# Patient Record
Sex: FEMALE | Race: WHITE | HISPANIC OR LATINO | URBAN - METROPOLITAN AREA
[De-identification: names, ages, dates, MRNs, and addresses within clinical notes are randomized per-mention and may not be internally consistent; named-entity substitution may affect disease eponyms.]

---

## 2017-10-18 ENCOUNTER — OUTPATIENT (OUTPATIENT)
Dept: OUTPATIENT SERVICES | Facility: HOSPITAL | Age: 77
LOS: 1 days | End: 2017-10-18
Payer: MEDICARE

## 2017-10-18 PROCEDURE — 74177 CT ABD & PELVIS W/CONTRAST: CPT | Mod: 26

## 2017-10-18 PROCEDURE — 74177 CT ABD & PELVIS W/CONTRAST: CPT

## 2018-03-01 ENCOUNTER — RESULT REVIEW (OUTPATIENT)
Age: 78
End: 2018-03-01

## 2018-03-02 ENCOUNTER — INPATIENT (INPATIENT)
Facility: HOSPITAL | Age: 78
LOS: 2 days | Discharge: HOME HEALTH | DRG: 355 | End: 2018-03-05
Attending: SURGERY | Admitting: SURGERY
Payer: MEDICARE

## 2018-03-02 VITALS
WEIGHT: 197.09 LBS | HEART RATE: 62 BPM | HEIGHT: 64 IN | TEMPERATURE: 98 F | DIASTOLIC BLOOD PRESSURE: 56 MMHG | OXYGEN SATURATION: 98 % | RESPIRATION RATE: 17 BRPM | SYSTOLIC BLOOD PRESSURE: 111 MMHG

## 2018-03-02 LAB
GLUCOSE BLDC GLUCOMTR-MCNC: 127 MG/DL — HIGH (ref 70–99)
GLUCOSE BLDC GLUCOMTR-MCNC: 182 MG/DL — HIGH (ref 70–99)
GLUCOSE BLDC GLUCOMTR-MCNC: 215 MG/DL — HIGH (ref 70–99)
GLUCOSE BLDC GLUCOMTR-MCNC: 224 MG/DL — HIGH (ref 70–99)
GLUCOSE BLDC GLUCOMTR-MCNC: 233 MG/DL — HIGH (ref 70–99)

## 2018-03-02 PROCEDURE — 49566: CPT | Mod: 82

## 2018-03-02 RX ORDER — SODIUM CHLORIDE 9 MG/ML
500 INJECTION, SOLUTION INTRAVENOUS ONCE
Qty: 0 | Refills: 0 | Status: COMPLETED | OUTPATIENT
Start: 2018-03-02 | End: 2018-03-02

## 2018-03-02 RX ORDER — ACETAMINOPHEN 500 MG
1000 TABLET ORAL ONCE
Qty: 0 | Refills: 0 | Status: COMPLETED | OUTPATIENT
Start: 2018-03-02 | End: 2018-03-03

## 2018-03-02 RX ORDER — LEVOTHYROXINE SODIUM 125 MCG
75 TABLET ORAL AT BEDTIME
Qty: 0 | Refills: 0 | Status: DISCONTINUED | OUTPATIENT
Start: 2018-03-02 | End: 2018-03-03

## 2018-03-02 RX ORDER — HEPARIN SODIUM 5000 [USP'U]/ML
5000 INJECTION INTRAVENOUS; SUBCUTANEOUS EVERY 8 HOURS
Qty: 0 | Refills: 0 | Status: DISCONTINUED | OUTPATIENT
Start: 2018-03-02 | End: 2018-03-05

## 2018-03-02 RX ORDER — SODIUM CHLORIDE 9 MG/ML
1000 INJECTION, SOLUTION INTRAVENOUS
Qty: 0 | Refills: 0 | Status: DISCONTINUED | OUTPATIENT
Start: 2018-03-02 | End: 2018-03-05

## 2018-03-02 RX ORDER — ONDANSETRON 8 MG/1
4 TABLET, FILM COATED ORAL EVERY 6 HOURS
Qty: 0 | Refills: 0 | Status: DISCONTINUED | OUTPATIENT
Start: 2018-03-02 | End: 2018-03-05

## 2018-03-02 RX ORDER — GLUCAGON INJECTION, SOLUTION 0.5 MG/.1ML
1 INJECTION, SOLUTION SUBCUTANEOUS ONCE
Qty: 0 | Refills: 0 | Status: DISCONTINUED | OUTPATIENT
Start: 2018-03-02 | End: 2018-03-05

## 2018-03-02 RX ORDER — DEXTROSE 50 % IN WATER 50 %
25 SYRINGE (ML) INTRAVENOUS ONCE
Qty: 0 | Refills: 0 | Status: DISCONTINUED | OUTPATIENT
Start: 2018-03-02 | End: 2018-03-05

## 2018-03-02 RX ORDER — CEFAZOLIN SODIUM 1 G
1000 VIAL (EA) INJECTION ONCE
Qty: 0 | Refills: 0 | Status: COMPLETED | OUTPATIENT
Start: 2018-03-02 | End: 2018-03-02

## 2018-03-02 RX ORDER — INSULIN LISPRO 100/ML
VIAL (ML) SUBCUTANEOUS
Qty: 0 | Refills: 0 | Status: DISCONTINUED | OUTPATIENT
Start: 2018-03-02 | End: 2018-03-05

## 2018-03-02 RX ORDER — METOPROLOL TARTRATE 50 MG
2.5 TABLET ORAL EVERY 6 HOURS
Qty: 0 | Refills: 0 | Status: DISCONTINUED | OUTPATIENT
Start: 2018-03-02 | End: 2018-03-03

## 2018-03-02 RX ORDER — HYDROMORPHONE HYDROCHLORIDE 2 MG/ML
0.5 INJECTION INTRAMUSCULAR; INTRAVENOUS; SUBCUTANEOUS EVERY 4 HOURS
Qty: 0 | Refills: 0 | Status: DISCONTINUED | OUTPATIENT
Start: 2018-03-02 | End: 2018-03-04

## 2018-03-02 RX ORDER — HYDROMORPHONE HYDROCHLORIDE 2 MG/ML
0.25 INJECTION INTRAMUSCULAR; INTRAVENOUS; SUBCUTANEOUS EVERY 4 HOURS
Qty: 0 | Refills: 0 | Status: DISCONTINUED | OUTPATIENT
Start: 2018-03-02 | End: 2018-03-04

## 2018-03-02 RX ORDER — SODIUM CHLORIDE 9 MG/ML
1000 INJECTION, SOLUTION INTRAVENOUS
Qty: 0 | Refills: 0 | Status: DISCONTINUED | OUTPATIENT
Start: 2018-03-02 | End: 2018-03-04

## 2018-03-02 RX ORDER — DEXTROSE 50 % IN WATER 50 %
12.5 SYRINGE (ML) INTRAVENOUS ONCE
Qty: 0 | Refills: 0 | Status: DISCONTINUED | OUTPATIENT
Start: 2018-03-02 | End: 2018-03-05

## 2018-03-02 RX ORDER — DEXTROSE 50 % IN WATER 50 %
1 SYRINGE (ML) INTRAVENOUS ONCE
Qty: 0 | Refills: 0 | Status: DISCONTINUED | OUTPATIENT
Start: 2018-03-02 | End: 2018-03-05

## 2018-03-02 RX ADMIN — Medication 100 MILLIGRAM(S): at 17:56

## 2018-03-02 RX ADMIN — Medication 2: at 22:54

## 2018-03-02 RX ADMIN — Medication 75 MICROGRAM(S): at 22:55

## 2018-03-02 RX ADMIN — Medication: at 12:02

## 2018-03-02 RX ADMIN — SODIUM CHLORIDE 125 MILLILITER(S): 9 INJECTION, SOLUTION INTRAVENOUS at 21:14

## 2018-03-02 RX ADMIN — HEPARIN SODIUM 5000 UNIT(S): 5000 INJECTION INTRAVENOUS; SUBCUTANEOUS at 22:51

## 2018-03-02 RX ADMIN — HYDROMORPHONE HYDROCHLORIDE 0.5 MILLIGRAM(S): 2 INJECTION INTRAMUSCULAR; INTRAVENOUS; SUBCUTANEOUS at 14:30

## 2018-03-02 RX ADMIN — HYDROMORPHONE HYDROCHLORIDE 0.5 MILLIGRAM(S): 2 INJECTION INTRAMUSCULAR; INTRAVENOUS; SUBCUTANEOUS at 14:04

## 2018-03-02 RX ADMIN — SODIUM CHLORIDE 1000 MILLILITER(S): 9 INJECTION, SOLUTION INTRAVENOUS at 14:00

## 2018-03-02 RX ADMIN — SODIUM CHLORIDE 100 MILLILITER(S): 9 INJECTION, SOLUTION INTRAVENOUS at 11:59

## 2018-03-02 RX ADMIN — ONDANSETRON 4 MILLIGRAM(S): 8 TABLET, FILM COATED ORAL at 17:56

## 2018-03-02 RX ADMIN — HYDROMORPHONE HYDROCHLORIDE 0.5 MILLIGRAM(S): 2 INJECTION INTRAMUSCULAR; INTRAVENOUS; SUBCUTANEOUS at 21:45

## 2018-03-02 RX ADMIN — HYDROMORPHONE HYDROCHLORIDE 0.5 MILLIGRAM(S): 2 INJECTION INTRAMUSCULAR; INTRAVENOUS; SUBCUTANEOUS at 21:19

## 2018-03-02 RX ADMIN — Medication 4: at 16:00

## 2018-03-02 NOTE — BRIEF OPERATIVE NOTE - PROCEDURE
<<-----Click on this checkbox to enter Procedure Incisional hernia repair with mesh  03/02/2018    Active  CORRINA  Robotic assisted procedure  03/02/2018    Active  CORRINA

## 2018-03-02 NOTE — H&P PST ADULT - ASSESSMENT
79 yo F w/ T2DM, HTN, HLD. with questionable surgical history. Here for ventral hernia repair robotic assist, converted to open    1. Ventral hernia s/p open repair  -Pain/nausea control  -DEIDRA  -NPO/IVF  DVT PPx hold until POD1    2. Cardiac hx   Denies any hx, however considered high risk  -2/16 Echo & stress, LVEF 70%, w/ normal perfusion    3. T2DM  -Home Glipizide held  -ISS    4. HTN  -Home Atenolol Held  - Current 2.5 Metoprolol IV Push    5. Hypothyroidism  - oN synthroid  - IV Levothyroxine

## 2018-03-02 NOTE — PROGRESS NOTE ADULT - SUBJECTIVE AND OBJECTIVE BOX
Team 1 Surgery Post-Op Note, PCN:     Pre-Op Dx:  incisional hernia  Procedure: Robotic assisted procedure  Incisional hernia repair with mesh    Surgeon: Huber    Subjective: pt seen at bedside, without complaints at this time      Vital Signs Last 24 Hrs  T(C): 36.6 (02 Mar 2018 11:19), Max: 36.6 (02 Mar 2018 11:19)  T(F): 97.9 (02 Mar 2018 11:19), Max: 97.9 (02 Mar 2018 11:19)  HR: 66 (02 Mar 2018 14:28) (62 - 86)  BP: 97/51 (02 Mar 2018 14:28) (92/50 - 115/58)  BP(mean): 69 (02 Mar 2018 14:28) (62 - 89)  RR: 18 (02 Mar 2018 14:28) (12 - 23)  SpO2: 96% (02 Mar 2018 14:28) (90% - 99%)    Physical Exam:  General: NAD, resting comfortably in bed  Pulmonary: Nonlabored breathing, no respiratory distress  Cardiovascular: NSR  Abdominal: soft, NT/ND  Extremities: WWP, normal strength  Neuro: A/O x 3, CNs II-XII grossly intact, no focal deficits, normal sensation  Pulses: palpable distal pulses            CAPILLARY BLOOD GLUCOSE  224 (02 Mar 2018 11:49)      POCT Blood Glucose.: 224 mg/dL (02 Mar 2018 11:50)  POCT Blood Glucose.: 127 mg/dL (02 Mar 2018 06:45)              Radiology and Additional Studies:    Assessment:78y Female s/p robot assisted incisional hernia repair    Plan:  Pain/nausea control PRN  Home meds  Incentive spirometer/OOB/Ambulate  IVF, Diet: CLD  AM labs  continue suazo

## 2018-03-02 NOTE — H&P PST ADULT - HISTORY OF PRESENT ILLNESS
77 yo F PMHx T2DM, HLD, HTN, Hypothyroidism. Here for a robotic-assisted ventral hernia repair, for a symptomatic ventral hernia. Patient considered cardiac risk for general anesthesia, so a pre-operative Echo & Stress test were performed, LVEF 70%, normal perfusion.     Patient reports no significant surgical history, however found to have 2 defects with suture upon entering abdomen.

## 2018-03-02 NOTE — BRIEF OPERATIVE NOTE - OPERATION/FINDINGS
Patient denied history of surgery, however two defects were identified with Prolene sutures present at both defects.  Several loops of small bowel including the mesentery were incarcerated within the sac and unable to be reduced robotically or laparoscopically.  Procedure was converted to open and the two defects were combined into one by dividing the tissue connecting the two defects.  Retrorectus plane created bilaterally.  Posterior sheath closed with running 0 Vicryl sutures.  Progrip mesh 15 x 15 cm placed overlying posterior sheath closure in retrorectus sheath.  Anterior sheath closed with running 1 PDS sutures.  19F Magdaleno drain left in subcutaneous space created by hernia sac.

## 2018-03-02 NOTE — PACU DISCHARGE NOTE - NS MD DISCHARGE NOTE DISCHARGE
I called pt. No answer. Left msg that I did not get notice of this. The last thing I saw was that they wanted to get old films to compare. Can you call breast center and see if there is an addendum or updated report? I left ms that I did not know and would try to find out.   Floor

## 2018-03-03 LAB
ANION GAP SERPL CALC-SCNC: 10 MMOL/L — SIGNIFICANT CHANGE UP (ref 5–17)
BASOPHILS NFR BLD AUTO: 0.1 % — SIGNIFICANT CHANGE UP (ref 0–2)
BUN SERPL-MCNC: 16 MG/DL — SIGNIFICANT CHANGE UP (ref 7–23)
CALCIUM SERPL-MCNC: 9.1 MG/DL — SIGNIFICANT CHANGE UP (ref 8.4–10.5)
CHLORIDE SERPL-SCNC: 101 MMOL/L — SIGNIFICANT CHANGE UP (ref 96–108)
CO2 SERPL-SCNC: 24 MMOL/L — SIGNIFICANT CHANGE UP (ref 22–31)
CREAT SERPL-MCNC: 0.75 MG/DL — SIGNIFICANT CHANGE UP (ref 0.5–1.3)
EOSINOPHIL NFR BLD AUTO: 0 % — SIGNIFICANT CHANGE UP (ref 0–6)
GLUCOSE BLDC GLUCOMTR-MCNC: 118 MG/DL — HIGH (ref 70–99)
GLUCOSE BLDC GLUCOMTR-MCNC: 125 MG/DL — HIGH (ref 70–99)
GLUCOSE BLDC GLUCOMTR-MCNC: 147 MG/DL — HIGH (ref 70–99)
GLUCOSE BLDC GLUCOMTR-MCNC: 170 MG/DL — HIGH (ref 70–99)
GLUCOSE BLDC GLUCOMTR-MCNC: 185 MG/DL — HIGH (ref 70–99)
GLUCOSE SERPL-MCNC: 132 MG/DL — HIGH (ref 70–99)
HBA1C BLD-MCNC: 5.8 % — HIGH (ref 4–5.6)
HCT VFR BLD CALC: 37.9 % — SIGNIFICANT CHANGE UP (ref 34.5–45)
HGB BLD-MCNC: 12.3 G/DL — SIGNIFICANT CHANGE UP (ref 11.5–15.5)
LYMPHOCYTES # BLD AUTO: 9.2 % — LOW (ref 13–44)
MAGNESIUM SERPL-MCNC: 1.5 MG/DL — LOW (ref 1.6–2.6)
MCHC RBC-ENTMCNC: 29.9 PG — SIGNIFICANT CHANGE UP (ref 27–34)
MCHC RBC-ENTMCNC: 32.5 G/DL — SIGNIFICANT CHANGE UP (ref 32–36)
MCV RBC AUTO: 92 FL — SIGNIFICANT CHANGE UP (ref 80–100)
MONOCYTES NFR BLD AUTO: 6.4 % — SIGNIFICANT CHANGE UP (ref 2–14)
NEUTROPHILS NFR BLD AUTO: 84.3 % — HIGH (ref 43–77)
PHOSPHATE SERPL-MCNC: 3.1 MG/DL — SIGNIFICANT CHANGE UP (ref 2.5–4.5)
PLATELET # BLD AUTO: 218 K/UL — SIGNIFICANT CHANGE UP (ref 150–400)
POTASSIUM SERPL-MCNC: 4.3 MMOL/L — SIGNIFICANT CHANGE UP (ref 3.5–5.3)
POTASSIUM SERPL-SCNC: 4.3 MMOL/L — SIGNIFICANT CHANGE UP (ref 3.5–5.3)
RBC # BLD: 4.12 M/UL — SIGNIFICANT CHANGE UP (ref 3.8–5.2)
RBC # FLD: 13.1 % — SIGNIFICANT CHANGE UP (ref 10.3–16.9)
SODIUM SERPL-SCNC: 135 MMOL/L — SIGNIFICANT CHANGE UP (ref 135–145)
WBC # BLD: 15.1 K/UL — HIGH (ref 3.8–10.5)
WBC # FLD AUTO: 15.1 K/UL — HIGH (ref 3.8–10.5)

## 2018-03-03 RX ORDER — ACETAMINOPHEN 500 MG
1000 TABLET ORAL ONCE
Qty: 0 | Refills: 0 | Status: COMPLETED | OUTPATIENT
Start: 2018-03-03 | End: 2018-03-04

## 2018-03-03 RX ORDER — ATENOLOL 25 MG/1
50 TABLET ORAL DAILY
Qty: 0 | Refills: 0 | Status: DISCONTINUED | OUTPATIENT
Start: 2018-03-03 | End: 2018-03-05

## 2018-03-03 RX ORDER — LISINOPRIL 2.5 MG/1
10 TABLET ORAL DAILY
Qty: 0 | Refills: 0 | Status: DISCONTINUED | OUTPATIENT
Start: 2018-03-03 | End: 2018-03-05

## 2018-03-03 RX ORDER — AMLODIPINE BESYLATE 2.5 MG/1
10 TABLET ORAL DAILY
Qty: 0 | Refills: 0 | Status: DISCONTINUED | OUTPATIENT
Start: 2018-03-03 | End: 2018-03-05

## 2018-03-03 RX ORDER — ATORVASTATIN CALCIUM 80 MG/1
20 TABLET, FILM COATED ORAL AT BEDTIME
Qty: 0 | Refills: 0 | Status: DISCONTINUED | OUTPATIENT
Start: 2018-03-03 | End: 2018-03-05

## 2018-03-03 RX ORDER — LEVOTHYROXINE SODIUM 125 MCG
150 TABLET ORAL DAILY
Qty: 0 | Refills: 0 | Status: DISCONTINUED | OUTPATIENT
Start: 2018-03-03 | End: 2018-03-05

## 2018-03-03 RX ORDER — MAGNESIUM SULFATE 500 MG/ML
4 VIAL (ML) INJECTION ONCE
Qty: 0 | Refills: 0 | Status: COMPLETED | OUTPATIENT
Start: 2018-03-03 | End: 2018-03-03

## 2018-03-03 RX ADMIN — ONDANSETRON 4 MILLIGRAM(S): 8 TABLET, FILM COATED ORAL at 05:47

## 2018-03-03 RX ADMIN — Medication 10 MILLIGRAM(S): at 17:44

## 2018-03-03 RX ADMIN — HEPARIN SODIUM 5000 UNIT(S): 5000 INJECTION INTRAVENOUS; SUBCUTANEOUS at 17:43

## 2018-03-03 RX ADMIN — Medication 100 GRAM(S): at 12:28

## 2018-03-03 RX ADMIN — Medication 1000 MILLIGRAM(S): at 06:15

## 2018-03-03 RX ADMIN — HYDROMORPHONE HYDROCHLORIDE 0.5 MILLIGRAM(S): 2 INJECTION INTRAMUSCULAR; INTRAVENOUS; SUBCUTANEOUS at 12:07

## 2018-03-03 RX ADMIN — HYDROMORPHONE HYDROCHLORIDE 0.5 MILLIGRAM(S): 2 INJECTION INTRAMUSCULAR; INTRAVENOUS; SUBCUTANEOUS at 17:45

## 2018-03-03 RX ADMIN — ATENOLOL 50 MILLIGRAM(S): 25 TABLET ORAL at 10:17

## 2018-03-03 RX ADMIN — Medication 400 MILLIGRAM(S): at 05:47

## 2018-03-03 RX ADMIN — HEPARIN SODIUM 5000 UNIT(S): 5000 INJECTION INTRAVENOUS; SUBCUTANEOUS at 05:47

## 2018-03-03 RX ADMIN — ATORVASTATIN CALCIUM 20 MILLIGRAM(S): 80 TABLET, FILM COATED ORAL at 22:13

## 2018-03-03 RX ADMIN — HEPARIN SODIUM 5000 UNIT(S): 5000 INJECTION INTRAVENOUS; SUBCUTANEOUS at 22:12

## 2018-03-03 RX ADMIN — Medication 2: at 17:44

## 2018-03-03 RX ADMIN — SODIUM CHLORIDE 125 MILLILITER(S): 9 INJECTION, SOLUTION INTRAVENOUS at 06:38

## 2018-03-03 RX ADMIN — HYDROMORPHONE HYDROCHLORIDE 0.5 MILLIGRAM(S): 2 INJECTION INTRAMUSCULAR; INTRAVENOUS; SUBCUTANEOUS at 18:45

## 2018-03-03 RX ADMIN — HYDROMORPHONE HYDROCHLORIDE 0.5 MILLIGRAM(S): 2 INJECTION INTRAMUSCULAR; INTRAVENOUS; SUBCUTANEOUS at 13:05

## 2018-03-03 RX ADMIN — Medication 2: at 22:13

## 2018-03-03 NOTE — PROGRESS NOTE ADULT - SUBJECTIVE AND OBJECTIVE BOX
O/N: EMANUEL, pain well tolerated  3/2: robot assisted incisional hernia repair with mesh, POC wnl    POD 1 Incisional hernia repair with mesh O/N: EMANUEL, pain well tolerated  3/2: robot assisted incisional hernia repair with mesh, POC wnl    POD 1 Incisional hernia repair with mesh    SUBJECTIVE:  pt seen at bedside, complains of some incisional pain    MEDICATIONS  (STANDING):  dextrose 5%. 1000 milliLiter(s) (50 mL/Hr) IV Continuous <Continuous>  dextrose 50% Injectable 12.5 Gram(s) IV Push once  dextrose 50% Injectable 25 Gram(s) IV Push once  dextrose 50% Injectable 25 Gram(s) IV Push once  heparin  Injectable 5000 Unit(s) SubCutaneous every 8 hours  insulin lispro (HumaLOG) corrective regimen sliding scale   SubCutaneous Before meals and at bedtime  lactated ringers. 1000 milliLiter(s) (125 mL/Hr) IV Continuous <Continuous>  levothyroxine Injectable 75 MICROGram(s) IV Push at bedtime  metoprolol    tartrate Injectable 2.5 milliGRAM(s) IV Push every 6 hours    MEDICATIONS  (PRN):  dextrose Gel 1 Dose(s) Oral once PRN Blood Glucose LESS THAN 70 milliGRAM(s)/deciliter  glucagon  Injectable 1 milliGRAM(s) IntraMuscular once PRN Glucose LESS THAN 70 milligrams/deciliter  HYDROmorphone  Injectable 0.25 milliGRAM(s) IV Push every 4 hours PRN Moderate Pain  HYDROmorphone  Injectable 0.5 milliGRAM(s) IV Push every 4 hours PRN Severe Pain  ondansetron Injectable 4 milliGRAM(s) IV Push every 6 hours PRN Nausea      Vital Signs Last 24 Hrs  T(C): 36.6 (03 Mar 2018 04:00), Max: 36.6 (02 Mar 2018 11:19)  T(F): 97.8 (03 Mar 2018 04:00), Max: 97.9 (02 Mar 2018 11:19)  HR: 75 (03 Mar 2018 04:00) (66 - 86)  BP: 111/64 (03 Mar 2018 04:00) (91/48 - 121/67)  BP(mean): 72 (02 Mar 2018 16:00) (62 - 89)  RR: 18 (03 Mar 2018 04:00) (12 - 23)  SpO2: 95% (03 Mar 2018 04:00) (90% - 99%)    PHYSICAL EXAM:      Constitutional: A&Ox3    Respiratory: non labored breathing, no respiratory distress    Cardiovascular: NSR, RRR    Gastrointestinal: soft, mild incisional tenderness, nondistended    Extremities: (-) edema          I&O's Detail    02 Mar 2018 07:01  -  03 Mar 2018 07:00  --------------------------------------------------------  IN:    IV PiggyBack: 200 mL    lactated ringers.: 1980 mL  Total IN: 2180 mL    OUT:    Bulb: 140 mL    Indwelling Catheter - Urethral: 1085 mL  Total OUT: 1225 mL    Total NET: 955 mL          LABS:                        12.3   15.1  )-----------( 218      ( 03 Mar 2018 06:35 )             37.9     03-03    135  |  101  |  16  ----------------------------<  132<H>  4.3   |  24  |  0.75    Ca    9.1      03 Mar 2018 06:35  Phos  3.1     03-03  Mg     1.5     03-03

## 2018-03-03 NOTE — PROGRESS NOTE ADULT - ASSESSMENT
79 yo F w/ T2DM, HTN, HLD. with questionable surgical history. Here for ventral hernia repair robotic assist, converted to open    1. Ventral hernia s/p open repair  -Pain/nausea control  -DEIDRA  -CLD/IVF  -DVT PPx hold until POD1  -Ancef x 1 @ 18:00  -Nicole  -LEANN drain    2. Cardiac hx   Denies any hx, however considered high risk  -2/16 Echo & stress, LVEF 70%, w/ normal perfusion    3. T2DM  -Home Glipizide held  -ISS    4. HTN  -Home Atenolol Held  - Current 2.5 Metoprolol IV Push    5. Hypothyroidism  - On synthroid  - IV Levothyroxine 77 yo F w/ T2DM, HTN, HLD. with questionable surgical history. Here for ventral hernia repair robotic assist, converted to open    1. Ventral hernia s/p open repair  -Pain/nausea control  -DEIDRA  -CLD/IVF-adv to regular diet  -DVT PPx hold until POD1  -Ancef x 1 @ 18:00  -Nicole-dc this am  -LEANN drain    2. Cardiac hx   Denies any hx, however considered high risk  -2/16 Echo & stress, LVEF 70%, w/ normal perfusion    3. T2DM  -Home Glipizide held  -ISS    4. HTN  -Home Atenolol Held  - Current 2.5 Metoprolol IV Push    5. Hypothyroidism  - On synthroid  - IV Levothyroxine

## 2018-03-04 ENCOUNTER — TRANSCRIPTION ENCOUNTER (OUTPATIENT)
Age: 78
End: 2018-03-04

## 2018-03-04 LAB
GLUCOSE BLDC GLUCOMTR-MCNC: 134 MG/DL — HIGH (ref 70–99)
GLUCOSE BLDC GLUCOMTR-MCNC: 148 MG/DL — HIGH (ref 70–99)
GLUCOSE BLDC GLUCOMTR-MCNC: 150 MG/DL — HIGH (ref 70–99)
GLUCOSE BLDC GLUCOMTR-MCNC: 158 MG/DL — HIGH (ref 70–99)

## 2018-03-04 PROCEDURE — 74019 RADEX ABDOMEN 2 VIEWS: CPT | Mod: 26

## 2018-03-04 PROCEDURE — 74021 RADEX ABDOMEN 3+ VIEWS: CPT | Mod: 26

## 2018-03-04 RX ORDER — ATORVASTATIN CALCIUM 80 MG/1
1 TABLET, FILM COATED ORAL
Qty: 0 | Refills: 0 | COMMUNITY
Start: 2018-03-04

## 2018-03-04 RX ORDER — OXYCODONE AND ACETAMINOPHEN 5; 325 MG/1; MG/1
2 TABLET ORAL EVERY 4 HOURS
Qty: 0 | Refills: 0 | Status: DISCONTINUED | OUTPATIENT
Start: 2018-03-04 | End: 2018-03-05

## 2018-03-04 RX ORDER — OXYCODONE AND ACETAMINOPHEN 5; 325 MG/1; MG/1
1 TABLET ORAL EVERY 4 HOURS
Qty: 0 | Refills: 0 | Status: DISCONTINUED | OUTPATIENT
Start: 2018-03-04 | End: 2018-03-05

## 2018-03-04 RX ADMIN — LISINOPRIL 10 MILLIGRAM(S): 2.5 TABLET ORAL at 05:10

## 2018-03-04 RX ADMIN — Medication 150 MICROGRAM(S): at 05:10

## 2018-03-04 RX ADMIN — ATORVASTATIN CALCIUM 20 MILLIGRAM(S): 80 TABLET, FILM COATED ORAL at 21:57

## 2018-03-04 RX ADMIN — HEPARIN SODIUM 5000 UNIT(S): 5000 INJECTION INTRAVENOUS; SUBCUTANEOUS at 14:11

## 2018-03-04 RX ADMIN — AMLODIPINE BESYLATE 10 MILLIGRAM(S): 2.5 TABLET ORAL at 05:10

## 2018-03-04 RX ADMIN — OXYCODONE AND ACETAMINOPHEN 2 TABLET(S): 5; 325 TABLET ORAL at 09:03

## 2018-03-04 RX ADMIN — Medication 400 MILLIGRAM(S): at 00:14

## 2018-03-04 RX ADMIN — Medication 2: at 17:14

## 2018-03-04 RX ADMIN — OXYCODONE AND ACETAMINOPHEN 2 TABLET(S): 5; 325 TABLET ORAL at 10:00

## 2018-03-04 RX ADMIN — HEPARIN SODIUM 5000 UNIT(S): 5000 INJECTION INTRAVENOUS; SUBCUTANEOUS at 05:10

## 2018-03-04 RX ADMIN — ATENOLOL 50 MILLIGRAM(S): 25 TABLET ORAL at 05:10

## 2018-03-04 RX ADMIN — Medication 1000 MILLIGRAM(S): at 00:45

## 2018-03-04 RX ADMIN — HEPARIN SODIUM 5000 UNIT(S): 5000 INJECTION INTRAVENOUS; SUBCUTANEOUS at 21:58

## 2018-03-04 NOTE — DISCHARGE NOTE ADULT - PATIENT PORTAL LINK FT
You can access the [facility] Patient Portal, offered by [facility], by registering with the following website: [email or web address]

## 2018-03-04 NOTE — PROGRESS NOTE ADULT - ASSESSMENT
79 yo F w/ T2DM, HTN, HLD. with questionable surgical history. Here for ventral hernia repair robotic assist, converted to open    1. Ventral hernia s/p open repair  -Pain/nausea control  -DEIDRA  -CLD/IVF  -DVT PPx  -LEANN drain    2. Cardiac hx   Denies any hx, however considered high risk  -2/16 Echo & stress, LVEF 70%, w/ normal perfusion    3. T2DM  -Home Glipizide held  -ISS    4. HTN  -Home Atenolol Held  - Current 2.5 Metoprolol IV Push    5. Hypothyroidism  - On synthroid  - IV Levothyroxine 77 yo F w/ T2DM, HTN, HLD. with questionable surgical history. Here for ventral hernia repair robotic assist, converted to open    1. Ventral hernia s/p open repair  -Pain/nausea control  -DEIDRA  -Reg diet  -DVT PPx  -LEANN drain    2. Cardiac hx   Denies any hx, however considered high risk  -2/16 Echo & stress, LVEF 70%, w/ normal perfusion    3. T2DM  -Home Glipizide held  -ISS    4. HTN  -Home Atenolol Held  - Current 2.5 Metoprolol IV Push    5. Hypothyroidism  - PO synthroid

## 2018-03-04 NOTE — PROGRESS NOTE ADULT - SUBJECTIVE AND OBJECTIVE BOX
O/N: EMANUEL, pain well tolerated  3/3: gabriele amato'd, oral home meds, adv to regular diet, dulcolax suppository, denies BM, passed TOV    POD 2  robot assisted incisional hernia repair with mesh O/N: EMANUEL, pain well tolerated  3/3: gabriele montoya, oral home meds, adv to regular diet, dulcolax suppository, heidi BM, passed TOV    POD 2  robot assisted incisional hernia repair with mesh      Vital Signs Last 24 Hrs  T(C): 37.4 (04 Mar 2018 05:08), Max: 37.4 (03 Mar 2018 22:03)  T(F): 99.3 (04 Mar 2018 05:08), Max: 99.3 (03 Mar 2018 22:03)  HR: 88 (04 Mar 2018 05:08) (82 - 90)  BP: 125/81 (04 Mar 2018 05:08) (125/81 - 145/72)  BP(mean): --  RR: 23 (04 Mar 2018 05:08) (16 - 24)  SpO2: 91% (04 Mar 2018 05:08) (90% - 95%)      I&O's Summary    02 Mar 2018 07:01  -  03 Mar 2018 07:00  --------------------------------------------------------  IN: 2180 mL / OUT: 1225 mL / NET: 955 mL    03 Mar 2018 07:01  -  04 Mar 2018 06:21  --------------------------------------------------------  IN: 180 mL / OUT: 581 mL / NET: -401 mL        Gen: NAD   Abd: soft, nt / nd   incisions c/d/i

## 2018-03-04 NOTE — DISCHARGE NOTE ADULT - HOSPITAL COURSE
Her postoperative course was unremarkable with advancement of diet, passing trial of void, and pain control. On day of discharge patient was stable to be d/c'd home.

## 2018-03-04 NOTE — DISCHARGE NOTE ADULT - MEDICATION SUMMARY - MEDICATIONS TO TAKE
I will START or STAY ON the medications listed below when I get home from the hospital:    Aspirin Enteric Coated 81 mg oral delayed release tablet  -- 1 tab(s) by mouth once a day  -- Indication: For Home med     oxyCODONE-acetaminophen 5 mg-325 mg oral tablet  -- 1 tab(s) by mouth every 4 hours, As needed, Moderate Pain (4 - 6)  -- Indication: For PRN pain    lisinopril 10 mg oral tablet  -- 1 tab(s) by mouth once a day  -- Indication: For Home med     glipiZIDE 5 mg oral tablet  -- 1 tab(s) by mouth once a day  -- Indication: For Home med     atorvastatin 20 mg oral tablet  -- 1 tab(s) by mouth once a day (at bedtime)  -- Indication: For Home med     amlodipine-atorvastatin 10 mg-20 mg oral tablet  -- 1 tab(s) by mouth once a day  -- Indication: For Home med     atenolol 50 mg oral tablet  -- 1 tab(s) by mouth once a day  -- Indication: For Home med     Synthroid 150 mcg (0.15 mg) oral tablet  -- 1 tab(s) by mouth once a day  -- Indication: For Home med I will START or STAY ON the medications listed below when I get home from the hospital:    Aspirin Enteric Coated 81 mg oral delayed release tablet  -- 1 tab(s) by mouth once a day  -- Indication: For Home med     oxyCODONE-acetaminophen 5 mg-325 mg oral tablet  -- 1 tab(s) by mouth every 4 hours, As needed, Moderate Pain (4 - 6) MDD:6  -- Indication: For PRN pain     lisinopril 10 mg oral tablet  -- 1 tab(s) by mouth once a day  -- Indication: For Home med     glipiZIDE 5 mg oral tablet  -- 1 tab(s) by mouth once a day  -- Indication: For Home med     atorvastatin 20 mg oral tablet  -- 1 tab(s) by mouth once a day (at bedtime)  -- Indication: For Home med     amlodipine-atorvastatin 10 mg-20 mg oral tablet  -- 1 tab(s) by mouth once a day  -- Indication: For Home med     atenolol 50 mg oral tablet  -- 1 tab(s) by mouth once a day  -- Indication: For Home med     Synthroid 150 mcg (0.15 mg) oral tablet  -- 1 tab(s) by mouth once a day  -- Indication: For Home med I will START or STAY ON the medications listed below when I get home from the hospital:    Aspirin Enteric Coated 81 mg oral delayed release tablet  -- 1 tab(s) by mouth once a day  -- Indication: For Home med     oxyCODONE-acetaminophen 5 mg-325 mg oral tablet  -- 1 tab(s) by mouth every 4 hours, As needed, Moderate Pain (4 - 6) MDD:6  -- Indication: For PRN pain     lisinopril 10 mg oral tablet  -- 1 tab(s) by mouth once a day  -- Indication: For Home med     glipiZIDE 5 mg oral tablet  -- 1 tab(s) by mouth once a day  -- Indication: For Home med     atorvastatin 20 mg oral tablet  -- 1 tab(s) by mouth once a day (at bedtime)  -- Indication: For Home med     amlodipine-atorvastatin 10 mg-20 mg oral tablet  -- 1 tab(s) by mouth once a day  -- Indication: For Home med     atenolol 50 mg oral tablet  -- 1 tab(s) by mouth once a day  -- Indication: For Home med     bisacodyl 5 mg oral delayed release tablet  -- 1 tab(s) by mouth every 12 hours, As needed, Constipation  -- Indication: For constipation    Synthroid 150 mcg (0.15 mg) oral tablet  -- 1 tab(s) by mouth once a day  -- Indication: For Home med

## 2018-03-04 NOTE — DISCHARGE NOTE ADULT - CARE PLAN
Principal Discharge DX:	Ventral hernia  Goal:	follow up  Assessment and plan of treatment:	follow up in the office with dr. daley. call to schedule an appointment. call in 1 - 2 weeks. continue with regular diet. take pain medication only as needed. Principal Discharge DX:	Ventral hernia  Goal:	follow up  Assessment and plan of treatment:	follow up in the office with dr. daley. call to schedule an appointment. call in 1 week. continue with regular diet. take pain medication only as needed. LEANN will remain in place. Dr. Daley will remove drain in the office.

## 2018-03-04 NOTE — DISCHARGE NOTE ADULT - PLAN OF CARE
follow up follow up in the office with dr. daley. call to schedule an appointment. call in 1 - 2 weeks. continue with regular diet. take pain medication only as needed. follow up in the office with dr. daley. call to schedule an appointment. call in 1 week. continue with regular diet. take pain medication only as needed. LEANN will remain in place. Dr. Daley will remove drain in the office.

## 2018-03-05 VITALS
SYSTOLIC BLOOD PRESSURE: 100 MMHG | OXYGEN SATURATION: 91 % | HEART RATE: 95 BPM | RESPIRATION RATE: 17 BRPM | DIASTOLIC BLOOD PRESSURE: 73 MMHG | TEMPERATURE: 98 F

## 2018-03-05 LAB
ANION GAP SERPL CALC-SCNC: 12 MMOL/L — SIGNIFICANT CHANGE UP (ref 5–17)
BUN SERPL-MCNC: 31 MG/DL — HIGH (ref 7–23)
CALCIUM SERPL-MCNC: 10.2 MG/DL — SIGNIFICANT CHANGE UP (ref 8.4–10.5)
CHLORIDE SERPL-SCNC: 101 MMOL/L — SIGNIFICANT CHANGE UP (ref 96–108)
CO2 SERPL-SCNC: 22 MMOL/L — SIGNIFICANT CHANGE UP (ref 22–31)
CREAT SERPL-MCNC: 0.92 MG/DL — SIGNIFICANT CHANGE UP (ref 0.5–1.3)
GLUCOSE BLDC GLUCOMTR-MCNC: 151 MG/DL — HIGH (ref 70–99)
GLUCOSE BLDC GLUCOMTR-MCNC: 177 MG/DL — HIGH (ref 70–99)
GLUCOSE SERPL-MCNC: 163 MG/DL — HIGH (ref 70–99)
HCT VFR BLD CALC: 39.2 % — SIGNIFICANT CHANGE UP (ref 34.5–45)
HGB BLD-MCNC: 12.5 G/DL — SIGNIFICANT CHANGE UP (ref 11.5–15.5)
MAGNESIUM SERPL-MCNC: 2.1 MG/DL — SIGNIFICANT CHANGE UP (ref 1.6–2.6)
MCHC RBC-ENTMCNC: 30 PG — SIGNIFICANT CHANGE UP (ref 27–34)
MCHC RBC-ENTMCNC: 31.9 G/DL — LOW (ref 32–36)
MCV RBC AUTO: 94.2 FL — SIGNIFICANT CHANGE UP (ref 80–100)
PHOSPHATE SERPL-MCNC: 2.5 MG/DL — SIGNIFICANT CHANGE UP (ref 2.5–4.5)
PLATELET # BLD AUTO: 273 K/UL — SIGNIFICANT CHANGE UP (ref 150–400)
POTASSIUM SERPL-MCNC: 4.6 MMOL/L — SIGNIFICANT CHANGE UP (ref 3.5–5.3)
POTASSIUM SERPL-SCNC: 4.6 MMOL/L — SIGNIFICANT CHANGE UP (ref 3.5–5.3)
RBC # BLD: 4.16 M/UL — SIGNIFICANT CHANGE UP (ref 3.8–5.2)
RBC # FLD: 13.8 % — SIGNIFICANT CHANGE UP (ref 10.3–16.9)
SODIUM SERPL-SCNC: 135 MMOL/L — SIGNIFICANT CHANGE UP (ref 135–145)
WBC # BLD: 20.6 K/UL — HIGH (ref 3.8–10.5)
WBC # FLD AUTO: 20.6 K/UL — HIGH (ref 3.8–10.5)

## 2018-03-05 PROCEDURE — 85025 COMPLETE CBC W/AUTO DIFF WBC: CPT

## 2018-03-05 PROCEDURE — 83735 ASSAY OF MAGNESIUM: CPT

## 2018-03-05 PROCEDURE — 84100 ASSAY OF PHOSPHORUS: CPT

## 2018-03-05 PROCEDURE — S2900: CPT

## 2018-03-05 PROCEDURE — 74019 RADEX ABDOMEN 2 VIEWS: CPT

## 2018-03-05 PROCEDURE — 74021 RADEX ABDOMEN 3+ VIEWS: CPT

## 2018-03-05 PROCEDURE — 36415 COLL VENOUS BLD VENIPUNCTURE: CPT

## 2018-03-05 PROCEDURE — 88302 TISSUE EXAM BY PATHOLOGIST: CPT

## 2018-03-05 PROCEDURE — 83036 HEMOGLOBIN GLYCOSYLATED A1C: CPT

## 2018-03-05 PROCEDURE — 82962 GLUCOSE BLOOD TEST: CPT

## 2018-03-05 PROCEDURE — 85027 COMPLETE CBC AUTOMATED: CPT

## 2018-03-05 PROCEDURE — 80048 BASIC METABOLIC PNL TOTAL CA: CPT

## 2018-03-05 PROCEDURE — C1781: CPT

## 2018-03-05 RX ADMIN — OXYCODONE AND ACETAMINOPHEN 1 TABLET(S): 5; 325 TABLET ORAL at 05:57

## 2018-03-05 RX ADMIN — AMLODIPINE BESYLATE 10 MILLIGRAM(S): 2.5 TABLET ORAL at 05:57

## 2018-03-05 RX ADMIN — Medication 150 MICROGRAM(S): at 05:57

## 2018-03-05 RX ADMIN — HEPARIN SODIUM 5000 UNIT(S): 5000 INJECTION INTRAVENOUS; SUBCUTANEOUS at 05:57

## 2018-03-05 RX ADMIN — ATENOLOL 50 MILLIGRAM(S): 25 TABLET ORAL at 05:57

## 2018-03-05 RX ADMIN — OXYCODONE AND ACETAMINOPHEN 1 TABLET(S): 5; 325 TABLET ORAL at 06:51

## 2018-03-05 RX ADMIN — LISINOPRIL 10 MILLIGRAM(S): 2.5 TABLET ORAL at 05:57

## 2018-03-05 RX ADMIN — OXYCODONE AND ACETAMINOPHEN 2 TABLET(S): 5; 325 TABLET ORAL at 00:47

## 2018-03-05 RX ADMIN — Medication 2: at 08:12

## 2018-03-05 RX ADMIN — OXYCODONE AND ACETAMINOPHEN 2 TABLET(S): 5; 325 TABLET ORAL at 01:30

## 2018-03-05 NOTE — PROGRESS NOTE ADULT - SUBJECTIVE AND OBJECTIVE BOX
O/N:pain controlled, tolerating diet, ARBF. VSS. EMANUEL  3/4: diet advanced to regular, mild abdominal distension, abdominal film performed confirming dilatation O/N:pain controlled, tolerating diet, ARBF. VSS. EMANUEL  3/4: diet advanced to regular, mild abdominal distension, abdominal film performed confirming dilatation       INTERVAL HPI/OVERNIGHT EVENTS:    STATUS POST:    POD 3  robot assisted incisional hernia repair with mesh    SUBJECTIVE: Pt seen and examined at bedside. No flatus no BM. States chronic laxative use.   Flatus: [ ] YES [ x] NO             Bowel Movement: [ ] YES [ x] NO  Pain (0-10):            Pain Control Adequate: [x ] YES [ ] NO  Nausea: [ ] YES [x ] NO            Vomiting: [ ] YES [x ] NO  Diarrhea: [ ] YES [x ] NO         Constipation: [x ] YES [ ] NO     Chest Pain: [ ] YES [ x] NO    SOB:  [ ] YES [x ] NO    MEDICATIONS  (STANDING):  amLODIPine   Tablet 10 milliGRAM(s) Oral daily  ATENolol  Tablet 50 milliGRAM(s) Oral daily  atorvastatin 20 milliGRAM(s) Oral at bedtime  dextrose 5%. 1000 milliLiter(s) (50 mL/Hr) IV Continuous <Continuous>  dextrose 50% Injectable 12.5 Gram(s) IV Push once  dextrose 50% Injectable 25 Gram(s) IV Push once  dextrose 50% Injectable 25 Gram(s) IV Push once  heparin  Injectable 5000 Unit(s) SubCutaneous every 8 hours  insulin lispro (HumaLOG) corrective regimen sliding scale   SubCutaneous Before meals and at bedtime  levothyroxine 150 MICROGram(s) Oral daily  lisinopril 10 milliGRAM(s) Oral daily    MEDICATIONS  (PRN):  bisacodyl 5 milliGRAM(s) Oral every 12 hours PRN Constipation  dextrose Gel 1 Dose(s) Oral once PRN Blood Glucose LESS THAN 70 milliGRAM(s)/deciliter  glucagon  Injectable 1 milliGRAM(s) IntraMuscular once PRN Glucose LESS THAN 70 milligrams/deciliter  ondansetron Injectable 4 milliGRAM(s) IV Push every 6 hours PRN Nausea  oxyCODONE    5 mG/acetaminophen 325 mG 1 Tablet(s) Oral every 4 hours PRN Moderate Pain (4 - 6)  oxyCODONE    5 mG/acetaminophen 325 mG 2 Tablet(s) Oral every 4 hours PRN Severe Pain (7 - 10)      Vital Signs Last 24 Hrs  T(C): 36.7 (05 Mar 2018 05:11), Max: 37.1 (04 Mar 2018 21:10)  T(F): 98.1 (05 Mar 2018 05:11), Max: 98.7 (04 Mar 2018 21:10)  HR: 83 (05 Mar 2018 05:11) (80 - 95)  BP: 121/77 (05 Mar 2018 05:11) (121/72 - 143/78)  BP(mean): --  RR: 17 (05 Mar 2018 05:11) (16 - 20)  SpO2: 93% (05 Mar 2018 05:11) (91% - 95%)    PHYSICAL EXAM:      Constitutional: A&Ox3    Respiratory: non labored breathing, no respiratory distress    Cardiovascular:  RRR    Gastrointestinal: obese, softly distended, NT incisions cdi s/s to LEANN    Genitourinary: voiding    Extremities: (-) edema                  I&O's Detail    04 Mar 2018 07:01  -  05 Mar 2018 07:00  --------------------------------------------------------  IN:    Oral Fluid: 480 mL  Total IN: 480 mL    OUT:    Bulb: 10 mL    Voided: 400 mL  Total OUT: 410 mL    Total NET: 70 mL          LABS:                        12.5   20.6  )-----------( 273      ( 05 Mar 2018 05:26 )             39.2     03-05    135  |  101  |  31<H>  ----------------------------<  163<H>  4.6   |  22  |  0.92    Ca    10.2      05 Mar 2018 05:26  Phos  2.5     03-05  Mg     2.1     03-05            RADIOLOGY & ADDITIONAL STUDIES:

## 2018-03-06 LAB — SURGICAL PATHOLOGY STUDY: SIGNIFICANT CHANGE UP

## 2018-03-07 DIAGNOSIS — K43.0 INCISIONAL HERNIA WITH OBSTRUCTION, WITHOUT GANGRENE: ICD-10-CM

## 2018-03-07 DIAGNOSIS — I10 ESSENTIAL (PRIMARY) HYPERTENSION: ICD-10-CM

## 2018-03-07 DIAGNOSIS — E03.9 HYPOTHYROIDISM, UNSPECIFIED: ICD-10-CM

## 2018-03-07 DIAGNOSIS — E11.9 TYPE 2 DIABETES MELLITUS WITHOUT COMPLICATIONS: ICD-10-CM

## 2018-03-07 DIAGNOSIS — E66.9 OBESITY, UNSPECIFIED: ICD-10-CM

## 2018-03-07 DIAGNOSIS — E78.5 HYPERLIPIDEMIA, UNSPECIFIED: ICD-10-CM

## 2018-03-07 DIAGNOSIS — K42.9 UMBILICAL HERNIA WITHOUT OBSTRUCTION OR GANGRENE: ICD-10-CM

## 2018-03-12 ENCOUNTER — INPATIENT (INPATIENT)
Facility: HOSPITAL | Age: 78
LOS: 3 days | Discharge: HOME HEALTH | DRG: 176 | End: 2018-03-16
Attending: SURGERY | Admitting: SURGERY
Payer: MEDICARE

## 2018-03-12 VITALS
SYSTOLIC BLOOD PRESSURE: 105 MMHG | RESPIRATION RATE: 18 BRPM | OXYGEN SATURATION: 95 % | HEART RATE: 72 BPM | DIASTOLIC BLOOD PRESSURE: 71 MMHG

## 2018-03-12 DIAGNOSIS — Z98.890 OTHER SPECIFIED POSTPROCEDURAL STATES: Chronic | ICD-10-CM

## 2018-03-12 PROBLEM — E66.9 OBESITY, UNSPECIFIED: Chronic | Status: ACTIVE | Noted: 2018-03-01

## 2018-03-12 PROBLEM — I10 ESSENTIAL (PRIMARY) HYPERTENSION: Chronic | Status: ACTIVE | Noted: 2018-03-01

## 2018-03-12 PROBLEM — E78.5 HYPERLIPIDEMIA, UNSPECIFIED: Chronic | Status: ACTIVE | Noted: 2018-03-01

## 2018-03-12 PROBLEM — E11.9 TYPE 2 DIABETES MELLITUS WITHOUT COMPLICATIONS: Chronic | Status: ACTIVE | Noted: 2018-03-01

## 2018-03-12 LAB
ALBUMIN SERPL ELPH-MCNC: 2.2 G/DL — LOW (ref 3.3–5)
ALP SERPL-CCNC: 125 U/L — HIGH (ref 40–120)
ALT FLD-CCNC: 23 U/L — SIGNIFICANT CHANGE UP (ref 10–45)
ANION GAP SERPL CALC-SCNC: 14 MMOL/L — SIGNIFICANT CHANGE UP (ref 5–17)
APTT BLD: 31.9 SEC — SIGNIFICANT CHANGE UP (ref 27.5–37.4)
APTT BLD: 79.4 SEC — HIGH (ref 27.5–37.4)
AST SERPL-CCNC: 33 U/L — SIGNIFICANT CHANGE UP (ref 10–40)
BILIRUB SERPL-MCNC: 1 MG/DL — SIGNIFICANT CHANGE UP (ref 0.2–1.2)
BUN SERPL-MCNC: 31 MG/DL — HIGH (ref 7–23)
CALCIUM SERPL-MCNC: 9.4 MG/DL — SIGNIFICANT CHANGE UP (ref 8.4–10.5)
CHLORIDE SERPL-SCNC: 105 MMOL/L — SIGNIFICANT CHANGE UP (ref 96–108)
CO2 SERPL-SCNC: 24 MMOL/L — SIGNIFICANT CHANGE UP (ref 22–31)
CREAT SERPL-MCNC: 0.82 MG/DL — SIGNIFICANT CHANGE UP (ref 0.5–1.3)
GLUCOSE BLDC GLUCOMTR-MCNC: 112 MG/DL — HIGH (ref 70–99)
GLUCOSE BLDC GLUCOMTR-MCNC: 66 MG/DL — LOW (ref 70–99)
GLUCOSE BLDC GLUCOMTR-MCNC: 90 MG/DL — SIGNIFICANT CHANGE UP (ref 70–99)
GLUCOSE SERPL-MCNC: 95 MG/DL — SIGNIFICANT CHANGE UP (ref 70–99)
HCT VFR BLD CALC: 42.1 % — SIGNIFICANT CHANGE UP (ref 34.5–45)
HGB BLD-MCNC: 13.5 G/DL — SIGNIFICANT CHANGE UP (ref 11.5–15.5)
INR BLD: 1.35 — HIGH (ref 0.88–1.16)
LG PLATELETS BLD QL AUTO: PRESENT — SIGNIFICANT CHANGE UP
LYMPHOCYTES # BLD AUTO: 10 % — LOW (ref 13–44)
MANUAL DIF COMMENT BLD-IMP: SIGNIFICANT CHANGE UP
MANUAL SMEAR VERIFICATION: YES — SIGNIFICANT CHANGE UP
MCHC RBC-ENTMCNC: 29.9 PG — SIGNIFICANT CHANGE UP (ref 27–34)
MCHC RBC-ENTMCNC: 32.1 G/DL — SIGNIFICANT CHANGE UP (ref 32–36)
MCV RBC AUTO: 93.1 FL — SIGNIFICANT CHANGE UP (ref 80–100)
METAMYELOCYTES # FLD: 1 % — HIGH
MONOCYTES NFR BLD AUTO: 5 % — SIGNIFICANT CHANGE UP (ref 2–14)
MYELOCYTES NFR BLD: 1 % — HIGH
NEUTROPHILS NFR BLD AUTO: 67 % — SIGNIFICANT CHANGE UP (ref 43–77)
NEUTS BAND # BLD: 16 % — HIGH
PLAT MORPH BLD: (no result)
PLATELET # BLD AUTO: 253 K/UL — SIGNIFICANT CHANGE UP (ref 150–400)
POTASSIUM SERPL-MCNC: 4 MMOL/L — SIGNIFICANT CHANGE UP (ref 3.5–5.3)
POTASSIUM SERPL-SCNC: 4 MMOL/L — SIGNIFICANT CHANGE UP (ref 3.5–5.3)
PROT SERPL-MCNC: 6.7 G/DL — SIGNIFICANT CHANGE UP (ref 6–8.3)
PROTHROM AB SERPL-ACNC: 15.1 SEC — HIGH (ref 9.8–12.7)
RBC # BLD: 4.52 M/UL — SIGNIFICANT CHANGE UP (ref 3.8–5.2)
RBC # FLD: 14.4 % — SIGNIFICANT CHANGE UP (ref 10.3–16.9)
RBC BLD AUTO: NORMAL — SIGNIFICANT CHANGE UP
SODIUM SERPL-SCNC: 143 MMOL/L — SIGNIFICANT CHANGE UP (ref 135–145)
TOXIC GRANULES BLD QL SMEAR: SLIGHT — SIGNIFICANT CHANGE UP
TROPONIN T SERPL-MCNC: <0.01 NG/ML — SIGNIFICANT CHANGE UP (ref 0–0.01)
TROPONIN T SERPL-MCNC: <0.01 NG/ML — SIGNIFICANT CHANGE UP (ref 0–0.01)
WBC # BLD: 20.2 K/UL — HIGH (ref 3.8–10.5)
WBC # FLD AUTO: 20.2 K/UL — HIGH (ref 3.8–10.5)

## 2018-03-12 PROCEDURE — 71045 X-RAY EXAM CHEST 1 VIEW: CPT | Mod: 26

## 2018-03-12 PROCEDURE — 99285 EMERGENCY DEPT VISIT HI MDM: CPT | Mod: 25

## 2018-03-12 PROCEDURE — 71275 CT ANGIOGRAPHY CHEST: CPT | Mod: 26

## 2018-03-12 PROCEDURE — 99223 1ST HOSP IP/OBS HIGH 75: CPT | Mod: GC

## 2018-03-12 PROCEDURE — 93010 ELECTROCARDIOGRAM REPORT: CPT

## 2018-03-12 RX ORDER — ATORVASTATIN CALCIUM 80 MG/1
20 TABLET, FILM COATED ORAL AT BEDTIME
Qty: 0 | Refills: 0 | Status: DISCONTINUED | OUTPATIENT
Start: 2018-03-12 | End: 2018-03-16

## 2018-03-12 RX ORDER — HEPARIN SODIUM 5000 [USP'U]/ML
3500 INJECTION INTRAVENOUS; SUBCUTANEOUS EVERY 6 HOURS
Qty: 0 | Refills: 0 | Status: DISCONTINUED | OUTPATIENT
Start: 2018-03-12 | End: 2018-03-12

## 2018-03-12 RX ORDER — ONDANSETRON 8 MG/1
4 TABLET, FILM COATED ORAL EVERY 6 HOURS
Qty: 0 | Refills: 0 | Status: DISCONTINUED | OUTPATIENT
Start: 2018-03-12 | End: 2018-03-16

## 2018-03-12 RX ORDER — SODIUM CHLORIDE 9 MG/ML
1000 INJECTION, SOLUTION INTRAVENOUS
Qty: 0 | Refills: 0 | Status: DISCONTINUED | OUTPATIENT
Start: 2018-03-12 | End: 2018-03-16

## 2018-03-12 RX ORDER — SODIUM CHLORIDE 9 MG/ML
1000 INJECTION, SOLUTION INTRAVENOUS
Qty: 0 | Refills: 0 | Status: DISCONTINUED | OUTPATIENT
Start: 2018-03-12 | End: 2018-03-14

## 2018-03-12 RX ORDER — HEPARIN SODIUM 5000 [USP'U]/ML
7000 INJECTION INTRAVENOUS; SUBCUTANEOUS EVERY 6 HOURS
Qty: 0 | Refills: 0 | Status: DISCONTINUED | OUTPATIENT
Start: 2018-03-12 | End: 2018-03-12

## 2018-03-12 RX ORDER — DOCUSATE SODIUM 100 MG
100 CAPSULE ORAL THREE TIMES A DAY
Qty: 0 | Refills: 0 | Status: DISCONTINUED | OUTPATIENT
Start: 2018-03-12 | End: 2018-03-16

## 2018-03-12 RX ORDER — SENNA PLUS 8.6 MG/1
2 TABLET ORAL AT BEDTIME
Qty: 0 | Refills: 0 | Status: DISCONTINUED | OUTPATIENT
Start: 2018-03-12 | End: 2018-03-16

## 2018-03-12 RX ORDER — ACETAMINOPHEN 500 MG
975 TABLET ORAL ONCE
Qty: 0 | Refills: 0 | Status: COMPLETED | OUTPATIENT
Start: 2018-03-12 | End: 2018-03-12

## 2018-03-12 RX ORDER — DEXTROSE 50 % IN WATER 50 %
1 SYRINGE (ML) INTRAVENOUS ONCE
Qty: 0 | Refills: 0 | Status: DISCONTINUED | OUTPATIENT
Start: 2018-03-12 | End: 2018-03-16

## 2018-03-12 RX ORDER — HEPARIN SODIUM 5000 [USP'U]/ML
7000 INJECTION INTRAVENOUS; SUBCUTANEOUS ONCE
Qty: 0 | Refills: 0 | Status: COMPLETED | OUTPATIENT
Start: 2018-03-12 | End: 2018-03-12

## 2018-03-12 RX ORDER — KETOROLAC TROMETHAMINE 30 MG/ML
15 SYRINGE (ML) INJECTION ONCE
Qty: 0 | Refills: 0 | Status: DISCONTINUED | OUTPATIENT
Start: 2018-03-12 | End: 2018-03-12

## 2018-03-12 RX ORDER — INSULIN LISPRO 100/ML
VIAL (ML) SUBCUTANEOUS
Qty: 0 | Refills: 0 | Status: DISCONTINUED | OUTPATIENT
Start: 2018-03-12 | End: 2018-03-16

## 2018-03-12 RX ORDER — DEXTROSE 50 % IN WATER 50 %
50 SYRINGE (ML) INTRAVENOUS ONCE
Qty: 0 | Refills: 0 | Status: COMPLETED | OUTPATIENT
Start: 2018-03-12 | End: 2018-03-12

## 2018-03-12 RX ORDER — MORPHINE SULFATE 50 MG/1
4 CAPSULE, EXTENDED RELEASE ORAL ONCE
Qty: 0 | Refills: 0 | Status: DISCONTINUED | OUTPATIENT
Start: 2018-03-12 | End: 2018-03-12

## 2018-03-12 RX ORDER — DEXTROSE 50 % IN WATER 50 %
25 SYRINGE (ML) INTRAVENOUS ONCE
Qty: 0 | Refills: 0 | Status: DISCONTINUED | OUTPATIENT
Start: 2018-03-12 | End: 2018-03-12

## 2018-03-12 RX ORDER — LEVOTHYROXINE SODIUM 125 MCG
150 TABLET ORAL DAILY
Qty: 0 | Refills: 0 | Status: DISCONTINUED | OUTPATIENT
Start: 2018-03-13 | End: 2018-03-16

## 2018-03-12 RX ORDER — DEXTROSE 50 % IN WATER 50 %
12.5 SYRINGE (ML) INTRAVENOUS ONCE
Qty: 0 | Refills: 0 | Status: DISCONTINUED | OUTPATIENT
Start: 2018-03-12 | End: 2018-03-16

## 2018-03-12 RX ORDER — GLUCAGON INJECTION, SOLUTION 0.5 MG/.1ML
1 INJECTION, SOLUTION SUBCUTANEOUS ONCE
Qty: 0 | Refills: 0 | Status: DISCONTINUED | OUTPATIENT
Start: 2018-03-12 | End: 2018-03-16

## 2018-03-12 RX ORDER — ACETAMINOPHEN 500 MG
650 TABLET ORAL EVERY 6 HOURS
Qty: 0 | Refills: 0 | Status: DISCONTINUED | OUTPATIENT
Start: 2018-03-12 | End: 2018-03-16

## 2018-03-12 RX ORDER — DEXTROSE 50 % IN WATER 50 %
25 SYRINGE (ML) INTRAVENOUS ONCE
Qty: 0 | Refills: 0 | Status: DISCONTINUED | OUTPATIENT
Start: 2018-03-12 | End: 2018-03-16

## 2018-03-12 RX ORDER — LISINOPRIL 2.5 MG/1
10 TABLET ORAL DAILY
Qty: 0 | Refills: 0 | Status: DISCONTINUED | OUTPATIENT
Start: 2018-03-13 | End: 2018-03-16

## 2018-03-12 RX ORDER — ATENOLOL 25 MG/1
50 TABLET ORAL DAILY
Qty: 0 | Refills: 0 | Status: DISCONTINUED | OUTPATIENT
Start: 2018-03-13 | End: 2018-03-16

## 2018-03-12 RX ORDER — ASPIRIN/CALCIUM CARB/MAGNESIUM 324 MG
81 TABLET ORAL DAILY
Qty: 0 | Refills: 0 | Status: DISCONTINUED | OUTPATIENT
Start: 2018-03-12 | End: 2018-03-16

## 2018-03-12 RX ORDER — SODIUM CHLORIDE 9 MG/ML
1000 INJECTION, SOLUTION INTRAVENOUS
Qty: 0 | Refills: 0 | Status: DISCONTINUED | OUTPATIENT
Start: 2018-03-12 | End: 2018-03-12

## 2018-03-12 RX ORDER — HEPARIN SODIUM 5000 [USP'U]/ML
INJECTION INTRAVENOUS; SUBCUTANEOUS
Qty: 25000 | Refills: 0 | Status: DISCONTINUED | OUTPATIENT
Start: 2018-03-12 | End: 2018-03-13

## 2018-03-12 RX ADMIN — HEPARIN SODIUM 1600 UNIT(S)/HR: 5000 INJECTION INTRAVENOUS; SUBCUTANEOUS at 17:05

## 2018-03-12 RX ADMIN — Medication 975 MILLIGRAM(S): at 15:05

## 2018-03-12 RX ADMIN — MORPHINE SULFATE 4 MILLIGRAM(S): 50 CAPSULE, EXTENDED RELEASE ORAL at 17:06

## 2018-03-12 RX ADMIN — Medication 975 MILLIGRAM(S): at 16:05

## 2018-03-12 RX ADMIN — Medication 50 MILLILITER(S): at 18:08

## 2018-03-12 RX ADMIN — MORPHINE SULFATE 4 MILLIGRAM(S): 50 CAPSULE, EXTENDED RELEASE ORAL at 17:21

## 2018-03-12 RX ADMIN — HEPARIN SODIUM 7000 UNIT(S): 5000 INJECTION INTRAVENOUS; SUBCUTANEOUS at 17:06

## 2018-03-12 RX ADMIN — SODIUM CHLORIDE 150 MILLILITER(S): 9 INJECTION, SOLUTION INTRAVENOUS at 12:50

## 2018-03-12 RX ADMIN — Medication 15 MILLIGRAM(S): at 15:05

## 2018-03-12 RX ADMIN — Medication 15 MILLIGRAM(S): at 15:20

## 2018-03-12 NOTE — CONSULT NOTE ADULT - SUBJECTIVE AND OBJECTIVE BOX
Patient is a 78y old  Female who presents with a chief complaint of Confusion, pulmonary embolism (12 Mar 2018 18:28)      HPI:  77 yo female with hx of HTN, HLD, DM2, hypothyroidism, s/p ventral hernia repair with Dr. Ngo on 3/2 (robotic assisted converted to open), had an uncomplicated post-op course and was discharged on 3/5, presents to the ED today by EMS 2/2 hypoglycemia. Patient was confused this morning at home, diaphoretic and shaking and her daughter called EMS, she was found to have Glc 40. Patient reports taking diabetic medications daily, but over the last few days has not had an appetite and has been eating much less. Denies nausea, emesis, has mild abdominal and back pain, but it's well controlled with pain medications. Patient also reports diarrheal stools, that are darer than usual. Denies melena, BRBPR, urinary symptoms fevers or chills.  In the ED patient started complaining of some chest pain, which prompted CTA PE protocol study. Patient denies difficulty breathing, palpitations or shortness of breath.    In the ED remains hemodynamically stable, afebrile. Saturating 93-96% on RA. (12 Mar 2018 18:28)      PAST MEDICAL & SURGICAL HISTORY:  Obesity  DM (diabetes mellitus)  HLD (hyperlipidemia)  HTN (hypertension)  H/O ventral hernia repair      FAMILY HISTORY:  No pertinent family history in first degree relatives      SOCIAL HISTORY:  Smoking Status: [ ] Current, [ ] Former, [ ] Never  Pack Years:    MEDICATIONS:  Pulmonary:    Antimicrobials:    Anticoagulants:  aspirin enteric coated 81 milliGRAM(s) Oral daily  heparin  Infusion. 1500 Unit(s)/Hr IV Continuous <Continuous>    Onc:    GI/:  docusate sodium 100 milliGRAM(s) Oral three times a day PRN  senna 2 Tablet(s) Oral at bedtime PRN    Endocrine:  atorvastatin 20 milliGRAM(s) Oral at bedtime  dextrose 50% Injectable 12.5 Gram(s) IV Push once  dextrose 50% Injectable 25 Gram(s) IV Push once  dextrose 50% Injectable 25 Gram(s) IV Push once  dextrose Gel 1 Dose(s) Oral once PRN  glucagon  Injectable 1 milliGRAM(s) IntraMuscular once PRN  insulin lispro (HumaLOG) corrective regimen sliding scale   SubCutaneous Before meals and at bedtime  levothyroxine 150 MICROGram(s) Oral daily    Cardiac:  ATENolol  Tablet 50 milliGRAM(s) Oral daily  lisinopril 10 milliGRAM(s) Oral daily    Other Medications:  acetaminophen   Tablet 650 milliGRAM(s) Oral every 6 hours PRN  dextrose 5% + sodium chloride 0.45%. 1000 milliLiter(s) IV Continuous <Continuous>  dextrose 5%. 1000 milliLiter(s) IV Continuous <Continuous>  ondansetron Injectable 4 milliGRAM(s) IV Push every 6 hours PRN      Allergies    No Known Allergies    Intolerances        Vital Signs Last 24 Hrs  T(C): 37.2 (13 Mar 2018 20:49), Max: 37.4 (13 Mar 2018 00:50)  T(F): 98.9 (13 Mar 2018 20:49), Max: 99.4 (13 Mar 2018 00:50)  HR: 77 (13 Mar 2018 20:49) (76 - 86)  BP: 113/53 (13 Mar 2018 20:49) (109/65 - 118/56)  BP(mean): --  RR: 16 (13 Mar 2018 20:49) (16 - 18)  SpO2: 96% (13 Mar 2018 20:49) (95% - 96%)    03-13 @ 07:01  -  03-13 @ 23:04  --------------------------------------------------------  IN: 846 mL / OUT: 0 mL / NET: 846 mL          LABS:      CBC Full  -  ( 13 Mar 2018 07:49 )  WBC Count : 15.2 K/uL  Hemoglobin : 11.5 g/dL  Hematocrit : 35.6 %  Platelet Count - Automated : 181 K/uL  Mean Cell Volume : 92.5 fL  Mean Cell Hemoglobin : 29.9 pg  Mean Cell Hemoglobin Concentration : 32.3 g/dL  Auto Neutrophil # : x  Auto Lymphocyte # : x  Auto Monocyte # : x  Auto Eosinophil # : x  Auto Basophil # : x  Auto Neutrophil % : x  Auto Lymphocyte % : x  Auto Monocyte % : x  Auto Eosinophil % : x  Auto Basophil % : x    03-13    140  |  103  |  25<H>  ----------------------------<  147<H>  3.5   |  26  |  0.78    Ca    9.0      13 Mar 2018 07:49  Phos  3.3     03-13  Mg     1.8     03-13    TPro  5.9<L>  /  Alb  2.0<L>  /  TBili  1.0  /  DBili  x   /  AST  28  /  ALT  20  /  AlkPhos  110  03-13    PT/INR - ( 13 Mar 2018 07:49 )   PT: 17.0 sec;   INR: 1.52          PTT - ( 13 Mar 2018 18:43 )  PTT:94.6 sec    < from: CT Angio Chest PE Protocol w/ IV Cont (03.12.18 @ 14:14) >  EXAM:  CT ANGIO CHEST PE PROTOCOL IC                          PROCEDURE DATE:  03/12/2018    Quantity of Contrast in Vial in ml: 100 Contrast Used: Optiray 350  Quantity of Contrast Wasted in ml: 7           INTERPRETATION:  CTA (CT angiography) ofthe CHEST dated 3/12/2018 2:14 PM    INDICATION: Shortness of breath. Recent surgery. Assess for pulmonary   embolism.    TECHNIQUE: CT angiography of the chest was performed during bolus   injection of intravenous contrast.  Post-processing including the   production of axial, coronal and sagittal multiplanar reformatted images   and axial and coronal maximum intensity projections (MIPs) was performed.    PRIOR STUDY: None.    FINDINGS: There are filling defects within the motion artifact is present   in these regions. Arteries in the right upper lobe, right middle lobe,   and lingula. There also are probable subsegmental pulmonary emboli   involving the bilateral lower lobes. Evaluation here is difficult due to   breathing artifact. The pulmonary artery measures 3.4 cm. No thoracic   aortic aneurysm. The heart is normal in size. There is no ventricular   septal bowing, however there is increased ratio between the right and   left ventricles, suspicious for right heart strain. No pericardial   effusion is seen.     No mediastinal, hilar or axillary lymphadenopathy is seen. A few   epiphrenic lymph nodes are noted measuring up to 0.9 cm in short axis.    No pleural effusions are seen. Images of the lung are degraded by motion   artifact, especially at the lung bases. Scattered micronodules in both   lungs. Few thin-walled cysts within the left upper lobe. Mosaic lung   perfusion pattern is noted bilaterally.     Limited evaluation of the upper abdomen demonstrates no abnormality.     Evaluation of the osseous structures demonstrates moderate degenerative   changes of the spine.      IMPRESSION:   1.  Pulmonary emboli within the segmental arteries of the right upper   lobe, right middle lobe, and lingula.  2.  Findings suspicious for right heart strain, recommend correlation   with echocardiography.     < end of copied text >                  RADIOLOGY & ADDITIONAL STUDIES (The following images were personally reviewed):

## 2018-03-12 NOTE — ED PROVIDER NOTE - PHYSICAL EXAMINATION
VITAL SIGNS: I have reviewed nursing notes and confirm.  CONSTITUTIONAL: Well-developed; well-nourished obese female comfortable in stretcher moving all ext speaking clearly in complete sentences; in no acute distress.  SKIN: Agree with RN documentation regarding decubitus evaluation. Remainder of skin exam is warm and dry, no acute rash.  HEAD: Normocephalic; atraumatic.  EYES: PERRL, EOM intact; conjunctiva and sclera clear.  ENT: No nasal discharge; airway clear.  NECK: Supple; non tender.  CARD: S1, S2 normal; no murmurs, gallops, or rubs. Regular rate and rhythm.  RESP: No wheezes, rales or rhonchi.  ABD: Normal bowel sounds; soft; non-distended; non-tender; well healing midline surgical scar w/staples, + RLQ LEANN drain w/mild surrounding erythema, no discharge/purulence  EXT: Normal ROM. No clubbing, cyanosis or edema. non-ttp all ext  LYMPH: No acute cervical adenopathy.  NEURO: Alert, oriented. Grossly unremarkable.  PSYCH: Cooperative, appropriate.

## 2018-03-12 NOTE — CONSULT NOTE ADULT - ATTENDING COMMENTS
Patient seen and examined with house-staff during bedside rounds.  Resident note read, including vitals, physical findings, laboratory data, and radiological reports.   Revisions included below.  Direct personal management at bed side and extensive interpretation of the data.  Plan was outlined and discussed in details with the housestaff.  Decision making of high complexity  Action taken for acute disease activity to reflect the level of care provided:  - medication reconciliation  - review laboratory data  - Review the CT scan of chest  - discussion and management of PE

## 2018-03-12 NOTE — ED PROVIDER NOTE - MEDICAL DECISION MAKING DETAILS
+ hypoglycemia 2/2 long acting glipizide, stable BGM's on D5 1/2NS drinking orange juice, tolerating PO w/out pain, + R-sided PE on CTA, initiating heparin therapy, will require admission, discussing disposition with surgical team, anticipate admission to their service for post-op complication.

## 2018-03-12 NOTE — ED ADULT NURSE NOTE - OTHER COMPLAINTS
Brother called EMS because patient had altered mental status. On arrival of EMS, fingerstick 22. Patient given 1 amp D50. FS at triage 114. IV#18g to right forearm placed by EMS.

## 2018-03-12 NOTE — ED ADULT NURSE NOTE - OBJECTIVE STATEMENT
Patient presents to the ED via EMS, as per EMS, brother called 911 as patient was not acting like herself. FS 22 in field, given D5. Patient in the ED, alert and oriented x3. Speaking in full sentences. S/p hernia surgery on march 2nd, staples noted to abdomen, no signs of infections noted. reports diarrhea which has been going on since she was discharged from hospital. Patient presents to the ED via EMS, as per EMS, brother called 911 as patient was not acting like herself. FS 22 in field, given D5. Patient in the ED, alert and oriented x3. Speaking in full sentences. Brother reports that has not been eating. Brother gave her the diabetes medication today.  S/p hernia surgery on march 2nd, staples noted to abdomen, no signs of infections noted. Patient noted to have a tube to lower right abdomen. Reports diarrhea which has been going on since she was discharged from hospital as per the brother. Patient also complaining of chest pain and shortness of breath since yesterday. No fever or chills.

## 2018-03-12 NOTE — CONSULT NOTE ADULT - PROBLEM SELECTOR RECOMMENDATION 9
the patient has provoked PE postoperatively.  Duration of treatment is 3 month.  CT scan of chest revealed enlarged PA and right ventricular strain.  There is not enough clot burden to account for the dilated PA and troponin is negative.  She was started on heparin and aim at PTT 1.5x control.  Venous doppler/echo

## 2018-03-12 NOTE — H&P ADULT - HISTORY OF PRESENT ILLNESS
79 yo female with hx of HTN, HLD, DM2, hypothyroidism, s/p ventral hernia repair with Dr. Ngo on 3/2 (robotic assisted converted to open), had an uncomplicated post-op course and was discharged on 3/5, presents to the ED today by EMS 2/2 hypoglycemia. Patient was confused this morning at home, diaphoretic and shaking and her daughter called EMS, she was found to have Glc 40. Patient reports taking diabetic medications daily, but over the last few days has not had an appetite and has been eating much less. Denies nausea, emesis, has mild abdominal and back pain, but it's well controlled with pain medications. Patient also reports diarrheal stools, that are darer than usual. Denies melena, BRBPR, urinary symptoms fevers or chills.  In the ED patient started complaining of some chest pain, which prompted CTA PE protocol study. Patient denies difficulty breathing, palpitations or shortness of breath.    In the ED remains hemodynamically stable, afebrile. Saturating 93-96% on RA.

## 2018-03-12 NOTE — H&P ADULT - ASSESSMENT
77 yo female with HTN, HLD, DM2 and recent ventral hernia repair with Dr. Ngo (3/2/18), uncomplicated hospital course, presents to the ED with signs of hypoglycemia and some chest pain, found to have RUL PE on CTA.   - admit to surgery service under care of Dr. Ngo   - heparin gtt   - ECHO   - US Duplex LE b/l   - clears, IVF hydration   - trend troponin, rest of labs in am   - trend PTT goal 60-80   - pulmonology consult Dr. Trammell   - telemetry level of care   - discussed with Dr. Ngo

## 2018-03-12 NOTE — H&P ADULT - NSHPLABSRESULTS_GEN_ALL_CORE
Comprehensive Metabolic Panel (03.12.18 @ 11:48)    Sodium, Serum: 143 mmol/L    Potassium, Serum: 4.0 mmol/L    Chloride, Serum: 105 mmol/L    Carbon Dioxide, Serum: 24 mmol/L    Anion Gap, Serum: 14 mmol/L    Blood Urea Nitrogen, Serum: 31 mg/dL    Creatinine, Serum: 0.82 mg/dL    Glucose, Serum: 95 mg/dL    Calcium, Total Serum: 9.4 mg/dL    Protein Total, Serum: 6.7 g/dL    Albumin, Serum: 2.2 g/dL    Bilirubin Total, Serum: 1.0 mg/dL    Alkaline Phosphatase, Serum: 125 U/L    Aspartate Aminotransferase (AST/SGOT): 33 U/L    Alanine Aminotransferase (ALT/SGPT): 23 U/L    Complete Blood Count + Automated Diff (03.12.18 @ 11:48)    WBC Count: 20.2 K/uL    RBC Count: 4.52 M/uL    Hemoglobin: 13.5 g/dL    Hematocrit: 42.1 %    Mean Cell Volume: 93.1 fL    Mean Cell Hemoglobin: 29.9 pg    Mean Cell Hemoglobin Conc: 32.1 g/dL    Red Cell Distrib Width: 14.4 %    Platelet Count - Automated: 253 K/uL    POCT  Blood Glucose (03.12.18 @ 16:06)    POCT Blood Glucose.: 59: NotifiedMDClndMtr mg/dL    Prothrombin Time and INR, Plasma (03.12.18 @ 11:48)    Prothrombin Time, Plasma: 15.1: Effective March 21st, the reference range for PT has changed. sec    INR: 1.35: An INR within the range of 2.00 to 3.00 is recommended for patients with  deep vein thrombosis, pulmonary embolism, atrial fibrillation and tissue  valves.  An INR within the range of 2.50 to 3.50 is recommended for most patients  with artificial valves.    < from: CT Angio Chest PE Protocol w/ IV Cont (03.12.18 @ 14:14) >    IMPRESSION:   1.  Pulmonary emboli within the segmental arteries of the right upper   lobe, right middle lobe, and lingula.  2.  Findings suspicious for right heart strain, recommend correlation   with echocardiography.

## 2018-03-12 NOTE — ED PROVIDER NOTE - OBJECTIVE STATEMENT
77 yo F PMHx T2DM, HLD, HTN, Hypothyroidism, s/p ventral hernia repair on 3/4/18, unremarkable post-operative course, discharged on 3/5/18, p/w fatigue, diaphoresis, tremor and hypoglycemia after taking glipizide without eating anything today. Pt has had poor appetite since leaving hospital but has continued to take regular doses of her DM meds. Also endorsing exertional SOB and a dull substernal non-radiating non-positional CP which she describes as a heaviness. Having loose BMs, no brbpr or melena stool. No active abd pain. Has a RLQ LEANN drain in place to be removed this week, but notes that the drain pouch fell off somewhere in her house and she doesn't know where it is.

## 2018-03-12 NOTE — H&P ADULT - NSHPPHYSICALEXAM_GEN_ALL_CORE
Alert and oriented, in no acute distress  Normocardic, normotensive  Non-labored breathing on RA, mildly decreased breath sounds bibasilar  Abdomen soft, obese, mildly distended, non-tender throughout, no rebound or guarding, LEANN drain in place with no output, mild erythema around LEANN drain, midline incsion site healing well, small surrounding erythema, no edema, or drainage

## 2018-03-13 DIAGNOSIS — I26.99 OTHER PULMONARY EMBOLISM WITHOUT ACUTE COR PULMONALE: ICD-10-CM

## 2018-03-13 DIAGNOSIS — R06.00 DYSPNEA, UNSPECIFIED: ICD-10-CM

## 2018-03-13 DIAGNOSIS — I27.21 SECONDARY PULMONARY ARTERIAL HYPERTENSION: ICD-10-CM

## 2018-03-13 LAB
ALBUMIN SERPL ELPH-MCNC: 2 G/DL — LOW (ref 3.3–5)
ALP SERPL-CCNC: 110 U/L — SIGNIFICANT CHANGE UP (ref 40–120)
ALT FLD-CCNC: 20 U/L — SIGNIFICANT CHANGE UP (ref 10–45)
ANION GAP SERPL CALC-SCNC: 11 MMOL/L — SIGNIFICANT CHANGE UP (ref 5–17)
APTT BLD: 112.3 SEC — HIGH (ref 27.5–37.4)
APTT BLD: 88.7 SEC — HIGH (ref 27.5–37.4)
APTT BLD: 94.6 SEC — HIGH (ref 27.5–37.4)
AST SERPL-CCNC: 28 U/L — SIGNIFICANT CHANGE UP (ref 10–40)
BILIRUB SERPL-MCNC: 1 MG/DL — SIGNIFICANT CHANGE UP (ref 0.2–1.2)
BUN SERPL-MCNC: 25 MG/DL — HIGH (ref 7–23)
C DIFF GDH STL QL: NEGATIVE — SIGNIFICANT CHANGE UP
C DIFF GDH STL QL: SIGNIFICANT CHANGE UP
CALCIUM SERPL-MCNC: 9 MG/DL — SIGNIFICANT CHANGE UP (ref 8.4–10.5)
CHLORIDE SERPL-SCNC: 103 MMOL/L — SIGNIFICANT CHANGE UP (ref 96–108)
CO2 SERPL-SCNC: 26 MMOL/L — SIGNIFICANT CHANGE UP (ref 22–31)
CREAT SERPL-MCNC: 0.78 MG/DL — SIGNIFICANT CHANGE UP (ref 0.5–1.3)
GLUCOSE BLDC GLUCOMTR-MCNC: 105 MG/DL — HIGH (ref 70–99)
GLUCOSE BLDC GLUCOMTR-MCNC: 129 MG/DL — HIGH (ref 70–99)
GLUCOSE BLDC GLUCOMTR-MCNC: 138 MG/DL — HIGH (ref 70–99)
GLUCOSE BLDC GLUCOMTR-MCNC: 143 MG/DL — HIGH (ref 70–99)
GLUCOSE BLDC GLUCOMTR-MCNC: 160 MG/DL — HIGH (ref 70–99)
GLUCOSE BLDC GLUCOMTR-MCNC: 96 MG/DL — SIGNIFICANT CHANGE UP (ref 70–99)
GLUCOSE SERPL-MCNC: 147 MG/DL — HIGH (ref 70–99)
HBA1C BLD-MCNC: 5.7 % — HIGH (ref 4–5.6)
HCT VFR BLD CALC: 35.6 % — SIGNIFICANT CHANGE UP (ref 34.5–45)
HGB BLD-MCNC: 11.5 G/DL — SIGNIFICANT CHANGE UP (ref 11.5–15.5)
INR BLD: 1.52 — HIGH (ref 0.88–1.16)
MAGNESIUM SERPL-MCNC: 1.8 MG/DL — SIGNIFICANT CHANGE UP (ref 1.6–2.6)
MCHC RBC-ENTMCNC: 29.9 PG — SIGNIFICANT CHANGE UP (ref 27–34)
MCHC RBC-ENTMCNC: 32.3 G/DL — SIGNIFICANT CHANGE UP (ref 32–36)
MCV RBC AUTO: 92.5 FL — SIGNIFICANT CHANGE UP (ref 80–100)
PHOSPHATE SERPL-MCNC: 3.3 MG/DL — SIGNIFICANT CHANGE UP (ref 2.5–4.5)
PLATELET # BLD AUTO: 181 K/UL — SIGNIFICANT CHANGE UP (ref 150–400)
POTASSIUM SERPL-MCNC: 3.5 MMOL/L — SIGNIFICANT CHANGE UP (ref 3.5–5.3)
POTASSIUM SERPL-SCNC: 3.5 MMOL/L — SIGNIFICANT CHANGE UP (ref 3.5–5.3)
PROT SERPL-MCNC: 5.9 G/DL — LOW (ref 6–8.3)
PROTHROM AB SERPL-ACNC: 17 SEC — HIGH (ref 9.8–12.7)
RBC # BLD: 3.85 M/UL — SIGNIFICANT CHANGE UP (ref 3.8–5.2)
RBC # FLD: 14.3 % — SIGNIFICANT CHANGE UP (ref 10.3–16.9)
SODIUM SERPL-SCNC: 140 MMOL/L — SIGNIFICANT CHANGE UP (ref 135–145)
TROPONIN T SERPL-MCNC: <0.01 NG/ML — SIGNIFICANT CHANGE UP (ref 0–0.01)
WBC # BLD: 15.2 K/UL — HIGH (ref 3.8–10.5)
WBC # FLD AUTO: 15.2 K/UL — HIGH (ref 3.8–10.5)

## 2018-03-13 PROCEDURE — 93306 TTE W/DOPPLER COMPLETE: CPT | Mod: 26

## 2018-03-13 PROCEDURE — 99232 SBSQ HOSP IP/OBS MODERATE 35: CPT | Mod: GC

## 2018-03-13 RX ORDER — HEPARIN SODIUM 5000 [USP'U]/ML
1500 INJECTION INTRAVENOUS; SUBCUTANEOUS
Qty: 25000 | Refills: 0 | Status: DISCONTINUED | OUTPATIENT
Start: 2018-03-13 | End: 2018-03-14

## 2018-03-13 RX ORDER — HYDROMORPHONE HYDROCHLORIDE 2 MG/ML
0.5 INJECTION INTRAMUSCULAR; INTRAVENOUS; SUBCUTANEOUS ONCE
Qty: 0 | Refills: 0 | Status: DISCONTINUED | OUTPATIENT
Start: 2018-03-13 | End: 2018-03-13

## 2018-03-13 RX ORDER — HEPARIN SODIUM 5000 [USP'U]/ML
1550 INJECTION INTRAVENOUS; SUBCUTANEOUS
Qty: 25000 | Refills: 0 | Status: DISCONTINUED | OUTPATIENT
Start: 2018-03-13 | End: 2018-03-13

## 2018-03-13 RX ADMIN — Medication 2: at 12:46

## 2018-03-13 RX ADMIN — Medication 150 MICROGRAM(S): at 06:57

## 2018-03-13 RX ADMIN — HEPARIN SODIUM 1400 UNIT(S)/HR: 5000 INJECTION INTRAVENOUS; SUBCUTANEOUS at 09:29

## 2018-03-13 RX ADMIN — Medication 81 MILLIGRAM(S): at 12:46

## 2018-03-13 RX ADMIN — ATENOLOL 50 MILLIGRAM(S): 25 TABLET ORAL at 06:57

## 2018-03-13 RX ADMIN — ATORVASTATIN CALCIUM 20 MILLIGRAM(S): 80 TABLET, FILM COATED ORAL at 22:33

## 2018-03-13 RX ADMIN — SODIUM CHLORIDE 125 MILLILITER(S): 9 INJECTION, SOLUTION INTRAVENOUS at 15:18

## 2018-03-13 RX ADMIN — LISINOPRIL 10 MILLIGRAM(S): 2.5 TABLET ORAL at 06:57

## 2018-03-13 RX ADMIN — HYDROMORPHONE HYDROCHLORIDE 0.5 MILLIGRAM(S): 2 INJECTION INTRAMUSCULAR; INTRAVENOUS; SUBCUTANEOUS at 01:25

## 2018-03-13 RX ADMIN — Medication 650 MILLIGRAM(S): at 22:33

## 2018-03-13 RX ADMIN — HYDROMORPHONE HYDROCHLORIDE 0.5 MILLIGRAM(S): 2 INJECTION INTRAMUSCULAR; INTRAVENOUS; SUBCUTANEOUS at 01:08

## 2018-03-13 NOTE — PATIENT PROFILE ADULT. - --DESCRIBE SURGICAL SITE
midline Abd incision with stables open to air clean dry and intact with 1 LEANN on the right side of Abd no drainage

## 2018-03-13 NOTE — PROGRESS NOTE ADULT - SUBJECTIVE AND OBJECTIVE BOX
O/N: admitted with PE started on heparin gtt, troponin negative x2, PTT 79.4 continue heparin gtt at 16, diarrhea sent sample to r/o C. Diff O/N: admitted with PE started on heparin gtt, troponin negative x2, PTT 79.4 continue heparin gtt at 16, diarrhea sent sample to r/o C. Diff       SUBJECTIVE: Patient seen and examined bedside by chief resident. Doing well, no longer complaining of chest pain, or SOB.     aspirin enteric coated 81 milliGRAM(s) Oral daily  ATENolol  Tablet 50 milliGRAM(s) Oral daily  heparin  Infusion.  Unit(s)/Hr IV Continuous <Continuous>  lisinopril 10 milliGRAM(s) Oral daily      Vital Signs Last 24 Hrs  T(C): 37.2 (13 Mar 2018 13:41), Max: 37.7 (12 Mar 2018 23:03)  T(F): 99 (13 Mar 2018 13:41), Max: 99.9 (12 Mar 2018 23:03)  HR: 76 (13 Mar 2018 13:41) (62 - 91)  BP: 115/57 (13 Mar 2018 13:41) (114/58 - 123/71)  BP(mean): --  RR: 18 (13 Mar 2018 13:41) (17 - 18)  SpO2: 96% (13 Mar 2018 13:41) (93% - 96%)  I&O's Detail    13 Mar 2018 07:01  -  13 Mar 2018 14:43  --------------------------------------------------------  IN:    dextrose 5% + sodium chloride 0.45%.: 750 mL    heparin  Infusion.: 96 mL  Total IN: 846 mL    OUT:  Total OUT: 0 mL    Total NET: 846 mL          General: NAD, resting comfortably in bed  C/V: NSR  Pulm: Nonlabored breathing, no respiratory distress  Abd: soft, NT/ND. LEANN drain in place.   Extrem: WWP, no edema, SCDs in place        LABS:                        11.5   15.2  )-----------( 181      ( 13 Mar 2018 07:49 )             35.6     03-13    140  |  103  |  25<H>  ----------------------------<  147<H>  3.5   |  26  |  0.78    Ca    9.0      13 Mar 2018 07:49  Phos  3.3     03-13  Mg     1.8     03-13    TPro  5.9<L>  /  Alb  2.0<L>  /  TBili  1.0  /  DBili  x   /  AST  28  /  ALT  20  /  AlkPhos  110  03-13    PT/INR - ( 13 Mar 2018 07:49 )   PT: 17.0 sec;   INR: 1.52          PTT - ( 13 Mar 2018 12:59 )  PTT:88.7 sec      RADIOLOGY & ADDITIONAL STUDIES:

## 2018-03-13 NOTE — PROGRESS NOTE ADULT - SUBJECTIVE AND OBJECTIVE BOX
Interval Events: Reviewed  Patient seen and examined at bedside.    Patient is a 78y old  Female who presents with a chief complaint of Confusion, pulmonary embolism (12 Mar 2018 18:28)    she is still sob but better  PAST MEDICAL & SURGICAL HISTORY:  Obesity  DM (diabetes mellitus)  HLD (hyperlipidemia)  HTN (hypertension)  H/O ventral hernia repair      MEDICATIONS:  Pulmonary:    Antimicrobials:    Anticoagulants:  aspirin enteric coated 81 milliGRAM(s) Oral daily  heparin  Infusion. 1500 Unit(s)/Hr IV Continuous <Continuous>    Cardiac:  ATENolol  Tablet 50 milliGRAM(s) Oral daily  lisinopril 10 milliGRAM(s) Oral daily      Allergies    No Known Allergies    Intolerances        Vital Signs Last 24 Hrs  T(C): 37.2 (13 Mar 2018 20:49), Max: 37.4 (13 Mar 2018 00:50)  T(F): 98.9 (13 Mar 2018 20:49), Max: 99.4 (13 Mar 2018 00:50)  HR: 77 (13 Mar 2018 20:49) (76 - 86)  BP: 113/53 (13 Mar 2018 20:49) (109/65 - 118/56)  BP(mean): --  RR: 16 (13 Mar 2018 20:49) (16 - 18)  SpO2: 96% (13 Mar 2018 20:49) (95% - 96%)    03-13 @ 07:01  -  03-13 @ 23:10  --------------------------------------------------------  IN: 846 mL / OUT: 0 mL / NET: 846 mL          LABS:      CBC Full  -  ( 13 Mar 2018 07:49 )  WBC Count : 15.2 K/uL  Hemoglobin : 11.5 g/dL  Hematocrit : 35.6 %  Platelet Count - Automated : 181 K/uL  Mean Cell Volume : 92.5 fL  Mean Cell Hemoglobin : 29.9 pg  Mean Cell Hemoglobin Concentration : 32.3 g/dL  Auto Neutrophil # : x  Auto Lymphocyte # : x  Auto Monocyte # : x  Auto Eosinophil # : x  Auto Basophil # : x  Auto Neutrophil % : x  Auto Lymphocyte % : x  Auto Monocyte % : x  Auto Eosinophil % : x  Auto Basophil % : x    03-13    140  |  103  |  25<H>  ----------------------------<  147<H>  3.5   |  26  |  0.78    Ca    9.0      13 Mar 2018 07:49  Phos  3.3     03-13  Mg     1.8     03-13    TPro  5.9<L>  /  Alb  2.0<L>  /  TBili  1.0  /  DBili  x   /  AST  28  /  ALT  20  /  AlkPhos  110  03-13    PT/INR - ( 13 Mar 2018 07:49 )   PT: 17.0 sec;   INR: 1.52          PTT - ( 13 Mar 2018 18:43 )  PTT:94.6 sec          < from: Echocardiogram (03.13.18 @ 10:53) >    EXAM:  ECHOCARDIOGRAM (CARDIOL)                          PROCEDURE DATE:  03/13/2018                        INTERPRETATION:  Patient Height: 163.0 cm  Patient Weight: 85.7 kg  Heart Rate: 82 bpm  Systolic Pressure: 115 mmHg  Diastolic Pressure: 54 mmHg  BSA: 1.9 m^2  Interpretation Summary  The left atrium is mildly dilated. Right atrial size is normal.There is   mild   aortic valve thickening. No aortic regurgitation noted. No   hemodynamically   significant valvular aortic stenosis.  There istrivial mitral valve   thickening. There is trace mitral regurgitation.  Structurally normal   tricuspid valve. There is mild tricuspid regurgitation.The pulmonary   artery   systolic pressure is estimated to be 30 mmHg.The pulmonic valve is not   well   visualized. There is mild pulmonic regurgitation.  The right ventricle is   dilated. The right ventricular systolic function is normal.  There is   mild   concentric left ventricular hypertrophy. The left ventricular wall   motion is   normal. The left ventricular ejection fraction is 70%. Indeterminate LV   diastolic function.  No aortic root dilatation.There is no pericardial   effusion.No prior study for comparison.    < end of copied text >            RADIOLOGY & ADDITIONAL STUDIES (The following images were personally reviewed):  Nicole:                                     No  Urine output:                       adequate  DVT prophylaxis:                 Yes  Flattus:                                  Yes  Bowel movement:              No

## 2018-03-13 NOTE — PROGRESS NOTE ADULT - ASSESSMENT
79 yo female with HTN, HLD, DM2 and recent ventral hernia repair with Dr. Ngo (3/2/18), uncomplicated hospital course, presents to the ED with signs of hypoglycemia and some chest pain, found to have RUL PE on CTA.      1. pulmonary embolism   - heparin gtt   - ECHO   - US Duplex LE b/l   - clears, IVF hydration   - trend troponin, rest of labs in am   - pulmonology consult Dr. Trammell    2. s/p ventral hernia repair robotic converted to open on 3/2 POD #10  -stable  -LEANN x1    3. DM  -on ISS    4. HTN  -on home medication    5. hypothyroidism  -on home medication    6. HLD  -on home medication

## 2018-03-14 ENCOUNTER — TRANSCRIPTION ENCOUNTER (OUTPATIENT)
Age: 78
End: 2018-03-14

## 2018-03-14 LAB
ANION GAP SERPL CALC-SCNC: 13 MMOL/L — SIGNIFICANT CHANGE UP (ref 5–17)
APTT BLD: 57.7 SEC — HIGH (ref 27.5–37.4)
APTT BLD: 64.2 SEC — HIGH (ref 27.5–37.4)
APTT BLD: 86.8 SEC — HIGH (ref 27.5–37.4)
BUN SERPL-MCNC: 19 MG/DL — SIGNIFICANT CHANGE UP (ref 7–23)
CALCIUM SERPL-MCNC: 8.5 MG/DL — SIGNIFICANT CHANGE UP (ref 8.4–10.5)
CHLORIDE SERPL-SCNC: 104 MMOL/L — SIGNIFICANT CHANGE UP (ref 96–108)
CO2 SERPL-SCNC: 23 MMOL/L — SIGNIFICANT CHANGE UP (ref 22–31)
CREAT SERPL-MCNC: 0.71 MG/DL — SIGNIFICANT CHANGE UP (ref 0.5–1.3)
GLUCOSE BLDC GLUCOMTR-MCNC: 102 MG/DL — HIGH (ref 70–99)
GLUCOSE BLDC GLUCOMTR-MCNC: 161 MG/DL — HIGH (ref 70–99)
GLUCOSE BLDC GLUCOMTR-MCNC: 195 MG/DL — HIGH (ref 70–99)
GLUCOSE BLDC GLUCOMTR-MCNC: 92 MG/DL — SIGNIFICANT CHANGE UP (ref 70–99)
GLUCOSE SERPL-MCNC: 186 MG/DL — HIGH (ref 70–99)
HCT VFR BLD CALC: 33.6 % — LOW (ref 34.5–45)
HGB BLD-MCNC: 10.6 G/DL — LOW (ref 11.5–15.5)
MAGNESIUM SERPL-MCNC: 1.6 MG/DL — SIGNIFICANT CHANGE UP (ref 1.6–2.6)
MCHC RBC-ENTMCNC: 28.7 PG — SIGNIFICANT CHANGE UP (ref 27–34)
MCHC RBC-ENTMCNC: 31.5 G/DL — LOW (ref 32–36)
MCV RBC AUTO: 91.1 FL — SIGNIFICANT CHANGE UP (ref 80–100)
PHOSPHATE SERPL-MCNC: 2.6 MG/DL — SIGNIFICANT CHANGE UP (ref 2.5–4.5)
PLATELET # BLD AUTO: 145 K/UL — LOW (ref 150–400)
POTASSIUM SERPL-MCNC: 3.5 MMOL/L — SIGNIFICANT CHANGE UP (ref 3.5–5.3)
POTASSIUM SERPL-SCNC: 3.5 MMOL/L — SIGNIFICANT CHANGE UP (ref 3.5–5.3)
RBC # BLD: 3.69 M/UL — LOW (ref 3.8–5.2)
RBC # FLD: 14.3 % — SIGNIFICANT CHANGE UP (ref 10.3–16.9)
SODIUM SERPL-SCNC: 140 MMOL/L — SIGNIFICANT CHANGE UP (ref 135–145)
WBC # BLD: 16.3 K/UL — HIGH (ref 3.8–10.5)
WBC # FLD AUTO: 16.3 K/UL — HIGH (ref 3.8–10.5)

## 2018-03-14 PROCEDURE — 93970 EXTREMITY STUDY: CPT | Mod: 26

## 2018-03-14 PROCEDURE — 99232 SBSQ HOSP IP/OBS MODERATE 35: CPT | Mod: GC

## 2018-03-14 RX ORDER — HEPARIN SODIUM 5000 [USP'U]/ML
1550 INJECTION INTRAVENOUS; SUBCUTANEOUS
Qty: 25000 | Refills: 0 | Status: DISCONTINUED | OUTPATIENT
Start: 2018-03-14 | End: 2018-03-14

## 2018-03-14 RX ORDER — SODIUM CHLORIDE 9 MG/ML
3 INJECTION INTRAMUSCULAR; INTRAVENOUS; SUBCUTANEOUS EVERY 8 HOURS
Qty: 0 | Refills: 0 | Status: DISCONTINUED | OUTPATIENT
Start: 2018-03-14 | End: 2018-03-16

## 2018-03-14 RX ORDER — APIXABAN 2.5 MG/1
10 TABLET, FILM COATED ORAL EVERY 12 HOURS
Qty: 0 | Refills: 0 | Status: DISCONTINUED | OUTPATIENT
Start: 2018-03-14 | End: 2018-03-14

## 2018-03-14 RX ORDER — APIXABAN 2.5 MG/1
10 TABLET, FILM COATED ORAL EVERY 12 HOURS
Qty: 0 | Refills: 0 | Status: DISCONTINUED | OUTPATIENT
Start: 2018-03-14 | End: 2018-03-16

## 2018-03-14 RX ADMIN — Medication 2: at 12:53

## 2018-03-14 RX ADMIN — SODIUM CHLORIDE 3 MILLILITER(S): 9 INJECTION INTRAMUSCULAR; INTRAVENOUS; SUBCUTANEOUS at 12:55

## 2018-03-14 RX ADMIN — SODIUM CHLORIDE 3 MILLILITER(S): 9 INJECTION INTRAMUSCULAR; INTRAVENOUS; SUBCUTANEOUS at 22:18

## 2018-03-14 RX ADMIN — LISINOPRIL 10 MILLIGRAM(S): 2.5 TABLET ORAL at 07:08

## 2018-03-14 RX ADMIN — HEPARIN SODIUM 1550 UNIT(S)/HR: 5000 INJECTION INTRAVENOUS; SUBCUTANEOUS at 01:43

## 2018-03-14 RX ADMIN — Medication 150 MICROGRAM(S): at 07:08

## 2018-03-14 RX ADMIN — Medication 81 MILLIGRAM(S): at 12:54

## 2018-03-14 RX ADMIN — ATENOLOL 50 MILLIGRAM(S): 25 TABLET ORAL at 07:08

## 2018-03-14 RX ADMIN — APIXABAN 10 MILLIGRAM(S): 2.5 TABLET, FILM COATED ORAL at 22:17

## 2018-03-14 RX ADMIN — Medication 650 MILLIGRAM(S): at 07:09

## 2018-03-14 RX ADMIN — APIXABAN 10 MILLIGRAM(S): 2.5 TABLET, FILM COATED ORAL at 09:56

## 2018-03-14 RX ADMIN — Medication 2: at 07:09

## 2018-03-14 RX ADMIN — ATORVASTATIN CALCIUM 20 MILLIGRAM(S): 80 TABLET, FILM COATED ORAL at 22:17

## 2018-03-14 NOTE — DISCHARGE NOTE ADULT - CARE PLAN
Principal Discharge DX:	Other acute pulmonary embolism without acute cor pulmonale  Goal:	Recovery  Assessment and plan of treatment:	General Discharge Instructions:  Please resume all regular home medications unless specifically advised not to take a particular medication. Also, please take any new medications as prescribed.  Please get plenty of rest, continue to ambulate several times per day, and drink adequate amounts of fluids. Avoid lifting weights greater than 5-10 lbs until you follow-up with your surgeon, who will instruct you further regarding activity restrictions.  Avoid driving or operating heavy machinery while taking pain medications.  Please follow-up with your surgeon and Primary Care Provider (PCP) as advised.  Incision Care:  *Please call your doctor or nurse practitioner if you have increased pain, swelling, redness, or drainage from the incision site.  *Avoid swimming and baths until your follow-up appointment.  *You may shower, and wash surgical incisions with a mild soap and warm water. Gently pat the area dry.  *If you have staples, they will be removed at your follow-up appointment.  *If you have steri-strips, they will fall off on their own. Please remove any remaining strips 7-10 days after surgery.  Goal:	Recovery  Assessment and plan of treatment:	Warning Signs:  Please call your doctor or nurse practitioner if you experience the following:  *You experience new chest pain, pressure, squeezing or tightness.  *New or worsening cough, shortness of breath, or wheeze.  *If you are vomiting and cannot keep down fluids or your medications.  *You are getting dehydrated due to continued vomiting, diarrhea, or other reasons. Signs of dehydration include dry mouth, rapid heartbeat, or feeling dizzy or faint when standing.  *You see blood or dark/black material when you vomit or have a bowel movement.  *You experience burning when you urinate, have blood in your urine, or experience a discharge.  *Your pain is not improving within 8-12 hours or is not gone within 24 hours. Call or return immediately if your pain is getting worse, changes location, or moves to your chest or back.  *You have shaking chills, or fever greater than 101.5 degrees Fahrenheit or 38 degrees Celsius.  *Any change in your symptoms, or any new symptoms that concern you.  Goal:	Eliquis  Assessment and plan of treatment:	Start Eliquis 10mg two times daily for 7 days (3/14-3/20), then 5mg two times daily for 3 months

## 2018-03-14 NOTE — PROGRESS NOTE ADULT - SUBJECTIVE AND OBJECTIVE BOX
Interval Events: Reviewed  Patient seen and examined at bedside.    Patient is a 78y old  Female who presents with a chief complaint of Confusion, pulmonary embolism (14 Mar 2018 13:37)    she is having diarrhea  PAST MEDICAL & SURGICAL HISTORY:  Obesity  DM (diabetes mellitus)  HLD (hyperlipidemia)  HTN (hypertension)  H/O ventral hernia repair      MEDICATIONS:  Pulmonary:    Antimicrobials:    Anticoagulants:  apixaban 10 milliGRAM(s) Oral every 12 hours  aspirin enteric coated 81 milliGRAM(s) Oral daily    Cardiac:  ATENolol  Tablet 50 milliGRAM(s) Oral daily  lisinopril 10 milliGRAM(s) Oral daily      Allergies    No Known Allergies    Intolerances        Vital Signs Last 24 Hrs  T(C): 37.4 (14 Mar 2018 21:14), Max: 37.6 (14 Mar 2018 07:39)  T(F): 99.4 (14 Mar 2018 21:14), Max: 99.6 (14 Mar 2018 07:39)  HR: 79 (14 Mar 2018 21:14) (71 - 93)  BP: 125/60 (14 Mar 2018 21:14) (109/53 - 134/63)  BP(mean): --  RR: 18 (14 Mar 2018 21:14) (16 - 18)  SpO2: 93% (14 Mar 2018 21:14) (92% - 95%)    03-13 @ 07:01 - 03-14 @ 07:00  --------------------------------------------------------  IN: 2769 mL / OUT: 0 mL / NET: 2769 mL    03-14 @ 07:01 - 03-14 @ 21:21  --------------------------------------------------------  IN: 240 mL / OUT: 0 mL / NET: 240 mL          LABS:      CBC Full  -  ( 14 Mar 2018 07:49 )  WBC Count : 16.3 K/uL  Hemoglobin : 10.6 g/dL  Hematocrit : 33.6 %  Platelet Count - Automated : 145 K/uL  Mean Cell Volume : 91.1 fL  Mean Cell Hemoglobin : 28.7 pg  Mean Cell Hemoglobin Concentration : 31.5 g/dL  Auto Neutrophil # : x  Auto Lymphocyte # : x  Auto Monocyte # : x  Auto Eosinophil # : x  Auto Basophil # : x  Auto Neutrophil % : x  Auto Lymphocyte % : x  Auto Monocyte % : x  Auto Eosinophil % : x  Auto Basophil % : x    03-14    140  |  104  |  19  ----------------------------<  186<H>  3.5   |  23  |  0.71    Ca    8.5      14 Mar 2018 08:58  Phos  2.6     03-14  Mg     1.6     03-14    TPro  5.9<L>  /  Alb  2.0<L>  /  TBili  1.0  /  DBili  x   /  AST  28  /  ALT  20  /  AlkPhos  110  03-13    PT/INR - ( 13 Mar 2018 07:49 )   PT: 17.0 sec;   INR: 1.52          PTT - ( 14 Mar 2018 12:39 )  PTT:86.8 sec      < from: US Duplex Venous Lower Ext Complete, Bilateral (03.14.18 @ 11:31) >  EXAM:  US DPLX LWR EXT VEINS COMPL BI                          PROCEDURE DATE:  03/14/2018                     INTERPRETATION:    VENOUS DUPLEX DOPPLER OF BOTH LOWER EXTREMITIES dated 3/14/2018 11:31 AM    INDICATION: Pulmonary embolus    TECHNIQUE: Duplex Doppler evaluation including gray-scale ultrasound   imaging, color flow Doppler imaging, and Doppler spectral analysis of the   veins of both lower extremities was performed.     COMPARISON: None    FINDINGS:    Thigh veins: The common femoral, femoral, popliteal, proximal greater   saphenous, and proximal deep femoral veins are patent and free of   thrombus bilaterally. The veins are normally compressible and have normal   phasic flow and augmentation response.    Calf veins: The right paired peroneal and posterior tibial calf veins are   patent. The left paired posterior tibial veins are patent.    The right  calf intramuscular vein, left paired peroneal veins, and left    calf intramuscular vein are noncompressible and occluded bythrombus.    There is a 3.1 x 0.7 x 2.4 cm avascular cystic structure in the right   popliteal fossa consistent with Baker's cyst.    IMPRESSION:  1.  Acute deep venous thrombosis in bilateral calf intramuscular veins   and left paired peroneal veins.  2.  No DVT above the knee.  3.  Right Baker's cyst.    < end of copied text >                RADIOLOGY & ADDITIONAL STUDIES (The following images were personally reviewed):  Nicole:                                     No  Urine output:                       adequate  DVT prophylaxis:                 Yes  Flattus:                                  Yes  Bowel movement:              No

## 2018-03-14 NOTE — DISCHARGE NOTE ADULT - HOSPITAL COURSE
77 yo female with hx of HTN, HLD, DM2, hypothyroidism, s/p ventral hernia repair with Dr. Ngo on 3/2 (robotic assisted converted to open), had an uncomplicated post-op course and was discharged on 3/5, presented to the ED on 3/13 by EMS 2/2 hypoglycemia. Patient was confused that morning at home, diaphoretic and shaking and her daughter called EMS, she was found to have Glc 40. Patient reported taking diabetic medications daily, but over the last few days has not had an appetite and has been eating much less. Denied nausea, emesis, has mild abdominal and back pain, but it's well controlled with pain medications. Patient also reports diarrheal stools, that are darer than usual. Denied melena, BRBPR, urinary symptoms fevers or chills. In the ED patient started complaining of some chest pain, which prompted CTA PE protocol study. Patient denied difficulty breathing, palpitations or shortness of breath. In the ED she remained hemodynamically stable, afebrile. Saturated 93-96% on RA. Patient was placed on a heparin drip and pulmonary consult to Dr. Trammell. Patient was placed on apixaban the next day and given instructions on administration. 77 yo female with hx of HTN, HLD, DM2, hypothyroidism, s/p ventral hernia repair with Dr. Ngo on 3/2 (robotic assisted converted to open), had an uncomplicated post-op course and was discharged on 3/5, presented to the ED on 3/13 by EMS 2/2 hypoglycemia. Patient was confused that morning at home, diaphoretic and shaking and her daughter called EMS, she was found to have Glc 40. Patient reported taking diabetic medications daily, but over the last few days has not had an appetite and has been eating much less. Denied nausea, emesis, has mild abdominal and back pain, but it's well controlled with pain medications. Patient also reports diarrheal stools, that are darer than usual. Denied melena, BRBPR, urinary symptoms fevers or chills. In the ED patient started complaining of some chest pain, which prompted CTA PE protocol study. Patient denied difficulty breathing, palpitations or shortness of breath. In the ED she remained hemodynamically stable, afebrile. Saturated 93-96% on RA. Patient was placed on a heparin drip and pulmonary consult to Dr. Trammell. Patient was placed on apixaban the next day and given instructions on administration. Her LEANN drain was removed while she was in the hospital. On AM of 3/15 it was noticed patient becomes short of breath with exertion, pending a PT evaluation

## 2018-03-14 NOTE — PROGRESS NOTE ADULT - ASSESSMENT
79 yo female with HTN, HLD, DM2 and recent ventral hernia repair with Dr. Ngo (3/2/18), uncomplicated hospital course, presents to the ED with signs of hypoglycemia and some chest pain, found to have RUL PE on CTA.      1. pulmonary embolism   - heparin gtt   - ECHO   - US Duplex LE b/l   - clears, IVF hydration   - trend troponin, rest of labs in am   - pulmonology consult Dr. Trammell    2. s/p ventral hernia repair robotic converted to open on 3/2 POD #10  -stable  -LEANN x1    3. DM  -on ISS    4. HTN  -on home medication    5. hypothyroidism  -on home medication    6. HLD  -on home medication    7. dispo 79 yo female with HTN, HLD, DM2 and recent ventral hernia repair with Dr. Ngo (3/2/18), uncomplicated hospital course, presents to the ED with signs of hypoglycemia and some chest pain, found to have RUL PE on CTA.      1. pulmonary embolism   - heparin gtt   - ECHO   - US Duplex LE b/l   - clears, IVF hydration   - trend troponin, rest of labs in am   - pulmonology consult Dr. Trammell - start apixaban 10mg BID x 7 days, then 5mg BID x 3 months    2. s/p ventral hernia repair robotic converted to open on 3/2 POD #10  -stable  -LEANN x1    3. DM  -on ISS    4. HTN  -on home medication    5. hypothyroidism  -on home medication    6. HLD  -on home medication    7. dispo

## 2018-03-14 NOTE — DISCHARGE NOTE ADULT - MEDICATION SUMMARY - MEDICATIONS TO TAKE
I will START or STAY ON the medications listed below when I get home from the hospital:    Aspirin Enteric Coated 81 mg oral delayed release tablet  -- 1 tab(s) by mouth once a day  -- Indication: For Home medication    oxyCODONE-acetaminophen 5 mg-325 mg oral tablet  -- 1 tab(s) by mouth every 4 hours, As needed, Moderate Pain (4 - 6) MDD:6  -- Indication: For moderate pain    lisinopril 10 mg oral tablet  -- 1 tab(s) by mouth once a day  -- Indication: For Home medication    apixaban 5 mg oral tablet  -- 2 tab(s) by mouth 2 times a day for 4 days  -- Check with your doctor before becoming pregnant.  It is very important that you take or use this exactly as directed.  Do not skip doses or discontinue unless directed by your doctor.  Obtain medical advice before taking any non-prescription drugs as some may affect the action of this medication.    -- Indication: For Pulmonary embolism    apixaban 5 mg oral tablet  -- 1 tab(s) by mouth 2 times a day MDD:2; Please start on 3/21/18  -- Check with your doctor before becoming pregnant.  It is very important that you take or use this exactly as directed.  Do not skip doses or discontinue unless directed by your doctor.  Obtain medical advice before taking any non-prescription drugs as some may affect the action of this medication.    -- Indication: For Pulmonary embolism    glipiZIDE 5 mg oral tablet  -- 1 tab(s) by mouth once a day  -- Indication: For Home medication    atenolol 50 mg oral tablet  -- 1 tab(s) by mouth once a day  -- Indication: For Home medication    bisacodyl 5 mg oral delayed release tablet  -- 1 tab(s) by mouth every 12 hours, As needed, Constipation  -- Indication: For Home medication    Synthroid 150 mcg (0.15 mg) oral tablet  -- 1 tab(s) by mouth once a day  -- Indication: For Home medication

## 2018-03-14 NOTE — DISCHARGE NOTE ADULT - CARE PROVIDER_API CALL
Catrachito Ngo), Surgery  [address] [place] [address]  Phone: [phone]  Fax: [phone] Catrachito Ngo), Surgery  [address] [place] [address]  Phone: [phone]  Fax: [phone]    Irma Trammell), Critical Care Medicine; Pulmonary Disease  [address] [place] [address]  Phone: [phone]  Fax: [phone]

## 2018-03-14 NOTE — PROGRESS NOTE ADULT - SUBJECTIVE AND OBJECTIVE BOX
O/N: 6pm PTT 94.6 heparin gtt decreased to 15, repeat PTT@12:45am PTT 57.7 heparin gtt increased to 15.5. VSS. EMANUEL  3/13: 12 PM ptt 88. decreased to 15.5. 6 PM PTT. Echo read. Dr. Trammell will see later today O/N: 6pm PTT 94.6 heparin gtt decreased to 15, repeat PTT@12:45am PTT 57.7 heparin gtt increased to 15.5. VSS. EMANUEL  3/13: 12 PM ptt 88. decreased to 15.5. 6 PM PTT. Echo read. Dr. Trammell will see later today     SUBJECTIVE:  pt seen at bedside, without complaints at this time    MEDICATIONS  (STANDING):  apixaban 10 milliGRAM(s) Oral every 12 hours  aspirin enteric coated 81 milliGRAM(s) Oral daily  ATENolol  Tablet 50 milliGRAM(s) Oral daily  atorvastatin 20 milliGRAM(s) Oral at bedtime  dextrose 5% + sodium chloride 0.45%. 1000 milliLiter(s) (125 mL/Hr) IV Continuous <Continuous>  dextrose 5%. 1000 milliLiter(s) (50 mL/Hr) IV Continuous <Continuous>  dextrose 50% Injectable 12.5 Gram(s) IV Push once  dextrose 50% Injectable 25 Gram(s) IV Push once  dextrose 50% Injectable 25 Gram(s) IV Push once  heparin  Infusion. 1550 Unit(s)/Hr (15.5 mL/Hr) IV Continuous <Continuous>  insulin lispro (HumaLOG) corrective regimen sliding scale   SubCutaneous Before meals and at bedtime  levothyroxine 150 MICROGram(s) Oral daily  lisinopril 10 milliGRAM(s) Oral daily    MEDICATIONS  (PRN):  acetaminophen   Tablet 650 milliGRAM(s) Oral every 6 hours PRN moderate pain  dextrose Gel 1 Dose(s) Oral once PRN Blood Glucose LESS THAN 70 milliGRAM(s)/deciliter  docusate sodium 100 milliGRAM(s) Oral three times a day PRN Constipation  glucagon  Injectable 1 milliGRAM(s) IntraMuscular once PRN Glucose LESS THAN 70 milligrams/deciliter  ondansetron Injectable 4 milliGRAM(s) IV Push every 6 hours PRN Nausea  senna 2 Tablet(s) Oral at bedtime PRN Constipation      Vital Signs Last 24 Hrs  T(C): 36.7 (14 Mar 2018 00:32), Max: 37.2 (13 Mar 2018 13:41)  T(F): 98 (14 Mar 2018 00:32), Max: 99 (13 Mar 2018 13:41)  HR: 91 (14 Mar 2018 00:32) (76 - 91)  BP: 109/53 (14 Mar 2018 00:32) (109/53 - 115/57)  BP(mean): --  RR: 16 (14 Mar 2018 00:32) (16 - 18)  SpO2: 96% (13 Mar 2018 20:49) (95% - 96%)    PHYSICAL EXAM:      Constitutional: A&Ox3    Respiratory: non labored breathing, no respiratory distress    Cardiovascular: NSR, RRR    Gastrointestinal: soft, nontender, nondistended, LEANN in place to self suction    Extremities: (-) edema            I&O's Detail    13 Mar 2018 07:01  -  14 Mar 2018 07:00  --------------------------------------------------------  IN:    dextrose 5% + sodium chloride 0.45%.: 2250 mL    heparin  Infusion.: 93 mL    heparin  Infusion.: 96 mL    heparin  Infusion.: 90 mL    Oral Fluid: 120 mL  Total IN: 2649 mL    OUT:  Total OUT: 0 mL    Total NET: 2649 mL          LABS:                        11.5   15.2  )-----------( 181      ( 13 Mar 2018 07:49 )             35.6     03-13    140  |  103  |  25<H>  ----------------------------<  147<H>  3.5   |  26  |  0.78    Ca    9.0      13 Mar 2018 07:49  Phos  3.3     03-13  Mg     1.8     03-13    TPro  5.9<L>  /  Alb  2.0<L>  /  TBili  1.0  /  DBili  x   /  AST  28  /  ALT  20  /  AlkPhos  110  03-13    PT/INR - ( 13 Mar 2018 07:49 )   PT: 17.0 sec;   INR: 1.52          PTT - ( 14 Mar 2018 00:59 )  PTT:57.7 sec

## 2018-03-14 NOTE — DISCHARGE NOTE ADULT - PATIENT PORTAL LINK FT
You can access the [facility] Patient Portal, offered by [facility], by registering with the following website: [email or web address]

## 2018-03-14 NOTE — DISCHARGE NOTE ADULT - PLAN OF CARE
Recovery General Discharge Instructions:  Please resume all regular home medications unless specifically advised not to take a particular medication. Also, please take any new medications as prescribed.  Please get plenty of rest, continue to ambulate several times per day, and drink adequate amounts of fluids. Avoid lifting weights greater than 5-10 lbs until you follow-up with your surgeon, who will instruct you further regarding activity restrictions.  Avoid driving or operating heavy machinery while taking pain medications.  Please follow-up with your surgeon and Primary Care Provider (PCP) as advised.  Incision Care:  *Please call your doctor or nurse practitioner if you have increased pain, swelling, redness, or drainage from the incision site.  *Avoid swimming and baths until your follow-up appointment.  *You may shower, and wash surgical incisions with a mild soap and warm water. Gently pat the area dry.  *If you have staples, they will be removed at your follow-up appointment.  *If you have steri-strips, they will fall off on their own. Please remove any remaining strips 7-10 days after surgery. Warning Signs:  Please call your doctor or nurse practitioner if you experience the following:  *You experience new chest pain, pressure, squeezing or tightness.  *New or worsening cough, shortness of breath, or wheeze.  *If you are vomiting and cannot keep down fluids or your medications.  *You are getting dehydrated due to continued vomiting, diarrhea, or other reasons. Signs of dehydration include dry mouth, rapid heartbeat, or feeling dizzy or faint when standing.  *You see blood or dark/black material when you vomit or have a bowel movement.  *You experience burning when you urinate, have blood in your urine, or experience a discharge.  *Your pain is not improving within 8-12 hours or is not gone within 24 hours. Call or return immediately if your pain is getting worse, changes location, or moves to your chest or back.  *You have shaking chills, or fever greater than 101.5 degrees Fahrenheit or 38 degrees Celsius.  *Any change in your symptoms, or any new symptoms that concern you. Eliquis Start Eliquis 10mg two times daily for 7 days (3/14-3/20), then 5mg two times daily for 3 months

## 2018-03-15 LAB
ANION GAP SERPL CALC-SCNC: 12 MMOL/L — SIGNIFICANT CHANGE UP (ref 5–17)
APTT BLD: 38.5 SEC — HIGH (ref 27.5–37.4)
BUN SERPL-MCNC: 16 MG/DL — SIGNIFICANT CHANGE UP (ref 7–23)
CALCIUM SERPL-MCNC: 8.9 MG/DL — SIGNIFICANT CHANGE UP (ref 8.4–10.5)
CHLORIDE SERPL-SCNC: 105 MMOL/L — SIGNIFICANT CHANGE UP (ref 96–108)
CO2 SERPL-SCNC: 24 MMOL/L — SIGNIFICANT CHANGE UP (ref 22–31)
CREAT SERPL-MCNC: 0.74 MG/DL — SIGNIFICANT CHANGE UP (ref 0.5–1.3)
GLUCOSE BLDC GLUCOMTR-MCNC: 109 MG/DL — HIGH (ref 70–99)
GLUCOSE BLDC GLUCOMTR-MCNC: 95 MG/DL — SIGNIFICANT CHANGE UP (ref 70–99)
GLUCOSE BLDC GLUCOMTR-MCNC: 98 MG/DL — SIGNIFICANT CHANGE UP (ref 70–99)
GLUCOSE BLDC GLUCOMTR-MCNC: 99 MG/DL — SIGNIFICANT CHANGE UP (ref 70–99)
GLUCOSE SERPL-MCNC: 109 MG/DL — HIGH (ref 70–99)
HCT VFR BLD CALC: 34.9 % — SIGNIFICANT CHANGE UP (ref 34.5–45)
HGB BLD-MCNC: 11.1 G/DL — LOW (ref 11.5–15.5)
INR BLD: 3.43 — HIGH (ref 0.88–1.16)
MAGNESIUM SERPL-MCNC: 1.7 MG/DL — SIGNIFICANT CHANGE UP (ref 1.6–2.6)
MCHC RBC-ENTMCNC: 29.3 PG — SIGNIFICANT CHANGE UP (ref 27–34)
MCHC RBC-ENTMCNC: 31.8 G/DL — LOW (ref 32–36)
MCV RBC AUTO: 92.1 FL — SIGNIFICANT CHANGE UP (ref 80–100)
PHOSPHATE SERPL-MCNC: 3.4 MG/DL — SIGNIFICANT CHANGE UP (ref 2.5–4.5)
PLATELET # BLD AUTO: 158 K/UL — SIGNIFICANT CHANGE UP (ref 150–400)
POTASSIUM SERPL-MCNC: 3.6 MMOL/L — SIGNIFICANT CHANGE UP (ref 3.5–5.3)
POTASSIUM SERPL-SCNC: 3.6 MMOL/L — SIGNIFICANT CHANGE UP (ref 3.5–5.3)
PROTHROM AB SERPL-ACNC: 39 SEC — HIGH (ref 9.8–12.7)
RBC # BLD: 3.79 M/UL — LOW (ref 3.8–5.2)
RBC # FLD: 14.5 % — SIGNIFICANT CHANGE UP (ref 10.3–16.9)
SODIUM SERPL-SCNC: 141 MMOL/L — SIGNIFICANT CHANGE UP (ref 135–145)
WBC # BLD: 10.1 K/UL — SIGNIFICANT CHANGE UP (ref 3.8–10.5)
WBC # FLD AUTO: 10.1 K/UL — SIGNIFICANT CHANGE UP (ref 3.8–10.5)

## 2018-03-15 PROCEDURE — 99232 SBSQ HOSP IP/OBS MODERATE 35: CPT | Mod: GC

## 2018-03-15 RX ORDER — POTASSIUM CHLORIDE 20 MEQ
20 PACKET (EA) ORAL ONCE
Qty: 0 | Refills: 0 | Status: COMPLETED | OUTPATIENT
Start: 2018-03-15 | End: 2018-03-15

## 2018-03-15 RX ORDER — MAGNESIUM SULFATE 500 MG/ML
2 VIAL (ML) INJECTION ONCE
Qty: 0 | Refills: 0 | Status: COMPLETED | OUTPATIENT
Start: 2018-03-15 | End: 2018-03-15

## 2018-03-15 RX ADMIN — ATORVASTATIN CALCIUM 20 MILLIGRAM(S): 80 TABLET, FILM COATED ORAL at 21:13

## 2018-03-15 RX ADMIN — Medication 150 MICROGRAM(S): at 06:43

## 2018-03-15 RX ADMIN — SODIUM CHLORIDE 3 MILLILITER(S): 9 INJECTION INTRAMUSCULAR; INTRAVENOUS; SUBCUTANEOUS at 06:44

## 2018-03-15 RX ADMIN — SODIUM CHLORIDE 3 MILLILITER(S): 9 INJECTION INTRAMUSCULAR; INTRAVENOUS; SUBCUTANEOUS at 13:49

## 2018-03-15 RX ADMIN — SODIUM CHLORIDE 3 MILLILITER(S): 9 INJECTION INTRAMUSCULAR; INTRAVENOUS; SUBCUTANEOUS at 21:16

## 2018-03-15 RX ADMIN — APIXABAN 10 MILLIGRAM(S): 2.5 TABLET, FILM COATED ORAL at 10:05

## 2018-03-15 RX ADMIN — Medication 50 GRAM(S): at 10:06

## 2018-03-15 RX ADMIN — Medication 81 MILLIGRAM(S): at 13:48

## 2018-03-15 RX ADMIN — APIXABAN 10 MILLIGRAM(S): 2.5 TABLET, FILM COATED ORAL at 21:13

## 2018-03-15 RX ADMIN — Medication 20 MILLIEQUIVALENT(S): at 10:05

## 2018-03-15 RX ADMIN — ATENOLOL 50 MILLIGRAM(S): 25 TABLET ORAL at 06:43

## 2018-03-15 RX ADMIN — LISINOPRIL 10 MILLIGRAM(S): 2.5 TABLET ORAL at 06:43

## 2018-03-15 NOTE — PROGRESS NOTE ADULT - PROBLEM SELECTOR PROBLEM 1
Other acute pulmonary embolism without acute cor pulmonale

## 2018-03-15 NOTE — PROGRESS NOTE ADULT - PROBLEM SELECTOR PLAN 3
on anticoagulation but will need sleep study as outpatient

## 2018-03-15 NOTE — PHYSICAL THERAPY INITIAL EVALUATION ADULT - PERTINENT HX OF CURRENT PROBLEM, REHAB EVAL
77 yo female with HTN, HLD, DM2 and recent ventral hernia repair with Dr. Ngo (3/2/18), uncomplicated hospital course, presents to the ED with signs of hypoglycemia and some chest pain, found to have RUL PE on CTA.

## 2018-03-15 NOTE — PHYSICAL THERAPY INITIAL EVALUATION ADULT - CRITERIA FOR SKILLED THERAPEUTIC INTERVENTIONS
functional limitations in following categories/therapy frequency/anticipated discharge recommendation/risk reduction/prevention/impairments found/rehab potential

## 2018-03-15 NOTE — DIETITIAN INITIAL EVALUATION ADULT. - OTHER INFO
77yo F with HTN, HLD, DM2 and recent ventral hernia repair p/w signs of hypoglycemia and some chest pain, found to have RUL PE on CTA. pt currently on CSTCHO diet and tolerating PO. Endorses poor appetite, consuming <25% meals. Discussed with pt increased nutrient needs and aiming for >50% tray. Pt declined ONS. Reported having diarrhea 3x yesterday, improving. NKFA or dietary restrictions. Skin: surgical incision; GI diarrhea per flowsheet.

## 2018-03-15 NOTE — PROGRESS NOTE ADULT - PROBLEM SELECTOR PLAN 1
she is anticoagulated .  Oxygen saturation is normal.  ECHO reviewed.  Start Apixaban in am and DC heparin in 2 hours.  Start at 10 mg BID for 7 days then 5 mg BID for total of 3 month.  No bleeding.  Continue monitor.  Increase activity
she is anticoagulated .  Oxygen saturation is normal.  ECHO reviewed.  Started Apixaban in am and DCed heparin in 2 hour.  Continue monitor.  Increase activity
she is anticoagulated .  Oxygen saturation is normal.  ECHO reviewed.  Started Apixaban in am and DCed heparin in 2 hour.  Continue monitor.  Increase activity

## 2018-03-15 NOTE — PROVIDER CONTACT NOTE (CHANGE IN STATUS NOTIFICATION) - RECOMMENDATIONS
Increased O2 to get the pt back up to 90%, once pt was settled O2 lowered back down to 4.5L, sat pt in up right position.

## 2018-03-15 NOTE — PROGRESS NOTE ADULT - ATTENDING COMMENTS
Patient seen and examined with house-staff during bedside rounds.  Resident note read, including vitals, physical findings, laboratory data, and radiological reports.   Revisions included below.  Direct personal management at bed side and extensive interpretation of the data.  Plan was outlined and discussed in details with the housestaff.  Decision making of high complexity  Action taken for acute disease activity to reflect the level of care provided:  - medication reconciliation  - review laboratory data  - Review ECHO  - discussion and management of PE  - evaluation of diarrhea
Patient seen and examined with house-staff during bedside rounds.  Resident note read, including vitals, physical findings, laboratory data, and radiological reports.   Revisions included below.  Direct personal management at bed side and extensive interpretation of the data.  Plan was outlined and discussed in details with the housestaff.  Decision making of high complexity  Action taken for acute disease activity to reflect the level of care provided:  - medication reconciliation  - review laboratory data  - Review ECHO  - discussion and management of PE
Patient seen and examined with house-staff during bedside rounds.  Resident note read, including vitals, physical findings, laboratory data, and radiological reports.   Revisions included below.  Direct personal management at bed side and extensive interpretation of the data.  Plan was outlined and discussed in details with the housestaff.  Decision making of high complexity  Action taken for acute disease activity to reflect the level of care provided:  - medication reconciliation  - review laboratory data  - Review ECHO  - discussion and management of PE  - evaluation of diarrhea

## 2018-03-15 NOTE — PROGRESS NOTE ADULT - ASSESSMENT
77 yo female with HTN, HLD, DM2 and recent ventral hernia repair with Dr. Ngo (3/2/18), uncomplicated hospital course, presents to the ED with signs of hypoglycemia and some chest pain, found to have RUL PE on CTA.      1. pulmonary embolism   - heparin gtt   - ECHO   - US Duplex LE b/l   - clears, IVF hydration   - trend troponin, rest of labs in am   - pulmonology consult Dr. Trammell - rec apixaban 10mg BID x 7 days, then 5mg BID x 3        months    2. s/p ventral hernia repair robotic converted to open on 3/2 POD #10  -stable  -LEANN x1    3. DM  -on ISS    4. HTN  -on home medication    5. hypothyroidism  -on home medication    6. HLD  -on home medication    7. dispo 77 yo female with HTN, HLD, DM2 and recent ventral hernia repair with Dr. Ngo (3/2/18), uncomplicated hospital course, presents to the ED with signs of hypoglycemia and some chest pain, found to have RUL PE on CTA.      1. pulmonary embolism   - heparin gtt   - ECHO   - US Duplex LE b/l   - clears, IVF hydration   - trend troponin, rest of labs in am   - pulmonology consult Dr. Trammell - rec apixaban 10mg BID x 7 days, then 5mg BID x 3        months  -wean off OT    2. s/p ventral hernia repair robotic converted to open on 3/2 POD #10  -stable  -LEANN x1    3. DM  -on ISS    4. HTN  -on home medication    5. hypothyroidism  -on home medication    6. HLD  -on home medication    7. dispo  - PT consult

## 2018-03-15 NOTE — PROGRESS NOTE ADULT - PROBLEM SELECTOR PLAN 2
related to PAH, PE, MO, LV diastolic dysfunction, and possible SHARON

## 2018-03-15 NOTE — DIETITIAN INITIAL EVALUATION ADULT. - ENERGY NEEDS
Height: 5'2"(estimated) Weight: 190lbs, IBW 110lbs+/-10%, %%, BMI 34  IBW used for calculations as pt >120% of IBW   Nutrient needs based on [facility] standards of care for maintenance in older adults.   needs adjusted 2/2 post-op recovery, age and diarrhea

## 2018-03-15 NOTE — PROGRESS NOTE ADULT - PROBLEM SELECTOR PROBLEM 3
PAH (pulmonary artery hypertension)

## 2018-03-15 NOTE — PROGRESS NOTE ADULT - SUBJECTIVE AND OBJECTIVE BOX
O/N: desatting to 79% increased oxygen to 4.5L nc and now 94%  3/14: started apixaban 10mg BID x 7 days, stopped heparin drip, U/S duplex: calf vein thrombosis, None above knee. PT consult O/N: desatting to 79% increased oxygen to 4.5L nc and now 94%  3/14: started apixaban 10mg BID x 7 days, stopped heparin drip, U/S duplex: calf vein thrombosis, None above knee. PT consult      SUBJECTIVE:  pt seen sitting in chair, without complaints at this time    MEDICATIONS  (STANDING):  apixaban 10 milliGRAM(s) Oral every 12 hours  aspirin enteric coated 81 milliGRAM(s) Oral daily  ATENolol  Tablet 50 milliGRAM(s) Oral daily  atorvastatin 20 milliGRAM(s) Oral at bedtime  dextrose 5%. 1000 milliLiter(s) (50 mL/Hr) IV Continuous <Continuous>  dextrose 50% Injectable 12.5 Gram(s) IV Push once  dextrose 50% Injectable 25 Gram(s) IV Push once  dextrose 50% Injectable 25 Gram(s) IV Push once  insulin lispro (HumaLOG) corrective regimen sliding scale   SubCutaneous Before meals and at bedtime  levothyroxine 150 MICROGram(s) Oral daily  lisinopril 10 milliGRAM(s) Oral daily  sodium chloride 0.9% lock flush 3 milliLiter(s) IV Push every 8 hours    MEDICATIONS  (PRN):  acetaminophen   Tablet 650 milliGRAM(s) Oral every 6 hours PRN moderate pain  dextrose Gel 1 Dose(s) Oral once PRN Blood Glucose LESS THAN 70 milliGRAM(s)/deciliter  docusate sodium 100 milliGRAM(s) Oral three times a day PRN Constipation  glucagon  Injectable 1 milliGRAM(s) IntraMuscular once PRN Glucose LESS THAN 70 milligrams/deciliter  ondansetron Injectable 4 milliGRAM(s) IV Push every 6 hours PRN Nausea  senna 2 Tablet(s) Oral at bedtime PRN Constipation      Vital Signs Last 24 Hrs  T(C): 36.1 (15 Mar 2018 06:15), Max: 37.6 (14 Mar 2018 07:39)  T(F): 97 (15 Mar 2018 06:15), Max: 99.6 (14 Mar 2018 07:39)  HR: 76 (15 Mar 2018 06:15) (71 - 93)  BP: 117/58 (15 Mar 2018 06:15) (103/54 - 134/63)  BP(mean): --  RR: 20 (15 Mar 2018 06:15) (17 - 23)  SpO2: 93% (15 Mar 2018 06:15) (92% - 96%)    PHYSICAL EXAM:      Constitutional: A&Ox3    Respiratory: non labored breathing, no respiratory distress    Cardiovascular: NSR, RRR    Gastrointestinal: soft, nondistended, nontender, incision c/d/i with staples    Extremities: (-) edema                  I&O's Detail    14 Mar 2018 07:01  -  15 Mar 2018 07:00  --------------------------------------------------------  IN:    Oral Fluid: 240 mL  Total IN: 240 mL    OUT:  Total OUT: 0 mL    Total NET: 240 mL          LABS:                        11.1   10.1  )-----------( 158      ( 15 Mar 2018 06:36 )             34.9     03-15    141  |  105  |  16  ----------------------------<  109<H>  3.6   |  24  |  0.74    Ca    8.9      15 Mar 2018 06:36  Phos  3.4     03-15  Mg     1.7     03-15    TPro  5.9<L>  /  Alb  2.0<L>  /  TBili  1.0  /  DBili  x   /  AST  28  /  ALT  20  /  AlkPhos  110  03-13    PT/INR - ( 15 Mar 2018 06:36 )   PT: 39.0 sec;   INR: 3.43          PTT - ( 15 Mar 2018 06:36 )  PTT:38.5 sec

## 2018-03-16 VITALS
OXYGEN SATURATION: 95 % | TEMPERATURE: 98 F | HEART RATE: 78 BPM | RESPIRATION RATE: 18 BRPM | SYSTOLIC BLOOD PRESSURE: 120 MMHG | DIASTOLIC BLOOD PRESSURE: 71 MMHG

## 2018-03-16 LAB
ANION GAP SERPL CALC-SCNC: 12 MMOL/L — SIGNIFICANT CHANGE UP (ref 5–17)
BUN SERPL-MCNC: 18 MG/DL — SIGNIFICANT CHANGE UP (ref 7–23)
CALCIUM SERPL-MCNC: 8.9 MG/DL — SIGNIFICANT CHANGE UP (ref 8.4–10.5)
CHLORIDE SERPL-SCNC: 103 MMOL/L — SIGNIFICANT CHANGE UP (ref 96–108)
CO2 SERPL-SCNC: 23 MMOL/L — SIGNIFICANT CHANGE UP (ref 22–31)
CREAT SERPL-MCNC: 0.71 MG/DL — SIGNIFICANT CHANGE UP (ref 0.5–1.3)
GLUCOSE BLDC GLUCOMTR-MCNC: 111 MG/DL — HIGH (ref 70–99)
GLUCOSE BLDC GLUCOMTR-MCNC: 98 MG/DL — SIGNIFICANT CHANGE UP (ref 70–99)
GLUCOSE SERPL-MCNC: 115 MG/DL — HIGH (ref 70–99)
HCT VFR BLD CALC: 33.8 % — LOW (ref 34.5–45)
HCT VFR BLD CALC: 38.2 % — SIGNIFICANT CHANGE UP (ref 34.5–45)
HGB BLD-MCNC: 10.7 G/DL — LOW (ref 11.5–15.5)
HGB BLD-MCNC: 12.1 G/DL — SIGNIFICANT CHANGE UP (ref 11.5–15.5)
MAGNESIUM SERPL-MCNC: 1.9 MG/DL — SIGNIFICANT CHANGE UP (ref 1.6–2.6)
MCHC RBC-ENTMCNC: 29.2 PG — SIGNIFICANT CHANGE UP (ref 27–34)
MCHC RBC-ENTMCNC: 29.4 PG — SIGNIFICANT CHANGE UP (ref 27–34)
MCHC RBC-ENTMCNC: 31.7 G/DL — LOW (ref 32–36)
MCHC RBC-ENTMCNC: 31.7 G/DL — LOW (ref 32–36)
MCV RBC AUTO: 92.1 FL — SIGNIFICANT CHANGE UP (ref 80–100)
MCV RBC AUTO: 92.9 FL — SIGNIFICANT CHANGE UP (ref 80–100)
PHOSPHATE SERPL-MCNC: 3.5 MG/DL — SIGNIFICANT CHANGE UP (ref 2.5–4.5)
PLATELET # BLD AUTO: 186 K/UL — SIGNIFICANT CHANGE UP (ref 150–400)
PLATELET # BLD AUTO: 209 K/UL — SIGNIFICANT CHANGE UP (ref 150–400)
POTASSIUM SERPL-MCNC: 3.6 MMOL/L — SIGNIFICANT CHANGE UP (ref 3.5–5.3)
POTASSIUM SERPL-SCNC: 3.6 MMOL/L — SIGNIFICANT CHANGE UP (ref 3.5–5.3)
RBC # BLD: 3.67 M/UL — LOW (ref 3.8–5.2)
RBC # BLD: 4.11 M/UL — SIGNIFICANT CHANGE UP (ref 3.8–5.2)
RBC # FLD: 14.5 % — SIGNIFICANT CHANGE UP (ref 10.3–16.9)
RBC # FLD: 14.5 % — SIGNIFICANT CHANGE UP (ref 10.3–16.9)
SODIUM SERPL-SCNC: 138 MMOL/L — SIGNIFICANT CHANGE UP (ref 135–145)
WBC # BLD: 8.6 K/UL — SIGNIFICANT CHANGE UP (ref 3.8–10.5)
WBC # BLD: 8.8 K/UL — SIGNIFICANT CHANGE UP (ref 3.8–10.5)
WBC # FLD AUTO: 8.6 K/UL — SIGNIFICANT CHANGE UP (ref 3.8–10.5)
WBC # FLD AUTO: 8.8 K/UL — SIGNIFICANT CHANGE UP (ref 3.8–10.5)

## 2018-03-16 PROCEDURE — 99285 EMERGENCY DEPT VISIT HI MDM: CPT | Mod: 25

## 2018-03-16 PROCEDURE — 87324 CLOSTRIDIUM AG IA: CPT

## 2018-03-16 PROCEDURE — 83690 ASSAY OF LIPASE: CPT

## 2018-03-16 PROCEDURE — 82962 GLUCOSE BLOOD TEST: CPT

## 2018-03-16 PROCEDURE — 71275 CT ANGIOGRAPHY CHEST: CPT

## 2018-03-16 PROCEDURE — 97162 PT EVAL MOD COMPLEX 30 MIN: CPT

## 2018-03-16 PROCEDURE — 80053 COMPREHEN METABOLIC PANEL: CPT

## 2018-03-16 PROCEDURE — 36415 COLL VENOUS BLD VENIPUNCTURE: CPT

## 2018-03-16 PROCEDURE — 84484 ASSAY OF TROPONIN QUANT: CPT

## 2018-03-16 PROCEDURE — 71045 X-RAY EXAM CHEST 1 VIEW: CPT

## 2018-03-16 PROCEDURE — 96375 TX/PRO/DX INJ NEW DRUG ADDON: CPT | Mod: XU

## 2018-03-16 PROCEDURE — 93306 TTE W/DOPPLER COMPLETE: CPT

## 2018-03-16 PROCEDURE — 87449 NOS EACH ORGANISM AG IA: CPT

## 2018-03-16 PROCEDURE — 83036 HEMOGLOBIN GLYCOSYLATED A1C: CPT

## 2018-03-16 PROCEDURE — 85025 COMPLETE CBC W/AUTO DIFF WBC: CPT

## 2018-03-16 PROCEDURE — 93005 ELECTROCARDIOGRAM TRACING: CPT

## 2018-03-16 PROCEDURE — 83735 ASSAY OF MAGNESIUM: CPT

## 2018-03-16 PROCEDURE — 96374 THER/PROPH/DIAG INJ IV PUSH: CPT | Mod: XU

## 2018-03-16 PROCEDURE — 85610 PROTHROMBIN TIME: CPT

## 2018-03-16 PROCEDURE — 85730 THROMBOPLASTIN TIME PARTIAL: CPT

## 2018-03-16 PROCEDURE — 84100 ASSAY OF PHOSPHORUS: CPT

## 2018-03-16 PROCEDURE — 85027 COMPLETE CBC AUTOMATED: CPT

## 2018-03-16 PROCEDURE — 80048 BASIC METABOLIC PNL TOTAL CA: CPT

## 2018-03-16 PROCEDURE — 93970 EXTREMITY STUDY: CPT

## 2018-03-16 RX ORDER — APIXABAN 2.5 MG/1
10 TABLET, FILM COATED ORAL ONCE
Qty: 0 | Refills: 0 | Status: COMPLETED | OUTPATIENT
Start: 2018-03-16 | End: 2018-03-16

## 2018-03-16 RX ORDER — POTASSIUM CHLORIDE 20 MEQ
20 PACKET (EA) ORAL
Qty: 0 | Refills: 0 | Status: COMPLETED | OUTPATIENT
Start: 2018-03-16 | End: 2018-03-16

## 2018-03-16 RX ORDER — APIXABAN 2.5 MG/1
2 TABLET, FILM COATED ORAL
Qty: 16 | Refills: 0 | OUTPATIENT
Start: 2018-03-16 | End: 2018-03-19

## 2018-03-16 RX ADMIN — Medication 20 MILLIEQUIVALENT(S): at 12:19

## 2018-03-16 RX ADMIN — Medication 20 MILLIEQUIVALENT(S): at 10:42

## 2018-03-16 RX ADMIN — SODIUM CHLORIDE 3 MILLILITER(S): 9 INJECTION INTRAMUSCULAR; INTRAVENOUS; SUBCUTANEOUS at 05:41

## 2018-03-16 RX ADMIN — Medication 650 MILLIGRAM(S): at 00:44

## 2018-03-16 RX ADMIN — Medication 150 MICROGRAM(S): at 05:41

## 2018-03-16 RX ADMIN — APIXABAN 10 MILLIGRAM(S): 2.5 TABLET, FILM COATED ORAL at 11:47

## 2018-03-16 RX ADMIN — Medication 81 MILLIGRAM(S): at 11:47

## 2018-03-16 RX ADMIN — LISINOPRIL 10 MILLIGRAM(S): 2.5 TABLET ORAL at 05:41

## 2018-03-16 RX ADMIN — ATENOLOL 50 MILLIGRAM(S): 25 TABLET ORAL at 05:41

## 2018-03-16 NOTE — PROGRESS NOTE ADULT - SUBJECTIVE AND OBJECTIVE BOX
O/N: EMANUEL  3/15: PT consult-rec home PT, EMANUEL, lm in am O/N: EMANUEL  3/15: PT consult-rec home PT, lm CASTELLANOS in am    SUBJECTIVE:  pt seen at bedside, without complaints at this time    MEDICATIONS  (STANDING):  apixaban 10 milliGRAM(s) Oral every 12 hours  aspirin enteric coated 81 milliGRAM(s) Oral daily  ATENolol  Tablet 50 milliGRAM(s) Oral daily  atorvastatin 20 milliGRAM(s) Oral at bedtime  dextrose 5%. 1000 milliLiter(s) (50 mL/Hr) IV Continuous <Continuous>  dextrose 50% Injectable 12.5 Gram(s) IV Push once  dextrose 50% Injectable 25 Gram(s) IV Push once  dextrose 50% Injectable 25 Gram(s) IV Push once  insulin lispro (HumaLOG) corrective regimen sliding scale   SubCutaneous Before meals and at bedtime  levothyroxine 150 MICROGram(s) Oral daily  lisinopril 10 milliGRAM(s) Oral daily  sodium chloride 0.9% lock flush 3 milliLiter(s) IV Push every 8 hours    MEDICATIONS  (PRN):  acetaminophen   Tablet 650 milliGRAM(s) Oral every 6 hours PRN moderate pain  dextrose Gel 1 Dose(s) Oral once PRN Blood Glucose LESS THAN 70 milliGRAM(s)/deciliter  docusate sodium 100 milliGRAM(s) Oral three times a day PRN Constipation  glucagon  Injectable 1 milliGRAM(s) IntraMuscular once PRN Glucose LESS THAN 70 milligrams/deciliter  ondansetron Injectable 4 milliGRAM(s) IV Push every 6 hours PRN Nausea  senna 2 Tablet(s) Oral at bedtime PRN Constipation      Vital Signs Last 24 Hrs  T(C): 36.2 (16 Mar 2018 05:39), Max: 37.2 (15 Mar 2018 21:10)  T(F): 97.1 (16 Mar 2018 05:39), Max: 98.9 (15 Mar 2018 21:10)  HR: 79 (16 Mar 2018 05:39) (76 - 86)  BP: 133/63 (16 Mar 2018 05:39) (92/50 - 134/61)  BP(mean): --  RR: 16 (16 Mar 2018 05:39) (16 - 20)  SpO2: 95% (16 Mar 2018 05:39) (93% - 96%)    PHYSICAL EXAM:      Constitutional: A&Ox3    Respiratory: non labored breathing, no respiratory distress    Cardiovascular: NSR, RRR    Gastrointestinal: soft, nondistended, nontender, incision c/d/i    Extremities: (-) edema            I&O's Detail    15 Mar 2018 07:01  -  16 Mar 2018 07:00  --------------------------------------------------------  IN:    Oral Fluid: 1140 mL  Total IN: 1140 mL    OUT:  Total OUT: 0 mL    Total NET: 1140 mL          LABS:                        11.1   10.1  )-----------( 158      ( 15 Mar 2018 06:36 )             34.9     03-15    141  |  105  |  16  ----------------------------<  109<H>  3.6   |  24  |  0.74    Ca    8.9      15 Mar 2018 06:36  Phos  3.4     03-15  Mg     1.7     03-15      PT/INR - ( 15 Mar 2018 06:36 )   PT: 39.0 sec;   INR: 3.43          PTT - ( 15 Mar 2018 06:36 )  PTT:38.5 sec

## 2018-03-16 NOTE — PROGRESS NOTE ADULT - ASSESSMENT
79 yo female with HTN, HLD, DM2 and recent ventral hernia repair with Dr. Ngo (3/2/18), uncomplicated hospital course, presents to the ED with signs of hypoglycemia and some chest pain, found to have RUL PE on CTA.    1. pulmonary embolism  - rec apixaban 10mg BID x 7 days, then 5mg BID x 3        months   - ECHO - LVEF 70%, mildly dilated LA   - US Duplex LE b/l: calf vein venous thrombosis   - clears, IVF hydration   - trend troponin, rest of labs in am   - pulmonology consult Dr. Trammell -     2. s/p ventral hernia repair robotic converted to open on 3/2 POD #10  -stable  -LEANN x1    3. DM  -on ISS    4. HTN  -on home medication    5. hypothyroidism  -on home medication    6. HLD  -on home medication    7. dispo 79 yo female with HTN, HLD, DM2 and recent ventral hernia repair with Dr. Ngo (3/2/18), uncomplicated hospital course, presents to the ED with signs of hypoglycemia and some chest pain, found to have RUL PE on CTA.    1. pulmonary embolism  - rec apixaban 10mg BID x 7 days, then 5mg BID x 3        months   - ECHO - LVEF 70%, mildly dilated LA   - US Duplex LE b/l: calf vein venous thrombosis   - clears, IVF hydration   - trend troponin, rest of labs in am   - pulmonology consult Dr. Trammell -     2. s/p ventral hernia repair robotic converted to open on 3/2 POD #10  -stable  -LEANN x1    3. DM  -on ISS    4. HTN  -on home medication    5. hypothyroidism  -on home medication    6. HLD  -on home medication    7. dispo  - home today with home PT

## 2018-03-19 DIAGNOSIS — E11.649 TYPE 2 DIABETES MELLITUS WITH HYPOGLYCEMIA WITHOUT COMA: ICD-10-CM

## 2018-03-19 DIAGNOSIS — E66.9 OBESITY, UNSPECIFIED: ICD-10-CM

## 2018-03-19 DIAGNOSIS — I82.492 ACUTE EMBOLISM AND THROMBOSIS OF OTHER SPECIFIED DEEP VEIN OF LEFT LOWER EXTREMITY: ICD-10-CM

## 2018-03-19 DIAGNOSIS — I26.99 OTHER PULMONARY EMBOLISM WITHOUT ACUTE COR PULMONALE: ICD-10-CM

## 2018-03-19 DIAGNOSIS — I10 ESSENTIAL (PRIMARY) HYPERTENSION: ICD-10-CM

## 2018-03-19 DIAGNOSIS — E78.5 HYPERLIPIDEMIA, UNSPECIFIED: ICD-10-CM

## 2018-03-19 DIAGNOSIS — R19.7 DIARRHEA, UNSPECIFIED: ICD-10-CM

## 2018-03-19 DIAGNOSIS — E03.9 HYPOTHYROIDISM, UNSPECIFIED: ICD-10-CM

## 2018-03-19 DIAGNOSIS — I27.20 PULMONARY HYPERTENSION, UNSPECIFIED: ICD-10-CM

## 2018-03-19 DIAGNOSIS — I82.4Z3 ACUTE EMBOLISM AND THROMBOSIS OF UNSPECIFIED DEEP VEINS OF DISTAL LOWER EXTREMITY, BILATERAL: ICD-10-CM

## 2018-03-19 DIAGNOSIS — Z79.84 LONG TERM (CURRENT) USE OF ORAL HYPOGLYCEMIC DRUGS: ICD-10-CM

## 2018-03-21 RX ORDER — APIXABAN 2.5 MG/1
1 TABLET, FILM COATED ORAL
Qty: 60 | Refills: 0 | OUTPATIENT
Start: 2018-03-21 | End: 2018-04-19

## 2018-03-23 ENCOUNTER — INPATIENT (INPATIENT)
Facility: HOSPITAL | Age: 78
LOS: 18 days | Discharge: HOME | DRG: 856 | End: 2018-04-11
Attending: STUDENT IN AN ORGANIZED HEALTH CARE EDUCATION/TRAINING PROGRAM | Admitting: STUDENT IN AN ORGANIZED HEALTH CARE EDUCATION/TRAINING PROGRAM
Payer: MEDICARE

## 2018-03-23 VITALS
WEIGHT: 160.06 LBS | OXYGEN SATURATION: 96 % | HEIGHT: 63 IN | RESPIRATION RATE: 18 BRPM | SYSTOLIC BLOOD PRESSURE: 97 MMHG | DIASTOLIC BLOOD PRESSURE: 60 MMHG | HEART RATE: 84 BPM | TEMPERATURE: 98 F

## 2018-03-23 DIAGNOSIS — Z98.890 OTHER SPECIFIED POSTPROCEDURAL STATES: Chronic | ICD-10-CM
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
## 2018-03-23 LAB
ALBUMIN SERPL ELPH-MCNC: 1.9 G/DL — LOW (ref 3.3–5)
ALP SERPL-CCNC: 68 U/L — SIGNIFICANT CHANGE UP (ref 40–120)
ALT FLD-CCNC: 50 U/L — HIGH (ref 10–45)
ANION GAP SERPL CALC-SCNC: 15 MMOL/L — SIGNIFICANT CHANGE UP (ref 5–17)
ANISOCYTOSIS BLD QL: SLIGHT — SIGNIFICANT CHANGE UP
APTT BLD: 28.8 SEC — SIGNIFICANT CHANGE UP (ref 27.5–37.4)
AST SERPL-CCNC: 54 U/L — HIGH (ref 10–40)
BILIRUB SERPL-MCNC: 1 MG/DL — SIGNIFICANT CHANGE UP (ref 0.2–1.2)
BLD GP AB SCN SERPL QL: NEGATIVE — SIGNIFICANT CHANGE UP
BUN SERPL-MCNC: 22 MG/DL — SIGNIFICANT CHANGE UP (ref 7–23)
CALCIUM SERPL-MCNC: 9.1 MG/DL — SIGNIFICANT CHANGE UP (ref 8.4–10.5)
CHLORIDE SERPL-SCNC: 104 MMOL/L — SIGNIFICANT CHANGE UP (ref 96–108)
CO2 SERPL-SCNC: 20 MMOL/L — LOW (ref 22–31)
CREAT SERPL-MCNC: 1.02 MG/DL — SIGNIFICANT CHANGE UP (ref 0.5–1.3)
DOHLE BOD BLD QL SMEAR: SIGNIFICANT CHANGE UP
EOSINOPHIL NFR BLD AUTO: 1 % — SIGNIFICANT CHANGE UP (ref 0–6)
GLUCOSE BLDC GLUCOMTR-MCNC: 74 MG/DL — SIGNIFICANT CHANGE UP (ref 70–99)
GLUCOSE BLDC GLUCOMTR-MCNC: 82 MG/DL — SIGNIFICANT CHANGE UP (ref 70–99)
GLUCOSE BLDC GLUCOMTR-MCNC: 88 MG/DL — SIGNIFICANT CHANGE UP (ref 70–99)
GLUCOSE SERPL-MCNC: 86 MG/DL — SIGNIFICANT CHANGE UP (ref 70–99)
HCT VFR BLD CALC: 37.7 % — SIGNIFICANT CHANGE UP (ref 34.5–45)
HGB BLD-MCNC: 11.7 G/DL — SIGNIFICANT CHANGE UP (ref 11.5–15.5)
HYPOCHROMIA BLD QL: SLIGHT — SIGNIFICANT CHANGE UP
INR BLD: 2.01 — HIGH (ref 0.88–1.16)
LACTATE SERPL-SCNC: 2.8 MMOL/L — HIGH (ref 0.5–2)
LACTATE SERPL-SCNC: 3.1 MMOL/L — HIGH (ref 0.5–2)
LACTATE SERPL-SCNC: 4 MMOL/L — CRITICAL HIGH (ref 0.5–2)
LYMPHOCYTES # BLD AUTO: 20 % — SIGNIFICANT CHANGE UP (ref 13–44)
MANUAL DIF COMMENT BLD-IMP: SIGNIFICANT CHANGE UP
MANUAL SMEAR VERIFICATION: YES — SIGNIFICANT CHANGE UP
MCHC RBC-ENTMCNC: 29 PG — SIGNIFICANT CHANGE UP (ref 27–34)
MCHC RBC-ENTMCNC: 31 G/DL — LOW (ref 32–36)
MCV RBC AUTO: 93.5 FL — SIGNIFICANT CHANGE UP (ref 80–100)
METAMYELOCYTES # FLD: 1 % — HIGH
MONOCYTES NFR BLD AUTO: 7 % — SIGNIFICANT CHANGE UP (ref 2–14)
MYELOCYTES NFR BLD: 1 % — HIGH
NEUTROPHILS NFR BLD AUTO: 59 % — SIGNIFICANT CHANGE UP (ref 43–77)
NEUTS BAND # BLD: 11 % — SIGNIFICANT CHANGE UP
PLAT MORPH BLD: NORMAL — SIGNIFICANT CHANGE UP
PLATELET # BLD AUTO: 465 K/UL — HIGH (ref 150–400)
POTASSIUM SERPL-MCNC: 4.6 MMOL/L — SIGNIFICANT CHANGE UP (ref 3.5–5.3)
POTASSIUM SERPL-SCNC: 4.6 MMOL/L — SIGNIFICANT CHANGE UP (ref 3.5–5.3)
PROT SERPL-MCNC: 6.4 G/DL — SIGNIFICANT CHANGE UP (ref 6–8.3)
PROTHROM AB SERPL-ACNC: 22.6 SEC — HIGH (ref 9.8–12.7)
RBC # BLD: 4.03 M/UL — SIGNIFICANT CHANGE UP (ref 3.8–5.2)
RBC # FLD: 15 % — SIGNIFICANT CHANGE UP (ref 10.3–16.9)
RBC BLD AUTO: (no result)
RH IG SCN BLD-IMP: POSITIVE — SIGNIFICANT CHANGE UP
SODIUM SERPL-SCNC: 139 MMOL/L — SIGNIFICANT CHANGE UP (ref 135–145)
TOXIC GRANULES BLD QL SMEAR: SIGNIFICANT CHANGE UP
WBC # BLD: 16.3 K/UL — HIGH (ref 3.8–10.5)
WBC # FLD AUTO: 16.3 K/UL — HIGH (ref 3.8–10.5)

## 2018-03-23 PROCEDURE — 71045 X-RAY EXAM CHEST 1 VIEW: CPT | Mod: 26

## 2018-03-23 PROCEDURE — 99285 EMERGENCY DEPT VISIT HI MDM: CPT

## 2018-03-23 RX ORDER — SODIUM CHLORIDE 9 MG/ML
1000 INJECTION, SOLUTION INTRAVENOUS
Qty: 0 | Refills: 0 | Status: DISCONTINUED | OUTPATIENT
Start: 2018-03-23 | End: 2018-03-24

## 2018-03-23 RX ORDER — PROTHROMBIN COMPLEX CONCENTRATE (HUMAN) 25.5; 16.5; 24; 22; 22; 26 [IU]/ML; [IU]/ML; [IU]/ML; [IU]/ML; [IU]/ML; [IU]/ML
1500 POWDER, FOR SOLUTION INTRAVENOUS ONCE
Qty: 0 | Refills: 0 | Status: DISCONTINUED | OUTPATIENT
Start: 2018-03-23 | End: 2018-03-23

## 2018-03-23 RX ORDER — PIPERACILLIN AND TAZOBACTAM 4; .5 G/20ML; G/20ML
3.38 INJECTION, POWDER, LYOPHILIZED, FOR SOLUTION INTRAVENOUS ONCE
Qty: 0 | Refills: 0 | Status: COMPLETED | OUTPATIENT
Start: 2018-03-23 | End: 2018-03-23

## 2018-03-23 RX ORDER — SODIUM CHLORIDE 9 MG/ML
1000 INJECTION, SOLUTION INTRAVENOUS
Qty: 0 | Refills: 0 | Status: DISCONTINUED | OUTPATIENT
Start: 2018-03-23 | End: 2018-04-11

## 2018-03-23 RX ORDER — INSULIN LISPRO 100/ML
VIAL (ML) SUBCUTANEOUS
Qty: 0 | Refills: 0 | Status: DISCONTINUED | OUTPATIENT
Start: 2018-03-23 | End: 2018-03-30

## 2018-03-23 RX ORDER — SODIUM CHLORIDE 9 MG/ML
1000 INJECTION INTRAMUSCULAR; INTRAVENOUS; SUBCUTANEOUS ONCE
Qty: 0 | Refills: 0 | Status: COMPLETED | OUTPATIENT
Start: 2018-03-23 | End: 2018-03-23

## 2018-03-23 RX ORDER — MORPHINE SULFATE 50 MG/1
2 CAPSULE, EXTENDED RELEASE ORAL ONCE
Qty: 0 | Refills: 0 | Status: DISCONTINUED | OUTPATIENT
Start: 2018-03-23 | End: 2018-03-23

## 2018-03-23 RX ORDER — ASPIRIN/CALCIUM CARB/MAGNESIUM 324 MG
81 TABLET ORAL DAILY
Qty: 0 | Refills: 0 | Status: DISCONTINUED | OUTPATIENT
Start: 2018-03-23 | End: 2018-03-24

## 2018-03-23 RX ORDER — DEXTROSE 50 % IN WATER 50 %
12.5 SYRINGE (ML) INTRAVENOUS ONCE
Qty: 0 | Refills: 0 | Status: DISCONTINUED | OUTPATIENT
Start: 2018-03-23 | End: 2018-04-11

## 2018-03-23 RX ORDER — DEXTROSE 50 % IN WATER 50 %
1 SYRINGE (ML) INTRAVENOUS ONCE
Qty: 0 | Refills: 0 | Status: DISCONTINUED | OUTPATIENT
Start: 2018-03-23 | End: 2018-04-11

## 2018-03-23 RX ORDER — APIXABAN 2.5 MG/1
5 TABLET, FILM COATED ORAL EVERY 12 HOURS
Qty: 0 | Refills: 0 | Status: DISCONTINUED | OUTPATIENT
Start: 2018-03-23 | End: 2018-03-24

## 2018-03-23 RX ORDER — PIPERACILLIN AND TAZOBACTAM 4; .5 G/20ML; G/20ML
3.38 INJECTION, POWDER, LYOPHILIZED, FOR SOLUTION INTRAVENOUS EVERY 6 HOURS
Qty: 0 | Refills: 0 | Status: DISCONTINUED | OUTPATIENT
Start: 2018-03-23 | End: 2018-03-26

## 2018-03-23 RX ORDER — OXYCODONE AND ACETAMINOPHEN 5; 325 MG/1; MG/1
1 TABLET ORAL EVERY 4 HOURS
Qty: 0 | Refills: 0 | Status: DISCONTINUED | OUTPATIENT
Start: 2018-03-23 | End: 2018-03-30

## 2018-03-23 RX ORDER — LEVOTHYROXINE SODIUM 125 MCG
150 TABLET ORAL DAILY
Qty: 0 | Refills: 0 | Status: DISCONTINUED | OUTPATIENT
Start: 2018-03-23 | End: 2018-04-11

## 2018-03-23 RX ORDER — IOHEXOL 300 MG/ML
50 INJECTION, SOLUTION INTRAVENOUS ONCE
Qty: 0 | Refills: 0 | Status: COMPLETED | OUTPATIENT
Start: 2018-03-23 | End: 2018-03-23

## 2018-03-23 RX ORDER — DEXTROSE 50 % IN WATER 50 %
25 SYRINGE (ML) INTRAVENOUS ONCE
Qty: 0 | Refills: 0 | Status: DISCONTINUED | OUTPATIENT
Start: 2018-03-23 | End: 2018-04-11

## 2018-03-23 RX ORDER — VANCOMYCIN HCL 1 G
1000 VIAL (EA) INTRAVENOUS ONCE
Qty: 0 | Refills: 0 | Status: COMPLETED | OUTPATIENT
Start: 2018-03-23 | End: 2018-03-23

## 2018-03-23 RX ORDER — ATENOLOL 25 MG/1
50 TABLET ORAL DAILY
Qty: 0 | Refills: 0 | Status: DISCONTINUED | OUTPATIENT
Start: 2018-03-23 | End: 2018-03-26

## 2018-03-23 RX ORDER — GLUCAGON INJECTION, SOLUTION 0.5 MG/.1ML
1 INJECTION, SOLUTION SUBCUTANEOUS ONCE
Qty: 0 | Refills: 0 | Status: DISCONTINUED | OUTPATIENT
Start: 2018-03-23 | End: 2018-04-11

## 2018-03-23 RX ORDER — LISINOPRIL 2.5 MG/1
10 TABLET ORAL DAILY
Qty: 0 | Refills: 0 | Status: DISCONTINUED | OUTPATIENT
Start: 2018-03-23 | End: 2018-03-26

## 2018-03-23 RX ADMIN — MORPHINE SULFATE 2 MILLIGRAM(S): 50 CAPSULE, EXTENDED RELEASE ORAL at 07:59

## 2018-03-23 RX ADMIN — SODIUM CHLORIDE 2000 MILLILITER(S): 9 INJECTION INTRAMUSCULAR; INTRAVENOUS; SUBCUTANEOUS at 06:42

## 2018-03-23 RX ADMIN — PIPERACILLIN AND TAZOBACTAM 200 GRAM(S): 4; .5 INJECTION, POWDER, LYOPHILIZED, FOR SOLUTION INTRAVENOUS at 07:59

## 2018-03-23 RX ADMIN — SODIUM CHLORIDE 3000 MILLILITER(S): 9 INJECTION INTRAMUSCULAR; INTRAVENOUS; SUBCUTANEOUS at 09:05

## 2018-03-23 RX ADMIN — IOHEXOL 50 MILLILITER(S): 300 INJECTION, SOLUTION INTRAVENOUS at 07:03

## 2018-03-23 RX ADMIN — PIPERACILLIN AND TAZOBACTAM 200 GRAM(S): 4; .5 INJECTION, POWDER, LYOPHILIZED, FOR SOLUTION INTRAVENOUS at 13:48

## 2018-03-23 RX ADMIN — Medication 81 MILLIGRAM(S): at 12:08

## 2018-03-23 RX ADMIN — SODIUM CHLORIDE 2000 MILLILITER(S): 9 INJECTION INTRAMUSCULAR; INTRAVENOUS; SUBCUTANEOUS at 07:03

## 2018-03-23 RX ADMIN — Medication 250 MILLIGRAM(S): at 10:18

## 2018-03-23 RX ADMIN — SODIUM CHLORIDE 150 MILLILITER(S): 9 INJECTION, SOLUTION INTRAVENOUS at 10:17

## 2018-03-23 RX ADMIN — PIPERACILLIN AND TAZOBACTAM 200 GRAM(S): 4; .5 INJECTION, POWDER, LYOPHILIZED, FOR SOLUTION INTRAVENOUS at 21:36

## 2018-03-23 RX ADMIN — SODIUM CHLORIDE 150 MILLILITER(S): 9 INJECTION, SOLUTION INTRAVENOUS at 19:16

## 2018-03-23 RX ADMIN — APIXABAN 5 MILLIGRAM(S): 2.5 TABLET, FILM COATED ORAL at 18:43

## 2018-03-23 RX ADMIN — Medication 150 MICROGRAM(S): at 12:08

## 2018-03-23 NOTE — ED ADULT NURSE NOTE - OBJECTIVE STATEMENT
Pt states " I had hernia repair on 3/2/18 and since then, I have not been well. I have abd pain and tonight I started having diarrhea with bright red blood in it. I also have no appetite". Pt noted with open purulent malodorous wound to mid abd and rt lower quad. Pt also noted with bloody diarrhea

## 2018-03-23 NOTE — ED PROVIDER NOTE - OBJECTIVE STATEMENT
77 y/o female with hx of DM, HLD, HTN, PE on eloquis c/o abd pain and BRBPR. pt states abd pain ever since ventral hernia repair on 3/2/18. pt reports pain past 10 days to lower abdomen. pt also not BRBRPR past few days. no fever or chills. no n/v. + decrease appetite. + diarrhea. no constipation. no urinary sx's. no further complaints.

## 2018-03-23 NOTE — H&P ADULT - NSHPPHYSICALEXAM_GEN_ALL_CORE
General: WN/WD NAD  Neurology: A&Ox3, nonfocal, CABELLO x 4  Eyes: PERRLA/ EOMI, Gross vision intact  ENT/Neck: Neck supple, trachea midline, No JVD, Gross hearing intact  Respiratory: CTA B/L, No wheezing, rales, rhonchi  CV: RRR, S1S2, no murmurs, rubs or gallops  Abdominal: Soft, NT/ND. Purulent drainage from midline incision  Rectal: Rectal vault with stool, no visible blood on exam  Extremities: No edema, + peripheral pulses  Skin: No Rashes, Hematoma, Ecchymosis  Incisions: Midline incision

## 2018-03-23 NOTE — ED PROVIDER NOTE - ATTENDING CONTRIBUTION TO CARE
78F hx htn, dm, high chol, PE (on eliquis), s/p ventral hernia repair 3/2.  c/o abd pain and bleeding per rectum.  states ongoing for past few days.  no vomiting.  +diarrhea.  no fevers.    gen- nad  heent- ncat, clear conj  cv -rrr  lungs -ctab  abd - soft, ventral hernia - +dehiscence of incision with fecal material leaking, +lower abd TTP, maroon colored stool per rectum  ext -wwp, no edema  neuro -aox3, steady gait, lomeli  abd pain, likely feculent material from ventral hernia incision, also with GI bleeding. surgery consulted, will give kcentra for eliquis reversal. 78F hx htn, dm, high chol, PE (on eliquis), s/p ventral hernia repair 3/2.  c/o abd pain and bleeding per rectum.  states ongoing for past few days.  no vomiting.  +diarrhea.  no fevers.    gen- nad  heent- ncat, clear conj  cv -rrr  lungs -ctab  abd - soft, ventral hernia - +dehiscence of incision with fecal material leaking, +lower abd TTP, maroon colored stool per rectum  ext -wwp, no edema  neuro -aox3, steady gait, lomeli  abd pain, likely feculent material from ventral hernia incision, also with GI bleeding. surgery consulted, pending CT, given ABX, ivf

## 2018-03-23 NOTE — ED PROVIDER NOTE - PROGRESS NOTE DETAILS
received s/o from night team to obtain CT.  surgery saw patient and recom abx.  Pt stable, hb stable, rectal by surgery no blood on JOHN.  will continue to monitor.

## 2018-03-23 NOTE — H&P ADULT - ASSESSMENT
79 y/o female with hx of DM, HLD, HTN, recent admission for post-operative PE placed on Eliquis. Ventral hernia repair on 3/2. Now presenting for new onset of abdominal pain with active purulent drainage, and questionable blood per rectum    Continue IV ABX  Regular Diet - Carb controlled  IVF, will Hep Lock once tolerating  Trend Lactate decreasing after fluid bolus  Continue Home Eliquis, considering low suspicion for GI bleed, (Hgb stable since discharge, as well as Vital signs stable)  Continue Home Meds  Insulin Sliding Scale  Wound Packing

## 2018-03-23 NOTE — ED PROVIDER NOTE - GASTROINTESTINAL, MLM
Abdomen soft, + incision to abdominal wall open and leaking brownish d/c. + lower abd tenderness. no distention. no gaurding. no rebound. + maroon colored stool per rectum.

## 2018-03-23 NOTE — H&P ADULT - NSHPREVIEWOFSYSTEMS_GEN_ALL_CORE
REVIEW OF SYSTEMS:    CONSTITUTIONAL: No weakness, fevers or chills  EYES/ENT: No visual changes;  No vertigo or throat pain   NECK: No pain or stiffness  RESPIRATORY: No cough, wheezing, hemoptysis; No shortness of breath  CARDIOVASCULAR: No chest pain or palpitations  GASTROINTESTINAL: Incisional abdominal pain, Decreased appetite, Diarrhea, with episodic maroon colored stool.   GENITOURINARY: No dysuria, frequency or hematuria  NEUROLOGICAL: No numbness or weakness  SKIN: No itching, rashes

## 2018-03-23 NOTE — H&P ADULT - HISTORY OF PRESENT ILLNESS
77 y/o female with hx of DM, HLD, HTN, recent admission for post-operative PE placed on Eliquis. Complaining of abdominal pain and passing maroon colored stools. Patient states that she has had abdominal pain since ventral hernia repair on 3/2/18. Last few bowel movements feels like they might have had some blood in them, but she did notice anything bright red. Noticed some drainage from midline abdominal wound over the past couple of days, No fever or chills. no n/v. + decrease appetite. + diarrhea. No shortness of breath or chest pain.

## 2018-03-23 NOTE — ED PROVIDER NOTE - MEDICAL DECISION MAKING DETAILS
abd pain and BRBPR on eloquis. incision open and likely fistula present. maroon colored stool present on rectal exam. surgery consulted. k centra given. ct ordered, labs, and dispo pending results and surgery eval

## 2018-03-24 LAB
ANION GAP SERPL CALC-SCNC: 12 MMOL/L — SIGNIFICANT CHANGE UP (ref 5–17)
APTT BLD: 31.2 SEC — SIGNIFICANT CHANGE UP (ref 27.5–37.4)
BUN SERPL-MCNC: 32 MG/DL — HIGH (ref 7–23)
C DIFF GDH STL QL: NEGATIVE — SIGNIFICANT CHANGE UP
C DIFF GDH STL QL: SIGNIFICANT CHANGE UP
CALCIUM SERPL-MCNC: 8.4 MG/DL — SIGNIFICANT CHANGE UP (ref 8.4–10.5)
CHLORIDE SERPL-SCNC: 104 MMOL/L — SIGNIFICANT CHANGE UP (ref 96–108)
CO2 SERPL-SCNC: 21 MMOL/L — LOW (ref 22–31)
CREAT SERPL-MCNC: 1.62 MG/DL — HIGH (ref 0.5–1.3)
GLUCOSE BLDC GLUCOMTR-MCNC: 105 MG/DL — HIGH (ref 70–99)
GLUCOSE BLDC GLUCOMTR-MCNC: 132 MG/DL — HIGH (ref 70–99)
GLUCOSE BLDC GLUCOMTR-MCNC: 81 MG/DL — SIGNIFICANT CHANGE UP (ref 70–99)
GLUCOSE BLDC GLUCOMTR-MCNC: 83 MG/DL — SIGNIFICANT CHANGE UP (ref 70–99)
GLUCOSE SERPL-MCNC: 76 MG/DL — SIGNIFICANT CHANGE UP (ref 70–99)
HCT VFR BLD CALC: 32.4 % — LOW (ref 34.5–45)
HCT VFR BLD CALC: 35.1 % — SIGNIFICANT CHANGE UP (ref 34.5–45)
HGB BLD-MCNC: 10 G/DL — LOW (ref 11.5–15.5)
HGB BLD-MCNC: 11 G/DL — LOW (ref 11.5–15.5)
INR BLD: 2.36 — HIGH (ref 0.88–1.16)
LDH SERPL L TO P-CCNC: 170 U/L — SIGNIFICANT CHANGE UP (ref 50–242)
MAGNESIUM SERPL-MCNC: 1.7 MG/DL — SIGNIFICANT CHANGE UP (ref 1.6–2.6)
MCHC RBC-ENTMCNC: 29.2 PG — SIGNIFICANT CHANGE UP (ref 27–34)
MCHC RBC-ENTMCNC: 29.6 PG — SIGNIFICANT CHANGE UP (ref 27–34)
MCHC RBC-ENTMCNC: 30.9 G/DL — LOW (ref 32–36)
MCHC RBC-ENTMCNC: 31.3 G/DL — LOW (ref 32–36)
MCV RBC AUTO: 94.5 FL — SIGNIFICANT CHANGE UP (ref 80–100)
MCV RBC AUTO: 94.6 FL — SIGNIFICANT CHANGE UP (ref 80–100)
PHOSPHATE SERPL-MCNC: 3.7 MG/DL — SIGNIFICANT CHANGE UP (ref 2.5–4.5)
PLATELET # BLD AUTO: 321 K/UL — SIGNIFICANT CHANGE UP (ref 150–400)
PLATELET # BLD AUTO: 327 K/UL — SIGNIFICANT CHANGE UP (ref 150–400)
POTASSIUM SERPL-MCNC: 4.5 MMOL/L — SIGNIFICANT CHANGE UP (ref 3.5–5.3)
POTASSIUM SERPL-SCNC: 4.5 MMOL/L — SIGNIFICANT CHANGE UP (ref 3.5–5.3)
PROTHROM AB SERPL-ACNC: 26.7 SEC — HIGH (ref 9.8–12.7)
RBC # BLD: 3.43 M/UL — LOW (ref 3.8–5.2)
RBC # BLD: 3.71 M/UL — LOW (ref 3.8–5.2)
RBC # FLD: 15.1 % — SIGNIFICANT CHANGE UP (ref 10.3–16.9)
RBC # FLD: 15.1 % — SIGNIFICANT CHANGE UP (ref 10.3–16.9)
SODIUM SERPL-SCNC: 137 MMOL/L — SIGNIFICANT CHANGE UP (ref 135–145)
WBC # BLD: 13.6 K/UL — HIGH (ref 3.8–10.5)
WBC # BLD: 9.8 K/UL — SIGNIFICANT CHANGE UP (ref 3.8–10.5)
WBC # FLD AUTO: 13.6 K/UL — HIGH (ref 3.8–10.5)
WBC # FLD AUTO: 9.8 K/UL — SIGNIFICANT CHANGE UP (ref 3.8–10.5)

## 2018-03-24 RX ORDER — SODIUM CHLORIDE 9 MG/ML
1000 INJECTION, SOLUTION INTRAVENOUS
Qty: 0 | Refills: 0 | Status: DISCONTINUED | OUTPATIENT
Start: 2018-03-24 | End: 2018-03-26

## 2018-03-24 RX ORDER — MAGNESIUM SULFATE 500 MG/ML
1 VIAL (ML) INJECTION ONCE
Qty: 0 | Refills: 0 | Status: COMPLETED | OUTPATIENT
Start: 2018-03-24 | End: 2018-03-24

## 2018-03-24 RX ADMIN — Medication 150 MICROGRAM(S): at 05:52

## 2018-03-24 RX ADMIN — PIPERACILLIN AND TAZOBACTAM 200 GRAM(S): 4; .5 INJECTION, POWDER, LYOPHILIZED, FOR SOLUTION INTRAVENOUS at 18:10

## 2018-03-24 RX ADMIN — SODIUM CHLORIDE 175 MILLILITER(S): 9 INJECTION, SOLUTION INTRAVENOUS at 10:00

## 2018-03-24 RX ADMIN — PIPERACILLIN AND TAZOBACTAM 200 GRAM(S): 4; .5 INJECTION, POWDER, LYOPHILIZED, FOR SOLUTION INTRAVENOUS at 05:52

## 2018-03-24 RX ADMIN — ATENOLOL 50 MILLIGRAM(S): 25 TABLET ORAL at 05:52

## 2018-03-24 RX ADMIN — LISINOPRIL 10 MILLIGRAM(S): 2.5 TABLET ORAL at 05:52

## 2018-03-24 RX ADMIN — PIPERACILLIN AND TAZOBACTAM 200 GRAM(S): 4; .5 INJECTION, POWDER, LYOPHILIZED, FOR SOLUTION INTRAVENOUS at 11:31

## 2018-03-24 RX ADMIN — SODIUM CHLORIDE 150 MILLILITER(S): 9 INJECTION, SOLUTION INTRAVENOUS at 05:56

## 2018-03-24 RX ADMIN — Medication 100 GRAM(S): at 11:31

## 2018-03-24 NOTE — PROGRESS NOTE ADULT - ASSESSMENT
79 y/o female with hx of DM, HLD, HTN, recent admission for post-operative PE placed on Eliquis. Ventral hernia repair on 3/2. Now presenting for new onset of abdominal pain with active purulent drainage, and questionable blood per rectum    Continue IV ABX  Regular Diet - Carb controlled  IVF, will Hep Lock once tolerating  Trend Lactate decreasing after fluid bolus  Continue Home Eliquis, considering low suspicion for GI bleed, (Hgb stable since discharge, as well as Vital signs stable)  Continue Home Meds  Insulin Sliding Scale  Wound Packing 79 y/o female with hx of DM, HLD, HTN, recent admission for post-operative PE placed on Eliquis. Ventral hernia repair on 3/2. Now presenting for new onset of abdominal pain with active purulent drainage, and questionable blood per rectum    Continue IV ABX  Regular Diet - Carb controlled  IVF, will Hep Lock once tolerating  Holding home Eliquis & ASA.   Continue Home Meds  Insulin Sliding Scale  Wound Packing

## 2018-03-24 NOTE — PHYSICAL THERAPY INITIAL EVALUATION ADULT - ADDITIONAL COMMENTS
Pt states she lives in an elevator building, no prior use of DME, no hx of falls and is able to ambulate into her environment with grocery shopping and sometimes goes to the movies.

## 2018-03-24 NOTE — PROGRESS NOTE ADULT - SUBJECTIVE AND OBJECTIVE BOX
O/N: pt has multiple episodes of diarrhea as per nurse, wound pack replaced on abdomen, patient is actively bleeding out of her vagina, contact gyn in AM, labs were drawn at 3:30 instead of 5:30 due to bleed, pt/ptt/inr, lactate was added, Eliquis and Aspirin were stopped until situation is under control.  [facility]23: [facility] Wound packed, very little drainage. Fascia appears intact. O/N: pt has multiple episodes of diarrhea as per nurse, wound pack replaced on abdomen, patient is actively bleeding out of her vagina, contact gyn in AM, labs were drawn at 3:30 instead of 5:30 due to bleed, pt/ptt/inr, lactate was added, Eliquis and Aspirin were stopped until situation is under control.  3/23: Admitted to [facility]. Wound packed, very little drainage. Fascia appears intact.    SUBJECTIVE: Patient seen and examined bedside by chief resident. Feels ok.     ATENolol  Tablet 50 milliGRAM(s) Oral daily  lisinopril 10 milliGRAM(s) Oral daily  piperacillin/tazobactam IVPB. 3.375 Gram(s) IV Intermittent every 6 hours      Vital Signs Last 24 Hrs  T(C): 37.4 (24 Mar 2018 05:25), Max: 37.4 (24 Mar 2018 05:25)  T(F): 99.4 (24 Mar 2018 05:25), Max: 99.4 (24 Mar 2018 05:25)  HR: 80 (24 Mar 2018 05:25) (69 - 80)  BP: 126/75 (24 Mar 2018 05:25) (100/69 - 126/75)  BP(mean): --  RR: 18 (24 Mar 2018 05:25) (16 - 19)  SpO2: 95% (24 Mar 2018 05:25) (93% - 97%)  I&O's Detail    23 Mar 2018 07:01  -  24 Mar 2018 07:00  --------------------------------------------------------  IN:    IV PiggyBack: 100 mL    lactated ringers.: 1100 mL  Total IN: 1200 mL    OUT:  Total OUT: 0 mL    Total NET: 1200 mL          General: NAD, resting comfortably in bed  C/V: NSR  Pulm: Nonlabored breathing, no respiratory distress  Abd: soft, NT/ND. Wound packed  Rectum: No blood rectal vault.   Extrem: WWP, no edema, SCDs in place        LABS:                        11.0   9.8   )-----------( 327      ( 24 Mar 2018 03:44 )             35.1     03-24    137  |  104  |  32<H>  ----------------------------<  76  4.5   |  21<L>  |  1.62<H>    Ca    8.4      24 Mar 2018 03:44  Phos  3.7     03-24  Mg     1.7     03-24    TPro  6.4  /  Alb  1.9<L>  /  TBili  1.0  /  DBili  x   /  AST  54<H>  /  ALT  50<H>  /  AlkPhos  68  03-23    PT/INR - ( 24 Mar 2018 03:44 )   PT: 26.7 sec;   INR: 2.36          PTT - ( 24 Mar 2018 03:44 )  PTT:31.2 sec      RADIOLOGY & ADDITIONAL STUDIES:

## 2018-03-24 NOTE — PROVIDER CONTACT NOTE (OTHER) - SITUATION
pt with diarrhea- placed on Contact Isolation Precaution protocol,  stool specimen sent to r/o C-Diff with negative result

## 2018-03-25 DIAGNOSIS — N93.9 ABNORMAL UTERINE AND VAGINAL BLEEDING, UNSPECIFIED: ICD-10-CM

## 2018-03-25 DIAGNOSIS — D25.1 INTRAMURAL LEIOMYOMA OF UTERUS: ICD-10-CM

## 2018-03-25 LAB
ANION GAP SERPL CALC-SCNC: 12 MMOL/L — SIGNIFICANT CHANGE UP (ref 5–17)
BUN SERPL-MCNC: 39 MG/DL — HIGH (ref 7–23)
CALCIUM SERPL-MCNC: 7.9 MG/DL — LOW (ref 8.4–10.5)
CHLORIDE SERPL-SCNC: 106 MMOL/L — SIGNIFICANT CHANGE UP (ref 96–108)
CO2 SERPL-SCNC: 19 MMOL/L — LOW (ref 22–31)
CREAT SERPL-MCNC: 2.96 MG/DL — HIGH (ref 0.5–1.3)
GLUCOSE BLDC GLUCOMTR-MCNC: 115 MG/DL — HIGH (ref 70–99)
GLUCOSE BLDC GLUCOMTR-MCNC: 133 MG/DL — HIGH (ref 70–99)
GLUCOSE BLDC GLUCOMTR-MCNC: 138 MG/DL — HIGH (ref 70–99)
GLUCOSE BLDC GLUCOMTR-MCNC: 181 MG/DL — HIGH (ref 70–99)
GLUCOSE SERPL-MCNC: 174 MG/DL — HIGH (ref 70–99)
HCT VFR BLD CALC: 27.6 % — LOW (ref 34.5–45)
HGB BLD-MCNC: 8.5 G/DL — LOW (ref 11.5–15.5)
MCHC RBC-ENTMCNC: 29.4 PG — SIGNIFICANT CHANGE UP (ref 27–34)
MCHC RBC-ENTMCNC: 30.8 G/DL — LOW (ref 32–36)
MCV RBC AUTO: 95.5 FL — SIGNIFICANT CHANGE UP (ref 80–100)
PLATELET # BLD AUTO: 290 K/UL — SIGNIFICANT CHANGE UP (ref 150–400)
POTASSIUM SERPL-MCNC: 4 MMOL/L — SIGNIFICANT CHANGE UP (ref 3.5–5.3)
POTASSIUM SERPL-SCNC: 4 MMOL/L — SIGNIFICANT CHANGE UP (ref 3.5–5.3)
RBC # BLD: 2.89 M/UL — LOW (ref 3.8–5.2)
RBC # FLD: 15.2 % — SIGNIFICANT CHANGE UP (ref 10.3–16.9)
SODIUM SERPL-SCNC: 137 MMOL/L — SIGNIFICANT CHANGE UP (ref 135–145)
WBC # BLD: 13.3 K/UL — HIGH (ref 3.8–10.5)
WBC # FLD AUTO: 13.3 K/UL — HIGH (ref 3.8–10.5)

## 2018-03-25 PROCEDURE — 76856 US EXAM PELVIC COMPLETE: CPT | Mod: 26

## 2018-03-25 RX ORDER — SODIUM CHLORIDE 9 MG/ML
500 INJECTION, SOLUTION INTRAVENOUS ONCE
Qty: 0 | Refills: 0 | Status: COMPLETED | OUTPATIENT
Start: 2018-03-25 | End: 2018-03-25

## 2018-03-25 RX ORDER — IPRATROPIUM/ALBUTEROL SULFATE 18-103MCG
3 AEROSOL WITH ADAPTER (GRAM) INHALATION ONCE
Qty: 0 | Refills: 0 | Status: COMPLETED | OUTPATIENT
Start: 2018-03-25 | End: 2018-03-25

## 2018-03-25 RX ORDER — IPRATROPIUM/ALBUTEROL SULFATE 18-103MCG
3 AEROSOL WITH ADAPTER (GRAM) INHALATION EVERY 6 HOURS
Qty: 0 | Refills: 0 | Status: DISCONTINUED | OUTPATIENT
Start: 2018-03-25 | End: 2018-03-30

## 2018-03-25 RX ORDER — ONDANSETRON 8 MG/1
4 TABLET, FILM COATED ORAL ONCE
Qty: 0 | Refills: 0 | Status: COMPLETED | OUTPATIENT
Start: 2018-03-25 | End: 2018-03-25

## 2018-03-25 RX ADMIN — ONDANSETRON 4 MILLIGRAM(S): 8 TABLET, FILM COATED ORAL at 05:48

## 2018-03-25 RX ADMIN — SODIUM CHLORIDE 175 MILLILITER(S): 9 INJECTION, SOLUTION INTRAVENOUS at 22:19

## 2018-03-25 RX ADMIN — Medication 3 MILLILITER(S): at 22:18

## 2018-03-25 RX ADMIN — Medication 150 MICROGRAM(S): at 05:48

## 2018-03-25 RX ADMIN — PIPERACILLIN AND TAZOBACTAM 200 GRAM(S): 4; .5 INJECTION, POWDER, LYOPHILIZED, FOR SOLUTION INTRAVENOUS at 05:48

## 2018-03-25 RX ADMIN — PIPERACILLIN AND TAZOBACTAM 200 GRAM(S): 4; .5 INJECTION, POWDER, LYOPHILIZED, FOR SOLUTION INTRAVENOUS at 00:33

## 2018-03-25 RX ADMIN — PIPERACILLIN AND TAZOBACTAM 200 GRAM(S): 4; .5 INJECTION, POWDER, LYOPHILIZED, FOR SOLUTION INTRAVENOUS at 17:58

## 2018-03-25 RX ADMIN — LISINOPRIL 10 MILLIGRAM(S): 2.5 TABLET ORAL at 05:48

## 2018-03-25 RX ADMIN — PIPERACILLIN AND TAZOBACTAM 200 GRAM(S): 4; .5 INJECTION, POWDER, LYOPHILIZED, FOR SOLUTION INTRAVENOUS at 12:49

## 2018-03-25 RX ADMIN — ATENOLOL 50 MILLIGRAM(S): 25 TABLET ORAL at 05:48

## 2018-03-25 RX ADMIN — SODIUM CHLORIDE 1000 MILLILITER(S): 9 INJECTION, SOLUTION INTRAVENOUS at 21:11

## 2018-03-25 RX ADMIN — SODIUM CHLORIDE 175 MILLILITER(S): 9 INJECTION, SOLUTION INTRAVENOUS at 12:50

## 2018-03-25 RX ADMIN — Medication 2: at 12:49

## 2018-03-25 NOTE — CONSULT NOTE ADULT - SUBJECTIVE AND OBJECTIVE BOX
HPI:  79 y/o female with hx of DM, HLD, HTN, recent admission for post-operative PE placed on Eliquis. Complaining of abdominal pain and passing maroon colored stools. Patient states that she has had abdominal pain since ventral hernia repair on 3/2/18. Last few bowel movements feels like they might have had some blood in them, but she did notice anything bright red. Noticed some drainage from midline abdominal wound over the past couple of days, No fever or chills. no n/v. + decrease appetite. + diarrhea. No shortness of breath or chest pain. (23 Mar 2018 09:32)    Gyn consulted for new onset vaginal bleeding, noted to have passage of clots per vagina overnight.  On assessment, patient not fully cooperative and resistant to questioning, initially denied vaginal bleeding however following further discussion did recall having some bleeding overnight.  She was not able to quantify amount and does not recall prior episodes of vaginal bleeding.  She denies any discharge or urinary complaints.  No pelvic pain or cramping.    ObHx:  x2 >40 years ago  GynHx: LMP 30 years ago, no bleeding since.  Denies gynecologic history.  Has not seen a gyn in many years.        PAST MEDICAL & SURGICAL HISTORY:  Obesity  DM (diabetes mellitus)  HLD (hyperlipidemia)  HTN (hypertension)  H/O ventral hernia repair      ROS: See HPI    MEDICATIONS  (STANDING):  ATENolol  Tablet 50 milliGRAM(s) Oral daily  dextrose 5% + sodium chloride 0.45%. 1000 milliLiter(s) (175 mL/Hr) IV Continuous <Continuous>  dextrose 5%. 1000 milliLiter(s) (50 mL/Hr) IV Continuous <Continuous>  dextrose 50% Injectable 12.5 Gram(s) IV Push once  dextrose 50% Injectable 25 Gram(s) IV Push once  dextrose 50% Injectable 25 Gram(s) IV Push once  insulin lispro (HumaLOG) corrective regimen sliding scale   SubCutaneous Before meals and at bedtime  levothyroxine 150 MICROGram(s) Oral daily  lisinopril 10 milliGRAM(s) Oral daily  piperacillin/tazobactam IVPB. 3.375 Gram(s) IV Intermittent every 6 hours    MEDICATIONS  (PRN):  dextrose Gel 1 Dose(s) Oral once PRN Blood Glucose LESS THAN 70 milliGRAM(s)/deciliter  glucagon  Injectable 1 milliGRAM(s) IntraMuscular once PRN Glucose LESS THAN 70 milligrams/deciliter  oxyCODONE    5 mG/acetaminophen 325 mG 1 Tablet(s) Oral every 4 hours PRN Severe Pain (7 - 10)      Allergies    No Known Allergies    Intolerances        SOCIAL HISTORY:  Smoke: Never Smoker  EtOH: occasional    FAMILY HISTORY:  No pertinent family history in first degree relatives      Vital Signs Last 24 Hrs  T(C): 36.1 (25 Mar 2018 20:33), Max: 36.9 (25 Mar 2018 10:10)  T(F): 97 (25 Mar 2018 20:33), Max: 98.5 (25 Mar 2018 10:10)  HR: 60 (25 Mar 2018 20:33) (60 - 65)  BP: 92/62 (25 Mar 2018 20:33) (92/62 - 125/71)  BP(mean): --  RR: 20 (25 Mar 2018 20:33) (16 - 20)  SpO2: 93% (25 Mar 2018 20:33) (90% - 97%)    PHYSICAL EXAM    Gen: NAD   CV: RRR  Pulm: CTAB  Abd: Soft, obese, open defect in ventral hernia with purulent drainage under ABD pad, healing robotic port sites; unable to palpate uterus or adnexa due to habitus  Pelvic: patient declined speculum exam and internal pelvic exam; able to appreciate BRB in vaginal area externally    LABS:                        8.5    13.3  )-----------( 290      ( 25 Mar 2018 12:56 )             27.6     -    137  |  106  |  39<H>  ----------------------------<  174<H>  4.0   |  19<L>  |  2.96<H>    Ca    7.9<L>      25 Mar 2018 12:56  Phos  3.7     03-24  Mg     1.7     03-24      PT/INR - ( 24 Mar 2018 03:44 )   PT: 26.7 sec;   INR: 2.36          PTT - ( 24 Mar 2018 03:44 )  PTT:31.2 sec      RADIOLOGY & ADDITIONAL STUDIES:    < from: US Pelvis Complete (18 @ 16:19) >     ******PRELIMINARY REPORT******    ******PRELIMINARY REPORT******              INTERPRETATION:  RESIDENT PRELIMINARY REPORT    PELVIC ULTRASOUND dated 3/25/2018 4:19 PM    INDICATION: Postmenopausal vaginal bleeding. LMP: Many years ago.    TECHNIQUE: Transabdominal views of the pelvis were obtained. Patient   refused transvaginal examination.      PRIOR STUDIES: CT abdomen and pelvis 10/18/2017.    FINDINGS:   These images demonstrate the uterus to be anteverted. The uterus is   enlarged measuring 19.6 x 11.3 x 12.0 cm. There is a 9.0 x 7.8 x 9.3 cm   intramural fibroid at the uterine fundus.  The endometrium is thickened,   measuring 1.5 cm in thickness, which is normal.    The right ovary is normal in size, measuring 2.8 x 2.0 x 2.1 cm. No right   ovarian masses are seen. The left ovary is normal in size, measuring 2.6  x 1.3 x 2.3 cm. No left ovarian masses are seen. Doppler evaluation   demonstrates flow to both ovaries with no evidence of torsion.    No free fluid is seen in the cul-de-sac.    The urinary bladder is visualized anterior to the inferior uterus   consistent with bladder prolapse.      IMPRESSION:   1.  Thickened endometrium measuring 1.5 cm.  2.  Enlarged fibroid uterus.  3.  Findings consistent with prolapsed urinary bladder, similar to prior   examination dated 10/18/2017.            "Thank you for the opportunity to participate in the care of this   patient."  ******PRELIMINARY REPORT******    ******PRELIMINARY REPORT******          < end of copied text >

## 2018-03-25 NOTE — CONSULT NOTE ADULT - PROBLEM SELECTOR RECOMMENDATION 9
- vaginal bleeding likely exacerbated by anticoagulation, however still ultimately concerning for underlying malignancy given enlarged uterus and thickened endometrium, both of which are abnormal in a postmenopausal female.  Patient would benefit from tissue sampling via endometrial biopsy or dilation and curettage, however patient not consenting to pelvic exam at this time.  Will return to reassess patient at a later time to see if she is amenable  - recommend pad counts to quantify bleeding   - no other intervention at this time

## 2018-03-25 NOTE — PROGRESS NOTE ADULT - ASSESSMENT
79 y/o female with hx of DM, HLD, HTN, recent admission for post-operative PE placed on Eliquis. Ventral hernia repair on 3/2. Now presenting for new onset of abdominal pain with active purulent drainage, and questionable blood per rectum    Continue IV ABX  Regular Diet - Carb controlled  IVF, will Hep Lock once tolerating  Trend Lactate decreasing after fluid bolus  Holding  Home Eliquis, due to vaginal bleeding, (Hgb stable since discharge, as well as Vital signs stable)  Continue Home Meds  Insulin Sliding Scale  Wound Packing

## 2018-03-25 NOTE — CONSULT NOTE ADULT - ASSESSMENT
77 yo female s/p ventral hernia repair complicated by postoperative PE now on Eliquis and ASA, readmitted to general surgery with abdominal pain with suspected GI bleed along with purulent drainage from incision.  Gyn consulted for vaginal bleeding.

## 2018-03-25 NOTE — PROGRESS NOTE ADULT - SUBJECTIVE AND OBJECTIVE BOX
3/24 ON: Still passing clots from vagina. Hemodynamically stable. 3/24 ON: Still passing clots from vagina. Hemodynamically stable.       SUBJECTIVE: comp of vaginal bleeding  Flatus: [x ] YES [ ] NO             Bowel Movement: [x ] YES [ ] NO  Pain (0-10):            Pain Control Adequate: [x ] YES [ ] NO  Nausea: [ ] YES [x ] NO            Vomiting: [ ] YES [x ] NO  Diarrhea: [ ] YES [x ] NO         Constipation: [ ] YES [ x] NO     Chest Pain: [ ] YES [x ] NO    SOB:  [ ] YES [x ] NO    MEDICATIONS  (STANDING):  ATENolol  Tablet 50 milliGRAM(s) Oral daily  dextrose 5% + sodium chloride 0.45%. 1000 milliLiter(s) (175 mL/Hr) IV Continuous <Continuous>  dextrose 5%. 1000 milliLiter(s) (50 mL/Hr) IV Continuous <Continuous>  dextrose 50% Injectable 12.5 Gram(s) IV Push once  dextrose 50% Injectable 25 Gram(s) IV Push once  dextrose 50% Injectable 25 Gram(s) IV Push once  insulin lispro (HumaLOG) corrective regimen sliding scale   SubCutaneous Before meals and at bedtime  levothyroxine 150 MICROGram(s) Oral daily  lisinopril 10 milliGRAM(s) Oral daily  piperacillin/tazobactam IVPB. 3.375 Gram(s) IV Intermittent every 6 hours    MEDICATIONS  (PRN):  dextrose Gel 1 Dose(s) Oral once PRN Blood Glucose LESS THAN 70 milliGRAM(s)/deciliter  glucagon  Injectable 1 milliGRAM(s) IntraMuscular once PRN Glucose LESS THAN 70 milligrams/deciliter  oxyCODONE    5 mG/acetaminophen 325 mG 1 Tablet(s) Oral every 4 hours PRN Severe Pain (7 - 10)      Vital Signs Last 24 Hrs  T(C): 36.8 (25 Mar 2018 05:39), Max: 36.9 (24 Mar 2018 10:15)  T(F): 98.2 (25 Mar 2018 05:39), Max: 98.5 (24 Mar 2018 10:15)  HR: 65 (25 Mar 2018 05:39) (65 - 70)  BP: 108/77 (25 Mar 2018 05:39) (100/57 - 113/67)  BP(mean): --  RR: 18 (25 Mar 2018 05:39) (16 - 18)  SpO2: 94% (25 Mar 2018 05:39) (92% - 98%)    PHYSICAL EXAM:      Constitutional: A&Ox3    Respiratory: non labored breathing, no respiratory distress    Cardiovascular: NSR, RRR    Gastrointestinal: Soft ND,NT                 Incision: CDI    Genitourinary: voiding    Extremities: (-) edema                  I&O's Detail    24 Mar 2018 07:01  -  25 Mar 2018 07:00  --------------------------------------------------------  IN:    dextrose 5% + sodium chloride 0.45%.: 3762.5 mL    IV PiggyBack: 450 mL    Oral Fluid: 600 mL  Total IN: 4812.5 mL    OUT:    Voided: 2 mL  Total OUT: 2 mL    Total NET: 4810.5 mL          LABS:                        10.0   13.6  )-----------( 321      ( 24 Mar 2018 22:54 )             32.4     03-24    137  |  104  |  32<H>  ----------------------------<  76  4.5   |  21<L>  |  1.62<H>    Ca    8.4      24 Mar 2018 03:44  Phos  3.7     03-24  Mg     1.7     03-24      PT/INR - ( 24 Mar 2018 03:44 )   PT: 26.7 sec;   INR: 2.36          PTT - ( 24 Mar 2018 03:44 )  PTT:31.2 sec      RADIOLOGY & ADDITIONAL STUDIES:

## 2018-03-26 DIAGNOSIS — N17.9 ACUTE KIDNEY FAILURE, UNSPECIFIED: ICD-10-CM

## 2018-03-26 DIAGNOSIS — N25.0 RENAL OSTEODYSTROPHY: ICD-10-CM

## 2018-03-26 DIAGNOSIS — D64.9 ANEMIA, UNSPECIFIED: ICD-10-CM

## 2018-03-26 DIAGNOSIS — T81.4XXA INFECTION FOLLOWING A PROCEDURE, INITIAL ENCOUNTER: ICD-10-CM

## 2018-03-26 DIAGNOSIS — I10 ESSENTIAL (PRIMARY) HYPERTENSION: ICD-10-CM

## 2018-03-26 LAB
ANION GAP SERPL CALC-SCNC: 14 MMOL/L — SIGNIFICANT CHANGE UP (ref 5–17)
ANISOCYTOSIS BLD QL: SLIGHT — SIGNIFICANT CHANGE UP
APPEARANCE UR: (no result)
BASOPHILS NFR BLD AUTO: 1 % — SIGNIFICANT CHANGE UP (ref 0–2)
BILIRUB UR-MCNC: (no result)
BUN SERPL-MCNC: 41 MG/DL — HIGH (ref 7–23)
CALCIUM SERPL-MCNC: 7.8 MG/DL — LOW (ref 8.4–10.5)
CALCIUM UR-MCNC: 6.6 MG/DL — SIGNIFICANT CHANGE UP
CHLORIDE SERPL-SCNC: 103 MMOL/L — SIGNIFICANT CHANGE UP (ref 96–108)
CO2 SERPL-SCNC: 19 MMOL/L — LOW (ref 22–31)
COLOR SPEC: YELLOW — SIGNIFICANT CHANGE UP
CREAT ?TM UR-MCNC: 137 MG/DL — SIGNIFICANT CHANGE UP
CREAT SERPL-MCNC: 3.75 MG/DL — HIGH (ref 0.5–1.3)
DIFF PNL FLD: (no result)
DOHLE BOD BLD QL SMEAR: SIGNIFICANT CHANGE UP
EOSINOPHIL NFR BLD AUTO: 1 % — SIGNIFICANT CHANGE UP (ref 0–6)
GLUCOSE BLDC GLUCOMTR-MCNC: 134 MG/DL — HIGH (ref 70–99)
GLUCOSE BLDC GLUCOMTR-MCNC: 148 MG/DL — HIGH (ref 70–99)
GLUCOSE BLDC GLUCOMTR-MCNC: 159 MG/DL — HIGH (ref 70–99)
GLUCOSE BLDC GLUCOMTR-MCNC: 172 MG/DL — HIGH (ref 70–99)
GLUCOSE SERPL-MCNC: 135 MG/DL — HIGH (ref 70–99)
GLUCOSE UR QL: NEGATIVE — SIGNIFICANT CHANGE UP
HCT VFR BLD CALC: 27.2 % — LOW (ref 34.5–45)
HGB BLD-MCNC: 8.6 G/DL — LOW (ref 11.5–15.5)
KETONES UR-MCNC: 15 MG/DL
LEUKOCYTE ESTERASE UR-ACNC: NEGATIVE — SIGNIFICANT CHANGE UP
LYMPHOCYTES # BLD AUTO: 14 % — SIGNIFICANT CHANGE UP (ref 13–44)
MAGNESIUM SERPL-MCNC: 1.8 MG/DL — SIGNIFICANT CHANGE UP (ref 1.6–2.6)
MANUAL DIF COMMENT BLD-IMP: SIGNIFICANT CHANGE UP
MANUAL SMEAR VERIFICATION: SIGNIFICANT CHANGE UP
MCHC RBC-ENTMCNC: 30 PG — SIGNIFICANT CHANGE UP (ref 27–34)
MCHC RBC-ENTMCNC: 31.6 G/DL — LOW (ref 32–36)
MCV RBC AUTO: 94.8 FL — SIGNIFICANT CHANGE UP (ref 80–100)
METAMYELOCYTES # FLD: 1 % — HIGH
MONOCYTES NFR BLD AUTO: 7 % — SIGNIFICANT CHANGE UP (ref 2–14)
NEUTROPHILS NFR BLD AUTO: 65 % — SIGNIFICANT CHANGE UP (ref 43–77)
NEUTS BAND # BLD: 11 % — SIGNIFICANT CHANGE UP
NEUTS VAC BLD QL SMEAR: PRESENT — SIGNIFICANT CHANGE UP
NITRITE UR-MCNC: NEGATIVE — SIGNIFICANT CHANGE UP
NRBC # BLD: 1 /100 WBCS — HIGH
OSMOLALITY UR: 303 MOSMOL/KG — SIGNIFICANT CHANGE UP (ref 100–650)
PH UR: 5 — SIGNIFICANT CHANGE UP (ref 5–8)
PHOSPHATE SERPL-MCNC: 4.7 MG/DL — HIGH (ref 2.5–4.5)
PLAT MORPH BLD: NORMAL — SIGNIFICANT CHANGE UP
PLATELET # BLD AUTO: 257 K/UL — SIGNIFICANT CHANGE UP (ref 150–400)
POLYCHROMASIA BLD QL SMEAR: SLIGHT — SIGNIFICANT CHANGE UP
POTASSIUM SERPL-MCNC: 3.9 MMOL/L — SIGNIFICANT CHANGE UP (ref 3.5–5.3)
POTASSIUM SERPL-SCNC: 3.9 MMOL/L — SIGNIFICANT CHANGE UP (ref 3.5–5.3)
POTASSIUM UR-SCNC: 88 MMOL/L — SIGNIFICANT CHANGE UP
PROT ?TM UR-MCNC: 221 MG/DL — SIGNIFICANT CHANGE UP (ref 0–12)
PROT UR-MCNC: 30 MG/DL
PROT/CREAT UR-RTO: 1.61 RATIO — SIGNIFICANT CHANGE UP (ref 0–0.2)
RBC # BLD: 2.87 M/UL — LOW (ref 3.8–5.2)
RBC # FLD: 15.4 % — SIGNIFICANT CHANGE UP (ref 10.3–16.9)
RBC BLD AUTO: (no result)
SODIUM SERPL-SCNC: 136 MMOL/L — SIGNIFICANT CHANGE UP (ref 135–145)
SODIUM UR-SCNC: 30 MMOL/L — SIGNIFICANT CHANGE UP
SP GR SPEC: 1.02 — SIGNIFICANT CHANGE UP (ref 1–1.03)
TOXIC GRANULES BLD QL SMEAR: SLIGHT — SIGNIFICANT CHANGE UP
UROBILINOGEN FLD QL: 0.2 E.U./DL — SIGNIFICANT CHANGE UP
WBC # BLD: 13.8 K/UL — HIGH (ref 3.8–10.5)
WBC # FLD AUTO: 13.8 K/UL — HIGH (ref 3.8–10.5)

## 2018-03-26 PROCEDURE — 71045 X-RAY EXAM CHEST 1 VIEW: CPT | Mod: 26

## 2018-03-26 RX ORDER — SODIUM CHLORIDE 9 MG/ML
1000 INJECTION INTRAMUSCULAR; INTRAVENOUS; SUBCUTANEOUS
Qty: 0 | Refills: 0 | Status: DISCONTINUED | OUTPATIENT
Start: 2018-03-26 | End: 2018-03-27

## 2018-03-26 RX ORDER — PIPERACILLIN AND TAZOBACTAM 4; .5 G/20ML; G/20ML
INJECTION, POWDER, LYOPHILIZED, FOR SOLUTION INTRAVENOUS
Qty: 0 | Refills: 0 | Status: DISCONTINUED | OUTPATIENT
Start: 2018-03-26 | End: 2018-03-27

## 2018-03-26 RX ORDER — DIPHENHYDRAMINE HCL 50 MG
25 CAPSULE ORAL ONCE
Qty: 0 | Refills: 0 | Status: COMPLETED | OUTPATIENT
Start: 2018-03-26 | End: 2018-03-26

## 2018-03-26 RX ORDER — SODIUM CHLORIDE 9 MG/ML
500 INJECTION, SOLUTION INTRAVENOUS ONCE
Qty: 0 | Refills: 0 | Status: COMPLETED | OUTPATIENT
Start: 2018-03-26 | End: 2018-03-26

## 2018-03-26 RX ORDER — PIPERACILLIN AND TAZOBACTAM 4; .5 G/20ML; G/20ML
2.25 INJECTION, POWDER, LYOPHILIZED, FOR SOLUTION INTRAVENOUS EVERY 6 HOURS
Qty: 0 | Refills: 0 | Status: DISCONTINUED | OUTPATIENT
Start: 2018-03-26 | End: 2018-03-27

## 2018-03-26 RX ORDER — PIPERACILLIN AND TAZOBACTAM 4; .5 G/20ML; G/20ML
2.25 INJECTION, POWDER, LYOPHILIZED, FOR SOLUTION INTRAVENOUS ONCE
Qty: 0 | Refills: 0 | Status: COMPLETED | OUTPATIENT
Start: 2018-03-26 | End: 2018-03-26

## 2018-03-26 RX ADMIN — PIPERACILLIN AND TAZOBACTAM 200 GRAM(S): 4; .5 INJECTION, POWDER, LYOPHILIZED, FOR SOLUTION INTRAVENOUS at 14:49

## 2018-03-26 RX ADMIN — SODIUM CHLORIDE 1000 MILLILITER(S): 9 INJECTION, SOLUTION INTRAVENOUS at 22:15

## 2018-03-26 RX ADMIN — Medication 25 MILLIGRAM(S): at 03:38

## 2018-03-26 RX ADMIN — PIPERACILLIN AND TAZOBACTAM 200 GRAM(S): 4; .5 INJECTION, POWDER, LYOPHILIZED, FOR SOLUTION INTRAVENOUS at 19:08

## 2018-03-26 RX ADMIN — PIPERACILLIN AND TAZOBACTAM 200 GRAM(S): 4; .5 INJECTION, POWDER, LYOPHILIZED, FOR SOLUTION INTRAVENOUS at 00:13

## 2018-03-26 RX ADMIN — PIPERACILLIN AND TAZOBACTAM 200 GRAM(S): 4; .5 INJECTION, POWDER, LYOPHILIZED, FOR SOLUTION INTRAVENOUS at 06:00

## 2018-03-26 RX ADMIN — Medication 2: at 17:44

## 2018-03-26 RX ADMIN — Medication 2: at 08:28

## 2018-03-26 RX ADMIN — PIPERACILLIN AND TAZOBACTAM 200 GRAM(S): 4; .5 INJECTION, POWDER, LYOPHILIZED, FOR SOLUTION INTRAVENOUS at 23:31

## 2018-03-26 RX ADMIN — SODIUM CHLORIDE 75 MILLILITER(S): 9 INJECTION INTRAMUSCULAR; INTRAVENOUS; SUBCUTANEOUS at 14:30

## 2018-03-26 RX ADMIN — SODIUM CHLORIDE 1000 MILLILITER(S): 9 INJECTION, SOLUTION INTRAVENOUS at 03:42

## 2018-03-26 RX ADMIN — Medication 150 MICROGRAM(S): at 05:59

## 2018-03-26 RX ADMIN — SODIUM CHLORIDE 175 MILLILITER(S): 9 INJECTION, SOLUTION INTRAVENOUS at 05:59

## 2018-03-26 RX ADMIN — Medication 3 MILLILITER(S): at 05:59

## 2018-03-26 NOTE — CONSULT NOTE ADULT - PROBLEM SELECTOR RECOMMENDATION 5
Patient's Calcium 7.8 with albumin 1.9 at present with corrected calcium 9.9, Phosphorus 4.7 at present    Low k/Low phos/renal diet for now.  Monitor calcium and phosphorus level

## 2018-03-26 NOTE — PROGRESS NOTE ADULT - SUBJECTIVE AND OBJECTIVE BOX
O/N: per gyn: patient refused speculum exam, recs pad count, wound dressing replaced. BP low to 92 bolused, still having vaginal bleeding.   3/25: vaginal bleeding, transvaginal US-thickened endometrium and enlarge fibroid uterus- c/s GYN. O/N: per gyn: patient refused speculum exam, recs pad count, wound dressing replaced. BP low to 92 bolused, still having vaginal bleeding.   3/25: vaginal bleeding, transvaginal US-thickened endometrium and enlarge fibroid uterus- c/s GYN.       SUBJECTIVE: Patient seen and examined bedside by chief resident.     ATENolol  Tablet 50 milliGRAM(s) Oral daily  lisinopril 10 milliGRAM(s) Oral daily  piperacillin/tazobactam IVPB. 3.375 Gram(s) IV Intermittent every 6 hours      Vital Signs Last 24 Hrs  T(C): 35.9 (26 Mar 2018 05:51), Max: 37 (25 Mar 2018 22:00)  T(F): 96.7 (26 Mar 2018 05:51), Max: 98.6 (25 Mar 2018 22:00)  HR: 58 (26 Mar 2018 05:51) (57 - 65)  BP: 94/60 (26 Mar 2018 05:51) (91/52 - 125/71)  BP(mean): --  RR: 18 (26 Mar 2018 05:51) (16 - 22)  SpO2: 95% (26 Mar 2018 05:51) (90% - 97%)  I&O's Detail    25 Mar 2018 07:01  -  26 Mar 2018 07:00  --------------------------------------------------------  IN:    dextrose 5% + sodium chloride 0.45%.: 3580 mL    IV PiggyBack: 400 mL    Lactated Ringers IV Bolus: 500 mL    Oral Fluid: 600 mL  Total IN: 5080 mL    OUT:    Voided: 300 mL  Total OUT: 300 mL    Total NET: 4780 mL          General: NAD, resting comfortably in bed  C/V: NSR  Pulm: Nonlabored breathing, no respiratory distress  Abd: soft, NT/ND. Midline wound wide/superficial  Extrem: WWP, no edema, SCDs in place        LABS:                        8.6    13.8  )-----------( 257      ( 26 Mar 2018 07:04 )             27.2     03-26    136  |  103  |  41<H>  ----------------------------<  135<H>  3.9   |  19<L>  |  3.75<H>    Ca    7.8<L>      26 Mar 2018 07:04  Phos  4.7     03-26  Mg     1.8     03-26            RADIOLOGY & ADDITIONAL STUDIES:

## 2018-03-26 NOTE — CONSULT NOTE ADULT - PROBLEM SELECTOR RECOMMENDATION 9
Oliguric GONZALEZ likely due to Ischemic ATN/Hypotension/hypoperfusion of kidneys along with ACEI use vs Drug induced AIN.    Plan:  Strict I/o  Avoid Nephrotoxic agents  Urinalysis, urine electrolytes as per ordered  Avoid Hypotension and maintain MAP>70mmhg  Hold ACEI and betablocker for now  Change IV fluids to NS @ 75 cc/ hr for now to maintain MAP.  Obtain repeat chest xray  Close monitoring advised to better monitoring of blood pressure and urine output.  Volume status mildly hypevolemic. Electrolytes stable at present.  Patient may require RRT/HD if no improvement in next 24 to 48 hours if no significant urine output and/or electrolytes imbalance  Monitor BMP every 8 to 12 hrly for now.  Obtain Bilateral renal and bladder sonogram  Obtain CBC with differential   Will obtain further workup after urinalysis available to assess proteinuria/hematuria.  Adjust antibiotics as per renal dose clearance of Oliguric GONZALEZ likely due to Ischemic ATN/Hypotension/hypoperfusion of kidneys along with ACEI use vs Drug induced AIN.    Plan:  Strict I/o  Avoid Nephrotoxic agents  Urinalysis, urine electrolytes as per ordered  Avoid Hypotension and maintain MAP>70mmhg  Hold ACEI and betablocker for now  Change IV fluids 500cc bolus followed by NS @ 75 cc/ hr for next 12 hrs for now to maintain MAP.  Obtain repeat chest xray  Close monitoring advised to better monitoring of blood pressure and urine output.  Volume status mildly hypevolemic. Electrolytes stable at present.  Patient may require RRT/HD if no improvement in next 24 to 48 hours if no significant urine output and/or electrolytes imbalance  Monitor BMP every 8 to 12 hrly for now.  Obtain Bilateral renal and bladder sonogram  Obtain CBC with differential   Will obtain further workup after urinalysis available to assess proteinuria/hematuria.  Adjust antibiotics as per renal dose clearance of

## 2018-03-26 NOTE — CONSULT NOTE ADULT - SUBJECTIVE AND OBJECTIVE BOX
Patient is a 78y old  Female who presents with a chief complaint of Wound abscess (23 Mar 2018 09:32)      HPI:  79 y/o female with hx of DM, HLD, HTN, recent admission for post-operative PE placed on Eliquis. Complaining of abdominal pain and passing maroon colored stools. Patient states that she has had abdominal pain since ventral hernia repair on 3/2/18. Last few bowel movements feels like they might have had some blood in them, but she did notice anything bright red. Noticed some drainage from midline abdominal wound over the past couple of days, No fever or chills. no n/v. + decrease appetite. + diarrhea. No shortness of breath or chest pain. (23 Mar 2018 09:32)      PAST MEDICAL & SURGICAL HISTORY:  Obesity  DM (diabetes mellitus)  HLD (hyperlipidemia)  HTN (hypertension)  H/O ventral hernia repair      Allergies    No Known Allergies    Intolerances        FAMILY HISTORY:  No pertinent family history in first degree relatives      SOCIAL HISTORY:    MEDICATIONS  (STANDING):  ATENolol  Tablet 50 milliGRAM(s) Oral daily  dextrose 5% + sodium chloride 0.45%. 1000 milliLiter(s) (175 mL/Hr) IV Continuous <Continuous>  dextrose 5%. 1000 milliLiter(s) (50 mL/Hr) IV Continuous <Continuous>  dextrose 50% Injectable 12.5 Gram(s) IV Push once  dextrose 50% Injectable 25 Gram(s) IV Push once  dextrose 50% Injectable 25 Gram(s) IV Push once  insulin lispro (HumaLOG) corrective regimen sliding scale   SubCutaneous Before meals and at bedtime  levothyroxine 150 MICROGram(s) Oral daily  lisinopril 10 milliGRAM(s) Oral daily  piperacillin/tazobactam IVPB. 3.375 Gram(s) IV Intermittent every 6 hours    MEDICATIONS  (PRN):  ALBUTerol/ipratropium for Nebulization 3 milliLiter(s) Nebulizer every 6 hours PRN Wheezing  dextrose Gel 1 Dose(s) Oral once PRN Blood Glucose LESS THAN 70 milliGRAM(s)/deciliter  glucagon  Injectable 1 milliGRAM(s) IntraMuscular once PRN Glucose LESS THAN 70 milligrams/deciliter  oxyCODONE    5 mG/acetaminophen 325 mG 1 Tablet(s) Oral every 4 hours PRN Severe Pain (7 - 10)      REVIEW OF SYSTEMS:    CONSTITUTIONAL: Fatigue and tired, No fever or chills,  EYES: No blurred or double vision.  ENT: No recent URI or sore throat  RESPIRATORY: Mild shortness of breath, Denies any cough, hemoptysis  CARDIOVASCULAR: No Chest pain or shortness of breath  GASTROINTESTINAL: Mild abdominal discomfort, No nausea or vomiting, No diarrhea  GENITOURINARY: No dysuria or urinary burning, No difficulty passing urine, No hematuria  NEUROLOGICAL: No headaches or blurred vision  SKIN: No skin rashes   MUSCULOSKELETAL: Complaints of bilateral leg pains        PHYSICAL EXAM:  GENERAL: Ill appearing, sitting in chair, alert, awake, no acute distress at present  HEAD:  Atraumatic, Normocephalic,   EYES: Bilateral conjuctival and scleral pallor+nt  Oral cavity: Oral mucosa dry and pale  NECK: Neck supple, No JVD  CHEST/LUNG: Bilateral decreased breath sounds, Bibasilar minimal crepitations at base, No rales, no wheezing  HEART: Regular rate and rhythm. GIBSON II/VI at LPSB, No gallop, no rub   ABDOMEN: Soft, Nontender, BS+nt, No flank tenderness.   EXTREMITIES: Bilateral pedal edema+nt, No clubbing, cyanosis,  no calf tenderness  Neurology: AAOx3, no focal neurological deficit  SKIN: No rashes or lesions      CAPILLARY BLOOD GLUCOSE      POCT Blood Glucose.: 148 mg/dL (26 Mar 2018 12:12)  POCT Blood Glucose.: 159 mg/dL (26 Mar 2018 07:34)  POCT Blood Glucose.: 138 mg/dL (25 Mar 2018 21:06)  POCT Blood Glucose.: 115 mg/dL (25 Mar 2018 17:16)      I&O's Summary    25 Mar 2018 07:01  -  26 Mar 2018 07:00  --------------------------------------------------------  IN: 5080 mL / OUT: 300 mL / NET: 4780 mL    26 Mar 2018 07:01  -  26 Mar 2018 12:19  --------------------------------------------------------  IN: 240 mL / OUT: 60 mL / NET: 180 mL          LABS:                                                   03-26-18 @ 07:04    136  |  103  |  41<H>  ----------------------------<  135<H>  3.9   |  19<L>  |  3.75<H>                                                 03-25-18 @ 12:56    137  |  106  |  39<H>  ----------------------------<  174<H>  4.0   |  19<L>  |  2.96<H>                                                 03-24-18 @ 03:44    137  |  104  |  32<H>  ----------------------------<  76  4.5   |  21<L>  |  1.62<H>                                                 03-23-18 @ 06:38    139  |  104  |  22  ----------------------------<  86  4.6   |  20<L>  |  1.02    Ca    7.8<L>      26 Mar 2018 07:04  Ca    7.9<L>      25 Mar 2018 12:56  Ca    8.4      24 Mar 2018 03:44  Ca    9.1      23 Mar 2018 06:38  Phos  4.7     03-26  Phos  3.7     03-24  Mg     1.8     03-26  Mg     1.7     03-24    TPro  6.4  /  Alb  1.9<L>  /  TBili  1.0  /  DBili  x   /  AST  54<H>  /  ALT  50<H>  /  AlkPhos  68  03-23                          8.6    13.8  )-----------( 257      ( 26 Mar 2018 07:04 )             27.2     CBC Full  -  ( 26 Mar 2018 07:04 )  WBC Count : 13.8 K/uL  Hemoglobin : 8.6 g/dL  Hematocrit : 27.2 %  Platelet Count - Automated : 257 K/uL  Mean Cell Volume : 94.8 fL      CBC Full  -  ( 25 Mar 2018 12:56 )  WBC Count : 13.3 K/uL  Hemoglobin : 8.5 g/dL  Hematocrit : 27.6 %  Platelet Count - Automated : 290 K/uL  Mean Cell Volume : 95.5 fL      CBC Full  -  ( 24 Mar 2018 22:54 )  WBC Count : 13.6 K/uL  Hemoglobin : 10.0 g/dL  Hematocrit : 32.4 %  Platelet Count - Automated : 321 K/uL  Mean Cell Volume : 94.5 fL      CBC Full  -  ( 24 Mar 2018 03:44 )  WBC Count : 9.8 K/uL  Hemoglobin : 11.0 g/dL  Hematocrit : 35.1 %  Platelet Count - Automated : 327 K/uL  Mean Cell Volume : 94.6 fL      CBC Full  -  ( 23 Mar 2018 06:37 )  WBC Count : 16.3 K/uL  Hemoglobin : 11.7 g/dL  Hematocrit : 37.7 %  Platelet Count - Automated : 465 K/uL  Mean Cell Volume : 93.5 fL                  RADIOLOGY & ADDITIONAL TESTS:    < from: US Pelvis Complete (03.25.18 @ 16:19) >    EXAM:  US PELVIC COMPLETE                          PROCEDURE DATE:  03/25/2018                     INTERPRETATION:  PELVIC ULTRASOUND dated 3/25/2018 4:19 PM    INDICATION: Postmenopausal vaginal bleeding. LMP: Many years ago.    TECHNIQUE: Transabdominal views of the pelvis were obtained. Patient   refused transvaginal examination.      PRIOR STUDIES: CT abdomen and pelvis 10/18/2017.    FINDINGS:   These images demonstrate the uterus to be anteverted. The uterus is   enlarged measuring 19.6 x11.3 x 12.0 cm. There is a 9.0 x 7.8 x 9.3 cm   intramural fibroid at the uterine fundus.  The endometrium is thickened,   measuring 1.5 cm in thickness, which is normal.    The right ovary is normal in size, measuring 2.8 x 2.0 x 2.1 cm. No right   ovarian masses are seen. The left ovary is normal in size, measuring 2.6   x 1.3 x 2.3 cm. No left ovarian masses are seen. Doppler evaluation   demonstrates flow to both ovaries with no evidence of torsion.    No free fluid is seen in the cul-de-sac.    IMPRESSION:   Limited study due to lack of transvaginal images.    Thickened endometrium measuring 1.5 cm. Differential diagnosis includes   endometrial cancer, endometrial hyperplasia, endometrial polyp. Further   assessment is advised.    Enlarged fibroid uterus.    The pelvic floor relaxation reported on CT of abdomen and pelvis dated   10/18/2017 it is not demonstrated on ultrasound.            "Thank you for the opportunity to participate in the care of this   patient."    JACK BRYANT M.D., RADIOLOGY RESIDENT  This document has been electronically signed.  ELMER BECKER M.D., ATTENDING RADIOLOGIST  This document has been electronically signed. Mar 26 2018  9:04AM                  < end of copied text >    EKG/Telemetry: Reviewed Patient is a 78y old  Female who presents with a chief complaint of Wound abscess (23 Mar 2018 09:32)      HPI:  77 y/o female with hx of DM, HLD, HTN, recent admission for post-operative PE placed on Eliquis. Complaining of abdominal pain and passing maroon colored stools. Patient states that she has had abdominal pain since ventral hernia repair on 3/2/18. Last few bowel movements feels like they might have had some blood in them, but she did notice anything bright red. Noticed some drainage from midline abdominal wound over the past couple of days, No fever or chills. no n/v. + decrease appetite. + diarrhea. No shortness of breath or chest pain. (23 Mar 2018 09:32)      PAST MEDICAL & SURGICAL HISTORY:  Obesity  DM (diabetes mellitus)  HLD (hyperlipidemia)  HTN (hypertension)  H/O ventral hernia repair      Allergies    No Known Allergies    Intolerances    Home Medications:   * Patient Currently Takes Medications as of 23-Mar-2018 06:29 documented in Structured Notes  · 	apixaban 5 mg oral tablet: 1 tab(s) orally 2 times a day MDD:2, Please start on 3/21/18, Last Dose Taken:    · 	bisacodyl 5 mg oral delayed release tablet: 1 tab(s) orally every 12 hours, As needed, Constipation, Last Dose Taken:    · 	oxyCODONE-acetaminophen 5 mg-325 mg oral tablet: 1 tab(s) orally every 4 hours, As needed, Moderate Pain (4 - 6) MDD:6, Last Dose Taken:    · 	lisinopril 10 mg oral tablet: 1 tab(s) orally once a day, Last Dose Taken:    · 	atenolol 50 mg oral tablet: 1 tab(s) orally once a day, Last Dose Taken:    · 	Aspirin Enteric Coated 81 mg oral delayed release tablet: 1 tab(s) orally once a day, Last Dose Taken:    · 	glipiZIDE 5 mg oral tablet: 1 tab(s) orally once a day, Last Dose Taken:    · 	Synthroid 150 mcg (0.15 mg) oral tablet: 1 tab(s) orally once a day, Last Dose Taken:    · 	oxycodone-acetaminophen 5 mg-300 mg oral tablet: 1 tab(s) orally every 6 hours, Last Dose Taken:    · 	bisacodyl: Last Dose Taken:        FAMILY HISTORY:  No pertinent family history in first degree relatives      SOCIAL HISTORY:    MEDICATIONS  (STANDING):  ATENolol  Tablet 50 milliGRAM(s) Oral daily  dextrose 5% + sodium chloride 0.45%. 1000 milliLiter(s) (175 mL/Hr) IV Continuous <Continuous>  dextrose 5%. 1000 milliLiter(s) (50 mL/Hr) IV Continuous <Continuous>  dextrose 50% Injectable 12.5 Gram(s) IV Push once  dextrose 50% Injectable 25 Gram(s) IV Push once  dextrose 50% Injectable 25 Gram(s) IV Push once  insulin lispro (HumaLOG) corrective regimen sliding scale   SubCutaneous Before meals and at bedtime  levothyroxine 150 MICROGram(s) Oral daily  lisinopril 10 milliGRAM(s) Oral daily  piperacillin/tazobactam IVPB. 3.375 Gram(s) IV Intermittent every 6 hours    MEDICATIONS  (PRN):  ALBUTerol/ipratropium for Nebulization 3 milliLiter(s) Nebulizer every 6 hours PRN Wheezing  dextrose Gel 1 Dose(s) Oral once PRN Blood Glucose LESS THAN 70 milliGRAM(s)/deciliter  glucagon  Injectable 1 milliGRAM(s) IntraMuscular once PRN Glucose LESS THAN 70 milligrams/deciliter  oxyCODONE    5 mG/acetaminophen 325 mG 1 Tablet(s) Oral every 4 hours PRN Severe Pain (7 - 10)      REVIEW OF SYSTEMS:    CONSTITUTIONAL: Fatigue and tired, No fever or chills,  EYES: No blurred or double vision.  ENT: No recent URI or sore throat  RESPIRATORY: Mild shortness of breath, Denies any cough, hemoptysis  CARDIOVASCULAR: No Chest pain or shortness of breath  GASTROINTESTINAL: Mild abdominal discomfort, No nausea or vomiting, No diarrhea  GENITOURINARY: No dysuria or urinary burning, No difficulty passing urine, No hematuria  NEUROLOGICAL: No headaches or blurred vision  SKIN: No skin rashes   MUSCULOSKELETAL: Complaints of bilateral leg pains        PHYSICAL EXAM:  GENERAL: Ill appearing, sitting in chair, alert, awake, no acute distress at present  HEAD:  Atraumatic, Normocephalic,   EYES: Bilateral conjuctival and scleral pallor+nt  Oral cavity: Oral mucosa dry and pale  NECK: Neck supple, No JVD  CHEST/LUNG: Bilateral decreased breath sounds, Bibasilar minimal crepitations at base, No rales, no wheezing  HEART: Regular rate and rhythm. GIBSON II/VI at LPSB, No gallop, no rub   ABDOMEN: Soft, Nontender, BS+nt, No flank tenderness.   EXTREMITIES: Bilateral pedal edema+nt, No clubbing, cyanosis,  no calf tenderness  Neurology: AAOx3, no focal neurological deficit  SKIN: No rashes or lesions      CAPILLARY BLOOD GLUCOSE      POCT Blood Glucose.: 148 mg/dL (26 Mar 2018 12:12)  POCT Blood Glucose.: 159 mg/dL (26 Mar 2018 07:34)  POCT Blood Glucose.: 138 mg/dL (25 Mar 2018 21:06)  POCT Blood Glucose.: 115 mg/dL (25 Mar 2018 17:16)      I&O's Summary    25 Mar 2018 07:01  -  26 Mar 2018 07:00  --------------------------------------------------------  IN: 5080 mL / OUT: 300 mL / NET: 4780 mL    26 Mar 2018 07:01  -  26 Mar 2018 12:19  --------------------------------------------------------  IN: 240 mL / OUT: 60 mL / NET: 180 mL          LABS:                                                   03-26-18 @ 07:04    136  |  103  |  41<H>  ----------------------------<  135<H>  3.9   |  19<L>  |  3.75<H>                                                 03-25-18 @ 12:56    137  |  106  |  39<H>  ----------------------------<  174<H>  4.0   |  19<L>  |  2.96<H>                                                 03-24-18 @ 03:44    137  |  104  |  32<H>  ----------------------------<  76  4.5   |  21<L>  |  1.62<H>                                                 03-23-18 @ 06:38    139  |  104  |  22  ----------------------------<  86  4.6   |  20<L>  |  1.02    Ca    7.8<L>      26 Mar 2018 07:04  Ca    7.9<L>      25 Mar 2018 12:56  Ca    8.4      24 Mar 2018 03:44  Ca    9.1      23 Mar 2018 06:38  Phos  4.7     03-26  Phos  3.7     03-24  Mg     1.8     03-26  Mg     1.7     03-24    TPro  6.4  /  Alb  1.9<L>  /  TBili  1.0  /  DBili  x   /  AST  54<H>  /  ALT  50<H>  /  AlkPhos  68  03-23                          8.6    13.8  )-----------( 257      ( 26 Mar 2018 07:04 )             27.2     CBC Full  -  ( 26 Mar 2018 07:04 )  WBC Count : 13.8 K/uL  Hemoglobin : 8.6 g/dL  Hematocrit : 27.2 %  Platelet Count - Automated : 257 K/uL  Mean Cell Volume : 94.8 fL      CBC Full  -  ( 25 Mar 2018 12:56 )  WBC Count : 13.3 K/uL  Hemoglobin : 8.5 g/dL  Hematocrit : 27.6 %  Platelet Count - Automated : 290 K/uL  Mean Cell Volume : 95.5 fL      CBC Full  -  ( 24 Mar 2018 22:54 )  WBC Count : 13.6 K/uL  Hemoglobin : 10.0 g/dL  Hematocrit : 32.4 %  Platelet Count - Automated : 321 K/uL  Mean Cell Volume : 94.5 fL      CBC Full  -  ( 24 Mar 2018 03:44 )  WBC Count : 9.8 K/uL  Hemoglobin : 11.0 g/dL  Hematocrit : 35.1 %  Platelet Count - Automated : 327 K/uL  Mean Cell Volume : 94.6 fL      CBC Full  -  ( 23 Mar 2018 06:37 )  WBC Count : 16.3 K/uL  Hemoglobin : 11.7 g/dL  Hematocrit : 37.7 %  Platelet Count - Automated : 465 K/uL  Mean Cell Volume : 93.5 fL                  RADIOLOGY & ADDITIONAL TESTS:    < from: US Pelvis Complete (03.25.18 @ 16:19) >    EXAM:  US PELVIC COMPLETE                          PROCEDURE DATE:  03/25/2018                     INTERPRETATION:  PELVIC ULTRASOUND dated 3/25/2018 4:19 PM    INDICATION: Postmenopausal vaginal bleeding. LMP: Many years ago.    TECHNIQUE: Transabdominal views of the pelvis were obtained. Patient   refused transvaginal examination.      PRIOR STUDIES: CT abdomen and pelvis 10/18/2017.    FINDINGS:   These images demonstrate the uterus to be anteverted. The uterus is   enlarged measuring 19.6 x11.3 x 12.0 cm. There is a 9.0 x 7.8 x 9.3 cm   intramural fibroid at the uterine fundus.  The endometrium is thickened,   measuring 1.5 cm in thickness, which is normal.    The right ovary is normal in size, measuring 2.8 x 2.0 x 2.1 cm. No right   ovarian masses are seen. The left ovary is normal in size, measuring 2.6   x 1.3 x 2.3 cm. No left ovarian masses are seen. Doppler evaluation   demonstrates flow to both ovaries with no evidence of torsion.    No free fluid is seen in the cul-de-sac.    IMPRESSION:   Limited study due to lack of transvaginal images.    Thickened endometrium measuring 1.5 cm. Differential diagnosis includes   endometrial cancer, endometrial hyperplasia, endometrial polyp. Further   assessment is advised.    Enlarged fibroid uterus.    The pelvic floor relaxation reported on CT of abdomen and pelvis dated   10/18/2017 it is not demonstrated on ultrasound.            "Thank you for the opportunity to participate in the care of this   patient."    JACK BRYANT M.D., RADIOLOGY RESIDENT  This document has been electronically signed.  ELMER BECKER M.D., ATTENDING RADIOLOGIST  This document has been electronically signed. Mar 26 2018  9:04AM                  < end of copied text >    EKG/Telemetry: Reviewed

## 2018-03-26 NOTE — CONSULT NOTE ADULT - ASSESSMENT
79 y/o female with hx of DM, HLD, HTN, recent admission for post-operative PE placed on Eliquis. Complaining of abdominal pain and passing maroon colored stools. Nephrology consult called for Oliguric GONZALEZ

## 2018-03-26 NOTE — CONSULT NOTE ADULT - SUBJECTIVE AND OBJECTIVE BOX
HPI:  79 y/o female with hx of DM, HLD, HTN, consulted for vaginal bleeding s/p treatment for postop PE with elequis and ASA. Pt seen again to attempt pelvic exam and possible EMB for thick endometrial strip on TAUS. At this time pt reports no vaginal bleeding despite report from nurse that pt has bleeding every time she ambulates. In addition, pt states that she has no pelvic pain and does not want to be examined or have a procedure. In her own words pt states "I am 80 years old, I do not want to have anything done". Pt was told specifically that the increased diameter of her endometrial lining could be caused by increased cell division and a sign of cancer. Pt stated understanding but had no questions or concerns.     She does not have a GYN doctor currently    MEDICATIONS  (STANDING):  ATENolol  Tablet 50 milliGRAM(s) Oral daily  dextrose 5% + sodium chloride 0.45%. 1000 milliLiter(s) (175 mL/Hr) IV Continuous <Continuous>  dextrose 5%. 1000 milliLiter(s) (50 mL/Hr) IV Continuous <Continuous>  dextrose 50% Injectable 12.5 Gram(s) IV Push once  dextrose 50% Injectable 25 Gram(s) IV Push once  dextrose 50% Injectable 25 Gram(s) IV Push once  insulin lispro (HumaLOG) corrective regimen sliding scale   SubCutaneous Before meals and at bedtime  levothyroxine 150 MICROGram(s) Oral daily  lisinopril 10 milliGRAM(s) Oral daily  piperacillin/tazobactam IVPB. 3.375 Gram(s) IV Intermittent every 6 hours    MEDICATIONS  (PRN):  ALBUTerol/ipratropium for Nebulization 3 milliLiter(s) Nebulizer every 6 hours PRN Wheezing  dextrose Gel 1 Dose(s) Oral once PRN Blood Glucose LESS THAN 70 milliGRAM(s)/deciliter  glucagon  Injectable 1 milliGRAM(s) IntraMuscular once PRN Glucose LESS THAN 70 milligrams/deciliter  oxyCODONE    5 mG/acetaminophen 325 mG 1 Tablet(s) Oral every 4 hours PRN Severe Pain (7 - 10)      Vital Signs Last 24 Hrs  T(C): 35.9 (26 Mar 2018 05:51), Max: 37 (25 Mar 2018 22:00)  T(F): 96.7 (26 Mar 2018 05:51), Max: 98.6 (25 Mar 2018 22:00)  HR: 58 (26 Mar 2018 05:51) (57 - 65)  BP: 94/60 (26 Mar 2018 05:51) (91/52 - 125/71)  BP(mean): --  RR: 18 (26 Mar 2018 05:51) (16 - 22)  SpO2: 95% (26 Mar 2018 05:51) (90% - 97%)  PHYSICAL EXAM    Gen: NAD, sleeping undressed under towel  Abd: Soft, obese, open defect in ventral hernia with purulent drainage under ABD pad, healing robotic port sites  Pelvic: patient declined speculum exam and internal pelvic exam    LABS:               8.6    13.8   )----------(  257       ( 26 Mar 2018 07:04 )               27.2      136    |  103    |  41     ----------------------------<  135        ( 26 Mar 2018 07:04 )  3.9     |  19     |  3.75     Ca    7.8        ( 26 Mar 2018 07:04 )  Phos  4.7       ( 26 Mar 2018 07:04 )  Mg     1.8       ( 26 Mar 2018 07:04 )            CAPILLARY BLOOD GLUCOSE              RADIOLOGY & ADDITIONAL STUDIES:    < from: US Pelvis Complete (03.25.18 @ 16:19) >    EXAM:  US PELVIC COMPLETE                          PROCEDURE DATE:  03/25/2018                     INTERPRETATION:  PELVIC ULTRASOUND dated 3/25/2018 4:19 PM    INDICATION: Postmenopausal vaginal bleeding. LMP: Many years ago.    TECHNIQUE: Transabdominal views of the pelvis were obtained. Patient   refused transvaginal examination.      PRIOR STUDIES: CT abdomen and pelvis 10/18/2017.    FINDINGS:   These images demonstrate the uterus to be anteverted. The uterus is   enlarged measuring 19.6 x11.3 x 12.0 cm. There is a 9.0 x 7.8 x 9.3 cm   intramural fibroid at the uterine fundus.  The endometrium is thickened,   measuring 1.5 cm in thickness, which is normal.    The right ovary is normal in size, measuring 2.8 x 2.0 x 2.1 cm. No right   ovarian masses are seen. The left ovary is normal in size, measuring 2.6   x 1.3 x 2.3 cm. No left ovarian masses are seen. Doppler evaluation   demonstrates flow to both ovaries with no evidence of torsion.    No free fluid is seen in the cul-de-sac.    IMPRESSION:   Limited study due to lack of transvaginal images.    Thickened endometrium measuring 1.5 cm. Differential diagnosis includes   endometrial cancer, endometrial hyperplasia, endometrial polyp. Further   assessment is advised.    Enlarged fibroid uterus.    The pelvic floor relaxation reported on CT of abdomen and pelvis dated   10/18/2017 it is not demonstrated on ultrasound.            "Thank you for the opportunity to participate in the care of this   patient."    JACK BRYANT M.D., RADIOLOGY RESIDENT  This document has been electronically signed.  ELMER BECKER M.D., ATTENDING RADIOLOGIST  This document has been electronically signed. Mar 26 2018  9:04AM GYN was previously consulted on this patient on 3/25.    HPI: 79 y/o Para 2 postmenopausal female with hx of DM, HLD, HTN, consulted for vaginal bleeding s/p anticoagulation for postop PE.      Patient was previously declined pelvic exam and transvaginal ultrasound; transabdominal ultrasound was obtained with results listed below.  We saw patient at bedside to discuss these results.  At this time pt reports no vaginal bleeding.  In addition, pt states that she has no pelvic pain and does not want to be examined or have a procedure. Patient states "I am 80 years old, I do not want to have anything done".     She does not have a GYN doctor currently and has not seen an OBGYN in many years.    MEDICATIONS  (STANDING):  ATENolol  Tablet 50 milliGRAM(s) Oral daily  dextrose 5% + sodium chloride 0.45%. 1000 milliLiter(s) (175 mL/Hr) IV Continuous <Continuous>  dextrose 5%. 1000 milliLiter(s) (50 mL/Hr) IV Continuous <Continuous>  dextrose 50% Injectable 12.5 Gram(s) IV Push once  dextrose 50% Injectable 25 Gram(s) IV Push once  dextrose 50% Injectable 25 Gram(s) IV Push once  insulin lispro (HumaLOG) corrective regimen sliding scale   SubCutaneous Before meals and at bedtime  levothyroxine 150 MICROGram(s) Oral daily  lisinopril 10 milliGRAM(s) Oral daily  piperacillin/tazobactam IVPB. 3.375 Gram(s) IV Intermittent every 6 hours    MEDICATIONS  (PRN):  ALBUTerol/ipratropium for Nebulization 3 milliLiter(s) Nebulizer every 6 hours PRN Wheezing  dextrose Gel 1 Dose(s) Oral once PRN Blood Glucose LESS THAN 70 milliGRAM(s)/deciliter  glucagon  Injectable 1 milliGRAM(s) IntraMuscular once PRN Glucose LESS THAN 70 milligrams/deciliter  oxyCODONE    5 mG/acetaminophen 325 mG 1 Tablet(s) Oral every 4 hours PRN Severe Pain (7 - 10)      Vital Signs Last 24 Hrs  T(C): 35.9 (26 Mar 2018 05:51), Max: 37 (25 Mar 2018 22:00)  T(F): 96.7 (26 Mar 2018 05:51), Max: 98.6 (25 Mar 2018 22:00)  HR: 58 (26 Mar 2018 05:51) (57 - 65)  BP: 94/60 (26 Mar 2018 05:51) (91/52 - 125/71)  BP(mean): --  RR: 18 (26 Mar 2018 05:51) (16 - 22)  SpO2: 95% (26 Mar 2018 05:51) (90% - 97%)    PHYSICAL EXAM  Gen: NAD, sleeping undressed under towel  Abd: Soft, obese, open defect in ventral hernia with purulent drainage under ABD pad, healing robotic port sites  Pelvic: patient declined speculum exam and internal pelvic exam    LABS:               8.6    13.8   )----------(  257       ( 26 Mar 2018 07:04 )               27.2      136    |  103    |  41     ----------------------------<  135        ( 26 Mar 2018 07:04 )  3.9     |  19     |  3.75     Ca    7.8        ( 26 Mar 2018 07:04 )  Phos  4.7       ( 26 Mar 2018 07:04 )  Mg     1.8       ( 26 Mar 2018 07:04 )            CAPILLARY BLOOD GLUCOSE              RADIOLOGY & ADDITIONAL STUDIES:    < from: US Pelvis Complete (03.25.18 @ 16:19) >    EXAM:  US PELVIC COMPLETE                          PROCEDURE DATE:  03/25/2018                     INTERPRETATION:  PELVIC ULTRASOUND dated 3/25/2018 4:19 PM    INDICATION: Postmenopausal vaginal bleeding. LMP: Many years ago.    TECHNIQUE: Transabdominal views of the pelvis were obtained. Patient   refused transvaginal examination.      PRIOR STUDIES: CT abdomen and pelvis 10/18/2017.    FINDINGS:   These images demonstrate the uterus to be anteverted. The uterus is   enlarged measuring 19.6 x11.3 x 12.0 cm. There is a 9.0 x 7.8 x 9.3 cm   intramural fibroid at the uterine fundus.  The endometrium is thickened,   measuring 1.5 cm in thickness, which is normal.    The right ovary is normal in size, measuring 2.8 x 2.0 x 2.1 cm. No right   ovarian masses are seen. The left ovary is normal in size, measuring 2.6   x 1.3 x 2.3 cm. No left ovarian masses are seen. Doppler evaluation   demonstrates flow to both ovaries with no evidence of torsion.    No free fluid is seen in the cul-de-sac.    IMPRESSION:   Limited study due to lack of transvaginal images.    Thickened endometrium measuring 1.5 cm. Differential diagnosis includes   endometrial cancer, endometrial hyperplasia, endometrial polyp. Further   assessment is advised.    Enlarged fibroid uterus.    The pelvic floor relaxation reported on CT of abdomen and pelvis dated   10/18/2017 it is not demonstrated on ultrasound.            "Thank you for the opportunity to participate in the care of this   patient."    JACK BRYANT M.D., RADIOLOGY RESIDENT  This document has been electronically signed.  ELMER BECKER M.D., ATTENDING RADIOLOGIST  This document has been electronically signed. Mar 26 2018  9:04AM

## 2018-03-26 NOTE — CONSULT NOTE ADULT - ASSESSMENT
77 yo female s/p ventral hernia repair complicated by postoperative PE now on Eliquis and ASA, readmitted to general surgery with abdominal pain with suspected GI bleed.  Gyn consulted for vaginal bleeding. Second attempt to preform pelvic exam made. Pt offered exam and endometrial biopsy due to concern of postmenopausal bleeding.  At this time, pt very confused and might not be able to understand full extent of vaginal bleeding and risk of endometrial cancer, although counseling was given. Unable to perform exam and therefore would highly recommend her to see GYN outpt for exam and EMB.     d/w Dr. Christian 77 yo female s/p ventral hernia repair complicated by postoperative PE now on Eliquis and ASA, readmitted to general surgery with abdominal pain with suspected GI bleed.  Gyn consulted for postmenopausal vaginal bleeding.     -Discussed with patient the results of her transabdominal ultrasound.  Recommended transvaginal ultrasound for better assessment of uterus/adnexa.  -Discussed with patient that differential for thickened endometrial lining does include uterine malignancy.  -Pt offered pelvic exam and endometrial biopsy, but declined.  -We would highly recommend her to see GYN outpatient for examination and EMB.  Outpatient follow-up can be obtained at [facility] telephone #: [phone].    GYN is signed off on this patient, please re-consult if patient if necessary.    d/w Dr. Christian

## 2018-03-26 NOTE — CONSULT NOTE ADULT - PROBLEM SELECTOR RECOMMENDATION 4
Surgical abdominal wound infection now on IV antibiotics with Zosyn for now.  Adjust renal dose clearance of antibiotics with Cr cl=10-15ml/min for now  Obtain CBC with differential

## 2018-03-26 NOTE — PROGRESS NOTE ADULT - ASSESSMENT
79 y/o female with hx of DM, HLD, HTN, recent admission for post-operative PE placed on Eliquis. Ventral hernia repair on 3/2. Now presenting for new onset of abdominal pain with active purulent drainage, and questionable blood per rectum    Continue IV ABX  Regular Diet - Carb controlled  IVF, will Hep Lock once tolerating  Holding  Home Eliquis, due to vaginal bleeding, (Hgb stable since discharge, as well as Vital signs stable)  Continue Home Meds  Insulin Sliding Scale  Wound Packing

## 2018-03-27 LAB
ANION GAP SERPL CALC-SCNC: 14 MMOL/L — SIGNIFICANT CHANGE UP (ref 5–17)
ANION GAP SERPL CALC-SCNC: 15 MMOL/L — SIGNIFICANT CHANGE UP (ref 5–17)
BUN SERPL-MCNC: 44 MG/DL — HIGH (ref 7–23)
BUN SERPL-MCNC: 49 MG/DL — HIGH (ref 7–23)
CALCIUM SERPL-MCNC: 7.8 MG/DL — LOW (ref 8.4–10.5)
CALCIUM SERPL-MCNC: 7.8 MG/DL — LOW (ref 8.4–10.5)
CHLORIDE SERPL-SCNC: 104 MMOL/L — SIGNIFICANT CHANGE UP (ref 96–108)
CHLORIDE SERPL-SCNC: 104 MMOL/L — SIGNIFICANT CHANGE UP (ref 96–108)
CO2 SERPL-SCNC: 17 MMOL/L — LOW (ref 22–31)
CO2 SERPL-SCNC: 18 MMOL/L — LOW (ref 22–31)
CREAT SERPL-MCNC: 4.64 MG/DL — HIGH (ref 0.5–1.3)
CREAT SERPL-MCNC: 5.16 MG/DL — HIGH (ref 0.5–1.3)
GLUCOSE BLDC GLUCOMTR-MCNC: 74 MG/DL — SIGNIFICANT CHANGE UP (ref 70–99)
GLUCOSE BLDC GLUCOMTR-MCNC: 81 MG/DL — SIGNIFICANT CHANGE UP (ref 70–99)
GLUCOSE BLDC GLUCOMTR-MCNC: 82 MG/DL — SIGNIFICANT CHANGE UP (ref 70–99)
GLUCOSE BLDC GLUCOMTR-MCNC: 84 MG/DL — SIGNIFICANT CHANGE UP (ref 70–99)
GLUCOSE BLDC GLUCOMTR-MCNC: 87 MG/DL — SIGNIFICANT CHANGE UP (ref 70–99)
GLUCOSE SERPL-MCNC: 76 MG/DL — SIGNIFICANT CHANGE UP (ref 70–99)
GLUCOSE SERPL-MCNC: 79 MG/DL — SIGNIFICANT CHANGE UP (ref 70–99)
HCT VFR BLD CALC: 25.1 % — LOW (ref 34.5–45)
HGB BLD-MCNC: 8 G/DL — LOW (ref 11.5–15.5)
MAGNESIUM SERPL-MCNC: 1.9 MG/DL — SIGNIFICANT CHANGE UP (ref 1.6–2.6)
MCHC RBC-ENTMCNC: 29.7 PG — SIGNIFICANT CHANGE UP (ref 27–34)
MCHC RBC-ENTMCNC: 31.9 G/DL — LOW (ref 32–36)
MCV RBC AUTO: 93.3 FL — SIGNIFICANT CHANGE UP (ref 80–100)
PHOSPHATE SERPL-MCNC: 5.9 MG/DL — HIGH (ref 2.5–4.5)
PLATELET # BLD AUTO: 248 K/UL — SIGNIFICANT CHANGE UP (ref 150–400)
POTASSIUM SERPL-MCNC: 4.2 MMOL/L — SIGNIFICANT CHANGE UP (ref 3.5–5.3)
POTASSIUM SERPL-MCNC: 4.7 MMOL/L — SIGNIFICANT CHANGE UP (ref 3.5–5.3)
POTASSIUM SERPL-SCNC: 4.2 MMOL/L — SIGNIFICANT CHANGE UP (ref 3.5–5.3)
POTASSIUM SERPL-SCNC: 4.7 MMOL/L — SIGNIFICANT CHANGE UP (ref 3.5–5.3)
RBC # BLD: 2.69 M/UL — LOW (ref 3.8–5.2)
RBC # FLD: 15.3 % — SIGNIFICANT CHANGE UP (ref 10.3–16.9)
SODIUM SERPL-SCNC: 135 MMOL/L — SIGNIFICANT CHANGE UP (ref 135–145)
SODIUM SERPL-SCNC: 137 MMOL/L — SIGNIFICANT CHANGE UP (ref 135–145)
WBC # BLD: 12.9 K/UL — HIGH (ref 3.8–10.5)
WBC # FLD AUTO: 12.9 K/UL — HIGH (ref 3.8–10.5)

## 2018-03-27 PROCEDURE — 71045 X-RAY EXAM CHEST 1 VIEW: CPT | Mod: 26

## 2018-03-27 PROCEDURE — 76770 US EXAM ABDO BACK WALL COMP: CPT | Mod: 26

## 2018-03-27 RX ORDER — ALBUMIN HUMAN 25 %
50 VIAL (ML) INTRAVENOUS EVERY 6 HOURS
Qty: 0 | Refills: 0 | Status: COMPLETED | OUTPATIENT
Start: 2018-03-27 | End: 2018-03-28

## 2018-03-27 RX ORDER — SODIUM CHLORIDE 9 MG/ML
1000 INJECTION INTRAMUSCULAR; INTRAVENOUS; SUBCUTANEOUS
Qty: 0 | Refills: 0 | Status: DISCONTINUED | OUTPATIENT
Start: 2018-03-27 | End: 2018-03-28

## 2018-03-27 RX ORDER — HEPARIN SODIUM 5000 [USP'U]/ML
5000 INJECTION INTRAVENOUS; SUBCUTANEOUS EVERY 8 HOURS
Qty: 0 | Refills: 0 | Status: DISCONTINUED | OUTPATIENT
Start: 2018-03-27 | End: 2018-03-29

## 2018-03-27 RX ORDER — PIPERACILLIN AND TAZOBACTAM 4; .5 G/20ML; G/20ML
2.25 INJECTION, POWDER, LYOPHILIZED, FOR SOLUTION INTRAVENOUS EVERY 6 HOURS
Qty: 0 | Refills: 0 | Status: DISCONTINUED | OUTPATIENT
Start: 2018-03-27 | End: 2018-03-30

## 2018-03-27 RX ORDER — HEPARIN SODIUM 5000 [USP'U]/ML
7500 INJECTION INTRAVENOUS; SUBCUTANEOUS EVERY 8 HOURS
Qty: 0 | Refills: 0 | Status: DISCONTINUED | OUTPATIENT
Start: 2018-03-27 | End: 2018-03-27

## 2018-03-27 RX ORDER — SODIUM CHLORIDE 9 MG/ML
250 INJECTION INTRAMUSCULAR; INTRAVENOUS; SUBCUTANEOUS ONCE
Qty: 0 | Refills: 0 | Status: COMPLETED | OUTPATIENT
Start: 2018-03-27 | End: 2018-03-27

## 2018-03-27 RX ORDER — SODIUM CHLORIDE 9 MG/ML
1000 INJECTION INTRAMUSCULAR; INTRAVENOUS; SUBCUTANEOUS
Qty: 0 | Refills: 0 | Status: DISCONTINUED | OUTPATIENT
Start: 2018-03-27 | End: 2018-03-27

## 2018-03-27 RX ORDER — SODIUM CHLORIDE 9 MG/ML
500 INJECTION INTRAMUSCULAR; INTRAVENOUS; SUBCUTANEOUS ONCE
Qty: 0 | Refills: 0 | Status: COMPLETED | OUTPATIENT
Start: 2018-03-27 | End: 2018-03-27

## 2018-03-27 RX ADMIN — PIPERACILLIN AND TAZOBACTAM 200 GRAM(S): 4; .5 INJECTION, POWDER, LYOPHILIZED, FOR SOLUTION INTRAVENOUS at 11:59

## 2018-03-27 RX ADMIN — SODIUM CHLORIDE 500 MILLILITER(S): 9 INJECTION INTRAMUSCULAR; INTRAVENOUS; SUBCUTANEOUS at 21:48

## 2018-03-27 RX ADMIN — SODIUM CHLORIDE 75 MILLILITER(S): 9 INJECTION INTRAMUSCULAR; INTRAVENOUS; SUBCUTANEOUS at 05:19

## 2018-03-27 RX ADMIN — PIPERACILLIN AND TAZOBACTAM 200 GRAM(S): 4; .5 INJECTION, POWDER, LYOPHILIZED, FOR SOLUTION INTRAVENOUS at 17:14

## 2018-03-27 RX ADMIN — HEPARIN SODIUM 5000 UNIT(S): 5000 INJECTION INTRAVENOUS; SUBCUTANEOUS at 21:48

## 2018-03-27 RX ADMIN — SODIUM CHLORIDE 100 MILLILITER(S): 9 INJECTION INTRAMUSCULAR; INTRAVENOUS; SUBCUTANEOUS at 11:59

## 2018-03-27 RX ADMIN — HEPARIN SODIUM 7500 UNIT(S): 5000 INJECTION INTRAVENOUS; SUBCUTANEOUS at 14:11

## 2018-03-27 RX ADMIN — SODIUM CHLORIDE 100 MILLILITER(S): 9 INJECTION INTRAMUSCULAR; INTRAVENOUS; SUBCUTANEOUS at 21:48

## 2018-03-27 RX ADMIN — OXYCODONE AND ACETAMINOPHEN 1 TABLET(S): 5; 325 TABLET ORAL at 17:30

## 2018-03-27 RX ADMIN — Medication 50 MILLILITER(S): at 23:41

## 2018-03-27 RX ADMIN — Medication 50 MILLILITER(S): at 19:53

## 2018-03-27 RX ADMIN — Medication 150 MICROGRAM(S): at 05:19

## 2018-03-27 RX ADMIN — OXYCODONE AND ACETAMINOPHEN 1 TABLET(S): 5; 325 TABLET ORAL at 10:00

## 2018-03-27 RX ADMIN — PIPERACILLIN AND TAZOBACTAM 200 GRAM(S): 4; .5 INJECTION, POWDER, LYOPHILIZED, FOR SOLUTION INTRAVENOUS at 05:19

## 2018-03-27 RX ADMIN — OXYCODONE AND ACETAMINOPHEN 1 TABLET(S): 5; 325 TABLET ORAL at 08:59

## 2018-03-27 RX ADMIN — SODIUM CHLORIDE 1000 MILLILITER(S): 9 INJECTION INTRAMUSCULAR; INTRAVENOUS; SUBCUTANEOUS at 17:58

## 2018-03-27 NOTE — PROGRESS NOTE ADULT - SUBJECTIVE AND OBJECTIVE BOX
Patient is a 78y Female seen and evaluated at bedside. Patient lying in bed in no acute distress at present. Patient denies any chest pain, shortness of breath, palpitations, dizziness at present. Patient denies any nausea, vomiting, abdominal pain, confusion, headache, tremors at present. Creatinine continue to increase to 4.6 at present with oliguria.       ALBUTerol/ipratropium for Nebulization 3 every 6 hours PRN  dextrose 5%. 1000 <Continuous>  dextrose 50% Injectable 12.5 once  dextrose 50% Injectable 25 once  dextrose 50% Injectable 25 once  dextrose Gel 1 once PRN  glucagon  Injectable 1 once PRN  heparin  Injectable 7500 every 8 hours  insulin lispro (HumaLOG) corrective regimen sliding scale  Before meals and at bedtime  levothyroxine 150 daily  oxyCODONE    5 mG/acetaminophen 325 mG 1 every 4 hours PRN  piperacillin/tazobactam IVPB. 2.25 every 6 hours  sodium chloride 0.9%. 1000 <Continuous>      Allergies    No Known Allergies    Intolerances        T(C): , Max: 36.7 (18 @ 17:01)  T(F): , Max: 98.1 (18 @ 17:01)  HR: 65 (18 @ 08:09)  BP: 109/68 (18 @ 08:09)  BP(mean): --  RR: 17 (18 @ 08:09)  SpO2: 97% (18 @ 08:09)  Wt(kg): --     @ 07:  -   @ 07:00  --------------------------------------------------------  IN: 3295 mL / OUT: 300 mL / NET: 2995 mL     @ 07:01  -   @ 11:00  --------------------------------------------------------  IN: 275 mL / OUT: 20 mL / NET: 255 mL          Review of Systems:  CONSTITUTIONAL: Fatigue and tired,  No fever or chills,  EYES: No blurred or double vision.  RESPIRATORY: No shortness of breath, cough, hemoptysis  CARDIOVASCULAR: No Chest pain or shortness of breath, palpitations, dizziness  GASTROINTESTINAL: No abdominal or flank pain, No nausea or vomiting, No diarrhea  GENITOURINARY: No dysuria or urinary burning, No difficulty passing urine, No hematuria  NEUROLOGICAL: No headaches or blurred vision  SKIN: No skin rashes   MUSCULOSKELETAL: No arthralgia, Joint pain, leg edema, No muscle pains      PHYSICAL EXAM:  GENERAL: well  appearing, alert, awake, no acute distress at present  HEAD:  Atraumatic, Normocephalic,   EYES: Bilateral conjuctival and scleral pallor+nt  Oral cavity: Oral mucosa dry and pink  NECK: Neck supple, No JVD  CHEST/LUNG: Bilateral good air entry+nt, mild basal crepitations, no wheezing  HEART: Regular rate and rhythm. GIBSON II/VI at LPSB, No gallop, no rub   ABDOMEN: Soft, Nontender,mildly distended, lower abdominal wall mild erythema+nt, BS+nt, No flank tenderness.   EXTREMITIES: No clubbing, cyanosis, trace bilateral pedal edema noted.  Neurology: AAOx3, no focal neurological deficit, No asterixis  SKIN: No rashes or lesions          ACCESS:     LABS:                        8.0    12.9  )-----------( 248      ( 27 Mar 2018 06:28 )             25.1     03-    135  |  104  |  44<H>  ----------------------------<  76  4.2   |  17<L>  |  4.64<H>    Ca    7.8<L>      27 Mar 2018 06:28  Phos  5.9       Mg     1.9               Urinalysis Basic - ( 26 Mar 2018 13:24 )    Color: Yellow / Appearance: Cloudy / S.025 / pH: x  Gluc: x / Ketone: 15 mg/dL  / Bili: Small / Urobili: 0.2 E.U./dL   Blood: x / Protein: 30 mg/dL / Nitrite: NEGATIVE   Leuk Esterase: NEGATIVE / RBC: 5-10 /HPF / WBC < 5 /HPF   Sq Epi: x / Non Sq Epi: 5-10 /HPF / Bacteria: Many /HPF      Osmolality, Random Urine: 303 mosmol/kg ( @ 13:20)  Calcium, Random Urine: 6.6 mg/dL ( @ 13:20)  Potassium, Random Urine: 88 mmol/L ( @ 13:20)  Creatinine, Random Urine: 137 mg/dL ( @ 13:20)  Protein/Creatinine Ratio Calculation: 1.61 Ratio ( @ 13:20)  Sodium, Random Urine: 30 mmol/L ( @ 13:20)        RADIOLOGY & ADDITIONAL STUDIES:    < from: Xray Chest 1 View-PORTABLE IMMEDIATE (18 @ 12:57) >    EXAM:  XR CHEST PORTABLE IMMED 1V                          PROCEDURE DATE:  2018                     INTERPRETATION:  PORTABLE CHEST X-RAY     HISTORY: SOB. fluid in lungs    PRIOR STUDIES: 3/23/2018.    FINDINGS: Limited study. Kyphotic positioning. Poor inspiratory effort.   The visualized lungs are grossly clear. No pleural effusion. No   pneumothorax. Size of cardiac silhouette cannot be accurately assessed on   this study.    IMPRESSION:  Limited study. No gross lung pathology.            "Thank you for the opportunity to participate in the care of this   patient."        THERESE RAMIREZ M.D., ATTENDING RADIOLOGIST  This document has been electronically signed. Mar 27 2018 10:53AM                  < end of copied text >

## 2018-03-27 NOTE — PROGRESS NOTE ADULT - SUBJECTIVE AND OBJECTIVE BOX
O/N: VSS. EMANUEL. bolus [id number]  3/26: Serum Cr to 3.75. Renal consulted. FENa 0.6. Holding home BP meds. Fluids changed. O/N: VSS. EMANUEL. bolus [id number]  3/26: Serum Cr to 3.75. Renal consulted. FENa 0.6. Holding home BP meds. Fluids changed.       SUBJECTIVE: Patient seen and examined bedside by chief resident. Patients creatinine continues to rise    piperacillin/tazobactam IVPB. 2.25 Gram(s) IV Intermittent every 6 hours      Vital Signs Last 24 Hrs  T(C): 36.1 (27 Mar 2018 08:09), Max: 36.7 (26 Mar 2018 17:01)  T(F): 96.9 (27 Mar 2018 08:09), Max: 98.1 (26 Mar 2018 17:01)  HR: 65 (27 Mar 2018 08:09) (57 - 106)  BP: 109/68 (27 Mar 2018 08:09) (91/61 - 110/67)  BP(mean): --  RR: 17 (27 Mar 2018 08:09) (17 - 18)  SpO2: 97% (27 Mar 2018 08:09) (94% - 98%)  I&O's Detail    26 Mar 2018 07:01  -  27 Mar 2018 07:00  --------------------------------------------------------  IN:    dextrose 5% + sodium chloride 0.45%.: 1050 mL    IV PiggyBack: 200 mL    Lactated Ringers IV Bolus: 500 mL    Oral Fluid: 420 mL    sodium chloride 0.9%: 1125 mL  Total IN: 3295 mL    OUT:    Indwelling Catheter - Urethral: 220 mL    Voided: 80 mL  Total OUT: 300 mL    Total NET: 2995 mL          General: NAD, resting comfortably in bed  C/V: NSR  Pulm: Nonlabored breathing, no respiratory distress  Abd: soft, NT/ND. Wound packed.  Extrem: WWP, no edema, SCDs in place        LABS:                        8.0    12.9  )-----------( 248      ( 27 Mar 2018 06:28 )             25.1     03-27    135  |  104  |  44<H>  ----------------------------<  76  4.2   |  17<L>  |  4.64<H>    Ca    7.8<L>      27 Mar 2018 06:28  Phos  5.9     03-  Mg     1.9     03-        Urinalysis Basic - ( 26 Mar 2018 13:24 )    Color: Yellow / Appearance: Cloudy / S.025 / pH: x  Gluc: x / Ketone: 15 mg/dL  / Bili: Small / Urobili: 0.2 E.U./dL   Blood: x / Protein: 30 mg/dL / Nitrite: NEGATIVE   Leuk Esterase: NEGATIVE / RBC: 5-10 /HPF / WBC < 5 /HPF   Sq Epi: x / Non Sq Epi: 5-10 /HPF / Bacteria: Many /HPF        RADIOLOGY & ADDITIONAL STUDIES:

## 2018-03-27 NOTE — PROGRESS NOTE ADULT - PROBLEM SELECTOR PLAN 4
Surgical abdominal wound infection now on IV antibiotics with Zosyn for now.  Adjust renal dose clearance of antibiotics with Cr cl=10-15ml/min for now  Obtain CBC with differential.

## 2018-03-27 NOTE — PROGRESS NOTE ADULT - PROBLEM SELECTOR PLAN 1
Oliguric GONZALEZ likely due to Ischemic ATN/Hypotension/hypoperfusion of kidneys along with ACEI use vs Drug induced AIN.    Plan:  Strict I/o  Avoid Nephrotoxic agents  Urinalysis, urine electrolytes as per ordered  Avoid Hypotension and maintain MAP>70mmhg  Hold ACEI and betablocker for now  Change IV fluids 500cc bolus followed by NS @ 75 cc/ hr for next 12 hrs for now to maintain MAP.  Obtain repeat chest xray  Close monitoring advised to better monitoring of blood pressure and urine output.  Volume status mildly hypevolemic. Electrolytes stable at present.  Patient may require RRT/HD if no improvement in next 24 to 48 hours if no significant urine output and/or electrolytes imbalance  Monitor BMP every 8 to 12 hrly for now.  Obtain Bilateral renal and bladder sonogram  Obtain CBC with differential   Will obtain further workup after urinalysis available to assess proteinuria/hematuria.  Adjust antibiotics as per renal dose clearance of Oliguric GONZALEZ likely due to pre-renal and/or Hypotension/hypoperfusion of kidneys along with ACEI use with Low Fe Na 0.7 at present    Plan:  Continue IV fluids with NS @ 100 cc/hr for now with watchful respiratory status  Avoid nephrotoxic agents  Monitor BMP every 12 hrly for now  Hold all BP medications and maintain MAP>70mmhg  Monitor BMP every 12 hrly  Strict I/o for now  volume status and electrolytes stable at present but still remains oliguric at present.  Will consider Lasix challenge once adequate volume repletion.   No urgent need for RRT/HD at present.

## 2018-03-27 NOTE — PROGRESS NOTE ADULT - ASSESSMENT
77 y/o female with hx of DM, HLD, HTN, recent admission for post-operative PE placed on Eliquis. Complaining of abdominal pain and passing maroon colored stools. Nephrology consult called for Oliguric GONZALEZ.

## 2018-03-27 NOTE — PROGRESS NOTE ADULT - PROBLEM SELECTOR PLAN 3
Patient with prior history of hypertension now blood pressure improving off BP medications with range of 110's.  Continue hold all BP medications including ACEi and betablokers.  Consider IV fluids with NS @ 100 cc/hr for now  Maintain MAP>65-70 mmhg.

## 2018-03-27 NOTE — PROGRESS NOTE ADULT - PROBLEM SELECTOR PLAN 2
Anemia of chronic disease with acute bleeding likely due to blood loss anemia with stable hemoglobin of 8.0 at present.  Obtain iron studies  Transfuse PRBC per primary team  No indication for EPO at present due to GONZALEZ

## 2018-03-27 NOTE — PROGRESS NOTE ADULT - ASSESSMENT
79 y/o female with hx of DM, HLD, HTN, recent admission for post-operative PE placed on Eliquis. Ventral hernia repair on 3/2. Now presenting for new onset of abdominal pain with active purulent drainage, and questionable blood per rectum    Continue IV ABX  Regular Diet - Carb controlled  IVF, will Hep Lock once tolerating  Holding  Home Eliquis, due to vaginal bleeding, (Hgb stable since discharge, as well as Vital signs stable)  Continue Home Meds  Insulin Sliding Scale  Wound Packing 77 y/o female with hx of DM, HLD, HTN, recent admission for post-operative PE placed on Eliquis. Ventral hernia repair on 3/2. Now presenting for new onset of abdominal pain with active purulent drainage, and questionable blood per rectum    Continue IV ABX  Regular Diet - Carb controlled  IVF,   Renal recs appreciated.   Holding  Home Eliquis, due to vaginal bleeding, (Hgb stable since discharge, as well as Vital signs stable)  Continue Home Meds  Insulin Sliding Scale  Wound Packing

## 2018-03-28 DIAGNOSIS — E83.39 OTHER DISORDERS OF PHOSPHORUS METABOLISM: ICD-10-CM

## 2018-03-28 LAB
ANION GAP SERPL CALC-SCNC: 17 MMOL/L — SIGNIFICANT CHANGE UP (ref 5–17)
ANION GAP SERPL CALC-SCNC: 17 MMOL/L — SIGNIFICANT CHANGE UP (ref 5–17)
BUN SERPL-MCNC: 46 MG/DL — HIGH (ref 7–23)
BUN SERPL-MCNC: 49 MG/DL — HIGH (ref 7–23)
CALCIUM SERPL-MCNC: 7.8 MG/DL — LOW (ref 8.4–10.5)
CALCIUM SERPL-MCNC: 8 MG/DL — LOW (ref 8.4–10.5)
CALCIUM UR-MCNC: 4.8 MG/DL — SIGNIFICANT CHANGE UP
CHLORIDE SERPL-SCNC: 103 MMOL/L — SIGNIFICANT CHANGE UP (ref 96–108)
CHLORIDE SERPL-SCNC: 105 MMOL/L — SIGNIFICANT CHANGE UP (ref 96–108)
CHLORIDE UR-SCNC: 42 MMOL/L — SIGNIFICANT CHANGE UP
CO2 SERPL-SCNC: 16 MMOL/L — LOW (ref 22–31)
CO2 SERPL-SCNC: 16 MMOL/L — LOW (ref 22–31)
CREAT ?TM UR-MCNC: 122 MG/DL — SIGNIFICANT CHANGE UP
CREAT SERPL-MCNC: 5.44 MG/DL — HIGH (ref 0.5–1.3)
CREAT SERPL-MCNC: 5.5 MG/DL — HIGH (ref 0.5–1.3)
CULTURE RESULTS: SIGNIFICANT CHANGE UP
CULTURE RESULTS: SIGNIFICANT CHANGE UP
GLUCOSE BLDC GLUCOMTR-MCNC: 72 MG/DL — SIGNIFICANT CHANGE UP (ref 70–99)
GLUCOSE BLDC GLUCOMTR-MCNC: 77 MG/DL — SIGNIFICANT CHANGE UP (ref 70–99)
GLUCOSE BLDC GLUCOMTR-MCNC: 78 MG/DL — SIGNIFICANT CHANGE UP (ref 70–99)
GLUCOSE BLDC GLUCOMTR-MCNC: 79 MG/DL — SIGNIFICANT CHANGE UP (ref 70–99)
GLUCOSE BLDC GLUCOMTR-MCNC: 84 MG/DL — SIGNIFICANT CHANGE UP (ref 70–99)
GLUCOSE SERPL-MCNC: 73 MG/DL — SIGNIFICANT CHANGE UP (ref 70–99)
GLUCOSE SERPL-MCNC: 78 MG/DL — SIGNIFICANT CHANGE UP (ref 70–99)
HCT VFR BLD CALC: 25.6 % — LOW (ref 34.5–45)
HGB BLD-MCNC: 8.1 G/DL — LOW (ref 11.5–15.5)
MAGNESIUM SERPL-MCNC: 1.9 MG/DL — SIGNIFICANT CHANGE UP (ref 1.6–2.6)
MCHC RBC-ENTMCNC: 29.3 PG — SIGNIFICANT CHANGE UP (ref 27–34)
MCHC RBC-ENTMCNC: 31.6 G/DL — LOW (ref 32–36)
MCV RBC AUTO: 92.8 FL — SIGNIFICANT CHANGE UP (ref 80–100)
OSMOLALITY UR: 315 MOSMOL/KG — SIGNIFICANT CHANGE UP (ref 100–650)
PHOSPHATE SERPL-MCNC: 6.8 MG/DL — HIGH (ref 2.5–4.5)
PLATELET # BLD AUTO: 249 K/UL — SIGNIFICANT CHANGE UP (ref 150–400)
POTASSIUM SERPL-MCNC: 4.4 MMOL/L — SIGNIFICANT CHANGE UP (ref 3.5–5.3)
POTASSIUM SERPL-MCNC: 4.4 MMOL/L — SIGNIFICANT CHANGE UP (ref 3.5–5.3)
POTASSIUM SERPL-SCNC: 4.4 MMOL/L — SIGNIFICANT CHANGE UP (ref 3.5–5.3)
POTASSIUM SERPL-SCNC: 4.4 MMOL/L — SIGNIFICANT CHANGE UP (ref 3.5–5.3)
PROT ?TM UR-MCNC: 377 MG/DL — HIGH (ref 0–12)
PROT/CREAT UR-RTO: 3.1 RATIO — HIGH (ref 0–0.2)
RBC # BLD: 2.76 M/UL — LOW (ref 3.8–5.2)
RBC # FLD: 15.7 % — SIGNIFICANT CHANGE UP (ref 10.3–16.9)
SODIUM SERPL-SCNC: 136 MMOL/L — SIGNIFICANT CHANGE UP (ref 135–145)
SODIUM SERPL-SCNC: 138 MMOL/L — SIGNIFICANT CHANGE UP (ref 135–145)
SODIUM UR-SCNC: 65 MMOL/L — SIGNIFICANT CHANGE UP
SPECIMEN SOURCE: SIGNIFICANT CHANGE UP
SPECIMEN SOURCE: SIGNIFICANT CHANGE UP
WBC # BLD: 15.8 K/UL — HIGH (ref 3.8–10.5)
WBC # FLD AUTO: 15.8 K/UL — HIGH (ref 3.8–10.5)

## 2018-03-28 PROCEDURE — 71045 X-RAY EXAM CHEST 1 VIEW: CPT | Mod: 26

## 2018-03-28 RX ORDER — ONDANSETRON 8 MG/1
4 TABLET, FILM COATED ORAL EVERY 6 HOURS
Qty: 0 | Refills: 0 | Status: DISCONTINUED | OUTPATIENT
Start: 2018-03-28 | End: 2018-03-30

## 2018-03-28 RX ORDER — SODIUM BICARBONATE 1 MEQ/ML
650 SYRINGE (ML) INTRAVENOUS THREE TIMES A DAY
Qty: 0 | Refills: 0 | Status: DISCONTINUED | OUTPATIENT
Start: 2018-03-28 | End: 2018-03-30

## 2018-03-28 RX ORDER — ALBUMIN HUMAN 25 %
50 VIAL (ML) INTRAVENOUS EVERY 6 HOURS
Qty: 0 | Refills: 0 | Status: DISCONTINUED | OUTPATIENT
Start: 2018-03-28 | End: 2018-03-30

## 2018-03-28 RX ORDER — ASPIRIN/CALCIUM CARB/MAGNESIUM 324 MG
81 TABLET ORAL DAILY
Qty: 0 | Refills: 0 | Status: DISCONTINUED | OUTPATIENT
Start: 2018-03-28 | End: 2018-03-28

## 2018-03-28 RX ORDER — FUROSEMIDE 40 MG
80 TABLET ORAL ONCE
Qty: 0 | Refills: 0 | Status: COMPLETED | OUTPATIENT
Start: 2018-03-28 | End: 2018-03-28

## 2018-03-28 RX ADMIN — PIPERACILLIN AND TAZOBACTAM 200 GRAM(S): 4; .5 INJECTION, POWDER, LYOPHILIZED, FOR SOLUTION INTRAVENOUS at 01:00

## 2018-03-28 RX ADMIN — Medication 150 MICROGRAM(S): at 06:09

## 2018-03-28 RX ADMIN — Medication 50 MILLILITER(S): at 17:33

## 2018-03-28 RX ADMIN — PIPERACILLIN AND TAZOBACTAM 200 GRAM(S): 4; .5 INJECTION, POWDER, LYOPHILIZED, FOR SOLUTION INTRAVENOUS at 23:18

## 2018-03-28 RX ADMIN — ONDANSETRON 4 MILLIGRAM(S): 8 TABLET, FILM COATED ORAL at 06:55

## 2018-03-28 RX ADMIN — HEPARIN SODIUM 5000 UNIT(S): 5000 INJECTION INTRAVENOUS; SUBCUTANEOUS at 06:08

## 2018-03-28 RX ADMIN — Medication 650 MILLIGRAM(S): at 14:00

## 2018-03-28 RX ADMIN — HEPARIN SODIUM 5000 UNIT(S): 5000 INJECTION INTRAVENOUS; SUBCUTANEOUS at 23:18

## 2018-03-28 RX ADMIN — PIPERACILLIN AND TAZOBACTAM 200 GRAM(S): 4; .5 INJECTION, POWDER, LYOPHILIZED, FOR SOLUTION INTRAVENOUS at 17:24

## 2018-03-28 RX ADMIN — Medication 80 MILLIGRAM(S): at 11:38

## 2018-03-28 RX ADMIN — PIPERACILLIN AND TAZOBACTAM 200 GRAM(S): 4; .5 INJECTION, POWDER, LYOPHILIZED, FOR SOLUTION INTRAVENOUS at 06:09

## 2018-03-28 RX ADMIN — PIPERACILLIN AND TAZOBACTAM 200 GRAM(S): 4; .5 INJECTION, POWDER, LYOPHILIZED, FOR SOLUTION INTRAVENOUS at 11:38

## 2018-03-28 RX ADMIN — Medication 50 MILLILITER(S): at 11:37

## 2018-03-28 RX ADMIN — Medication 650 MILLIGRAM(S): at 23:14

## 2018-03-28 RX ADMIN — Medication 50 MILLILITER(S): at 06:07

## 2018-03-28 RX ADMIN — HEPARIN SODIUM 5000 UNIT(S): 5000 INJECTION INTRAVENOUS; SUBCUTANEOUS at 14:01

## 2018-03-28 RX ADMIN — Medication 50 MILLILITER(S): at 23:15

## 2018-03-28 NOTE — CONSULT NOTE ADULT - SUBJECTIVE AND OBJECTIVE BOX
Briefly, Ms. Baig is a 78 year old woman with a past medical history of diabetes mellitus, hyperlipidemia, hypertension, and recent admission for post-operative pulmonary embolism with subsequent treatment with Eliquis, unknown past psychiatric history. She was found to have Oliguric GONZALEZ, wound infection, and possible blood per rectum. Psychiatry consulted to assess the patient's capacity to refuse dialysis as her creatinine is climbing.     Patient assessed at bedside, where she was received in bed with wrist restraints for unclear reason. On assessment, the patient is uncooperative and hostile. She at first refused to engage in any conversation with this writer. On further questioning, the patient complained that doctors come in and out of the room and no one has a solution. She explained that she has been in the hospital since the 2nd of March and now has a tube coming out of her stomach and wonders if this writer would want to live ''like this.'' She says she wants to die instead of living ''like this'' but ''no one will let me die, so there.'' She refused to engage in a conversation about her medical illnesses, reason for hospitalization, or current treatment. She was unable to provide meaningful explanations for why the doctors want her to receive dialysis.     The patient also refused to give any information about her family or if there is anyone that helps her with her medical care. She made some statements about needing to be in an institution.     The patient refused to cooperative further with interview. At this point, the patient is unable to demonstrate an adequate appreciation of her medical illness, the reasons for treatment, or risks/benefits/alternatives to dialysis. As such, she does NOT currently show capacity to refuse dialysis.     CL Psychiatry to follow

## 2018-03-28 NOTE — PROGRESS NOTE ADULT - PROBLEM SELECTOR PLAN 3
Patient with prior history of hypertension now blood pressure improving off BP medications with range of 110's.  Continue hold all BP medications including ACEi and betablokers.  Hold IV fluids for now  Maintain MAP>65-70 mmhg.

## 2018-03-28 NOTE — PROGRESS NOTE ADULT - PROBLEM SELECTOR PLAN 1
Oliguric GONZALEZ likely due Ischemic ATN with increasing creatinine to 5.4 at present with volume overload state with net positive fluid balance.    Plan:  Hold IV fluids for now  Will give Lasix 80mg IV once for now with close monitoring of urine output.  Avoid nephrotoxic agents  Monitor BMP every 12 hrly for now  Hold all BP medications and maintain MAP>70mmhg  Monitor BMP every 12 hrly  Strict I/o for now  Continue Iv albumin every 6 hrly for now  Add sodi  If no significant urine output will start RRT Oliguric GONZALEZ likely due Ischemic ATN with increasing creatinine to 5.4 at present with volume overload state with net positive fluid balance.    Plan:  Hold IV fluids for now  Will give Lasix 80mg IV once for now with close monitoring of urine output.  Avoid nephrotoxic agents  Monitor BMP every 12 hrly for now  Hold all BP medications and maintain MAP>70mmhg  Monitor BMP every 12 hrly  Strict I/o for now  Continue Iv albumin every 6 hrly for now  Add sodium bicarbonate 650mg po tid for now  If no significant urine output will start RRT. Oliguric GONZALEZ likely due Ischemic ATN with increasing creatinine to 5.4 at present with volume overload state with net positive fluid balance.    Plan:  Hold IV fluids for now  Will give Lasix 80mg IV once for now with close monitoring of urine output.  Avoid nephrotoxic agents  Monitor BMP every 12 hrly for now  Hold all BP medications and maintain MAP>70mmhg  Strict I/o for now  Continue Iv albumin every 6 hrly for now  Add sodium bicarbonate 650mg po tid for now  If no significant urine output will start RRT.   Discuss with Dr. Mcelroy and patient as patient refusing dialysis at present. Risk, benefits, alternative explained to patient. Will recommend psych evaluation for capacity and family involvement as well as medical/ID evaluation for further management.

## 2018-03-28 NOTE — PROGRESS NOTE ADULT - ASSESSMENT
79 y/o female with hx of DM, HLD, HTN, recent admission for post-operative PE placed on Eliquis. Complaining of abdominal pain and passing maroon colored stools. Nephrology consult called for Oliguric GONZALEZ.

## 2018-03-28 NOTE — PROGRESS NOTE ADULT - PROBLEM SELECTOR PLAN 5
Hyperphosphatemia with phosphorus level 6.7 at present likely due to GONZALEZ and Dietary indiscretion    Plan:  Low phosphorus/Low k/renal diet  Monitor phosphorus daily  If remains elevated will ad Phos binder.

## 2018-03-28 NOTE — PROGRESS NOTE ADULT - PROBLEM SELECTOR PLAN 2
Anemia of chronic disease with acute bleeding likely due to blood loss anemia with stable hemoglobin of 8.1 at present.  Obtain iron studies  Transfuse PRBC per primary team  No indication for EPO at present due to GONZALEZ

## 2018-03-28 NOTE — PROGRESS NOTE ADULT - SUBJECTIVE AND OBJECTIVE BOX
O/N: bolused [id number], VSS. EMANUEL  3/27: Restarted SubQHep. Renal believes this is all ATN. Holding course. Albumin 25% Q6 for 4 doses. Possible Lasix challenge tomorrow. MAP goal of 70. 500 cc bolus. O/N: bolused [id number], VSS. EMANUEL  3/27: Restarted SubQHep. Renal believes this is all ATN. Holding course. Albumin 25% Q6 for 4 doses. Possible Lasix challenge tomorrow. MAP goal of 70. 500 cc bolus.     SUBJECTIVE:  pt seen at bedside, nontalkative. does not offer complaints at this time.    MEDICATIONS  (STANDING):  albumin human 25% IVPB 50 milliLiter(s) IV Intermittent every 6 hours  dextrose 5%. 1000 milliLiter(s) (50 mL/Hr) IV Continuous <Continuous>  dextrose 50% Injectable 12.5 Gram(s) IV Push once  dextrose 50% Injectable 25 Gram(s) IV Push once  dextrose 50% Injectable 25 Gram(s) IV Push once  heparin  Injectable 5000 Unit(s) SubCutaneous every 8 hours  insulin lispro (HumaLOG) corrective regimen sliding scale   SubCutaneous Before meals and at bedtime  levothyroxine 150 MICROGram(s) Oral daily  piperacillin/tazobactam IVPB. 2.25 Gram(s) IV Intermittent every 6 hours  sodium chloride 0.9%. 1000 milliLiter(s) (100 mL/Hr) IV Continuous <Continuous>    MEDICATIONS  (PRN):  ALBUTerol/ipratropium for Nebulization 3 milliLiter(s) Nebulizer every 6 hours PRN Wheezing  dextrose Gel 1 Dose(s) Oral once PRN Blood Glucose LESS THAN 70 milliGRAM(s)/deciliter  glucagon  Injectable 1 milliGRAM(s) IntraMuscular once PRN Glucose LESS THAN 70 milligrams/deciliter  ondansetron Injectable 4 milliGRAM(s) IV Push every 6 hours PRN Nausea and/or Vomiting  oxyCODONE    5 mG/acetaminophen 325 mG 1 Tablet(s) Oral every 4 hours PRN Severe Pain (7 - 10)      Vital Signs Last 24 Hrs  T(C): 36.3 (28 Mar 2018 05:53), Max: 36.8 (27 Mar 2018 17:19)  T(F): 97.3 (28 Mar 2018 05:53), Max: 98.2 (27 Mar 2018 17:19)  HR: 72 (28 Mar 2018 05:08) (64 - 72)  BP: 114/56 (28 Mar 2018 05:08) (91/54 - 144/93)  BP(mean): 81 (28 Mar 2018 05:08) (66 - 113)  RR: 19 (28 Mar 2018 05:08) (16 - 19)  SpO2: 92% (28 Mar 2018 05:08) (91% - 98%)    PHYSICAL EXAM:      Constitutional: A&Ox3    Respiratory: non labored breathing, no respiratory distress    Cardiovascular: NSR, RRR    Gastrointestinal: soft, nontender, wound vac in place to suction without erythema    Extremities: (-) edema      I&O's Detail    27 Mar 2018 07:01  -  28 Mar 2018 07:00  --------------------------------------------------------  IN:    IV PiggyBack: 200 mL    Oral Fluid: 340 mL    sodium chloride 0.9%: 75 mL    sodium chloride 0.9%.: 2050 mL  Total IN: 2665 mL    OUT:    Indwelling Catheter - Urethral: 40 mL  Total OUT: 40 mL    Total NET: 2625 mL          LABS:                        8.1    15.8  )-----------( 249      ( 28 Mar 2018 06:14 )             25.6     03-28    138  |  105  |  46<H>  ----------------------------<  73  4.4   |  16<L>  |  5.44<H>    Ca    8.0<L>      28 Mar 2018 06:14  Phos  6.8     -  Mg     1.9     -28        Urinalysis Basic - ( 26 Mar 2018 13:24 )    Color: Yellow / Appearance: Cloudy / S.025 / pH: x  Gluc: x / Ketone: 15 mg/dL  / Bili: Small / Urobili: 0.2 E.U./dL   Blood: x / Protein: 30 mg/dL / Nitrite: NEGATIVE   Leuk Esterase: NEGATIVE / RBC: 5-10 /HPF / WBC < 5 /HPF   Sq Epi: x / Non Sq Epi: 5-10 /HPF / Bacteria: Many /HPF

## 2018-03-28 NOTE — PROGRESS NOTE ADULT - SUBJECTIVE AND OBJECTIVE BOX
Patient is a 78y Female seen and evaluated at bedside. Patient lying in bed complaints of mild shortness of breath and bloating. She denies any chest pain, palpitations, dizziness. Blood pressure ranging from 100 to 110's at present. Creatinine continue to uptrend to 5.4      albumin human 25% IVPB 50 every 6 hours  ALBUTerol/ipratropium for Nebulization 3 every 6 hours PRN  dextrose 5%. 1000 <Continuous>  dextrose 50% Injectable 12.5 once  dextrose 50% Injectable 25 once  dextrose 50% Injectable 25 once  dextrose Gel 1 once PRN  furosemide   Injectable 80 once  glucagon  Injectable 1 once PRN  heparin  Injectable 5000 every 8 hours  insulin lispro (HumaLOG) corrective regimen sliding scale  Before meals and at bedtime  levothyroxine 150 daily  ondansetron Injectable 4 every 6 hours PRN  oxyCODONE    5 mG/acetaminophen 325 mG 1 every 4 hours PRN  piperacillin/tazobactam IVPB. 2.25 every 6 hours  sodium chloride 0.9%. 1000 <Continuous>      Allergies    No Known Allergies    Intolerances        T(C): , Max: 36.8 (18 @ 17:19)  T(F): , Max: 98.3 (18 @ 10:00)  HR: 72 (18 @ 09:00)  BP: 114/58 (18 @ 09:00)  BP(mean): 81 (18 @ 09:00)  RR: 18 (18 @ 09:00)  SpO2: 94% (18 @ 09:00)  Wt(kg): --     @ 07:01  -   @ 07:00  --------------------------------------------------------  IN: 2665 mL / OUT: 40 mL / NET: 2625 mL          Review of Systems:  CONSTITUTIONAL: No fever or chills, No fatigue or tiredness.  EYES: No blurred or double vision.  RESPIRATORY: No shortness of breath, cough, hemoptysis  CARDIOVASCULAR: No Chest pain or shortness of breath  GASTROINTESTINAL: NO abdominal or flank pain, No nausea or vomiting, No diarrhea  GENITOURINARY: No dysuria or urinary burning, No difficulty passing urine, No hematuria  NEUROLOGICAL: No headaches or blurred vision  SKIN: No skin rashes   MUSCULOSKELETAL: No arthralgia, Joint pain, leg edema, No muscle pains      PHYSICAL EXAM:  GENERAL: NAD, well-developed, well nourished, alert, awake, no acute distress at present  HEAD:  Atraumatic, Normocephalic,   EYES: Bilateral conjuctiva and sclera normal   Oral cavity: Oral mucosa dry and pink  NECK: Neck supple, No JVD  CHEST/LUNG: Clear to auscultation bilaterally; No wheeze, no rales, no crepitations  HEART: Regular rate and rhythm. GIBSON II/VI at LPSB, No gallop, no rub   ABDOMEN: Soft, Nontender, BS+nt, No flank tenderness.   EXTREMITIES: No clubbing, cyanosis, or edema  Neurology: AAOx3, no focal neurological deficit  SKIN: No rashes or lesions          ACCESS:     LABS:                        8.1    15.8  )-----------( 249      ( 28 Mar 2018 06:14 )             25.6         138  |  105  |  46<H>  ----------------------------<  73  4.4   |  16<L>  |  5.44<H>    Ca    8.0<L>      28 Mar 2018 06:14  Phos  6.8       Mg     1.9               Urinalysis Basic - ( 26 Mar 2018 13:24 )    Color: Yellow / Appearance: Cloudy / S.025 / pH: x  Gluc: x / Ketone: 15 mg/dL  / Bili: Small / Urobili: 0.2 E.U./dL   Blood: x / Protein: 30 mg/dL / Nitrite: NEGATIVE   Leuk Esterase: NEGATIVE / RBC: 5-10 /HPF / WBC < 5 /HPF   Sq Epi: x / Non Sq Epi: 5-10 /HPF / Bacteria: Many /HPF      Osmolality, Random Urine: 315 mosmol/kg ( @ 05:10)  Calcium, Random Urine: 4.8 mg/dL ( @ 05:10)  Chloride, Random Urine: 42 mmol/L ( @ 05:09)  Creatinine, Random Urine: 122 mg/dL ( @ 05:09)  Protein/Creatinine Ratio Calculation: 3.1 Ratio ( @ 05:09)  Sodium, Random Urine: 65 mmol/L ( @ 05:09)  Osmolality, Random Urine: 303 mosmol/kg ( @ 13:20)  Calcium, Random Urine: 6.6 mg/dL ( @ 13:20)  Potassium, Random Urine: 88 mmol/L ( @ 13:20)  Creatinine, Random Urine: 137 mg/dL ( @ 13:20)  Protein/Creatinine Ratio Calculation: 1.61 Ratio ( @ 13:20)  Sodium, Random Urine: 30 mmol/L ( @ 13:20)        RADIOLOGY & ADDITIONAL STUDIES:  < from: Xray Chest 1 View- PORTABLE-Urgent (18 @ 10:39) >    EXAM:  XR CHEST PORTABLE URGENT 1V                          PROCEDURE DATE:  2018                     INTERPRETATION:  PORTABLE CHEST X-RAY     HISTORY: f/u effusions    PRIOR STUDIES: 3/27/2018.    FINDINGS: Limited. Under inspiration. Possible trace right basal pleural   fluid. No visualized left pleural effusion. No pneumothorax. Size of   cardiac silhouette unchanged.      IMPRESSION:  Limited study. Possible trace right basal pleural effusion.            "Thank you for the opportunity to participate in the care of this   patient."        THERESE RAMIREZ M.D., ATTENDING RADIOLOGIST  This document has been electronically signed. Mar 28 2018 10:56AM                  < end of copied text > Patient is a 78y Female seen and evaluated at bedside. Patient lying in bed complaints of mild shortness of breath and bloating. She denies any chest pain, palpitations, dizziness. Blood pressure ranging from 100 to 110's at present. Creatinine continue to uptrend to 5.4 at present with oligoanuria. Chest xray with right pleural effusion.      albumin human 25% IVPB 50 every 6 hours  ALBUTerol/ipratropium for Nebulization 3 every 6 hours PRN  dextrose 5%. 1000 <Continuous>  dextrose 50% Injectable 12.5 once  dextrose 50% Injectable 25 once  dextrose 50% Injectable 25 once  dextrose Gel 1 once PRN  furosemide   Injectable 80 once  glucagon  Injectable 1 once PRN  heparin  Injectable 5000 every 8 hours  insulin lispro (HumaLOG) corrective regimen sliding scale  Before meals and at bedtime  levothyroxine 150 daily  ondansetron Injectable 4 every 6 hours PRN  oxyCODONE    5 mG/acetaminophen 325 mG 1 every 4 hours PRN  piperacillin/tazobactam IVPB. 2.25 every 6 hours  sodium chloride 0.9%. 1000 <Continuous>      Allergies    No Known Allergies    Intolerances        T(C): , Max: 36.8 (18 @ 17:19)  T(F): , Max: 98.3 (18 @ 10:00)  HR: 72 (18 @ 09:00)  BP: 114/58 (18 @ 09:00)  BP(mean): 81 (18 @ 09:00)  RR: 18 (18 @ 09:00)  SpO2: 94% (18 @ 09:00)  Wt(kg): --     @ 07:01  -   @ 07:00  --------------------------------------------------------  IN: 2665 mL / OUT: 40 mL / NET: 2625 mL          Review of Systems:  CONSTITUTIONAL: No fever or chills, No fatigue or tiredness.  EYES: No blurred or double vision.  RESPIRATORY: No shortness of breath, cough, hemoptysis  CARDIOVASCULAR: No Chest pain or shortness of breath  GASTROINTESTINAL: NO abdominal or flank pain, No nausea or vomiting, No diarrhea  GENITOURINARY: No dysuria or urinary burning, No difficulty passing urine, No hematuria  NEUROLOGICAL: No headaches or blurred vision  SKIN: No skin rashes   MUSCULOSKELETAL: No arthralgia, Joint pain, leg edema, No muscle pains      PHYSICAL EXAM:  GENERAL: NAD, well-developed, well nourished, alert, awake, no acute distress at present  HEAD:  Atraumatic, Normocephalic,   EYES: Bilateral conjuctiva and sclera normal   Oral cavity: Oral mucosa dry and pink  NECK: Neck supple, No JVD  CHEST/LUNG: Clear to auscultation bilaterally; No wheeze, no rales, no crepitations  HEART: Regular rate and rhythm. GIBSON II/VI at LPSB, No gallop, no rub   ABDOMEN: Soft, Nontender, BS+nt, No flank tenderness.   EXTREMITIES: No clubbing, cyanosis, or edema  Neurology: AAOx3, no focal neurological deficit  SKIN: No rashes or lesions          ACCESS:     LABS:                        8.1    15.8  )-----------( 249      ( 28 Mar 2018 06:14 )             25.6         138  |  105  |  46<H>  ----------------------------<  73  4.4   |  16<L>  |  5.44<H>    Ca    8.0<L>      28 Mar 2018 06:14  Phos  6.8       Mg     1.9               Urinalysis Basic - ( 26 Mar 2018 13:24 )    Color: Yellow / Appearance: Cloudy / S.025 / pH: x  Gluc: x / Ketone: 15 mg/dL  / Bili: Small / Urobili: 0.2 E.U./dL   Blood: x / Protein: 30 mg/dL / Nitrite: NEGATIVE   Leuk Esterase: NEGATIVE / RBC: 5-10 /HPF / WBC < 5 /HPF   Sq Epi: x / Non Sq Epi: 5-10 /HPF / Bacteria: Many /HPF      Osmolality, Random Urine: 315 mosmol/kg ( @ 05:10)  Calcium, Random Urine: 4.8 mg/dL ( @ 05:10)  Chloride, Random Urine: 42 mmol/L ( @ 05:09)  Creatinine, Random Urine: 122 mg/dL ( @ 05:09)  Protein/Creatinine Ratio Calculation: 3.1 Ratio ( @ 05:09)  Sodium, Random Urine: 65 mmol/L ( @ 05:09)  Osmolality, Random Urine: 303 mosmol/kg ( @ 13:20)  Calcium, Random Urine: 6.6 mg/dL ( @ 13:20)  Potassium, Random Urine: 88 mmol/L ( @ 13:20)  Creatinine, Random Urine: 137 mg/dL ( @ 13:20)  Protein/Creatinine Ratio Calculation: 1.61 Ratio ( @ 13:20)  Sodium, Random Urine: 30 mmol/L ( @ 13:20)        RADIOLOGY & ADDITIONAL STUDIES:  < from: Xray Chest 1 View- PORTABLE-Urgent (18 @ 10:39) >    EXAM:  XR CHEST PORTABLE URGENT 1V                          PROCEDURE DATE:  2018                     INTERPRETATION:  PORTABLE CHEST X-RAY     HISTORY: f/u effusions    PRIOR STUDIES: 3/27/2018.    FINDINGS: Limited. Under inspiration. Possible trace right basal pleural   fluid. No visualized left pleural effusion. No pneumothorax. Size of   cardiac silhouette unchanged.      IMPRESSION:  Limited study. Possible trace right basal pleural effusion.            "Thank you for the opportunity to participate in the care of this   patient."        THERESE RAMIREZ M.D., ATTENDING RADIOLOGIST  This document has been electronically signed. Mar 28 2018 10:56AM                  < end of copied text >

## 2018-03-28 NOTE — DIETITIAN INITIAL EVALUATION ADULT. - OTHER INFO
79 y/o female with hx of DM, HLD, HTN, recent admission for post-operative PE placed on Eliquis. Ventral hernia repair on 3/2. Now presenting for new onset of abdominal pain with active purulent drainage, and questionable blood per rectum. Observed pt did not touch breakfast tray this morning.

## 2018-03-28 NOTE — DIETITIAN INITIAL EVALUATION ADULT. - ENERGY NEEDS
52 kg IBW; 82 kg ABW; BMI 32; 158% of IBW; 160 cm.  IBW used due to pt > 120% of IBW.   Needs 2* infection

## 2018-03-29 DIAGNOSIS — F05 DELIRIUM DUE TO KNOWN PHYSIOLOGICAL CONDITION: ICD-10-CM

## 2018-03-29 DIAGNOSIS — E03.9 HYPOTHYROIDISM, UNSPECIFIED: ICD-10-CM

## 2018-03-29 DIAGNOSIS — J90 PLEURAL EFFUSION, NOT ELSEWHERE CLASSIFIED: ICD-10-CM

## 2018-03-29 DIAGNOSIS — I26.99 OTHER PULMONARY EMBOLISM WITHOUT ACUTE COR PULMONALE: ICD-10-CM

## 2018-03-29 DIAGNOSIS — D62 ACUTE POSTHEMORRHAGIC ANEMIA: ICD-10-CM

## 2018-03-29 DIAGNOSIS — E11.9 TYPE 2 DIABETES MELLITUS WITHOUT COMPLICATIONS: ICD-10-CM

## 2018-03-29 LAB
ANION GAP SERPL CALC-SCNC: 18 MMOL/L — HIGH (ref 5–17)
ANION GAP SERPL CALC-SCNC: 19 MMOL/L — HIGH (ref 5–17)
APPEARANCE UR: (no result)
BACTERIA # UR AUTO: (no result) /HPF
BILIRUB UR-MCNC: (no result)
BUN SERPL-MCNC: 53 MG/DL — HIGH (ref 7–23)
BUN SERPL-MCNC: 56 MG/DL — HIGH (ref 7–23)
CALCIUM SERPL-MCNC: 7.9 MG/DL — LOW (ref 8.4–10.5)
CALCIUM SERPL-MCNC: 8.1 MG/DL — LOW (ref 8.4–10.5)
CHLORIDE SERPL-SCNC: 102 MMOL/L — SIGNIFICANT CHANGE UP (ref 96–108)
CHLORIDE SERPL-SCNC: 105 MMOL/L — SIGNIFICANT CHANGE UP (ref 96–108)
CO2 SERPL-SCNC: 15 MMOL/L — LOW (ref 22–31)
CO2 SERPL-SCNC: 16 MMOL/L — LOW (ref 22–31)
COLOR SPEC: YELLOW — SIGNIFICANT CHANGE UP
COMMENT - URINE: SIGNIFICANT CHANGE UP
CREAT SERPL-MCNC: 5.69 MG/DL — HIGH (ref 0.5–1.3)
CREAT SERPL-MCNC: 5.92 MG/DL — HIGH (ref 0.5–1.3)
DIFF PNL FLD: (no result)
EPI CELLS # UR: (no result) /HPF (ref 0–5)
FERRITIN SERPL-MCNC: 356 NG/ML — HIGH (ref 15–150)
GLUCOSE BLDC GLUCOMTR-MCNC: 70 MG/DL — SIGNIFICANT CHANGE UP (ref 70–99)
GLUCOSE BLDC GLUCOMTR-MCNC: 72 MG/DL — SIGNIFICANT CHANGE UP (ref 70–99)
GLUCOSE BLDC GLUCOMTR-MCNC: 84 MG/DL — SIGNIFICANT CHANGE UP (ref 70–99)
GLUCOSE BLDC GLUCOMTR-MCNC: 85 MG/DL — SIGNIFICANT CHANGE UP (ref 70–99)
GLUCOSE SERPL-MCNC: 75 MG/DL — SIGNIFICANT CHANGE UP (ref 70–99)
GLUCOSE SERPL-MCNC: 82 MG/DL — SIGNIFICANT CHANGE UP (ref 70–99)
GLUCOSE UR QL: NEGATIVE — SIGNIFICANT CHANGE UP
GRAN CASTS # UR COMP ASSIST: (no result) /LPF
HAV IGM SER-ACNC: SIGNIFICANT CHANGE UP
HBV CORE IGM SER-ACNC: SIGNIFICANT CHANGE UP
HBV SURFACE AG SER-ACNC: SIGNIFICANT CHANGE UP
HCT VFR BLD CALC: 18.7 % — CRITICAL LOW (ref 34.5–45)
HCT VFR BLD CALC: 18.8 % — CRITICAL LOW (ref 34.5–45)
HCV AB S/CO SERPL IA: 0.2 S/CO — SIGNIFICANT CHANGE UP
HCV AB SERPL-IMP: SIGNIFICANT CHANGE UP
HGB BLD-MCNC: 5.9 G/DL — CRITICAL LOW (ref 11.5–15.5)
HGB BLD-MCNC: 6.1 G/DL — CRITICAL LOW (ref 11.5–15.5)
IRON SATN MFR SERPL: 55 UG/DL — SIGNIFICANT CHANGE UP (ref 30–160)
IRON SATN MFR SERPL: 64 % — HIGH (ref 14–50)
KETONES UR-MCNC: (no result) MG/DL
LEUKOCYTE ESTERASE UR-ACNC: (no result)
MAGNESIUM SERPL-MCNC: 1.9 MG/DL — SIGNIFICANT CHANGE UP (ref 1.6–2.6)
MAGNESIUM SERPL-MCNC: 1.9 MG/DL — SIGNIFICANT CHANGE UP (ref 1.6–2.6)
MCHC RBC-ENTMCNC: 29.2 PG — SIGNIFICANT CHANGE UP (ref 27–34)
MCHC RBC-ENTMCNC: 29.2 PG — SIGNIFICANT CHANGE UP (ref 27–34)
MCHC RBC-ENTMCNC: 31.6 G/DL — LOW (ref 32–36)
MCHC RBC-ENTMCNC: 32.4 G/DL — SIGNIFICANT CHANGE UP (ref 32–36)
MCV RBC AUTO: 90 FL — SIGNIFICANT CHANGE UP (ref 80–100)
MCV RBC AUTO: 92.6 FL — SIGNIFICANT CHANGE UP (ref 80–100)
NITRITE UR-MCNC: NEGATIVE — SIGNIFICANT CHANGE UP
PH UR: 5 — SIGNIFICANT CHANGE UP (ref 5–8)
PHOSPHATE SERPL-MCNC: 6.8 MG/DL — HIGH (ref 2.5–4.5)
PHOSPHATE SERPL-MCNC: 6.9 MG/DL — HIGH (ref 2.5–4.5)
PLATELET # BLD AUTO: 180 K/UL — SIGNIFICANT CHANGE UP (ref 150–400)
PLATELET # BLD AUTO: 196 K/UL — SIGNIFICANT CHANGE UP (ref 150–400)
POTASSIUM SERPL-MCNC: 4.3 MMOL/L — SIGNIFICANT CHANGE UP (ref 3.5–5.3)
POTASSIUM SERPL-MCNC: 4.3 MMOL/L — SIGNIFICANT CHANGE UP (ref 3.5–5.3)
POTASSIUM SERPL-SCNC: 4.3 MMOL/L — SIGNIFICANT CHANGE UP (ref 3.5–5.3)
POTASSIUM SERPL-SCNC: 4.3 MMOL/L — SIGNIFICANT CHANGE UP (ref 3.5–5.3)
PROT UR-MCNC: 100 MG/DL
RBC # BLD: 2.02 M/UL — LOW (ref 3.8–5.2)
RBC # BLD: 2.09 M/UL — LOW (ref 3.8–5.2)
RBC # FLD: 15.1 % — SIGNIFICANT CHANGE UP (ref 10.3–16.9)
RBC # FLD: 15.6 % — SIGNIFICANT CHANGE UP (ref 10.3–16.9)
RBC CASTS # UR COMP ASSIST: (no result) /HPF
SODIUM SERPL-SCNC: 135 MMOL/L — SIGNIFICANT CHANGE UP (ref 135–145)
SODIUM SERPL-SCNC: 140 MMOL/L — SIGNIFICANT CHANGE UP (ref 135–145)
SP GR SPEC: >=1.03 — SIGNIFICANT CHANGE UP (ref 1–1.03)
TIBC SERPL-MCNC: 86 UG/DL — LOW (ref 220–430)
UIBC SERPL-MCNC: 31 UG/DL — LOW (ref 110–370)
UROBILINOGEN FLD QL: 0.2 E.U./DL — SIGNIFICANT CHANGE UP
WBC # BLD: 12.1 K/UL — HIGH (ref 3.8–10.5)
WBC # BLD: 12.8 K/UL — HIGH (ref 3.8–10.5)
WBC # FLD AUTO: 12.1 K/UL — HIGH (ref 3.8–10.5)
WBC # FLD AUTO: 12.8 K/UL — HIGH (ref 3.8–10.5)
WBC UR QL: (no result) /HPF

## 2018-03-29 PROCEDURE — 76937 US GUIDE VASCULAR ACCESS: CPT | Mod: 26

## 2018-03-29 PROCEDURE — 99152 MOD SED SAME PHYS/QHP 5/>YRS: CPT

## 2018-03-29 PROCEDURE — 99233 SBSQ HOSP IP/OBS HIGH 50: CPT

## 2018-03-29 PROCEDURE — 36558 INSERT TUNNELED CV CATH: CPT

## 2018-03-29 PROCEDURE — 77001 FLUOROGUIDE FOR VEIN DEVICE: CPT | Mod: 26

## 2018-03-29 RX ORDER — ALBUMIN HUMAN 25 %
50 VIAL (ML) INTRAVENOUS
Qty: 0 | Refills: 0 | Status: DISCONTINUED | OUTPATIENT
Start: 2018-03-29 | End: 2018-03-30

## 2018-03-29 RX ADMIN — Medication 650 MILLIGRAM(S): at 15:39

## 2018-03-29 RX ADMIN — ONDANSETRON 4 MILLIGRAM(S): 8 TABLET, FILM COATED ORAL at 06:11

## 2018-03-29 RX ADMIN — Medication 50 MILLILITER(S): at 11:54

## 2018-03-29 RX ADMIN — HEPARIN SODIUM 5000 UNIT(S): 5000 INJECTION INTRAVENOUS; SUBCUTANEOUS at 06:11

## 2018-03-29 RX ADMIN — Medication 150 MICROGRAM(S): at 06:12

## 2018-03-29 RX ADMIN — Medication 50 MILLILITER(S): at 06:12

## 2018-03-29 RX ADMIN — PIPERACILLIN AND TAZOBACTAM 200 GRAM(S): 4; .5 INJECTION, POWDER, LYOPHILIZED, FOR SOLUTION INTRAVENOUS at 06:12

## 2018-03-29 RX ADMIN — Medication 3 MILLILITER(S): at 15:39

## 2018-03-29 RX ADMIN — Medication 650 MILLIGRAM(S): at 06:12

## 2018-03-29 RX ADMIN — PIPERACILLIN AND TAZOBACTAM 200 GRAM(S): 4; .5 INJECTION, POWDER, LYOPHILIZED, FOR SOLUTION INTRAVENOUS at 11:54

## 2018-03-29 NOTE — CONSULT NOTE ADULT - ASSESSMENT
78F with PMHx of HTN, DM, HLD, hypothyroidism admitted for sepsis 2/2 abdominal wound infection following robotically assisted hernia repair on 3/2, complicated by RUL PE, now found to have acute blood loss anemia and ATN. Medicine consulted for medical management of these conditions. 78F with PMHx of HTN, DM, HLD, hypothyroidism admitted for management of surgical wound infection following robotically assisted hernia repair on 3/2, complicated by RUL PE, now found to have acute blood loss anemia and ATN. Medicine consulted for medical management of these conditions.

## 2018-03-29 NOTE — CONSULT NOTE ADULT - PROBLEM SELECTOR PROBLEM 7
Delirium due to another medical condition DM (diabetes mellitus) Surgical wound infection, initial encounter

## 2018-03-29 NOTE — CONSULT NOTE ADULT - PROBLEM SELECTOR RECOMMENDATION 4
New on this admission, no prior reported hx of renal disease. Per records, patient developed oliguria with worsening creatinine despite Lasix challenge. Nephrology following, deemed patient candidate for HD, s/p HD catheter placement by IR on 3/29 with first session of HD on same day, tolerated well.   Etiology likely ischemic ATN vs hypoperfusion vs drug-induced AIN (patient previously on ACEi and given Vancomycin).   - continue management per nephrology Consider sequelae of fluid overload 2/2 renal failure. Can also consider pulmonary embolism.   - continue to monitor respiratory status with frequent lung checks   - f/u CXR after HD to assess for improvement in effusions   - consider NIV

## 2018-03-29 NOTE — CONSULT NOTE ADULT - PROBLEM SELECTOR RECOMMENDATION 5
Interval development of bilateral pleural effusions on CXR from 3/28 compared to that of 3/23. Likely 2/2 fluid overload in the setting of renal failure and oliguria. Patient currently without complaints of SOB, no increased oxygen requirements (on NC2L, RR 18). Expect that pleural effusions will improve with HD.   - follow up AM CXR to assess for improvement after HD New on this admission, no prior reported hx of renal disease. Per records, patient developed oliguria with worsening creatinine despite Lasix challenge. Nephrology following, deemed patient candidate for HD, s/p HD catheter placement by IR on 3/29 with first session of HD on same day, tolerated well.   Etiology likely ischemic ATN vs hypoperfusion vs drug-induced AIN (patient previously on ACEi and given Vancomycin).   - continue management per nephrology

## 2018-03-29 NOTE — CONSULT NOTE ADULT - PROBLEM SELECTOR RECOMMENDATION 3
Patient with prior history of hypertension with blood pressure ranging from 90's at present with oliguria now on lisinopril and atenolol for now.  Advised hold all BP medications including ACEi and betablokers.  Consider IV fluids with NS @ 75 cc/hr for now  Maintain MAP>65-70 mmhg
CTA 3/12 with findings of PE within the segmental arteries of the right upper   lobe, right middle lobe, and lingula. Subsequent echocardiogram notable for RV dilatation, however deemed by pulmonology to be unlikely that clot burden caused dilatation. Trops neg x3. Provoked PE in the setting of recent abdominal surgery.   - hold Eliquis in the setting of active vaginal bleeding with acute blood loss anemia

## 2018-03-29 NOTE — PROGRESS NOTE ADULT - SUBJECTIVE AND OBJECTIVE BOX
Patient was seen and evaluated on dialysis.   Patient is tolerating the procedure well.   HR: 68 (03-29-18 @ 15:47)  BP: 128/60 (03-29-18 @ 15:47) pusle 65, /67  Continue dialysis:   Dialyzer:  180     QB:      400  QD: 500  Goal UF .5 l over 2 Hours

## 2018-03-29 NOTE — CONSULT NOTE ADULT - PROBLEM SELECTOR RECOMMENDATION 6
Per documentation, patient initially presented with abdominal pain and noted to have purulent drainage from abdominal incision sites. Currently with wound vac over midline incision, on suction.   - continue Zosyn per primary team; given new HD, would recommend decreasing dose to Zosyn 2.25g Q12hrs and dosing after dialysis on HD days Interval development of bilateral pleural effusions on CXR from 3/28 compared to that of 3/23. Likely 2/2 fluid overload in the setting of renal failure and oliguria. Patient currently without complaints of SOB, no increased oxygen requirements (on NC2L, RR 18). Expect that pleural effusions will improve with HD.   - follow up CXR to assess for improvement after HD

## 2018-03-29 NOTE — PROGRESS NOTE ADULT - PROBLEM SELECTOR PLAN 2
Anemia of chronic disease with acute bleeding likely due to blood loss anemia with stable hemoglobin of 8.1 at present.  Obtain iron studies  Transfuse PRBC per primary team  No indication for EPO at present due to GONZALEZ  Work up for paraproteinemia as per ordered.

## 2018-03-29 NOTE — PROVIDER CONTACT NOTE (OTHER) - SITUATION
1)low urine output: PA stated she will get in contact with team for a treatment plan  2) vaginal bleeding: PA stated pt is refusing treatment, was deemed incompetent to make self decisions per psych

## 2018-03-29 NOTE — CONSULT NOTE ADULT - PROBLEM SELECTOR RECOMMENDATION 2
Anemia of chronic disease with acute bleeding likely due to blood loss anemia  Obtain iron studies  Transfuse PRBC per primary team  No indication for EPO at present due to GONZALEZ
- again, enlarged/fibroid uterus is concerning in postmenopausal female.  Recommend comparison with prior CT scan (no uterine size provided in formal report) to assess growth since October.  Could also perform MRI to better assess pelvic anatomy and pathology
- again, enlarged/fibroid uterus is concerning in postmenopausal female.  Recommend comparison with prior CT scan (no uterine size provided in formal report) to assess growth since October.  Could also perform MRI to better assess pelvic anatomy and pathology
New on this admission, per reports patient denies previous hx of bleeding.   - plans per GYN

## 2018-03-29 NOTE — CONSULT NOTE ADULT - PROBLEM SELECTOR RECOMMENDATION 8
Previously on Lisinopril and Atenolol. BPs stable without antihypertensives   - would continue to hold antihypertensives as patient currently normotensive On Glipizide at home.   - continue to hold oral antihyperglycemics   - continue ISS, FSG

## 2018-03-29 NOTE — PROGRESS NOTE BEHAVIORAL HEALTH - NSBHCONSULTMEDS_PSY_A_CORE FT
1. Haldol 2 mg PO or IM q4h prn for agitation  2. Recheck QTc if utilizing haldol  3. Patient does not have capacity to refuse dialysis and mental status would likely improve with dialysis  4. Agree with attempting to reach nephew/clarifying if there is a health care proxy

## 2018-03-29 NOTE — PROGRESS NOTE BEHAVIORAL HEALTH - NSBHFUPINTERVALHXFT_PSY_A_CORE
Patient seen at bedside. In wrist restraints. Was irritable, moderately cooperative with interview. Pt did not recall what brought her to the hospital. She stated that she was at another hospital where she received bad care, and now she is at [facility] and does not know what is happening with her medically and what the team is recommending. When dialysis was brought up specifically, she says she doesn't want it "because I want to die."  Cannot explain why she wants to die.  Says she has never been felt this way before.  Denies pain or acute physical discomfort.  When wrist restraints questioned, seems surprised to find herself with them, says it is because she wanted water from the bedside table.  Asks for detailed explanation explaining dialysis.  She stated living alone in [place] but could not specify which area. When writer provided her the choices, she said [place]. Denied any current or past AVH.  Says she has no friends or family.    Stated she was depressed in the past but has been fine for years. She could not recall any medication she took for her mood. She denied having any family or friends, and could not recall if she has a nephew.  She stated she did not want to die when she was not in the hospital and at home, and was more “happy.”     Per RN patient in restraints due to removing IV yesterday but is calmer and more cooperative today, will trial one hand free.     Cognitive testing: Pt could not tell how old she was but was able to provide her year of birth. She could recognize objects. Her immediate recall was 3/3, delayed recall 0/3 even with cues.  She could not recall the current president of [place], but when provided with multiple choices, she was able to recognize him. The year she stated was “1918,” month as “April” and season as “spring.” Patient seen at bedside. In wrist restraints. Was irritable, moderately cooperative with interview. Pt did not recall what brought her to the hospital. She stated that she was at another hospital where she received bad care, and now she is at [facility] and does not know what is happening with her medically and what the team is recommending. When dialysis was brought up specifically, she says she doesn't want it "because I want to die."  Cannot explain why she wants to die.  Says she has never been felt this way before.  Denies pain or acute physical discomfort.  When wrist restraints questioned, seems surprised to find herself with them, says it is because she wanted water from the bedside table.  Asks for detailed explanation explaining dialysis.  She stated living alone in [place] but could not specify which area. When writer provided her the choices, she said [place]. Denied any current or past AVH.  Says she has no friends or family, does not recall having nephew.    Per RN patient in restraints due to removing IV yesterday but is calmer and more cooperative today, will trial one hand free.     Cognitive testing: Pt could not tell how old she was but was able to provide her year of birth. She could recognize objects. Her immediate recall was 3/3, delayed recall 0/3 even with cues.  She could not recall the current president of [place], but when provided with multiple choices, she was able to recognize him. The year she stated was “1918,” month as “April” and season as “spring.”

## 2018-03-29 NOTE — PROGRESS NOTE ADULT - ASSESSMENT
77 y/o female with hx of DM, HLD, HTN, recent admission for post-operative PE placed on Eliquis. Ventral hernia repair on 3/2. Now presenting for new onset of abdominal pain with active purulent drainage, and questionable blood per rectum    Continue IV ABX  Regular Diet - Carb controlled  IVF, will Hep Lock once tolerating  Holding  Home Eliquis, due to vaginal bleeding, (Hgb stable since discharge, as well as Vital signs stable)  Continue Home Meds  Insulin Sliding Scale  Wound Packing 79 y/o female with hx of DM, HLD, HTN, recent admission for post-operative PE placed on Eliquis. Ventral hernia repair on 3/2. Now presenting for new onset of abdominal pain with active purulent drainage, and questionable blood per rectum    Continue IV ABX  Regular Diet - Carb controlled  IVF, will Hep Lock once tolerating  Holding  Home Eliquis, due to vaginal bleeding, (Hgb stable since discharge, as well as Vital signs stable)  Continue Home Meds  Insulin Sliding Scale  Wound Packing-wound vac change pending for friday

## 2018-03-29 NOTE — CONSULT NOTE ADULT - PROBLEM SELECTOR RECOMMENDATION 9
On Synthroid at home.   - continue Synthroid 150mcg QD  - f/u TSH to ensure adequate dose Patient noted to have drop in H/H from baseline associated with active vaginal bleeding. Evaluated by GYN who recommended pelvic ultrasound with findings of fibroid uterus and endometrial wall thickening concerning for possible malignancy.  - s/p 2U pRBC, would check post-transfusion CBC  - maintain active T/S   - given no cardiac hx, can transfuse to goal Hgb >7   - continue to monitor CBC Q8hrs   - hold AC as patient having active vaginal bleeding, will need to reassess once Hgb stabilizes Previously on Lisinopril and Atenolol. BPs stable without antihypertensives   - would continue to hold antihypertensives as patient currently normotensive

## 2018-03-29 NOTE — PROGRESS NOTE BEHAVIORAL HEALTH - SUMMARY
78F unclear PPH but as per patient minimal, PMH DM, HLD, HTN, recent ventral hernia repair with subsequent admission for post-operative PE placed on Eliquis, presenting again 3/23 for abdominal pain and bloody stools.  Patient's Cr WNL on admission, uptrending since 3/24.  Patient also with vaginal bleeding and as per gyn consult concern for potential malignancy.  Found to have purulent drainage from abdominal wound.  As per chart patient AAOx3 on admission and 3/24, but becoming increasingly more disoriented and uncooperative since then.  PAtient likely delirious in setting of GONZALEZ, wound infection, and potential gynecological malignancy but now refusing dialysis.  Patient does not have the capacity to refuse dialysis as she does not understand her current medical situation and cannot explain her reasoning for refusing dialysis.  Per primary team they are attempting to contact nephew.  If he cannot be reached would consider two physician consent for dialysis if need becomes emergent.

## 2018-03-29 NOTE — PROGRESS NOTE ADULT - PROBLEM SELECTOR PLAN 1
Oligoanuric GONZALEZ likely due Ischemic ATN with increasing creatinine to 5.4 at present with volume overload state.    Initial urinalysis with Protein/RBC/blood and WBC  PCR: 1.6 to 3.1  Plan:  Hold IV fluids for now  Patient received lasix IV 80mg yesterday with no urine output.  Avoid nephrotoxic agents  Monitor BMP every 12 hrly for now  Hold all BP medications and maintain MAP>70mmhg  Strict I/o for now  Continue Iv albumin every 6 hrly and sodium bicarbonate for now  Obtain new blood work today along with work up for proteinuria/hematuria as per ordered.  Will consider RRT if patient/family in agreement for it.   Psych consult noted.

## 2018-03-29 NOTE — PROGRESS NOTE ADULT - SUBJECTIVE AND OBJECTIVE BOX
O/N: psych evaluated pt and states "she does NOT currently show capacity to refuse dialysis", low urine output,  3/28: still decreased UO, lasix 80mg given, stopped lasix and IVF as per renal-may need dialysis, psych consult? O/N: psych evaluated pt and states "she does NOT currently show capacity to refuse dialysis", low urine output,  3/28: still decreased UO, lasix 80mg given, stopped lasix and IVF as per renal-may need dialysis, psych consult?      SUBJECTIVE:  pt seen at bedside with chief resident, does not offer complaints. had one episode of emesis as per RN    MEDICATIONS  (STANDING):  albumin human 25% IVPB 50 milliLiter(s) IV Intermittent every 6 hours  dextrose 5%. 1000 milliLiter(s) (50 mL/Hr) IV Continuous <Continuous>  dextrose 50% Injectable 12.5 Gram(s) IV Push once  dextrose 50% Injectable 25 Gram(s) IV Push once  dextrose 50% Injectable 25 Gram(s) IV Push once  heparin  Injectable 5000 Unit(s) SubCutaneous every 8 hours  insulin lispro (HumaLOG) corrective regimen sliding scale   SubCutaneous Before meals and at bedtime  levothyroxine 150 MICROGram(s) Oral daily  piperacillin/tazobactam IVPB. 2.25 Gram(s) IV Intermittent every 6 hours  sodium bicarbonate 650 milliGRAM(s) Oral three times a day    MEDICATIONS  (PRN):  ALBUTerol/ipratropium for Nebulization 3 milliLiter(s) Nebulizer every 6 hours PRN Wheezing  dextrose Gel 1 Dose(s) Oral once PRN Blood Glucose LESS THAN 70 milliGRAM(s)/deciliter  glucagon  Injectable 1 milliGRAM(s) IntraMuscular once PRN Glucose LESS THAN 70 milligrams/deciliter  ondansetron Injectable 4 milliGRAM(s) IV Push every 6 hours PRN Nausea and/or Vomiting  oxyCODONE    5 mG/acetaminophen 325 mG 1 Tablet(s) Oral every 4 hours PRN Severe Pain (7 - 10)      Vital Signs Last 24 Hrs  T(C): 36.4 (29 Mar 2018 06:00), Max: 36.9 (29 Mar 2018 02:00)  T(F): 97.6 (29 Mar 2018 06:00), Max: 98.4 (29 Mar 2018 02:00)  HR: 64 (29 Mar 2018 04:40) (64 - 82)  BP: 110/56 (29 Mar 2018 04:40) (99/58 - 134/62)  BP(mean): 80 (29 Mar 2018 04:40) (72 - 89)  RR: 17 (29 Mar 2018 04:40) (16 - 18)  SpO2: 95% (29 Mar 2018 04:40) (94% - 99%)    PHYSICAL EXAM:      Constitutional: A&Ox3    Respiratory: non labored breathing, no respiratory distress    Cardiovascular: NSR, RRR    Gastrointestinal: soft, nontender, small wound at right lower quadrant, scabbing presents-silk suture removed; wound vac in place to suction without surrounding erythema    Extremities: (-) edema        I&O's Detail    27 Mar 2018 07:01  -  28 Mar 2018 07:00  --------------------------------------------------------  IN:    IV PiggyBack: 200 mL    Oral Fluid: 340 mL    sodium chloride 0.9%: 2050 mL    sodium chloride 0.9%: 75 mL  Total IN: 2665 mL    OUT:    Indwelling Catheter - Urethral: 40 mL  Total OUT: 40 mL    Total NET: 2625 mL      28 Mar 2018 07:01  -  29 Mar 2018 06:49  --------------------------------------------------------  IN:    IV PiggyBack: 700 mL  Total IN: 700 mL    OUT:    Indwelling Catheter - Urethral: 73 mL    VAC (Vacuum Assisted Closure) System: 100 mL  Total OUT: 173 mL    Total NET: 527 mL          LABS:                        8.1    15.8  )-----------( 249      ( 28 Mar 2018 06:14 )             25.6     03-28    136  |  103  |  49<H>  ----------------------------<  78  4.4   |  16<L>  |  5.50<H>    Ca    7.8<L>      28 Mar 2018 16:53  Phos  6.8     03-28  Mg     1.9     03-28        Urinalysis Basic - ( 29 Mar 2018 04:06 )    Color: x / Appearance: x / SG: x / pH: x  Gluc: x / Ketone: x  / Bili: x / Urobili: x   Blood: x / Protein: x / Nitrite: x   Leuk Esterase: x / RBC: Many /HPF / WBC 5-10 /HPF   Sq Epi: x / Non Sq Epi: 5-10 /HPF / Bacteria: Many /HPF

## 2018-03-29 NOTE — PROGRESS NOTE ADULT - SUBJECTIVE AND OBJECTIVE BOX
Patient is a 78y Female seen and evaluated at bedside. Patient lying in bed in no acute distress feels frustrated and does not want to talk about dialysis. She remain oligoanuric over past 24 hours with Lasix trial. Patient denies any chest pain, shortness of breath, palpitations, dizziness at present. Blood pressure better ranging from 110 to 120s at present.     albumin human 25% IVPB 50 every 6 hours  ALBUTerol/ipratropium for Nebulization 3 every 6 hours PRN  dextrose 5%. 1000 <Continuous>  dextrose 50% Injectable 12.5 once  dextrose 50% Injectable 25 once  dextrose 50% Injectable 25 once  dextrose Gel 1 once PRN  glucagon  Injectable 1 once PRN  heparin  Injectable 5000 every 8 hours  insulin lispro (HumaLOG) corrective regimen sliding scale  Before meals and at bedtime  levothyroxine 150 daily  ondansetron Injectable 4 every 6 hours PRN  oxyCODONE    5 mG/acetaminophen 325 mG 1 every 4 hours PRN  piperacillin/tazobactam IVPB. 2.25 every 6 hours  sodium bicarbonate 650 three times a day      Allergies    No Known Allergies    Intolerances        T(C): , Max: 37 (03-29-18 @ 10:27)  T(F): , Max: 98.6 (03-29-18 @ 10:27)  HR: 72 (03-29-18 @ 09:35)  BP: 113/59 (03-29-18 @ 09:35)  BP(mean): 83 (03-29-18 @ 09:35)  RR: 16 (03-29-18 @ 09:35)  SpO2: 93% (03-29-18 @ 09:35)  Wt(kg): --    03-28 @ 07:01  -  03-29 @ 07:00  --------------------------------------------------------  IN: 700 mL / OUT: 173 mL / NET: 527 mL          Review of Systems:  CONSTITUTIONAL:  Feels fatigue and frustrated, No fever or chills.  EYES: No blurred or double vision.  RESPIRATORY: No shortness of breath, cough, hemoptysis  CARDIOVASCULAR: No Chest pain or shortness of breath  GASTROINTESTINAL: Lower abdominal pain, No nausea or vomiting, No diarrhea  GENITOURINARY: Diminished urine output, No dysuria or urinary burning,  No hematuria  NEUROLOGICAL: No headaches or blurred vision  SKIN: No skin rashes   MUSCULOSKELETAL: Bilateral leg edema and abdominal and thigh edema      PHYSICAL EXAM:  GENERAL: Ill appearing, lying in bed, awake and alert in no acute distress at present  HEAD:  Atraumatic, Normocephalic,   EYES: Bilateral conjuctival and scleral pallor+nt  Oral cavity: Oral mucosa moist and pale  NECK: Neck supple, No JVD  CHEST/LUNG: Bilateral decreased breath sounds, Bibasilar rales and crepitations+nt, no wheezing  HEART: Regular rate and rhythm. GIBSON II/VI at LPSB, No gallop, no rub   ABDOMEN: Soft, Nontender, lower surgical scar with erythema noted, BS+nt, No flank tenderness.   EXTREMITIES: Bilateral pitting pedal edema++nt, No clubbing, cyanosis  Neurology: AAOx2-3, no focal neurological deficit  SKIN: No rashes or lesions          ACCESS:     LABS:                        8.1    15.8  )-----------( 249      ( 28 Mar 2018 06:14 )             25.6     03-28    136  |  103  |  49<H>  ----------------------------<  78  4.4   |  16<L>  |  5.50<H>    Ca    7.8<L>      28 Mar 2018 16:53  Phos  6.8     03-28  Mg     1.9     03-28          Urinalysis Basic - ( 29 Mar 2018 04:06 )    Color: x / Appearance: x / SG: x / pH: x  Gluc: x / Ketone: x  / Bili: x / Urobili: x   Blood: x / Protein: x / Nitrite: x   Leuk Esterase: x / RBC: Many /HPF / WBC 5-10 /HPF   Sq Epi: x / Non Sq Epi: 5-10 /HPF / Bacteria: Many /HPF      Osmolality, Random Urine: 315 mosmol/kg (03-28 @ 05:10)  Calcium, Random Urine: 4.8 mg/dL (03-28 @ 05:10)  Chloride, Random Urine: 42 mmol/L (03-28 @ 05:09)  Creatinine, Random Urine: 122 mg/dL (03-28 @ 05:09)  Protein/Creatinine Ratio Calculation: 3.1 Ratio (03-28 @ 05:09)  Sodium, Random Urine: 65 mmol/L (03-28 @ 05:09)        RADIOLOGY & ADDITIONAL STUDIES:  < from: Xray Chest 1 View- PORTABLE-Urgent (03.28.18 @ 10:39) >    EXAM:  XR CHEST PORTABLE URGENT 1V                          PROCEDURE DATE:  03/28/2018                     INTERPRETATION:  PORTABLE CHEST X-RAY     HISTORY: f/u effusions    PRIOR STUDIES: 3/27/2018.    FINDINGS: Limited. Under inspiration. Possible trace right basal pleural   fluid. No visualized left pleural effusion. No pneumothorax. Size of   cardiac silhouette unchanged.      IMPRESSION:  Limited study. Possible trace right basal pleural effusion.            "Thank you for the opportunity to participate in the care of this   patient."        THERESE RAMIREZ M.D., ATTENDING RADIOLOGIST  This document has been electronically signed. Mar 28 2018 10:56AM                  < end of copied text >

## 2018-03-29 NOTE — CONSULT NOTE ADULT - PROBLEM SELECTOR RECOMMENDATION 7
On Glipizide at home.   - continue to hold oral antihyperglycemics   - continue ISS, FSG Per documentation, patient initially presented with abdominal pain and noted to have purulent drainage from abdominal incision sites. Currently with wound vac over midline incision, on suction.   - continue Zosyn per primary team; ensure its renally dosed in this patient with new HD, would recommend dosing after dialysis on HD days

## 2018-03-29 NOTE — CONSULT NOTE ADULT - SUBJECTIVE AND OBJECTIVE BOX
HPI:   78F with PMHx of HTN, DM, HLD, hypothyroidism admitted for sepsis 2/2 abdominal wound infection following robotically assisted hernia repair on 3/2. Per history, patient initially presented on 3/2 for elective surgical intervention of symptomatic ventral hernia, procedure tolerated without complication and patient discharged on 3/5 with instructions to continue home meds. Patient returned on 3/12 with acute confusion and lethargy in the setting of hypoglycemia, during which time she was found to have a RUL PE for which she was started on hep gtt and transitioned to Eliquis; discharged on 3/16. Patient again returned on 3/23 with complaints of abdominal pain and possible maroon colored stools, found to have purulent discharge from abdominal port sites and midline incision. Patient had wound vac placed and started on Vancomycin and Zosyn with interval development of ATN (believed to be due to Vancomycin) with oliguria. Nephrology consulted and per recommendations patient given Lasix challenge without improvement    SUBJECTIVE: Patient seen and examined at bedside.     ROS:  CV: Denies chest pain  Resp: Denies SOB  GI: Denies abdominal pain, constipation, diarrhea, nausea, vomiting  : Denies dysuria  ID: Denies fevers, chills  MSK: Denies joint pain     OBJECTIVE:    VITAL SIGNS:  ICU Vital Signs Last 24 Hrs  T(C): 36.7 (29 Mar 2018 19:55), Max: 37 (29 Mar 2018 10:27)  T(F): 98.1 (29 Mar 2018 19:55), Max: 98.6 (29 Mar 2018 10:27)  HR: 61 (29 Mar 2018 19:55) (61 - 72)  BP: 124/57 (29 Mar 2018 19:55) (92/43 - 128/60)  BP(mean): 86 (29 Mar 2018 15:47) (79 - 86)  ABP: --  ABP(mean): --  RR: 16 (29 Mar 2018 19:55) (16 - 18)  SpO2: 96% (29 Mar 2018 19:55) (93% - 97%)        03-28 @ 07:01  -  03-29 @ 07:00  --------------------------------------------------------  IN: 700 mL / OUT: 173 mL / NET: 527 mL    03-29 @ 07:01  - 03-29 @ 21:15  --------------------------------------------------------  IN: 800 mL / OUT: 1250 mL / NET: -450 mL      CAPILLARY BLOOD GLUCOSE      POCT Blood Glucose.: 70 mg/dL (29 Mar 2018 20:40)      PHYSICAL EXAM:    General: NAD  HEENT: NC/AT; PERRL, clear conjunctiva  Neck: supple  Respiratory: CTA b/l  Cardiovascular: +S1/S2; RRR  Abdomen: soft, NT/ND; +BS x4  Extremities: WWP, 2+ peripheral pulses b/l; no LE edema  Skin: normal color and turgor; no rash  MSK: no joint swelling  Neurological:     MEDICATIONS:  MEDICATIONS  (STANDING):  albumin human 25% IVPB 50 milliLiter(s) IV Intermittent every 1 hour  albumin human 25% IVPB 50 milliLiter(s) IV Intermittent every 6 hours  dextrose 5%. 1000 milliLiter(s) (50 mL/Hr) IV Continuous <Continuous>  dextrose 50% Injectable 12.5 Gram(s) IV Push once  dextrose 50% Injectable 25 Gram(s) IV Push once  dextrose 50% Injectable 25 Gram(s) IV Push once  insulin lispro (HumaLOG) corrective regimen sliding scale   SubCutaneous Before meals and at bedtime  levothyroxine 150 MICROGram(s) Oral daily  piperacillin/tazobactam IVPB. 2.25 Gram(s) IV Intermittent every 6 hours  sodium bicarbonate 650 milliGRAM(s) Oral three times a day    MEDICATIONS  (PRN):  ALBUTerol/ipratropium for Nebulization 3 milliLiter(s) Nebulizer every 6 hours PRN Wheezing  dextrose Gel 1 Dose(s) Oral once PRN Blood Glucose LESS THAN 70 milliGRAM(s)/deciliter  glucagon  Injectable 1 milliGRAM(s) IntraMuscular once PRN Glucose LESS THAN 70 milligrams/deciliter  ondansetron Injectable 4 milliGRAM(s) IV Push every 6 hours PRN Nausea and/or Vomiting  oxyCODONE    5 mG/acetaminophen 325 mG 1 Tablet(s) Oral every 4 hours PRN Severe Pain (7 - 10)      ALLERGIES:  Allergies    No Known Allergies    Intolerances        LABS:                        5.9    12.1  )-----------( 196      ( 29 Mar 2018 16:02 )             18.7     03-29    140  |  105  |  53<H>  ----------------------------<  75  4.3   |  16<L>  |  5.92<H>    Ca    8.1<L>      29 Mar 2018 16:02  Phos  6.9     03-29  Mg     1.9     03-29        Urinalysis Basic - ( 29 Mar 2018 04:06 )    Color: x / Appearance: x / SG: x / pH: x  Gluc: x / Ketone: x  / Bili: x / Urobili: x   Blood: x / Protein: x / Nitrite: x   Leuk Esterase: x / RBC: Many /HPF / WBC 5-10 /HPF   Sq Epi: x / Non Sq Epi: 5-10 /HPF / Bacteria: Many /HPF        RADIOLOGY & ADDITIONAL TESTS: Reviewed. HPI:   78F with PMHx of HTN, DM, HLD, hypothyroidism admitted for sepsis 2/2 abdominal wound infection following robotically assisted hernia repair on 3/2. Per history, patient initially presented on 3/2 for elective surgical intervention of symptomatic ventral hernia, procedure tolerated without complication and patient discharged on 3/5 with instructions to continue home meds. Patient returned on 3/12 with acute confusion and lethargy in the setting of hypoglycemia, during which time she was found to have a RUL PE for which she was started on hep gtt and transitioned to Eliquis; discharged on 3/16. Patient again returned on 3/23 with complaints of abdominal pain and possible maroon colored stools, found to have purulent discharge from abdominal port sites and midline incision. Patient had wound vac placed and started on Vancomycin and Zosyn with interval development of ATN (believed to be due to Vancomycin) with oliguria. Nephrology consulted and per recommendations patient given Lasix challenge without improvement, now s/p R IJ HD catheter placement by IR on 3/29 and first HD session same day, tolerated well. Hospital course additionally complicated by acute blood loss anemia, determined to be 2/2 heavy vaginal bleeding, for which patient underwent pelvic ultrasound on 3/25 with evidence of fibroid uterus with thickened endometrial walls concerning for possible malignancy. Patient continued to have stable Hgb >8 until 3/29 when Hgb found to be 6.1 --> 5.6 on repeat; transfused 2U pRBC. Eliquis had previously been discontinued on 3/24, but with anemia aspirin and HSQ also discontinued on 3/28 and 3/29, respectively. Patient has intermittently refused procedures including complete vaginal exam, transvaginal US, and initially dialysis for which psych was consulted and deemed patient without capacity.     SUBJECTIVE: Patient seen and examined at bedside. Patient not participatory with questioning or physical exam, stating she wants to sleep and to leave her alone. Unable to assess ROS.     VITAL SIGNS:  ICU Vital Signs Last 24 Hrs  T(C): 36.7 (29 Mar 2018 19:55), Max: 37 (29 Mar 2018 10:27)  T(F): 98.1 (29 Mar 2018 19:55), Max: 98.6 (29 Mar 2018 10:27)  HR: 61 (29 Mar 2018 19:55) (61 - 72)  BP: 124/57 (29 Mar 2018 19:55) (92/43 - 128/60)  BP(mean): 86 (29 Mar 2018 15:47) (79 - 86)  RR: 16 (29 Mar 2018 19:55) (16 - 18)  SpO2: 96% (29 Mar 2018 19:55) (93% - 97%)    03-28 @ 07:01 - 03-29 @ 07:00  --------------------------------------------------------  IN: 700 mL / OUT: 173 mL / NET: 527 mL    03-29 @ 07:01 - 03-29 @ 21:15  --------------------------------------------------------  IN: 800 mL / OUT: 1250 mL / NET: -450 mL    POCT Blood Glucose.: 70 mg/dL (29 Mar 2018 20:40)    PHYSICAL EXAM:  Constitutional: elderly female, unkempt appearing, well-developed, somnolent but awakens to name calling, NAD   HEENT: NCAT, refusing to open eyes or mouth   Neck: supple, HD catheter in place   Pulm: limited due to poor inspiratory effort, decreased breath sounds at bilateral bases, no wheezing  CV: RRR, +S1S2, no murmurs appreciated   GI: soft, nontender, wound vac overlying midline incision, port sites with purulent discharge and covered with gauze, +BS  : refusing to participate in exam, +active vaginal bleeding noted  Ext: WWP, 3+ LE edema bilaterally, DP pulses palpable   Neuro: somnolent but awakens to name calling, refusing to participate in orientation questioning or neurological exam, moves all extremities     MEDICATIONS:  MEDICATIONS  (STANDING):  albumin human 25% IVPB 50 milliLiter(s) IV Intermittent every 1 hour  albumin human 25% IVPB 50 milliLiter(s) IV Intermittent every 6 hours  dextrose 5%. 1000 milliLiter(s) (50 mL/Hr) IV Continuous <Continuous>  dextrose 50% Injectable 12.5 Gram(s) IV Push once  dextrose 50% Injectable 25 Gram(s) IV Push once  dextrose 50% Injectable 25 Gram(s) IV Push once  insulin lispro (HumaLOG) corrective regimen sliding scale   SubCutaneous Before meals and at bedtime  levothyroxine 150 MICROGram(s) Oral daily  piperacillin/tazobactam IVPB. 2.25 Gram(s) IV Intermittent every 6 hours  sodium bicarbonate 650 milliGRAM(s) Oral three times a day    MEDICATIONS  (PRN):  ALBUTerol/ipratropium for Nebulization 3 milliLiter(s) Nebulizer every 6 hours PRN Wheezing  dextrose Gel 1 Dose(s) Oral once PRN Blood Glucose LESS THAN 70 milliGRAM(s)/deciliter  glucagon  Injectable 1 milliGRAM(s) IntraMuscular once PRN Glucose LESS THAN 70 milligrams/deciliter  ondansetron Injectable 4 milliGRAM(s) IV Push every 6 hours PRN Nausea and/or Vomiting  oxyCODONE    5 mG/acetaminophen 325 mG 1 Tablet(s) Oral every 4 hours PRN Severe Pain (7 - 10)    ALLERGIES: No Known Allergies    LABS:                        5.9    12.1  )-----------( 196      ( 29 Mar 2018 16:02 )             18.7     140  |  105  |  53<H>  ----------------------------<  75  4.3   |  16<L>  |  5.92<H>    Ca    8.1<L>      Phos  6.9     Mg     1.9         Urinalysis Basic - ( 29 Mar 2018 04:06 )  Color: x / Appearance: x / SG: x / pH: x  Gluc: x / Ketone: x  / Bili: x / Urobili: x   Blood: x / Protein: x / Nitrite: x   Leuk Esterase: x / RBC: Many /HPF / WBC 5-10 /HPF   Sq Epi: x / Non Sq Epi: 5-10 /HPF / Bacteria: Many /HPF    RADIOLOGY & ADDITIONAL TESTS: Reviewed. HPI:   78F with PMHx of HTN, DM, HLD, hypothyroidism admitted for management of surgical wound infection following robotically assisted hernia repair on 3/2. Per history, patient initially presented on 3/2 for elective surgical intervention of symptomatic ventral hernia, procedure tolerated without complication and patient discharged on 3/5 with instructions to continue home meds. Patient returned on 3/12 with acute confusion and lethargy in the setting of hypoglycemia, during which time she was found to have a RUL PE for which she was started on hep gtt and transitioned to Eliquis; discharged on 3/16. Patient again returned on 3/23 with complaints of abdominal pain and possible maroon colored stools, found to have purulent discharge from abdominal port sites and midline incision. Patient had wound vac placed and started on Vancomycin and Zosyn with interval development of ATN (believed to be due to Vancomycin) with oliguria. Nephrology consulted and per recommendations patient given Lasix challenge without improvement, now s/p R IJ HD catheter placement by IR on 3/29 and first HD session same day, tolerated well. Hospital course additionally complicated by acute blood loss anemia, determined to be 2/2 heavy vaginal bleeding, for which patient underwent pelvic ultrasound on 3/25 with evidence of fibroid uterus with thickened endometrial walls concerning for possible malignancy. Patient continued to have stable Hgb >8 until 3/29 when Hgb found to be 6.1 --> 5.6 on repeat; transfused 2U pRBC. Eliquis had previously been discontinued on 3/24, but with anemia aspirin and HSQ also discontinued on 3/28 and 3/29, respectively. Patient has intermittently refused procedures including complete vaginal exam, transvaginal US, and initially dialysis for which psych was consulted and deemed patient without capacity.     SUBJECTIVE: Patient seen and examined at bedside. Patient not participatory with questioning or physical exam, stating she wants to sleep and to leave her alone. Unable to assess ROS.     VITAL SIGNS:  ICU Vital Signs Last 24 Hrs  T(C): 36.7 (29 Mar 2018 19:55), Max: 37 (29 Mar 2018 10:27)  T(F): 98.1 (29 Mar 2018 19:55), Max: 98.6 (29 Mar 2018 10:27)  HR: 61 (29 Mar 2018 19:55) (61 - 72)  BP: 124/57 (29 Mar 2018 19:55) (92/43 - 128/60)  BP(mean): 86 (29 Mar 2018 15:47) (79 - 86)  RR: 16 (29 Mar 2018 19:55) (16 - 18)  SpO2: 96% (29 Mar 2018 19:55) (93% - 97%)    03-28 @ 07:01 - 03-29 @ 07:00  --------------------------------------------------------  IN: 700 mL / OUT: 173 mL / NET: 527 mL    03-29 @ 07:01 - 03-29 @ 21:15  --------------------------------------------------------  IN: 800 mL / OUT: 1250 mL / NET: -450 mL    POCT Blood Glucose.: 70 mg/dL (29 Mar 2018 20:40)    PHYSICAL EXAM:  Constitutional: elderly female, unkempt appearing, well-developed, somnolent but awakens to name calling, NAD   HEENT: NCAT, refusing to open eyes or mouth   Neck: supple, HD catheter in place   Pulm: limited due to poor inspiratory effort, decreased breath sounds at bilateral bases, no wheezing  CV: RRR, +S1S2, no murmurs appreciated   GI: soft, nontender, wound vac overlying midline incision, port sites with purulent discharge and covered with gauze, +BS  : refusing to participate in exam, +active vaginal bleeding noted  Ext: WWP, 3+ LE edema bilaterally, DP pulses palpable   Neuro: somnolent but awakens to name calling, refusing to participate in orientation questioning or neurological exam, moves all extremities     MEDICATIONS:  MEDICATIONS  (STANDING):  albumin human 25% IVPB 50 milliLiter(s) IV Intermittent every 1 hour  albumin human 25% IVPB 50 milliLiter(s) IV Intermittent every 6 hours  dextrose 5%. 1000 milliLiter(s) (50 mL/Hr) IV Continuous <Continuous>  dextrose 50% Injectable 12.5 Gram(s) IV Push once  dextrose 50% Injectable 25 Gram(s) IV Push once  dextrose 50% Injectable 25 Gram(s) IV Push once  insulin lispro (HumaLOG) corrective regimen sliding scale   SubCutaneous Before meals and at bedtime  levothyroxine 150 MICROGram(s) Oral daily  piperacillin/tazobactam IVPB. 2.25 Gram(s) IV Intermittent every 6 hours  sodium bicarbonate 650 milliGRAM(s) Oral three times a day    MEDICATIONS  (PRN):  ALBUTerol/ipratropium for Nebulization 3 milliLiter(s) Nebulizer every 6 hours PRN Wheezing  dextrose Gel 1 Dose(s) Oral once PRN Blood Glucose LESS THAN 70 milliGRAM(s)/deciliter  glucagon  Injectable 1 milliGRAM(s) IntraMuscular once PRN Glucose LESS THAN 70 milligrams/deciliter  ondansetron Injectable 4 milliGRAM(s) IV Push every 6 hours PRN Nausea and/or Vomiting  oxyCODONE    5 mG/acetaminophen 325 mG 1 Tablet(s) Oral every 4 hours PRN Severe Pain (7 - 10)    ALLERGIES: No Known Allergies    LABS:                        5.9    12.1  )-----------( 196      ( 29 Mar 2018 16:02 )             18.7     140  |  105  |  53<H>  ----------------------------<  75  4.3   |  16<L>  |  5.92<H>    Ca    8.1<L>      Phos  6.9     Mg     1.9         Urinalysis Basic - ( 29 Mar 2018 04:06 )  Color: x / Appearance: x / SG: x / pH: x  Gluc: x / Ketone: x  / Bili: x / Urobili: x   Blood: x / Protein: x / Nitrite: x   Leuk Esterase: x / RBC: Many /HPF / WBC 5-10 /HPF   Sq Epi: x / Non Sq Epi: 5-10 /HPF / Bacteria: Many /HPF    RADIOLOGY & ADDITIONAL TESTS: Reviewed.

## 2018-03-29 NOTE — PROVIDER CONTACT NOTE (OTHER) - BACKGROUND
Plan to have two physician consent tomorrow for treatment, since no family available  3) Pa stated she will come and look at wound open to air to RLQ, has not seen it previously. Awaiting orders

## 2018-03-30 DIAGNOSIS — E03.9 HYPOTHYROIDISM, UNSPECIFIED: ICD-10-CM

## 2018-03-30 DIAGNOSIS — I26.99 OTHER PULMONARY EMBOLISM WITHOUT ACUTE COR PULMONALE: ICD-10-CM

## 2018-03-30 DIAGNOSIS — R06.82 TACHYPNEA, NOT ELSEWHERE CLASSIFIED: ICD-10-CM

## 2018-03-30 DIAGNOSIS — I10 ESSENTIAL (PRIMARY) HYPERTENSION: ICD-10-CM

## 2018-03-30 DIAGNOSIS — N17.9 ACUTE KIDNEY FAILURE, UNSPECIFIED: ICD-10-CM

## 2018-03-30 DIAGNOSIS — R63.8 OTHER SYMPTOMS AND SIGNS CONCERNING FOOD AND FLUID INTAKE: ICD-10-CM

## 2018-03-30 DIAGNOSIS — E11.8 TYPE 2 DIABETES MELLITUS WITH UNSPECIFIED COMPLICATIONS: ICD-10-CM

## 2018-03-30 DIAGNOSIS — I82.403 ACUTE EMBOLISM AND THROMBOSIS OF UNSPECIFIED DEEP VEINS OF LOWER EXTREMITY, BILATERAL: ICD-10-CM

## 2018-03-30 DIAGNOSIS — Z29.9 ENCOUNTER FOR PROPHYLACTIC MEASURES, UNSPECIFIED: ICD-10-CM

## 2018-03-30 LAB
ALBUMIN SERPL ELPH-MCNC: 3.4 G/DL — SIGNIFICANT CHANGE UP (ref 3.3–5)
ALP SERPL-CCNC: 45 U/L — SIGNIFICANT CHANGE UP (ref 40–120)
ALT FLD-CCNC: 13 U/L — SIGNIFICANT CHANGE UP (ref 10–45)
ANION GAP SERPL CALC-SCNC: 16 MMOL/L — SIGNIFICANT CHANGE UP (ref 5–17)
ANION GAP SERPL CALC-SCNC: 17 MMOL/L — SIGNIFICANT CHANGE UP (ref 5–17)
ANION GAP SERPL CALC-SCNC: 18 MMOL/L — HIGH (ref 5–17)
APTT BLD: 34.7 SEC — SIGNIFICANT CHANGE UP (ref 27.5–37.4)
APTT BLD: 38.6 SEC — HIGH (ref 27.5–37.4)
AST SERPL-CCNC: 17 U/L — SIGNIFICANT CHANGE UP (ref 10–40)
B-OH-BUTYR SERPL-SCNC: 0.2 MMOL/L — SIGNIFICANT CHANGE UP
BASOPHILS NFR BLD AUTO: 0.2 % — SIGNIFICANT CHANGE UP (ref 0–2)
BILIRUB SERPL-MCNC: 0.8 MG/DL — SIGNIFICANT CHANGE UP (ref 0.2–1.2)
BUN SERPL-MCNC: 20 MG/DL — SIGNIFICANT CHANGE UP (ref 7–23)
BUN SERPL-MCNC: 24 MG/DL — HIGH (ref 7–23)
BUN SERPL-MCNC: 39 MG/DL — HIGH (ref 7–23)
CALCIUM SERPL-MCNC: 7.9 MG/DL — LOW (ref 8.4–10.5)
CALCIUM SERPL-MCNC: 7.9 MG/DL — LOW (ref 8.4–10.5)
CALCIUM SERPL-MCNC: 8.2 MG/DL — LOW (ref 8.4–10.5)
CHLORIDE SERPL-SCNC: 100 MMOL/L — SIGNIFICANT CHANGE UP (ref 96–108)
CHLORIDE SERPL-SCNC: 102 MMOL/L — SIGNIFICANT CHANGE UP (ref 96–108)
CHLORIDE SERPL-SCNC: 102 MMOL/L — SIGNIFICANT CHANGE UP (ref 96–108)
CK MB CFR SERPL CALC: 1.6 NG/ML — SIGNIFICANT CHANGE UP (ref 0–6.7)
CK SERPL-CCNC: 13 U/L — LOW (ref 25–170)
CO2 SERPL-SCNC: 19 MMOL/L — LOW (ref 22–31)
CO2 SERPL-SCNC: 22 MMOL/L — SIGNIFICANT CHANGE UP (ref 22–31)
CO2 SERPL-SCNC: 24 MMOL/L — SIGNIFICANT CHANGE UP (ref 22–31)
CREAT SERPL-MCNC: 2.85 MG/DL — HIGH (ref 0.5–1.3)
CREAT SERPL-MCNC: 3.6 MG/DL — HIGH (ref 0.5–1.3)
CREAT SERPL-MCNC: 4.64 MG/DL — HIGH (ref 0.5–1.3)
EOSINOPHIL NFR BLD AUTO: 0.3 % — SIGNIFICANT CHANGE UP (ref 0–6)
GLUCOSE BLDC GLUCOMTR-MCNC: 170 MG/DL — HIGH (ref 70–99)
GLUCOSE BLDC GLUCOMTR-MCNC: 69 MG/DL — LOW (ref 70–99)
GLUCOSE BLDC GLUCOMTR-MCNC: 69 MG/DL — LOW (ref 70–99)
GLUCOSE BLDC GLUCOMTR-MCNC: 72 MG/DL — SIGNIFICANT CHANGE UP (ref 70–99)
GLUCOSE BLDC GLUCOMTR-MCNC: 77 MG/DL — SIGNIFICANT CHANGE UP (ref 70–99)
GLUCOSE BLDC GLUCOMTR-MCNC: 77 MG/DL — SIGNIFICANT CHANGE UP (ref 70–99)
GLUCOSE BLDC GLUCOMTR-MCNC: 78 MG/DL — SIGNIFICANT CHANGE UP (ref 70–99)
GLUCOSE BLDC GLUCOMTR-MCNC: 86 MG/DL — SIGNIFICANT CHANGE UP (ref 70–99)
GLUCOSE SERPL-MCNC: 70 MG/DL — SIGNIFICANT CHANGE UP (ref 70–99)
GLUCOSE SERPL-MCNC: 74 MG/DL — SIGNIFICANT CHANGE UP (ref 70–99)
GLUCOSE SERPL-MCNC: 80 MG/DL — SIGNIFICANT CHANGE UP (ref 70–99)
HCT VFR BLD CALC: 22 % — LOW (ref 34.5–45)
HCT VFR BLD CALC: 23.5 % — LOW (ref 34.5–45)
HCT VFR BLD CALC: 23.9 % — LOW (ref 34.5–45)
HCT VFR BLD CALC: 24.1 % — LOW (ref 34.5–45)
HCT VFR BLD CALC: 34.6 % — SIGNIFICANT CHANGE UP (ref 34.5–45)
HGB BLD-MCNC: 11.2 G/DL — LOW (ref 11.5–15.5)
HGB BLD-MCNC: 7.3 G/DL — LOW (ref 11.5–15.5)
HGB BLD-MCNC: 7.8 G/DL — LOW (ref 11.5–15.5)
HGB BLD-MCNC: 7.8 G/DL — LOW (ref 11.5–15.5)
HGB BLD-MCNC: 8 G/DL — LOW (ref 11.5–15.5)
INR BLD: 1.61 — HIGH (ref 0.88–1.16)
INR BLD: 1.74 — HIGH (ref 0.88–1.16)
LACTATE SERPL-SCNC: 1.4 MMOL/L — SIGNIFICANT CHANGE UP (ref 0.5–2)
LYMPHOCYTES # BLD AUTO: 9.9 % — LOW (ref 13–44)
MAGNESIUM SERPL-MCNC: 1.8 MG/DL — SIGNIFICANT CHANGE UP (ref 1.6–2.6)
MAGNESIUM SERPL-MCNC: 1.9 MG/DL — SIGNIFICANT CHANGE UP (ref 1.6–2.6)
MCHC RBC-ENTMCNC: 27.1 PG — SIGNIFICANT CHANGE UP (ref 27–34)
MCHC RBC-ENTMCNC: 28.7 PG — SIGNIFICANT CHANGE UP (ref 27–34)
MCHC RBC-ENTMCNC: 28.9 PG — SIGNIFICANT CHANGE UP (ref 27–34)
MCHC RBC-ENTMCNC: 29 PG — SIGNIFICANT CHANGE UP (ref 27–34)
MCHC RBC-ENTMCNC: 29.1 PG — SIGNIFICANT CHANGE UP (ref 27–34)
MCHC RBC-ENTMCNC: 32.4 G/DL — SIGNIFICANT CHANGE UP (ref 32–36)
MCHC RBC-ENTMCNC: 32.6 G/DL — SIGNIFICANT CHANGE UP (ref 32–36)
MCHC RBC-ENTMCNC: 33.2 G/DL — SIGNIFICANT CHANGE UP (ref 32–36)
MCV RBC AUTO: 83.8 FL — SIGNIFICANT CHANGE UP (ref 80–100)
MCV RBC AUTO: 87 FL — SIGNIFICANT CHANGE UP (ref 80–100)
MCV RBC AUTO: 87.3 FL — SIGNIFICANT CHANGE UP (ref 80–100)
MCV RBC AUTO: 87.6 FL — SIGNIFICANT CHANGE UP (ref 80–100)
MCV RBC AUTO: 87.9 FL — SIGNIFICANT CHANGE UP (ref 80–100)
MONOCYTES NFR BLD AUTO: 6.1 % — SIGNIFICANT CHANGE UP (ref 2–14)
NEUTROPHILS NFR BLD AUTO: 83.5 % — HIGH (ref 43–77)
PHOSPHATE SERPL-MCNC: 3.2 MG/DL — SIGNIFICANT CHANGE UP (ref 2.5–4.5)
PHOSPHATE SERPL-MCNC: 5.4 MG/DL — HIGH (ref 2.5–4.5)
PLATELET # BLD AUTO: 131 K/UL — LOW (ref 150–400)
PLATELET # BLD AUTO: 147 K/UL — LOW (ref 150–400)
PLATELET # BLD AUTO: 149 K/UL — LOW (ref 150–400)
PLATELET # BLD AUTO: 152 K/UL — SIGNIFICANT CHANGE UP (ref 150–400)
PLATELET # BLD AUTO: 426 K/UL — HIGH (ref 150–400)
POTASSIUM SERPL-MCNC: 3.6 MMOL/L — SIGNIFICANT CHANGE UP (ref 3.5–5.3)
POTASSIUM SERPL-MCNC: 3.6 MMOL/L — SIGNIFICANT CHANGE UP (ref 3.5–5.3)
POTASSIUM SERPL-MCNC: 3.8 MMOL/L — SIGNIFICANT CHANGE UP (ref 3.5–5.3)
POTASSIUM SERPL-SCNC: 3.6 MMOL/L — SIGNIFICANT CHANGE UP (ref 3.5–5.3)
POTASSIUM SERPL-SCNC: 3.6 MMOL/L — SIGNIFICANT CHANGE UP (ref 3.5–5.3)
POTASSIUM SERPL-SCNC: 3.8 MMOL/L — SIGNIFICANT CHANGE UP (ref 3.5–5.3)
PROT SERPL-MCNC: 5.3 G/DL — LOW (ref 6–8.3)
PROT SERPL-MCNC: 5.3 G/DL — LOW (ref 6–8.3)
PROT SERPL-MCNC: 6 G/DL — SIGNIFICANT CHANGE UP (ref 6–8.3)
PROTHROM AB SERPL-ACNC: 18.1 SEC — HIGH (ref 9.8–12.7)
PROTHROM AB SERPL-ACNC: 19.5 SEC — HIGH (ref 9.8–12.7)
RBC # BLD: 2.51 M/UL — LOW (ref 3.8–5.2)
RBC # BLD: 2.7 M/UL — LOW (ref 3.8–5.2)
RBC # BLD: 2.72 M/UL — LOW (ref 3.8–5.2)
RBC # BLD: 2.76 M/UL — LOW (ref 3.8–5.2)
RBC # BLD: 4.13 M/UL — SIGNIFICANT CHANGE UP (ref 3.8–5.2)
RBC # BLD: 4.13 M/UL — SIGNIFICANT CHANGE UP (ref 3.8–5.2)
RBC # FLD: 14.1 % — SIGNIFICANT CHANGE UP (ref 10.3–16.9)
RBC # FLD: 15.2 % — SIGNIFICANT CHANGE UP (ref 10.3–16.9)
RBC # FLD: 15.6 % — SIGNIFICANT CHANGE UP (ref 10.3–16.9)
RBC # FLD: 15.7 % — SIGNIFICANT CHANGE UP (ref 10.3–16.9)
RBC # FLD: 16.1 % — SIGNIFICANT CHANGE UP (ref 10.3–16.9)
RETICS/RBC NFR: 1.2 % — SIGNIFICANT CHANGE UP (ref 0.5–2.5)
SODIUM SERPL-SCNC: 139 MMOL/L — SIGNIFICANT CHANGE UP (ref 135–145)
SODIUM SERPL-SCNC: 140 MMOL/L — SIGNIFICANT CHANGE UP (ref 135–145)
SODIUM SERPL-SCNC: 141 MMOL/L — SIGNIFICANT CHANGE UP (ref 135–145)
T PALLIDUM AB TITR SER: NEGATIVE — SIGNIFICANT CHANGE UP
TROPONIN T SERPL-MCNC: 0.03 NG/ML — HIGH (ref 0–0.01)
TSH SERPL-MCNC: 0.45 UIU/ML — SIGNIFICANT CHANGE UP (ref 0.35–4.94)
TSH SERPL-MCNC: 1.03 UIU/ML — SIGNIFICANT CHANGE UP (ref 0.35–4.94)
WBC # BLD: 10.5 K/UL — SIGNIFICANT CHANGE UP (ref 3.8–10.5)
WBC # BLD: 10.5 K/UL — SIGNIFICANT CHANGE UP (ref 3.8–10.5)
WBC # BLD: 12.4 K/UL — HIGH (ref 3.8–10.5)
WBC # BLD: 13.4 K/UL — HIGH (ref 3.8–10.5)
WBC # BLD: 14.2 K/UL — HIGH (ref 3.8–10.5)
WBC # FLD AUTO: 10.5 K/UL — SIGNIFICANT CHANGE UP (ref 3.8–10.5)
WBC # FLD AUTO: 10.5 K/UL — SIGNIFICANT CHANGE UP (ref 3.8–10.5)
WBC # FLD AUTO: 12.4 K/UL — HIGH (ref 3.8–10.5)
WBC # FLD AUTO: 13.4 K/UL — HIGH (ref 3.8–10.5)
WBC # FLD AUTO: 14.2 K/UL — HIGH (ref 3.8–10.5)

## 2018-03-30 PROCEDURE — 99222 1ST HOSP IP/OBS MODERATE 55: CPT | Mod: GC

## 2018-03-30 PROCEDURE — 93970 EXTREMITY STUDY: CPT | Mod: 26

## 2018-03-30 PROCEDURE — 37191 INS ENDOVAS VENA CAVA FILTR: CPT | Mod: GC

## 2018-03-30 PROCEDURE — 99233 SBSQ HOSP IP/OBS HIGH 50: CPT

## 2018-03-30 RX ORDER — DEXTROSE 50 % IN WATER 50 %
25 SYRINGE (ML) INTRAVENOUS ONCE
Qty: 0 | Refills: 0 | Status: DISCONTINUED | OUTPATIENT
Start: 2018-03-30 | End: 2018-03-30

## 2018-03-30 RX ORDER — DOCUSATE SODIUM 100 MG
100 CAPSULE ORAL THREE TIMES A DAY
Qty: 0 | Refills: 0 | Status: DISCONTINUED | OUTPATIENT
Start: 2018-03-30 | End: 2018-03-31

## 2018-03-30 RX ORDER — MEDROXYPROGESTERONE ACETATE 150 MG/ML
10 INJECTION, SUSPENSION, EXTENDED RELEASE INTRAMUSCULAR DAILY
Qty: 0 | Refills: 0 | Status: DISCONTINUED | OUTPATIENT
Start: 2018-03-30 | End: 2018-04-11

## 2018-03-30 RX ORDER — PIPERACILLIN AND TAZOBACTAM 4; .5 G/20ML; G/20ML
INJECTION, POWDER, LYOPHILIZED, FOR SOLUTION INTRAVENOUS
Qty: 0 | Refills: 0 | Status: DISCONTINUED | OUTPATIENT
Start: 2018-03-30 | End: 2018-04-01

## 2018-03-30 RX ORDER — POTASSIUM CHLORIDE 20 MEQ
20 PACKET (EA) ORAL ONCE
Qty: 0 | Refills: 0 | Status: COMPLETED | OUTPATIENT
Start: 2018-03-30 | End: 2018-03-30

## 2018-03-30 RX ORDER — DEXTROSE 50 % IN WATER 50 %
25 SYRINGE (ML) INTRAVENOUS ONCE
Qty: 0 | Refills: 0 | Status: COMPLETED | OUTPATIENT
Start: 2018-03-30 | End: 2018-03-30

## 2018-03-30 RX ORDER — HYDROMORPHONE HYDROCHLORIDE 2 MG/ML
0.5 INJECTION INTRAMUSCULAR; INTRAVENOUS; SUBCUTANEOUS ONCE
Qty: 0 | Refills: 0 | Status: DISCONTINUED | OUTPATIENT
Start: 2018-03-30 | End: 2018-03-30

## 2018-03-30 RX ORDER — DEXTROSE 50 % IN WATER 50 %
50 SYRINGE (ML) INTRAVENOUS ONCE
Qty: 0 | Refills: 0 | Status: COMPLETED | OUTPATIENT
Start: 2018-03-30 | End: 2018-03-30

## 2018-03-30 RX ORDER — SENNA PLUS 8.6 MG/1
2 TABLET ORAL AT BEDTIME
Qty: 0 | Refills: 0 | Status: DISCONTINUED | OUTPATIENT
Start: 2018-03-30 | End: 2018-03-31

## 2018-03-30 RX ORDER — ACETAMINOPHEN 500 MG
650 TABLET ORAL EVERY 6 HOURS
Qty: 0 | Refills: 0 | Status: DISCONTINUED | OUTPATIENT
Start: 2018-03-30 | End: 2018-04-11

## 2018-03-30 RX ORDER — HYDROMORPHONE HYDROCHLORIDE 2 MG/ML
0.5 INJECTION INTRAMUSCULAR; INTRAVENOUS; SUBCUTANEOUS EVERY 6 HOURS
Qty: 0 | Refills: 0 | Status: DISCONTINUED | OUTPATIENT
Start: 2018-03-30 | End: 2018-03-31

## 2018-03-30 RX ORDER — PIPERACILLIN AND TAZOBACTAM 4; .5 G/20ML; G/20ML
2.25 INJECTION, POWDER, LYOPHILIZED, FOR SOLUTION INTRAVENOUS EVERY 12 HOURS
Qty: 0 | Refills: 0 | Status: DISCONTINUED | OUTPATIENT
Start: 2018-03-31 | End: 2018-04-01

## 2018-03-30 RX ORDER — ALBUMIN HUMAN 25 %
50 VIAL (ML) INTRAVENOUS
Qty: 0 | Refills: 0 | Status: COMPLETED | OUTPATIENT
Start: 2018-03-30 | End: 2018-03-30

## 2018-03-30 RX ORDER — MAGNESIUM SULFATE 500 MG/ML
2 VIAL (ML) INJECTION ONCE
Qty: 0 | Refills: 0 | Status: COMPLETED | OUTPATIENT
Start: 2018-03-30 | End: 2018-03-30

## 2018-03-30 RX ORDER — PIPERACILLIN AND TAZOBACTAM 4; .5 G/20ML; G/20ML
2.25 INJECTION, POWDER, LYOPHILIZED, FOR SOLUTION INTRAVENOUS ONCE
Qty: 0 | Refills: 0 | Status: COMPLETED | OUTPATIENT
Start: 2018-03-30 | End: 2018-03-30

## 2018-03-30 RX ADMIN — Medication 50 MILLILITER(S): at 22:06

## 2018-03-30 RX ADMIN — HYDROMORPHONE HYDROCHLORIDE 0.5 MILLIGRAM(S): 2 INJECTION INTRAMUSCULAR; INTRAVENOUS; SUBCUTANEOUS at 12:18

## 2018-03-30 RX ADMIN — PIPERACILLIN AND TAZOBACTAM 200 GRAM(S): 4; .5 INJECTION, POWDER, LYOPHILIZED, FOR SOLUTION INTRAVENOUS at 22:53

## 2018-03-30 RX ADMIN — Medication 50 MILLILITER(S): at 07:13

## 2018-03-30 RX ADMIN — Medication 25 MILLILITER(S): at 18:30

## 2018-03-30 RX ADMIN — PIPERACILLIN AND TAZOBACTAM 200 GRAM(S): 4; .5 INJECTION, POWDER, LYOPHILIZED, FOR SOLUTION INTRAVENOUS at 00:13

## 2018-03-30 RX ADMIN — Medication 650 MILLIGRAM(S): at 06:40

## 2018-03-30 RX ADMIN — Medication 50 MILLILITER(S): at 17:10

## 2018-03-30 RX ADMIN — Medication 3 MILLILITER(S): at 02:34

## 2018-03-30 RX ADMIN — Medication 20 MILLIEQUIVALENT(S): at 10:10

## 2018-03-30 RX ADMIN — PIPERACILLIN AND TAZOBACTAM 200 GRAM(S): 4; .5 INJECTION, POWDER, LYOPHILIZED, FOR SOLUTION INTRAVENOUS at 06:41

## 2018-03-30 RX ADMIN — Medication 50 MILLILITER(S): at 01:14

## 2018-03-30 RX ADMIN — Medication 50 GRAM(S): at 10:11

## 2018-03-30 RX ADMIN — HYDROMORPHONE HYDROCHLORIDE 0.5 MILLIGRAM(S): 2 INJECTION INTRAMUSCULAR; INTRAVENOUS; SUBCUTANEOUS at 11:55

## 2018-03-30 RX ADMIN — Medication 150 MICROGRAM(S): at 06:40

## 2018-03-30 NOTE — CONSULT NOTE ADULT - ASSESSMENT
79 yo F with PE, unable to be anticoagulated due to significant vaginal bleeding  -NPO except meds for OR/Cath lab, patient added on  -Transfuse as needed per primary team  -check ECHO/trops  -Plan for IVC filter placement this afternoon      Patient with altered mental status at baseline and unable to provide consent, her Nephew/healthcare proxy Vargas Boggs contacted, and is agreeable to procedure.  Consent obtained, on chart. Case d/w primary team and Dr. Colon.

## 2018-03-30 NOTE — CONSULT NOTE ADULT - ATTENDING COMMENTS
IVC filter placed for contraindication to anticoagulation in setting of venous thromboembolism.    Risks and benefits explained to patient and family.    No periop complications.    Groin soft.    Call with questions.
Patient seen and examined: Plans discussed; Had IVC filter inserted; Antibiotics noted:
Patient with GYN pathology but unwilling to participate in work up at this time.  She is also not a good candidate for biopsy that can lead to increased bleeding.  VB can be due to anticoagulation, however, GYN pathology ie thickened EE and uterine size does warrant independent work up if patient consents.  If patient to go to OR, please notify us otherwise recommend biopsy when stable. See above for more details
Case d/w Dr. Adams, agree with above.  D/C antihypertensives.   Monitor UO. Serial BMP's.   Renal service will monitor ARF with you.    Recommend Internal Medicine consult.

## 2018-03-30 NOTE — BRIEF OPERATIVE NOTE - OPERATION/FINDINGS
Right groin accessed with US and micropuncture needle. Venogram of the IVC shot to identify renal veins and iliacs. Cook Celect IVC filter placed in the infrarenal IVC. Sheath pulled and manual pressure held 12 minutes.

## 2018-03-30 NOTE — PROGRESS NOTE ADULT - PROBLEM SELECTOR PLAN 3
Patient with prior history of hypertension now blood pressure improving off BP medications with range of 110 to 120's.  Continue hold all BP medications including ACEi and betablokers.  Maintain MAP>65-70 mmhg.

## 2018-03-30 NOTE — PROGRESS NOTE ADULT - SUBJECTIVE AND OBJECTIVE BOX
Patient was seen and evaluated on dialysis.   Patient is was not tolerating HD well.   About 2 hours into HD, the patient was noted to have HR up to 150-160s on the bedside cardiac monitor and patient reported a sensation that she was "going to die." Does not endorse chest pain while here.   The HD machine vital sign measurements (which rely on a manual BP measured HR and is not the reliable real time measurement of a cardiac monitor) did not  this tachycardia.    HR: 67 (03-30-18 @ 16:10)  BP: 124/56 (03-30-18 @ 16:10)  Initial dialysis settings  Dialyzer:  Revaclear   300     QB: 300       QD: 500 3K bath   Goal UF 1.5kg over 2.5 Hours     Cancelled HD at about 2 hours   Calculated net UF will have been 1.4kg approximately    One can review the paper HD flowsheets for hemodynamic measurements obtained intradialysis.     Since rinseback, patient's symptoms have improved    HD nurses to obtain stat labs  Also noted to have mild oozing of blood at the PC site. Heavy bandaging is applied.   ECG to be performed.   Glucose 69. NPO. 1amp of D50 given.     DDx:   Arrhythmia provoked by ultrafiltration  Cardiac ischemia  Hypoglycemia => catecholaminergic effect   New onset of infection  New onset of bleeding  Anxiety    Dialyzer reaction  Transient hypokalemia intradialysis    Primary team to evaluate for the above etiologies.  Renal to follow up on the potential renal related etiologies by switching dialyzers to Optiflux on next HD and checking post HD K labs  Of note, do not replete a post HD K unless < 2.5 as there will be a rebound of intracellular to extracellular K levels  If K is 2.5 - 3.5, repeat K in 4-6 hours to see the equilibrium levels before repletion.     If PC site continues to have oozing overnight, reverse any coagulopathy (if any exists, coags sent) and contact IR resident on call for hemostasis.     Informed primary team intern Dr. Matthews of these developments to follow up on once return to floors.

## 2018-03-30 NOTE — PROGRESS NOTE ADULT - PROBLEM SELECTOR PROBLEM 5
Type 2 diabetes mellitus with complication, unspecified long term insulin use status Other pulmonary embolism without acute cor pulmonale, unspecified chronicity

## 2018-03-30 NOTE — PROGRESS NOTE ADULT - PROBLEM SELECTOR PROBLEM 4
Other pulmonary embolism without acute cor pulmonale, unspecified chronicity Deep vein thrombosis (DVT) of both lower extremities, unspecified chronicity, unspecified vein

## 2018-03-30 NOTE — PROGRESS NOTE ADULT - PROBLEM SELECTOR PLAN 1
-Active vaginal bleeding possibly 2/2 GYN malignancy.  TVUS showed fibroid uterus with thickened endometrium.  -s/p 2 units PRBCs -Active vaginal bleeding possibly 2/2 GYN malignancy.  TVUS showed fibroid uterus with thickened endometrium.  -s/p 2 units PRBCs  -maintain active T&S  -Transfuse <7  -Trend CBC  -IVC filter today.  A/C contraindicated.

## 2018-03-30 NOTE — PROGRESS NOTE ADULT - PROBLEM SELECTOR PLAN 3
Interval development of bilateral pleural effusions on CXR from 3/28 compared to that of 3/23. Likely 2/2 fluid overload in the setting of renal failure and oliguria. Patient currently without complaints of SOB, no increased oxygen requirements (on NC2L, RR 18). Expect that pleural effusions will improve with HD.   - follow up CXR

## 2018-03-30 NOTE — PROGRESS NOTE ADULT - ASSESSMENT
79 y/o female with hx of DM, HLD, HTN, recent admission for post-operative PE placed on Eliquis. Ventral hernia repair on 3/2. Now presenting for new onset of abdominal pain with active purulent drainage, and questionable blood per rectum    Continue IV ABX  Regular Diet - Carb controlled  IVF, will Hep Lock once tolerating  Holding  Home Eliquis, due to vaginal bleeding, (Hgb stable since discharge, as well as Vital signs stable)  Continue Home Meds  Insulin Sliding Scale  Wound Packing 77 y/o female with hx of DM, HLD, HTN, recent admission for post-operative PE placed on Eliquis. Ventral hernia repair on 3/2. Now presenting for new onset of abdominal pain with active purulent drainage, and questionable blood per rectum    Continue IV ABX  Regular Diet - Carb controlled  IVF, will Hep Lock once tolerating  Holding  Home Eliquis, due to vaginal bleeding, (Hgb stable since discharge, as well as Vital signs stable)  Continue Home Meds  Insulin Sliding Scale  Wound Packing  HD as per nephro

## 2018-03-30 NOTE — PROGRESS NOTE ADULT - SUBJECTIVE AND OBJECTIVE BOX
HOSPITAL COURSE:    78F with PMHx of HTN, DM, HLD, hypothyroidism admitted for management of surgical wound infection following robotically assisted hernia repair on 3/2. Per history, patient initially presented on 3/2 for elective surgical intervention of symptomatic ventral hernia, procedure tolerated without complication and patient discharged on 3/5 with instructions to continue home meds. Patient returned on 3/12 with acute confusion and lethargy in the setting of hypoglycemia, during which time she was found to have a RUL PE for which she was started on hep gtt and transitioned to Eliquis; discharged on 3/16. Patient again returned on 3/23 with complaints of abdominal pain and possible maroon colored stools, found to have purulent discharge from abdominal port sites and midline incision. Patient had wound vac placed and started on Vancomycin and Zosyn with interval development of ATN (believed to be due to Vancomycin) with oliguria. Nephrology consulted and per recommendations patient given Lasix challenge without improvement, now s/p R IJ HD catheter placement by IR on 3/29 and first HD session same day, tolerated well. Hospital course additionally complicated by acute blood loss anemia, determined to be 2/2 heavy vaginal bleeding, for which patient underwent pelvic ultrasound on 3/25 with evidence of fibroid uterus with thickened endometrial walls concerning for possible malignancy. Patient continued to have stable Hgb >8 until 3/29 when Hgb found to be 6.1 --> 5.6 on repeat; transfused 2U pRBC. Eliquis had previously been discontinued on 3/24, but with anemia aspirin and HSQ also discontinued on 3/28 and 3/29, respectively. Patient has intermittently refused procedures including complete vaginal exam, transvaginal US, and initially dialysis for which psych was consulted and deemed patient without capacity. Due to worsening renal function, patient had HD cath placed with first HD on 3/29.      INTERVAL/OVERNIGHT EVENTS:  First HD on 3/29, tolerated well.    SUBJECTIVE:  Seen and examined at bedside.     ROS otherwise negative.    VITAL SIGNS:  T(F): 98.6 (03-30-18 @ 09:32)  HR: 64 (03-30-18 @ 09:24)  BP: 105/49 (03-30-18 @ 08:39)  RR: 18 (03-30-18 @ 09:24)  SpO2: 96% (03-30-18 @ 09:24)  Wt(kg): --    PHYSICAL EXAM:        MEDICATIONS  (STANDING):  albumin human 25% IVPB 50 milliLiter(s) IV Intermittent every 1 hour  albumin human 25% IVPB 50 milliLiter(s) IV Intermittent every 6 hours  dextrose 5%. 1000 milliLiter(s) (50 mL/Hr) IV Continuous <Continuous>  dextrose 50% Injectable 12.5 Gram(s) IV Push once  dextrose 50% Injectable 25 Gram(s) IV Push once  dextrose 50% Injectable 25 Gram(s) IV Push once  docusate sodium 100 milliGRAM(s) Oral three times a day  insulin lispro (HumaLOG) corrective regimen sliding scale   SubCutaneous Before meals and at bedtime  levothyroxine 150 MICROGram(s) Oral daily  piperacillin/tazobactam IVPB. 2.25 Gram(s) IV Intermittent every 6 hours  senna 2 Tablet(s) Oral at bedtime  sodium bicarbonate 650 milliGRAM(s) Oral three times a day    MEDICATIONS  (PRN):  ALBUTerol/ipratropium for Nebulization 3 milliLiter(s) Nebulizer every 6 hours PRN Wheezing  dextrose Gel 1 Dose(s) Oral once PRN Blood Glucose LESS THAN 70 milliGRAM(s)/deciliter  glucagon  Injectable 1 milliGRAM(s) IntraMuscular once PRN Glucose LESS THAN 70 milligrams/deciliter  ondansetron Injectable 4 milliGRAM(s) IV Push every 6 hours PRN Nausea and/or Vomiting  oxyCODONE    5 mG/acetaminophen 325 mG 1 Tablet(s) Oral every 4 hours PRN Severe Pain (7 - 10)      Allergies    No Known Allergies    Intolerances      albumin human 25% IVPB 50 milliLiter(s) IV Intermittent every 1 hour  albumin human 25% IVPB 50 milliLiter(s) IV Intermittent every 6 hours  ALBUTerol/ipratropium for Nebulization 3 milliLiter(s) Nebulizer every 6 hours PRN  dextrose 5%. 1000 milliLiter(s) IV Continuous <Continuous>  dextrose 50% Injectable 12.5 Gram(s) IV Push once  dextrose 50% Injectable 25 Gram(s) IV Push once  dextrose 50% Injectable 25 Gram(s) IV Push once  dextrose Gel 1 Dose(s) Oral once PRN  docusate sodium 100 milliGRAM(s) Oral three times a day  glucagon  Injectable 1 milliGRAM(s) IntraMuscular once PRN  insulin lispro (HumaLOG) corrective regimen sliding scale   SubCutaneous Before meals and at bedtime  levothyroxine 150 MICROGram(s) Oral daily  ondansetron Injectable 4 milliGRAM(s) IV Push every 6 hours PRN  oxyCODONE    5 mG/acetaminophen 325 mG 1 Tablet(s) Oral every 4 hours PRN  piperacillin/tazobactam IVPB. 2.25 Gram(s) IV Intermittent every 6 hours  senna 2 Tablet(s) Oral at bedtime  sodium bicarbonate 650 milliGRAM(s) Oral three times a day      LABS:                          7.8    13.4  )-----------( 149      ( 30 Mar 2018 07:41 )             23.9     03-30    139  |  102  |  39<H>  ----------------------------<  70  3.8   |  19<L>  |  4.64<H>    Ca    7.9<L>      30 Mar 2018 07:41  Phos  5.4     03-30  Mg     1.8     03-30        Urinalysis Basic - ( 29 Mar 2018 04:06 )    Color: x / Appearance: x / SG: x / pH: x  Gluc: x / Ketone: x  / Bili: x / Urobili: x   Blood: x / Protein: x / Nitrite: x   Leuk Esterase: x / RBC: Many /HPF / WBC 5-10 /HPF   Sq Epi: x / Non Sq Epi: 5-10 /HPF / Bacteria: Many /HPF        RADIOLOGY & ADDITIONAL TESTS:  All imaging reviewed. HOSPITAL COURSE:    78F with PMHx of HTN, DM, HLD, hypothyroidism admitted for management of surgical wound infection following robotically assisted hernia repair on 3/2. Per history, patient initially presented on 3/2 for elective surgical intervention of symptomatic ventral hernia, procedure tolerated without complication and patient discharged on 3/5 with instructions to continue home meds. Patient returned on 3/12 with acute confusion and lethargy in the setting of hypoglycemia, during which time she was found to have a RUL PE for which she was started on hep gtt and transitioned to Eliquis; discharged on 3/16. Patient again returned on 3/23 with complaints of abdominal pain and possible maroon colored stools, found to have purulent discharge from abdominal port sites and midline incision. Patient had wound vac placed and started on Vancomycin and Zosyn with interval development of ATN (believed to be due to Vancomycin) with oliguria. Nephrology consulted and per recommendations patient given Lasix challenge without improvement, now s/p R IJ HD catheter placement by IR on 3/29 and first HD session same day, tolerated well. Hospital course additionally complicated by acute blood loss anemia, determined to be 2/2 heavy vaginal bleeding, for which patient underwent pelvic ultrasound on 3/25 with evidence of fibroid uterus with thickened endometrial walls concerning for possible malignancy. Patient continued to have stable Hgb >8 until 3/29 when Hgb found to be 6.1 --> 5.6 on repeat; transfused 2U pRBC. Eliquis had previously been discontinued on 3/24, but with anemia aspirin and HSQ also discontinued on 3/28 and 3/29, respectively. Patient has intermittently refused procedures including complete vaginal exam, transvaginal US, and initially dialysis for which psych was consulted and deemed patient without capacity. Due to worsening renal function, patient had HD cath placed with first HD on 3/29.      INTERVAL/OVERNIGHT EVENTS:  First HD on 3/29, tolerated well.    SUBJECTIVE:  Seen and examined at bedside.  Does not report complaints but says that "you are not making me any better."      VITAL SIGNS:  T(F): 98.6 (03-30-18 @ 09:32)  HR: 64 (03-30-18 @ 09:24)  BP: 105/49 (03-30-18 @ 08:39)  RR: 18 (03-30-18 @ 09:24)  SpO2: 96% (03-30-18 @ 09:24)  Wt(kg): --    PHYSICAL EXAM:    General: NAD, comfortable, elderly female, unkempt appearing, obese  HEENT: NCAT, PERRL, clear conjunctiva, no scleral icterus, dry mucous membranes  Neck: supple, no JVD, HD cath in place on R  Respiratory: difficult to auscultate due to body habitus and poor inspiratory effort on exam. No wheezing, rhonchi, rales,    Cardiovascular: RRR, normal S1S2, no M/R/G  Abdomen: soft, NT/ND, bowel sounds in all four quadrants, ventral wound vac over umbilicus draining pink fluid  Extremities: WWP, no clubbing or cyanosis. 4+ pitting edema over entire lower extremities  Neuro: awake, alert, oriented to person, place and time, moving all extremities   Psych: does not have insight into medical condition, does not believe she has any medical problems    MEDICATIONS  (STANDING):  albumin human 25% IVPB 50 milliLiter(s) IV Intermittent every 1 hour  albumin human 25% IVPB 50 milliLiter(s) IV Intermittent every 6 hours  dextrose 5%. 1000 milliLiter(s) (50 mL/Hr) IV Continuous <Continuous>  dextrose 50% Injectable 12.5 Gram(s) IV Push once  dextrose 50% Injectable 25 Gram(s) IV Push once  dextrose 50% Injectable 25 Gram(s) IV Push once  docusate sodium 100 milliGRAM(s) Oral three times a day  insulin lispro (HumaLOG) corrective regimen sliding scale   SubCutaneous Before meals and at bedtime  levothyroxine 150 MICROGram(s) Oral daily  piperacillin/tazobactam IVPB. 2.25 Gram(s) IV Intermittent every 6 hours  senna 2 Tablet(s) Oral at bedtime  sodium bicarbonate 650 milliGRAM(s) Oral three times a day    MEDICATIONS  (PRN):  ALBUTerol/ipratropium for Nebulization 3 milliLiter(s) Nebulizer every 6 hours PRN Wheezing  dextrose Gel 1 Dose(s) Oral once PRN Blood Glucose LESS THAN 70 milliGRAM(s)/deciliter  glucagon  Injectable 1 milliGRAM(s) IntraMuscular once PRN Glucose LESS THAN 70 milligrams/deciliter  ondansetron Injectable 4 milliGRAM(s) IV Push every 6 hours PRN Nausea and/or Vomiting  oxyCODONE    5 mG/acetaminophen 325 mG 1 Tablet(s) Oral every 4 hours PRN Severe Pain (7 - 10)      Allergies    No Known Allergies    Intolerances      albumin human 25% IVPB 50 milliLiter(s) IV Intermittent every 1 hour  albumin human 25% IVPB 50 milliLiter(s) IV Intermittent every 6 hours  ALBUTerol/ipratropium for Nebulization 3 milliLiter(s) Nebulizer every 6 hours PRN  dextrose 5%. 1000 milliLiter(s) IV Continuous <Continuous>  dextrose 50% Injectable 12.5 Gram(s) IV Push once  dextrose 50% Injectable 25 Gram(s) IV Push once  dextrose 50% Injectable 25 Gram(s) IV Push once  dextrose Gel 1 Dose(s) Oral once PRN  docusate sodium 100 milliGRAM(s) Oral three times a day  glucagon  Injectable 1 milliGRAM(s) IntraMuscular once PRN  insulin lispro (HumaLOG) corrective regimen sliding scale   SubCutaneous Before meals and at bedtime  levothyroxine 150 MICROGram(s) Oral daily  ondansetron Injectable 4 milliGRAM(s) IV Push every 6 hours PRN  oxyCODONE    5 mG/acetaminophen 325 mG 1 Tablet(s) Oral every 4 hours PRN  piperacillin/tazobactam IVPB. 2.25 Gram(s) IV Intermittent every 6 hours  senna 2 Tablet(s) Oral at bedtime  sodium bicarbonate 650 milliGRAM(s) Oral three times a day      LABS:                          7.8    13.4  )-----------( 149      ( 30 Mar 2018 07:41 )             23.9     03-30    139  |  102  |  39<H>  ----------------------------<  70  3.8   |  19<L>  |  4.64<H>    Ca    7.9<L>      30 Mar 2018 07:41  Phos  5.4     03-30  Mg     1.8     03-30        Urinalysis Basic - ( 29 Mar 2018 04:06 )    Color: x / Appearance: x / SG: x / pH: x  Gluc: x / Ketone: x  / Bili: x / Urobili: x   Blood: x / Protein: x / Nitrite: x   Leuk Esterase: x / RBC: Many /HPF / WBC 5-10 /HPF   Sq Epi: x / Non Sq Epi: 5-10 /HPF / Bacteria: Many /HPF        RADIOLOGY & ADDITIONAL TESTS:  All imaging reviewed.

## 2018-03-30 NOTE — PROVIDER CONTACT NOTE (CHANGE IN STATUS NOTIFICATION) - ACTION/TREATMENT ORDERED:
as per PA, will come by later today to change wound vac. no exact time given. notified PA that medicine was at bedside completing their consult. gyn already consulted; patient refusing speculum

## 2018-03-30 NOTE — PROGRESS NOTE ADULT - PROBLEM SELECTOR PLAN 1
Oliguric GONZALEZ likely due Ischemic ATN now on RRT.    1st ever HD treatment on 3/29. Tolerated procedure well.  Will schedule for 2nd HD treatment for UF and clearance   Volume status hypervolemic. Electrolytes stable at present.  Avoid nephrotoxic agents  Monitor BMP daily for now  Continue IV albumin for now.  Strict I/o for now  Follow up proteinuria work up to eval for other etiologies of GONZALEZ.

## 2018-03-30 NOTE — PROGRESS NOTE ADULT - PROBLEM SELECTOR PLAN 5
Hyperphosphatemia with phosphorus level 5.4 at present likely due to GONZALEZ and Dietary indiscretion    Plan:  Low phosphorus/Low k/renal diet  Monitor phosphorus daily  Will do RRT for now.

## 2018-03-30 NOTE — PROGRESS NOTE ADULT - PROBLEM SELECTOR PLAN 2
Anemia of chronic disease with acute bleeding likely due to blood loss anemia with stable hemoglobin of 7.8 at present.  Not iron deficient  Received 2 units of PRBC yesterday.  Transfuse PRBC per primary team  No indication for EPO at present due to GONZALEZ  Work up for paraproteinemia as per ordered.

## 2018-03-30 NOTE — PROGRESS NOTE BEHAVIORAL HEALTH - SUMMARY
78F unclear PPH but as per patient minimal, PMH DM, HLD, HTN, recent ventral hernia repair with subsequent admission for post-operative PE placed on Eliquis, presenting again 3/23 for abdominal pain and bloody stools, initially AAOx3 but increasingly more delirious 2/2 GONZALEZ, wound infection, and potential gynecological malignancy.  Refusing dialysis and gyn workup.  Continues to appear delirious today.  Nephew is consenting to procedures as patient does not have the capacity to participate in medical decision making at this time (although this could change as delirium resolves.)  As per chart patient previously was living alone, also has brother who had been staying with her on discharge from previous admission. 78F h/o depression not currently in treatment, PMH DM, HLD, HTN, recent ventral hernia repair with subsequent admission for post-operative PE placed on Eliquis, presenting again 3/23 for abdominal pain and bloody stools, initially AAOx3 but increasingly more delirious 2/2 GONZALEZ, wound infection, and potential gynecological malignancy.  Refusing dialysis and gyn workup.  Continues to appear delirious today.  Nephew is consenting to procedures as patient does not have the capacity to participate in medical decision making at this time (although this could change as delirium resolves.)  Patient at baseline has been depressed but cognitively intact.  Will consider antidepressant trial as delirium resolves.

## 2018-03-30 NOTE — CONSULT NOTE ADULT - SUBJECTIVE AND OBJECTIVE BOX
HPI / Hospital Course:  78F with PMHx of HTN, DM, HLD, hypothyroidism admitted for management of surgical wound infection following robotically assisted hernia repair on 3/2. Per history, patient initially presented on 3/2 for elective surgical intervention of symptomatic ventral hernia, procedure tolerated without complication and patient discharged on 3/5 with instructions to continue home meds. Patient returned on 3/12 with acute confusion and lethargy in the setting of hypoglycemia, during which time she was found to have a RUL PE for which she was started on hep gtt and transitioned to Eliquis; discharged on 3/16. Patient again returned on 3/23 with complaints of abdominal pain and possible maroon colored stools, found to have purulent discharge from abdominal port sites and midline incision. Patient had wound vac placed and started on Vancomycin and Zosyn with interval development of ATN (believed to be due to Vancomycin) with oliguria. Nephrology consulted and per recommendations patient given Lasix challenge without improvement, now s/p R IJ HD catheter placement by IR on 3/29 and first HD session same day, tolerated well. Hospital course additionally complicated by acute blood loss anemia, determined to be 2/2 heavy vaginal bleeding, for which patient underwent pelvic ultrasound on 3/25 with evidence of fibroid uterus with thickened endometrial walls concerning for possible malignancy. Patient continued to have stable Hgb >8 until 3/29 when Hgb found to be 6.1 --> 5.6 on repeat; transfused 2U pRBC. Eliquis had previously been discontinued on 3/24, but with anemia aspirin and HSQ also discontinued on 3/28 and 3/29, respectively. Patient has intermittently refused procedures including complete vaginal exam, transvaginal US, and initially dialysis for which psych was consulted and deemed patient without capacity.     SUBJECTIVE:       ICU CONSULT    Patient is a 78y old  Female who presents with a chief complaint of Wound abscess (23 Mar 2018 09:32)      78yFemale      MEDICATIONS  (STANDING):  albumin human 25% IVPB 50 milliLiter(s) IV Intermittent every 1 hour  albumin human 25% IVPB 50 milliLiter(s) IV Intermittent every 6 hours  dextrose 5%. 1000 milliLiter(s) (50 mL/Hr) IV Continuous <Continuous>  dextrose 50% Injectable 12.5 Gram(s) IV Push once  dextrose 50% Injectable 25 Gram(s) IV Push once  dextrose 50% Injectable 25 Gram(s) IV Push once  insulin lispro (HumaLOG) corrective regimen sliding scale   SubCutaneous Before meals and at bedtime  levothyroxine 150 MICROGram(s) Oral daily  piperacillin/tazobactam IVPB. 2.25 Gram(s) IV Intermittent every 6 hours  sodium bicarbonate 650 milliGRAM(s) Oral three times a day    MEDICATIONS  (PRN):  ALBUTerol/ipratropium for Nebulization 3 milliLiter(s) Nebulizer every 6 hours PRN Wheezing  dextrose Gel 1 Dose(s) Oral once PRN Blood Glucose LESS THAN 70 milliGRAM(s)/deciliter  glucagon  Injectable 1 milliGRAM(s) IntraMuscular once PRN Glucose LESS THAN 70 milligrams/deciliter  ondansetron Injectable 4 milliGRAM(s) IV Push every 6 hours PRN Nausea and/or Vomiting  oxyCODONE    5 mG/acetaminophen 325 mG 1 Tablet(s) Oral every 4 hours PRN Severe Pain (7 - 10)    PAST MEDICAL & SURGICAL HISTORY:  Obesity  DM (diabetes mellitus)  HLD (hyperlipidemia)  HTN (hypertension)  H/O ventral hernia repair    Allergies    No Known Allergies    Intolerances      FAMILY HISTORY:  No pertinent family history in first degree relatives      ROS:  CV: Denies chest pain  Resp: Denies SOB  GI: Denies abdominal pain, constipation, diarrhea, nausea, vomiting  : Denies dysuria  ID: Denies fevers, chills  MSK: Denies joint pain     PHYSICAL EXAM   Vital Signs Last 24 Hrs  T(C): 37 (30 Mar 2018 05:06), Max: 37.2 (30 Mar 2018 01:00)  T(F): 98.6 (30 Mar 2018 05:06), Max: 99 (30 Mar 2018 01:00)  HR: 64 (30 Mar 2018 05:23) (61 - 72)  BP: 122/56 (30 Mar 2018 05:23) (92/43 - 128/60)  BP(mean): 81 (30 Mar 2018 05:23) (67 - 86)  RR: 17 (30 Mar 2018 05:23) (16 - 19)  SpO2: 99% (30 Mar 2018 05:23) (93% - 99%)  CAPILLARY BLOOD GLUCOSE      POCT Blood Glucose.: 77 mg/dL (30 Mar 2018 07:32)  POCT Blood Glucose.: 72 mg/dL (29 Mar 2018 22:11)  POCT Blood Glucose.: 70 mg/dL (29 Mar 2018 20:40)  POCT Blood Glucose.: 85 mg/dL (29 Mar 2018 11:21)      General: NAD, comfortable  HEENT: NCAT, PERRL, clear conjunctiva, no scleral icterus  Neck: supple, no JVD  Respiratory: CTA b/l, no wheezing, rhonchi, rales  Cardiovascular: RRR, normal S1S2, no M/R/G  Abdomen: soft, NT/ND, bowel sounds in all four quadrants, no palpable masses  Extremities: WWP, no clubbing, cyanosis, or edema  Neuro:       LABS                        7.8    13.4  )-----------( 149      ( 30 Mar 2018 07:41 )             23.9     03-30    139  |  102  |  39<H>  ----------------------------<  70  3.8   |  19<L>  |  4.64<H>    Ca    7.9<L>      30 Mar 2018 07:41  Phos  5.4     03-30  Mg     1.8     03-30                Urinalysis Basic - ( 29 Mar 2018 04:06 )    Color: x / Appearance: x / SG: x / pH: x  Gluc: x / Ketone: x  / Bili: x / Urobili: x   Blood: x / Protein: x / Nitrite: x   Leuk Esterase: x / RBC: Many /HPF / WBC 5-10 /HPF   Sq Epi: x / Non Sq Epi: 5-10 /HPF / Bacteria: Many /HPF          IMAGING   CXR -   EKG - ICU consult note    HPI / Hospital Course:  78F with PMHx of HTN, DM, HLD, hypothyroidism admitted for management of surgical wound infection following robotically assisted hernia repair on 3/2. Per history, patient initially presented on 3/2 for elective surgical intervention of symptomatic ventral hernia, procedure tolerated without complication and patient discharged on 3/5 with instructions to continue home meds. Patient returned on 3/12 with acute confusion and lethargy in the setting of hypoglycemia, during which time she was found to have a RUL PE for which she was started on hep gtt and transitioned to Eliquis; discharged on 3/16. Patient again returned on 3/23 with complaints of abdominal pain and possible maroon colored stools, found to have purulent discharge from abdominal port sites and midline incision. Patient had wound vac placed and started on Vancomycin and Zosyn with interval development of ATN (believed to be due to Vancomycin) with oliguria. Nephrology consulted and per recommendations patient given Lasix challenge without improvement, now s/p R IJ HD catheter placement by IR on 3/29 and first HD session same day, tolerated well. Hospital course additionally complicated by acute blood loss anemia, determined to be 2/2 heavy vaginal bleeding, for which patient underwent pelvic ultrasound on 3/25 with evidence of fibroid uterus with thickened endometrial walls concerning for possible malignancy. Patient continued to have stable Hgb >8 until 3/29 when Hgb found to be 6.1 --> 5.6 on repeat; transfused 2U pRBC. Eliquis had previously been discontinued on 3/24, but with anemia aspirin and HSQ also discontinued on 3/28 and 3/29, respectively. Patient has intermittently refused procedures including complete vaginal exam, transvaginal US, and initially dialysis for which psych was consulted and deemed patient without capacity. Dialysis catheter placed and first dialysis session done overnight.     SUBJECTIVE: Patient denies any current complaints other than being thirsty. Initially stated she has vaginal bleeding, then denied stating that she has not problems. Stated that I have not helped her, and when explained that I was just meeting her for the first time patient responded with "oh". Denies any current headache, dizziness, blurry vision, chest pain, SOB, palpitations, abdominal pain, or other complaints. Stating multiple times that she does not have any problems and nothing is wrong with her.       MEDICATIONS  (STANDING):  albumin human 25% IVPB 50 milliLiter(s) IV Intermittent every 1 hour  albumin human 25% IVPB 50 milliLiter(s) IV Intermittent every 6 hours  dextrose 5%. 1000 milliLiter(s) (50 mL/Hr) IV Continuous <Continuous>  dextrose 50% Injectable 12.5 Gram(s) IV Push once  dextrose 50% Injectable 25 Gram(s) IV Push once  dextrose 50% Injectable 25 Gram(s) IV Push once  insulin lispro (HumaLOG) corrective regimen sliding scale   SubCutaneous Before meals and at bedtime  levothyroxine 150 MICROGram(s) Oral daily  piperacillin/tazobactam IVPB. 2.25 Gram(s) IV Intermittent every 6 hours  sodium bicarbonate 650 milliGRAM(s) Oral three times a day    MEDICATIONS  (PRN):  ALBUTerol/ipratropium for Nebulization 3 milliLiter(s) Nebulizer every 6 hours PRN Wheezing  dextrose Gel 1 Dose(s) Oral once PRN Blood Glucose LESS THAN 70 milliGRAM(s)/deciliter  glucagon  Injectable 1 milliGRAM(s) IntraMuscular once PRN Glucose LESS THAN 70 milligrams/deciliter  ondansetron Injectable 4 milliGRAM(s) IV Push every 6 hours PRN Nausea and/or Vomiting  oxyCODONE    5 mG/acetaminophen 325 mG 1 Tablet(s) Oral every 4 hours PRN Severe Pain (7 - 10)    PAST MEDICAL & SURGICAL HISTORY:  Obesity  DM (diabetes mellitus)  HLD (hyperlipidemia)  HTN (hypertension)  H/O ventral hernia repair    Allergies    No Known Allergies    Intolerances      FAMILY HISTORY:  No pertinent family history in first degree relatives      ROS:  CV: Denies chest pain or palpitations  Resp: Denies SOB  GI: Denies abdominal pain, constipation, diarrhea, nausea, vomiting  : Denies dysuria  ID: Denies fevers, chills    ROS as above and in HPI    PHYSICAL EXAM   Vital Signs Last 24 Hrs  T(C): 37 (30 Mar 2018 05:06), Max: 37.2 (30 Mar 2018 01:00)  T(F): 98.6 (30 Mar 2018 05:06), Max: 99 (30 Mar 2018 01:00)  HR: 64 (30 Mar 2018 05:23) (61 - 72)  BP: 122/56 (30 Mar 2018 05:23) (92/43 - 128/60)  BP(mean): 81 (30 Mar 2018 05:23) (67 - 86)  RR: 17 (30 Mar 2018 05:23) (16 - 19)  SpO2: 99% (30 Mar 2018 05:23) (93% - 99%)  CAPILLARY BLOOD GLUCOSE      POCT Blood Glucose.: 77 mg/dL (30 Mar 2018 07:32)  POCT Blood Glucose.: 72 mg/dL (29 Mar 2018 22:11)  POCT Blood Glucose.: 70 mg/dL (29 Mar 2018 20:40)  POCT Blood Glucose.: 85 mg/dL (29 Mar 2018 11:21)      General: NAD, comfortable, elderly female, unkempt appearing, obese  HEENT: NCAT, PERRL, clear conjunctiva, no scleral icterus, dry mucous membranes  Neck: supple, no JVD, HD cath in place on R  Respiratory: difficult to auscultate due to body habitus and poor inspiratory effort on exam. No wheezing, rhonchi, rales,    Cardiovascular: RRR, normal S1S2, no M/R/G  Abdomen: soft, NT/ND, bowel sounds in all four quadrants, ventral wound vac over umbilicus draining pink fluid  Extremities: WWP, no clubbing or cyanosis. 4+ pitting edema over entire lower extremities  Neuro: awake, alert, oriented to person, place and time, moving all extremities   Psych: does not have insight into medical condition, does not believe she has any medical problems      LABS                        7.8    13.4  )-----------( 149      ( 30 Mar 2018 07:41 )             23.9     03-30    139  |  102  |  39<H>  ----------------------------<  70  3.8   |  19<L>  |  4.64<H>    Ca    7.9<L>      30 Mar 2018 07:41  Phos  5.4     03-30  Mg     1.8     03-30            Urinalysis Basic - ( 29 Mar 2018 04:06 )    Color: x / Appearance: x / SG: x / pH: x  Gluc: x / Ketone: x  / Bili: x / Urobili: x   Blood: x / Protein: x / Nitrite: x   Leuk Esterase: x / RBC: Many /HPF / WBC 5-10 /HPF   Sq Epi: x / Non Sq Epi: 5-10 /HPF / Bacteria: Many /HPF          IMAGING   CXR -   EKG - ICU consult note    HPI / Hospital Course:  78F with PMHx of HTN, DM, HLD, hypothyroidism admitted for management of surgical wound infection following robotically assisted hernia repair on 3/2. Per history, patient initially presented on 3/2 for elective surgical intervention of symptomatic ventral hernia, procedure tolerated without complication and patient discharged on 3/5 with instructions to continue home meds. Patient returned on 3/12 with acute confusion and lethargy in the setting of hypoglycemia, during which time she was found to have a RUL PE for which she was started on hep gtt and transitioned to Eliquis; discharged on 3/16. Patient again returned on 3/23 with complaints of abdominal pain and possible maroon colored stools, found to have purulent discharge from abdominal port sites and midline incision. Patient had wound vac placed and started on Vancomycin and Zosyn with interval development of ATN (believed to be due to Vancomycin) with oliguria. Nephrology consulted and per recommendations patient given Lasix challenge without improvement, now s/p R IJ HD catheter placement by IR on 3/29 and first HD session same day, tolerated well. Hospital course additionally complicated by acute blood loss anemia, determined to be 2/2 heavy vaginal bleeding, for which patient underwent pelvic ultrasound on 3/25 with evidence of fibroid uterus with thickened endometrial walls concerning for possible malignancy. Patient continued to have stable Hgb >8 until 3/29 when Hgb found to be 6.1 --> 5.6 on repeat; transfused 2U pRBC. Eliquis had previously been discontinued on 3/24, but with anemia aspirin and HSQ also discontinued on 3/28 and 3/29, respectively. Patient has intermittently refused procedures including complete vaginal exam, transvaginal US, and initially dialysis for which psych was consulted and deemed patient without capacity. Dialysis catheter placed and first dialysis session done overnight.     SUBJECTIVE: Patient denies any current complaints other than being thirsty. Initially stated she has vaginal bleeding, then denied stating that she has not problems. Stated that I have not helped her, and when explained that I was just meeting her for the first time patient responded with "oh". Denies any current headache, dizziness, blurry vision, chest pain, SOB, palpitations, abdominal pain, or other complaints. Stating multiple times that she does not have any problems and nothing is wrong with her.       MEDICATIONS  (STANDING):  albumin human 25% IVPB 50 milliLiter(s) IV Intermittent every 1 hour  albumin human 25% IVPB 50 milliLiter(s) IV Intermittent every 6 hours  dextrose 5%. 1000 milliLiter(s) (50 mL/Hr) IV Continuous <Continuous>  dextrose 50% Injectable 12.5 Gram(s) IV Push once  dextrose 50% Injectable 25 Gram(s) IV Push once  dextrose 50% Injectable 25 Gram(s) IV Push once  insulin lispro (HumaLOG) corrective regimen sliding scale   SubCutaneous Before meals and at bedtime  levothyroxine 150 MICROGram(s) Oral daily  piperacillin/tazobactam IVPB. 2.25 Gram(s) IV Intermittent every 6 hours  sodium bicarbonate 650 milliGRAM(s) Oral three times a day    MEDICATIONS  (PRN):  ALBUTerol/ipratropium for Nebulization 3 milliLiter(s) Nebulizer every 6 hours PRN Wheezing  dextrose Gel 1 Dose(s) Oral once PRN Blood Glucose LESS THAN 70 milliGRAM(s)/deciliter  glucagon  Injectable 1 milliGRAM(s) IntraMuscular once PRN Glucose LESS THAN 70 milligrams/deciliter  ondansetron Injectable 4 milliGRAM(s) IV Push every 6 hours PRN Nausea and/or Vomiting  oxyCODONE    5 mG/acetaminophen 325 mG 1 Tablet(s) Oral every 4 hours PRN Severe Pain (7 - 10)    PAST MEDICAL & SURGICAL HISTORY:  Obesity  DM (diabetes mellitus)  HLD (hyperlipidemia)  HTN (hypertension)  H/O ventral hernia repair    Allergies    No Known Allergies    Intolerances      FAMILY HISTORY:  No pertinent family history in first degree relatives      ROS:  CV: Denies chest pain or palpitations  Resp: Denies SOB  GI: Denies abdominal pain, constipation, diarrhea, nausea, vomiting  : Denies dysuria  ID: Denies fevers, chills    ROS as above and in HPI    PHYSICAL EXAM   Vital Signs Last 24 Hrs  T(C): 37 (30 Mar 2018 05:06), Max: 37.2 (30 Mar 2018 01:00)  T(F): 98.6 (30 Mar 2018 05:06), Max: 99 (30 Mar 2018 01:00)  HR: 64 (30 Mar 2018 05:23) (61 - 72)  BP: 122/56 (30 Mar 2018 05:23) (92/43 - 128/60)  BP(mean): 81 (30 Mar 2018 05:23) (67 - 86)  RR: 17 (30 Mar 2018 05:23) (16 - 19)  SpO2: 99% (30 Mar 2018 05:23) (93% - 99%)  CAPILLARY BLOOD GLUCOSE      POCT Blood Glucose.: 77 mg/dL (30 Mar 2018 07:32)  POCT Blood Glucose.: 72 mg/dL (29 Mar 2018 22:11)  POCT Blood Glucose.: 70 mg/dL (29 Mar 2018 20:40)  POCT Blood Glucose.: 85 mg/dL (29 Mar 2018 11:21)      General: NAD, comfortable, elderly female, unkempt appearing, obese  HEENT: NCAT, PERRL, clear conjunctiva, no scleral icterus, dry mucous membranes  Neck: supple, no JVD, HD cath in place on R  Respiratory: difficult to auscultate due to body habitus and poor inspiratory effort on exam. No wheezing, rhonchi, rales,    Cardiovascular: RRR, normal S1S2, no M/R/G  Abdomen: soft, NT/ND, bowel sounds in all four quadrants, ventral wound vac over umbilicus draining pink fluid  Extremities: WWP, no clubbing or cyanosis. 4+ pitting edema over entire lower extremities  Neuro: awake, alert, oriented to person, place and time, moving all extremities   Psych: does not have insight into medical condition, does not believe she has any medical problems      LABS                        7.8    13.4  )-----------( 149      ( 30 Mar 2018 07:41 )             23.9     03-30    139  |  102  |  39<H>  ----------------------------<  70  3.8   |  19<L>  |  4.64<H>    Ca    7.9<L>      30 Mar 2018 07:41  Phos  5.4     03-30  Mg     1.8     03-30            Urinalysis Basic - ( 29 Mar 2018 04:06 )    Color: x / Appearance: x / SG: x / pH: x  Gluc: x / Ketone: x  / Bili: x / Urobili: x   Blood: x / Protein: x / Nitrite: x   Leuk Esterase: x / RBC: Many /HPF / WBC 5-10 /HPF   Sq Epi: x / Non Sq Epi: 5-10 /HPF / Bacteria: Many /HPF      Imagining:  CXR: R pleural effusion, not seen on admission CXR

## 2018-03-30 NOTE — CONSULT NOTE ADULT - SUBJECTIVE AND OBJECTIVE BOX
Vascular Attending:        HPI:  77 y/o female with hx of DM, HLD, HTN, recent admission for post-operative PE placed on Eliquis. Complaining of abdominal pain and passing maroon colored stools. Patient states that she has had abdominal pain since ventral hernia repair on 3/2/18. Last few bowel movements feels like they might have had some blood in them, but she did notice anything bright red. Noticed some drainage from midline abdominal wound over the past couple of days, No fever or chills. no n/v. + decrease appetite. + diarrhea. No shortness of breath or chest pain. (23 Mar 2018 09:32)      PAST MEDICAL & SURGICAL HISTORY:  Obesity  DM (diabetes mellitus)  HLD (hyperlipidemia)  HTN (hypertension)  H/O ventral hernia repair      REVIEW OF SYSTEMS      General:	    Skin/Breast:  	  Ophthalmologic:  	  ENMT:	    Respiratory and Thorax:  	  Cardiovascular:	    Gastrointestinal:	    Genitourinary:	    Musculoskeletal:	    Neurological:	    Psychiatric:	    Hematology/Lymphatics:	    Endocrine:	    Allergic/Immunologic:	    MEDICATIONS  (STANDING):  albumin human 25% IVPB 50 milliLiter(s) IV Intermittent every 1 hour  dextrose 5%. 1000 milliLiter(s) (50 mL/Hr) IV Continuous <Continuous>  dextrose 50% Injectable 12.5 Gram(s) IV Push once  dextrose 50% Injectable 25 Gram(s) IV Push once  dextrose 50% Injectable 25 Gram(s) IV Push once  docusate sodium 100 milliGRAM(s) Oral three times a day  levothyroxine 150 MICROGram(s) Oral daily  medroxyPROGESTERone 10 milliGRAM(s) Oral daily  piperacillin/tazobactam IVPB. 2.25 Gram(s) IV Intermittent every 6 hours  senna 2 Tablet(s) Oral at bedtime    MEDICATIONS  (PRN):  acetaminophen   Tablet. 650 milliGRAM(s) Oral every 6 hours PRN Mild Pain (1 - 3)  dextrose Gel 1 Dose(s) Oral once PRN Blood Glucose LESS THAN 70 milliGRAM(s)/deciliter  glucagon  Injectable 1 milliGRAM(s) IntraMuscular once PRN Glucose LESS THAN 70 milligrams/deciliter  HYDROmorphone  Injectable 0.5 milliGRAM(s) IV Push every 6 hours PRN Severe Pain (7 - 10)      Allergies    No Known Allergies    Intolerances        SOCIAL HISTORY:    FAMILY HISTORY:  No pertinent family history in first degree relatives      Vital Signs Last 24 Hrs  T(C): 37 (30 Mar 2018 09:32), Max: 37.2 (30 Mar 2018 01:00)  T(F): 98.6 (30 Mar 2018 09:32), Max: 99 (30 Mar 2018 01:00)  HR: 66 (30 Mar 2018 11:58) (61 - 72)  BP: 109/52 (30 Mar 2018 11:58) (92/43 - 128/60)  BP(mean): 71 (30 Mar 2018 08:39) (67 - 86)  RR: 18 (30 Mar 2018 11:58) (16 - 19)  SpO2: 97% (30 Mar 2018 11:58) (93% - 99%)    PHYSICAL EXAM:      Constitutional:    Eyes:    ENMT:    Neck:    Breasts:    Back:    Respiratory:    Cardiovascular:    Gastrointestinal:    Genitourinary:    Rectal:    Extremities:    Vascular:    Neurological:    Skin:    Lymph Nodes:    Musculoskeletal:    Psychiatric:        LABS:                        7.8    13.4  )-----------( 149      ( 30 Mar 2018 07:41 )             23.9     03-30    139  |  102  |  39<H>  ----------------------------<  70  3.8   |  19<L>  |  4.64<H>    Ca    7.9<L>      30 Mar 2018 07:41  Phos  5.4     03-30  Mg     1.8     03-30        Urinalysis Basic - ( 29 Mar 2018 04:06 )    Color: x / Appearance: x / SG: x / pH: x  Gluc: x / Ketone: x  / Bili: x / Urobili: x   Blood: x / Protein: x / Nitrite: x   Leuk Esterase: x / RBC: Many /HPF / WBC 5-10 /HPF   Sq Epi: x / Non Sq Epi: 5-10 /HPF / Bacteria: Many /HPF        RADIOLOGY & ADDITIONAL STUDIES Vascular Attending:  Isaiah      HPI:  79 y/o female with hx of DM, HLD, HTN, recent admission for post-operative PE placed on Eliquis. Complaining of abdominal pain and passing maroon colored stools and having vaginal bleeding. Patient states that she has had abdominal pain since ventral hernia repair on 3/2/18. Primary team reports patient has been having significant vaginal bleeding for the past few days and found to have endometriosis and uterine fibroids on transvaginal US by GYN team. Heparin drip is contraindicated at this point and vascular Surgery consulted for IVC filter placement.    PAST MEDICAL & SURGICAL HISTORY:  Obesity  DM (diabetes mellitus)  HLD (hyperlipidemia)  HTN (hypertension)  H/O ventral hernia repair  PE      MEDICATIONS  (STANDING):  albumin human 25% IVPB 50 milliLiter(s) IV Intermittent every 1 hour  dextrose 5%. 1000 milliLiter(s) (50 mL/Hr) IV Continuous <Continuous>  dextrose 50% Injectable 12.5 Gram(s) IV Push once  dextrose 50% Injectable 25 Gram(s) IV Push once  dextrose 50% Injectable 25 Gram(s) IV Push once  docusate sodium 100 milliGRAM(s) Oral three times a day  levothyroxine 150 MICROGram(s) Oral daily  medroxyPROGESTERone 10 milliGRAM(s) Oral daily  piperacillin/tazobactam IVPB. 2.25 Gram(s) IV Intermittent every 6 hours  senna 2 Tablet(s) Oral at bedtime    MEDICATIONS  (PRN):  acetaminophen   Tablet. 650 milliGRAM(s) Oral every 6 hours PRN Mild Pain (1 - 3)  dextrose Gel 1 Dose(s) Oral once PRN Blood Glucose LESS THAN 70 milliGRAM(s)/deciliter  glucagon  Injectable 1 milliGRAM(s) IntraMuscular once PRN Glucose LESS THAN 70 milligrams/deciliter  HYDROmorphone  Injectable 0.5 milliGRAM(s) IV Push every 6 hours PRN Severe Pain (7 - 10)      Allergies    No Known Allergies          Vital Signs Last 24 Hrs  T(C): 37 (30 Mar 2018 09:32), Max: 37.2 (30 Mar 2018 01:00)  T(F): 98.6 (30 Mar 2018 09:32), Max: 99 (30 Mar 2018 01:00)  HR: 66 (30 Mar 2018 11:58) (61 - 72)  BP: 109/52 (30 Mar 2018 11:58) (92/43 - 128/60)  BP(mean): 71 (30 Mar 2018 08:39) (67 - 86)  RR: 18 (30 Mar 2018 11:58) (16 - 19)  SpO2: 97% (30 Mar 2018 11:58) (93% - 99%)    PHYSICAL EXAM:      Constitutional: alert and awake, NAD, satting well on NC    Respiratory: no respiratory distress, speaking without SOB    Cardiovascular: RRR    Extremities: LE warm, well perfused, calf soft and non tender      LABS:                        7.8    13.4  )-----------( 149      ( 30 Mar 2018 07:41 )             23.9     03-30    139  |  102  |  39<H>  ----------------------------<  70  3.8   |  19<L>  |  4.64<H>    Ca    7.9<L>      30 Mar 2018 07:41  Phos  5.4     03-30  Mg     1.8     03-30        Urinalysis Basic - ( 29 Mar 2018 04:06 )    Color: x / Appearance: x / SG: x / pH: x  Gluc: x / Ketone: x  / Bili: x / Urobili: x   Blood: x / Protein: x / Nitrite: x   Leuk Esterase: x / RBC: Many /HPF / WBC 5-10 /HPF   Sq Epi: x / Non Sq Epi: 5-10 /HPF / Bacteria: Many /HPF        RADIOLOGY & ADDITIONAL STUDIES    CTA IMPRESSION:   1.  Pulmonary emboli within the segmental arteries of the right upper lobe, right middle lobe, and lingula.   2.  Findings suspicious for right heart strain, recommend correlation with echocardiography.       The above findings were discussed with Dr. Bey of the ER on 3/12/2018   at 3:40 PM.      Duplex IMPRESSION: No DVT above the knees. Resolution of the prior bilateral calf intramuscular vein thrombosis. Unchanged thrombosis of left calf   paired  peroneal veins. New thrombosis of one of the  right calf  paired peroneal veins. Vascular Attending:  Isaiah      HPI:  77 y/o female with hx of DM, HLD, HTN, recent admission for post-operative PE placed on Eliquis. Complaining of abdominal pain and passing maroon colored stools and having vaginal bleeding. Patient states that she has had abdominal pain since ventral hernia repair on 3/2/18. Primary team reports patient has been having significant vaginal bleeding for the past few days and found to have endometriosis and uterine fibroids on transvaginal US by GYN team. Heparin drip is contraindicated at this point and Vascular Surgery consulted for IVC filter placement.    PAST MEDICAL & SURGICAL HISTORY:  Obesity  DM (diabetes mellitus)  HLD (hyperlipidemia)  HTN (hypertension)  H/O ventral hernia repair  PE      MEDICATIONS  (STANDING):  albumin human 25% IVPB 50 milliLiter(s) IV Intermittent every 1 hour  dextrose 5%. 1000 milliLiter(s) (50 mL/Hr) IV Continuous <Continuous>  dextrose 50% Injectable 12.5 Gram(s) IV Push once  dextrose 50% Injectable 25 Gram(s) IV Push once  dextrose 50% Injectable 25 Gram(s) IV Push once  docusate sodium 100 milliGRAM(s) Oral three times a day  levothyroxine 150 MICROGram(s) Oral daily  medroxyPROGESTERone 10 milliGRAM(s) Oral daily  piperacillin/tazobactam IVPB. 2.25 Gram(s) IV Intermittent every 6 hours  senna 2 Tablet(s) Oral at bedtime    MEDICATIONS  (PRN):  acetaminophen   Tablet. 650 milliGRAM(s) Oral every 6 hours PRN Mild Pain (1 - 3)  dextrose Gel 1 Dose(s) Oral once PRN Blood Glucose LESS THAN 70 milliGRAM(s)/deciliter  glucagon  Injectable 1 milliGRAM(s) IntraMuscular once PRN Glucose LESS THAN 70 milligrams/deciliter  HYDROmorphone  Injectable 0.5 milliGRAM(s) IV Push every 6 hours PRN Severe Pain (7 - 10)      Allergies    No Known Allergies          Vital Signs Last 24 Hrs  T(C): 37 (30 Mar 2018 09:32), Max: 37.2 (30 Mar 2018 01:00)  T(F): 98.6 (30 Mar 2018 09:32), Max: 99 (30 Mar 2018 01:00)  HR: 66 (30 Mar 2018 11:58) (61 - 72)  BP: 109/52 (30 Mar 2018 11:58) (92/43 - 128/60)  BP(mean): 71 (30 Mar 2018 08:39) (67 - 86)  RR: 18 (30 Mar 2018 11:58) (16 - 19)  SpO2: 97% (30 Mar 2018 11:58) (93% - 99%)    PHYSICAL EXAM:      Constitutional: alert and awake, NAD, satting well on NC    Respiratory: no respiratory distress, speaking without SOB    Cardiovascular: RRR    Extremities: LE warm, well perfused, calf soft and non tender      LABS:                        7.8    13.4  )-----------( 149      ( 30 Mar 2018 07:41 )             23.9     03-30    139  |  102  |  39<H>  ----------------------------<  70  3.8   |  19<L>  |  4.64<H>    Ca    7.9<L>      30 Mar 2018 07:41  Phos  5.4     03-30  Mg     1.8     03-30        Urinalysis Basic - ( 29 Mar 2018 04:06 )    Color: x / Appearance: x / SG: x / pH: x  Gluc: x / Ketone: x  / Bili: x / Urobili: x   Blood: x / Protein: x / Nitrite: x   Leuk Esterase: x / RBC: Many /HPF / WBC 5-10 /HPF   Sq Epi: x / Non Sq Epi: 5-10 /HPF / Bacteria: Many /HPF        RADIOLOGY & ADDITIONAL STUDIES    CTA IMPRESSION:   1.  Pulmonary emboli within the segmental arteries of the right upper lobe, right middle lobe, and lingula.   2.  Findings suspicious for right heart strain, recommend correlation with echocardiography.       The above findings were discussed with Dr. Bey of the ER on 3/12/2018   at 3:40 PM.      Duplex IMPRESSION: No DVT above the knees. Resolution of the prior bilateral calf intramuscular vein thrombosis. Unchanged thrombosis of left calf   paired  peroneal veins. New thrombosis of one of the  right calf  paired peroneal veins.

## 2018-03-30 NOTE — PROGRESS NOTE ADULT - PROBLEM SELECTOR PLAN 5
CTA 3/12 with findings of PE within the segmental arteries of the right upper   lobe, right middle lobe, and lingula. Subsequent echocardiogram notable for RV dilatation, however deemed by pulmonology to be unlikely that clot burden caused dilatation. Trops neg x3. Provoked PE in the setting of recent abdominal surgery.   - hold Eliquis in the setting of active vaginal bleeding with acute blood loss anemia.   -IVF 3/30

## 2018-03-30 NOTE — PROGRESS NOTE ADULT - SUBJECTIVE AND OBJECTIVE BOX
O/N: Tunneled hemodialysis catheter placement performed via right   internal jugular vein by IR, pt received HD today and tolerated well, pt pulled wound vac off, re-adhere the wound vac, H/H: 8.0/24.1after 2 units of blood, medicine consult done (see note for recs)  3/29: hgb down-1u on hold for after IR, for HD access with IR today and then emergency HD tonight-nephew contacted and okayed, medicine consult O/N: Tunneled hemodialysis catheter placement performed via right   internal jugular vein by IR, pt received HD today and tolerated well, pt pulled wound vac off, re-adhere the wound vac, H/H: 8.0/24.1after 2 units of blood, medicine consult done (see note for recs)  3/29: hgb down-1u on hold for after IR, for HD access with IR today and then emergency HD tonight-nephew contacted and okayed, medicine consult    SUBJECTIVE:  pt seen at bedside, non-participatory.    MEDICATIONS  (STANDING):  albumin human 25% IVPB 50 milliLiter(s) IV Intermittent every 1 hour  albumin human 25% IVPB 50 milliLiter(s) IV Intermittent every 6 hours  dextrose 5%. 1000 milliLiter(s) (50 mL/Hr) IV Continuous <Continuous>  dextrose 50% Injectable 12.5 Gram(s) IV Push once  dextrose 50% Injectable 25 Gram(s) IV Push once  dextrose 50% Injectable 25 Gram(s) IV Push once  insulin lispro (HumaLOG) corrective regimen sliding scale   SubCutaneous Before meals and at bedtime  levothyroxine 150 MICROGram(s) Oral daily  piperacillin/tazobactam IVPB. 2.25 Gram(s) IV Intermittent every 6 hours  sodium bicarbonate 650 milliGRAM(s) Oral three times a day    MEDICATIONS  (PRN):  ALBUTerol/ipratropium for Nebulization 3 milliLiter(s) Nebulizer every 6 hours PRN Wheezing  dextrose Gel 1 Dose(s) Oral once PRN Blood Glucose LESS THAN 70 milliGRAM(s)/deciliter  glucagon  Injectable 1 milliGRAM(s) IntraMuscular once PRN Glucose LESS THAN 70 milligrams/deciliter  ondansetron Injectable 4 milliGRAM(s) IV Push every 6 hours PRN Nausea and/or Vomiting  oxyCODONE    5 mG/acetaminophen 325 mG 1 Tablet(s) Oral every 4 hours PRN Severe Pain (7 - 10)      Vital Signs Last 24 Hrs  T(C): 37 (30 Mar 2018 05:06), Max: 37.2 (30 Mar 2018 01:00)  T(F): 98.6 (30 Mar 2018 05:06), Max: 99 (30 Mar 2018 01:00)  HR: 64 (30 Mar 2018 05:23) (61 - 72)  BP: 122/56 (30 Mar 2018 05:23) (92/43 - 128/60)  BP(mean): 81 (30 Mar 2018 05:23) (67 - 86)  RR: 17 (30 Mar 2018 05:23) (16 - 19)  SpO2: 99% (30 Mar 2018 05:23) (93% - 99%)    PHYSICAL EXAM:      Constitutional: A&Ox3    Respiratory: non labored breathing, no respiratory distress    Cardiovascular: NSR, RRR    Gastrointestinal: soft, nontender, nondistended, wound vac in place to suction    Extremities: (-) edema        I&O's Detail    29 Mar 2018 07:01  -  30 Mar 2018 07:00  --------------------------------------------------------  IN:    IV PiggyBack: 350 mL    PRBCs (Packed Red Blood Cells): 600 mL  Total IN: 950 mL    OUT:    Indwelling Catheter - Urethral: 200 mL    Other: 1100 mL  Total OUT: 1300 mL    Total NET: -350 mL          LABS:                        8.0    12.4  )-----------( 147      ( 30 Mar 2018 00:26 )             24.1     03-29    140  |  105  |  53<H>  ----------------------------<  75  4.3   |  16<L>  |  5.92<H>    Ca    8.1<L>      29 Mar 2018 16:02  Phos  6.9     03-29  Mg     1.9     03-29        Urinalysis Basic - ( 29 Mar 2018 04:06 )    Color: x / Appearance: x / SG: x / pH: x  Gluc: x / Ketone: x  / Bili: x / Urobili: x   Blood: x / Protein: x / Nitrite: x   Leuk Esterase: x / RBC: Many /HPF / WBC 5-10 /HPF   Sq Epi: x / Non Sq Epi: 5-10 /HPF / Bacteria: Many /HPF

## 2018-03-30 NOTE — CONSULT NOTE ADULT - ASSESSMENT
78F with PMHx of HTN, DM, HLD, hypothyroidism admitted for management of surgical wound infection following robotically assisted hernia repair on 3/2, complicated by RUL PE, now found to have acute blood loss anemia and ATN. s/p first dialysis session yesterday. Patient still denying any medical problems, deemed not to have capacity per psychiatry.     Recommend    # anemia: likely due to vaginal bleeding, pelvic ultrasound with findings of fibroid uterus and endometrial wall thickening concerning for possible malignancy. s/p 2 units pRBCs yesterday, Hgb 7.8 this morning.   - maintain active type & screen  - goal Hgb > 7 as no history of cardiac disease  - continue to monitor CBC q8hr and PRN  - hold all AC due to active vaginal bleeding, can consider restarting if Hgb stabilizes  - f/u GYN    # recently diagnosed PE, was discharged on Eliquis. Patient now with vaginal bleeding, requiring transfusion  - hold all AC in setting of vaginal bleeding requiring transfusion  - f/u bilateral LE duplex  - patient will likely need IVC filter, especially if DVT seen on duplex    # acute renal failure: new this admission, with oliguria and worsening creatinine. Unclear etiology, may be ischemic ATN vs drug-induced AIN. Dailysis catheter placed, now s/p first session of HD which she tolerated well  -  continue management per nephrology  - patient to have second dialysis session today      Transfer to medical telemetry team (7 [facility]) under Dr. Johnson

## 2018-03-30 NOTE — PROGRESS NOTE ADULT - PROBLEM SELECTOR PLAN 7
Previously on Lisinopril and atenolol. BPs stable without antihypertensives   -would continue to hold antihypertensives as patient currently normotensive.

## 2018-03-30 NOTE — PROGRESS NOTE BEHAVIORAL HEALTH - NSBHCONSULTMEDS_PSY_A_CORE FT
1. Haldol 2 mg PO or IM q4h prn for agitation  2. Recheck QTc if utilizing haldol  3. Patient does not have capacity to participate in medical decision making at this time  4. Please attempt to clarify/document patient's baseline functioning and mental status 1. Haldol 2 mg PO or IM q4h prn for agitation  2. Recheck QTc if utilizing haldol  3. Patient does not have capacity to participate in medical decision making at this time

## 2018-03-30 NOTE — PROGRESS NOTE BEHAVIORAL HEALTH - NSBHFUPINTERVALHXFT_PSY_A_CORE
As per chart, HD catheter placed and patient underwent dialysis last night.  Patient seen at bedside.  Eyes closed, opens to name called.  Says she is "bad," but denies pain.  AAOx0.  [facility] at bedside, does not know who brought them.  Unable to explain medical situation.    Attempted to call nephew to clarify patient's baseline mental status, no answer. As per chart, HD catheter placed and patient underwent dialysis last night.  Patient seen at bedside.  Eyes closed, opens to name called.  Says she is "bad," but denies pain.  AAOx0.  [facility] at bedside, does not know who brought them.  Again says she has no friends or family.  Unable to explain medical situation.    Spoke to nephew Vargas Boggs- he lives in [place].  Patient's brother/Vargas's father lives in [place].  At baseline patient is cognitively intact but depressed for many years, stays at home all the time, likely in setting of no relationship with children for 10 years.  Patient's brother had been staying with patient following hernia surgery with [facility] 3 times a week, but when brother had to go back to [place] they took her to hospital because didn't feel she could care for herself alone.  She was having vaginal bleeding, defecating on herself, couldn't get out of bed alone, wouldn't take medications on her own.  They have tried calling her here but she hasn't picked up.  Brother and nephew would like patient to go to rehab.  One of her friends went to go see her, thinks she is "out of it."

## 2018-03-30 NOTE — PROGRESS NOTE ADULT - SUBJECTIVE AND OBJECTIVE BOX
Patient is a 78y Female seen and evaluated at bedside. Patient lying in bed in no acute distress at present. Interval placement of HD catheter yesterday followed by 1st HD treatment for Anuric GONZALEZ with net 500cc fluid removal. Patient still remain with significant net positive fluid balance since admission upto 15 Liter. Pateint received 2 units of PRBC during HD treatment. for vaginal bleeding      albumin human 25% IVPB 50 every 1 hour  albumin human 25% IVPB 50 every 6 hours  ALBUTerol/ipratropium for Nebulization 3 every 6 hours PRN  dextrose 5%. 1000 <Continuous>  dextrose 50% Injectable 12.5 once  dextrose 50% Injectable 25 once  dextrose 50% Injectable 25 once  dextrose Gel 1 once PRN  glucagon  Injectable 1 once PRN  insulin lispro (HumaLOG) corrective regimen sliding scale  Before meals and at bedtime  levothyroxine 150 daily  magnesium sulfate  IVPB 2 once  ondansetron Injectable 4 every 6 hours PRN  oxyCODONE    5 mG/acetaminophen 325 mG 1 every 4 hours PRN  piperacillin/tazobactam IVPB. 2.25 every 6 hours  potassium chloride   Powder 20 once  sodium bicarbonate 650 three times a day      Allergies    No Known Allergies    Intolerances        T(C): , Max: 37.2 (03-30-18 @ 01:00)  T(F): , Max: 99 (03-30-18 @ 01:00)  HR: 64 (03-30-18 @ 09:24)  BP: 105/49 (03-30-18 @ 08:39)  BP(mean): 71 (03-30-18 @ 08:39)  RR: 18 (03-30-18 @ 09:24)  SpO2: 96% (03-30-18 @ 09:24)  Wt(kg): --    03-29 @ 07:01  -  03-30 @ 07:00  --------------------------------------------------------  IN: 950 mL / OUT: 1300 mL / NET: -350 mL          Review of Systems:  CONSTITUTIONAL: Fatigue and tired, No fever or chill.  EYES: No blurred or double vision.  RESPIRATORY: Mild shortness of breath, Denies any cough, hemoptysis  CARDIOVASCULAR: No Chest pain, palpitations, dizziness  GASTROINTESTINAL: No abdominal or flank pain, No nausea or vomiting, No diarrhea  GENITOURINARY: No dysuria or urinary burning, No difficulty passing urine, No hematuria  NEUROLOGICAL: No headaches or blurred vision  SKIN: No skin rashes   MUSCULOSKELETAL: Bilateral leg edema+nt, No arthralgia, Joint pain, No muscle pains      PHYSICAL EXAM:  GENERAL: Ill appearing, alert, awake, no acute distress at present  HEAD:  Atraumatic, Normocephalic,   EYES: Bilateral conjuctiva and sclera normal   Oral cavity: Oral mucosa dry and pale  NECK: Neck supple, No JVD  CHEST/LUNG: Bilateral decreased breath sounds, Bibasilar crepitations+nt, no wheezing  HEART: Regular rate and rhythm. GIBSON II/VI at LPSB, No gallop, no rub   ABDOMEN: Soft, Nontender, non distended,  BS+nt, No flank tenderness.   EXTREMITIES: Bilateral pitting pedal edema ++nt, No clubbing, cyanosis  Neurology: AAOx2-3, no focal neurological deficit  SKIN: No rashes or lesions          ACCESS: Right     LABS:                        7.8    13.4  )-----------( 149      ( 30 Mar 2018 07:41 )             23.9     03-30    139  |  102  |  39<H>  ----------------------------<  70  3.8   |  19<L>  |  4.64<H>    Ca    7.9<L>      30 Mar 2018 07:41  Phos  5.4     03-30  Mg     1.8     03-30      Hepatitis C Virus S/CO Ratio: 0.20 S/CO (03-29 @ 12:32)  Hepatitis C Virus Interpretation: Nonreact (03-29 @ 12:32)      Urinalysis Basic - ( 29 Mar 2018 04:06 )    Color: x / Appearance: x / SG: x / pH: x  Gluc: x / Ketone: x  / Bili: x / Urobili: x   Blood: x / Protein: x / Nitrite: x   Leuk Esterase: x / RBC: Many /HPF / WBC 5-10 /HPF   Sq Epi: x / Non Sq Epi: 5-10 /HPF / Bacteria: Many /HPF            RADIOLOGY & ADDITIONAL STUDIES:  < from: IR Procedure. (03.29.18 @ 17:32) >    EXAM:  IR INTERVENTIONAL RAD PROC                          PROCEDURE DATE:  03/29/2018                     INTERPRETATION:  Tunneled hemodialysis catheter placement     : Ashley Bailey MD    Assistant: None    Contrast: None    Complications: None immediate    Medications: Versed 1 mg IV, Fentanyl 25 mcg IV. Conscious sedation was   administered and monitored by radiology nursing personnel, under my   direct supervision, for a total of 20 minutes. 2% lidocaine   subcutaneously.    Cumulative Dose: 10 mGy  Fluoroscopy time: 1.4 minutes    Indication: Acute renal failure, requires hemodialysis.      Procedure:     Informed consent was obtained where the risks, benefits and alternatives   to the procedure were explained to the patient's healthcare proxy over   the telephone.    Using sterile technique and real time ultrasound guidance, the right   internal jugular vein was accessed through a short subcutaneous tunnel.   Using this access, a stiff guidewire was inserted into the inferior vena   cava.  Serial dilatations of the tract were then performed.  Finally, a   14.5- Fr 19 cm (from cuff to tip) Cov[employer] Damagerome dialysis catheter   was placed under fluoroscopic guidance with its tip at the caval atrial   junction. The catheter was secured in place with sutures, each lumen   flushed with sterile saline and then heparinized, and a sterile dressing   applied to the area. The patient tolerated the procedure well.    The catheter may be used immediately.      This procedure was performed under the personal supervision of Dr. Bailey, who was present for the entire procedure.    Findings:   1.  Ultrasound shows an anechoic and compressible right internal jugular   vein.  2. Fluoroscopy demonstrates a right-sided tunneled dialysis catheter in   place with distal catheter tip at the cavoatrial junction.    Impression: Tunneled hemodialysis catheter placement performed via right   internal jugular vein access, as discussed above. The catheter is ready   for immediate use.            "Thank you for the opportunity to participate in the care of this   patient."        ASHLEY BAILEY M.D., ATTENDING RADIOLOGIST  This document has been electronically signed. Mar 29 2018  5:41PM                  < end of copied text > Patient is a 78y Female seen and evaluated at bedside. Patient lying in bed in no acute distress at present. Interval placement of HD catheter yesterday followed by 1st HD treatment for Anuric GONZALEZ with net 500cc fluid removal. Patient still remain with significant net positive fluid balance since admission upto 15 Liter. Pateint received 2 units of PRBC during HD treatment. for vaginal bleeding. Patient urine output 200 cc in past 24 hours duration.      albumin human 25% IVPB 50 every 1 hour  albumin human 25% IVPB 50 every 6 hours  ALBUTerol/ipratropium for Nebulization 3 every 6 hours PRN  dextrose 5%. 1000 <Continuous>  dextrose 50% Injectable 12.5 once  dextrose 50% Injectable 25 once  dextrose 50% Injectable 25 once  dextrose Gel 1 once PRN  glucagon  Injectable 1 once PRN  insulin lispro (HumaLOG) corrective regimen sliding scale  Before meals and at bedtime  levothyroxine 150 daily  magnesium sulfate  IVPB 2 once  ondansetron Injectable 4 every 6 hours PRN  oxyCODONE    5 mG/acetaminophen 325 mG 1 every 4 hours PRN  piperacillin/tazobactam IVPB. 2.25 every 6 hours  potassium chloride   Powder 20 once  sodium bicarbonate 650 three times a day      Allergies    No Known Allergies    Intolerances        T(C): , Max: 37.2 (03-30-18 @ 01:00)  T(F): , Max: 99 (03-30-18 @ 01:00)  HR: 64 (03-30-18 @ 09:24)  BP: 105/49 (03-30-18 @ 08:39)  BP(mean): 71 (03-30-18 @ 08:39)  RR: 18 (03-30-18 @ 09:24)  SpO2: 96% (03-30-18 @ 09:24)  Wt(kg): --    03-29 @ 07:01  -  03-30 @ 07:00  --------------------------------------------------------  IN: 950 mL / OUT: 1300 mL / NET: -350 mL          Review of Systems:  CONSTITUTIONAL: Fatigue and tired, No fever or chill.  EYES: No blurred or double vision.  RESPIRATORY: Mild shortness of breath, Denies any cough, hemoptysis  CARDIOVASCULAR: No Chest pain, palpitations, dizziness  GASTROINTESTINAL: No abdominal or flank pain, No nausea or vomiting, No diarrhea  GENITOURINARY: No dysuria or urinary burning, No difficulty passing urine, No hematuria  NEUROLOGICAL: No headaches or blurred vision  SKIN: No skin rashes   MUSCULOSKELETAL: Bilateral leg edema+nt, No arthralgia, Joint pain, No muscle pains      PHYSICAL EXAM:  GENERAL: Ill appearing, alert, awake, no acute distress at present  HEAD:  Atraumatic, Normocephalic,   EYES: Bilateral conjuctiva and sclera normal   Oral cavity: Oral mucosa dry and pale  NECK: Neck supple, No JVD  CHEST/LUNG: Bilateral decreased breath sounds, Bibasilar crepitations+nt, no wheezing  HEART: Regular rate and rhythm. GIBSON II/VI at LPSB, No gallop, no rub   ABDOMEN: Soft, Nontender, non distended,  BS+nt, No flank tenderness, Lower abdominal wound vac present.   EXTREMITIES: Bilateral pitting pedal edema ++nt, No clubbing, cyanosis  Neurology: AAOx2-3, no focal neurological deficit  SKIN: No rashes or lesions          ACCESS: Right     LABS:                        7.8    13.4  )-----------( 149      ( 30 Mar 2018 07:41 )             23.9     03-30    139  |  102  |  39<H>  ----------------------------<  70  3.8   |  19<L>  |  4.64<H>    Ca    7.9<L>      30 Mar 2018 07:41  Phos  5.4     03-30  Mg     1.8     03-30      Hepatitis C Virus S/CO Ratio: 0.20 S/CO (03-29 @ 12:32)  Hepatitis C Virus Interpretation: Nonreact (03-29 @ 12:32)      Urinalysis Basic - ( 29 Mar 2018 04:06 )    Color: x / Appearance: x / SG: x / pH: x  Gluc: x / Ketone: x  / Bili: x / Urobili: x   Blood: x / Protein: x / Nitrite: x   Leuk Esterase: x / RBC: Many /HPF / WBC 5-10 /HPF   Sq Epi: x / Non Sq Epi: 5-10 /HPF / Bacteria: Many /HPF            RADIOLOGY & ADDITIONAL STUDIES:  < from: IR Procedure. (03.29.18 @ 17:32) >    EXAM:  IR INTERVENTIONAL RAD PROC                          PROCEDURE DATE:  03/29/2018                     INTERPRETATION:  Tunneled hemodialysis catheter placement     : Ashley Bailey MD    Assistant: None    Contrast: None    Complications: None immediate    Medications: Versed 1 mg IV, Fentanyl 25 mcg IV. Conscious sedation was   administered and monitored by radiology nursing personnel, under my   direct supervision, for a total of 20 minutes. 2% lidocaine   subcutaneously.    Cumulative Dose: 10 mGy  Fluoroscopy time: 1.4 minutes    Indication: Acute renal failure, requires hemodialysis.      Procedure:     Informed consent was obtained where the risks, benefits and alternatives   to the procedure were explained to the patient's healthcare proxy over   the telephone.    Using sterile technique and real time ultrasound guidance, the right   internal jugular vein was accessed through a short subcutaneous tunnel.   Using this access, a stiff guidewire was inserted into the inferior vena   cava.  Serial dilatations of the tract were then performed.  Finally, a   14.5- Fr 19 cm (from cuff to tip) Cov[employer]eredekal[employer]comrome dialysis catheter   was placed under fluoroscopic guidance with its tip at the caval atrial   junction. The catheter was secured in place with sutures, each lumen   flushed with sterile saline and then heparinized, and a sterile dressing   applied to the area. The patient tolerated the procedure well.    The catheter may be used immediately.      This procedure was performed under the personal supervision of Dr. Bailey, who was present for the entire procedure.    Findings:   1.  Ultrasound shows an anechoic and compressible right internal jugular   vein.  2. Fluoroscopy demonstrates a right-sided tunneled dialysis catheter in   place with distal catheter tip at the cavoatrial junction.    Impression: Tunneled hemodialysis catheter placement performed via right   internal jugular vein access, as discussed above. The catheter is ready   for immediate use.            "Thank you for the opportunity to participate in the care of this   patient."        ASHLEY BAILEY M.D., ATTENDING RADIOLOGIST  This document has been electronically signed. Mar 29 2018  5:41PM                  < end of copied text >

## 2018-03-30 NOTE — PROGRESS NOTE ADULT - PROBLEM SELECTOR PLAN 4
Surgical abdominal wound infection now on IV antibiotics with Zosyn along with wound vac for now.  Adjust renal dose clearance of antibiotics with Cr cl=10-15ml/min for now

## 2018-03-30 NOTE — CHART NOTE - NSCHARTNOTEFT_GEN_A_CORE
wound vac change    wound vac sponge removed, wound pink with granulation tissue, no active discharge or bleeding noted.   wound vac sponge inserted and packed into the wound, attached to suction  the patient tolerated the procedure

## 2018-03-30 NOTE — PROVIDER CONTACT NOTE (CHANGE IN STATUS NOTIFICATION) - ASSESSMENT
saturated pad with red juan blood, wound vac alarm air leak, completed leak check & was prompted by machine to change dressing

## 2018-03-31 LAB
ANA TITR SER: NEGATIVE — SIGNIFICANT CHANGE UP
ANION GAP SERPL CALC-SCNC: 15 MMOL/L — SIGNIFICANT CHANGE UP (ref 5–17)
BUN SERPL-MCNC: 26 MG/DL — HIGH (ref 7–23)
C3 SERPL-MCNC: 18 MG/DL — LOW (ref 80–180)
CALCIUM SERPL-MCNC: 8.1 MG/DL — LOW (ref 8.4–10.5)
CHLORIDE SERPL-SCNC: 102 MMOL/L — SIGNIFICANT CHANGE UP (ref 96–108)
CO2 SERPL-SCNC: 23 MMOL/L — SIGNIFICANT CHANGE UP (ref 22–31)
CORTIS AM PEAK SERPL-MCNC: 17.6 UG/DL — SIGNIFICANT CHANGE UP (ref 3.9–37.5)
CREAT SERPL-MCNC: 3.81 MG/DL — HIGH (ref 0.5–1.3)
EOSINOPHIL NFR BLD AUTO: 2 % — SIGNIFICANT CHANGE UP (ref 0–6)
FOLATE SERPL-MCNC: 14.2 NG/ML — SIGNIFICANT CHANGE UP (ref 4.8–24.2)
GLUCOSE BLDC GLUCOMTR-MCNC: 73 MG/DL — SIGNIFICANT CHANGE UP (ref 70–99)
GLUCOSE BLDC GLUCOMTR-MCNC: 89 MG/DL — SIGNIFICANT CHANGE UP (ref 70–99)
GLUCOSE BLDC GLUCOMTR-MCNC: 92 MG/DL — SIGNIFICANT CHANGE UP (ref 70–99)
GLUCOSE SERPL-MCNC: 89 MG/DL — SIGNIFICANT CHANGE UP (ref 70–99)
HBV SURFACE AB SER-ACNC: SIGNIFICANT CHANGE UP
HCT VFR BLD CALC: 23 % — LOW (ref 34.5–45)
HGB BLD-MCNC: 7.6 G/DL — LOW (ref 11.5–15.5)
KAPPA LC SER QL IFE: 12.8 MG/DL — HIGH (ref 0.33–1.94)
KAPPA/LAMBDA FREE LIGHT CHAIN RATIO, SERUM: 1.24 RATIO — SIGNIFICANT CHANGE UP (ref 0.26–1.65)
LAMBDA LC SER QL IFE: 10.3 MG/DL — HIGH (ref 0.57–2.63)
LYMPHOCYTES # BLD AUTO: 20 % — SIGNIFICANT CHANGE UP (ref 13–44)
MAGNESIUM SERPL-MCNC: 1.9 MG/DL — SIGNIFICANT CHANGE UP (ref 1.6–2.6)
MCHC RBC-ENTMCNC: 29.2 PG — SIGNIFICANT CHANGE UP (ref 27–34)
MCHC RBC-ENTMCNC: 33 G/DL — SIGNIFICANT CHANGE UP (ref 32–36)
MCV RBC AUTO: 88.5 FL — SIGNIFICANT CHANGE UP (ref 80–100)
MONOCYTES NFR BLD AUTO: 4 % — SIGNIFICANT CHANGE UP (ref 2–14)
NEUTROPHILS NFR BLD AUTO: 69 % — SIGNIFICANT CHANGE UP (ref 43–77)
PHOSPHATE SERPL-MCNC: 4.3 MG/DL — SIGNIFICANT CHANGE UP (ref 2.5–4.5)
PLATELET # BLD AUTO: 128 K/UL — LOW (ref 150–400)
POTASSIUM SERPL-MCNC: 3.6 MMOL/L — SIGNIFICANT CHANGE UP (ref 3.5–5.3)
POTASSIUM SERPL-SCNC: 3.6 MMOL/L — SIGNIFICANT CHANGE UP (ref 3.5–5.3)
RBC # BLD: 2.6 M/UL — LOW (ref 3.8–5.2)
RBC # FLD: 16.2 % — SIGNIFICANT CHANGE UP (ref 10.3–16.9)
SODIUM SERPL-SCNC: 140 MMOL/L — SIGNIFICANT CHANGE UP (ref 135–145)
TROPONIN T SERPL-MCNC: 0.02 NG/ML — HIGH (ref 0–0.01)
VIT B12 SERPL-MCNC: 396 PG/ML — SIGNIFICANT CHANGE UP (ref 232–1245)
WBC # BLD: 10.2 K/UL — SIGNIFICANT CHANGE UP (ref 3.8–10.5)
WBC # FLD AUTO: 10.2 K/UL — SIGNIFICANT CHANGE UP (ref 3.8–10.5)

## 2018-03-31 PROCEDURE — 99232 SBSQ HOSP IP/OBS MODERATE 35: CPT

## 2018-03-31 RX ORDER — QUETIAPINE FUMARATE 200 MG/1
12.5 TABLET, FILM COATED ORAL AT BEDTIME
Qty: 0 | Refills: 0 | Status: DISCONTINUED | OUTPATIENT
Start: 2018-03-31 | End: 2018-04-04

## 2018-03-31 RX ORDER — HALOPERIDOL DECANOATE 100 MG/ML
1 INJECTION INTRAMUSCULAR ONCE
Qty: 0 | Refills: 0 | Status: COMPLETED | OUTPATIENT
Start: 2018-03-31 | End: 2018-03-31

## 2018-03-31 RX ORDER — ALBUMIN HUMAN 25 %
50 VIAL (ML) INTRAVENOUS
Qty: 0 | Refills: 0 | Status: DISCONTINUED | OUTPATIENT
Start: 2018-03-31 | End: 2018-04-01

## 2018-03-31 RX ORDER — DEXTROSE 50 % IN WATER 50 %
50 SYRINGE (ML) INTRAVENOUS ONCE
Qty: 0 | Refills: 0 | Status: COMPLETED | OUTPATIENT
Start: 2018-03-31 | End: 2018-03-31

## 2018-03-31 RX ADMIN — Medication 150 MICROGRAM(S): at 06:57

## 2018-03-31 RX ADMIN — PIPERACILLIN AND TAZOBACTAM 200 GRAM(S): 4; .5 INJECTION, POWDER, LYOPHILIZED, FOR SOLUTION INTRAVENOUS at 18:56

## 2018-03-31 RX ADMIN — MEDROXYPROGESTERONE ACETATE 10 MILLIGRAM(S): 150 INJECTION, SUSPENSION, EXTENDED RELEASE INTRAMUSCULAR at 12:12

## 2018-03-31 RX ADMIN — QUETIAPINE FUMARATE 12.5 MILLIGRAM(S): 200 TABLET, FILM COATED ORAL at 21:41

## 2018-03-31 RX ADMIN — QUETIAPINE FUMARATE 12.5 MILLIGRAM(S): 200 TABLET, FILM COATED ORAL at 07:26

## 2018-03-31 RX ADMIN — Medication 100 MILLIGRAM(S): at 06:57

## 2018-03-31 RX ADMIN — HALOPERIDOL DECANOATE 1 MILLIGRAM(S): 100 INJECTION INTRAMUSCULAR at 07:26

## 2018-03-31 NOTE — PROGRESS NOTE ADULT - ASSESSMENT
Assessment: 78 year old female s/p ventral hernia repair, abdominal wound    Plan:  Now transferred to medical service  Continue IV ABX  Regular Diet - Carb controlled  IVF, will Hep Lock once tolerating  s/p IVC filter placement  Continue Home Meds  Insulin Sliding Scale  Wound Packing-wound vac in place  HD as per nephro

## 2018-03-31 NOTE — PROGRESS NOTE ADULT - SUBJECTIVE AND OBJECTIVE BOX
Patient was seen and evaluated on dialysis.   Patient is tolerating the procedure well.   HR: 71 (03-31-18 @ 15:45)  BP: 111/52 (03-31-18 @ 15:45)  Continue dialysis:   Dialyzer:  optiflux 180        QB: 400       QD: 500 3K bath   Goal UF 1.5kg over 3 Hours     No recurrent symptoms or tachycardia today noted on HD

## 2018-03-31 NOTE — PROGRESS NOTE ADULT - ASSESSMENT
79 y/o female with hx of DM, HLD, HTN, recent admission for post-operative PE placed on Eliquis. Complaining of abdominal pain and passing maroon colored stools. Nephrology consult called for Oliguric GONZALEZ started HD

## 2018-03-31 NOTE — PROGRESS NOTE ADULT - PROBLEM SELECTOR PLAN 1
-Active vaginal bleeding possibly 2/2 GYN malignancy.  TVUS showed fibroid uterus with thickened endometrium.  -s/p 2 units PRBCs  -maintain active T&S  -Transfuse <7  -Trend CBC  -IVC filter today.  A/C contraindicated. -Active vaginal bleeding possibly 2/2 GYN malignancy.  TVUS showed fibroid uterus with thickened endometrium.  -s/p 2 units PRBCs  -maintain active T&S  -Transfuse <7  -Trend CBC  -IVC filter placed A/C contraindicated in setting of vaginal bleeding  #Intrabdominal Infection  Patient on zosyn for abdominal wound infection s/p incisional hernia repair.  Length of treatment to be clarified with surgery.

## 2018-03-31 NOTE — PROGRESS NOTE ADULT - SUBJECTIVE AND OBJECTIVE BOX
Patient is a 78y old  Female who presents with a chief complaint of Wound abscess (23 Mar 2018 09:32)        INTERVAL HPI/OVERNIGHT EVENTS: none, remains mildly agitated    SYMPTOMS agitated at times ,confused    DRIPS none        ICU Vital Signs Last 24 Hrs  T(C): 36.8 (31 Mar 2018 05:27), Max: 37 (30 Mar 2018 09:32)  T(F): 98.2 (31 Mar 2018 05:27), Max: 98.6 (30 Mar 2018 09:32)  HR: 64 (31 Mar 2018 06:00) (62 - 70)  BP: 139/64 (31 Mar 2018 06:00) (102/47 - 139/64)  BP(mean): 92 (31 Mar 2018 06:00) (71 - 92)  ABP: --  ABP(mean): --  RR: 15 (31 Mar 2018 06:00) (15 - 18)  SpO2: 96% (31 Mar 2018 06:00) (95% - 98%)      I&O's Summary    29 Mar 2018 07:01  -  30 Mar 2018 07:00  --------------------------------------------------------  IN: 950 mL / OUT: 1300 mL / NET: -350 mL    30 Mar 2018 07:01  -  31 Mar 2018 06:48  --------------------------------------------------------  IN: 100 mL / OUT: 1385 mL / NET: -1285 mL        EXAM    Chest clear    Heart rr    Abdomen softnontedner with vac    Extremities with edema    Neuro awake, agitates at times confused      LABS:  CAPILLARY BLOOD GLUCOSE                            7.6    10.2  )-----------( 128      ( 31 Mar 2018 03:08 )             23.0     03-31    140  |  102  |  26<H>  ----------------------------<  89  3.6   |  23  |  3.81<H>    Ca    8.1<L>      31 Mar 2018 03:08  Phos  4.3     03-31  Mg     1.9     03-31    TPro  6.0  /  Alb  3.4  /  TBili  0.8  /  DBili  x   /  AST  17  /  ALT  13  /  AlkPhos  45  03-30    PT/INR - ( 30 Mar 2018 22:39 )   PT: 19.5 sec;   INR: 1.74          PTT - ( 30 Mar 2018 22:39 )  PTT:38.6 sec    Lactate, Blood: 1.4 mmoL/L (03-30 @ 18:45)        RADIOLOGY & ADDITIONAL STUDIES:    CRITICAL CARE TIME SPENT:

## 2018-03-31 NOTE — PROGRESS NOTE ADULT - PROBLEM SELECTOR PLAN 8
On Synthroid at home.   -continue Synthroid 150mcg QD  -f/u TSH to ensure adequate dose. On Synthroid at home.   -continue Synthroid 150mcg QD

## 2018-03-31 NOTE — PROGRESS NOTE ADULT - SUBJECTIVE AND OBJECTIVE BOX
patient seen and examined at bedside.    Rt groin- drsg- c/d/i soft no hematoma felt    78yoF s/p IVC filter  -signing off thank you for the consult  -call [phone] if any questions or concerns

## 2018-03-31 NOTE — PROGRESS NOTE ADULT - SUBJECTIVE AND OBJECTIVE BOX
Patient seen and examined at bedside.     No recurrent arrhythmia issues overnight  Workup post HD arrhythmia was reviewed and unrevealing  Patient laying supine now  Does not endorse dyspnea or orthopnea.   Remains oliguric with only 135mL of urine output  Inputs recorded as 100mL but uncertain of true inputs     acetaminophen   Tablet. 650 milliGRAM(s) every 6 hours PRN  albumin human 25% IVPB 50 milliLiter(s) every 1 hour PRN  dextrose 5%. 1000 milliLiter(s) <Continuous>  dextrose 50% Injectable 12.5 Gram(s) once  dextrose 50% Injectable 25 Gram(s) once  dextrose 50% Injectable 25 Gram(s) once  dextrose Gel 1 Dose(s) once PRN  docusate sodium 100 milliGRAM(s) three times a day  glucagon  Injectable 1 milliGRAM(s) once PRN  levothyroxine 150 MICROGram(s) daily  medroxyPROGESTERone 10 milliGRAM(s) daily  piperacillin/tazobactam IVPB.     piperacillin/tazobactam IVPB. 2.25 Gram(s) every 12 hours  QUEtiapine 12.5 milliGRAM(s) at bedtime  senna 2 Tablet(s) at bedtime      Allergies    No Known Allergies    Intolerances        T(C): , Max: 36.8 (03-31-18 @ 01:00)  T(F): , Max: 98.3 (03-31-18 @ 01:00)  HR: 68 (03-31-18 @ 08:32)  BP: 136/63 (03-31-18 @ 08:32)  BP(mean): 91 (03-31-18 @ 08:32)  RR: 18 (03-31-18 @ 08:32)  SpO2: 96% (03-31-18 @ 08:32)  Wt(kg): --    03-30 @ 07:01  -  03-31 @ 07:00  --------------------------------------------------------  IN:    IV PiggyBack: 100 mL  Total IN: 100 mL    OUT:    Indwelling Catheter - Urethral: 150 mL    Other: 1200 mL    VAC (Vacuum Assisted Closure) System: 100 mL  Total OUT: 1450 mL    Total NET: -1350 mL              LABS:                        7.6    10.2  )-----------( 128      ( 31 Mar 2018 03:08 )             23.0     03-31    140  |  102  |  26<H>  ----------------------------<  89  3.6   |  23  |  3.81<H>    Ca    8.1<L>      31 Mar 2018 03:08  Phos  4.3     03-31  Mg     1.9     03-31    TPro  6.0  /  Alb  3.4  /  TBili  0.8  /  DBili  x   /  AST  17  /  ALT  13  /  AlkPhos  45  03-30      PT/INR - ( 30 Mar 2018 22:39 )   PT: 19.5 sec;   INR: 1.74          PTT - ( 30 Mar 2018 22:39 )  PTT:38.6 sec          RADIOLOGY & ADDITIONAL STUDIES:

## 2018-03-31 NOTE — PROGRESS NOTE ADULT - ASSESSMENT
78F with PMHx of HTN, DM, HLD, hypothyroidism admitted for management of surgical wound infection following robotically assisted hernia repair on 3/2, complicated by RUL PE, now found to have acute blood loss anemia and ATN. s/p first dialysis session yesterday. Patient still denying any medical problems, deemed not to have capacity per psychiatry.

## 2018-03-31 NOTE — PROGRESS NOTE ADULT - ASSESSMENT
A -toxic encephalopathy/ delerium /acute renal failure stn/wound infectio/dvt/PE/vaginal bleeding    Suggest  dialysis per renal  small dose of seroquel at night  discuss duration of ABX with surgery  mobilize  possible transfer to floor

## 2018-03-31 NOTE — PROGRESS NOTE ADULT - PROBLEM SELECTOR PLAN 5
CTA 3/12 with findings of PE within the segmental arteries of the right upper   lobe, right middle lobe, and lingula. Subsequent echocardiogram notable for RV dilatation, however deemed by pulmonology to be unlikely that clot burden caused dilatation. Trops neg x3. Provoked PE in the setting of recent abdominal surgery.   - hold Eliquis in the setting of active vaginal bleeding with acute blood loss anemia.   -IVF 3/30 CTA 3/12 with findings of PE within the segmental arteries of the right upper   lobe, right middle lobe, and lingula. Subsequent echocardiogram notable for RV dilatation, however deemed by pulmonology to be unlikely that clot burden caused dilatation. Trops neg x3. Provoked PE in the setting of recent abdominal surgery.   - hold Eliquis in the setting of active vaginal bleeding with acute blood loss anemia.   -IVF 3/30 placed

## 2018-03-31 NOTE — PROGRESS NOTE ADULT - SUBJECTIVE AND OBJECTIVE BOX
SUBJECTIVE:  pt seen at bedside, uncooperative; s/p IVC filter placement    MEDICATIONS  (STANDING):  dextrose 5%. 1000 milliLiter(s) (50 mL/Hr) IV Continuous <Continuous>  dextrose 50% Injectable 12.5 Gram(s) IV Push once  dextrose 50% Injectable 25 Gram(s) IV Push once  dextrose 50% Injectable 25 Gram(s) IV Push once  levothyroxine 150 MICROGram(s) Oral daily  medroxyPROGESTERone 10 milliGRAM(s) Oral daily  piperacillin/tazobactam IVPB.      piperacillin/tazobactam IVPB. 2.25 Gram(s) IV Intermittent every 12 hours  QUEtiapine 12.5 milliGRAM(s) Oral at bedtime    MEDICATIONS  (PRN):  acetaminophen   Tablet. 650 milliGRAM(s) Oral every 6 hours PRN Mild Pain (1 - 3)  albumin human 25% IVPB 50 milliLiter(s) IV Intermittent every 1 hour PRN HD nurse use  dextrose Gel 1 Dose(s) Oral once PRN Blood Glucose LESS THAN 70 milliGRAM(s)/deciliter  glucagon  Injectable 1 milliGRAM(s) IntraMuscular once PRN Glucose LESS THAN 70 milligrams/deciliter      Vital Signs Last 24 Hrs  T(C): 36.9 (31 Mar 2018 13:00), Max: 36.9 (31 Mar 2018 13:00)  T(F): 98.4 (31 Mar 2018 13:00), Max: 98.4 (31 Mar 2018 13:00)  HR: 68 (31 Mar 2018 12:04) (62 - 70)  BP: 138/62 (31 Mar 2018 12:04) (102/47 - 139/64)  BP(mean): 89 (31 Mar 2018 12:04) (88 - 92)  RR: 20 (31 Mar 2018 12:04) (15 - 20)  SpO2: 98% (31 Mar 2018 12:04) (95% - 98%)    PHYSICAL EXAM:      Constitutional: A&Ox3    Respiratory: non labored breathing, no respiratory distress    Cardiovascular: NSR, RRR    Gastrointestinal: soft, nondistended, nontender, wound vac in place to suction, mild surrounding erythema, LEANN site wound unchanged without active discharge, mild erythema noted    Extremities: (-) edema          I&O's Detail    30 Mar 2018 07:01  -  31 Mar 2018 07:00  --------------------------------------------------------  IN:    IV PiggyBack: 100 mL  Total IN: 100 mL    OUT:    Indwelling Catheter - Urethral: 150 mL    Other: 1200 mL    VAC (Vacuum Assisted Closure) System: 100 mL  Total OUT: 1450 mL    Total NET: -1350 mL          LABS:                        7.6    10.2  )-----------( 128      ( 31 Mar 2018 03:08 )             23.0     03-31    140  |  102  |  26<H>  ----------------------------<  89  3.6   |  23  |  3.81<H>    Ca    8.1<L>      31 Mar 2018 03:08  Phos  4.3     03-31  Mg     1.9     03-31    TPro  6.0  /  Alb  3.4  /  TBili  0.8  /  DBili  x   /  AST  17  /  ALT  13  /  AlkPhos  45  03-30    PT/INR - ( 30 Mar 2018 22:39 )   PT: 19.5 sec;   INR: 1.74          PTT - ( 30 Mar 2018 22:39 )  PTT:38.6 sec

## 2018-03-31 NOTE — PROGRESS NOTE ADULT - PROBLEM SELECTOR PLAN 1
Oliguric ATN  Will perform third session of HD today for clearance and volume optimization.Will avoid hypotension on HD  Then tentatively on a MWF schedule barring quick resolution of ATN.

## 2018-03-31 NOTE — PROGRESS NOTE ADULT - PROBLEM SELECTOR PLAN 4
-bilateral thrombi below the knees  -IVC filter today  -Hold A/C in setting of bleeding -bilateral thrombi below the knees  -IVC filter placed   -Hold A/C in setting of vaginal bleeding

## 2018-03-31 NOTE — PROGRESS NOTE ADULT - PROBLEM SELECTOR PLAN 3
Interval development of bilateral pleural effusions on CXR from 3/28 compared to that of 3/23. Likely 2/2 fluid overload in the setting of renal failure and oliguria. Patient currently without complaints of SOB, no increased oxygen requirements (on NC2L, RR 18). Expect that pleural effusions will improve with HD.   - follow up CXR Interval development of bilateral pleural effusions on CXR from 3/28 compared to that of 3/23. Likely 2/2 fluid overload in the setting of renal failure and oliguria. Patient currently without complaints of SOB, no increased oxygen requirements (on NC2L, RR 18). Improved with dialysis.

## 2018-03-31 NOTE — PROGRESS NOTE ADULT - PROBLEM SELECTOR PLAN 2
-New on this admission, no prior reported hx of renal disease. Per records, patient developed oliguria with worsening creatinine despite Lasix challenge.   -s/p HD catheter placement by IR on 3/29 with first session of HD on same day, tolerated well.    - continue management per nephrology.    #AGMA   -likely 2/2 uremia.   -f/u B-OH and lactate  -Trend BMP -New on this admission, no prior reported hx of renal disease. Per records, patient developed oliguria with worsening creatinine despite Lasix challenge.   -s/p HD catheter placement by IR on 3/29 with first session of HD on same day, tolerated well.    - continue management per nephrology.  - dialysis session today tolerated  #Agitation  Patient intermittingly agitated, resulted in pulling out IV lines last night, started seroquel 12.5 at bedtime  Prior to admission as per family patient baseline A&O x 2, patient not at baseline possibly in setting of uremia

## 2018-03-31 NOTE — PROGRESS NOTE ADULT - SUBJECTIVE AND OBJECTIVE BOX
INCOMPLETE  INTERVAL/OVERNIGHT EVENTS:  First HD on 3/29, tolerated well.    SUBJECTIVE:  Seen and examined at bedside.  Does not report complaints but says that "you are not making me any better."      VITAL SIGNS:  T(F): 98.6 (03-30-18 @ 09:32)  HR: 64 (03-30-18 @ 09:24)  BP: 105/49 (03-30-18 @ 08:39)  RR: 18 (03-30-18 @ 09:24)  SpO2: 96% (03-30-18 @ 09:24)  Wt(kg): --    PHYSICAL EXAM:    General: NAD, comfortable, elderly female, unkempt appearing, obese  HEENT: NCAT, PERRL, clear conjunctiva, no scleral icterus, dry mucous membranes  Neck: supple, no JVD, HD cath in place on R  Respiratory: difficult to auscultate due to body habitus and poor inspiratory effort on exam. No wheezing, rhonchi, rales,    Cardiovascular: RRR, normal S1S2, no M/R/G  Abdomen: soft, NT/ND, bowel sounds in all four quadrants, ventral wound vac over umbilicus draining pink fluid  Extremities: WWP, no clubbing or cyanosis. 4+ pitting edema over entire lower extremities  Neuro: awake, alert, oriented to person, place and time, moving all extremities   Psych: does not have insight into medical condition, does not believe she has any medical problems    MEDICATIONS  (STANDING):  albumin human 25% IVPB 50 milliLiter(s) IV Intermittent every 1 hour  albumin human 25% IVPB 50 milliLiter(s) IV Intermittent every 6 hours  dextrose 5%. 1000 milliLiter(s) (50 mL/Hr) IV Continuous <Continuous>  dextrose 50% Injectable 12.5 Gram(s) IV Push once  dextrose 50% Injectable 25 Gram(s) IV Push once  dextrose 50% Injectable 25 Gram(s) IV Push once  docusate sodium 100 milliGRAM(s) Oral three times a day  insulin lispro (HumaLOG) corrective regimen sliding scale   SubCutaneous Before meals and at bedtime  levothyroxine 150 MICROGram(s) Oral daily  piperacillin/tazobactam IVPB. 2.25 Gram(s) IV Intermittent every 6 hours  senna 2 Tablet(s) Oral at bedtime  sodium bicarbonate 650 milliGRAM(s) Oral three times a day    MEDICATIONS  (PRN):  ALBUTerol/ipratropium for Nebulization 3 milliLiter(s) Nebulizer every 6 hours PRN Wheezing  dextrose Gel 1 Dose(s) Oral once PRN Blood Glucose LESS THAN 70 milliGRAM(s)/deciliter  glucagon  Injectable 1 milliGRAM(s) IntraMuscular once PRN Glucose LESS THAN 70 milligrams/deciliter  ondansetron Injectable 4 milliGRAM(s) IV Push every 6 hours PRN Nausea and/or Vomiting  oxyCODONE    5 mG/acetaminophen 325 mG 1 Tablet(s) Oral every 4 hours PRN Severe Pain (7 - 10)      Allergies    No Known Allergies    Intolerances      albumin human 25% IVPB 50 milliLiter(s) IV Intermittent every 1 hour  albumin human 25% IVPB 50 milliLiter(s) IV Intermittent every 6 hours  ALBUTerol/ipratropium for Nebulization 3 milliLiter(s) Nebulizer every 6 hours PRN  dextrose 5%. 1000 milliLiter(s) IV Continuous <Continuous>  dextrose 50% Injectable 12.5 Gram(s) IV Push once  dextrose 50% Injectable 25 Gram(s) IV Push once  dextrose 50% Injectable 25 Gram(s) IV Push once  dextrose Gel 1 Dose(s) Oral once PRN  docusate sodium 100 milliGRAM(s) Oral three times a day  glucagon  Injectable 1 milliGRAM(s) IntraMuscular once PRN  insulin lispro (HumaLOG) corrective regimen sliding scale   SubCutaneous Before meals and at bedtime  levothyroxine 150 MICROGram(s) Oral daily  ondansetron Injectable 4 milliGRAM(s) IV Push every 6 hours PRN  oxyCODONE    5 mG/acetaminophen 325 mG 1 Tablet(s) Oral every 4 hours PRN  piperacillin/tazobactam IVPB. 2.25 Gram(s) IV Intermittent every 6 hours  senna 2 Tablet(s) Oral at bedtime  sodium bicarbonate 650 milliGRAM(s) Oral three times a day      LABS:                          7.8    13.4  )-----------( 149      ( 30 Mar 2018 07:41 )             23.9     03-30    139  |  102  |  39<H>  ----------------------------<  70  3.8   |  19<L>  |  4.64<H>    Ca    7.9<L>      30 Mar 2018 07:41  Phos  5.4     03-30  Mg     1.8     03-30        Urinalysis Basic - ( 29 Mar 2018 04:06 )    Color: x / Appearance: x / SG: x / pH: x  Gluc: x / Ketone: x  / Bili: x / Urobili: x   Blood: x / Protein: x / Nitrite: x   Leuk Esterase: x / RBC: Many /HPF / WBC 5-10 /HPF   Sq Epi: x / Non Sq Epi: 5-10 /HPF / Bacteria: Many /HPF        RADIOLOGY & ADDITIONAL TESTS:  All imaging reviewed. INCOMPLETE  INTERVAL/OVERNIGHT EVENTS:  First HD on 3/29, tolerated well.    SUBJECTIVE:  Seen and examined at bedside.  Does not report complaints but says that "you are not making me any better."      VITAL SIGNS:  ICU Vital Signs Last 24 Hrs  T(C): 36.8 (31 Mar 2018 05:27), Max: 37 (30 Mar 2018 09:32)  T(F): 98.2 (31 Mar 2018 05:27), Max: 98.6 (30 Mar 2018 09:32)  HR: 64 (31 Mar 2018 00:46) (62 - 70)  BP: 131/61 (31 Mar 2018 00:46) (102/47 - 131/61)  BP(mean): 88 (31 Mar 2018 00:46) (71 - 88)  ABP: --  ABP(mean): --  RR: 15 (31 Mar 2018 00:46) (15 - 18)  SpO2: 96% (31 Mar 2018 00:46) (95% - 98%)      PHYSICAL EXAM:    General: NAD, comfortable, elderly female, unkempt appearing, obese  HEENT: NCAT, PERRL, clear conjunctiva, no scleral icterus, dry mucous membranes  Neck: supple, no JVD, HD cath in place on R  Respiratory: difficult to auscultate due to body habitus and poor inspiratory effort on exam. No wheezing, rhonchi, rales,    Cardiovascular: RRR, normal S1S2, no M/R/G  Abdomen: soft, NT/ND, bowel sounds in all four quadrants, ventral wound vac over umbilicus draining pink fluid  Extremities: WWP, no clubbing or cyanosis. 4+ pitting edema over entire lower extremities  Neuro: awake, alert, oriented to person, place and time, moving all extremities   Psych: does not have insight into medical condition, does not believe she has any medical problems    MEDICATIONS  (STANDING):  albumin human 25% IVPB 50 milliLiter(s) IV Intermittent every 1 hour  albumin human 25% IVPB 50 milliLiter(s) IV Intermittent every 6 hours  dextrose 5%. 1000 milliLiter(s) (50 mL/Hr) IV Continuous <Continuous>  dextrose 50% Injectable 12.5 Gram(s) IV Push once  dextrose 50% Injectable 25 Gram(s) IV Push once  dextrose 50% Injectable 25 Gram(s) IV Push once  docusate sodium 100 milliGRAM(s) Oral three times a day  insulin lispro (HumaLOG) corrective regimen sliding scale   SubCutaneous Before meals and at bedtime  levothyroxine 150 MICROGram(s) Oral daily  piperacillin/tazobactam IVPB. 2.25 Gram(s) IV Intermittent every 6 hours  senna 2 Tablet(s) Oral at bedtime  sodium bicarbonate 650 milliGRAM(s) Oral three times a day    MEDICATIONS  (PRN):  ALBUTerol/ipratropium for Nebulization 3 milliLiter(s) Nebulizer every 6 hours PRN Wheezing  dextrose Gel 1 Dose(s) Oral once PRN Blood Glucose LESS THAN 70 milliGRAM(s)/deciliter  glucagon  Injectable 1 milliGRAM(s) IntraMuscular once PRN Glucose LESS THAN 70 milligrams/deciliter  ondansetron Injectable 4 milliGRAM(s) IV Push every 6 hours PRN Nausea and/or Vomiting  oxyCODONE    5 mG/acetaminophen 325 mG 1 Tablet(s) Oral every 4 hours PRN Severe Pain (7 - 10)      Allergies    No Known Allergies    Intolerances      albumin human 25% IVPB 50 milliLiter(s) IV Intermittent every 1 hour  albumin human 25% IVPB 50 milliLiter(s) IV Intermittent every 6 hours  ALBUTerol/ipratropium for Nebulization 3 milliLiter(s) Nebulizer every 6 hours PRN  dextrose 5%. 1000 milliLiter(s) IV Continuous <Continuous>  dextrose 50% Injectable 12.5 Gram(s) IV Push once  dextrose 50% Injectable 25 Gram(s) IV Push once  dextrose 50% Injectable 25 Gram(s) IV Push once  dextrose Gel 1 Dose(s) Oral once PRN  docusate sodium 100 milliGRAM(s) Oral three times a day  glucagon  Injectable 1 milliGRAM(s) IntraMuscular once PRN  insulin lispro (HumaLOG) corrective regimen sliding scale   SubCutaneous Before meals and at bedtime  levothyroxine 150 MICROGram(s) Oral daily  ondansetron Injectable 4 milliGRAM(s) IV Push every 6 hours PRN  oxyCODONE    5 mG/acetaminophen 325 mG 1 Tablet(s) Oral every 4 hours PRN  piperacillin/tazobactam IVPB. 2.25 Gram(s) IV Intermittent every 6 hours  senna 2 Tablet(s) Oral at bedtime  sodium bicarbonate 650 milliGRAM(s) Oral three times a day      LABS:                          7.8    13.4  )-----------( 149      ( 30 Mar 2018 07:41 )             23.9     03-30    139  |  102  |  39<H>  ----------------------------<  70  3.8   |  19<L>  |  4.64<H>    Ca    7.9<L>      30 Mar 2018 07:41  Phos  5.4     03-30  Mg     1.8     03-30        Urinalysis Basic - ( 29 Mar 2018 04:06 )    Color: x / Appearance: x / SG: x / pH: x  Gluc: x / Ketone: x  / Bili: x / Urobili: x   Blood: x / Protein: x / Nitrite: x   Leuk Esterase: x / RBC: Many /HPF / WBC 5-10 /HPF   Sq Epi: x / Non Sq Epi: 5-10 /HPF / Bacteria: Many /HPF        RADIOLOGY & ADDITIONAL TESTS:  All imaging reviewed. INTERVAL/OVERNIGHT EVENTS:  FGS 86, given 1 AMP D50.  Agitated, pulled out peripheral IVs.      SUBJECTIVE:  Patient complaining of diffuse abdominal pain. Patient otherwise without complaints.       VITAL SIGNS:  ICU Vital Signs Last 24 Hrs  T(C): 37 (31 Mar 2018 18:45), Max: 37.2 (31 Mar 2018 15:45)  T(F): 98.6 (31 Mar 2018 18:45), Max: 99 (31 Mar 2018 15:45)  HR: 78 (31 Mar 2018 20:14) (64 - 80)  BP: 169/73 (31 Mar 2018 20:14) (111/52 - 169/73)  BP(mean): 105 (31 Mar 2018 20:14) (88 - 105)  ABP: --  ABP(mean): --  RR: 18 (31 Mar 2018 20:14) (15 - 20)  SpO2: 100% (31 Mar 2018 20:14) (96% - 100%)        PHYSICAL EXAM:    General: NAD, comfortable, elderly female, obese  HEENT: NCAT, PERRL, clear conjunctiva, no scleral icterus, dry mucous membranes  Neck: supple, no JVD, HD cath in place on R  Respiratory: difficult to auscultate due to body habitus and poor inspiratory effort on exam. No wheezing, rhonchi, rales,    Cardiovascular: RRR, normal S1S2, no M/R/G  Abdomen: soft, diffuse tenderness to palpation, bowel sounds in all four quadrants, ventral wound vac over umbilicus draining pink fluid  Extremities: WWP, no clubbing or cyanosis. 4+ pitting edema over entire lower extremities  Neuro: awake, alert, oriented to person, place and time, moving all extremities   Psych: does not have insight into medical condition, does not believe she has any medical problems    MEDICATIONS  (STANDING):  dextrose 5%. 1000 milliLiter(s) (50 mL/Hr) IV Continuous <Continuous>  dextrose 50% Injectable 12.5 Gram(s) IV Push once  dextrose 50% Injectable 25 Gram(s) IV Push once  dextrose 50% Injectable 25 Gram(s) IV Push once  levothyroxine 150 MICROGram(s) Oral daily  medroxyPROGESTERone 10 milliGRAM(s) Oral daily  piperacillin/tazobactam IVPB.      piperacillin/tazobactam IVPB. 2.25 Gram(s) IV Intermittent every 12 hours  QUEtiapine 12.5 milliGRAM(s) Oral at bedtime    MEDICATIONS  (STANDING):  dextrose 5%. 1000 milliLiter(s) (50 mL/Hr) IV Continuous <Continuous>  dextrose 50% Injectable 12.5 Gram(s) IV Push once  dextrose 50% Injectable 25 Gram(s) IV Push once  dextrose 50% Injectable 25 Gram(s) IV Push once  levothyroxine 150 MICROGram(s) Oral daily  medroxyPROGESTERone 10 milliGRAM(s) Oral daily  piperacillin/tazobactam IVPB.      piperacillin/tazobactam IVPB. 2.25 Gram(s) IV Intermittent every 12 hours  QUEtiapine 12.5 milliGRAM(s) Oral at bedtime    MEDICATIONS  (PRN):  acetaminophen   Tablet. 650 milliGRAM(s) Oral every 6 hours PRN Mild Pain (1 - 3)  albumin human 25% IVPB 50 milliLiter(s) IV Intermittent every 1 hour PRN HD nurse use  dextrose Gel 1 Dose(s) Oral once PRN Blood Glucose LESS THAN 70 milliGRAM(s)/deciliter  glucagon  Injectable 1 milliGRAM(s) IntraMuscular once PRN Glucose LESS THAN 70 milligrams/deciliter      .  LABS:                         7.6    10.2  )-----------( 128      ( 31 Mar 2018 03:08 )             23.0     03-31    140  |  102  |  26<H>  ----------------------------<  89  3.6   |  23  |  3.81<H>    Ca    8.1<L>      31 Mar 2018 03:08  Phos  4.3     03-31  Mg     1.9     03-31    TPro  6.0  /  Alb  3.4  /  TBili  0.8  /  DBili  x   /  AST  17  /  ALT  13  /  AlkPhos  45  03-30    PT/INR - ( 30 Mar 2018 22:39 )   PT: 19.5 sec;   INR: 1.74          PTT - ( 30 Mar 2018 22:39 )  PTT:38.6 sec    CARDIAC MARKERS ( 31 Mar 2018 03:08 )  x     / 0.02 ng/mL / x     / x     / x      CARDIAC MARKERS ( 30 Mar 2018 19:19 )  x     / 0.03 ng/mL / 13 U/L / x     / 1.6 ng/mL            RADIOLOGY, EKG & ADDITIONAL TESTS: Reviewed.

## 2018-04-01 LAB
24R-OH-CALCIDIOL SERPL-MCNC: 17.8 NG/ML — LOW (ref 30–80)
ANION GAP SERPL CALC-SCNC: 15 MMOL/L — SIGNIFICANT CHANGE UP (ref 5–17)
AUTO DIFF PNL BLD: NEGATIVE — SIGNIFICANT CHANGE UP
BLD GP AB SCN SERPL QL: NEGATIVE — SIGNIFICANT CHANGE UP
BUN SERPL-MCNC: 16 MG/DL — SIGNIFICANT CHANGE UP (ref 7–23)
C-ANCA SER-ACNC: NEGATIVE — SIGNIFICANT CHANGE UP
CALCIUM SERPL-MCNC: 8 MG/DL — LOW (ref 8.4–10.5)
CHLORIDE SERPL-SCNC: 100 MMOL/L — SIGNIFICANT CHANGE UP (ref 96–108)
CO2 SERPL-SCNC: 25 MMOL/L — SIGNIFICANT CHANGE UP (ref 22–31)
CREAT SERPL-MCNC: 3.11 MG/DL — HIGH (ref 0.5–1.3)
EOSINOPHIL NFR BLD AUTO: 1 % — SIGNIFICANT CHANGE UP (ref 0–6)
GLUCOSE BLDC GLUCOMTR-MCNC: 86 MG/DL — SIGNIFICANT CHANGE UP (ref 70–99)
GLUCOSE BLDC GLUCOMTR-MCNC: 87 MG/DL — SIGNIFICANT CHANGE UP (ref 70–99)
GLUCOSE SERPL-MCNC: 82 MG/DL — SIGNIFICANT CHANGE UP (ref 70–99)
HCT VFR BLD CALC: 21.9 % — LOW (ref 34.5–45)
HCT VFR BLD CALC: 23.8 % — LOW (ref 34.5–45)
HGB BLD-MCNC: 7.1 G/DL — LOW (ref 11.5–15.5)
HGB BLD-MCNC: 7.6 G/DL — LOW (ref 11.5–15.5)
LYMPHOCYTES # BLD AUTO: 15 % — SIGNIFICANT CHANGE UP (ref 13–44)
MAGNESIUM SERPL-MCNC: 1.8 MG/DL — SIGNIFICANT CHANGE UP (ref 1.6–2.6)
MCHC RBC-ENTMCNC: 28.6 PG — SIGNIFICANT CHANGE UP (ref 27–34)
MCHC RBC-ENTMCNC: 28.9 PG — SIGNIFICANT CHANGE UP (ref 27–34)
MCHC RBC-ENTMCNC: 31.9 G/DL — LOW (ref 32–36)
MCHC RBC-ENTMCNC: 32.4 G/DL — SIGNIFICANT CHANGE UP (ref 32–36)
MCV RBC AUTO: 89 FL — SIGNIFICANT CHANGE UP (ref 80–100)
MCV RBC AUTO: 89.5 FL — SIGNIFICANT CHANGE UP (ref 80–100)
MONOCYTES NFR BLD AUTO: 1 % — LOW (ref 2–14)
NEUTROPHILS NFR BLD AUTO: 75 % — SIGNIFICANT CHANGE UP (ref 43–77)
P-ANCA SER-ACNC: NEGATIVE — SIGNIFICANT CHANGE UP
PHOSPHATE SERPL-MCNC: 3.1 MG/DL — SIGNIFICANT CHANGE UP (ref 2.5–4.5)
PLATELET # BLD AUTO: 127 K/UL — LOW (ref 150–400)
PLATELET # BLD AUTO: 97 K/UL — LOW (ref 150–400)
POTASSIUM SERPL-MCNC: 3.4 MMOL/L — LOW (ref 3.5–5.3)
POTASSIUM SERPL-SCNC: 3.4 MMOL/L — LOW (ref 3.5–5.3)
RBC # BLD: 2.46 M/UL — LOW (ref 3.8–5.2)
RBC # BLD: 2.66 M/UL — LOW (ref 3.8–5.2)
RBC # FLD: 16 % — SIGNIFICANT CHANGE UP (ref 10.3–16.9)
RBC # FLD: 16.4 % — SIGNIFICANT CHANGE UP (ref 10.3–16.9)
RH IG SCN BLD-IMP: POSITIVE — SIGNIFICANT CHANGE UP
SODIUM SERPL-SCNC: 140 MMOL/L — SIGNIFICANT CHANGE UP (ref 135–145)
WBC # BLD: 11.6 K/UL — HIGH (ref 3.8–10.5)
WBC # BLD: 9.4 K/UL — SIGNIFICANT CHANGE UP (ref 3.8–10.5)
WBC # FLD AUTO: 11.6 K/UL — HIGH (ref 3.8–10.5)
WBC # FLD AUTO: 9.4 K/UL — SIGNIFICANT CHANGE UP (ref 3.8–10.5)

## 2018-04-01 PROCEDURE — 99232 SBSQ HOSP IP/OBS MODERATE 35: CPT

## 2018-04-01 RX ORDER — MAGNESIUM SULFATE 500 MG/ML
1 VIAL (ML) INJECTION ONCE
Qty: 0 | Refills: 0 | Status: COMPLETED | OUTPATIENT
Start: 2018-04-01 | End: 2018-04-01

## 2018-04-01 RX ORDER — CHOLECALCIFEROL (VITAMIN D3) 125 MCG
1000 CAPSULE ORAL DAILY
Qty: 0 | Refills: 0 | Status: DISCONTINUED | OUTPATIENT
Start: 2018-04-01 | End: 2018-04-11

## 2018-04-01 RX ADMIN — Medication 100 GRAM(S): at 08:32

## 2018-04-01 RX ADMIN — MEDROXYPROGESTERONE ACETATE 10 MILLIGRAM(S): 150 INJECTION, SUSPENSION, EXTENDED RELEASE INTRAMUSCULAR at 13:51

## 2018-04-01 RX ADMIN — QUETIAPINE FUMARATE 12.5 MILLIGRAM(S): 200 TABLET, FILM COATED ORAL at 21:39

## 2018-04-01 RX ADMIN — Medication 1000 UNIT(S): at 21:39

## 2018-04-01 RX ADMIN — Medication 150 MICROGRAM(S): at 06:31

## 2018-04-01 NOTE — CONSULT NOTE ADULT - SUBJECTIVE AND OBJECTIVE BOX
Medicine requested a central venous catheter to accommodate therapy for the anemia, the abdominal wall infection post ventral hernia repair and the uterine bleeding. The history a ATN and acute renal   failure is also noted along with the cxr and data. She has a right pectoral/ right IJ Permcath for HD. On ultrasound exam, the left int jugular and subclavians veins are normal. The two physician consent  was signed.

## 2018-04-01 NOTE — PROGRESS NOTE ADULT - PROBLEM SELECTOR PLAN 2
-New on this admission, no prior reported hx of renal disease. Per records, patient developed oliguria with worsening creatinine despite Lasix challenge.   -s/p HD catheter placement by IR on 3/29 with first session of HD on same day, tolerated well.    - continue management per nephrology.  - last dialysis session 3/31  -started vit D supplementation    #Agitation  Patient intermittingly agitated, resulted in pulling out IV lines last night, started seroquel 12.5 at bedtime.  may decrease dose if possible as quite somnolent this morning

## 2018-04-01 NOTE — PROGRESS NOTE ADULT - SUBJECTIVE AND OBJECTIVE BOX
Patient is a 78y old  Female who presents with a chief complaint of Wound abscess (23 Mar 2018 09:32)        INTERVAL HPI/OVERNIGHT EVENTS: less agitated    SYMPTOMS no sob, pain    DRIPS none        ICU Vital Signs Last 24 Hrs  T(C): 37.5 (01 Apr 2018 05:00), Max: 37.5 (31 Mar 2018 22:00)  T(F): 99.5 (01 Apr 2018 05:00), Max: 99.5 (31 Mar 2018 22:00)  HR: 69 (01 Apr 2018 05:31) (68 - 80)  BP: 161/72 (01 Apr 2018 05:31) (111/52 - 169/73)  BP(mean): 103 (01 Apr 2018 05:31) (89 - 105)  ABP: --  ABP(mean): --  RR: 16 (01 Apr 2018 05:31) (16 - 20)  SpO2: 95% (01 Apr 2018 05:31) (95% - 100%)      I&O's Summary    30 Mar 2018 07:01  -  31 Mar 2018 07:00  --------------------------------------------------------  IN: 100 mL / OUT: 1450 mL / NET: -1350 mL    31 Mar 2018 07:01  -  01 Apr 2018 06:35  --------------------------------------------------------  IN: 20 mL / OUT: 1615 mL / NET: -1595 mL        EXAM    Chest clear    Heart rr    Abdomen soft nontender with small vac    Extremities trace edma    Neuro awake      LABS:  CAPILLARY BLOOD GLUCOSE                            7.6    10.2  )-----------( 128      ( 31 Mar 2018 03:08 )             23.0     03-31    140  |  102  |  26<H>  ----------------------------<  89  3.6   |  23  |  3.81<H>    Ca    8.1<L>      31 Mar 2018 03:08  Phos  4.3     03-31  Mg     1.9     03-31    TPro  6.0  /  Alb  3.4  /  TBili  0.8  /  DBili  x   /  AST  17  /  ALT  13  /  AlkPhos  45  03-30    PT/INR - ( 30 Mar 2018 22:39 )   PT: 19.5 sec;   INR: 1.74          PTT - ( 30 Mar 2018 22:39 )  PTT:38.6 sec          RADIOLOGY & ADDITIONAL STUDIES:    CRITICAL CARE TIME SPENT:

## 2018-04-01 NOTE — PROGRESS NOTE ADULT - PROBLEM SELECTOR PLAN 3
Patient with prior history of hypertension now blood pressure improving off BP medications with range of 110 to 120's.  Maintain MAP>65-70 mmhg.

## 2018-04-01 NOTE — PROGRESS NOTE ADULT - SUBJECTIVE AND OBJECTIVE BOX
INTERVAL/OVERNIGHT EVENTS:        SUBJECTIVE:  Seen and examined at bedside.    ROS otherwise negative.    VITAL SIGNS:  T(F): 99 (04-01-18 @ 01:00)  HR: 68 (04-01-18 @ 01:18)  BP: 152/66 (04-01-18 @ 01:18)  RR: 17 (04-01-18 @ 01:18)  SpO2: 98% (04-01-18 @ 01:18)  Wt(kg): --    PHYSICAL EXAM:        MEDICATIONS  (STANDING):  dextrose 5%. 1000 milliLiter(s) (50 mL/Hr) IV Continuous <Continuous>  dextrose 50% Injectable 12.5 Gram(s) IV Push once  dextrose 50% Injectable 25 Gram(s) IV Push once  dextrose 50% Injectable 25 Gram(s) IV Push once  levothyroxine 150 MICROGram(s) Oral daily  medroxyPROGESTERone 10 milliGRAM(s) Oral daily  piperacillin/tazobactam IVPB.      piperacillin/tazobactam IVPB. 2.25 Gram(s) IV Intermittent every 12 hours  QUEtiapine 12.5 milliGRAM(s) Oral at bedtime    MEDICATIONS  (PRN):  acetaminophen   Tablet. 650 milliGRAM(s) Oral every 6 hours PRN Mild Pain (1 - 3)  albumin human 25% IVPB 50 milliLiter(s) IV Intermittent every 1 hour PRN HD nurse use  dextrose Gel 1 Dose(s) Oral once PRN Blood Glucose LESS THAN 70 milliGRAM(s)/deciliter  glucagon  Injectable 1 milliGRAM(s) IntraMuscular once PRN Glucose LESS THAN 70 milligrams/deciliter      Allergies    No Known Allergies    Intolerances      acetaminophen   Tablet. 650 milliGRAM(s) Oral every 6 hours PRN  albumin human 25% IVPB 50 milliLiter(s) IV Intermittent every 1 hour PRN  dextrose 5%. 1000 milliLiter(s) IV Continuous <Continuous>  dextrose 50% Injectable 12.5 Gram(s) IV Push once  dextrose 50% Injectable 25 Gram(s) IV Push once  dextrose 50% Injectable 25 Gram(s) IV Push once  dextrose Gel 1 Dose(s) Oral once PRN  glucagon  Injectable 1 milliGRAM(s) IntraMuscular once PRN  levothyroxine 150 MICROGram(s) Oral daily  medroxyPROGESTERone 10 milliGRAM(s) Oral daily  piperacillin/tazobactam IVPB.      piperacillin/tazobactam IVPB. 2.25 Gram(s) IV Intermittent every 12 hours  QUEtiapine 12.5 milliGRAM(s) Oral at bedtime      LABS:                          7.6    10.2  )-----------( 128      ( 31 Mar 2018 03:08 )             23.0     03-31    140  |  102  |  26<H>  ----------------------------<  89  3.6   |  23  |  3.81<H>    Ca    8.1<L>      31 Mar 2018 03:08  Phos  4.3     03-31  Mg     1.9     03-31    TPro  6.0  /  Alb  3.4  /  TBili  0.8  /  DBili  x   /  AST  17  /  ALT  13  /  AlkPhos  45  03-30    PT/INR - ( 30 Mar 2018 22:39 )   PT: 19.5 sec;   INR: 1.74          PTT - ( 30 Mar 2018 22:39 )  PTT:38.6 sec      RADIOLOGY & ADDITIONAL TESTS:  All imaging reviewed. INTERVAL/OVERNIGHT EVENTS:  FS 73.  No IV access.  Improved on its own to 87.  Obtained IV access.  Went through 3 sanitary pads overnight due to vaginal bleeding.    SUBJECTIVE:  Seen and examined at bedside.  Uncooperative to questioning.  ROS questionable but denies SOB, cough, CP, abd pain, N/V/D/C, flank pain, leg pain    ROS otherwise negative.    VITAL SIGNS:  T(F): 99 (04-01-18 @ 01:00)  HR: 68 (04-01-18 @ 01:18)  BP: 152/66 (04-01-18 @ 01:18)  RR: 17 (04-01-18 @ 01:18)  SpO2: 98% (04-01-18 @ 01:18)  Wt(kg): --    PHYSICAL EXAM:    General: NAD, comfortable, elderly female, obese, oriented x o  HEENT: NCAT, PERRL, clear conjunctiva, no scleral icterus, dry mucous membranes  Neck: supple, no JVD, HD cath in place on R  Respiratory: difficult to auscultate due to body habitus and poor inspiratory effort on exam. No wheezing, rhonchi, rales  Cardiovascular: normal S1S2, irregularly irregular with normal rate, no M/R/G  Abdomen: soft, nontender to palpation, bowel sounds in all four quadrants, ventral wound vac over umbilicus draining pink fluid, small 2 cm wound on RLQ without purulence or significant surrounding erythema.  Extremities: WWP, no clubbing or cyanosis. 3+ pitting edema b/l  lower extremities  Neuro: oriented x 0  Psych: does not have insight into medical condition, does not believe she has any medical problems    MEDICATIONS  (STANDING):  dextrose 5%. 1000 milliLiter(s) (50 mL/Hr) IV Continuous <Continuous>  dextrose 50% Injectable 12.5 Gram(s) IV Push once  dextrose 50% Injectable 25 Gram(s) IV Push once  dextrose 50% Injectable 25 Gram(s) IV Push once  levothyroxine 150 MICROGram(s) Oral daily  medroxyPROGESTERone 10 milliGRAM(s) Oral daily  piperacillin/tazobactam IVPB.      piperacillin/tazobactam IVPB. 2.25 Gram(s) IV Intermittent every 12 hours  QUEtiapine 12.5 milliGRAM(s) Oral at bedtime    MEDICATIONS  (PRN):  acetaminophen   Tablet. 650 milliGRAM(s) Oral every 6 hours PRN Mild Pain (1 - 3)  albumin human 25% IVPB 50 milliLiter(s) IV Intermittent every 1 hour PRN HD nurse use  dextrose Gel 1 Dose(s) Oral once PRN Blood Glucose LESS THAN 70 milliGRAM(s)/deciliter  glucagon  Injectable 1 milliGRAM(s) IntraMuscular once PRN Glucose LESS THAN 70 milligrams/deciliter      Allergies    No Known Allergies    Intolerances      acetaminophen   Tablet. 650 milliGRAM(s) Oral every 6 hours PRN  albumin human 25% IVPB 50 milliLiter(s) IV Intermittent every 1 hour PRN  dextrose 5%. 1000 milliLiter(s) IV Continuous <Continuous>  dextrose 50% Injectable 12.5 Gram(s) IV Push once  dextrose 50% Injectable 25 Gram(s) IV Push once  dextrose 50% Injectable 25 Gram(s) IV Push once  dextrose Gel 1 Dose(s) Oral once PRN  glucagon  Injectable 1 milliGRAM(s) IntraMuscular once PRN  levothyroxine 150 MICROGram(s) Oral daily  medroxyPROGESTERone 10 milliGRAM(s) Oral daily  piperacillin/tazobactam IVPB.      piperacillin/tazobactam IVPB. 2.25 Gram(s) IV Intermittent every 12 hours  QUEtiapine 12.5 milliGRAM(s) Oral at bedtime      LABS:                          7.6    10.2  )-----------( 128      ( 31 Mar 2018 03:08 )             23.0     03-31    140  |  102  |  26<H>  ----------------------------<  89  3.6   |  23  |  3.81<H>    Ca    8.1<L>      31 Mar 2018 03:08  Phos  4.3     03-31  Mg     1.9     03-31    TPro  6.0  /  Alb  3.4  /  TBili  0.8  /  DBili  x   /  AST  17  /  ALT  13  /  AlkPhos  45  03-30    PT/INR - ( 30 Mar 2018 22:39 )   PT: 19.5 sec;   INR: 1.74          PTT - ( 30 Mar 2018 22:39 )  PTT:38.6 sec      RADIOLOGY & ADDITIONAL TESTS:  All imaging reviewed. HOSPITAL COURSE  78F with PMHx of HTN, DM, HLD, hypothyroidism admitted for management of surgical wound infection following robotically assisted hernia repair on 3/2. Per history, patient initially presented on 3/2 for elective surgical intervention of symptomatic ventral hernia, procedure tolerated without complication and patient discharged on 3/5 with instructions to continue home meds. Patient returned on 3/12 with acute confusion and lethargy in the setting of hypoglycemia, during which time she was found to have a RUL PE for which she was started on hep gtt and transitioned to Eliquis; discharged on 3/16. Patient again returned on 3/23 with complaints of abdominal pain and possible maroon colored stools, found to have purulent discharge from abdominal port sites and midline incision. Patient had wound vac placed and started on Vancomycin and Zosyn with interval development of ATN (believed to be due to Vancomycin) with oliguria. Nephrology consulted and per recommendations patient given Lasix challenge without improvement, now s/p R IJ HD catheter placement by IR on 3/29 and first HD session same day, tolerated well. Hospital course additionally complicated by acute blood loss anemia, determined to be 2/2 heavy vaginal bleeding, for which patient underwent pelvic ultrasound on 3/25 with evidence of fibroid uterus with thickened endometrial walls concerning for possible malignancy. Patient continued to have stable Hgb >8 until 3/29 when Hgb found to be 6.1 --> 5.6 on repeat; transfused 2U pRBC. Eliquis had previously been discontinued on 3/24, but with anemia aspirin and HSQ also discontinued on 3/28 and 3/29, respectively. Patient has intermittently refused procedures including complete vaginal exam, transvaginal US, and initially dialysis for which psych was consulted and deemed patient without capacity. Due to worsening renal function, patient had HD cath placed with first HD on 3/29.  Zosyn discontinued.  She continues to have vaginal bleeding.  Anticipate PRBC transfusion 4/1 for hgb 7.1-7.8 in the setting of active bleeding, pending CVC placement (patient only has one 22 gauge IV).    INTERVAL/OVERNIGHT EVENTS:  FS 73.  No IV access.  Improved on its own to 87.  Obtained IV access.  Went through 3 sanitary pads overnight due to vaginal bleeding.    SUBJECTIVE:  Seen and examined at bedside.  Uncooperative to questioning.  ROS questionable but denies SOB, cough, CP, abd pain, N/V/D/C, flank pain, leg pain    ROS otherwise negative.    VITAL SIGNS:  T(F): 99 (04-01-18 @ 01:00)  HR: 68 (04-01-18 @ 01:18)  BP: 152/66 (04-01-18 @ 01:18)  RR: 17 (04-01-18 @ 01:18)  SpO2: 98% (04-01-18 @ 01:18)  Wt(kg): --    PHYSICAL EXAM:    General: NAD, comfortable, elderly female, obese, oriented x o  HEENT: NCAT, PERRL, clear conjunctiva, no scleral icterus, dry mucous membranes  Neck: supple, no JVD, HD cath in place on R  Respiratory: difficult to auscultate due to body habitus and poor inspiratory effort on exam. No wheezing, rhonchi, rales  Cardiovascular: normal S1S2, irregularly irregular with normal rate, no M/R/G  Abdomen: soft, nontender to palpation, bowel sounds in all four quadrants, ventral wound vac over umbilicus draining pink fluid, small 2 cm wound on RLQ without purulence or significant surrounding erythema.  Extremities: WWP, no clubbing or cyanosis. 3+ pitting edema b/l  lower extremities  Neuro: oriented x 0  Psych: does not have insight into medical condition, does not believe she has any medical problems    MEDICATIONS  (STANDING):  dextrose 5%. 1000 milliLiter(s) (50 mL/Hr) IV Continuous <Continuous>  dextrose 50% Injectable 12.5 Gram(s) IV Push once  dextrose 50% Injectable 25 Gram(s) IV Push once  dextrose 50% Injectable 25 Gram(s) IV Push once  levothyroxine 150 MICROGram(s) Oral daily  medroxyPROGESTERone 10 milliGRAM(s) Oral daily  piperacillin/tazobactam IVPB.      piperacillin/tazobactam IVPB. 2.25 Gram(s) IV Intermittent every 12 hours  QUEtiapine 12.5 milliGRAM(s) Oral at bedtime    MEDICATIONS  (PRN):  acetaminophen   Tablet. 650 milliGRAM(s) Oral every 6 hours PRN Mild Pain (1 - 3)  albumin human 25% IVPB 50 milliLiter(s) IV Intermittent every 1 hour PRN HD nurse use  dextrose Gel 1 Dose(s) Oral once PRN Blood Glucose LESS THAN 70 milliGRAM(s)/deciliter  glucagon  Injectable 1 milliGRAM(s) IntraMuscular once PRN Glucose LESS THAN 70 milligrams/deciliter      Allergies    No Known Allergies    Intolerances      acetaminophen   Tablet. 650 milliGRAM(s) Oral every 6 hours PRN  albumin human 25% IVPB 50 milliLiter(s) IV Intermittent every 1 hour PRN  dextrose 5%. 1000 milliLiter(s) IV Continuous <Continuous>  dextrose 50% Injectable 12.5 Gram(s) IV Push once  dextrose 50% Injectable 25 Gram(s) IV Push once  dextrose 50% Injectable 25 Gram(s) IV Push once  dextrose Gel 1 Dose(s) Oral once PRN  glucagon  Injectable 1 milliGRAM(s) IntraMuscular once PRN  levothyroxine 150 MICROGram(s) Oral daily  medroxyPROGESTERone 10 milliGRAM(s) Oral daily  piperacillin/tazobactam IVPB.      piperacillin/tazobactam IVPB. 2.25 Gram(s) IV Intermittent every 12 hours  QUEtiapine 12.5 milliGRAM(s) Oral at bedtime      LABS:                          7.6    10.2  )-----------( 128      ( 31 Mar 2018 03:08 )             23.0     03-31    140  |  102  |  26<H>  ----------------------------<  89  3.6   |  23  |  3.81<H>    Ca    8.1<L>      31 Mar 2018 03:08  Phos  4.3     03-31  Mg     1.9     03-31    TPro  6.0  /  Alb  3.4  /  TBili  0.8  /  DBili  x   /  AST  17  /  ALT  13  /  AlkPhos  45  03-30    PT/INR - ( 30 Mar 2018 22:39 )   PT: 19.5 sec;   INR: 1.74          PTT - ( 30 Mar 2018 22:39 )  PTT:38.6 sec      RADIOLOGY & ADDITIONAL TESTS:  All imaging reviewed.

## 2018-04-01 NOTE — PROGRESS NOTE ADULT - PROBLEM SELECTOR PLAN 1
Oliguric ATN  Tentatively on a MWF schedule barring quick resolution of ATN.  Not yet at [facility]  Will challenge [facility] on 4/2 HD session

## 2018-04-01 NOTE — PROGRESS NOTE ADULT - PROBLEM SELECTOR PLAN 3
Interval development of bilateral pleural effusions on CXR from 3/28 compared to that of 3/23. Likely 2/2 fluid overload in the setting of renal failure and oliguria. Patient currently without complaints of SOB, no increased oxygen requirements (on NC2L, RR 18). Improved with dialysis.

## 2018-04-01 NOTE — PROGRESS NOTE ADULT - PROBLEM SELECTOR PLAN 5
CTA 3/12 with findings of PE within the segmental arteries of the right upper   lobe, right middle lobe, and lingula. Subsequent echocardiogram notable for RV dilatation, however deemed by pulmonology to be unlikely that clot burden caused dilatation. Trops neg x3. Provoked PE in the setting of recent abdominal surgery.   - hold Eliquis in the setting of active vaginal bleeding with acute blood loss anemia.   -IVF 3/30 placed

## 2018-04-01 NOTE — PROGRESS NOTE ADULT - PROBLEM SELECTOR PLAN 7
Previously on Lisinopril and atenolol. BPs stable without antihypertensives   -would continue to hold antihypertensives as patient currently normotensive, although intermittently hypertensive to systolic 160s

## 2018-04-01 NOTE — PROGRESS NOTE ADULT - SUBJECTIVE AND OBJECTIVE BOX
Patient seen and examined at bedside.     Tolerated third HD session well  No adverse events  1500mL UF obtained  Remains anuric with 15mL of urine output    she reports she feels okay right now. No new complaints     acetaminophen   Tablet. 650 milliGRAM(s) every 6 hours PRN  albumin human 25% IVPB 50 milliLiter(s) every 1 hour PRN  dextrose 5%. 1000 milliLiter(s) <Continuous>  dextrose 50% Injectable 12.5 Gram(s) once  dextrose 50% Injectable 25 Gram(s) once  dextrose 50% Injectable 25 Gram(s) once  dextrose Gel 1 Dose(s) once PRN  glucagon  Injectable 1 milliGRAM(s) once PRN  levothyroxine 150 MICROGram(s) daily  medroxyPROGESTERone 10 milliGRAM(s) daily  QUEtiapine 12.5 milliGRAM(s) at bedtime      Allergies    No Known Allergies    Intolerances        T(C): , Max: 37.5 (03-31-18 @ 22:00)  T(F): , Max: 99.5 (03-31-18 @ 22:00)  HR: 69 (04-01-18 @ 05:31)  BP: 161/72 (04-01-18 @ 05:31)  BP(mean): 103 (04-01-18 @ 05:31)  RR: 16 (04-01-18 @ 05:31)  SpO2: 95% (04-01-18 @ 05:31)  Wt(kg): --    03-31 @ 07:01  -  04-01 @ 07:00  --------------------------------------------------------  IN:    Oral Fluid: 20 mL  Total IN: 20 mL    OUT:    Indwelling Catheter - Urethral: 15 mL    Other: 1500 mL    VAC (Vacuum Assisted Closure) System: 100 mL  Total OUT: 1615 mL    Total NET: -1595 mL              LABS:                        7.1    9.4   )-----------( 97       ( 01 Apr 2018 06:23 )             21.9     04-01    140  |  100  |  16  ----------------------------<  82  3.4<L>   |  25  |  3.11<H>    Ca    8.0<L>      01 Apr 2018 06:23  Phos  3.1     04-01  Mg     1.8     04-01    TPro  6.0  /  Alb  3.4  /  TBili  0.8  /  DBili  x   /  AST  17  /  ALT  13  /  AlkPhos  45  03-30      PT/INR - ( 30 Mar 2018 22:39 )   PT: 19.5 sec;   INR: 1.74          PTT - ( 30 Mar 2018 22:39 )  PTT:38.6 sec          RADIOLOGY & ADDITIONAL STUDIES:

## 2018-04-01 NOTE — PROGRESS NOTE ADULT - ASSESSMENT
79 y/o female with hx of DM, HLD, HTN, recent admission for post-operative PE placed on Eliquis. Complaining of abdominal pain and passing maroon colored stools. Nephrology consult called for Oliguric GONZALEZ started HD.  Not yet at [facility]

## 2018-04-01 NOTE — PROGRESS NOTE ADULT - PROBLEM SELECTOR PLAN 1
-Active vaginal bleeding possibly 2/2 GYN malignancy.  3 pads overnight.  TVUS showed fibroid uterus with thickened endometrium.  -s/p 2 units PRBCs  -maintain active T&S  -Transfuse <7.  will transfuse one unit today for 7.1.  repeat CBC this evening.  -IVC filter placed A/C contraindicated in setting of vaginal bleeding  -f/u gyn onc re: intervention    #Intrabdominal Infection  d/c'ed zosyn.  abdominal wound infection after incisional hernia repair.  afebrile without leukocytosis

## 2018-04-01 NOTE — PROGRESS NOTE ADULT - ASSESSMENT
78F with PMHx of HTN, DM, HLD, hypothyroidism admitted for management of surgical wound infection following robotically assisted hernia repair on 3/2, complicated by RUL PE, now found to have acute blood loss anemia and ATN on HD, with blood loss anemia

## 2018-04-01 NOTE — PROGRESS NOTE ADULT - ASSESSMENT
A - deelrium/encephalopthy/ ATN acute renal failure/DVT/vaginal bleeding/sp ivc filter    Suggest:  DC abx  continue small dose of seroquel  dialysis per renal  mobilize  wound care per surgery  allow po feeds  transfer to floor

## 2018-04-02 LAB
% ALBUMIN: 51.5 % — SIGNIFICANT CHANGE UP
% ALPHA 1: 7.2 % — SIGNIFICANT CHANGE UP
% ALPHA 2: 9.1 % — SIGNIFICANT CHANGE UP
% BETA: 8 % — SIGNIFICANT CHANGE UP
% GAMMA: 24.2 % — SIGNIFICANT CHANGE UP
% M SPIKE: SIGNIFICANT CHANGE UP %
ALBUMIN SERPL ELPH-MCNC: 2.7 G/DL — LOW (ref 3.6–5.5)
ALBUMIN/GLOB SERPL ELPH: 1 RATIO — SIGNIFICANT CHANGE UP
ALPHA1 GLOB SERPL ELPH-MCNC: 0.4 G/DL — SIGNIFICANT CHANGE UP (ref 0.1–0.4)
ALPHA2 GLOB SERPL ELPH-MCNC: 0.5 G/DL — SIGNIFICANT CHANGE UP (ref 0.5–1)
B-GLOBULIN SERPL ELPH-MCNC: 0.4 G/DL — LOW (ref 0.5–1)
GAMMA GLOBULIN: 1.3 G/DL — SIGNIFICANT CHANGE UP (ref 0.6–1.6)
GLUCOSE BLDC GLUCOMTR-MCNC: 75 MG/DL — SIGNIFICANT CHANGE UP (ref 70–99)
GLUCOSE BLDC GLUCOMTR-MCNC: 81 MG/DL — SIGNIFICANT CHANGE UP (ref 70–99)
GLUCOSE BLDC GLUCOMTR-MCNC: 82 MG/DL — SIGNIFICANT CHANGE UP (ref 70–99)
GLUCOSE BLDC GLUCOMTR-MCNC: 84 MG/DL — SIGNIFICANT CHANGE UP (ref 70–99)
INTERPRETATION SERPL IFE-IMP: SIGNIFICANT CHANGE UP
M-SPIKE: SIGNIFICANT CHANGE UP G/DL (ref 0–0)
PROT PATTERN SERPL ELPH-IMP: SIGNIFICANT CHANGE UP

## 2018-04-02 PROCEDURE — 99233 SBSQ HOSP IP/OBS HIGH 50: CPT

## 2018-04-02 PROCEDURE — 71045 X-RAY EXAM CHEST 1 VIEW: CPT | Mod: 26

## 2018-04-02 PROCEDURE — 99233 SBSQ HOSP IP/OBS HIGH 50: CPT | Mod: GC

## 2018-04-02 RX ORDER — PIPERACILLIN AND TAZOBACTAM 4; .5 G/20ML; G/20ML
2.25 INJECTION, POWDER, LYOPHILIZED, FOR SOLUTION INTRAVENOUS EVERY 12 HOURS
Qty: 0 | Refills: 0 | Status: DISCONTINUED | OUTPATIENT
Start: 2018-04-02 | End: 2018-04-02

## 2018-04-02 RX ORDER — ACETAMINOPHEN 500 MG
1000 TABLET ORAL ONCE
Qty: 0 | Refills: 0 | Status: COMPLETED | OUTPATIENT
Start: 2018-04-02 | End: 2018-04-02

## 2018-04-02 RX ORDER — SODIUM CHLORIDE 0.65 %
1 AEROSOL, SPRAY (ML) NASAL EVERY 4 HOURS
Qty: 0 | Refills: 0 | Status: DISCONTINUED | OUTPATIENT
Start: 2018-04-02 | End: 2018-04-11

## 2018-04-02 RX ADMIN — QUETIAPINE FUMARATE 12.5 MILLIGRAM(S): 200 TABLET, FILM COATED ORAL at 21:59

## 2018-04-02 RX ADMIN — MEDROXYPROGESTERONE ACETATE 10 MILLIGRAM(S): 150 INJECTION, SUSPENSION, EXTENDED RELEASE INTRAMUSCULAR at 19:00

## 2018-04-02 RX ADMIN — Medication 400 MILLIGRAM(S): at 07:41

## 2018-04-02 RX ADMIN — Medication 150 MICROGRAM(S): at 06:03

## 2018-04-02 RX ADMIN — Medication 1000 UNIT(S): at 19:00

## 2018-04-02 NOTE — PROGRESS NOTE ADULT - ASSESSMENT
78 year old female s/p ventral hernia repair, c/b surgical site infection, was on IV Zosyn, c/b GONZALEZ. Now with spiked fever of 100.8 and leukocytosis     CXR, blood culture, UA, CBC, BMP - To follow up  Hold IV Zosyn for now as per  due to worsening renal function  follow up on labs   will reassessed in PM    plan d/w

## 2018-04-02 NOTE — PROGRESS NOTE ADULT - SUBJECTIVE AND OBJECTIVE BOX
[facility] TRANSFER ACCEPT NOTE    HOSPITAL COURSE:  78F with PMHx of HTN, DM, HLD, hypothyroidism admitted for management of surgical wound infection following robotically assisted hernia repair on 3/2. Per history, patient initially presented on 3/2 for elective surgical intervention of symptomatic ventral hernia, procedure tolerated without complication and patient discharged on 3/5 with instructions to continue home meds. Patient returned on 3/12 with acute confusion and lethargy in the setting of hypoglycemia, during which time she was found to have a RUL PE for which she was started on hep gtt and transitioned to Eliquis; discharged on 3/16. Patient again returned on 3/23 with complaints of abdominal pain and possible maroon colored stools, found to have purulent discharge from abdominal port sites and midline incision. Patient had wound vac placed and started on Vancomycin and Zosyn with interval development of ATN (believed to be due to Vancomycin) with oliguria. Nephrology consulted and per recommendations patient given Lasix challenge without improvement, now s/p R IJ HD catheter placement by IR on 3/29 and first HD session same day, tolerated well. Hospital course additionally complicated by acute blood loss anemia, determined to be 2/2 heavy vaginal bleeding, for which patient underwent pelvic ultrasound on 3/25 with evidence of fibroid uterus with thickened endometrial walls concerning for possible malignancy. Patient continued to have stable Hgb >8 until 3/29 when Hgb found to be 6.1 --> 5.6 on repeat; transfused 2U pRBC. Eliquis had previously been discontinued on 3/24, but with anemia aspirin and HSQ also discontinued on 3/28 and 3/29, respectively. Patient has intermittently refused procedures including complete vaginal exam, transvaginal US, and initially dialysis for which psych was consulted and deemed patient without capacity. Due to worsening renal function, patient had HD cath placed with first HD on 3/29.  Zosyn discontinued.  She continues to have vaginal spotting.  GYN recc Provera to control vaginal bleeding with outpt follow up.  Hgb stable in 7-8 range.  L IJ TLC placed on 4/1 in the event of needing pRBCs.      SUBJECTIVE / INTERVAL HPI: Patient seen and examined at bedside. She is alert and inappropriately responded to questions, which is her baseline     VITAL SIGNS:  Vital Signs Last 24 Hrs  T(C): 36.8 (02 Apr 2018 11:44), Max: 38.2 (02 Apr 2018 06:00)  T(F): 98.2 (02 Apr 2018 11:44), Max: 100.8 (02 Apr 2018 06:00)  HR: 82 (02 Apr 2018 11:44) (68 - 82)  BP: 143/71 (02 Apr 2018 11:44) (142/64 - 165/72)  BP(mean): 92 (02 Apr 2018 08:15) (92 - 103)  RR: 17 (02 Apr 2018 11:44) (14 - 22)  SpO2: 93% (02 Apr 2018 11:44) (93% - 100%)    PHYSICAL EXAM:    General: NAD, comfortable, elderly female, obese, oriented x o  HEENT: NCAT, PERRL, clear conjunctiva, no scleral icterus, dry mucous membranes  Neck: supple, no JVD, HD cath in place on R  Respiratory: difficult to auscultate due to body habitus and poor inspiratory effort on exam. No wheezing, rhonchi, rales  Cardiovascular: normal S1S2, irregularly irregular with normal rate, no M/R/G  Abdomen: soft, nontender to palpation, bowel sounds in all four quadrants, ventral wound vac over umbilicus draining pink fluid, small 2 cm wound on RLQ without purulence or significant surrounding erythema.  Extremities: WWP, no clubbing or cyanosis. 3+ pitting edema b/l  lower extremities  Neuro: oriented x 0  Psych: does not have insight into medical condition, does not believe she has any medical problems    MEDICATIONS:  MEDICATIONS  (STANDING):  cholecalciferol 1000 Unit(s) Oral daily  dextrose 5%. 1000 milliLiter(s) (50 mL/Hr) IV Continuous <Continuous>  dextrose 50% Injectable 12.5 Gram(s) IV Push once  dextrose 50% Injectable 25 Gram(s) IV Push once  dextrose 50% Injectable 25 Gram(s) IV Push once  heparin  flush 100 Units/mL Injectable 100 Unit(s) IV Push every other day  levothyroxine 150 MICROGram(s) Oral daily  medroxyPROGESTERone 10 milliGRAM(s) Oral daily  QUEtiapine 12.5 milliGRAM(s) Oral at bedtime    MEDICATIONS  (PRN):  acetaminophen   Tablet. 650 milliGRAM(s) Oral every 6 hours PRN Mild Pain (1 - 3)  dextrose Gel 1 Dose(s) Oral once PRN Blood Glucose LESS THAN 70 milliGRAM(s)/deciliter  glucagon  Injectable 1 milliGRAM(s) IntraMuscular once PRN Glucose LESS THAN 70 milligrams/deciliter      ALLERGIES:  Allergies    No Known Allergies    Intolerances        LABS:                        7.6    11.6  )-----------( 127      ( 01 Apr 2018 18:12 )             23.8     04-01    140  |  100  |  16  ----------------------------<  82  3.4<L>   |  25  |  3.11<H>    Ca    8.0<L>      01 Apr 2018 06:23  Phos  3.1     04-01  Mg     1.8     04-01          CAPILLARY BLOOD GLUCOSE      POCT Blood Glucose.: 84 mg/dL (02 Apr 2018 12:05)      RADIOLOGY & ADDITIONAL TESTS: Reviewed.

## 2018-04-02 NOTE — PROGRESS NOTE ADULT - SUBJECTIVE AND OBJECTIVE BOX
INTERVAL/OVERNIGHT EVENTS:  L IJ TLC placed.    SUBJECTIVE:  Seen and examined at bedside.    ROS otherwise negative.    VITAL SIGNS:  T(F): 100.8 (04-02-18 @ 06:00)  HR: 78 (04-02-18 @ 04:59)  BP: 157/70 (04-02-18 @ 04:59)  RR: 14 (04-02-18 @ 04:59)  SpO2: 100% (04-02-18 @ 04:59)  Wt(kg): --    PHYSICAL EXAM:        MEDICATIONS  (STANDING):  acetaminophen  IVPB 1000 milliGRAM(s) IV Intermittent once  cholecalciferol 1000 Unit(s) Oral daily  dextrose 5%. 1000 milliLiter(s) (50 mL/Hr) IV Continuous <Continuous>  dextrose 50% Injectable 12.5 Gram(s) IV Push once  dextrose 50% Injectable 25 Gram(s) IV Push once  dextrose 50% Injectable 25 Gram(s) IV Push once  heparin  flush 100 Units/mL Injectable 100 Unit(s) IV Push every other day  levothyroxine 150 MICROGram(s) Oral daily  medroxyPROGESTERone 10 milliGRAM(s) Oral daily  QUEtiapine 12.5 milliGRAM(s) Oral at bedtime    MEDICATIONS  (PRN):  acetaminophen   Tablet. 650 milliGRAM(s) Oral every 6 hours PRN Mild Pain (1 - 3)  dextrose Gel 1 Dose(s) Oral once PRN Blood Glucose LESS THAN 70 milliGRAM(s)/deciliter  glucagon  Injectable 1 milliGRAM(s) IntraMuscular once PRN Glucose LESS THAN 70 milligrams/deciliter      Allergies    No Known Allergies    Intolerances      acetaminophen   Tablet. 650 milliGRAM(s) Oral every 6 hours PRN  acetaminophen  IVPB 1000 milliGRAM(s) IV Intermittent once  cholecalciferol 1000 Unit(s) Oral daily  dextrose 5%. 1000 milliLiter(s) IV Continuous <Continuous>  dextrose 50% Injectable 12.5 Gram(s) IV Push once  dextrose 50% Injectable 25 Gram(s) IV Push once  dextrose 50% Injectable 25 Gram(s) IV Push once  dextrose Gel 1 Dose(s) Oral once PRN  glucagon  Injectable 1 milliGRAM(s) IntraMuscular once PRN  heparin  flush 100 Units/mL Injectable 100 Unit(s) IV Push every other day  levothyroxine 150 MICROGram(s) Oral daily  medroxyPROGESTERone 10 milliGRAM(s) Oral daily  QUEtiapine 12.5 milliGRAM(s) Oral at bedtime      LABS:                          7.6    11.6  )-----------( 127      ( 01 Apr 2018 18:12 )             23.8     04-01    140  |  100  |  16  ----------------------------<  82  3.4<L>   |  25  |  3.11<H>    Ca    8.0<L>      01 Apr 2018 06:23  Phos  3.1     04-01  Mg     1.8     04-01            RADIOLOGY & ADDITIONAL TESTS:  All imaging reviewed. HOSPITAL COURSE  7[facility] to [facility]  78F with PMHx of HTN, DM, HLD, hypothyroidism admitted for management of surgical wound infection following robotically assisted hernia repair on 3/2. Per history, patient initially presented on 3/2 for elective surgical intervention of symptomatic ventral hernia, procedure tolerated without complication and patient discharged on 3/5 with instructions to continue home meds. Patient returned on 3/12 with acute confusion and lethargy in the setting of hypoglycemia, during which time she was found to have a RUL PE for which she was started on hep gtt and transitioned to Eliquis; discharged on 3/16. Patient again returned on 3/23 with complaints of abdominal pain and possible maroon colored stools, found to have purulent discharge from abdominal port sites and midline incision. Patient had wound vac placed and started on Vancomycin and Zosyn with interval development of ATN (believed to be due to Vancomycin) with oliguria. Nephrology consulted and per recommendations patient given Lasix challenge without improvement, now s/p R IJ HD catheter placement by IR on 3/29 and first HD session same day, tolerated well. Hospital course additionally complicated by acute blood loss anemia, determined to be 2/2 heavy vaginal bleeding, for which patient underwent pelvic ultrasound on 3/25 with evidence of fibroid uterus with thickened endometrial walls concerning for possible malignancy. Patient continued to have stable Hgb >8 until 3/29 when Hgb found to be 6.1 --> 5.6 on repeat; transfused 2U pRBC. Eliquis had previously been discontinued on 3/24, but with anemia aspirin and HSQ also discontinued on 3/28 and 3/29, respectively. Patient has intermittently refused procedures including complete vaginal exam, transvaginal US, and initially dialysis for which psych was consulted and deemed patient without capacity. Due to worsening renal function, patient had HD cath placed with first HD on 3/29.  Zosyn discontinued.  She continues to have vaginal spotting.  GYN recc Provera to control vaginal bleeding with outpt follow up.  Hgb stable in 7-8 range.  L IJ TLC placed on 4/1 in the event of needing PRBCs.      INTERVAL/OVERNIGHT EVENTS:  L IJ TLC placed.    SUBJECTIVE:  Seen and examined at bedside.    ROS otherwise negative.    VITAL SIGNS:  T(F): 100.8 (04-02-18 @ 06:00)  HR: 78 (04-02-18 @ 04:59)  BP: 157/70 (04-02-18 @ 04:59)  RR: 14 (04-02-18 @ 04:59)  SpO2: 100% (04-02-18 @ 04:59)  Wt(kg): --    PHYSICAL EXAM:        MEDICATIONS  (STANDING):  acetaminophen  IVPB 1000 milliGRAM(s) IV Intermittent once  cholecalciferol 1000 Unit(s) Oral daily  dextrose 5%. 1000 milliLiter(s) (50 mL/Hr) IV Continuous <Continuous>  dextrose 50% Injectable 12.5 Gram(s) IV Push once  dextrose 50% Injectable 25 Gram(s) IV Push once  dextrose 50% Injectable 25 Gram(s) IV Push once  heparin  flush 100 Units/mL Injectable 100 Unit(s) IV Push every other day  levothyroxine 150 MICROGram(s) Oral daily  medroxyPROGESTERone 10 milliGRAM(s) Oral daily  QUEtiapine 12.5 milliGRAM(s) Oral at bedtime    MEDICATIONS  (PRN):  acetaminophen   Tablet. 650 milliGRAM(s) Oral every 6 hours PRN Mild Pain (1 - 3)  dextrose Gel 1 Dose(s) Oral once PRN Blood Glucose LESS THAN 70 milliGRAM(s)/deciliter  glucagon  Injectable 1 milliGRAM(s) IntraMuscular once PRN Glucose LESS THAN 70 milligrams/deciliter      Allergies    No Known Allergies    Intolerances      acetaminophen   Tablet. 650 milliGRAM(s) Oral every 6 hours PRN  acetaminophen  IVPB 1000 milliGRAM(s) IV Intermittent once  cholecalciferol 1000 Unit(s) Oral daily  dextrose 5%. 1000 milliLiter(s) IV Continuous <Continuous>  dextrose 50% Injectable 12.5 Gram(s) IV Push once  dextrose 50% Injectable 25 Gram(s) IV Push once  dextrose 50% Injectable 25 Gram(s) IV Push once  dextrose Gel 1 Dose(s) Oral once PRN  glucagon  Injectable 1 milliGRAM(s) IntraMuscular once PRN  heparin  flush 100 Units/mL Injectable 100 Unit(s) IV Push every other day  levothyroxine 150 MICROGram(s) Oral daily  medroxyPROGESTERone 10 milliGRAM(s) Oral daily  QUEtiapine 12.5 milliGRAM(s) Oral at bedtime      LABS:                          7.6    11.6  )-----------( 127      ( 01 Apr 2018 18:12 )             23.8     04-01    140  |  100  |  16  ----------------------------<  82  3.4<L>   |  25  |  3.11<H>    Ca    8.0<L>      01 Apr 2018 06:23  Phos  3.1     04-01  Mg     1.8     04-01            RADIOLOGY & ADDITIONAL TESTS:  All imaging reviewed.

## 2018-04-02 NOTE — PROGRESS NOTE ADULT - PROBLEM SELECTOR PLAN 2
-New on this admission, no prior reported hx of renal disease. Per records, patient developed oliguria with worsening creatinine despite Lasix challenge.   -s/p HD catheter placement by IR on 3/29 with first session of HD on same day, tolerated well.    - continue management per nephrology.  - last dialysis session 4/2  -started vit D supplementation    #Agitation  Patient intermittingly agitated, resulted in pulling out IV lines last night, started seroquel 12.5 at bedtime.

## 2018-04-02 NOTE — PROGRESS NOTE BEHAVIORAL HEALTH - NSBHFUPINTERVALHXFT_PSY_A_CORE
As per chart Seroquel 12.5 mg PO HS started on 3/31 as patient was agitated, pulled out IV.  Patient seen at bedside.  Eyes closed, opens to name called.  Says she is "bad," but again denies pain.  When asked if she feels sad says yes.  Refuses to answer further questions, says she wants to "rest."

## 2018-04-02 NOTE — PROGRESS NOTE ADULT - SUBJECTIVE AND OBJECTIVE BOX
SUBJECTIVE: Patient seen and examined bedside, refused to be examined, able to inspect - wound VAC in place, no leak, no purulent discharge, no skin changes. did claim that there's some pain over the LLQ. no nausea/vomiting, spiked fever to 100.8 in AM.     heparin  flush 100 Units/mL Injectable 100 Unit(s) IV Push every other day  piperacillin/tazobactam IVPB. 2.25 Gram(s) IV Intermittent every 12 hours      Vital Signs Last 24 Hrs  T(C): 36.8 (02 Apr 2018 11:44), Max: 38.2 (02 Apr 2018 06:00)  T(F): 98.2 (02 Apr 2018 11:44), Max: 100.8 (02 Apr 2018 06:00)  HR: 82 (02 Apr 2018 11:44) (68 - 82)  BP: 143/71 (02 Apr 2018 11:44) (142/64 - 165/72)  BP(mean): 92 (02 Apr 2018 08:15) (92 - 103)  RR: 17 (02 Apr 2018 11:44) (14 - 22)  SpO2: 93% (02 Apr 2018 11:44) (93% - 100%)  I&O's Detail    01 Apr 2018 07:01  -  02 Apr 2018 07:00  --------------------------------------------------------  IN:    Oral Fluid: 340 mL  Total IN: 340 mL    OUT:    Indwelling Catheter - Stomal: 75 mL    Indwelling Catheter - Urethral: 100 mL    VAC (Vacuum Assisted Closure) System: 210 mL  Total OUT: 385 mL    Total NET: -45 mL        Physical exam : unable to assessed, patient refused.    LABS:                        7.6    11.6  )-----------( 127      ( 01 Apr 2018 18:12 )             23.8     04-01    140  |  100  |  16  ----------------------------<  82  3.4<L>   |  25  |  3.11<H>    Ca    8.0<L>      01 Apr 2018 06:23  Phos  3.1     04-01  Mg     1.8     04-01            RADIOLOGY & ADDITIONAL STUDIES:

## 2018-04-02 NOTE — PROGRESS NOTE ADULT - PROBLEM SELECTOR PLAN 3
-Improved with dialysis.    -Interval development of bilateral pleural effusions on CXR from 3/28 compared to that of 3/23. Likely 2/2 fluid overload in the setting of renal failure and oliguria. Patient currently without complaints of SOB, no increased oxygen requirements (on NC2L, RR 18).

## 2018-04-02 NOTE — PROGRESS NOTE ADULT - PROBLEM SELECTOR PLAN 4
-bilateral thrombi below the knees  -IVC filter placed   -Hold A/C in setting of vaginal spotting -bilateral thrombi below the knees  -IVC filter placed 3/30  -Hold A/C in setting of vaginal spotting

## 2018-04-02 NOTE — PROCEDURE NOTE - ADDITIONAL PROCEDURE DETAILS
cvp is high normal; equal breath sounds; guidewire ectopy was noted on the screen and was immediately stopped by withdrawing   the wire; biopatch is applied.

## 2018-04-02 NOTE — PROGRESS NOTE BEHAVIORAL HEALTH - SUMMARY
78F h/o depression not currently in treatment, PMH DM, HLD, HTN, recent ventral hernia repair with subsequent admission for post-operative PE placed on Eliquis, presenting again 3/23 for abdominal pain and bloody stools, initially AAOx3 but increasingly more delirious 2/2 GONZALEZ, wound infection, and potential gynecological malignancy.  Refusing dialysis and gyn workup.  Appears very slightly more alert today and less confused although was minimally participatory in interview.  Would start Lexapro 5 mg PO daily as nephew reporting patient with significant depression prior to presentation.

## 2018-04-02 NOTE — PROGRESS NOTE ADULT - PROBLEM SELECTOR PLAN 5
CTA 3/12 with findings of PE within the segmental arteries of the right upper   lobe, right middle lobe, and lingula. Subsequent echocardiogram notable for RV dilatation, however deemed by pulmonology to be unlikely that clot burden caused dilatation. Trops neg x3. Provoked PE in the setting of recent abdominal surgery.   - hold Eliquis in the setting of active vaginal spotting with acute blood loss anemia.   -IVC placed 3/30

## 2018-04-02 NOTE — CHART NOTE - NSCHARTNOTEFT_GEN_A_CORE
Nephrology addendum    Patient's dialysis catheter was manipulated and a cap was placed which cannot be removed. Unable to use patients HD catheter. Will need to fix HD catheter. Will postpone dialysis till tomorrow since there is no emergent need for dialysis today. Please do not use HD catheter for anything other then hemodialysis. Catheter should not be flushed by any other nurses besides the hemodialysis nurses.

## 2018-04-02 NOTE — PROGRESS NOTE ADULT - PROBLEM SELECTOR PLAN 5
CTA 3/12 with findings of PE within the segmental arteries of the right upper   lobe, right middle lobe, and lingula. Subsequent echocardiogram notable for RV dilatation, however deemed by pulmonology to be unlikely that clot burden caused dilatation. Trops neg x3. Provoked PE in the setting of recent abdominal surgery.   - hold Eliquis in the setting of active vaginal spotting with acute blood loss anemia.   -IVC placed 3/30 CTA 3/12 with findings of PE within the segmental arteries of the right upper   lobe, right middle lobe, and lingula. Subsequent echocardiogram notable for RV dilatation, however deemed by pulmonology to be unlikely that clot burden caused dilatation. Trops neg x3. Provoked PE in the setting of recent abdominal surgery.   - hold Eliquis in the setting of active vaginal spotting with acute blood loss anemia.   -IVC filter placed 3/30

## 2018-04-02 NOTE — PROGRESS NOTE BEHAVIORAL HEALTH - NSBHCONSULTMEDS_PSY_A_CORE FT
1. Start Lexapro 5 mg PO daily  2. Can continue Seroquel 12.5 mg PO HS for now, would consider switching to haldol (0.5 mg PO BID) if oversedated

## 2018-04-02 NOTE — PROGRESS NOTE ADULT - PROBLEM SELECTOR PLAN 3
Patient has a history of hypertension.   blood pressure stable off antihypertensive medications.   WIll manage blood pressure with UF during dialysis.

## 2018-04-02 NOTE — PROCEDURE NOTE - NSPROCDETAILS_GEN_ALL_CORE
lumen(s) aspirated and flushed/ultrasound guidance/sterile dressing applied/sterile technique, catheter placed/guidewire recovered

## 2018-04-02 NOTE — PROGRESS NOTE ADULT - PROBLEM SELECTOR PLAN 1
Patient is a 78 year old female with acute oliguric renal failure requiring hemodialysis. Patient remains oliguric with only 100 cc of urine output. Patient is tentatively being dialyzed on a M/W/F schedule.     P - dialysis today   Optiflux 180, , , 2K bath   goal UF 1.5 kg over 3.5 hours   Monitor strict I/Os   avoid nephrotoxic medications, IV contrast and NSAIDs.

## 2018-04-02 NOTE — PROGRESS NOTE ADULT - PROBLEM SELECTOR PLAN 1
-Active vaginal spotting possibly 2/2 GYN malignancy.  ~1 pad overnight (patient refusing actual pads).  TVUS showed fibroid uterus with thickened endometrium.  -s/p 2 units PRBCs  -maintain active T&S  -Transfuse <7.  Trend CBC  -IVC filter placed A/C contraindicated in setting of vaginal bleeding  -c/w provera with outpt f/u    #Intrabdominal Infection  d/c'ed zosyn 3/31.  may restart as had temp of 100.8 this am.  abdominal wound infection after incisional hernia repair.  still without leukocytosis.  blood cx today.  may do UA and CXR today.

## 2018-04-02 NOTE — PROGRESS NOTE ADULT - PROBLEM SELECTOR PLAN 2
Hemoglobin 7.6   No iron deficiency anemia  No indication for Epo as patient's renal failure is due to GONZALEZ

## 2018-04-02 NOTE — PROGRESS NOTE ADULT - ASSESSMENT
Patient is a 78 year old female with a history of diabetes mellitus, hyperlipidemia, hypertension and pulmonary embolism whom developed oliguric GONZALEZ from ATN now on hemodialysis.

## 2018-04-02 NOTE — PROGRESS NOTE ADULT - PROBLEM SELECTOR PLAN 7
Previously on Lisinopril and atenolol. BPs stable without antihypertensives   -would continue to hold antihypertensives as patient currently normotensive, although intermittently hypertensive to systolic 160s Previously on Lisinopril and atenolol. BPs stable without antihypertensives   -would continue to hold antihypertensives as patient currently normotensive, although intermittently hypertensive to systolic 160s  -BP will be managed with hyperfiltration during dialysis

## 2018-04-02 NOTE — PROGRESS NOTE ADULT - PROBLEM SELECTOR PLAN 1
-Active vaginal spotting possibly 2/2 GYN malignancy.  ~1 pad overnight (patient refusing actual pads).  TVUS showed fibroid uterus with thickened endometrium.  -s/p 2 units PRBCs  -maintain active T&S  -Transfuse <7.  Trend CBC  -IVC filter placed A/C contraindicated in setting of vaginal bleeding  -c/w provera with outpt f/u    #Intrabdominal Infection  d/c'ed zosyn 3/31.  may restart as had temp of 100.8 this am.  abdominal wound infection after incisional hernia repair.  still without leukocytosis.  blood cx today.  may do UA and CXR today. -Active vaginal spotting possibly 2/2 GYN malignancy.  ~1 pad overnight (patient refusing actual pads).  TVUS showed fibroid uterus with thickened endometrium.  -s/p 2 units PRBCs  -maintain active T&S  -Transfuse <7.  Trend CBC  -IVC filter placed A/C contraindicated in setting of vaginal bleeding  -c/w provera with outpt f/u    #Intrabdominal Infection  d/c'ed zosyn 3/31.  may restart as had temp of 100.8 this am.  abdominal wound infection after incisional hernia repair.  still without leukocytosis.    - f/u BCx today  - f/u CXR and UA  - wound vac management by Surgery Team 1C

## 2018-04-02 NOTE — PROGRESS NOTE ADULT - SUBJECTIVE AND OBJECTIVE BOX
Patient seen and examined at bedside. Patient is a 78 year old female with a history of diabetes mellitus, hyperlipidemia, hypertension and pulmonary embolism whom developed oliguric GONZALEZ from ATN now on hemodialysis. Patient appears comfortable and in no acute distress. Patient was last dialyzed 3/31.     acetaminophen   Tablet. 650 milliGRAM(s) every 6 hours PRN  cholecalciferol 1000 Unit(s) daily  dextrose 5%. 1000 milliLiter(s) <Continuous>  dextrose 50% Injectable 12.5 Gram(s) once  dextrose 50% Injectable 25 Gram(s) once  dextrose 50% Injectable 25 Gram(s) once  dextrose Gel 1 Dose(s) once PRN  glucagon  Injectable 1 milliGRAM(s) once PRN  heparin  flush 100 Units/mL Injectable 100 Unit(s) every other day  levothyroxine 150 MICROGram(s) daily  medroxyPROGESTERone 10 milliGRAM(s) daily  QUEtiapine 12.5 milliGRAM(s) at bedtime    Allergies    No Known Allergies    Intolerances    T(C): , Max: 38.2 (04-02-18 @ 06:00)  T(F): , Max: 100.8 (04-02-18 @ 06:00)  HR: 82 (04-02-18 @ 11:44)  BP: 143/71 (04-02-18 @ 11:44)  BP(mean): 92 (04-02-18 @ 08:15)  RR: 17 (04-02-18 @ 11:44)  SpO2: 93% (04-02-18 @ 11:44)    04-01 @ 07:01  -  04-02 @ 07:00  --------------------------------------------------------  IN:    Oral Fluid: 340 mL  Total IN: 340 mL    OUT:    Indwelling Catheter - Stomal: 75 mL    Indwelling Catheter - Urethral: 100 mL    VAC (Vacuum Assisted Closure) System: 210 mL  Total OUT: 385 mL    Total NET: -45 mL    LABS:                        7.6    11.6  )-----------( 127      ( 01 Apr 2018 18:12 )             23.8     04-01    140  |  100  |  16  ----------------------------<  82  3.4<L>   |  25  |  3.11<H>    Ca    8.0<L>      01 Apr 2018 06:23  Phos  3.1     04-01  Mg     1.8     04-01

## 2018-04-02 NOTE — CHART NOTE - NSCHARTNOTEFT_GEN_A_CORE
Admitting Diagnosis:   Patient is a 78y old  Female who presents with a chief complaint of Wound abscess (23 Mar 2018 09:32)      PAST MEDICAL & SURGICAL HISTORY:  Obesity  DM (diabetes mellitus)  HLD (hyperlipidemia)  HTN (hypertension)  H/O ventral hernia repair      Current Nutrition Order:  Renal, Consistent Carbohydrate Diet w/o snack        PO Intake: Good (%) [   ]  Fair (50-75%) [   ] Poor (<25%) [   ]- intake variable, 25-50% per meal     GI Issues: Some nausea endorsed    Pain: No pain at this time     Skin Integrity: R. chest permacath; abdominal wound with vac    Labs:   04-01    140  |  100  |  16  ----------------------------<  82  3.4<L>   |  25  |  3.11<H>    Ca    8.0<L>      01 Apr 2018 06:23  Phos  3.1     04-01  Mg     1.8     04-01      CAPILLARY BLOOD GLUCOSE      POCT Blood Glucose.: 84 mg/dL (02 Apr 2018 12:05)  POCT Blood Glucose.: 75 mg/dL (02 Apr 2018 06:43)      Medications:  MEDICATIONS  (STANDING):  cholecalciferol 1000 Unit(s) Oral daily  dextrose 5%. 1000 milliLiter(s) (50 mL/Hr) IV Continuous <Continuous>  dextrose 50% Injectable 12.5 Gram(s) IV Push once  dextrose 50% Injectable 25 Gram(s) IV Push once  dextrose 50% Injectable 25 Gram(s) IV Push once  heparin  flush 100 Units/mL Injectable 100 Unit(s) IV Push every other day  levothyroxine 150 MICROGram(s) Oral daily  medroxyPROGESTERone 10 milliGRAM(s) Oral daily  QUEtiapine 12.5 milliGRAM(s) Oral at bedtime    MEDICATIONS  (PRN):  acetaminophen   Tablet. 650 milliGRAM(s) Oral every 6 hours PRN Mild Pain (1 - 3)  dextrose Gel 1 Dose(s) Oral once PRN Blood Glucose LESS THAN 70 milliGRAM(s)/deciliter  glucagon  Injectable 1 milliGRAM(s) IntraMuscular once PRN Glucose LESS THAN 70 milligrams/deciliter      Weight: 97.4kg  Daily     Daily     Weight Change: 1kg weight loss since admit (initiation of HD)    Estimated energy needs: Ideal body weight used for calculations as pt >120% of IBW. Needs estimated for age, wound healing, ESRD on HD  Calories: 30-35 kcal/kg = 3010-2454 kcal/day  Protein: 1.2-1.4 g/kg = 62-73 g protein/day  Fluids per MD: 1000mL + UOP    Subjective: 77 yo/female admitted with wound dehiscence/abdominal pain. Pt deemed not to have capacity per psych notes. HD initiated on 3/29, plan for 4th session today. Pt seen in room, tx to 7 UR, asleep, only arousable to very loud verbal stimuli and still lethargic. Pt also noted with AMS. Pt endorses poor intake/appetite and some nausea. Unable to complete rest of interview 2/2 lethargy. Unable to provide nutrition education at this time; also unsure if pt good candidate for ed.     Previous Nutrition Diagnosis:  Increased nutrient needs RT increased demand for protein AEB infection and HD    Active [ X  ]  Resolved [   ]    If resolved, new PES:     Goal: Pt will meet % of EER per day with +tolerance     Recommendations:  1. recommend Nepro 1x/day (425 kcal, 19.1g protein)  2. encourage PO intake   3. trend pre/post HD weights   4. attempt to provide nutrition education at f/u    Education: Unable to provide at this time 2/2 lethargy    Risk Level: High [  X ] Moderate [   ] Low [   ]

## 2018-04-03 LAB
ANION GAP SERPL CALC-SCNC: 17 MMOL/L — SIGNIFICANT CHANGE UP (ref 5–17)
APPEARANCE UR: (no result)
APTT BLD: 27.8 SEC — SIGNIFICANT CHANGE UP (ref 27.5–37.4)
BACTERIA # UR AUTO: (no result) /HPF
BASOPHILS NFR BLD AUTO: 0.3 % — SIGNIFICANT CHANGE UP (ref 0–2)
BILIRUB UR-MCNC: (no result)
BLD GP AB SCN SERPL QL: NEGATIVE — SIGNIFICANT CHANGE UP
BUN SERPL-MCNC: 31 MG/DL — HIGH (ref 7–23)
CALCIUM SERPL-MCNC: 8.4 MG/DL — SIGNIFICANT CHANGE UP (ref 8.4–10.5)
CHLORIDE SERPL-SCNC: 101 MMOL/L — SIGNIFICANT CHANGE UP (ref 96–108)
CO2 SERPL-SCNC: 22 MMOL/L — SIGNIFICANT CHANGE UP (ref 22–31)
COLOR SPEC: SIGNIFICANT CHANGE UP
COMMENT - URINE: SIGNIFICANT CHANGE UP
COMMENT - URINE: SIGNIFICANT CHANGE UP
CREAT SERPL-MCNC: 3.9 MG/DL — HIGH (ref 0.5–1.3)
DIFF PNL FLD: (no result)
EOSINOPHIL NFR BLD AUTO: 0.3 % — SIGNIFICANT CHANGE UP (ref 0–6)
EPI CELLS # UR: SIGNIFICANT CHANGE UP /HPF (ref 0–5)
GLUCOSE BLDC GLUCOMTR-MCNC: 73 MG/DL — SIGNIFICANT CHANGE UP (ref 70–99)
GLUCOSE BLDC GLUCOMTR-MCNC: 74 MG/DL — SIGNIFICANT CHANGE UP (ref 70–99)
GLUCOSE SERPL-MCNC: 71 MG/DL — SIGNIFICANT CHANGE UP (ref 70–99)
GLUCOSE UR QL: NEGATIVE — SIGNIFICANT CHANGE UP
HCT VFR BLD CALC: 23.7 % — LOW (ref 34.5–45)
HGB BLD-MCNC: 7.5 G/DL — LOW (ref 11.5–15.5)
INR BLD: 1.79 — HIGH (ref 0.88–1.16)
KETONES UR-MCNC: (no result) MG/DL
LEUKOCYTE ESTERASE UR-ACNC: NEGATIVE — SIGNIFICANT CHANGE UP
LYMPHOCYTES # BLD AUTO: 12.6 % — LOW (ref 13–44)
MAGNESIUM SERPL-MCNC: 2.1 MG/DL — SIGNIFICANT CHANGE UP (ref 1.6–2.6)
MCHC RBC-ENTMCNC: 28.6 PG — SIGNIFICANT CHANGE UP (ref 27–34)
MCHC RBC-ENTMCNC: 31.6 G/DL — LOW (ref 32–36)
MCV RBC AUTO: 90.5 FL — SIGNIFICANT CHANGE UP (ref 80–100)
MONOCYTES NFR BLD AUTO: 5.7 % — SIGNIFICANT CHANGE UP (ref 2–14)
NEUTROPHILS NFR BLD AUTO: 81.1 % — HIGH (ref 43–77)
NITRITE UR-MCNC: NEGATIVE — SIGNIFICANT CHANGE UP
PH UR: 5 — SIGNIFICANT CHANGE UP (ref 5–8)
PLATELET # BLD AUTO: 140 K/UL — LOW (ref 150–400)
POTASSIUM SERPL-MCNC: 3.6 MMOL/L — SIGNIFICANT CHANGE UP (ref 3.5–5.3)
POTASSIUM SERPL-SCNC: 3.6 MMOL/L — SIGNIFICANT CHANGE UP (ref 3.5–5.3)
PROT UR-MCNC: 30 MG/DL
PROTHROM AB SERPL-ACNC: 20.1 SEC — HIGH (ref 9.8–12.7)
RBC # BLD: 2.62 M/UL — LOW (ref 3.8–5.2)
RBC # FLD: 16.6 % — SIGNIFICANT CHANGE UP (ref 10.3–16.9)
RBC CASTS # UR COMP ASSIST: (no result) /HPF
RH IG SCN BLD-IMP: POSITIVE — SIGNIFICANT CHANGE UP
SODIUM SERPL-SCNC: 140 MMOL/L — SIGNIFICANT CHANGE UP (ref 135–145)
SP GR SPEC: >=1.03 — SIGNIFICANT CHANGE UP (ref 1–1.03)
UROBILINOGEN FLD QL: 0.2 E.U./DL — SIGNIFICANT CHANGE UP
WBC # BLD: 12.7 K/UL — HIGH (ref 3.8–10.5)
WBC # FLD AUTO: 12.7 K/UL — HIGH (ref 3.8–10.5)
WBC UR QL: (no result) /HPF

## 2018-04-03 PROCEDURE — 99233 SBSQ HOSP IP/OBS HIGH 50: CPT

## 2018-04-03 PROCEDURE — 36580 REPLACE CVAD CATH: CPT

## 2018-04-03 PROCEDURE — 99233 SBSQ HOSP IP/OBS HIGH 50: CPT | Mod: GC

## 2018-04-03 RX ORDER — ESCITALOPRAM OXALATE 10 MG/1
5 TABLET, FILM COATED ORAL DAILY
Qty: 0 | Refills: 0 | Status: DISCONTINUED | OUTPATIENT
Start: 2018-04-03 | End: 2018-04-04

## 2018-04-03 RX ORDER — ERYTHROPOIETIN 10000 [IU]/ML
10000 INJECTION, SOLUTION INTRAVENOUS; SUBCUTANEOUS ONCE
Qty: 0 | Refills: 0 | Status: COMPLETED | OUTPATIENT
Start: 2018-04-03 | End: 2018-04-03

## 2018-04-03 RX ADMIN — Medication 650 MILLIGRAM(S): at 12:36

## 2018-04-03 RX ADMIN — Medication 150 MICROGRAM(S): at 06:23

## 2018-04-03 RX ADMIN — QUETIAPINE FUMARATE 12.5 MILLIGRAM(S): 200 TABLET, FILM COATED ORAL at 21:57

## 2018-04-03 RX ADMIN — ERYTHROPOIETIN 10000 UNIT(S): 10000 INJECTION, SOLUTION INTRAVENOUS; SUBCUTANEOUS at 17:30

## 2018-04-03 RX ADMIN — Medication 650 MILLIGRAM(S): at 13:00

## 2018-04-03 NOTE — PROGRESS NOTE ADULT - SUBJECTIVE AND OBJECTIVE BOX
Patient was seen and evaluated on dialysis.   Patient is tolerating the procedure well.   HR: 84 (04-03-18 @ 09:06)  BP: 123/66 (04-03-18 @ 09:06)  Continue dialysis:   Dialyzer:  Optiflux 180      QB:   400     QD: 500 2K bath   Goal UF 1.5kg over 3.5 Hours

## 2018-04-03 NOTE — PROGRESS NOTE ADULT - PROBLEM SELECTOR PLAN 4
-bilateral thrombi below the knees  -IVC filter placed 3/30  -Hold A/C in setting of vaginal spotting

## 2018-04-03 NOTE — PROGRESS NOTE ADULT - PROBLEM SELECTOR PLAN 1
-Active vaginal spotting possibly 2/2 GYN malignancy.  ~1 pad overnight (patient refusing actual pads).  TVUS showed fibroid uterus with thickened endometrium.  -s/p 2 units PRBCs  -maintain active T&S  -Transfuse <7.  Trend CBC  -IVC filter placed A/C contraindicated in setting of vaginal bleeding  -c/w medroxyprogesterone 10mg daily, and with outpt GYN follow up    #Intrabdominal Infection  s/p Vanc 1 dose 3/23 and Zosyn 3/23-3/31 for adominal wound infection after incisional hernia repair. Patient had solitary fever to 100.8 on 4/2 and leukocytosis increasing to 12.7  - f/u BCx 4/2  - f/u CXR and UA  - wound vac management by Surgery Team 1C  - holding off on restarting antibiotics at this time, patient is non toxic appearing

## 2018-04-03 NOTE — PROGRESS NOTE ADULT - SUBJECTIVE AND OBJECTIVE BOX
OVERNIGHT EVENTS: EMANUEL    SUBJECTIVE / INTERVAL HPI: Patient seen and examined at bedside. Awake but not oriented and unable to obtain ROS    VITAL SIGNS:  Vital Signs Last 24 Hrs  T(C): 36.9 (03 Apr 2018 05:15), Max: 37 (02 Apr 2018 20:50)  T(F): 98.5 (03 Apr 2018 05:15), Max: 98.6 (02 Apr 2018 20:50)  HR: 85 (03 Apr 2018 05:15) (79 - 85)  BP: 129/70 (03 Apr 2018 05:15) (129/70 - 167/70)  BP(mean): --  RR: 1 (03 Apr 2018 05:15) (1 - 20)  SpO2: 93% (03 Apr 2018 05:15) (91% - 96%)    PHYSICAL EXAM:    General: NAD, comfortable, elderly female, obese, oriented x 0  HEENT: NCAT, PERRL, clear conjunctiva, no scleral icterus, dry mucous membranes  Neck: supple, no JVD, HD cath in place on R, CVC in place in left IJ  Respiratory: difficult to auscultate due to body habitus and poor inspiratory effort on exam. No wheezing, rhonchi, rales  Cardiovascular: S1/S2, RRR, no M/R/G  Abdomen: soft, nontender to palpation, bowel sounds in all four quadrants, ventral wound vac over umbilicus draining pink fluid, small 2 cm wound on RLQ without purulence or significant surrounding erythema.  Extremities: WWP, no clubbing or cyanosis. 3+ pitting edema b/l  lower extremities  Neuro: oriented x 0, moves all extremities, no focal deficits  Psych: does not have insight into medical condition, does not believe she has any medical problems    MEDICATIONS:  MEDICATIONS  (STANDING):  cholecalciferol 1000 Unit(s) Oral daily  dextrose 5%. 1000 milliLiter(s) (50 mL/Hr) IV Continuous <Continuous>  dextrose 50% Injectable 12.5 Gram(s) IV Push once  dextrose 50% Injectable 25 Gram(s) IV Push once  dextrose 50% Injectable 25 Gram(s) IV Push once  epoetin nury Injectable 54016 Unit(s) IV Push once  escitalopram 5 milliGRAM(s) Oral daily  heparin  flush 100 Units/mL Injectable 100 Unit(s) IV Push every other day  levothyroxine 150 MICROGram(s) Oral daily  medroxyPROGESTERone 10 milliGRAM(s) Oral daily  QUEtiapine 12.5 milliGRAM(s) Oral at bedtime    MEDICATIONS  (PRN):  acetaminophen   Tablet. 650 milliGRAM(s) Oral every 6 hours PRN Mild Pain (1 - 3)  dextrose Gel 1 Dose(s) Oral once PRN Blood Glucose LESS THAN 70 milliGRAM(s)/deciliter  glucagon  Injectable 1 milliGRAM(s) IntraMuscular once PRN Glucose LESS THAN 70 milligrams/deciliter  sodium chloride 0.65% Nasal 1 Spray(s) Both Nostrils every 4 hours PRN Nasal Congestion      ALLERGIES:  Allergies    No Known Allergies    Intolerances        LABS:                        7.5    12.7  )-----------( 140      ( 03 Apr 2018 07:09 )             23.7     04-03    140  |  101  |  31<H>  ----------------------------<  71  3.6   |  22  |  3.90<H>    Ca    8.4      03 Apr 2018 07:09  Mg     2.1     04-03      PT/INR - ( 03 Apr 2018 07:09 )   PT: 20.1 sec;   INR: 1.79          PTT - ( 03 Apr 2018 07:09 )  PTT:27.8 sec    CAPILLARY BLOOD GLUCOSE      POCT Blood Glucose.: 81 mg/dL (02 Apr 2018 21:35)      RADIOLOGY & ADDITIONAL TESTS: Reviewed.    ASSESSMENT:    PLAN:

## 2018-04-03 NOTE — PROGRESS NOTE ADULT - SUBJECTIVE AND OBJECTIVE BOX
SUBJECTIVE: Pt seen and examined at bedside. HD catheter clogged with plans to go to IR for exchange today. No fevers since the 1x fever yesterday. +BM/+Flatus    Vital Signs Last 24 Hrs  T(C): 37.1 (03 Apr 2018 09:06), Max: 37.1 (03 Apr 2018 09:06)  T(F): 98.8 (03 Apr 2018 09:06), Max: 98.8 (03 Apr 2018 09:06)  HR: 84 (03 Apr 2018 09:06) (79 - 85)  BP: 123/66 (03 Apr 2018 09:06) (123/66 - 167/70)  BP(mean): --  RR: 16 (03 Apr 2018 09:06) (1 - 20)  SpO2: 95% (03 Apr 2018 09:06) (91% - 96%)    PHYSICAL EXAM    Gen: NAD  CV: RRR  Pulm: Regular non-labored respirations  Abd: Soft, mild tenderness to palpation around VAC site      LABS:                        7.5    12.7  )-----------( 140      ( 03 Apr 2018 07:09 )             23.7     04-03    140  |  101  |  31<H>  ----------------------------<  71  3.6   |  22  |  3.90<H>    Ca    8.4      03 Apr 2018 07:09  Mg     2.1     04-03      PT/INR - ( 03 Apr 2018 07:09 )   PT: 20.1 sec;   INR: 1.79          PTT - ( 03 Apr 2018 07:09 )  PTT:27.8 sec      ASSESSMENT AND PLAN  78 year old female s/p ventral hernia repair, c/b surgical site infection s/p wound VAC placement, was on IV Zosyn, c/b GONZALEZ now on medicine service with vaginal bleeding. Pt with fever to 100.8 yesterday, bld Cx negative x12hr. CXR with questionable right sided infiltrate, however, radiology read pending. Would get UA/UCx to complete fever workup. SUBJECTIVE: Pt seen and examined at bedside. HD catheter clogged with plans to go to IR for exchange today. No fevers since the 1x fever yesterday. +BM/+Flatus    Vital Signs Last 24 Hrs  T(C): 37.1 (03 Apr 2018 09:06), Max: 37.1 (03 Apr 2018 09:06)  T(F): 98.8 (03 Apr 2018 09:06), Max: 98.8 (03 Apr 2018 09:06)  HR: 84 (03 Apr 2018 09:06) (79 - 85)  BP: 123/66 (03 Apr 2018 09:06) (123/66 - 167/70)  BP(mean): --  RR: 16 (03 Apr 2018 09:06) (1 - 20)  SpO2: 95% (03 Apr 2018 09:06) (91% - 96%)    PHYSICAL EXAM    Gen: NAD  CV: RRR  Pulm: Regular non-labored respirations  Abd: Soft, mild tenderness to palpation around VAC site. VAC with good seal      LABS:                        7.5    12.7  )-----------( 140      ( 03 Apr 2018 07:09 )             23.7     04-03    140  |  101  |  31<H>  ----------------------------<  71  3.6   |  22  |  3.90<H>    Ca    8.4      03 Apr 2018 07:09  Mg     2.1     04-03      PT/INR - ( 03 Apr 2018 07:09 )   PT: 20.1 sec;   INR: 1.79          PTT - ( 03 Apr 2018 07:09 )  PTT:27.8 sec      ASSESSMENT AND PLAN  78 year old female s/p ventral hernia repair, c/b surgical site infection s/p wound VAC placement, was on IV Zosyn, c/b GONZALEZ now on medicine service with vaginal bleeding. Pt with fever to 100.8 yesterday, bld Cx negative x12hr. CXR with questionable right sided infiltrate, however, radiology read pending. Would get UA/UCx to complete fever workup. SUBJECTIVE: Pt seen and examined at bedside. HD catheter clogged with plans to go to IR for exchange today. No fevers since the 1x fever yesterday. +BM/+Flatus    Vital Signs Last 24 Hrs  T(C): 37.1 (03 Apr 2018 09:06), Max: 37.1 (03 Apr 2018 09:06)  T(F): 98.8 (03 Apr 2018 09:06), Max: 98.8 (03 Apr 2018 09:06)  HR: 84 (03 Apr 2018 09:06) (79 - 85)  BP: 123/66 (03 Apr 2018 09:06) (123/66 - 167/70)  BP(mean): --  RR: 16 (03 Apr 2018 09:06) (1 - 20)  SpO2: 95% (03 Apr 2018 09:06) (91% - 96%)    PHYSICAL EXAM    Gen: NAD  Neck: Both Left IJ and Right HD catheter sites appear clean without surrounding erythema or induration  CV: RRR  Pulm: Regular non-labored respirations  Abd: Soft, mild tenderness to palpation around VAC site. VAC removed with granulation tissue and fibrinous tissue underneath. VAC replaced with 3x black sponge pieces  Back: No sacral decubitus ulcer or lesion appreciated      LABS:                        7.5    12.7  )-----------( 140      ( 03 Apr 2018 07:09 )             23.7     04-03    140  |  101  |  31<H>  ----------------------------<  71  3.6   |  22  |  3.90<H>    Ca    8.4      03 Apr 2018 07:09  Mg     2.1     04-03      PT/INR - ( 03 Apr 2018 07:09 )   PT: 20.1 sec;   INR: 1.79          PTT - ( 03 Apr 2018 07:09 )  PTT:27.8 sec      ASSESSMENT AND PLAN  78 year old female s/p ventral hernia repair, c/b surgical site infection s/p wound VAC placement, was on IV Zosyn, c/b GONZALEZ now on medicine service with vaginal bleeding. Pt with fever to 100.8 yesterday, bld Cx negative x12hr. CXR with questionable right sided infiltrate, however, radiology read pending. Unlikely to be abdominal source at this time and abdominal wound appears clean and without purulence on exam. Line sites and sacrum clean and do not suggest source of fever. Pt afebrile today but WBC increasing. Would follow up radiology read of CXR from yesterday.   -No acute surgical intervention at this time  -Plan to change VAC again at bedside on Friday 2/6 (NOTE: 3 black foam pieces located within cavity)  -If pt produces any urine would be useful to send UA/UCx to complete fever workup  -Further management per primary team  -D/W Dr. Ngo

## 2018-04-03 NOTE — PROGRESS NOTE ADULT - SUBJECTIVE AND OBJECTIVE BOX
Patient seen and examined at bedside. Patient is a 78 year old female with a history of diabetes mellitus, hyperlipidemia, hypertension and pulmonary embolism whom developed oliguric GONZALEZ from ATN now on hemodialysis. Patient is comfortable and in no acute distress. Patient is planned for new permcath placement today.     acetaminophen   Tablet. 650 milliGRAM(s) every 6 hours PRN  cholecalciferol 1000 Unit(s) daily  dextrose 5%. 1000 milliLiter(s) <Continuous>  dextrose 50% Injectable 12.5 Gram(s) once  dextrose 50% Injectable 25 Gram(s) once  dextrose 50% Injectable 25 Gram(s) once  dextrose Gel 1 Dose(s) once PRN  epoetin nury Injectable 71426 Unit(s) once  escitalopram 5 milliGRAM(s) daily  glucagon  Injectable 1 milliGRAM(s) once PRN  heparin  flush 100 Units/mL Injectable 100 Unit(s) every other day  levothyroxine 150 MICROGram(s) daily  medroxyPROGESTERone 10 milliGRAM(s) daily  QUEtiapine 12.5 milliGRAM(s) at bedtime  sodium chloride 0.65% Nasal 1 Spray(s) every 4 hours PRN    Allergies    No Known Allergies    Intolerances    T(C): , Max: 37.1 (04-03-18 @ 09:06)  T(F): , Max: 98.8 (04-03-18 @ 09:06)  HR: 84 (04-03-18 @ 09:06)  BP: 123/66 (04-03-18 @ 09:06)  RR: 16 (04-03-18 @ 09:06)  SpO2: 95% (04-03-18 @ 09:06)    04-02 @ 07:01  -  04-03 @ 07:00  --------------------------------------------------------  IN:  Total IN: 0 mL    OUT:    Indwelling Catheter - Urethral: 250 mL  Total OUT: 250 mL    Total NET: -250 mL    LABS:                        7.5    12.7  )-----------( 140      ( 03 Apr 2018 07:09 )             23.7     04-03    140  |  101  |  31<H>  ----------------------------<  71  3.6   |  22  |  3.90<H>    Ca    8.4      03 Apr 2018 07:09  Mg     2.1     04-03    PT/INR - ( 03 Apr 2018 07:09 )   PT: 20.1 sec;   INR: 1.79       PTT - ( 03 Apr 2018 07:09 )  PTT:27.8 sec  Urinalysis Basic - ( 03 Apr 2018 12:50 )    Color: manuel / Appearance: SL Cloudy / SG: >=1.030 / pH: x  Gluc: x / Ketone: Trace mg/dL  / Bili: Moderate / Urobili: 0.2 E.U./dL   Blood: x / Protein: 30 mg/dL / Nitrite: NEGATIVE   Leuk Esterase: NEGATIVE / RBC: 5-10 /HPF / WBC 5-10 /HPF   Sq Epi: x / Non Sq Epi: 0-5 /HPF / Bacteria: Many /HPF

## 2018-04-03 NOTE — PROGRESS NOTE ADULT - PROBLEM SELECTOR PLAN 1
Patient is a 78 year old female with acute oliguric renal failure requiring hemodialysis. Patient remains oliguric with only 250 cc of urine output. Patient is tentatively being dialyzed on a M/W/F schedule. Dialysis was unable to be done yesterday as it was noted that patient's HD catheter was manipulated by one of the floor nurses and an incorrect cap was placed on the catheter which was unable to be removed, which hindered the catheter unusable.     P - Patient is to have a new permcath placed today   dialysis after permcath placement   Optiflux 180, , , 2K bath   goal UF 1.5 kg over 3.5 hours   Monitor strict I/Os   avoid nephrotoxic medications, IV contrast and NSAIDs.

## 2018-04-03 NOTE — PROGRESS NOTE BEHAVIORAL HEALTH - SUMMARY
78F h/o depression not currently in treatment, PMH DM, HLD, HTN, recent ventral hernia repair with subsequent admission for post-operative PE placed on Eliquis, presenting again 3/23 for abdominal pain and bloody stools, initially AAOx3 but increasingly more delirious 2/2 GONZALEZ, wound infection, and potential gynecological malignancy.  Refusing dialysis and gyn workup.  Delirium appears to be resolving alhtough patient continues to report depression.  Would continue Lexapro 5 mg PO daily and Seroquel 12.5 mg PO HS.

## 2018-04-03 NOTE — PROGRESS NOTE ADULT - PROBLEM SELECTOR PLAN 2
-New on this admission, no prior reported hx of renal disease. Per records, patient developed oliguria with worsening creatinine despite Lasix challenge.   -s/p HD catheter placement by IR on 3/29 with first session of HD on same day, tolerated well.  Will receive HD cath replacement today 2/2 cap on HD cath unable to be removed  - continue management per nephrology.  -started vit D supplementation    #Agitation  Patient intermittingly agitated, resulted in pulling out IV lines last night, started seroquel 12.5 at bedtime.  - f/u EKG for QTc -New on this admission, no prior reported hx of renal disease.  Etiology ischemic ATN with component of AIN from ACE-I and Vancomycin. Per records, patient developed oligoanuria with worsening creatinine despite Lasix challenge.   -s/p HD catheter placement by IR on 3/29 with first session of HD on same day, tolerated well.  Will receive HD cath replacement today 2/2 cap on HD cath unable to be removed  - continue management per nephrology.  -started vit D supplementation    #Agitation  Patient intermittingly agitated, resulted in pulling out IV lines last night, started seroquel 12.5 at bedtime.  - f/u EKG for QTc

## 2018-04-03 NOTE — PROGRESS NOTE BEHAVIORAL HEALTH - NSBHCONSULTMEDS_PSY_A_CORE FT
1. Continue Lexapro 5 mg PO daily  2. Can continue Seroquel 12.5 mg PO HS for now, would consider switching to haldol (0.5 mg PO BID) if oversedated

## 2018-04-03 NOTE — PROGRESS NOTE BEHAVIORAL HEALTH - NSBHFUPINTERVALHXFT_PSY_A_CORE
Patient seen at bedside.  Distinctly more alert than at last interview, able to carry on conversation.  Continues to report that she is depressed and wants to die, now able to say this is in the context of ongoing admission.  Says she thought hernia repair would require 3-4 days of recovery and is upset she has been in the hospital for so long.  Reports she was not depressed prior to surgery.  Still with some confusion about timeline and multiple medical issues.  Denies vaginal bleeding.  Does not want to speak about family but today does not deny she has nephew or brother.  Able to wet mouth with sponge on own. Patient seen at bedside.  Distinctly more alert than at last interview, able to carry on conversation.  Somewhat labile and dramatic.  Continues to report that she is depressed and wants to die, now able to say this is in the context of ongoing admission.  Says she thought hernia repair would require 3-4 days of recovery and is upset she has been in the hospital for so long.  Reports she was not depressed prior to surgery.  Still with some confusion about timeline and multiple medical issues.  Denies vaginal bleeding.  Does not want to speak about family but today does not deny she has nephew or brother.  Able to wet mouth with sponge on own.

## 2018-04-03 NOTE — PROGRESS NOTE ADULT - PROBLEM SELECTOR PLAN 5
CTA 3/12 with findings of PE within the segmental arteries of the right upper   lobe, right middle lobe, and lingula. Subsequent echocardiogram notable for RV dilatation, however deemed by pulmonology to be unlikely that clot burden caused dilatation. Trops neg x3. Provoked PE in the setting of recent abdominal surgery.   - hold Eliquis in the setting of active vaginal spotting with acute blood loss anemia.   -IVC filter placed 3/30

## 2018-04-03 NOTE — PROGRESS NOTE ADULT - PROBLEM SELECTOR PLAN 7
Previously on Lisinopril and atenolol. BPs stable without antihypertensives   -would continue to hold antihypertensives as patient currently normotensive, although intermittently hypertensive to systolic 160s  -BP will be managed with hyperfiltration during dialysis

## 2018-04-04 DIAGNOSIS — R06.02 SHORTNESS OF BREATH: ICD-10-CM

## 2018-04-04 LAB
ALBUMIN SERPL ELPH-MCNC: 2.3 G/DL — LOW (ref 3.3–5)
ALP SERPL-CCNC: 53 U/L — SIGNIFICANT CHANGE UP (ref 40–120)
ALT FLD-CCNC: 10 U/L — SIGNIFICANT CHANGE UP (ref 10–45)
ANION GAP SERPL CALC-SCNC: 15 MMOL/L — SIGNIFICANT CHANGE UP (ref 5–17)
APPEARANCE UR: CLEAR — SIGNIFICANT CHANGE UP
AST SERPL-CCNC: 18 U/L — SIGNIFICANT CHANGE UP (ref 10–40)
BILIRUB SERPL-MCNC: 0.8 MG/DL — SIGNIFICANT CHANGE UP (ref 0.2–1.2)
BILIRUB UR-MCNC: (no result)
BUN SERPL-MCNC: 17 MG/DL — SIGNIFICANT CHANGE UP (ref 7–23)
CALCIUM SERPL-MCNC: 8.1 MG/DL — LOW (ref 8.4–10.5)
CHLORIDE SERPL-SCNC: 98 MMOL/L — SIGNIFICANT CHANGE UP (ref 96–108)
CO2 SERPL-SCNC: 25 MMOL/L — SIGNIFICANT CHANGE UP (ref 22–31)
COLOR SPEC: YELLOW — SIGNIFICANT CHANGE UP
CREAT SERPL-MCNC: 2.37 MG/DL — HIGH (ref 0.5–1.3)
DIFF PNL FLD: (no result)
EOSINOPHIL NFR BLD AUTO: 1 % — SIGNIFICANT CHANGE UP (ref 0–6)
GLUCOSE BLDC GLUCOMTR-MCNC: 78 MG/DL — SIGNIFICANT CHANGE UP (ref 70–99)
GLUCOSE BLDC GLUCOMTR-MCNC: 82 MG/DL — SIGNIFICANT CHANGE UP (ref 70–99)
GLUCOSE BLDC GLUCOMTR-MCNC: 88 MG/DL — SIGNIFICANT CHANGE UP (ref 70–99)
GLUCOSE BLDC GLUCOMTR-MCNC: 91 MG/DL — SIGNIFICANT CHANGE UP (ref 70–99)
GLUCOSE BLDC GLUCOMTR-MCNC: 97 MG/DL — SIGNIFICANT CHANGE UP (ref 70–99)
GLUCOSE SERPL-MCNC: 89 MG/DL — SIGNIFICANT CHANGE UP (ref 70–99)
GLUCOSE UR QL: NEGATIVE — SIGNIFICANT CHANGE UP
HCT VFR BLD CALC: 23.1 % — LOW (ref 34.5–45)
HGB BLD-MCNC: 7.3 G/DL — LOW (ref 11.5–15.5)
KETONES UR-MCNC: (no result) MG/DL
LACTATE SERPL-SCNC: 2.2 MMOL/L — HIGH (ref 0.5–2)
LEUKOCYTE ESTERASE UR-ACNC: NEGATIVE — SIGNIFICANT CHANGE UP
LYMPHOCYTES # BLD AUTO: 9 % — LOW (ref 13–44)
MAGNESIUM SERPL-MCNC: 1.9 MG/DL — SIGNIFICANT CHANGE UP (ref 1.6–2.6)
MCHC RBC-ENTMCNC: 28.6 PG — SIGNIFICANT CHANGE UP (ref 27–34)
MCHC RBC-ENTMCNC: 31.6 G/DL — LOW (ref 32–36)
MCV RBC AUTO: 90.6 FL — SIGNIFICANT CHANGE UP (ref 80–100)
MONOCYTES NFR BLD AUTO: 4 % — SIGNIFICANT CHANGE UP (ref 2–14)
NEUTROPHILS NFR BLD AUTO: 83 % — HIGH (ref 43–77)
NITRITE UR-MCNC: NEGATIVE — SIGNIFICANT CHANGE UP
PH UR: 5 — SIGNIFICANT CHANGE UP (ref 5–8)
PLATELET # BLD AUTO: 145 K/UL — LOW (ref 150–400)
POTASSIUM SERPL-MCNC: 3.3 MMOL/L — LOW (ref 3.5–5.3)
POTASSIUM SERPL-SCNC: 3.3 MMOL/L — LOW (ref 3.5–5.3)
PROT SERPL-MCNC: 5.4 G/DL — LOW (ref 6–8.3)
PROT UR-MCNC: 30 MG/DL
RBC # BLD: 2.55 M/UL — LOW (ref 3.8–5.2)
RBC # FLD: 16.5 % — SIGNIFICANT CHANGE UP (ref 10.3–16.9)
SODIUM SERPL-SCNC: 138 MMOL/L — SIGNIFICANT CHANGE UP (ref 135–145)
SP GR SPEC: >=1.03 — SIGNIFICANT CHANGE UP (ref 1–1.03)
UROBILINOGEN FLD QL: 0.2 E.U./DL — SIGNIFICANT CHANGE UP
WBC # BLD: 13.2 K/UL — HIGH (ref 3.8–10.5)
WBC # FLD AUTO: 13.2 K/UL — HIGH (ref 3.8–10.5)

## 2018-04-04 PROCEDURE — 93010 ELECTROCARDIOGRAM REPORT: CPT

## 2018-04-04 PROCEDURE — 71045 X-RAY EXAM CHEST 1 VIEW: CPT | Mod: 26

## 2018-04-04 PROCEDURE — 99233 SBSQ HOSP IP/OBS HIGH 50: CPT | Mod: GC

## 2018-04-04 PROCEDURE — 99233 SBSQ HOSP IP/OBS HIGH 50: CPT

## 2018-04-04 RX ORDER — QUETIAPINE FUMARATE 200 MG/1
12.5 TABLET, FILM COATED ORAL AT BEDTIME
Qty: 0 | Refills: 0 | Status: DISCONTINUED | OUTPATIENT
Start: 2018-04-04 | End: 2018-04-11

## 2018-04-04 RX ORDER — ARIPIPRAZOLE 15 MG/1
5 TABLET ORAL DAILY
Qty: 0 | Refills: 0 | Status: DISCONTINUED | OUTPATIENT
Start: 2018-04-04 | End: 2018-04-11

## 2018-04-04 RX ORDER — POTASSIUM CHLORIDE 20 MEQ
40 PACKET (EA) ORAL ONCE
Qty: 0 | Refills: 0 | Status: COMPLETED | OUTPATIENT
Start: 2018-04-04 | End: 2018-04-04

## 2018-04-04 RX ORDER — ESCITALOPRAM OXALATE 10 MG/1
10 TABLET, FILM COATED ORAL DAILY
Qty: 0 | Refills: 0 | Status: DISCONTINUED | OUTPATIENT
Start: 2018-04-05 | End: 2018-04-11

## 2018-04-04 RX ORDER — INSULIN LISPRO 100/ML
VIAL (ML) SUBCUTANEOUS
Qty: 0 | Refills: 0 | Status: DISCONTINUED | OUTPATIENT
Start: 2018-04-04 | End: 2018-04-11

## 2018-04-04 RX ORDER — ACETAMINOPHEN 500 MG
1000 TABLET ORAL ONCE
Qty: 0 | Refills: 0 | Status: COMPLETED | OUTPATIENT
Start: 2018-04-04 | End: 2018-04-04

## 2018-04-04 RX ORDER — ACETAMINOPHEN 500 MG
1000 TABLET ORAL ONCE
Qty: 0 | Refills: 0 | Status: COMPLETED | OUTPATIENT
Start: 2018-04-04 | End: 2018-04-06

## 2018-04-04 RX ORDER — ESCITALOPRAM OXALATE 10 MG/1
5 TABLET, FILM COATED ORAL ONCE
Qty: 0 | Refills: 0 | Status: COMPLETED | OUTPATIENT
Start: 2018-04-04 | End: 2018-04-04

## 2018-04-04 RX ORDER — FUROSEMIDE 40 MG
40 TABLET ORAL ONCE
Qty: 0 | Refills: 0 | Status: COMPLETED | OUTPATIENT
Start: 2018-04-04 | End: 2018-04-04

## 2018-04-04 RX ORDER — POTASSIUM CHLORIDE 20 MEQ
20 PACKET (EA) ORAL ONCE
Qty: 0 | Refills: 0 | Status: COMPLETED | OUTPATIENT
Start: 2018-04-04 | End: 2018-04-04

## 2018-04-04 RX ORDER — POTASSIUM CHLORIDE 20 MEQ
20 PACKET (EA) ORAL ONCE
Qty: 0 | Refills: 0 | Status: DISCONTINUED | OUTPATIENT
Start: 2018-04-04 | End: 2018-04-04

## 2018-04-04 RX ADMIN — Medication 1000 MILLIGRAM(S): at 17:34

## 2018-04-04 RX ADMIN — ESCITALOPRAM OXALATE 5 MILLIGRAM(S): 10 TABLET, FILM COATED ORAL at 14:40

## 2018-04-04 RX ADMIN — ESCITALOPRAM OXALATE 5 MILLIGRAM(S): 10 TABLET, FILM COATED ORAL at 11:25

## 2018-04-04 RX ADMIN — MEDROXYPROGESTERONE ACETATE 10 MILLIGRAM(S): 150 INJECTION, SUSPENSION, EXTENDED RELEASE INTRAMUSCULAR at 11:29

## 2018-04-04 RX ADMIN — Medication 100 UNIT(S): at 11:26

## 2018-04-04 RX ADMIN — ARIPIPRAZOLE 5 MILLIGRAM(S): 15 TABLET ORAL at 14:38

## 2018-04-04 RX ADMIN — Medication 40 MILLIGRAM(S): at 09:53

## 2018-04-04 RX ADMIN — Medication 100 MILLIEQUIVALENT(S): at 13:02

## 2018-04-04 RX ADMIN — Medication 400 MILLIGRAM(S): at 15:47

## 2018-04-04 NOTE — PROVIDER CONTACT NOTE (OTHER) - SITUATION
Pt was in the process of taking her medications, began to choke and spit out her medication. Pt says she is nauseous and has pain above wound vac. Pt complains of SOB.

## 2018-04-04 NOTE — PROGRESS NOTE ADULT - PROBLEM SELECTOR PLAN 1
-Active vaginal spotting possibly 2/2 GYN malignancy.  ~1 pad overnight (patient refusing actual pads).  TVUS showed fibroid uterus with thickened endometrium.  -s/p 2 units PRBCs  -maintain active T&S  -Transfuse <7.  Trend CBC  -IVC filter placed A/C contraindicated in setting of vaginal bleeding  -c/w medroxyprogesterone 10mg daily, and with outpt GYN follow up    #Intrabdominal Infection  s/p Vanc 1 dose 3/23 and Zosyn 3/23-3/31 for adominal wound infection after incisional hernia repair. Patient had solitary fever to 100.8 on 4/2 and leukocytosis increasing to 12.7  - f/u BCx 4/2  - f/u CXR and UA  - wound vac management by Surgery Team 1C  - holding off on restarting antibiotics at this time, patient is non toxic appearing -Active vaginal spotting possibly 2/2 GYN malignancy.  ~1 pad overnight (patient refusing actual pads).  TVUS showed fibroid uterus with thickened endometrium.  -s/p 2 units PRBCs  -maintain active T&S  -Transfuse <7.  Trend CBC  -IVC filter placed A/C contraindicated in setting of vaginal bleeding  -c/w medroxyprogesterone 10mg daily, and with outpt GYN follow up    #Intrabdominal Infection  s/p Vanc 1 dose 3/23 and Zosyn 3/23-3/31 for abdominal wound infection after incisional hernia repair. Patient had solitary fever to 100.8 on 4/2 and leukocytosis increasing to 12.7  - f/u BCx 4/2  - f/u CXR and UA  - wound vac management by Surgery Team 1C  - holding off on restarting antibiotics at this time, patient is non toxic appearing -Active vaginal spotting possibly 2/2 GYN malignancy.  ~1 pad overnight (patient refusing actual pads).  TVUS showed fibroid uterus with thickened endometrium.  -s/p 2 units PRBCs  -maintain active T&S  -Transfuse <7.  Trend CBC  -IVC filter placed A/C contraindicated in setting of vaginal bleeding  -c/w medroxyprogesterone 10mg daily, and with outpt GYN follow up    #Intrabdominal Infection  s/p Vanc 1 dose 3/23 and Zosyn 3/23-3/31 for abdominal wound infection after incisional hernia repair. Patient had solitary fever to 100.8 on 4/2 and leukocytosis increasing to 12.7  - Bcx 4/2: NG at 2days  - wound vac management by Surgery Team 1C  - holding off on restarting antibiotics at this time, patient is non toxic appearing

## 2018-04-04 NOTE — PROGRESS NOTE BEHAVIORAL HEALTH - NSBHFUPINTERVALHXFT_PSY_A_CORE
Patient seen at bedside.  RNs present at bedside.  Patient reports abdominal discomfort, cannot be more specific.  Moderately cooperative with PO meds.  Wants to sleep.

## 2018-04-04 NOTE — PROGRESS NOTE BEHAVIORAL HEALTH - NSBHCONSULTMEDS_PSY_A_CORE FT
1. Increase Lexapro to 10 mg PO daily  2. Start Abilify 5 mg PO daily  3. Switch Seroquel 12.5 mg PO HS to PRN for agitation/insomnia

## 2018-04-04 NOTE — PROGRESS NOTE ADULT - PROBLEM SELECTOR PLAN 4
-bilateral thrombi below the knees  -IVC filter placed 3/30  -Hold A/C in setting of vaginal spotting -bilateral thrombi below the knees  -IVC filter placed 3/30  -heparin flushes

## 2018-04-04 NOTE — PROGRESS NOTE ADULT - PROBLEM SELECTOR PLAN 10
DVT PPx: IVC filter    Dispo: [facility]  FULL CODE DVT PPx: IVC filter  Nicole: in place    Dispo: [facility]  FULL CODE

## 2018-04-04 NOTE — PROGRESS NOTE ADULT - ASSESSMENT
78F with PMHx of HTN, DM, HLD, hypothyroidism admitted for management of surgical wound infection following robotically assisted hernia repair on 3/2, complicated by RUL PE, now found to have acute blood loss anemia and ATN on HD

## 2018-04-04 NOTE — PROGRESS NOTE ADULT - SUBJECTIVE AND OBJECTIVE BOX
OVERNIGHT EVENTS:    SUBJECTIVE / INTERVAL HPI: Patient seen and examined at bedside. Patient upset at having been awakened for morning exam. Unwilling to participate in interview or exam. Deferred.     VITAL SIGNS:  Vital Signs Last 24 Hrs  T(C): 36.4 (04 Apr 2018 05:12), Max: 37.1 (03 Apr 2018 09:06)  T(F): 97.6 (04 Apr 2018 05:12), Max: 98.8 (03 Apr 2018 09:06)  HR: 76 (04 Apr 2018 05:12) (76 - 94)  BP: 125/67 (04 Apr 2018 05:12) (123/66 - 164/72)  BP(mean): --  RR: 19 (04 Apr 2018 05:12) (16 - 19)  SpO2: 93% (04 Apr 2018 05:12) (92% - 95%)    PHYSICAL EXAM: Patient deferred    General: NAD, upset at being awakened for morning exam, mildly combative  HEENT: NCAT, PERRL, clear conjunctiva, no scleral icterus, dry mucous membranes  Neck: supple, no JVD, HD cath on R, TLC in place in left IJ - clean, dry with no surrounding erythema  Respiratory: unable to assess  Cardiovascular: unable to assess  Abdomen:  unable to assess  Extremities: unable to assess  Neuro: unable to fully assess    MEDICATIONS:  MEDICATIONS  (STANDING):  cholecalciferol 1000 Unit(s) Oral daily  dextrose 5%. 1000 milliLiter(s) (50 mL/Hr) IV Continuous <Continuous>  dextrose 50% Injectable 12.5 Gram(s) IV Push once  dextrose 50% Injectable 25 Gram(s) IV Push once  dextrose 50% Injectable 25 Gram(s) IV Push once  escitalopram 5 milliGRAM(s) Oral daily  heparin  flush 100 Units/mL Injectable 100 Unit(s) IV Push every other day  levothyroxine 150 MICROGram(s) Oral daily  medroxyPROGESTERone 10 milliGRAM(s) Oral daily  QUEtiapine 12.5 milliGRAM(s) Oral at bedtime    MEDICATIONS  (PRN):  acetaminophen   Tablet. 650 milliGRAM(s) Oral every 6 hours PRN Mild Pain (1 - 3)  dextrose Gel 1 Dose(s) Oral once PRN Blood Glucose LESS THAN 70 milliGRAM(s)/deciliter  glucagon  Injectable 1 milliGRAM(s) IntraMuscular once PRN Glucose LESS THAN 70 milligrams/deciliter  sodium chloride 0.65% Nasal 1 Spray(s) Both Nostrils every 4 hours PRN Nasal Congestion      ALLERGIES:  Allergies    No Known Allergies    Intolerances        LABS:                        7.5    12.7  )-----------( 140      ( 03 Apr 2018 07:09 )             23.7     04-03    140  |  101  |  31<H>  ----------------------------<  71  3.6   |  22  |  3.90<H>    Ca    8.4      03 Apr 2018 07:09  Mg     2.1     04-03      PT/INR - ( 03 Apr 2018 07:09 )   PT: 20.1 sec;   INR: 1.79          PTT - ( 03 Apr 2018 07:09 )  PTT:27.8 sec  Urinalysis Basic - ( 04 Apr 2018 01:30 )    Color: Yellow / Appearance: Clear / SG: >=1.030 / pH: x  Gluc: x / Ketone: Trace mg/dL  / Bili: Moderate / Urobili: 0.2 E.U./dL   Blood: x / Protein: 30 mg/dL / Nitrite: NEGATIVE   Leuk Esterase: NEGATIVE / RBC: 5-10 /HPF / WBC 5-10 /HPF   Sq Epi: x / Non Sq Epi: 0-5 /HPF / Bacteria: Present /HPF      CAPILLARY BLOOD GLUCOSE      POCT Blood Glucose.: 78 mg/dL (04 Apr 2018 01:48)      RADIOLOGY & ADDITIONAL TESTS: Reviewed. OVERNIGHT EVENTS: EMANUEL    SUBJECTIVE / INTERVAL HPI: Patient seen and examined at bedside. Patient upset at having been awakened for morning exam. Unwilling to participate in interview or exam. Deferred.     VITAL SIGNS:  Vital Signs Last 24 Hrs  T(C): 36.4 (04 Apr 2018 05:12), Max: 37.1 (03 Apr 2018 09:06)  T(F): 97.6 (04 Apr 2018 05:12), Max: 98.8 (03 Apr 2018 09:06)  HR: 76 (04 Apr 2018 05:12) (76 - 94)  BP: 125/67 (04 Apr 2018 05:12) (123/66 - 164/72)  BP(mean): --  RR: 19 (04 Apr 2018 05:12) (16 - 19)  SpO2: 93% (04 Apr 2018 05:12) (92% - 95%)    PHYSICAL EXAM: Patient deferred    General: NAD, upset at being awakened for morning exam, mildly combative  HEENT: NCAT, PERRL, clear conjunctiva, no scleral icterus, dry mucous membranes  Neck: supple, no JVD, HD cath on R, TLC in place in left IJ - clean, dry with no surrounding erythema  Respiratory: unable to assess  Cardiovascular: unable to assess  Abdomen:  unable to assess  Extremities: unable to assess  Neuro: unable to fully assess    MEDICATIONS:  MEDICATIONS  (STANDING):  cholecalciferol 1000 Unit(s) Oral daily  dextrose 5%. 1000 milliLiter(s) (50 mL/Hr) IV Continuous <Continuous>  dextrose 50% Injectable 12.5 Gram(s) IV Push once  dextrose 50% Injectable 25 Gram(s) IV Push once  dextrose 50% Injectable 25 Gram(s) IV Push once  escitalopram 5 milliGRAM(s) Oral daily  heparin  flush 100 Units/mL Injectable 100 Unit(s) IV Push every other day  levothyroxine 150 MICROGram(s) Oral daily  medroxyPROGESTERone 10 milliGRAM(s) Oral daily  QUEtiapine 12.5 milliGRAM(s) Oral at bedtime    MEDICATIONS  (PRN):  acetaminophen   Tablet. 650 milliGRAM(s) Oral every 6 hours PRN Mild Pain (1 - 3)  dextrose Gel 1 Dose(s) Oral once PRN Blood Glucose LESS THAN 70 milliGRAM(s)/deciliter  glucagon  Injectable 1 milliGRAM(s) IntraMuscular once PRN Glucose LESS THAN 70 milligrams/deciliter  sodium chloride 0.65% Nasal 1 Spray(s) Both Nostrils every 4 hours PRN Nasal Congestion      ALLERGIES:  Allergies    No Known Allergies    Intolerances        LABS:                        7.5    12.7  )-----------( 140      ( 03 Apr 2018 07:09 )             23.7     04-03    140  |  101  |  31<H>  ----------------------------<  71  3.6   |  22  |  3.90<H>    Ca    8.4      03 Apr 2018 07:09  Mg     2.1     04-03      PT/INR - ( 03 Apr 2018 07:09 )   PT: 20.1 sec;   INR: 1.79          PTT - ( 03 Apr 2018 07:09 )  PTT:27.8 sec  Urinalysis Basic - ( 04 Apr 2018 01:30 )    Color: Yellow / Appearance: Clear / SG: >=1.030 / pH: x  Gluc: x / Ketone: Trace mg/dL  / Bili: Moderate / Urobili: 0.2 E.U./dL   Blood: x / Protein: 30 mg/dL / Nitrite: NEGATIVE   Leuk Esterase: NEGATIVE / RBC: 5-10 /HPF / WBC 5-10 /HPF   Sq Epi: x / Non Sq Epi: 0-5 /HPF / Bacteria: Present /HPF      CAPILLARY BLOOD GLUCOSE      POCT Blood Glucose.: 78 mg/dL (04 Apr 2018 01:48)      RADIOLOGY & ADDITIONAL TESTS: Reviewed. OVERNIGHT EVENTS: EMANUEL    SUBJECTIVE / INTERVAL HPI: Patient seen and examined at bedside. Patient upset at having been awakened for morning exam. Unwilling to participate in interview or exam. Deferred. Later in the morning, patient complaining of shortness of breath. Sp02 was 93-94% with temperature afebrile and 's, Hr in the 80's. Urgent CXR showed some possible congestion, unclear if increased from prior. Patient symptoms improved after one dose of 40mg IV lasix.     VITAL SIGNS:  Vital Signs Last 24 Hrs  T(C): 36.4 (04 Apr 2018 05:12), Max: 37.1 (03 Apr 2018 09:06)  T(F): 97.6 (04 Apr 2018 05:12), Max: 98.8 (03 Apr 2018 09:06)  HR: 76 (04 Apr 2018 05:12) (76 - 94)  BP: 125/67 (04 Apr 2018 05:12) (123/66 - 164/72)  BP(mean): --  RR: 19 (04 Apr 2018 05:12) (16 - 19)  SpO2: 93% (04 Apr 2018 05:12) (92% - 95%)    PHYSICAL EXAM: Patient deferred    General: NAD, upset at being awakened for morning exam, mildly combative  HEENT: NCAT, PERRL, clear conjunctiva, no scleral icterus, dry mucous membranes  Neck: supple, no JVD, HD cath on R, TLC in place in left IJ - clean, dry with no surrounding erythema  Respiratory: unable to assess  Cardiovascular: unable to assess  Abdomen:  unable to assess  Extremities: unable to assess  Neuro: unable to fully assess    MEDICATIONS:  MEDICATIONS  (STANDING):  cholecalciferol 1000 Unit(s) Oral daily  dextrose 5%. 1000 milliLiter(s) (50 mL/Hr) IV Continuous <Continuous>  dextrose 50% Injectable 12.5 Gram(s) IV Push once  dextrose 50% Injectable 25 Gram(s) IV Push once  dextrose 50% Injectable 25 Gram(s) IV Push once  escitalopram 5 milliGRAM(s) Oral daily  heparin  flush 100 Units/mL Injectable 100 Unit(s) IV Push every other day  levothyroxine 150 MICROGram(s) Oral daily  medroxyPROGESTERone 10 milliGRAM(s) Oral daily  QUEtiapine 12.5 milliGRAM(s) Oral at bedtime    MEDICATIONS  (PRN):  acetaminophen   Tablet. 650 milliGRAM(s) Oral every 6 hours PRN Mild Pain (1 - 3)  dextrose Gel 1 Dose(s) Oral once PRN Blood Glucose LESS THAN 70 milliGRAM(s)/deciliter  glucagon  Injectable 1 milliGRAM(s) IntraMuscular once PRN Glucose LESS THAN 70 milligrams/deciliter  sodium chloride 0.65% Nasal 1 Spray(s) Both Nostrils every 4 hours PRN Nasal Congestion      ALLERGIES:  Allergies    No Known Allergies    Intolerances        LABS:                        7.5    12.7  )-----------( 140      ( 03 Apr 2018 07:09 )             23.7     04-03    140  |  101  |  31<H>  ----------------------------<  71  3.6   |  22  |  3.90<H>    Ca    8.4      03 Apr 2018 07:09  Mg     2.1     04-03      PT/INR - ( 03 Apr 2018 07:09 )   PT: 20.1 sec;   INR: 1.79          PTT - ( 03 Apr 2018 07:09 )  PTT:27.8 sec  Urinalysis Basic - ( 04 Apr 2018 01:30 )    Color: Yellow / Appearance: Clear / SG: >=1.030 / pH: x  Gluc: x / Ketone: Trace mg/dL  / Bili: Moderate / Urobili: 0.2 E.U./dL   Blood: x / Protein: 30 mg/dL / Nitrite: NEGATIVE   Leuk Esterase: NEGATIVE / RBC: 5-10 /HPF / WBC 5-10 /HPF   Sq Epi: x / Non Sq Epi: 0-5 /HPF / Bacteria: Present /HPF      CAPILLARY BLOOD GLUCOSE      POCT Blood Glucose.: 78 mg/dL (04 Apr 2018 01:48)      RADIOLOGY & ADDITIONAL TESTS: Reviewed. OVERNIGHT EVENTS: EMANUEL    SUBJECTIVE / INTERVAL HPI: Patient seen and examined at bedside. Patient upset at having been awakened for morning exam. Unwilling to participate in interview or exam. Deferred. Later in the morning, patient complaining of shortness of breath. Sp02 was 93-94% with temperature afebrile and 's, Hr in the 80's. Urgent CXR showed some possible congestion, unclear if increased from prior. Patient symptoms improved after one dose of 40mg IV lasix. Nicole placed for strict I/O monitoring.      VITAL SIGNS:  Vital Signs Last 24 Hrs  T(C): 36.4 (04 Apr 2018 05:12), Max: 37.1 (03 Apr 2018 09:06)  T(F): 97.6 (04 Apr 2018 05:12), Max: 98.8 (03 Apr 2018 09:06)  HR: 76 (04 Apr 2018 05:12) (76 - 94)  BP: 125/67 (04 Apr 2018 05:12) (123/66 - 164/72)  BP(mean): --  RR: 19 (04 Apr 2018 05:12) (16 - 19)  SpO2: 93% (04 Apr 2018 05:12) (92% - 95%)    PHYSICAL EXAM: Patient deferred    General: NAD, upset at being awakened for morning exam, mildly combative  HEENT: NCAT, PERRL, clear conjunctiva, no scleral icterus, dry mucous membranes  Neck: supple, no JVD, HD cath on R, TLC in place in left IJ - clean, dry with no surrounding erythema  Respiratory: unable to assess  Cardiovascular: unable to assess  Abdomen:  unable to assess  Extremities: unable to assess  Neuro: unable to fully assess    MEDICATIONS:  MEDICATIONS  (STANDING):  cholecalciferol 1000 Unit(s) Oral daily  dextrose 5%. 1000 milliLiter(s) (50 mL/Hr) IV Continuous <Continuous>  dextrose 50% Injectable 12.5 Gram(s) IV Push once  dextrose 50% Injectable 25 Gram(s) IV Push once  dextrose 50% Injectable 25 Gram(s) IV Push once  escitalopram 5 milliGRAM(s) Oral daily  heparin  flush 100 Units/mL Injectable 100 Unit(s) IV Push every other day  levothyroxine 150 MICROGram(s) Oral daily  medroxyPROGESTERone 10 milliGRAM(s) Oral daily  QUEtiapine 12.5 milliGRAM(s) Oral at bedtime    MEDICATIONS  (PRN):  acetaminophen   Tablet. 650 milliGRAM(s) Oral every 6 hours PRN Mild Pain (1 - 3)  dextrose Gel 1 Dose(s) Oral once PRN Blood Glucose LESS THAN 70 milliGRAM(s)/deciliter  glucagon  Injectable 1 milliGRAM(s) IntraMuscular once PRN Glucose LESS THAN 70 milligrams/deciliter  sodium chloride 0.65% Nasal 1 Spray(s) Both Nostrils every 4 hours PRN Nasal Congestion      ALLERGIES:  Allergies    No Known Allergies    Intolerances        LABS:                        7.5    12.7  )-----------( 140      ( 03 Apr 2018 07:09 )             23.7     04-03    140  |  101  |  31<H>  ----------------------------<  71  3.6   |  22  |  3.90<H>    Ca    8.4      03 Apr 2018 07:09  Mg     2.1     04-03      PT/INR - ( 03 Apr 2018 07:09 )   PT: 20.1 sec;   INR: 1.79          PTT - ( 03 Apr 2018 07:09 )  PTT:27.8 sec  Urinalysis Basic - ( 04 Apr 2018 01:30 )    Color: Yellow / Appearance: Clear / SG: >=1.030 / pH: x  Gluc: x / Ketone: Trace mg/dL  / Bili: Moderate / Urobili: 0.2 E.U./dL   Blood: x / Protein: 30 mg/dL / Nitrite: NEGATIVE   Leuk Esterase: NEGATIVE / RBC: 5-10 /HPF / WBC 5-10 /HPF   Sq Epi: x / Non Sq Epi: 0-5 /HPF / Bacteria: Present /HPF      CAPILLARY BLOOD GLUCOSE      POCT Blood Glucose.: 78 mg/dL (04 Apr 2018 01:48)      RADIOLOGY & ADDITIONAL TESTS: Reviewed.

## 2018-04-04 NOTE — PROGRESS NOTE ADULT - PROBLEM SELECTOR PLAN 7
Previously on Lisinopril and atenolol. BPs stable without antihypertensives   -would continue to hold antihypertensives as patient currently normotensive, although intermittently hypertensive to systolic 160s  -BP will be managed with hyperfiltration during dialysis Previously on Lisinopril and atenolol. BPs stable without antihypertensives   -would continue to hold antihypertensives as patient currently normotensive - SBP to 130's  -BP will be managed with hyperfiltration during dialysis. HD planned for later today.

## 2018-04-04 NOTE — PROGRESS NOTE ADULT - ASSESSMENT
78F with PMHx of HTN, DM, HLD, hypothyroidism admitted for management of surgical wound infection following robotically assisted hernia repair on 3/2, complicated by RUL PE, now found to have acute blood loss anemia and ATN on HD, with blood loss anemia 78F with PMHx of HTN, DM, HLD, hypothyroidism admitted for management of surgical wound infection following robotically assisted hernia repair on 3/2, complicated by RUL PE, now found to have acute blood loss anemia and ATN on HD (last session 4/3), with blood loss anemia currently stable.

## 2018-04-04 NOTE — PROGRESS NOTE BEHAVIORAL HEALTH - SUMMARY
78F h/o depression not currently in treatment, PMH DM, HLD, HTN, recent ventral hernia repair with subsequent admission for post-operative PE placed on Eliquis, presenting again 3/23 for abdominal pain and bloody stools, initially AAOx3 but increasingly more delirious 2/2 GONZALEZ, wound infection, and potential gynecological malignancy.  Refusing dialysis and gyn workup.  Delirium appears to be resolving although patient continues to report depression.  Would increase Lexapro to 10 mg PO daily and switch antipsychotic to Abilify 5 mg PO daily as patient appears lethargic but still delirious, also adjunctive treatment for depression.  Can continue Seroquel 12.5 mg PO HS as PRN order for agitation.

## 2018-04-04 NOTE — PROGRESS NOTE ADULT - SUBJECTIVE AND OBJECTIVE BOX
CC: SURGICAL WOUND INFECTION      INTERVAL HISTORY:  appears SOB  complains of not feeling well with chest discomfort and subjective SOB      ROS: No chest pain, no sob, no abd pain. No n/v/d    PAST MEDICAL & SURGICAL HISTORY:  Obesity  DM (diabetes mellitus)  HLD (hyperlipidemia)  HTN (hypertension)  H/O ventral hernia repair  No significant past surgical history  History of left inguinal hernia repair      PHYSICAL EXAM:  T(C): 37.4 (04-04-18 @ 09:10), Max: 37.4 (04-04-18 @ 09:10)  HR: 90 (04-04-18 @ 09:10)  BP: 132/70 (04-04-18 @ 09:10) (125/67 - 164/72)  RR: 18 (04-04-18 @ 09:10)  SpO2: 93% (04-04-18 @ 09:10)  Wt(kg): --  I&O's Summary    03 Apr 2018 07:01  -  04 Apr 2018 07:00  --------------------------------------------------------  IN: 0 mL / OUT: 1500 mL / NET: -1500 mL      Weight   General: AAO x 3,  NAD.  HEENT: moist mucous membranes, no pallor/cyanosis.  Neck: no JVD visible.  Cardiac: S1, S2. RRR. No murmurs   Respratory: CTA b/l, no access muscle use.   Abdomen: soft. nontender. nondistended  Skin: no rashes.  Extremities: no LE edema b/l  Access: R Permacath      DATA:                        7.5<L>  12.7<H> )-----------( 140<L>    ( 03 Apr 2018 07:09 )             23.7<L>    Ferritin, Serum: 356 ng/mL <H> (03-29 @ 12:32)      140    |  101    |  31<H>  ----------------------------<  71     Ca:8.4   (03 Apr 2018 07:09)  3.6     |  22     |  3.90<H>              Vitamin D, 25-Hydroxy: 17.8 ng/mL <L> [30.0 - 80.0] (03-31 @ 20:39)    Urinalysis Basic - ( 04 Apr 2018 01:30 )  Color: Yellow / Appearance: Clear / SG: >=1.030 / pH: x  Gluc: x / Ketone: Trace mg/dL<!!>  / Bili: Moderate<!!> / Urobili: 0.2 E.U./dL   Blood: x / Protein: 30 mg/dL<!!> / Nitrite: NEGATIVE   Leuk Esterase: NEGATIVE / RBC: 5-10 /HPF<!!> / WBC 5-10 /HPF<!!>   Sq Epi: x / Non Sq Epi: 0-5 /HPF / Bacteria: Present /HPF<!!>        Protein/Creatinine Ratio Calculation: 3.1 Ratio <H> (03-28 @ 05:09)          MEDICATIONS  (STANDING):  cholecalciferol 1000 Unit(s) Oral daily  dextrose 5%. 1000 milliLiter(s) (50 mL/Hr) IV Continuous <Continuous>  dextrose 50% Injectable 12.5 Gram(s) IV Push once  dextrose 50% Injectable 25 Gram(s) IV Push once  dextrose 50% Injectable 25 Gram(s) IV Push once  escitalopram 5 milliGRAM(s) Oral daily  heparin  flush 100 Units/mL Injectable 100 Unit(s) IV Push every other day  levothyroxine 150 MICROGram(s) Oral daily  medroxyPROGESTERone 10 milliGRAM(s) Oral daily  QUEtiapine 12.5 milliGRAM(s) Oral at bedtime    MEDICATIONS  (PRN):  acetaminophen   Tablet. 650 milliGRAM(s) Oral every 6 hours PRN Mild Pain (1 - 3)  dextrose Gel 1 Dose(s) Oral once PRN Blood Glucose LESS THAN 70 milliGRAM(s)/deciliter  glucagon  Injectable 1 milliGRAM(s) IntraMuscular once PRN Glucose LESS THAN 70 milligrams/deciliter  sodium chloride 0.65% Nasal 1 Spray(s) Both Nostrils every 4 hours PRN Nasal Congestion

## 2018-04-04 NOTE — PROGRESS NOTE ADULT - PROBLEM SELECTOR PLAN 6
On Glipizide at home.   -continue to hold oral antihyperglycemics   -continue ISS, FSG. On Glipizide at home.   -continue to hold oral antihyperglycemics   -continue ISS, FSG.  -Fs showing blood sugar in the 80's, will continue to monitor and supplement as appropriate

## 2018-04-04 NOTE — PROGRESS NOTE ADULT - PROBLEM SELECTOR PLAN 1
repeat labs today  had 1.5L UF removed yesterday  noted to have increased UO since yesterday as per chart - please place Nicole    give one dose of Lasix 40mg IV x 1 to see if there is a response (would like to see if there may be some resolution of GONZALEZ)  if there is a response to Lasix may start standing dose of Lasix 40mg IV BID to maintain a negative fluid balance  if no response to Lasix will consider repeat hemodialysis today for additional UF repeat labs today  had 1.5L UF removed yesterday  noted to have increased UO since yesterday as per chart - please place Nicole  also, repeat Urine protein:creatinine ratio    give one dose of Lasix 40mg IV x 1 to see if there is a response (would like to see if there may be some resolution of GONZALEZ)  if there is a response to Lasix may start standing dose of Lasix 40mg IV BID to maintain a negative fluid balance  if no response to Lasix will consider repeat hemodialysis today for additional UF

## 2018-04-04 NOTE — PROGRESS NOTE ADULT - PROBLEM SELECTOR PLAN 9
F:  none  E:  replete cautiously  N:  renal consistent carb F:  Nicole in place; strict I/O  E:  replete cautiously  N:  renal consistent carb

## 2018-04-04 NOTE — PROGRESS NOTE ADULT - PROBLEM SELECTOR PLAN 3
-Improved with dialysis.    -Interval development of bilateral pleural effusions on CXR from 3/28 compared to that of 3/23. Likely 2/2 fluid overload in the setting of renal failure and oliguria. Patient currently without complaints of SOB, no increased oxygen requirements (on NC2L, RR 18). -Improved with dialysis.    -Interval development of bilateral pleural effusions on CXR from 3/28 compared to that of 3/23. Likely 2/2 fluid overload in the setting of renal failure and oliguria. Patient currently without complaints of SOB, no increased oxygen requirements (on NC2L, RR 18).  -CXR this AM, poor quality film. Unclear if change from previous. Experiencing SOB, improved with one dose of 40mg IV lasix. -Improved with dialysis.    -Interval development of bilateral pleural effusions on CXR from 3/28 compared to that of 3/23. Likely 2/2 fluid overload in the setting of renal failure and oliguria. Patient currently without complaints of SOB, no increased oxygen requirements (on NC2L, RR 18).  -CXR this AM, poor quality film. Unclear if change from previous. Experiencing SOB, improved with one dose of 40mg IV lasix.  -encouraging incentive spirometry for improved aeration

## 2018-04-04 NOTE — PROGRESS NOTE ADULT - PROBLEM SELECTOR PLAN 5
CTA 3/12 with findings of PE within the segmental arteries of the right upper   lobe, right middle lobe, and lingula. Subsequent echocardiogram notable for RV dilatation, however deemed by pulmonology to be unlikely that clot burden caused dilatation. Trops neg x3. Provoked PE in the setting of recent abdominal surgery.   - hold Eliquis in the setting of active vaginal spotting with acute blood loss anemia.   -IVC filter placed 3/30 CTA 3/12 with findings of PE within the segmental arteries of the right upper   lobe, right middle lobe, and lingula. Subsequent echocardiogram notable for RV dilatation, however deemed by pulmonology to be unlikely that clot burden caused dilatation. Trops neg x3. Provoked PE in the setting of recent abdominal surgery.   - hold Eliquis in the setting of active vaginal spotting with acute blood loss anemia.   - receiving heparin flushes  -IVC filter placed 3/30 CTA 3/12 with findings of PE within the segmental arteries of the right upper   lobe, right middle lobe, and lingula. Subsequent echocardiogram notable for RV dilatation, however deemed by pulmonology to be unlikely that clot burden caused dilatation. Trops neg x3. Provoked PE in the setting of recent abdominal surgery.   - hold Eliquis in the setting of active vaginal spotting with acute blood loss anemia.   - receiving heparin flushes  -IVC filter placed 3/30  -encouraging incentive spirometry

## 2018-04-04 NOTE — PROVIDER CONTACT NOTE (OTHER) - BACKGROUND
VS: BP elevated 165/73, SPO2 at 91%. T 98.9 and initial , went down to 96 in a minute. Pt noted to have dark stool this morning. Md Tom aware

## 2018-04-04 NOTE — PROGRESS NOTE ADULT - SUBJECTIVE AND OBJECTIVE BOX
SUBJECTIVE: Pt seen and examined at bedside. s/p HD catheter exchange and dialysis yesterday. Tolerating diet. +BM/+Flatus    Vital Signs Last 24 Hrs  T(C): 36.2 (04 Apr 2018 10:26), Max: 37.4 (04 Apr 2018 09:10)  T(F): 97.2 (04 Apr 2018 10:26), Max: 99.3 (04 Apr 2018 09:10)  HR: 85 (04 Apr 2018 10:26) (76 - 94)  BP: 135/65 (04 Apr 2018 10:26) (125/67 - 164/72)  BP(mean): --  RR: 24 (04 Apr 2018 10:26) (18 - 24)  SpO2: 95% (04 Apr 2018 10:26) (92% - 95%)    PHYSICAL EXAM    Gen: NAD  CV: RRR  Pulm: Regular non-labored respirations  Abd: Soft, NT/ND. VAC in place with good suction          LABS:                        7.3    13.2  )-----------( 145      ( 04 Apr 2018 10:19 )             23.1     04-04    138  |  98  |  17  ----------------------------<  89  3.3<L>   |  25  |  2.37<H>    Ca    8.1<L>      04 Apr 2018 10:19  Mg     1.9     04-04    TPro  5.4<L>  /  Alb  2.3<L>  /  TBili  0.8  /  DBili  x   /  AST  18  /  ALT  10  /  AlkPhos  53  04-04    PT/INR - ( 03 Apr 2018 07:09 )   PT: 20.1 sec;   INR: 1.79          PTT - ( 03 Apr 2018 07:09 )  PTT:27.8 sec  Urinalysis Basic - ( 04 Apr 2018 01:30 )    Color: Yellow / Appearance: Clear / SG: >=1.030 / pH: x  Gluc: x / Ketone: Trace mg/dL  / Bili: Moderate / Urobili: 0.2 E.U./dL   Blood: x / Protein: 30 mg/dL / Nitrite: NEGATIVE   Leuk Esterase: NEGATIVE / RBC: 5-10 /HPF / WBC 5-10 /HPF   Sq Epi: x / Non Sq Epi: 0-5 /HPF / Bacteria: Present /HPF        ASSESSMENT AND PLAN  78 year old female s/p ventral hernia repair, c/b surgical site infection s/p wound VAC placement, was on IV Zosyn, c/b GONZALEZ now on medicine service with vaginal bleeding. Pt with fever to 100.8 yesterday, bld Cx negative x1d. Pt afebrile but WBC with slight increase.   -No acute surgical intervention at this time  -Plan to change VAC again at bedside on Friday 2/6 (NOTE: 3 black foam pieces located within cavity)  -Further management per primary team  -D/W Dr. Ngo

## 2018-04-04 NOTE — PROGRESS NOTE ADULT - PROBLEM SELECTOR PLAN 2
-New on this admission, no prior reported hx of renal disease.  Etiology ischemic ATN with component of AIN from ACE-I and Vancomycin. Per records, patient developed oligoanuria with worsening creatinine despite Lasix challenge.   -s/p HD catheter placement by IR on 3/29 with first session of HD on same day, tolerated well.  Will receive HD cath replacement today 2/2 cap on HD cath unable to be removed  - continue management per nephrology.  -started vit D supplementation    #Agitation  Patient intermittingly agitated, resulted in pulling out IV lines last night, started seroquel 12.5 at bedtime.  - f/u EKG for QTc -New on this admission, no prior reported hx of renal disease.  Etiology ischemic ATN with component of AIN from ACE-I and Vancomycin. Per records, patient developed oligoanuria with worsening creatinine despite Lasix challenge.   -s/p HD catheter placement by IR on 3/29 with first session of HD on same day, tolerated well.  Will receive HD cath replacement today 2/2 cap on HD cath unable to be removed  - continue management per nephrology, will receive HD today  -started vit D supplementation, refusing this AM    #Agitation  Patient intermittingly agitated, resulted in pulling out IV lines last night, started seroquel 12.5 at bedtime.  - Qtc 426 on Am EKG

## 2018-04-04 NOTE — PROGRESS NOTE ADULT - PROBLEM SELECTOR PLAN 2
needs repeat blood work, CXR, ABG  EKG result noted from this AM  to receive Lasix this AM as delineated above

## 2018-04-04 NOTE — PROGRESS NOTE BEHAVIORAL HEALTH - NSBHCONSULTFOLLOWDETAILS_PSY_A_CORE FT
Patient delirious, does not appear to be at baseline mental status
Patient delirious, does nto appear to be at baseline mental status

## 2018-04-04 NOTE — PROGRESS NOTE ADULT - PROBLEM SELECTOR PLAN 8
On Synthroid at home.   -continue Synthroid 150mcg QD On Synthroid at home.   -continue Synthroid 150mcg QD, intermittently refusing

## 2018-04-05 ENCOUNTER — TRANSCRIPTION ENCOUNTER (OUTPATIENT)
Age: 78
End: 2018-04-05

## 2018-04-05 LAB
ALBUMIN SERPL ELPH-MCNC: 2.4 G/DL — LOW (ref 3.3–5)
ALP SERPL-CCNC: 51 U/L — SIGNIFICANT CHANGE UP (ref 40–120)
ALT FLD-CCNC: 11 U/L — SIGNIFICANT CHANGE UP (ref 10–45)
ANION GAP SERPL CALC-SCNC: 18 MMOL/L — HIGH (ref 5–17)
AST SERPL-CCNC: 19 U/L — SIGNIFICANT CHANGE UP (ref 10–40)
BASOPHILS NFR BLD AUTO: 0.4 % — SIGNIFICANT CHANGE UP (ref 0–2)
BILIRUB SERPL-MCNC: 0.7 MG/DL — SIGNIFICANT CHANGE UP (ref 0.2–1.2)
BUN SERPL-MCNC: 25 MG/DL — HIGH (ref 7–23)
CALCIUM SERPL-MCNC: 8.4 MG/DL — SIGNIFICANT CHANGE UP (ref 8.4–10.5)
CHLORIDE SERPL-SCNC: 100 MMOL/L — SIGNIFICANT CHANGE UP (ref 96–108)
CO2 SERPL-SCNC: 23 MMOL/L — SIGNIFICANT CHANGE UP (ref 22–31)
CREAT SERPL-MCNC: 2.57 MG/DL — HIGH (ref 0.5–1.3)
EOSINOPHIL NFR BLD AUTO: 0.1 % — SIGNIFICANT CHANGE UP (ref 0–6)
GLUCOSE BLDC GLUCOMTR-MCNC: 86 MG/DL — SIGNIFICANT CHANGE UP (ref 70–99)
GLUCOSE BLDC GLUCOMTR-MCNC: 87 MG/DL — SIGNIFICANT CHANGE UP (ref 70–99)
GLUCOSE BLDC GLUCOMTR-MCNC: 90 MG/DL — SIGNIFICANT CHANGE UP (ref 70–99)
GLUCOSE BLDC GLUCOMTR-MCNC: 95 MG/DL — SIGNIFICANT CHANGE UP (ref 70–99)
GLUCOSE SERPL-MCNC: 88 MG/DL — SIGNIFICANT CHANGE UP (ref 70–99)
HCT VFR BLD CALC: 23.1 % — LOW (ref 34.5–45)
HGB BLD-MCNC: 7.3 G/DL — LOW (ref 11.5–15.5)
LYMPHOCYTES # BLD AUTO: 15.9 % — SIGNIFICANT CHANGE UP (ref 13–44)
MAGNESIUM SERPL-MCNC: 1.9 MG/DL — SIGNIFICANT CHANGE UP (ref 1.6–2.6)
MCHC RBC-ENTMCNC: 28.6 PG — SIGNIFICANT CHANGE UP (ref 27–34)
MCHC RBC-ENTMCNC: 31.6 G/DL — LOW (ref 32–36)
MCV RBC AUTO: 90.6 FL — SIGNIFICANT CHANGE UP (ref 80–100)
MONOCYTES NFR BLD AUTO: 5.8 % — SIGNIFICANT CHANGE UP (ref 2–14)
NEUTROPHILS NFR BLD AUTO: 77.8 % — HIGH (ref 43–77)
PLATELET # BLD AUTO: 156 K/UL — SIGNIFICANT CHANGE UP (ref 150–400)
POTASSIUM SERPL-MCNC: 3.5 MMOL/L — SIGNIFICANT CHANGE UP (ref 3.5–5.3)
POTASSIUM SERPL-SCNC: 3.5 MMOL/L — SIGNIFICANT CHANGE UP (ref 3.5–5.3)
PROT SERPL-MCNC: 5.2 G/DL — LOW (ref 6–8.3)
RBC # BLD: 2.55 M/UL — LOW (ref 3.8–5.2)
RBC # FLD: 17.2 % — HIGH (ref 10.3–16.9)
SODIUM SERPL-SCNC: 141 MMOL/L — SIGNIFICANT CHANGE UP (ref 135–145)
WBC # BLD: 11.4 K/UL — HIGH (ref 3.8–10.5)
WBC # FLD AUTO: 11.4 K/UL — HIGH (ref 3.8–10.5)

## 2018-04-05 PROCEDURE — 99233 SBSQ HOSP IP/OBS HIGH 50: CPT | Mod: GC

## 2018-04-05 PROCEDURE — 99231 SBSQ HOSP IP/OBS SF/LOW 25: CPT

## 2018-04-05 RX ORDER — POTASSIUM CHLORIDE 20 MEQ
40 PACKET (EA) ORAL ONCE
Qty: 0 | Refills: 0 | Status: COMPLETED | OUTPATIENT
Start: 2018-04-05 | End: 2018-04-05

## 2018-04-05 RX ORDER — FUROSEMIDE 40 MG
60 TABLET ORAL ONCE
Qty: 0 | Refills: 0 | Status: COMPLETED | OUTPATIENT
Start: 2018-04-05 | End: 2018-04-05

## 2018-04-05 RX ORDER — MAGNESIUM SULFATE 500 MG/ML
1 VIAL (ML) INJECTION ONCE
Qty: 0 | Refills: 0 | Status: COMPLETED | OUTPATIENT
Start: 2018-04-05 | End: 2018-04-05

## 2018-04-05 RX ORDER — TUBERCULIN PURIFIED PROTEIN DERIVATIVE 5 [IU]/.1ML
5 INJECTION, SOLUTION INTRADERMAL ONCE
Qty: 0 | Refills: 0 | Status: COMPLETED | OUTPATIENT
Start: 2018-04-05 | End: 2018-04-05

## 2018-04-05 RX ORDER — ERYTHROPOIETIN 10000 [IU]/ML
10000 INJECTION, SOLUTION INTRAVENOUS; SUBCUTANEOUS ONCE
Qty: 0 | Refills: 0 | Status: DISCONTINUED | OUTPATIENT
Start: 2018-04-05 | End: 2018-04-05

## 2018-04-05 RX ORDER — ACETAMINOPHEN 500 MG
650 TABLET ORAL EVERY 6 HOURS
Qty: 0 | Refills: 0 | Status: DISCONTINUED | OUTPATIENT
Start: 2018-04-05 | End: 2018-04-11

## 2018-04-05 RX ADMIN — Medication 100 GRAM(S): at 11:55

## 2018-04-05 RX ADMIN — ARIPIPRAZOLE 5 MILLIGRAM(S): 15 TABLET ORAL at 11:54

## 2018-04-05 RX ADMIN — Medication 150 MICROGRAM(S): at 05:55

## 2018-04-05 RX ADMIN — Medication 1000 UNIT(S): at 11:54

## 2018-04-05 RX ADMIN — MEDROXYPROGESTERONE ACETATE 10 MILLIGRAM(S): 150 INJECTION, SUSPENSION, EXTENDED RELEASE INTRAMUSCULAR at 11:54

## 2018-04-05 RX ADMIN — Medication 60 MILLIGRAM(S): at 21:42

## 2018-04-05 RX ADMIN — Medication 40 MILLIEQUIVALENT(S): at 11:54

## 2018-04-05 RX ADMIN — ESCITALOPRAM OXALATE 10 MILLIGRAM(S): 10 TABLET, FILM COATED ORAL at 11:54

## 2018-04-05 RX ADMIN — Medication 60 MILLIGRAM(S): at 11:54

## 2018-04-05 RX ADMIN — TUBERCULIN PURIFIED PROTEIN DERIVATIVE 5 UNIT(S): 5 INJECTION, SOLUTION INTRADERMAL at 12:00

## 2018-04-05 NOTE — PROGRESS NOTE ADULT - PROBLEM SELECTOR PLAN 10
DVT PPx: IVC filter  Nicole: in place    Dispo: [facility]  FULL CODE DVT PPx: IVC filter  Nicole: in place  Lines: R subclavian permcath changed 4/3, TLC in L IJ changed 4/2  Full code  Dispo: [facility]  Dispo: [facility]  FULL CODE

## 2018-04-05 NOTE — PROGRESS NOTE ADULT - SUBJECTIVE AND OBJECTIVE BOX
On interval followup, the left int jugular venous catheter site is clean, dry and non-inflamed. Afebrile.  Notes and cultures are followed.

## 2018-04-05 NOTE — PROGRESS NOTE ADULT - PROBLEM SELECTOR PLAN 6
On Glipizide at home.   -continue to hold oral antihyperglycemics   -continue ISS, FSG.  -Fs showing blood sugar in the 80's, will continue to monitor and supplement as appropriate On Glipizide at home.   -continue to hold oral antihyperglycemics   -continue ISS, FSG.    #Delirium/Depression  -followed by psychiatry service: Delirium appears to be resolving although patient continues to report depression.   -continue Lexapro 10 mg PO daily   -continue Abilify 5 mg PO daily    -continue Seroquel 12.5 mg PO HS as PRN order for agitation

## 2018-04-05 NOTE — PROGRESS NOTE BEHAVIORAL HEALTH - SUMMARY
78F h/o depression not currently in treatment, PMH DM, HLD, HTN, recent ventral hernia repair with subsequent admission for post-operative PE placed on Eliquis, presenting again 3/23 for abdominal pain and bloody stools, initially AAOx3 but increasingly more delirious 2/2 GONZALEZ, wound infection, and potential gynecological malignancy, initially refusing dialysis and gyn workup.  Delirium appears to be resolving although patient continues to report depression.  Would continue Lexapro 10 mg PO daily and Abilify 5 mg PO daily for delirium and adjunctive treatment for depression.  Can continue Seroquel 12.5 mg PO HS as PRN order for agitation.

## 2018-04-05 NOTE — DISCHARGE NOTE ADULT - PLAN OF CARE
Please continue to take your atorvastatin on a daily basis. You developed fluid accumulation in your lungs that was relieved by a medication called lasix (a that makes you urinate out extra fluid your body is storing) and dialysis. Likely you had these pleural effusions because of increased fluid from your kidneys not working correctly. You presented with complications of a wound at the closure site of your ventral hernia repair. You were seen by the surgery team and a wound vac was placed To You presented with anemia due to blood loss. If you begin to experience bleeding again, in your stool, from your vagina, or in your sputum or vomit, tell your doctor and report to the emergency room if the episodes continue. You presented with complications of a wound at the closure site of your ventral hernia repair. You were seen by the surgery team and a wound vac was placed and you were given vancomycin and zosyn. Your antibiotics were stopped out of concern for your renal function but your infection dissipated. You have an abnormal growth in your uterus that can cause vaginal bleeding. If you continue to lose large amounts of blood or have ongoing episodes of light bleeding, follow up with a gynecologist who can oversee your symptoms and oversee your management and care. Please continue to take your synthroid on a daily basis. To improve kidney function Your kidneys were not working well. It's possible that this was because of a medication you received, that was then stopped. You had a catheter placed in your right shoulder to provide dialysis to help your kidneys while they recovered. Your kidneys did show improvement and you started to make more urine. It will be very important for you to continue to take your medications daily and follow up with your doctor who will continue to monitor your progress. Your kidneys were not working well. It's possible that this was because of a medication you received, that was then stopped. You had a catheter placed in your right shoulder to provide dialysis to help your kidneys while they recovered. Your kidneys did show improvement and you started to make more urine. It will be very important for you to continue to take your medications daily and follow up with your doctor who will continue to monitor your progress. An appointment has been made for you to follow up with Dr. De Jesus on 4/16 at 10:20AM at her office on [address] [place] [address] (address below). You have an abnormal growth in your uterus that can cause vaginal bleeding. If you continue to lose large amounts of blood or have ongoing episodes of light bleeding, follow up with a gynecologist who can oversee your symptoms and oversee your management and care.   While in the hospital, the gynecologists recommended an endometrial biopsy, which you may pursue if you decide to do so in the outpatient setting. Please continue to take your synthroid on a daily basis.    HYPERLIPIDEMIA  -continue daily atorvostatin You had some blood mixed in with your stool. You underwent a colonoscopy - a test that looks inside of your colon to see if there are any abnormalities. The test identified some inflammation with bleeding. Take a diet as you are able to tolerate. If you develop abdominal pain and notice increased blood from your rectum/in your stool, report back to the hospital for treatment. You presented with complications of a wound at the closure site of your ventral hernia repair. You were seen by the surgery team and a wound vac was placed and you were given vancomycin and zosyn. Your antibiotics were stopped out of concern for your renal function but your infection dissipated.    #Wound care with wound vac in place  Please follow the care instructions outlined below. You have an abnormal growth in your uterus that can cause vaginal bleeding. If you continue to lose large amounts of blood or have ongoing episodes of light bleeding, follow up with a gynecologist who can oversee your symptoms and oversee your management and care.   While in the hospital, the gynecologists recommended an endometrial biopsy, which you may pursue if you decide to do so in the outpatient setting. If you feel comfortable doing so, please follow up with Dr. Christian for this evaluation and procedure. His information is provided below. Please continue to take your synthroid on a daily basis.     HYPERLIPIDEMIA  -continue daily atorvostatin

## 2018-04-05 NOTE — PROGRESS NOTE BEHAVIORAL HEALTH - NSBHCONSULTMEDS_PSY_A_CORE FT
1. Continue Lexapro 10 mg PO daily  2. Continue Abilify 5 mg PO daily  3. Continue Seroquel 12.5 mg PO HS PRN for agitation/insomnia

## 2018-04-05 NOTE — DISCHARGE NOTE ADULT - OTHER SIGNIFICANT FINDINGS
< from: US Duplex Venous Lower Ext Complete, Bilateral (03.14.18 @ 11:31) >  IMPRESSION:  1.  Acute deep venous thrombosis in bilateral calf intramuscular veins   and left paired peroneal veins.  2.  No DVT above the knee.  3.  Right Baker's cyst.    < end of copied text >  < from: US Pelvis Complete (03.25.18 @ 16:19) >  IMPRESSION:   Limited study due to lack of transvaginal images.    Thickened endometrium measuring 1.5 cm. Differential diagnosis includes   endometrial cancer, endometrial hyperplasia, endometrial polyp. Further   assessment is advised.    Enlarged fibroid uterus.    The pelvic floor relaxation reported on CT of abdomen and pelvis dated   10/18/2017 it is not demonstrated on ultrasound.    < end of copied text >

## 2018-04-05 NOTE — PROGRESS NOTE ADULT - PROBLEM SELECTOR PLAN 5
CTA 3/12 with findings of PE within the segmental arteries of the right upper   lobe, right middle lobe, and lingula. Subsequent echocardiogram notable for RV dilatation, however deemed by pulmonology to be unlikely that clot burden caused dilatation. Trops neg x3. Provoked PE in the setting of recent abdominal surgery.   - hold Eliquis in the setting of active vaginal spotting with acute blood loss anemia.   - receiving heparin flushes  -IVC filter placed 3/30  -encouraging incentive spirometry

## 2018-04-05 NOTE — DISCHARGE NOTE ADULT - FINDINGS/TREATMENT
A catheter was placed in the right side of your neck in order to allow you to receive dialysis treatments. A catheter was placed in your neck for administration of medications and blood products in case of severe blood loss anemia. Wound VAC :  Please change the wound VAC 3 times a week (Monday, Wednesday and Friday)  Use a medium size granuloform (black) sponge. Connect it to the machine with 125mmHg pressure.  In case of wound VAC is not functioning and no replacement, you can use a wet to dry method for dressing and replace it with VAC once it is available. Wound VAC :  Please change the wound VAC 3 times a week (Monday, Wednesday and Friday)  Use a medium size granuloform (black) sponge. Connect it to the machine with 125mmHg pressure.  In case of wound VAC is not functioning and no replacement, you can use a wet to dry method for dressing and replace it with VAC once it is available.    1. irrigate wound well with NS, pat dry.  2. Using large granufoam, cut granufoam in half so that it is less thick.   3. Granufoam should be placed in 2 layers in wound bed, cutting granufoam so that it Is big enough to fill cavity beneath skin but not so big that it can't be placed in wound opening (which is much smaller). Fold granufoam and placthen open it out once it is beneath skin. Repeat with additional layer.   4. Cut smaller piece to fit over opening of the wound, then apply clear drape and suction tubing.   5. Vac at 125mm/Hg negative pressure continuous, change 3x/week.

## 2018-04-05 NOTE — PROGRESS NOTE ADULT - ASSESSMENT
78F with PMHx of HTN, DM, HLD, hypothyroidism admitted for management of surgical wound infection following robotically assisted hernia repair on 3/2, complicated by RUL PE, now found to have acute blood loss anemia and ATN on HD.

## 2018-04-05 NOTE — PROGRESS NOTE ADULT - SUBJECTIVE AND OBJECTIVE BOX
CC: SURGICAL WOUND INFECTION      INTERVAL HISTORY: comfortable  appears better than yesterday      ROS: No chest pain, no sob, no abd pain. No n/v/d    PAST MEDICAL & SURGICAL HISTORY:  Obesity  DM (diabetes mellitus)  HLD (hyperlipidemia)  HTN (hypertension)  H/O ventral hernia repair  No significant past surgical history  History of left inguinal hernia repair      PHYSICAL EXAM:  T(C): 37.2 (04-05-18 @ 10:19), Max: 37.2 (04-04-18 @ 14:48)  HR: 80 (04-05-18 @ 10:19)  BP: 153/76 (04-05-18 @ 10:19) (148/74 - 165/73)  RR: 18 (04-05-18 @ 10:19)  SpO2: 95% (04-05-18 @ 10:19)  Wt(kg): --  I&O's Summary    04 Apr 2018 07:01  -  05 Apr 2018 07:00  --------------------------------------------------------  IN: 190 mL / OUT: 325 mL / NET: -135 mL      Weight   General: AAO x 3,  NAD.  HEENT: moist mucous membranes, no pallor/cyanosis.  Neck: no JVD visible.  Cardiac: S1, S2. RRR. No murmurs   Respratory: CTA b/l, no access muscle use.   Abdomen: soft. nontender. nondistended  Skin: no rashes.  Extremities: no LE edema b/l  Access:       DATA:                        7.3<L>  11.4<H> )-----------( 156      ( 05 Apr 2018 07:19 )             23.1<L>    Ferritin, Serum: 356 ng/mL <H> (03-29 @ 12:32)      141    |  100    |  25<H>  ----------------------------<  88     Ca:8.4   (05 Apr 2018 07:19)  3.5     |  23     |  2.57<H>      eGFR if Non : 17 <L>  eGFR if African American: 20 <L>    TPro  5.2<L>  /  Alb  2.4<L>  /  TBili  0.7    /  DBili  x      /  AST  19     /  ALT  11     /  AlkPhos  51     05 Apr 2018 07:19      Vitamin D, 25-Hydroxy: 17.8 ng/mL <L> [30.0 - 80.0] (03-31 @ 20:39)    Urinalysis Basic - ( 04 Apr 2018 01:30 )  Color: Yellow / Appearance: Clear / SG: >=1.030 / pH: x  Gluc: x / Ketone: Trace mg/dL<!!>  / Bili: Moderate<!!> / Urobili: 0.2 E.U./dL   Blood: x / Protein: 30 mg/dL<!!> / Nitrite: NEGATIVE   Leuk Esterase: NEGATIVE / RBC: 5-10 /HPF<!!> / WBC 5-10 /HPF<!!>   Sq Epi: x / Non Sq Epi: 0-5 /HPF / Bacteria: Present /HPF<!!>        Protein/Creatinine Ratio Calculation: 3.1 Ratio <H> (03-28 @ 05:09)          MEDICATIONS  (STANDING):  ARIPiprazole 5 milliGRAM(s) Oral daily  cholecalciferol 1000 Unit(s) Oral daily  dextrose 5%. 1000 milliLiter(s) (50 mL/Hr) IV Continuous <Continuous>  dextrose 50% Injectable 12.5 Gram(s) IV Push once  dextrose 50% Injectable 25 Gram(s) IV Push once  dextrose 50% Injectable 25 Gram(s) IV Push once  escitalopram 10 milliGRAM(s) Oral daily  heparin  flush 100 Units/mL Injectable 100 Unit(s) IV Push every other day  insulin lispro (HumaLOG) corrective regimen sliding scale   SubCutaneous Before meals and at bedtime  levothyroxine 150 MICROGram(s) Oral daily  medroxyPROGESTERone 10 milliGRAM(s) Oral daily    MEDICATIONS  (PRN):  acetaminophen   Tablet. 650 milliGRAM(s) Oral every 6 hours PRN Mild Pain (1 - 3)  acetaminophen  IVPB. 1000 milliGRAM(s) IV Intermittent once PRN Moderate Pain (4 - 6)  dextrose Gel 1 Dose(s) Oral once PRN Blood Glucose LESS THAN 70 milliGRAM(s)/deciliter  glucagon  Injectable 1 milliGRAM(s) IntraMuscular once PRN Glucose LESS THAN 70 milligrams/deciliter  QUEtiapine 12.5 milliGRAM(s) Oral at bedtime PRN Agitation and/or Insomnia  sodium chloride 0.65% Nasal 1 Spray(s) Both Nostrils every 4 hours PRN Nasal Congestion

## 2018-04-05 NOTE — PROGRESS NOTE ADULT - PROBLEM SELECTOR PLAN 7
Previously on Lisinopril and atenolol. BPs stable without antihypertensives   -would continue to hold antihypertensives as patient currently normotensive - SBP to 130's  -BP will be managed with hyperfiltration during dialysis. HD planned for later today.

## 2018-04-05 NOTE — PROGRESS NOTE ADULT - PROBLEM SELECTOR PLAN 9
F:  Nicole in place; strict I/O  E:  replete cautiously  N:  renal consistent carb    Lines: R subclavian permcath changed 4/3, TLC in L IJ changed 4/2 F:  Nicole in place; strict I/O  E:  replete cautiously  N:  renal consistent carb

## 2018-04-05 NOTE — PROGRESS NOTE ADULT - PROBLEM SELECTOR PLAN 1
-Active vaginal spotting possibly 2/2 GYN malignancy.  ~1 pad overnight (patient refusing actual pads).  TVUS showed fibroid uterus with thickened endometrium.  -s/p 2 units PRBCs  -maintain active T&S  -Transfuse <7.  Trend CBC  -IVC filter placed A/C contraindicated in setting of vaginal bleeding  -c/w medroxyprogesterone 10mg daily, and with outpt GYN follow up    #Intrabdominal Infection  s/p Vanc 1 dose 3/23 and Zosyn 3/23-3/31 for abdominal wound infection after incisional hernia repair. Patient had solitary fever to 100.8 on 4/2 and leukocytosis increasing to 12.7  - Bcx 4/2: NG at 2days  - wound vac management by Surgery Team 1C  - holding off on restarting antibiotics at this time, patient is non toxic appearing -New on this admission, no prior reported hx of renal disease.  Etiology ischemic ATN with component of AIN from ACE-I and Vancomycin. Per records, patient developed oligoanuria with worsening creatinine despite Lasix challenge.   -s/p HD catheter placement by IR on 3/29 with first session of HD on same day, tolerated well.  Will receive HD cath replacement today 4/2 cap on HD cath unable to be removed  - continue management per nephrology; will give additional lasix challenge 60mg IV x2 to evaluate urine output given increased output overnight.   -continue vit D supplementation    #Agitation  Patient intermittingly agitated, resulted in pulling out IV lines last night, started seroquel 12.5 at bedtime.  - Qtc 426 on Am EKG

## 2018-04-05 NOTE — DISCHARGE NOTE ADULT - MEDICATION SUMMARY - MEDICATIONS TO STOP TAKING
I will STOP taking the medications listed below when I get home from the hospital:    lisinopril 10 mg oral tablet  -- 1 tab(s) by mouth once a day    atenolol 50 mg oral tablet  -- 1 tab(s) by mouth once a day    oxyCODONE-acetaminophen 5 mg-325 mg oral tablet  -- 1 tab(s) by mouth every 4 hours, As needed, Moderate Pain (4 - 6) MDD:6    bisacodyl 5 mg oral delayed release tablet  -- 1 tab(s) by mouth every 12 hours, As needed, Constipation    oxycodone-acetaminophen 5 mg-300 mg oral tablet  -- 1 tab(s) by mouth every 6 hours

## 2018-04-05 NOTE — DISCHARGE NOTE ADULT - CARE PLAN
Principal Discharge DX:	Acute renal failure  Goal:	To  Secondary Diagnosis:	Anemia  Assessment and plan of treatment:	You presented with anemia due to blood loss. If you begin to experience bleeding again, in your stool, from your vagina, or in your sputum or vomit, tell your doctor and report to the emergency room if the episodes continue.  Secondary Diagnosis:	HLD (hyperlipidemia)  Assessment and plan of treatment:	Please continue to take your atorvastatin on a daily basis.  Secondary Diagnosis:	Pleural effusion  Assessment and plan of treatment:	You developed fluid accumulation in your lungs that was relieved by a medication called lasix (a that makes you urinate out extra fluid your body is storing) and dialysis. Likely you had these pleural effusions because of increased fluid from your kidneys not working correctly.  Secondary Diagnosis:	Surgical wound infection, initial encounter  Assessment and plan of treatment:	You presented with complications of a wound at the closure site of your ventral hernia repair. You were seen by the surgery team and a wound vac was placed  Secondary Diagnosis:	Intramural leiomyoma of uterus  Secondary Diagnosis:	Hypothyroidism Principal Discharge DX:	Acute renal failure  Goal:	To  Secondary Diagnosis:	Anemia  Assessment and plan of treatment:	You presented with anemia due to blood loss. If you begin to experience bleeding again, in your stool, from your vagina, or in your sputum or vomit, tell your doctor and report to the emergency room if the episodes continue.  Secondary Diagnosis:	HLD (hyperlipidemia)  Assessment and plan of treatment:	Please continue to take your atorvastatin on a daily basis.  Secondary Diagnosis:	Pleural effusion  Assessment and plan of treatment:	You developed fluid accumulation in your lungs that was relieved by a medication called lasix (a that makes you urinate out extra fluid your body is storing) and dialysis. Likely you had these pleural effusions because of increased fluid from your kidneys not working correctly.  Secondary Diagnosis:	Surgical wound infection, initial encounter  Assessment and plan of treatment:	You presented with complications of a wound at the closure site of your ventral hernia repair. You were seen by the surgery team and a wound vac was placed and you were given vancomycin and zosyn. Your antibiotics were stopped out of concern for your renal function but your infection dissipated.  Secondary Diagnosis:	Intramural leiomyoma of uterus  Assessment and plan of treatment:	You have an abnormal growth in your uterus that can cause vaginal bleeding. If you continue to lose large amounts of blood or have ongoing episodes of light bleeding, follow up with a gynecologist who can oversee your symptoms and oversee your management and care.  Secondary Diagnosis:	Hypothyroidism  Assessment and plan of treatment:	Please continue to take your synthroid on a daily basis. Principal Discharge DX:	Acute renal failure  Goal:	To improve kidney function  Assessment and plan of treatment:	Your kidneys were not working well. It's possible that this was because of a medication you received, that was then stopped. You had a catheter placed in your right shoulder to provide dialysis to help your kidneys while they recovered. Your kidneys did show improvement and you started to make more urine. It will be very important for you to continue to take your medications daily and follow up with your doctor who will continue to monitor your progress.  Secondary Diagnosis:	Anemia  Assessment and plan of treatment:	You presented with anemia due to blood loss. If you begin to experience bleeding again, in your stool, from your vagina, or in your sputum or vomit, tell your doctor and report to the emergency room if the episodes continue.  Secondary Diagnosis:	HLD (hyperlipidemia)  Assessment and plan of treatment:	Please continue to take your atorvastatin on a daily basis.  Secondary Diagnosis:	Pleural effusion  Assessment and plan of treatment:	You developed fluid accumulation in your lungs that was relieved by a medication called lasix (a that makes you urinate out extra fluid your body is storing) and dialysis. Likely you had these pleural effusions because of increased fluid from your kidneys not working correctly.  Secondary Diagnosis:	Surgical wound infection, initial encounter  Assessment and plan of treatment:	You presented with complications of a wound at the closure site of your ventral hernia repair. You were seen by the surgery team and a wound vac was placed and you were given vancomycin and zosyn. Your antibiotics were stopped out of concern for your renal function but your infection dissipated.  Secondary Diagnosis:	Intramural leiomyoma of uterus  Assessment and plan of treatment:	You have an abnormal growth in your uterus that can cause vaginal bleeding. If you continue to lose large amounts of blood or have ongoing episodes of light bleeding, follow up with a gynecologist who can oversee your symptoms and oversee your management and care.  Secondary Diagnosis:	Hypothyroidism  Assessment and plan of treatment:	Please continue to take your synthroid on a daily basis. Principal Discharge DX:	Acute renal failure  Goal:	To improve kidney function  Assessment and plan of treatment:	Your kidneys were not working well. It's possible that this was because of a medication you received, that was then stopped. You had a catheter placed in your right shoulder to provide dialysis to help your kidneys while they recovered. Your kidneys did show improvement and you started to make more urine. It will be very important for you to continue to take your medications daily and follow up with your doctor who will continue to monitor your progress. An appointment has been made for you to follow up with Dr. De Jesus on 4/16 at 10:20AM at her office on [address] [place] [address] (address below).  Secondary Diagnosis:	Anemia  Assessment and plan of treatment:	You presented with anemia due to blood loss. If you begin to experience bleeding again, in your stool, from your vagina, or in your sputum or vomit, tell your doctor and report to the emergency room if the episodes continue.  Secondary Diagnosis:	HLD (hyperlipidemia)  Assessment and plan of treatment:	Please continue to take your atorvastatin on a daily basis.  Secondary Diagnosis:	Pleural effusion  Assessment and plan of treatment:	You developed fluid accumulation in your lungs that was relieved by a medication called lasix (a that makes you urinate out extra fluid your body is storing) and dialysis. Likely you had these pleural effusions because of increased fluid from your kidneys not working correctly.  Secondary Diagnosis:	Surgical wound infection, initial encounter  Assessment and plan of treatment:	You presented with complications of a wound at the closure site of your ventral hernia repair. You were seen by the surgery team and a wound vac was placed and you were given vancomycin and zosyn. Your antibiotics were stopped out of concern for your renal function but your infection dissipated.  Secondary Diagnosis:	Intramural leiomyoma of uterus  Assessment and plan of treatment:	You have an abnormal growth in your uterus that can cause vaginal bleeding. If you continue to lose large amounts of blood or have ongoing episodes of light bleeding, follow up with a gynecologist who can oversee your symptoms and oversee your management and care.   While in the hospital, the gynecologists recommended an endometrial biopsy, which you may pursue if you decide to do so in the outpatient setting.  Secondary Diagnosis:	Hypothyroidism  Assessment and plan of treatment:	Please continue to take your synthroid on a daily basis. Principal Discharge DX:	Acute renal failure  Goal:	To improve kidney function  Assessment and plan of treatment:	Your kidneys were not working well. It's possible that this was because of a medication you received, that was then stopped. You had a catheter placed in your right shoulder to provide dialysis to help your kidneys while they recovered. Your kidneys did show improvement and you started to make more urine. It will be very important for you to continue to take your medications daily and follow up with your doctor who will continue to monitor your progress. An appointment has been made for you to follow up with Dr. De Jesus on 4/16 at 10:20AM at her office on [address] [place] [address] (address below).  Secondary Diagnosis:	Anemia  Assessment and plan of treatment:	You presented with anemia due to blood loss. If you begin to experience bleeding again, in your stool, from your vagina, or in your sputum or vomit, tell your doctor and report to the emergency room if the episodes continue.  Secondary Diagnosis:	Pleural effusion  Assessment and plan of treatment:	You developed fluid accumulation in your lungs that was relieved by a medication called lasix (a that makes you urinate out extra fluid your body is storing) and dialysis. Likely you had these pleural effusions because of increased fluid from your kidneys not working correctly.  Secondary Diagnosis:	Surgical wound infection, initial encounter  Assessment and plan of treatment:	You presented with complications of a wound at the closure site of your ventral hernia repair. You were seen by the surgery team and a wound vac was placed and you were given vancomycin and zosyn. Your antibiotics were stopped out of concern for your renal function but your infection dissipated.  Secondary Diagnosis:	Intramural leiomyoma of uterus  Assessment and plan of treatment:	You have an abnormal growth in your uterus that can cause vaginal bleeding. If you continue to lose large amounts of blood or have ongoing episodes of light bleeding, follow up with a gynecologist who can oversee your symptoms and oversee your management and care.   While in the hospital, the gynecologists recommended an endometrial biopsy, which you may pursue if you decide to do so in the outpatient setting.  Secondary Diagnosis:	Hypothyroidism  Assessment and plan of treatment:	Please continue to take your synthroid on a daily basis.    HYPERLIPIDEMIA  -continue daily atorvostatin  Secondary Diagnosis:	Ischemic colitis  Assessment and plan of treatment:	You had some blood mixed in with your stool. You underwent a colonoscopy - a test that looks inside of your colon to see if there are any abnormalities. The test identified some inflammation with bleeding. Take a diet as you are able to tolerate. If you develop abdominal pain and notice increased blood from your rectum/in your stool, report back to the hospital for treatment. Principal Discharge DX:	Acute renal failure  Goal:	To improve kidney function  Assessment and plan of treatment:	Your kidneys were not working well. It's possible that this was because of a medication you received, that was then stopped. You had a catheter placed in your right shoulder to provide dialysis to help your kidneys while they recovered. Your kidneys did show improvement and you started to make more urine. It will be very important for you to continue to take your medications daily and follow up with your doctor who will continue to monitor your progress. An appointment has been made for you to follow up with Dr. De Jesus on 4/16 at 10:20AM at her office on [address] [place] [address] (address below).  Secondary Diagnosis:	Anemia  Assessment and plan of treatment:	You presented with anemia due to blood loss. If you begin to experience bleeding again, in your stool, from your vagina, or in your sputum or vomit, tell your doctor and report to the emergency room if the episodes continue.  Secondary Diagnosis:	Pleural effusion  Assessment and plan of treatment:	You developed fluid accumulation in your lungs that was relieved by a medication called lasix (a that makes you urinate out extra fluid your body is storing) and dialysis. Likely you had these pleural effusions because of increased fluid from your kidneys not working correctly.  Secondary Diagnosis:	Surgical wound infection, initial encounter  Assessment and plan of treatment:	You presented with complications of a wound at the closure site of your ventral hernia repair. You were seen by the surgery team and a wound vac was placed and you were given vancomycin and zosyn. Your antibiotics were stopped out of concern for your renal function but your infection dissipated.    #Wound care with wound vac in place  Please follow the care instructions outlined below.  Secondary Diagnosis:	Intramural leiomyoma of uterus  Assessment and plan of treatment:	You have an abnormal growth in your uterus that can cause vaginal bleeding. If you continue to lose large amounts of blood or have ongoing episodes of light bleeding, follow up with a gynecologist who can oversee your symptoms and oversee your management and care.   While in the hospital, the gynecologists recommended an endometrial biopsy, which you may pursue if you decide to do so in the outpatient setting. If you feel comfortable doing so, please follow up with Dr. Christian for this evaluation and procedure. His information is provided below.  Secondary Diagnosis:	Hypothyroidism  Assessment and plan of treatment:	Please continue to take your synthroid on a daily basis.     HYPERLIPIDEMIA  -continue daily atorvostatin  Secondary Diagnosis:	Ischemic colitis  Assessment and plan of treatment:	You had some blood mixed in with your stool. You underwent a colonoscopy - a test that looks inside of your colon to see if there are any abnormalities. The test identified some inflammation with bleeding. Take a diet as you are able to tolerate. If you develop abdominal pain and notice increased blood from your rectum/in your stool, report back to the hospital for treatment.

## 2018-04-05 NOTE — PROGRESS NOTE ADULT - PROBLEM SELECTOR PLAN 3
-Improved with dialysis.    -Interval development of bilateral pleural effusions on CXR from 3/28 compared to that of 3/23. Likely 2/2 fluid overload in the setting of renal failure and oliguria. Patient currently without complaints of SOB, no increased oxygen requirements (on NC2L, RR 18).  -CXR this AM, poor quality film. Unclear if change from previous. Experiencing SOB, improved with one dose of 40mg IV lasix.  -encouraging incentive spirometry for improved aeration

## 2018-04-05 NOTE — PROGRESS NOTE ADULT - PROBLEM SELECTOR PLAN 1
patient appears to have a slowly increasing UO daily  minimal response to 40mg IV Lasix but will try higher dose today of 60 mg IV  will defer HD today in light of stable Scret and increasing UO

## 2018-04-05 NOTE — DISCHARGE NOTE ADULT - HOSPITAL COURSE
EDIT THIS.    78F with PMHx of HTN, DM, HLD, hypothyroidism admitted for management of surgical wound infection following robotically assisted hernia repair on 3/2. Per history, patient initially presented on 3/2 for elective surgical intervention of symptomatic ventral hernia, procedure tolerated without complication and patient discharged on 3/5 with instructions to continue home meds. Patient returned on 3/12 with acute confusion and lethargy in the setting of hypoglycemia, during which time she was found to have a RUL PE for which she was started on hep gtt and transitioned to Eliquis; discharged on 3/16. Patient again returned on 3/23 with complaints of abdominal pain and possible maroon colored stools, found to have purulent discharge from abdominal port sites and midline incision. Patient had wound vac placed and started on Vancomycin and Zosyn with interval development of ATN (believed to be due to Vancomycin) with oliguria. Nephrology consulted and per recommendations patient given Lasix challenge without improvement, now s/p R IJ HD catheter placement by IR on 3/29 and first HD session same day, tolerated well. Hospital course additionally complicated by acute blood loss anemia, determined to be 2/2 heavy vaginal bleeding, for which patient underwent pelvic ultrasound on 3/25 with evidence of fibroid uterus with thickened endometrial walls concerning for possible malignancy. Patient continued to have stable Hgb >8 until 3/29 when Hgb found to be 6.1 --> 5.6 on repeat; transfused 2U pRBC. Eliquis had previously been discontinued on 3/24, but with anemia aspirin and HSQ also discontinued on 3/28 and 3/29, respectively. Patient has intermittently refused procedures including complete vaginal exam, transvaginal US, and initially dialysis for which psych was consulted and deemed patient without capacity. Due to worsening renal function, patient had HD cath placed with first HD on 3/29.  Zosyn discontinued.  She continues to have vaginal spotting.  GYN recc Provera to control vaginal bleeding with outpt follow up.  Hgb stable in 7-8 range.  L IJ TLC placed on 4/1 in the event of needing pRBCs. 78F with PMHx of HTN, DM, HLD, hypothyroidism admitted for management of surgical wound infection from the incision site of a robotically assisted hernia repair performed on 3/2. Patient had wound vac placed and was treated with broad spectrum antibiotics; vanc/zosyn. Antibiotics were discontinued with development of ATN with oliguria for which patient had R IJ HD catheter placed on 3/29 for initiation of hemodialysis, which the patient tolerated well. Patient showed improvement in Uop with lasix challenges and it was determined that _____________________________ (yes/no) long term dialysis.    Hospital course additionally complicated by acute blood loss anemia, determined to be 2/2 heavy vaginal bleeding and required transfusion of 2u pRBC. Pelvic ultrasound on 3/25 with evidence of fibroid uterus with thickened endometrial walls concerning for possible malignancy. Bleeding stabilized with initiation of Provera.     Psych team followed patient for dilirium and depression prescribing new medications.  L IJ TLC placed on 4/1 in the event of needing pRBCs. 78F with PMHx of HTN, DM, HLD, hypothyroidism admitted for management of surgical wound infection from the incision site of a robotically assisted hernia repair performed on 3/2. Patient had wound vac placed and was treated with broad spectrum antibiotics; vanc/zosyn. Antibiotics were discontinued with development of ATN with oliguria for which patient had R IJ HD catheter placed on 3/29 for initiation of hemodialysis, which the patient tolerated well. Patient showed improvement in     Hospital course additionally complicated by acute blood loss anemia, determined to be 2/2 heavy vaginal bleeding and required transfusion of 2u pRBC. Pelvic ultrasound on 3/25 with evidence of fibroid uterus with thickened endometrial walls concerning for possible malignancy. Bleeding stabilized with initiation of Provera.     Psych team followed patient for dilirium and depression prescribing new medications.  L IJ TLC placed on 4/1 in the event of needing pRBCs. 78F with PMHx of HTN, DM, HLD, hypothyroidism admitted for management of surgical wound infection from the incision site of a robotically assisted hernia repair performed on 3/2. Patient had wound vac placed and was treated with broad spectrum antibiotics; vanc/zosyn. Antibiotics were discontinued with development of ATN with oliguria for which patient had R IJ HD catheter placed on 3/29 for initiation of hemodialysis, which the patient tolerated well. Patient showed improvement in     Hospital course additionally complicated by acute blood loss anemia, determined to be 2/2 heavy vaginal bleeding and required transfusion of 2u pRBC. Pelvic ultrasound on 3/25 with evidence of fibroid uterus with thickened endometrial walls concerning for possible malignancy. Bleeding stabilized with initiation of Provera.     Patient had additional episode of bright red blood noticed mixed in with stool and a colonoscopy identified mild ischemic colitis and gastritis. Patient was stable and did not require additional transfusion. Course further complicated by electrolyte abnormalities that resolved with fluid administration.     Psych team followed patient for dilirium and depression prescribing new medications.  L IJ TLC placed on 4/1 in the event of needing pRBCs, changed on 4/3, removed on 4/10. 78F with PMHx of HTN, DM, HLD, hypothyroidism admitted for management of surgical wound infection from the incision site of a robotically assisted hernia repair performed on 3/2. Patient had wound vac placed and was treated with broad spectrum antibiotics; vanc/zosyn. Antibiotics were discontinued with development of ATN with oliguria for which patient had R IJ HD catheter placed on 3/29 for initiation of hemodialysis. Patient tolerated dialysis well. Patient showed improvement in kidney function with lasix challenge and kidney function improved. HD cath and suazo were removed.     Hospital course additionally complicated by acute blood loss anemia, determined to be 2/2 heavy vaginal bleeding and required transfusion of 2u pRBC. Pelvic ultrasound on 3/25 with evidence of fibroid uterus with thickened endometrial walls concerning for possible malignancy. Bleeding stabilized with initiation of Provera.     Patient had additional episode of bright red blood noticed mixed in with stool and a colonoscopy identified mild ischemic colitis and gastritis. Patient was stable and did not require additional transfusion. She did not look toxic, was afebrile, did not require antibiotics. Course further complicated by electrolyte abnormalities that resolved with fluid administration.     Psych team followed patient for dilirium and depression prescribing new medications.  L IJ TLC placed on 4/1 in the event of needing pRBCs, changed on 4/3, removed on 4/10.     Patient stable for discharge to [facility] on 4/11. 78F with PMHx of HTN, DM, HLD, hypothyroidism admitted for management of surgical wound infection from the incision site of a robotically assisted hernia repair performed on 3/2. Patient had wound vac placed and was treated with broad spectrum antibiotics; vanc/zosyn. Antibiotics were discontinued with development of ATN with oliguria for which patient had R IJ HD catheter placed on 3/29 for initiation of hemodialysis. Patient tolerated dialysis well. Patient showed improvement in kidney function with lasix challenge and kidney function improved. HD cath and suazo were removed.     Hospital course additionally complicated by acute blood loss anemia, determined to be 2/2 heavy vaginal bleeding and required transfusion of 2u pRBC. Pelvic ultrasound on 3/25 with evidence of fibroid uterus with thickened endometrial walls concerning for possible malignancy. Bleeding stabilized with initiation of Provera.     Patient had additional episode of bright red blood noticed mixed in with stool and a colonoscopy identified mild colitis and gastritis. Patient was stable and did not require additional transfusion. She did not look toxic, was afebrile, did not require antibiotics. Course further complicated by electrolyte abnormalities that resolved with fluid administration. Patient continued to complain of abdominal pain in the area of her wound vac and underwent CT abdomen pelvis which showed:     Psych team followed patient for dilirium and depression prescribing new medications.  L IJ TLC placed on 4/1 in the event of needing pRBCs, changed on 4/3, removed on 4/10.     Patient stable for discharge to [facility] on 4/11. 78F with PMHx of HTN, DM, HLD, hypothyroidism admitted for management of surgical wound infection from the incision site of a robotically assisted hernia repair performed on 3/2. Patient had wound vac placed and was treated with broad spectrum antibiotics; vanc/zosyn. Antibiotics were discontinued with development of ATN with oliguria for which patient had R IJ HD catheter placed on 3/29 for initiation of hemodialysis. Patient tolerated dialysis well. Patient showed improvement in kidney function with lasix challenge and kidney function improved. HD cath and suazo were removed.     Hospital course additionally complicated by acute blood loss anemia, determined to be 2/2 heavy vaginal bleeding and required transfusion of 2u pRBC. Pelvic ultrasound on 3/25 with evidence of fibroid uterus with thickened endometrial walls concerning for possible malignancy. Bleeding stabilized with initiation of Provera.     Patient had additional episode of bright red blood noticed mixed in with stool and a colonoscopy identified mild colitis and gastritis. Patient was stable and did not require additional transfusion. She did not look toxic, was afebrile, did not require antibiotics. Course further complicated by electrolyte abnormalities that resolved with fluid administration. Patient continued to complain of abdominal pain in the area of her wound vac and underwent CT abdomen pelvis which showed:     Psych team followed patient for dilirium and depression prescribing new medications.  L IJ TLC placed on 4/1 in the event of needing pRBCs, changed on 4/3, removed on 4/10.     Patient stable for discharge to [facility] on 4/11. On discharge, patient's lisinopril was being held for elevated creatinine and eliquis was being held for bleeding. Lisinopril can be restarted as appropriate with resolution of kidney injury and eliquis should be held as long as there is ongoing concern for bleed. 78F with PMHx of HTN, DM, HLD, hypothyroidism admitted for management of surgical wound infection from the incision site of a robotically assisted hernia repair performed on 3/2. Patient had wound vac placed and was treated with broad spectrum antibiotics; vanc/zosyn. Antibiotics were discontinued with development of ATN with oliguria for which patient had R IJ HD catheter placed on 3/29 for initiation of hemodialysis. Patient tolerated dialysis well. Patient showed improvement in kidney function with lasix challenge and kidney function improved. HD cath and suazo were removed.     Hospital course additionally complicated by acute blood loss anemia, determined to be 2/2 heavy vaginal bleeding and required transfusion of 2u pRBC. Pelvic ultrasound on 3/25 with evidence of fibroid uterus with thickened endometrial walls concerning for possible malignancy. Bleeding stabilized with initiation of Provera.     Patient had additional episode of bright red blood noticed mixed in with stool and a colonoscopy identified mild colitis and gastritis. Patient was stable and did not require additional transfusion. She did not look toxic, was afebrile, did not require antibiotics. Course further complicated by electrolyte abnormalities that resolved with fluid administration. Patient continued to complain of abdominal pain in the area of her wound vac and underwent CT abdomen pelvis which showed nonspecific fluid around the sigmoid on preliminary read.     Psych team followed patient for dilirium and depression prescribing new medications.  L IJ TLC placed on 4/1 in the event of needing pRBCs, changed on 4/3, removed on 4/10.     Patient stable for discharge to [facility] on 4/11. On discharge, patient's lisinopril was being held for elevated creatinine and eliquis was being held for bleeding. Lisinopril can be restarted as appropriate with resolution of kidney injury and eliquis should be held as long as there is ongoing concern for bleed.

## 2018-04-05 NOTE — PROGRESS NOTE ADULT - SUBJECTIVE AND OBJECTIVE BOX
SUBJECTIVE: Pt seen and examined at bedside. Pt reports that she doesn't feel well, although reports no abdominal pain when asked. +BM/flatus.     Vital Signs Last 24 Hrs  T(C): 36.5 (05 Apr 2018 05:19), Max: 37.2 (04 Apr 2018 14:48)  T(F): 97.7 (05 Apr 2018 05:19), Max: 98.9 (04 Apr 2018 14:48)  HR: 100 (05 Apr 2018 05:19) (81 - 100)  BP: 148/74 (05 Apr 2018 05:19) (135/65 - 165/73)  BP(mean): --  RR: 18 (05 Apr 2018 05:19) (18 - 24)  SpO2: 96% (05 Apr 2018 05:19) (91% - 96%)    PHYSICAL EXAM    Gen: NAD  CV: RRR  Pulm: Regular non-labored respirations  Abd: Soft, NT/ND. VAC in place with good suction, no surrounding erythema or induration to suggest infection.      LABS:                        7.3    11.4  )-----------( 156      ( 05 Apr 2018 07:19 )             23.1     04-05    141  |  100  |  25<H>  ----------------------------<  88  3.5   |  23  |  2.57<H>    Ca    8.4      05 Apr 2018 07:19  Mg     1.9     04-05    TPro  5.2<L>  /  Alb  2.4<L>  /  TBili  0.7  /  DBili  x   /  AST  19  /  ALT  11  /  AlkPhos  51  04-05      Urinalysis Basic - ( 04 Apr 2018 01:30 )    Color: Yellow / Appearance: Clear / SG: >=1.030 / pH: x  Gluc: x / Ketone: Trace mg/dL  / Bili: Moderate / Urobili: 0.2 E.U./dL   Blood: x / Protein: 30 mg/dL / Nitrite: NEGATIVE   Leuk Esterase: NEGATIVE / RBC: 5-10 /HPF / WBC 5-10 /HPF   Sq Epi: x / Non Sq Epi: 0-5 /HPF / Bacteria: Present /HPF      ASSESSMENT AND PLAN  78 year old female s/p ventral hernia repair, c/b surgical site infection s/p wound VAC placement, was on IV Zosyn, c/b GONZALEZ now on medicine service. Pt continues to be afebrile, VSS. WBC decreasing this morning. Source of WBC increase and fever unlikely to be abdomen; pt does not complain of abd pain and is nontender on exam. Wound appeared clean with granulation tissue and some fibrinous material on Tues 4/3. Skin surrounding VAC continues to be nonerythematous, with no surrounding induration. Nurse yesterday with concerns that pt aspirating with medications, and CXR from 4/2 shows Right sided infiltrate, possibly aspiration per radiology read. Would pursue speech and swallow evaluation to further assess aspiration; it is possible that the pt's fever from 2days ago and her increased WBC due to brewing pneumonia.     -No acute surgical intervention at this time  -Would obtain Speech and Swallow evaluation   -Plan to change VAC again at bedside on Friday 2/6 (NOTE: 3 black foam pieces located within cavity)  -Further management per primary team  -D/W Dr. Ngo

## 2018-04-05 NOTE — PROGRESS NOTE ADULT - PROBLEM SELECTOR PLAN 2
receiving EPO with HD  will continue to receive if requires HD but if not will switch to SQ q week  Fe stores adequate

## 2018-04-05 NOTE — PROGRESS NOTE BEHAVIORAL HEALTH - NSBHFUPINTERVALHXFT_PSY_A_CORE
Patient seen at bedside.  Appears awake, alert.  Sipping ginger ale, coughed.  Does not participate much verbally in interview, but appears less depressed/irritable/labile.

## 2018-04-05 NOTE — DISCHARGE NOTE ADULT - ADDITIONAL INSTRUCTIONS
Please follow up with Dr. De Jesus on 4/16/2018 at her office on [address] [place] [address] (address above) at 10:20AM. Please follow up with Dr. De Jesus on 4/16/2018 at her office on [address] [place] [address] (address above) at 10:20AM.  Please follow up with Dr. Christina when you feel comfortable.

## 2018-04-05 NOTE — DISCHARGE NOTE ADULT - SECONDARY DIAGNOSIS.
Pleural effusion Surgical wound infection, initial encounter Intramural leiomyoma of uterus Hypothyroidism Anemia HLD (hyperlipidemia) Ischemic colitis

## 2018-04-05 NOTE — PROGRESS NOTE ADULT - SUBJECTIVE AND OBJECTIVE BOX
OVERNIGHT EVENTS:    SUBJECTIVE / INTERVAL HPI: Patient seen and examined at bedside.     VITAL SIGNS:  Vital Signs Last 24 Hrs  T(C): 36.5 (05 Apr 2018 05:19), Max: 37.4 (04 Apr 2018 09:10)  T(F): 97.7 (05 Apr 2018 05:19), Max: 99.3 (04 Apr 2018 09:10)  HR: 100 (05 Apr 2018 05:19) (81 - 100)  BP: 148/74 (05 Apr 2018 05:19) (132/70 - 165/73)  BP(mean): --  RR: 18 (05 Apr 2018 05:19) (18 - 24)  SpO2: 96% (05 Apr 2018 05:19) (91% - 96%)    PHYSICAL EXAM:    General: WDWN  HEENT: NC/AT; PERRL, anicteric sclera; MMM  Neck: supple  Cardiovascular: +S1/S2; RRR  Respiratory: CTA B/L; no W/R/R  Gastrointestinal: soft, NT/ND; +BSx4  Extremities: WWP; no edema, clubbing or cyanosis  Vascular: 2+ radial, DP/PT pulses B/L  Neurological: AAOx3; no focal deficits    MEDICATIONS:  MEDICATIONS  (STANDING):  ARIPiprazole 5 milliGRAM(s) Oral daily  cholecalciferol 1000 Unit(s) Oral daily  dextrose 5%. 1000 milliLiter(s) (50 mL/Hr) IV Continuous <Continuous>  dextrose 50% Injectable 12.5 Gram(s) IV Push once  dextrose 50% Injectable 25 Gram(s) IV Push once  dextrose 50% Injectable 25 Gram(s) IV Push once  escitalopram 10 milliGRAM(s) Oral daily  heparin  flush 100 Units/mL Injectable 100 Unit(s) IV Push every other day  insulin lispro (HumaLOG) corrective regimen sliding scale   SubCutaneous Before meals and at bedtime  levothyroxine 150 MICROGram(s) Oral daily  medroxyPROGESTERone 10 milliGRAM(s) Oral daily    MEDICATIONS  (PRN):  acetaminophen   Tablet. 650 milliGRAM(s) Oral every 6 hours PRN Mild Pain (1 - 3)  acetaminophen  IVPB. 1000 milliGRAM(s) IV Intermittent once PRN Moderate Pain (4 - 6)  dextrose Gel 1 Dose(s) Oral once PRN Blood Glucose LESS THAN 70 milliGRAM(s)/deciliter  glucagon  Injectable 1 milliGRAM(s) IntraMuscular once PRN Glucose LESS THAN 70 milligrams/deciliter  QUEtiapine 12.5 milliGRAM(s) Oral at bedtime PRN Agitation and/or Insomnia  sodium chloride 0.65% Nasal 1 Spray(s) Both Nostrils every 4 hours PRN Nasal Congestion      ALLERGIES:  Allergies    No Known Allergies    Intolerances    LABS:                        7.3    13.2  )-----------( 145      ( 04 Apr 2018 10:19 )             23.1     04-04    138  |  98  |  17  ----------------------------<  89  3.3<L>   |  25  |  2.37<H>    Ca    8.1<L>      04 Apr 2018 10:19  Mg     1.9     04-04    TPro  5.4<L>  /  Alb  2.3<L>  /  TBili  0.8  /  DBili  x   /  AST  18  /  ALT  10  /  AlkPhos  53  04-04    PT/INR - ( 03 Apr 2018 07:09 )   PT: 20.1 sec;   INR: 1.79          PTT - ( 03 Apr 2018 07:09 )  PTT:27.8 sec  Urinalysis Basic - ( 04 Apr 2018 01:30 )    Color: Yellow / Appearance: Clear / SG: >=1.030 / pH: x  Gluc: x / Ketone: Trace mg/dL  / Bili: Moderate / Urobili: 0.2 E.U./dL   Blood: x / Protein: 30 mg/dL / Nitrite: NEGATIVE   Leuk Esterase: NEGATIVE / RBC: 5-10 /HPF / WBC 5-10 /HPF   Sq Epi: x / Non Sq Epi: 0-5 /HPF / Bacteria: Present /HPF      CAPILLARY BLOOD GLUCOSE      POCT Blood Glucose.: 97 mg/dL (04 Apr 2018 21:29)      RADIOLOGY & ADDITIONAL TESTS: Reviewed. OVERNIGHT EVENTS: No events overnight. Uop 150cc overnight.    SUBJECTIVE / INTERVAL HPI: Patient seen and examined at bedside. No shortness of breath this morning. No complaints of pain.     VITAL SIGNS:  Vital Signs Last 24 Hrs  T(C): 36.5 (05 Apr 2018 05:19), Max: 37.4 (04 Apr 2018 09:10)  T(F): 97.7 (05 Apr 2018 05:19), Max: 99.3 (04 Apr 2018 09:10)  HR: 100 (05 Apr 2018 05:19) (81 - 100)  BP: 148/74 (05 Apr 2018 05:19) (132/70 - 165/73)  BP(mean): --  RR: 18 (05 Apr 2018 05:19) (18 - 24)  SpO2: 96% (05 Apr 2018 05:19) (91% - 96%)    PHYSICAL EXAM:    General: WDWN, obese female, laying in bed, in no acute distress, breathing comfortably on room air  HEENT: NC/AT; PERRL, anicteric sclera; MMM,   Neck: supple, no jvd  Cardiovascular: +S1/soft S2, 2/6 crescendo/decrescendo murmur at RSB, 2/6 murmur at the apex  Respiratory: course breath sounds bilaterally - difficult to auscultate because patient noncompliant with exam/movt. for appropriate auscultation  Gastrointestinal: soft, nontender to palpation, +BSx4 normal bowel sounds, central wound vac in place - no erythema or signs of infection  Extremities: WWP; no edema, clubbing or cyanosis  Vascular: 2+ radial, DP/PT pulses B/L  Neurological: AAOx3; no focal deficits    MEDICATIONS:  MEDICATIONS  (STANDING):  ARIPiprazole 5 milliGRAM(s) Oral daily  cholecalciferol 1000 Unit(s) Oral daily  dextrose 5%. 1000 milliLiter(s) (50 mL/Hr) IV Continuous <Continuous>  dextrose 50% Injectable 12.5 Gram(s) IV Push once  dextrose 50% Injectable 25 Gram(s) IV Push once  dextrose 50% Injectable 25 Gram(s) IV Push once  escitalopram 10 milliGRAM(s) Oral daily  heparin  flush 100 Units/mL Injectable 100 Unit(s) IV Push every other day  insulin lispro (HumaLOG) corrective regimen sliding scale   SubCutaneous Before meals and at bedtime  levothyroxine 150 MICROGram(s) Oral daily  medroxyPROGESTERone 10 milliGRAM(s) Oral daily    MEDICATIONS  (PRN):  acetaminophen   Tablet. 650 milliGRAM(s) Oral every 6 hours PRN Mild Pain (1 - 3)  acetaminophen  IVPB. 1000 milliGRAM(s) IV Intermittent once PRN Moderate Pain (4 - 6)  dextrose Gel 1 Dose(s) Oral once PRN Blood Glucose LESS THAN 70 milliGRAM(s)/deciliter  glucagon  Injectable 1 milliGRAM(s) IntraMuscular once PRN Glucose LESS THAN 70 milligrams/deciliter  QUEtiapine 12.5 milliGRAM(s) Oral at bedtime PRN Agitation and/or Insomnia  sodium chloride 0.65% Nasal 1 Spray(s) Both Nostrils every 4 hours PRN Nasal Congestion      ALLERGIES:  Allergies    No Known Allergies    Intolerances    LABS:                        7.3    13.2  )-----------( 145      ( 04 Apr 2018 10:19 )             23.1     04-04    138  |  98  |  17  ----------------------------<  89  3.3<L>   |  25  |  2.37<H>    Ca    8.1<L>      04 Apr 2018 10:19  Mg     1.9     04-04    TPro  5.4<L>  /  Alb  2.3<L>  /  TBili  0.8  /  DBili  x   /  AST  18  /  ALT  10  /  AlkPhos  53  04-04    PT/INR - ( 03 Apr 2018 07:09 )   PT: 20.1 sec;   INR: 1.79          PTT - ( 03 Apr 2018 07:09 )  PTT:27.8 sec  Urinalysis Basic - ( 04 Apr 2018 01:30 )    Color: Yellow / Appearance: Clear / SG: >=1.030 / pH: x  Gluc: x / Ketone: Trace mg/dL  / Bili: Moderate / Urobili: 0.2 E.U./dL   Blood: x / Protein: 30 mg/dL / Nitrite: NEGATIVE   Leuk Esterase: NEGATIVE / RBC: 5-10 /HPF / WBC 5-10 /HPF   Sq Epi: x / Non Sq Epi: 0-5 /HPF / Bacteria: Present /HPF      CAPILLARY BLOOD GLUCOSE      POCT Blood Glucose.: 97 mg/dL (04 Apr 2018 21:29)      RADIOLOGY & ADDITIONAL TESTS: Reviewed. OVERNIGHT EVENTS: No events overnight. Uop 150cc overnight.    SUBJECTIVE / INTERVAL HPI: Patient seen and examined at bedside. No shortness of breath this morning. No complaints of pain.     VITAL SIGNS:  Vital Signs Last 24 Hrs  T(C): 36.5 (05 Apr 2018 05:19), Max: 37.4 (04 Apr 2018 09:10)  T(F): 97.7 (05 Apr 2018 05:19), Max: 99.3 (04 Apr 2018 09:10)  HR: 100 (05 Apr 2018 05:19) (81 - 100)  BP: 148/74 (05 Apr 2018 05:19) (132/70 - 165/73)  BP(mean): --  RR: 18 (05 Apr 2018 05:19) (18 - 24)  SpO2: 96% (05 Apr 2018 05:19) (91% - 96%)    PHYSICAL EXAM:  General: WDWN, obese female, laying in bed, in no acute distress, breathing comfortably on room air  HEENT: NC/AT; PERRL, anicteric sclera; MMM, L TLC in place (site clean/dry/intact), R permcath in place (site clean/dry/intact)  Neck: supple, no jvd  Cardiovascular: +S1/S2, RRR, -m/g/r  Respiratory: course breath sounds bilaterally - difficult to auscultate because patient noncompliant with exam/movt. for appropriate auscultation  Gastrointestinal: soft, nontender to palpation, +BSx4 normal bowel sounds, central wound vac in place - no erythema or signs of infection  Extremities: WWP, clubbing or cyanosis, 2+ edema bilaterally in calves  Vascular: 2+ radial, DP/PT pulses B/L  Neurological: AAOx3; no focal deficits    MEDICATIONS:  MEDICATIONS  (STANDING):  ARIPiprazole 5 milliGRAM(s) Oral daily  cholecalciferol 1000 Unit(s) Oral daily  dextrose 5%. 1000 milliLiter(s) (50 mL/Hr) IV Continuous <Continuous>  dextrose 50% Injectable 12.5 Gram(s) IV Push once  dextrose 50% Injectable 25 Gram(s) IV Push once  dextrose 50% Injectable 25 Gram(s) IV Push once  escitalopram 10 milliGRAM(s) Oral daily  heparin  flush 100 Units/mL Injectable 100 Unit(s) IV Push every other day  insulin lispro (HumaLOG) corrective regimen sliding scale   SubCutaneous Before meals and at bedtime  levothyroxine 150 MICROGram(s) Oral daily  medroxyPROGESTERone 10 milliGRAM(s) Oral daily    MEDICATIONS  (PRN):  acetaminophen   Tablet. 650 milliGRAM(s) Oral every 6 hours PRN Mild Pain (1 - 3)  acetaminophen  IVPB. 1000 milliGRAM(s) IV Intermittent once PRN Moderate Pain (4 - 6)  dextrose Gel 1 Dose(s) Oral once PRN Blood Glucose LESS THAN 70 milliGRAM(s)/deciliter  glucagon  Injectable 1 milliGRAM(s) IntraMuscular once PRN Glucose LESS THAN 70 milligrams/deciliter  QUEtiapine 12.5 milliGRAM(s) Oral at bedtime PRN Agitation and/or Insomnia  sodium chloride 0.65% Nasal 1 Spray(s) Both Nostrils every 4 hours PRN Nasal Congestion      ALLERGIES:  Allergies  No Known Allergies or Intolerances    LABS:                        7.3    13.2  )-----------( 145      ( 04 Apr 2018 10:19 )             23.1     04-04    138  |  98  |  17  ----------------------------<  89  3.3<L>   |  25  |  2.37<H>    Ca    8.1<L>      04 Apr 2018 10:19  Mg     1.9     04-04    TPro  5.4<L>  /  Alb  2.3<L>  /  TBili  0.8  /  DBili  x   /  AST  18  /  ALT  10  /  AlkPhos  53  04-04    PT/INR - ( 03 Apr 2018 07:09 )   PT: 20.1 sec;   INR: 1.79          PTT - ( 03 Apr 2018 07:09 )  PTT:27.8 sec  Urinalysis Basic - ( 04 Apr 2018 01:30 )    Color: Yellow / Appearance: Clear / SG: >=1.030 / pH: x  Gluc: x / Ketone: Trace mg/dL  / Bili: Moderate / Urobili: 0.2 E.U./dL   Blood: x / Protein: 30 mg/dL / Nitrite: NEGATIVE   Leuk Esterase: NEGATIVE / RBC: 5-10 /HPF / WBC 5-10 /HPF   Sq Epi: x / Non Sq Epi: 0-5 /HPF / Bacteria: Present /HPF      CAPILLARY BLOOD GLUCOSE      POCT Blood Glucose.: 97 mg/dL (04 Apr 2018 21:29)      RADIOLOGY & ADDITIONAL TESTS: Reviewed.

## 2018-04-05 NOTE — DISCHARGE NOTE ADULT - CARE PROVIDER_API CALL
Geneva De Jesus), Internal Medicine  [address] [address]  Phone: [phone]  Fax: [phone] Geneva De Jesus), Internal Medicine  [address] [address]  Phone: [phone]  Fax: [phone]    Sixto Christian), Obstetrics and Gynecology  [address] [address]  Phone: [phone]  Fax: [phone]

## 2018-04-05 NOTE — PROGRESS NOTE ADULT - PROBLEM SELECTOR PLAN 2
-New on this admission, no prior reported hx of renal disease.  Etiology ischemic ATN with component of AIN from ACE-I and Vancomycin. Per records, patient developed oligoanuria with worsening creatinine despite Lasix challenge.   -s/p HD catheter placement by IR on 3/29 with first session of HD on same day, tolerated well.  Will receive HD cath replacement today 2/2 cap on HD cath unable to be removed  - continue management per nephrology, will receive HD today  -started vit D supplementation, refusing this AM    #Agitation  Patient intermittingly agitated, resulted in pulling out IV lines last night, started seroquel 12.5 at bedtime.  - Qtc 426 on Am EKG -Active vaginal spotting possibly 2/2 GYN malignancy.  ~1 pad overnight (patient refusing actual pads).  TVUS showed fibroid uterus with thickened endometrium.  -s/p 2 units PRBCs  -maintain active T&S  -Transfuse <7.  Trend CBC  -IVC filter placed A/C contraindicated in setting of vaginal bleeding  -c/w medroxyprogesterone 10mg daily, and with outpt GYN follow up    #Intrabdominal Infection  s/p Vanc 1 dose 3/23 and Zosyn 3/23-3/31 for abdominal wound infection after incisional hernia repair. Patient had solitary fever to 100.8 on 4/2 and leukocytosis increasing to 12.7  - Bcx 4/2: NG   - wound vac management by Surgery Team 1C, to be changed on Friday  - Leukocytosis trending down. No need for antibiotics at this time.

## 2018-04-05 NOTE — DISCHARGE NOTE ADULT - MEDICATION SUMMARY - MEDICATIONS TO TAKE
I will START or STAY ON the medications listed below when I get home from the hospital:    Aspirin Enteric Coated 81 mg oral delayed release tablet  -- 1 tab(s) by mouth once a day  -- Indication: For Prophylactic measure    acetaminophen 325 mg oral tablet  -- 2 tab(s) by mouth every 6 hours, As needed, Mild Pain (1 - 3)  -- Indication: For Pain    escitalopram 10 mg oral tablet  -- 1 tab(s) by mouth once a day  -- Indication: For Anxiety    glipiZIDE 5 mg oral tablet  -- 1 tab(s) by mouth once a day  -- Indication: For Diabetes    medroxyPROGESTERone 10 mg oral tablet  -- 1 tab(s) by mouth once a day  -- Indication: For Vaginal bleeding    ARIPiprazole 5 mg oral tablet  -- 1 tab(s) by mouth once a day  -- Indication: For Delirium due to another medical condition    QUEtiapine  -- Indication: For Delirium due to another medical condition    amLODIPine 10 mg oral tablet  -- 1 tab(s) by mouth once a day  -- Indication: For HTN (hypertension)    bisacodyl 5 mg oral delayed release tablet  -- 1 tab(s) by mouth every 12 hours, As needed, Constipation  -- Indication: For constipation    sodium chloride 0.65% nasal spray  -- 2 spray(s) into nose once a day  -- Indication: For Nasal congestion    pantoprazole 40 mg oral delayed release tablet  -- 1 tab(s) by mouth once a day (before a meal)  -- Indication: For Gastritis    levothyroxine 150 mcg (0.15 mg) oral tablet  -- 1 tab(s) by mouth once a day  -- Indication: For Hypothyroidism    cholecalciferol oral tablet  -- 1000 unit(s) by mouth once a day  -- Indication: For Nutrition, metabolism, and development symptoms

## 2018-04-06 LAB
ANION GAP SERPL CALC-SCNC: 17 MMOL/L — SIGNIFICANT CHANGE UP (ref 5–17)
BASOPHILS NFR BLD AUTO: 0.4 % — SIGNIFICANT CHANGE UP (ref 0–2)
BLD GP AB SCN SERPL QL: NEGATIVE — SIGNIFICANT CHANGE UP
BUN SERPL-MCNC: 31 MG/DL — HIGH (ref 7–23)
CALCIUM SERPL-MCNC: 8.8 MG/DL — SIGNIFICANT CHANGE UP (ref 8.4–10.5)
CHLORIDE SERPL-SCNC: 100 MMOL/L — SIGNIFICANT CHANGE UP (ref 96–108)
CO2 SERPL-SCNC: 22 MMOL/L — SIGNIFICANT CHANGE UP (ref 22–31)
CREAT SERPL-MCNC: 2.34 MG/DL — HIGH (ref 0.5–1.3)
EOSINOPHIL NFR BLD AUTO: 0.1 % — SIGNIFICANT CHANGE UP (ref 0–6)
GLUCOSE BLDC GLUCOMTR-MCNC: 75 MG/DL — SIGNIFICANT CHANGE UP (ref 70–99)
GLUCOSE BLDC GLUCOMTR-MCNC: 75 MG/DL — SIGNIFICANT CHANGE UP (ref 70–99)
GLUCOSE BLDC GLUCOMTR-MCNC: 76 MG/DL — SIGNIFICANT CHANGE UP (ref 70–99)
GLUCOSE BLDC GLUCOMTR-MCNC: 77 MG/DL — SIGNIFICANT CHANGE UP (ref 70–99)
GLUCOSE SERPL-MCNC: 79 MG/DL — SIGNIFICANT CHANGE UP (ref 70–99)
HCT VFR BLD CALC: 24.3 % — LOW (ref 34.5–45)
HGB BLD-MCNC: 7.6 G/DL — LOW (ref 11.5–15.5)
LYMPHOCYTES # BLD AUTO: 20.6 % — SIGNIFICANT CHANGE UP (ref 13–44)
MAGNESIUM SERPL-MCNC: 2.2 MG/DL — SIGNIFICANT CHANGE UP (ref 1.6–2.6)
MCHC RBC-ENTMCNC: 28.9 PG — SIGNIFICANT CHANGE UP (ref 27–34)
MCHC RBC-ENTMCNC: 31.3 G/DL — LOW (ref 32–36)
MCV RBC AUTO: 92.4 FL — SIGNIFICANT CHANGE UP (ref 80–100)
MONOCYTES NFR BLD AUTO: 6.1 % — SIGNIFICANT CHANGE UP (ref 2–14)
NEUTROPHILS NFR BLD AUTO: 72.8 % — SIGNIFICANT CHANGE UP (ref 43–77)
PLATELET # BLD AUTO: 165 K/UL — SIGNIFICANT CHANGE UP (ref 150–400)
POTASSIUM SERPL-MCNC: 3.3 MMOL/L — LOW (ref 3.5–5.3)
POTASSIUM SERPL-SCNC: 3.3 MMOL/L — LOW (ref 3.5–5.3)
RBC # BLD: 2.63 M/UL — LOW (ref 3.8–5.2)
RBC # FLD: 17.4 % — HIGH (ref 10.3–16.9)
RH IG SCN BLD-IMP: POSITIVE — SIGNIFICANT CHANGE UP
SODIUM SERPL-SCNC: 139 MMOL/L — SIGNIFICANT CHANGE UP (ref 135–145)
WBC # BLD: 10.2 K/UL — SIGNIFICANT CHANGE UP (ref 3.8–10.5)
WBC # FLD AUTO: 10.2 K/UL — SIGNIFICANT CHANGE UP (ref 3.8–10.5)

## 2018-04-06 PROCEDURE — 99231 SBSQ HOSP IP/OBS SF/LOW 25: CPT

## 2018-04-06 PROCEDURE — 99233 SBSQ HOSP IP/OBS HIGH 50: CPT

## 2018-04-06 RX ORDER — APIXABAN 2.5 MG/1
5 TABLET, FILM COATED ORAL EVERY 12 HOURS
Qty: 0 | Refills: 0 | Status: DISCONTINUED | OUTPATIENT
Start: 2018-04-06 | End: 2018-04-07

## 2018-04-06 RX ORDER — FUROSEMIDE 40 MG
60 TABLET ORAL EVERY 12 HOURS
Qty: 0 | Refills: 0 | Status: DISCONTINUED | OUTPATIENT
Start: 2018-04-06 | End: 2018-04-07

## 2018-04-06 RX ORDER — POTASSIUM CHLORIDE 20 MEQ
40 PACKET (EA) ORAL ONCE
Qty: 0 | Refills: 0 | Status: DISCONTINUED | OUTPATIENT
Start: 2018-04-06 | End: 2018-04-06

## 2018-04-06 RX ORDER — POTASSIUM CHLORIDE 20 MEQ
20 PACKET (EA) ORAL
Qty: 0 | Refills: 0 | Status: COMPLETED | OUTPATIENT
Start: 2018-04-06 | End: 2018-04-06

## 2018-04-06 RX ORDER — POTASSIUM CHLORIDE 20 MEQ
10 PACKET (EA) ORAL
Qty: 0 | Refills: 0 | Status: DISCONTINUED | OUTPATIENT
Start: 2018-04-06 | End: 2018-04-06

## 2018-04-06 RX ADMIN — ARIPIPRAZOLE 5 MILLIGRAM(S): 15 TABLET ORAL at 11:47

## 2018-04-06 RX ADMIN — Medication 150 MICROGRAM(S): at 05:40

## 2018-04-06 RX ADMIN — Medication 1000 UNIT(S): at 11:47

## 2018-04-06 RX ADMIN — Medication 1000 MILLIGRAM(S): at 12:44

## 2018-04-06 RX ADMIN — Medication 60 MILLIGRAM(S): at 11:46

## 2018-04-06 RX ADMIN — Medication 100 MILLIEQUIVALENT(S): at 11:47

## 2018-04-06 RX ADMIN — Medication 60 MILLIGRAM(S): at 23:18

## 2018-04-06 RX ADMIN — Medication 100 UNIT(S): at 11:48

## 2018-04-06 RX ADMIN — APIXABAN 5 MILLIGRAM(S): 2.5 TABLET, FILM COATED ORAL at 17:16

## 2018-04-06 RX ADMIN — MEDROXYPROGESTERONE ACETATE 10 MILLIGRAM(S): 150 INJECTION, SUSPENSION, EXTENDED RELEASE INTRAMUSCULAR at 11:48

## 2018-04-06 RX ADMIN — Medication 100 MILLIEQUIVALENT(S): at 10:39

## 2018-04-06 RX ADMIN — ESCITALOPRAM OXALATE 10 MILLIGRAM(S): 10 TABLET, FILM COATED ORAL at 11:47

## 2018-04-06 RX ADMIN — Medication 400 MILLIGRAM(S): at 11:59

## 2018-04-06 RX ADMIN — Medication 100 MILLIEQUIVALENT(S): at 09:39

## 2018-04-06 NOTE — PROGRESS NOTE ADULT - PROBLEM SELECTOR PLAN 1
-New on this admission, no prior reported hx of renal disease.  Etiology ischemic ATN with component of AIN from ACE-I and Vancomycin. Per records, patient developed oligoanuria with worsening creatinine despite Lasix challenge.   -s/p HD catheter placement by IR on 3/29 with first session of HD on same day, tolerated well.  Will receive HD cath replacement today 4/2 cap on HD cath unable to be removed  - continue management per nephrology; will give additional lasix challenge 60mg IV x2 to evaluate urine output given increased output overnight.   -continue vit D supplementation    #Agitation  Patient intermittingly agitated, resulted in pulling out IV lines last night, started seroquel 12.5 at bedtime.  - Qtc 426 on Am EKG -New on this admission, no prior reported hx of renal disease.  Etiology ischemic ATN with component of AIN from ACE-I and Vancomycin. Per records, patient developed oligoanuria with worsening creatinine despite Lasix challenge.   -s/p HD catheter placement by IR on 3/29 with first session of HD on same day, tolerated well.  Will receive HD cath replacement today 4/2 cap on HD cath unable to be removed  - continue management per nephrology; made ~240cc urine overnight, await nephrology recommendations regarding ongoing dialysis and management  -continue vit D supplementation -New on this admission, no prior reported hx of renal disease.  Etiology ischemic ATN with component of AIN from ACE-I and Vancomycin. Per records, patient developed oligoanuria with worsening creatinine despite Lasix challenge.   -s/p HD catheter placement by IR on 3/29 with first session of HD on same day, tolerated well.  Will receive HD cath replacement today 4/2 cap on HD cath unable to be removed  - continue management per nephrology; made ~240cc urine overnight  -will continue with additional day of 60mg IV lasix BID to monitor Uop, then evaluate whether long term dialysis necessary. If so, may also consider starting epogen.  -PPD placed at noon on 4/5  -continue vit D supplementation -New on this admission, no prior reported hx of renal disease.  Etiology ischemic ATN with component of AIN from ACE-I and Vancomycin. Per records, patient developed oligoanuria with worsening creatinine despite Lasix challenge.   -s/p HD catheter placement by IR on 3/29 with first session of HD on same day, tolerated well.  Will receive HD cath replacement today 4/2 cap on HD cath unable to be removed  - continue management per nephrology; made ~240cc urine overnight  -will continue with additional day of 60mg IV lasix BID to monitor Uop, then evaluate whether long term dialysis necessary. If so, may also consider starting epogen.  -serum creatinine downtrending  -PPD placed at noon on 4/5  -continue vit D supplementation

## 2018-04-06 NOTE — PROGRESS NOTE ADULT - PROBLEM SELECTOR PROBLEM 2
Acute blood loss anemia Deep vein thrombosis (DVT) of both lower extremities, unspecified chronicity, unspecified vein

## 2018-04-06 NOTE — PROGRESS NOTE BEHAVIORAL HEALTH - SUMMARY
78F h/o depression not currently in treatment, PMH DM, HLD, HTN, recent ventral hernia repair with subsequent admission for post-operative PE placed on Eliquis, presenting again 3/23 for abdominal pain and bloody stools, initially AAOx3 but increasingly more delirious 2/2 GONZALEZ, wound infection, and potential gynecological malignancy, initially refusing dialysis and gyn workup.  Delirium appears to be resolving and patient appears less depressed/labile.  Would continue Lexapro 10 mg PO daily and Abilify 5 mg PO daily for delirium and adjunctive treatment for depression.  Can continue Seroquel 12.5 mg PO HS as PRN order for agitation.

## 2018-04-06 NOTE — PROGRESS NOTE ADULT - SUBJECTIVE AND OBJECTIVE BOX
SUBJECTIVE: Pt seen and examined at bedside. +Flatus/BM. Pt reports no abd pain    Vital Signs Last 24 Hrs  T(C): 36.9 (06 Apr 2018 08:38), Max: 37.1 (05 Apr 2018 20:52)  T(F): 98.5 (06 Apr 2018 08:38), Max: 98.7 (05 Apr 2018 20:52)  HR: 76 (06 Apr 2018 08:38) (76 - 83)  BP: 128/70 (06 Apr 2018 08:38) (128/70 - 156/74)  BP(mean): --  RR: 18 (06 Apr 2018 08:38) (16 - 20)  SpO2: 95% (06 Apr 2018 08:38) (93% - 95%)    PHYSICAL EXAM    Gen: NAD  CV: RRR  Pulm: Regular non-labored respirations  Abd: Soft, NT/ND. Wound with granulating tissue, and some fibrinous material. No purulence noted.        LABS:                        7.6    10.2  )-----------( 165      ( 06 Apr 2018 06:39 )             24.3     04-06    139  |  100  |  31<H>  ----------------------------<  79  3.3<L>   |  22  |  2.34<H>    Ca    8.8      06 Apr 2018 06:38  Mg     2.2     04-06    TPro  5.2<L>  /  Alb  2.4<L>  /  TBili  0.7  /  DBili  x   /  AST  19  /  ALT  11  /  AlkPhos  51  04-05      ASSESSMENT AND PLAN  78 year old female s/p ventral hernia repair, c/b surgical site infection s/p wound VAC placement, was on IV Zosyn, c/b GONZALEZ now on medicine service. Pt continues to be afebrile, VSS. WBC continuing to decrease   -No acute surgical intervention at this time  -Would get wound care nurse consult for VAC changes. NOTE: there are 3 pieces of black foam inside the wound  -Further management per primary team  -D/W Dr. Ngo

## 2018-04-06 NOTE — PROGRESS NOTE BEHAVIORAL HEALTH - NSBHFUPINTERVALHXFT_PSY_A_CORE
Patient seen at bedside.  Awake, alert, appears pleasant although gives minimal verbal answers to questions, it appears volitionally- responds with arm movements and shrugs.  Denies recent vaginal bleeding, does not want further workup at this time.  Says she is feeling much better physically and would feel comfortable leaving hospital when it is recommended.  Continues to refuse to speak about her family.  Does not know name of hospital and says year is "1918."

## 2018-04-06 NOTE — PROGRESS NOTE ADULT - PROBLEM SELECTOR PLAN 5
CTA 3/12 with findings of PE within the segmental arteries of the right upper   lobe, right middle lobe, and lingula. Subsequent echocardiogram notable for RV dilatation, however deemed by pulmonology to be unlikely that clot burden caused dilatation. Trops neg x3. Provoked PE in the setting of recent abdominal surgery.   - hold Eliquis in the setting of active vaginal spotting with acute blood loss anemia.   - receiving heparin flushes  -IVC filter placed 3/30  -encouraging incentive spirometry CTA 3/12 with findings of PE within the segmental arteries of the right upper   lobe, right middle lobe, and lingula. Subsequent echocardiogram notable for RV dilatation, however deemed by pulmonology to be unlikely that clot burden caused dilatation. Trops neg x3. Provoked PE in the setting of recent abdominal surgery.   - hold Eliquis given recent vaginal bleed   - receiving heparin flushes  -IVC filter placed 3/30  -encouraging incentive spirometry - Resolved with dialysis.    - Interval development of bilateral pleural effusions on CXR from 3/28 compared to that of 3/23. Likely 2/2 fluid overload in the setting of renal failure and oliguria. Patient currently without complaints of SOB, no increased oxygen requirements (on NC2L, RR 18).   -encouraging incentive spirometry for improved aeration

## 2018-04-06 NOTE — PROGRESS NOTE ADULT - PROBLEM SELECTOR PLAN 3
-Improved with dialysis.    -Interval development of bilateral pleural effusions on CXR from 3/28 compared to that of 3/23. Likely 2/2 fluid overload in the setting of renal failure and oliguria. Patient currently without complaints of SOB, no increased oxygen requirements (on NC2L, RR 18).  -CXR this AM, poor quality film. Unclear if change from previous. Experiencing SOB, improved with one dose of 40mg IV lasix.  -encouraging incentive spirometry for improved aeration - Resolved with dialysis.    - Interval development of bilateral pleural effusions on CXR from 3/28 compared to that of 3/23. Likely 2/2 fluid overload in the setting of renal failure and oliguria. Patient currently without complaints of SOB, no increased oxygen requirements (on NC2L, RR 18).   -encouraging incentive spirometry for improved aeration CTA 3/12 with findings of PE within the segmental arteries of the right upper   lobe, right middle lobe, and lingula. Subsequent echocardiogram notable for RV dilatation, however deemed by pulmonology to be unlikely that clot burden caused dilatation. Trops neg x3. Provoked PE in the setting of recent abdominal surgery.   - restarting Eliquis given resolution of vaginal bleed  -IVC filter placed 3/30  -encouraging incentive spirometry

## 2018-04-06 NOTE — PROGRESS NOTE ADULT - SUBJECTIVE AND OBJECTIVE BOX
OVERNIGHT EVENTS:    SUBJECTIVE / INTERVAL HPI: Patient seen and examined at bedside.     VITAL SIGNS:  Vital Signs Last 24 Hrs  T(C): 37 (06 Apr 2018 05:19), Max: 37.2 (05 Apr 2018 10:19)  T(F): 98.6 (06 Apr 2018 05:19), Max: 98.9 (05 Apr 2018 10:19)  HR: 81 (06 Apr 2018 05:46) (79 - 83)  BP: 150/75 (06 Apr 2018 05:19) (137/71 - 156/74)  BP(mean): --  RR: 18 (06 Apr 2018 05:46) (16 - 20)  SpO2: 95% (06 Apr 2018 05:46) (93% - 95%)    PHYSICAL EXAM:    General: WDWN, obese female, laying in bed, in no acute distress, breathing comfortably on room air  HEENT: NC/AT; PERRL, anicteric sclera; MMM, L TLC in place (site clean/dry/intact), R permcath in place (site clean/dry/intact)  Neck: supple, no jvd  Cardiovascular: +S1/S2, RRR, -m/g/r  Respiratory: course breath sounds bilaterally - difficult to auscultate because patient noncompliant with exam/movt. for appropriate auscultation  Gastrointestinal: soft, nontender to palpation, +BSx4 normal bowel sounds, central wound vac in place - no erythema or signs of infection  Extremities: WWP, clubbing or cyanosis, 2+ edema bilaterally in calves  Vascular: 2+ radial, DP/PT pulses B/L  Neurological: AAOx3; no focal deficits      MEDICATIONS:  MEDICATIONS  (STANDING):  ARIPiprazole 5 milliGRAM(s) Oral daily  cholecalciferol 1000 Unit(s) Oral daily  dextrose 5%. 1000 milliLiter(s) (50 mL/Hr) IV Continuous <Continuous>  dextrose 50% Injectable 12.5 Gram(s) IV Push once  dextrose 50% Injectable 25 Gram(s) IV Push once  dextrose 50% Injectable 25 Gram(s) IV Push once  escitalopram 10 milliGRAM(s) Oral daily  heparin  flush 100 Units/mL Injectable 100 Unit(s) IV Push every other day  insulin lispro (HumaLOG) corrective regimen sliding scale   SubCutaneous Before meals and at bedtime  levothyroxine 150 MICROGram(s) Oral daily  medroxyPROGESTERone 10 milliGRAM(s) Oral daily    MEDICATIONS  (PRN):  acetaminophen   Tablet. 650 milliGRAM(s) Oral every 6 hours PRN Mild Pain (1 - 3)  acetaminophen   Tablet. 650 milliGRAM(s) Oral every 6 hours PRN Moderate Pain (4 - 6)  acetaminophen  IVPB. 1000 milliGRAM(s) IV Intermittent once PRN Moderate Pain (4 - 6)  dextrose Gel 1 Dose(s) Oral once PRN Blood Glucose LESS THAN 70 milliGRAM(s)/deciliter  glucagon  Injectable 1 milliGRAM(s) IntraMuscular once PRN Glucose LESS THAN 70 milligrams/deciliter  QUEtiapine 12.5 milliGRAM(s) Oral at bedtime PRN Agitation and/or Insomnia  sodium chloride 0.65% Nasal 1 Spray(s) Both Nostrils every 4 hours PRN Nasal Congestion      ALLERGIES:  Allergies    No Known Allergies    Intolerances        LABS:                        7.3    11.4  )-----------( 156      ( 05 Apr 2018 07:19 )             23.1     04-05    141  |  100  |  25<H>  ----------------------------<  88  3.5   |  23  |  2.57<H>    Ca    8.4      05 Apr 2018 07:19  Mg     1.9     04-05    TPro  5.2<L>  /  Alb  2.4<L>  /  TBili  0.7  /  DBili  x   /  AST  19  /  ALT  11  /  AlkPhos  51  04-05        POCT Blood Glucose.: 77 mg/dL (06 Apr 2018 06:39)      RADIOLOGY & ADDITIONAL TESTS: Reviewed. OVERNIGHT EVENTS:    SUBJECTIVE / INTERVAL HPI: Patient seen and examined at bedside. She was in no apparent distress. She denied any abdominal pain or shortness of breath.     VITAL SIGNS:  Vital Signs Last 24 Hrs  T(C): 37 (06 Apr 2018 05:19), Max: 37.2 (05 Apr 2018 10:19)  T(F): 98.6 (06 Apr 2018 05:19), Max: 98.9 (05 Apr 2018 10:19)  HR: 81 (06 Apr 2018 05:46) (79 - 83)  BP: 150/75 (06 Apr 2018 05:19) (137/71 - 156/74)  BP(mean): --  RR: 18 (06 Apr 2018 05:46) (16 - 20)  SpO2: 95% (06 Apr 2018 05:46) (93% - 95%)    PHYSICAL EXAM:  General: WDWN, obese female, laying in bed, in no acute distress, breathing comfortably on room air  HEENT: NC/AT; PERRL, anicteric sclera; MMM, L TLC in place (site clean/dry/intact), R permcath in place (site clean/dry/intact)  Neck: supple, no jvd  Cardiovascular: +S1/S2, RRR, -m/g/r  Respiratory: course breath sounds bilaterally - difficult to auscultate because patient noncompliant with exam/movt. for appropriate auscultation  Gastrointestinal: soft, nontender to palpation, +BSx4 normal bowel sounds, central wound vac in place - no erythema or signs of infection  Extremities: WWP, clubbing or cyanosis, 2+ edema bilaterally in calves persists, but skin now starting to crack and look dry  Vascular: 2+ radial, DP/PT pulses B/L  Neurological: AAOx3; no focal deficits    MEDICATIONS:  MEDICATIONS  (STANDING):  ARIPiprazole 5 milliGRAM(s) Oral daily  cholecalciferol 1000 Unit(s) Oral daily  dextrose 5%. 1000 milliLiter(s) (50 mL/Hr) IV Continuous <Continuous>  dextrose 50% Injectable 12.5 Gram(s) IV Push once  dextrose 50% Injectable 25 Gram(s) IV Push once  dextrose 50% Injectable 25 Gram(s) IV Push once  escitalopram 10 milliGRAM(s) Oral daily  heparin  flush 100 Units/mL Injectable 100 Unit(s) IV Push every other day  insulin lispro (HumaLOG) corrective regimen sliding scale   SubCutaneous Before meals and at bedtime  levothyroxine 150 MICROGram(s) Oral daily  medroxyPROGESTERone 10 milliGRAM(s) Oral daily    MEDICATIONS  (PRN):  acetaminophen   Tablet. 650 milliGRAM(s) Oral every 6 hours PRN Mild Pain (1 - 3)  acetaminophen   Tablet. 650 milliGRAM(s) Oral every 6 hours PRN Moderate Pain (4 - 6)  acetaminophen  IVPB. 1000 milliGRAM(s) IV Intermittent once PRN Moderate Pain (4 - 6)  dextrose Gel 1 Dose(s) Oral once PRN Blood Glucose LESS THAN 70 milliGRAM(s)/deciliter  glucagon  Injectable 1 milliGRAM(s) IntraMuscular once PRN Glucose LESS THAN 70 milligrams/deciliter  QUEtiapine 12.5 milliGRAM(s) Oral at bedtime PRN Agitation and/or Insomnia  sodium chloride 0.65% Nasal 1 Spray(s) Both Nostrils every 4 hours PRN Nasal Congestion      ALLERGIES:  Allergies    No Known Allergies    Intolerances        LABS:                        7.3    11.4  )-----------( 156      ( 05 Apr 2018 07:19 )             23.1     04-05    141  |  100  |  25<H>  ----------------------------<  88  3.5   |  23  |  2.57<H>    Ca    8.4      05 Apr 2018 07:19  Mg     1.9     04-05    TPro  5.2<L>  /  Alb  2.4<L>  /  TBili  0.7  /  DBili  x   /  AST  19  /  ALT  11  /  AlkPhos  51  04-05        POCT Blood Glucose.: 77 mg/dL (06 Apr 2018 06:39)      RADIOLOGY & ADDITIONAL TESTS: Reviewed. OVERNIGHT EVENTS: Uop ~ 240cc overnight after 60mg IV lasix challenge at 11:00PM.     SUBJECTIVE / INTERVAL HPI: Patient seen and examined at bedside. She was in no apparent distress. She denied any abdominal pain or shortness of breath.     VITAL SIGNS:  Vital Signs Last 24 Hrs  T(C): 37 (06 Apr 2018 05:19), Max: 37.2 (05 Apr 2018 10:19)  T(F): 98.6 (06 Apr 2018 05:19), Max: 98.9 (05 Apr 2018 10:19)  HR: 81 (06 Apr 2018 05:46) (79 - 83)  BP: 150/75 (06 Apr 2018 05:19) (137/71 - 156/74)  BP(mean): --  RR: 18 (06 Apr 2018 05:46) (16 - 20)  SpO2: 95% (06 Apr 2018 05:46) (93% - 95%)    PHYSICAL EXAM:  General: WDWN, obese female, laying in bed, in no acute distress, breathing comfortably on room air  HEENT: NC/AT; PERRL, anicteric sclera; MMM, L TLC in place (site clean/dry/intact), R permcath in place (site clean/dry/intact)  Neck: supple, no jvd  Cardiovascular: +S1/S2, RRR, -m/g/r  Respiratory: course breath sounds bilaterally - difficult to auscultate because patient noncompliant with exam/movt. for appropriate auscultation  Gastrointestinal: soft, nontender to palpation, +BSx4 normal bowel sounds, central wound vac in place - no erythema or signs of infection  Extremities: WWP, clubbing or cyanosis, 2+ edema bilaterally in calves persists, but skin now starting to crack and look dry  Vascular: 2+ radial, DP/PT pulses B/L  Neurological: AAOx3; no focal deficits    MEDICATIONS:  MEDICATIONS  (STANDING):  ARIPiprazole 5 milliGRAM(s) Oral daily  cholecalciferol 1000 Unit(s) Oral daily  dextrose 5%. 1000 milliLiter(s) (50 mL/Hr) IV Continuous <Continuous>  dextrose 50% Injectable 12.5 Gram(s) IV Push once  dextrose 50% Injectable 25 Gram(s) IV Push once  dextrose 50% Injectable 25 Gram(s) IV Push once  escitalopram 10 milliGRAM(s) Oral daily  heparin  flush 100 Units/mL Injectable 100 Unit(s) IV Push every other day  insulin lispro (HumaLOG) corrective regimen sliding scale   SubCutaneous Before meals and at bedtime  levothyroxine 150 MICROGram(s) Oral daily  medroxyPROGESTERone 10 milliGRAM(s) Oral daily    MEDICATIONS  (PRN):  acetaminophen   Tablet. 650 milliGRAM(s) Oral every 6 hours PRN Mild Pain (1 - 3)  acetaminophen   Tablet. 650 milliGRAM(s) Oral every 6 hours PRN Moderate Pain (4 - 6)  acetaminophen  IVPB. 1000 milliGRAM(s) IV Intermittent once PRN Moderate Pain (4 - 6)  dextrose Gel 1 Dose(s) Oral once PRN Blood Glucose LESS THAN 70 milliGRAM(s)/deciliter  glucagon  Injectable 1 milliGRAM(s) IntraMuscular once PRN Glucose LESS THAN 70 milligrams/deciliter  QUEtiapine 12.5 milliGRAM(s) Oral at bedtime PRN Agitation and/or Insomnia  sodium chloride 0.65% Nasal 1 Spray(s) Both Nostrils every 4 hours PRN Nasal Congestion      ALLERGIES:  Allergies    No Known Allergies    Intolerances        LABS:                        7.3    11.4  )-----------( 156      ( 05 Apr 2018 07:19 )             23.1     04-05    141  |  100  |  25<H>  ----------------------------<  88  3.5   |  23  |  2.57<H>    Ca    8.4      05 Apr 2018 07:19  Mg     1.9     04-05    TPro  5.2<L>  /  Alb  2.4<L>  /  TBili  0.7  /  DBili  x   /  AST  19  /  ALT  11  /  AlkPhos  51  04-05        POCT Blood Glucose.: 77 mg/dL (06 Apr 2018 06:39)      RADIOLOGY & ADDITIONAL TESTS: Reviewed. OVERNIGHT EVENTS: Uop ~ 240cc overnight after 60mg IV lasix challenge at 11:00PM.     SUBJECTIVE / INTERVAL HPI: Patient seen and examined at bedside. She was in no apparent distress. She denied any abdominal pain or shortness of breath.     VITAL SIGNS:  Vital Signs Last 24 Hrs  T(C): 37 (06 Apr 2018 05:19), Max: 37.2 (05 Apr 2018 10:19)  T(F): 98.6 (06 Apr 2018 05:19), Max: 98.9 (05 Apr 2018 10:19)  HR: 81 (06 Apr 2018 05:46) (79 - 83)  BP: 150/75 (06 Apr 2018 05:19) (137/71 - 156/74)  BP(mean): --  RR: 18 (06 Apr 2018 05:46) (16 - 20)  SpO2: 95% (06 Apr 2018 05:46) (93% - 95%)    PHYSICAL EXAM:  General: WDWN, obese female, laying in bed, in no acute distress, breathing comfortably on room air  HEENT: NC/AT; PERRL, anicteric sclera; MMM, L TLC in place (site clean/dry/intact), R permcath in place (site clean/dry/intact)  Neck: supple, no jvd  Cardiovascular: +S1/S2, RRR, -m/g/r  Respiratory: clear breath sounds   Gastrointestinal: soft, nontender to deep palpation, +BSx4 normal bowel sounds, central wound vac in place - no erythema or signs of infection  Extremities: WWP, clubbing or cyanosis, 2+ edema bilaterally in calves persists, but skin now starting to crack and look dry  Vascular: 2+ radial, DP/PT pulses B/L  Neurological: AAOx3; no focal deficits    MEDICATIONS:  MEDICATIONS  (STANDING):  ARIPiprazole 5 milliGRAM(s) Oral daily  cholecalciferol 1000 Unit(s) Oral daily  dextrose 5%. 1000 milliLiter(s) (50 mL/Hr) IV Continuous <Continuous>  dextrose 50% Injectable 12.5 Gram(s) IV Push once  dextrose 50% Injectable 25 Gram(s) IV Push once  dextrose 50% Injectable 25 Gram(s) IV Push once  escitalopram 10 milliGRAM(s) Oral daily  heparin  flush 100 Units/mL Injectable 100 Unit(s) IV Push every other day  insulin lispro (HumaLOG) corrective regimen sliding scale   SubCutaneous Before meals and at bedtime  levothyroxine 150 MICROGram(s) Oral daily  medroxyPROGESTERone 10 milliGRAM(s) Oral daily    MEDICATIONS  (PRN):  acetaminophen   Tablet. 650 milliGRAM(s) Oral every 6 hours PRN Mild Pain (1 - 3)  acetaminophen   Tablet. 650 milliGRAM(s) Oral every 6 hours PRN Moderate Pain (4 - 6)  acetaminophen  IVPB. 1000 milliGRAM(s) IV Intermittent once PRN Moderate Pain (4 - 6)  dextrose Gel 1 Dose(s) Oral once PRN Blood Glucose LESS THAN 70 milliGRAM(s)/deciliter  glucagon  Injectable 1 milliGRAM(s) IntraMuscular once PRN Glucose LESS THAN 70 milligrams/deciliter  QUEtiapine 12.5 milliGRAM(s) Oral at bedtime PRN Agitation and/or Insomnia  sodium chloride 0.65% Nasal 1 Spray(s) Both Nostrils every 4 hours PRN Nasal Congestion      ALLERGIES:  Allergies    No Known Allergies    Intolerances        LABS:                        7.3    11.4  )-----------( 156      ( 05 Apr 2018 07:19 )             23.1     04-05    141  |  100  |  25<H>  ----------------------------<  88  3.5   |  23  |  2.57<H>    Ca    8.4      05 Apr 2018 07:19  Mg     1.9     04-05    TPro  5.2<L>  /  Alb  2.4<L>  /  TBili  0.7  /  DBili  x   /  AST  19  /  ALT  11  /  AlkPhos  51  04-05        POCT Blood Glucose.: 77 mg/dL (06 Apr 2018 06:39)      RADIOLOGY & ADDITIONAL TESTS: Reviewed. OVERNIGHT EVENTS: Uop ~ 240cc overnight after 60mg IV lasix challenge at 11:00PM.     SUBJECTIVE / INTERVAL HPI: Patient seen and examined at bedside. She was in no apparent distress. She denied any abdominal pain or shortness of breath.     VITAL SIGNS:  Vital Signs Last 24 Hrs  T(C): 37 (06 Apr 2018 05:19), Max: 37.2 (05 Apr 2018 10:19)  T(F): 98.6 (06 Apr 2018 05:19), Max: 98.9 (05 Apr 2018 10:19)  HR: 81 (06 Apr 2018 05:46) (79 - 83)  BP: 150/75 (06 Apr 2018 05:19) (137/71 - 156/74)  BP(mean): --  RR: 18 (06 Apr 2018 05:46) (16 - 20)  SpO2: 95% (06 Apr 2018 05:46) (93% - 95%)    PHYSICAL EXAM:  General: WDWN, obese female, laying in bed, in no acute distress, breathing comfortably on room air  HEENT: NC/AT; PERRL, anicteric sclera; MMM, L TLC in place (site clean/dry/intact), R permcath in place (site clean/dry/intact)  Neck: supple, no jvd  Cardiovascular: +S1/S2, RRR, -m/g/r  Respiratory: clear breath sounds bilaterally  Gastrointestinal: soft, nontender to deep palpation, +BSx4 normal bowel sounds, central wound vac in place - no erythema or signs of infection  Extremities: WWP, clubbing or cyanosis, 2+ edema bilaterally in calves persists, but skin now starting to look dry  Vascular: 2+ radial, DP/PT pulses B/L  Neurological: AAOx3; no focal deficits    MEDICATIONS:  MEDICATIONS  (STANDING):  ARIPiprazole 5 milliGRAM(s) Oral daily  cholecalciferol 1000 Unit(s) Oral daily  dextrose 5%. 1000 milliLiter(s) (50 mL/Hr) IV Continuous <Continuous>  dextrose 50% Injectable 12.5 Gram(s) IV Push once  dextrose 50% Injectable 25 Gram(s) IV Push once  dextrose 50% Injectable 25 Gram(s) IV Push once  escitalopram 10 milliGRAM(s) Oral daily  heparin  flush 100 Units/mL Injectable 100 Unit(s) IV Push every other day  insulin lispro (HumaLOG) corrective regimen sliding scale   SubCutaneous Before meals and at bedtime  levothyroxine 150 MICROGram(s) Oral daily  medroxyPROGESTERone 10 milliGRAM(s) Oral daily    MEDICATIONS  (PRN):  acetaminophen   Tablet. 650 milliGRAM(s) Oral every 6 hours PRN Mild Pain (1 - 3)  acetaminophen   Tablet. 650 milliGRAM(s) Oral every 6 hours PRN Moderate Pain (4 - 6)  acetaminophen  IVPB. 1000 milliGRAM(s) IV Intermittent once PRN Moderate Pain (4 - 6)  dextrose Gel 1 Dose(s) Oral once PRN Blood Glucose LESS THAN 70 milliGRAM(s)/deciliter  glucagon  Injectable 1 milliGRAM(s) IntraMuscular once PRN Glucose LESS THAN 70 milligrams/deciliter  QUEtiapine 12.5 milliGRAM(s) Oral at bedtime PRN Agitation and/or Insomnia  sodium chloride 0.65% Nasal 1 Spray(s) Both Nostrils every 4 hours PRN Nasal Congestion      ALLERGIES:  Allergies  No Known Allergies    Intolerances      LABS:                        7.3    11.4  )-----------( 156      ( 05 Apr 2018 07:19 )             23.1     04-05    141  |  100  |  25<H>  ----------------------------<  88  3.5   |  23  |  2.57<H>    Ca    8.4      05 Apr 2018 07:19  Mg     1.9     04-05    TPro  5.2<L>  /  Alb  2.4<L>  /  TBili  0.7  /  DBili  x   /  AST  19  /  ALT  11  /  AlkPhos  51  04-05        POCT Blood Glucose.: 77 mg/dL (06 Apr 2018 06:39)      RADIOLOGY & ADDITIONAL TESTS: Reviewed. OVERNIGHT EVENTS: Uop ~ 240cc overnight after 60mg IV lasix challenge at 11:00PM.     SUBJECTIVE / INTERVAL HPI: Patient seen and examined at bedside. She was in no apparent distress. She denied any abdominal pain or shortness of breath.     VITAL SIGNS:  Vital Signs Last 24 Hrs  T(C): 37 (06 Apr 2018 05:19), Max: 37.2 (05 Apr 2018 10:19)  T(F): 98.6 (06 Apr 2018 05:19), Max: 98.9 (05 Apr 2018 10:19)  HR: 81 (06 Apr 2018 05:46) (79 - 83)  BP: 150/75 (06 Apr 2018 05:19) (137/71 - 156/74)  BP(mean): --  RR: 18 (06 Apr 2018 05:46) (16 - 20)  SpO2: 95% (06 Apr 2018 05:46) (93% - 95%)    PHYSICAL EXAM:  General: WDWN, obese female, laying in bed, in no acute distress, breathing comfortably on room air  HEENT: NC/AT; PERRL, anicteric sclera; MMM, L TLC in place (site clean/dry/intact), R permcath in place (site clean/dry/intact)  Neck: supple, no jvd  Cardiovascular: +S1/S2, RRR, -m/g/r  Respiratory: clear breath sounds bilaterally  Gastrointestinal: soft, nontender to deep palpation, +BSx4 normal bowel sounds, central wound vac in place - no erythema or signs of infection  Extremities: WWP, clubbing or cyanosis, 2+ edema bilaterally in calves persists, but skin now starting to look dry  Vascular: 2+ radial, DP/PT pulses B/L  Neurological: AAOx3; no focal deficits  Psych: less agitated, less combative, more cooperative and appropriate    MEDICATIONS:  MEDICATIONS  (STANDING):  ARIPiprazole 5 milliGRAM(s) Oral daily  cholecalciferol 1000 Unit(s) Oral daily  dextrose 5%. 1000 milliLiter(s) (50 mL/Hr) IV Continuous <Continuous>  dextrose 50% Injectable 12.5 Gram(s) IV Push once  dextrose 50% Injectable 25 Gram(s) IV Push once  dextrose 50% Injectable 25 Gram(s) IV Push once  escitalopram 10 milliGRAM(s) Oral daily  heparin  flush 100 Units/mL Injectable 100 Unit(s) IV Push every other day  insulin lispro (HumaLOG) corrective regimen sliding scale   SubCutaneous Before meals and at bedtime  levothyroxine 150 MICROGram(s) Oral daily  medroxyPROGESTERone 10 milliGRAM(s) Oral daily    MEDICATIONS  (PRN):  acetaminophen   Tablet. 650 milliGRAM(s) Oral every 6 hours PRN Mild Pain (1 - 3)  acetaminophen   Tablet. 650 milliGRAM(s) Oral every 6 hours PRN Moderate Pain (4 - 6)  acetaminophen  IVPB. 1000 milliGRAM(s) IV Intermittent once PRN Moderate Pain (4 - 6)  dextrose Gel 1 Dose(s) Oral once PRN Blood Glucose LESS THAN 70 milliGRAM(s)/deciliter  glucagon  Injectable 1 milliGRAM(s) IntraMuscular once PRN Glucose LESS THAN 70 milligrams/deciliter  QUEtiapine 12.5 milliGRAM(s) Oral at bedtime PRN Agitation and/or Insomnia  sodium chloride 0.65% Nasal 1 Spray(s) Both Nostrils every 4 hours PRN Nasal Congestion      ALLERGIES:  Allergies  No Known Allergies    Intolerances      LABS:                        7.3    11.4  )-----------( 156      ( 05 Apr 2018 07:19 )             23.1     04-05    141  |  100  |  25<H>  ----------------------------<  88  3.5   |  23  |  2.57<H>    Ca    8.4      05 Apr 2018 07:19  Mg     1.9     04-05    TPro  5.2<L>  /  Alb  2.4<L>  /  TBili  0.7  /  DBili  x   /  AST  19  /  ALT  11  /  AlkPhos  51  04-05        POCT Blood Glucose.: 77 mg/dL (06 Apr 2018 06:39)      RADIOLOGY & ADDITIONAL TESTS: Reviewed.

## 2018-04-06 NOTE — PROGRESS NOTE ADULT - PROBLEM SELECTOR PLAN 2
-Active vaginal spotting possibly 2/2 GYN malignancy.  ~1 pad overnight (patient refusing actual pads).  TVUS showed fibroid uterus with thickened endometrium.  -s/p 2 units PRBCs  -maintain active T&S  -Transfuse <7.  Trend CBC  -IVC filter placed A/C contraindicated in setting of vaginal bleeding  -c/w medroxyprogesterone 10mg daily, and with outpt GYN follow up    #Intrabdominal Infection  s/p Vanc 1 dose 3/23 and Zosyn 3/23-3/31 for abdominal wound infection after incisional hernia repair. Patient had solitary fever to 100.8 on 4/2 and leukocytosis increasing to 12.7  - Bcx 4/2: NG   - wound vac management by Surgery Team 1C, to be changed on Friday  - Leukocytosis trending down. No need for antibiotics at this time. -Now stable. previously active vaginal spotting possibly 2/2 GYN malignancy.  ~1 pad overnight (patient refusing actual pads).  TVUS showed fibroid uterus with thickened endometrium. Received 2 units PRBCs.  -maintain active T&S  -Transfuse <7.  Trend CBC  -IVC filter placed A/C contraindicated in setting of vaginal bleeding  -c/w medroxyprogesterone 10mg daily, and with outpt GYN follow up    #Intrabdominal Infection  leukocytosis resolved, no longer on antibiotics. No signs of infection at wound site. No clinical complaints by patient.   - wound vac management by Surgery Team 1C, to be changed today -bilateral thrombi below the knees with LE edema greater in L>R  -IVC filter placed 3/30  -restarting eliquis tonight given no vaginal bleeding

## 2018-04-06 NOTE — PROGRESS NOTE ADULT - PROBLEM SELECTOR PLAN 10
DVT PPx: IVC filter  Nicole: in place  Lines: R subclavian permcath changed 4/3, TLC in L IJ changed 4/2  Full code  Dispo: [facility]  Dispo: [facility]  FULL CODE DVT PPx: IVC filter  Nicole: in place  Lines: R subclavian permcath changed 4/3, TLC in L IJ changed 4/2  Full code  Dispo: [facility]    FULL CODE

## 2018-04-06 NOTE — PROGRESS NOTE ADULT - PROBLEM SELECTOR PLAN 1
patient with increased UO s/p one dose of Lasix  should have received second dose but never carried through  would continue Lasix as a standing order of 60mg IV q 12  Scret trending downwards so will again defer HD  will follow on a daily basis and make decisions regarding further hemodialysis depending on Scret and UO measures

## 2018-04-06 NOTE — PROGRESS NOTE ADULT - PROBLEM SELECTOR PLAN 6
On Glipizide at home.   -continue to hold oral antihyperglycemics   -continue ISS, FSG.    #Delirium/Depression  -followed by psychiatry service: Delirium appears to be resolving although patient continues to report depression.   -continue Lexapro 10 mg PO daily   -continue Abilify 5 mg PO daily    -continue Seroquel 12.5 mg PO HS as PRN order for agitation On Glipizide at home.   -continue to hold oral antihyperglycemics   -continue ISS, FSG.    #Delirium/Depression  -followed by psychiatry service: Delirium appears to be resolving although patient continues to report depression.   -continue Lexapro 10 mg PO daily   -continue Abilify 5 mg PO daily    -continue Seroquel 12.5 mg PO HS as PRN order for agitation, QTc 426 on EKG

## 2018-04-06 NOTE — PROGRESS NOTE ADULT - PROBLEM SELECTOR PLAN 4
-bilateral thrombi below the knees  -IVC filter placed 3/30  -heparin flushes -bilateral thrombi below the knees with LE edema greater in L>R  -IVC filter placed 3/30 -Now stable. previously active vaginal spotting possibly 2/2 GYN malignancy.  ~1 pad overnight (patient refusing actual pads).  TVUS showed fibroid uterus with thickened endometrium. Received 2 units PRBCs.  -maintain active T&S  -Transfuse <7.  Trend CBC  -IVC filter placed A/C contraindicated in setting of vaginal bleeding  -c/w medroxyprogesterone 10mg daily, and with outpt GYN follow up    #Intrabdominal Infection  leukocytosis resolved, no longer on antibiotics. No signs of infection at wound site. No clinical complaints by patient.   - wound vac management by Surgery Team 1C, to be changed today -Now stable. previously active vaginal spotting possibly 2/2 GYN malignancy.  ~1 pad overnight (patient refusing actual pads).  TVUS showed fibroid uterus with thickened endometrium. Received 2 units PRBCs.  -maintain active T&S  -Transfuse <7.  Trend CBC  -IVC filter placed A/C contraindicated in setting of vaginal bleeding  -c/w medroxyprogesterone 10mg daily, and with outpt GYN follow up    #Intrabdominal Infection  leukocytosis resolved, no longer on antibiotics. No signs of infection at wound site. Wound vac in place. No clinical complaints by patient.   - wound vac intermittent management by Surgery Team 1C, to be changed today.   - consulted wound care team for additional maintenance

## 2018-04-06 NOTE — PROGRESS NOTE ADULT - PROBLEM SELECTOR PLAN 2
may consider SQ EPOGEN if no longer planning to receive HD  further decision regarding HD to be given tomorrow in AM

## 2018-04-06 NOTE — PROGRESS NOTE ADULT - SUBJECTIVE AND OBJECTIVE BOX
CC: SURGICAL WOUND INFECTION      INTERVAL HISTORY:  clinically stable  no complaints      ROS: No chest pain, no sob, no abd pain. No n/v/d    PAST MEDICAL & SURGICAL HISTORY:  Obesity  DM (diabetes mellitus)  HLD (hyperlipidemia)  HTN (hypertension)  H/O ventral hernia repair  No significant past surgical history  History of left inguinal hernia repair      PHYSICAL EXAM:  T(C): 36.9 (04-06-18 @ 08:38), Max: 37.2 (04-05-18 @ 10:19)  HR: 76 (04-06-18 @ 08:38)  BP: 128/70 (04-06-18 @ 08:38) (128/70 - 156/74)  RR: 18 (04-06-18 @ 08:38)  SpO2: 95% (04-06-18 @ 08:38)  Wt(kg): --  I&O's Summary    05 Apr 2018 07:01  -  06 Apr 2018 07:00  --------------------------------------------------------  IN: 100 mL / OUT: 425 mL / NET: -325 mL      Weight   General: AAO x 3,  NAD.  HEENT: moist mucous membranes, no pallor/cyanosis.  Neck: no JVD visible.  Cardiac: S1, S2. RRR. No murmurs   Respratory: CTA b/l, no access muscle use.   Abdomen: soft. nontender. nondistended  Skin: no rashes.  Extremities: + trace LE edema b/l  Access:       DATA:                        7.6<L>  10.2  )-----------( 165      ( 06 Apr 2018 06:39 )             24.3<L>    Ferritin, Serum: 356 ng/mL <H> (03-29 @ 12:32)      139    |  100    |  31<H>  ----------------------------<  79     Ca:8.8   (06 Apr 2018 06:38)  3.3<L>   |  22     |  2.34<H>      eGFR if Non : 19 <L>  eGFR if : 22 <L>    TPro  5.2<L>  /  Alb  2.4<L>  /  TBili  0.7    /  DBili  x      /  AST  19     /  ALT  11     /  AlkPhos  51     05 Apr 2018 07:19      Vitamin D, 25-Hydroxy: 17.8 ng/mL <L> [30.0 - 80.0] (03-31 @ 20:39)    Urinalysis Basic - ( 04 Apr 2018 01:30 )  Color: Yellow / Appearance: Clear / SG: >=1.030 / pH: x  Gluc: x / Ketone: Trace mg/dL<!!>  / Bili: Moderate<!!> / Urobili: 0.2 E.U./dL   Blood: x / Protein: 30 mg/dL<!!> / Nitrite: NEGATIVE   Leuk Esterase: NEGATIVE / RBC: 5-10 /HPF<!!> / WBC 5-10 /HPF<!!>   Sq Epi: x / Non Sq Epi: 0-5 /HPF / Bacteria: Present /HPF<!!>        Protein/Creatinine Ratio Calculation: 3.1 Ratio <H> (03-28 @ 05:09)          MEDICATIONS  (STANDING):  ARIPiprazole 5 milliGRAM(s) Oral daily  cholecalciferol 1000 Unit(s) Oral daily  dextrose 5%. 1000 milliLiter(s) (50 mL/Hr) IV Continuous <Continuous>  dextrose 50% Injectable 12.5 Gram(s) IV Push once  dextrose 50% Injectable 25 Gram(s) IV Push once  dextrose 50% Injectable 25 Gram(s) IV Push once  escitalopram 10 milliGRAM(s) Oral daily  heparin  flush 100 Units/mL Injectable 100 Unit(s) IV Push every other day  insulin lispro (HumaLOG) corrective regimen sliding scale   SubCutaneous Before meals and at bedtime  levothyroxine 150 MICROGram(s) Oral daily  medroxyPROGESTERone 10 milliGRAM(s) Oral daily  potassium chloride  20 mEq/100 mL IVPB 20 milliEquivalent(s) IV Intermittent every 1 hour    MEDICATIONS  (PRN):  acetaminophen   Tablet. 650 milliGRAM(s) Oral every 6 hours PRN Mild Pain (1 - 3)  acetaminophen   Tablet. 650 milliGRAM(s) Oral every 6 hours PRN Moderate Pain (4 - 6)  acetaminophen  IVPB. 1000 milliGRAM(s) IV Intermittent once PRN Moderate Pain (4 - 6)  dextrose Gel 1 Dose(s) Oral once PRN Blood Glucose LESS THAN 70 milliGRAM(s)/deciliter  glucagon  Injectable 1 milliGRAM(s) IntraMuscular once PRN Glucose LESS THAN 70 milligrams/deciliter  QUEtiapine 12.5 milliGRAM(s) Oral at bedtime PRN Agitation and/or Insomnia  sodium chloride 0.65% Nasal 1 Spray(s) Both Nostrils every 4 hours PRN Nasal Congestion

## 2018-04-06 NOTE — PROGRESS NOTE ADULT - ASSESSMENT
78F with PMHx of HTN, DM, HLD, hypothyroidism admitted for management of surgical wound infection following robotically assisted hernia repair on 3/2, complicated by RUL PE, now found to have acute blood loss anemia and ATN on HD. 78F with PMHx of HTN, DM, HLD, hypothyroidism admitted for management of surgical wound infection from robotically assisted hernia repair on previous admission. Course complicated by blood loss anemia (now resolved), with patient receiving intermittent HD for acute renal failure. Now undergoing lasix challenge to evaluate need for chronic dialysis as labs show improvement in kidney function.

## 2018-04-06 NOTE — PROGRESS NOTE ADULT - PROBLEM SELECTOR PLAN 7
Previously on Lisinopril and atenolol. BPs stable without antihypertensives   -would continue to hold antihypertensives as patient currently normotensive - SBP to 130's  -BP will be managed with hyperfiltration during dialysis. HD planned for later today. Previously on Lisinopril and atenolol. BPs stable without antihypertensives   -would continue to hold antihypertensives as patient currently normotensive - SBP to 130's

## 2018-04-06 NOTE — PROGRESS NOTE ADULT - PROBLEM SELECTOR PROBLEM 4
Deep vein thrombosis (DVT) of both lower extremities, unspecified chronicity, unspecified vein Acute blood loss anemia

## 2018-04-06 NOTE — PROVIDER CONTACT NOTE (OTHER) - BACKGROUND
Pt admitted with surgical wound infection. As per report nephrology will use urine output data to determine if patient will require long-term dialysis.

## 2018-04-07 LAB
ANION GAP SERPL CALC-SCNC: 18 MMOL/L — HIGH (ref 5–17)
ANISOCYTOSIS BLD QL: SLIGHT — SIGNIFICANT CHANGE UP
BUN SERPL-MCNC: 35 MG/DL — HIGH (ref 7–23)
CALCIUM SERPL-MCNC: 9 MG/DL — SIGNIFICANT CHANGE UP (ref 8.4–10.5)
CHLORIDE SERPL-SCNC: 104 MMOL/L — SIGNIFICANT CHANGE UP (ref 96–108)
CO2 SERPL-SCNC: 21 MMOL/L — LOW (ref 22–31)
CREAT SERPL-MCNC: 1.92 MG/DL — HIGH (ref 0.5–1.3)
CULTURE RESULTS: SIGNIFICANT CHANGE UP
CULTURE RESULTS: SIGNIFICANT CHANGE UP
DACRYOCYTES BLD QL SMEAR: SLIGHT — SIGNIFICANT CHANGE UP
DOHLE BOD BLD QL SMEAR: SIGNIFICANT CHANGE UP
GLUCOSE BLDC GLUCOMTR-MCNC: 71 MG/DL — SIGNIFICANT CHANGE UP (ref 70–99)
GLUCOSE BLDC GLUCOMTR-MCNC: 74 MG/DL — SIGNIFICANT CHANGE UP (ref 70–99)
GLUCOSE BLDC GLUCOMTR-MCNC: 80 MG/DL — SIGNIFICANT CHANGE UP (ref 70–99)
GLUCOSE BLDC GLUCOMTR-MCNC: 85 MG/DL — SIGNIFICANT CHANGE UP (ref 70–99)
GLUCOSE SERPL-MCNC: 79 MG/DL — SIGNIFICANT CHANGE UP (ref 70–99)
HCT VFR BLD CALC: 23.6 % — LOW (ref 34.5–45)
HCT VFR BLD CALC: 24.7 % — LOW (ref 34.5–45)
HGB BLD-MCNC: 7.3 G/DL — LOW (ref 11.5–15.5)
HGB BLD-MCNC: 7.7 G/DL — LOW (ref 11.5–15.5)
HYPOCHROMIA BLD QL: SIGNIFICANT CHANGE UP
LYMPHOCYTES # BLD AUTO: 15 % — SIGNIFICANT CHANGE UP (ref 13–44)
MACROCYTES BLD QL: SLIGHT — SIGNIFICANT CHANGE UP
MAGNESIUM SERPL-MCNC: 1.9 MG/DL — SIGNIFICANT CHANGE UP (ref 1.6–2.6)
MANUAL DIF COMMENT BLD-IMP: SIGNIFICANT CHANGE UP
MANUAL SMEAR VERIFICATION: YES — SIGNIFICANT CHANGE UP
MCHC RBC-ENTMCNC: 29.1 PG — SIGNIFICANT CHANGE UP (ref 27–34)
MCHC RBC-ENTMCNC: 29.2 PG — SIGNIFICANT CHANGE UP (ref 27–34)
MCHC RBC-ENTMCNC: 30.9 G/DL — LOW (ref 32–36)
MCHC RBC-ENTMCNC: 31.2 G/DL — LOW (ref 32–36)
MCV RBC AUTO: 93.6 FL — SIGNIFICANT CHANGE UP (ref 80–100)
MCV RBC AUTO: 94 FL — SIGNIFICANT CHANGE UP (ref 80–100)
METAMYELOCYTES # FLD: 1 % — HIGH
MICROCYTES BLD QL: SLIGHT — SIGNIFICANT CHANGE UP
MONOCYTES NFR BLD AUTO: 7 % — SIGNIFICANT CHANGE UP (ref 2–14)
MYELOCYTES NFR BLD: 2 % — HIGH
NEUTROPHILS NFR BLD AUTO: 73 % — SIGNIFICANT CHANGE UP (ref 43–77)
NEUTS BAND # BLD: 2 % — SIGNIFICANT CHANGE UP
NRBC # BLD: 1 /100 WBCS — HIGH
OVALOCYTES BLD QL SMEAR: SLIGHT — SIGNIFICANT CHANGE UP
PLAT MORPH BLD: NORMAL — SIGNIFICANT CHANGE UP
PLATELET # BLD AUTO: 181 K/UL — SIGNIFICANT CHANGE UP (ref 150–400)
PLATELET # BLD AUTO: 196 K/UL — SIGNIFICANT CHANGE UP (ref 150–400)
POIKILOCYTOSIS BLD QL AUTO: SLIGHT — SIGNIFICANT CHANGE UP
POLYCHROMASIA BLD QL SMEAR: SLIGHT — SIGNIFICANT CHANGE UP
POTASSIUM SERPL-MCNC: 3.8 MMOL/L — SIGNIFICANT CHANGE UP (ref 3.5–5.3)
POTASSIUM SERPL-SCNC: 3.8 MMOL/L — SIGNIFICANT CHANGE UP (ref 3.5–5.3)
RBC # BLD: 2.51 M/UL — LOW (ref 3.8–5.2)
RBC # BLD: 2.64 M/UL — LOW (ref 3.8–5.2)
RBC # FLD: 17.5 % — HIGH (ref 10.3–16.9)
RBC # FLD: 17.6 % — HIGH (ref 10.3–16.9)
RBC BLD AUTO: (no result)
SCHISTOCYTES BLD QL AUTO: SIGNIFICANT CHANGE UP
SODIUM SERPL-SCNC: 143 MMOL/L — SIGNIFICANT CHANGE UP (ref 135–145)
SPECIMEN SOURCE: SIGNIFICANT CHANGE UP
SPECIMEN SOURCE: SIGNIFICANT CHANGE UP
SPHEROCYTES BLD QL SMEAR: SLIGHT — SIGNIFICANT CHANGE UP
WBC # BLD: 10.3 K/UL — SIGNIFICANT CHANGE UP (ref 3.8–10.5)
WBC # BLD: 11 K/UL — HIGH (ref 3.8–10.5)
WBC # FLD AUTO: 10.3 K/UL — SIGNIFICANT CHANGE UP (ref 3.8–10.5)
WBC # FLD AUTO: 11 K/UL — HIGH (ref 3.8–10.5)

## 2018-04-07 PROCEDURE — 99232 SBSQ HOSP IP/OBS MODERATE 35: CPT

## 2018-04-07 PROCEDURE — 99233 SBSQ HOSP IP/OBS HIGH 50: CPT | Mod: GC

## 2018-04-07 RX ORDER — AMLODIPINE BESYLATE 2.5 MG/1
10 TABLET ORAL DAILY
Qty: 0 | Refills: 0 | Status: DISCONTINUED | OUTPATIENT
Start: 2018-04-07 | End: 2018-04-11

## 2018-04-07 RX ADMIN — Medication 1000 UNIT(S): at 12:43

## 2018-04-07 RX ADMIN — ESCITALOPRAM OXALATE 10 MILLIGRAM(S): 10 TABLET, FILM COATED ORAL at 12:45

## 2018-04-07 RX ADMIN — Medication 60 MILLIGRAM(S): at 12:44

## 2018-04-07 RX ADMIN — AMLODIPINE BESYLATE 10 MILLIGRAM(S): 2.5 TABLET ORAL at 22:50

## 2018-04-07 RX ADMIN — TUBERCULIN PURIFIED PROTEIN DERIVATIVE 5 UNIT(S): 5 INJECTION, SOLUTION INTRADERMAL at 19:13

## 2018-04-07 RX ADMIN — APIXABAN 5 MILLIGRAM(S): 2.5 TABLET, FILM COATED ORAL at 18:30

## 2018-04-07 RX ADMIN — MEDROXYPROGESTERONE ACETATE 10 MILLIGRAM(S): 150 INJECTION, SUSPENSION, EXTENDED RELEASE INTRAMUSCULAR at 12:43

## 2018-04-07 RX ADMIN — ARIPIPRAZOLE 5 MILLIGRAM(S): 15 TABLET ORAL at 12:42

## 2018-04-07 RX ADMIN — APIXABAN 5 MILLIGRAM(S): 2.5 TABLET, FILM COATED ORAL at 06:46

## 2018-04-07 RX ADMIN — Medication 150 MICROGRAM(S): at 06:45

## 2018-04-07 NOTE — PROGRESS NOTE ADULT - PROBLEM SELECTOR PLAN 1
Patient is a 78 year old female with acute oliguric renal failure requiring hemodialysis. Patient's urine output has increased to 700 cc. Patient's creatinine is trending down to 1.9 today. Electrolytes are stable. Patient seems to be recovering renal function.     P - would continue with furosemide 60 mg PO BID   Patient is recovering renal function and will no longer require hemodialysis   would recommend to remove permcath   Please have patient follow up in nephrology clinic upon discharge   Monitor strict I/Os   avoid nephrotoxic medications, IV contrast and NSAIDs. Patient is a 78 year old female with acute oliguric renal failure requiring hemodialysis. Patient's urine output has increased to 700 cc. Patient's creatinine is trending down to 1.9 today. Electrolytes are stable. Patient seems to be recovering renal function.     P - please stop furosemide   Patient is recovering renal function and will no longer require hemodialysis   would recommend to remove permcath   Please have patient follow up in nephrology clinic upon discharge   Monitor strict I/Os   avoid nephrotoxic medications, IV contrast and NSAIDs.

## 2018-04-07 NOTE — PROGRESS NOTE ADULT - PROBLEM SELECTOR PLAN 6
On Glipizide at home.   -continue to hold oral antihyperglycemics   -continue ISS, FSG.    #Delirium/Depression  -followed by psychiatry service: Delirium appears to be resolving although patient continues to report depression.   -continue Lexapro 10 mg PO daily   -continue Abilify 5 mg PO daily    -continue Seroquel 12.5 mg PO HS as PRN order for agitation, QTc 426 on EKG

## 2018-04-07 NOTE — PROGRESS NOTE ADULT - PROBLEM SELECTOR PLAN 3
Blood pressure elevated which could be volume related.   Continue with furosemide 40 mg BID Blood pressure elevated which could be volume related.   please stop furosemide

## 2018-04-07 NOTE — PROGRESS NOTE ADULT - PROBLEM SELECTOR PLAN 1
-New on this admission, no prior reported hx of renal disease.  Etiology ischemic ATN with component of AIN from ACE-I and Vancomycin. Per records, patient developed oligoanuria with worsening creatinine despite Lasix challenge.   -s/p HD catheter placement by IR on 3/29 with first session of HD on same day, tolerated well.  Will receive HD cath replacement today 4/2 cap on HD cath unable to be removed  - continue management per nephrology; with improved creatinine clearance and Uop (550cc o/n) no need for ongoing dialysis, can remove permcath.  -Await IR removal of permcath on Monday  -dc lasix   -PPD placed on afternoon of 4/5, read this afternoon; no reaction  -continue vit D supplementation

## 2018-04-07 NOTE — PROGRESS NOTE ADULT - SUBJECTIVE AND OBJECTIVE BOX
Patient seen and examined at bedside. Patient is a 78 year old female with a history of diabetes mellitus, hyperlipidemia, hypertension and pulmonary embolism whom developed oliguric GONZALEZ from ATN which required dialysis. Patient has not received dialysis for several days now. Patient appears to be regaining renal function and urine output has increased and patient's creatinine is now down trending.     acetaminophen   Tablet. 650 milliGRAM(s) every 6 hours PRN  acetaminophen   Tablet. 650 milliGRAM(s) every 6 hours PRN  apixaban 5 milliGRAM(s) every 12 hours  ARIPiprazole 5 milliGRAM(s) daily  cholecalciferol 1000 Unit(s) daily  dextrose 5%. 1000 milliLiter(s) <Continuous>  dextrose 50% Injectable 12.5 Gram(s) once  dextrose 50% Injectable 25 Gram(s) once  dextrose 50% Injectable 25 Gram(s) once  dextrose Gel 1 Dose(s) once PRN  escitalopram 10 milliGRAM(s) daily  furosemide   Injectable 60 milliGRAM(s) every 12 hours  glucagon  Injectable 1 milliGRAM(s) once PRN  heparin  flush 100 Units/mL Injectable 100 Unit(s) every other day  insulin lispro (HumaLOG) corrective regimen sliding scale   Before meals and at bedtime  levothyroxine 150 MICROGram(s) daily  medroxyPROGESTERone 10 milliGRAM(s) daily  QUEtiapine 12.5 milliGRAM(s) at bedtime PRN  sodium chloride 0.65% Nasal 1 Spray(s) every 4 hours PRN    Allergies    No Known Allergies    Intolerances    T(C): , Max: 37 (04-07-18 @ 06:38)  T(F): , Max: 98.6 (04-07-18 @ 06:38)  HR: 71 (04-07-18 @ 10:00)  BP: 142/64 (04-07-18 @ 10:00)  BP(mean): 105 (04-06-18 @ 15:11)  RR: 16 (04-07-18 @ 10:00)  SpO2: 94% (04-07-18 @ 10:00)    04-06 @ 07:01  -  04-07 @ 07:00  --------------------------------------------------------  IN:  Total IN: 0 mL    OUT:    Indwelling Catheter - Urethral: 700 mL    VAC (Vacuum Assisted Closure) System: 50 mL  Total OUT: 750 mL    Total NET: -750 mL    LABS:                        7.3    10.3  )-----------( 181      ( 07 Apr 2018 11:04 )             23.6     04-07    143  |  104  |  35<H>  ----------------------------<  79  3.8   |  21<L>  |  1.92<H>    Ca    9.0      07 Apr 2018 11:04  Mg     1.9     04-07

## 2018-04-07 NOTE — PROGRESS NOTE ADULT - PROBLEM SELECTOR PLAN 5
- Resolved with dialysis.    - Interval development of bilateral pleural effusions on CXR from 3/28 compared to that of 3/23. Likely 2/2 fluid overload in the setting of renal failure and oliguria. Patient currently without complaints of SOB, no increased oxygen requirements (on NC2L, RR 18).   -encouraging incentive spirometry for improved aeration

## 2018-04-07 NOTE — PROGRESS NOTE ADULT - PROBLEM SELECTOR PLAN 7
Previously on Lisinopril, atenolol, amlodipine.   Restarted amlodipine at home dose for SBP to 160's.

## 2018-04-07 NOTE — PROGRESS NOTE ADULT - PROBLEM SELECTOR PLAN 3
CTA 3/12 with findings of PE within the segmental arteries of the right upper   lobe, right middle lobe, and lingula. Subsequent echocardiogram notable for RV dilatation, however deemed by pulmonology to be unlikely that clot burden caused dilatation. Trops neg x3. Provoked PE in the setting of recent abdominal surgery.   - Eliquis  - continue  -IVC filter placed 3/30  -encouraging incentive spirometry

## 2018-04-07 NOTE — PROGRESS NOTE ADULT - PROBLEM SELECTOR PLAN 4
-Now stable. previously active vaginal spotting possibly 2/2 GYN malignancy.  ~1 pad overnight (patient refusing actual pads).  TVUS showed fibroid uterus with thickened endometrium. Received 2 units PRBCs.  -maintain active T&S - last 4/6, next 4/9  -Transfuse <7.  Trend CBC  -IVC filter placed A/C contraindicated in setting of vaginal bleeding  -c/w medroxyprogesterone 10mg daily, and with outpt GYN follow up    #Intrabdominal Infection  leukocytosis resolved, no longer on antibiotics. No signs of infection at wound site. Wound vac in place. No clinical complaints by patient.   - wound vac intermittent management by Surgery Team 1C, changed 4/6   - consulted wound care team for additional maintenance

## 2018-04-07 NOTE — PROGRESS NOTE ADULT - PROBLEM SELECTOR PLAN 2
Hemoglobin 7.3  No iron deficiency anemia  No indication for Epo as patient's renal failure is due to GONZALEZ

## 2018-04-07 NOTE — PROGRESS NOTE ADULT - PROBLEM SELECTOR PLAN 2
-bilateral thrombi below the knees with LE edema greater in L>R  -IVC filter placed 3/30  -eliquis started, one episode of some new blood noted around vagina by nursing today, no repeat on monitoring throughout day  -Active T/S - next on 4/9

## 2018-04-07 NOTE — PROGRESS NOTE ADULT - ASSESSMENT
78F with PMHx of HTN, DM, HLD, hypothyroidism admitted for management of surgical wound infection from robotically assisted hernia repair on previous admission. Course complicated by blood loss anemia (now resolved), with patient receiving intermittent HD for acute renal failure. Now undergoing lasix challenge to evaluate need for chronic dialysis as labs show improvement in kidney function.

## 2018-04-07 NOTE — PROGRESS NOTE ADULT - SUBJECTIVE AND OBJECTIVE BOX
OVERNIGHT EVENTS: Uop ~550ccs.     SUBJECTIVE / INTERVAL HPI: Patient seen and examined at bedside. No complaints this AM. She is docile and denies shortness of breath or abdominal pain. No vaginal bleeding.     VITAL SIGNS:  Vital Signs Last 24 Hrs  T(C): 37 (07 Apr 2018 06:38), Max: 37 (07 Apr 2018 06:38)  T(F): 98.6 (07 Apr 2018 06:38), Max: 98.6 (07 Apr 2018 06:38)  HR: 82 (07 Apr 2018 06:38) (76 - 84)  BP: 148/73 (07 Apr 2018 06:38) (128/70 - 170/70)  BP(mean): 105 (06 Apr 2018 15:11) (105 - 105)  RR: 16 (07 Apr 2018 06:38) (16 - 20)  SpO2: 97% (07 Apr 2018 06:38) (94% - 97%)    PHYSICAL EXAM:  General: WDWN, obese female, laying in bed, in no acute distress, breathing comfortably on room air  HEENT: NC/AT; PERRL, anicteric sclera; MMM, L TLC in place (site clean/dry/intact), R permcath in place (site clean/dry/intact)  Neck: supple, no jvd  Cardiovascular: +S1/S2, RRR, -m/g/r  Respiratory: clear breath sounds bilaterally  Gastrointestinal: soft, nontender to deep palpation, +BSx4 normal bowel sounds, central wound vac in place - no erythema or signs of infection  Extremities: WWP, clubbing or cyanosis, 2+ edema bilaterally in calves persists, but skin now starting to look dry  Vascular: 2+ radial, DP/PT pulses B/L  Neurological: AAOx3; no focal deficits  Psych: docile, appropriate    MEDICATIONS:  MEDICATIONS  (STANDING):  apixaban 5 milliGRAM(s) Oral every 12 hours  ARIPiprazole 5 milliGRAM(s) Oral daily  cholecalciferol 1000 Unit(s) Oral daily  dextrose 5%. 1000 milliLiter(s) (50 mL/Hr) IV Continuous <Continuous>  dextrose 50% Injectable 12.5 Gram(s) IV Push once  dextrose 50% Injectable 25 Gram(s) IV Push once  dextrose 50% Injectable 25 Gram(s) IV Push once  escitalopram 10 milliGRAM(s) Oral daily  furosemide   Injectable 60 milliGRAM(s) IV Push every 12 hours  heparin  flush 100 Units/mL Injectable 100 Unit(s) IV Push every other day  insulin lispro (HumaLOG) corrective regimen sliding scale   SubCutaneous Before meals and at bedtime  levothyroxine 150 MICROGram(s) Oral daily  medroxyPROGESTERone 10 milliGRAM(s) Oral daily    MEDICATIONS  (PRN):  acetaminophen   Tablet. 650 milliGRAM(s) Oral every 6 hours PRN Mild Pain (1 - 3)  acetaminophen   Tablet. 650 milliGRAM(s) Oral every 6 hours PRN Moderate Pain (4 - 6)  dextrose Gel 1 Dose(s) Oral once PRN Blood Glucose LESS THAN 70 milliGRAM(s)/deciliter  glucagon  Injectable 1 milliGRAM(s) IntraMuscular once PRN Glucose LESS THAN 70 milligrams/deciliter  QUEtiapine 12.5 milliGRAM(s) Oral at bedtime PRN Agitation and/or Insomnia  sodium chloride 0.65% Nasal 1 Spray(s) Both Nostrils every 4 hours PRN Nasal Congestion      ALLERGIES:  Allergies    No Known Allergies    Intolerances        LABS:                        7.6    10.2  )-----------( 165      ( 06 Apr 2018 06:39 )             24.3     04-06    139  |  100  |  31<H>  ----------------------------<  79  3.3<L>   |  22  |  2.34<H>    Ca    8.8      06 Apr 2018 06:38  Mg     2.2     04-06          CAPILLARY BLOOD GLUCOSE      POCT Blood Glucose.: 74 mg/dL (07 Apr 2018 08:01)          RADIOLOGY & ADDITIONAL TESTS: Reviewed.      ASSESSMENT:    PLAN: OVERNIGHT EVENTS: Uop ~550ccs.     SUBJECTIVE / INTERVAL HPI: Patient seen and examined at bedside. No complaints this AM. She is docile and denies shortness of breath or abdominal pain. No vaginal bleeding.     VITAL SIGNS:  Vital Signs Last 24 Hrs  T(C): 37 (07 Apr 2018 06:38), Max: 37 (07 Apr 2018 06:38)  T(F): 98.6 (07 Apr 2018 06:38), Max: 98.6 (07 Apr 2018 06:38)  HR: 82 (07 Apr 2018 06:38) (76 - 84)  BP: 148/73 (07 Apr 2018 06:38) (128/70 - 170/70)  BP(mean): 105 (06 Apr 2018 15:11) (105 - 105)  RR: 16 (07 Apr 2018 06:38) (16 - 20)  SpO2: 97% (07 Apr 2018 06:38) (94% - 97%)    PHYSICAL EXAM:  General: WDWN, obese female, laying in bed, in no acute distress, breathing comfortably on room air  HEENT: NC/AT; PERRL, anicteric sclera; MMM, L TLC in place (site clean/dry/intact), R permcath in place (site clean/dry/intact)  Neck: supple, no jvd  Cardiovascular: +S1/S2, RRR, -m/g/r  Respiratory: clear breath sounds bilaterally  Gastrointestinal: soft, nontender to deep palpation, +BSx4 normal bowel sounds, central wound vac in place - no erythema or signs of infection  Extremities: WWP, clubbing or cyanosis, 2+ edema bilaterally in calves persists, but skin now starting to look dry  Vascular: 2+ radial, DP/PT pulses B/L  Neurological: AAOx3; no focal deficits  Psych: docile, appropriate    MEDICATIONS:  MEDICATIONS  (STANDING):  apixaban 5 milliGRAM(s) Oral every 12 hours  ARIPiprazole 5 milliGRAM(s) Oral daily  cholecalciferol 1000 Unit(s) Oral daily  dextrose 5%. 1000 milliLiter(s) (50 mL/Hr) IV Continuous <Continuous>  dextrose 50% Injectable 12.5 Gram(s) IV Push once  dextrose 50% Injectable 25 Gram(s) IV Push once  dextrose 50% Injectable 25 Gram(s) IV Push once  escitalopram 10 milliGRAM(s) Oral daily  furosemide   Injectable 60 milliGRAM(s) IV Push every 12 hours  heparin  flush 100 Units/mL Injectable 100 Unit(s) IV Push every other day  insulin lispro (HumaLOG) corrective regimen sliding scale   SubCutaneous Before meals and at bedtime  levothyroxine 150 MICROGram(s) Oral daily  medroxyPROGESTERone 10 milliGRAM(s) Oral daily    MEDICATIONS  (PRN):  acetaminophen   Tablet. 650 milliGRAM(s) Oral every 6 hours PRN Mild Pain (1 - 3)  acetaminophen   Tablet. 650 milliGRAM(s) Oral every 6 hours PRN Moderate Pain (4 - 6)  dextrose Gel 1 Dose(s) Oral once PRN Blood Glucose LESS THAN 70 milliGRAM(s)/deciliter  glucagon  Injectable 1 milliGRAM(s) IntraMuscular once PRN Glucose LESS THAN 70 milligrams/deciliter  QUEtiapine 12.5 milliGRAM(s) Oral at bedtime PRN Agitation and/or Insomnia  sodium chloride 0.65% Nasal 1 Spray(s) Both Nostrils every 4 hours PRN Nasal Congestion      ALLERGIES:  Allergies    No Known Allergies    Intolerances        LABS:                        7.6    10.2  )-----------( 165      ( 06 Apr 2018 06:39 )             24.3     04-06    139  |  100  |  31<H>  ----------------------------<  79  3.3<L>   |  22  |  2.34<H>    Ca    8.8      06 Apr 2018 06:38  Mg     2.2     04-06      CAPILLARY BLOOD GLUCOSE      POCT Blood Glucose.: 74 mg/dL (07 Apr 2018 08:01)          RADIOLOGY & ADDITIONAL TESTS: Reviewed.

## 2018-04-07 NOTE — PROGRESS NOTE ADULT - PROBLEM SELECTOR PLAN 10
DVT PPx: IVC filter  Nicole: in place  Lines: R subclavian permcath changed 4/3, TLC in L IJ changed 4/2  Full code  Dispo: RMF    FULL CODE

## 2018-04-08 DIAGNOSIS — K92.2 GASTROINTESTINAL HEMORRHAGE, UNSPECIFIED: ICD-10-CM

## 2018-04-08 LAB
GLUCOSE BLDC GLUCOMTR-MCNC: 87 MG/DL — SIGNIFICANT CHANGE UP (ref 70–99)
GLUCOSE BLDC GLUCOMTR-MCNC: 90 MG/DL — SIGNIFICANT CHANGE UP (ref 70–99)
GLUCOSE BLDC GLUCOMTR-MCNC: 93 MG/DL — SIGNIFICANT CHANGE UP (ref 70–99)
GLUCOSE BLDC GLUCOMTR-MCNC: 95 MG/DL — SIGNIFICANT CHANGE UP (ref 70–99)

## 2018-04-08 PROCEDURE — 99233 SBSQ HOSP IP/OBS HIGH 50: CPT | Mod: GC

## 2018-04-08 PROCEDURE — 99232 SBSQ HOSP IP/OBS MODERATE 35: CPT

## 2018-04-08 RX ORDER — SOD SULF/SODIUM/NAHCO3/KCL/PEG
4000 SOLUTION, RECONSTITUTED, ORAL ORAL ONCE
Qty: 0 | Refills: 0 | Status: COMPLETED | OUTPATIENT
Start: 2018-04-08 | End: 2018-04-08

## 2018-04-08 RX ADMIN — Medication 650 MILLIGRAM(S): at 13:40

## 2018-04-08 RX ADMIN — Medication 100 UNIT(S): at 12:34

## 2018-04-08 RX ADMIN — Medication 4000 MILLILITER(S): at 21:11

## 2018-04-08 RX ADMIN — AMLODIPINE BESYLATE 10 MILLIGRAM(S): 2.5 TABLET ORAL at 12:32

## 2018-04-08 RX ADMIN — ESCITALOPRAM OXALATE 10 MILLIGRAM(S): 10 TABLET, FILM COATED ORAL at 12:33

## 2018-04-08 RX ADMIN — Medication 1000 UNIT(S): at 12:32

## 2018-04-08 RX ADMIN — ARIPIPRAZOLE 5 MILLIGRAM(S): 15 TABLET ORAL at 12:32

## 2018-04-08 RX ADMIN — Medication 150 MICROGRAM(S): at 06:56

## 2018-04-08 RX ADMIN — MEDROXYPROGESTERONE ACETATE 10 MILLIGRAM(S): 150 INJECTION, SUSPENSION, EXTENDED RELEASE INTRAMUSCULAR at 12:33

## 2018-04-08 RX ADMIN — Medication 650 MILLIGRAM(S): at 12:50

## 2018-04-08 NOTE — PROGRESS NOTE ADULT - RS GEN PE MLT RESP DETAILS PC
respirations non-labored/normal/no rales/airway patent/no rhonchi/no wheezes
airway patent/no wheezes/no rhonchi/normal/respirations non-labored/no rales
clear to auscultation bilaterally/respirations non-labored/normal/airway patent/good air movement/breath sounds equal
normal/airway patent/respirations non-labored/clear to auscultation bilaterally/good air movement/breath sounds equal
clear to auscultation bilaterally/normal/airway patent/breath sounds equal/good air movement/respirations non-labored
good air movement/airway patent/respirations non-labored/normal/clear to auscultation bilaterally/breath sounds equal

## 2018-04-08 NOTE — PROVIDER CONTACT NOTE (OTHER) - ASSESSMENT
Bp, 167/70, T 98.3, RR 19, oxygenation 97.
4) PA stated she will renew suazo order
Alert, nonverbal at times. Pt with abdominal wound vac. Nicole to draining to gravity. Pt asleep. No distress noted.
Pt was in distress, unable to swallow--began to choke.

## 2018-04-08 NOTE — PROGRESS NOTE ADULT - CARDIOVASCULAR DETAILS
positive S2/positive S1
positive S1/positive S2
positive S2/positive S1
positive S2/positive S1

## 2018-04-08 NOTE — CHART NOTE - NSCHARTNOTEFT_GEN_A_CORE
Called to bedside around 11:15 pm as patient noted to have suspected vaginal bleeding. Per primary medical team, patient noted to be previously have some spotting earlier in the day on 4/7/2018 with no additional episodes since. Patient assessed, not in any acute distress, however noted to be sitting in blood with what appeared to be mixed in with stool. No melena appreciated. VSS, if anything hypertensive to 167/70. On exam, no gross blood appreciated to be noted upon external examination of the vagina. Rectal exam notable for blood (not bright red blood but not melena).     PE  Constitutional: NAD, lying comfortably in bed  :    Plan  -STAT CBC  -baseline Hgb on admission on 3/23/18 noted to be ~11, with slow downtrend throughout hospital stay. Hgb over the last several days noted to be 7s, not requiring any transfusions to date. Active T&S (4/6/18) in place. Transfuse if Hgb <7   -Maintain Active T&S  -PT/PTT/INR In AM   -Consider GI or surgery or ICU consult if patient becomes HD unstable Called to bedside around 11:15 pm as patient noted to have suspected vaginal bleeding. Per primary medical team, patient noted to have some spotting earlier in the day on 4/7/2018 with no additional episodes since. Vaginal bleeding concerning for underlying endometrial malignancy. TVUS (3/25/2018) revealed fibroid uterus with thickened endometrium (1.5 cm). Of note, patient's course also complicated by acute DVT in b/l calf intramuscular veins and left paired peroneal veins (LE duplex 3/14/18) for which she subsequently had an IVC placed (on 3/30). Patient was re-started on eliquis on 4/7 for the treatment of her DVTs as patient no longer appeared to have any recent vaginal bleeding.     Patient assessed, not in any acute distress, not complaining of any abdominal pain however noted to be sitting in blood with what appeared to be mixed in with stool. No melena appreciated. VSS, if anything hypertensive to 167/70. On exam, no gross blood appreciated to be noted upon external examination of the vagina. Rectal exam notable for blood (not bright red blood but not melena).     PE  Constitutional: NAD, lying comfortably in bed  : no gross vaginal bleeding appreciated  Rectal: patient sitting in bloody output, not juan red blood but perhaps mixed with stool. Rectal sphincter tone intact, similar output noted on JOHN    Plan  -Discontinue Eliquis, continue to monitor  -STAT CBC  -baseline Hgb on admission on 3/23/18 noted to be ~11, with slow downtrend throughout hospital stay. Hgb over the last several days noted to be 7s, not requiring any transfusions to date. Active T&S (4/6/18) in place. Transfuse if Hgb <7   -Maintain Active T&S  -VSS during encounter, monitor VS q2h for now  -PT/PTT/INR In AM   -Consider GI or surgery or ICU consult if patient becomes HD unstable Called to bedside around 11:15 pm as patient noted to have suspected vaginal bleeding. Per primary medical team, patient noted to have some spotting earlier in the day on 4/7/2018 with no additional episodes since. Vaginal bleeding first initiated after initiating AC for post-op PE (ventral hernia repair 3/2/18). Work up for post-menopausal bleeding TVUS (3/25/2018) revealed fibroid uterus with thickened endometrium (1.5 cm) concerning for underlying endometrial malignancy however patient previously expressing no desire for any intervention. Of note, patient's course also complicated by acute DVT in b/l calf intramuscular veins and left paired peroneal veins (LE duplex 3/14/18) for which she subsequently had an IVC placed (on 3/30). Patient was re-started on eliquis on 4/7 for the treatment of her DVTs as patient no longer appeared to have any recent vaginal bleeding.     Patient assessed, not in any acute distress, not complaining of any abdominal pain however noted to be sitting in blood with what appeared to be mixed in with stool. No melena appreciated. VSS, if anything hypertensive to 167/70. On exam, no gross blood appreciated to be noted upon external examination of the vagina. Rectal exam notable for blood (not bright red blood but not melena). Last dose of Eliquis 6 pm on 4/7/18, Eliquis discontinued and STAT CBC obtained.     PE  Constitutional: NAD, lying comfortably in bed  : no gross vaginal bleeding appreciated  Rectal: patient sitting in bloody output, not juan red blood but perhaps mixed with stool. Rectal sphincter tone intact, similar output noted on JOHN    Plan  -Discontinue Eliquis in light of concern for active bleed. Patient also admitted with concern for possible GIB as well. Continue to monitor  -STAT CBC  -baseline Hgb on admission on 3/23/18 noted to be ~11, with slow downtrend throughout hospital stay. Hgb over the last several days noted to be 7s. Active T&S (4/6/18) in place. Transfuse if Hgb <7   -Maintain Active T&S  -VSS during encounter, monitor VS q2h for now  -PT/PTT/INR In AM   -Consider GI consult in AM if patient has persistent bloody output

## 2018-04-08 NOTE — CONSULT NOTE ADULT - ASSESSMENT
78yr old F with PMHx significant for DMII, Dyslipidemia, HTN, sp ventral hernia repair 3/2/18 with Dr Fernandez now with wound vac over surgical site, post-op PE, and started on Eliquis, who was admitted for evaluation of abdominal pain and bleeding.     # GI bleed -   - unclear if GI bleed vs vaginal bleed  - patient with possible uterine cancer - but does not want further Rx for this  - Hgb relatively stable  - she will probably benefit from a colonoscopy to evaluate for possible etiologies of her bleeding from the colon  - unclear if patient will be able to tolerate prep/complete prep  - clears  - NPO midnite  - start 238g miralax in 64oz of clear liquid  - will plan for a colonoscopy as schedule permits tomorrow    Will discuss with attending Dr Magno ETIENNE following 78yr old F with PMHx significant for DMII, Dyslipidemia, HTN, sp ventral hernia repair 3/2/18 with Dr Fernandez now with wound vac over surgical site, post-op PE, and started on Eliquis, who was admitted for evaluation of abdominal pain and bleeding.     # GI bleed -   - unclear if GI bleed vs vaginal bleed  - patient with possible uterine cancer - but does not want further Rx for this  - Hgb relatively stable  - she will probably benefit from an EGD/colonoscopy to evaluate for possible etiologies of her bleeding/and anemia  - unclear if patient will be able to tolerate prep/complete prep  - clears  - NPO midnite  - start 238g miralax in 64oz of clear liquid  - will plan for an EGD/colonoscopy as schedule permits tomorrow    Will discuss with attending Dr Magno ETIENNE following

## 2018-04-08 NOTE — CONSULT NOTE ADULT - PROVIDER SPECIALTY LIST ADULT
Critical Care
GYN
Gastroenterology
Internal Medicine
Psychiatry
Surgery
Vascular Surgery
GYN
Nephrology

## 2018-04-08 NOTE — PROGRESS NOTE ADULT - PROBLEM SELECTOR PLAN 1
Patient is a 78 year old female with acute oliguric renal failure requiring hemodialysis. Patient's urine output has increased to 600 cc. Patient seems to be recovering renal function as creatinine has been trending down and electrolytes are stable.     P - Please check labs today    Patient is planned for permcath removal this week   Please have patient follow up in nephrology clinic upon discharge   Monitor strict I/Os   avoid nephrotoxic medications, IV contrast and NSAIDs.

## 2018-04-08 NOTE — CONSULT NOTE ADULT - CONSULT REQUESTED DATE/TIME
01-Apr-2018 23:50
08-Apr-2018 15:37
26-Mar-2018 10:11
28-Mar-2018 18:00
29-Mar-2018 21:00
30-Mar-2018 09:00
30-Mar-2018 14:28
25-Mar-2018 15:48
26-Mar-2018 12:18

## 2018-04-08 NOTE — PROGRESS NOTE ADULT - PROBLEM SELECTOR PLAN 1
Resolving.  The patient is urinating well with lasix (600cc overnight).  Creatinine is downtrending (1.92 today).    -Plan to remove dialysis catheter this week.

## 2018-04-08 NOTE — PROGRESS NOTE ADULT - PROBLEM SELECTOR PLAN 3
Blood pressure labile with BP sometimes going down to 120/60s  Patient was started on amlodipine 10 mg.   monitor blood pressure closely

## 2018-04-08 NOTE — PROGRESS NOTE ADULT - SUBJECTIVE AND OBJECTIVE BOX
Interval Events:  Patient seen and examined at bedside.  Melena noted last night and the patient's Eliquis was stopped.  Hb stable and the patient is hemodynamically stable.  No complaints this morning.          Vital Signs Last 24 Hrs  T(C): 36.9 (08 Apr 2018 09:31), Max: 36.9 (08 Apr 2018 09:31)  T(F): 98.5 (08 Apr 2018 09:31), Max: 98.5 (08 Apr 2018 09:31)  HR: 88 (08 Apr 2018 09:31) (73 - 88)  BP: 168/73 (08 Apr 2018 09:31) (121/67 - 169/77)  BP(mean): 99 (07 Apr 2018 15:13) (99 - 99)  RR: 18 (08 Apr 2018 09:31) (18 - 20)  SpO2: 90% (08 Apr 2018 09:31) (90% - 98%)    04-07 @ 07:01  -  04-08 @ 07:00  --------------------------------------------------------  IN: 0 mL / OUT: 600 mL / NET: -600 mL          PHYSICAL EXAM:    General: Well developed; well nourished; in no acute distress  Neck: Supple; non tender; no masses  Respiratory: +rales bilaterally at the bases  Cardiovascular: Regular rate and rhythm. S1 and S2 Normal; No murmurs, gallops or rubs  Gastrointestinal: Soft non-tender non-distended; Normal bowel sounds  Extremities: +1 pitting edema bilaterally  Neurological: Alert and oriented x3  Skin: Warm and dry. No obvious rash    LABS:      CBC Full  -  ( 07 Apr 2018 23:46 )  WBC Count : 11.0 K/uL  Hemoglobin : 7.7 g/dL  Hematocrit : 24.7 %  Platelet Count - Automated : 196 K/uL  Mean Cell Volume : 93.6 fL  Mean Cell Hemoglobin : 29.2 pg  Mean Cell Hemoglobin Concentration : 31.2 g/dL      04-07    143  |  104  |  35<H>  ----------------------------<  79  3.8   |  21<L>  |  1.92<H>    Ca    9.0      07 Apr 2018 11:04  Mg     1.9     04-07                        RADIOLOGY & ADDITIONAL STUDIES (The following images were personally reviewed):

## 2018-04-08 NOTE — PROGRESS NOTE ADULT - CONSTITUTIONAL DETAILS
well-developed/well-nourished/well-groomed
well-groomed/obese/well-developed/well-nourished
well-groomed/well-developed/well-nourished/obese
well-nourished/well-groomed/well-developed
well-developed/well-groomed/well-nourished
well-nourished/well-groomed/well-developed

## 2018-04-08 NOTE — PROVIDER CONTACT NOTE (OTHER) - ACTION/TREATMENT ORDERED:
discontinue Contact Isolation precaution per MD
Blood draw of CBC, Rectal and vaginal examination by MD Lemons
MD aware of urine output and wants urine output to be assessed at 6am.
MD Tom made aware and at bedside. No immediate intervention at this time, continue to monitor.

## 2018-04-08 NOTE — PROGRESS NOTE ADULT - MS EXT PE MLT D E PC
no pedal edema/normal
normal/no pedal edema
pedal edema
pedal edema
no pedal edema/normal
no pedal edema/normal

## 2018-04-08 NOTE — CONSULT NOTE ADULT - CONSULT REASON
Assess capacity to refuse dialysis
GI Bleed
central venous access
management of PE, active bleeding, and uremia with renal failure
management of PE, pleural effusions, chronic medical conditions
vaginal bleeding
PE and endometriosis with vaginal bleeding
vaginal bleeding
GONZALEZ

## 2018-04-08 NOTE — PROGRESS NOTE ADULT - PROBLEM SELECTOR PLAN 2
Hemoglobin 7.7. Patient did have an episode of vaginal bleeding overnight.   No iron deficiency anemia  No indication for Epo as patient's renal failure is due to GONZALEZ

## 2018-04-08 NOTE — CONSULT NOTE ADULT - CONSULT REQUESTED BY NAME
[place] Gloria
Dr Christina Johnson
Dr. Ngo
Dr. Ngo
Surgery and Neprhology
team 1
Dr. Johnson
team 1
Surgery team 1

## 2018-04-08 NOTE — PROGRESS NOTE ADULT - SUBJECTIVE AND OBJECTIVE BOX
On interval followup, the left int jugular catheter site is clean, dry and non-inflamed.  T 99 range. Notes and cultures are followed.

## 2018-04-08 NOTE — PROGRESS NOTE ADULT - NSHPATTENDINGPLANDISCUSS_GEN_ALL_CORE
housestaff
team1
housestaff
Dr. Adams
medical team
medical team
house staff
housestaff
house staff

## 2018-04-08 NOTE — CONSULT NOTE ADULT - SUBJECTIVE AND OBJECTIVE BOX
HPI:  78yr old F with PMHx significant for DMII, Dyslipidemia, HTN, sp ventral hernia repair 3/2/18 with Dr Fernandez, post-operative PE, and started on Eliquis, who was admitted for evaluation of abdominal pain and bleeding. Complaining of abdominal pain and passing maroon colored stools. Patient states that she has had abdominal pain since ventral hernia repair on 3/2/18. Last few bowel movements feels like they might have had some blood in them, but she did notice anything bright red. Noticed some drainage from midline abdominal wound over the past couple of days, No fever or chills. no n/v. + decrease appetite. + diarrhea. No shortness of breath or chest pain. (23 Mar 2018 09:32)      PAST MEDICAL & SURGICAL HISTORY:  Obesity  DM (diabetes mellitus)  HLD (hyperlipidemia)  HTN (hypertension)  H/O ventral hernia repair      REVIEW OF SYSTEMS      General:	    Skin/Breast:  	  Ophthalmologic:  	  ENMT:	    Respiratory and Thorax:  	  Cardiovascular:	    Gastrointestinal:	    Genitourinary:	    Musculoskeletal:	    Neurological:	    Psychiatric:	    Hematology/Lymphatics:	    Endocrine:	    Allergic/Immunologic:	    MEDICATIONS  (STANDING):  amLODIPine   Tablet 10 milliGRAM(s) Oral daily  ARIPiprazole 5 milliGRAM(s) Oral daily  cholecalciferol 1000 Unit(s) Oral daily  dextrose 5%. 1000 milliLiter(s) (50 mL/Hr) IV Continuous <Continuous>  dextrose 50% Injectable 12.5 Gram(s) IV Push once  dextrose 50% Injectable 25 Gram(s) IV Push once  dextrose 50% Injectable 25 Gram(s) IV Push once  escitalopram 10 milliGRAM(s) Oral daily  heparin  flush 100 Units/mL Injectable 100 Unit(s) IV Push every other day  insulin lispro (HumaLOG) corrective regimen sliding scale   SubCutaneous Before meals and at bedtime  levothyroxine 150 MICROGram(s) Oral daily  medroxyPROGESTERone 10 milliGRAM(s) Oral daily    MEDICATIONS  (PRN):  acetaminophen   Tablet. 650 milliGRAM(s) Oral every 6 hours PRN Mild Pain (1 - 3)  acetaminophen   Tablet. 650 milliGRAM(s) Oral every 6 hours PRN Moderate Pain (4 - 6)  dextrose Gel 1 Dose(s) Oral once PRN Blood Glucose LESS THAN 70 milliGRAM(s)/deciliter  glucagon  Injectable 1 milliGRAM(s) IntraMuscular once PRN Glucose LESS THAN 70 milligrams/deciliter  QUEtiapine 12.5 milliGRAM(s) Oral at bedtime PRN Agitation and/or Insomnia  sodium chloride 0.65% Nasal 1 Spray(s) Both Nostrils every 4 hours PRN Nasal Congestion      Allergies    No Known Allergies    Intolerances        SOCIAL HISTORY:    FAMILY HISTORY:  No pertinent family history in first degree relatives      Vital Signs Last 24 Hrs  T(C): 36.9 (08 Apr 2018 15:05), Max: 36.9 (08 Apr 2018 09:31)  T(F): 98.5 (08 Apr 2018 15:05), Max: 98.5 (08 Apr 2018 09:31)  HR: 94 (08 Apr 2018 15:05) (73 - 94)  BP: 138/71 (08 Apr 2018 15:05) (121/67 - 169/77)  BP(mean): --  RR: 20 (08 Apr 2018 15:05) (18 - 20)  SpO2: 91% (08 Apr 2018 15:05) (90% - 98%)    PHYSICAL EXAM:      Constitutional:    Eyes:    ENMT:    Neck:    Breasts:    Back:    Respiratory:    Cardiovascular:    Gastrointestinal:    Genitourinary:    Rectal:    Extremities:    Vascular:    Neurological:    Skin:    Lymph Nodes:    Musculoskeletal:    Psychiatric:        LABS:                        7.7    11.0  )-----------( 196      ( 07 Apr 2018 23:46 )             24.7     04-07    143  |  104  |  35<H>  ----------------------------<  79  3.8   |  21<L>  |  1.92<H>    Ca    9.0      07 Apr 2018 11:04  Mg     1.9     04-07            RADIOLOGY & ADDITIONAL STUDIES: HPI:  78yr old F with PMHx significant for DMII, Dyslipidemia, HTN, sp ventral hernia repair 3/2/18 with Dr Fernandez now with wound vac over surgical site, post-op PE, and started on Eliquis, who was admitted for evaluation of abdominal pain and bleeding.   Patient is a poor historian due to her dementia.  She report some abdominal pain - mostly located over the site of her wound vac and prior ventral hernia site, unable to further describe the pain. Also found to have bleeding which source has been in question, but was deemed to be vaginal - as her niece states this is not a new problem for her and she has been known to have vaginal bleeding in the past that she has refused to work up. Yesterday she had a rectal that showed some blood in the examining finger, so GI was consulted for this. Patient also notes some diarrhea, anorexia, and generalized malaise and fatigue. Patient states that she is nauseous and has been vomiting most of the day but her niece who is at bedside denies this. She denies melena, hematemesis, fever, chills, sick contacts, leg swelling, SOB, chest pain. Patient has been hospitalized for over 2 weeks now and is being followed by ObGyn for possible uterine malignancy which she does not want worked up any further. Psych feels that patient does not have capacity to make decisions regarding her care, and she does have 2 health care proxies her nephew and niece.    EGD - not sure  Colonoscopy - maybe 4yrs ago - she is not very sure      PAST MEDICAL & SURGICAL HISTORY:  Obesity  DM (diabetes mellitus)  HLD (hyperlipidemia)  HTN (hypertension)  H/O ventral hernia repair      REVIEW OF SYSTEMS  Apart from items noted in the HPI a 10point ROS was negative  	    MEDICATIONS  (STANDING):  amLODIPine   Tablet 10 milliGRAM(s) Oral daily  ARIPiprazole 5 milliGRAM(s) Oral daily  cholecalciferol 1000 Unit(s) Oral daily  dextrose 5%. 1000 milliLiter(s) (50 mL/Hr) IV Continuous <Continuous>  dextrose 50% Injectable 12.5 Gram(s) IV Push once  dextrose 50% Injectable 25 Gram(s) IV Push once  dextrose 50% Injectable 25 Gram(s) IV Push once  escitalopram 10 milliGRAM(s) Oral daily  heparin  flush 100 Units/mL Injectable 100 Unit(s) IV Push every other day  insulin lispro (HumaLOG) corrective regimen sliding scale   SubCutaneous Before meals and at bedtime  levothyroxine 150 MICROGram(s) Oral daily  medroxyPROGESTERone 10 milliGRAM(s) Oral daily    MEDICATIONS  (PRN):  acetaminophen   Tablet. 650 milliGRAM(s) Oral every 6 hours PRN Mild Pain (1 - 3)  acetaminophen   Tablet. 650 milliGRAM(s) Oral every 6 hours PRN Moderate Pain (4 - 6)  dextrose Gel 1 Dose(s) Oral once PRN Blood Glucose LESS THAN 70 milliGRAM(s)/deciliter  glucagon  Injectable 1 milliGRAM(s) IntraMuscular once PRN Glucose LESS THAN 70 milligrams/deciliter  QUEtiapine 12.5 milliGRAM(s) Oral at bedtime PRN Agitation and/or Insomnia  sodium chloride 0.65% Nasal 1 Spray(s) Both Nostrils every 4 hours PRN Nasal Congestion      Allergies  No Known Allergies    Intolerances    SOCIAL HISTORY:  Denies toxic or illicit habits    FAMILY HISTORY:  Niece denies FHx of stomach/colon/pancreatic cancer, IBD or liver issues  No pertinent family history in first degree relatives      Vital Signs Last 24 Hrs  T(C): 36.9 (08 Apr 2018 15:05), Max: 36.9 (08 Apr 2018 09:31)  T(F): 98.5 (08 Apr 2018 15:05), Max: 98.5 (08 Apr 2018 09:31)  HR: 94 (08 Apr 2018 15:05) (73 - 94)  BP: 138/71 (08 Apr 2018 15:05) (121/67 - 169/77)  BP(mean): --  RR: 20 (08 Apr 2018 15:05) (18 - 20)  SpO2: 91% (08 Apr 2018 15:05) (90% - 98%)    PHYSICAL EXAM:  GEN - elderly F lying in bed in no distress, anicteric afebrile, not pale  Respiratory: CTA  Cardiovascular: s1 s2 no M RRR  Gastrointestinal: full, obese, soft, midline incision with wound vac over it, vague generalized tenderness, NR, NG, no masses or organs palpated  Rectal: patient refused  Extremities: trace pitting edema  Neurological: AAOx1-2 non focal  Skin: intact      LABS:                    7.7    11.0  )-----------( 196      ( 07 Apr 2018 23:46 )             24.7     143  |  104  |  35<H>  ----------------------------<  79  3.8   |  21<L>  |  1.92<H>    Ca    9.0      07 Apr 2018 11:04  Mg     1.9     04-07          RADIOLOGY & ADDITIONAL STUDIES:

## 2018-04-08 NOTE — PROGRESS NOTE ADULT - ASSESSMENT
78 year old woman with HTN, DM, HLD, hypothyroidism with surgical wound infection, renal failure, GIB, and vaginal bleeding.

## 2018-04-09 ENCOUNTER — RESULT REVIEW (OUTPATIENT)
Age: 78
End: 2018-04-09

## 2018-04-09 LAB
ALBUMIN SERPL ELPH-MCNC: 2.6 G/DL — LOW (ref 3.3–5)
ALP SERPL-CCNC: 68 U/L — SIGNIFICANT CHANGE UP (ref 40–120)
ALT FLD-CCNC: 16 U/L — SIGNIFICANT CHANGE UP (ref 10–45)
ANION GAP SERPL CALC-SCNC: 13 MMOL/L — SIGNIFICANT CHANGE UP (ref 5–17)
ANION GAP SERPL CALC-SCNC: 15 MMOL/L — SIGNIFICANT CHANGE UP (ref 5–17)
ANISOCYTOSIS BLD QL: SLIGHT — SIGNIFICANT CHANGE UP
AST SERPL-CCNC: 23 U/L — SIGNIFICANT CHANGE UP (ref 10–40)
BILIRUB SERPL-MCNC: 1 MG/DL — SIGNIFICANT CHANGE UP (ref 0.2–1.2)
BLD GP AB SCN SERPL QL: NEGATIVE — SIGNIFICANT CHANGE UP
BUN SERPL-MCNC: 38 MG/DL — HIGH (ref 7–23)
BUN SERPL-MCNC: 39 MG/DL — HIGH (ref 7–23)
CALCIUM SERPL-MCNC: 9.1 MG/DL — SIGNIFICANT CHANGE UP (ref 8.4–10.5)
CALCIUM SERPL-MCNC: 9.3 MG/DL — SIGNIFICANT CHANGE UP (ref 8.4–10.5)
CHLORIDE SERPL-SCNC: 109 MMOL/L — HIGH (ref 96–108)
CHLORIDE SERPL-SCNC: 110 MMOL/L — HIGH (ref 96–108)
CO2 SERPL-SCNC: 26 MMOL/L — SIGNIFICANT CHANGE UP (ref 22–31)
CO2 SERPL-SCNC: 27 MMOL/L — SIGNIFICANT CHANGE UP (ref 22–31)
CREAT SERPL-MCNC: 1.33 MG/DL — HIGH (ref 0.5–1.3)
CREAT SERPL-MCNC: 1.45 MG/DL — HIGH (ref 0.5–1.3)
DACRYOCYTES BLD QL SMEAR: SLIGHT — SIGNIFICANT CHANGE UP
DOHLE BOD BLD QL SMEAR: SIGNIFICANT CHANGE UP
EOSINOPHIL NFR BLD AUTO: 1 % — SIGNIFICANT CHANGE UP (ref 0–6)
GLUCOSE BLDC GLUCOMTR-MCNC: 104 MG/DL — HIGH (ref 70–99)
GLUCOSE BLDC GLUCOMTR-MCNC: 187 MG/DL — HIGH (ref 70–99)
GLUCOSE BLDC GLUCOMTR-MCNC: 97 MG/DL — SIGNIFICANT CHANGE UP (ref 70–99)
GLUCOSE BLDC GLUCOMTR-MCNC: 99 MG/DL — SIGNIFICANT CHANGE UP (ref 70–99)
GLUCOSE SERPL-MCNC: 102 MG/DL — HIGH (ref 70–99)
GLUCOSE SERPL-MCNC: 106 MG/DL — HIGH (ref 70–99)
HCT VFR BLD CALC: 23.4 % — LOW (ref 34.5–45)
HGB BLD-MCNC: 7.1 G/DL — LOW (ref 11.5–15.5)
HYPOCHROMIA BLD QL: SIGNIFICANT CHANGE UP
LYMPHOCYTES # BLD AUTO: 24 % — SIGNIFICANT CHANGE UP (ref 13–44)
MACROCYTES BLD QL: SLIGHT — SIGNIFICANT CHANGE UP
MAGNESIUM SERPL-MCNC: 1.7 MG/DL — SIGNIFICANT CHANGE UP (ref 1.6–2.6)
MANUAL DIF COMMENT BLD-IMP: SIGNIFICANT CHANGE UP
MANUAL SMEAR VERIFICATION: SIGNIFICANT CHANGE UP
MCHC RBC-ENTMCNC: 28.5 PG — SIGNIFICANT CHANGE UP (ref 27–34)
MCHC RBC-ENTMCNC: 30.3 G/DL — LOW (ref 32–36)
MCV RBC AUTO: 94 FL — SIGNIFICANT CHANGE UP (ref 80–100)
METAMYELOCYTES # FLD: 2 % — HIGH
MICROCYTES BLD QL: SLIGHT — SIGNIFICANT CHANGE UP
MONOCYTES NFR BLD AUTO: 1 % — LOW (ref 2–14)
MYELOCYTES NFR BLD: 1 % — HIGH
NEUTROPHILS NFR BLD AUTO: 65 % — SIGNIFICANT CHANGE UP (ref 43–77)
NEUTS BAND # BLD: 6 % — SIGNIFICANT CHANGE UP
OVALOCYTES BLD QL SMEAR: SLIGHT — SIGNIFICANT CHANGE UP
PHOSPHATE SERPL-MCNC: 2.6 MG/DL — SIGNIFICANT CHANGE UP (ref 2.5–4.5)
PLAT MORPH BLD: NORMAL — SIGNIFICANT CHANGE UP
PLATELET # BLD AUTO: 173 K/UL — SIGNIFICANT CHANGE UP (ref 150–400)
POIKILOCYTOSIS BLD QL AUTO: SLIGHT — SIGNIFICANT CHANGE UP
POLYCHROMASIA BLD QL SMEAR: SLIGHT — SIGNIFICANT CHANGE UP
POTASSIUM SERPL-MCNC: 2.9 MMOL/L — CRITICAL LOW (ref 3.5–5.3)
POTASSIUM SERPL-MCNC: 3.1 MMOL/L — LOW (ref 3.5–5.3)
POTASSIUM SERPL-SCNC: 2.9 MMOL/L — CRITICAL LOW (ref 3.5–5.3)
POTASSIUM SERPL-SCNC: 3.1 MMOL/L — LOW (ref 3.5–5.3)
PROT SERPL-MCNC: 6 G/DL — SIGNIFICANT CHANGE UP (ref 6–8.3)
RBC # BLD: 2.49 M/UL — LOW (ref 3.8–5.2)
RBC # FLD: 17.7 % — HIGH (ref 10.3–16.9)
RBC BLD AUTO: (no result)
RH IG SCN BLD-IMP: POSITIVE — SIGNIFICANT CHANGE UP
SCHISTOCYTES BLD QL AUTO: SIGNIFICANT CHANGE UP
SODIUM SERPL-SCNC: 150 MMOL/L — HIGH (ref 135–145)
SODIUM SERPL-SCNC: 150 MMOL/L — HIGH (ref 135–145)
TOXIC GRANULES BLD QL SMEAR: SLIGHT — SIGNIFICANT CHANGE UP
WBC # BLD: 7.4 K/UL — SIGNIFICANT CHANGE UP (ref 3.8–10.5)
WBC # FLD AUTO: 7.4 K/UL — SIGNIFICANT CHANGE UP (ref 3.8–10.5)

## 2018-04-09 PROCEDURE — 99233 SBSQ HOSP IP/OBS HIGH 50: CPT | Mod: GC

## 2018-04-09 PROCEDURE — 99231 SBSQ HOSP IP/OBS SF/LOW 25: CPT

## 2018-04-09 RX ORDER — ALBUMIN HUMAN 25 %
250 VIAL (ML) INTRAVENOUS ONCE
Qty: 0 | Refills: 0 | Status: COMPLETED | OUTPATIENT
Start: 2018-04-09 | End: 2018-04-09

## 2018-04-09 RX ORDER — POTASSIUM CHLORIDE 20 MEQ
20 PACKET (EA) ORAL
Qty: 0 | Refills: 0 | Status: COMPLETED | OUTPATIENT
Start: 2018-04-09 | End: 2018-04-09

## 2018-04-09 RX ORDER — FLUTICASONE PROPIONATE 50 MCG
1 SPRAY, SUSPENSION NASAL
Qty: 0 | Refills: 0 | Status: COMPLETED | OUTPATIENT
Start: 2018-04-09 | End: 2018-04-10

## 2018-04-09 RX ORDER — MAGNESIUM SULFATE 500 MG/ML
1 VIAL (ML) INJECTION ONCE
Qty: 0 | Refills: 0 | Status: COMPLETED | OUTPATIENT
Start: 2018-04-09 | End: 2018-04-09

## 2018-04-09 RX ORDER — SODIUM CHLORIDE 9 MG/ML
1000 INJECTION, SOLUTION INTRAVENOUS
Qty: 0 | Refills: 0 | Status: DISCONTINUED | OUTPATIENT
Start: 2018-04-09 | End: 2018-04-09

## 2018-04-09 RX ORDER — SODIUM CHLORIDE 9 MG/ML
1000 INJECTION, SOLUTION INTRAVENOUS
Qty: 0 | Refills: 0 | Status: DISCONTINUED | OUTPATIENT
Start: 2018-04-09 | End: 2018-04-10

## 2018-04-09 RX ORDER — PANTOPRAZOLE SODIUM 20 MG/1
40 TABLET, DELAYED RELEASE ORAL
Qty: 0 | Refills: 0 | Status: DISCONTINUED | OUTPATIENT
Start: 2018-04-10 | End: 2018-04-11

## 2018-04-09 RX ADMIN — Medication 50 MILLIEQUIVALENT(S): at 13:00

## 2018-04-09 RX ADMIN — Medication 125 MILLILITER(S): at 12:26

## 2018-04-09 RX ADMIN — ARIPIPRAZOLE 5 MILLIGRAM(S): 15 TABLET ORAL at 12:29

## 2018-04-09 RX ADMIN — Medication 100 GRAM(S): at 12:29

## 2018-04-09 RX ADMIN — AMLODIPINE BESYLATE 10 MILLIGRAM(S): 2.5 TABLET ORAL at 05:53

## 2018-04-09 RX ADMIN — Medication 50 MILLIEQUIVALENT(S): at 18:43

## 2018-04-09 RX ADMIN — MEDROXYPROGESTERONE ACETATE 10 MILLIGRAM(S): 150 INJECTION, SUSPENSION, EXTENDED RELEASE INTRAMUSCULAR at 12:30

## 2018-04-09 RX ADMIN — Medication 1: at 21:56

## 2018-04-09 RX ADMIN — Medication 100 MILLIEQUIVALENT(S): at 22:53

## 2018-04-09 RX ADMIN — Medication 1 SPRAY(S): at 19:07

## 2018-04-09 RX ADMIN — Medication 100 MILLIEQUIVALENT(S): at 21:33

## 2018-04-09 RX ADMIN — ESCITALOPRAM OXALATE 10 MILLIGRAM(S): 10 TABLET, FILM COATED ORAL at 12:30

## 2018-04-09 RX ADMIN — Medication 1000 UNIT(S): at 12:30

## 2018-04-09 RX ADMIN — Medication 50 MILLIEQUIVALENT(S): at 19:07

## 2018-04-09 RX ADMIN — SODIUM CHLORIDE 100 MILLILITER(S): 9 INJECTION, SOLUTION INTRAVENOUS at 20:33

## 2018-04-09 RX ADMIN — Medication 650 MILLIGRAM(S): at 05:32

## 2018-04-09 RX ADMIN — Medication 150 MICROGRAM(S): at 05:53

## 2018-04-09 NOTE — PROGRESS NOTE ADULT - ASSESSMENT
78F with PMHx of HTN, DM, HLD, hypothyroidism admitted for management of surgical wound infection from robotically assisted hernia repair on previous admission. Course complicated by blood loss anemia (now resolved), with patient s/p intermittent HD for renal failure, now improving. 78F with PMHx of HTN, DM, HLD, hypothyroidism admitted for management of surgical wound infection from robotically assisted hernia repair on previous admission. Course complicated by blood loss anemia (now resolved), with patient s/p intermittent HD for renal failure, with improving kidney function. New episode of bleeding, unclear if source GI or . Pending scope.

## 2018-04-09 NOTE — PROGRESS NOTE ADULT - PROBLEM SELECTOR PLAN 3
CTA 3/12 with findings of PE within the segmental arteries of the right upper   lobe, right middle lobe, and lingula. Subsequent echocardiogram notable for RV dilatation, however deemed by pulmonology to be unlikely that clot burden caused dilatation. Trops neg x3. Provoked PE in the setting of recent abdominal surgery.   - Eliquis  - continue  -IVC filter placed 3/30  -encouraging incentive spirometry -bilateral thrombi below the knees with LE edema greater in L>R  -IVC filter placed 3/30  -eliquis discontinued with new episode of bleed (unclear source, GI/)  -Active T/S; renewed 4/9 -bilateral thrombi below the knees with LE edema greater in L>R  -IVC filter placed 3/30  -eliquis discontinued with new episode of bleed (unclear source, GI/)  -Active T/S; last active through midnight, ordered repeat today

## 2018-04-09 NOTE — PROGRESS NOTE ADULT - PROBLEM SELECTOR PLAN 5
- Resolved with dialysis.    - Interval development of bilateral pleural effusions on CXR from 3/28 compared to that of 3/23. Likely 2/2 fluid overload in the setting of renal failure and oliguria. Patient currently without complaints of SOB, no increased oxygen requirements (on NC2L, RR 18).   -encouraging incentive spirometry for improved aeration - Resolved with dialysis.    - Interval development of bilateral pleural effusions on CXR from 3/28 compared to that of 3/23. Likely 2/2 fluid overload in the setting of renal failure and oliguria. Patient currently without complaints of SOB, no increased oxygen requirements (on NC2L, RR 18).   -encouraging incentive spirometry

## 2018-04-09 NOTE — PROGRESS NOTE ADULT - PROBLEM SELECTOR PLAN 4
-Now stable. previously active vaginal spotting possibly 2/2 GYN malignancy.  ~1 pad overnight (patient refusing actual pads).  TVUS showed fibroid uterus with thickened endometrium. Received 2 units PRBCs.  -maintain active T&S - last 4/6, next 4/9  -Transfuse <7.  Trend CBC  -IVC filter placed A/C contraindicated in setting of vaginal bleeding  -c/w medroxyprogesterone 10mg daily, and with outpt GYN follow up    #Intrabdominal Infection  leukocytosis resolved, no longer on antibiotics. No signs of infection at wound site. Wound vac in place. No clinical complaints by patient.   - wound vac intermittent management by Surgery Team 1C, changed 4/6   - consulted wound care team for additional maintenance CTA 3/12 with findings of PE within the segmental arteries of the right upper   lobe, right middle lobe, and lingula. Subsequent echocardiogram notable for RV dilatation, however deemed by pulmonology to be unlikely that clot burden caused dilatation. Trops neg x3. Provoked PE in the setting of recent abdominal surgery.   - breathing comfortably on room air   - encouraging incentive spirometry  - holding Eliquis in context of intermittent bleed (unclear source, Gi/Gu)  - IVC filter in place; placed 3/30

## 2018-04-09 NOTE — PROGRESS NOTE ADULT - ASSESSMENT
78F with PMHx of HTN, DM, HLD, hypothyroidism admitted for management of surgical wound infection from robotically assisted hernia repair on previous admission. Course complicated by blood loss anemia (now resolved), with patient s/p intermittent HD for renal failure, with improving kidney function. New episode of bleeding

## 2018-04-09 NOTE — PROGRESS NOTE ADULT - SUBJECTIVE AND OBJECTIVE BOX
SUBJECTIVE: Pt seen and examined at bedside this AM. +BM/flatus. Question of GI bleed over the weekend, however per nurse, pt stool yellow-brown in color.       Vital Signs Last 24 Hrs  T(C): 36.9 (09 Apr 2018 10:25), Max: 36.9 (09 Apr 2018 05:10)  T(F): 98.5 (09 Apr 2018 10:25), Max: 98.5 (09 Apr 2018 10:25)  HR: 94 (09 Apr 2018 10:25) (87 - 95)  BP: 170/76 (09 Apr 2018 10:25) (152/78 - 170/76)  BP(mean): --  RR: 20 (09 Apr 2018 10:25) (19 - 20)  SpO2: 97% (09 Apr 2018 10:25) (94% - 97%)    PHYSICAL EXAM    Gen: NAD  CV: RRR  Pulm: Regular non-labored respirations  Abd: Soft, NT/ND. Wound VAC in place to suction      LABS:                        7.1    7.4   )-----------( 173      ( 09 Apr 2018 09:48 )             23.4     04-09    150<H>  |  110<H>  |  38<H>  ----------------------------<  102<H>  3.1<L>   |  27  |  1.45<H>    Ca    9.1      09 Apr 2018 09:48  Phos  2.6     04-09  Mg     1.7     04-09    TPro  6.0  /  Alb  2.6<L>  /  TBili  1.0  /  DBili  x   /  AST  23  /  ALT  16  /  AlkPhos  68  04-09          ASSESSMENT AND PLAN SUBJECTIVE: Pt seen and examined at bedside this AM. +BM/flatus. Question of GI bleed over the weekend, however per nurse, pt stool yellow-brown in color. Pt reported no abdominal pain when asked this morning.      Vital Signs Last 24 Hrs  T(C): 36.9 (09 Apr 2018 10:25), Max: 36.9 (09 Apr 2018 05:10)  T(F): 98.5 (09 Apr 2018 10:25), Max: 98.5 (09 Apr 2018 10:25)  HR: 94 (09 Apr 2018 10:25) (87 - 95)  BP: 170/76 (09 Apr 2018 10:25) (152/78 - 170/76)  BP(mean): --  RR: 20 (09 Apr 2018 10:25) (19 - 20)  SpO2: 97% (09 Apr 2018 10:25) (94% - 97%)    PHYSICAL EXAM    Gen: NAD  CV: RRR  Pulm: Regular non-labored respirations  Abd: Soft, NT/ND. Wound VAC in place to suction      LABS:                        7.1    7.4   )-----------( 173      ( 09 Apr 2018 09:48 )             23.4     04-09    150<H>  |  110<H>  |  38<H>  ----------------------------<  102<H>  3.1<L>   |  27  |  1.45<H>    Ca    9.1      09 Apr 2018 09:48  Phos  2.6     04-09  Mg     1.7     04-09    TPro  6.0  /  Alb  2.6<L>  /  TBili  1.0  /  DBili  x   /  AST  23  /  ALT  16  /  AlkPhos  68  04-09      ASSESSMENT AND PLAN  78 year old female s/p ventral hernia repair, c/b surgical site infection s/p wound VAC placement, was on IV Zosyn, c/b GONZALEZ now on medicine service. Pt continues to be afebrile, VSS. WBC normal   -No acute surgical intervention at this time  -VAC changes per wound care nursing team  -Further management per primary team  -D/W Dr. Ngo SUBJECTIVE: Pt seen and examined at bedside this AM. +BM/flatus. Question of GI bleed over the weekend, however per nurse, pt stool yellow-brown in color. Pt reported no abdominal pain when asked this morning.      Vital Signs Last 24 Hrs  T(C): 36.9 (09 Apr 2018 10:25), Max: 36.9 (09 Apr 2018 05:10)  T(F): 98.5 (09 Apr 2018 10:25), Max: 98.5 (09 Apr 2018 10:25)  HR: 94 (09 Apr 2018 10:25) (87 - 95)  BP: 170/76 (09 Apr 2018 10:25) (152/78 - 170/76)  BP(mean): --  RR: 20 (09 Apr 2018 10:25) (19 - 20)  SpO2: 97% (09 Apr 2018 10:25) (94% - 97%)    PHYSICAL EXAM    Gen: NAD  CV: RRR  Pulm: Regular non-labored respirations  Abd: Soft, NT/ND. Wound VAC in place to suction  Rectal: No obvious masses noted. No blood on withdrawal of gloved finger      LABS:                        7.1    7.4   )-----------( 173      ( 09 Apr 2018 09:48 )             23.4     04-09    150<H>  |  110<H>  |  38<H>  ----------------------------<  102<H>  3.1<L>   |  27  |  1.45<H>    Ca    9.1      09 Apr 2018 09:48  Phos  2.6     04-09  Mg     1.7     04-09    TPro  6.0  /  Alb  2.6<L>  /  TBili  1.0  /  DBili  x   /  AST  23  /  ALT  16  /  AlkPhos  68  04-09      ASSESSMENT AND PLAN  78 year old female s/p ventral hernia repair, c/b surgical site infection s/p wound VAC placement, was on IV Zosyn, c/b GONZALEZ now on medicine service. Pt continues to be afebrile, VSS. WBC normal  -No acute surgical intervention at this time  -VAC changes per wound care nursing team  -Colonoscopy/GIB workup per GI team  -Further management per primary team  -D/W Dr. Ngo

## 2018-04-09 NOTE — PROGRESS NOTE ADULT - SUBJECTIVE AND OBJECTIVE BOX
OVERNIGHT EVENTS: Episode of blood in stool when nursing came to clean patient. Unclear if source is vaginal or rectal. Eliquis dc'd. CBC stable. GI on board for possible scope.     SUBJECTIVE / INTERVAL HPI: Patient seen and examined at bedside. She is complaining of pain in her belly at the site of her ventral wound vac. No tenderness to palpation, normal bowel sounds. Site of wound vac clean and dry with no sign of infection. No further episodes of bleeding this morning.     VITAL SIGNS:  Vital Signs Last 24 Hrs  T(C): 36.9 (09 Apr 2018 05:10), Max: 36.9 (08 Apr 2018 09:31)  T(F): 98.4 (09 Apr 2018 05:10), Max: 98.5 (08 Apr 2018 09:31)  HR: 87 (09 Apr 2018 05:10) (87 - 95)  BP: 161/72 (09 Apr 2018 05:10) (138/71 - 168/73)  BP(mean): --  RR: 19 (09 Apr 2018 05:10) (18 - 20)  SpO2: 95% (09 Apr 2018 05:10) (90% - 96%)    PHYSICAL EXAM:  General: WDWN, obese female, laying in bed, breathing comfortably on room air  HEENT: NC/AT; PERRL, anicteric sclera; MMM, L TLC in place (site clean/dry/intact), R permcath in place (site clean/dry/intact)  Neck: supple, no jvd  Cardiovascular: +S1/S2, RRR, -m/g/r  Respiratory: clear breath sounds bilaterally  Gastrointestinal: soft, nontender to deep palpation, +BSx4 normal bowel sounds, central wound vac in place - no erythema or signs of infection. No bleeding around the rectum  : normal external female genitalia; no signs of further bleeding    Extremities: WWP, clubbing or cyanosis, 2+ edema bilaterally in calves persists worse in L>R  Vascular: 2+ radial, DP/PT pulses B/L  Neurological: AAOx3; no focal deficits      MEDICATIONS:  MEDICATIONS  (STANDING):  amLODIPine   Tablet 10 milliGRAM(s) Oral daily  ARIPiprazole 5 milliGRAM(s) Oral daily  cholecalciferol 1000 Unit(s) Oral daily  dextrose 5%. 1000 milliLiter(s) (50 mL/Hr) IV Continuous <Continuous>  dextrose 50% Injectable 12.5 Gram(s) IV Push once  dextrose 50% Injectable 25 Gram(s) IV Push once  dextrose 50% Injectable 25 Gram(s) IV Push once  escitalopram 10 milliGRAM(s) Oral daily  heparin  flush 100 Units/mL Injectable 100 Unit(s) IV Push every other day  insulin lispro (HumaLOG) corrective regimen sliding scale   SubCutaneous Before meals and at bedtime  levothyroxine 150 MICROGram(s) Oral daily  medroxyPROGESTERone 10 milliGRAM(s) Oral daily    MEDICATIONS  (PRN):  acetaminophen   Tablet. 650 milliGRAM(s) Oral every 6 hours PRN Mild Pain (1 - 3)  acetaminophen   Tablet. 650 milliGRAM(s) Oral every 6 hours PRN Moderate Pain (4 - 6)  dextrose Gel 1 Dose(s) Oral once PRN Blood Glucose LESS THAN 70 milliGRAM(s)/deciliter  glucagon  Injectable 1 milliGRAM(s) IntraMuscular once PRN Glucose LESS THAN 70 milligrams/deciliter  QUEtiapine 12.5 milliGRAM(s) Oral at bedtime PRN Agitation and/or Insomnia  sodium chloride 0.65% Nasal 1 Spray(s) Both Nostrils every 4 hours PRN Nasal Congestion      ALLERGIES:  Allergies    No Known Allergies    Intolerances        LABS:                        7.7    11.0  )-----------( 196      ( 07 Apr 2018 23:46 )             24.7     04-07    143  |  104  |  35<H>  ----------------------------<  79  3.8   |  21<L>  |  1.92<H>    Ca    9.0      07 Apr 2018 11:04  Mg     1.9     04-07      CAPILLARY BLOOD GLUCOSE      POCT Blood Glucose.: 93 mg/dL (08 Apr 2018 21:12)      RADIOLOGY & ADDITIONAL TESTS: Reviewed. OVERNIGHT EVENTS: Episode of blood in stool when nursing came to clean patient. Unclear if source is vaginal or rectal. Eliquis dc'd. CBC stable. GI on board for possible scope. Patient pulling at lines overnight. Three episodes of red streaking noted on kei when cleaning the patient after stooling.     SUBJECTIVE / INTERVAL HPI: Patient seen and examined at bedside. She is complaining of pain in her belly at the site of her ventral wound vac. No tenderness to palpation, normal bowel sounds. Site of wound vac clean with no sign of infection. No further episodes of bleeding this morning. However, bed sheet show areas of stool incontinence. The stool is watery with the outer ring of the stain showing absorption of bright red blood. Patient did not tolerate her golytly. Refused rectal exam.      VITAL SIGNS:  Vital Signs Last 24 Hrs  T(C): 36.9 (09 Apr 2018 05:10), Max: 36.9 (08 Apr 2018 09:31)  T(F): 98.4 (09 Apr 2018 05:10), Max: 98.5 (08 Apr 2018 09:31)  HR: 87 (09 Apr 2018 05:10) (87 - 95)  BP: 161/72 (09 Apr 2018 05:10) (138/71 - 168/73)  BP(mean): --  RR: 19 (09 Apr 2018 05:10) (18 - 20)  SpO2: 95% (09 Apr 2018 05:10) (90% - 96%)    PHYSICAL EXAM:  General: WDWN, obese female, laying in bed, breathing comfortably on room air  HEENT: NC/AT; PERRL, anicteric sclera; MMM, L TLC in place (site clean/dry/intact), R permcath in place (site clean/dry/intact)  Neck: supple, no jvd  Cardiovascular: +S1/S2, RRR, -m/g/r  Respiratory: clear breath sounds bilaterally  Gastrointestinal: soft, nontender to deep palpation, +BSx4 normal bowel sounds, central wound vac in place - no erythema or signs of infection. No bleeding around the rectum  : normal external female genitalia; no signs of further bleeding superficially on skin, refused rectal exam. Nicole in place with 30cc nonconcentrated urine  Extremities: WWP, clubbing or cyanosis, 2+ edema bilaterally in calves persists worse in L>R, bruising in areas of blood draws and peripheral lines  Vascular: 2+ radial, DP/PT pulses B/L  Neurological: AAOx3; no focal deficits    MEDICATIONS:  MEDICATIONS  (STANDING):  amLODIPine   Tablet 10 milliGRAM(s) Oral daily  ARIPiprazole 5 milliGRAM(s) Oral daily  cholecalciferol 1000 Unit(s) Oral daily  dextrose 5%. 1000 milliLiter(s) (50 mL/Hr) IV Continuous <Continuous>  dextrose 50% Injectable 12.5 Gram(s) IV Push once  dextrose 50% Injectable 25 Gram(s) IV Push once  dextrose 50% Injectable 25 Gram(s) IV Push once  escitalopram 10 milliGRAM(s) Oral daily  heparin  flush 100 Units/mL Injectable 100 Unit(s) IV Push every other day  insulin lispro (HumaLOG) corrective regimen sliding scale   SubCutaneous Before meals and at bedtime  levothyroxine 150 MICROGram(s) Oral daily  medroxyPROGESTERone 10 milliGRAM(s) Oral daily    MEDICATIONS  (PRN):  acetaminophen   Tablet. 650 milliGRAM(s) Oral every 6 hours PRN Mild Pain (1 - 3)  acetaminophen   Tablet. 650 milliGRAM(s) Oral every 6 hours PRN Moderate Pain (4 - 6)  dextrose Gel 1 Dose(s) Oral once PRN Blood Glucose LESS THAN 70 milliGRAM(s)/deciliter  glucagon  Injectable 1 milliGRAM(s) IntraMuscular once PRN Glucose LESS THAN 70 milligrams/deciliter  QUEtiapine 12.5 milliGRAM(s) Oral at bedtime PRN Agitation and/or Insomnia  sodium chloride 0.65% Nasal 1 Spray(s) Both Nostrils every 4 hours PRN Nasal Congestion      ALLERGIES:  Allergies  No Known Allergies    Intolerances        LABS:                        7.7    11.0  )-----------( 196      ( 07 Apr 2018 23:46 )             24.7     04-07    143  |  104  |  35<H>  ----------------------------<  79  3.8   |  21<L>  |  1.92<H>    Ca    9.0      07 Apr 2018 11:04  Mg     1.9     04-07      CAPILLARY BLOOD GLUCOSE      POCT Blood Glucose.: 93 mg/dL (08 Apr 2018 21:12)      RADIOLOGY & ADDITIONAL TESTS: Reviewed. OVERNIGHT EVENTS: Three episodes of red streaking noted on kei when cleaning the patient after stooling. Unclear if source is vaginal or rectal. Eliquis dc'd. CBC stable. GI on board for possible scope. Patient pulling at lines overnight.      SUBJECTIVE / INTERVAL HPI: Patient seen and examined at bedside. She is complaining of pain in her belly at the site of her ventral wound vac. No tenderness to palpation, normal bowel sounds. Site of wound vac clean with no sign of infection. No further episodes of bleeding this morning. However, bed sheet show areas of stool incontinence. The stool is watery with the outer ring of the stain showing absorption of bright red blood. Patient did not tolerate her golytly. Refused rectal exam.      VITAL SIGNS:  Vital Signs Last 24 Hrs  T(C): 36.9 (09 Apr 2018 05:10), Max: 36.9 (08 Apr 2018 09:31)  T(F): 98.4 (09 Apr 2018 05:10), Max: 98.5 (08 Apr 2018 09:31)  HR: 87 (09 Apr 2018 05:10) (87 - 95)  BP: 161/72 (09 Apr 2018 05:10) (138/71 - 168/73)  BP(mean): --  RR: 19 (09 Apr 2018 05:10) (18 - 20)  SpO2: 95% (09 Apr 2018 05:10) (90% - 96%)    PHYSICAL EXAM:  General: WDWN, obese female, laying in bed, breathing comfortably on room air  HEENT: NC/AT; PERRL, anicteric sclera; MMM, L TLC in place (site clean/dry/intact), R permcath in place (placed 4/3, site clean/dry/intact)  Neck: supple, no jvd  Cardiovascular: +S1/S2, RRR, -m/g/r  Respiratory: clear breath sounds bilaterally  Gastrointestinal: soft, nontender to deep palpation, +BSx4 normal bowel sounds, central wound vac in place - no erythema or signs of infection. No bleeding around the rectum  : normal external female genitalia; no signs of further bleeding superficially on skin, refused rectal exam. Nicole in place with 30cc nonconcentrated urine  Extremities: WWP, clubbing or cyanosis, 2+ edema bilaterally in calves persists worse in L>R, bruising in areas of blood draws and peripheral lines  Vascular: 2+ radial, DP/PT pulses B/L  Neurological: AAOx3; no focal deficits    MEDICATIONS:  MEDICATIONS  (STANDING):  amLODIPine   Tablet 10 milliGRAM(s) Oral daily  ARIPiprazole 5 milliGRAM(s) Oral daily  cholecalciferol 1000 Unit(s) Oral daily  dextrose 5%. 1000 milliLiter(s) (50 mL/Hr) IV Continuous <Continuous>  dextrose 50% Injectable 12.5 Gram(s) IV Push once  dextrose 50% Injectable 25 Gram(s) IV Push once  dextrose 50% Injectable 25 Gram(s) IV Push once  escitalopram 10 milliGRAM(s) Oral daily  heparin  flush 100 Units/mL Injectable 100 Unit(s) IV Push every other day  insulin lispro (HumaLOG) corrective regimen sliding scale   SubCutaneous Before meals and at bedtime  levothyroxine 150 MICROGram(s) Oral daily  medroxyPROGESTERone 10 milliGRAM(s) Oral daily    MEDICATIONS  (PRN):  acetaminophen   Tablet. 650 milliGRAM(s) Oral every 6 hours PRN Mild Pain (1 - 3)  acetaminophen   Tablet. 650 milliGRAM(s) Oral every 6 hours PRN Moderate Pain (4 - 6)  dextrose Gel 1 Dose(s) Oral once PRN Blood Glucose LESS THAN 70 milliGRAM(s)/deciliter  glucagon  Injectable 1 milliGRAM(s) IntraMuscular once PRN Glucose LESS THAN 70 milligrams/deciliter  QUEtiapine 12.5 milliGRAM(s) Oral at bedtime PRN Agitation and/or Insomnia  sodium chloride 0.65% Nasal 1 Spray(s) Both Nostrils every 4 hours PRN Nasal Congestion      ALLERGIES:  Allergies  No Known Allergies    Intolerances        LABS:                        7.7    11.0  )-----------( 196      ( 07 Apr 2018 23:46 )             24.7     04-07    143  |  104  |  35<H>  ----------------------------<  79  3.8   |  21<L>  |  1.92<H>    Ca    9.0      07 Apr 2018 11:04  Mg     1.9     04-07      CAPILLARY BLOOD GLUCOSE      POCT Blood Glucose.: 93 mg/dL (08 Apr 2018 21:12)      RADIOLOGY & ADDITIONAL TESTS: Reviewed.

## 2018-04-09 NOTE — PROGRESS NOTE ADULT - PROBLEM SELECTOR PLAN 7
Previously on Lisinopril, atenolol, amlodipine.   Restarted amlodipine at home dose for SBP to 160's. Previously on Lisinopril, atenolol, amlodipine.   Restarted amlodipine at home dose for SBP to 160's. Will increase dose if SBP remains elevated.

## 2018-04-09 NOTE — PROGRESS NOTE BEHAVIORAL HEALTH - SUMMARY
78F h/o depression not currently in treatment, PMH DM, HLD, HTN, recent ventral hernia repair with subsequent admission for post-operative PE placed on Eliquis, presenting again 3/23 for abdominal pain and bloody stools, initially AAOx3 but increasingly more delirious 2/2 GONZALEZ, wound infection, and potential gynecological malignancy, initially refusing dialysis and gyn workup.  Delirium appears to be resolving and patient appears less depressed/labile but continues to demonstrate very poor understanding of her medical conditions.  Would continue Lexapro 10 mg PO daily and Abilify 5 mg PO daily for delirium and adjunctive treatment for depression.  Can continue Seroquel 12.5 mg PO HS as PRN order for agitation.

## 2018-04-09 NOTE — PROGRESS NOTE ADULT - PROBLEM SELECTOR PLAN 9
F:  Nicole in place; strict I/O  E:  replete PRN  N:  soft with renal restriction F:  Nicole in place; strict I/O - will Dc  E:  replete PRN  N:  soft with renal restriction

## 2018-04-09 NOTE — PROGRESS NOTE BEHAVIORAL HEALTH - NSBHFUPINTERVALHXFT_PSY_A_CORE
Patient seen at bedside.  Awake, alert, mostly answers questions with shrugs and facial expressions.  Denies awareness of any recent bleeding, including previous  bleeding which has been discussed extensively with patient, and evidence of recent potential bloody stool.  Asks about her brother, requests I call nephew.  Does not want to call nephew herself.

## 2018-04-09 NOTE — PROGRESS NOTE ADULT - SUBJECTIVE AND OBJECTIVE BOX
CC: SURGICAL WOUND INFECTION      INTERVAL HISTORY:  feeling better      ROS: No chest pain, no sob, no abd pain. No n/v/d    PAST MEDICAL & SURGICAL HISTORY:  Obesity  DM (diabetes mellitus)  HLD (hyperlipidemia)  HTN (hypertension)  H/O ventral hernia repair  No significant past surgical history  History of left inguinal hernia repair      PHYSICAL EXAM:  T(C): 36.9 (04-09-18 @ 05:10), Max: 36.9 (04-08-18 @ 15:05)  HR: 87 (04-09-18 @ 05:10)  BP: 161/72 (04-09-18 @ 05:10) (138/71 - 166/70)  RR: 19 (04-09-18 @ 05:10)  SpO2: 95% (04-09-18 @ 05:10)  Wt(kg): --  I&O's Summary    08 Apr 2018 07:01  -  09 Apr 2018 07:00  --------------------------------------------------------  IN: 0 mL / OUT: 750 mL / NET: -750 mL      Weight   General: ,  NAD.  HEENT: moist mucous membranes, no pallor/cyanosis.  Neck: no JVD visible.  Cardiac: S1, S2. RRR. No murmurs   Respratory: CTA b/l, no access muscle use.   Abdomen: soft. nontender. nondistended  Skin: no rashes.  Extremities: no LE edema b/l  Access:       DATA:                        7.1<L>  7.4   )-----------( 173      ( 09 Apr 2018 09:48 )             23.4<L>    Ferritin, Serum: 356 ng/mL <H> (03-29 @ 12:32)      150<H>  |  110<H>  |  38<H>  ----------------------------<  102<H>  Ca:9.1   (09 Apr 2018 09:48)  3.1<L>   |  27     |  1.45<H>      eGFR if Non : 34 <L>  eGFR if : 40 <L>    TPro  6.0    /  Alb  2.6<L>  /  TBili  1.0    /  DBili  x      /  AST  23     /  ALT  16     /  AlkPhos  68     09 Apr 2018 09:48      Vitamin D, 25-Hydroxy: 17.8 ng/mL <L> [30.0 - 80.0] (03-31 @ 20:39)        Protein/Creatinine Ratio Calculation: 3.1 Ratio <H> (03-28 @ 05:09)          MEDICATIONS  (STANDING):  amLODIPine   Tablet 10 milliGRAM(s) Oral daily  ARIPiprazole 5 milliGRAM(s) Oral daily  cholecalciferol 1000 Unit(s) Oral daily  dextrose 5%. 1000 milliLiter(s) (50 mL/Hr) IV Continuous <Continuous>  dextrose 50% Injectable 12.5 Gram(s) IV Push once  dextrose 50% Injectable 25 Gram(s) IV Push once  dextrose 50% Injectable 25 Gram(s) IV Push once  escitalopram 10 milliGRAM(s) Oral daily  heparin  flush 100 Units/mL Injectable 100 Unit(s) IV Push every other day  insulin lispro (HumaLOG) corrective regimen sliding scale   SubCutaneous Before meals and at bedtime  levothyroxine 150 MICROGram(s) Oral daily  magnesium sulfate  IVPB 1 Gram(s) IV Intermittent once  medroxyPROGESTERone 10 milliGRAM(s) Oral daily  potassium chloride  20 mEq/100 mL IVPB 20 milliEquivalent(s) IV Intermittent every 2 hours    MEDICATIONS  (PRN):  acetaminophen   Tablet. 650 milliGRAM(s) Oral every 6 hours PRN Mild Pain (1 - 3)  acetaminophen   Tablet. 650 milliGRAM(s) Oral every 6 hours PRN Moderate Pain (4 - 6)  dextrose Gel 1 Dose(s) Oral once PRN Blood Glucose LESS THAN 70 milliGRAM(s)/deciliter  glucagon  Injectable 1 milliGRAM(s) IntraMuscular once PRN Glucose LESS THAN 70 milligrams/deciliter  QUEtiapine 12.5 milliGRAM(s) Oral at bedtime PRN Agitation and/or Insomnia  sodium chloride 0.65% Nasal 1 Spray(s) Both Nostrils every 4 hours PRN Nasal Congestion

## 2018-04-09 NOTE — PROGRESS NOTE ADULT - PROBLEM SELECTOR PROBLEM 4
Acute blood loss anemia Other pulmonary embolism without acute cor pulmonale, unspecified chronicity

## 2018-04-09 NOTE — ADVANCED PRACTICE NURSE CONSULT - ASSESSMENT
78 year old female s/p ventral hernia repair, c/b surgical site infection s/p wound VAC placement, was on IV Zosyn, c/b GONZALEZ now on medicine service. Pt continues to be afebrile, VSS. WBC continuing to decrease. Wound at mid abdomen above umbilicus, measures 4x4.5x4 cm on outside, beneath skin wound measures approx 5 cm to the right and also to the left, 4 cm at 12 o'clock and approx 3.5 cm at 6 o'clock (undermining circumferentially). 2 layers of granufoam placed inside/beneath skin of wound but layers cut in half to make them thinner. Smaller piece placed on top and vac at 125 mm/Hg negative pressure. Pt was observed pulling off old dressing at start of visit.

## 2018-04-09 NOTE — PROGRESS NOTE ADULT - PROBLEM SELECTOR PLAN 2
-bilateral thrombi below the knees with LE edema greater in L>R  -IVC filter placed 3/30  -eliquis started, one episode of some new blood noted around vagina by nursing today, no repeat on monitoring throughout day  -Active T/S - next on 4/9 -New on this admission, no prior reported hx of renal disease.  Etiology ischemic ATN with component of AIN from ACE-I and Vancomycin. Per records, patient developed oligoanuria with worsening creatinine despite Lasix challenge.   -s/p HD catheter placement by IR on 3/29 with first session of HD on same day, tolerated well.  Replaced on 4/2. For removal today by IR.   - continue management per nephrology; remove permcath, dc lasix.  -Await IR removal of permcath later today  -continue vit D supplementation -New on this admission, no prior reported hx of renal disease.  Etiology ischemic ATN with component of AIN from ACE-I and Vancomycin. Per records, patient developed oligoanuria with worsening creatinine despite Lasix challenge.   -s/p HD catheter placement by IR on 3/29 with first session of HD on same day, tolerated well.  Replaced on 4/2. For removal today by IR.   - continue management per nephrology; remove permcath, dc lasix.  - IR removal of permcath later today  -continue vit D supplementation

## 2018-04-09 NOTE — PROGRESS NOTE ADULT - PROBLEM SELECTOR PLAN 1
resolving GONZALEZ  no further acute need for hemodialysis  scheduled for Permacath removal  please discontinue Niocle for voiding trial

## 2018-04-09 NOTE — PROGRESS NOTE ADULT - PROBLEM SELECTOR PLAN 1
-New on this admission, no prior reported hx of renal disease.  Etiology ischemic ATN with component of AIN from ACE-I and Vancomycin. Per records, patient developed oligoanuria with worsening creatinine despite Lasix challenge.   -s/p HD catheter placement by IR on 3/29 with first session of HD on same day, tolerated well.  Will receive HD cath replacement today 4/2 cap on HD cath unable to be removed  - continue management per nephrology; with improved creatinine clearance and Uop (550cc o/n) no need for ongoing dialysis, can remove permcath.  -Await IR removal of permcath on Monday  -dc lasix   -PPD placed on afternoon of 4/5, read this afternoon; no reaction  -continue vit D supplementation -Now with newly noted blood on kei when being cleaned by nursing staff overnight. Episode x3 of red tinge mixed with stool on kei - unclear if GI or  source.   -CBC stable overnight, awaiting AM labs,   -active T&S renewed 4/9  -Transfuse <7.  Trend CBC  -Previously active vaginal spotting possibly 2/2 GYN malignancy.  TVUS showed fibroid uterus with thickened endometrium. Received 2 units PRBCs.  -c/w medroxyprogesterone 10mg daily, and with outpt GYN follow up  -holding eliquis in context of new bleed (unclear Gi/)    #Intrabdominal Infection  leukocytosis resolved, no longer on antibiotics. No signs of infection at wound site. Wound vac in place. patient complains of intermittent pain around site but nontender on exam.  - wound vac intermittent management by Surgery Team 1C, changed 4/6   - consulted wound care team for additional maintenance -Now with newly noted blood on kei when being cleaned by nursing staff overnight. Episode x3 of red tinge mixed with stool on kei - unclear if GI or  source.   -CBC stable overnight, Hgb 7.1 this AM  -active T&S renewed 4/9  -Transfuse <7.  Trend CBC  -Previously active vaginal spotting possibly 2/2 GYN malignancy.  TVUS showed fibroid uterus with thickened endometrium. Received 2 units PRBCs.  -c/w medroxyprogesterone 10mg daily, and with outpt GYN follow up  -holding eliquis in context of new bleed (unclear Gi/)    #Intrabdominal Infection  leukocytosis resolved, no longer on antibiotics. No signs of infection at wound site. Wound vac in place. patient complains of intermittent pain around site but nontender on exam.  - wound vac intermittent management by Surgery Team 1C, changed 4/6   - consulted wound care team for additional maintenance

## 2018-04-09 NOTE — PROGRESS NOTE ADULT - PROBLEM SELECTOR PLAN 10
DVT PPx: IVC filter  Nicole: in place  Lines: R subclavian permcath changed 4/3, TLC in L IJ changed 4/2  Full code  Dispo: [facility]    FULL CODE DVT PPx: IVC filter  Nicole: in place -   Lines: R subclavian permcath changed 4/3, TLC in L IJ changed 4/2  Full code  Dispo: [facility]    FULL CODE DVT PPx: IVC filter  Nicole: in place -   Lines: R subclavian permcath changed 4/3 - for removal, TLC in L IJ changed 4/2  Full code  Dispo: [facility]    FULL CODE

## 2018-04-10 ENCOUNTER — APPOINTMENT (OUTPATIENT)
Dept: PULMONOLOGY | Facility: CLINIC | Age: 78
End: 2018-04-10

## 2018-04-10 DIAGNOSIS — E87.0 HYPEROSMOLALITY AND HYPERNATREMIA: ICD-10-CM

## 2018-04-10 LAB
ANION GAP SERPL CALC-SCNC: 11 MMOL/L — SIGNIFICANT CHANGE UP (ref 5–17)
ANION GAP SERPL CALC-SCNC: 14 MMOL/L — SIGNIFICANT CHANGE UP (ref 5–17)
BUN SERPL-MCNC: 34 MG/DL — HIGH (ref 7–23)
BUN SERPL-MCNC: 35 MG/DL — HIGH (ref 7–23)
CALCIUM SERPL-MCNC: 9.1 MG/DL — SIGNIFICANT CHANGE UP (ref 8.4–10.5)
CALCIUM SERPL-MCNC: 9.2 MG/DL — SIGNIFICANT CHANGE UP (ref 8.4–10.5)
CHLORIDE SERPL-SCNC: 110 MMOL/L — HIGH (ref 96–108)
CHLORIDE SERPL-SCNC: 111 MMOL/L — HIGH (ref 96–108)
CO2 SERPL-SCNC: 23 MMOL/L — SIGNIFICANT CHANGE UP (ref 22–31)
CO2 SERPL-SCNC: 29 MMOL/L — SIGNIFICANT CHANGE UP (ref 22–31)
CREAT SERPL-MCNC: 1.24 MG/DL — SIGNIFICANT CHANGE UP (ref 0.5–1.3)
CREAT SERPL-MCNC: 1.26 MG/DL — SIGNIFICANT CHANGE UP (ref 0.5–1.3)
GLUCOSE BLDC GLUCOMTR-MCNC: 129 MG/DL — HIGH (ref 70–99)
GLUCOSE BLDC GLUCOMTR-MCNC: 133 MG/DL — HIGH (ref 70–99)
GLUCOSE BLDC GLUCOMTR-MCNC: 155 MG/DL — HIGH (ref 70–99)
GLUCOSE BLDC GLUCOMTR-MCNC: 173 MG/DL — HIGH (ref 70–99)
GLUCOSE SERPL-MCNC: 129 MG/DL — HIGH (ref 70–99)
GLUCOSE SERPL-MCNC: 133 MG/DL — HIGH (ref 70–99)
HCT VFR BLD CALC: 24.2 % — LOW (ref 34.5–45)
HGB BLD-MCNC: 7.4 G/DL — LOW (ref 11.5–15.5)
MAGNESIUM SERPL-MCNC: 1.7 MG/DL — SIGNIFICANT CHANGE UP (ref 1.6–2.6)
MCHC RBC-ENTMCNC: 29 PG — SIGNIFICANT CHANGE UP (ref 27–34)
MCHC RBC-ENTMCNC: 30.6 G/DL — LOW (ref 32–36)
MCV RBC AUTO: 94.9 FL — SIGNIFICANT CHANGE UP (ref 80–100)
PLATELET # BLD AUTO: 155 K/UL — SIGNIFICANT CHANGE UP (ref 150–400)
POTASSIUM SERPL-MCNC: 3.6 MMOL/L — SIGNIFICANT CHANGE UP (ref 3.5–5.3)
POTASSIUM SERPL-MCNC: 3.6 MMOL/L — SIGNIFICANT CHANGE UP (ref 3.5–5.3)
POTASSIUM SERPL-SCNC: 3.6 MMOL/L — SIGNIFICANT CHANGE UP (ref 3.5–5.3)
POTASSIUM SERPL-SCNC: 3.6 MMOL/L — SIGNIFICANT CHANGE UP (ref 3.5–5.3)
RBC # BLD: 2.55 M/UL — LOW (ref 3.8–5.2)
RBC # FLD: 18.4 % — HIGH (ref 10.3–16.9)
SODIUM SERPL-SCNC: 147 MMOL/L — HIGH (ref 135–145)
SODIUM SERPL-SCNC: 151 MMOL/L — HIGH (ref 135–145)
SURGICAL PATHOLOGY STUDY: SIGNIFICANT CHANGE UP
WBC # BLD: 10.4 K/UL — SIGNIFICANT CHANGE UP (ref 3.8–10.5)
WBC # FLD AUTO: 10.4 K/UL — SIGNIFICANT CHANGE UP (ref 3.8–10.5)

## 2018-04-10 PROCEDURE — 99233 SBSQ HOSP IP/OBS HIGH 50: CPT | Mod: GC

## 2018-04-10 PROCEDURE — 36589 REMOVAL TUNNELED CV CATH: CPT

## 2018-04-10 RX ORDER — POTASSIUM CHLORIDE 20 MEQ
40 PACKET (EA) ORAL ONCE
Qty: 0 | Refills: 0 | Status: COMPLETED | OUTPATIENT
Start: 2018-04-10 | End: 2018-04-10

## 2018-04-10 RX ORDER — MAGNESIUM SULFATE 500 MG/ML
1 VIAL (ML) INJECTION ONCE
Qty: 0 | Refills: 0 | Status: COMPLETED | OUTPATIENT
Start: 2018-04-10 | End: 2018-04-10

## 2018-04-10 RX ORDER — SODIUM CHLORIDE 9 MG/ML
1000 INJECTION, SOLUTION INTRAVENOUS
Qty: 0 | Refills: 0 | Status: DISCONTINUED | OUTPATIENT
Start: 2018-04-10 | End: 2018-04-11

## 2018-04-10 RX ADMIN — Medication 650 MILLIGRAM(S): at 10:36

## 2018-04-10 RX ADMIN — AMLODIPINE BESYLATE 10 MILLIGRAM(S): 2.5 TABLET ORAL at 06:53

## 2018-04-10 RX ADMIN — Medication 100 GRAM(S): at 10:12

## 2018-04-10 RX ADMIN — Medication 40 MILLIEQUIVALENT(S): at 10:12

## 2018-04-10 RX ADMIN — Medication 150 MICROGRAM(S): at 06:52

## 2018-04-10 RX ADMIN — MEDROXYPROGESTERONE ACETATE 10 MILLIGRAM(S): 150 INJECTION, SUSPENSION, EXTENDED RELEASE INTRAMUSCULAR at 11:42

## 2018-04-10 RX ADMIN — Medication 1 SPRAY(S): at 06:53

## 2018-04-10 RX ADMIN — Medication 100 UNIT(S): at 11:42

## 2018-04-10 RX ADMIN — PANTOPRAZOLE SODIUM 40 MILLIGRAM(S): 20 TABLET, DELAYED RELEASE ORAL at 06:53

## 2018-04-10 RX ADMIN — ESCITALOPRAM OXALATE 10 MILLIGRAM(S): 10 TABLET, FILM COATED ORAL at 11:42

## 2018-04-10 RX ADMIN — Medication 1: at 12:52

## 2018-04-10 RX ADMIN — Medication 1: at 17:27

## 2018-04-10 RX ADMIN — ARIPIPRAZOLE 5 MILLIGRAM(S): 15 TABLET ORAL at 11:42

## 2018-04-10 RX ADMIN — Medication 1000 UNIT(S): at 11:42

## 2018-04-10 NOTE — PROGRESS NOTE ADULT - PROBLEM SELECTOR PLAN 3
-bilateral thrombi below the knees with LE edema greater in L>R  -IVC filter placed 3/30  -eliquis discontinued with new Gi bleed  -Active T/S from 4/9 -bilateral thrombi below the knees with LE edema greater in L>R  -IVC filter placed 3/30  -eliquis discontinued with Gi v.  bleed  -Active T/S from 4/9

## 2018-04-10 NOTE — PROGRESS NOTE ADULT - ASSESSMENT
78yr old F with PMHx significant for DMII, Dyslipidemia, HTN, sp ventral hernia repair 3/2/18 with Dr Fernandez now with wound vac over surgical site, post-op PE, and started on Eliquis, who was admitted for evaluation of abdominal pain and bleeding.     # GI bleed -   - likely bled from the areas of colitis seen on colonoscopy  - sp EGD 4/9/18 which showed gastropathy and gastric erosions and ulcers  - sp colonoscopy 4/9/19 - which showed some significant areas of colitis in descending colon to splenic flexure, unable to get through the splenic flexure, path suggesting focal active inflammation  - patient with possible uterine cancer - but does not want further Rx for this  - Hgb relatively stable  - PPI daily  - monitor serial abdominal exams      Discussed with attending Dr Kiran  GI following

## 2018-04-10 NOTE — PROGRESS NOTE ADULT - SUBJECTIVE AND OBJECTIVE BOX
SUBJECTIVE: Pt seen and examined at bedside. Colonoscopy with no overt bleeding but notable for erythema of descending colon. Pt continues to note no abdominal pain. +BM. VAC in place    Vital Signs Last 24 Hrs  T(C): 36.7 (10 Apr 2018 09:11), Max: 37.1 (10 Apr 2018 04:47)  T(F): 98.1 (10 Apr 2018 09:11), Max: 98.7 (10 Apr 2018 04:47)  HR: 90 (10 Apr 2018 09:11) (90 - 100)  BP: 149/70 (10 Apr 2018 09:11) (149/70 - 156/79)  BP(mean): --  RR: 18 (10 Apr 2018 09:11) (18 - 20)  SpO2: 96% (10 Apr 2018 09:11) (95% - 96%)    PHYSICAL EXAM    Gen: NAD  CV: RRR  Pulm: Regular non-labored respirations  Abd: Soft, NT/ND. Wound VAC in place to suction with good seal        LABS:                        7.4    10.4  )-----------( 155      ( 10 Apr 2018 07:00 )             24.2     04-10    151<H>  |  111<H>  |  34<H>  ----------------------------<  129<H>  3.6   |  29  |  1.26    Ca    9.2      10 Apr 2018 07:00  Phos  2.6     04-09  Mg     1.7     04-10    TPro  6.0  /  Alb  2.6<L>  /  TBili  1.0  /  DBili  x   /  AST  23  /  ALT  16  /  AlkPhos  68  04-09      ASSESSMENT AND PLAN  78 year old female s/p ventral hernia repair, c/b surgical site infection s/p wound VAC placement, was on IV Zosyn, c/b GONZALEZ now on medicine service. Pt continues to be afebrile, VSS. Given normal WBC and benign exam, would not start abx. Pt can continue diet and will continue to observe. Suspect the blood coming from known vaginal bleeding.  -No acute surgical intervention at this time  -VAC changes per wound care nursing team  -Further management per primary team  -D/W Dr. Ngo

## 2018-04-10 NOTE — PROGRESS NOTE ADULT - PROBLEM SELECTOR PLAN 1
-Now with newly noted blood on kei when being cleaned by nursing staff overnight. Episode x3 of red tinge mixed with stool on kei - unclear if GI or  source.   -CBC stable overnight, Hgb 7.1 this AM  -active T&S renewed 4/9  -Transfuse <7.  Trend CBC  -Previously active vaginal spotting possibly 2/2 GYN malignancy.  TVUS showed fibroid uterus with thickened endometrium. Received 2 units PRBCs.  -c/w medroxyprogesterone 10mg daily, and with outpt GYN follow up  -holding eliquis in context of new bleed (unclear Gi/)    #Ischemic colitis  Possibly from previous surgery or wound vac placement  visualized on colonoscopy 4/9  # episodes of repeat bleeding;       #Intrabdominal Infection  leukocytosis resolved, no longer on antibiotics. No signs of infection at wound site. Wound vac in place. patient complains of intermittent pain around site but nontender on exam.  - wound vac intermittent management by Surgery Team 1C, changed 4/6   - consulted wound care team for additional maintenance -Now with newly noted blood on kei when being cleaned by nursing staff overnight. Episode x3 of red tinge mixed with stool on kei - unclear if GI or  source.   -s/p colonoscopy on 4/9 visualizing gastritis and ischemic colitis  -CBC stable overnight, Hgb 7.1 this AM  -active T&S renewed 4/9  -Transfuse <7.  Trend CBC  -Previously active vaginal spotting possibly 2/2 GYN malignancy.  TVUS showed fibroid uterus with thickened endometrium. Received 2 units PRBCs.  -c/w medroxyprogesterone 10mg daily, and with outpt GYN follow up  -holding eliquis in context of new bleed (still unclear Gi/)    #Ischemic colitis  Possibly from previous surgery or wound vac placement  visualized on colonoscopy 4/9  2 more episodes of small amount of blood noted on kei when changing patient, still unclear if GI or  given known vaginal bleeding  Patient CBC stable in the 7s with active T&S     #Intrabdominal Infection  leukocytosis resolved, no longer on antibiotics. No signs of infection at wound site. Wound vac in place. patient complains of intermittent pain around site but nontender on exam.  - wound vac intermittent management by Surgery Team 1C, changed 4/6   - consulted wound care team for additional maintenance

## 2018-04-10 NOTE — PROGRESS NOTE ADULT - PROBLEM SELECTOR PLAN 10
DVT PPx: IVC filter    Lines: R subclavian permcath changed 4/3 - for removal today, TLC in L IJ changed 4/2  Full code  Dispo: [facility]    FULL CODE

## 2018-04-10 NOTE — PROGRESS NOTE ADULT - SUBJECTIVE AND OBJECTIVE BOX
Pt seen and examined at bedside  No new c/o  Denies n/v/d  Still reports some abdominal discomfort at site of wound vac      MEDICATIONS:  MEDICATIONS  (STANDING):  amLODIPine   Tablet 10 milliGRAM(s) Oral daily  ARIPiprazole 5 milliGRAM(s) Oral daily  cholecalciferol 1000 Unit(s) Oral daily  dextrose 5%. 1000 milliLiter(s) (50 mL/Hr) IV Continuous <Continuous>  dextrose 5%. 1000 milliLiter(s) (125 mL/Hr) IV Continuous <Continuous>  dextrose 50% Injectable 12.5 Gram(s) IV Push once  dextrose 50% Injectable 25 Gram(s) IV Push once  dextrose 50% Injectable 25 Gram(s) IV Push once  escitalopram 10 milliGRAM(s) Oral daily  heparin  flush 100 Units/mL Injectable 100 Unit(s) IV Push every other day  insulin lispro (HumaLOG) corrective regimen sliding scale   SubCutaneous Before meals and at bedtime  levothyroxine 150 MICROGram(s) Oral daily  medroxyPROGESTERone 10 milliGRAM(s) Oral daily  pantoprazole    Tablet 40 milliGRAM(s) Oral before breakfast    MEDICATIONS  (PRN):  acetaminophen   Tablet. 650 milliGRAM(s) Oral every 6 hours PRN Mild Pain (1 - 3)  acetaminophen   Tablet. 650 milliGRAM(s) Oral every 6 hours PRN Moderate Pain (4 - 6)  dextrose Gel 1 Dose(s) Oral once PRN Blood Glucose LESS THAN 70 milliGRAM(s)/deciliter  glucagon  Injectable 1 milliGRAM(s) IntraMuscular once PRN Glucose LESS THAN 70 milligrams/deciliter  QUEtiapine 12.5 milliGRAM(s) Oral at bedtime PRN Agitation and/or Insomnia  sodium chloride 0.65% Nasal 1 Spray(s) Both Nostrils every 4 hours PRN Nasal Congestion      Allergies  No Known Allergies    Intolerances      Vital Signs Last 24 Hrs  T(C): 36.8 (10 Apr 2018 15:52), Max: 37.1 (10 Apr 2018 04:47)  T(F): 98.2 (10 Apr 2018 15:52), Max: 98.7 (10 Apr 2018 04:47)  HR: 92 (10 Apr 2018 15:52) (90 - 100)  BP: 154/72 (10 Apr 2018 15:52) (149/70 - 156/79)  BP(mean): --  RR: 18 (10 Apr 2018 15:52) (18 - 20)  SpO2: 95% (10 Apr 2018 15:52) (95% - 96%)    04-09 @ 07:01  -  04-10 @ 07:00  --------------------------------------------------------  IN: 1640 mL / OUT: 200 mL / NET: 1440 mL    04-10 @ 07:01  -  04-10 @ 20:10  --------------------------------------------------------  IN: 1225 mL / OUT: 0 mL / NET: 1225 mL      PHYSICAL EXAM:  GEN - elderly F lying in bed in no distress, anicteric afebrile, not pale  Gastrointestinal: full, obese, soft, midline incision with wound vac over it, vague generalized tenderness, NR, NG, no masses or organs palpated  Rectal: patient refused  Extremities: trace pitting edema  Neurological: AAOx1-2 non focal  Skin: intact      LABS:  CBC Full  -  ( 10 Apr 2018 07:00 )  WBC Count : 10.4 K/uL  Hemoglobin : 7.4 g/dL  Hematocrit : 24.2 %  Platelet Count - Automated : 155 K/uL  Mean Cell Volume : 94.9 fL  Mean Cell Hemoglobin : 29.0 pg  Mean Cell Hemoglobin Concentration : 30.6 g/dL    147<H>  |  110<H>  |  35<H>  ----------------------------<  133<H>  3.6   |  23  |  1.24    Ca    9.1      10 Apr 2018 18:50  Phos  2.6     04-09  Mg     1.7     04-10    TPro  6.0  /  Alb  2.6<L>  /  TBili  1.0  /  DBili  x   /  AST  23  /  ALT  16  /  AlkPhos  68  04-09        RADIOLOGY & ADDITIONAL STUDIES (The following images were personally reviewed):  Surgical Pathology Report (04.09.18 @ 15:25)  1. Stoamch, antrum; biopsy:  - Mild chronic gastritis.  - H pylori stain pending.    2. Stoamch; biopsy:  - Mild chronicgastritis.    3. Colon, descending; biopsy:  - Colonic mucosa with focal active inflammation and prominent pigment laden macrophages in the lamina propria suggestive of melanosis coli.

## 2018-04-10 NOTE — PROGRESS NOTE ADULT - PROBLEM SELECTOR PLAN 4
CTA 3/12 with findings of PE within the segmental arteries of the right upper   lobe, right middle lobe, and lingula. Subsequent echocardiogram notable for RV dilatation, however deemed by pulmonology to be unlikely that clot burden caused dilatation. Trops neg x3. Provoked PE in the setting of recent abdominal surgery.   - breathing comfortably on room air   - encouraging incentive spirometry  - holding Eliquis in context of intermittent bleed (previously vaginal, now from GI source)  - IVC filter in place; placed 3/30

## 2018-04-10 NOTE — PROGRESS NOTE ADULT - PROBLEM SELECTOR PLAN 2
-New on this admission, no prior reported hx of renal disease.  Etiology ischemic ATN with component of AIN from ACE-I and Vancomycin. Per records, patient developed oligoanuria with worsening creatinine despite Lasix challenge.   -s/p HD catheter placement by IR on 3/29 with first session of HD on same day, tolerated well.  Replaced on 4/2. For removal today by IR.   - continue management per nephrology; remove permcath, dc lasix.  - IR removal of permcath today (not removed yesterday)  -continue vit D supplementation -New on this admission, no prior reported hx of renal disease.  Etiology ischemic ATN with component of AIN from ACE-I and Vancomycin. Per records, patient developed oligoanuria with worsening creatinine despite Lasix challenge.   -s/p HD catheter placement by IR on 3/29 with first session of HD on same day, tolerated well.  Replaced on 4/2. For removal today by IR.   - continue management per nephrology; remove permcath, dc lasix, remove suazo, TOV  -Awaiting pass TOV  - IR removal of permcath today (not removed yesterday)  -continue vit D supplementation    #Hypernatremia   To high of 151, previous lasix use - was mildly dry  FWD 800ccs  D5 at 125cc/hr  Trend with afternoon BMP

## 2018-04-10 NOTE — PROGRESS NOTE ADULT - SUBJECTIVE AND OBJECTIVE BOX
OVERNIGHT EVENTS:    SUBJECTIVE / INTERVAL HPI: Patient seen and examined at bedside.     VITAL SIGNS:  Vital Signs Last 24 Hrs  T(C): 37.1 (10 Apr 2018 04:47), Max: 37.1 (10 Apr 2018 04:47)  T(F): 98.7 (10 Apr 2018 04:47), Max: 98.7 (10 Apr 2018 04:47)  HR: 98 (10 Apr 2018 04:47) (94 - 100)  BP: 156/79 (10 Apr 2018 04:47) (152/77 - 170/76)  BP(mean): --  RR: 19 (10 Apr 2018 04:47) (19 - 20)  SpO2: 95% (10 Apr 2018 04:47) (95% - 97%)    PHYSICAL EXAM:    General: WDWN  HEENT: NC/AT; PERRL, anicteric sclera; MMM  Neck: supple  Cardiovascular: +S1/S2; RRR  Respiratory: CTA B/L; no W/R/R  Gastrointestinal: soft, NT/ND; +BSx4  Extremities: WWP; no edema, clubbing or cyanosis  Vascular: 2+ radial, DP/PT pulses B/L  Neurological: AAOx3; no focal deficits    MEDICATIONS:  MEDICATIONS  (STANDING):  amLODIPine   Tablet 10 milliGRAM(s) Oral daily  ARIPiprazole 5 milliGRAM(s) Oral daily  cholecalciferol 1000 Unit(s) Oral daily  dextrose 5%. 1000 milliLiter(s) (50 mL/Hr) IV Continuous <Continuous>  dextrose 5%. 1000 milliLiter(s) (100 mL/Hr) IV Continuous <Continuous>  dextrose 50% Injectable 12.5 Gram(s) IV Push once  dextrose 50% Injectable 25 Gram(s) IV Push once  dextrose 50% Injectable 25 Gram(s) IV Push once  escitalopram 10 milliGRAM(s) Oral daily  fluticasone propionate 50 MICROgram(s)/spray Nasal Spray 1 Spray(s) Both Nostrils two times a day  heparin  flush 100 Units/mL Injectable 100 Unit(s) IV Push every other day  insulin lispro (HumaLOG) corrective regimen sliding scale   SubCutaneous Before meals and at bedtime  levothyroxine 150 MICROGram(s) Oral daily  medroxyPROGESTERone 10 milliGRAM(s) Oral daily  pantoprazole    Tablet 40 milliGRAM(s) Oral before breakfast    MEDICATIONS  (PRN):  acetaminophen   Tablet. 650 milliGRAM(s) Oral every 6 hours PRN Mild Pain (1 - 3)  acetaminophen   Tablet. 650 milliGRAM(s) Oral every 6 hours PRN Moderate Pain (4 - 6)  dextrose Gel 1 Dose(s) Oral once PRN Blood Glucose LESS THAN 70 milliGRAM(s)/deciliter  glucagon  Injectable 1 milliGRAM(s) IntraMuscular once PRN Glucose LESS THAN 70 milligrams/deciliter  QUEtiapine 12.5 milliGRAM(s) Oral at bedtime PRN Agitation and/or Insomnia  sodium chloride 0.65% Nasal 1 Spray(s) Both Nostrils every 4 hours PRN Nasal Congestion      ALLERGIES:  Allergies    No Known Allergies    Intolerances        LABS:                        7.1    7.4   )-----------( 173      ( 09 Apr 2018 09:48 )             23.4     04-09    150<H>  |  109<H>  |  39<H>  ----------------------------<  106<H>  2.9<LL>   |  26  |  1.33<H>    Ca    9.3      09 Apr 2018 19:09  Phos  2.6     04-09  Mg     1.7     04-09    TPro  6.0  /  Alb  2.6<L>  /  TBili  1.0  /  DBili  x   /  AST  23  /  ALT  16  /  AlkPhos  68  04-09        CAPILLARY BLOOD GLUCOSE      POCT Blood Glucose.: 187 mg/dL (09 Apr 2018 21:37)          RADIOLOGY & ADDITIONAL TESTS: Reviewed. OVERNIGHT EVENTS: Two episodes of mild blood noted on kei when nursing staff changing patient. Bladder scan at [facility] showed 219cc in bladder.     SUBJECTIVE / INTERVAL HPI: Patient seen and examined at bedside. Patient docile on exam this morning. No complaints. Denies abdominal pain this AM and is nontender to deep palpation. Not yet passed TOV, suspect patient may be incontinent, not voiding all at once.     VITAL SIGNS:  Vital Signs Last 24 Hrs  T(C): 37.1 (10 Apr 2018 04:47), Max: 37.1 (10 Apr 2018 04:47)  T(F): 98.7 (10 Apr 2018 04:47), Max: 98.7 (10 Apr 2018 04:47)  HR: 98 (10 Apr 2018 04:47) (94 - 100)  BP: 156/79 (10 Apr 2018 04:47) (152/77 - 170/76)  BP(mean): --  RR: 19 (10 Apr 2018 04:47) (19 - 20)  SpO2: 95% (10 Apr 2018 04:47) (95% - 97%)    PHYSICAL EXAM:  General: WDWN, obese female, laying flat in bed, breathing comfortably on room air  HEENT: NC/AT; PERRL, anicteric sclera; MMM, L TLC in place (site clean/dry/intact), R permcath remains in place (placed 4/3, site clean/dry/intact)  Neck: supple, no jvd  Cardiovascular: +S1/S2, RRR, -m/g/r  Respiratory: clear breath sounds bilaterally  Gastrointestinal: soft, nontender to deep palpation, +BSx4 normal bowel sounds, central wound vac in place - no erythema or signs of infection. No bleeding around the rectum  : normal external female genitalia; no signs of further bleeding superficially on skin.   Extremities: WWP, clubbing or cyanosis, 2+ edema bilaterally in calves persists worse in L>R, bruising in areas of blood draws and peripheral lines  Vascular: 2+ radial, DP/PT pulses B/L  Neurological: AAOx3; no focal deficits    MEDICATIONS:  MEDICATIONS  (STANDING):  amLODIPine   Tablet 10 milliGRAM(s) Oral daily  ARIPiprazole 5 milliGRAM(s) Oral daily  cholecalciferol 1000 Unit(s) Oral daily  dextrose 5%. 1000 milliLiter(s) (50 mL/Hr) IV Continuous <Continuous>  dextrose 5%. 1000 milliLiter(s) (100 mL/Hr) IV Continuous <Continuous>  dextrose 50% Injectable 12.5 Gram(s) IV Push once  dextrose 50% Injectable 25 Gram(s) IV Push once  dextrose 50% Injectable 25 Gram(s) IV Push once  escitalopram 10 milliGRAM(s) Oral daily  fluticasone propionate 50 MICROgram(s)/spray Nasal Spray 1 Spray(s) Both Nostrils two times a day  heparin  flush 100 Units/mL Injectable 100 Unit(s) IV Push every other day  insulin lispro (HumaLOG) corrective regimen sliding scale   SubCutaneous Before meals and at bedtime  levothyroxine 150 MICROGram(s) Oral daily  medroxyPROGESTERone 10 milliGRAM(s) Oral daily  pantoprazole    Tablet 40 milliGRAM(s) Oral before breakfast    MEDICATIONS  (PRN):  acetaminophen   Tablet. 650 milliGRAM(s) Oral every 6 hours PRN Mild Pain (1 - 3)  acetaminophen   Tablet. 650 milliGRAM(s) Oral every 6 hours PRN Moderate Pain (4 - 6)  dextrose Gel 1 Dose(s) Oral once PRN Blood Glucose LESS THAN 70 milliGRAM(s)/deciliter  glucagon  Injectable 1 milliGRAM(s) IntraMuscular once PRN Glucose LESS THAN 70 milligrams/deciliter  QUEtiapine 12.5 milliGRAM(s) Oral at bedtime PRN Agitation and/or Insomnia  sodium chloride 0.65% Nasal 1 Spray(s) Both Nostrils every 4 hours PRN Nasal Congestion      ALLERGIES:  Allergies    No Known Allergies    Intolerances    LABS:                        7.1    7.4   )-----------( 173      ( 09 Apr 2018 09:48 )             23.4     04-09    150<H>  |  109<H>  |  39<H>  ----------------------------<  106<H>  2.9<LL>   |  26  |  1.33<H>    Ca    9.3      09 Apr 2018 19:09  Phos  2.6     04-09  Mg     1.7     04-09    TPro  6.0  /  Alb  2.6<L>  /  TBili  1.0  /  DBili  x   /  AST  23  /  ALT  16  /  AlkPhos  68  04-09        CAPILLARY BLOOD GLUCOSE  POCT Blood Glucose.: 187 mg/dL (09 Apr 2018 21:37)        RADIOLOGY & ADDITIONAL TESTS: Reviewed.

## 2018-04-10 NOTE — PROGRESS NOTE ADULT - ASSESSMENT
78F with PMHx of HTN, DM, HLD, hypothyroidism admitted for management of surgical wound infection from robotically assisted hernia repair on previous admission.

## 2018-04-10 NOTE — PROGRESS NOTE ADULT - PROBLEM SELECTOR PLAN 9
F:  D5 at 100cc/hr   E:  replete PRN  N:  soft with renal restriction F:  D5 at 125cc/hr   E:  replete PRN  N:  soft with renal restriction

## 2018-04-10 NOTE — PROGRESS NOTE ADULT - PROBLEM SELECTOR PLAN 5
- Resolved with dialysis.    - Interval development of bilateral pleural effusions on CXR from 3/28 compared to that of 3/23. Likely 2/2 fluid overload in the setting of renal failure and oliguria. Patient currently without complaints of SOB, no increased oxygen requirements (on NC2L, RR 18).   -encouraging incentive spirometry

## 2018-04-10 NOTE — PROGRESS NOTE ADULT - PROBLEM SELECTOR PLAN 7
Previously on Lisinopril, atenolol, amlodipine.   Restarted amlodipine at home dose for SBP to 160's. Will increase dose if SBP remains elevated.

## 2018-04-10 NOTE — PROGRESS NOTE ADULT - SUBJECTIVE AND OBJECTIVE BOX
CC: SURGICAL WOUND INFECTION      INTERVAL HISTORY:  lying in bed in NAD      ROS: No chest pain, no sob, no abd pain. No n/v/d    PAST MEDICAL & SURGICAL HISTORY:  Obesity  DM (diabetes mellitus)  HLD (hyperlipidemia)  HTN (hypertension)  H/O ventral hernia repair  No significant past surgical history  History of left inguinal hernia repair      PHYSICAL EXAM:  T(C): 36.7 (04-10-18 @ 09:11), Max: 37.1 (04-10-18 @ 04:47)  HR: 90 (04-10-18 @ 09:11)  BP: 149/70 (04-10-18 @ 09:11) (149/70 - 170/76)  RR: 18 (04-10-18 @ 09:11)  SpO2: 96% (04-10-18 @ 09:11)  Wt(kg): --  I&O's Summary    09 Apr 2018 07:01  -  10 Apr 2018 07:00  --------------------------------------------------------  IN: 1640 mL / OUT: 200 mL / NET: 1440 mL      Weight   General:  NAD.  HEENT: moist mucous membranes, no pallor/cyanosis.  Neck: no JVD visible.  Cardiac: S1, S2. RRR. No murmurs   Respratory: CTA b/l, no access muscle use.   Abdomen: soft. nontender. nondistended  Skin: no rashes.  Extremities: no LE edema b/l  Access:       DATA:                        7.4<L>  10.4  )-----------( 155      ( 10 Apr 2018 07:00 )             24.2<L>    Ferritin, Serum: 356 ng/mL <H> (03-29 @ 12:32)      151<H>  |  111<H>  |  34<H>  ----------------------------<  129<H>  Ca:9.2   (10 Apr 2018 07:00)  3.6     |  29     |  1.26       eGFR if Non : 41 <L>  eGFR if : 47 <L>    TPro  6.0    /  Alb  2.6<L>  /  TBili  1.0    /  DBili  x      /  AST  23     /  ALT  16     /  AlkPhos  68     09 Apr 2018 09:48      Vitamin D, 25-Hydroxy: 17.8 ng/mL <L> [30.0 - 80.0] (03-31 @ 20:39)        Protein/Creatinine Ratio Calculation: 3.1 Ratio <H> (03-28 @ 05:09)          MEDICATIONS  (STANDING):  amLODIPine   Tablet 10 milliGRAM(s) Oral daily  ARIPiprazole 5 milliGRAM(s) Oral daily  cholecalciferol 1000 Unit(s) Oral daily  dextrose 5%. 1000 milliLiter(s) (50 mL/Hr) IV Continuous <Continuous>  dextrose 5%. 1000 milliLiter(s) (125 mL/Hr) IV Continuous <Continuous>  dextrose 50% Injectable 12.5 Gram(s) IV Push once  dextrose 50% Injectable 25 Gram(s) IV Push once  dextrose 50% Injectable 25 Gram(s) IV Push once  escitalopram 10 milliGRAM(s) Oral daily  heparin  flush 100 Units/mL Injectable 100 Unit(s) IV Push every other day  insulin lispro (HumaLOG) corrective regimen sliding scale   SubCutaneous Before meals and at bedtime  levothyroxine 150 MICROGram(s) Oral daily  medroxyPROGESTERone 10 milliGRAM(s) Oral daily  pantoprazole    Tablet 40 milliGRAM(s) Oral before breakfast    MEDICATIONS  (PRN):  acetaminophen   Tablet. 650 milliGRAM(s) Oral every 6 hours PRN Mild Pain (1 - 3)  acetaminophen   Tablet. 650 milliGRAM(s) Oral every 6 hours PRN Moderate Pain (4 - 6)  dextrose Gel 1 Dose(s) Oral once PRN Blood Glucose LESS THAN 70 milliGRAM(s)/deciliter  glucagon  Injectable 1 milliGRAM(s) IntraMuscular once PRN Glucose LESS THAN 70 milligrams/deciliter  QUEtiapine 12.5 milliGRAM(s) Oral at bedtime PRN Agitation and/or Insomnia  sodium chloride 0.65% Nasal 1 Spray(s) Both Nostrils every 4 hours PRN Nasal Congestion

## 2018-04-10 NOTE — PROGRESS NOTE ADULT - PROBLEM SELECTOR PLAN 6
On Glipizide at home.   -continue to hold oral antihyperglycemics   -continue ISS, FSG.    #Delirium/Depression  -followed by psychiatry service: Delirium appears to be resolving although patient continues to report depression.   -continue Lexapro 10 mg PO daily   -continue Abilify 5 mg PO daily    -continue Seroquel 12.5 mg PO HS as PRN order for agitation, QTc 426 on EKG On Glipizide at home.   -continue to hold oral antihyperglycemics   -continue ISS, FSG.    #Delirium/Depression  -followed by psychiatry service: Delirium appears to be resolving although patient continues to report depression.   -continue Lexapro 10 mg PO daily   -continue Abilify 5 mg PO daily    -continue Seroquel 12.5 mg PO HS as PRN order for agitation, QTc 426 on EKG  -Psych to recommend home care/adult services in outpatient setting

## 2018-04-10 NOTE — PROGRESS NOTE ADULT - ASSESSMENT
78F with PMHx of HTN, DM, HLD, hypothyroidism admitted for management of surgical wound infection from robotically assisted hernia repair on previous admission. Course complicated by blood loss anemia (now resolved), with patient s/p intermittent HD for renal failure, with improving kidney function. New episode of bleeding, unclear if source GI or . Pending scope. 78F with PMHx of HTN, DM, HLD, hypothyroidism admitted for management of surgical wound infection from robotically assisted hernia repair on previous admission. Course complicated by blood loss anemia (now resolved), with patient s/p intermittent HD for renal failure, with improving kidney function. New episode of bleeding, unclear if source GI or . Colonoscopy indicative of mild ischemic colitis.

## 2018-04-11 VITALS
HEART RATE: 98 BPM | TEMPERATURE: 98 F | DIASTOLIC BLOOD PRESSURE: 72 MMHG | SYSTOLIC BLOOD PRESSURE: 148 MMHG | OXYGEN SATURATION: 98 % | RESPIRATION RATE: 17 BRPM

## 2018-04-11 DIAGNOSIS — D72.829 ELEVATED WHITE BLOOD CELL COUNT, UNSPECIFIED: ICD-10-CM

## 2018-04-11 DIAGNOSIS — N17.0 ACUTE KIDNEY FAILURE WITH TUBULAR NECROSIS: ICD-10-CM

## 2018-04-11 DIAGNOSIS — F32.9 MAJOR DEPRESSIVE DISORDER, SINGLE EPISODE, UNSPECIFIED: ICD-10-CM

## 2018-04-11 LAB
ANION GAP SERPL CALC-SCNC: 13 MMOL/L — SIGNIFICANT CHANGE UP (ref 5–17)
BUN SERPL-MCNC: 32 MG/DL — HIGH (ref 7–23)
CALCIUM SERPL-MCNC: 9.3 MG/DL — SIGNIFICANT CHANGE UP (ref 8.4–10.5)
CHLORIDE SERPL-SCNC: 110 MMOL/L — HIGH (ref 96–108)
CO2 SERPL-SCNC: 25 MMOL/L — SIGNIFICANT CHANGE UP (ref 22–31)
CREAT SERPL-MCNC: 1.33 MG/DL — HIGH (ref 0.5–1.3)
GLUCOSE BLDC GLUCOMTR-MCNC: 121 MG/DL — HIGH (ref 70–99)
GLUCOSE BLDC GLUCOMTR-MCNC: 159 MG/DL — HIGH (ref 70–99)
GLUCOSE SERPL-MCNC: 118 MG/DL — HIGH (ref 70–99)
HCT VFR BLD CALC: 25.5 % — LOW (ref 34.5–45)
HGB BLD-MCNC: 7.6 G/DL — LOW (ref 11.5–15.5)
MAGNESIUM SERPL-MCNC: 1.8 MG/DL — SIGNIFICANT CHANGE UP (ref 1.6–2.6)
MCHC RBC-ENTMCNC: 28.4 PG — SIGNIFICANT CHANGE UP (ref 27–34)
MCHC RBC-ENTMCNC: 29.8 G/DL — LOW (ref 32–36)
MCV RBC AUTO: 95.1 FL — SIGNIFICANT CHANGE UP (ref 80–100)
PLATELET # BLD AUTO: 128 K/UL — LOW (ref 150–400)
POTASSIUM SERPL-MCNC: 3.7 MMOL/L — SIGNIFICANT CHANGE UP (ref 3.5–5.3)
POTASSIUM SERPL-SCNC: 3.7 MMOL/L — SIGNIFICANT CHANGE UP (ref 3.5–5.3)
RBC # BLD: 2.68 M/UL — LOW (ref 3.8–5.2)
RBC # FLD: 19.2 % — HIGH (ref 10.3–16.9)
SODIUM SERPL-SCNC: 148 MMOL/L — HIGH (ref 135–145)
WBC # BLD: 13.9 K/UL — HIGH (ref 3.8–10.5)
WBC # FLD AUTO: 13.9 K/UL — HIGH (ref 3.8–10.5)

## 2018-04-11 PROCEDURE — 82553 CREATINE MB FRACTION: CPT

## 2018-04-11 PROCEDURE — 86706 HEP B SURFACE ANTIBODY: CPT

## 2018-04-11 PROCEDURE — 71045 X-RAY EXAM CHEST 1 VIEW: CPT

## 2018-04-11 PROCEDURE — 86334 IMMUNOFIX E-PHORESIS SERUM: CPT

## 2018-04-11 PROCEDURE — 82746 ASSAY OF FOLIC ACID SERUM: CPT

## 2018-04-11 PROCEDURE — 86160 COMPLEMENT ANTIGEN: CPT

## 2018-04-11 PROCEDURE — 83521 IG LIGHT CHAINS FREE EACH: CPT

## 2018-04-11 PROCEDURE — 80048 BASIC METABOLIC PNL TOTAL CA: CPT

## 2018-04-11 PROCEDURE — 82550 ASSAY OF CK (CPK): CPT

## 2018-04-11 PROCEDURE — 86850 RBC ANTIBODY SCREEN: CPT

## 2018-04-11 PROCEDURE — 83615 LACTATE (LD) (LDH) ENZYME: CPT

## 2018-04-11 PROCEDURE — 80074 ACUTE HEPATITIS PANEL: CPT

## 2018-04-11 PROCEDURE — 82728 ASSAY OF FERRITIN: CPT

## 2018-04-11 PROCEDURE — 93970 EXTREMITY STUDY: CPT

## 2018-04-11 PROCEDURE — 80053 COMPREHEN METABOLIC PANEL: CPT

## 2018-04-11 PROCEDURE — 84133 ASSAY OF URINE POTASSIUM: CPT

## 2018-04-11 PROCEDURE — 96374 THER/PROPH/DIAG INJ IV PUSH: CPT

## 2018-04-11 PROCEDURE — 86780 TREPONEMA PALLIDUM: CPT

## 2018-04-11 PROCEDURE — 82340 ASSAY OF CALCIUM IN URINE: CPT

## 2018-04-11 PROCEDURE — 83935 ASSAY OF URINE OSMOLALITY: CPT

## 2018-04-11 PROCEDURE — 97161 PT EVAL LOW COMPLEX 20 MIN: CPT

## 2018-04-11 PROCEDURE — 84484 ASSAY OF TROPONIN QUANT: CPT

## 2018-04-11 PROCEDURE — 85027 COMPLETE CBC AUTOMATED: CPT

## 2018-04-11 PROCEDURE — 77001 FLUOROGUIDE FOR VEIN DEVICE: CPT

## 2018-04-11 PROCEDURE — 87040 BLOOD CULTURE FOR BACTERIA: CPT

## 2018-04-11 PROCEDURE — 84155 ASSAY OF PROTEIN SERUM: CPT

## 2018-04-11 PROCEDURE — 94640 AIRWAY INHALATION TREATMENT: CPT

## 2018-04-11 PROCEDURE — 82010 KETONE BODYS QUAN: CPT

## 2018-04-11 PROCEDURE — 99233 SBSQ HOSP IP/OBS HIGH 50: CPT

## 2018-04-11 PROCEDURE — 74176 CT ABD & PELVIS W/O CONTRAST: CPT

## 2018-04-11 PROCEDURE — 85610 PROTHROMBIN TIME: CPT

## 2018-04-11 PROCEDURE — 36589 REMOVAL TUNNELED CV CATH: CPT

## 2018-04-11 PROCEDURE — 76770 US EXAM ABDO BACK WALL COMP: CPT

## 2018-04-11 PROCEDURE — 86923 COMPATIBILITY TEST ELECTRIC: CPT

## 2018-04-11 PROCEDURE — C1750: CPT

## 2018-04-11 PROCEDURE — P9047: CPT

## 2018-04-11 PROCEDURE — 74176 CT ABD & PELVIS W/O CONTRAST: CPT | Mod: 26

## 2018-04-11 PROCEDURE — 82306 VITAMIN D 25 HYDROXY: CPT

## 2018-04-11 PROCEDURE — 84165 PROTEIN E-PHORESIS SERUM: CPT

## 2018-04-11 PROCEDURE — 82962 GLUCOSE BLOOD TEST: CPT

## 2018-04-11 PROCEDURE — 97116 GAIT TRAINING THERAPY: CPT

## 2018-04-11 PROCEDURE — 83735 ASSAY OF MAGNESIUM: CPT

## 2018-04-11 PROCEDURE — P9045: CPT

## 2018-04-11 PROCEDURE — 82607 VITAMIN B-12: CPT

## 2018-04-11 PROCEDURE — 85045 AUTOMATED RETICULOCYTE COUNT: CPT

## 2018-04-11 PROCEDURE — 85025 COMPLETE CBC W/AUTO DIFF WBC: CPT

## 2018-04-11 PROCEDURE — C1769: CPT

## 2018-04-11 PROCEDURE — 84443 ASSAY THYROID STIM HORMONE: CPT

## 2018-04-11 PROCEDURE — 76937 US GUIDE VASCULAR ACCESS: CPT

## 2018-04-11 PROCEDURE — 36430 TRANSFUSION BLD/BLD COMPNT: CPT

## 2018-04-11 PROCEDURE — 99239 HOSP IP/OBS DSCHRG MGMT >30: CPT

## 2018-04-11 PROCEDURE — 83550 IRON BINDING TEST: CPT

## 2018-04-11 PROCEDURE — 76856 US EXAM PELVIC COMPLETE: CPT

## 2018-04-11 PROCEDURE — 36558 INSERT TUNNELED CV CATH: CPT

## 2018-04-11 PROCEDURE — 81001 URINALYSIS AUTO W/SCOPE: CPT

## 2018-04-11 PROCEDURE — 36415 COLL VENOUS BLD VENIPUNCTURE: CPT

## 2018-04-11 PROCEDURE — 36580 REPLACE CVAD CATH: CPT

## 2018-04-11 PROCEDURE — 82533 TOTAL CORTISOL: CPT

## 2018-04-11 PROCEDURE — 84100 ASSAY OF PHOSPHORUS: CPT

## 2018-04-11 PROCEDURE — 96375 TX/PRO/DX INJ NEW DRUG ADDON: CPT

## 2018-04-11 PROCEDURE — 86901 BLOOD TYPING SEROLOGIC RH(D): CPT

## 2018-04-11 PROCEDURE — 86900 BLOOD TYPING SEROLOGIC ABO: CPT

## 2018-04-11 PROCEDURE — 82436 ASSAY OF URINE CHLORIDE: CPT

## 2018-04-11 PROCEDURE — 99152 MOD SED SAME PHYS/QHP 5/>YRS: CPT

## 2018-04-11 PROCEDURE — 88305 TISSUE EXAM BY PATHOLOGIST: CPT

## 2018-04-11 PROCEDURE — 83605 ASSAY OF LACTIC ACID: CPT

## 2018-04-11 PROCEDURE — 84156 ASSAY OF PROTEIN URINE: CPT

## 2018-04-11 PROCEDURE — 88341 IMHCHEM/IMCYTCHM EA ADD ANTB: CPT

## 2018-04-11 PROCEDURE — 84300 ASSAY OF URINE SODIUM: CPT

## 2018-04-11 PROCEDURE — C1894: CPT

## 2018-04-11 PROCEDURE — 87449 NOS EACH ORGANISM AG IA: CPT

## 2018-04-11 PROCEDURE — 99285 EMERGENCY DEPT VISIT HI MDM: CPT | Mod: 25

## 2018-04-11 PROCEDURE — 97530 THERAPEUTIC ACTIVITIES: CPT

## 2018-04-11 PROCEDURE — 85730 THROMBOPLASTIN TIME PARTIAL: CPT

## 2018-04-11 PROCEDURE — 87324 CLOSTRIDIUM AG IA: CPT

## 2018-04-11 PROCEDURE — 93005 ELECTROCARDIOGRAM TRACING: CPT

## 2018-04-11 PROCEDURE — C1880: CPT

## 2018-04-11 PROCEDURE — 97110 THERAPEUTIC EXERCISES: CPT

## 2018-04-11 PROCEDURE — 86038 ANTINUCLEAR ANTIBODIES: CPT

## 2018-04-11 PROCEDURE — P9016: CPT

## 2018-04-11 PROCEDURE — 90935 HEMODIALYSIS ONE EVALUATION: CPT

## 2018-04-11 RX ORDER — ACETAMINOPHEN 500 MG
2 TABLET ORAL
Qty: 0 | Refills: 0 | COMMUNITY
Start: 2018-04-11

## 2018-04-11 RX ORDER — LEVOTHYROXINE SODIUM 125 MCG
1 TABLET ORAL
Qty: 0 | Refills: 0 | COMMUNITY

## 2018-04-11 RX ORDER — SODIUM CHLORIDE 9 MG/ML
1000 INJECTION, SOLUTION INTRAVENOUS
Qty: 0 | Refills: 0 | Status: DISCONTINUED | OUTPATIENT
Start: 2018-04-11 | End: 2018-04-11

## 2018-04-11 RX ORDER — ESCITALOPRAM OXALATE 10 MG/1
1 TABLET, FILM COATED ORAL
Qty: 0 | Refills: 0 | COMMUNITY
Start: 2018-04-11

## 2018-04-11 RX ORDER — LEVOTHYROXINE SODIUM 125 MCG
1 TABLET ORAL
Qty: 0 | Refills: 0 | COMMUNITY
Start: 2018-04-11

## 2018-04-11 RX ORDER — ALBUMIN HUMAN 25 %
250 VIAL (ML) INTRAVENOUS ONCE
Qty: 0 | Refills: 0 | Status: COMPLETED | OUTPATIENT
Start: 2018-04-11 | End: 2018-04-11

## 2018-04-11 RX ORDER — PANTOPRAZOLE SODIUM 20 MG/1
1 TABLET, DELAYED RELEASE ORAL
Qty: 0 | Refills: 0 | COMMUNITY
Start: 2018-04-11

## 2018-04-11 RX ORDER — QUETIAPINE FUMARATE 200 MG/1
0 TABLET, FILM COATED ORAL
Qty: 0 | Refills: 0 | COMMUNITY
Start: 2018-04-11

## 2018-04-11 RX ORDER — POTASSIUM CHLORIDE 20 MEQ
20 PACKET (EA) ORAL DAILY
Qty: 0 | Refills: 0 | Status: DISCONTINUED | OUTPATIENT
Start: 2018-04-11 | End: 2018-04-11

## 2018-04-11 RX ORDER — ATENOLOL 25 MG/1
1 TABLET ORAL
Qty: 0 | Refills: 0 | COMMUNITY

## 2018-04-11 RX ORDER — MAGNESIUM SULFATE 500 MG/ML
1 VIAL (ML) INJECTION ONCE
Qty: 0 | Refills: 0 | Status: COMPLETED | OUTPATIENT
Start: 2018-04-11 | End: 2018-04-11

## 2018-04-11 RX ORDER — MEDROXYPROGESTERONE ACETATE 150 MG/ML
1 INJECTION, SUSPENSION, EXTENDED RELEASE INTRAMUSCULAR
Qty: 0 | Refills: 0 | COMMUNITY
Start: 2018-04-11

## 2018-04-11 RX ORDER — DIATRIZOATE MEGLUMINE 180 MG/ML
30 INJECTION, SOLUTION INTRAVESICAL ONCE
Qty: 0 | Refills: 0 | Status: COMPLETED | OUTPATIENT
Start: 2018-04-11 | End: 2018-04-11

## 2018-04-11 RX ORDER — LISINOPRIL 2.5 MG/1
1 TABLET ORAL
Qty: 0 | Refills: 0 | COMMUNITY

## 2018-04-11 RX ORDER — ARIPIPRAZOLE 15 MG/1
1 TABLET ORAL
Qty: 0 | Refills: 0 | COMMUNITY
Start: 2018-04-11

## 2018-04-11 RX ORDER — SODIUM CHLORIDE 0.65 %
2 AEROSOL, SPRAY (ML) NASAL
Qty: 0 | Refills: 0 | COMMUNITY
Start: 2018-04-11

## 2018-04-11 RX ORDER — CHOLECALCIFEROL (VITAMIN D3) 125 MCG
1000 CAPSULE ORAL
Qty: 0 | Refills: 0 | COMMUNITY
Start: 2018-04-11

## 2018-04-11 RX ORDER — AMLODIPINE BESYLATE 2.5 MG/1
1 TABLET ORAL
Qty: 0 | Refills: 0 | COMMUNITY
Start: 2018-04-11

## 2018-04-11 RX ADMIN — AMLODIPINE BESYLATE 10 MILLIGRAM(S): 2.5 TABLET ORAL at 05:12

## 2018-04-11 RX ADMIN — DIATRIZOATE MEGLUMINE 30 MILLILITER(S): 180 INJECTION, SOLUTION INTRAVESICAL at 12:58

## 2018-04-11 RX ADMIN — PANTOPRAZOLE SODIUM 40 MILLIGRAM(S): 20 TABLET, DELAYED RELEASE ORAL at 05:12

## 2018-04-11 RX ADMIN — Medication 150 MICROGRAM(S): at 05:12

## 2018-04-11 RX ADMIN — Medication 125 MILLILITER(S): at 10:26

## 2018-04-11 RX ADMIN — Medication 1000 UNIT(S): at 12:57

## 2018-04-11 RX ADMIN — ARIPIPRAZOLE 5 MILLIGRAM(S): 15 TABLET ORAL at 12:56

## 2018-04-11 RX ADMIN — MEDROXYPROGESTERONE ACETATE 10 MILLIGRAM(S): 150 INJECTION, SUSPENSION, EXTENDED RELEASE INTRAMUSCULAR at 12:57

## 2018-04-11 RX ADMIN — ESCITALOPRAM OXALATE 10 MILLIGRAM(S): 10 TABLET, FILM COATED ORAL at 12:57

## 2018-04-11 RX ADMIN — Medication 1: at 13:09

## 2018-04-11 RX ADMIN — SODIUM CHLORIDE 125 MILLILITER(S): 9 INJECTION, SOLUTION INTRAVENOUS at 08:59

## 2018-04-11 RX ADMIN — Medication 100 GRAM(S): at 08:58

## 2018-04-11 RX ADMIN — Medication 20 MILLIEQUIVALENT(S): at 12:56

## 2018-04-11 NOTE — PROGRESS NOTE BEHAVIORAL HEALTH - NS ED [facility] MED ROS ENDOCRINE
No complaints

## 2018-04-11 NOTE — PROGRESS NOTE BEHAVIORAL HEALTH - NSBHADMITCOORDWITH_PSY_A_CORE
medical staff
medical staff/family/Caregiver

## 2018-04-11 NOTE — PROGRESS NOTE BEHAVIORAL HEALTH - BEHAVIOR
Other
Uncooperative
Other
Other
Uncooperative

## 2018-04-11 NOTE — PROGRESS NOTE BEHAVIORAL HEALTH - NSBHCONSULTMEDS_PSY_A_CORE FT
1. Continue Lexapro 10 mg PO daily- discharge on this medication  2. Continue Abilify 5 mg PO daily- discharge on this medication  3. Continue Seroquel 12.5 mg PO HS PRN for agitation/insomnia

## 2018-04-11 NOTE — PROGRESS NOTE BEHAVIORAL HEALTH - NSBHPTASSESSDT_PSY_A_CORE
09-Apr-2018 09:30
10-Apr-2018 13:00
29-Mar-2018 10:15
04-Apr-2018 11:40
30-Mar-2018 12:00
05-Apr-2018 11:30
06-Apr-2018 11:30
03-Apr-2018 11:30
30-Mar-2018 12:00

## 2018-04-11 NOTE — ADVANCED PRACTICE NURSE CONSULT - ASSESSMENT
Wound vac dressing removed from abdominal wound and wet to dry kerlix lightly packed into wound, covered with ABD pad. Pt tolerated well.

## 2018-04-11 NOTE — PROGRESS NOTE BEHAVIORAL HEALTH - ABNORMAL MOVEMENTS
No abnormal movements

## 2018-04-11 NOTE — PROGRESS NOTE BEHAVIORAL HEALTH - DETAILS
vague abdominal discomfort

## 2018-04-11 NOTE — PROGRESS NOTE BEHAVIORAL HEALTH - NSBHFUPINTERVALCCFT_PSY_A_CORE
Does not verbalize
Does not verbalize
"I want to die"
unable to assess
"bad"
unable to assess
unable to assess
"bad"
"bad"

## 2018-04-11 NOTE — PROGRESS NOTE BEHAVIORAL HEALTH - THOUGHT CONTENT
Unremarkable

## 2018-04-11 NOTE — PROGRESS NOTE BEHAVIORAL HEALTH - SUMMARY
78F h/o depression not recently in treatment, PMH DM, HLD, HTN, recent ventral hernia repair with subsequent admission for post-operative PE placed on Eliquis, presenting again 3/23 for abdominal pain and bloody stools, initially AAOx3 but increasingly more delirious 2/2 GONZALEZ, wound infection, and potential gynecological malignancy, initially refusing dialysis and gyn workup.  Delirium appears to be resolving and patient appears less depressed/labile but continues to demonstrate very poor understanding of her medical conditions.  Would continue Lexapro 10 mg PO daily and Abilify 5 mg PO daily for delirium and adjunctive treatment for depression.  This regimen to be continued when discharged to [facility].

## 2018-04-11 NOTE — PROGRESS NOTE BEHAVIORAL HEALTH - NSBHFUPREASONCONS_PSY_A_CORE
agitation/delerium/capacity/depression
delerium/capacity/depression/agitation
delerium/capacity
capacity/delerium
delerium/capacity
capacity/delerium
capacity/delerium
delerium/capacity
delerium/capacity

## 2018-04-11 NOTE — PROGRESS NOTE BEHAVIORAL HEALTH - NSBHADMITCOUNSEL_PSY_A_CORE
importance of adherence to chosen treatment/risk factor reduction/risks and benefits of treatment options/client/family/caregiver education/prognosis/instructions for management, treatment and follow up/diagnostic results/impressions and/or recommended studies
client/family/caregiver education/importance of adherence to chosen treatment/diagnostic results/impressions and/or recommended studies/risks and benefits of treatment options/instructions for management, treatment and follow up/risk factor reduction/prognosis
instructions for management, treatment and follow up/risk factor reduction/diagnostic results/impressions and/or recommended studies/client/family/caregiver education/prognosis/risks and benefits of treatment options/importance of adherence to chosen treatment
client/family/caregiver education/prognosis/diagnostic results/impressions and/or recommended studies/importance of adherence to chosen treatment/risks and benefits of treatment options/risk factor reduction/instructions for management, treatment and follow up
diagnostic results/impressions and/or recommended studies/risks and benefits of treatment options/importance of adherence to chosen treatment/client/family/caregiver education/prognosis/instructions for management, treatment and follow up/risk factor reduction
instructions for management, treatment and follow up/risk factor reduction/risks and benefits of treatment options/client/family/caregiver education/prognosis/diagnostic results/impressions and/or recommended studies/importance of adherence to chosen treatment
client/family/caregiver education/instructions for management, treatment and follow up/risk factor reduction/diagnostic results/impressions and/or recommended studies/prognosis/risks and benefits of treatment options/importance of adherence to chosen treatment
client/family/caregiver education/risk factor reduction/prognosis/diagnostic results/impressions and/or recommended studies/risks and benefits of treatment options/importance of adherence to chosen treatment/instructions for management, treatment and follow up
prognosis/risks and benefits of treatment options/diagnostic results/impressions and/or recommended studies/importance of adherence to chosen treatment/instructions for management, treatment and follow up/risk factor reduction/client/family/caregiver education

## 2018-04-11 NOTE — PROGRESS NOTE BEHAVIORAL HEALTH - NSBHCHARTREVIEWLAB_PSY_A_CORE FT
7.6    10.2  )-----------( 165      ( 06 Apr 2018 06:39 )             24.3   04-06    139  |  100  |  31<H>  ----------------------------<  79  3.3<L>   |  22  |  2.34<H>    Ca    8.8      06 Apr 2018 06:38  Mg     2.2     04-06    TPro  5.2<L>  /  Alb  2.4<L>  /  TBili  0.7  /  DBili  x   /  AST  19  /  ALT  11  /  AlkPhos  51  04-05
7.6    11.6  )-----------( 127      ( 01 Apr 2018 18:12 )             23.8   04-01    140  |  100  |  16  ----------------------------<  82  3.4<L>   |  25  |  3.11<H>    Ca    8.0<L>      01 Apr 2018 06:23  Phos  3.1     04-01  Mg     1.8     04-01
7.8    13.4  )-----------( 149      ( 30 Mar 2018 07:41 )             23.9   03-30    139  |  102  |  39<H>  ----------------------------<  70  3.8   |  19<L>  |  4.64<H>    Ca    7.9<L>      30 Mar 2018 07:41  Phos  5.4     03-30  Mg     1.8     03-30
7.1    7.4   )-----------( 173      ( 09 Apr 2018 09:48 )             23.4   04-07    143  |  104  |  35<H>  ----------------------------<  79  3.8   |  21<L>  |  1.92<H>    Ca    9.0      07 Apr 2018 11:04  Mg     1.9     04-07
7.6    13.9  )-----------( 128      ( 11 Apr 2018 06:18 )             25.5   04-11    148<H>  |  110<H>  |  32<H>  ----------------------------<  118<H>  3.7   |  25  |  1.33<H>    Ca    9.3      11 Apr 2018 06:18  Phos  2.6     04-09  Mg     1.8     04-11    TPro  6.0  /  Alb  2.6<L>  /  TBili  1.0  /  DBili  x   /  AST  23  /  ALT  16  /  AlkPhos  68  04-09
6.1    12.8  )-----------( 180      ( 29 Mar 2018 12:04 )             18.8   03-29    135  |  102  |  56<H>  ----------------------------<  82  4.3   |  15<L>  |  5.69<H>    Ca    7.9<L>      29 Mar 2018 12:04  Phos  6.8     03-29  Mg     1.9     03-29
7.5    12.7  )-----------( 140      ( 03 Apr 2018 07:09 )             23.7   04-03    140  |  101  |  31<H>  ----------------------------<  71  3.6   |  22  |  3.90<H>    Ca    8.4      03 Apr 2018 07:09  Mg     2.1     04-03
7.3    13.2  )-----------( 145      ( 04 Apr 2018 10:19 )             23.1   04-04    138  |  98  |  17  ----------------------------<  89  3.3<L>   |  25  |  2.37<H>    Ca    8.1<L>      04 Apr 2018 10:19  Mg     1.9     04-04    TPro  5.4<L>  /  Alb  2.3<L>  /  TBili  0.8  /  DBili  x   /  AST  18  /  ALT  10  /  AlkPhos  53  04-04
7.3    11.4  )-----------( 156      ( 05 Apr 2018 07:19 )             23.1   04-05    141  |  100  |  25<H>  ----------------------------<  88  3.5   |  23  |  2.57<H>    Ca    8.4      05 Apr 2018 07:19  Mg     1.9     04-05    TPro  5.2<L>  /  Alb  2.4<L>  /  TBili  0.7  /  DBili  x   /  AST  19  /  ALT  11  /  AlkPhos  51  04-05

## 2018-04-11 NOTE — PROGRESS NOTE BEHAVIORAL HEALTH - NS ED [facility] MSE GENERAL APPEARANCE
Well developed

## 2018-04-11 NOTE — PROGRESS NOTE BEHAVIORAL HEALTH - NSBHCHARTREVIEWINVESTIGATE_PSY_A_CORE FT
3/23 EKG QTc 452

## 2018-04-11 NOTE — PROGRESS NOTE ADULT - PROVIDER SPECIALTY LIST ADULT
Critical Care
Critical Care
Hospitalist
Internal Medicine
Nephrology
Surgery
Vascular Surgery
Internal Medicine
Surgery
Gastroenterology
Nephrology
Internal Medicine

## 2018-04-11 NOTE — PROGRESS NOTE ADULT - ATTENDING COMMENTS
Patient seen and examined personally by me.  Patient has no complaints today.  She denies any shortness of breath or vaginal bleeding this morning.  She made 700cc of urine overnight with Lasix.    -Creatinine continues to trend down with good urine output and no electrolyte abnormalities so unlikely to be dialyzed today.  Nephrology following and will make decision however.    -Continue anticoagulation for previously found PE.    -Continue wound vac for surgical wound infection, surgery following.  Off of antibiotics.  -Fingersticks well controlled (not on basal insulin).  Ensure she is eating.  -Attempt to place peripheral IVs so we can remove the left IJ triple lumen catheter.  -Will get out of bed into a chair today.  -Plan for ERNESTINE next week.
dx  pulmonary edema -  improved with lasix ;  will need HD   encephaloapthy w/ agitation- mental status improved.      seroquel qhs. will need serial reassessments by psych.   ATN/ESRD- cont HD prn as per nephro. lasix as needed for vol. overload  vaginal bleeding/endometrial thickening - a/c dc'd , bleeding has subsided. will need endometrial bx? cont ocp  blood loss anemia - keep hb >7. no active bleeding currently  RUL PE s/p ivc filter - stable  HTN - BPs near goal. should improve w/ HD  hypothyroid- synthroid .  dispo - PT recommending ERNESTINE. will need to discuss with brother (in [place]) the plan as pt does not have capacity at this time.
will sign off case for now for GONZALEZ management but will continue to monitor hypernatremia  please call should further assistance be required
Patient was seen and examined. I agree with the assessment and plan as outlined above.  Plan for EGD and colonoscopy today.    Thank you for this consult. We will continue to follow along closely with you.
As Above.    Will check C Diff.  Needs PT and will need ERNESTINE
Dispo: if repeat CT negtive for infection/abscess; d/c to rehab today.
Case reviewed and patient seen on rounds.  Agree with above.
Interim chart reviewed and case d/w renal fellow, Dr. Adams, agree with note above. Second HD today, and likely again tomorrow.
Case reviewed with Dr. Adams. Agree with above. Likely RRT soon.  We will continue to follow from a renal standpoint, but would recommend getting and ID Consult.
Pt seen on renal rounds today. Case d/w Dr. Adams and resident at length.  Attempting fluid challenge today, though we are aware that the pt will likely remain oligo-anuric.  Will reevaluate labs in am.
ATN improving Cr, awaiting today's labs, non oliguric. If Cr comes back improved still today, can d/c catheter for pt comfort of no concerns by primary team. can d/c HD catheter tomorrow with IR. Holding lasix, pt euvolemic, not eating large amounts.
ATN on CKD with improving renal failure, last HD April 3rd, can remove permacath. On 60mg IV BID lasix, advise to d/c as pt appears euvolemic and not eating/drinking well, saturating well on RA. Will reassess need for diuretic tomorrow with daily weights, UO, bp etc.
dx  encephaloapthy w/ agitation- pt currently does NOT have capacity. HD catheter is non-functional and pt requires urgent RRT therefore, pt is to have HD catheter replaced by Dr Abad via 2 Physician consent.  seroquel qhs  ATN/ESRD- cont HD as per nephro.   vaginal bleeding/endometrial thickening - a/c dc'd , bleeding has subsided. will need endometrial bx? cont ocp  blood loss anemia - keep hb >7. no active bleeding currently  pulm edema - will need HD  RUL PE s/p ivc filter - stable  HTN - BPs near goal. should improve w/ HD  hypothyroid- synthroid
Hold zosyn as unclear what the source of the fever is.  Cont to monitor. Start Zosyn of cont fever or if localizing sx. Consult ID in AM for choice and duration with w/o a clear source. Will consider re-imaging as well if cont fever.
Pt seen and examined by me at bedside earlier in AM. Agree with housestaff's exam/a/p as noted above with additions,   colonoscopy report reviewed, possible ischemic colitis, clinically denies abd pain, surgery following, hgb stable.   ATN: permacath removed, monitor Cr  Hypernatremia likely 2/2 free water loss: trend na on D5w.   Agree with rest of treatment plan as above. d/c planning ERNESTINE.
dx  pulmonary edema - episode of resp distress this AM, improved with lasix ;  will need HD + standing lasix  encephaloapthy w/ agitation- pt currently does NOT have capacity. HD catheter is non-functional and pt requires urgent RRT therefore, pt is to have HD catheter replaced by Dr Abad (via 2 Physician consent).   seroquel qhs. will need serial reassessments by psych.   ATN/ESRD- cont HD prn as per nephro. lasix   vaginal bleeding/endometrial thickening - a/c dc'd , bleeding has subsided. will need endometrial bx? cont ocp  blood loss anemia - keep hb >7. no active bleeding currently  RUL PE s/p ivc filter - stable  HTN - BPs near goal. should improve w/ HD  hypothyroid- synthroid .
dx  pulmonary edema -  improved with lasix ;  may not need ongoing HD?   encephaloapthy w/ agitation- mental status improved.    seroquel qhs. f/u psych recs   ATN/ESRD- may not require HD as per nephro. lasix as needed for vol. overload  vaginal bleeding/endometrial thickening - a/c dc'd , bleeding has subsided. will need endometrial bx? cont ocp  blood loss anemia - keep hb >7. no active bleeding currently  RUL PE s/p ivc filter - stable  HTN - BPs near goal. lasix  hypothyroid- synthroid .  dispo - PT recommending ERNESTINE.

## 2018-04-11 NOTE — PROGRESS NOTE BEHAVIORAL HEALTH - NSBHCONSFOLLOWNEEDS_PSY_A_CORE
Patient needs further psychiatric safety assessment prior to discharge
Patient needs further psychiatric safety assessment prior to discharge
no psychiatric contraindications to discharge
no psychiatric contraindications to discharge
Patient needs further psychiatric safety assessment prior to discharge
no psychiatric contraindications to discharge
no psychiatric contraindications to discharge

## 2018-04-11 NOTE — PROGRESS NOTE BEHAVIORAL HEALTH - AFFECT CONGRUENCE
Congruent
Congruent
Not congruent
Not congruent
Congruent
Congruent
Not congruent
Congruent
Congruent

## 2018-04-11 NOTE — PROGRESS NOTE BEHAVIORAL HEALTH - NSBHCONSULTFOLLOWAFTERCARE_PSY_A_CORE FT
ERNESTINE as per primary team
ERNESTINE as per primary team, can be followed by psychiatry there.  Nephtone Kaye given contact information for [facility] to arrange follow up care after ERNESTINE should patient return home.

## 2018-04-11 NOTE — PROGRESS NOTE BEHAVIORAL HEALTH - NSBHFUPTYPE_PSY_A_CORE
Consult Follow Up

## 2018-04-11 NOTE — PROGRESS NOTE BEHAVIORAL HEALTH - NSBHFUPINTERVALHXFT_PSY_A_CORE
Patient seen at bedside.  Awake, alert, mostly answers questions with shrugs and facial expressions but less engaged than yesterday.  Indicates she is thirsty.  Does not engage in discussion of medical condition.    Spoke to nephew Vargas who wanted to check patient was getting treatment for depression.  Gave Vargas information for [facility] and discussed psychiatric follow up after ERNESTINE.

## 2018-04-11 NOTE — PROGRESS NOTE ADULT - SUBJECTIVE AND OBJECTIVE BOX
Pt seen and examined at bedside. Pt continues to note no abdominal pain. Complains of some nausea, but well controlled with medication +BM. VAC in place    VITALS:  Temp: 97.6  HR: 102  BP: 153/70  Resp: 18  O2: 100%      PHYSICAL EXAM    Gen: NAD  CV: RRR  Pulm: Regular non-labored respirations  Abd: Soft, NT/ND. Wound VAC in place to suction with good seal    LABS:  WBC: 13.9  Hgb: 7.6  Hct: 25.5  Plts: 128    Na: 148  K: 3.7  Cl: 110  BUN: 32  Creat: 1.33  Glu: 118            ASSESSMENT AND PLAN  78 year old female s/p ventral hernia repair, c/b surgical site infection s/p wound VAC placement, was on IV Zosyn, c/b GONZALEZ now on medicine service. Pt continues to be afebrile, VSS. Pt can continue diet and will continue to observe. Suspect the blood coming from known vaginal bleeding.  -No acute surgical intervention at this time  -VAC changes per wound care nursing team; as per WOCN-to change from wound vac to wet to dry dressings, instructions for ERNESTINE to be provided by WOCN  -Further management per primary team  -Dr. Ngo notified

## 2018-04-11 NOTE — PROGRESS NOTE BEHAVIORAL HEALTH - THOUGHT PROCESS
Perseverative
Other
Other
Perseverative
Other
Perseverative

## 2018-04-11 NOTE — PROGRESS NOTE BEHAVIORAL HEALTH - PERCEPTIONS
No abnormalities

## 2018-04-11 NOTE — CHART NOTE - NSCHARTNOTEFT_GEN_A_CORE
Admitting Diagnosis:   Patient is a 78y old  Female who presents with a chief complaint of surgical site infection from surgery prior to admission (05 Apr 2018 16:57)      PAST MEDICAL & SURGICAL HISTORY:  Obesity  DM (diabetes mellitus)  HLD (hyperlipidemia)  HTN (hypertension)  H/O ventral hernia repair      Current Nutrition Order:pureed CCHO with snack     PO Intake: Good (%) [   ]  Fair (50-75%) [ x  ] Poor (<25%) [   ] needs assistance   with  meals  GI Issues: none    Pain:some discomfort from wound vac noted     Skin Integrity: wound vac with wound on abdomen    Labs:   04-11    148<H>  |  110<H>  |  32<H>  ----------------------------<  118<H>  3.7   |  25  |  1.33<H>    Ca    9.3      11 Apr 2018 06:18  Phos  2.6     04-09  Mg     1.8     04-11    TPro  6.0  /  Alb  2.6<L>  /  TBili  1.0  /  DBili  x   /  AST  23  /  ALT  16  /  AlkPhos  68  04-09    CAPILLARY BLOOD GLUCOSE      POCT Blood Glucose.: 129 mg/dL (10 Apr 2018 21:34)  POCT Blood Glucose.: 155 mg/dL (10 Apr 2018 16:59)  POCT Blood Glucose.: 173 mg/dL (10 Apr 2018 12:10)  POCT Blood Glucose.: 133 mg/dL (10 Apr 2018 08:22)      Medications:  MEDICATIONS  (STANDING):  amLODIPine   Tablet 10 milliGRAM(s) Oral daily  ARIPiprazole 5 milliGRAM(s) Oral daily  cholecalciferol 1000 Unit(s) Oral daily  dextrose 5%. 1000 milliLiter(s) (50 mL/Hr) IV Continuous <Continuous>  dextrose 5%. 1000 milliLiter(s) (125 mL/Hr) IV Continuous <Continuous>  dextrose 50% Injectable 12.5 Gram(s) IV Push once  dextrose 50% Injectable 25 Gram(s) IV Push once  dextrose 50% Injectable 25 Gram(s) IV Push once  escitalopram 10 milliGRAM(s) Oral daily  heparin  flush 100 Units/mL Injectable 100 Unit(s) IV Push every other day  insulin lispro (HumaLOG) corrective regimen sliding scale   SubCutaneous Before meals and at bedtime  levothyroxine 150 MICROGram(s) Oral daily  medroxyPROGESTERone 10 milliGRAM(s) Oral daily  pantoprazole    Tablet 40 milliGRAM(s) Oral before breakfast    MEDICATIONS  (PRN):  acetaminophen   Tablet. 650 milliGRAM(s) Oral every 6 hours PRN Mild Pain (1 - 3)  acetaminophen   Tablet. 650 milliGRAM(s) Oral every 6 hours PRN Moderate Pain (4 - 6)  dextrose Gel 1 Dose(s) Oral once PRN Blood Glucose LESS THAN 70 milliGRAM(s)/deciliter  glucagon  Injectable 1 milliGRAM(s) IntraMuscular once PRN Glucose LESS THAN 70 milligrams/deciliter  QUEtiapine 12.5 milliGRAM(s) Oral at bedtime PRN Agitation and/or Insomnia  sodium chloride 0.65% Nasal 1 Spray(s) Both Nostrils every 4 hours PRN Nasal Congestion      Weight:3/23:81.6kg 4/3: pre-HD: 97.4kg ?accuracy  Daily  no updated weight   Daily     Weight Change: ?34lb weight gain since 3/23    Estimated energy needs: Based on IBW due to obesity;IBW:52kg g72-58mexn:1300-1560kcal and 1-1.2gmprotein(due to wound needs/pt not on HD now):52-62gmprotein and 30-35ccfluids:1560cc-1820cc    Subjective: 79 y/o female admitted s/p ventral hernia repair with noted gastric erosion/abdominal pain and bleeding. .With wound VAC.    Previous Nutrition Diagnosis: Increased nutrient needs r/t increased demand for protein/nutrients AEB: HD    Active [   ]  Resolved [  x ]    If resolved, new PES: Increased nutrient needs r/t increased demand for nutrients AEB: wound demands    Goal: Meet 80% of needs consistently     Recommendations:1.Updated weight 2.Assist with feeding    Education: not a candidate due to mental status    Risk Level: High [   ] Moderate [  x ] Low [   ]

## 2018-04-11 NOTE — PROGRESS NOTE ADULT - SUBJECTIVE AND OBJECTIVE BOX
Pt seen and examined by me at bedside earlier in AM    VSS  comfortable  lying supine  +S1/S2 RRR  CTA b/l  +bs/slight TTP around wound vac site    labs reviewed: WBC 13.9K

## 2018-04-11 NOTE — PROGRESS NOTE BEHAVIORAL HEALTH - NSBHCONSULTOBS_PSY_A_CORE
Routine observation

## 2018-04-11 NOTE — PROGRESS NOTE BEHAVIORAL HEALTH - LANGUAGE
Normal naming

## 2018-04-11 NOTE — PROGRESS NOTE BEHAVIORAL HEALTH - NSBHATTESTSEENBY_PSY_A_CORE
attending Psychiatrist without NP/Trainee

## 2018-04-11 NOTE — PROGRESS NOTE BEHAVIORAL HEALTH - NSBHTIMEBASEDBILLING_PSY_A_CORE
The patient has been examined and the H&P has been reviewed:    I concur with the findings and no changes have occurred since H&P was written.    Anesthesia/Surgery risks, benefits and alternative options discussed and understood by patient/family.          Active Hospital Problems    Diagnosis  POA    Atherosclerotic PVD with ulceration [I70.209, X92.532]  Yes      Resolved Hospital Problems    Diagnosis Date Resolved POA   No resolved problems to display.     
yes...

## 2018-04-11 NOTE — PROGRESS NOTE BEHAVIORAL HEALTH - PRIMARY DX
Delirium due to another medical condition

## 2018-04-11 NOTE — PROGRESS NOTE BEHAVIORAL HEALTH - NSBHCHARTREVIEWVS_PSY_A_CORE FT
Vital Signs Last 24 Hrs  T(C): 36.6 (06 Apr 2018 15:11), Max: 37.1 (05 Apr 2018 20:52)  T(F): 97.9 (06 Apr 2018 15:11), Max: 98.7 (05 Apr 2018 20:52)  HR: 84 (06 Apr 2018 15:11) (76 - 84)  BP: 156/80 (06 Apr 2018 15:11) (128/70 - 156/80)  BP(mean): 105 (06 Apr 2018 15:11) (105 - 105)  RR: 20 (06 Apr 2018 15:11) (16 - 20)  SpO2: 96% (06 Apr 2018 15:11) (93% - 96%)
Vital Signs Last 24 Hrs  T(C): 36.8 (02 Apr 2018 15:40), Max: 38.2 (02 Apr 2018 06:00)  T(F): 98.2 (02 Apr 2018 15:40), Max: 100.8 (02 Apr 2018 06:00)  HR: 79 (02 Apr 2018 15:40) (68 - 82)  BP: 167/70 (02 Apr 2018 15:40) (142/64 - 167/70)  BP(mean): 92 (02 Apr 2018 08:15) (92 - 103)  RR: 20 (02 Apr 2018 15:40) (14 - 22)  SpO2: 91% (02 Apr 2018 15:40) (91% - 100%)
Vital Signs Last 24 Hrs  T(C): 37 (30 Mar 2018 09:32), Max: 37.2 (30 Mar 2018 01:00)  T(F): 98.6 (30 Mar 2018 09:32), Max: 99 (30 Mar 2018 01:00)  HR: 66 (30 Mar 2018 11:58) (61 - 72)  BP: 109/52 (30 Mar 2018 11:58) (92/43 - 124/57)  BP(mean): 71 (30 Mar 2018 08:39) (67 - 82)  RR: 18 (30 Mar 2018 11:58) (16 - 19)  SpO2: 97% (30 Mar 2018 11:58) (93% - 99%)
Vital Signs Last 24 Hrs  T(C): 36.7 (11 Apr 2018 05:00), Max: 36.9 (11 Apr 2018 00:21)  T(F): 98 (11 Apr 2018 05:00), Max: 98.4 (11 Apr 2018 00:21)  HR: 101 (11 Apr 2018 05:00) (90 - 103)  BP: 137/71 (11 Apr 2018 05:00) (137/71 - 154/72)  BP(mean): --  RR: 18 (11 Apr 2018 05:00) (18 - 19)  SpO2: 96% (11 Apr 2018 05:00) (95% - 96%)
Vital Signs Last 24 Hrs  T(C): 36.9 (09 Apr 2018 05:10), Max: 36.9 (08 Apr 2018 15:05)  T(F): 98.4 (09 Apr 2018 05:10), Max: 98.5 (08 Apr 2018 15:05)  HR: 87 (09 Apr 2018 05:10) (87 - 95)  BP: 161/72 (09 Apr 2018 05:10) (138/71 - 166/70)  BP(mean): --  RR: 19 (09 Apr 2018 05:10) (19 - 20)  SpO2: 95% (09 Apr 2018 05:10) (91% - 96%)
Vital Signs Last 24 Hrs  T(C): 37 (29 Mar 2018 10:27), Max: 37 (29 Mar 2018 10:27)  T(F): 98.6 (29 Mar 2018 10:27), Max: 98.6 (29 Mar 2018 10:27)  HR: 64 (29 Mar 2018 12:04) (64 - 74)  BP: 116/58 (29 Mar 2018 12:04) (99/58 - 134/62)  BP(mean): 83 (29 Mar 2018 12:04) (72 - 89)  RR: 18 (29 Mar 2018 12:04) (16 - 18)  SpO2: 96% (29 Mar 2018 12:04) (93% - 99%)
Vital Signs Last 24 Hrs  T(C): 37.1 (03 Apr 2018 09:06), Max: 37.1 (03 Apr 2018 09:06)  T(F): 98.8 (03 Apr 2018 09:06), Max: 98.8 (03 Apr 2018 09:06)  HR: 84 (03 Apr 2018 09:06) (80 - 85)  BP: 123/66 (03 Apr 2018 09:06) (123/66 - 135/70)  BP(mean): --  RR: 16 (03 Apr 2018 09:06) (1 - 18)  SpO2: 95% (03 Apr 2018 09:06) (93% - 96%)
Vital Signs Last 24 Hrs  T(C): 36.2 (04 Apr 2018 10:26), Max: 37.4 (04 Apr 2018 09:10)  T(F): 97.2 (04 Apr 2018 10:26), Max: 99.3 (04 Apr 2018 09:10)  HR: 85 (04 Apr 2018 10:26) (76 - 94)  BP: 135/65 (04 Apr 2018 10:26) (125/67 - 164/72)  BP(mean): --  RR: 24 (04 Apr 2018 10:26) (18 - 24)  SpO2: 95% (04 Apr 2018 10:26) (92% - 95%)
Vital Signs Last 24 Hrs  T(C): 37.2 (05 Apr 2018 10:19), Max: 37.2 (05 Apr 2018 10:19)  T(F): 98.9 (05 Apr 2018 10:19), Max: 98.9 (05 Apr 2018 10:19)  HR: 80 (05 Apr 2018 10:19) (80 - 100)  BP: 153/76 (05 Apr 2018 10:19) (148/74 - 164/73)  BP(mean): --  RR: 18 (05 Apr 2018 10:19) (18 - 18)  SpO2: 95% (05 Apr 2018 10:19) (94% - 96%)

## 2018-04-13 DIAGNOSIS — E78.5 HYPERLIPIDEMIA, UNSPECIFIED: ICD-10-CM

## 2018-04-13 DIAGNOSIS — E87.1 HYPO-OSMOLALITY AND HYPONATREMIA: ICD-10-CM

## 2018-04-13 DIAGNOSIS — E66.9 OBESITY, UNSPECIFIED: ICD-10-CM

## 2018-04-13 DIAGNOSIS — E83.39 OTHER DISORDERS OF PHOSPHORUS METABOLISM: ICD-10-CM

## 2018-04-13 DIAGNOSIS — N25.0 RENAL OSTEODYSTROPHY: ICD-10-CM

## 2018-04-13 DIAGNOSIS — F05 DELIRIUM DUE TO KNOWN PHYSIOLOGICAL CONDITION: ICD-10-CM

## 2018-04-13 DIAGNOSIS — D62 ACUTE POSTHEMORRHAGIC ANEMIA: ICD-10-CM

## 2018-04-13 DIAGNOSIS — K29.70 GASTRITIS, UNSPECIFIED, WITHOUT BLEEDING: ICD-10-CM

## 2018-04-13 DIAGNOSIS — I10 ESSENTIAL (PRIMARY) HYPERTENSION: ICD-10-CM

## 2018-04-13 DIAGNOSIS — E11.9 TYPE 2 DIABETES MELLITUS WITHOUT COMPLICATIONS: ICD-10-CM

## 2018-04-13 DIAGNOSIS — K44.9 DIAPHRAGMATIC HERNIA WITHOUT OBSTRUCTION OR GANGRENE: ICD-10-CM

## 2018-04-13 DIAGNOSIS — T81.4XXA INFECTION FOLLOWING A PROCEDURE, INITIAL ENCOUNTER: ICD-10-CM

## 2018-04-13 DIAGNOSIS — N93.9 ABNORMAL UTERINE AND VAGINAL BLEEDING, UNSPECIFIED: ICD-10-CM

## 2018-04-13 DIAGNOSIS — D25.1 INTRAMURAL LEIOMYOMA OF UTERUS: ICD-10-CM

## 2018-04-13 DIAGNOSIS — J90 PLEURAL EFFUSION, NOT ELSEWHERE CLASSIFIED: ICD-10-CM

## 2018-04-13 DIAGNOSIS — K55.9 VASCULAR DISORDER OF INTESTINE, UNSPECIFIED: ICD-10-CM

## 2018-04-13 DIAGNOSIS — K63.5 POLYP OF COLON: ICD-10-CM

## 2018-04-13 DIAGNOSIS — N17.0 ACUTE KIDNEY FAILURE WITH TUBULAR NECROSIS: ICD-10-CM

## 2018-04-13 DIAGNOSIS — N81.10 CYSTOCELE, UNSPECIFIED: ICD-10-CM

## 2018-04-13 DIAGNOSIS — I82.491 ACUTE EMBOLISM AND THROMBOSIS OF OTHER SPECIFIED DEEP VEIN OF RIGHT LOWER EXTREMITY: ICD-10-CM

## 2018-04-13 DIAGNOSIS — I26.99 OTHER PULMONARY EMBOLISM WITHOUT ACUTE COR PULMONALE: ICD-10-CM

## 2018-04-13 DIAGNOSIS — K64.0 FIRST DEGREE HEMORRHOIDS: ICD-10-CM

## 2018-04-17 ENCOUNTER — INPATIENT (INPATIENT)
Facility: HOSPITAL | Age: 78
LOS: 47 days | Discharge: NURSING, REHAB OR LONG-TERM CARE | DRG: 856 | End: 2018-06-04
Attending: SURGERY | Admitting: SURGERY
Payer: MEDICARE

## 2018-04-17 VITALS — TEMPERATURE: 94 F | OXYGEN SATURATION: 100 % | HEART RATE: 89 BPM | RESPIRATION RATE: 13 BRPM

## 2018-04-17 DIAGNOSIS — Z98.890 OTHER SPECIFIED POSTPROCEDURAL STATES: Chronic | ICD-10-CM

## 2018-04-17 LAB
ALBUMIN SERPL ELPH-MCNC: 2.5 G/DL — LOW (ref 3.3–5)
ALP SERPL-CCNC: 82 U/L — SIGNIFICANT CHANGE UP (ref 40–120)
ALT FLD-CCNC: 15 U/L — SIGNIFICANT CHANGE UP (ref 10–45)
ANION GAP SERPL CALC-SCNC: 16 MMOL/L — SIGNIFICANT CHANGE UP (ref 5–17)
APTT BLD: 49.6 SEC — HIGH (ref 27.5–37.4)
AST SERPL-CCNC: 18 U/L — SIGNIFICANT CHANGE UP (ref 10–40)
BASE EXCESS BLDA CALC-SCNC: -6.2 MMOL/L — LOW (ref -2–3)
BILIRUB SERPL-MCNC: 1.1 MG/DL — SIGNIFICANT CHANGE UP (ref 0.2–1.2)
BLD GP AB SCN SERPL QL: NEGATIVE — SIGNIFICANT CHANGE UP
BUN SERPL-MCNC: 34 MG/DL — HIGH (ref 7–23)
CALCIUM SERPL-MCNC: 8 MG/DL — LOW (ref 8.4–10.5)
CHLORIDE SERPL-SCNC: 118 MMOL/L — HIGH (ref 96–108)
CO2 SERPL-SCNC: 19 MMOL/L — LOW (ref 22–31)
CREAT SERPL-MCNC: 1.92 MG/DL — HIGH (ref 0.5–1.3)
GAS PNL BLDV: SIGNIFICANT CHANGE UP
GLUCOSE BLDC GLUCOMTR-MCNC: 112 MG/DL — HIGH (ref 70–99)
GLUCOSE SERPL-MCNC: 111 MG/DL — HIGH (ref 70–99)
HCO3 BLDA-SCNC: 18 MMOL/L — LOW (ref 21–28)
HCT VFR BLD CALC: 25.7 % — LOW (ref 34.5–45)
HGB BLD-MCNC: 8 G/DL — LOW (ref 11.5–15.5)
INR BLD: 1.72 — HIGH (ref 0.88–1.16)
LACTATE SERPL-SCNC: 5.7 MMOL/L — CRITICAL HIGH (ref 0.5–2)
LACTATE SERPL-SCNC: 5.8 MMOL/L — CRITICAL HIGH (ref 0.5–2)
MAGNESIUM SERPL-MCNC: 2 MG/DL — SIGNIFICANT CHANGE UP (ref 1.6–2.6)
MCHC RBC-ENTMCNC: 28.8 PG — SIGNIFICANT CHANGE UP (ref 27–34)
MCHC RBC-ENTMCNC: 31.1 G/DL — LOW (ref 32–36)
MCV RBC AUTO: 92.4 FL — SIGNIFICANT CHANGE UP (ref 80–100)
PCO2 BLDA: 33 MMHG — SIGNIFICANT CHANGE UP (ref 32–45)
PH BLDA: 7.36 — SIGNIFICANT CHANGE UP (ref 7.35–7.45)
PHOSPHATE SERPL-MCNC: 2.1 MG/DL — LOW (ref 2.5–4.5)
PLATELET # BLD AUTO: 89 K/UL — LOW (ref 150–400)
PO2 BLDA: 129 MMHG — HIGH (ref 83–108)
POTASSIUM SERPL-MCNC: 3.7 MMOL/L — SIGNIFICANT CHANGE UP (ref 3.5–5.3)
POTASSIUM SERPL-SCNC: 3.7 MMOL/L — SIGNIFICANT CHANGE UP (ref 3.5–5.3)
PROT SERPL-MCNC: 4.7 G/DL — LOW (ref 6–8.3)
PROTHROM AB SERPL-ACNC: 19.3 SEC — HIGH (ref 9.8–12.7)
RBC # BLD: 2.78 M/UL — LOW (ref 3.8–5.2)
RBC # FLD: 21.5 % — HIGH (ref 10.3–16.9)
RH IG SCN BLD-IMP: POSITIVE — SIGNIFICANT CHANGE UP
SAO2 % BLDA: 98 % — SIGNIFICANT CHANGE UP (ref 95–100)
SODIUM SERPL-SCNC: 153 MMOL/L — HIGH (ref 135–145)
WBC # BLD: 17.9 K/UL — HIGH (ref 3.8–10.5)
WBC # FLD AUTO: 17.9 K/UL — HIGH (ref 3.8–10.5)

## 2018-04-17 PROCEDURE — 93010 ELECTROCARDIOGRAM REPORT: CPT

## 2018-04-17 PROCEDURE — 99223 1ST HOSP IP/OBS HIGH 75: CPT | Mod: GC

## 2018-04-17 PROCEDURE — 71045 X-RAY EXAM CHEST 1 VIEW: CPT | Mod: 26

## 2018-04-17 RX ORDER — PIPERACILLIN AND TAZOBACTAM 4; .5 G/20ML; G/20ML
4.5 INJECTION, POWDER, LYOPHILIZED, FOR SOLUTION INTRAVENOUS EVERY 8 HOURS
Qty: 0 | Refills: 0 | Status: DISCONTINUED | OUTPATIENT
Start: 2018-04-17 | End: 2018-04-17

## 2018-04-17 RX ORDER — PANTOPRAZOLE SODIUM 20 MG/1
40 TABLET, DELAYED RELEASE ORAL DAILY
Qty: 0 | Refills: 0 | Status: DISCONTINUED | OUTPATIENT
Start: 2018-04-17 | End: 2018-04-19

## 2018-04-17 RX ORDER — DEXTROSE 50 % IN WATER 50 %
12.5 SYRINGE (ML) INTRAVENOUS ONCE
Qty: 0 | Refills: 0 | Status: DISCONTINUED | OUTPATIENT
Start: 2018-04-17 | End: 2018-06-04

## 2018-04-17 RX ORDER — DEXTROSE 50 % IN WATER 50 %
25 SYRINGE (ML) INTRAVENOUS ONCE
Qty: 0 | Refills: 0 | Status: DISCONTINUED | OUTPATIENT
Start: 2018-04-17 | End: 2018-05-17

## 2018-04-17 RX ORDER — GLUCAGON INJECTION, SOLUTION 0.5 MG/.1ML
1 INJECTION, SOLUTION SUBCUTANEOUS ONCE
Qty: 0 | Refills: 0 | Status: DISCONTINUED | OUTPATIENT
Start: 2018-04-17 | End: 2018-05-17

## 2018-04-17 RX ORDER — OXACILLIN SODIUM 2 G
2 INTRAVENOUS SOLUTION, PIGGYBACK (EA) INTRAVENOUS ONCE
Qty: 0 | Refills: 0 | Status: COMPLETED | OUTPATIENT
Start: 2018-04-17 | End: 2018-04-17

## 2018-04-17 RX ORDER — ACETAMINOPHEN 500 MG
1000 TABLET ORAL ONCE
Qty: 0 | Refills: 0 | Status: COMPLETED | OUTPATIENT
Start: 2018-04-17 | End: 2018-04-17

## 2018-04-17 RX ORDER — INSULIN LISPRO 100/ML
VIAL (ML) SUBCUTANEOUS EVERY 6 HOURS
Qty: 0 | Refills: 0 | Status: DISCONTINUED | OUTPATIENT
Start: 2018-04-17 | End: 2018-06-04

## 2018-04-17 RX ORDER — LEVOTHYROXINE SODIUM 125 MCG
75 TABLET ORAL AT BEDTIME
Qty: 0 | Refills: 0 | Status: DISCONTINUED | OUTPATIENT
Start: 2018-04-17 | End: 2018-04-19

## 2018-04-17 RX ORDER — IPRATROPIUM/ALBUTEROL SULFATE 18-103MCG
3 AEROSOL WITH ADAPTER (GRAM) INHALATION EVERY 6 HOURS
Qty: 0 | Refills: 0 | Status: DISCONTINUED | OUTPATIENT
Start: 2018-04-17 | End: 2018-06-04

## 2018-04-17 RX ORDER — SODIUM CHLORIDE 9 MG/ML
1000 INJECTION, SOLUTION INTRAVENOUS
Qty: 0 | Refills: 0 | Status: DISCONTINUED | OUTPATIENT
Start: 2018-04-17 | End: 2018-04-17

## 2018-04-17 RX ORDER — DEXTROSE 50 % IN WATER 50 %
25 SYRINGE (ML) INTRAVENOUS ONCE
Qty: 0 | Refills: 0 | Status: DISCONTINUED | OUTPATIENT
Start: 2018-04-17 | End: 2018-06-04

## 2018-04-17 RX ORDER — ONDANSETRON 8 MG/1
4 TABLET, FILM COATED ORAL EVERY 6 HOURS
Qty: 0 | Refills: 0 | Status: DISCONTINUED | OUTPATIENT
Start: 2018-04-17 | End: 2018-06-04

## 2018-04-17 RX ORDER — HEPARIN SODIUM 5000 [USP'U]/ML
5000 INJECTION INTRAVENOUS; SUBCUTANEOUS EVERY 8 HOURS
Qty: 0 | Refills: 0 | Status: DISCONTINUED | OUTPATIENT
Start: 2018-04-17 | End: 2018-04-19

## 2018-04-17 RX ORDER — SODIUM CHLORIDE 9 MG/ML
1000 INJECTION, SOLUTION INTRAVENOUS
Qty: 0 | Refills: 0 | Status: DISCONTINUED | OUTPATIENT
Start: 2018-04-17 | End: 2018-05-17

## 2018-04-17 RX ORDER — DEXTROSE 50 % IN WATER 50 %
1 SYRINGE (ML) INTRAVENOUS ONCE
Qty: 0 | Refills: 0 | Status: DISCONTINUED | OUTPATIENT
Start: 2018-04-17 | End: 2018-05-17

## 2018-04-17 RX ORDER — MEROPENEM 1 G/30ML
1000 INJECTION INTRAVENOUS ONCE
Qty: 0 | Refills: 0 | Status: COMPLETED | OUTPATIENT
Start: 2018-04-17 | End: 2018-04-17

## 2018-04-17 RX ORDER — OXACILLIN SODIUM 2 G
2 INTRAVENOUS SOLUTION, PIGGYBACK (EA) INTRAVENOUS EVERY 4 HOURS
Qty: 0 | Refills: 0 | Status: DISCONTINUED | OUTPATIENT
Start: 2018-04-17 | End: 2018-05-01

## 2018-04-17 RX ORDER — MEROPENEM 1 G/30ML
INJECTION INTRAVENOUS
Qty: 0 | Refills: 0 | Status: DISCONTINUED | OUTPATIENT
Start: 2018-04-17 | End: 2018-04-17

## 2018-04-17 RX ORDER — DEXTROSE 10 % IN WATER 10 %
1000 INTRAVENOUS SOLUTION INTRAVENOUS
Qty: 0 | Refills: 0 | Status: DISCONTINUED | OUTPATIENT
Start: 2018-04-17 | End: 2018-04-20

## 2018-04-17 RX ORDER — MEROPENEM 1 G/30ML
1000 INJECTION INTRAVENOUS EVERY 12 HOURS
Qty: 0 | Refills: 0 | Status: DISCONTINUED | OUTPATIENT
Start: 2018-04-17 | End: 2018-04-22

## 2018-04-17 RX ORDER — SODIUM CHLORIDE 9 MG/ML
1000 INJECTION, SOLUTION INTRAVENOUS
Qty: 0 | Refills: 0 | Status: DISCONTINUED | OUTPATIENT
Start: 2018-04-17 | End: 2018-04-19

## 2018-04-17 RX ORDER — MEROPENEM 1 G/30ML
1000 INJECTION INTRAVENOUS ONCE
Qty: 0 | Refills: 0 | Status: COMPLETED | OUTPATIENT
Start: 2018-04-18 | End: 2018-04-18

## 2018-04-17 RX ORDER — OXACILLIN SODIUM 2 G
INTRAVENOUS SOLUTION, PIGGYBACK (EA) INTRAVENOUS
Qty: 0 | Refills: 0 | Status: DISCONTINUED | OUTPATIENT
Start: 2018-04-17 | End: 2018-05-01

## 2018-04-17 RX ADMIN — Medication 75 MICROGRAM(S): at 23:19

## 2018-04-17 RX ADMIN — Medication 100 GRAM(S): at 20:34

## 2018-04-17 RX ADMIN — SODIUM CHLORIDE 50 MILLILITER(S): 9 INJECTION, SOLUTION INTRAVENOUS at 17:00

## 2018-04-17 RX ADMIN — MEROPENEM 100 MILLIGRAM(S): 1 INJECTION INTRAVENOUS at 17:36

## 2018-04-17 RX ADMIN — HEPARIN SODIUM 5000 UNIT(S): 5000 INJECTION INTRAVENOUS; SUBCUTANEOUS at 21:22

## 2018-04-17 RX ADMIN — SODIUM CHLORIDE 100 MILLILITER(S): 9 INJECTION, SOLUTION INTRAVENOUS at 17:00

## 2018-04-17 RX ADMIN — PANTOPRAZOLE SODIUM 40 MILLIGRAM(S): 20 TABLET, DELAYED RELEASE ORAL at 19:50

## 2018-04-17 RX ADMIN — Medication 1000 MILLIGRAM(S): at 18:00

## 2018-04-17 RX ADMIN — Medication 400 MILLIGRAM(S): at 17:22

## 2018-04-17 RX ADMIN — Medication 50 MILLILITER(S): at 19:03

## 2018-04-17 NOTE — H&P ADULT - NSHPLABSRESULTS_GEN_ALL_CORE
8.0    17.9  )-----------( 89       ( 17 Apr 2018 15:45 )             25.7     04-17    153<H>  |  118<H>  |  34<H>  ----------------------------<  111<H>  3.7   |  19<L>  |  1.92<H>    Ca    8.0<L>      17 Apr 2018 15:45  Phos  2.1     04-17  Mg     2.0     04-17    TPro  4.7<L>  /  Alb  2.5<L>  /  TBili  1.1  /  DBili  x   /  AST  18  /  ALT  15  /  AlkPhos  82  04-17    Lactate, Blood (04.17.18 @ 19:47)    Lactate, Blood: 5.7    PT/INR - ( 17 Apr 2018 15:45 )   PT: 19.3 sec;   INR: 1.72          PTT - ( 17 Apr 2018 15:45 )  PTT:49.6 sec    Blood Gas Profile - Arterial (04.17.18 @ 15:57)    pH, Arterial: 7.36    pCO2, Arterial: 33 mmHg    pO2, Arterial: 129 mmHg    HCO3, Arterial: 18 mmol/L    Base Excess, Arterial: -6.2 mmol/L    Oxygen Saturation, Arterial: 98 %

## 2018-04-17 NOTE — H&P ADULT - NSHPPHYSICALEXAM_GEN_ALL_CORE
General: mild distress  HEENT: NC/AT, EOMI, normal hearing, tongue dry with brown discoloration, neck supple w/o LAD  Pulmonary: Nonlabored breathing, no respiratory distress, CTA-B  Cardiovascular: NSR, no murmurs  Abdominal: obese, soft, NT/ND, hypoactive BS, no organomegaly. Midline wound 5x5 cm, open and granulating with small center of fibrinous exudate and exposed suture. Severe undermining, and deep half of wound likely an additional 5 cm radius all around. Three kerlix bandages removed from wound upon arrival.   Extremities: WWP, 1+ edema in legs bilaterally  Neuro: A/O x 1, normal motor, unable to assess sensory

## 2018-04-17 NOTE — CONSULT NOTE ADULT - ASSESSMENT
79 y/o F with sepsis from open abdominal wound infection, bacteremia, and UTI.    Neuro: prn pain control, Zofran prn, holding seroquel, aripiprazole, escitalapram   CV: HD stable s/p severe sepsis on D5LR @100 D10 @50, holidng amlodipine  Pulm: Satting well on NC  GI: NPO, protonix  : Nicole S/p AKF on HD now resolved. UTI   ID: Ecoli in Urine resistant to Zosyn: Jose L( 4/17-) MSSA in blood: Oxacillin ( 4/17-)   Endo: ISS, Synthroid 75 IV, holding medroxyprogesterone  PPx: sqh PE:Sp IVC filter on 3/30  Lines: PIV Rt IJ TLC from OSH ( 4/15-) L Rad Art line( 4/15-)   Wounds: Wound vac to midline incision   PT/OT: Bed rest

## 2018-04-17 NOTE — H&P ADULT - HISTORY OF PRESENT ILLNESS
Pt is a 79 yo F with morbid obesity, DM, HTN, and hypothyroidism who presents as a transfer from OSH for management of multifactorial sepsis and abdominal wound.    On 3/2, she underwent repair of ventral hernia with progrip mesh, robotic converted to open. She regained bowel function and was discharged 3/4.    On 3/12, she presented to [facility] with SoA, ultimately diagnosed as PE. She was started on hep gtt and eventually switched to NOAC. She was discharged on 3/16.    On 3/23, she returned to [facility] with anemia 2/2 acute blood loss anemia from vaginal bleeding (uterine fibroids) and upper GI bleed. EGD showed ulcers, no active bleed. Hospital course complicated by ATN requiring temporary HD. HD catheter ultimately removed. She underwent IVC filter placement 3/30. She was discharged on 4/11.     She presented to [facility] from her rehab facility on 4/15 with sepsis, which was found to be 2/2 MSSA bacteremia and MDR E coli in urine. She was treated with IV Zosyn, but E coli in urine ctx were resistant to Zosyn. Her open abdominal wound was packed with betadine-soaked Kerlix WTD, changed daily. CT showed extraluminal gas and free fluid. She was stabilized and transferred to [facility] today.    Currently, she is [place]-speaking but not speaking coherently. She feels "bad" but no specific complaints.

## 2018-04-17 NOTE — CONSULT NOTE ADULT - SUBJECTIVE AND OBJECTIVE BOX
ff HPI:      PMH:  PSH:  Meds:  Allerg:  SH:  FH:    PAST MEDICAL & SURGICAL HISTORY:  Obesity  DM (diabetes mellitus)  HLD (hyperlipidemia)  HTN (hypertension)  H/O ventral hernia repair      MEDICATIONS  (STANDING):  dextrose 10%. 1000 milliLiter(s) (50 mL/Hr) IV Continuous <Continuous>  dextrose 5% + lactated ringers. 1000 milliLiter(s) (100 mL/Hr) IV Continuous <Continuous>  dextrose 5%. 1000 milliLiter(s) (50 mL/Hr) IV Continuous <Continuous>  dextrose 50% Injectable 12.5 Gram(s) IV Push once  dextrose 50% Injectable 25 Gram(s) IV Push once  dextrose 50% Injectable 25 Gram(s) IV Push once  heparin  Injectable 5000 Unit(s) SubCutaneous every 8 hours  insulin lispro (HumaLOG) corrective regimen sliding scale   SubCutaneous three times a day before meals  levothyroxine Injectable 75 MICROGram(s) IV Push at bedtime  meropenem  IVPB 1000 milliGRAM(s) IV Intermittent every 8 hours  oxacillin IVPB        MEDICATIONS  (PRN):  dextrose Gel 1 Dose(s) Oral once PRN Blood Glucose LESS THAN 70 milliGRAM(s)/deciliter  glucagon  Injectable 1 milliGRAM(s) IntraMuscular once PRN Glucose LESS THAN 70 milligrams/deciliter  ondansetron Injectable 4 milliGRAM(s) IV Push every 6 hours PRN Nausea      Allergies    No Known Allergies    Intolerances        SOCIAL HISTORY:    FAMILY HISTORY:  No pertinent family history in first degree relatives      REVIEW OF SYSTEMS      General:	    Skin/Breast:  	  Ophthalmologic:  	  ENMT:	    Respiratory and Thorax:  	  Cardiovascular:	    Gastrointestinal:	    Genitourinary:	    Musculoskeletal:	    Neurological:	    Psychiatric:	    Hematology/Lymphatics:	    Endocrine:	    Allergic/Immunologic:	    ICU Vital Signs Last 24 Hrs  T(C): 35.6 (17 Apr 2018 18:00), Max: 35.6 (17 Apr 2018 18:00)  T(F): 96.1 (17 Apr 2018 18:00), Max: 96.1 (17 Apr 2018 18:00)  HR: 82 (17 Apr 2018 19:00) (82 - 90)  BP: --  BP(mean): --  ABP: 152/58 (17 Apr 2018 19:00) (118/866 - 182/76)  ABP(mean): 90 (17 Apr 2018 19:00) (85 - 120)  RR: 23 (17 Apr 2018 19:00) (12 - 24)  SpO2: 100% (17 Apr 2018 19:00) (99% - 100%)      I&O's Summary    17 Apr 2018 07:01  -  17 Apr 2018 19:12  --------------------------------------------------------  IN: 450 mL / OUT: 110 mL / NET: 340 mL        Nicole:	  [ ] None	[ ] Daily Nicole Order Placed	   Indication:	  [ ] Strict I and O's    [ ] Obstruction     [ ] Incontinence + Stage 3 or 4 Decubitus  Central Line:  [ ] None	   [ ]  Medication / TPN Administration       Physical Exam:  General: NAD  HEENT: NC/AT, EOMI, PERRLA, normal hearing, no oral lesions, neck supple w/o LAD  Pulmonary: Nonlabored breathing, no respiratory distress, CTA-B  Cardiovascular: NSR, no murmurs  Abdominal: soft, NT/ND, +BS, no organomegaly  Extremities: WWP, 5/5 strength x 4, no clubbing/cyanosis/edema  Neuro: A/O x3, CNs II-XII grossly intact, normal motor/sensation, no focal deficits  Pulses:   Right:                                                                          Left:  FEM [ ]2+ [ ]1+ [ ]doppler                                             FEM [ ]2+ [ ]1+ [ ]doppler    POP [ ]2+ [ ]1+ [ ]doppler                                             POP [ ]2+ [ ]1+ [ ]doppler    DP [ ]2+ [ ]1+ [ ]doppler                                                DP [ ]2+ [ ]1+ [ ]doppler  PT[ ]2+ [ ]1+ [ ]doppler                                                  PT [ ]2+ [ ]1+ [ ]doppler    Lines/tubes/drains:    Vent settings:      LABS:                        8.0    17.9  )-----------( 89       ( 17 Apr 2018 15:45 )             25.7     04-17    153<H>  |  118<H>  |  34<H>  ----------------------------<  111<H>  3.7   |  19<L>  |  1.92<H>    Ca    8.0<L>      17 Apr 2018 15:45  Phos  2.1     04-17  Mg     2.0     04-17    TPro  4.7<L>  /  Alb  2.5<L>  /  TBili  1.1  /  DBili  x   /  AST  18  /  ALT  15  /  AlkPhos  82  04-17    PT/INR - ( 17 Apr 2018 15:45 )   PT: 19.3 sec;   INR: 1.72          PTT - ( 17 Apr 2018 15:45 )  PTT:49.6 sec    CAPILLARY BLOOD GLUCOSE      POCT Blood Glucose.: 112 mg/dL (17 Apr 2018 17:29)    LIVER FUNCTIONS - ( 17 Apr 2018 15:45 )  Alb: 2.5 g/dL / Pro: 4.7 g/dL / ALK PHOS: 82 U/L / ALT: 15 U/L / AST: 18 U/L / GGT: x             Cultures:      RADIOLOGY & ADDITIONAL STUDIES: HPI:  Pt is a 77 yo F with morbid obesity, DM, HTN, and hypothyroidism who presents as a transfer for [facility] for management of multifactorial sepsis and abdominal wound s/p surgery with Dr. Ngo.    On 3/2, she underwent repair of ventral hernia.       PMH:  PSH:  Meds:  Allerg:  SH:  FH:    PAST MEDICAL & SURGICAL HISTORY:  Obesity  DM (diabetes mellitus)  HLD (hyperlipidemia)  HTN (hypertension)  H/O ventral hernia repair  GERD      MEDICATIONS  (STANDING):  dextrose 10%. 1000 milliLiter(s) (50 mL/Hr) IV Continuous <Continuous>  dextrose 5% + lactated ringers. 1000 milliLiter(s) (100 mL/Hr) IV Continuous <Continuous>  dextrose 5%. 1000 milliLiter(s) (50 mL/Hr) IV Continuous <Continuous>  dextrose 50% Injectable 12.5 Gram(s) IV Push once  dextrose 50% Injectable 25 Gram(s) IV Push once  dextrose 50% Injectable 25 Gram(s) IV Push once  heparin  Injectable 5000 Unit(s) SubCutaneous every 8 hours  insulin lispro (HumaLOG) corrective regimen sliding scale   SubCutaneous three times a day before meals  levothyroxine Injectable 75 MICROGram(s) IV Push at bedtime  meropenem  IVPB 1000 milliGRAM(s) IV Intermittent every 8 hours  oxacillin IVPB        MEDICATIONS  (PRN):  dextrose Gel 1 Dose(s) Oral once PRN Blood Glucose LESS THAN 70 milliGRAM(s)/deciliter  glucagon  Injectable 1 milliGRAM(s) IntraMuscular once PRN Glucose LESS THAN 70 milligrams/deciliter  ondansetron Injectable 4 milliGRAM(s) IV Push every 6 hours PRN Nausea      Allergies    No Known Allergies    Intolerances        SOCIAL HISTORY:    FAMILY HISTORY:  No pertinent family history in first degree relatives      REVIEW OF SYSTEMS      General:	    Skin/Breast:  	  Ophthalmologic:  	  ENMT:	    Respiratory and Thorax:  	  Cardiovascular:	    Gastrointestinal:	    Genitourinary:	    Musculoskeletal:	    Neurological:	    Psychiatric:	    Hematology/Lymphatics:	    Endocrine:	    Allergic/Immunologic:	    ICU Vital Signs Last 24 Hrs  T(C): 35.6 (17 Apr 2018 18:00), Max: 35.6 (17 Apr 2018 18:00)  T(F): 96.1 (17 Apr 2018 18:00), Max: 96.1 (17 Apr 2018 18:00)  HR: 82 (17 Apr 2018 19:00) (82 - 90)  BP: --  BP(mean): --  ABP: 152/58 (17 Apr 2018 19:00) (118/866 - 182/76)  ABP(mean): 90 (17 Apr 2018 19:00) (85 - 120)  RR: 23 (17 Apr 2018 19:00) (12 - 24)  SpO2: 100% (17 Apr 2018 19:00) (99% - 100%)      I&O's Summary    17 Apr 2018 07:01  -  17 Apr 2018 19:12  --------------------------------------------------------  IN: 450 mL / OUT: 110 mL / NET: 340 mL        Nicole:	  [ ] None	[ ] Daily Nicole Order Placed	   Indication:	  [ ] Strict I and O's    [ ] Obstruction     [ ] Incontinence + Stage 3 or 4 Decubitus  Central Line:  [ ] None	   [ ]  Medication / TPN Administration       Physical Exam:  General: NAD  HEENT: NC/AT, EOMI, PERRLA, normal hearing, no oral lesions, neck supple w/o LAD  Pulmonary: Nonlabored breathing, no respiratory distress, CTA-B  Cardiovascular: NSR, no murmurs  Abdominal: soft, NT/ND, +BS, no organomegaly  Extremities: WWP, 5/5 strength x 4, no clubbing/cyanosis/edema  Neuro: A/O x3, CNs II-XII grossly intact, normal motor/sensation, no focal deficits  Pulses:   Right:                                                                          Left:  FEM [ ]2+ [ ]1+ [ ]doppler                                             FEM [ ]2+ [ ]1+ [ ]doppler    POP [ ]2+ [ ]1+ [ ]doppler                                             POP [ ]2+ [ ]1+ [ ]doppler    DP [ ]2+ [ ]1+ [ ]doppler                                                DP [ ]2+ [ ]1+ [ ]doppler  PT[ ]2+ [ ]1+ [ ]doppler                                                  PT [ ]2+ [ ]1+ [ ]doppler    Lines/tubes/drains:    Vent settings:      LABS:                        8.0    17.9  )-----------( 89       ( 17 Apr 2018 15:45 )             25.7     04-17    153<H>  |  118<H>  |  34<H>  ----------------------------<  111<H>  3.7   |  19<L>  |  1.92<H>    Ca    8.0<L>      17 Apr 2018 15:45  Phos  2.1     04-17  Mg     2.0     04-17    TPro  4.7<L>  /  Alb  2.5<L>  /  TBili  1.1  /  DBili  x   /  AST  18  /  ALT  15  /  AlkPhos  82  04-17    PT/INR - ( 17 Apr 2018 15:45 )   PT: 19.3 sec;   INR: 1.72          PTT - ( 17 Apr 2018 15:45 )  PTT:49.6 sec    CAPILLARY BLOOD GLUCOSE      POCT Blood Glucose.: 112 mg/dL (17 Apr 2018 17:29)    LIVER FUNCTIONS - ( 17 Apr 2018 15:45 )  Alb: 2.5 g/dL / Pro: 4.7 g/dL / ALK PHOS: 82 U/L / ALT: 15 U/L / AST: 18 U/L / GGT: x             Cultures:      RADIOLOGY & ADDITIONAL STUDIES: HPI:  Pt is a 77 yo F with morbid obesity, DM, HTN, and hypothyroidism who presents as a transfer from OSH for management of multifactorial sepsis and abdominal wound.    On 3/2, she underwent repair of ventral hernia with progrip mesh, robotic converted to open. She regained bowel function and was discharged 3/4.    On 3/12, she presented to [facility] with SoA, ultimately diagnosed as PE. She was started on hep gtt and eventually switched to NOAC. She was discharged on 3/16.    On 3/23, she returned to [facility] with anemia 2/2 acute blood loss anemia from vaginal bleeding (uterine fibroids) and upper GI bleed. EGD showed ulcers, no active bleed. Hospital course complicated by ATN requiring temporary HD. She was discharged on 4/11.     She presented to [facility] from her rehab facility on 4/15 with sepsis, which was found to be 2/2 MSSA bacteremia and MDR E coli in urine. She was treated with IV Zosyn, but E coli in urine ctx were resistant to Zosyn. Her open abdominal wound was packed with betadine-soaked Kerlix WTD, changed daily. CT showed extraluminal gas and free fluid. She was stabilized and transferred to [facility] today.    Currently, she is Vietnamese-speaking but not speaking coherently. She feels "bad" but no specific complaints.    PAST MEDICAL & SURGICAL HISTORY:  Obesity  DM (diabetes mellitus)  HLD (hyperlipidemia)  HTN (hypertension)  H/O ventral hernia repair  GERD      MEDICATIONS  (STANDING):  dextrose 10%. 1000 milliLiter(s) (50 mL/Hr) IV Continuous <Continuous>  dextrose 5% + lactated ringers. 1000 milliLiter(s) (100 mL/Hr) IV Continuous <Continuous>  dextrose 5%. 1000 milliLiter(s) (50 mL/Hr) IV Continuous <Continuous>  dextrose 50% Injectable 12.5 Gram(s) IV Push once  dextrose 50% Injectable 25 Gram(s) IV Push once  dextrose 50% Injectable 25 Gram(s) IV Push once  heparin  Injectable 5000 Unit(s) SubCutaneous every 8 hours  insulin lispro (HumaLOG) corrective regimen sliding scale   SubCutaneous three times a day before meals  levothyroxine Injectable 75 MICROGram(s) IV Push at bedtime  meropenem  IVPB 1000 milliGRAM(s) IV Intermittent every 8 hours  oxacillin IVPB        MEDICATIONS  (PRN):  dextrose Gel 1 Dose(s) Oral once PRN Blood Glucose LESS THAN 70 milliGRAM(s)/deciliter  glucagon  Injectable 1 milliGRAM(s) IntraMuscular once PRN Glucose LESS THAN 70 milligrams/deciliter  ondansetron Injectable 4 milliGRAM(s) IV Push every 6 hours PRN Nausea      Allergies    No Known Allergies    FAMILY HISTORY:  No pertinent family history in first degree relatives    ICU Vital Signs Last 24 Hrs  T(C): 35.6 (17 Apr 2018 18:00), Max: 35.6 (17 Apr 2018 18:00)  T(F): 96.1 (17 Apr 2018 18:00), Max: 96.1 (17 Apr 2018 18:00)  HR: 82 (17 Apr 2018 19:00) (82 - 90)  BP: --  BP(mean): --  ABP: 152/58 (17 Apr 2018 19:00) (118/866 - 182/76)  ABP(mean): 90 (17 Apr 2018 19:00) (85 - 120)  RR: 23 (17 Apr 2018 19:00) (12 - 24)  SpO2: 100% (17 Apr 2018 19:00) (99% - 100%)      I&O's Summary    17 Apr 2018 07:01  -  17 Apr 2018 19:12  --------------------------------------------------------  IN: 450 mL / OUT: 110 mL / NET: 340 mL        Nicole:	  [ ] None	[ x] Daily Nicole Order Placed	   Indication:	  [x ] Strict I and O's    [ ] Obstruction     [ ] Incontinence + Stage 3 or 4 Decubitus  Central Line:  [ ] None	   [x ]  Medication / TPN Administration       Physical Exam:  General: mild distress  HEENT: NC/AT, EOMI, normal hearing, tongue dry with brown discoloration, neck supple w/o LAD  Pulmonary: Nonlabored breathing, no respiratory distress, CTA-B  Cardiovascular: NSR, no murmurs  Abdominal: obese, soft, NT/ND, hypoactive BS, no organomegaly. Midline wound 5x5 cm, open and granulating with small center of fibrinous exudate and exposed suture. Severe undermining, and deep half of wound likely an additional 5 cm radius all around. Three kerlix bandages removed from wound upon arrival.   Extremities: WWP, 1+ edema in legs bilaterally  Neuro: A/O x 1, normal motor, unable to assess sensory        LABS:                        8.0    17.9  )-----------( 89       ( 17 Apr 2018 15:45 )             25.7     04-17    153<H>  |  118<H>  |  34<H>  ----------------------------<  111<H>  3.7   |  19<L>  |  1.92<H>    Ca    8.0<L>      17 Apr 2018 15:45  Phos  2.1     04-17  Mg     2.0     04-17    TPro  4.7<L>  /  Alb  2.5<L>  /  TBili  1.1  /  DBili  x   /  AST  18  /  ALT  15  /  AlkPhos  82  04-17    PT/INR - ( 17 Apr 2018 15:45 )   PT: 19.3 sec;   INR: 1.72          PTT - ( 17 Apr 2018 15:45 )  PTT:49.6 sec    CAPILLARY BLOOD GLUCOSE      POCT Blood Glucose.: 112 mg/dL (17 Apr 2018 17:29)    LIVER FUNCTIONS - ( 17 Apr 2018 15:45 )  Alb: 2.5 g/dL / Pro: 4.7 g/dL / ALK PHOS: 82 U/L / ALT: 15 U/L / AST: 18 U/L / GGT: x HPI:  Pt is a 77 yo F with morbid obesity, DM, HTN, and hypothyroidism who presents as a transfer from OSH for management of multifactorial sepsis and abdominal wound.    On 3/2, she underwent repair of ventral hernia with progrip mesh, robotic converted to open. She regained bowel function and was discharged 3/4.    On 3/12, she presented to [facility] with SoA, ultimately diagnosed as PE. She was started on hep gtt and eventually switched to NOAC. She was discharged on 3/16.    On 3/23, she returned to [facility] with anemia 2/2 acute blood loss anemia from vaginal bleeding (uterine fibroids) and upper GI bleed. EGD showed ulcers, no active bleed. Hospital course complicated by ATN requiring temporary HD. HD catheter ultimately removed. She underwent IVC filter placement 3/30. She was discharged on 4/11.     She presented to [facility] from her rehab facility on 4/15 with sepsis, which was found to be 2/2 MSSA bacteremia and MDR E coli in urine. She was treated with IV Zosyn, but E coli in urine ctx were resistant to Zosyn. Her open abdominal wound was packed with betadine-soaked Kerlix WTD, changed daily. CT showed extraluminal gas and free fluid. She was stabilized and transferred to [facility] today.    Currently, she is Salvadorean-speaking but not speaking coherently. She feels "bad" but no specific complaints.    PAST MEDICAL & SURGICAL HISTORY:  Obesity  DM (diabetes mellitus)  HLD (hyperlipidemia)  HTN (hypertension)  H/O ventral hernia repair  GERD      MEDICATIONS  (STANDING):  dextrose 10%. 1000 milliLiter(s) (50 mL/Hr) IV Continuous <Continuous>  dextrose 5% + lactated ringers. 1000 milliLiter(s) (100 mL/Hr) IV Continuous <Continuous>  dextrose 5%. 1000 milliLiter(s) (50 mL/Hr) IV Continuous <Continuous>  dextrose 50% Injectable 12.5 Gram(s) IV Push once  dextrose 50% Injectable 25 Gram(s) IV Push once  dextrose 50% Injectable 25 Gram(s) IV Push once  heparin  Injectable 5000 Unit(s) SubCutaneous every 8 hours  insulin lispro (HumaLOG) corrective regimen sliding scale   SubCutaneous three times a day before meals  levothyroxine Injectable 75 MICROGram(s) IV Push at bedtime  meropenem  IVPB 1000 milliGRAM(s) IV Intermittent every 8 hours  oxacillin IVPB        MEDICATIONS  (PRN):  dextrose Gel 1 Dose(s) Oral once PRN Blood Glucose LESS THAN 70 milliGRAM(s)/deciliter  glucagon  Injectable 1 milliGRAM(s) IntraMuscular once PRN Glucose LESS THAN 70 milligrams/deciliter  ondansetron Injectable 4 milliGRAM(s) IV Push every 6 hours PRN Nausea      Allergies    No Known Allergies    FAMILY HISTORY:  No pertinent family history in first degree relatives    ICU Vital Signs Last 24 Hrs  T(C): 35.6 (17 Apr 2018 18:00), Max: 35.6 (17 Apr 2018 18:00)  T(F): 96.1 (17 Apr 2018 18:00), Max: 96.1 (17 Apr 2018 18:00)  HR: 82 (17 Apr 2018 19:00) (82 - 90)  BP: --  BP(mean): --  ABP: 152/58 (17 Apr 2018 19:00) (118/866 - 182/76)  ABP(mean): 90 (17 Apr 2018 19:00) (85 - 120)  RR: 23 (17 Apr 2018 19:00) (12 - 24)  SpO2: 100% (17 Apr 2018 19:00) (99% - 100%)      I&O's Summary    17 Apr 2018 07:01  -  17 Apr 2018 19:12  --------------------------------------------------------  IN: 450 mL / OUT: 110 mL / NET: 340 mL        Nicole:	  [ ] None	[ x] Daily Nicole Order Placed	   Indication:	  [x ] Strict I and O's    [ ] Obstruction     [ ] Incontinence + Stage 3 or 4 Decubitus  Central Line:  [ ] None	   [x ]  Medication / TPN Administration       Physical Exam:  General: mild distress  HEENT: NC/AT, EOMI, normal hearing, tongue dry with brown discoloration, neck supple w/o LAD  Pulmonary: Nonlabored breathing, no respiratory distress, CTA-B  Cardiovascular: NSR, no murmurs  Abdominal: obese, soft, NT/ND, hypoactive BS, no organomegaly. Midline wound 5x5 cm, open and granulating with small center of fibrinous exudate and exposed suture. Severe undermining, and deep half of wound likely an additional 5 cm radius all around. Three kerlix bandages removed from wound upon arrival.   Extremities: WWP, 1+ edema in legs bilaterally  Neuro: A/O x 1, normal motor, unable to assess sensory        LABS:                        8.0    17.9  )-----------( 89       ( 17 Apr 2018 15:45 )             25.7     04-17    153<H>  |  118<H>  |  34<H>  ----------------------------<  111<H>  3.7   |  19<L>  |  1.92<H>    Ca    8.0<L>      17 Apr 2018 15:45  Phos  2.1     04-17  Mg     2.0     04-17    TPro  4.7<L>  /  Alb  2.5<L>  /  TBili  1.1  /  DBili  x   /  AST  18  /  ALT  15  /  AlkPhos  82  04-17    PT/INR - ( 17 Apr 2018 15:45 )   PT: 19.3 sec;   INR: 1.72          PTT - ( 17 Apr 2018 15:45 )  PTT:49.6 sec    CAPILLARY BLOOD GLUCOSE      POCT Blood Glucose.: 112 mg/dL (17 Apr 2018 17:29)    LIVER FUNCTIONS - ( 17 Apr 2018 15:45 )  Alb: 2.5 g/dL / Pro: 4.7 g/dL / ALK PHOS: 82 U/L / ALT: 15 U/L / AST: 18 U/L / GGT: x

## 2018-04-17 NOTE — H&P ADULT - PMH
DM (diabetes mellitus)    GERD (gastroesophageal reflux disease)    HLD (hyperlipidemia)    HTN (hypertension)    Hypothyroidism    Obesity

## 2018-04-18 ENCOUNTER — APPOINTMENT (OUTPATIENT)
Dept: NEPHROLOGY | Facility: CLINIC | Age: 78
End: 2018-04-18

## 2018-04-18 LAB
ANION GAP SERPL CALC-SCNC: 15 MMOL/L — SIGNIFICANT CHANGE UP (ref 5–17)
ANION GAP SERPL CALC-SCNC: 16 MMOL/L — SIGNIFICANT CHANGE UP (ref 5–17)
APPEARANCE UR: CLEAR — SIGNIFICANT CHANGE UP
APTT BLD: 54.4 SEC — HIGH (ref 27.5–37.4)
BILIRUB UR-MCNC: NEGATIVE — SIGNIFICANT CHANGE UP
BUN SERPL-MCNC: 32 MG/DL — HIGH (ref 7–23)
BUN SERPL-MCNC: 34 MG/DL — HIGH (ref 7–23)
CALCIUM SERPL-MCNC: 7.2 MG/DL — LOW (ref 8.4–10.5)
CALCIUM SERPL-MCNC: 7.5 MG/DL — LOW (ref 8.4–10.5)
CHLORIDE SERPL-SCNC: 116 MMOL/L — HIGH (ref 96–108)
CHLORIDE SERPL-SCNC: 117 MMOL/L — HIGH (ref 96–108)
CO2 SERPL-SCNC: 17 MMOL/L — LOW (ref 22–31)
CO2 SERPL-SCNC: 19 MMOL/L — LOW (ref 22–31)
COLOR SPEC: YELLOW — SIGNIFICANT CHANGE UP
CREAT SERPL-MCNC: 1.64 MG/DL — HIGH (ref 0.5–1.3)
CREAT SERPL-MCNC: 1.77 MG/DL — HIGH (ref 0.5–1.3)
DIFF PNL FLD: (no result)
GLUCOSE BLDC GLUCOMTR-MCNC: 129 MG/DL — HIGH (ref 70–99)
GLUCOSE BLDC GLUCOMTR-MCNC: 132 MG/DL — HIGH (ref 70–99)
GLUCOSE BLDC GLUCOMTR-MCNC: 70 MG/DL — SIGNIFICANT CHANGE UP (ref 70–99)
GLUCOSE BLDC GLUCOMTR-MCNC: 99 MG/DL — SIGNIFICANT CHANGE UP (ref 70–99)
GLUCOSE SERPL-MCNC: 142 MG/DL — HIGH (ref 70–99)
GLUCOSE SERPL-MCNC: 151 MG/DL — HIGH (ref 70–99)
GLUCOSE UR QL: NEGATIVE — SIGNIFICANT CHANGE UP
HCT VFR BLD CALC: 23.6 % — LOW (ref 34.5–45)
HGB BLD-MCNC: 7.4 G/DL — LOW (ref 11.5–15.5)
INR BLD: 1.59 — HIGH (ref 0.88–1.16)
KETONES UR-MCNC: (no result) MG/DL
LACTATE SERPL-SCNC: 4.6 MMOL/L — CRITICAL HIGH (ref 0.5–2)
LACTATE SERPL-SCNC: 5 MMOL/L — CRITICAL HIGH (ref 0.5–2)
LACTATE SERPL-SCNC: 5.7 MMOL/L — CRITICAL HIGH (ref 0.5–2)
LEUKOCYTE ESTERASE UR-ACNC: (no result)
MAGNESIUM SERPL-MCNC: 1.8 MG/DL — SIGNIFICANT CHANGE UP (ref 1.6–2.6)
MCHC RBC-ENTMCNC: 28.8 PG — SIGNIFICANT CHANGE UP (ref 27–34)
MCHC RBC-ENTMCNC: 31.4 G/DL — LOW (ref 32–36)
MCV RBC AUTO: 91.8 FL — SIGNIFICANT CHANGE UP (ref 80–100)
NITRITE UR-MCNC: NEGATIVE — SIGNIFICANT CHANGE UP
PH UR: 5.5 — SIGNIFICANT CHANGE UP (ref 5–8)
PHOSPHATE SERPL-MCNC: 2.5 MG/DL — SIGNIFICANT CHANGE UP (ref 2.5–4.5)
PLATELET # BLD AUTO: 70 K/UL — LOW (ref 150–400)
POTASSIUM SERPL-MCNC: 3.4 MMOL/L — LOW (ref 3.5–5.3)
POTASSIUM SERPL-MCNC: 3.7 MMOL/L — SIGNIFICANT CHANGE UP (ref 3.5–5.3)
POTASSIUM SERPL-SCNC: 3.4 MMOL/L — LOW (ref 3.5–5.3)
POTASSIUM SERPL-SCNC: 3.7 MMOL/L — SIGNIFICANT CHANGE UP (ref 3.5–5.3)
PROT UR-MCNC: 30 MG/DL
PROTHROM AB SERPL-ACNC: 17.8 SEC — HIGH (ref 9.8–12.7)
RBC # BLD: 2.57 M/UL — LOW (ref 3.8–5.2)
RBC # FLD: 21.1 % — HIGH (ref 10.3–16.9)
SODIUM SERPL-SCNC: 148 MMOL/L — HIGH (ref 135–145)
SODIUM SERPL-SCNC: 152 MMOL/L — HIGH (ref 135–145)
SP GR SPEC: 1.01 — SIGNIFICANT CHANGE UP (ref 1–1.03)
UROBILINOGEN FLD QL: 0.2 E.U./DL — SIGNIFICANT CHANGE UP
WBC # BLD: 10.2 K/UL — SIGNIFICANT CHANGE UP (ref 3.8–10.5)
WBC # FLD AUTO: 10.2 K/UL — SIGNIFICANT CHANGE UP (ref 3.8–10.5)

## 2018-04-18 PROCEDURE — 99233 SBSQ HOSP IP/OBS HIGH 50: CPT | Mod: GC

## 2018-04-18 PROCEDURE — 71045 X-RAY EXAM CHEST 1 VIEW: CPT | Mod: 26,77

## 2018-04-18 PROCEDURE — 71045 X-RAY EXAM CHEST 1 VIEW: CPT | Mod: 26

## 2018-04-18 PROCEDURE — 74176 CT ABD & PELVIS W/O CONTRAST: CPT | Mod: 26

## 2018-04-18 PROCEDURE — 99223 1ST HOSP IP/OBS HIGH 75: CPT

## 2018-04-18 PROCEDURE — 74018 RADEX ABDOMEN 1 VIEW: CPT | Mod: 26

## 2018-04-18 RX ORDER — ARIPIPRAZOLE 15 MG/1
10 TABLET ORAL DAILY
Qty: 0 | Refills: 0 | Status: DISCONTINUED | OUTPATIENT
Start: 2018-04-18 | End: 2018-04-23

## 2018-04-18 RX ORDER — ALBUMIN HUMAN 25 %
250 VIAL (ML) INTRAVENOUS ONCE
Qty: 0 | Refills: 0 | Status: COMPLETED | OUTPATIENT
Start: 2018-04-18 | End: 2018-04-18

## 2018-04-18 RX ORDER — QUETIAPINE FUMARATE 200 MG/1
25 TABLET, FILM COATED ORAL DAILY
Qty: 0 | Refills: 0 | Status: DISCONTINUED | OUTPATIENT
Start: 2018-04-18 | End: 2018-04-20

## 2018-04-18 RX ORDER — FUROSEMIDE 40 MG
20 TABLET ORAL ONCE
Qty: 0 | Refills: 0 | Status: COMPLETED | OUTPATIENT
Start: 2018-04-18 | End: 2018-04-18

## 2018-04-18 RX ORDER — ESCITALOPRAM OXALATE 10 MG/1
10 TABLET, FILM COATED ORAL DAILY
Qty: 0 | Refills: 0 | Status: DISCONTINUED | OUTPATIENT
Start: 2018-04-18 | End: 2018-04-23

## 2018-04-18 RX ORDER — POTASSIUM CHLORIDE 20 MEQ
20 PACKET (EA) ORAL
Qty: 0 | Refills: 0 | Status: COMPLETED | OUTPATIENT
Start: 2018-04-18 | End: 2018-04-18

## 2018-04-18 RX ORDER — IOHEXOL 300 MG/ML
30 INJECTION, SOLUTION INTRAVENOUS ONCE
Qty: 0 | Refills: 0 | Status: DISCONTINUED | OUTPATIENT
Start: 2018-04-18 | End: 2018-04-18

## 2018-04-18 RX ORDER — DIATRIZOATE MEGLUMINE 180 MG/ML
30 INJECTION, SOLUTION INTRAVESICAL ONCE
Qty: 0 | Refills: 0 | Status: DISCONTINUED | OUTPATIENT
Start: 2018-04-18 | End: 2018-04-18

## 2018-04-18 RX ORDER — MAGNESIUM SULFATE 500 MG/ML
1 VIAL (ML) INJECTION ONCE
Qty: 0 | Refills: 0 | Status: COMPLETED | OUTPATIENT
Start: 2018-04-18 | End: 2018-04-18

## 2018-04-18 RX ORDER — IOHEXOL 300 MG/ML
50 INJECTION, SOLUTION INTRAVENOUS ONCE
Qty: 0 | Refills: 0 | Status: COMPLETED | OUTPATIENT
Start: 2018-04-18 | End: 2018-04-18

## 2018-04-18 RX ORDER — DIATRIZOATE MEGLUMINE 180 MG/ML
30 INJECTION, SOLUTION INTRAVESICAL ONCE
Qty: 0 | Refills: 0 | Status: COMPLETED | OUTPATIENT
Start: 2018-04-18 | End: 2018-04-18

## 2018-04-18 RX ADMIN — ESCITALOPRAM OXALATE 10 MILLIGRAM(S): 10 TABLET, FILM COATED ORAL at 19:41

## 2018-04-18 RX ADMIN — SODIUM CHLORIDE 100 MILLILITER(S): 9 INJECTION, SOLUTION INTRAVENOUS at 05:25

## 2018-04-18 RX ADMIN — HEPARIN SODIUM 5000 UNIT(S): 5000 INJECTION INTRAVENOUS; SUBCUTANEOUS at 22:36

## 2018-04-18 RX ADMIN — IOHEXOL 50 MILLILITER(S): 300 INJECTION, SOLUTION INTRAVENOUS at 15:09

## 2018-04-18 RX ADMIN — PANTOPRAZOLE SODIUM 40 MILLIGRAM(S): 20 TABLET, DELAYED RELEASE ORAL at 11:56

## 2018-04-18 RX ADMIN — ARIPIPRAZOLE 10 MILLIGRAM(S): 15 TABLET ORAL at 19:50

## 2018-04-18 RX ADMIN — Medication 50 MILLIEQUIVALENT(S): at 09:48

## 2018-04-18 RX ADMIN — Medication 125 MILLILITER(S): at 03:19

## 2018-04-18 RX ADMIN — Medication 20 MILLIGRAM(S): at 20:58

## 2018-04-18 RX ADMIN — Medication 100 GRAM(S): at 09:48

## 2018-04-18 RX ADMIN — QUETIAPINE FUMARATE 25 MILLIGRAM(S): 200 TABLET, FILM COATED ORAL at 18:35

## 2018-04-18 RX ADMIN — Medication 100 GRAM(S): at 00:11

## 2018-04-18 RX ADMIN — HEPARIN SODIUM 5000 UNIT(S): 5000 INJECTION INTRAVENOUS; SUBCUTANEOUS at 13:18

## 2018-04-18 RX ADMIN — Medication 75 MICROGRAM(S): at 22:36

## 2018-04-18 RX ADMIN — Medication 100 GRAM(S): at 11:56

## 2018-04-18 RX ADMIN — MEROPENEM 100 MILLIGRAM(S): 1 INJECTION INTRAVENOUS at 05:25

## 2018-04-18 RX ADMIN — Medication 100 GRAM(S): at 15:11

## 2018-04-18 RX ADMIN — Medication 50 MILLILITER(S): at 15:10

## 2018-04-18 RX ADMIN — Medication 100 GRAM(S): at 04:11

## 2018-04-18 RX ADMIN — Medication 100 GRAM(S): at 19:10

## 2018-04-18 RX ADMIN — MEROPENEM 100 MILLIGRAM(S): 1 INJECTION INTRAVENOUS at 18:35

## 2018-04-18 RX ADMIN — Medication 50 MILLIEQUIVALENT(S): at 13:18

## 2018-04-18 RX ADMIN — HEPARIN SODIUM 5000 UNIT(S): 5000 INJECTION INTRAVENOUS; SUBCUTANEOUS at 05:25

## 2018-04-18 RX ADMIN — Medication 50 MILLIEQUIVALENT(S): at 11:56

## 2018-04-18 RX ADMIN — Medication 100 GRAM(S): at 07:33

## 2018-04-18 NOTE — DIETITIAN INITIAL EVALUATION ADULT. - ENERGY NEEDS
Height 67"; .4#; #; 153%IBW  BMI 32.3  Ideal body weight used for calculations as pt >120% of IBW. needs estimated for older age; adjusted for sepsis

## 2018-04-18 NOTE — DIETITIAN INITIAL EVALUATION ADULT. - OTHER INFO
77 yo/female with PMhx DM, HTN, recent ventral hernia repair c/b PE, anemia, GIB, admitted with sepsis 2/2 abdominal wound, UTI and bacteremia. Pt seen in room, on contact precautions, though very disoriented and AMS per RN. Eyes awake but not responding to questions or following commands. Possible plan for dobhoff placement and initiation of TF per RN. Skin noted with B/L buttock stage II pressure injuries and midline abdominal wound. Breathing with NC. NKFA. Weight from previous visit in early April 7# heavier than current; would continue to trend. No education dt AMS.

## 2018-04-18 NOTE — PROGRESS NOTE ADULT - ASSESSMENT
77 y/o F with sepsis from open abdominal wound infection, bacteremia present on admission, and UTI present on admission.    Neuro: prn pain control, Zofran prn, seroquel, aripiprazole, escitalapram   CV: HD stable s/p severe sepsis on D5LR @100 D10 @50, holidng amlodipine  Pulm: Satting well on NC  GI: NPO, protonix, Dobhoff  : Nicole S/p AKF on HD now resolved. UTI (present on admission)  ID:MDR  Ecoli in Urine resistant to Zosyn: Jose L( 4/17-) MSSA in blood: Oxacillin ( 4/17-) Kai virus (good hand washing), f/u Greg sensitivities  Endo: ISS, Synthroid 75 IV, holding medroxyprogesterone  PPx: sqh PE:Sp IVC filter on 3/30  Lines: PIV Rt IJ TLC from OSH ( 4/15-), s/p L Rad Art line( 4/15-4/18)   Wounds: Wound vac to midline incision   PT/OT: Bed rest

## 2018-04-18 NOTE — DIETITIAN INITIAL EVALUATION ADULT. - NS AS NUTRI INTERV ENTERAL NUTRITION
Recommend Glucerna 1.2 Nicholas @ 59mL/hr x 24hrs (route per MD) to provide 1416mL TV, 1699 kcal, 85g protein, 1142mL free h2O, 113% RDI, 1.38g/kg IBW protein. Recommend Volume Based Feeding protocol. Monitor for s/s intolerance; maintain aspiration precautions. Additional free H2O per MD discretion./Volume/Insert enteral feeding tube/Composition/Feeding tube flush/Concentration/Rate/Route/Schedule

## 2018-04-18 NOTE — CONSULT NOTE ADULT - SUBJECTIVE AND OBJECTIVE BOX
HPI:  Pt is a 79 yo F with morbid obesity, DM, HTN, and hypothyroidism who presents as a transfer from OSH for management of multifactorial sepsis and abdominal wound.    On 3/2, she underwent repair of ventral hernia with progrip mesh, robotic converted to open. She regained bowel function and was discharged 3/4.    On 3/12, she presented to [facility] with SoA, ultimately diagnosed as PE. She was started on hep gtt and eventually switched to NOAC. She was discharged on 3/16.    On 3/23, she returned to [facility] with anemia 2/2 acute blood loss anemia from vaginal bleeding (uterine fibroids) and upper GI bleed. EGD showed ulcers, no active bleed. Hospital course complicated by ATN requiring temporary HD. HD catheter ultimately removed. She underwent IVC filter placement 3/30. She was discharged on 4/11.     She presented to [facility] from her rehab facility on 4/15 with sepsis, which was found to be 2/2 MSSA bacteremia and MDR E coli in urine. She was treated with IV Zosyn, but E coli in urine ctx were resistant to Zosyn. Her open abdominal wound was packed with betadine-soaked Kerlix WTD, changed daily. CT showed extraluminal gas and free fluid. She was stabilized and transferred to [facility] today.    Currently, she is Azerbaijani-speaking but not speaking coherently. She feels "bad" but no specific complaints. (17 Apr 2018 21:02)      PAST MEDICAL & SURGICAL HISTORY:  Hypothyroidism  GERD (gastroesophageal reflux disease)  Obesity  DM (diabetes mellitus)  HLD (hyperlipidemia)  HTN (hypertension)  H/O ventral hernia repair        REVIEW OF SYSTEMS:  Negative except for above.       MEDICATIONS  (STANDING):  dextrose 10%. 1000 milliLiter(s) (50 mL/Hr) IV Continuous <Continuous>  dextrose 5% + lactated ringers. 1000 milliLiter(s) (100 mL/Hr) IV Continuous <Continuous>  dextrose 5%. 1000 milliLiter(s) (50 mL/Hr) IV Continuous <Continuous>  dextrose 50% Injectable 12.5 Gram(s) IV Push once  dextrose 50% Injectable 25 Gram(s) IV Push once  dextrose 50% Injectable 25 Gram(s) IV Push once  heparin  Injectable 5000 Unit(s) SubCutaneous every 8 hours  insulin lispro (HumaLOG) corrective regimen sliding scale   SubCutaneous every 6 hours  levothyroxine Injectable 75 MICROGram(s) IV Push at bedtime  magnesium sulfate  IVPB 1 Gram(s) IV Intermittent once  meropenem  IVPB 1000 milliGRAM(s) IV Intermittent every 8 hours  oxacillin IVPB      oxacillin IVPB 2 Gram(s) IV Intermittent every 4 hours  pantoprazole  Injectable 40 milliGRAM(s) IV Push daily  potassium chloride  20 mEq/100 mL IVPB 20 milliEquivalent(s) IV Intermittent every 2 hours    MEDICATIONS  (PRN):  ALBUTerol/ipratropium for Nebulization 3 milliLiter(s) Nebulizer every 6 hours PRN Shortness of Breath and/or Wheezing  dextrose Gel 1 Dose(s) Oral once PRN Blood Glucose LESS THAN 70 milliGRAM(s)/deciliter  glucagon  Injectable 1 milliGRAM(s) IntraMuscular once PRN Glucose LESS THAN 70 milligrams/deciliter  ondansetron Injectable 4 milliGRAM(s) IV Push every 6 hours PRN Nausea      Allergies    No Known Allergies    Intolerances        Vital Signs Last 24 Hrs  T(C): 36.1 (18 Apr 2018 05:18), Max: 36.1 (18 Apr 2018 05:18)  T(F): 97 (18 Apr 2018 05:18), Max: 97 (18 Apr 2018 05:18)  HR: 86 (18 Apr 2018 07:00) (76 - 94)  BP: --  BP(mean): --  RR: 18 (18 Apr 2018 07:00) (9 - 26)  SpO2: 99% (18 Apr 2018 07:00) (98% - 100%)    PHYSICAL EXAM:  Constitutional: NAD. Well-developed, well nourished  HEENT: Conjunctiva clear, no oral lesion, no sinus tenderness on percussion	  Neck: no JVD, no lymphadenopathy, supple  Respiratory: CTAB. No w/r/r.  Cardiovascular: RRR with no murmurs  Gastrointestinal:soft, (+) BS, no HSM, nondistened, nonrigid.  Extremities: LE WWP b/l. No LE edema b/l.   Vascular: 2+ DP pulses b/l    LABS                        7.4    10.2  )-----------( 70       ( 18 Apr 2018 06:09 )             23.6     04-18    148<H>  |  116<H>  |  32<H>  ----------------------------<  142<H>  3.4<L>   |  17<L>  |  1.64<H>    Ca    7.2<L>      18 Apr 2018 06:09  Phos  2.5     04-18  Mg     1.8     04-18    TPro  4.7<L>  /  Alb  2.5<L>  /  TBili  1.1  /  DBili  x   /  AST  18  /  ALT  15  /  AlkPhos  82  04-17    PT/INR - ( 18 Apr 2018 06:09 )   PT: 17.8 sec;   INR: 1.59          PTT - ( 18 Apr 2018 06:09 )  PTT:54.4 sec      MICROBIOLOGY:    RADIOLOGY & ADDITIONAL STUDIES: HPI:  78 year old female, morbidly obesity, DM, HTN, and hypothyroidism who presents as a transfer from OSH for management of multifactorial sepsis and abdominal wound. Patient underwent ventral hernia repair on 3/2 and left open. She was brought back to [facility] multiple times after discharge for post-operative complications of PE, GIB, and ATN.  She was then discharged and sent to [facility]. She presented to [facility] after rehab, admitted there with sepsis, found to have MSSA bacteremia and MDR E. Coli in urine. She was treated with Zosyn, however, as per primary team, records indicate cultures were resistant to Zosyn. Patient left with an open abdominal wound, which was packed. Underwent CT which showed "extraluminal gas and free fluid". Patient stabilized and transferred to [facility] for further management. Infectious disease consulted for management of MSSA bacteremia and MDR E. Coli. Patient arrived with severe sepsis, temp 94.5, WBX 17.9, lactate 5.8, and elevated Cr 1.7. Patient seen and examined at bedside.       PAST MEDICAL & SURGICAL HISTORY:  Hypothyroidism  GERD (gastroesophageal reflux disease)  Obesity  DM (diabetes mellitus)  HLD (hyperlipidemia)  HTN (hypertension)  H/O ventral hernia repair        REVIEW OF SYSTEMS:  Negative except for above.       MEDICATIONS  (STANDING):  dextrose 10%. 1000 milliLiter(s) (50 mL/Hr) IV Continuous <Continuous>  dextrose 5% + lactated ringers. 1000 milliLiter(s) (100 mL/Hr) IV Continuous <Continuous>  dextrose 5%. 1000 milliLiter(s) (50 mL/Hr) IV Continuous <Continuous>  dextrose 50% Injectable 12.5 Gram(s) IV Push once  dextrose 50% Injectable 25 Gram(s) IV Push once  dextrose 50% Injectable 25 Gram(s) IV Push once  heparin  Injectable 5000 Unit(s) SubCutaneous every 8 hours  insulin lispro (HumaLOG) corrective regimen sliding scale   SubCutaneous every 6 hours  levothyroxine Injectable 75 MICROGram(s) IV Push at bedtime  magnesium sulfate  IVPB 1 Gram(s) IV Intermittent once  meropenem  IVPB 1000 milliGRAM(s) IV Intermittent every 8 hours  oxacillin IVPB      oxacillin IVPB 2 Gram(s) IV Intermittent every 4 hours  pantoprazole  Injectable 40 milliGRAM(s) IV Push daily  potassium chloride  20 mEq/100 mL IVPB 20 milliEquivalent(s) IV Intermittent every 2 hours    MEDICATIONS  (PRN):  ALBUTerol/ipratropium for Nebulization 3 milliLiter(s) Nebulizer every 6 hours PRN Shortness of Breath and/or Wheezing  dextrose Gel 1 Dose(s) Oral once PRN Blood Glucose LESS THAN 70 milliGRAM(s)/deciliter  glucagon  Injectable 1 milliGRAM(s) IntraMuscular once PRN Glucose LESS THAN 70 milligrams/deciliter  ondansetron Injectable 4 milliGRAM(s) IV Push every 6 hours PRN Nausea      Allergies    No Known Allergies    Intolerances        Vital Signs Last 24 Hrs  T(C): 36.1 (18 Apr 2018 05:18), Max: 36.1 (18 Apr 2018 05:18)  T(F): 97 (18 Apr 2018 05:18), Max: 97 (18 Apr 2018 05:18)  HR: 86 (18 Apr 2018 07:00) (76 - 94)  BP: --  BP(mean): --  RR: 18 (18 Apr 2018 07:00) (9 - 26)  SpO2: 99% (18 Apr 2018 07:00) (98% - 100%)    PHYSICAL EXAM:  Constitutional: NAD. Well-developed, well nourished  HEENT: Conjunctiva clear, no oral lesion, no sinus tenderness on percussion	  Neck: no JVD, no lymphadenopathy, supple  Respiratory: CTAB. No w/r/r.  Cardiovascular: RRR with no murmurs  Gastrointestinal:soft, (+) BS, no HSM, nondistened, nonrigid.  Extremities: LE WWP b/l. No LE edema b/l.   Vascular: 2+ DP pulses b/l    LABS                        7.4    10.2  )-----------( 70       ( 18 Apr 2018 06:09 )             23.6     04-18    148<H>  |  116<H>  |  32<H>  ----------------------------<  142<H>  3.4<L>   |  17<L>  |  1.64<H>    Ca    7.2<L>      18 Apr 2018 06:09  Phos  2.5     04-18  Mg     1.8     04-18    TPro  4.7<L>  /  Alb  2.5<L>  /  TBili  1.1  /  DBili  x   /  AST  18  /  ALT  15  /  AlkPhos  82  04-17    PT/INR - ( 18 Apr 2018 06:09 )   PT: 17.8 sec;   INR: 1.59          PTT - ( 18 Apr 2018 06:09 )  PTT:54.4 sec      MICROBIOLOGY:    RADIOLOGY & ADDITIONAL STUDIES: HPI:  78 year old female, morbidly obesity, DM, HTN, and hypothyroidism who presents as a transfer from OSH for management of multifactorial sepsis and abdominal wound. Patient underwent ventral hernia repair on 3/2 and left open. She was brought back to [facility] multiple times after discharge for post-operative complications of PE, GIB, and ATN.  She was then discharged and sent to [facility]. She presented to [facility] after rehab, admitted there with sepsis, found to have MSSA bacteremia and MDR E. Coli in urine. She was treated with Zosyn, however, as per primary team, records indicate cultures were resistant to Zosyn. Patient left with an open abdominal wound, which was packed. Underwent CT which showed "extraluminal gas and free fluid". Patient stabilized and transferred to [facility] for further management. Infectious disease consulted for management of MSSA bacteremia and MDR E. Coli. Patient arrived with severe sepsis, temp 94.5, WBX 17.9, lactate 5.8, and elevated Cr 1.7. Patient seen and examined at bedside. Awake,       PAST MEDICAL & SURGICAL HISTORY:  Hypothyroidism  GERD (gastroesophageal reflux disease)  Obesity  DM (diabetes mellitus)  HLD (hyperlipidemia)  HTN (hypertension)  H/O ventral hernia repair        REVIEW OF SYSTEMS:  Negative except for above.       MEDICATIONS  (STANDING):  dextrose 10%. 1000 milliLiter(s) (50 mL/Hr) IV Continuous <Continuous>  dextrose 5% + lactated ringers. 1000 milliLiter(s) (100 mL/Hr) IV Continuous <Continuous>  dextrose 5%. 1000 milliLiter(s) (50 mL/Hr) IV Continuous <Continuous>  dextrose 50% Injectable 12.5 Gram(s) IV Push once  dextrose 50% Injectable 25 Gram(s) IV Push once  dextrose 50% Injectable 25 Gram(s) IV Push once  heparin  Injectable 5000 Unit(s) SubCutaneous every 8 hours  insulin lispro (HumaLOG) corrective regimen sliding scale   SubCutaneous every 6 hours  levothyroxine Injectable 75 MICROGram(s) IV Push at bedtime  magnesium sulfate  IVPB 1 Gram(s) IV Intermittent once  meropenem  IVPB 1000 milliGRAM(s) IV Intermittent every 8 hours  oxacillin IVPB      oxacillin IVPB 2 Gram(s) IV Intermittent every 4 hours  pantoprazole  Injectable 40 milliGRAM(s) IV Push daily  potassium chloride  20 mEq/100 mL IVPB 20 milliEquivalent(s) IV Intermittent every 2 hours    MEDICATIONS  (PRN):  ALBUTerol/ipratropium for Nebulization 3 milliLiter(s) Nebulizer every 6 hours PRN Shortness of Breath and/or Wheezing  dextrose Gel 1 Dose(s) Oral once PRN Blood Glucose LESS THAN 70 milliGRAM(s)/deciliter  glucagon  Injectable 1 milliGRAM(s) IntraMuscular once PRN Glucose LESS THAN 70 milligrams/deciliter  ondansetron Injectable 4 milliGRAM(s) IV Push every 6 hours PRN Nausea      Allergies    No Known Allergies    Intolerances        Vital Signs Last 24 Hrs  T(C): 36.1 (18 Apr 2018 05:18), Max: 36.1 (18 Apr 2018 05:18)  T(F): 97 (18 Apr 2018 05:18), Max: 97 (18 Apr 2018 05:18)  HR: 86 (18 Apr 2018 07:00) (76 - 94)  BP: --  BP(mean): --  RR: 18 (18 Apr 2018 07:00) (9 - 26)  SpO2: 99% (18 Apr 2018 07:00) (98% - 100%)    PHYSICAL EXAM:  Constitutional: NAD. Well-developed, well nourished  HEENT: Conjunctiva clear, no oral lesion, no sinus tenderness on percussion	  Neck: no JVD, no lymphadenopathy, supple  Respiratory: CTAB. No w/r/r.  Cardiovascular: RRR with no murmurs  Gastrointestinal:soft, (+) BS, no HSM, nondistened, nonrigid.  Extremities: LE WWP b/l. No LE edema b/l.   Vascular: 2+ DP pulses b/l    LABS                        7.4    10.2  )-----------( 70       ( 18 Apr 2018 06:09 )             23.6     04-18    148<H>  |  116<H>  |  32<H>  ----------------------------<  142<H>  3.4<L>   |  17<L>  |  1.64<H>    Ca    7.2<L>      18 Apr 2018 06:09  Phos  2.5     04-18  Mg     1.8     04-18    TPro  4.7<L>  /  Alb  2.5<L>  /  TBili  1.1  /  DBili  x   /  AST  18  /  ALT  15  /  AlkPhos  82  04-17    PT/INR - ( 18 Apr 2018 06:09 )   PT: 17.8 sec;   INR: 1.59          PTT - ( 18 Apr 2018 06:09 )  PTT:54.4 sec      MICROBIOLOGY:    RADIOLOGY & ADDITIONAL STUDIES: HPI:  78 year old female, morbidly obesity, DM, HTN, and hypothyroidism who presents as a transfer from OSH for management of multifactorial sepsis and abdominal wound. Patient underwent ventral hernia repair on 3/2 and left open. She was brought back to [facility] multiple times after discharge for post-operative complications of PE, GIB, and ATN.  She was then discharged and sent to [facility]. She presented to [facility] after rehab, admitted there with sepsis, found to have MSSA bacteremia and MDR E. Coli in urine. She was treated with Zosyn, however, as per primary team, records indicate cultures were resistant to Zosyn. Patient left with an open abdominal wound, which was packed. Underwent CT which showed "extraluminal gas and free fluid". Patient stabilized and transferred to [facility] for further management. Infectious disease consulted for management of MSSA bacteremia and MDR E. Coli. Patient arrived with severe sepsis, temp 94.5, WBC 17.9, lactate 5.8, and elevated Cr 1.7. Patient seen and examined at bedside. Awake, oriented x 0. Unable to obtain ROS.       PAST MEDICAL & SURGICAL HISTORY:  Hypothyroidism  GERD (gastroesophageal reflux disease)  Obesity  DM (diabetes mellitus)  HLD (hyperlipidemia)  HTN (hypertension)  H/O ventral hernia repair        REVIEW OF SYSTEMS:  Negative except for above.       MEDICATIONS  (STANDING):  dextrose 10%. 1000 milliLiter(s) (50 mL/Hr) IV Continuous <Continuous>  dextrose 5% + lactated ringers. 1000 milliLiter(s) (100 mL/Hr) IV Continuous <Continuous>  dextrose 5%. 1000 milliLiter(s) (50 mL/Hr) IV Continuous <Continuous>  dextrose 50% Injectable 12.5 Gram(s) IV Push once  dextrose 50% Injectable 25 Gram(s) IV Push once  dextrose 50% Injectable 25 Gram(s) IV Push once  heparin  Injectable 5000 Unit(s) SubCutaneous every 8 hours  insulin lispro (HumaLOG) corrective regimen sliding scale   SubCutaneous every 6 hours  levothyroxine Injectable 75 MICROGram(s) IV Push at bedtime  magnesium sulfate  IVPB 1 Gram(s) IV Intermittent once  meropenem  IVPB 1000 milliGRAM(s) IV Intermittent every 8 hours  oxacillin IVPB      oxacillin IVPB 2 Gram(s) IV Intermittent every 4 hours  pantoprazole  Injectable 40 milliGRAM(s) IV Push daily  potassium chloride  20 mEq/100 mL IVPB 20 milliEquivalent(s) IV Intermittent every 2 hours    MEDICATIONS  (PRN):  ALBUTerol/ipratropium for Nebulization 3 milliLiter(s) Nebulizer every 6 hours PRN Shortness of Breath and/or Wheezing  dextrose Gel 1 Dose(s) Oral once PRN Blood Glucose LESS THAN 70 milliGRAM(s)/deciliter  glucagon  Injectable 1 milliGRAM(s) IntraMuscular once PRN Glucose LESS THAN 70 milligrams/deciliter  ondansetron Injectable 4 milliGRAM(s) IV Push every 6 hours PRN Nausea      Allergies    No Known Allergies    Intolerances        Vital Signs Last 24 Hrs  T(C): 36.1 (18 Apr 2018 05:18), Max: 36.1 (18 Apr 2018 05:18)  T(F): 97 (18 Apr 2018 05:18), Max: 97 (18 Apr 2018 05:18)  HR: 86 (18 Apr 2018 07:00) (76 - 94)  BP: --  BP(mean): --  RR: 18 (18 Apr 2018 07:00) (9 - 26)  SpO2: 99% (18 Apr 2018 07:00) (98% - 100%)    PHYSICAL EXAM:  Constitutional: NAD. Morbidly obese. Non-toxic appearing   HEENT: Conjunctiva clear, no oral lesion, no sinus tenderness on percussion	  Respiratory: poor inspiratory effort. decreased at the bases. no wheezing or crackles   Cardiovascular: RRR with no murmurs  Gastrointestinal: obese, midline surgical open 5x5 wound. packed with kerlix bandages.   Extremities: 1+ pitting edema  Neuro" AAOx0-1     LABS                        7.4    10.2  )-----------( 70       ( 18 Apr 2018 06:09 )             23.6     04-18    148<H>  |  116<H>  |  32<H>  ----------------------------<  142<H>  3.4<L>   |  17<L>  |  1.64<H>    Ca    7.2<L>      18 Apr 2018 06:09  Phos  2.5     04-18  Mg     1.8     04-18    TPro  4.7<L>  /  Alb  2.5<L>  /  TBili  1.1  /  DBili  x   /  AST  18  /  ALT  15  /  AlkPhos  82  04-17    PT/INR - ( 18 Apr 2018 06:09 )   PT: 17.8 sec;   INR: 1.59          PTT - ( 18 Apr 2018 06:09 )  PTT:54.4 sec      MICROBIOLOGY:  MICRO RESULTS FROM OUTSIDE HOSPITAL GROWING MSSA BACTEREMIA AND MDR E.COLI IN URINE    RADIOLOGY & ADDITIONAL STUDIES: HPI:  78 year old female, morbidly obesity, DM, HTN, and hypothyroidism who presents as a transfer from OSH for management of multifactorial sepsis and abdominal wound. Patient underwent ventral hernia repair on 3/2 (wound dehiscences 3/24). She was brought back to [facility] multiple times after discharge for post-operative complications of PE, GIB, and ATN.  She was then discharged and sent to [facility]. She presented to [facility] after rehab, admitted there with sepsis, found to have MSSA bacteremia and MDR E. Coli in urine. She was treated with Zosyn, however, as per primary team, records indicate cultures were resistant to Zosyn. Patient left with an open abdominal wound, which was packed. Underwent CT which showed "extraluminal gas and free fluid". Patient stabilized and transferred to [facility] for further management. Infectious disease consulted for management of MSSA bacteremia and MDR E. Coli. Patient arrived with severe sepsis, temp 94.5, WBC 17.9, lactate 5.8, and elevated Cr 1.7. Patient seen and examined at bedside. Awake, oriented x 0. Unable to obtain ROS.       PAST MEDICAL & SURGICAL HISTORY:  Hypothyroidism  GERD (gastroesophageal reflux disease)  Obesity  DM (diabetes mellitus)  HLD (hyperlipidemia)  HTN (hypertension)  H/O ventral hernia repair        REVIEW OF SYSTEMS:  Negative except for above.       MEDICATIONS  (STANDING):  dextrose 10%. 1000 milliLiter(s) (50 mL/Hr) IV Continuous <Continuous>  dextrose 5% + lactated ringers. 1000 milliLiter(s) (100 mL/Hr) IV Continuous <Continuous>  dextrose 5%. 1000 milliLiter(s) (50 mL/Hr) IV Continuous <Continuous>  dextrose 50% Injectable 12.5 Gram(s) IV Push once  dextrose 50% Injectable 25 Gram(s) IV Push once  dextrose 50% Injectable 25 Gram(s) IV Push once  heparin  Injectable 5000 Unit(s) SubCutaneous every 8 hours  insulin lispro (HumaLOG) corrective regimen sliding scale   SubCutaneous every 6 hours  levothyroxine Injectable 75 MICROGram(s) IV Push at bedtime  magnesium sulfate  IVPB 1 Gram(s) IV Intermittent once  meropenem  IVPB 1000 milliGRAM(s) IV Intermittent every 8 hours  oxacillin IVPB      oxacillin IVPB 2 Gram(s) IV Intermittent every 4 hours  pantoprazole  Injectable 40 milliGRAM(s) IV Push daily  potassium chloride  20 mEq/100 mL IVPB 20 milliEquivalent(s) IV Intermittent every 2 hours    MEDICATIONS  (PRN):  ALBUTerol/ipratropium for Nebulization 3 milliLiter(s) Nebulizer every 6 hours PRN Shortness of Breath and/or Wheezing  dextrose Gel 1 Dose(s) Oral once PRN Blood Glucose LESS THAN 70 milliGRAM(s)/deciliter  glucagon  Injectable 1 milliGRAM(s) IntraMuscular once PRN Glucose LESS THAN 70 milligrams/deciliter  ondansetron Injectable 4 milliGRAM(s) IV Push every 6 hours PRN Nausea      Allergies    No Known Allergies    Intolerances        Vital Signs Last 24 Hrs  T(C): 36.1 (18 Apr 2018 05:18), Max: 36.1 (18 Apr 2018 05:18)  T(F): 97 (18 Apr 2018 05:18), Max: 97 (18 Apr 2018 05:18)  HR: 86 (18 Apr 2018 07:00) (76 - 94)  BP: --  BP(mean): --  RR: 18 (18 Apr 2018 07:00) (9 - 26)  SpO2: 99% (18 Apr 2018 07:00) (98% - 100%)    PHYSICAL EXAM:  Constitutional: NAD. Morbidly obese. Non-toxic appearing   HEENT: Conjunctiva clear, no oral lesion, no sinus tenderness on percussion	  Respiratory: poor inspiratory effort. decreased at the bases. no wheezing or crackles   Cardiovascular: RRR with no murmurs  Gastrointestinal: obese, midline surgical open 5x5 wound. packed with kerlix bandages.   Extremities: 1+ pitting edema  Neuro" AAOx0-1     LABS                        7.4    10.2  )-----------( 70       ( 18 Apr 2018 06:09 )             23.6     04-18    148<H>  |  116<H>  |  32<H>  ----------------------------<  142<H>  3.4<L>   |  17<L>  |  1.64<H>    Ca    7.2<L>      18 Apr 2018 06:09  Phos  2.5     04-18  Mg     1.8     04-18    TPro  4.7<L>  /  Alb  2.5<L>  /  TBili  1.1  /  DBili  x   /  AST  18  /  ALT  15  /  AlkPhos  82  04-17    PT/INR - ( 18 Apr 2018 06:09 )   PT: 17.8 sec;   INR: 1.59          PTT - ( 18 Apr 2018 06:09 )  PTT:54.4 sec      MICROBIOLOGY:  MICRO RESULTS FROM OUTSIDE HOSPITAL GROWING MSSA BACTEREMIA AND MDR E.COLI IN URINE    RADIOLOGY & ADDITIONAL STUDIES:

## 2018-04-18 NOTE — PROGRESS NOTE ADULT - ATTENDING COMMENTS
Patient seen and examined with house-staff during bedside rounds.  Resident note read, including vitals, physical findings, laboratory data, and radiological reports.   Revisions included below.  Direct personal management at bed side and extensive interpretation of the data.  Plan was outlined and discussed in details with the housestaff.  Decision making of high complexity  Action taken for acute disease activity to reflect the level of care provided:  - medication reconciliation  - review laboratory data   management of sepsis  - management of bacteremia  - wound care  - CT scan of abdomen  - ID consult

## 2018-04-18 NOTE — PROGRESS NOTE ADULT - SUBJECTIVE AND OBJECTIVE BOX
24 hr events:    SUBJECTIVE:    Flatus: [ ] YES [ ] NO             Bowel Movement: [ ] YES [ ] NO  Pain (0-10):            Pain Control Adequate: [ ] YES [ ] NO  Nausea: [ ] YES [ ] NO            Vomiting: [ ] YES [ ] NO  Diarrhea: [ ] YES [ ] NO         Constipation: [ ] YES [ ] NO     Chest Pain: [ ] YES [ ] NO    SOB:  [ ] YES [ ] NO    MEDICATIONS  (STANDING):  dextrose 10%. 1000 milliLiter(s) (50 mL/Hr) IV Continuous <Continuous>  dextrose 5% + lactated ringers. 1000 milliLiter(s) (100 mL/Hr) IV Continuous <Continuous>  dextrose 5%. 1000 milliLiter(s) (50 mL/Hr) IV Continuous <Continuous>  dextrose 50% Injectable 12.5 Gram(s) IV Push once  dextrose 50% Injectable 25 Gram(s) IV Push once  dextrose 50% Injectable 25 Gram(s) IV Push once  heparin  Injectable 5000 Unit(s) SubCutaneous every 8 hours  insulin lispro (HumaLOG) corrective regimen sliding scale   SubCutaneous every 6 hours  levothyroxine Injectable 75 MICROGram(s) IV Push at bedtime  magnesium sulfate  IVPB 1 Gram(s) IV Intermittent once  meropenem  IVPB 1000 milliGRAM(s) IV Intermittent every 8 hours  oxacillin IVPB      oxacillin IVPB 2 Gram(s) IV Intermittent every 4 hours  pantoprazole  Injectable 40 milliGRAM(s) IV Push daily  potassium chloride  20 mEq/100 mL IVPB 20 milliEquivalent(s) IV Intermittent every 2 hours    MEDICATIONS  (PRN):  ALBUTerol/ipratropium for Nebulization 3 milliLiter(s) Nebulizer every 6 hours PRN Shortness of Breath and/or Wheezing  dextrose Gel 1 Dose(s) Oral once PRN Blood Glucose LESS THAN 70 milliGRAM(s)/deciliter  glucagon  Injectable 1 milliGRAM(s) IntraMuscular once PRN Glucose LESS THAN 70 milligrams/deciliter  ondansetron Injectable 4 milliGRAM(s) IV Push every 6 hours PRN Nausea      Nicole:	  [ ] None	[ ] Daily Nicole Order Placed	   Indication:	  [ ] Strict I and O's    [ ] Obstruction     [ ] Incontinence + Stage 3 or 4 Decubitus  Central Line:  [ ] None	   [ ]  Medication / TPN Administration     Drips:     ICU Vital Signs Last 24 Hrs  T(C): 36.1 (18 Apr 2018 05:18), Max: 36.1 (18 Apr 2018 05:18)  T(F): 97 (18 Apr 2018 05:18), Max: 97 (18 Apr 2018 05:18)  HR: 86 (18 Apr 2018 07:00) (76 - 94)  BP: --  BP(mean): --  ABP: 152/64 (18 Apr 2018 07:00) (118/866 - 182/76)  ABP(mean): 98 (18 Apr 2018 07:00) (85 - 120)  RR: 18 (18 Apr 2018 07:00) (9 - 26)  SpO2: 99% (18 Apr 2018 07:00) (98% - 100%)      Physical Exam:  General: NAD  HEENT: NC/AT, EOMI, PERRLA, normal hearing, no oral lesions, neck supple w/o LAD  Pulmonary: Nonlabored breathing, no respiratory distress, CTA-B  Cardiovascular: NSR, no murmurs  Abdominal: soft, NT/ND, +BS, no organomegaly  Extremities: WWP, 5/5 strength x 4, no clubbing/cyanosis/edema  Neuro: A/O x3, CNs II-XII grossly intact, normal motor/sensation, no focal deficits  Pulses: palpable distal pulses    Lines/tubes/drains:    Vent settings:      I&O's Summary    17 Apr 2018 07:01  -  18 Apr 2018 07:00  --------------------------------------------------------  IN: 3100 mL / OUT: 725 mL / NET: 2375 mL        LABS:                        7.4    10.2  )-----------( 70       ( 18 Apr 2018 06:09 )             23.6     04-18    148<H>  |  116<H>  |  32<H>  ----------------------------<  142<H>  3.4<L>   |  17<L>  |  1.64<H>    Ca    7.2<L>      18 Apr 2018 06:09  Phos  2.5     04-18  Mg     1.8     04-18    TPro  4.7<L>  /  Alb  2.5<L>  /  TBili  1.1  /  DBili  x   /  AST  18  /  ALT  15  /  AlkPhos  82  04-17    PT/INR - ( 18 Apr 2018 06:09 )   PT: 17.8 sec;   INR: 1.59          PTT - ( 18 Apr 2018 06:09 )  PTT:54.4 sec    CAPILLARY BLOOD GLUCOSE      POCT Blood Glucose.: 132 mg/dL (18 Apr 2018 06:20)  POCT Blood Glucose.: 112 mg/dL (17 Apr 2018 17:29)    LIVER FUNCTIONS - ( 17 Apr 2018 15:45 )  Alb: 2.5 g/dL / Pro: 4.7 g/dL / ALK PHOS: 82 U/L / ALT: 15 U/L / AST: 18 U/L / GGT: x             Cultures:    RADIOLOGY & ADDITIONAL STUDIES: 24 hr events:  o/n: lactate 5.7--> 5.7. Venous sat 80. given albumin 5% bolus.   4/17:admitted from [facility] for  LA 5.8 repeat @8pm     SUBJECTIVE: Nonconversant incomprehensible speech even with . RoS deferred.    MEDICATIONS  (STANDING):  dextrose 10%. 1000 milliLiter(s) (50 mL/Hr) IV Continuous <Continuous>  dextrose 5% + lactated ringers. 1000 milliLiter(s) (100 mL/Hr) IV Continuous <Continuous>  dextrose 5%. 1000 milliLiter(s) (50 mL/Hr) IV Continuous <Continuous>  dextrose 50% Injectable 12.5 Gram(s) IV Push once  dextrose 50% Injectable 25 Gram(s) IV Push once  dextrose 50% Injectable 25 Gram(s) IV Push once  heparin  Injectable 5000 Unit(s) SubCutaneous every 8 hours  insulin lispro (HumaLOG) corrective regimen sliding scale   SubCutaneous every 6 hours  levothyroxine Injectable 75 MICROGram(s) IV Push at bedtime  magnesium sulfate  IVPB 1 Gram(s) IV Intermittent once  meropenem  IVPB 1000 milliGRAM(s) IV Intermittent every 8 hours  oxacillin IVPB      oxacillin IVPB 2 Gram(s) IV Intermittent every 4 hours  pantoprazole  Injectable 40 milliGRAM(s) IV Push daily  potassium chloride  20 mEq/100 mL IVPB 20 milliEquivalent(s) IV Intermittent every 2 hours    MEDICATIONS  (PRN):  ALBUTerol/ipratropium for Nebulization 3 milliLiter(s) Nebulizer every 6 hours PRN Shortness of Breath and/or Wheezing  dextrose Gel 1 Dose(s) Oral once PRN Blood Glucose LESS THAN 70 milliGRAM(s)/deciliter  glucagon  Injectable 1 milliGRAM(s) IntraMuscular once PRN Glucose LESS THAN 70 milligrams/deciliter  ondansetron Injectable 4 milliGRAM(s) IV Push every 6 hours PRN Nausea      Nicole:	  [ ] None	[ x] Daily Nicole Order Placed	   Indication:	  [ x] Strict I and O's    [ ] Obstruction     [ ] Incontinence + Stage 3 or 4 Decubitus  Central Line:  [ ] None	   [x ]  Medication / TPN Administration         ICU Vital Signs Last 24 Hrs  T(C): 36.1 (18 Apr 2018 05:18), Max: 36.1 (18 Apr 2018 05:18)  T(F): 97 (18 Apr 2018 05:18), Max: 97 (18 Apr 2018 05:18)  HR: 86 (18 Apr 2018 07:00) (76 - 94)  BP: --  BP(mean): --  ABP: 152/64 (18 Apr 2018 07:00) (118/866 - 182/76)  ABP(mean): 98 (18 Apr 2018 07:00) (85 - 120)  RR: 18 (18 Apr 2018 07:00) (9 - 26)  SpO2: 99% (18 Apr 2018 07:00) (98% - 100%)      Physical Exam:  General: mild distress  HEENT: NC/AT, EOMI, normal hearing, tongue dry with brown discoloration, neck supple w/o LAD  Pulmonary: Nonlabored breathing, no respiratory distress, CTA-B  Cardiovascular: NSR, no murmurs  Abdominal: obese, soft, NT/ND, hypoactive BS, no organomegaly. Midline wound 5x5 cm, open and granulating with small center of fibrinous exudate. Severe undermining, and deep half of wound likely an additional 5 cm radius all around. Wound vac in place, functioning.   Extremities: WWP, 1+ edema in legs bilaterally  Neuro: A/O x 1, normal motor, unable to assess sensory, non-conversant, incomprehensible speech      I&O's Summary    17 Apr 2018 07:01  -  18 Apr 2018 07:00  --------------------------------------------------------  IN: 3100 mL / OUT: 725 mL / NET: 2375 mL        LABS:                        7.4    10.2  )-----------( 70       ( 18 Apr 2018 06:09 )             23.6     04-18    148<H>  |  116<H>  |  32<H>  ----------------------------<  142<H>  3.4<L>   |  17<L>  |  1.64<H>    Ca    7.2<L>      18 Apr 2018 06:09  Phos  2.5     04-18  Mg     1.8     04-18    TPro  4.7<L>  /  Alb  2.5<L>  /  TBili  1.1  /  DBili  x   /  AST  18  /  ALT  15  /  AlkPhos  82  04-17    PT/INR - ( 18 Apr 2018 06:09 )   PT: 17.8 sec;   INR: 1.59          PTT - ( 18 Apr 2018 06:09 )  PTT:54.4 sec    CAPILLARY BLOOD GLUCOSE      POCT Blood Glucose.: 132 mg/dL (18 Apr 2018 06:20)  POCT Blood Glucose.: 112 mg/dL (17 Apr 2018 17:29)    LIVER FUNCTIONS - ( 17 Apr 2018 15:45 )  Alb: 2.5 g/dL / Pro: 4.7 g/dL / ALK PHOS: 82 U/L / ALT: 15 U/L / AST: 18 U/L / GGT: x

## 2018-04-18 NOTE — CONSULT NOTE ADULT - ASSESSMENT
78 year old female, morbidly obesity, DM, HTN, and hypothyroidism who presents as a transfer from OSH for management of multifactorial sepsis and open abdominal wound. Patient found to have MDR E.Coli and MSSA Bacteremia. Currently on Meropenem 2g q8h and Oxacillin 2g q4h. 78 year old female, morbidly obesity, DM, HTN, and hypothyroidism who presents as a transfer from OSH for management of multifactorial sepsis and open abdominal wound. Patient found to have MDR E.Coli and MSSA Bacteremia, origin likely coming from abdomen.     Recommendations:  1. Currently on Meropenem 2g q8h and Oxacillin 2g q4h. Would continue with same management   2. Please remove right IJ line (came with it from [place]) and place new central line  3. TTE to r/o vegetations   4. Obtain CT abdomen/pelvis w/ IV + PO contrast   5. f/u [place] Susceptibilities on MSSA blood culture and MDR E.Coli urine culture   6. Surveillance blood cultures     7. Surgical intervention as per SICU team     ID will follow 78 year old female, morbidly obesity, DM, ventral hernia repair with mesh 3/2 c/b wound dehiscence, recent ATN requiring temporary HD (Permacath removed 4/10), who presents as a transfer from OSH for management of multifactorial sepsis and open abdominal wound. Patient found to have MDR E.Coli and MSSA Bacteremia, origin likely coming from abdomen.     Recommendations:  1. Currently on Meropenem 1g q12h and Oxacillin 2g q4h for now  2. Please remove right IJ line (came with it from [place]) and place new central line  3. TTE to r/o vegetations   4. Obtain CT abdomen/pelvis w/ IV + PO contrast   5. f/u [place] Susceptibilities on MSSA blood culture and MDR E.Coli urine culture   6. Surveillance blood cultures     7. Surgical intervention as per SICU team     ID will follow

## 2018-04-19 LAB
ANION GAP SERPL CALC-SCNC: 13 MMOL/L — SIGNIFICANT CHANGE UP (ref 5–17)
BUN SERPL-MCNC: 33 MG/DL — HIGH (ref 7–23)
CALCIUM SERPL-MCNC: 7.1 MG/DL — LOW (ref 8.4–10.5)
CHLORIDE SERPL-SCNC: 113 MMOL/L — HIGH (ref 96–108)
CO2 SERPL-SCNC: 21 MMOL/L — LOW (ref 22–31)
CREAT SERPL-MCNC: 1.65 MG/DL — HIGH (ref 0.5–1.3)
GLUCOSE BLDC GLUCOMTR-MCNC: 132 MG/DL — HIGH (ref 70–99)
GLUCOSE BLDC GLUCOMTR-MCNC: 60 MG/DL — LOW (ref 70–99)
GLUCOSE BLDC GLUCOMTR-MCNC: 61 MG/DL — LOW (ref 70–99)
GLUCOSE BLDC GLUCOMTR-MCNC: 74 MG/DL — SIGNIFICANT CHANGE UP (ref 70–99)
GLUCOSE BLDC GLUCOMTR-MCNC: 84 MG/DL — SIGNIFICANT CHANGE UP (ref 70–99)
GLUCOSE BLDC GLUCOMTR-MCNC: 96 MG/DL — SIGNIFICANT CHANGE UP (ref 70–99)
GLUCOSE SERPL-MCNC: 101 MG/DL — HIGH (ref 70–99)
HCT VFR BLD CALC: 23.5 % — LOW (ref 34.5–45)
HGB BLD-MCNC: 7.5 G/DL — LOW (ref 11.5–15.5)
LACTATE SERPL-SCNC: 3.5 MMOL/L — HIGH (ref 0.5–2)
LACTATE SERPL-SCNC: 3.6 MMOL/L — HIGH (ref 0.5–2)
MAGNESIUM SERPL-MCNC: 1.9 MG/DL — SIGNIFICANT CHANGE UP (ref 1.6–2.6)
MCHC RBC-ENTMCNC: 28.8 PG — SIGNIFICANT CHANGE UP (ref 27–34)
MCHC RBC-ENTMCNC: 31.9 G/DL — LOW (ref 32–36)
MCV RBC AUTO: 90.4 FL — SIGNIFICANT CHANGE UP (ref 80–100)
PF4 HEPARIN CMPLX AB SER-ACNC: SIGNIFICANT CHANGE UP
PF4 HEPARIN CMPLX AB SERPL QL IA: NEGATIVE — SIGNIFICANT CHANGE UP
PHOSPHATE SERPL-MCNC: 2.7 MG/DL — SIGNIFICANT CHANGE UP (ref 2.5–4.5)
PLATELET # BLD AUTO: 58 K/UL — LOW (ref 150–400)
POTASSIUM SERPL-MCNC: 3.8 MMOL/L — SIGNIFICANT CHANGE UP (ref 3.5–5.3)
POTASSIUM SERPL-SCNC: 3.8 MMOL/L — SIGNIFICANT CHANGE UP (ref 3.5–5.3)
RBC # BLD: 2.6 M/UL — LOW (ref 3.8–5.2)
RBC # FLD: 20.6 % — HIGH (ref 10.3–16.9)
SODIUM SERPL-SCNC: 147 MMOL/L — HIGH (ref 135–145)
WBC # BLD: 9.3 K/UL — SIGNIFICANT CHANGE UP (ref 3.8–10.5)
WBC # FLD AUTO: 9.3 K/UL — SIGNIFICANT CHANGE UP (ref 3.8–10.5)

## 2018-04-19 PROCEDURE — 99233 SBSQ HOSP IP/OBS HIGH 50: CPT | Mod: GC

## 2018-04-19 PROCEDURE — 93306 TTE W/DOPPLER COMPLETE: CPT | Mod: 26

## 2018-04-19 RX ORDER — PANTOPRAZOLE SODIUM 20 MG/1
40 TABLET, DELAYED RELEASE ORAL DAILY
Qty: 0 | Refills: 0 | Status: DISCONTINUED | OUTPATIENT
Start: 2018-04-19 | End: 2018-05-12

## 2018-04-19 RX ORDER — LEVOTHYROXINE SODIUM 125 MCG
150 TABLET ORAL DAILY
Qty: 0 | Refills: 0 | Status: DISCONTINUED | OUTPATIENT
Start: 2018-04-19 | End: 2018-04-20

## 2018-04-19 RX ORDER — DEXTROSE 50 % IN WATER 50 %
50 SYRINGE (ML) INTRAVENOUS ONCE
Qty: 0 | Refills: 0 | Status: COMPLETED | OUTPATIENT
Start: 2018-04-19 | End: 2018-04-19

## 2018-04-19 RX ORDER — POTASSIUM PHOSPHATE, MONOBASIC POTASSIUM PHOSPHATE, DIBASIC 236; 224 MG/ML; MG/ML
15 INJECTION, SOLUTION INTRAVENOUS ONCE
Qty: 0 | Refills: 0 | Status: COMPLETED | OUTPATIENT
Start: 2018-04-19 | End: 2018-04-19

## 2018-04-19 RX ORDER — DEXTROSE 50 % IN WATER 50 %
50 SYRINGE (ML) INTRAVENOUS ONCE
Qty: 0 | Refills: 0 | Status: COMPLETED | OUTPATIENT
Start: 2018-04-19 | End: 2018-04-20

## 2018-04-19 RX ORDER — HEPARIN SODIUM 5000 [USP'U]/ML
5000 INJECTION INTRAVENOUS; SUBCUTANEOUS EVERY 8 HOURS
Qty: 0 | Refills: 0 | Status: DISCONTINUED | OUTPATIENT
Start: 2018-04-19 | End: 2018-04-25

## 2018-04-19 RX ORDER — FONDAPARINUX SODIUM 2.5 MG/.5ML
2.5 INJECTION, SOLUTION SUBCUTANEOUS DAILY
Qty: 0 | Refills: 0 | Status: DISCONTINUED | OUTPATIENT
Start: 2018-04-19 | End: 2018-04-19

## 2018-04-19 RX ORDER — ALBUMIN HUMAN 25 %
250 VIAL (ML) INTRAVENOUS ONCE
Qty: 0 | Refills: 0 | Status: COMPLETED | OUTPATIENT
Start: 2018-04-19 | End: 2018-04-19

## 2018-04-19 RX ORDER — MAGNESIUM SULFATE 500 MG/ML
1 VIAL (ML) INJECTION ONCE
Qty: 0 | Refills: 0 | Status: COMPLETED | OUTPATIENT
Start: 2018-04-19 | End: 2018-04-19

## 2018-04-19 RX ORDER — ALBUMIN HUMAN 25 %
50 VIAL (ML) INTRAVENOUS EVERY 8 HOURS
Qty: 0 | Refills: 0 | Status: DISCONTINUED | OUTPATIENT
Start: 2018-04-19 | End: 2018-05-04

## 2018-04-19 RX ADMIN — PANTOPRAZOLE SODIUM 40 MILLIGRAM(S): 20 TABLET, DELAYED RELEASE ORAL at 11:48

## 2018-04-19 RX ADMIN — FONDAPARINUX SODIUM 2.5 MILLIGRAM(S): 2.5 INJECTION, SOLUTION SUBCUTANEOUS at 11:48

## 2018-04-19 RX ADMIN — Medication 50 MILLILITER(S): at 04:30

## 2018-04-19 RX ADMIN — Medication 100 GRAM(S): at 08:31

## 2018-04-19 RX ADMIN — ARIPIPRAZOLE 10 MILLIGRAM(S): 15 TABLET ORAL at 11:48

## 2018-04-19 RX ADMIN — Medication 100 GRAM(S): at 16:43

## 2018-04-19 RX ADMIN — HEPARIN SODIUM 5000 UNIT(S): 5000 INJECTION INTRAVENOUS; SUBCUTANEOUS at 06:00

## 2018-04-19 RX ADMIN — ESCITALOPRAM OXALATE 10 MILLIGRAM(S): 10 TABLET, FILM COATED ORAL at 11:48

## 2018-04-19 RX ADMIN — Medication 125 MILLILITER(S): at 19:54

## 2018-04-19 RX ADMIN — HEPARIN SODIUM 5000 UNIT(S): 5000 INJECTION INTRAVENOUS; SUBCUTANEOUS at 22:55

## 2018-04-19 RX ADMIN — POTASSIUM PHOSPHATE, MONOBASIC POTASSIUM PHOSPHATE, DIBASIC 62.5 MILLIMOLE(S): 236; 224 INJECTION, SOLUTION INTRAVENOUS at 08:32

## 2018-04-19 RX ADMIN — Medication 100 GRAM(S): at 04:50

## 2018-04-19 RX ADMIN — Medication 100 GRAM(S): at 08:17

## 2018-04-19 RX ADMIN — Medication 150 MICROGRAM(S): at 13:32

## 2018-04-19 RX ADMIN — Medication 50 MILLILITER(S): at 13:33

## 2018-04-19 RX ADMIN — Medication 100 GRAM(S): at 00:31

## 2018-04-19 RX ADMIN — HEPARIN SODIUM 5000 UNIT(S): 5000 INJECTION INTRAVENOUS; SUBCUTANEOUS at 13:43

## 2018-04-19 RX ADMIN — Medication 100 GRAM(S): at 23:40

## 2018-04-19 RX ADMIN — Medication 100 GRAM(S): at 11:49

## 2018-04-19 RX ADMIN — MEROPENEM 100 MILLIGRAM(S): 1 INJECTION INTRAVENOUS at 05:32

## 2018-04-19 RX ADMIN — Medication 100 GRAM(S): at 19:04

## 2018-04-19 RX ADMIN — MEROPENEM 100 MILLIGRAM(S): 1 INJECTION INTRAVENOUS at 17:40

## 2018-04-19 RX ADMIN — QUETIAPINE FUMARATE 25 MILLIGRAM(S): 200 TABLET, FILM COATED ORAL at 11:48

## 2018-04-19 RX ADMIN — Medication 125 MILLILITER(S): at 13:32

## 2018-04-19 RX ADMIN — Medication 50 MILLILITER(S): at 23:03

## 2018-04-19 RX ADMIN — SODIUM CHLORIDE 100 MILLILITER(S): 9 INJECTION, SOLUTION INTRAVENOUS at 00:33

## 2018-04-19 RX ADMIN — Medication 50 MILLILITER(S): at 17:05

## 2018-04-19 NOTE — PROGRESS NOTE ADULT - ATTENDING COMMENTS
I have reviewed the medical record, including laboratory and radiographic studies, examined the patient and discussed the plan with Dr. Valdez, the ID Resident.  Agree with above.  She needs additional surveillance cultures which also will contribute to determination of level of aggression (eg need for KEENA).  Will continue to follow with you – ID service.

## 2018-04-19 NOTE — PROGRESS NOTE ADULT - SUBJECTIVE AND OBJECTIVE BOX
24 hr events:    SUBJECTIVE:    Flatus: [ ] YES [ ] NO             Bowel Movement: [ ] YES [ ] NO  Pain (0-10):            Pain Control Adequate: [ ] YES [ ] NO  Nausea: [ ] YES [ ] NO            Vomiting: [ ] YES [ ] NO  Diarrhea: [ ] YES [ ] NO         Constipation: [ ] YES [ ] NO     Chest Pain: [ ] YES [ ] NO    SOB:  [ ] YES [ ] NO    MEDICATIONS  (STANDING):  ARIPiprazole 10 milliGRAM(s) Oral daily  dextrose 10%. 1000 milliLiter(s) (50 mL/Hr) IV Continuous <Continuous>  dextrose 5%. 1000 milliLiter(s) (50 mL/Hr) IV Continuous <Continuous>  dextrose 50% Injectable 12.5 Gram(s) IV Push once  dextrose 50% Injectable 25 Gram(s) IV Push once  dextrose 50% Injectable 25 Gram(s) IV Push once  escitalopram 10 milliGRAM(s) Oral daily  heparin  Injectable 5000 Unit(s) SubCutaneous every 8 hours  insulin lispro (HumaLOG) corrective regimen sliding scale   SubCutaneous every 6 hours  levothyroxine Injectable 75 MICROGram(s) IV Push at bedtime  magnesium sulfate  IVPB 1 Gram(s) IV Intermittent once  meropenem  IVPB 1000 milliGRAM(s) IV Intermittent every 12 hours  oxacillin IVPB      oxacillin IVPB 2 Gram(s) IV Intermittent every 4 hours  pantoprazole  Injectable 40 milliGRAM(s) IV Push daily  potassium phosphate IVPB 15 milliMole(s) IV Intermittent once  QUEtiapine 25 milliGRAM(s) Oral daily    MEDICATIONS  (PRN):  ALBUTerol/ipratropium for Nebulization 3 milliLiter(s) Nebulizer every 6 hours PRN Shortness of Breath and/or Wheezing  dextrose Gel 1 Dose(s) Oral once PRN Blood Glucose LESS THAN 70 milliGRAM(s)/deciliter  glucagon  Injectable 1 milliGRAM(s) IntraMuscular once PRN Glucose LESS THAN 70 milligrams/deciliter  ondansetron Injectable 4 milliGRAM(s) IV Push every 6 hours PRN Nausea      Nicole:	  [ ] None	[ ] Daily Nicole Order Placed	   Indication:	  [ ] Strict I and O's    [ ] Obstruction     [ ] Incontinence + Stage 3 or 4 Decubitus  Central Line:  [ ] None	   [ ]  Medication / TPN Administration     Drips:     ICU Vital Signs Last 24 Hrs  T(C): 35.9 (2018 06:29), Max: 36.1 (2018 09:51)  T(F): 96.7 (2018 06:29), Max: 97 (2018 09:51)  HR: 82 (2018 08:00) (82 - 98)  BP: 112/61 (2018 08:00) (86/53 - 124/65)  BP(mean): 87 (2018 08:00) (59 - 88)  ABP: 136/64 (2018 13:00) (136/64 - 158/76)  ABP(mean): 88 (2018 13:00) (88 - 106)  RR: 23 (2018 08:00) (8 - 34)  SpO2: 99% (2018 08:00) (98% - 100%)      Physical Exam:  General: NAD  HEENT: NC/AT, EOMI, PERRLA, normal hearing, no oral lesions, neck supple w/o LAD  Pulmonary: Nonlabored breathing, no respiratory distress, CTA-B  Cardiovascular: NSR, no murmurs  Abdominal: soft, NT/ND, +BS, no organomegaly  Extremities: WWP, 5/5 strength x 4, no clubbing/cyanosis/edema  Neuro: A/O x3, CNs II-XII grossly intact, normal motor/sensation, no focal deficits  Pulses: palpable distal pulses    Lines/tubes/drains:    Vent settings:      I&O's Summary    2018 07:01  -  2018 07:00  --------------------------------------------------------  IN: 4640 mL / OUT: 2190 mL / NET: 2450 mL        LABS:                        7.5    9.3   )-----------( 58       ( 2018 06:07 )             23.5     04-19    147<H>  |  113<H>  |  33<H>  ----------------------------<  101<H>  3.8   |  21<L>  |  1.65<H>    Ca    7.1<L>      2018 06:07  Phos  2.7     -  Mg     1.9     -    TPro  4.7<L>  /  Alb  2.5<L>  /  TBili  1.1  /  DBili  x   /  AST  18  /  ALT  15  /  AlkPhos  82  04-17    PT/INR - ( 2018 06:09 )   PT: 17.8 sec;   INR: 1.59          PTT - ( 2018 06:09 )  PTT:54.4 sec  Urinalysis Basic - ( 2018 12:35 )    Color: Yellow / Appearance: Clear / S.015 / pH: x  Gluc: x / Ketone: Trace mg/dL  / Bili: Negative / Urobili: 0.2 E.U./dL   Blood: x / Protein: 30 mg/dL / Nitrite: NEGATIVE   Leuk Esterase: Small / RBC: Many /HPF / WBC Many /HPF   Sq Epi: x / Non Sq Epi: 5-10 /HPF / Bacteria: Present /HPF      CAPILLARY BLOOD GLUCOSE      POCT Blood Glucose.: 96 mg/dL (2018 06:22)  POCT Blood Glucose.: 74 mg/dL (2018 03:35)  POCT Blood Glucose.: 70 mg/dL (2018 23:26)  POCT Blood Glucose.: 99 mg/dL (2018 16:42)  POCT Blood Glucose.: 129 mg/dL (2018 11:10)    LIVER FUNCTIONS - ( 2018 15:45 )  Alb: 2.5 g/dL / Pro: 4.7 g/dL / ALK PHOS: 82 U/L / ALT: 15 U/L / AST: 18 U/L / GGT: x             Cultures:Culture Results:   No growth at 12 hours ( @ 08:42)      RADIOLOGY & ADDITIONAL STUDIES: 24 hr events:  O/N: given lasix 20 per Dr. Olivo, nurse says unable to get peripherals, lactic acid down to 3.5  : LA improvedto 4.6 ngt placed wound vac applied imelda approved by ID. CT scan: read pending ech ordered will d/c tlc if PIV placed.  repeat LA @8pm     SUBJECTIVE: Nonconversant incomprehensible speech even with . RoS deferred. However, now smiling and more awake.     MEDICATIONS  (STANDING):  ARIPiprazole 10 milliGRAM(s) Oral daily  dextrose 10%. 1000 milliLiter(s) (50 mL/Hr) IV Continuous <Continuous>  dextrose 5%. 1000 milliLiter(s) (50 mL/Hr) IV Continuous <Continuous>  dextrose 50% Injectable 12.5 Gram(s) IV Push once  dextrose 50% Injectable 25 Gram(s) IV Push once  dextrose 50% Injectable 25 Gram(s) IV Push once  escitalopram 10 milliGRAM(s) Oral daily  heparin  Injectable 5000 Unit(s) SubCutaneous every 8 hours  insulin lispro (HumaLOG) corrective regimen sliding scale   SubCutaneous every 6 hours  levothyroxine Injectable 75 MICROGram(s) IV Push at bedtime  magnesium sulfate  IVPB 1 Gram(s) IV Intermittent once  meropenem  IVPB 1000 milliGRAM(s) IV Intermittent every 12 hours  oxacillin IVPB      oxacillin IVPB 2 Gram(s) IV Intermittent every 4 hours  pantoprazole  Injectable 40 milliGRAM(s) IV Push daily  potassium phosphate IVPB 15 milliMole(s) IV Intermittent once  QUEtiapine 25 milliGRAM(s) Oral daily    MEDICATIONS  (PRN):  ALBUTerol/ipratropium for Nebulization 3 milliLiter(s) Nebulizer every 6 hours PRN Shortness of Breath and/or Wheezing  dextrose Gel 1 Dose(s) Oral once PRN Blood Glucose LESS THAN 70 milliGRAM(s)/deciliter  glucagon  Injectable 1 milliGRAM(s) IntraMuscular once PRN Glucose LESS THAN 70 milligrams/deciliter  ondansetron Injectable 4 milliGRAM(s) IV Push every 6 hours PRN Nausea      Nicole:	  [ ] None	[x ] Daily Nicole Order Placed	   Indication:	  [ x] Strict I and O's    [ ] Obstruction     [ ] Incontinence + Stage 3 or 4 Decubitus  Central Line:  [ ] None	   [ x]  Medication / TPN Administration       ICU Vital Signs Last 24 Hrs  T(C): 35.9 (2018 06:29), Max: 36.1 (2018 09:51)  T(F): 96.7 (2018 06:29), Max: 97 (2018 09:51)  HR: 82 (2018 08:00) (82 - 98)  BP: 112/61 (2018 08:00) (86/53 - 124/65)  BP(mean): 87 (2018 08:00) (59 - 88)  ABP: 136/64 (2018 13:00) (136/64 - 158/76)  ABP(mean): 88 (2018 13:00) (88 - 106)  RR: 23 (2018 08:00) (8 - 34)  SpO2: 99% (2018 08:00) (98% - 100%)      Physical Exam:  General: no distress  HEENT: NC/AT, EOMI, normal hearing, neck supple w/o LAD  Pulmonary: Nonlabored breathing, no respiratory distress, CTA-B  Cardiovascular: NSR, no murmurs  Abdominal: obese, soft, NT/ND, hypoactive BS, no organomegaly. Midline wound 5x5 cm, open and granulating with small center of fibrinous exudate. Severe undermining, and deep half of wound likely an additional 5 cm radius all around. Wound vac in place, functioning.   Extremities: WWP, 1+ edema in legs bilaterally  Neuro: A/O x 1, normal motor, unable to assess sensory, non-conversant, incomprehensible speech      I&O's Summary    2018 07:01  -  2018 07:00  --------------------------------------------------------  IN: 4640 mL / OUT: 2190 mL / NET: 2450 mL        LABS:                        7.5    9.3   )-----------( 58       ( 2018 06:07 )             23.5     04-19    147<H>  |  113<H>  |  33<H>  ----------------------------<  101<H>  3.8   |  21<L>  |  1.65<H>    Ca    7.1<L>      2018 06:07  Phos  2.7       Mg     1.9         TPro  4.7<L>  /  Alb  2.5<L>  /  TBili  1.1  /  DBili  x   /  AST  18  /  ALT  15  /  AlkPhos  82  04-17    PT/INR - ( 2018 06:09 )   PT: 17.8 sec;   INR: 1.59          PTT - ( 2018 06:09 )  PTT:54.4 sec  Urinalysis Basic - ( 2018 12:35 )    Color: Yellow / Appearance: Clear / S.015 / pH: x  Gluc: x / Ketone: Trace mg/dL  / Bili: Negative / Urobili: 0.2 E.U./dL   Blood: x / Protein: 30 mg/dL / Nitrite: NEGATIVE   Leuk Esterase: Small / RBC: Many /HPF / WBC Many /HPF   Sq Epi: x / Non Sq Epi: 5-10 /HPF / Bacteria: Present /HPF      CAPILLARY BLOOD GLUCOSE      POCT Blood Glucose.: 96 mg/dL (2018 06:22)  POCT Blood Glucose.: 74 mg/dL (2018 03:35)  POCT Blood Glucose.: 70 mg/dL (2018 23:26)  POCT Blood Glucose.: 99 mg/dL (2018 16:42)  POCT Blood Glucose.: 129 mg/dL (2018 11:10)    LIVER FUNCTIONS - ( 2018 15:45 )  Alb: 2.5 g/dL / Pro: 4.7 g/dL / ALK PHOS: 82 U/L / ALT: 15 U/L / AST: 18 U/L / GGT: x             Cultures:Culture Results:   No growth at 12 hours ( @ 08:42)      RADIOLOGY & ADDITIONAL STUDIES:  IMPRESSION:  Circular collection of extraluminal oral contrast around the enlarged   fibroid uterus likely connected to a loop of small bowel (enterotomy) in   the right lower quadrant. Collection may be contained around the uterus   by peritoneal coverings or adhesions. Small ascites.    Mesenteric edema in the upper abdomen particularly around the mesenteric   vessels. Recommend contrast enhanced CT or Doppler ultrasound to evaluate   patency of superior mesenteric vein and portal vein.

## 2018-04-19 NOTE — PROGRESS NOTE ADULT - SUBJECTIVE AND OBJECTIVE BOX
INTERVAL HPI/OVERNIGHT EVENTS:    CONSTITUTIONAL:  Negative fever or chills, feels well, good appetite  EYES:  Negative  blurry vision or double vision  CARDIOVASCULAR:  Negative for chest pain or palpitations  RESPIRATORY:  Negative for cough, wheezing, or SOB   GASTROINTESTINAL:  Negative for nausea, vomiting, diarrhea, constipation, or abdominal pain  GENITOURINARY:  Negative frequency, urgency or dysuria  NEUROLOGIC:  No headache, confusion, dizziness, lightheadedness      ANTIBIOTICS/RELEVANT:    MEDICATIONS  (STANDING):  albumin human 25% IVPB 50 milliLiter(s) IV Intermittent every 8 hours  ARIPiprazole 10 milliGRAM(s) Oral daily  dextrose 10%. 1000 milliLiter(s) (50 mL/Hr) IV Continuous <Continuous>  dextrose 5%. 1000 milliLiter(s) (50 mL/Hr) IV Continuous <Continuous>  dextrose 50% Injectable 12.5 Gram(s) IV Push once  dextrose 50% Injectable 25 Gram(s) IV Push once  dextrose 50% Injectable 25 Gram(s) IV Push once  escitalopram 10 milliGRAM(s) Oral daily  fondaparinux Injectable 2.5 milliGRAM(s) SubCutaneous daily  insulin lispro (HumaLOG) corrective regimen sliding scale   SubCutaneous every 6 hours  levothyroxine 150 MICROGram(s) Oral daily  meropenem  IVPB 1000 milliGRAM(s) IV Intermittent every 12 hours  oxacillin IVPB      oxacillin IVPB 2 Gram(s) IV Intermittent every 4 hours  pantoprazole   Suspension 40 milliGRAM(s) Oral daily  QUEtiapine 25 milliGRAM(s) Oral daily    MEDICATIONS  (PRN):  ALBUTerol/ipratropium for Nebulization 3 milliLiter(s) Nebulizer every 6 hours PRN Shortness of Breath and/or Wheezing  dextrose Gel 1 Dose(s) Oral once PRN Blood Glucose LESS THAN 70 milliGRAM(s)/deciliter  glucagon  Injectable 1 milliGRAM(s) IntraMuscular once PRN Glucose LESS THAN 70 milligrams/deciliter  ondansetron Injectable 4 milliGRAM(s) IV Push every 6 hours PRN Nausea        Vital Signs Last 24 Hrs  T(C): 35.7 (2018 15:00), Max: 36.1 (2018 18:08)  T(F): 96.2 (2018 15:00), Max: 97 (2018 18:08)  HR: 90 (2018 15:00) (80 - 98)  BP: 117/67 (2018 15:00) (86/53 - 121/65)  BP(mean): 96 (2018 15:00) (59 - 96)  RR: 17 (2018 15:00) (8 - 34)  SpO2: 96% (2018 15:00) (96% - 100%)    18 @ 07:01  -  18 @ 07:00  --------------------------------------------------------  IN: 4640 mL / OUT: 2190 mL / NET: 2450 mL    18 @ 07:01  -  18 @ 15:37  --------------------------------------------------------  IN: 993 mL / OUT: 185 mL / NET: 808 mL      PHYSICAL EXAM:  Constitutional:Well-developed, well nourished  Eyes:ZABRINA, EOMI  Ear/Nose/Throat: no oral lesion, no sinus tenderness on percussion	  Neck:no JVD, no lymphadenopathy, supple  Respiratory: CTA maris  Cardiovascular: S1S2 RRR, no murmurs  Gastrointestinal:soft, (+) BS, no HSM  Extremities:no e/e/c  Vascular: DP Pulse:	right normal; left normal      LABS:                        7.5    9.3   )-----------( 58       ( 2018 06:07 )             23.5     04-19    147<H>  |  113<H>  |  33<H>  ----------------------------<  101<H>  3.8   |  21<L>  |  1.65<H>    Ca    7.1<L>      2018 06:07  Phos  2.7     04-  Mg     1.9     -19    TPro  4.7<L>  /  Alb  2.5<L>  /  TBili  1.1  /  DBili  x   /  AST  18  /  ALT  15  /  AlkPhos  82  04-17    PT/INR - ( 2018 06:09 )   PT: 17.8 sec;   INR: 1.59          PTT - ( 2018 06:09 )  PTT:54.4 sec  Urinalysis Basic - ( 2018 12:35 )    Color: Yellow / Appearance: Clear / S.015 / pH: x  Gluc: x / Ketone: Trace mg/dL  / Bili: Negative / Urobili: 0.2 E.U./dL   Blood: x / Protein: 30 mg/dL / Nitrite: NEGATIVE   Leuk Esterase: Small / RBC: Many /HPF / WBC Many /HPF   Sq Epi: x / Non Sq Epi: 5-10 /HPF / Bacteria: Present /HPF        MICROBIOLOGY:    RADIOLOGY & ADDITIONAL STUDIES: INTERVAL HPI/OVERNIGHT EVENTS:  Patient seen and examined. Awake, at mental baseline, non-responsive. Does not appear to be in any distress. Unable to obtain ROS.       MEDICATIONS  (STANDING):  albumin human 25% IVPB 50 milliLiter(s) IV Intermittent every 8 hours  ARIPiprazole 10 milliGRAM(s) Oral daily  dextrose 10%. 1000 milliLiter(s) (50 mL/Hr) IV Continuous <Continuous>  dextrose 5%. 1000 milliLiter(s) (50 mL/Hr) IV Continuous <Continuous>  dextrose 50% Injectable 12.5 Gram(s) IV Push once  dextrose 50% Injectable 25 Gram(s) IV Push once  dextrose 50% Injectable 25 Gram(s) IV Push once  escitalopram 10 milliGRAM(s) Oral daily  fondaparinux Injectable 2.5 milliGRAM(s) SubCutaneous daily  insulin lispro (HumaLOG) corrective regimen sliding scale   SubCutaneous every 6 hours  levothyroxine 150 MICROGram(s) Oral daily  meropenem  IVPB 1000 milliGRAM(s) IV Intermittent every 12 hours  oxacillin IVPB      oxacillin IVPB 2 Gram(s) IV Intermittent every 4 hours  pantoprazole   Suspension 40 milliGRAM(s) Oral daily  QUEtiapine 25 milliGRAM(s) Oral daily    MEDICATIONS  (PRN):  ALBUTerol/ipratropium for Nebulization 3 milliLiter(s) Nebulizer every 6 hours PRN Shortness of Breath and/or Wheezing  dextrose Gel 1 Dose(s) Oral once PRN Blood Glucose LESS THAN 70 milliGRAM(s)/deciliter  glucagon  Injectable 1 milliGRAM(s) IntraMuscular once PRN Glucose LESS THAN 70 milligrams/deciliter  ondansetron Injectable 4 milliGRAM(s) IV Push every 6 hours PRN Nausea        Vital Signs Last 24 Hrs  T(C): 35.7 (2018 15:00), Max: 36.1 (2018 18:08)  T(F): 96.2 (2018 15:00), Max: 97 (2018 18:08)  HR: 90 (2018 15:00) (80 - 98)  BP: 117/67 (2018 15:00) (86/53 - 121/65)  BP(mean): 96 (2018 15:00) (59 - 96)  RR: 17 (2018 15:00) (8 - 34)  SpO2: 96% (2018 15:00) (96% - 100%)    18 @ 07:01  -  18 @ 07:00  --------------------------------------------------------  IN: 4640 mL / OUT: 2190 mL / NET: 2450 mL    18 @ 07:01  -  18 @ 15:37  --------------------------------------------------------  IN: 993 mL / OUT: 185 mL / NET: 808 mL      PHYSICAL EXAM:  Constitutional: NAD. Morbidly obese. Non-toxic appearing   HEENT: Conjunctiva clear, no oral lesion, no sinus tenderness on percussion	  Respiratory: poor inspiratory effort. decreased at the bases. no wheezing or crackles   Cardiovascular: RRR with no murmurs  Gastrointestinal: obese, midline surgical wound (5x5), now with wound vac   Extremities: 1+ pitting edema  Neuro" AAOx0       LABS:                        7.5    9.3   )-----------( 58       ( 2018 06:07 )             23.5     04-19    147<H>  |  113<H>  |  33<H>  ----------------------------<  101<H>  3.8   |  21<L>  |  1.65<H>    Ca    7.1<L>      2018 06:07  Phos  2.7     04-19  Mg     1.9     -19    TPro  4.7<L>  /  Alb  2.5<L>  /  TBili  1.1  /  DBili  x   /  AST  18  /  ALT  15  /  AlkPhos  82  04-17    PT/INR - ( 2018 06:09 )   PT: 17.8 sec;   INR: 1.59          PTT - ( 2018 06:09 )  PTT:54.4 sec  Urinalysis Basic - ( 2018 12:35 )    Color: Yellow / Appearance: Clear / S.015 / pH: x  Gluc: x / Ketone: Trace mg/dL  / Bili: Negative / Urobili: 0.2 E.U./dL   Blood: x / Protein: 30 mg/dL / Nitrite: NEGATIVE   Leuk Esterase: Small / RBC: Many /HPF / WBC Many /HPF   Sq Epi: x / Non Sq Epi: 5-10 /HPF / Bacteria: Present /HPF        MICROBIOLOGY:  Culture - Urine (18 @ 12:53)    Specimen Source: .Urine Catheterized    Culture Results:   Gram Negative Rods  Identification and susceptibility to follow.    Culture - Blood (18 @ 08:42)    Specimen Source: .Blood Blood    Culture Results:   No growth at 1 day.    Culture - Blood (18 @ 16:09)    Specimen Source: .Blood Blood-Peripheral    Culture Results:   No growth at 5 days.    Culture - Blood (18 @ 08:22)    Specimen Source: .Blood Blood    Culture Results:   No growth at 5 days.      RADIOLOGY & ADDITIONAL STUDIES:  < from: CT Abdomen and Pelvis w/ Oral Cont (18 @ 17:55) >  EXAM:  CT ABDOMEN AND PELVIS OC                          < end of copied text >  < from: CT Abdomen and Pelvis w/ Oral Cont (18 @ 17:55) >  FINDINGS: Images of the lower chest demonstrate  small bilateral pleural   effusions, right greater than left. Dependent atelectasis is seen in the   lung bases.    The liver is normal in appearance.   No radiopaque gallstones are seen.    There is mild infiltration of the fat anterior to the pancreatic head   surrounding the mesenteric vascular pedicle. No splenic abnormalities are   seen.    No adrenal abnormalities are seen.   Thekidneys are normal in appearance   bilaterally.  No renal stones are seen.  No hydronephrosis is evident.    No abdominal aortic aneurysm is seen. An IVC filter is in place. No   lymphadenopathy is seen.    Postoperative changes are seen in the anterior abdominal wall. There is   gas, likely contained within packing material in the anterior abdominal   wall midline wound. There is subcutaneous edema but no drainable fluid   collection is seen in the abdominal wall.  Gastric tube is within the gastric antrum. Small bowel loops and right   colon are opacified with oral contrast. There is a circular collection of   extraluminal oral contrast and pockets of gas surrounding the enlarged   fibroid uterus which appears to be connected to a loop of distal small   bowel in the right lower quadrant (axial image 85 coronal image 41).   There is a small upper abdominal ascites which measures close to water   density and does not contain external oral contrast. There is no bowel   obstruction. Nicole catheter is seen in a collapsed urinary bladder.   Uterus measures up to 17.5 cm in height.    Evaluation of the osseous structures demonstrates degenerative changes.      IMPRESSION:  Circular collection of extraluminal oral contrast around the enlarged   fibroid uterus likely connected to a loop of small bowel (enterotomy) in   the right lower quadrant. Collection may be contained around the uterus   by peritoneal coverings or adhesions. Small ascites.    Mesenteric edema in the upper abdomen particularly around the mesenteric   vessels. Recommend contrast enhanced CT or Doppler ultrasound to evaluate   patency of superior mesenteric vein and portal vein.    < end of copied text >

## 2018-04-19 NOTE — PROGRESS NOTE ADULT - ASSESSMENT
78 year old female, morbidly obesity, DM, ventral hernia repair with mesh 3/2 c/b wound dehiscence, recent ATN requiring temporary HD (Permacath removed 4/10), who presents as a transfer from OSH for management of multifactorial sepsis and open abdominal wound. Patient found to have MDR E.Coli and MSSA Bacteremia, origin likely coming from abdomen/pelvis, however cannot exclude removed permacath on 4/14 as a possible source for bacteremia. No vegetations noted on TTE.     Recommendations:  1. c/w Meropenem 1g q12h and Oxacillin 2g q4h   2. f/u GI PCR panel   3. Please remove right IJ line (came with it from [place]) and place new central line  4. f/u [place] Susceptibilities on MSSA blood culture and MDR E.Coli urine culture   5. Surveillance blood cultures     6. Surgical intervention as per SICU team     ID will follow 78 year old female, morbidly obesity, DM, ventral hernia repair with mesh 3/2 c/b wound dehiscence, recent ATN requiring temporary HD (Permacath removed 4/10), who presents as a transfer from OSH for management of multifactorial sepsis and open abdominal wound. Patient found to have ESBL-producing E.Coli UTI and MSSA Bacteremia, origin likely coming from abdomen/pelvis, however cannot exclude removed permacath on 4/14 as a possible source for bacteremia. No vegetations noted on TTE.     Recommendations:  1. c/w Meropenem 1g q12h and Oxacillin 2g q4h   2. f/u GI PCR panel   3. Please remove right IJ line (came with it from [place]) and place new central line  4. f/u [place] Susceptibilities on MSSA blood culture and MDR E.Coli urine culture   5. Surveillance blood cultures     6. Surgical intervention as per SICU team     ID will follow

## 2018-04-19 NOTE — PROGRESS NOTE ADULT - ASSESSMENT
79 y/o F with sepsis from open abdominal wound infection, bacteremia present on admission, and UTI present on admission.    Neuro: prn pain control, Zofran prn, seroquel, aripiprazole, escitalapram   CV: HD stable s/p severe sepsis on D10 @50, holidng amlodipine, albumin 25%q8   Pulm: Satting well on NC  GI: NPO, protonix, Dobhoff, f/u speech/swallow  : TF jevity 1.2 @20 Nicole S/p AKF on HD now resolved. UTI (present on admission)  ID:MDR  Ecoli in Urine resistant to Zosyn: Jose L( 4/17-) MSSA in blood: Oxacillin ( 4/17-) Kai virus (good hand washing), f/u Greg sensitivities. TTE neg for endocarditis  Endo: ISS, Synthroid 150 po, Hypoglycemia holding medroxyprogesterone  PPx: sqh PE:Sp IVC filter on 3/30  Lines: PIV Rt IJ TLC from OSH ( 4/15-), s/p L Rad Art line( 4/15-4/18)   Wounds: Wound vac to midline incision   PT/OT: ordered

## 2018-04-19 NOTE — PROGRESS NOTE ADULT - ATTENDING COMMENTS
Patient seen and examined with house-staff during bedside rounds.  Resident note read, including vitals, physical findings, laboratory data, and radiological reports.   Revisions included below.  Direct personal management at bed side and extensive interpretation of the data.  Plan was outlined and discussed in details with the housestaff.  Decision making of high complexity  Action taken for acute disease activity to reflect the level of care provided:  - medication reconciliation  - review laboratory data  - restarted psychic meds  - continue antibiotic  - spesis picture resolving  - Repeat blood culture negative  - Repeat urine culture positive  - HIT workup and changes to Fondaparinux  - start tube feed  - wean off D10  - change wound dressing  - evaluated for step down

## 2018-04-20 LAB
-  AMIKACIN: SIGNIFICANT CHANGE UP
-  AMPICILLIN/SULBACTAM: SIGNIFICANT CHANGE UP
-  AMPICILLIN/SULBACTAM: SIGNIFICANT CHANGE UP
-  AMPICILLIN: SIGNIFICANT CHANGE UP
-  AMPICILLIN: SIGNIFICANT CHANGE UP
-  AZTREONAM: SIGNIFICANT CHANGE UP
-  CEFAZOLIN: SIGNIFICANT CHANGE UP
-  CEFAZOLIN: SIGNIFICANT CHANGE UP
-  CEFEPIME: SIGNIFICANT CHANGE UP
-  CEFOTAXIME: SIGNIFICANT CHANGE UP
-  CEFOXITIN: SIGNIFICANT CHANGE UP
-  CEFTAZIDIME: SIGNIFICANT CHANGE UP
-  CEFTRIAXONE: SIGNIFICANT CHANGE UP
-  CEFTRIAXONE: SIGNIFICANT CHANGE UP
-  CEFUROXIME: SIGNIFICANT CHANGE UP
-  CIPROFLOXACIN: SIGNIFICANT CHANGE UP
-  CIPROFLOXACIN: SIGNIFICANT CHANGE UP
-  ERTAPENEM: SIGNIFICANT CHANGE UP
-  GENTAMICIN: SIGNIFICANT CHANGE UP
-  GENTAMICIN: SIGNIFICANT CHANGE UP
-  IMIPENEM: SIGNIFICANT CHANGE UP
-  LEVOFLOXACIN: SIGNIFICANT CHANGE UP
-  MEROPENEM: SIGNIFICANT CHANGE UP
-  NITROFURANTOIN: SIGNIFICANT CHANGE UP
-  NITROFURANTOIN: SIGNIFICANT CHANGE UP
-  PIPERACILLIN/TAZOBACTAM: SIGNIFICANT CHANGE UP
-  PIPERACILLIN/TAZOBACTAM: SIGNIFICANT CHANGE UP
-  TIGECYCLINE: SIGNIFICANT CHANGE UP
-  TOBRAMYCIN: SIGNIFICANT CHANGE UP
-  TOBRAMYCIN: SIGNIFICANT CHANGE UP
-  TRIMETHOPRIM/SULFAMETHOXAZOLE: SIGNIFICANT CHANGE UP
-  TRIMETHOPRIM/SULFAMETHOXAZOLE: SIGNIFICANT CHANGE UP
ANION GAP SERPL CALC-SCNC: 12 MMOL/L — SIGNIFICANT CHANGE UP (ref 5–17)
ANISOCYTOSIS BLD QL: SLIGHT — SIGNIFICANT CHANGE UP
APPEARANCE UR: CLEAR — SIGNIFICANT CHANGE UP
BILIRUB UR-MCNC: (no result)
BUN SERPL-MCNC: 37 MG/DL — HIGH (ref 7–23)
CALCIUM SERPL-MCNC: 7.2 MG/DL — LOW (ref 8.4–10.5)
CHLORIDE SERPL-SCNC: 112 MMOL/L — HIGH (ref 96–108)
CO2 SERPL-SCNC: 22 MMOL/L — SIGNIFICANT CHANGE UP (ref 22–31)
COLOR SPEC: YELLOW — SIGNIFICANT CHANGE UP
CREAT SERPL-MCNC: 1.77 MG/DL — HIGH (ref 0.5–1.3)
CULTURE RESULTS: SIGNIFICANT CHANGE UP
CULTURE RESULTS: SIGNIFICANT CHANGE UP
DACRYOCYTES BLD QL SMEAR: SLIGHT — SIGNIFICANT CHANGE UP
DIFF PNL FLD: (no result)
GLUCOSE BLDC GLUCOMTR-MCNC: 130 MG/DL — HIGH (ref 70–99)
GLUCOSE BLDC GLUCOMTR-MCNC: 141 MG/DL — HIGH (ref 70–99)
GLUCOSE BLDC GLUCOMTR-MCNC: 217 MG/DL — HIGH (ref 70–99)
GLUCOSE BLDC GLUCOMTR-MCNC: 60 MG/DL — LOW (ref 70–99)
GLUCOSE BLDC GLUCOMTR-MCNC: 95 MG/DL — SIGNIFICANT CHANGE UP (ref 70–99)
GLUCOSE SERPL-MCNC: 59 MG/DL — LOW (ref 70–99)
GLUCOSE UR QL: NEGATIVE — SIGNIFICANT CHANGE UP
HCT VFR BLD CALC: 18.5 % — CRITICAL LOW (ref 34.5–45)
HCT VFR BLD CALC: 19.9 % — CRITICAL LOW (ref 34.5–45)
HCT VFR BLD CALC: 26.4 % — LOW (ref 34.5–45)
HGB BLD-MCNC: 5.9 G/DL — CRITICAL LOW (ref 11.5–15.5)
HGB BLD-MCNC: 6.5 G/DL — CRITICAL LOW (ref 11.5–15.5)
HGB BLD-MCNC: 8.8 G/DL — LOW (ref 11.5–15.5)
KETONES UR-MCNC: (no result) MG/DL
LEUKOCYTE ESTERASE UR-ACNC: (no result)
MACROCYTES BLD QL: SLIGHT — SIGNIFICANT CHANGE UP
MAGNESIUM SERPL-MCNC: 1.9 MG/DL — SIGNIFICANT CHANGE UP (ref 1.6–2.6)
MANUAL SMEAR VERIFICATION: SIGNIFICANT CHANGE UP
MCHC RBC-ENTMCNC: 28.9 PG — SIGNIFICANT CHANGE UP (ref 27–34)
MCHC RBC-ENTMCNC: 29.3 PG — SIGNIFICANT CHANGE UP (ref 27–34)
MCHC RBC-ENTMCNC: 29.4 PG — SIGNIFICANT CHANGE UP (ref 27–34)
MCHC RBC-ENTMCNC: 31.9 G/DL — LOW (ref 32–36)
MCHC RBC-ENTMCNC: 32.7 G/DL — SIGNIFICANT CHANGE UP (ref 32–36)
MCHC RBC-ENTMCNC: 33.3 G/DL — SIGNIFICANT CHANGE UP (ref 32–36)
MCV RBC AUTO: 88 FL — SIGNIFICANT CHANGE UP (ref 80–100)
MCV RBC AUTO: 90 FL — SIGNIFICANT CHANGE UP (ref 80–100)
MCV RBC AUTO: 90.7 FL — SIGNIFICANT CHANGE UP (ref 80–100)
METHOD TYPE: SIGNIFICANT CHANGE UP
METHOD TYPE: SIGNIFICANT CHANGE UP
MICROCYTES BLD QL: SLIGHT — SIGNIFICANT CHANGE UP
NITRITE UR-MCNC: NEGATIVE — SIGNIFICANT CHANGE UP
ORGANISM # SPEC MICROSCOPIC CNT: SIGNIFICANT CHANGE UP
OVALOCYTES BLD QL SMEAR: SLIGHT — SIGNIFICANT CHANGE UP
PH UR: 6 — SIGNIFICANT CHANGE UP (ref 5–8)
PHOSPHATE SERPL-MCNC: 3.9 MG/DL — SIGNIFICANT CHANGE UP (ref 2.5–4.5)
PLAT MORPH BLD: NORMAL — SIGNIFICANT CHANGE UP
PLATELET # BLD AUTO: 33 K/UL — LOW (ref 150–400)
PLATELET # BLD AUTO: 38 K/UL — LOW (ref 150–400)
PLATELET # BLD AUTO: 41 K/UL — LOW (ref 150–400)
POIKILOCYTOSIS BLD QL AUTO: SLIGHT — SIGNIFICANT CHANGE UP
POTASSIUM SERPL-MCNC: 3.7 MMOL/L — SIGNIFICANT CHANGE UP (ref 3.5–5.3)
POTASSIUM SERPL-SCNC: 3.7 MMOL/L — SIGNIFICANT CHANGE UP (ref 3.5–5.3)
PREALB SERPL-MCNC: 6 MG/DL — LOW (ref 20–40)
PROT UR-MCNC: (no result) MG/DL
RBC # BLD: 2.04 M/UL — LOW (ref 3.8–5.2)
RBC # BLD: 2.21 M/UL — LOW (ref 3.8–5.2)
RBC # BLD: 3 M/UL — LOW (ref 3.8–5.2)
RBC # FLD: 17.7 % — HIGH (ref 10.3–16.9)
RBC # FLD: 20.1 % — HIGH (ref 10.3–16.9)
RBC # FLD: 20.4 % — HIGH (ref 10.3–16.9)
RBC BLD AUTO: (no result)
SCHISTOCYTES BLD QL AUTO: SIGNIFICANT CHANGE UP
SODIUM SERPL-SCNC: 146 MMOL/L — HIGH (ref 135–145)
SP GR SPEC: 1.02 — SIGNIFICANT CHANGE UP (ref 1–1.03)
SPECIMEN SOURCE: SIGNIFICANT CHANGE UP
SPECIMEN SOURCE: SIGNIFICANT CHANGE UP
TRIGL SERPL-MCNC: 82 MG/DL — SIGNIFICANT CHANGE UP (ref 10–149)
UROBILINOGEN FLD QL: 0.2 E.U./DL — SIGNIFICANT CHANGE UP
WBC # BLD: 5.2 K/UL — SIGNIFICANT CHANGE UP (ref 3.8–10.5)
WBC # BLD: 5.4 K/UL — SIGNIFICANT CHANGE UP (ref 3.8–10.5)
WBC # BLD: 5.5 K/UL — SIGNIFICANT CHANGE UP (ref 3.8–10.5)
WBC # FLD AUTO: 5.2 K/UL — SIGNIFICANT CHANGE UP (ref 3.8–10.5)
WBC # FLD AUTO: 5.4 K/UL — SIGNIFICANT CHANGE UP (ref 3.8–10.5)
WBC # FLD AUTO: 5.5 K/UL — SIGNIFICANT CHANGE UP (ref 3.8–10.5)

## 2018-04-20 PROCEDURE — 71045 X-RAY EXAM CHEST 1 VIEW: CPT | Mod: 26

## 2018-04-20 PROCEDURE — 99233 SBSQ HOSP IP/OBS HIGH 50: CPT | Mod: GC

## 2018-04-20 RX ORDER — COSYNTROPIN 0.25 MG/ML
0.25 INJECTION, SOLUTION INTRAVENOUS ONCE
Qty: 0 | Refills: 0 | Status: COMPLETED | OUTPATIENT
Start: 2018-04-20 | End: 2018-04-20

## 2018-04-20 RX ORDER — ELECTROLYTE SOLUTION,INJ
1 VIAL (ML) INTRAVENOUS
Qty: 0 | Refills: 0 | Status: DISCONTINUED | OUTPATIENT
Start: 2018-04-20 | End: 2018-04-20

## 2018-04-20 RX ORDER — DEXTROSE 50 % IN WATER 50 %
50 SYRINGE (ML) INTRAVENOUS ONCE
Qty: 0 | Refills: 0 | Status: COMPLETED | OUTPATIENT
Start: 2018-04-20 | End: 2018-04-20

## 2018-04-20 RX ORDER — POTASSIUM CHLORIDE 20 MEQ
20 PACKET (EA) ORAL EVERY 4 HOURS
Qty: 0 | Refills: 0 | Status: COMPLETED | OUTPATIENT
Start: 2018-04-20 | End: 2018-04-20

## 2018-04-20 RX ORDER — FUROSEMIDE 40 MG
40 TABLET ORAL ONCE
Qty: 0 | Refills: 0 | Status: COMPLETED | OUTPATIENT
Start: 2018-04-20 | End: 2018-04-20

## 2018-04-20 RX ORDER — LEVOTHYROXINE SODIUM 125 MCG
75 TABLET ORAL
Qty: 0 | Refills: 0 | Status: DISCONTINUED | OUTPATIENT
Start: 2018-04-20 | End: 2018-06-04

## 2018-04-20 RX ORDER — I.V. FAT EMULSION 20 G/100ML
0.6 EMULSION INTRAVENOUS
Qty: 50 | Refills: 0 | Status: DISCONTINUED | OUTPATIENT
Start: 2018-04-20 | End: 2018-04-20

## 2018-04-20 RX ORDER — MAGNESIUM SULFATE 500 MG/ML
1 VIAL (ML) INJECTION ONCE
Qty: 0 | Refills: 0 | Status: COMPLETED | OUTPATIENT
Start: 2018-04-20 | End: 2018-04-20

## 2018-04-20 RX ORDER — POTASSIUM CHLORIDE 20 MEQ
10 PACKET (EA) ORAL
Qty: 0 | Refills: 0 | Status: DISCONTINUED | OUTPATIENT
Start: 2018-04-20 | End: 2018-04-20

## 2018-04-20 RX ORDER — DEXTROSE 10 % IN WATER 10 %
1000 INTRAVENOUS SOLUTION INTRAVENOUS
Qty: 0 | Refills: 0 | Status: DISCONTINUED | OUTPATIENT
Start: 2018-04-20 | End: 2018-04-20

## 2018-04-20 RX ADMIN — Medication 100 GRAM(S): at 20:00

## 2018-04-20 RX ADMIN — Medication 50 MILLILITER(S): at 05:45

## 2018-04-20 RX ADMIN — MEROPENEM 100 MILLIGRAM(S): 1 INJECTION INTRAVENOUS at 19:26

## 2018-04-20 RX ADMIN — Medication 100 GRAM(S): at 07:27

## 2018-04-20 RX ADMIN — Medication 100 MILLIEQUIVALENT(S): at 11:22

## 2018-04-20 RX ADMIN — HEPARIN SODIUM 5000 UNIT(S): 5000 INJECTION INTRAVENOUS; SUBCUTANEOUS at 21:03

## 2018-04-20 RX ADMIN — Medication 100 GRAM(S): at 11:23

## 2018-04-20 RX ADMIN — Medication 1 EACH: at 17:53

## 2018-04-20 RX ADMIN — MEROPENEM 100 MILLIGRAM(S): 1 INJECTION INTRAVENOUS at 05:01

## 2018-04-20 RX ADMIN — ESCITALOPRAM OXALATE 10 MILLIGRAM(S): 10 TABLET, FILM COATED ORAL at 11:23

## 2018-04-20 RX ADMIN — Medication 100 MILLIEQUIVALENT(S): at 15:05

## 2018-04-20 RX ADMIN — COSYNTROPIN 0.25 MILLIGRAM(S): 0.25 INJECTION, SOLUTION INTRAVENOUS at 15:00

## 2018-04-20 RX ADMIN — Medication 100 GRAM(S): at 08:46

## 2018-04-20 RX ADMIN — Medication 50 MILLILITER(S): at 05:01

## 2018-04-20 RX ADMIN — Medication 40 MILLIGRAM(S): at 17:53

## 2018-04-20 RX ADMIN — Medication 100 GRAM(S): at 23:21

## 2018-04-20 RX ADMIN — Medication 50 MILLILITER(S): at 19:25

## 2018-04-20 RX ADMIN — PANTOPRAZOLE SODIUM 40 MILLIGRAM(S): 20 TABLET, DELAYED RELEASE ORAL at 11:23

## 2018-04-20 RX ADMIN — I.V. FAT EMULSION 20.83 GM/KG/DAY: 20 EMULSION INTRAVENOUS at 21:03

## 2018-04-20 RX ADMIN — QUETIAPINE FUMARATE 25 MILLIGRAM(S): 200 TABLET, FILM COATED ORAL at 11:23

## 2018-04-20 RX ADMIN — Medication 50 MILLILITER(S): at 23:20

## 2018-04-20 RX ADMIN — ARIPIPRAZOLE 10 MILLIGRAM(S): 15 TABLET ORAL at 11:23

## 2018-04-20 RX ADMIN — Medication 100 GRAM(S): at 15:05

## 2018-04-20 RX ADMIN — HEPARIN SODIUM 5000 UNIT(S): 5000 INJECTION INTRAVENOUS; SUBCUTANEOUS at 15:05

## 2018-04-20 RX ADMIN — Medication 100 GRAM(S): at 03:41

## 2018-04-20 RX ADMIN — HEPARIN SODIUM 5000 UNIT(S): 5000 INJECTION INTRAVENOUS; SUBCUTANEOUS at 05:01

## 2018-04-20 RX ADMIN — Medication 120 MILLILITER(S): at 11:19

## 2018-04-20 RX ADMIN — Medication 70 MILLILITER(S): at 06:58

## 2018-04-20 RX ADMIN — Medication 150 MICROGRAM(S): at 05:45

## 2018-04-20 NOTE — PROGRESS NOTE ADULT - SUBJECTIVE AND OBJECTIVE BOX
INTERVAL HPI/OVERNIGHT EVENTS:    CONSTITUTIONAL:  Negative fever or chills, feels well, good appetite  EYES:  Negative  blurry vision or double vision  CARDIOVASCULAR:  Negative for chest pain or palpitations  RESPIRATORY:  Negative for cough, wheezing, or SOB   GASTROINTESTINAL:  Negative for nausea, vomiting, diarrhea, constipation, or abdominal pain  GENITOURINARY:  Negative frequency, urgency or dysuria  NEUROLOGIC:  No headache, confusion, dizziness, lightheadedness      ANTIBIOTICS/RELEVANT:    MEDICATIONS  (STANDING):  albumin human 25% IVPB 50 milliLiter(s) IV Intermittent every 8 hours  ARIPiprazole 10 milliGRAM(s) Oral daily  cosyntropin Injectable 0.25 milliGRAM(s) IV Push once  dextrose 10%. 1000 milliLiter(s) (120 mL/Hr) IV Continuous <Continuous>  dextrose 5%. 1000 milliLiter(s) (50 mL/Hr) IV Continuous <Continuous>  dextrose 50% Injectable 12.5 Gram(s) IV Push once  dextrose 50% Injectable 25 Gram(s) IV Push once  dextrose 50% Injectable 25 Gram(s) IV Push once  escitalopram 10 milliGRAM(s) Oral daily  fat emulsion (Plant Based) 20% Infusion 0.6 Gm/kG/Day (20.83 mL/Hr) IV Continuous <Continuous>  heparin  Injectable 5000 Unit(s) SubCutaneous every 8 hours  insulin lispro (HumaLOG) corrective regimen sliding scale   SubCutaneous every 6 hours  levothyroxine 150 MICROGram(s) Oral daily  meropenem  IVPB 1000 milliGRAM(s) IV Intermittent every 12 hours  oxacillin IVPB      oxacillin IVPB 2 Gram(s) IV Intermittent every 4 hours  pantoprazole   Suspension 40 milliGRAM(s) Oral daily  Parenteral Nutrition - Adult 1 Each (46 mL/Hr) TPN Continuous <Continuous>  QUEtiapine 25 milliGRAM(s) Oral daily    MEDICATIONS  (PRN):  ALBUTerol/ipratropium for Nebulization 3 milliLiter(s) Nebulizer every 6 hours PRN Shortness of Breath and/or Wheezing  dextrose Gel 1 Dose(s) Oral once PRN Blood Glucose LESS THAN 70 milliGRAM(s)/deciliter  glucagon  Injectable 1 milliGRAM(s) IntraMuscular once PRN Glucose LESS THAN 70 milligrams/deciliter  ondansetron Injectable 4 milliGRAM(s) IV Push every 6 hours PRN Nausea        Vital Signs Last 24 Hrs  T(C): 35.6 (2018 13:00), Max: 36.1 (2018 17:33)  T(F): 96 (2018 13:00), Max: 96.9 (2018 17:33)  HR: 82 (2018 13:00) (78 - 92)  BP: 97/62 (:00) (97/62 - 125/62)  BP(mean): 73 (:00) (65 - 88)  RR: 18 (:00) (12 - 27)  SpO2: 99% (2018 13:00) (94% - 100%)    18 @ 07:01  -  18 @ 07:00  --------------------------------------------------------  IN: 2658 mL / OUT: 905 mL / NET: 1753 mL    18 @ 07:01  -  18 @ 16:09  --------------------------------------------------------  IN: 1000 mL / OUT: 280 mL / NET: 720 mL      PHYSICAL EXAM:  Constitutional:Well-developed, well nourished  Eyes:ZABRINA, EOMI  Ear/Nose/Throat: no oral lesion, no sinus tenderness on percussion	  Neck:no JVD, no lymphadenopathy, supple  Respiratory: CTA maris  Cardiovascular: S1S2 RRR, no murmurs  Gastrointestinal:soft, (+) BS, no HSM  Extremities:no e/e/c  Vascular: DP Pulse:	right normal; left normal      LABS:                        5.9    5.2   )-----------( 38       ( 2018 05:26 )             18.5     04-20    146<H>  |  112<H>  |  37<H>  ----------------------------<  59<L>  3.7   |  22  |  1.77<H>    Ca    7.2<L>      2018 04:49  Phos  3.9     04-20  Mg     1.9     04-20        Urinalysis Basic - ( 2018 12:49 )    Color: Yellow / Appearance: Clear / S.020 / pH: x  Gluc: x / Ketone: Trace mg/dL  / Bili: Small / Urobili: 0.2 E.U./dL   Blood: x / Protein: Trace mg/dL / Nitrite: NEGATIVE   Leuk Esterase: Small / RBC: > 10 /HPF / WBC Many /HPF   Sq Epi: x / Non Sq Epi: 5-10 /HPF / Bacteria: Present /HPF        MICROBIOLOGY:    RADIOLOGY & ADDITIONAL STUDIES: INTERVAL HPI/OVERNIGHT EVENTS:  Patient seen and examined. Mental status improvement; no able to follow some commands. More responsive to verbal stimuli. Still AAOx0    ANTIBIOTICS/RELEVANT:    MEDICATIONS  (STANDING):  albumin human 25% IVPB 50 milliLiter(s) IV Intermittent every 8 hours  ARIPiprazole 10 milliGRAM(s) Oral daily  cosyntropin Injectable 0.25 milliGRAM(s) IV Push once  dextrose 10%. 1000 milliLiter(s) (120 mL/Hr) IV Continuous <Continuous>  dextrose 5%. 1000 milliLiter(s) (50 mL/Hr) IV Continuous <Continuous>  dextrose 50% Injectable 12.5 Gram(s) IV Push once  dextrose 50% Injectable 25 Gram(s) IV Push once  dextrose 50% Injectable 25 Gram(s) IV Push once  escitalopram 10 milliGRAM(s) Oral daily  fat emulsion (Plant Based) 20% Infusion 0.6 Gm/kG/Day (20.83 mL/Hr) IV Continuous <Continuous>  heparin  Injectable 5000 Unit(s) SubCutaneous every 8 hours  insulin lispro (HumaLOG) corrective regimen sliding scale   SubCutaneous every 6 hours  levothyroxine 150 MICROGram(s) Oral daily  meropenem  IVPB 1000 milliGRAM(s) IV Intermittent every 12 hours  oxacillin IVPB      oxacillin IVPB 2 Gram(s) IV Intermittent every 4 hours  pantoprazole   Suspension 40 milliGRAM(s) Oral daily  Parenteral Nutrition - Adult 1 Each (46 mL/Hr) TPN Continuous <Continuous>  QUEtiapine 25 milliGRAM(s) Oral daily    MEDICATIONS  (PRN):  ALBUTerol/ipratropium for Nebulization 3 milliLiter(s) Nebulizer every 6 hours PRN Shortness of Breath and/or Wheezing  dextrose Gel 1 Dose(s) Oral once PRN Blood Glucose LESS THAN 70 milliGRAM(s)/deciliter  glucagon  Injectable 1 milliGRAM(s) IntraMuscular once PRN Glucose LESS THAN 70 milligrams/deciliter  ondansetron Injectable 4 milliGRAM(s) IV Push every 6 hours PRN Nausea        Vital Signs Last 24 Hrs  T(C): 35.6 (2018 13:00), Max: 36.1 (2018 17:33)  T(F): 96 (2018 13:00), Max: 96.9 (2018 17:33)  HR: 82 (2018 13:00) (78 - 92)  BP: 97/62 (2018 13:00) (97/62 - 125/62)  BP(mean): 73 (2018 13:00) (65 - 88)  RR: 18 (2018 13:00) (12 - 27)  SpO2: 99% (2018 13:00) (94% - 100%)    18 @ 07:01  -  18 @ 07:00  --------------------------------------------------------  IN: 2658 mL / OUT: 905 mL / NET: 1753 mL    18 @ 07:01  -  18 @ 16:09  --------------------------------------------------------  IN: 1000 mL / OUT: 280 mL / NET: 720 mL      PHYSICAL EXAM:  Constitutional: NAD. Morbidly obese. Non-toxic appearing   HEENT: Conjunctiva clear, no oral lesion, no sinus tenderness on percussion	  Respiratory: poor inspiratory effort. decreased at the bases. no wheezing or crackles   Cardiovascular: RRR with no murmurs  Gastrointestinal: obese, midline surgical wound (5x5), now with wound vac   Extremities: 1+ pitting edema  Neuro" AAOx0       LABS:                        5.9    5.2   )-----------( 38       ( 2018 05:26 )             18.5     04-20    146<H>  |  112<H>  |  37<H>  ----------------------------<  59<L>  3.7   |  22  |  1.77<H>    Ca    7.2<L>      2018 04:49  Phos  3.9     04-20  Mg     1.9     04-20        Urinalysis Basic - ( 2018 12:49 )    Color: Yellow / Appearance: Clear / S.020 / pH: x  Gluc: x / Ketone: Trace mg/dL  / Bili: Small / Urobili: 0.2 E.U./dL   Blood: x / Protein: Trace mg/dL / Nitrite: NEGATIVE   Leuk Esterase: Small / RBC: > 10 /HPF / WBC Many /HPF   Sq Epi: x / Non Sq Epi: 5-10 /HPF / Bacteria: Present /HPF        MICROBIOLOGY:  Culture - Urine (18 @ 12:53)    -  Amikacin: S <=16    -  Ampicillin: R >16 These ampicillin results predict results for amoxicillin    -  Ampicillin: R >16 These ampicillin results predict results for amoxicillin    -  Ampicillin/Sulbactam: R >16/8    -  Ampicillin/Sulbactam: R >16/8    -  Aztreonam: S <=4    -  Cefazolin: R >16 This predicts results for oral agents cefaclor, cefdinir, cefpodoxime, cefprozil, cefuroxime axetil, cephalexin and locarbef for uncomplicated UTI. Note that some isolates may be susceptible to these agents while testing resistant to cefazolin.    -  Cefazolin: R >16 This predicts results for oral agents cefaclor, cefdinir, cefpodoxime, cefprozil, cefuroxime axetil, cephalexin and locarbef for uncomplicated UTI. Note that some isolates may be susceptible to these agents while testing resistant to cefazolin.    -  Cefepime: I 16    -  Cefotaxime: S <=2    -  Cefoxitin: S <=8    -  Ceftazidime: S <=1    -  Ceftriaxone: S <=1 Enterobacter, Citrobacter, and Serratia may develop resistance during prolonged therapy    -  Ceftriaxone: S <=1 Enterobacter, Citrobacter, and Serratia may develop resistance during prolonged therapy    -  Cefuroxime: R >16    -  Ciprofloxacin: S <=1    -  Ciprofloxacin: S <=1    -  Ertapenem: S <=1    -  Gentamicin: S <=4    -  Gentamicin: S <=4    -  Imipenem: S <=1    -  Levofloxacin: S <=2    -  Meropenem: S <=1    -  Nitrofurantoin: S <=32 Should not be used to treat pyelonephritis    -  Nitrofurantoin: S <=32 Should not be used to treat pyelonephritis    -  Piperacillin/Tazobactam: R >64    -  Piperacillin/Tazobactam: R >64    -  Tigecycline: S <=2    -  Tobramycin: S <=4    -  Tobramycin: S <=4    -  Trimethoprim/Sulfamethoxazole: S <=2/38    -  Trimethoprim/Sulfamethoxazole: S <=2/38    Specimen Source: .Urine Catheterized    Culture Results:   4,000 CFU/ml Escherichia coli    Organism Identification: Escherichia coli  Escherichia coli    Organism: Escherichia coli    Organism: Escherichia coli    Method Type: MARY    Method Type: MARY    Culture - Blood (18 @ 08:42)    Specimen Source: .Blood Blood    Culture Results:   No growth at 2 days.      RADIOLOGY & ADDITIONAL STUDIES:  < from: CT Abdomen and Pelvis w/ Oral Cont (18 @ 17:55) >  IMPRESSION:  Circular collection of extraluminal oral contrast around the enlarged   fibroid uterus likely connected to a loop of small bowel (enterotomy) in   the right lower quadrant. Collection may be contained around the uterus   by peritoneal coverings or adhesions. Small ascites.    Mesenteric edema in the upper abdomen particularly around the mesenteric   vessels. Recommend contrast enhanced CT or Doppler ultrasound to evaluate   patency of superior mesenteric vein and portal vein.    < end of copied text >

## 2018-04-20 NOTE — PROGRESS NOTE ADULT - SUBJECTIVE AND OBJECTIVE BOX
24 hr events:    SUBJECTIVE:    Flatus: [ ] YES [ ] NO             Bowel Movement: [ ] YES [ ] NO  Pain (0-10):            Pain Control Adequate: [ ] YES [ ] NO  Nausea: [ ] YES [ ] NO            Vomiting: [ ] YES [ ] NO  Diarrhea: [ ] YES [ ] NO         Constipation: [ ] YES [ ] NO     Chest Pain: [ ] YES [ ] NO    SOB:  [ ] YES [ ] NO    MEDICATIONS  (STANDING):  albumin human 25% IVPB 50 milliLiter(s) IV Intermittent every 8 hours  ARIPiprazole 10 milliGRAM(s) Oral daily  cosyntropin Injectable 0.25 milliGRAM(s) IV Push once  dextrose 10%. 1000 milliLiter(s) (120 mL/Hr) IV Continuous <Continuous>  dextrose 5%. 1000 milliLiter(s) (50 mL/Hr) IV Continuous <Continuous>  dextrose 50% Injectable 12.5 Gram(s) IV Push once  dextrose 50% Injectable 25 Gram(s) IV Push once  dextrose 50% Injectable 25 Gram(s) IV Push once  escitalopram 10 milliGRAM(s) Oral daily  fat emulsion (Plant Based) 20% Infusion 0.6 Gm/kG/Day (20.83 mL/Hr) IV Continuous <Continuous>  heparin  Injectable 5000 Unit(s) SubCutaneous every 8 hours  insulin lispro (HumaLOG) corrective regimen sliding scale   SubCutaneous every 6 hours  levothyroxine 150 MICROGram(s) Oral daily  meropenem  IVPB 1000 milliGRAM(s) IV Intermittent every 12 hours  oxacillin IVPB      oxacillin IVPB 2 Gram(s) IV Intermittent every 4 hours  pantoprazole   Suspension 40 milliGRAM(s) Oral daily  Parenteral Nutrition - Adult 1 Each (46 mL/Hr) TPN Continuous <Continuous>  potassium chloride  20 mEq/100 mL IVPB 20 milliEquivalent(s) IV Intermittent every 4 hours  QUEtiapine 25 milliGRAM(s) Oral daily    MEDICATIONS  (PRN):  ALBUTerol/ipratropium for Nebulization 3 milliLiter(s) Nebulizer every 6 hours PRN Shortness of Breath and/or Wheezing  dextrose Gel 1 Dose(s) Oral once PRN Blood Glucose LESS THAN 70 milliGRAM(s)/deciliter  glucagon  Injectable 1 milliGRAM(s) IntraMuscular once PRN Glucose LESS THAN 70 milligrams/deciliter  ondansetron Injectable 4 milliGRAM(s) IV Push every 6 hours PRN Nausea      Nicole:	  [ ] None	[ ] Daily Nicole Order Placed	   Indication:	  [ ] Strict I and O's    [ ] Obstruction     [ ] Incontinence + Stage 3 or 4 Decubitus  Central Line:  [ ] None	   [ ]  Medication / TPN Administration     Drips:     ICU Vital Signs Last 24 Hrs  T(C): 35.3 (2018 09:00), Max: 36.1 (2018 17:33)  T(F): 95.5 (2018 09:00), Max: 96.9 (2018 17:33)  HR: 80 (2018 12:00) (78 - 92)  BP: 107/58 (2018 12:00) (99/56 - 125/62)  BP(mean): 76 (2018 12:00) (65 - 96)  ABP: --  ABP(mean): --  RR: 12 (2018 12:00) (12 - 27)  SpO2: 100% (2018 12:00) (94% - 100%)      Physical Exam:  General: NAD  HEENT: NC/AT, EOMI, PERRLA, normal hearing, no oral lesions, neck supple w/o LAD  Pulmonary: Nonlabored breathing, no respiratory distress, CTA-B  Cardiovascular: NSR, no murmurs  Abdominal: soft, NT/ND, +BS, no organomegaly  Extremities: WWP, 5/5 strength x 4, no clubbing/cyanosis/edema  Neuro: A/O x3, CNs II-XII grossly intact, normal motor/sensation, no focal deficits  Pulses: palpable distal pulses    Lines/tubes/drains:    Vent settings:      I&O's Summary    2018 07:  -  2018 07:00  --------------------------------------------------------  IN: 2658 mL / OUT: 905 mL / NET: 1753 mL    2018 07:  -  2018 13:08  --------------------------------------------------------  IN: 1000 mL / OUT: 280 mL / NET: 720 mL        LABS:                        5.9    5.2   )-----------( 38       ( 2018 05:26 )             18.5     04-20    146<H>  |  112<H>  |  37<H>  ----------------------------<  59<L>  3.7   |  22  |  1.77<H>    Ca    7.2<L>      2018 04:49  Phos  3.9     -  Mg     1.9     -20        Urinalysis Basic - ( 2018 12:49 )    Color: Yellow / Appearance: Clear / S.020 / pH: x  Gluc: x / Ketone: Trace mg/dL  / Bili: Small / Urobili: 0.2 E.U./dL   Blood: x / Protein: Trace mg/dL / Nitrite: NEGATIVE   Leuk Esterase: Small / RBC: x / WBC x   Sq Epi: x / Non Sq Epi: x / Bacteria: x      CAPILLARY BLOOD GLUCOSE      POCT Blood Glucose.: 95 mg/dL (2018 11:09)  POCT Blood Glucose.: 130 mg/dL (2018 05:39)  POCT Blood Glucose.: 60 mg/dL (2018 05:06)  POCT Blood Glucose.: 60 mg/dL (2018 23:46)  POCT Blood Glucose.: 132 mg/dL (2018 17:35)  POCT Blood Glucose.: 61 mg/dL (2018 16:52)        Cultures:    RADIOLOGY & ADDITIONAL STUDIES: 24 hr events:  O/N: d50x2, raised [id number]  : Mental status improved. Starting TFs, sent HIT panel negative. restarted heparin. sp& sw ordered. uo low given 5% albumin and started 25 %q8 repeat As per ID sent Culture for dwayne virus in scott nesha medium.  CT scan showing ? extravasation of contrast in pelvis. As per Dr Ngo will monitor and hold TF, TTE negative for valve abnormalities.     SUBJECTIVE: Nonconversant incomprehensible speech even with . RoS deferred.    MEDICATIONS  (STANDING):  albumin human 25% IVPB 50 milliLiter(s) IV Intermittent every 8 hours  ARIPiprazole 10 milliGRAM(s) Oral daily  cosyntropin Injectable 0.25 milliGRAM(s) IV Push once  dextrose 10%. 1000 milliLiter(s) (120 mL/Hr) IV Continuous <Continuous>  dextrose 5%. 1000 milliLiter(s) (50 mL/Hr) IV Continuous <Continuous>  dextrose 50% Injectable 12.5 Gram(s) IV Push once  dextrose 50% Injectable 25 Gram(s) IV Push once  dextrose 50% Injectable 25 Gram(s) IV Push once  escitalopram 10 milliGRAM(s) Oral daily  fat emulsion (Plant Based) 20% Infusion 0.6 Gm/kG/Day (20.83 mL/Hr) IV Continuous <Continuous>  heparin  Injectable 5000 Unit(s) SubCutaneous every 8 hours  insulin lispro (HumaLOG) corrective regimen sliding scale   SubCutaneous every 6 hours  levothyroxine 150 MICROGram(s) Oral daily  meropenem  IVPB 1000 milliGRAM(s) IV Intermittent every 12 hours  oxacillin IVPB      oxacillin IVPB 2 Gram(s) IV Intermittent every 4 hours  pantoprazole   Suspension 40 milliGRAM(s) Oral daily  Parenteral Nutrition - Adult 1 Each (46 mL/Hr) TPN Continuous <Continuous>  potassium chloride  20 mEq/100 mL IVPB 20 milliEquivalent(s) IV Intermittent every 4 hours  QUEtiapine 25 milliGRAM(s) Oral daily    MEDICATIONS  (PRN):  ALBUTerol/ipratropium for Nebulization 3 milliLiter(s) Nebulizer every 6 hours PRN Shortness of Breath and/or Wheezing  dextrose Gel 1 Dose(s) Oral once PRN Blood Glucose LESS THAN 70 milliGRAM(s)/deciliter  glucagon  Injectable 1 milliGRAM(s) IntraMuscular once PRN Glucose LESS THAN 70 milligrams/deciliter  ondansetron Injectable 4 milliGRAM(s) IV Push every 6 hours PRN Nausea      Nicole:	  [ ] None	[ x] Daily Nicole Order Placed	   Indication:	  [x ] Strict I and O's    [ ] Obstruction     [ ] Incontinence + Stage 3 or 4 Decubitus  Central Line:  [ ] None	   [x ]  Medication / TPN Administration       ICU Vital Signs Last 24 Hrs  T(C): 35.3 (2018 09:00), Max: 36.1 (2018 17:33)  T(F): 95.5 (2018 09:00), Max: 96.9 (2018 17:33)  HR: 80 (2018 12:00) (78 - 92)  BP: 107/58 (2018 12:00) (99/56 - 125/62)  BP(mean): 76 (2018 12:00) (65 - 96)  ABP: --  ABP(mean): --  RR: 12 (2018 12:00) (12 - 27)  SpO2: 100% (2018 12:00) (94% - 100%)      Physical Exam:  General: no distress  HEENT: NC/AT, EOMI, normal hearing, neck supple w/o LAD  Pulmonary: Nonlabored breathing, no respiratory distress, CTA-B  Cardiovascular: NSR, no murmurs  Abdominal: obese, soft, NT/ND, hypoactive BS, no organomegaly. Midline wound 5x5 cm, open and granulating with small center of fibrinous exudate. Severe undermining, and deep half of wound likely an additional 5 cm radius all around. Wound vac in place, functioning.   Extremities: WWP, 3+ pitting doughy weeping edema in legs bilaterally  Neuro: A/O x 0, normal motor, unable to assess sensory, non-conversant, incomprehensible speech      I&O's Summary    2018 07:01  -  2018 07:00  --------------------------------------------------------  IN: 2658 mL / OUT: 905 mL / NET: 1753 mL    2018 07:01  -  2018 13:08  --------------------------------------------------------  IN: 1000 mL / OUT: 280 mL / NET: 720 mL        LABS:                        5.9    5.2   )-----------( 38       ( 2018 05:26 )             18.5     04-20    146<H>  |  112<H>  |  37<H>  < from: CT Abdomen and Pelvis w/ Oral Cont (18 @ 17:55) >  IMPRESSION:  Circular collection of extraluminal oral contrast around the enlarged   fibroid uterus likely connected to a loop of small bowel (enterotomy) in   the right lower quadrant. Collection may be contained around the uterus   by peritoneal coverings or adhesions. Small ascites.    Mesenteric edema in the upper abdomen particularly around the mesenteric   vessels. Recommend contrast enhanced CT or Doppler ultrasound to evaluate   patency of superior mesenteric vein and portal vein.    < end of copied text >  ----------------------------<  59<L>  3.7   |  22  |  1.77<H>    Ca    7.2<L>      2018 04:49  Phos  3.9     04-20  Mg     1.9     04-20        Urinalysis Basic - ( 2018 12:49 )    Color: Yellow / Appearance: Clear / S.020 / pH: x  Gluc: x / Ketone: Trace mg/dL  / Bili: Small / Urobili: 0.2 E.U./dL   Blood: x / Protein: Trace mg/dL / Nitrite: NEGATIVE   Leuk Esterase: Small / RBC: x / WBC x   Sq Epi: x / Non Sq Epi: x / Bacteria: x      CAPILLARY BLOOD GLUCOSE      POCT Blood Glucose.: 95 mg/dL (2018 11:09)  POCT Blood Glucose.: 130 mg/dL (2018 05:39)  POCT Blood Glucose.: 60 mg/dL (2018 05:06)  POCT Blood Glucose.: 60 mg/dL (2018 23:46)  POCT Blood Glucose.: 132 mg/dL (2018 17:35)  POCT Blood Glucose.: 61 mg/dL (2018 16:52)        Cultures:  Culture - Urine (18 @ 12:53)    -  Amikacin: S <=16    -  Ampicillin: R >16 These ampicillin results predict results for amoxicillin    -  Ampicillin: R >16 These ampicillin results predict results for amoxicillin    -  Ampicillin/Sulbactam: R >16/8    -  Ampicillin/Sulbactam: R >16/8    -  Aztreonam: S <=4    -  Cefazolin: R >16 This predicts results for oral agents cefaclor, cefdinir, cefpodoxime, cefprozil, cefuroxime axetil, cephalexin and locarbef for uncomplicated UTI. Note that some isolates may be susceptible to these agents while testing resistant to cefazolin.    -  Cefazolin: R >16 This predicts results for oral agents cefaclor, cefdinir, cefpodoxime, cefprozil, cefuroxime axetil, cephalexin and locarbef for uncomplicated UTI. Note that some isolates may be susceptible to these agents while testing resistant to cefazolin.    -  Cefepime: I 16    -  Cefotaxime: S <=2    -  Cefoxitin: S <=8    -  Ceftazidime: S <=1    -  Ceftriaxone: S <=1 Enterobacter, Citrobacter, and Serratia may develop resistance during prolonged therapy    -  Ceftriaxone: S <=1 Enterobacter, Citrobacter, and Serratia may develop resistance during prolonged therapy    -  Cefuroxime: R >16    -  Ciprofloxacin: S <=1    -  Ciprofloxacin: S <=1    -  Ertapenem: S <=1    -  Gentamicin: S <=4    -  Gentamicin: S <=4    -  Imipenem: S <=1    -  Levofloxacin: S <=2    -  Meropenem: S <=1    -  Nitrofurantoin: S <=32 Should not be used to treat pyelonephritis    -  Nitrofurantoin: S <=32 Should not be used to treat pyelonephritis    -  Piperacillin/Tazobactam: R >64    -  Piperacillin/Tazobactam: R >64    -  Tigecycline: S <=2    -  Tobramycin: S <=4    -  Tobramycin: S <=4    -  Trimethoprim/Sulfamethoxazole: S <=2/38    -  Trimethoprim/Sulfamethoxazole: S <=    Specimen Source: .Urine Catheterized    Culture Results:   4,000 CFU/ml Escherichia coli    Organism Identification: Escherichia coli  Escherichia coli    Organism: Escherichia coli    Organism: Escherichia coli    Method Type: MARY    Method Type: MARY    Specimen Source: .Blood Blood (18 @ 08:42): No growth at 2 days        RADIOLOGY & ADDITIONAL STUDIES:  < from: CT Abdomen and Pelvis w/ Oral Cont (18 @ 17:55) >  IMPRESSION:  Circular collection of extraluminal oral contrast around the enlarged   fibroid uterus likely connected to a loop of small bowel (enterotomy) in   the right lower quadrant. Collection may be contained around the uterus   by peritoneal coverings or adhesions. Small ascites.    Mesenteric edema in the upper abdomen particularly around the mesenteric   vessels. Recommend contrast enhanced CT or Doppler ultrasound to evaluate   patency of superior mesenteric vein and portal vein.    < end of copied text > 24 hr events:  O/N: d50x2, raised [id number]/hr  : Mental status improved. Starting TFs, sent HIT panel negative. restarted heparin. sp& sw ordered. uo low given 5% albumin and started 25 %q8 repeat As per ID sent Culture for dwayne virus in scott nesha medium.  CT scan showing ? extravasation of contrast in pelvis. As per Dr Ngo will monitor and hold TF, TTE negative for valve abnormalities.     SUBJECTIVE: Nonconversant incomprehensible speech even with . RoS deferred.    MEDICATIONS  (STANDING):  albumin human 25% IVPB 50 milliLiter(s) IV Intermittent every 8 hours  ARIPiprazole 10 milliGRAM(s) Oral daily  cosyntropin Injectable 0.25 milliGRAM(s) IV Push once  dextrose 10%. 1000 milliLiter(s) (120 mL/Hr) IV Continuous <Continuous>  dextrose 5%. 1000 milliLiter(s) (50 mL/Hr) IV Continuous <Continuous>  dextrose 50% Injectable 12.5 Gram(s) IV Push once  dextrose 50% Injectable 25 Gram(s) IV Push once  dextrose 50% Injectable 25 Gram(s) IV Push once  escitalopram 10 milliGRAM(s) Oral daily  fat emulsion (Plant Based) 20% Infusion 0.6 Gm/kG/Day (20.83 mL/Hr) IV Continuous <Continuous>  heparin  Injectable 5000 Unit(s) SubCutaneous every 8 hours  insulin lispro (HumaLOG) corrective regimen sliding scale   SubCutaneous every 6 hours  levothyroxine 150 MICROGram(s) Oral daily  meropenem  IVPB 1000 milliGRAM(s) IV Intermittent every 12 hours  oxacillin IVPB      oxacillin IVPB 2 Gram(s) IV Intermittent every 4 hours  pantoprazole   Suspension 40 milliGRAM(s) Oral daily  Parenteral Nutrition - Adult 1 Each (46 mL/Hr) TPN Continuous <Continuous>  potassium chloride  20 mEq/100 mL IVPB 20 milliEquivalent(s) IV Intermittent every 4 hours  QUEtiapine 25 milliGRAM(s) Oral daily    MEDICATIONS  (PRN):  ALBUTerol/ipratropium for Nebulization 3 milliLiter(s) Nebulizer every 6 hours PRN Shortness of Breath and/or Wheezing  dextrose Gel 1 Dose(s) Oral once PRN Blood Glucose LESS THAN 70 milliGRAM(s)/deciliter  glucagon  Injectable 1 milliGRAM(s) IntraMuscular once PRN Glucose LESS THAN 70 milligrams/deciliter  ondansetron Injectable 4 milliGRAM(s) IV Push every 6 hours PRN Nausea      Suazo:	  [ ] None	[ x] Daily Suazo Order Placed	   Indication:	  [x ] Strict I and O's    [ ] Obstruction     [ ] Incontinence + Stage 3 or 4 Decubitus  Central Line:  [ ] None	   [x ]  Medication / TPN Administration       ICU Vital Signs Last 24 Hrs  T(C): 35.3 (2018 09:00), Max: 36.1 (2018 17:33)  T(F): 95.5 (2018 09:00), Max: 96.9 (2018 17:33)  HR: 80 (2018 12:00) (78 - 92)  BP: 107/58 (2018 12:00) (99/56 - 125/62)  BP(mean): 76 (2018 12:00) (65 - 96)  ABP: --  ABP(mean): --  RR: 12 (2018 12:00) (12 - 27)  SpO2: 100% (2018 12:00) (94% - 100%)      Physical Exam:  General: no distress  HEENT: NC/AT, EOMI, normal hearing, neck supple w/o LAD  Pulmonary: Nonlabored breathing, no respiratory distress, CTA-B  Cardiovascular: NSR, no murmurs  Abdominal: obese, soft, NT/ND, hypoactive BS, no organomegaly. Midline wound 5x5 cm, open and granulating with small center of fibrinous exudate. Severe undermining, and deep half of wound likely an additional 5 cm radius all around. Wound vac in place, functioning.   : suazo in place  Vasc: 2+ radial, 1+ DP pulses  MSK: no joint swelling  Extremities: WWP, 3+ pitting doughy weeping edema in legs bilaterally  Neuro: A/O x 0, normal motor, unable to assess sensory, non-conversant, incomprehensible speech  Skin: no rash      I&O's Summary    2018 07:01  -  2018 07:00  --------------------------------------------------------  IN: 2658 mL / OUT: 905 mL / NET: 1753 mL    2018 07:01  -  2018 13:08  --------------------------------------------------------  IN: 1000 mL / OUT: 280 mL / NET: 720 mL        LABS:                        5.9    5.2   )-----------( 38       ( 2018 05:26 )             18.5     04-20    146<H>  |  112<H>  |  37<H>  < from: CT Abdomen and Pelvis w/ Oral Cont (18 @ 17:55) >  IMPRESSION:  Circular collection of extraluminal oral contrast around the enlarged   fibroid uterus likely connected to a loop of small bowel (enterotomy) in   the right lower quadrant. Collection may be contained around the uterus   by peritoneal coverings or adhesions. Small ascites.    Mesenteric edema in the upper abdomen particularly around the mesenteric   vessels. Recommend contrast enhanced CT or Doppler ultrasound to evaluate   patency of superior mesenteric vein and portal vein.    < end of copied text >  ----------------------------<  59<L>  3.7   |  22  |  1.77<H>    Ca    7.2<L>      2018 04:49  Phos  3.9     04-20  Mg     1.9     04-20        Urinalysis Basic - ( 2018 12:49 )    Color: Yellow / Appearance: Clear / S.020 / pH: x  Gluc: x / Ketone: Trace mg/dL  / Bili: Small / Urobili: 0.2 E.U./dL   Blood: x / Protein: Trace mg/dL / Nitrite: NEGATIVE   Leuk Esterase: Small / RBC: x / WBC x   Sq Epi: x / Non Sq Epi: x / Bacteria: x      CAPILLARY BLOOD GLUCOSE      POCT Blood Glucose.: 95 mg/dL (2018 11:09)  POCT Blood Glucose.: 130 mg/dL (2018 05:39)  POCT Blood Glucose.: 60 mg/dL (2018 05:06)  POCT Blood Glucose.: 60 mg/dL (2018 23:46)  POCT Blood Glucose.: 132 mg/dL (2018 17:35)  POCT Blood Glucose.: 61 mg/dL (2018 16:52)        Cultures:  Culture - Urine (18 @ 12:53)    -  Amikacin: S <=16    -  Ampicillin: R >16 These ampicillin results predict results for amoxicillin    -  Ampicillin: R >16 These ampicillin results predict results for amoxicillin    -  Ampicillin/Sulbactam: R >16/8    -  Ampicillin/Sulbactam: R >16/8    -  Aztreonam: S <=4    -  Cefazolin: R >16 This predicts results for oral agents cefaclor, cefdinir, cefpodoxime, cefprozil, cefuroxime axetil, cephalexin and locarbef for uncomplicated UTI. Note that some isolates may be susceptible to these agents while testing resistant to cefazolin.    -  Cefazolin: R >16 This predicts results for oral agents cefaclor, cefdinir, cefpodoxime, cefprozil, cefuroxime axetil, cephalexin and locarbef for uncomplicated UTI. Note that some isolates may be susceptible to these agents while testing resistant to cefazolin.    -  Cefepime: I 16    -  Cefotaxime: S <=2    -  Cefoxitin: S <=8    -  Ceftazidime: S <=1    -  Ceftriaxone: S <=1 Enterobacter, Citrobacter, and Serratia may develop resistance during prolonged therapy    -  Ceftriaxone: S <=1 Enterobacter, Citrobacter, and Serratia may develop resistance during prolonged therapy    -  Cefuroxime: R >16    -  Ciprofloxacin: S <=1    -  Ciprofloxacin: S <=1    -  Ertapenem: S <=1    -  Gentamicin: S <=4    -  Gentamicin: S <=4    -  Imipenem: S <=1    -  Levofloxacin: S <=2    -  Meropenem: S <=1    -  Nitrofurantoin: S <=32 Should not be used to treat pyelonephritis    -  Nitrofurantoin: S <=32 Should not be used to treat pyelonephritis    -  Piperacillin/Tazobactam: R >64    -  Piperacillin/Tazobactam: R >64    -  Tigecycline: S <=2    -  Tobramycin: S <=4    -  Tobramycin: S <=4    -  Trimethoprim/Sulfamethoxazole: S <=2/38    -  Trimethoprim/Sulfamethoxazole: S <=2/38    Specimen Source: .Urine Catheterized    Culture Results:   4,000 CFU/ml Escherichia coli    Organism Identification: Escherichia coli  Escherichia coli    Organism: Escherichia coli    Organism: Escherichia coli    Method Type: MARY    Method Type: MARY    Specimen Source: .Blood Blood (18 @ 08:42): No growth at 2 days        RADIOLOGY & ADDITIONAL STUDIES:  < from: CT Abdomen and Pelvis w/ Oral Cont (18 @ 17:55) >  IMPRESSION:  Circular collection of extraluminal oral contrast around the enlarged   fibroid uterus likely connected to a loop of small bowel (enterotomy) in   the right lower quadrant. Collection may be contained around the uterus   by peritoneal coverings or adhesions. Small ascites.    Mesenteric edema in the upper abdomen particularly around the mesenteric   vessels. Recommend contrast enhanced CT or Doppler ultrasound to evaluate   patency of superior mesenteric vein and portal vein.    < end of copied text >

## 2018-04-20 NOTE — CHART NOTE - NSCHARTNOTEFT_GEN_A_CORE
Admitting Diagnosis:   Patient is a 78y old  Female who presents with a chief complaint of Abdominal wound and multifactorial sepsis (17 Apr 2018 21:02)      PAST MEDICAL & SURGICAL HISTORY:  Hypothyroidism  GERD (gastroesophageal reflux disease)  Obesity  DM (diabetes mellitus)  HLD (hyperlipidemia)  HTN (hypertension)  H/O ventral hernia repair      Current Nutrition Order:  NPO- TF held d/t small bowel perf       PO Intake: Good (%) [   ]  Fair (50-75%) [   ] Poor (<25%) [   ]- N/A NPO    GI Issues: Viral diarrhea     Pain: Unable to assess at this time 2/2 AMS    Skin Integrity: Open abdominal wound w/vac, B/L buttocks stage II    Labs:   04-20    146<H>  |  112<H>  |  37<H>  ----------------------------<  59<L>  3.7   |  22  |  1.77<H>    Ca    7.2<L>      20 Apr 2018 04:49  Phos  3.9     04-20  Mg     1.9     04-20      CAPILLARY BLOOD GLUCOSE      POCT Blood Glucose.: 130 mg/dL (20 Apr 2018 05:39)  POCT Blood Glucose.: 60 mg/dL (20 Apr 2018 05:06)  POCT Blood Glucose.: 60 mg/dL (19 Apr 2018 23:46)  POCT Blood Glucose.: 132 mg/dL (19 Apr 2018 17:35)  POCT Blood Glucose.: 61 mg/dL (19 Apr 2018 16:52)  POCT Blood Glucose.: 84 mg/dL (19 Apr 2018 11:24)      Medications:  MEDICATIONS  (STANDING):  albumin human 25% IVPB 50 milliLiter(s) IV Intermittent every 8 hours  ARIPiprazole 10 milliGRAM(s) Oral daily  dextrose 10%. 1000 milliLiter(s) (70 mL/Hr) IV Continuous <Continuous>  dextrose 5%. 1000 milliLiter(s) (50 mL/Hr) IV Continuous <Continuous>  dextrose 50% Injectable 12.5 Gram(s) IV Push once  dextrose 50% Injectable 25 Gram(s) IV Push once  dextrose 50% Injectable 25 Gram(s) IV Push once  escitalopram 10 milliGRAM(s) Oral daily  heparin  Injectable 5000 Unit(s) SubCutaneous every 8 hours  insulin lispro (HumaLOG) corrective regimen sliding scale   SubCutaneous every 6 hours  levothyroxine 150 MICROGram(s) Oral daily  meropenem  IVPB 1000 milliGRAM(s) IV Intermittent every 12 hours  oxacillin IVPB      oxacillin IVPB 2 Gram(s) IV Intermittent every 4 hours  pantoprazole   Suspension 40 milliGRAM(s) Oral daily  potassium chloride  20 mEq/100 mL IVPB 20 milliEquivalent(s) IV Intermittent every 4 hours  QUEtiapine 25 milliGRAM(s) Oral daily    MEDICATIONS  (PRN):  ALBUTerol/ipratropium for Nebulization 3 milliLiter(s) Nebulizer every 6 hours PRN Shortness of Breath and/or Wheezing  dextrose Gel 1 Dose(s) Oral once PRN Blood Glucose LESS THAN 70 milliGRAM(s)/deciliter  glucagon  Injectable 1 milliGRAM(s) IntraMuscular once PRN Glucose LESS THAN 70 milligrams/deciliter  ondansetron Injectable 4 milliGRAM(s) IV Push every 6 hours PRN Nausea      Weight: 93.6kg  Daily     Daily     Weight Change: No new weights recorded since admit     Estimated energy needs: Ideal body weight used for calculations as pt >120% of IBW. needs estimated for older age; adjusted for sepsis  Calories: 25-30 kcal/kg = 7627-6952 kcal/day  Protein: 1.2-1.4 g/kg = 73-85g protein/day  Fluids: 30-35 mL/kg = 2224-5316 mL/day    Subjective:     Previous Nutrition Diagnosis:  Inadequate oral intake RT inability to meet needs via oral intake 2/2 AMS AEB NPO    Active [ X  ]  Resolved [   ]    If resolved, new PES:     Goal: Nutrition to be initiated within 48hrs     Recommendations:  1. Per discussion with team, recommend TPN via central line: 300g Dex, 60g AA, 50g 20% Lipids to provide in total 1764 Kcal, 60g protein, GIR 2.23, 1.0g/kg IBW protein  Recommend checking TG before starting TPN and then TG weekly. Check Mg, Phos, K daily and POC BG Q6hrs. Trend daily weights. Recommend increasing AA to 79g pending BUN/Cr labs (1799 kcal, 79g protein, 1.3 g/kg IBW protein, GIR 2.23)  2. Monitor for surgical team plan of care; use gut as medically feasible  *endorsed to team     Education: N/A AMS    Risk Level: High [ X  ] Moderate [   ] Low [   ] Admitting Diagnosis:   Patient is a 78y old  Female who presents with a chief complaint of Abdominal wound and multifactorial sepsis (17 Apr 2018 21:02)      PAST MEDICAL & SURGICAL HISTORY:  Hypothyroidism  GERD (gastroesophageal reflux disease)  Obesity  DM (diabetes mellitus)  HLD (hyperlipidemia)  HTN (hypertension)  H/O ventral hernia repair      Current Nutrition Order:  NPO- TF held d/t small bowel perf       PO Intake: Good (%) [   ]  Fair (50-75%) [   ] Poor (<25%) [   ]- N/A NPO    GI Issues: Viral diarrhea     Pain: Unable to assess at this time 2/2 AMS    Skin Integrity: Open abdominal wound w/vac, B/L buttocks stage II    Labs:   04-20    146<H>  |  112<H>  |  37<H>  ----------------------------<  59<L>  3.7   |  22  |  1.77<H>    Ca    7.2<L>      20 Apr 2018 04:49  Phos  3.9     04-20  Mg     1.9     04-20      CAPILLARY BLOOD GLUCOSE      POCT Blood Glucose.: 130 mg/dL (20 Apr 2018 05:39)  POCT Blood Glucose.: 60 mg/dL (20 Apr 2018 05:06)  POCT Blood Glucose.: 60 mg/dL (19 Apr 2018 23:46)  POCT Blood Glucose.: 132 mg/dL (19 Apr 2018 17:35)  POCT Blood Glucose.: 61 mg/dL (19 Apr 2018 16:52)  POCT Blood Glucose.: 84 mg/dL (19 Apr 2018 11:24)      Medications:  MEDICATIONS  (STANDING):  albumin human 25% IVPB 50 milliLiter(s) IV Intermittent every 8 hours  ARIPiprazole 10 milliGRAM(s) Oral daily  dextrose 10%. 1000 milliLiter(s) (70 mL/Hr) IV Continuous <Continuous>  dextrose 5%. 1000 milliLiter(s) (50 mL/Hr) IV Continuous <Continuous>  dextrose 50% Injectable 12.5 Gram(s) IV Push once  dextrose 50% Injectable 25 Gram(s) IV Push once  dextrose 50% Injectable 25 Gram(s) IV Push once  escitalopram 10 milliGRAM(s) Oral daily  heparin  Injectable 5000 Unit(s) SubCutaneous every 8 hours  insulin lispro (HumaLOG) corrective regimen sliding scale   SubCutaneous every 6 hours  levothyroxine 150 MICROGram(s) Oral daily  meropenem  IVPB 1000 milliGRAM(s) IV Intermittent every 12 hours  oxacillin IVPB      oxacillin IVPB 2 Gram(s) IV Intermittent every 4 hours  pantoprazole   Suspension 40 milliGRAM(s) Oral daily  potassium chloride  20 mEq/100 mL IVPB 20 milliEquivalent(s) IV Intermittent every 4 hours  QUEtiapine 25 milliGRAM(s) Oral daily    MEDICATIONS  (PRN):  ALBUTerol/ipratropium for Nebulization 3 milliLiter(s) Nebulizer every 6 hours PRN Shortness of Breath and/or Wheezing  dextrose Gel 1 Dose(s) Oral once PRN Blood Glucose LESS THAN 70 milliGRAM(s)/deciliter  glucagon  Injectable 1 milliGRAM(s) IntraMuscular once PRN Glucose LESS THAN 70 milligrams/deciliter  ondansetron Injectable 4 milliGRAM(s) IV Push every 6 hours PRN Nausea      Weight: 93.6kg  Daily     Daily     Weight Change: No new weights recorded since admit     Estimated energy needs: Ideal body weight used for calculations as pt >120% of IBW. needs estimated for older age; adjusted for sepsis (protein needs currently estimated lower d/t hepatic and renal dysfunction)  Calories: 25-30 kcal/kg = 9615-6763 kcal/day  Protein: 1.0-1.2 g/kg = 73-85g protein/day  Fluids: 30-35 mL/kg = 8200-2901 mL/day    Subjective: 79 yo/female with PMhx DM, HTN, recent ventral hernia repair c/b PE, anemia, GIB, admitted with sepsis 2/2 abdominal wound, UTI and bacteremia. Pt discussed during rounds this AM, per MD pt likely with small bowel perf. Pt still with AMS, minimally arousable or alert. Dobhoff in place but TF on hold d/t TF. RN endorses pt without complaints, noted with viral diarrhea. Per MD/PA, plan to start TPN until surgical plan in place for small bowel perf. Will continue to monitor.     Previous Nutrition Diagnosis:  Inadequate oral intake RT inability to meet needs via oral intake 2/2 AMS AEB NPO    Active [ X  ]  Resolved [   ]    If resolved, new PES:     Goal: Nutrition to be initiated within 48hrs     Recommendations:  1. Per discussion with team, recommend TPN via central line: 300g Dex, 60g AA, 50g 20% Lipids to provide in total 1764 Kcal, 60g protein, GIR 2.23, 1.0g/kg IBW protein  Recommend checking TG before starting TPN and then TG weekly. Check Mg, Phos, K daily and POC BG Q6hrs. Trend daily weights. Recommend increasing AA to 79g pending BUN/Cr labs (1799 kcal, 79g protein, 1.3 g/kg IBW protein, GIR 2.23). Fluids and lytes per MD discretion.   2. Monitor for surgical team plan of care; use gut as medically feasible  *endorsed to team     Education: N/A AMS    Risk Level: High [ X  ] Moderate [   ] Low [   ]

## 2018-04-20 NOTE — PROGRESS NOTE ADULT - ATTENDING COMMENTS
I have reviewed the medical record, including laboratory and radiographic studies, examined the patient and discussed the plan with Dr. Valdez, the ID Resident.  Agree with above, except she was very lethargic and not following commands when seen by me this afternoon.  Will continue to follow with you – ID service.

## 2018-04-20 NOTE — PROGRESS NOTE ADULT - ASSESSMENT
78 year old female, morbidly obesity, DM, ventral hernia repair with mesh 3/2 c/b wound dehiscence, recent ATN requiring temporary HD (Permacath removed 4/10), who presents as a transfer from OSH for management of multifactorial sepsis and open abdominal wound. Patient found to have ESBL-producing E.Coli UTI and MSSA Bacteremia, origin likely coming from abdomen/pelvis. CT imaging reviewed with attending radiologist, notes a small bowel perforation with surrounding extra-luminal fluid collections. As per surgery, no surgical intervention at this time.     Recommendations:  1. c/w Meropenem 1g q12h and Oxacillin 2g q4h   2. Would recommend drainage of fluid collection   3. f/u [place] Susceptibilities on MSSA blood culture and MDR E.Coli urine culture   4. Surveillance blood cultures     5. Surgical intervention as per SICU team     Discussed with ID attending and primary team  ID will follow

## 2018-04-20 NOTE — PROVIDER CONTACT NOTE (CHANGE IN STATUS NOTIFICATION) - SITUATION
Patient currently at [facility] right now and she refused the scan to be performed. AS per the tech, patient was in pain but did not want to notify RN on the floor for medication.

## 2018-04-20 NOTE — PROGRESS NOTE ADULT - ASSESSMENT
79 y/o F with sepsis from open abdominal wound infection, bacteremia present on admission, and UTI present on admission.    Neuro: prn pain control, Zofran prn, seroquel, aripiprazole, escitalapram   CV: HD stable s/p severe sepsis on D10 @70, holidng amlodipine, albumin 25%q8 4/19 Echo  65-70%  Pulm: Satting well on NC  GI: NPO, protonix, Dobhoff (no feeds), f/u speech/swallow, TPN  : Nicole S/p AKF on HD now resolved. UTI (present on admission)  ID: ESBL Ecoli in Urine resistant to Zosyn: Jose L( 4/17-) MSSA in blood: Oxacillin ( 4/17-) Kai virus (good hand washing), TTE neg for endocarditis  Endo: ISS, Synthroid 150 po, Hypoglycemia holding medroxyprogesterone, f/u cosyntropin stim test  PPx: sqh PE:Sp IVC filter on 3/30  Lines: PIV Rt IJ TLC from OSH ( 4/15-), s/p L Rad Art line( 4/15-4/18)   Wounds: Wound vac to midline incision , change MWF  PT/OT: ordered 79 y/o F with sepsis from open abdominal wound infection, bacteremia present on admission, and UTI present on admission, now with ATN, fluid overload, possible enterotomy with leak.    Neuro: prn pain control, Zofran prn, aripiprazole, escitalapram. DC seroquel.  CV: HD stable s/p severe sepsis on D10 @70, holding amlodipine; Diurese with lasix and albumin 25%q8 4/19 Echo  65-70%  Pulm: Satting well on NC  GI: NPO, protonix, Dobhoff (no feeds), f/u speech/swallow, TPN written  : Nicole S/p AKF on HD but some recurrent ATN last 2 days. UTI (present on admission)  ID: ESBL Ecoli in Urine resistant to Zosyn: Jose L( 4/17-) MSSA in blood: Oxacillin ( 4/17-) Kai virus (good hand washing), TTE neg for endocarditis  Endo: ISS, Synthroid 150 po, Hypoglycemia holding medroxyprogesterone, f/u cosyntropin stim test  PPx: sqh PE:Sp IVC filter on 3/30  Lines: PIV Rt IJ TLC from OSH ( 4/15-), s/p L Rad Art line( 4/15-4/18)   Wounds: Wound vac to midline incision , change MWF  PT/OT: ordered 77 y/o F with sepsis from open abdominal wound infection, Staphylococcal bacteremia present on admission, and UTI present on admission, now with ATN, fluid overload, possible enterotomy with leak, hypoglycemia, protein calorie malnutirition.    Neuro: prn pain control, Zofran prn, aripiprazole, escitalapram. DC seroquel.  CV: HD stable s/p severe sepsis on D10 @70, holding amlodipine; Diurese with lasix and albumin 25%q8 4/19 Echo  65-70%  Pulm: Satting well on NC  GI: NPO, protonix, Dobhoff (no feeds), f/u speech/swallow, TPN written  : Nicole S/p AKF on HD but some recurrent ATN last 2 days. UTI (present on admission)  ID: ESBL Ecoli in Urine resistant to Zosyn: Jose L( 4/17-) MSSA in blood: Oxacillin ( 4/17-) Kai virus (good hand washing), TTE neg for endocarditis  Endo: ISS, Synthroid 150 po, Hypoglycemia holding medroxyprogesterone, f/u cosyntropin stim test  PPx: sqh PE:Sp IVC filter on 3/30  Lines: PIV Rt IJ TLC from OSH ( 4/15-), s/p L Rad Art line( 4/15-4/18)   Wounds: Wound vac to midline incision , change MWF  PT/OT: ordered

## 2018-04-21 LAB
ALBUMIN SERPL ELPH-MCNC: 2.7 G/DL — LOW (ref 3.3–5)
ALBUMIN SERPL ELPH-MCNC: 2.9 G/DL — LOW (ref 3.3–5)
ALP SERPL-CCNC: 89 U/L — SIGNIFICANT CHANGE UP (ref 40–120)
ALP SERPL-CCNC: 96 U/L — SIGNIFICANT CHANGE UP (ref 40–120)
ALT FLD-CCNC: 11 U/L — SIGNIFICANT CHANGE UP (ref 10–45)
ALT FLD-CCNC: 12 U/L — SIGNIFICANT CHANGE UP (ref 10–45)
ANION GAP SERPL CALC-SCNC: 11 MMOL/L — SIGNIFICANT CHANGE UP (ref 5–17)
ANION GAP SERPL CALC-SCNC: 11 MMOL/L — SIGNIFICANT CHANGE UP (ref 5–17)
APTT BLD: 61 SEC — HIGH (ref 27.5–37.4)
APTT BLD: 78.2 SEC — HIGH (ref 27.5–37.4)
AST SERPL-CCNC: 18 U/L — SIGNIFICANT CHANGE UP (ref 10–40)
AST SERPL-CCNC: 19 U/L — SIGNIFICANT CHANGE UP (ref 10–40)
BILIRUB DIRECT SERPL-MCNC: 0.9 MG/DL — HIGH (ref 0–0.2)
BILIRUB INDIRECT FLD-MCNC: 0.4 MG/DL — SIGNIFICANT CHANGE UP (ref 0.2–1)
BILIRUB SERPL-MCNC: 1.3 MG/DL — HIGH (ref 0.2–1.2)
BILIRUB SERPL-MCNC: 1.5 MG/DL — HIGH (ref 0.2–1.2)
BUN SERPL-MCNC: 41 MG/DL — HIGH (ref 7–23)
BUN SERPL-MCNC: 42 MG/DL — HIGH (ref 7–23)
CALCIUM SERPL-MCNC: 7 MG/DL — LOW (ref 8.4–10.5)
CALCIUM SERPL-MCNC: 7.1 MG/DL — LOW (ref 8.4–10.5)
CHLORIDE SERPL-SCNC: 107 MMOL/L — SIGNIFICANT CHANGE UP (ref 96–108)
CHLORIDE SERPL-SCNC: 109 MMOL/L — HIGH (ref 96–108)
CO2 SERPL-SCNC: 23 MMOL/L — SIGNIFICANT CHANGE UP (ref 22–31)
CO2 SERPL-SCNC: 23 MMOL/L — SIGNIFICANT CHANGE UP (ref 22–31)
CREAT SERPL-MCNC: 1.78 MG/DL — HIGH (ref 0.5–1.3)
CREAT SERPL-MCNC: 1.79 MG/DL — HIGH (ref 0.5–1.3)
D DIMER BLD IA.RAPID-MCNC: 855 NG/ML DDU — HIGH
FIBRINOGEN PPP-MCNC: 172 MG/DL — LOW (ref 258–438)
FIBRINOGEN PPP-MCNC: 185 MG/DL — LOW (ref 258–438)
GLUCOSE BLDC GLUCOMTR-MCNC: 128 MG/DL — HIGH (ref 70–99)
GLUCOSE BLDC GLUCOMTR-MCNC: 134 MG/DL — HIGH (ref 70–99)
GLUCOSE BLDC GLUCOMTR-MCNC: 168 MG/DL — HIGH (ref 70–99)
GLUCOSE BLDC GLUCOMTR-MCNC: 175 MG/DL — HIGH (ref 70–99)
GLUCOSE SERPL-MCNC: 126 MG/DL — HIGH (ref 70–99)
GLUCOSE SERPL-MCNC: 189 MG/DL — HIGH (ref 70–99)
HAPTOGLOB SERPL-MCNC: 41 MG/DL — SIGNIFICANT CHANGE UP (ref 34–200)
HAPTOGLOB SERPL-MCNC: 45 MG/DL — SIGNIFICANT CHANGE UP (ref 34–200)
HCT VFR BLD CALC: 24.8 % — LOW (ref 34.5–45)
HCT VFR BLD CALC: 25.9 % — LOW (ref 34.5–45)
HGB BLD-MCNC: 8.1 G/DL — LOW (ref 11.5–15.5)
HGB BLD-MCNC: 8.6 G/DL — LOW (ref 11.5–15.5)
INR BLD: 1.26 — HIGH (ref 0.88–1.16)
INR BLD: 1.27 — HIGH (ref 0.88–1.16)
LACTATE SERPL-SCNC: 1.5 MMOL/L — SIGNIFICANT CHANGE UP (ref 0.5–2)
LDH SERPL L TO P-CCNC: 253 U/L — HIGH (ref 50–242)
LDH SERPL L TO P-CCNC: 280 U/L — HIGH (ref 50–242)
MAGNESIUM SERPL-MCNC: 2 MG/DL — SIGNIFICANT CHANGE UP (ref 1.6–2.6)
MAGNESIUM SERPL-MCNC: 2 MG/DL — SIGNIFICANT CHANGE UP (ref 1.6–2.6)
MCHC RBC-ENTMCNC: 28.7 PG — SIGNIFICANT CHANGE UP (ref 27–34)
MCHC RBC-ENTMCNC: 29.2 PG — SIGNIFICANT CHANGE UP (ref 27–34)
MCHC RBC-ENTMCNC: 32.7 G/DL — SIGNIFICANT CHANGE UP (ref 32–36)
MCHC RBC-ENTMCNC: 33.2 G/DL — SIGNIFICANT CHANGE UP (ref 32–36)
MCV RBC AUTO: 87.8 FL — SIGNIFICANT CHANGE UP (ref 80–100)
MCV RBC AUTO: 87.9 FL — SIGNIFICANT CHANGE UP (ref 80–100)
PHOSPHATE SERPL-MCNC: 3.9 MG/DL — SIGNIFICANT CHANGE UP (ref 2.5–4.5)
PHOSPHATE SERPL-MCNC: 4.2 MG/DL — SIGNIFICANT CHANGE UP (ref 2.5–4.5)
PLATELET # BLD AUTO: 40 K/UL — LOW (ref 150–400)
PLATELET # BLD AUTO: 41 K/UL — LOW (ref 150–400)
POTASSIUM SERPL-MCNC: 3.8 MMOL/L — SIGNIFICANT CHANGE UP (ref 3.5–5.3)
POTASSIUM SERPL-MCNC: 4.1 MMOL/L — SIGNIFICANT CHANGE UP (ref 3.5–5.3)
POTASSIUM SERPL-SCNC: 3.8 MMOL/L — SIGNIFICANT CHANGE UP (ref 3.5–5.3)
POTASSIUM SERPL-SCNC: 4.1 MMOL/L — SIGNIFICANT CHANGE UP (ref 3.5–5.3)
PROT SERPL-MCNC: 4.4 G/DL — LOW (ref 6–8.3)
PROT SERPL-MCNC: 4.5 G/DL — LOW (ref 6–8.3)
PROTHROM AB SERPL-ACNC: 14.1 SEC — HIGH (ref 9.8–12.7)
PROTHROM AB SERPL-ACNC: 14.2 SEC — HIGH (ref 9.8–12.7)
RBC # BLD: 2.82 M/UL — LOW (ref 3.8–5.2)
RBC # BLD: 2.95 M/UL — LOW (ref 3.8–5.2)
RBC # FLD: 18 % — HIGH (ref 10.3–16.9)
RBC # FLD: 18.3 % — HIGH (ref 10.3–16.9)
SODIUM SERPL-SCNC: 141 MMOL/L — SIGNIFICANT CHANGE UP (ref 135–145)
SODIUM SERPL-SCNC: 143 MMOL/L — SIGNIFICANT CHANGE UP (ref 135–145)
WBC # BLD: 5.4 K/UL — SIGNIFICANT CHANGE UP (ref 3.8–10.5)
WBC # BLD: 6.3 K/UL — SIGNIFICANT CHANGE UP (ref 3.8–10.5)
WBC # FLD AUTO: 5.4 K/UL — SIGNIFICANT CHANGE UP (ref 3.8–10.5)
WBC # FLD AUTO: 6.3 K/UL — SIGNIFICANT CHANGE UP (ref 3.8–10.5)

## 2018-04-21 PROCEDURE — 99233 SBSQ HOSP IP/OBS HIGH 50: CPT | Mod: GC

## 2018-04-21 PROCEDURE — 71045 X-RAY EXAM CHEST 1 VIEW: CPT | Mod: 26

## 2018-04-21 RX ORDER — ELECTROLYTE SOLUTION,INJ
1 VIAL (ML) INTRAVENOUS
Qty: 0 | Refills: 0 | Status: DISCONTINUED | OUTPATIENT
Start: 2018-04-21 | End: 2018-04-21

## 2018-04-21 RX ORDER — I.V. FAT EMULSION 20 G/100ML
0.6 EMULSION INTRAVENOUS
Qty: 50 | Refills: 0 | Status: DISCONTINUED | OUTPATIENT
Start: 2018-04-21 | End: 2018-04-21

## 2018-04-21 RX ORDER — FUROSEMIDE 40 MG
40 TABLET ORAL ONCE
Qty: 0 | Refills: 0 | Status: COMPLETED | OUTPATIENT
Start: 2018-04-21 | End: 2018-04-21

## 2018-04-21 RX ORDER — ACETAMINOPHEN 500 MG
1000 TABLET ORAL ONCE
Qty: 0 | Refills: 0 | Status: COMPLETED | OUTPATIENT
Start: 2018-04-21 | End: 2018-04-21

## 2018-04-21 RX ADMIN — Medication 4: at 00:11

## 2018-04-21 RX ADMIN — Medication 100 GRAM(S): at 23:21

## 2018-04-21 RX ADMIN — Medication 400 MILLIGRAM(S): at 17:40

## 2018-04-21 RX ADMIN — Medication 100 GRAM(S): at 07:19

## 2018-04-21 RX ADMIN — Medication 2: at 19:21

## 2018-04-21 RX ADMIN — PANTOPRAZOLE SODIUM 40 MILLIGRAM(S): 20 TABLET, DELAYED RELEASE ORAL at 11:37

## 2018-04-21 RX ADMIN — Medication 40 MILLIGRAM(S): at 11:37

## 2018-04-21 RX ADMIN — MEROPENEM 100 MILLIGRAM(S): 1 INJECTION INTRAVENOUS at 05:00

## 2018-04-21 RX ADMIN — Medication 2: at 13:00

## 2018-04-21 RX ADMIN — Medication 400 MILLIGRAM(S): at 10:00

## 2018-04-21 RX ADMIN — MEROPENEM 100 MILLIGRAM(S): 1 INJECTION INTRAVENOUS at 17:40

## 2018-04-21 RX ADMIN — HEPARIN SODIUM 5000 UNIT(S): 5000 INJECTION INTRAVENOUS; SUBCUTANEOUS at 21:54

## 2018-04-21 RX ADMIN — ESCITALOPRAM OXALATE 10 MILLIGRAM(S): 10 TABLET, FILM COATED ORAL at 11:33

## 2018-04-21 RX ADMIN — I.V. FAT EMULSION 20.83 GM/KG/DAY: 20 EMULSION INTRAVENOUS at 21:54

## 2018-04-21 RX ADMIN — Medication 100 GRAM(S): at 17:00

## 2018-04-21 RX ADMIN — Medication 50 MILLILITER(S): at 11:34

## 2018-04-21 RX ADMIN — Medication 50 MILLILITER(S): at 19:22

## 2018-04-21 RX ADMIN — Medication 100 GRAM(S): at 03:42

## 2018-04-21 RX ADMIN — Medication 1000 MILLIGRAM(S): at 18:10

## 2018-04-21 RX ADMIN — ARIPIPRAZOLE 10 MILLIGRAM(S): 15 TABLET ORAL at 11:33

## 2018-04-21 RX ADMIN — Medication 50 MILLILITER(S): at 02:11

## 2018-04-21 RX ADMIN — HEPARIN SODIUM 5000 UNIT(S): 5000 INJECTION INTRAVENOUS; SUBCUTANEOUS at 05:00

## 2018-04-21 RX ADMIN — Medication 75 MICROGRAM(S): at 06:35

## 2018-04-21 RX ADMIN — Medication 1 EACH: at 18:40

## 2018-04-21 RX ADMIN — Medication 1000 MILLIGRAM(S): at 11:00

## 2018-04-21 RX ADMIN — HEPARIN SODIUM 5000 UNIT(S): 5000 INJECTION INTRAVENOUS; SUBCUTANEOUS at 13:36

## 2018-04-21 RX ADMIN — Medication 100 GRAM(S): at 11:34

## 2018-04-21 RX ADMIN — Medication 100 GRAM(S): at 19:22

## 2018-04-21 NOTE — PROGRESS NOTE ADULT - SUBJECTIVE AND OBJECTIVE BOX
INTERVAL HPI/OVERNIGHT EVENTS:        MEDICATIONS  (STANDING):  acetaminophen  IVPB. 1000 milliGRAM(s) IV Intermittent once  albumin human 25% IVPB 50 milliLiter(s) IV Intermittent every 8 hours  ARIPiprazole 10 milliGRAM(s) Oral daily  dextrose 5%. 1000 milliLiter(s) (50 mL/Hr) IV Continuous <Continuous>  dextrose 50% Injectable 12.5 Gram(s) IV Push once  dextrose 50% Injectable 25 Gram(s) IV Push once  dextrose 50% Injectable 25 Gram(s) IV Push once  escitalopram 10 milliGRAM(s) Oral daily  fat emulsion (Plant Based) 20% Infusion 0.6 Gm/kG/Day (20.83 mL/Hr) IV Continuous <Continuous>  furosemide   Injectable 40 milliGRAM(s) IV Push once  heparin  Injectable 5000 Unit(s) SubCutaneous every 8 hours  insulin lispro (HumaLOG) corrective regimen sliding scale   SubCutaneous every 6 hours  levothyroxine Injectable 75 MICROGram(s) IV Push <User Schedule>  meropenem  IVPB 1000 milliGRAM(s) IV Intermittent every 12 hours  oxacillin IVPB      oxacillin IVPB 2 Gram(s) IV Intermittent every 4 hours  pantoprazole   Suspension 40 milliGRAM(s) Oral daily  Parenteral Nutrition - Adult 1 Each (46 mL/Hr) TPN Continuous <Continuous>    MEDICATIONS  (PRN):  ALBUTerol/ipratropium for Nebulization 3 milliLiter(s) Nebulizer every 6 hours PRN Shortness of Breath and/or Wheezing  dextrose Gel 1 Dose(s) Oral once PRN Blood Glucose LESS THAN 70 milliGRAM(s)/deciliter  glucagon  Injectable 1 milliGRAM(s) IntraMuscular once PRN Glucose LESS THAN 70 milligrams/deciliter  ondansetron Injectable 4 milliGRAM(s) IV Push every 6 hours PRN Nausea      Drug Dosing Weight  Height (cm): 170.2 (2018 16:18)  Weight (kg): 93.6 (2018 16:17)  BMI (kg/m2): 32.3 (2018 16:18)  BSA (m2): 2.05 (2018 16:18)      PAST MEDICAL & SURGICAL HISTORY:  Hypothyroidism  GERD (gastroesophageal reflux disease)  Obesity  DM (diabetes mellitus)  HLD (hyperlipidemia)  HTN (hypertension)  H/O ventral hernia repair      ICU Vital Signs Last 24 Hrs  T(C): 37.3 (:00), Max: 37.4 (2018 01:00)  T(F): 99.1 (:00), Max: 99.3 (:00)  HR: 84 (:00) (78 - 110)  BP: 111/54 (:) (89/49 - 149/60)  BP(mean): 74 (:) (58 - 90)  ABP: --  ABP(mean): --  RR: 19 (:) (12 - 24)  SpO2: 95% (:) (93% - 100%)            :01  -  2018 07:00  --------------------------------------------------------  IN:    Albumin 25%: 100 mL    dextrose 10%: 930 mL    Enteral Tube Flush: 150 mL    fat emulsion (Plant Based) 20% Infusion: 208 mL    IV PiggyBack: 900 mL    Packed Red Blood Cells: 600 mL    TPN (Total Parenteral Nutrition): 690 mL  Total IN: 3578 mL    OUT:    Indwelling Catheter - Urethral: 851 mL    VAC (Vacuum Assisted Closure) System: 140 mL  Total OUT: 991 mL    Total NET: 2587 mL      2018 07:01  -  2018 09:38  --------------------------------------------------------  IN:    fat emulsion (Plant Based) 20% Infusion: 20.8 mL    TPN (Total Parenteral Nutrition): 46 mL  Total IN: 66.8 mL    OUT:    Indwelling Catheter - Urethral: 15 mL  Total OUT: 15 mL    Total NET: 51.8 mL      PHYSICAL EXAM:  General: resting comfortably in bed, NAD  Neuro: more alert today, oriented to self, follows commands, no focal deficits  HEENT: NC/AT, MMM, no JVD  Pulm: no respiratory distress, b/l rhonchi with decreased BS at b/l bases  CV: RRR, no murmur  Abd: obese, soft, NT/ND, hypoactive BS, no organomegaly. Midline wound 5x5 cm, open and granulating with small center of fibrinous exudate. Severe undermining, and deep half of wound likely an additional 5 cm radius all around. Wound vac in place, functioning.   : suazo in place  Vasc: 2+ radial, 1+ DP pulses  MSK: no joint swelling  Extremities: WWP, 3+ pitting doughy weeping edema in legs bilaterally  Skin: no rash        LABS:  CBC Full  -  ( 2018 07:07 )  WBC Count : 5.4 K/uL  Hemoglobin : 8.1 g/dL  Hematocrit : 24.8 %  Platelet Count - Automated : 40 K/uL  Mean Cell Volume : 87.9 fL  Mean Cell Hemoglobin : 28.7 pg  Mean Cell Hemoglobin Concentration : 32.7 g/dL  Auto Neutrophil # : x  Auto Lymphocyte # : x  Auto Monocyte # : x  Auto Eosinophil # : x  Auto Basophil # : x  Auto Neutrophil % : x  Auto Lymphocyte % : x  Auto Monocyte % : x  Auto Eosinophil % : x  Auto Basophil % : x    04-21    143  |  109<H>  |  42<H>  ----------------------------<  126<H>  3.8   |  23  |  1.79<H>    Ca    7.1<L>      2018 07:07  Phos  3.9     04-21  Mg     2.0     -21    TPro  4.4<L>  /  Alb  2.9<L>  /  TBili  1.3<H>  /  DBili  0.9<H>  /  AST  18  /  ALT  11  /  AlkPhos  89  04-21    PT/INR - ( 2018 07:07 )   PT: 14.2 sec;   INR: 1.27          PTT - ( 2018 07:07 )  PTT:78.2 sec  Urinalysis Basic - ( 2018 12:49 )    Color: Yellow / Appearance: Clear / S.020 / pH: x  Gluc: x / Ketone: Trace mg/dL  / Bili: Small / Urobili: 0.2 E.U./dL   Blood: x / Protein: Trace mg/dL / Nitrite: NEGATIVE   Leuk Esterase: Small / RBC: > 10 /HPF / WBC Many /HPF   Sq Epi: x / Non Sq Epi: 5-10 /HPF / Bacteria: Present /HPF INTERVAL HPI/OVERNIGHT EVENTS: o/n: Minimal response to Lasix. Stool PCR neg. +0.5L for the shift.  : Started TPN. transfused 2 units pRBC for hg 5.9. Post-txn 8.8. BCx sent. Vac changed. +1.4L, given lasix 40 iv for fluid overload. seroquel held to improve wakefulness    Pt seen and examined on AM rounds. She c/o abdominal pain. Denies SOB/CP/N/V    MEDICATIONS  (STANDING):  acetaminophen  IVPB. 1000 milliGRAM(s) IV Intermittent once  albumin human 25% IVPB 50 milliLiter(s) IV Intermittent every 8 hours  ARIPiprazole 10 milliGRAM(s) Oral daily  dextrose 5%. 1000 milliLiter(s) (50 mL/Hr) IV Continuous <Continuous>  dextrose 50% Injectable 12.5 Gram(s) IV Push once  dextrose 50% Injectable 25 Gram(s) IV Push once  dextrose 50% Injectable 25 Gram(s) IV Push once  escitalopram 10 milliGRAM(s) Oral daily  fat emulsion (Plant Based) 20% Infusion 0.6 Gm/kG/Day (20.83 mL/Hr) IV Continuous <Continuous>  furosemide   Injectable 40 milliGRAM(s) IV Push once  heparin  Injectable 5000 Unit(s) SubCutaneous every 8 hours  insulin lispro (HumaLOG) corrective regimen sliding scale   SubCutaneous every 6 hours  levothyroxine Injectable 75 MICROGram(s) IV Push <User Schedule>  meropenem  IVPB 1000 milliGRAM(s) IV Intermittent every 12 hours  oxacillin IVPB      oxacillin IVPB 2 Gram(s) IV Intermittent every 4 hours  pantoprazole   Suspension 40 milliGRAM(s) Oral daily  Parenteral Nutrition - Adult 1 Each (46 mL/Hr) TPN Continuous <Continuous>    MEDICATIONS  (PRN):  ALBUTerol/ipratropium for Nebulization 3 milliLiter(s) Nebulizer every 6 hours PRN Shortness of Breath and/or Wheezing  dextrose Gel 1 Dose(s) Oral once PRN Blood Glucose LESS THAN 70 milliGRAM(s)/deciliter  glucagon  Injectable 1 milliGRAM(s) IntraMuscular once PRN Glucose LESS THAN 70 milligrams/deciliter  ondansetron Injectable 4 milliGRAM(s) IV Push every 6 hours PRN Nausea      Drug Dosing Weight  Height (cm): 170.2 (2018 16:18)  Weight (kg): 93.6 (2018 16:17)  BMI (kg/m2): 32.3 (2018 16:18)  BSA (m2): 2.05 (2018 16:18)      PAST MEDICAL & SURGICAL HISTORY:  Hypothyroidism  GERD (gastroesophageal reflux disease)  Obesity  DM (diabetes mellitus)  HLD (hyperlipidemia)  HTN (hypertension)  H/O ventral hernia repair      ICU Vital Signs Last 24 Hrs  T(C): 37.3 (2018 07:00), Max: 37.4 (2018 01:00)  T(F): 99.1 (2018 07:00), Max: 99.3 (2018 01:00)  HR: 84 (2018 09:00) (78 - 110)  BP: 111/54 (2018 09:00) (89/49 - 149/60)  BP(mean): 74 (2018 09:00) (58 - 90)  ABP: --  ABP(mean): --  RR: 19 (2018 09:00) (12 - 24)  SpO2: 95% (2018 09:00) (93% - 100%)            2018 07:01  -  2018 07:00  --------------------------------------------------------  IN:    Albumin 25%: 100 mL    dextrose 10%: 930 mL    Enteral Tube Flush: 150 mL    fat emulsion (Plant Based) 20% Infusion: 208 mL    IV PiggyBack: 900 mL    Packed Red Blood Cells: 600 mL    TPN (Total Parenteral Nutrition): 690 mL  Total IN: 3578 mL    OUT:    Indwelling Catheter - Urethral: 851 mL    VAC (Vacuum Assisted Closure) System: 140 mL  Total OUT: 991 mL    Total NET: 2587 mL      2018 07:  -  2018 09:38  --------------------------------------------------------  IN:    fat emulsion (Plant Based) 20% Infusion: 20.8 mL    TPN (Total Parenteral Nutrition): 46 mL  Total IN: 66.8 mL    OUT:    Indwelling Catheter - Urethral: 15 mL  Total OUT: 15 mL    Total NET: 51.8 mL      PHYSICAL EXAM:  General: resting comfortably in bed, NAD  Neuro: more alert today, oriented to self, follows commands, no focal deficits  HEENT: NC/AT, MMM, no JVD  Pulm: no respiratory distress, b/l rhonchi with decreased BS at b/l bases  CV: RRR, no murmur  Abd: obese, soft, ND, no focal tenderness, hypoactive BS. Midline wound vac in place, functioning properly.   : suazo draining clear urine  Vasc: 2+ radial, 1+ DP pulses  MSK: no joint swelling  Extremities: WWP, marked anasarca   Skin: no rash        LABS:  CBC Full  -  ( 2018 07:07 )  WBC Count : 5.4 K/uL  Hemoglobin : 8.1 g/dL  Hematocrit : 24.8 %  Platelet Count - Automated : 40 K/uL  Mean Cell Volume : 87.9 fL  Mean Cell Hemoglobin : 28.7 pg  Mean Cell Hemoglobin Concentration : 32.7 g/dL      04-    143  |  109<H>  |  42<H>  ----------------------------<  126<H>  3.8   |  23  |  1.79<H>    Ca    7.1<L>      2018 07:07  Phos  3.9     04-21  Mg     2.0     04-21    TPro  4.4<L>  /  Alb  2.9<L>  /  TBili  1.3<H>  /  DBili  0.9<H>  /  AST  18  /  ALT  11  /  AlkPhos  89  04-21    PT/INR - ( 2018 07:07 )   PT: 14.2 sec;   INR: 1.27          PTT - ( 2018 07:07 )  PTT:78.2 sec  Urinalysis Basic - ( 2018 12:49 )    Color: Yellow / Appearance: Clear / S.020 / pH: x  Gluc: x / Ketone: Trace mg/dL  / Bili: Small / Urobili: 0.2 E.U./dL   Blood: x / Protein: Trace mg/dL / Nitrite: NEGATIVE   Leuk Esterase: Small / RBC: > 10 /HPF / WBC Many /HPF   Sq Epi: x / Non Sq Epi: 5-10 /HPF / Bacteria: Present /HPF

## 2018-04-21 NOTE — PROGRESS NOTE ADULT - ASSESSMENT
79 y/o F with sepsis from open abdominal wound infection, MSSA bacteremia present on admission, and UTI present on admission found to have contained small bowel leak    Neuro: prn pain control, Zofran prn, holding seroquel, aripiprazole, escitalapram   CV: HD stable s/p severe sepsis, albumin 25%q8, holding amlodipine, 4/19 Echo  65-70%. Will diurese with lasix 40 x 1 today  Pulm: Satting well on NC  GI: NPO, protonix, Dobhoff (no feeds), TPN  : Nicole S/p AKF on HD now resolved. UTI (present on admission)  ID: ESBL Ecoli in Urine resistant to Zosyn: Jose L( 4/17-) MSSA in blood: Oxacillin ( 4/17-) Kai virus (good hand washing), TTE neg for endocarditis  Endo: ISS, Synthroid 150 po, Hypoglycemia holding medroxyprogesterone, f/u cosyntropin stim test  PPx: sqh PE:Sp IVC filter on 3/30  Lines: PIV Rt IJ TLC from OSH ( 4/15-), s/p L Rad Art line( 4/15-4/18)   Wounds: Wound vac to midline incision , change MWF  PT/OT: ordered

## 2018-04-22 LAB
ANION GAP SERPL CALC-SCNC: 13 MMOL/L — SIGNIFICANT CHANGE UP (ref 5–17)
BUN SERPL-MCNC: 46 MG/DL — HIGH (ref 7–23)
CALCIUM SERPL-MCNC: 7.6 MG/DL — LOW (ref 8.4–10.5)
CHLORIDE SERPL-SCNC: 108 MMOL/L — SIGNIFICANT CHANGE UP (ref 96–108)
CO2 SERPL-SCNC: 22 MMOL/L — SIGNIFICANT CHANGE UP (ref 22–31)
CREAT SERPL-MCNC: 1.69 MG/DL — HIGH (ref 0.5–1.3)
GLUCOSE BLDC GLUCOMTR-MCNC: 167 MG/DL — HIGH (ref 70–99)
GLUCOSE BLDC GLUCOMTR-MCNC: 185 MG/DL — HIGH (ref 70–99)
GLUCOSE BLDC GLUCOMTR-MCNC: 190 MG/DL — HIGH (ref 70–99)
GLUCOSE SERPL-MCNC: 193 MG/DL — HIGH (ref 70–99)
HCT VFR BLD CALC: 24.1 % — LOW (ref 34.5–45)
HGB BLD-MCNC: 8.1 G/DL — LOW (ref 11.5–15.5)
MAGNESIUM SERPL-MCNC: 2.1 MG/DL — SIGNIFICANT CHANGE UP (ref 1.6–2.6)
MCHC RBC-ENTMCNC: 28.7 PG — SIGNIFICANT CHANGE UP (ref 27–34)
MCHC RBC-ENTMCNC: 33.6 G/DL — SIGNIFICANT CHANGE UP (ref 32–36)
MCV RBC AUTO: 85.5 FL — SIGNIFICANT CHANGE UP (ref 80–100)
PHOSPHATE SERPL-MCNC: 3.6 MG/DL — SIGNIFICANT CHANGE UP (ref 2.5–4.5)
PLATELET # BLD AUTO: 30 K/UL — LOW (ref 150–400)
POTASSIUM SERPL-MCNC: 3.7 MMOL/L — SIGNIFICANT CHANGE UP (ref 3.5–5.3)
POTASSIUM SERPL-SCNC: 3.7 MMOL/L — SIGNIFICANT CHANGE UP (ref 3.5–5.3)
RBC # BLD: 2.82 M/UL — LOW (ref 3.8–5.2)
RBC # FLD: 18.5 % — HIGH (ref 10.3–16.9)
SODIUM SERPL-SCNC: 143 MMOL/L — SIGNIFICANT CHANGE UP (ref 135–145)
WBC # BLD: 4.6 K/UL — SIGNIFICANT CHANGE UP (ref 3.8–10.5)
WBC # FLD AUTO: 4.6 K/UL — SIGNIFICANT CHANGE UP (ref 3.8–10.5)

## 2018-04-22 PROCEDURE — 71045 X-RAY EXAM CHEST 1 VIEW: CPT | Mod: 26

## 2018-04-22 PROCEDURE — 99233 SBSQ HOSP IP/OBS HIGH 50: CPT | Mod: GC

## 2018-04-22 PROCEDURE — 99232 SBSQ HOSP IP/OBS MODERATE 35: CPT | Mod: GC

## 2018-04-22 PROCEDURE — 70450 CT HEAD/BRAIN W/O DYE: CPT | Mod: 26

## 2018-04-22 RX ORDER — MEROPENEM 1 G/30ML
1000 INJECTION INTRAVENOUS EVERY 12 HOURS
Qty: 0 | Refills: 0 | Status: DISCONTINUED | OUTPATIENT
Start: 2018-04-23 | End: 2018-05-21

## 2018-04-22 RX ORDER — I.V. FAT EMULSION 20 G/100ML
0.6 EMULSION INTRAVENOUS
Qty: 50 | Refills: 0 | Status: DISCONTINUED | OUTPATIENT
Start: 2018-04-22 | End: 2018-04-22

## 2018-04-22 RX ORDER — ACETAMINOPHEN 500 MG
1000 TABLET ORAL ONCE
Qty: 0 | Refills: 0 | Status: COMPLETED | OUTPATIENT
Start: 2018-04-22 | End: 2018-04-22

## 2018-04-22 RX ORDER — FUROSEMIDE 40 MG
80 TABLET ORAL ONCE
Qty: 0 | Refills: 0 | Status: COMPLETED | OUTPATIENT
Start: 2018-04-22 | End: 2018-04-22

## 2018-04-22 RX ORDER — ELECTROLYTE SOLUTION,INJ
1 VIAL (ML) INTRAVENOUS
Qty: 0 | Refills: 0 | Status: DISCONTINUED | OUTPATIENT
Start: 2018-04-22 | End: 2018-04-22

## 2018-04-22 RX ADMIN — I.V. FAT EMULSION 20.8 GM/KG/DAY: 20 EMULSION INTRAVENOUS at 22:21

## 2018-04-22 RX ADMIN — Medication 2: at 13:00

## 2018-04-22 RX ADMIN — MEROPENEM 100 MILLIGRAM(S): 1 INJECTION INTRAVENOUS at 05:44

## 2018-04-22 RX ADMIN — Medication 400 MILLIGRAM(S): at 12:34

## 2018-04-22 RX ADMIN — Medication 100 GRAM(S): at 17:00

## 2018-04-22 RX ADMIN — Medication 2: at 07:50

## 2018-04-22 RX ADMIN — Medication 100 GRAM(S): at 07:15

## 2018-04-22 RX ADMIN — PANTOPRAZOLE SODIUM 40 MILLIGRAM(S): 20 TABLET, DELAYED RELEASE ORAL at 12:35

## 2018-04-22 RX ADMIN — Medication 80 MILLIGRAM(S): at 12:04

## 2018-04-22 RX ADMIN — HEPARIN SODIUM 5000 UNIT(S): 5000 INJECTION INTRAVENOUS; SUBCUTANEOUS at 22:22

## 2018-04-22 RX ADMIN — ESCITALOPRAM OXALATE 10 MILLIGRAM(S): 10 TABLET, FILM COATED ORAL at 12:35

## 2018-04-22 RX ADMIN — Medication 75 MICROGRAM(S): at 05:43

## 2018-04-22 RX ADMIN — Medication 100 GRAM(S): at 19:44

## 2018-04-22 RX ADMIN — ARIPIPRAZOLE 10 MILLIGRAM(S): 15 TABLET ORAL at 12:34

## 2018-04-22 RX ADMIN — MEROPENEM 100 MILLIGRAM(S): 1 INJECTION INTRAVENOUS at 18:32

## 2018-04-22 RX ADMIN — HEPARIN SODIUM 5000 UNIT(S): 5000 INJECTION INTRAVENOUS; SUBCUTANEOUS at 14:32

## 2018-04-22 RX ADMIN — Medication 50 MILLILITER(S): at 03:00

## 2018-04-22 RX ADMIN — Medication 50 MILLILITER(S): at 18:33

## 2018-04-22 RX ADMIN — Medication 50 MILLILITER(S): at 12:04

## 2018-04-22 RX ADMIN — Medication 100 GRAM(S): at 23:14

## 2018-04-22 RX ADMIN — Medication 1000 MILLIGRAM(S): at 13:00

## 2018-04-22 RX ADMIN — Medication 2: at 18:00

## 2018-04-22 RX ADMIN — HEPARIN SODIUM 5000 UNIT(S): 5000 INJECTION INTRAVENOUS; SUBCUTANEOUS at 05:45

## 2018-04-22 RX ADMIN — Medication 100 GRAM(S): at 12:06

## 2018-04-22 NOTE — PROGRESS NOTE ADULT - ASSESSMENT
78yoF s/p VHR now with sepsis from open abdominal wound infection, MSSA bacteremia present on admission, and UTI present on admission found to have contained small bowel leak    Neuro: prn pain control, Zofran prn, holding seroquel, aripiprazole, escitalapram   CV: HD stable s/p severe sepsis, albumin 25%q8, holding amlodipine, 4/19 Echo  65-70%. Will diurese with lasix 80 x 1 today  Pulm: Satting well on NC  GI: NPO, protonix, Dobhoff (no feeds), TPN  : Nicole S/p AKF on HD now resolved. UTI (present on admission)  ID: ESBL Ecoli in Urine resistant to Zosyn: Jose L( 4/17-) MSSA in blood: Oxacillin ( 4/17-) Kai virus (good hand washing), TTE neg for endocarditis  Endo: ISS, Synthroid 150 po, Hypoglycemia holding medroxyprogesterone, f/u cosyntropin stim test  PPx: sqh PE:Sp IVC filter on 3/30  Lines: PIV Rt IJ TLC from OSH ( 4/15-), s/p L Rad Art line( 4/15-4/18)   Wounds: Wound vac to midline incision , change MWF  PT/OT: ordered

## 2018-04-22 NOTE — PHYSICAL THERAPY INITIAL EVALUATION ADULT - PERTINENT HX OF CURRENT PROBLEM, REHAB EVAL
78yoF s/p VHR now with sepsis from open abdominal wound infection, MSSA bacteremia present on admission, and UTI present on admission found to have contained small bowel leak.

## 2018-04-22 NOTE — PHYSICAL THERAPY INITIAL EVALUATION ADULT - IMPAIRMENTS FOUND, PT EVAL
aerobic capacity/endurance/poor safety awareness/arousal, attention, and cognition/integumentary integrity

## 2018-04-22 NOTE — PHYSICAL THERAPY INITIAL EVALUATION ADULT - IMPAIRMENTS CONTRIBUTING IMPAIRED BED MOBILITY, REHAB EVAL
Supine to/from sit transfers not assessed due to concern for safety as patient does not follow commands and was very restless and agitated at time of eval when stimulated by touch. Will require at least 3-person assist./cognition

## 2018-04-22 NOTE — PHYSICAL THERAPY INITIAL EVALUATION ADULT - GENERAL OBSERVATIONS, REHAB EVAL
patient received reclined in MICU. +TLC, NGT, supplemental O2 dariela NC (2L/min), suazo catheter, B wrist restraints, IV, cardiac monitor, heel protector boots, SCDs patient received reclined in MICU. +TLC, NGT, supplemental O2 dariela NC (2L/min), suazo catheter, B wrist restraints, IV, cardiac monitor, heel protector boots, SCDs, abdominal wound vac

## 2018-04-22 NOTE — PROGRESS NOTE ADULT - SUBJECTIVE AND OBJECTIVE BOX
INTERVAL HPI/OVERNIGHT EVENTS:  Patient diuresed with 40mg Lasix x1 yesterday. Off Seroquel as more awake and alert. This morning however, pt appears delirious again.  TPN continued.    MEDICATIONS  (STANDING):  albumin human 25% IVPB 50 milliLiter(s) IV Intermittent every 8 hours  ARIPiprazole 10 milliGRAM(s) Oral daily  escitalopram 10 milliGRAM(s) Oral daily  fat emulsion (Plant Based) 20% Infusion 0.6 Gm/kG/Day (20.83 mL/Hr) IV Continuous <Continuous>  furosemide   Injectable 80 milliGRAM(s) IV Push once  heparin  Injectable 5000 Unit(s) SubCutaneous every 8 hours  insulin lispro (HumaLOG) corrective regimen sliding scale   SubCutaneous every 6 hours  levothyroxine Injectable 75 MICROGram(s) IV Push <User Schedule>  meropenem  IVPB 1000 milliGRAM(s) IV Intermittent every 12 hours  oxacillin IVPB      oxacillin IVPB 2 Gram(s) IV Intermittent every 4 hours  pantoprazole   Suspension 40 milliGRAM(s) Oral daily  Parenteral Nutrition - Adult 1 Each (46 mL/Hr) TPN Continuous <Continuous>  Parenteral Nutrition - Adult 1 Each (46 mL/Hr) TPN Continuous <Continuous>    MEDICATIONS  (PRN):  ALBUTerol/ipratropium for Nebulization 3 milliLiter(s) Nebulizer every 6 hours PRN Shortness of Breath and/or Wheezing  ondansetron Injectable 4 milliGRAM(s) IV Push every 6 hours PRN Nausea      Drug Dosing Weight  Height (cm): 170.2 (2018 16:18)  Weight (kg): 93.6 (2018 16:17)  BMI (kg/m2): 32.3 (2018 16:18)  BSA (m2): 2.05 (2018 16:18)    PAST MEDICAL & SURGICAL HISTORY:  Hypothyroidism  GERD (gastroesophageal reflux disease)  Obesity  DM (diabetes mellitus)  HLD (hyperlipidemia)  HTN (hypertension)  H/O ventral hernia repair      ICU Vital Signs Last 24 Hrs  T(C): 36.1 (2018 09:48), Max: 37.1 (2018 10:49)  T(F): 97 (2018 09:48), Max: 98.7 (2018 10:49)  HR: 78 (2018 09:00) (72 - 86)  BP: 115/66 (2018 09:00) (92/62 - 124/64)  BP(mean): 83 (2018 09:00) (69 - 96)  ABP: --  ABP(mean): --  RR: 16 (2018 09:00) (7 - 24)  SpO2: 97% (2018 09:00) (95% - 97%)            2018 07:01  -  2018 07:00  --------------------------------------------------------  IN:    Albumin 25%: 150 mL    fat emulsion (Plant Based) 20% Infusion: 20.8 mL    fat emulsion (Plant Based) 20% Infusion: 231 mL    IV PiggyBack: 600 mL    TPN (Total Parenteral Nutrition): 1101 mL  Total IN: 2102.8 mL    OUT:    Bulb: 80 mL    Indwelling Catheter - Urethral: 1035 mL    VAC (Vacuum Assisted Closure) System: 350 mL  Total OUT: 1465 mL    Total NET: 637.8 mL      2018 07:01  -  2018 10:43  --------------------------------------------------------  IN:    fat emulsion (Plant Based) 20% Infusion: 42 mL    TPN (Total Parenteral Nutrition): 92 mL  Total IN: 134 mL    OUT:    Indwelling Catheter - Urethral: 80 mL  Total OUT: 80 mL    Total NET: 54 mL              PHYSICAL EXAM:    General: resting comfortably in bed, NAD though appears restless  Neuro: Not following commands or tracking. Nonpurposeful movements of BUE.  HEENT: NC/AT, MMM, no JVD  Pulm: no respiratory distress, b/l rhonchi with decreased BS at b/l bases  CV: RRR, no murmur  Abd: obese, soft, ND, no focal tenderness, hypoactive BS. Midline wound vac in place, functioning properly.   : suazo draining clear urine  Vasc: 2+ radial, 1+ DP pulses  MSK: no joint swelling  Extremities: WWP, marked anasarca   Skin: no rash      LABS:  CBC Full  -  ( 2018 05:32 )  WBC Count : 4.6 K/uL  Hemoglobin : 8.1 g/dL  Hematocrit : 24.1 %  Platelet Count - Automated : 30 K/uL  Mean Cell Volume : 85.5 fL  Mean Cell Hemoglobin : 28.7 pg  Mean Cell Hemoglobin Concentration : 33.6 g/dL        143  |  108  |  46<H>  ----------------------------<  193<H>  3.7   |  22  |  1.69<H>    Ca    7.6<L>      2018 05:32  Phos  3.6       Mg     2.1         TPro  4.4<L>  /  Alb  2.9<L>  /  TBili  1.3<H>  /  DBili  0.9<H>  /  AST  18  /  ALT  11  /  AlkPhos  89  04-21    PT/INR - ( 2018 07:07 )   PT: 14.2 sec;   INR: 1.27          PTT - ( 2018 07:07 )  PTT:78.2 sec  Urinalysis Basic - ( 2018 12:49 )    Color: Yellow / Appearance: Clear / S.020 / pH: x  Gluc: x / Ketone: Trace mg/dL  / Bili: Small / Urobili: 0.2 E.U./dL   Blood: x / Protein: Trace mg/dL / Nitrite: NEGATIVE   Leuk Esterase: Small / RBC: > 10 /HPF / WBC Many /HPF   Sq Epi: x / Non Sq Epi: 5-10 /HPF / Bacteria: Present /HPF        RADIOLOGY & ADDITIONAL STUDIES:

## 2018-04-23 LAB
ALBUMIN SERPL ELPH-MCNC: 2.5 G/DL — LOW (ref 3.3–5)
ALP SERPL-CCNC: 83 U/L — SIGNIFICANT CHANGE UP (ref 40–120)
ALT FLD-CCNC: 5 U/L — LOW (ref 10–45)
ANION GAP SERPL CALC-SCNC: 12 MMOL/L — SIGNIFICANT CHANGE UP (ref 5–17)
AST SERPL-CCNC: 15 U/L — SIGNIFICANT CHANGE UP (ref 10–40)
BASOPHILS NFR BLD AUTO: 0 % — SIGNIFICANT CHANGE UP (ref 0–2)
BILIRUB SERPL-MCNC: 1 MG/DL — SIGNIFICANT CHANGE UP (ref 0.2–1.2)
BUN SERPL-MCNC: 50 MG/DL — HIGH (ref 7–23)
CALCIUM SERPL-MCNC: 8.3 MG/DL — LOW (ref 8.4–10.5)
CHLORIDE SERPL-SCNC: 108 MMOL/L — SIGNIFICANT CHANGE UP (ref 96–108)
CO2 SERPL-SCNC: 23 MMOL/L — SIGNIFICANT CHANGE UP (ref 22–31)
CREAT SERPL-MCNC: 1.52 MG/DL — HIGH (ref 0.5–1.3)
CULTURE RESULTS: SIGNIFICANT CHANGE UP
EOSINOPHIL NFR BLD AUTO: 2.1 % — SIGNIFICANT CHANGE UP (ref 0–6)
GLUCOSE BLDC GLUCOMTR-MCNC: 172 MG/DL — HIGH (ref 70–99)
GLUCOSE BLDC GLUCOMTR-MCNC: 172 MG/DL — HIGH (ref 70–99)
GLUCOSE BLDC GLUCOMTR-MCNC: 178 MG/DL — HIGH (ref 70–99)
GLUCOSE BLDC GLUCOMTR-MCNC: 202 MG/DL — HIGH (ref 70–99)
GLUCOSE BLDC GLUCOMTR-MCNC: 202 MG/DL — HIGH (ref 70–99)
GLUCOSE SERPL-MCNC: 176 MG/DL — HIGH (ref 70–99)
HCT VFR BLD CALC: 22 % — LOW (ref 34.5–45)
HGB BLD-MCNC: 7.3 G/DL — LOW (ref 11.5–15.5)
LYMPHOCYTES # BLD AUTO: 18.1 % — SIGNIFICANT CHANGE UP (ref 13–44)
MAGNESIUM SERPL-MCNC: 2 MG/DL — SIGNIFICANT CHANGE UP (ref 1.6–2.6)
MCHC RBC-ENTMCNC: 29.1 PG — SIGNIFICANT CHANGE UP (ref 27–34)
MCHC RBC-ENTMCNC: 33.2 G/DL — SIGNIFICANT CHANGE UP (ref 32–36)
MCV RBC AUTO: 87.6 FL — SIGNIFICANT CHANGE UP (ref 80–100)
MONOCYTES NFR BLD AUTO: 3.9 % — SIGNIFICANT CHANGE UP (ref 2–14)
NEUTROPHILS NFR BLD AUTO: 75.9 % — SIGNIFICANT CHANGE UP (ref 43–77)
PHOSPHATE SERPL-MCNC: 3.9 MG/DL — SIGNIFICANT CHANGE UP (ref 2.5–4.5)
PLATELET # BLD AUTO: 23 K/UL — LOW (ref 150–400)
POTASSIUM SERPL-MCNC: 3.2 MMOL/L — LOW (ref 3.5–5.3)
POTASSIUM SERPL-SCNC: 3.2 MMOL/L — LOW (ref 3.5–5.3)
PREALB SERPL-MCNC: 9 MG/DL — LOW (ref 20–40)
PROT SERPL-MCNC: 4.3 G/DL — LOW (ref 6–8.3)
RBC # BLD: 2.51 M/UL — LOW (ref 3.8–5.2)
RBC # FLD: 18 % — HIGH (ref 10.3–16.9)
SODIUM SERPL-SCNC: 143 MMOL/L — SIGNIFICANT CHANGE UP (ref 135–145)
SPECIMEN SOURCE: SIGNIFICANT CHANGE UP
SRA INTERP SER-IMP: SIGNIFICANT CHANGE UP
WBC # BLD: 3.7 K/UL — LOW (ref 3.8–10.5)
WBC # FLD AUTO: 3.7 K/UL — LOW (ref 3.8–10.5)

## 2018-04-23 PROCEDURE — 71045 X-RAY EXAM CHEST 1 VIEW: CPT | Mod: 26

## 2018-04-23 PROCEDURE — 99233 SBSQ HOSP IP/OBS HIGH 50: CPT | Mod: GC

## 2018-04-23 RX ORDER — POTASSIUM CHLORIDE 20 MEQ
20 PACKET (EA) ORAL
Qty: 0 | Refills: 0 | Status: COMPLETED | OUTPATIENT
Start: 2018-04-23 | End: 2018-04-23

## 2018-04-23 RX ORDER — ELECTROLYTE SOLUTION,INJ
1 VIAL (ML) INTRAVENOUS
Qty: 0 | Refills: 0 | Status: DISCONTINUED | OUTPATIENT
Start: 2018-04-23 | End: 2018-04-23

## 2018-04-23 RX ORDER — ACETAMINOPHEN 500 MG
1000 TABLET ORAL ONCE
Qty: 0 | Refills: 0 | Status: COMPLETED | OUTPATIENT
Start: 2018-04-23 | End: 2018-04-23

## 2018-04-23 RX ORDER — I.V. FAT EMULSION 20 G/100ML
0.6 EMULSION INTRAVENOUS
Qty: 50 | Refills: 0 | Status: DISCONTINUED | OUTPATIENT
Start: 2018-04-23 | End: 2018-04-23

## 2018-04-23 RX ORDER — FUROSEMIDE 40 MG
60 TABLET ORAL ONCE
Qty: 0 | Refills: 0 | Status: COMPLETED | OUTPATIENT
Start: 2018-04-23 | End: 2018-04-23

## 2018-04-23 RX ADMIN — HEPARIN SODIUM 5000 UNIT(S): 5000 INJECTION INTRAVENOUS; SUBCUTANEOUS at 22:01

## 2018-04-23 RX ADMIN — Medication 50 MILLILITER(S): at 12:11

## 2018-04-23 RX ADMIN — MEROPENEM 1000 MILLIGRAM(S): 1 INJECTION INTRAVENOUS at 17:18

## 2018-04-23 RX ADMIN — Medication 50 MILLIEQUIVALENT(S): at 12:11

## 2018-04-23 RX ADMIN — MEROPENEM 1000 MILLIGRAM(S): 1 INJECTION INTRAVENOUS at 06:37

## 2018-04-23 RX ADMIN — Medication 50 MILLIEQUIVALENT(S): at 10:48

## 2018-04-23 RX ADMIN — Medication 400 MILLIGRAM(S): at 00:38

## 2018-04-23 RX ADMIN — Medication 100 GRAM(S): at 23:03

## 2018-04-23 RX ADMIN — Medication 75 MICROGRAM(S): at 10:47

## 2018-04-23 RX ADMIN — Medication 2: at 12:12

## 2018-04-23 RX ADMIN — Medication 50 MILLIEQUIVALENT(S): at 14:02

## 2018-04-23 RX ADMIN — Medication 1 EACH: at 18:10

## 2018-04-23 RX ADMIN — Medication 1000 MILLIGRAM(S): at 01:00

## 2018-04-23 RX ADMIN — Medication 60 MILLIGRAM(S): at 17:18

## 2018-04-23 RX ADMIN — HEPARIN SODIUM 5000 UNIT(S): 5000 INJECTION INTRAVENOUS; SUBCUTANEOUS at 06:40

## 2018-04-23 RX ADMIN — Medication 100 GRAM(S): at 07:11

## 2018-04-23 RX ADMIN — Medication 2: at 07:07

## 2018-04-23 RX ADMIN — Medication 100 GRAM(S): at 03:48

## 2018-04-23 RX ADMIN — Medication 100 GRAM(S): at 14:02

## 2018-04-23 RX ADMIN — Medication 2: at 23:51

## 2018-04-23 RX ADMIN — Medication 100 GRAM(S): at 19:36

## 2018-04-23 RX ADMIN — I.V. FAT EMULSION 20.8 GM/KG/DAY: 20 EMULSION INTRAVENOUS at 22:01

## 2018-04-23 RX ADMIN — Medication 4: at 00:39

## 2018-04-23 RX ADMIN — PANTOPRAZOLE SODIUM 40 MILLIGRAM(S): 20 TABLET, DELAYED RELEASE ORAL at 12:12

## 2018-04-23 RX ADMIN — HEPARIN SODIUM 5000 UNIT(S): 5000 INJECTION INTRAVENOUS; SUBCUTANEOUS at 14:03

## 2018-04-23 RX ADMIN — Medication 4: at 17:18

## 2018-04-23 RX ADMIN — Medication 100 GRAM(S): at 11:30

## 2018-04-23 RX ADMIN — Medication 50 MILLILITER(S): at 03:44

## 2018-04-23 RX ADMIN — Medication 50 MILLILITER(S): at 18:11

## 2018-04-23 NOTE — PROGRESS NOTE ADULT - ASSESSMENT
77 y/o F with sepsis from open abdominal wound infection, MSSA bacteremia present on admission, and UTI present on admission found to have contained small bowel leak    Neuro: prn pain control, Zofran prn, holding seroquel/aripiprazole/escitalapram in setting of lethargy and pancytopenia  CV: HD stable s/p severe sepsis, albumin 25%q8, holding amlodipine, 4/19 Echo  65-70%. Lasix prn  Pulm: Satting well on NC  GI: NPO, protonix, Dobhoff (no feeds), TPN  : Nicole S/p AKF on HD now resolved. UTI (present on admission)  ID: ESBL Ecoli in Urine resistant to Zosyn: Jose L( 4/17-) MSSA in blood: Oxacillin ( 4/17-) Kai virus (good hand washing), TTE neg for endocarditis  Endo: ISS, Synthroid 150 po, Hypoglycemia holding medroxyprogesterone, f/u cosyntropin stim test  PPx: sqh PE:Sp IVC filter on 3/30  Lines: PIV Rt IJ TLC from OSH ( 4/15-), s/p L Rad Art line( 4/15-4/18)   Wounds: Wound vac to midline incision , change MWF  PT/OT: ordered

## 2018-04-23 NOTE — PROGRESS NOTE ADULT - SUBJECTIVE AND OBJECTIVE BOX
INTERVAL HPI/OVERNIGHT EVENTS:    PRESSORS: [ ] YES [ ] NO      MEDICATIONS  (STANDING):  albumin human 25% IVPB 50 milliLiter(s) IV Intermittent every 8 hours  dextrose 5%. 1000 milliLiter(s) (50 mL/Hr) IV Continuous <Continuous>  dextrose 50% Injectable 12.5 Gram(s) IV Push once  dextrose 50% Injectable 25 Gram(s) IV Push once  dextrose 50% Injectable 25 Gram(s) IV Push once  fat emulsion (Plant Based) 20% Infusion 0.6 Gm/kG/Day (20.8 mL/Hr) IV Continuous <Continuous>  fat emulsion (Plant Based) 20% Infusion 0.6 Gm/kG/Day (20.8 mL/Hr) IV Continuous <Continuous>  heparin  Injectable 5000 Unit(s) SubCutaneous every 8 hours  insulin lispro (HumaLOG) corrective regimen sliding scale   SubCutaneous every 6 hours  levothyroxine Injectable 75 MICROGram(s) IV Push <User Schedule>  meropenem Injectable 1000 milliGRAM(s) IV Push every 12 hours  oxacillin IVPB      oxacillin IVPB 2 Gram(s) IV Intermittent every 4 hours  pantoprazole   Suspension 40 milliGRAM(s) Oral daily  Parenteral Nutrition - Adult 1 Each (46 mL/Hr) TPN Continuous <Continuous>  Parenteral Nutrition - Adult 1 Each (47 mL/Hr) TPN Continuous <Continuous>  potassium chloride  20 mEq/100 mL IVPB 20 milliEquivalent(s) IV Intermittent every 2 hours    MEDICATIONS  (PRN):  ALBUTerol/ipratropium for Nebulization 3 milliLiter(s) Nebulizer every 6 hours PRN Shortness of Breath and/or Wheezing  dextrose Gel 1 Dose(s) Oral once PRN Blood Glucose LESS THAN 70 milliGRAM(s)/deciliter  glucagon  Injectable 1 milliGRAM(s) IntraMuscular once PRN Glucose LESS THAN 70 milligrams/deciliter  ondansetron Injectable 4 milliGRAM(s) IV Push every 6 hours PRN Nausea      Drug Dosing Weight  Height (cm): 170.2 (17 Apr 2018 16:18)  Weight (kg): 93.6 (17 Apr 2018 16:17)  BMI (kg/m2): 32.3 (17 Apr 2018 16:18)  BSA (m2): 2.05 (17 Apr 2018 16:18)      PAST MEDICAL & SURGICAL HISTORY:  Hypothyroidism  GERD (gastroesophageal reflux disease)  Obesity  DM (diabetes mellitus)  HLD (hyperlipidemia)  HTN (hypertension)  H/O ventral hernia repair      ICU Vital Signs Last 24 Hrs  T(C): 35.9 (23 Apr 2018 10:00), Max: 36.1 (22 Apr 2018 18:57)  T(F): 96.7 (23 Apr 2018 10:00), Max: 97 (22 Apr 2018 22:28)  HR: 80 (23 Apr 2018 10:00) (62 - 82)  BP: 126/81 (23 Apr 2018 10:00) (118/54 - 156/84)  BP(mean): 106 (23 Apr 2018 10:00) (72 - 114)  ABP: --  ABP(mean): --  RR: 22 (23 Apr 2018 10:00) (9 - 22)  SpO2: 97% (23 Apr 2018 10:00) (96% - 100%)            22 Apr 2018 07:01  -  23 Apr 2018 07:00  --------------------------------------------------------  IN:    Albumin 25%: 100 mL    Albumin 5%  - 250 mL: 50 mL    fat emulsion (Plant Based) 20% Infusion: 200 mL    fat emulsion (Plant Based) 20% Infusion: 42 mL    Solution: 550 mL    TPN (Total Parenteral Nutrition): 1104 mL  Total IN: 2046 mL    OUT:    Bulb: 65 mL    Indwelling Catheter - Urethral: 1775 mL    VAC (Vacuum Assisted Closure) System: 100 mL  Total OUT: 1940 mL    Total NET: 106 mL      23 Apr 2018 07:01  -  23 Apr 2018 10:18  --------------------------------------------------------  IN:    fat emulsion (Plant Based) 20% Infusion: 40 mL    Solution: 5 mL    TPN (Total Parenteral Nutrition): 92 mL  Total IN: 137 mL    OUT:    Indwelling Catheter - Urethral: 250 mL  Total OUT: 250 mL    Total NET: -113 mL              PHYSICAL EXAM:      Constitutional:    Eyes:    ENMT:    Neck:    Breasts:    Back:    Respiratory:    Cardiovascular:    Gastrointestinal:    Genitourinary:    Rectal:    Extremities:    Vascular:    Neurological:    Skin:    Lymph Nodes:    Musculoskeletal:    Psychiatric:        LABS:  CBC Full  -  ( 23 Apr 2018 05:34 )  WBC Count : 3.7 K/uL  Hemoglobin : 7.3 g/dL  Hematocrit : 22.0 %  Platelet Count - Automated : 23 K/uL  Mean Cell Volume : 87.6 fL  Mean Cell Hemoglobin : 29.1 pg  Mean Cell Hemoglobin Concentration : 33.2 g/dL  Auto Neutrophil # : x  Auto Lymphocyte # : x  Auto Monocyte # : x  Auto Eosinophil # : x  Auto Basophil # : x  Auto Neutrophil % : x  Auto Lymphocyte % : x  Auto Monocyte % : x  Auto Eosinophil % : x  Auto Basophil % : x    04-23    143  |  108  |  50<H>  ----------------------------<  176<H>  3.2<L>   |  23  |  1.52<H>    Ca    8.3<L>      23 Apr 2018 05:34  Phos  3.9     04-23  Mg     2.0     04-23    TPro  4.3<L>  /  Alb  2.5<L>  /  TBili  1.0  /  DBili  x   /  AST  15  /  ALT  5<L>  /  AlkPhos  83  04-23 INTERVAL HPI/OVERNIGHT EVENTS: O/N: huge episode of diarrhea, rectal tube, tylenol for pain   4/22: Lasix 80x1. CTH for worsening delirium/AMS. No acute findings. 1.3x1.8cm L parafalcine meningioma noted    Pt seen and examined. She c/o abdominal pain. Denies SOB/CP/N/V    MEDICATIONS  (STANDING):  albumin human 25% IVPB 50 milliLiter(s) IV Intermittent every 8 hours  dextrose 5%. 1000 milliLiter(s) (50 mL/Hr) IV Continuous <Continuous>  dextrose 50% Injectable 12.5 Gram(s) IV Push once  dextrose 50% Injectable 25 Gram(s) IV Push once  dextrose 50% Injectable 25 Gram(s) IV Push once  fat emulsion (Plant Based) 20% Infusion 0.6 Gm/kG/Day (20.8 mL/Hr) IV Continuous <Continuous>  fat emulsion (Plant Based) 20% Infusion 0.6 Gm/kG/Day (20.8 mL/Hr) IV Continuous <Continuous>  heparin  Injectable 5000 Unit(s) SubCutaneous every 8 hours  insulin lispro (HumaLOG) corrective regimen sliding scale   SubCutaneous every 6 hours  levothyroxine Injectable 75 MICROGram(s) IV Push <User Schedule>  meropenem Injectable 1000 milliGRAM(s) IV Push every 12 hours  oxacillin IVPB      oxacillin IVPB 2 Gram(s) IV Intermittent every 4 hours  pantoprazole   Suspension 40 milliGRAM(s) Oral daily  Parenteral Nutrition - Adult 1 Each (46 mL/Hr) TPN Continuous <Continuous>  Parenteral Nutrition - Adult 1 Each (47 mL/Hr) TPN Continuous <Continuous>  potassium chloride  20 mEq/100 mL IVPB 20 milliEquivalent(s) IV Intermittent every 2 hours    MEDICATIONS  (PRN):  ALBUTerol/ipratropium for Nebulization 3 milliLiter(s) Nebulizer every 6 hours PRN Shortness of Breath and/or Wheezing  dextrose Gel 1 Dose(s) Oral once PRN Blood Glucose LESS THAN 70 milliGRAM(s)/deciliter  glucagon  Injectable 1 milliGRAM(s) IntraMuscular once PRN Glucose LESS THAN 70 milligrams/deciliter  ondansetron Injectable 4 milliGRAM(s) IV Push every 6 hours PRN Nausea      Drug Dosing Weight  Height (cm): 170.2 (17 Apr 2018 16:18)  Weight (kg): 93.6 (17 Apr 2018 16:17)  BMI (kg/m2): 32.3 (17 Apr 2018 16:18)  BSA (m2): 2.05 (17 Apr 2018 16:18)      PAST MEDICAL & SURGICAL HISTORY:  Hypothyroidism  GERD (gastroesophageal reflux disease)  Obesity  DM (diabetes mellitus)  HLD (hyperlipidemia)  HTN (hypertension)  H/O ventral hernia repair      ICU Vital Signs Last 24 Hrs  T(C): 35.9 (23 Apr 2018 10:00), Max: 36.1 (22 Apr 2018 18:57)  T(F): 96.7 (23 Apr 2018 10:00), Max: 97 (22 Apr 2018 22:28)  HR: 80 (23 Apr 2018 10:00) (62 - 82)  BP: 126/81 (23 Apr 2018 10:00) (118/54 - 156/84)  BP(mean): 106 (23 Apr 2018 10:00) (72 - 114)  ABP: --  ABP(mean): --  RR: 22 (23 Apr 2018 10:00) (9 - 22)  SpO2: 97% (23 Apr 2018 10:00) (96% - 100%)            22 Apr 2018 07:01  -  23 Apr 2018 07:00  --------------------------------------------------------  IN:    Albumin 25%: 100 mL    Albumin 5%  - 250 mL: 50 mL    fat emulsion (Plant Based) 20% Infusion: 200 mL    fat emulsion (Plant Based) 20% Infusion: 42 mL    Solution: 550 mL    TPN (Total Parenteral Nutrition): 1104 mL  Total IN: 2046 mL    OUT:    Bulb: 65 mL    Indwelling Catheter - Urethral: 1775 mL    VAC (Vacuum Assisted Closure) System: 100 mL  Total OUT: 1940 mL    Total NET: 106 mL      23 Apr 2018 07:01  -  23 Apr 2018 10:18  --------------------------------------------------------  IN:    fat emulsion (Plant Based) 20% Infusion: 40 mL    Solution: 5 mL    TPN (Total Parenteral Nutrition): 92 mL  Total IN: 137 mL    OUT:    Indwelling Catheter - Urethral: 250 mL  Total OUT: 250 mL    Total NET: -113 mL              PHYSICAL EXAM:  General: resting comfortably in bed, NAD  Neuro: More alert today. following commands. PERRL, EOMI, MAEx4  HEENT: MMM, no JVD  Pulm: no respiratory distress, b/l rhonchi with decreased BS at b/l bases  CV: RRR, no murmur  Abd: obese, soft, ND, no focal tenderness, hypoactive BS. Midline wound vac in place, functioning properly.   : suazo draining clear urine  Vasc: 2+ radial, 1+ DP pulses  MSK: no joint swelling  Extremities: WWP, 3+pitting edema in LE, improved  Skin: no rash      LABS:  CBC Full  -  ( 23 Apr 2018 05:34 )  WBC Count : 3.7 K/uL  Hemoglobin : 7.3 g/dL  Hematocrit : 22.0 %  Platelet Count - Automated : 23 K/uL  Mean Cell Volume : 87.6 fL  Mean Cell Hemoglobin : 29.1 pg  Mean Cell Hemoglobin Concentration : 33.2 g/dL      04-23    143  |  108  |  50<H>  ----------------------------<  176<H>  3.2<L>   |  23  |  1.52<H>    Ca    8.3<L>      23 Apr 2018 05:34  Phos  3.9     04-23  Mg     2.0     04-23    TPro  4.3<L>  /  Alb  2.5<L>  /  TBili  1.0  /  DBili  x   /  AST  15  /  ALT  5<L>  /  AlkPhos  83  04-23

## 2018-04-23 NOTE — ADVANCED PRACTICE NURSE CONSULT - RECOMMEDATIONS
Recommend continuing moisture barrier ointment to bilat buttocks bid and prn. Spoke with [facility] and house staff.

## 2018-04-23 NOTE — PROGRESS NOTE ADULT - SUBJECTIVE AND OBJECTIVE BOX
INTERVAL HPI/OVERNIGHT EVENTS:  No overnight events. Afebrile, without leukocytosis. Seen and examined this morning; mental status without improvement. Unable to obtain ROS    MEDICATIONS  (STANDING):  albumin human 25% IVPB 50 milliLiter(s) IV Intermittent every 8 hours  dextrose 5%. 1000 milliLiter(s) (50 mL/Hr) IV Continuous <Continuous>  dextrose 50% Injectable 12.5 Gram(s) IV Push once  dextrose 50% Injectable 25 Gram(s) IV Push once  dextrose 50% Injectable 25 Gram(s) IV Push once  fat emulsion (Plant Based) 20% Infusion 0.6 Gm/kG/Day (20.8 mL/Hr) IV Continuous <Continuous>  heparin  Injectable 5000 Unit(s) SubCutaneous every 8 hours  insulin lispro (HumaLOG) corrective regimen sliding scale   SubCutaneous every 6 hours  levothyroxine Injectable 75 MICROGram(s) IV Push <User Schedule>  meropenem Injectable 1000 milliGRAM(s) IV Push every 12 hours  oxacillin IVPB      oxacillin IVPB 2 Gram(s) IV Intermittent every 4 hours  pantoprazole   Suspension 40 milliGRAM(s) Oral daily  Parenteral Nutrition - Adult 1 Each (46 mL/Hr) TPN Continuous <Continuous>  Parenteral Nutrition - Adult 1 Each (47 mL/Hr) TPN Continuous <Continuous>    MEDICATIONS  (PRN):  ALBUTerol/ipratropium for Nebulization 3 milliLiter(s) Nebulizer every 6 hours PRN Shortness of Breath and/or Wheezing  dextrose Gel 1 Dose(s) Oral once PRN Blood Glucose LESS THAN 70 milliGRAM(s)/deciliter  glucagon  Injectable 1 milliGRAM(s) IntraMuscular once PRN Glucose LESS THAN 70 milligrams/deciliter  ondansetron Injectable 4 milliGRAM(s) IV Push every 6 hours PRN Nausea        Vital Signs Last 24 Hrs  T(C): 36.3 (23 Apr 2018 14:15), Max: 36.3 (23 Apr 2018 14:15)  T(F): 97.4 (23 Apr 2018 14:15), Max: 97.4 (23 Apr 2018 14:15)  HR: 72 (23 Apr 2018 16:00) (62 - 80)  BP: 120/59 (23 Apr 2018 16:00) (118/54 - 156/84)  BP(mean): 87 (23 Apr 2018 16:00) (72 - 114)  RR: 28 (23 Apr 2018 16:00) (10 - 28)  SpO2: 97% (23 Apr 2018 16:00) (96% - 98%)    04-22-18 @ 07:01  -  04-23-18 @ 07:00  --------------------------------------------------------  IN: 2046 mL / OUT: 1940 mL / NET: 106 mL    04-23-18 @ 07:01  -  04-23-18 @ 16:23  --------------------------------------------------------  IN: 1068 mL / OUT: 760 mL / NET: 308 mL      PHYSICAL EXAM:  Constitutional: NAD. Morbidly obese. Non-toxic appearing   HEENT: Conjunctiva clear, no oral lesion, no sinus tenderness on percussion	  Respiratory: poor inspiratory effort. decreased at the bases. no wheezing or crackles   Cardiovascular: RRR with no murmurs  Gastrointestinal: obese, midline surgical wound (5x5), now with wound vac   Extremities: 1+ pitting edema  Neuro: AAOx0         LABS:                        7.3    3.7   )-----------( 23       ( 23 Apr 2018 05:34 )             22.0     04-23    143  |  108  |  50<H>  ----------------------------<  176<H>  3.2<L>   |  23  |  1.52<H>    Ca    8.3<L>      23 Apr 2018 05:34  Phos  3.9     04-23  Mg     2.0     04-23    TPro  4.3<L>  /  Alb  2.5<L>  /  TBili  1.0  /  DBili  x   /  AST  15  /  ALT  5<L>  /  AlkPhos  83  04-23          MICROBIOLOGY:  Culture - Blood (04.20.18 @ 17:28)    Specimen Source: .Blood Blood    Culture Results:   No growth at 2 days.    GI PCR Panel, Stool (04.20.18 @ 17:20)    Culture Results:   GI PCR Results: NOT detected  *******Please Note:*******  GI panel PCR evaluates for:  Campylobacter, Plesiomonas shigelloides, Salmonella,  Vibrio, Yersinia enterocolitica, Enteroaggregative  Escherichia coli (EAEC), Enteropathogenic E.coli (EPEC),  Enterotoxigenic E. coli (ETEC) lt/st, Shiga-like  toxin-producing E. coli (STEC) stx1/stx2,  Shigella/ Enteroinvasive E. coli (EIEC), Cryptosporidium,  Cyclospora cayetanensis, Entamoeba histolytica,  Giardia lamblia, Adenovirus F 40/41, Astrovirus,  Norovirus GI/GII, Rotavirus A, Sapovirus    Culture - Urine (04.18.18 @ 12:53)    -  Amikacin: S <=16    -  Ampicillin: R >16 These ampicillin results predict results for amoxicillin    -  Ampicillin: R >16 These ampicillin results predict results for amoxicillin    -  Ampicillin/Sulbactam: R >16/8    -  Ampicillin/Sulbactam: R >16/8    -  Aztreonam: S <=4    -  Cefazolin: R >16 This predicts results for oral agents cefaclor, cefdinir, cefpodoxime, cefprozil, cefuroxime axetil, cephalexin and locarbef for uncomplicated UTI. Note that some isolates may be susceptible to these agents while testing resistant to cefazolin.    -  Cefazolin: R >16 This predicts results for oral agents cefaclor, cefdinir, cefpodoxime, cefprozil, cefuroxime axetil, cephalexin and locarbef for uncomplicated UTI. Note that some isolates may be susceptible to these agents while testing resistant to cefazolin.    -  Cefepime: I 16    -  Cefotaxime: S <=2    -  Cefoxitin: S <=8    -  Ceftazidime: S <=1    -  Ceftriaxone: S <=1 Enterobacter, Citrobacter, and Serratia may develop resistance during prolonged therapy    -  Ceftriaxone: S <=1 Enterobacter, Citrobacter, and Serratia may develop resistance during prolonged therapy    -  Cefuroxime: R >16    -  Ciprofloxacin: S <=1    -  Ciprofloxacin: S <=1    -  Ertapenem: S <=1    -  Gentamicin: S <=4    -  Gentamicin: S <=4    -  Imipenem: S <=1    -  Levofloxacin: S <=2    -  Meropenem: S <=1    -  Nitrofurantoin: S <=32 Should not be used to treat pyelonephritis    -  Nitrofurantoin: S <=32 Should not be used to treat pyelonephritis    -  Piperacillin/Tazobactam: R >64    -  Piperacillin/Tazobactam: R >64    -  Tigecycline: S <=2    -  Tobramycin: S <=4    -  Tobramycin: S <=4    -  Trimethoprim/Sulfamethoxazole: S <=2/38    -  Trimethoprim/Sulfamethoxazole: S <=2/38    Specimen Source: .Urine Catheterized    Culture Results:   4,000 CFU/ml Escherichia coli    Organism Identification: Escherichia coli  Escherichia coli    Organism: Escherichia coli    Organism: Escherichia coli    Method Type: MARY    Method Type: MARY    Culture - Blood (04.02.18 @ 16:09)    Specimen Source: .Blood Blood-Peripheral    Culture Results:   No growth at 5 days.          RADIOLOGY & ADDITIONAL STUDIES:  < from: CT Abdomen and Pelvis w/ Oral Cont (04.18.18 @ 17:55) >  EXAM:  CT ABDOMEN AND PELVIS OC                          PROCEDURE DATE:  04/18/2018                     INTERPRETATION:  CT of the ABDOMEN and PELVIS without intravenous   contrast dated 4/18/2018 5:55 PM    INDICATION: Sepsis. Extraluminal gas onprior contrast. Status post   hernia repair.    TECHNIQUE: CT of the abdomen and pelvis was performed using oral contrast   material.  Intravenous contrast was not used as requested.  Axial and   coronal images were produced and reviewed.    PRIOR STUDIES: 4/15/2018 and other studies dating back to 10/18/2017.    FINDINGS: Images of the lower chest demonstrate  small bilateral pleural   effusions, right greater than left. Dependent atelectasis is seen in the   lung bases.    The liver is normal in appearance.   No radiopaque gallstones are seen.    There is mild infiltration of the fat anterior to the pancreatic head   surrounding the mesenteric vascular pedicle. No splenic abnormalities are   seen.    No adrenal abnormalities are seen.   Thekidneys are normal in appearance   bilaterally.  No renal stones are seen.  No hydronephrosis is evident.    No abdominal aortic aneurysm is seen. An IVC filter is in place. No   lymphadenopathy is seen.    Postoperative changes are seen in the anterior abdominal wall. There is   gas, likely contained within packing material in the anterior abdominal   wall midline wound. There is subcutaneous edema but no drainable fluid   collection is seen in the abdominal wall.  Gastric tube is within the gastric antrum. Small bowel loops and right   colon are opacified with oral contrast. There is a circular collection of   extraluminal oral contrast and pockets of gas surrounding the enlarged   fibroid uterus which appears to be connected to a loop of distal small   bowel in the right lower quadrant (axial image 85 coronal image 41).   There is a small upper abdominal ascites which measures close to water   density and does not contain external oral contrast. There is no bowel   obstruction. Nicole catheter is seen in a collapsed urinary bladder.   Uterus measures up to 17.5 cm in height.    Evaluation of the osseous structures demonstrates degenerative changes.      IMPRESSION:  Circular collection of extraluminal oral contrast around the enlarged   fibroid uterus likely connected to a loop of small bowel (enterotomy) in   the right lower quadrant. Collection may be contained around the uterus   by peritoneal coverings or adhesions. Small ascites.    Mesenteric edema in the upper abdomen particularly around the mesenteric   vessels. Recommend contrast enhanced CT or Doppler ultrasound to evaluate   patency of superior mesenteric vein and portal vein.      < end of copied text >

## 2018-04-23 NOTE — ADVANCED PRACTICE NURSE CONSULT - ASSESSMENT
77 y/o F with sepsis from open abdominal wound infection, bacteremia present on admission, and UTI present on admission. Pt has had multiple episodes of diarrhea with areas of erosion from incontinence over bilat buttocks. Currently has rectal tube in place, vac dressing intact to abdomen. Pt being turned with AirTap wedges every 2 hours.

## 2018-04-23 NOTE — PROGRESS NOTE ADULT - ATTENDING COMMENTS
Patient seen and examined with house-staff during bedside rounds.  Resident note read, including vitals, physical findings, laboratory data, and radiological reports.   Revisions included below.  Direct personal management at bed side and extensive interpretation of the data.  Plan was outlined and discussed in details with the housestaff.  Decision making of high complexity  Action taken for acute disease activity to reflect the level of care provided:  - medication reconciliation  - review laboratory data  - management of sepsis  - change to PICC  - evalauted for transfer off ICU

## 2018-04-23 NOTE — PROGRESS NOTE ADULT - ATTENDING COMMENTS
I have reviewed the medical record, including laboratory and radiographic studies, examined the patient and discussed the plan with Dr. Valdez, the ID Resident.  Agree with above.  Will continue to follow with you – ID service.

## 2018-04-23 NOTE — PROGRESS NOTE ADULT - ASSESSMENT
78 year old female, morbidly obesity, DM, ventral hernia repair with mesh 3/2 c/b wound dehiscence, recent ATN requiring temporary HD (Permacath removed 4/10), who presents as a transfer from OSH for management of multifactorial sepsis and open abdominal wound. Patient found to have ESBL-producing E.Coli UTI and MSSA Bacteremia, origin likely coming from abdomen/pelvis. CT imaging suggestive of small bowel perforation with surrounding extra-luminal fluid collections     Recommendations:  1. c/w Meropenem 1g q12h and Oxacillin 2g q4h   2. Would again recommend drainage of fluid collection   4. Surveillance blood cultures NGTD    5. Surgical intervention as per SICU team     Discussed with ID attending and primary team  ID will follow

## 2018-04-24 LAB
ANION GAP SERPL CALC-SCNC: 14 MMOL/L — SIGNIFICANT CHANGE UP (ref 5–17)
BLD GP AB SCN SERPL QL: NEGATIVE — SIGNIFICANT CHANGE UP
BUN SERPL-MCNC: 48 MG/DL — HIGH (ref 7–23)
CALCIUM SERPL-MCNC: 9.2 MG/DL — SIGNIFICANT CHANGE UP (ref 8.4–10.5)
CHLORIDE SERPL-SCNC: 109 MMOL/L — HIGH (ref 96–108)
CO2 SERPL-SCNC: 24 MMOL/L — SIGNIFICANT CHANGE UP (ref 22–31)
CREAT SERPL-MCNC: 1.39 MG/DL — HIGH (ref 0.5–1.3)
GLUCOSE BLDC GLUCOMTR-MCNC: 157 MG/DL — HIGH (ref 70–99)
GLUCOSE BLDC GLUCOMTR-MCNC: 185 MG/DL — HIGH (ref 70–99)
GLUCOSE BLDC GLUCOMTR-MCNC: 223 MG/DL — HIGH (ref 70–99)
GLUCOSE BLDC GLUCOMTR-MCNC: 230 MG/DL — HIGH (ref 70–99)
GLUCOSE BLDC GLUCOMTR-MCNC: 247 MG/DL — HIGH (ref 70–99)
GLUCOSE SERPL-MCNC: 195 MG/DL — HIGH (ref 70–99)
HCT VFR BLD CALC: 20.4 % — CRITICAL LOW (ref 34.5–45)
HCT VFR BLD CALC: 22.4 % — LOW (ref 34.5–45)
HGB BLD-MCNC: 6.9 G/DL — CRITICAL LOW (ref 11.5–15.5)
HGB BLD-MCNC: 7.8 G/DL — LOW (ref 11.5–15.5)
MAGNESIUM SERPL-MCNC: 2 MG/DL — SIGNIFICANT CHANGE UP (ref 1.6–2.6)
MCHC RBC-ENTMCNC: 28.6 PG — SIGNIFICANT CHANGE UP (ref 27–34)
MCHC RBC-ENTMCNC: 29.3 PG — SIGNIFICANT CHANGE UP (ref 27–34)
MCHC RBC-ENTMCNC: 33.8 G/DL — SIGNIFICANT CHANGE UP (ref 32–36)
MCHC RBC-ENTMCNC: 34.8 G/DL — SIGNIFICANT CHANGE UP (ref 32–36)
MCV RBC AUTO: 84.2 FL — SIGNIFICANT CHANGE UP (ref 80–100)
MCV RBC AUTO: 84.6 FL — SIGNIFICANT CHANGE UP (ref 80–100)
PHOSPHATE SERPL-MCNC: 4 MG/DL — SIGNIFICANT CHANGE UP (ref 2.5–4.5)
PLATELET # BLD AUTO: 25 K/UL — LOW (ref 150–400)
PLATELET # BLD AUTO: 30 K/UL — LOW (ref 150–400)
POTASSIUM SERPL-MCNC: 3.7 MMOL/L — SIGNIFICANT CHANGE UP (ref 3.5–5.3)
POTASSIUM SERPL-SCNC: 3.7 MMOL/L — SIGNIFICANT CHANGE UP (ref 3.5–5.3)
RBC # BLD: 2.41 M/UL — LOW (ref 3.8–5.2)
RBC # BLD: 2.66 M/UL — LOW (ref 3.8–5.2)
RBC # FLD: 17.8 % — HIGH (ref 10.3–16.9)
RBC # FLD: 18.7 % — HIGH (ref 10.3–16.9)
RH IG SCN BLD-IMP: POSITIVE — SIGNIFICANT CHANGE UP
SODIUM SERPL-SCNC: 147 MMOL/L — HIGH (ref 135–145)
WBC # BLD: 3.3 K/UL — LOW (ref 3.8–10.5)
WBC # BLD: 3.9 K/UL — SIGNIFICANT CHANGE UP (ref 3.8–10.5)
WBC # FLD AUTO: 3.3 K/UL — LOW (ref 3.8–10.5)
WBC # FLD AUTO: 3.9 K/UL — SIGNIFICANT CHANGE UP (ref 3.8–10.5)

## 2018-04-24 PROCEDURE — 99233 SBSQ HOSP IP/OBS HIGH 50: CPT | Mod: GC

## 2018-04-24 PROCEDURE — 71045 X-RAY EXAM CHEST 1 VIEW: CPT | Mod: 26

## 2018-04-24 PROCEDURE — 99232 SBSQ HOSP IP/OBS MODERATE 35: CPT | Mod: GC

## 2018-04-24 PROCEDURE — 95951: CPT | Mod: 26

## 2018-04-24 RX ORDER — DIATRIZOATE MEGLUMINE 180 MG/ML
30 INJECTION, SOLUTION INTRAVESICAL ONCE
Qty: 0 | Refills: 0 | Status: COMPLETED | OUTPATIENT
Start: 2018-04-25 | End: 2018-04-25

## 2018-04-24 RX ORDER — ELECTROLYTE SOLUTION,INJ
1 VIAL (ML) INTRAVENOUS
Qty: 0 | Refills: 0 | Status: DISCONTINUED | OUTPATIENT
Start: 2018-04-24 | End: 2018-04-24

## 2018-04-24 RX ORDER — I.V. FAT EMULSION 20 G/100ML
0.6 EMULSION INTRAVENOUS
Qty: 50 | Refills: 0 | Status: DISCONTINUED | OUTPATIENT
Start: 2018-04-24 | End: 2018-04-24

## 2018-04-24 RX ADMIN — Medication 50 MILLILITER(S): at 10:09

## 2018-04-24 RX ADMIN — Medication 100 GRAM(S): at 18:05

## 2018-04-24 RX ADMIN — I.V. FAT EMULSION 20.8 GM/KG/DAY: 20 EMULSION INTRAVENOUS at 18:07

## 2018-04-24 RX ADMIN — Medication 100 GRAM(S): at 10:10

## 2018-04-24 RX ADMIN — PANTOPRAZOLE SODIUM 40 MILLIGRAM(S): 20 TABLET, DELAYED RELEASE ORAL at 11:27

## 2018-04-24 RX ADMIN — Medication 100 GRAM(S): at 07:21

## 2018-04-24 RX ADMIN — Medication 100 GRAM(S): at 02:56

## 2018-04-24 RX ADMIN — Medication 4: at 18:17

## 2018-04-24 RX ADMIN — Medication 4: at 11:27

## 2018-04-24 RX ADMIN — Medication 100 GRAM(S): at 23:00

## 2018-04-24 RX ADMIN — Medication 100 GRAM(S): at 14:24

## 2018-04-24 RX ADMIN — HEPARIN SODIUM 5000 UNIT(S): 5000 INJECTION INTRAVENOUS; SUBCUTANEOUS at 15:31

## 2018-04-24 RX ADMIN — Medication 50 MILLILITER(S): at 18:06

## 2018-04-24 RX ADMIN — Medication 75 MICROGRAM(S): at 07:20

## 2018-04-24 RX ADMIN — MEROPENEM 1000 MILLIGRAM(S): 1 INJECTION INTRAVENOUS at 18:06

## 2018-04-24 RX ADMIN — Medication 2: at 06:23

## 2018-04-24 RX ADMIN — HEPARIN SODIUM 5000 UNIT(S): 5000 INJECTION INTRAVENOUS; SUBCUTANEOUS at 06:00

## 2018-04-24 RX ADMIN — HEPARIN SODIUM 5000 UNIT(S): 5000 INJECTION INTRAVENOUS; SUBCUTANEOUS at 22:12

## 2018-04-24 RX ADMIN — MEROPENEM 1000 MILLIGRAM(S): 1 INJECTION INTRAVENOUS at 06:12

## 2018-04-24 RX ADMIN — Medication 50 MILLILITER(S): at 03:38

## 2018-04-24 NOTE — PROGRESS NOTE ADULT - ASSESSMENT
78 year old female, morbidly obesity, DM, ventral hernia repair with mesh 3/2 c/b wound dehiscence, recent ATN requiring temporary HD (Permacath removed 4/10), who presents as a transfer from OSH for management of multifactorial sepsis and open abdominal wound. Patient found to have ESBL-producing E.Coli UTI and MSSA Bacteremia, origin likely coming from abdomen/pelvis. CT imaging suggestive of small bowel perforation with surrounding extra-luminal fluid collections. Patient has been on Meropenem and Oxacillin with stable HD, without leukocytosis or fever. However, patient has been steadily dropping his platelets, likely 2/2 antibiotics or anti-psychotic therapies.     Recommendations:  1. c/w Meropenem 1g q12h and Oxacillin 2g q4h for now  2. Repeat CT a/p imaging tomorrow as per SICU team. If fluid collection again identified, would again recommend drainage  3. Surveillance blood cultures NGTD    4. Monitor PLT's   5. Surgical intervention as per SICU team     Discussed with ID attending and primary team  ID will follow

## 2018-04-24 NOTE — PROGRESS NOTE ADULT - SUBJECTIVE AND OBJECTIVE BOX
SUBJECTIVE: Pt seen and examined at bedside.  O/N: seratonin release assay negative, 1 u pRBC for Hgb 6.9  4/23: psyc meds held 2/2 pancytopenia. given lasix 60x1. wound vac changed.    Vital Signs Last 24 Hrs  T(C): 36.9 (24 Apr 2018 09:14), Max: 37.1 (24 Apr 2018 06:00)  T(F): 98.5 (24 Apr 2018 09:14), Max: 98.8 (24 Apr 2018 06:00)  HR: 78 (24 Apr 2018 12:00) (70 - 82)  BP: 143/65 (24 Apr 2018 12:00) (93/43 - 164/71)  BP(mean): 87 (24 Apr 2018 12:00) (62 - 110)  RR: 18 (24 Apr 2018 12:00) (10 - 31)  SpO2: 96% (24 Apr 2018 12:00) (95% - 99%)    PHYSICAL EXAM    General: NAD  Neuro: responsive to some commands, responding affirmatively to questioning about pain  HEENT: MMM, no masses  Pulm: no respiratory distress, b/l rhonchi with decreased BS at b/l bases  CV: RRR, no murmur  Abd: obese, soft, ND, no focal tenderness, midline wound vac in place, functioning properly.   : suazo to gravity  Vasc: b/l DP/PT pulses  MSK: no joint swelling  Extremities: WWP, 3+pitting edema in LE particularly in feet  Skin: no rash    I&O's Detail    23 Apr 2018 07:01  -  24 Apr 2018 07:00  --------------------------------------------------------  IN:    Albumin 25%: 150 mL    Enteral Tube Flush: 65 mL    fat emulsion (Plant Based) 20% Infusion: 200 mL    fat emulsion (Plant Based) 20% Infusion: 60 mL    Solution: 300 mL    Solution: 300 mL    Solution: 110 mL    Solution: 200 mL    TPN (Total Parenteral Nutrition): 1118 mL  Total IN: 2503 mL    OUT:    Indwelling Catheter - Urethral: 2290 mL    Rectal Tube: 300 mL    VAC (Vacuum Assisted Closure) System: 200 mL  Total OUT: 2790 mL    Total NET: -287 mL      24 Apr 2018 07:01  -  24 Apr 2018 13:49  --------------------------------------------------------  IN:    Albumin 25%: 50 mL    Enteral Tube Flush: 150 mL    fat emulsion (Plant Based) 20% Infusion: 60 mL    Packed Red Blood Cells: 340 mL    Solution: 50 mL    Solution: 50 mL    TPN (Total Parenteral Nutrition): 188 mL  Total IN: 888 mL    OUT:    Indwelling Catheter - Urethral: 190 mL    VAC (Vacuum Assisted Closure) System: 50 mL  Total OUT: 240 mL    Total NET: 648 mL          LABS:                        6.9    3.3   )-----------( 25       ( 24 Apr 2018 05:15 )             20.4     04-24    147<H>  |  109<H>  |  48<H>  ----------------------------<  195<H>  3.7   |  24  |  1.39<H>    Ca    9.2      24 Apr 2018 05:15  Phos  4.0     04-24  Mg     2.0     04-24    TPro  4.3<L>  /  Alb  2.5<L>  /  TBili  1.0  /  DBili  x   /  AST  15  /  ALT  5<L>  /  AlkPhos  83  04-23          MEDICATIONS  (STANDING):  albumin human 25% IVPB 50 milliLiter(s) IV Intermittent every 8 hours  dextrose 5%. 1000 milliLiter(s) (50 mL/Hr) IV Continuous <Continuous>  dextrose 50% Injectable 12.5 Gram(s) IV Push once  dextrose 50% Injectable 25 Gram(s) IV Push once  dextrose 50% Injectable 25 Gram(s) IV Push once  fat emulsion (Plant Based) 20% Infusion 0.6 Gm/kG/Day (20.8 mL/Hr) IV Continuous <Continuous>  heparin  Injectable 5000 Unit(s) SubCutaneous every 8 hours  insulin lispro (HumaLOG) corrective regimen sliding scale   SubCutaneous every 6 hours  levothyroxine Injectable 75 MICROGram(s) IV Push <User Schedule>  meropenem Injectable 1000 milliGRAM(s) IV Push every 12 hours  oxacillin IVPB      oxacillin IVPB 2 Gram(s) IV Intermittent every 4 hours  pantoprazole   Suspension 40 milliGRAM(s) Oral daily  Parenteral Nutrition - Adult 1 Each (47 mL/Hr) TPN Continuous <Continuous>  Parenteral Nutrition - Adult 1 Each (47 mL/Hr) TPN Continuous <Continuous>    MEDICATIONS  (PRN):  ALBUTerol/ipratropium for Nebulization 3 milliLiter(s) Nebulizer every 6 hours PRN Shortness of Breath and/or Wheezing  dextrose Gel 1 Dose(s) Oral once PRN Blood Glucose LESS THAN 70 milliGRAM(s)/deciliter  glucagon  Injectable 1 milliGRAM(s) IntraMuscular once PRN Glucose LESS THAN 70 milligrams/deciliter  ondansetron Injectable 4 milliGRAM(s) IV Push every 6 hours PRN Nausea      RADIOLOGY & ADDITIONAL STUDIES:    ASSESSMENT AND PLAN  77 y/o F with sepsis from open abdominal wound infection, MSSA bacteremia present on admission, and UTI present on admission found to have contained small bowel leak  -CT scan tomorrow  -EEG    Neuro: prn pain control, Zofran prn, holding seroquel, aripiprazole, escitalapram   CV: HD stable s/p severe sepsis, albumin 25%q8, holding amlodipine, 4/19 Echo  65-70%. Lasix prn  Pulm: Satting well on NC  GI: NPO, protonix, Dobhoff (no feeds), TPN  : Suazo S/p AKF on HD now resolved. UTI (present on admission)  ID: ESBL Ecoli in Urine resistant to Zosyn: Jose L( 4/17-) MSSA in blood: Oxacillin ( 4/17-) Kai virus (good hand washing), TTE neg for endocarditis  Endo: ISS, Synthroid 150 po, Hypoglycemia holding medroxyprogesterone, f/u cosyntropin stim test  PPx: sqh PE:Sp IVC filter on 3/30  Lines: PIV Rt IJ TLC from OSH ( 4/15-), s/p L Rad Art line( 4/15-4/18)   Wounds: Wound vac to midline incision , change MWF  PT/OT: ordered SUBJECTIVE: Pt seen and examined at bedside.  O/N: seratonin release assay negative, 1 u pRBC for Hgb 6.9  4/23: psyc meds held 2/2 pancytopenia. given lasix 60x1. wound vac changed.    Vital Signs Last 24 Hrs  T(C): 36.9 (24 Apr 2018 09:14), Max: 37.1 (24 Apr 2018 06:00)  T(F): 98.5 (24 Apr 2018 09:14), Max: 98.8 (24 Apr 2018 06:00)  HR: 78 (24 Apr 2018 12:00) (70 - 82)  BP: 143/65 (24 Apr 2018 12:00) (93/43 - 164/71)  BP(mean): 87 (24 Apr 2018 12:00) (62 - 110)  RR: 18 (24 Apr 2018 12:00) (10 - 31)  SpO2: 96% (24 Apr 2018 12:00) (95% - 99%)    PHYSICAL EXAM    General: NAD  Neuro: responsive to some commands, responding affirmatively to questioning about pain  HEENT: MMM, no masses  Pulm: no respiratory distress, b/l rhonchi with decreased BS at b/l bases  CV: RRR, no murmur  Abd: obese, soft, ND, no focal tenderness, midline wound vac in place, functioning properly.   : suazo to gravity  Vasc: b/l DP/PT pulses  MSK: no joint swelling  Extremities: WWP, 3+pitting edema in LE particularly in feet  Skin: no rash    I&O's Detail    23 Apr 2018 07:01  -  24 Apr 2018 07:00  --------------------------------------------------------  IN:    Albumin 25%: 150 mL    Enteral Tube Flush: 65 mL    fat emulsion (Plant Based) 20% Infusion: 200 mL    fat emulsion (Plant Based) 20% Infusion: 60 mL    Solution: 300 mL    Solution: 300 mL    Solution: 110 mL    Solution: 200 mL    TPN (Total Parenteral Nutrition): 1118 mL  Total IN: 2503 mL    OUT:    Indwelling Catheter - Urethral: 2290 mL    Rectal Tube: 300 mL    VAC (Vacuum Assisted Closure) System: 200 mL  Total OUT: 2790 mL    Total NET: -287 mL      24 Apr 2018 07:01  -  24 Apr 2018 13:49  --------------------------------------------------------  IN:    Albumin 25%: 50 mL    Enteral Tube Flush: 150 mL    fat emulsion (Plant Based) 20% Infusion: 60 mL    Packed Red Blood Cells: 340 mL    Solution: 50 mL    Solution: 50 mL    TPN (Total Parenteral Nutrition): 188 mL  Total IN: 888 mL    OUT:    Indwelling Catheter - Urethral: 190 mL    VAC (Vacuum Assisted Closure) System: 50 mL  Total OUT: 240 mL    Total NET: 648 mL          LABS:                        6.9    3.3   )-----------( 25       ( 24 Apr 2018 05:15 )             20.4     04-24    147<H>  |  109<H>  |  48<H>  ----------------------------<  195<H>  3.7   |  24  |  1.39<H>    Ca    9.2      24 Apr 2018 05:15  Phos  4.0     04-24  Mg     2.0     04-24    TPro  4.3<L>  /  Alb  2.5<L>  /  TBili  1.0  /  DBili  x   /  AST  15  /  ALT  5<L>  /  AlkPhos  83  04-23          MEDICATIONS  (STANDING):  albumin human 25% IVPB 50 milliLiter(s) IV Intermittent every 8 hours  dextrose 5%. 1000 milliLiter(s) (50 mL/Hr) IV Continuous <Continuous>  dextrose 50% Injectable 12.5 Gram(s) IV Push once  dextrose 50% Injectable 25 Gram(s) IV Push once  dextrose 50% Injectable 25 Gram(s) IV Push once  fat emulsion (Plant Based) 20% Infusion 0.6 Gm/kG/Day (20.8 mL/Hr) IV Continuous <Continuous>  heparin  Injectable 5000 Unit(s) SubCutaneous every 8 hours  insulin lispro (HumaLOG) corrective regimen sliding scale   SubCutaneous every 6 hours  levothyroxine Injectable 75 MICROGram(s) IV Push <User Schedule>  meropenem Injectable 1000 milliGRAM(s) IV Push every 12 hours  oxacillin IVPB      oxacillin IVPB 2 Gram(s) IV Intermittent every 4 hours  pantoprazole   Suspension 40 milliGRAM(s) Oral daily  Parenteral Nutrition - Adult 1 Each (47 mL/Hr) TPN Continuous <Continuous>  Parenteral Nutrition - Adult 1 Each (47 mL/Hr) TPN Continuous <Continuous>    MEDICATIONS  (PRN):  ALBUTerol/ipratropium for Nebulization 3 milliLiter(s) Nebulizer every 6 hours PRN Shortness of Breath and/or Wheezing  dextrose Gel 1 Dose(s) Oral once PRN Blood Glucose LESS THAN 70 milliGRAM(s)/deciliter  glucagon  Injectable 1 milliGRAM(s) IntraMuscular once PRN Glucose LESS THAN 70 milligrams/deciliter  ondansetron Injectable 4 milliGRAM(s) IV Push every 6 hours PRN Nausea      RADIOLOGY & ADDITIONAL STUDIES:    ASSESSMENT AND PLAN  79 y/o F with sepsis from open abdominal wound infection, MSSA bacteremia present on admission, and UTI present on admission found to have contained small bowel leak. Toxic metabolic encephalopathy.  -CT scan tomorrow  -EEG    Neuro: prn pain control, Zofran prn, holding seroquel, aripiprazole, escitalopram and to obtain EEG  CV: HD stable s/p severe sepsis, albumin 25%q8, holding amlodipine, 4/19 Echo  65-70%. Lasix prn  Pulm: Satting well on NC  GI: NPO, protonix, Dobhoff (no feeds), TPN written  : Suazo S/p AKF on HD now resolved. UTI (present on admission)  ID: ESBL Ecoli in Urine resistant to Zosyn: Jose L( 4/17-) MSSA in blood: Oxacillin ( 4/17-) Kai virus (good hand washing), TTE neg for endocarditis. To repeat abdominal CT tomorrow to compare to previous.  Endo: ISS, Synthroid 150 po, Hypoglycemia holding medroxyprogesterone, f/u cosyntropin stim test  PPx: sqh PE:Sp IVC filter on 3/30  Lines: PIV Rt IJ TLC from OSH ( 4/15-), s/p L Rad Art line( 4/15-4/18)   Wounds: Wound vac to midline incision , change MWF  PT/OT: ordered SUBJECTIVE: Pt seen and examined at bedside.  O/N: seratonin release assay negative, 1 u pRBC for Hgb 6.9  4/23: psyc meds held 2/2 pancytopenia. given lasix 60x1. wound vac changed.    Vital Signs Last 24 Hrs  T(C): 36.9 (24 Apr 2018 09:14), Max: 37.1 (24 Apr 2018 06:00)  T(F): 98.5 (24 Apr 2018 09:14), Max: 98.8 (24 Apr 2018 06:00)  HR: 78 (24 Apr 2018 12:00) (70 - 82)  BP: 143/65 (24 Apr 2018 12:00) (93/43 - 164/71)  BP(mean): 87 (24 Apr 2018 12:00) (62 - 110)  RR: 18 (24 Apr 2018 12:00) (10 - 31)  SpO2: 96% (24 Apr 2018 12:00) (95% - 99%)    PHYSICAL EXAM    General: NAD  Neuro: responsive to some commands, responding affirmatively to questioning about pain  HEENT: MMM, no masses  Pulm: no respiratory distress, b/l rhonchi with decreased BS at b/l bases  CV: RRR, no murmur  Abd: obese, soft, ND, no focal tenderness, midline wound vac in place, functioning properly.   : suazo to gravity  Vasc: b/l DP/PT pulses  MSK: no joint swelling  Extremities: WWP, 3+pitting edema in LE particularly in feet  Skin: no rash    I&O's Detail    23 Apr 2018 07:01  -  24 Apr 2018 07:00  --------------------------------------------------------  IN:    Albumin 25%: 150 mL    Enteral Tube Flush: 65 mL    fat emulsion (Plant Based) 20% Infusion: 200 mL    fat emulsion (Plant Based) 20% Infusion: 60 mL    Solution: 300 mL    Solution: 300 mL    Solution: 110 mL    Solution: 200 mL    TPN (Total Parenteral Nutrition): 1118 mL  Total IN: 2503 mL    OUT:    Indwelling Catheter - Urethral: 2290 mL    Rectal Tube: 300 mL    VAC (Vacuum Assisted Closure) System: 200 mL  Total OUT: 2790 mL    Total NET: -287 mL      24 Apr 2018 07:01  -  24 Apr 2018 13:49  --------------------------------------------------------  IN:    Albumin 25%: 50 mL    Enteral Tube Flush: 150 mL    fat emulsion (Plant Based) 20% Infusion: 60 mL    Packed Red Blood Cells: 340 mL    Solution: 50 mL    Solution: 50 mL    TPN (Total Parenteral Nutrition): 188 mL  Total IN: 888 mL    OUT:    Indwelling Catheter - Urethral: 190 mL    VAC (Vacuum Assisted Closure) System: 50 mL  Total OUT: 240 mL    Total NET: 648 mL          LABS:                        6.9    3.3   )-----------( 25       ( 24 Apr 2018 05:15 )             20.4     04-24    147<H>  |  109<H>  |  48<H>  ----------------------------<  195<H>  3.7   |  24  |  1.39<H>    Ca    9.2      24 Apr 2018 05:15  Phos  4.0     04-24  Mg     2.0     04-24    TPro  4.3<L>  /  Alb  2.5<L>  /  TBili  1.0  /  DBili  x   /  AST  15  /  ALT  5<L>  /  AlkPhos  83  04-23          MEDICATIONS  (STANDING):  albumin human 25% IVPB 50 milliLiter(s) IV Intermittent every 8 hours  dextrose 5%. 1000 milliLiter(s) (50 mL/Hr) IV Continuous <Continuous>  dextrose 50% Injectable 12.5 Gram(s) IV Push once  dextrose 50% Injectable 25 Gram(s) IV Push once  dextrose 50% Injectable 25 Gram(s) IV Push once  fat emulsion (Plant Based) 20% Infusion 0.6 Gm/kG/Day (20.8 mL/Hr) IV Continuous <Continuous>  heparin  Injectable 5000 Unit(s) SubCutaneous every 8 hours  insulin lispro (HumaLOG) corrective regimen sliding scale   SubCutaneous every 6 hours  levothyroxine Injectable 75 MICROGram(s) IV Push <User Schedule>  meropenem Injectable 1000 milliGRAM(s) IV Push every 12 hours  oxacillin IVPB      oxacillin IVPB 2 Gram(s) IV Intermittent every 4 hours  pantoprazole   Suspension 40 milliGRAM(s) Oral daily  Parenteral Nutrition - Adult 1 Each (47 mL/Hr) TPN Continuous <Continuous>  Parenteral Nutrition - Adult 1 Each (47 mL/Hr) TPN Continuous <Continuous>    MEDICATIONS  (PRN):  ALBUTerol/ipratropium for Nebulization 3 milliLiter(s) Nebulizer every 6 hours PRN Shortness of Breath and/or Wheezing  dextrose Gel 1 Dose(s) Oral once PRN Blood Glucose LESS THAN 70 milliGRAM(s)/deciliter  glucagon  Injectable 1 milliGRAM(s) IntraMuscular once PRN Glucose LESS THAN 70 milligrams/deciliter  ondansetron Injectable 4 milliGRAM(s) IV Push every 6 hours PRN Nausea      RADIOLOGY & ADDITIONAL STUDIES:    ASSESSMENT AND PLAN  79 y/o F with sepsis from open abdominal wound infection, MSSA bacteremia present on admission, and UTI present on admission found to have contained small bowel leak. Toxic metabolic encephalopathy and protein calorie malnutrition.  -CT scan tomorrow  -EEG    Neuro: prn pain control, Zofran prn, holding seroquel, aripiprazole, escitalopram and to obtain EEG  CV: HD stable s/p severe sepsis, albumin 25%q8, holding amlodipine, 4/19 Echo  65-70%. Lasix prn  Pulm: Satting well on NC  GI: NPO, protonix, Dobhoff (no feeds), TPN written  : Suazo S/p AKF on HD now resolved. UTI (present on admission)  ID: ESBL Ecoli in Urine resistant to Zosyn: Jose L( 4/17-) MSSA in blood: Oxacillin ( 4/17-) Kai virus (good hand washing), TTE neg for endocarditis. To repeat abdominal CT tomorrow to compare to previous.  Endo: ISS, Synthroid 150 po, Hypoglycemia holding medroxyprogesterone, f/u cosyntropin stim test  PPx: sqh PE:Sp IVC filter on 3/30  Lines: PIV Rt IJ TLC from OSH ( 4/15-), s/p L Rad Art line( 4/15-4/18)   Wounds: Wound vac to midline incision , change MWF  PT/OT: ordered

## 2018-04-24 NOTE — PROGRESS NOTE ADULT - ATTENDING COMMENTS
I have reviewed the medical record, including laboratory and radiographic studies, interviewed and examined the patient and discussed the plan with Dr. Valdez, the ID Resident.  Agree with above.  Will continue to follow with you – ID service.

## 2018-04-24 NOTE — PROGRESS NOTE ADULT - SUBJECTIVE AND OBJECTIVE BOX
INTERVAL HPI/OVERNIGHT EVENTS:  No overnight events. Patient seen and examined at bedside.       ANTIBIOTICS/RELEVANT:    MEDICATIONS  (STANDING):  albumin human 25% IVPB 50 milliLiter(s) IV Intermittent every 8 hours  dextrose 5%. 1000 milliLiter(s) (50 mL/Hr) IV Continuous <Continuous>  dextrose 50% Injectable 12.5 Gram(s) IV Push once  dextrose 50% Injectable 25 Gram(s) IV Push once  dextrose 50% Injectable 25 Gram(s) IV Push once  fat emulsion (Plant Based) 20% Infusion 0.6 Gm/kG/Day (20.8 mL/Hr) IV Continuous <Continuous>  heparin  Injectable 5000 Unit(s) SubCutaneous every 8 hours  insulin lispro (HumaLOG) corrective regimen sliding scale   SubCutaneous every 6 hours  levothyroxine Injectable 75 MICROGram(s) IV Push <User Schedule>  meropenem Injectable 1000 milliGRAM(s) IV Push every 12 hours  oxacillin IVPB      oxacillin IVPB 2 Gram(s) IV Intermittent every 4 hours  pantoprazole   Suspension 40 milliGRAM(s) Oral daily  Parenteral Nutrition - Adult 1 Each (47 mL/Hr) TPN Continuous <Continuous>  Parenteral Nutrition - Adult 1 Each (47 mL/Hr) TPN Continuous <Continuous>    MEDICATIONS  (PRN):  ALBUTerol/ipratropium for Nebulization 3 milliLiter(s) Nebulizer every 6 hours PRN Shortness of Breath and/or Wheezing  dextrose Gel 1 Dose(s) Oral once PRN Blood Glucose LESS THAN 70 milliGRAM(s)/deciliter  glucagon  Injectable 1 milliGRAM(s) IntraMuscular once PRN Glucose LESS THAN 70 milligrams/deciliter  ondansetron Injectable 4 milliGRAM(s) IV Push every 6 hours PRN Nausea        Vital Signs Last 24 Hrs  T(C): 36.8 (24 Apr 2018 14:04), Max: 37.1 (24 Apr 2018 06:00)  T(F): 98.3 (24 Apr 2018 14:04), Max: 98.8 (24 Apr 2018 06:00)  HR: 72 (24 Apr 2018 15:00) (72 - 82)  BP: 125/84 (24 Apr 2018 14:00) (93/43 - 164/71)  BP(mean): 102 (24 Apr 2018 14:00) (62 - 110)  RR: 15 (24 Apr 2018 15:00) (10 - 31)  SpO2: 96% (24 Apr 2018 15:00) (95% - 99%)    04-23-18 @ 07:01  -  04-24-18 @ 07:00  --------------------------------------------------------  IN: 2503 mL / OUT: 2790 mL / NET: -287 mL    04-24-18 @ 07:01  -  04-24-18 @ 15:39  --------------------------------------------------------  IN: 1126 mL / OUT: 485 mL / NET: 641 mL      PHYSICAL EXAM:  Constitutional: NAD. Morbidly obese. Non-toxic appearing   HEENT: Conjunctiva clear, no oral lesion, no sinus tenderness on percussion	  Respiratory: poor inspiratory effort. decreased at the bases. no wheezing or crackles   Cardiovascular: RRR with no murmurs  Gastrointestinal: obese, midline surgical wound (5x5), now with wound vac   Extremities: 1+ pitting edema  Neuro: AAOx0       LABS:                        6.9    3.3   )-----------( 25       ( 24 Apr 2018 05:15 )             20.4     04-24    147<H>  |  109<H>  |  48<H>  ----------------------------<  195<H>  3.7   |  24  |  1.39<H>    Ca    9.2      24 Apr 2018 05:15  Phos  4.0     04-24  Mg     2.0     04-24    TPro  4.3<L>  /  Alb  2.5<L>  /  TBili  1.0  /  DBili  x   /  AST  15  /  ALT  5<L>  /  AlkPhos  83  04-23          Culture - Blood (04.20.18 @ 17:28)    Specimen Source: .Blood Blood    Culture Results:   No growth at 2 days.    GI PCR Panel, Stool (04.20.18 @ 17:20)    Culture Results:   GI PCR Results: NOT detected  *******Please Note:*******  GI panel PCR evaluates for:  Campylobacter, Plesiomonas shigelloides, Salmonella,  Vibrio, Yersinia enterocolitica, Enteroaggregative  Escherichia coli (EAEC), Enteropathogenic E.coli (EPEC),  Enterotoxigenic E. coli (ETEC) lt/st, Shiga-like  toxin-producing E. coli (STEC) stx1/stx2,  Shigella/ Enteroinvasive E. coli (EIEC), Cryptosporidium,  Cyclospora cayetanensis, Entamoeba histolytica,  Giardia lamblia, Adenovirus F 40/41, Astrovirus,  Norovirus GI/GII, Rotavirus A, Sapovirus    Culture - Urine (04.18.18 @ 12:53)    -  Amikacin: S <=16    -  Ampicillin: R >16 These ampicillin results predict results for amoxicillin    -  Ampicillin: R >16 These ampicillin results predict results for amoxicillin    -  Ampicillin/Sulbactam: R >16/8    -  Ampicillin/Sulbactam: R >16/8    -  Aztreonam: S <=4    -  Cefazolin: R >16 This predicts results for oral agents cefaclor, cefdinir, cefpodoxime, cefprozil, cefuroxime axetil, cephalexin and locarbef for uncomplicated UTI. Note that some isolates may be susceptible to these agents while testing resistant to cefazolin.    -  Cefazolin: R >16 This predicts results for oral agents cefaclor, cefdinir, cefpodoxime, cefprozil, cefuroxime axetil, cephalexin and locarbef for uncomplicated UTI. Note that some isolates may be susceptible to these agents while testing resistant to cefazolin.    -  Cefepime: I 16    -  Cefotaxime: S <=2    -  Cefoxitin: S <=8    -  Ceftazidime: S <=1    -  Ceftriaxone: S <=1 Enterobacter, Citrobacter, and Serratia may develop resistance during prolonged therapy    -  Ceftriaxone: S <=1 Enterobacter, Citrobacter, and Serratia may develop resistance during prolonged therapy    -  Cefuroxime: R >16    -  Ciprofloxacin: S <=1    -  Ciprofloxacin: S <=1    -  Ertapenem: S <=1    -  Gentamicin: S <=4    -  Gentamicin: S <=4    -  Imipenem: S <=1    -  Levofloxacin: S <=2    -  Meropenem: S <=1    -  Nitrofurantoin: S <=32 Should not be used to treat pyelonephritis    -  Nitrofurantoin: S <=32 Should not be used to treat pyelonephritis    -  Piperacillin/Tazobactam: R >64    -  Piperacillin/Tazobactam: R >64    -  Tigecycline: S <=2    -  Tobramycin: S <=4    -  Tobramycin: S <=4    -  Trimethoprim/Sulfamethoxazole: S <=2/38    -  Trimethoprim/Sulfamethoxazole: S <=2/38    Specimen Source: .Urine Catheterized    Culture Results:   4,000 CFU/ml Escherichia coli    Organism Identification: Escherichia coli  Escherichia coli    Organism: Escherichia coli    Organism: Escherichia coli    Method Type: MARY    Method Type: MARY    Culture - Blood (04.02.18 @ 16:09)    Specimen Source: .Blood Blood-Peripheral    Culture Results:   No growth at 5 days.          RADIOLOGY & ADDITIONAL STUDIES:  < from: CT Abdomen and Pelvis w/ Oral Cont (04.18.18 @ 17:55) >  EXAM:  CT ABDOMEN AND PELVIS OC                          PROCEDURE DATE:  04/18/2018                     INTERPRETATION:  CT of the ABDOMEN and PELVIS without intravenous   contrast dated 4/18/2018 5:55 PM    INDICATION: Sepsis. Extraluminal gas onprior contrast. Status post   hernia repair.    TECHNIQUE: CT of the abdomen and pelvis was performed using oral contrast   material.  Intravenous contrast was not used as requested.  Axial and   coronal images were produced and reviewed.    PRIOR STUDIES: 4/15/2018 and other studies dating back to 10/18/2017.    FINDINGS: Images of the lower chest demonstrate  small bilateral pleural   effusions, right greater than left. Dependent atelectasis is seen in the   lung bases.    The liver is normal in appearance.   No radiopaque gallstones are seen.    There is mild infiltration of the fat anterior to the pancreatic head   surrounding the mesenteric vascular pedicle. No splenic abnormalities are   seen.    No adrenal abnormalities are seen.   Thekidneys are normal in appearance   bilaterally.  No renal stones are seen.  No hydronephrosis is evident.    No abdominal aortic aneurysm is seen. An IVC filter is in place. No   lymphadenopathy is seen.    Postoperative changes are seen in the anterior abdominal wall. There is   gas, likely contained within packing material in the anterior abdominal   wall midline wound. There is subcutaneous edema but no drainable fluid   collection is seen in the abdominal wall.  Gastric tube is within the gastric antrum. Small bowel loops and right   colon are opacified with oral contrast. There is a circular collection of   extraluminal oral contrast and pockets of gas surrounding the enlarged   fibroid uterus which appears to be connected to a loop of distal small   bowel in the right lower quadrant (axial image 85 coronal image 41).   There is a small upper abdominal ascites which measures close to water   density and does not contain external oral contrast. There is no bowel   obstruction. Nicole catheter is seen in a collapsed urinary bladder.   Uterus measures up to 17.5 cm in height.    Evaluation of the osseous structures demonstrates degenerative changes.      IMPRESSION:  Circular collection of extraluminal oral contrast around the enlarged   fibroid uterus likely connected to a loop of small bowel (enterotomy) in   the right lower quadrant. Collection may be contained around the uterus   by peritoneal coverings or adhesions. Small ascites.    Mesenteric edema in the upper abdomen particularly around the mesenteric   vessels. Recommend contrast enhanced CT or Doppler ultrasound to evaluate   patency of superior mesenteric vein and portal vein.

## 2018-04-25 LAB
ANION GAP SERPL CALC-SCNC: 14 MMOL/L — SIGNIFICANT CHANGE UP (ref 5–17)
APTT BLD: 45 SEC — HIGH (ref 27.5–37.4)
BUN SERPL-MCNC: 49 MG/DL — HIGH (ref 7–23)
CALCIUM SERPL-MCNC: 9.2 MG/DL — SIGNIFICANT CHANGE UP (ref 8.4–10.5)
CHLORIDE SERPL-SCNC: 107 MMOL/L — SIGNIFICANT CHANGE UP (ref 96–108)
CO2 SERPL-SCNC: 23 MMOL/L — SIGNIFICANT CHANGE UP (ref 22–31)
CREAT SERPL-MCNC: 1.39 MG/DL — HIGH (ref 0.5–1.3)
CULTURE RESULTS: SIGNIFICANT CHANGE UP
GLUCOSE BLDC GLUCOMTR-MCNC: 126 MG/DL — HIGH (ref 70–99)
GLUCOSE BLDC GLUCOMTR-MCNC: 152 MG/DL — HIGH (ref 70–99)
GLUCOSE BLDC GLUCOMTR-MCNC: 191 MG/DL — HIGH (ref 70–99)
GLUCOSE BLDC GLUCOMTR-MCNC: 223 MG/DL — HIGH (ref 70–99)
GLUCOSE SERPL-MCNC: 207 MG/DL — HIGH (ref 70–99)
HCT VFR BLD CALC: 20.4 % — CRITICAL LOW (ref 34.5–45)
HCT VFR BLD CALC: 21.3 % — LOW (ref 34.5–45)
HGB BLD-MCNC: 6.7 G/DL — CRITICAL LOW (ref 11.5–15.5)
HGB BLD-MCNC: 7.2 G/DL — LOW (ref 11.5–15.5)
INR BLD: 1.4 — HIGH (ref 0.88–1.16)
MAGNESIUM SERPL-MCNC: 2 MG/DL — SIGNIFICANT CHANGE UP (ref 1.6–2.6)
MCHC RBC-ENTMCNC: 29.4 PG — SIGNIFICANT CHANGE UP (ref 27–34)
MCHC RBC-ENTMCNC: 29.5 PG — SIGNIFICANT CHANGE UP (ref 27–34)
MCHC RBC-ENTMCNC: 32.8 G/DL — SIGNIFICANT CHANGE UP (ref 32–36)
MCHC RBC-ENTMCNC: 33.8 G/DL — SIGNIFICANT CHANGE UP (ref 32–36)
MCV RBC AUTO: 87.3 FL — SIGNIFICANT CHANGE UP (ref 80–100)
MCV RBC AUTO: 89.5 FL — SIGNIFICANT CHANGE UP (ref 80–100)
PHOSPHATE SERPL-MCNC: 4.1 MG/DL — SIGNIFICANT CHANGE UP (ref 2.5–4.5)
PLATELET # BLD AUTO: 28 K/UL — LOW (ref 150–400)
PLATELET # BLD AUTO: 29 K/UL — LOW (ref 150–400)
POTASSIUM SERPL-MCNC: 3.3 MMOL/L — LOW (ref 3.5–5.3)
POTASSIUM SERPL-SCNC: 3.3 MMOL/L — LOW (ref 3.5–5.3)
PROTHROM AB SERPL-ACNC: 15.6 SEC — HIGH (ref 9.8–12.7)
RBC # BLD: 2.28 M/UL — LOW (ref 3.8–5.2)
RBC # BLD: 2.44 M/UL — LOW (ref 3.8–5.2)
RBC # FLD: 17.6 % — HIGH (ref 10.3–16.9)
RBC # FLD: 18.2 % — HIGH (ref 10.3–16.9)
SODIUM SERPL-SCNC: 144 MMOL/L — SIGNIFICANT CHANGE UP (ref 135–145)
SPECIMEN SOURCE: SIGNIFICANT CHANGE UP
WBC # BLD: 3.8 K/UL — SIGNIFICANT CHANGE UP (ref 3.8–10.5)
WBC # BLD: 4 K/UL — SIGNIFICANT CHANGE UP (ref 3.8–10.5)
WBC # FLD AUTO: 3.8 K/UL — SIGNIFICANT CHANGE UP (ref 3.8–10.5)
WBC # FLD AUTO: 4 K/UL — SIGNIFICANT CHANGE UP (ref 3.8–10.5)

## 2018-04-25 PROCEDURE — 71045 X-RAY EXAM CHEST 1 VIEW: CPT | Mod: 26,76

## 2018-04-25 PROCEDURE — 71260 CT THORAX DX C+: CPT | Mod: 26

## 2018-04-25 PROCEDURE — 99222 1ST HOSP IP/OBS MODERATE 55: CPT

## 2018-04-25 PROCEDURE — 99232 SBSQ HOSP IP/OBS MODERATE 35: CPT | Mod: GC

## 2018-04-25 PROCEDURE — 74177 CT ABD & PELVIS W/CONTRAST: CPT | Mod: 26

## 2018-04-25 PROCEDURE — 95813 EEG EXTND MNTR 61-119 MIN: CPT | Mod: 26

## 2018-04-25 PROCEDURE — 99233 SBSQ HOSP IP/OBS HIGH 50: CPT | Mod: GC

## 2018-04-25 RX ORDER — CHLORHEXIDINE GLUCONATE 213 G/1000ML
15 SOLUTION TOPICAL
Qty: 0 | Refills: 0 | Status: DISCONTINUED | OUTPATIENT
Start: 2018-04-25 | End: 2018-05-08

## 2018-04-25 RX ORDER — PROPOFOL 10 MG/ML
8.9 INJECTION, EMULSION INTRAVENOUS
Qty: 1000 | Refills: 0 | Status: DISCONTINUED | OUTPATIENT
Start: 2018-04-25 | End: 2018-04-28

## 2018-04-25 RX ORDER — POTASSIUM CHLORIDE 20 MEQ
40 PACKET (EA) ORAL ONCE
Qty: 0 | Refills: 0 | Status: COMPLETED | OUTPATIENT
Start: 2018-04-25 | End: 2018-04-25

## 2018-04-25 RX ORDER — PANTOPRAZOLE SODIUM 20 MG/1
40 TABLET, DELAYED RELEASE ORAL DAILY
Qty: 0 | Refills: 0 | Status: DISCONTINUED | OUTPATIENT
Start: 2018-04-25 | End: 2018-04-26

## 2018-04-25 RX ORDER — ELECTROLYTE SOLUTION,INJ
1 VIAL (ML) INTRAVENOUS
Qty: 0 | Refills: 0 | Status: DISCONTINUED | OUTPATIENT
Start: 2018-04-25 | End: 2018-04-25

## 2018-04-25 RX ORDER — DIATRIZOATE MEGLUMINE 180 MG/ML
30 INJECTION, SOLUTION INTRAVESICAL ONCE
Qty: 0 | Refills: 0 | Status: COMPLETED | OUTPATIENT
Start: 2018-04-25 | End: 2018-04-25

## 2018-04-25 RX ORDER — HUMAN INSULIN 100 [IU]/ML
10 INJECTION, SUSPENSION SUBCUTANEOUS ONCE
Qty: 0 | Refills: 0 | Status: COMPLETED | OUTPATIENT
Start: 2018-04-25 | End: 2018-04-25

## 2018-04-25 RX ORDER — FENTANYL CITRATE 50 UG/ML
0.53 INJECTION INTRAVENOUS
Qty: 2500 | Refills: 0 | Status: DISCONTINUED | OUTPATIENT
Start: 2018-04-25 | End: 2018-05-01

## 2018-04-25 RX ORDER — I.V. FAT EMULSION 20 G/100ML
0.6 EMULSION INTRAVENOUS
Qty: 50 | Refills: 0 | Status: DISCONTINUED | OUTPATIENT
Start: 2018-04-25 | End: 2018-04-25

## 2018-04-25 RX ADMIN — Medication 100 GRAM(S): at 03:25

## 2018-04-25 RX ADMIN — MEROPENEM 1000 MILLIGRAM(S): 1 INJECTION INTRAVENOUS at 06:16

## 2018-04-25 RX ADMIN — Medication 4: at 06:16

## 2018-04-25 RX ADMIN — Medication 50 MILLILITER(S): at 11:55

## 2018-04-25 RX ADMIN — PROPOFOL 5 MICROGRAM(S)/KG/MIN: 10 INJECTION, EMULSION INTRAVENOUS at 11:50

## 2018-04-25 RX ADMIN — MEROPENEM 1000 MILLIGRAM(S): 1 INJECTION INTRAVENOUS at 17:14

## 2018-04-25 RX ADMIN — PANTOPRAZOLE SODIUM 40 MILLIGRAM(S): 20 TABLET, DELAYED RELEASE ORAL at 11:55

## 2018-04-25 RX ADMIN — Medication 100 GRAM(S): at 11:58

## 2018-04-25 RX ADMIN — Medication 1 EACH: at 17:14

## 2018-04-25 RX ADMIN — Medication 100 GRAM(S): at 07:29

## 2018-04-25 RX ADMIN — Medication 100 GRAM(S): at 16:05

## 2018-04-25 RX ADMIN — Medication 50 MILLILITER(S): at 20:45

## 2018-04-25 RX ADMIN — Medication 100 GRAM(S): at 20:42

## 2018-04-25 RX ADMIN — Medication 2: at 17:43

## 2018-04-25 RX ADMIN — Medication 75 MICROGRAM(S): at 07:29

## 2018-04-25 RX ADMIN — HUMAN INSULIN 10 UNIT(S): 100 INJECTION, SUSPENSION SUBCUTANEOUS at 11:55

## 2018-04-25 RX ADMIN — Medication 50 MILLILITER(S): at 03:26

## 2018-04-25 RX ADMIN — DIATRIZOATE MEGLUMINE 30 MILLILITER(S): 180 INJECTION, SOLUTION INTRAVESICAL at 04:42

## 2018-04-25 RX ADMIN — HEPARIN SODIUM 5000 UNIT(S): 5000 INJECTION INTRAVENOUS; SUBCUTANEOUS at 06:16

## 2018-04-25 RX ADMIN — DIATRIZOATE MEGLUMINE 30 MILLILITER(S): 180 INJECTION, SOLUTION INTRAVESICAL at 12:00

## 2018-04-25 RX ADMIN — Medication 2: at 11:55

## 2018-04-25 RX ADMIN — CHLORHEXIDINE GLUCONATE 15 MILLILITER(S): 213 SOLUTION TOPICAL at 17:14

## 2018-04-25 RX ADMIN — Medication 40 MILLIEQUIVALENT(S): at 11:54

## 2018-04-25 NOTE — PROGRESS NOTE ADULT - SUBJECTIVE AND OBJECTIVE BOX
SUBJECTIVE: Pt seen and examined at bedside. Increased work of breathing, easily arousable but not responsive to questions  O/N: no events  4/24: EEG ordered, CT tomorrow ordered will start contrast tonight @3am Given 1uPRBC repeat cbc @4pm:Hgb 7.8 from 6.9 plts improved along with wbc . Poss Change Abx.       Vital Signs Last 24 Hrs  T(C): 37.7 (25 Apr 2018 06:28), Max: 37.9 (24 Apr 2018 19:00)  T(F): 99.8 (25 Apr 2018 06:28), Max: 100.2 (24 Apr 2018 19:00)  HR: 60 (25 Apr 2018 11:04) (60 - 78)  BP: 104/54 (25 Apr 2018 11:00) (104/54 - 161/61)  BP(mean): 74 (25 Apr 2018 11:00) (69 - 116)  RR: 12 (25 Apr 2018 11:04) (12 - 41)  SpO2: 97% (25 Apr 2018 11:04) (92% - 100%)    PHYSICAL EXAM    General: labored breathing, AOx0  Neuro: not responsive to commands  HEENT: MMM, no masses  Pulm: Prolonged expiration, b/l rhonchi with decreased BS at b/l bases  CV: RRR, no murmur  Abd: obese, soft, ND, no focal tenderness, midline wound vac in place, functioning properly  : suazo to gravity  Vasc: b/l DP/PT pulses  MSK: no joint swelling  Extremities: WWP, 3+pitting edema b/l  Skin: no rash    I&O's Detail    24 Apr 2018 07:01  -  25 Apr 2018 07:00  --------------------------------------------------------  IN:    Albumin 25%: 150 mL    Enteral Tube Flush: 150 mL    fat emulsion (Plant Based) 20% Infusion: 60 mL    fat emulsion (Plant Based) 20% Infusion: 280 mL    Packed Red Blood Cells: 340 mL    Solution: 100 mL    Solution: 300 mL    Solution: 50 mL    TPN (Total Parenteral Nutrition): 1128 mL  Total IN: 2558 mL    OUT:    Indwelling Catheter - Urethral: 1045 mL    Rectal Tube: 200 mL    VAC (Vacuum Assisted Closure) System: 150 mL  Total OUT: 1395 mL    Total NET: 1163 mL      25 Apr 2018 07:01  -  25 Apr 2018 11:48  --------------------------------------------------------  IN:    TPN (Total Parenteral Nutrition): 141 mL  Total IN: 141 mL    OUT:    Indwelling Catheter - Urethral: 115 mL  Total OUT: 115 mL    Total NET: 26 mL          LABS:                        7.2    4.0   )-----------( 28       ( 25 Apr 2018 05:42 )             21.3     04-25    144  |  107  |  49<H>  ----------------------------<  207<H>  3.3<L>   |  23  |  1.39<H>    Ca    9.2      25 Apr 2018 05:42  Phos  4.1     04-25  Mg     2.0     04-25            MEDICATIONS  (STANDING):  albumin human 25% IVPB 50 milliLiter(s) IV Intermittent every 8 hours  chlorhexidine 0.12% Liquid 15 milliLiter(s) Swish and Spit two times a day  dextrose 5%. 1000 milliLiter(s) (50 mL/Hr) IV Continuous <Continuous>  dextrose 50% Injectable 12.5 Gram(s) IV Push once  dextrose 50% Injectable 25 Gram(s) IV Push once  dextrose 50% Injectable 25 Gram(s) IV Push once  diatrizoate meglumine/diatrizoate sodium. 30 milliLiter(s) Oral once  fat emulsion (Plant Based) 20% Infusion 0.6 Gm/kG/Day (20.8 mL/Hr) IV Continuous <Continuous>  fentaNYL   Infusion 0.534 MICROgram(s)/kG/Hr (5 mL/Hr) IV Continuous <Continuous>  heparin  Injectable 5000 Unit(s) SubCutaneous every 8 hours  insulin lispro (HumaLOG) corrective regimen sliding scale   SubCutaneous every 6 hours  insulin NPH human recombinant 10 Unit(s) SubCutaneous once  levothyroxine Injectable 75 MICROGram(s) IV Push <User Schedule>  meropenem Injectable 1000 milliGRAM(s) IV Push every 12 hours  oxacillin IVPB      oxacillin IVPB 2 Gram(s) IV Intermittent every 4 hours  pantoprazole   Suspension 40 milliGRAM(s) Oral daily  pantoprazole   Suspension 40 milliGRAM(s) Oral daily  Parenteral Nutrition - Adult 1 Each (47 mL/Hr) TPN Continuous <Continuous>  Parenteral Nutrition - Adult 1 Each (47 mL/Hr) TPN Continuous <Continuous>  potassium chloride   Powder 40 milliEquivalent(s) Enteral Tube once  propofol Infusion 8.903 MICROgram(s)/kG/Min (5 mL/Hr) IV Continuous <Continuous>    MEDICATIONS  (PRN):  ALBUTerol/ipratropium for Nebulization 3 milliLiter(s) Nebulizer every 6 hours PRN Shortness of Breath and/or Wheezing  dextrose Gel 1 Dose(s) Oral once PRN Blood Glucose LESS THAN 70 milliGRAM(s)/deciliter  glucagon  Injectable 1 milliGRAM(s) IntraMuscular once PRN Glucose LESS THAN 70 milligrams/deciliter  ondansetron Injectable 4 milliGRAM(s) IV Push every 6 hours PRN Nausea      RADIOLOGY & ADDITIONAL STUDIES:    ASSESSMENT AND PLAN  79 y/o F with sepsis from open abdominal wound infection, MSSA bacteremia present on admission, and UTI present on admission found to have contained small bowel leak    Neuro: prn pain control, Zofran prn, holding seroquel, aripiprazole, escitalapram   CV: HD stable s/p severe sepsis, albumin 25%q8, holding amlodipine, 4/19 Echo  65-70%. Lasix prn  Pulm: intubated  GI: NPO, protonix, Dobhoff (no feeds), TPN  : Suazo S/p AKF on HD now resolved. UTI (present on admission)  ID: ESBL Ecoli in Urine resistant to Zosyn: Jose L( 4/17-) MSSA in blood: Oxacillin ( 4/17-) Kai virus (good hand washing), TTE neg for endocarditis  Endo: ISS, Synthroid 150 po, Hypoglycemia holding medroxyprogesterone, f/u cosyntropin stim test  PPx: sqh PE:Sp IVC filter on 3/30  Lines: PIV Rt IJ TLC from OSH ( 4/15-), s/p L Rad Art line( 4/15-4/18)   Wounds: Wound vac to midline incision , change MWF  PT/OT: ordered SUBJECTIVE: Pt seen and examined at bedside. Increased work of breathing, easily arousable but not responsive to questions  O/N: no events  4/24: EEG ordered, CT tomorrow ordered will start contrast tonight @3am Given 1uPRBC repeat cbc @4pm:Hgb 7.8 from 6.9 plts improved along with wbc . Poss Change Abx.       Vital Signs Last 24 Hrs  T(C): 37.7 (25 Apr 2018 06:28), Max: 37.9 (24 Apr 2018 19:00)  T(F): 99.8 (25 Apr 2018 06:28), Max: 100.2 (24 Apr 2018 19:00)  HR: 60 (25 Apr 2018 11:04) (60 - 78)  BP: 104/54 (25 Apr 2018 11:00) (104/54 - 161/61)  BP(mean): 74 (25 Apr 2018 11:00) (69 - 116)  RR: 12 (25 Apr 2018 11:04) (12 - 41)  SpO2: 97% (25 Apr 2018 11:04) (92% - 100%)    PHYSICAL EXAM    General: labored breathing, AOx0  Neuro: not responsive to commands  HEENT: MMM, no masses  Pulm: Prolonged expiration, b/l rhonchi with decreased BS at b/l bases  CV: RRR, no murmur  Abd: obese, soft, ND, no focal tenderness, midline wound vac in place, functioning properly  : suazo to gravity  Vasc: b/l DP/PT pulses  MSK: no joint swelling  Extremities: WWP, 3+pitting edema b/l  Skin: no rash    I&O's Detail    24 Apr 2018 07:01  -  25 Apr 2018 07:00  --------------------------------------------------------  IN:    Albumin 25%: 150 mL    Enteral Tube Flush: 150 mL    fat emulsion (Plant Based) 20% Infusion: 60 mL    fat emulsion (Plant Based) 20% Infusion: 280 mL    Packed Red Blood Cells: 340 mL    Solution: 100 mL    Solution: 300 mL    Solution: 50 mL    TPN (Total Parenteral Nutrition): 1128 mL  Total IN: 2558 mL    OUT:    Indwelling Catheter - Urethral: 1045 mL    Rectal Tube: 200 mL    VAC (Vacuum Assisted Closure) System: 150 mL  Total OUT: 1395 mL    Total NET: 1163 mL      25 Apr 2018 07:01  -  25 Apr 2018 11:48  --------------------------------------------------------  IN:    TPN (Total Parenteral Nutrition): 141 mL  Total IN: 141 mL    OUT:    Indwelling Catheter - Urethral: 115 mL  Total OUT: 115 mL    Total NET: 26 mL          LABS:                        7.2    4.0   )-----------( 28       ( 25 Apr 2018 05:42 )             21.3     04-25    144  |  107  |  49<H>  ----------------------------<  207<H>  3.3<L>   |  23  |  1.39<H>    Ca    9.2      25 Apr 2018 05:42  Phos  4.1     04-25  Mg     2.0     04-25            MEDICATIONS  (STANDING):  albumin human 25% IVPB 50 milliLiter(s) IV Intermittent every 8 hours  chlorhexidine 0.12% Liquid 15 milliLiter(s) Swish and Spit two times a day  dextrose 5%. 1000 milliLiter(s) (50 mL/Hr) IV Continuous <Continuous>  dextrose 50% Injectable 12.5 Gram(s) IV Push once  dextrose 50% Injectable 25 Gram(s) IV Push once  dextrose 50% Injectable 25 Gram(s) IV Push once  diatrizoate meglumine/diatrizoate sodium. 30 milliLiter(s) Oral once  fat emulsion (Plant Based) 20% Infusion 0.6 Gm/kG/Day (20.8 mL/Hr) IV Continuous <Continuous>  fentaNYL   Infusion 0.534 MICROgram(s)/kG/Hr (5 mL/Hr) IV Continuous <Continuous>  heparin  Injectable 5000 Unit(s) SubCutaneous every 8 hours  insulin lispro (HumaLOG) corrective regimen sliding scale   SubCutaneous every 6 hours  insulin NPH human recombinant 10 Unit(s) SubCutaneous once  levothyroxine Injectable 75 MICROGram(s) IV Push <User Schedule>  meropenem Injectable 1000 milliGRAM(s) IV Push every 12 hours  oxacillin IVPB      oxacillin IVPB 2 Gram(s) IV Intermittent every 4 hours  pantoprazole   Suspension 40 milliGRAM(s) Oral daily  pantoprazole   Suspension 40 milliGRAM(s) Oral daily  Parenteral Nutrition - Adult 1 Each (47 mL/Hr) TPN Continuous <Continuous>  Parenteral Nutrition - Adult 1 Each (47 mL/Hr) TPN Continuous <Continuous>  potassium chloride   Powder 40 milliEquivalent(s) Enteral Tube once  propofol Infusion 8.903 MICROgram(s)/kG/Min (5 mL/Hr) IV Continuous <Continuous>    MEDICATIONS  (PRN):  ALBUTerol/ipratropium for Nebulization 3 milliLiter(s) Nebulizer every 6 hours PRN Shortness of Breath and/or Wheezing  dextrose Gel 1 Dose(s) Oral once PRN Blood Glucose LESS THAN 70 milliGRAM(s)/deciliter  glucagon  Injectable 1 milliGRAM(s) IntraMuscular once PRN Glucose LESS THAN 70 milligrams/deciliter  ondansetron Injectable 4 milliGRAM(s) IV Push every 6 hours PRN Nausea      RADIOLOGY & ADDITIONAL STUDIES:    ASSESSMENT AND PLAN  79 y/o F with sepsis from open abdominal wound infection, MSSA bacteremia present on admission, and UTI present on admission found to have contained small bowel leak  -CT C/A/P today  -EEG with evidence of slowing, no seizure activity    Neuro: prn pain control, Zofran prn, holding seroquel, aripiprazole, escitalapram   CV: HD stable s/p severe sepsis, albumin 25%q8, holding amlodipine, 4/19 Echo  65-70%. Lasix prn  Pulm: intubated  GI: NPO, protonix, Dobhoff (no feeds), TPN  : Suazo S/p AKF on HD now resolved. UTI (present on admission)  ID: ESBL Ecoli in Urine resistant to Zosyn: Jose L( 4/17-) MSSA in blood: Oxacillin ( 4/17-) Kai virus (good hand washing), TTE neg for endocarditis  Endo: ISS, Synthroid 150 po, Hypoglycemia holding medroxyprogesterone, f/u cosyntropin stim test  PPx: sqh PE:Sp IVC filter on 3/30  Lines: PIV Rt IJ TLC from OSH ( 4/15-), s/p L Rad Art line( 4/15-4/18)   Wounds: Wound vac to midline incision , change MWF  PT/OT: ordered SUBJECTIVE: Pt seen and examined at bedside. Increased work of breathing, easily arousable but not responsive to questions  O/N: no events  4/24: EEG ordered, CT tomorrow ordered will start contrast tonight @3am Given 1uPRBC repeat cbc @4pm:Hgb 7.8 from 6.9 plts improved along with wbc . Poss Change Abx.       Vital Signs Last 24 Hrs  T(C): 37.7 (25 Apr 2018 06:28), Max: 37.9 (24 Apr 2018 19:00)  T(F): 99.8 (25 Apr 2018 06:28), Max: 100.2 (24 Apr 2018 19:00)  HR: 60 (25 Apr 2018 11:04) (60 - 78)  BP: 104/54 (25 Apr 2018 11:00) (104/54 - 161/61)  BP(mean): 74 (25 Apr 2018 11:00) (69 - 116)  RR: 12 (25 Apr 2018 11:04) (12 - 41)  SpO2: 97% (25 Apr 2018 11:04) (92% - 100%)    PHYSICAL EXAM    General: labored breathing, AOx0  Neuro: not responsive to commands  HEENT: MMM, no masses  Pulm: Prolonged expiration, b/l rhonchi with decreased BS at b/l bases  CV: RRR, no murmur  Abd: obese, soft, ND, no focal tenderness, midline wound vac in place, functioning properly  : suazo to gravity  Vasc: b/l DP/PT pulses  MSK: no joint swelling  Extremities: WWP, 3+pitting edema b/l  Skin: no rash    I&O's Detail    24 Apr 2018 07:01  -  25 Apr 2018 07:00  --------------------------------------------------------  IN:    Albumin 25%: 150 mL    Enteral Tube Flush: 150 mL    fat emulsion (Plant Based) 20% Infusion: 60 mL    fat emulsion (Plant Based) 20% Infusion: 280 mL    Packed Red Blood Cells: 340 mL    Solution: 100 mL    Solution: 300 mL    Solution: 50 mL    TPN (Total Parenteral Nutrition): 1128 mL  Total IN: 2558 mL    OUT:    Indwelling Catheter - Urethral: 1045 mL    Rectal Tube: 200 mL    VAC (Vacuum Assisted Closure) System: 150 mL  Total OUT: 1395 mL    Total NET: 1163 mL      25 Apr 2018 07:01  -  25 Apr 2018 11:48  --------------------------------------------------------  IN:    TPN (Total Parenteral Nutrition): 141 mL  Total IN: 141 mL    OUT:    Indwelling Catheter - Urethral: 115 mL  Total OUT: 115 mL    Total NET: 26 mL          LABS:                        7.2    4.0   )-----------( 28       ( 25 Apr 2018 05:42 )             21.3     04-25    144  |  107  |  49<H>  ----------------------------<  207<H>  3.3<L>   |  23  |  1.39<H>    Ca    9.2      25 Apr 2018 05:42  Phos  4.1     04-25  Mg     2.0     04-25            MEDICATIONS  (STANDING):  albumin human 25% IVPB 50 milliLiter(s) IV Intermittent every 8 hours  chlorhexidine 0.12% Liquid 15 milliLiter(s) Swish and Spit two times a day  dextrose 5%. 1000 milliLiter(s) (50 mL/Hr) IV Continuous <Continuous>  dextrose 50% Injectable 12.5 Gram(s) IV Push once  dextrose 50% Injectable 25 Gram(s) IV Push once  dextrose 50% Injectable 25 Gram(s) IV Push once  diatrizoate meglumine/diatrizoate sodium. 30 milliLiter(s) Oral once  fat emulsion (Plant Based) 20% Infusion 0.6 Gm/kG/Day (20.8 mL/Hr) IV Continuous <Continuous>  fentaNYL   Infusion 0.534 MICROgram(s)/kG/Hr (5 mL/Hr) IV Continuous <Continuous>  heparin  Injectable 5000 Unit(s) SubCutaneous every 8 hours  insulin lispro (HumaLOG) corrective regimen sliding scale   SubCutaneous every 6 hours  insulin NPH human recombinant 10 Unit(s) SubCutaneous once  levothyroxine Injectable 75 MICROGram(s) IV Push <User Schedule>  meropenem Injectable 1000 milliGRAM(s) IV Push every 12 hours  oxacillin IVPB      oxacillin IVPB 2 Gram(s) IV Intermittent every 4 hours  pantoprazole   Suspension 40 milliGRAM(s) Oral daily  pantoprazole   Suspension 40 milliGRAM(s) Oral daily  Parenteral Nutrition - Adult 1 Each (47 mL/Hr) TPN Continuous <Continuous>  Parenteral Nutrition - Adult 1 Each (47 mL/Hr) TPN Continuous <Continuous>  potassium chloride   Powder 40 milliEquivalent(s) Enteral Tube once  propofol Infusion 8.903 MICROgram(s)/kG/Min (5 mL/Hr) IV Continuous <Continuous>    MEDICATIONS  (PRN):  ALBUTerol/ipratropium for Nebulization 3 milliLiter(s) Nebulizer every 6 hours PRN Shortness of Breath and/or Wheezing  dextrose Gel 1 Dose(s) Oral once PRN Blood Glucose LESS THAN 70 milliGRAM(s)/deciliter  glucagon  Injectable 1 milliGRAM(s) IntraMuscular once PRN Glucose LESS THAN 70 milligrams/deciliter  ondansetron Injectable 4 milliGRAM(s) IV Push every 6 hours PRN Nausea      RADIOLOGY & ADDITIONAL STUDIES:    ASSESSMENT AND PLAN  79 y/o F with sepsis from open abdominal wound infection, MSSA bacteremia present on admission, and UTI present on admission found to have contained small bowel leak. Today with acute respiratory failure with inability to protect airway  -CT C/A/P today  -EEG with evidence of slowing, no seizure activity    Neuro: prn pain control, Zofran prn, holding seroquel, aripiprazole, escitalapram   CV: HD stable s/p severe sepsis, albumin 25%q8, holding amlodipine, 4/19 Echo  65-70%. Lasix prn  Pulm: intubated and placed on A/C  GI: NPO, protonix, Dobhoff (no feeds), TPN with more K and insulin  : Suazo S/p AKF on HD now resolved. UTI (present on admission)  ID: ESBL Ecoli in Urine resistant to Zosyn: Jose L( 4/17-) MSSA in blood: Oxacillin ( 4/17-) Kai virus (good hand washing), TTE neg for endocarditis. Repeat CT today with bigger fluid collection around uterus so will ask IR to drain.  Endo: ISS, Synthroid 150 po, Hypoglycemia holding medroxyprogesterone, f/u cosyntropin stim test  PPx: sqh PE:Sp IVC filter on 3/30  Lines: PIV Rt IJ TLC from OSH ( 4/15-), s/p L Rad Art line( 4/15-4/18)   Wounds: Wound vac to midline incision , change MWF  PT/OT: ordered

## 2018-04-25 NOTE — CHART NOTE - NSCHARTNOTEFT_GEN_A_CORE
Admitting Diagnosis:   Patient is a 78y old  Female who presents with a chief complaint of Abdominal wound and multifactorial sepsis (17 Apr 2018 21:02)      PAST MEDICAL & SURGICAL HISTORY:  Hypothyroidism  GERD (gastroesophageal reflux disease)  Obesity  DM (diabetes mellitus)  HLD (hyperlipidemia)  HTN (hypertension)  H/O ventral hernia repair      Current Nutrition Order:  TPN via central line @ 47mL/hr:  300g Dex, 60g AA, 50g 20% Lipids to provide in total  1760Kcal, 60g protein, 2.23GIR , 1.0g/kg IBW protein       PO Intake: Good (%) [   ]  Fair (50-75%) [   ] Poor (<25%) [   ]- N/A NPO    GI Issues: Viral diarrhea     Pain: Unable to assess at this time 2/2 AMS    Skin Integrity: Open abdominal wound w/vac, B/L buttocks stage II      Labs:   04-25    144  |  107  |  49<H>  ----------------------------<  207<H>  3.3<L>   |  23  |  1.39<H>    Ca    9.2      25 Apr 2018 05:42  Phos  4.1     04-25  Mg     2.0     04-25      CAPILLARY BLOOD GLUCOSE      POCT Blood Glucose.: 191 mg/dL (25 Apr 2018 11:49)  POCT Blood Glucose.: 223 mg/dL (25 Apr 2018 05:57)  POCT Blood Glucose.: 157 mg/dL (24 Apr 2018 23:27)  POCT Blood Glucose.: 223 mg/dL (24 Apr 2018 18:10)      Medications:  MEDICATIONS  (STANDING):  albumin human 25% IVPB 50 milliLiter(s) IV Intermittent every 8 hours  chlorhexidine 0.12% Liquid 15 milliLiter(s) Swish and Spit two times a day  dextrose 5%. 1000 milliLiter(s) (50 mL/Hr) IV Continuous <Continuous>  dextrose 50% Injectable 12.5 Gram(s) IV Push once  dextrose 50% Injectable 25 Gram(s) IV Push once  dextrose 50% Injectable 25 Gram(s) IV Push once  fat emulsion (Plant Based) 20% Infusion 0.6 Gm/kG/Day (20.8 mL/Hr) IV Continuous <Continuous>  fentaNYL   Infusion 0.534 MICROgram(s)/kG/Hr (5 mL/Hr) IV Continuous <Continuous>  heparin  Injectable 5000 Unit(s) SubCutaneous every 8 hours  insulin lispro (HumaLOG) corrective regimen sliding scale   SubCutaneous every 6 hours  levothyroxine Injectable 75 MICROGram(s) IV Push <User Schedule>  meropenem Injectable 1000 milliGRAM(s) IV Push every 12 hours  oxacillin IVPB      oxacillin IVPB 2 Gram(s) IV Intermittent every 4 hours  pantoprazole   Suspension 40 milliGRAM(s) Oral daily  pantoprazole   Suspension 40 milliGRAM(s) Oral daily  Parenteral Nutrition - Adult 1 Each (47 mL/Hr) TPN Continuous <Continuous>  Parenteral Nutrition - Adult 1 Each (47 mL/Hr) TPN Continuous <Continuous>  propofol Infusion 8.903 MICROgram(s)/kG/Min (5 mL/Hr) IV Continuous <Continuous>    MEDICATIONS  (PRN):  ALBUTerol/ipratropium for Nebulization 3 milliLiter(s) Nebulizer every 6 hours PRN Shortness of Breath and/or Wheezing  dextrose Gel 1 Dose(s) Oral once PRN Blood Glucose LESS THAN 70 milliGRAM(s)/deciliter  glucagon  Injectable 1 milliGRAM(s) IntraMuscular once PRN Glucose LESS THAN 70 milligrams/deciliter  ondansetron Injectable 4 milliGRAM(s) IV Push every 6 hours PRN Nausea        Weight: 93.6kg  Daily     Daily     Weight Change: No new weights recorded since admit     Estimated energy needs: Ideal body weight (61kg) used for calculations as pt >120% of IBW. needs estimated for older age; adjusted for sepsis (protein needs currently estimated lower d/t hepatic and renal dysfunction)  Calories: 25-30 kcal/kg = 6580-9287 kcal/day  Protein: 1.0-1.2 g/kg = 73-85g protein/day  Fluids: 30-35 mL/kg = 7375-5564 mL/day    Subjective: 79 yo/female with PMhx DM, HTN, recent ventral hernia repair c/b PE, anemia, GIB, admitted with sepsis 2/2 abdominal wound, UTI and bacteremia. Also found to have small bowel perf, and TF were stopped. Pt started on TPN. Pt seen in room, awake, breathing on BiPAP, very agitated and pulling at lines. Per SICU team likely will be intubated. TPN running with lipids at goal. Unable to assess for nutrition-related complaints. Lytes being managed by SICU team. BUN/Cr stable. Last TG check 287mg/dL.     Previous Nutrition Diagnosis:  Inadequate oral intake RT inability to meet needs via oral intake 2/2 AMS AEB NPO    Active [ X  ]  Resolved [   ]    If resolved, new PES:     Goal: Pt will continue to meet % of EER via tolerated route      Recommendations:  1. Recommend increasing AA to 79g pending BUN/Cr labs (1799 kcal, 79g protein, 1.3 g/kg IBW protein, GIR 2.23).   Recommend checking TG weekly. Check Mg, Phos, K daily and POC BG Q6hrs. Trend daily weights. Fluids and lytes per MD discretion.   2. Monitor for surgical team plan of care; use gut as medically feasible    Education: N/A AMS    Risk Level: High [ X  ] Moderate [   ] Low [   ]

## 2018-04-25 NOTE — CONSULT NOTE ADULT - SUBJECTIVE AND OBJECTIVE BOX
Neurology Consult Note    HPI:  77 yo W with PMHx of HTN, DM and hypothyroidism who presents as a transfer from OSH for management of multifactorial sepsis and abdominal wound. Neurology consulted for worsening in mental status. Patient was being intubated when I went to obtain history/examine her, so history is obtained from primary team and chart review.   Patient had a ventral hernia repair on 3/2 and discharged on 3/4, then returned to [facility] on 3/12 with PE and d/c'd on 3/16 on a NOAC, but then patient returned on 3/23 with vaginal and upper GI bleed, with course complicated by ATN requiring HD and patient was discharged on 4/11 after having an IVC filter placed. This time, she presents from [place] with sepsis from MSSA bacteremia and MDR E Coli, after she presented there on 4/15.  As per chart, on admission she was saying she felt "bad" but no specific complaints. Per team, she was not very coherent all the time but was responsive, until this weekend when she has been noticed to have a mental status that was waxing and waning,    ROS  Unable to assess given patient's status (intubated)    Past medical history  Per HPI     MEDICATIONS:  albumin human 25% IVPB 50 milliLiter(s) IV Intermittent every 8 hours  ALBUTerol/ipratropium for Nebulization 3 milliLiter(s) Nebulizer every 6 hours PRN  chlorhexidine 0.12% Liquid 15 milliLiter(s) Swish and Spit two times a day  dextrose 5%. 1000 milliLiter(s) IV Continuous <Continuous>  dextrose 50% Injectable 12.5 Gram(s) IV Push once  dextrose 50% Injectable 25 Gram(s) IV Push once  dextrose 50% Injectable 25 Gram(s) IV Push once  dextrose Gel 1 Dose(s) Oral once PRN  diatrizoate meglumine/diatrizoate sodium. 30 milliLiter(s) Oral once  fat emulsion (Plant Based) 20% Infusion 0.6 Gm/kG/Day IV Continuous <Continuous>  fentaNYL   Infusion 0.534 MICROgram(s)/kG/Hr IV Continuous <Continuous>  glucagon  Injectable 1 milliGRAM(s) IntraMuscular once PRN  heparin  Injectable 5000 Unit(s) SubCutaneous every 8 hours  insulin lispro (HumaLOG) corrective regimen sliding scale   SubCutaneous every 6 hours  levothyroxine Injectable 75 MICROGram(s) IV Push <User Schedule>  meropenem Injectable 1000 milliGRAM(s) IV Push every 12 hours  ondansetron Injectable 4 milliGRAM(s) IV Push every 6 hours PRN  oxacillin IVPB      oxacillin IVPB 2 Gram(s) IV Intermittent every 4 hours  pantoprazole   Suspension 40 milliGRAM(s) Oral daily  pantoprazole   Suspension 40 milliGRAM(s) Oral daily  Parenteral Nutrition - Adult 1 Each TPN Continuous <Continuous>  Parenteral Nutrition - Adult 1 Each TPN Continuous <Continuous>  propofol Infusion 8.903 MICROgram(s)/kG/Min IV Continuous <Continuous>    VITAL SIGNS:  Vital Signs Last 24 Hrs  T(C): 37.7 (25 Apr 2018 06:28), Max: 37.9 (24 Apr 2018 19:00)  T(F): 99.8 (25 Apr 2018 06:28), Max: 100.2 (24 Apr 2018 19:00)  HR: 60 (25 Apr 2018 11:04) (60 - 78)  BP: 104/54 (25 Apr 2018 11:00) (104/54 - 161/61)  BP(mean): 74 (25 Apr 2018 11:00) (69 - 116)  RR: 12 (25 Apr 2018 11:04) (12 - 41)  SpO2: 97% (25 Apr 2018 11:04) (92% - 100%)    PHYSICAL EXAMINATION:  General: intubated, sedated  Eyes: Conjunctiva and sclera clear.  Cardiovascular: Regular rate and rhythm; S1 and S2 Normal; No murmurs, gallops or rubs.  Neurologic:  - Mental Status: sedated  - Cranial Nerves II-XII:    II: Pupils are equal, round, and reactive to light.  III, IV, VI: unable to assess.   V:  unable to assess.   VII:  unable to assess.   VIII:  unable to assess.   IX, X:  unable to assess.   XI:  unable to assess.   XII:  unable to assess.   - Motor:  Normal muscle bulk and tone throughout. Unable to assess strength.  - Reflexes:  2+ and symmetric at the biceps, triceps, brachioradialis, knees, and ankles. Babinski absent.   - Sensory:  Unable to assess.  - Coordination:  Unable to assess.   - Gait:   Unable to assess.     LABORATORY STUDIES:                          7.2    4.0   )-----------( 28       ( 25 Apr 2018 05:42 )             21.3     04-25    144  |  107  |  49<H>  ----------------------------<  207<H>  3.3<L>   |  23  |  1.39<H>    Ca    9.2      25 Apr 2018 05:42  Phos  4.1     04-25  Mg     2.0     04-25    RADIOLOGY & ADDITIONAL STUDIES:    4/22 CT head   FINDINGS: There is a partially visualized nasogastric tube.   VENTRICLES AND SULCI: There is mild enlargement of the sulci and   ventricles commensurate with age appropriate parenchymal volume loss.   INTRA-AXIAL:  There are small calcifications in the globus pallidus. No   acute transcortical infarct or hemorrhage. The gray-white differentiation   is preserved. There is no mass effect or midline shift.   EXTRA-AXIAL: There is a 1.3 x 1.8 cm lesion centered along the left falx   cerebri with large coarse central calcification, likely a meningioma and   not acute hemorrhage.   VISUALIZED SINUSES: No air-fluid levels are identified.   VISUALIZED MASTOIDS: Well developed and aerated bilaterally.  CALVARIUM: No calvarial fracture.    IMPRESSION:   1.  No intracranial hemorrhage or calvarial fracture.  2.  1.3 x 1.8 cm left parafalcine partially calcified meningioma. Neurology Consult Note    HPI:  79 y/o woman, HTN, DM, and hypothyroidism who presents as a transfer from [facility] for management of multifactorial sepsis. Neurology was consulted for evaluation of altered mental status.     Patient was being intubated when I went to obtain history/examine her, so history is obtained from primary team and chart review.   Patient had a ventral hernia repair on 3/2 and discharged on 3/4, then returned to [facility] on 3/12 with PE and d/c'd on 3/16 on a NOAC, but then the patient returned on 3/23 with vaginal and upper GI bleed, with course complicated by ATN requiring HD and patient was discharged on 4/11 after having an IVC filter placed. This time, she presents from [place] with sepsis from MSSA bacteremia and MDR E Coli (of note she presented to [facility] on 4/15).  Per chart, on admission she was saying she felt "bad" but no specific complaints. Per team, she was not very coherent all the time but was responsive to verbal stimuli until this weekend when she was noticed to have waxing and waning mental status.     ROS  Pt intubated/sedated so unable to obtain.     Past medical history  Per HPI     Soc hx  Pt unable to contribute info    Fam hx  Pt unable to contribute info    MEDICATIONS:  albumin human 25% IVPB 50 milliLiter(s) IV Intermittent every 8 hours  ALBUTerol/ipratropium for Nebulization 3 milliLiter(s) Nebulizer every 6 hours PRN  chlorhexidine 0.12% Liquid 15 milliLiter(s) Swish and Spit two times a day  dextrose 5%. 1000 milliLiter(s) IV Continuous <Continuous>  dextrose 50% Injectable 12.5 Gram(s) IV Push once  dextrose 50% Injectable 25 Gram(s) IV Push once  dextrose 50% Injectable 25 Gram(s) IV Push once  dextrose Gel 1 Dose(s) Oral once PRN  diatrizoate meglumine/diatrizoate sodium. 30 milliLiter(s) Oral once  fat emulsion (Plant Based) 20% Infusion 0.6 Gm/kG/Day IV Continuous <Continuous>  fentaNYL   Infusion 0.534 MICROgram(s)/kG/Hr IV Continuous <Continuous>  glucagon  Injectable 1 milliGRAM(s) IntraMuscular once PRN  heparin  Injectable 5000 Unit(s) SubCutaneous every 8 hours  insulin lispro (HumaLOG) corrective regimen sliding scale   SubCutaneous every 6 hours  levothyroxine Injectable 75 MICROGram(s) IV Push <User Schedule>  meropenem Injectable 1000 milliGRAM(s) IV Push every 12 hours  ondansetron Injectable 4 milliGRAM(s) IV Push every 6 hours PRN  oxacillin IVPB      oxacillin IVPB 2 Gram(s) IV Intermittent every 4 hours  pantoprazole   Suspension 40 milliGRAM(s) Oral daily  pantoprazole   Suspension 40 milliGRAM(s) Oral daily  Parenteral Nutrition - Adult 1 Each TPN Continuous <Continuous>  Parenteral Nutrition - Adult 1 Each TPN Continuous <Continuous>  propofol Infusion 8.903 MICROgram(s)/kG/Min IV Continuous <Continuous>    VITAL SIGNS:  Vital Signs Last 24 Hrs  T(C): 37.7 (25 Apr 2018 06:28), Max: 37.9 (24 Apr 2018 19:00)  T(F): 99.8 (25 Apr 2018 06:28), Max: 100.2 (24 Apr 2018 19:00)  HR: 60 (25 Apr 2018 11:04) (60 - 78)  BP: 104/54 (25 Apr 2018 11:00) (104/54 - 161/61)  BP(mean): 74 (25 Apr 2018 11:00) (69 - 116)  RR: 12 (25 Apr 2018 11:04) (12 - 41)  SpO2: 97% (25 Apr 2018 11:04) (92% - 100%)    PHYSICAL EXAMINATION:  General: intubated, sedated  Eyes: Conjunctiva and sclera clear.  Neck: no nuchal rigidity.   Cardiovascular: Regular rate and rhythm; S1 and S2 Normal; No murmurs, gallops or rubs.  Neurologic:  - Mental Status: sedated  - Cranial Nerves II-XII:    II: Pupils are equal, round, and reactive to light.  III, IV, VI: unable to assess.   V:  unable to assess.   VII:  unable to assess.   VIII:  unable to assess.   IX, X:  unable to assess.   XI:  unable to assess.   XII:  unable to assess.   - Motor:    - Reflexes:  2+ and symmetric at the biceps, triceps, brachioradialis, knees, and ankles. Babinski absent.   - Sensory:   - Coordination:  deferred  - Gait:   deferred    LABORATORY STUDIES:                          7.2    4.0   )-----------( 28       ( 25 Apr 2018 05:42 )             21.3     04-25    144  |  107  |  49<H>  ----------------------------<  207<H>  3.3<L>   |  23  |  1.39<H>    Ca    9.2      25 Apr 2018 05:42  Phos  4.1     04-25  Mg     2.0     04-25    RADIOLOGY & ADDITIONAL STUDIES:    4/22 CT head   FINDINGS: There is a partially visualized nasogastric tube.   VENTRICLES AND SULCI: There is mild enlargement of the sulci and   ventricles commensurate with age appropriate parenchymal volume loss.   INTRA-AXIAL:  There are small calcifications in the globus pallidus. No   acute transcortical infarct or hemorrhage. The gray-white differentiation   is preserved. There is no mass effect or midline shift.   EXTRA-AXIAL: There is a 1.3 x 1.8 cm lesion centered along the left falx   cerebri with large coarse central calcification, likely a meningioma and   not acute hemorrhage.   VISUALIZED SINUSES: No air-fluid levels are identified.   VISUALIZED MASTOIDS: Well developed and aerated bilaterally.  CALVARIUM: No calvarial fracture.    IMPRESSION:   1.  No intracranial hemorrhage or calvarial fracture.  2.  1.3 x 1.8 cm left parafalcine partially calcified meningioma. Neurology Consult Note    HPI:  77 y/o woman, HTN, DM, and hypothyroidism who presents as a transfer from [facility] for management of multifactorial sepsis. Neurology was consulted for evaluation of altered mental status.     Patient was being intubated when I went to obtain history/examine her, so history is obtained from primary team and chart review.   Patient had a ventral hernia repair on 3/2 and discharged on 3/4, then returned to [facility] on 3/12 with PE and d/c'd on 3/16 on a NOAC, but then the patient returned on 3/23 with vaginal and upper GI bleed, with course complicated by ATN requiring HD and patient was discharged on 4/11 after having an IVC filter placed. This time, she presents from [place] with sepsis from MSSA bacteremia and MDR E Coli (of note she presented to [facility] on 4/15).  Per chart, on admission she was saying she felt "bad" but no specific complaints. Per team, she was not very coherent all the time but was responsive to verbal stimuli until this weekend when she was noticed to have waxing and waning mental status.     ROS  Pt intubated/sedated so unable to obtain.     Past medical history  Per HPI     Soc hx  Pt unable to contribute info    Fam hx  Pt unable to contribute info    MEDICATIONS:  albumin human 25% IVPB 50 milliLiter(s) IV Intermittent every 8 hours  ALBUTerol/ipratropium for Nebulization 3 milliLiter(s) Nebulizer every 6 hours PRN  chlorhexidine 0.12% Liquid 15 milliLiter(s) Swish and Spit two times a day  dextrose 5%. 1000 milliLiter(s) IV Continuous <Continuous>  dextrose 50% Injectable 12.5 Gram(s) IV Push once  dextrose 50% Injectable 25 Gram(s) IV Push once  dextrose 50% Injectable 25 Gram(s) IV Push once  dextrose Gel 1 Dose(s) Oral once PRN  diatrizoate meglumine/diatrizoate sodium. 30 milliLiter(s) Oral once  fat emulsion (Plant Based) 20% Infusion 0.6 Gm/kG/Day IV Continuous <Continuous>  fentaNYL   Infusion 0.534 MICROgram(s)/kG/Hr IV Continuous <Continuous>  glucagon  Injectable 1 milliGRAM(s) IntraMuscular once PRN  heparin  Injectable 5000 Unit(s) SubCutaneous every 8 hours  insulin lispro (HumaLOG) corrective regimen sliding scale   SubCutaneous every 6 hours  levothyroxine Injectable 75 MICROGram(s) IV Push <User Schedule>  meropenem Injectable 1000 milliGRAM(s) IV Push every 12 hours  ondansetron Injectable 4 milliGRAM(s) IV Push every 6 hours PRN  oxacillin IVPB      oxacillin IVPB 2 Gram(s) IV Intermittent every 4 hours  pantoprazole   Suspension 40 milliGRAM(s) Oral daily  pantoprazole   Suspension 40 milliGRAM(s) Oral daily  Parenteral Nutrition - Adult 1 Each TPN Continuous <Continuous>  Parenteral Nutrition - Adult 1 Each TPN Continuous <Continuous>  propofol Infusion 8.903 MICROgram(s)/kG/Min IV Continuous <Continuous>    VITAL SIGNS:  Vital Signs Last 24 Hrs  T(C): 37.7 (25 Apr 2018 06:28), Max: 37.9 (24 Apr 2018 19:00)  T(F): 99.8 (25 Apr 2018 06:28), Max: 100.2 (24 Apr 2018 19:00)  HR: 60 (25 Apr 2018 11:04) (60 - 78)  BP: 104/54 (25 Apr 2018 11:00) (104/54 - 161/61)  BP(mean): 74 (25 Apr 2018 11:00) (69 - 116)  RR: 12 (25 Apr 2018 11:04) (12 - 41)  SpO2: 97% (25 Apr 2018 11:04) (92% - 100%)    PHYSICAL EXAMINATION:  General: intubated, sedated  Eyes: Conjunctiva and sclera clear.  Neck: no nuchal rigidity.   Cardiovascular: Regular rate and rhythm; S1 and S2 Normal; No murmurs, gallops or rubs.  Neurologic:  - Mental Status: sedated  - Cranial Nerves II-XII:    II: Pupils are equal, round, and reactive to light.  III, IV, VI: unable to assess.   V:  unable to assess.   VII:  unable to assess.   VIII:  unable to assess.   IX, X:  unable to assess.   XI:  unable to assess.   XII:  unable to assess.   - Motor:    - Reflexes:  2+ and symmetric at the biceps, triceps, brachioradialis, knees, and ankles. Babinski absent.   - Sensory:   - Coordination:  deferred  - Gait:   deferred    LABORATORY STUDIES:                          7.2    4.0   )-----------( 28       ( 25 Apr 2018 05:42 )             21.3     04-25    144  |  107  |  49<H>  ----------------------------<  207<H>  3.3<L>   |  23  |  1.39<H>    Ca    9.2      25 Apr 2018 05:42  Phos  4.1     04-25  Mg     2.0     04-25    RADIOLOGY & ADDITIONAL STUDIES:    4/22 CT head   FINDINGS: There is a partially visualized nasogastric tube.   VENTRICLES AND SULCI: There is mild enlargement of the sulci and   ventricles commensurate with age appropriate parenchymal volume loss.   INTRA-AXIAL:  There are small calcifications in the globus pallidus. No   acute transcortical infarct or hemorrhage. The gray-white differentiation   is preserved. There is no mass effect or midline shift.   EXTRA-AXIAL: There is a 1.3 x 1.8 cm lesion centered along the left falx   cerebri with large coarse central calcification, likely a meningioma and   not acute hemorrhage.   VISUALIZED SINUSES: No air-fluid levels are identified.   VISUALIZED MASTOIDS: Well developed and aerated bilaterally.  CALVARIUM: No calvarial fracture.    IMPRESSION:   1.  No intracranial hemorrhage or calvarial fracture.  2.  1.3 x 1.8 cm left parafalcine partially calcified meningioma.

## 2018-04-25 NOTE — CONSULT NOTE ADULT - ASSESSMENT
77 y/o woman, HTN, DM, and hypothyroidism who presents as a transfer from [facility] for management of multifactorial sepsis. Neurology was consulted for evaluation of altered mental status. CT head 4/22/18 was notable for left parafalcine partially calcified meningioma, mild diffuse volume loss, otherwise unremarkable. The primary team is concerned that her mental status has not been improving despite being on antibiotics.     - LP is reasonable to assess for an encephalitic process given continued AMS despite being on antibiotics.   - continuous EEG. May remover after 24 hours if no seizures or epileptiform discharges.   - MRI brain w/o con when able. 79 y/o woman, HTN, DM, and hypothyroidism who presents as a transfer from [facility] for management of multifactorial sepsis. Neurology was consulted for evaluation of altered mental status. CT head 4/22/18 was notable for left parafalcine partially calcified meningioma, mild diffuse volume loss, otherwise unremarkable. The primary team is concerned that her mental status has not been improving despite being on antibiotics.     - LP is reasonable to assess for an encephalitic process given continued AMS despite being on antibiotics.       - Continuous EEG. May remover after 24 hours if no seizures or epileptiform discharges.   - MRI brain w/o con when able. 79 y/o woman, HTN, DM, and hypothyroidism who presents as a transfer from [facility] for management of multifactorial sepsis. Neurology was consulted for evaluation of altered mental status. CT head 4/22/18 was notable for left parafalcine partially calcified meningioma, mild diffuse volume loss, otherwise unremarkable. The primary team is concerned that her mental status has not been improving despite being on antibiotics.     - LP is reasonable to assess for an encephalitic process given continued AMS despite being on antibiotics.   - Continuous EEG. May remove after 24 hours if no seizures or epileptiform discharges.   - MRI brain w/o con when able. 77 y/o woman, HTN, DM, and hypothyroidism who presents as a transfer from [facility] for management of multifactorial sepsis. Neurology was consulted for evaluation of altered mental status. CT head 4/22/18 was notable for left parafalcine partially calcified meningioma, mild diffuse volume loss, otherwise unremarkable. The primary team is concerned that her mental status has not been improving despite being on antibiotics. Her exam is currently limited by sedation, but further work-up for AMS is appropriate.    - LP is reasonable to assess for an encephalitic process given continued AMS despite being on antibiotics.   - Continuous EEG. May remove after 24 hours if no seizures or epileptiform discharges.   - MRI brain w/o con when able.

## 2018-04-25 NOTE — PROVIDER CONTACT NOTE (OTHER) - ASSESSMENT
Somnolent, minimally arousable to pain. Withdrawing from pain on all four extremities. Using abdominal muscles to breathe. Placed on 2LNC at this time for O2 saturation 93%.

## 2018-04-25 NOTE — PROGRESS NOTE ADULT - SUBJECTIVE AND OBJECTIVE BOX
INTERVAL HPI/OVERNIGHT EVENTS:  Seen and examined this morning. Seems more agitated and restless, moaning and appears uncomfortable in bed.   Unable to obtain ROS. Temp 99.8, WBC 4.0     MEDICATIONS  (STANDING):  albumin human 25% IVPB 50 milliLiter(s) IV Intermittent every 8 hours  chlorhexidine 0.12% Liquid 15 milliLiter(s) Swish and Spit two times a day  dextrose 5%. 1000 milliLiter(s) (50 mL/Hr) IV Continuous <Continuous>  dextrose 50% Injectable 12.5 Gram(s) IV Push once  dextrose 50% Injectable 25 Gram(s) IV Push once  dextrose 50% Injectable 25 Gram(s) IV Push once  fat emulsion (Plant Based) 20% Infusion 0.6 Gm/kG/Day (20.8 mL/Hr) IV Continuous <Continuous>  fentaNYL   Infusion 0.534 MICROgram(s)/kG/Hr (5 mL/Hr) IV Continuous <Continuous>  heparin  Injectable 5000 Unit(s) SubCutaneous every 8 hours  insulin lispro (HumaLOG) corrective regimen sliding scale   SubCutaneous every 6 hours  levothyroxine Injectable 75 MICROGram(s) IV Push <User Schedule>  meropenem Injectable 1000 milliGRAM(s) IV Push every 12 hours  oxacillin IVPB      oxacillin IVPB 2 Gram(s) IV Intermittent every 4 hours  pantoprazole   Suspension 40 milliGRAM(s) Oral daily  pantoprazole   Suspension 40 milliGRAM(s) Oral daily  Parenteral Nutrition - Adult 1 Each (47 mL/Hr) TPN Continuous <Continuous>  Parenteral Nutrition - Adult 1 Each (47 mL/Hr) TPN Continuous <Continuous>  propofol Infusion 8.903 MICROgram(s)/kG/Min (5 mL/Hr) IV Continuous <Continuous>    MEDICATIONS  (PRN):  ALBUTerol/ipratropium for Nebulization 3 milliLiter(s) Nebulizer every 6 hours PRN Shortness of Breath and/or Wheezing  dextrose Gel 1 Dose(s) Oral once PRN Blood Glucose LESS THAN 70 milliGRAM(s)/deciliter  glucagon  Injectable 1 milliGRAM(s) IntraMuscular once PRN Glucose LESS THAN 70 milligrams/deciliter  ondansetron Injectable 4 milliGRAM(s) IV Push every 6 hours PRN Nausea        Vital Signs Last 24 Hrs  T(C): 36.8 (25 Apr 2018 14:00), Max: 37.9 (24 Apr 2018 19:00)  T(F): 98.3 (25 Apr 2018 14:00), Max: 100.2 (24 Apr 2018 19:00)  HR: 64 (25 Apr 2018 15:00) (52 - 90)  BP: 109/55 (25 Apr 2018 15:00) (104/54 - 161/61)  BP(mean): 78 (25 Apr 2018 15:00) (69 - 116)  RR: 14 (25 Apr 2018 15:00) (12 - 41)  SpO2: 99% (25 Apr 2018 15:00) (92% - 100%)    04-24-18 @ 07:01  -  04-25-18 @ 07:00  --------------------------------------------------------  IN: 2558 mL / OUT: 1395 mL / NET: 1163 mL    04-25-18 @ 07:01  -  04-25-18 @ 15:50  --------------------------------------------------------  IN: 873.8 mL / OUT: 270 mL / NET: 603.8 mL      PHYSICAL EXAM:  Constitutional: NAD. Morbidly obese. Non-toxic appearing. On EEG  HEENT: Conjunctiva clear, no oral lesion, no sinus tenderness on percussion	  Respiratory: poor inspiratory effort. decreased at the bases. no wheezing or crackles   Cardiovascular: RRR with no murmurs  Gastrointestinal: obese, midline surgical wound (5x5), now with wound vac   Extremities: 1+ pitting edema  Neuro: AAOx0       LABS:                        7.2    4.0   )-----------( 28       ( 25 Apr 2018 05:42 )             21.3     04-25    144  |  107  |  49<H>  ----------------------------<  207<H>  3.3<L>   |  23  |  1.39<H>    Ca    9.2      25 Apr 2018 05:42  Phos  4.1     04-25  Mg     2.0     04-25            MICROBIOLOGY:  Culture - Blood (04.20.18 @ 17:28)    Specimen Source: .Blood Blood    Culture Results:   No growth at 4 days.    GI PCR Panel, Stool (04.20.18 @ 17:20)    Culture Results:   GI PCR Results: NOT detected  *******Please Note:*******  GI panel PCR evaluates for:  Campylobacter, Plesiomonas shigelloides, Salmonella,  Vibrio, Yersinia enterocolitica, Enteroaggregative  Escherichia coli (EAEC), Enteropathogenic E.coli (EPEC),  Enterotoxigenic E. coli (ETEC) lt/st, Shiga-like  toxin-producing E. coli (STEC) stx1/stx2,  Shigella/ Enteroinvasive E. coli (EIEC), Cryptosporidium,  Cyclospora cayetanensis, Entamoeba histolytica,  Giardia lamblia, Adenovirus F 40/41, Astrovirus,  Norovirus GI/GII, Rotavirus A, Sapovirus    Culture - Urine (04.18.18 @ 12:53)    -  Amikacin: S <=16    -  Ampicillin: R >16 These ampicillin results predict results for amoxicillin    -  Ampicillin: R >16 These ampicillin results predict results for amoxicillin    -  Ampicillin/Sulbactam: R >16/8    -  Ampicillin/Sulbactam: R >16/8    -  Aztreonam: S <=4    -  Cefazolin: R >16 This predicts results for oral agents cefaclor, cefdinir, cefpodoxime, cefprozil, cefuroxime axetil, cephalexin and locarbef for uncomplicated UTI. Note that some isolates may be susceptible to these agents while testing resistant to cefazolin.    -  Cefazolin: R >16 This predicts results for oral agents cefaclor, cefdinir, cefpodoxime, cefprozil, cefuroxime axetil, cephalexin and locarbef for uncomplicated UTI. Note that some isolates may be susceptible to these agents while testing resistant to cefazolin.    -  Cefepime: I 16    -  Cefotaxime: S <=2    -  Cefoxitin: S <=8    -  Ceftazidime: S <=1    -  Ceftriaxone: S <=1 Enterobacter, Citrobacter, and Serratia may develop resistance during prolonged therapy    -  Ceftriaxone: S <=1 Enterobacter, Citrobacter, and Serratia may develop resistance during prolonged therapy    -  Cefuroxime: R >16    -  Ciprofloxacin: S <=1    -  Ciprofloxacin: S <=1    -  Ertapenem: S <=1    -  Gentamicin: S <=4    -  Gentamicin: S <=4    -  Imipenem: S <=1    -  Levofloxacin: S <=2    -  Meropenem: S <=1    -  Nitrofurantoin: S <=32 Should not be used to treat pyelonephritis    -  Nitrofurantoin: S <=32 Should not be used to treat pyelonephritis    -  Piperacillin/Tazobactam: R >64    -  Piperacillin/Tazobactam: R >64    -  Tigecycline: S <=2    -  Tobramycin: S <=4    -  Tobramycin: S <=4    -  Trimethoprim/Sulfamethoxazole: S <=2/38    -  Trimethoprim/Sulfamethoxazole: S <=2/38    Specimen Source: .Urine Catheterized    Culture Results:   4,000 CFU/ml Escherichia coli    Organism Identification: Escherichia coli  Escherichia coli    Organism: Escherichia coli    Organism: Escherichia coli    Method Type: MARY    Method Type: MARY    Culture - Blood (04.02.18 @ 16:09)    Specimen Source: .Blood Blood-Peripheral    Culture Results:   No growth at 5 days.        RADIOLOGY & ADDITIONAL STUDIES:

## 2018-04-25 NOTE — PROGRESS NOTE ADULT - ASSESSMENT
78 year old female, morbidly obesity, DM, ventral hernia repair with mesh 3/2 c/b wound dehiscence, recent ATN requiring temporary HD (Permacath removed 4/10), who presents as a transfer from OSH for management of multifactorial sepsis and open abdominal wound. Patient found to have ESBL-producing E.Coli UTI and MSSA Bacteremia, origin likely coming from abdomen/pelvis. CT imaging suggestive of small bowel perforation with surrounding extra-luminal fluid collections. Patient has been on Meropenem and Oxacillin with stable HD    Recommendations:  1. c/w Meropenem 1g q12h and Oxacillin 2g q4h  2. Follow up on repeat imaging to determine whether there is a drainable collection  3. Surveillance blood cultures NGTD    4. Monitor PLT's   5. Surgical intervention as per SICU team     Discussed with ID attending and primary team  ID will follow

## 2018-04-26 LAB
ANION GAP SERPL CALC-SCNC: 10 MMOL/L — SIGNIFICANT CHANGE UP (ref 5–17)
APTT BLD: 40.3 SEC — HIGH (ref 27.5–37.4)
BUN SERPL-MCNC: 52 MG/DL — HIGH (ref 7–23)
CALCIUM SERPL-MCNC: 9.9 MG/DL — SIGNIFICANT CHANGE UP (ref 8.4–10.5)
CHLORIDE SERPL-SCNC: 108 MMOL/L — SIGNIFICANT CHANGE UP (ref 96–108)
CO2 SERPL-SCNC: 27 MMOL/L — SIGNIFICANT CHANGE UP (ref 22–31)
CREAT SERPL-MCNC: 1.38 MG/DL — HIGH (ref 0.5–1.3)
GLUCOSE BLDC GLUCOMTR-MCNC: 154 MG/DL — HIGH (ref 70–99)
GLUCOSE BLDC GLUCOMTR-MCNC: 202 MG/DL — HIGH (ref 70–99)
GLUCOSE BLDC GLUCOMTR-MCNC: 207 MG/DL — HIGH (ref 70–99)
GLUCOSE SERPL-MCNC: 169 MG/DL — HIGH (ref 70–99)
HCT VFR BLD CALC: 23.6 % — LOW (ref 34.5–45)
HCT VFR BLD CALC: 23.7 % — LOW (ref 34.5–45)
HGB BLD-MCNC: 7.8 G/DL — LOW (ref 11.5–15.5)
HGB BLD-MCNC: 8 G/DL — LOW (ref 11.5–15.5)
INR BLD: 1.27 — HIGH (ref 0.88–1.16)
MAGNESIUM SERPL-MCNC: 2 MG/DL — SIGNIFICANT CHANGE UP (ref 1.6–2.6)
MCHC RBC-ENTMCNC: 29.3 PG — SIGNIFICANT CHANGE UP (ref 27–34)
MCHC RBC-ENTMCNC: 29.8 PG — SIGNIFICANT CHANGE UP (ref 27–34)
MCHC RBC-ENTMCNC: 33.1 G/DL — SIGNIFICANT CHANGE UP (ref 32–36)
MCHC RBC-ENTMCNC: 33.8 G/DL — SIGNIFICANT CHANGE UP (ref 32–36)
MCV RBC AUTO: 86.8 FL — SIGNIFICANT CHANGE UP (ref 80–100)
MCV RBC AUTO: 90.1 FL — SIGNIFICANT CHANGE UP (ref 80–100)
PHOSPHATE SERPL-MCNC: 4.5 MG/DL — SIGNIFICANT CHANGE UP (ref 2.5–4.5)
PLATELET # BLD AUTO: 31 K/UL — LOW (ref 150–400)
PLATELET # BLD AUTO: 54 K/UL — LOW (ref 150–400)
POTASSIUM SERPL-MCNC: 4.6 MMOL/L — SIGNIFICANT CHANGE UP (ref 3.5–5.3)
POTASSIUM SERPL-SCNC: 4.6 MMOL/L — SIGNIFICANT CHANGE UP (ref 3.5–5.3)
PROTHROM AB SERPL-ACNC: 14.2 SEC — HIGH (ref 9.8–12.7)
RBC # BLD: 2.62 M/UL — LOW (ref 3.8–5.2)
RBC # BLD: 2.73 M/UL — LOW (ref 3.8–5.2)
RBC # FLD: 18.3 % — HIGH (ref 10.3–16.9)
RBC # FLD: 18.4 % — HIGH (ref 10.3–16.9)
SODIUM SERPL-SCNC: 145 MMOL/L — SIGNIFICANT CHANGE UP (ref 135–145)
WBC # BLD: 4.6 K/UL — SIGNIFICANT CHANGE UP (ref 3.8–10.5)
WBC # BLD: 5.8 K/UL — SIGNIFICANT CHANGE UP (ref 3.8–10.5)
WBC # FLD AUTO: 4.6 K/UL — SIGNIFICANT CHANGE UP (ref 3.8–10.5)
WBC # FLD AUTO: 5.8 K/UL — SIGNIFICANT CHANGE UP (ref 3.8–10.5)

## 2018-04-26 PROCEDURE — 99233 SBSQ HOSP IP/OBS HIGH 50: CPT | Mod: GC

## 2018-04-26 PROCEDURE — 49406 IMAGE CATH FLUID PERI/RETRO: CPT

## 2018-04-26 PROCEDURE — 99232 SBSQ HOSP IP/OBS MODERATE 35: CPT

## 2018-04-26 RX ORDER — FUROSEMIDE 40 MG
40 TABLET ORAL ONCE
Qty: 0 | Refills: 0 | Status: COMPLETED | OUTPATIENT
Start: 2018-04-26 | End: 2018-04-26

## 2018-04-26 RX ORDER — ELECTROLYTE SOLUTION,INJ
1 VIAL (ML) INTRAVENOUS
Qty: 0 | Refills: 0 | Status: DISCONTINUED | OUTPATIENT
Start: 2018-04-26 | End: 2018-04-26

## 2018-04-26 RX ORDER — FUROSEMIDE 40 MG
60 TABLET ORAL ONCE
Qty: 0 | Refills: 0 | Status: COMPLETED | OUTPATIENT
Start: 2018-04-26 | End: 2018-04-26

## 2018-04-26 RX ORDER — I.V. FAT EMULSION 20 G/100ML
0.27 EMULSION INTRAVENOUS
Qty: 25 | Refills: 0 | Status: DISCONTINUED | OUTPATIENT
Start: 2018-04-26 | End: 2018-04-26

## 2018-04-26 RX ORDER — PHYTONADIONE (VIT K1) 5 MG
2.5 TABLET ORAL ONCE
Qty: 0 | Refills: 0 | Status: COMPLETED | OUTPATIENT
Start: 2018-04-26 | End: 2018-04-26

## 2018-04-26 RX ADMIN — MEROPENEM 1000 MILLIGRAM(S): 1 INJECTION INTRAVENOUS at 06:19

## 2018-04-26 RX ADMIN — CHLORHEXIDINE GLUCONATE 15 MILLILITER(S): 213 SOLUTION TOPICAL at 18:50

## 2018-04-26 RX ADMIN — Medication 100 GRAM(S): at 18:59

## 2018-04-26 RX ADMIN — Medication 100.5 MILLIGRAM(S): at 23:00

## 2018-04-26 RX ADMIN — PANTOPRAZOLE SODIUM 40 MILLIGRAM(S): 20 TABLET, DELAYED RELEASE ORAL at 11:39

## 2018-04-26 RX ADMIN — Medication 50 MILLILITER(S): at 18:52

## 2018-04-26 RX ADMIN — I.V. FAT EMULSION 10.42 GM/KG/DAY: 20 EMULSION INTRAVENOUS at 22:18

## 2018-04-26 RX ADMIN — Medication 50 MILLILITER(S): at 03:48

## 2018-04-26 RX ADMIN — PROPOFOL 5 MICROGRAM(S)/KG/MIN: 10 INJECTION, EMULSION INTRAVENOUS at 07:21

## 2018-04-26 RX ADMIN — CHLORHEXIDINE GLUCONATE 15 MILLILITER(S): 213 SOLUTION TOPICAL at 06:21

## 2018-04-26 RX ADMIN — Medication 100 GRAM(S): at 11:36

## 2018-04-26 RX ADMIN — Medication 4: at 06:19

## 2018-04-26 RX ADMIN — Medication 50 MILLILITER(S): at 11:35

## 2018-04-26 RX ADMIN — Medication 60 MILLIGRAM(S): at 18:51

## 2018-04-26 RX ADMIN — Medication 100 GRAM(S): at 04:35

## 2018-04-26 RX ADMIN — Medication 100 GRAM(S): at 22:18

## 2018-04-26 RX ADMIN — MEROPENEM 1000 MILLIGRAM(S): 1 INJECTION INTRAVENOUS at 18:51

## 2018-04-26 RX ADMIN — Medication 4: at 23:48

## 2018-04-26 RX ADMIN — Medication 100 GRAM(S): at 07:21

## 2018-04-26 RX ADMIN — Medication 40 MILLIGRAM(S): at 10:19

## 2018-04-26 RX ADMIN — Medication 100 GRAM(S): at 00:31

## 2018-04-26 RX ADMIN — Medication 75 MICROGRAM(S): at 06:20

## 2018-04-26 NOTE — PROGRESS NOTE ADULT - ATTENDING COMMENTS
I have reviewed the medical record, including laboratory and radiographic studies, interviewed and examined the patient and discussed the plan with Dr. Valdez, the ID Resident.  Agree with above. If she has LP, would send CSF for Meningitis/Encephalitis PCR panel.  Please recall if further ID input is desired - ID service. I have reviewed the medical record, including laboratory and radiographic studies, and examined the patient and discussed the plan with Dr. Valdez, the ID Resident.  Agree with above. If she has LP, would send CSF for Meningitis/Encephalitis PCR panel.  Will continue to follow with you - ID service.

## 2018-04-26 NOTE — PROGRESS NOTE ADULT - ASSESSMENT
79 y/o woman, HTN, DM, and hypothyroidism who presents as a transfer from [facility] for management of multifactorial sepsis. Neurology was consulted for evaluation of altered mental status. CT head 4/22/18 was notable for left parafalcine partially calcified meningioma, mild diffuse volume loss, otherwise unremarkable. The primary team is concerned that her mental status has not been improving despite being on antibiotics. Her exam is currently limited by sedation, but further work-up for AMS is appropriate. The patient had EEG done yesterday which showed mild sloweing which is likely due to sedative medications, although in general, it was a poor quality study.    - LP is reasonable to assess for an encephalitic process given continued AMS despite being on antibiotics.   - Recommend to repeat VEEG given poor quality of the previous test.   - MRI brain w/o con when able to rule out septic emboli. 77 y/o woman, HTN, DM, and hypothyroidism who presents as a transfer from OSH for management of multifactorial sepsis. Neurology was consulted for evaluation of altered mental status. CT head 4/22/18 was notable for left parafalcine partially calcified meningioma, mild diffuse volume loss, otherwise unremarkable. The pt's mental status has not been improving despite being on antibiotics. Her exam is currently limited by sedation, but further work-up for AMS is appropriate. The patient had EEG 4/25->4/26 which was likely normal, though it was a technically poor study,    - LP is reasonable to assess for an encephalitic process given continued AMS despite being on antibiotics. Please check protein, glucose, cell count, bacterial/fungal culture, Gram stain. Remove 30 cc in case we decide to send additional studies.  - Recommend repeat VEEG for at least 24 hours given poor technical quality of the previous test.   - MRI brain w/o con to assess for structural etiologies of AMS (e.g. infarcts, inflammation, hemorrhage).

## 2018-04-26 NOTE — PROGRESS NOTE ADULT - SUBJECTIVE AND OBJECTIVE BOX
Interval Events:    no events overnight    Patient seen and examined at bedside.      Allergies    No Known Allergies    Intolerances        Vital Signs Last 24 Hrs  T(C): 36.2 (26 Apr 2018 06:03), Max: 36.8 (25 Apr 2018 14:00)  T(F): 97.2 (26 Apr 2018 06:03), Max: 98.3 (25 Apr 2018 14:00)  HR: 64 (26 Apr 2018 08:00) (52 - 90)  BP: 147/84 (26 Apr 2018 08:00) (95/52 - 154/64)  BP(mean): 109 (26 Apr 2018 08:00) (73 - 109)  RR: 26 (26 Apr 2018 08:00) (4 - 41)  SpO2: 99% (26 Apr 2018 08:00) (95% - 100%)    04-25 @ 07:01  -  04-26 @ 07:00  --------------------------------------------------------  IN: 2526.9 mL / OUT: 1595 mL / NET: 931.9 mL    04-26 @ 07:01 - 04-26 @ 08:14  --------------------------------------------------------  IN: 147 mL / OUT: 50 mL / NET: 97 mL      04-25 @ 07:01 - 04-26 @ 07:00  --------------------------------------------------------  IN: 2526.9 mL / OUT: 1595 mL / NET: 931.9 mL    04-26 @ 07:01 - 04-26 @ 08:14  --------------------------------------------------------  IN: 147 mL / OUT: 50 mL / NET: 97 mL          LABS:      CBC Full  -  ( 26 Apr 2018 05:11 )  WBC Count : 4.6 K/uL  Hemoglobin : 8.0 g/dL  Hematocrit : 23.7 %  Platelet Count - Automated : 31 K/uL  Mean Cell Volume : 86.8 fL  Mean Cell Hemoglobin : 29.3 pg  Mean Cell Hemoglobin Concentration : 33.8 g/dL  Auto Neutrophil # : x  Auto Lymphocyte # : x  Auto Monocyte # : x  Auto Eosinophil # : x  Auto Basophil # : x  Auto Neutrophil % : x  Auto Lymphocyte % : x  Auto Monocyte % : x  Auto Eosinophil % : x  Auto Basophil % : x    04-26    145  |  108  |  52<H>  ----------------------------<  169<H>  4.6   |  27  |  1.38<H>    Ca    9.9      26 Apr 2018 05:09  Phos  4.5     04-26  Mg     2.0     04-26      PT/INR - ( 26 Apr 2018 05:11 )   PT: 14.2 sec;   INR: 1.27          PTT - ( 26 Apr 2018 05:11 )  PTT:40.3 sec                RADIOLOGY & ADDITIONAL STUDIES (The following images were personally reviewed):      Physical Exam:     General: NAD sedated intubated   Neuro: not responsive to commands  HEENT: MMM, no masses  PulmCTA  B.L No w/r/r  CV: RRR, reg s1 s2 no murmur  Abd: obese, soft, ND, no focal tenderness, umbilical wound vac in place, functioning properly + Ecchymosis to left LQ.  : suazo to gravity  Vasc: b/l DP/PT pulses  MSK: no joint swelling  Extremities: WWP, 3+pitting edema Lext & Uext  b/l   Skin: no rash    A/p: 79 y/o F with sepsis from open abdominal wound infection, MSSA bacteremia present on admission, and UTI present on admission found to have contained small bowel leak    Neuro: Propofol fentanyl  Zofran prn, AMS: EEG:slowing Neurology consult stating poss LP, and MRI of brain with con. holding:  seroquel, aripiprazole, escitalapram   CV: HD stable s/p severe sepsis, albumin 25%q8, holding amlodipine, 4/19 Echo  65-70%. Lasix prn  Pulm: Satting well on 40/490/12/5 chlorhex  GI: NPO, protonix, Dobhoff (no feeds), TPN Protonix  : Suazo S/p AKF on HD now resolved. UTI (present on admission) + Uterine fibroids on CT scan with vaginal bleeding will consult GYN   ID: ESBL Ecoli in Urine resistant to Zosyn: Jose L( 4/17-) MSSA in blood: Oxacillin ( 4/17-) Kai virus (good hand washing), TTE neg for endocarditis. IR drainage today for pelvic collection drainage.   Endo: ISS, Synthroid  Insulin  in TPN: 20   PPx: sqh held for low plts  PE:Sp IVC filter on 3/30 Plts low- will give 1U b/f IR drainage  Lines: PIV Rt IJ TLC from OSH ( 4/15-), s/p L Rad Art line( 4/15-4/18)   Wounds: Wound vac to midline incision , change MWF  PT/OT: ordered currently strict bed rest. Interval Events:     no events overnight    Patient seen and examined at bedside. ROS not obtainable      Allergies    No Known Allergies    Intolerances        Vital Signs Last 24 Hrs  T(C): 36.2 (26 Apr 2018 06:03), Max: 36.8 (25 Apr 2018 14:00)  T(F): 97.2 (26 Apr 2018 06:03), Max: 98.3 (25 Apr 2018 14:00)  HR: 64 (26 Apr 2018 08:00) (52 - 90)  BP: 147/84 (26 Apr 2018 08:00) (95/52 - 154/64)  BP(mean): 109 (26 Apr 2018 08:00) (73 - 109)  RR: 26 (26 Apr 2018 08:00) (4 - 41)  SpO2: 99% (26 Apr 2018 08:00) (95% - 100%)    04-25 @ 07:01  -  04-26 @ 07:00  --------------------------------------------------------  IN: 2526.9 mL / OUT: 1595 mL / NET: 931.9 mL    04-26 @ 07:01  -  04-26 @ 08:14  --------------------------------------------------------  IN: 147 mL / OUT: 50 mL / NET: 97 mL      04-25 @ 07:01  -  04-26 @ 07:00  --------------------------------------------------------  IN: 2526.9 mL / OUT: 1595 mL / NET: 931.9 mL    04-26 @ 07:01  -  04-26 @ 08:14  --------------------------------------------------------  IN: 147 mL / OUT: 50 mL / NET: 97 mL          LABS:      CBC Full  -  ( 26 Apr 2018 05:11 )  WBC Count : 4.6 K/uL  Hemoglobin : 8.0 g/dL  Hematocrit : 23.7 %  Platelet Count - Automated : 31 K/uL  Mean Cell Volume : 86.8 fL  Mean Cell Hemoglobin : 29.3 pg  Mean Cell Hemoglobin Concentration : 33.8 g/dL  Auto Neutrophil # : x  Auto Lymphocyte # : x  Auto Monocyte # : x  Auto Eosinophil # : x  Auto Basophil # : x  Auto Neutrophil % : x  Auto Lymphocyte % : x  Auto Monocyte % : x  Auto Eosinophil % : x  Auto Basophil % : x    04-26    145  |  108  |  52<H>  ----------------------------<  169<H>  4.6   |  27  |  1.38<H>    Ca    9.9      26 Apr 2018 05:09  Phos  4.5     04-26  Mg     2.0     04-26      PT/INR - ( 26 Apr 2018 05:11 )   PT: 14.2 sec;   INR: 1.27          PTT - ( 26 Apr 2018 05:11 )  PTT:40.3 sec                RADIOLOGY & ADDITIONAL STUDIES (The following images were personally reviewed):      Physical Exam:     General: NAD sedated intubated   Neuro: not responsive to commands but moves all extremities  HEENT: MMM, no masses  PulmCTA  B.L No w/r/r  CV: RRR, reg s1 s2 no murmur  Abd: obese, soft, ND, no focal tenderness, umbilical wound vac in place, functioning properly + Ecchymosis to left LQ.  : suazo to gravity  Vasc: b/l DP/PT pulses  MSK: no joint swelling  Extremities: WWP, 3+pitting edema Lext & Uext  b/l   Skin: no rash    A/p: 77 y/o F with sepsis from open abdominal wound infection, MSSA bacteremia present on admission, and UTI present on admission found to have contained small bowel leak, ATN, acute respiratory failure, toxic metabolic encephalopathy    Neuro: Propofol fentanyl  Zofran prn, AMS: EEG:slowing Neurology consult stating poss LP, and MRI of brain with con. holding:  seroquel, aripiprazole, escitalapram. Will likely LP and consider MRI and repeat EEG to exclude other causes of toic metabolic encephaolopathy.  CV: HD stable s/p severe sepsis, albumin 25%q8, holding amlodipine, 4/19 Echo  65-70%. Lasix pr  Pulm: Satting well on 40/490/12/5 chlorhex  GI: NPO, protonix, Dobhoff (no feeds), TPN Protonix  : Suazo S/p AKF on HD now resolved. UTI (present on admission) + Uterine fibroids on CT scan with vaginal bleeding will consult GYN   ID: ESBL Ecoli in Urine resistant to Zosyn: Jose L( 4/17-) MSSA in blood: Oxacillin ( 4/17-) Kai virus (good hand washing), TTE neg for endocarditis. IR drainage today for pelvic collection drainage.   Endo: ISS, Synthroid  Insulin  in TPN: 20   PPx: sqh held for low plts  PE:Sp IVC filter on 3/30 Plts low- will give 1U b/f IR drainage  Lines: PIV Rt IJ TLC from OSH ( 4/15-), s/p L Rad Art line( 4/15-4/18)   Wounds: Wound vac to midline incision , change MWF  PT/OT: ordered currently strict bed rest.

## 2018-04-26 NOTE — CONSULT NOTE ADULT - ASSESSMENT
77yo F with multiple co-morbities, including sepsis, intubated/sedated, seen for vaginal bleeding  1) given sono 1 month ago with thickened endometrium and vaginal bleeding, must rule out endometrial cancer. This requires a tissue diagnosis. If able to obtain consent, properly sedate and position patient, an endometrial biopsy could be obtained. This is not extremely urgent and can be done in the next few days to weeks ideally when the patient is not septic and at high risk of bleeding w/ very low platelets,  from possible perforation with biopsy.   2) vaginal bleeding - see above. also, bleeding is not significant at this time. if becomes acute or worsens, please call GYN immediately to acutely control the bleeding. Also call for inability to re-start anticoagulation due to bleeding if this is an issue.     Will follow closely and obtained tissue sample/determine a plan as soon as reasonably and safely possible. Please call GYN for any issues.   d/w Dr. Menjivar.

## 2018-04-26 NOTE — PROGRESS NOTE ADULT - SUBJECTIVE AND OBJECTIVE BOX
INTERVAL HPI/OVERNIGHT EVENTS:  s/p intubated for airway protection. afebrile without leukocytosis. unable to obtain ROS as shes intubated and sedated.     ANTIBIOTICS/RELEVANT:    MEDICATIONS  (STANDING):  albumin human 25% IVPB 50 milliLiter(s) IV Intermittent every 8 hours  chlorhexidine 0.12% Liquid 15 milliLiter(s) Swish and Spit two times a day  dextrose 5%. 1000 milliLiter(s) (50 mL/Hr) IV Continuous <Continuous>  dextrose 50% Injectable 12.5 Gram(s) IV Push once  dextrose 50% Injectable 25 Gram(s) IV Push once  dextrose 50% Injectable 25 Gram(s) IV Push once  fat emulsion (Plant Based) 20% Infusion 0.27 Gm/kG/Day (10.42 mL/Hr) IV Continuous <Continuous>  fentaNYL   Infusion 0.534 MICROgram(s)/kG/Hr (5 mL/Hr) IV Continuous <Continuous>  insulin lispro (HumaLOG) corrective regimen sliding scale   SubCutaneous every 6 hours  levothyroxine Injectable 75 MICROGram(s) IV Push <User Schedule>  meropenem Injectable 1000 milliGRAM(s) IV Push every 12 hours  oxacillin IVPB      oxacillin IVPB 2 Gram(s) IV Intermittent every 4 hours  pantoprazole   Suspension 40 milliGRAM(s) Oral daily  Parenteral Nutrition - Adult 1 Each (47 mL/Hr) TPN Continuous <Continuous>  Parenteral Nutrition - Adult 1 Each (47 mL/Hr) TPN Continuous <Continuous>  propofol Infusion 8.903 MICROgram(s)/kG/Min (5 mL/Hr) IV Continuous <Continuous>    MEDICATIONS  (PRN):  ALBUTerol/ipratropium for Nebulization 3 milliLiter(s) Nebulizer every 6 hours PRN Shortness of Breath and/or Wheezing  dextrose Gel 1 Dose(s) Oral once PRN Blood Glucose LESS THAN 70 milliGRAM(s)/deciliter  glucagon  Injectable 1 milliGRAM(s) IntraMuscular once PRN Glucose LESS THAN 70 milligrams/deciliter  ondansetron Injectable 4 milliGRAM(s) IV Push every 6 hours PRN Nausea        Vital Signs Last 24 Hrs  T(C): 36.7 (26 Apr 2018 14:40), Max: 36.9 (26 Apr 2018 09:12)  T(F): 98 (26 Apr 2018 14:40), Max: 98.4 (26 Apr 2018 09:12)  HR: 60 (26 Apr 2018 15:00) (55 - 70)  BP: 129/77 (26 Apr 2018 15:00) (95/52 - 147/84)  BP(mean): 113 (26 Apr 2018 15:00) (73 - 113)  RR: 12 (26 Apr 2018 15:00) (4 - 36)  SpO2: 96% (26 Apr 2018 15:00) (96% - 100%)    04-25-18 @ 07:01  -  04-26-18 @ 07:00  --------------------------------------------------------  IN: 2526.9 mL / OUT: 1595 mL / NET: 931.9 mL    04-26-18 @ 07:01  -  04-26-18 @ 16:54  --------------------------------------------------------  IN: 1068.9 mL / OUT: 610 mL / NET: 458.9 mL      PHYSICAL EXAM:  General: ill appearing woman, intubated, in no distress.   Eyes: Conjunctiva and sclera clear.  Cardiovascular: Regular rate and rhythm; S1 and S2 Normal; No murmurs, gallops or rubs.  Respiratory: bibasilar crackles w/ decreased movement at the bases.   Cardiovascular: RRR with no murmurs  Gastrointestinal: obese, midline surgical wound (5x5), with wound vac   Extremities: 1+ pitting edema  Neuro: AAOx0       LABS:                        7.8    5.8   )-----------( 54       ( 26 Apr 2018 12:38 )             23.6     04-26    145  |  108  |  52<H>  ----------------------------<  169<H>  4.6   |  27  |  1.38<H>    Ca    9.9      26 Apr 2018 05:09  Phos  4.5     04-26  Mg     2.0     04-26      PT/INR - ( 26 Apr 2018 05:11 )   PT: 14.2 sec;   INR: 1.27          PTT - ( 26 Apr 2018 05:11 )  PTT:40.3 sec      MICROBIOLOGY:  Culture - Blood (04.20.18 @ 17:28)    Specimen Source: .Blood Blood    Culture Results:   No growth at 5 days.      RADIOLOGY & ADDITIONAL STUDIES:  < from: CT Abdomen and Pelvis w/ Oral Cont and w/ IV Cont (04.25.18 @ 14:17) >  EXAM:  CT CHEST IC                          EXAM:  CT ABDOMEN AND PELVIS OC IC                          PROCEDURE DATE:  04/25/2018     100  [employer] 350   10           INTERPRETATION:  CT of the CHEST, ABDOMEN and PELVIS with intravenous   contrastdated 4/25/2018 2:17 PM    INDICATION: Status post ventral hernia repair. Follow-up pelvic fluid   collections.    TECHNIQUE: CT of the chest, abdomen and pelvis was performed using oral,   rectal and intravenous contrast. Axial, sagittal and coronalimages were   produced and reviewed.    PRIOR STUDIES: CT abdomen pelvis 4/18/2018 and 4/11/2018.    FINDINGS: Tip of right IJ line is within the SVC. ET tube is in   satisfactory position. Gastric tube extends to the gastric fundus.    The heart is normal in size.  Moderate coronary artery calcification. No   thoracic aortic aneurysm is identified. The main pulmonary arteries   mildly dilated measuring 3.6 cm suggestive of pulmonary artery   hypertension. No pericardial effusion is seen.  No mediastinal, hilar or   axillary lymphadenopathy is seen.    Evaluation of the pulmonary parenchyma redemonstrates small to   moderate-sized bilateral pleural effusions with associated compressive   atelectasis in the dependent aspect of the lungs. Thereare new streaky   and groundglass subpleural opacities in the upper lobes bilaterally which   could be due to atelectasis or congestion.    Images of the upper abdomen demonstrate no focal hepatic abnormalities.     No radiopaque stones are seen in the gallbladder.  Mildly enlarged   pancreatic head. No splenic abnormalities are seen.    The adrenal glands are unremarkable. The kidneys are normal in   appearance.     No aortic aneurysm is seen. No lymphadenopathy is seen. Infrarenal IVC   filter is seen.    Evaluation of the bowel demonstrates enteric tube within the gastric   antrum. No evidence of active oral contrast extravasation. No bowel   obstruction. Packing material seen within the ventral incision site.   There is a small ascites.     There is again a somewhat circular collection of fluid and extraluminal   gas around the fibroid uterus which has mildly enlarged since prior   study. Collection on the right side of the uterus measures 7.4 x 2.8 cm.   Largest portion of collectionon the left side of the uterus measures 8.8   x 2.8 cm. There is no longer extraluminal oral contrast within the   collection. The uterus measures up to 19 cm in height. Rectal tube is in   place.    Evaluation of the osseous structures demonstrates moderate degenerative   changes. Extensive anasarca.      IMPRESSION:  Mildly enlarged circular collection of fluid and extraluminal gas around   the uterus, probably located extraperitoneally, covered by peritoneal   covering overlying the enlarged fibroid uterus. There is no longer   extraluminal oral contrast.    Bilateral pleural effusions and atelectatic changes in the lungs.   Pneumonia is less likely but not excluded    < end of copied text >

## 2018-04-26 NOTE — PROGRESS NOTE ADULT - ASSESSMENT
78 year old female, morbidly obesity, DM, ventral hernia repair with mesh 3/2 c/b wound dehiscence, recent ATN requiring temporary HD (Permacath removed 4/10), who presents as a transfer from OSH for management of multifactorial sepsis and open abdominal wound. Patient found to have ESBL-producing E.Coli UTI and MSSA Bacteremia, origin likely coming from abdomen/pelvis. Patient intubated 4/25 for acute hypoxic respiratory failure. Repeat CT imaging suggestive of an intra-abdominal collection measuring >7 x 2 cm.     Recommendations:  1. c/w Meropenem 1g q12h and Oxacillin 2g q4h  2. Currently undergoing IR drainage of fluid collection- Please sure cultures sent   3. Surveillance blood cultures NGTD     4. Surgical intervention as per SICU team     Discussed with ID attending and primary team  ID will follow 78 year old female, morbidly obesity, DM, ventral hernia repair with mesh 3/2 c/b wound dehiscence, recent ATN requiring temporary HD (Permacath removed 4/10), who presents as a transfer from OSH for management of multifactorial sepsis and open abdominal wound. Patient found to have ESBL-producing E.Coli UTI and MSSA Bacteremia, origin likely coming from abdomen/pelvis vs. prior HD catheter. Patient intubated 4/25 for acute hypoxic respiratory failure. Repeat CT imaging suggestive of an intra-abdominal collection measuring >7 x 2 cm.     Recommendations:  1. c/w Meropenem 1g q12h and Oxacillin 2g q4h  2. Currently undergoing IR drainage of fluid collection- Please sure cultures sent   3. Surveillance blood cultures NGTD     4. Surgical intervention as per SICU team     Discussed with ID attending and primary team  ID will follow

## 2018-04-26 NOTE — CONSULT NOTE ADULT - SUBJECTIVE AND OBJECTIVE BOX
NOTE TAKEN FROM H&P ON ADMISSION AS PATIENT IS SEDATED  Pt is a 79 yo F with morbid obesity, DM, HTN, and hypothyroidism who presents as a transfer from OSH for management of multifactorial sepsis and abdominal wound.    On 3/2, she underwent repair of ventral hernia with progrip mesh, robotic converted to open. She regained bowel function and was discharged 3/4.    On 3/12, she presented to [facility] with SoA, ultimately diagnosed as PE. She was started on hep gtt and eventually switched to NOAC. She was discharged on 3/16.    On 3/23, she returned to [facility] with anemia 2/2 acute blood loss anemia from vaginal bleeding (uterine fibroids) and upper GI bleed. EGD showed ulcers, no active bleed. Hospital course complicated by ATN requiring temporary HD. HD catheter ultimately removed. She underwent IVC filter placement 3/30. She was discharged on 4/11.     She presented to [facility] from her rehab facility on 4/15 with sepsis, which was found to be 2/2 MSSA bacteremia and MDR E coli in urine. She was treated with IV Zosyn, but E coli in urine ctx were resistant to Zosyn. Her open abdominal wound was packed with betadine-soaked Kerlix WTD, changed daily. CT showed extraluminal gas and free fluid. She was stabilized and transferred to [facility] today.    Currently, she is Arabic-speaking but not speaking coherently. She feels "bad" but no specific complaints.      *PROXY CALLED, [place] IN [place], STATES PATIENT DOES NOT HAVE A RELATIONSHIP WITH HER CHILDREN SO HE AND HIS SISTER HAVE BEEN TAKING CARE OF HER ON/OFF. PREVIOUSLY BEFORE THE SURGERY THE PATIENT WAS MODERATELY ABLE TO DO ACTIVITIES OF DAILY LIVING HOWEVER RECENTLY EXPERIENCED DEPRESSION DUE TO HER CHILDREN NOT SPEAKING WITH HER. STATES HE DOESN'T THINK SHE HAS MEDICAL PROBLEMS OTHER THAN WHAT'S LISTED ABOVE. STATES HE IS NOT AWARE OF ANY ADDITIONAL SURGERIES. UNSURE IF PATIENT HAD C/S OR VD. STATES HE IS WILLING FOR OUR TEAM TO DO WHAT'S NECESSARY FOR HER CARE AND WILL SIGN CONSENTS AS NEEDED. HE IS COMING TO TOWN THE EVENING OF 4/27/19.    ATTEMPTED TO SUPERFICIALLY EVALUATE VAGINAL BLEEDING. PATIENT RESISTED GENTLE FLEXION OF THE LEGS HOWEVER CAN SEE APPRX 5CC BLOOD AROUND THE VAGINAL AREA. NO FURTHER EXAM PERFORMED. NO EVIDENCE OF ACTIVE VAGINAL BLEEDING.   PATIENT HAS BOLIVAR/RECTAL TUBE/WOUND VAC ON UMBILICUS/INTUBATED. DID NOT ASSESS IF PT HAS PFANNENSTIEL INCISION. OBESE HABITUS.     FINDINGS:   These images demonstrate the uterus to be anteverted. The uterus is   enlarged measuring 19.6 x11.3 x 12.0 cm. There is a 9.0 x 7.8 x 9.3 cm   intramural fibroid at the uterine fundus.  The endometrium is thickened,   measuring 1.5 cm in thickness, which is normal.    The right ovary is normal in size, measuring 2.8 x 2.0 x 2.1 cm. No right   ovarian masses are seen. The left ovary is normal in size, measuring 2.6   x 1.3 x 2.3 cm. No left ovarian masses are seen. Doppler evaluation   demonstrates flow to both ovaries with no evidence of torsion.    No free fluid is seen in the cul-de-sac.    IMPRESSION:   Limited study due to lack of transvaginal images.    Thickened endometrium measuring 1.5 cm. Differential diagnosis includes   endometrial cancer, endometrial hyperplasia, endometrial polyp. Further   assessment is advised.    Enlarged fibroid uterus.    The pelvic floor relaxation reported on CT of abdomen and pelvis dated   10/18/2017 it is not demonstrated on ultrasound.

## 2018-04-26 NOTE — PROGRESS NOTE ADULT - ATTENDING COMMENTS
I modified the note above with the exception of the physical exam section. The exam below is my own:    gen: intubated, sedated on Prop gtt  HEENT: trachea midline, no jaundice or icterus.  Ext: well-perfused  Alertness: eyes closed, then opened for a few seconds in response to verbal stimuli. She localized with both hands to sternal rub. Followed no commands.   II: no BTT  III, IV, VI: No nystagmus. PERRLA 3>2 bilaterally. She did not track.  VII: No flattening of nasolabial fold. Corneal reflex positive R/L.  Motor: no atrophy or fasciculations. No tremor. No hypo/hyperkinesia. Tone normal. She moves all limbs spontaneously without asymmetry, and withdraws all limbs to NB pressure without asymmetry.

## 2018-04-26 NOTE — PROGRESS NOTE ADULT - SUBJECTIVE AND OBJECTIVE BOX
NEUROLOGY FOLLOW-UP NOTE    INTERVAL HISTORY: EMANUEL. Patient still intubated/sedated.     REVIEW OF SYSTEMS:  As per HPI, otherwise negative for Constitutional, Eyes, Ears/Nose/Mouth/Throat, Neck, Cardiovascular, Respiratory, Gastrointestinal, Genitourinary, Skin, Endocrine, Musculoskeletal, Psychiatric, and Hematologic/Lymphatic.    MEDICATIONS:  albumin human 25% IVPB 50 milliLiter(s) IV Intermittent every 8 hours  ALBUTerol/ipratropium for Nebulization 3 milliLiter(s) Nebulizer every 6 hours PRN  chlorhexidine 0.12% Liquid 15 milliLiter(s) Swish and Spit two times a day  dextrose 5%. 1000 milliLiter(s) IV Continuous <Continuous>  dextrose 50% Injectable 12.5 Gram(s) IV Push once  dextrose 50% Injectable 25 Gram(s) IV Push once  dextrose 50% Injectable 25 Gram(s) IV Push once  dextrose Gel 1 Dose(s) Oral once PRN  fat emulsion (Plant Based) 20% Infusion 0.27 Gm/kG/Day IV Continuous <Continuous>  fentaNYL   Infusion 0.534 MICROgram(s)/kG/Hr IV Continuous <Continuous>  glucagon  Injectable 1 milliGRAM(s) IntraMuscular once PRN  insulin lispro (HumaLOG) corrective regimen sliding scale   SubCutaneous every 6 hours  levothyroxine Injectable 75 MICROGram(s) IV Push <User Schedule>  meropenem Injectable 1000 milliGRAM(s) IV Push every 12 hours  ondansetron Injectable 4 milliGRAM(s) IV Push every 6 hours PRN  oxacillin IVPB      oxacillin IVPB 2 Gram(s) IV Intermittent every 4 hours  pantoprazole   Suspension 40 milliGRAM(s) Oral daily  Parenteral Nutrition - Adult 1 Each TPN Continuous <Continuous>  Parenteral Nutrition - Adult 1 Each TPN Continuous <Continuous>  propofol Infusion 8.903 MICROgram(s)/kG/Min IV Continuous <Continuous>    VITAL SIGNS:  Vital Signs Last 24 Hrs  T(C): 36.7 (26 Apr 2018 14:40), Max: 36.9 (26 Apr 2018 09:12)  T(F): 98 (26 Apr 2018 14:40), Max: 98.4 (26 Apr 2018 09:12)  HR: 60 (26 Apr 2018 15:00) (55 - 70)  BP: 129/77 (26 Apr 2018 15:00) (95/52 - 147/84)  BP(mean): 113 (26 Apr 2018 15:00) (73 - 113)  RR: 12 (26 Apr 2018 15:00) (4 - 36)  SpO2: 96% (26 Apr 2018 15:00) (96% - 100%)    PHYSICAL EXAMINATION:  General: ill appearing woman, intubated, in no distress.   Eyes: Conjunctiva and sclera clear.  Cardiovascular: Regular rate and rhythm; S1 and S2 Normal; No murmurs, gallops or rubs.  Neurologic:  - Mental Status: patient responsive to verbal stimulation.   - Cranial Nerves II-XII:   II:  Pupils are 2-4mm, equal, round, and reactive to light.   III, IV, VI:  unable to assess since patient is sedated, and won't follow commands.   V:  unable to assess since patient is intubated.   VII:  unable to assess.   VIII: unable to assess.   IX, X: unable to assess.   XI:  unable to assess.   XII:  unable to assess.   - Motor: patient moves all extremities upon stimulation. Normal muscle bulk and tone throughout.  - Reflexes:  2+ and symmetric at the biceps, triceps, brachioradialis, knees, and ankles.    - Sensory:  unable to assess.   - Coordination:  unable to assess.   - Gait:   unable to assess.     LABORATORY STUDIES:                          7.8    5.8   )-----------( 54       ( 26 Apr 2018 12:38 )             23.6     04-26    145  |  108  |  52<H>  ----------------------------<  169<H>  4.6   |  27  |  1.38<H>    Ca    9.9      26 Apr 2018 05:09  Phos  4.5     04-26  Mg     2.0     04-26    PT/INR - ( 26 Apr 2018 05:11 )   PT: 14.2 sec;   INR: 1.27       PTT - ( 26 Apr 2018 05:11 )  PTT:40.3 sec

## 2018-04-27 LAB
ANION GAP SERPL CALC-SCNC: 10 MMOL/L — SIGNIFICANT CHANGE UP (ref 5–17)
BUN SERPL-MCNC: 53 MG/DL — HIGH (ref 7–23)
CALCIUM SERPL-MCNC: 10 MG/DL — SIGNIFICANT CHANGE UP (ref 8.4–10.5)
CHLORIDE SERPL-SCNC: 109 MMOL/L — HIGH (ref 96–108)
CO2 SERPL-SCNC: 28 MMOL/L — SIGNIFICANT CHANGE UP (ref 22–31)
CREAT SERPL-MCNC: 1.32 MG/DL — HIGH (ref 0.5–1.3)
GLUCOSE BLDC GLUCOMTR-MCNC: 173 MG/DL — HIGH (ref 70–99)
GLUCOSE BLDC GLUCOMTR-MCNC: 179 MG/DL — HIGH (ref 70–99)
GLUCOSE BLDC GLUCOMTR-MCNC: 181 MG/DL — HIGH (ref 70–99)
GLUCOSE BLDC GLUCOMTR-MCNC: 193 MG/DL — HIGH (ref 70–99)
GLUCOSE BLDC GLUCOMTR-MCNC: 199 MG/DL — HIGH (ref 70–99)
GLUCOSE SERPL-MCNC: 201 MG/DL — HIGH (ref 70–99)
GRAM STN FLD: SIGNIFICANT CHANGE UP
HCT VFR BLD CALC: 21.7 % — LOW (ref 34.5–45)
HCT VFR BLD CALC: 23 % — LOW (ref 34.5–45)
HGB BLD-MCNC: 7.1 G/DL — LOW (ref 11.5–15.5)
HGB BLD-MCNC: 7.5 G/DL — LOW (ref 11.5–15.5)
MAGNESIUM SERPL-MCNC: 2.1 MG/DL — SIGNIFICANT CHANGE UP (ref 1.6–2.6)
MCHC RBC-ENTMCNC: 29.2 PG — SIGNIFICANT CHANGE UP (ref 27–34)
MCHC RBC-ENTMCNC: 30.2 PG — SIGNIFICANT CHANGE UP (ref 27–34)
MCHC RBC-ENTMCNC: 32.6 G/DL — SIGNIFICANT CHANGE UP (ref 32–36)
MCHC RBC-ENTMCNC: 32.7 G/DL — SIGNIFICANT CHANGE UP (ref 32–36)
MCV RBC AUTO: 89.3 FL — SIGNIFICANT CHANGE UP (ref 80–100)
MCV RBC AUTO: 92.7 FL — SIGNIFICANT CHANGE UP (ref 80–100)
PHOSPHATE SERPL-MCNC: 4.4 MG/DL — SIGNIFICANT CHANGE UP (ref 2.5–4.5)
PLATELET # BLD AUTO: 38 K/UL — LOW (ref 150–400)
PLATELET # BLD AUTO: 41 K/UL — LOW (ref 150–400)
POTASSIUM SERPL-MCNC: 4.8 MMOL/L — SIGNIFICANT CHANGE UP (ref 3.5–5.3)
POTASSIUM SERPL-SCNC: 4.8 MMOL/L — SIGNIFICANT CHANGE UP (ref 3.5–5.3)
RBC # BLD: 2.43 M/UL — LOW (ref 3.8–5.2)
RBC # BLD: 2.48 M/UL — LOW (ref 3.8–5.2)
RBC # FLD: 19.2 % — HIGH (ref 10.3–16.9)
RBC # FLD: 19.2 % — HIGH (ref 10.3–16.9)
SODIUM SERPL-SCNC: 147 MMOL/L — HIGH (ref 135–145)
WBC # BLD: 4.4 K/UL — SIGNIFICANT CHANGE UP (ref 3.8–10.5)
WBC # BLD: 4.7 K/UL — SIGNIFICANT CHANGE UP (ref 3.8–10.5)
WBC # FLD AUTO: 4.4 K/UL — SIGNIFICANT CHANGE UP (ref 3.8–10.5)
WBC # FLD AUTO: 4.7 K/UL — SIGNIFICANT CHANGE UP (ref 3.8–10.5)

## 2018-04-27 PROCEDURE — 99233 SBSQ HOSP IP/OBS HIGH 50: CPT | Mod: GC

## 2018-04-27 PROCEDURE — 99232 SBSQ HOSP IP/OBS MODERATE 35: CPT

## 2018-04-27 RX ORDER — MICAFUNGIN SODIUM 100 MG/1
INJECTION, POWDER, LYOPHILIZED, FOR SOLUTION INTRAVENOUS
Qty: 0 | Refills: 0 | Status: DISCONTINUED | OUTPATIENT
Start: 2018-04-27 | End: 2018-04-28

## 2018-04-27 RX ORDER — FENTANYL CITRATE 50 UG/ML
12.5 INJECTION INTRAVENOUS ONCE
Qty: 0 | Refills: 0 | Status: DISCONTINUED | OUTPATIENT
Start: 2018-04-27 | End: 2018-04-27

## 2018-04-27 RX ORDER — MICAFUNGIN SODIUM 100 MG/1
100 INJECTION, POWDER, LYOPHILIZED, FOR SOLUTION INTRAVENOUS EVERY 24 HOURS
Qty: 0 | Refills: 0 | Status: DISCONTINUED | OUTPATIENT
Start: 2018-04-28 | End: 2018-04-28

## 2018-04-27 RX ORDER — I.V. FAT EMULSION 20 G/100ML
0.27 EMULSION INTRAVENOUS
Qty: 25 | Refills: 0 | Status: DISCONTINUED | OUTPATIENT
Start: 2018-04-27 | End: 2018-04-27

## 2018-04-27 RX ORDER — ELECTROLYTE SOLUTION,INJ
1 VIAL (ML) INTRAVENOUS
Qty: 0 | Refills: 0 | Status: DISCONTINUED | OUTPATIENT
Start: 2018-04-27 | End: 2018-04-27

## 2018-04-27 RX ORDER — FUROSEMIDE 40 MG
40 TABLET ORAL ONCE
Qty: 0 | Refills: 0 | Status: COMPLETED | OUTPATIENT
Start: 2018-04-27 | End: 2018-04-27

## 2018-04-27 RX ORDER — MICAFUNGIN SODIUM 100 MG/1
100 INJECTION, POWDER, LYOPHILIZED, FOR SOLUTION INTRAVENOUS ONCE
Qty: 0 | Refills: 0 | Status: COMPLETED | OUTPATIENT
Start: 2018-04-27 | End: 2018-04-27

## 2018-04-27 RX ADMIN — Medication 40 MILLIGRAM(S): at 16:12

## 2018-04-27 RX ADMIN — CHLORHEXIDINE GLUCONATE 15 MILLILITER(S): 213 SOLUTION TOPICAL at 18:02

## 2018-04-27 RX ADMIN — Medication 100 GRAM(S): at 11:08

## 2018-04-27 RX ADMIN — Medication 100 GRAM(S): at 03:03

## 2018-04-27 RX ADMIN — PROPOFOL 5 MICROGRAM(S)/KG/MIN: 10 INJECTION, EMULSION INTRAVENOUS at 21:57

## 2018-04-27 RX ADMIN — Medication 50 MILLILITER(S): at 11:09

## 2018-04-27 RX ADMIN — Medication 2: at 06:06

## 2018-04-27 RX ADMIN — FENTANYL CITRATE 12.5 MICROGRAM(S): 50 INJECTION INTRAVENOUS at 12:00

## 2018-04-27 RX ADMIN — Medication 75 MICROGRAM(S): at 06:08

## 2018-04-27 RX ADMIN — Medication 50 MILLILITER(S): at 18:02

## 2018-04-27 RX ADMIN — Medication 100 GRAM(S): at 14:53

## 2018-04-27 RX ADMIN — Medication 100 GRAM(S): at 22:31

## 2018-04-27 RX ADMIN — Medication 2: at 18:04

## 2018-04-27 RX ADMIN — MEROPENEM 1000 MILLIGRAM(S): 1 INJECTION INTRAVENOUS at 05:00

## 2018-04-27 RX ADMIN — Medication 100 GRAM(S): at 06:08

## 2018-04-27 RX ADMIN — I.V. FAT EMULSION 10.42 GM/KG/DAY: 20 EMULSION INTRAVENOUS at 22:02

## 2018-04-27 RX ADMIN — MICAFUNGIN SODIUM 105 MILLIGRAM(S): 100 INJECTION, POWDER, LYOPHILIZED, FOR SOLUTION INTRAVENOUS at 18:55

## 2018-04-27 RX ADMIN — MEROPENEM 1000 MILLIGRAM(S): 1 INJECTION INTRAVENOUS at 18:03

## 2018-04-27 RX ADMIN — FENTANYL CITRATE 12.5 MICROGRAM(S): 50 INJECTION INTRAVENOUS at 11:32

## 2018-04-27 RX ADMIN — PANTOPRAZOLE SODIUM 40 MILLIGRAM(S): 20 TABLET, DELAYED RELEASE ORAL at 11:10

## 2018-04-27 RX ADMIN — Medication 50 MILLILITER(S): at 03:03

## 2018-04-27 RX ADMIN — Medication 1 EACH: at 18:04

## 2018-04-27 RX ADMIN — Medication 100 GRAM(S): at 18:01

## 2018-04-27 RX ADMIN — PROPOFOL 5 MICROGRAM(S)/KG/MIN: 10 INJECTION, EMULSION INTRAVENOUS at 06:06

## 2018-04-27 RX ADMIN — Medication 2: at 11:19

## 2018-04-27 RX ADMIN — FENTANYL CITRATE 5 MICROGRAM(S)/KG/HR: 50 INJECTION INTRAVENOUS at 22:29

## 2018-04-27 RX ADMIN — CHLORHEXIDINE GLUCONATE 15 MILLILITER(S): 213 SOLUTION TOPICAL at 05:00

## 2018-04-27 NOTE — PROGRESS NOTE ADULT - ASSESSMENT
77 y/o F with sepsis from open abdominal wound infection, MSSA bacteremia present on admission, and UTI present on admission found to have contained small bowel leak    Neuro: Propofol fentanyl  Zofran prn, AMS: EEG:slowing Neurology consult stating poss LP, and MRI of brain with con. holding:  seroquel, aripiprazole, escitalapram   CV: HD stable s/p severe sepsis, albumin 25%q8, holding amlodipine, 4/19 Echo  65-70%. Lasix prn  Pulm: intubated, satting well on 40/490/12/5 chlorhex  GI: NPO, protonix, Dobhoff (no feeds), TPN Protonix  : Nicole S/p AKF on HD now resolved. UTI (present on admission) + Uterine fibroids on CT scan with vaginal bleeding will consult GYN   ID: ESBL Ecoli in Urine resistant to Zosyn: Jose L( 4/17-) MSSA in blood: Oxacillin ( 4/17-) Kai virus (good hand washing), TTE neg for endocarditis. IR drainage today for pelvic collection drainage.   Endo: ISS, Synthroid  Insulin  in TPN: 20   PPx: sqh held for low plts  PE:Sp IVC filter on 3/30 Plts low- will give 1U b/f IR drainage  Lines: PIV Rt IJ TLC from OSH ( 4/15-), s/p L Rad Art line( 4/15-4/18)   Wounds: Wound vac to midline incision , change MWF  PT/OT: ordered currently strict bed rest. 77 y/o F with sepsis from open abdominal wound infection, MSSA bacteremia present on admission, and UTI present on admission found to have contained small bowel leak, acute respiratory failure, toxic metabolic encephalopathy    Neuro: Propofol fentanyl  Zofran prn, AMS: EEG:slowing Neurology consult stating poss LP, and MRI of brain with con. holding:  seroquel, aripiprazole, escitalapram   CV: HD stable s/p severe sepsis, albumin 25%q8, holding amlodipine, 4/19 Echo  65-70%. Lasix prn  Pulm: intubated, satting well on 40/490/12/5 chlorhex  GI: NPO, protonix, Dobhoff (no feeds), TPN Protonix  : Nicole S/p AKF on HD now resolved. UTI (present on admission) + Uterine fibroids on CT scan with vaginal bleeding will consult GYN   ID: ESBL Ecoli in Urine resistant to Zosyn: Jose L( 4/17-) MSSA in blood: Oxacillin ( 4/17-) Kai virus (good hand washing), TTE neg for endocarditis. IR drainage today for pelvic collection drainage.   Endo: ISS, Synthroid  Insulin  in TPN: 20   PPx: sqh held for low plts  PE:Sp IVC filter on 3/30 Plts low- will give 1U b/f IR drainage  Lines: PIV Rt IJ TLC from OSH ( 4/15-), s/p L Rad Art line( 4/15-4/18)   Wounds: Wound vac to midline incision , change MWF  PT/OT: ordered currently strict bed rest.

## 2018-04-27 NOTE — PROGRESS NOTE ADULT - ATTENDING COMMENTS
I modified the note above with the exception of the physical exam section. The exam below is my own:    gen: intubated, sedated on Prop gtt  HEENT: trachea midline, no jaundice or icterus.  Ext: well-perfused  Alertness: eyes remained closed to verbal/tactile/noxious. In response to light shoulder rub she grimaced and turned away to the right. She localized with both hands to sternal rub. Followed no commands.   II: no BTT  III, IV, VI: No nystagmus. PERRLA 3>2 bilaterally. She did not track. Dolls +  VII: No flattening of nasolabial fold.   Motor: no atrophy or fasciculations. No tremor. No hypo/hyperkinesia. Tone normal. She moves all limbs spontaneously without asymmetry, and withdraws all limbs to NB pressure without asymmetry.

## 2018-04-27 NOTE — PROGRESS NOTE ADULT - SUBJECTIVE AND OBJECTIVE BOX
O/N: 2.5 of vit K given to normalize INR before possible LP tmrw, UOP>100   4/26:2uplts given for IR drainage today given lasix 40 in am lasix 60 in afternoon . hgb improved to 8 and now 7.8.      SUBJECTIVE: Patient seen and examined bedside, unable to obtain information as patient is sedated.    meropenem Injectable 1000 milliGRAM(s) IV Push every 12 hours  oxacillin IVPB      oxacillin IVPB 2 Gram(s) IV Intermittent every 4 hours      Vital Signs Last 24 Hrs  T(C): 36.2 (27 Apr 2018 06:27), Max: 37.1 (26 Apr 2018 18:12)  T(F): 97.1 (27 Apr 2018 06:27), Max: 98.8 (26 Apr 2018 18:12)  HR: 64 (27 Apr 2018 09:50) (56 - 68)  BP: 132/79 (27 Apr 2018 09:00) (106/50 - 156/86)  BP(mean): 106 (27 Apr 2018 09:00) (72 - 113)  RR: 19 (27 Apr 2018 09:00) (6 - 19)  SpO2: 100% (27 Apr 2018 09:50) (96% - 100%)  I&O's Detail    26 Apr 2018 07:01  -  27 Apr 2018 07:00  --------------------------------------------------------  IN:    Albumin 25%: 100 mL    Fat Emulsion 20%: 104.2 mL    Platelets - Single Donor: 441 mL    propofol Infusion: 177.6 mL    Solution: 100 mL    Solution: 50 mL    Solution: 100 mL    Solution: 400 mL    TPN (Total Parenteral Nutrition): 1128 mL  Total IN: 2600.8 mL    OUT:    Bulb: 50 mL    Indwelling Catheter - Urethral: 2550 mL    Rectal Tube: 750 mL    VAC (Vacuum Assisted Closure) System: 200 mL  Total OUT: 3550 mL    Total NET: -949.2 mL      27 Apr 2018 07:01  -  27 Apr 2018 10:00  --------------------------------------------------------  IN:    Fat Emulsion 20%: 20.8 mL    propofol Infusion: 16.2 mL    TPN (Total Parenteral Nutrition): 94 mL  Total IN: 131 mL    OUT:    Indwelling Catheter - Urethral: 175 mL  Total OUT: 175 mL    Total NET: -44 mL          General: NAD sedated intubated   Neuro: not responsive to commands but moves all extremities  Pulm: CTA  b/l   CV: RRR, reg s1 s2 no murmur  Abd: obese, soft, ND, no focal tenderness, umbilical wound vac in place, functioning properly + Ecchymosis to left LQ.  : suazo to gravity  Vasc: b/l DP/PT pulses  MSK: no joint swelling  Extremities: WWP, 3+pitting edema Lext & Uext  b/l   Skin: no rash        LABS:                        7.1    4.4   )-----------( 41       ( 27 Apr 2018 05:27 )             21.7     04-27    147<H>  |  109<H>  |  53<H>  ----------------------------<  201<H>  4.8   |  28  |  1.32<H>    Ca    10.0      27 Apr 2018 05:26  Phos  4.4     04-27  Mg     2.1     04-27      PT/INR - ( 26 Apr 2018 05:11 )   PT: 14.2 sec;   INR: 1.27          PTT - ( 26 Apr 2018 05:11 )  PTT:40.3 sec      RADIOLOGY & ADDITIONAL STUDIES: O/N: 2.5 of vit K given to normalize INR before possible LP tmrw, UOP>100   4/26:2uplts given for IR drainage today given lasix 40 in am lasix 60 in afternoon . hgb improved to 8 and now 7.8.      SUBJECTIVE: Patient seen and examined bedside, unable to obtain ROS as patient is sedated.    meropenem Injectable 1000 milliGRAM(s) IV Push every 12 hours  oxacillin IVPB      oxacillin IVPB 2 Gram(s) IV Intermittent every 4 hours      Vital Signs Last 24 Hrs  T(C): 36.2 (27 Apr 2018 06:27), Max: 37.1 (26 Apr 2018 18:12)  T(F): 97.1 (27 Apr 2018 06:27), Max: 98.8 (26 Apr 2018 18:12)  HR: 64 (27 Apr 2018 09:50) (56 - 68)  BP: 132/79 (27 Apr 2018 09:00) (106/50 - 156/86)  BP(mean): 106 (27 Apr 2018 09:00) (72 - 113)  RR: 19 (27 Apr 2018 09:00) (6 - 19)  SpO2: 100% (27 Apr 2018 09:50) (96% - 100%)  I&O's Detail    26 Apr 2018 07:01  -  27 Apr 2018 07:00  --------------------------------------------------------  IN:    Albumin 25%: 100 mL    Fat Emulsion 20%: 104.2 mL    Platelets - Single Donor: 441 mL    propofol Infusion: 177.6 mL    Solution: 100 mL    Solution: 50 mL    Solution: 100 mL    Solution: 400 mL    TPN (Total Parenteral Nutrition): 1128 mL  Total IN: 2600.8 mL    OUT:    Bulb: 50 mL    Indwelling Catheter - Urethral: 2550 mL    Rectal Tube: 750 mL    VAC (Vacuum Assisted Closure) System: 200 mL  Total OUT: 3550 mL    Total NET: -949.2 mL      27 Apr 2018 07:01  -  27 Apr 2018 10:00  --------------------------------------------------------  IN:    Fat Emulsion 20%: 20.8 mL    propofol Infusion: 16.2 mL    TPN (Total Parenteral Nutrition): 94 mL  Total IN: 131 mL    OUT:    Indwelling Catheter - Urethral: 175 mL  Total OUT: 175 mL    Total NET: -44 mL          General: NAD sedated intubated   Neuro: not responsive to commands but moves all extremities  Pulm: CTA  b/l   CV: RRR, reg s1 s2 no murmur  Abd: obese, soft, ND, no focal tenderness, umbilical wound vac in place, functioning properly + Ecchymosis to left LQ.  : suazo to gravity  Vasc: b/l DP/PT pulses  MSK: no joint swelling  Extremities: WWP, 3+pitting edema Lext & Uext  b/l   Skin: no rash        LABS:                        7.1    4.4   )-----------( 41       ( 27 Apr 2018 05:27 )             21.7     04-27    147<H>  |  109<H>  |  53<H>  ----------------------------<  201<H>  4.8   |  28  |  1.32<H>    Ca    10.0      27 Apr 2018 05:26  Phos  4.4     04-27  Mg     2.1     04-27      PT/INR - ( 26 Apr 2018 05:11 )   PT: 14.2 sec;   INR: 1.27          PTT - ( 26 Apr 2018 05:11 )  PTT:40.3 sec      RADIOLOGY & ADDITIONAL STUDIES:

## 2018-04-27 NOTE — PROGRESS NOTE ADULT - ATTENDING COMMENTS
I have reviewed the medical record, including laboratory and radiographic studies, interviewed and examined the patient and discussed the plan with Dr. Crews, the ID Resident.  Agree with above.  Will continue to follow with you – ID service.

## 2018-04-27 NOTE — PROGRESS NOTE ADULT - ASSESSMENT
79 y/o woman, HTN, DM, and hypothyroidism who presents as a transfer from OSH for management of multifactorial sepsis. Neurology was consulted for evaluation of altered mental status. CT head 4/22/18 was notable for left parafalcine partially calcified meningioma, mild diffuse volume loss, otherwise unremarkable. The pt's mental status has not been improving despite being on antibiotics. Her exam is currently limited by sedation, but further work-up for AMS is appropriate. The patient had EEG 4/25->4/26 which was likely normal, though it was a technically poor study,    - LP is reasonable to assess for an encephalitic process given continued AMS despite being on antibiotics. Please check protein, glucose, cell count, bacterial/fungal culture, Gram stain. Remove 30 cc in case we decide to send additional studies.  - MRI brain w/o con to assess for structural etiologies of AMS (e.g. infarcts, inflammation, hemorrhage).    NOTE IS INCOMPLETE 79 y/o woman, HTN, DM, and hypothyroidism who presents as a transfer from OSH for management of multifactorial sepsis. Neurology was consulted for evaluation of altered mental status. CT head 4/22/18 was notable for left parafalcine partially calcified meningioma, mild diffuse volume loss, otherwise unremarkable. The pt's mental status has not been improving despite being on antibiotics. Her exam is currently limited by sedation, but further work-up for AMS is appropriate. The patient had EEG 4/25->4/26 which was likely normal, though it was a technically poor study,    - LP is reasonable to assess for an encephalitic process given continued AMS despite being on antibiotics. Please check protein, glucose, cell count, bacterial/fungal culture, Gram stain. Please remove 30 cc in case we decide to send additional studies.  - MRI brain w/o con to assess for structural etiologies of AMS (e.g. infarcts, inflammation, hemorrhage). 79 y/o woman, HTN, DM, and hypothyroidism who presents as a transfer from OSH for management of multifactorial sepsis. Neurology was consulted for evaluation of altered mental status. CT head 4/22/18 was notable for left parafalcine partially calcified meningioma, mild diffuse volume loss, otherwise unremarkable. The pt's mental status has not been improving despite being on antibiotics. Her exam is currently limited by sedation, but further work-up for AMS is appropriate. The patient had EEG 4/25->4/26 which was likely normal, though it was a technically poor study,    - LP is reasonable to assess for an encephalitic process given continued AMS despite being on antibiotics. Please check protein, glucose, cell count, bacterial/fungal culture, Gram stain.   - MRI brain w/o con to assess for structural etiologies of AMS (e.g. infarcts, inflammation, hemorrhage).  - recommend repeat EEG considering the last one was a poor study.

## 2018-04-27 NOTE — PROGRESS NOTE ADULT - SUBJECTIVE AND OBJECTIVE BOX
INTERVAL HPI/OVERNIGHT EVENTS:    Patient seen and examined at [facility]. Currently intubated and on sedation. intermittently following commands. LEANN drain 50 cc and wound vac 200 cc past 24 hrs    ROS: unable to obtain       ANTIBIOTICS/RELEVANT:    MEDICATIONS  (STANDING):  albumin human 25% IVPB 50 milliLiter(s) IV Intermittent every 8 hours  chlorhexidine 0.12% Liquid 15 milliLiter(s) Swish and Spit two times a day  dextrose 5%. 1000 milliLiter(s) (50 mL/Hr) IV Continuous <Continuous>  dextrose 50% Injectable 12.5 Gram(s) IV Push once  dextrose 50% Injectable 25 Gram(s) IV Push once  dextrose 50% Injectable 25 Gram(s) IV Push once  fat emulsion (Plant Based) 20% Infusion 0.27 Gm/kG/Day (10.42 mL/Hr) IV Continuous <Continuous>  fentaNYL   Infusion 0.534 MICROgram(s)/kG/Hr (5 mL/Hr) IV Continuous <Continuous>  insulin lispro (HumaLOG) corrective regimen sliding scale   SubCutaneous every 6 hours  levothyroxine Injectable 75 MICROGram(s) IV Push <User Schedule>  meropenem Injectable 1000 milliGRAM(s) IV Push every 12 hours  micafungin IVPB      oxacillin IVPB      oxacillin IVPB 2 Gram(s) IV Intermittent every 4 hours  pantoprazole   Suspension 40 milliGRAM(s) Oral daily  Parenteral Nutrition - Adult 1 Each (47 mL/Hr) TPN Continuous <Continuous>  Parenteral Nutrition - Adult 1 Each (47 mL/Hr) TPN Continuous <Continuous>  propofol Infusion 8.903 MICROgram(s)/kG/Min (5 mL/Hr) IV Continuous <Continuous>    MEDICATIONS  (PRN):  ALBUTerol/ipratropium for Nebulization 3 milliLiter(s) Nebulizer every 6 hours PRN Shortness of Breath and/or Wheezing  dextrose Gel 1 Dose(s) Oral once PRN Blood Glucose LESS THAN 70 milliGRAM(s)/deciliter  glucagon  Injectable 1 milliGRAM(s) IntraMuscular once PRN Glucose LESS THAN 70 milligrams/deciliter  ondansetron Injectable 4 milliGRAM(s) IV Push every 6 hours PRN Nausea        Vital Signs Last 24 Hrs  T(C): 36.2 (27 Apr 2018 16:56), Max: 37.1 (27 Apr 2018 10:52)  T(F): 97.1 (27 Apr 2018 16:56), Max: 98.7 (27 Apr 2018 10:52)  HR: 76 (27 Apr 2018 18:00) (56 - 76)  BP: 131/75 (27 Apr 2018 18:00) (97/63 - 136/65)  BP(mean): 98 (27 Apr 2018 17:00) (69 - 114)  RR: 18 (27 Apr 2018 18:00) (6 - 21)  SpO2: 100% (27 Apr 2018 18:00) (96% - 100%)    04-26-18 @ 07:01  -  04-27-18 @ 07:00  --------------------------------------------------------  IN: 2600.8 mL / OUT: 3550 mL / NET: -949.2 mL    04-27-18 @ 07:01  -  04-27-18 @ 19:01  --------------------------------------------------------  IN: 1147.9 mL / OUT: 1180 mL / NET: -32.1 mL      General: ill appearing woman, intubated, agitated   Eyes: Conjunctiva and sclera clear.  Cardiovascular: Regular rate and rhythm; S1 and S2 Normal; No murmurs, gallops or rubs.  Respiratory: rocnchorous BS appreciated throughout  Cardiovascular: RRR with no murmurs  Gastrointestinal: obese, midline surgical wound (5x5), with wound vac draining SS fluid  Extremities:  non- pitting edema noted on the LE B/L  Neuro: AAOx0; intermittently following commands           LABS:                        7.5    4.7   )-----------( 38       ( 27 Apr 2018 12:49 )             23.0     04-27    147<H>  |  109<H>  |  53<H>  ----------------------------<  201<H>  4.8   |  28  |  1.32<H>    Ca    10.0      27 Apr 2018 05:26  Phos  4.4     04-27  Mg     2.1     04-27      PT/INR - ( 26 Apr 2018 05:11 )   PT: 14.2 sec;   INR: 1.27          PTT - ( 26 Apr 2018 05:11 )  PTT:40.3 sec      MICROBIOLOGY:    Culture - Body Fluid with Gram Stain (04.26.18 @ 20:41)    Gram Stain:   Few Yeast  Numerous White blood cells    Specimen Source: .Body Fluid Left Pelvic Fluid    Culture Results:   No growth to date    Culture - Blood (04.20.18 @ 17:28)    Specimen Source: .Blood Blood    Culture Results:   No growth at 5 days.    GI PCR Panel, Stool (04.20.18 @ 17:20)    Culture Results:   GI PCR Results: NOT detected  *******Please Note:*******  GI panel PCR evaluates for:  Campylobacter, Plesiomonas shigelloides, Salmonella,  Vibrio, Yersinia enterocolitica, Enteroaggregative  Escherichia coli (EAEC), Enteropathogenic E.coli (EPEC),  Enterotoxigenic E. coli (ETEC) lt/st, Shiga-like  toxin-producing E. coli (STEC) stx1/stx2,  Shigella/ Enteroinvasive E. coli (EIEC), Cryptosporidium,  Cyclospora cayetanensis, Entamoeba histolytica,  Giardia lamblia, Adenovirus F 40/41, Astrovirus,  Norovirus GI/GII, Rotavirus A, Sapovirus    Culture - Urine (04.18.18 @ 12:53)    -  Amikacin: S <=16    -  Ampicillin: R >16 These ampicillin results predict results for amoxicillin    -  Ampicillin: R >16 These ampicillin results predict results for amoxicillin    -  Ampicillin/Sulbactam: R >16/8    -  Ampicillin/Sulbactam: R >16/8    -  Aztreonam: S <=4    -  Cefazolin: R >16 This predicts results for oral agents cefaclor, cefdinir, cefpodoxime, cefprozil, cefuroxime axetil, cephalexin and locarbef for uncomplicated UTI. Note that some isolates may be susceptible to these agents while testing resistant to cefazolin.    -  Cefazolin: R >16 This predicts results for oral agents cefaclor, cefdinir, cefpodoxime, cefprozil, cefuroxime axetil, cephalexin and locarbef for uncomplicated UTI. Note that some isolates may be susceptible to these agents while testing resistant to cefazolin.    -  Cefepime: I 16    -  Cefotaxime: S <=2    -  Cefoxitin: S <=8    -  Ceftazidime: S <=1    -  Ceftriaxone: S <=1 Enterobacter, Citrobacter, and Serratia may develop resistance during prolonged therapy    -  Ceftriaxone: S <=1 Enterobacter, Citrobacter, and Serratia may develop resistance during prolonged therapy    -  Cefuroxime: R >16    -  Ciprofloxacin: S <=1    -  Ciprofloxacin: S <=1    -  Ertapenem: S <=1    -  Gentamicin: S <=4    -  Gentamicin: S <=4    -  Imipenem: S <=1    -  Levofloxacin: S <=2    -  Meropenem: S <=1    -  Nitrofurantoin: S <=32 Should not be used to treat pyelonephritis    -  Nitrofurantoin: S <=32 Should not be used to treat pyelonephritis    -  Piperacillin/Tazobactam: R >64    -  Piperacillin/Tazobactam: R >64    -  Tigecycline: S <=2    -  Tobramycin: S <=4    -  Tobramycin: S <=4    -  Trimethoprim/Sulfamethoxazole: S <=2/38    -  Trimethoprim/Sulfamethoxazole: S <=2/38    Specimen Source: .Urine Catheterized    Culture Results:   4,000 CFU/ml Escherichia coli    Organism Identification: Escherichia coli  Escherichia coli    Organism: Escherichia coli    Organism: Escherichia coli    Method Type: MARY    Method Type: MARY        RADIOLOGY & ADDITIONAL STUDIES:    Xray Chest 1 View-PORTABLE IMMEDIATE (04.25.18 @ 11:01) >  FINDINGS: Size of cardiac silhouette unchanged. NG tube noted with distal   aspect obscured in abdomen. Right internal jugular venous line with tip   overlying the SVC. The lungs are unchanged. No visualized pneumothorax.   No pleural effusion. Endotracheal tube tip 2.1 cm proximal to tonio.    IMPRESSION:  Interval placement of an endotracheal tube.    < end of copied text >        A/P: Patient is a 78 year old female, morbidly obesity, DM, ventral hernia repair with mesh 3/2 c/b wound dehiscence, recent ATN requiring temporary HD (Permacath removed 4/10), who presents as a transfer from OSH for management of multifactorial sepsis and open abdominal wound. Patient found to have ESBL-producing E.Coli UTI and MSSA Bacteremia, origin likely coming from abdomen/pelvis vs. prior HD catheter. Patient intubated 4/25 for acute hypoxic respiratory failure. Repeat CT imaging suggestive of an intra-abdominal collection measuring >7 x 2 cm. body fluid now growing yeast     Recommendations:  -- start MICAFUNGIN 100 mg QD  --f/u speciation of body fluid culture  --c/w Meropenem 1g q12h and Oxacillin 2g q4h  --Surveillance blood cultures NGTD     -- Surgical intervention as per SICU team     Discussed with ID attending and primary team  ID will follow

## 2018-04-27 NOTE — PROGRESS NOTE ADULT - SUBJECTIVE AND OBJECTIVE BOX
NEUROLOGY FOLLOW-UP NOTE    INTERVAL HISTORY: Patient s/p CT guided drainage of pelvic fluid collection on 4/26. EMANUEL.     REVIEW OF SYSTEMS:  Unable to obtain since patient is intubated/sedated.    MEDICATIONS:  albumin human 25% IVPB 50 milliLiter(s) IV Intermittent every 8 hours  ALBUTerol/ipratropium for Nebulization 3 milliLiter(s) Nebulizer every 6 hours PRN  chlorhexidine 0.12% Liquid 15 milliLiter(s) Swish and Spit two times a day  dextrose 5%. 1000 milliLiter(s) IV Continuous <Continuous>  dextrose 50% Injectable 12.5 Gram(s) IV Push once  dextrose 50% Injectable 25 Gram(s) IV Push once  dextrose 50% Injectable 25 Gram(s) IV Push once  dextrose Gel 1 Dose(s) Oral once PRN  fat emulsion (Plant Based) 20% Infusion 0.27 Gm/kG/Day IV Continuous <Continuous>  fentaNYL   Infusion 0.534 MICROgram(s)/kG/Hr IV Continuous <Continuous>  glucagon  Injectable 1 milliGRAM(s) IntraMuscular once PRN  insulin lispro (HumaLOG) corrective regimen sliding scale   SubCutaneous every 6 hours  levothyroxine Injectable 75 MICROGram(s) IV Push <User Schedule>  meropenem Injectable 1000 milliGRAM(s) IV Push every 12 hours  ondansetron Injectable 4 milliGRAM(s) IV Push every 6 hours PRN  oxacillin IVPB      oxacillin IVPB 2 Gram(s) IV Intermittent every 4 hours  pantoprazole   Suspension 40 milliGRAM(s) Oral daily  Parenteral Nutrition - Adult 1 Each TPN Continuous <Continuous>  Parenteral Nutrition - Adult 1 Each TPN Continuous <Continuous>  propofol Infusion 8.903 MICROgram(s)/kG/Min IV Continuous <Continuous>    VITAL SIGNS:  Vital Signs Last 24 Hrs  T(C): 37.1 (27 Apr 2018 10:52), Max: 37.1 (26 Apr 2018 18:12)  T(F): 98.7 (27 Apr 2018 10:52), Max: 98.8 (26 Apr 2018 18:12)  HR: 64 (27 Apr 2018 13:00) (56 - 68)  BP: 133/67 (27 Apr 2018 13:00) (97/63 - 156/86)  BP(mean): 101 (27 Apr 2018 13:00) (72 - 113)  RR: 13 (27 Apr 2018 13:00) (6 - 19)  SpO2: 100% (27 Apr 2018 13:00) (96% - 100%)    PHYSICAL EXAMINATION:  General: patient intubated, in no distress.  Eyes: Conjunctiva and sclera clear.  Cardiovascular: Regular rate and rhythm; S1 and S2 Normal; No murmurs, gallops or rubs.  Neurologic:  - Mental Status: patient intubated and sedated. Not responding to verbal stimuli, but responsive to verbal stimuli.   - Cranial Nerves II-XII:    II:  Pupils are 3-4mm, equal, round, and reactive to light.  III, IV, VI:  unable to assess since patient is not following commands.   V:  unable to assess since patient is intubated and sedated.   VII:  unable to assess since patient is intubated and sedated.   VIII: unable to assess since patient is intubated and sedated.   IX, X:  unable to assess since patient is intubated.   XI:  unable to assess since patient is sedated. .  XII: unable to assess since patient is intubated and sedated.   - Motor:  moves all extremities upon tactile stimuli.   - Reflexes:  2+ in biceps, brachioradialis, triceps, knees, and ankles.   - Sensory: unable to assess since patient is intubated and sedated.   - Coordination:  unable to assess since patient is intubated and sedated.   - Gait:  unable to assess since patient is intubated and sedated.     LABORATORY STUDIES:                          7.5    4.7   )-----------( 38       ( 27 Apr 2018 12:49 )             23.0     04-27    147<H>  |  109<H>  |  53<H>  ----------------------------<  201<H>  4.8   |  28  |  1.32<H>    Ca    10.0      27 Apr 2018 05:26  Phos  4.4     04-27  Mg     2.1     04-27    PT/INR - ( 26 Apr 2018 05:11 )   PT: 14.2 sec;   INR: 1.27       PTT - ( 26 Apr 2018 05:11 )  PTT:40.3 sec NEUROLOGY FOLLOW-UP NOTE    INTERVAL HISTORY: Patient s/p CT guided drainage of pelvic fluid collection on 4/26. No new neurologic events. Still sedated on Propofol.     REVIEW OF SYSTEMS:  Unable to obtain since patient is intubated/sedated.    MEDICATIONS:  albumin human 25% IVPB 50 milliLiter(s) IV Intermittent every 8 hours  ALBUTerol/ipratropium for Nebulization 3 milliLiter(s) Nebulizer every 6 hours PRN  chlorhexidine 0.12% Liquid 15 milliLiter(s) Swish and Spit two times a day  dextrose 5%. 1000 milliLiter(s) IV Continuous <Continuous>  dextrose 50% Injectable 12.5 Gram(s) IV Push once  dextrose 50% Injectable 25 Gram(s) IV Push once  dextrose 50% Injectable 25 Gram(s) IV Push once  dextrose Gel 1 Dose(s) Oral once PRN  fat emulsion (Plant Based) 20% Infusion 0.27 Gm/kG/Day IV Continuous <Continuous>  fentaNYL   Infusion 0.534 MICROgram(s)/kG/Hr IV Continuous <Continuous>  glucagon  Injectable 1 milliGRAM(s) IntraMuscular once PRN  insulin lispro (HumaLOG) corrective regimen sliding scale   SubCutaneous every 6 hours  levothyroxine Injectable 75 MICROGram(s) IV Push <User Schedule>  meropenem Injectable 1000 milliGRAM(s) IV Push every 12 hours  ondansetron Injectable 4 milliGRAM(s) IV Push every 6 hours PRN  oxacillin IVPB      oxacillin IVPB 2 Gram(s) IV Intermittent every 4 hours  pantoprazole   Suspension 40 milliGRAM(s) Oral daily  Parenteral Nutrition - Adult 1 Each TPN Continuous <Continuous>  Parenteral Nutrition - Adult 1 Each TPN Continuous <Continuous>  propofol Infusion 8.903 MICROgram(s)/kG/Min IV Continuous <Continuous>    VITAL SIGNS:  Vital Signs Last 24 Hrs  T(C): 37.1 (27 Apr 2018 10:52), Max: 37.1 (26 Apr 2018 18:12)  T(F): 98.7 (27 Apr 2018 10:52), Max: 98.8 (26 Apr 2018 18:12)  HR: 64 (27 Apr 2018 13:00) (56 - 68)  BP: 133/67 (27 Apr 2018 13:00) (97/63 - 156/86)  BP(mean): 101 (27 Apr 2018 13:00) (72 - 113)  RR: 13 (27 Apr 2018 13:00) (6 - 19)  SpO2: 100% (27 Apr 2018 13:00) (96% - 100%)    PHYSICAL EXAMINATION:  General: patient intubated, in no distress.  Eyes: Conjunctiva and sclera clear.  Cardiovascular: Regular rate and rhythm; S1 and S2 Normal; No murmurs, gallops or rubs.  Neurologic:  - Mental Status: patient intubated and sedated. Not responding to verbal stimuli, but responsive to verbal stimuli.   - Cranial Nerves II-XII:    II:  Pupils are 3-4mm, equal, round, and reactive to light.  III, IV, VI:  unable to assess since patient is not following commands.   V:  unable to assess since patient is intubated and sedated.   VII:  unable to assess since patient is intubated and sedated.   VIII: unable to assess since patient is intubated and sedated.   IX, X:  unable to assess since patient is intubated.   XI:  unable to assess since patient is sedated. .  XII: unable to assess since patient is intubated and sedated.   - Motor:  moves all extremities upon tactile stimuli.   - Reflexes:  2+ in biceps, brachioradialis, triceps, knees, and ankles.   - Sensory: unable to assess since patient is intubated and sedated.   - Coordination:  unable to assess since patient is intubated and sedated.   - Gait:  unable to assess since patient is intubated and sedated.     LABORATORY STUDIES:                          7.5    4.7   )-----------( 38       ( 27 Apr 2018 12:49 )             23.0     04-27    147<H>  |  109<H>  |  53<H>  ----------------------------<  201<H>  4.8   |  28  |  1.32<H>    Ca    10.0      27 Apr 2018 05:26  Phos  4.4     04-27  Mg     2.1     04-27    PT/INR - ( 26 Apr 2018 05:11 )   PT: 14.2 sec;   INR: 1.27       PTT - ( 26 Apr 2018 05:11 )  PTT:40.3 sec

## 2018-04-27 NOTE — PROGRESS NOTE ADULT - ATTENDING COMMENTS
Reviewed with YUE Gamble acting as my scribe.  LP unsuccessful. Will reattempt after platelets increase or with transfusion.

## 2018-04-28 LAB
ANION GAP SERPL CALC-SCNC: 13 MMOL/L — SIGNIFICANT CHANGE UP (ref 5–17)
BUN SERPL-MCNC: 55 MG/DL — HIGH (ref 7–23)
CALCIUM SERPL-MCNC: 10.1 MG/DL — SIGNIFICANT CHANGE UP (ref 8.4–10.5)
CHLORIDE SERPL-SCNC: 108 MMOL/L — SIGNIFICANT CHANGE UP (ref 96–108)
CO2 SERPL-SCNC: 30 MMOL/L — SIGNIFICANT CHANGE UP (ref 22–31)
CREAT SERPL-MCNC: 1.4 MG/DL — HIGH (ref 0.5–1.3)
GLUCOSE BLDC GLUCOMTR-MCNC: 139 MG/DL — HIGH (ref 70–99)
GLUCOSE BLDC GLUCOMTR-MCNC: 140 MG/DL — HIGH (ref 70–99)
GLUCOSE BLDC GLUCOMTR-MCNC: 158 MG/DL — HIGH (ref 70–99)
GLUCOSE BLDC GLUCOMTR-MCNC: 172 MG/DL — HIGH (ref 70–99)
GLUCOSE BLDC GLUCOMTR-MCNC: 189 MG/DL — HIGH (ref 70–99)
GLUCOSE SERPL-MCNC: 127 MG/DL — HIGH (ref 70–99)
HCT VFR BLD CALC: 22.9 % — LOW (ref 34.5–45)
HGB BLD-MCNC: 7.3 G/DL — LOW (ref 11.5–15.5)
MAGNESIUM SERPL-MCNC: 2.3 MG/DL — SIGNIFICANT CHANGE UP (ref 1.6–2.6)
MCHC RBC-ENTMCNC: 30.2 PG — SIGNIFICANT CHANGE UP (ref 27–34)
MCHC RBC-ENTMCNC: 31.9 G/DL — LOW (ref 32–36)
MCV RBC AUTO: 94.6 FL — SIGNIFICANT CHANGE UP (ref 80–100)
PHOSPHATE SERPL-MCNC: 4.1 MG/DL — SIGNIFICANT CHANGE UP (ref 2.5–4.5)
PLATELET # BLD AUTO: 58 K/UL — LOW (ref 150–400)
POTASSIUM SERPL-MCNC: 5.2 MMOL/L — SIGNIFICANT CHANGE UP (ref 3.5–5.3)
POTASSIUM SERPL-SCNC: 5.2 MMOL/L — SIGNIFICANT CHANGE UP (ref 3.5–5.3)
RBC # BLD: 2.42 M/UL — LOW (ref 3.8–5.2)
RBC # FLD: 20.1 % — HIGH (ref 10.3–16.9)
SODIUM SERPL-SCNC: 151 MMOL/L — HIGH (ref 135–145)
WBC # BLD: 4.8 K/UL — SIGNIFICANT CHANGE UP (ref 3.8–10.5)
WBC # FLD AUTO: 4.8 K/UL — SIGNIFICANT CHANGE UP (ref 3.8–10.5)

## 2018-04-28 PROCEDURE — 99232 SBSQ HOSP IP/OBS MODERATE 35: CPT

## 2018-04-28 PROCEDURE — 99233 SBSQ HOSP IP/OBS HIGH 50: CPT | Mod: GC

## 2018-04-28 RX ORDER — DEXMEDETOMIDINE HYDROCHLORIDE IN 0.9% SODIUM CHLORIDE 4 UG/ML
0.2 INJECTION INTRAVENOUS
Qty: 200 | Refills: 0 | Status: DISCONTINUED | OUTPATIENT
Start: 2018-04-28 | End: 2018-05-01

## 2018-04-28 RX ORDER — ELECTROLYTE SOLUTION,INJ
1 VIAL (ML) INTRAVENOUS
Qty: 0 | Refills: 0 | Status: DISCONTINUED | OUTPATIENT
Start: 2018-04-28 | End: 2018-04-28

## 2018-04-28 RX ORDER — MICAFUNGIN SODIUM 100 MG/1
100 INJECTION, POWDER, LYOPHILIZED, FOR SOLUTION INTRAVENOUS DAILY
Qty: 0 | Refills: 0 | Status: DISCONTINUED | OUTPATIENT
Start: 2018-04-28 | End: 2018-05-16

## 2018-04-28 RX ORDER — SODIUM CHLORIDE 9 MG/ML
1000 INJECTION, SOLUTION INTRAVENOUS
Qty: 0 | Refills: 0 | Status: DISCONTINUED | OUTPATIENT
Start: 2018-04-28 | End: 2018-04-29

## 2018-04-28 RX ORDER — I.V. FAT EMULSION 20 G/100ML
0.27 EMULSION INTRAVENOUS
Qty: 25 | Refills: 0 | Status: DISCONTINUED | OUTPATIENT
Start: 2018-04-28 | End: 2018-04-28

## 2018-04-28 RX ADMIN — Medication 100 GRAM(S): at 19:48

## 2018-04-28 RX ADMIN — Medication 50 MILLILITER(S): at 09:56

## 2018-04-28 RX ADMIN — Medication 50 MILLILITER(S): at 17:28

## 2018-04-28 RX ADMIN — Medication 100 GRAM(S): at 16:15

## 2018-04-28 RX ADMIN — Medication 50 MILLILITER(S): at 04:00

## 2018-04-28 RX ADMIN — Medication 100 GRAM(S): at 04:00

## 2018-04-28 RX ADMIN — SODIUM CHLORIDE 30 MILLILITER(S): 9 INJECTION, SOLUTION INTRAVENOUS at 10:47

## 2018-04-28 RX ADMIN — Medication 1 EACH: at 17:27

## 2018-04-28 RX ADMIN — Medication 75 MICROGRAM(S): at 06:37

## 2018-04-28 RX ADMIN — Medication 2: at 17:26

## 2018-04-28 RX ADMIN — MEROPENEM 1000 MILLIGRAM(S): 1 INJECTION INTRAVENOUS at 17:27

## 2018-04-28 RX ADMIN — Medication 2: at 12:33

## 2018-04-28 RX ADMIN — MEROPENEM 1000 MILLIGRAM(S): 1 INJECTION INTRAVENOUS at 05:41

## 2018-04-28 RX ADMIN — Medication 100 GRAM(S): at 12:33

## 2018-04-28 RX ADMIN — CHLORHEXIDINE GLUCONATE 15 MILLILITER(S): 213 SOLUTION TOPICAL at 17:26

## 2018-04-28 RX ADMIN — CHLORHEXIDINE GLUCONATE 15 MILLILITER(S): 213 SOLUTION TOPICAL at 05:41

## 2018-04-28 RX ADMIN — Medication 2: at 00:52

## 2018-04-28 RX ADMIN — Medication 100 GRAM(S): at 07:00

## 2018-04-28 RX ADMIN — PANTOPRAZOLE SODIUM 40 MILLIGRAM(S): 20 TABLET, DELAYED RELEASE ORAL at 12:33

## 2018-04-28 RX ADMIN — MICAFUNGIN SODIUM 210 MILLIGRAM(S): 100 INJECTION, POWDER, LYOPHILIZED, FOR SOLUTION INTRAVENOUS at 12:33

## 2018-04-28 NOTE — PROGRESS NOTE ADULT - ASSESSMENT
79 y/o F with sepsis from open abdominal wound infection, MSSA bacteremia present on admission, and UTI present on admission found to have contained small bowel leak, acute respiratory failure, toxic metabolic encephalopathy, intubated and sedated, critically ill    Neuro: Will attempt to wean propofol/fentanyl, start precedex, S/p Unsuccessful LP yesterday, will reatemp via IR on Monday AMS: EEG:slowing Neurology consult stating poss LP, and MRI of brain with con. holding:  seroquel, aripiprazole, escitalapram   CV: HD stable s/p severe sepsis, holding amlodipine, 4/19 Echo  65-70%. metolazone 10mg once for diuresis with evidence of hypernatremia  Pulm: intubated, satting well on 40/490/12/5 chlorhex  GI: NPO, protonix, Dobhoff (no feeds), TPN, readjusted tpn for decreased NA due to hypernatremia Protonix  : Nicole S/p AKF on HD now resolved. Hypernatremia - giving d5 @ 30, decreased Na in TPN and giving metolazone. UTI (present on admission) + Uterine fibroids on CT scan with vaginal bleeding GYN recs are endometrial biopsy once clinical status improved.  ID: ESBL Ecoli in Urine resistant to Zosyn: Jose L( 4/17-) MSSA in blood: Oxacillin ( 4/17-) Kai virus (good hand washing), TTE neg for endocarditis. started Micafungin due to yeast growing in IR guided drain placement   Endo: ISS, Synthroid  Insulin  in TPN: 20   PPx: sqh held for low plts  PE:Sp IVC filter on 3/30 Plts improved today  Lines: PIV Rt IJ TLC from OSH ( 4/15-), s/p L Rad Art line( 4/15-4/18)   Wounds: Wound vac to midline incision , change MWF  PT/OT: ordered currently strict bed res 77 y/o F with sepsis from open abdominal wound infection, MSSA bacteremia present on admission, and UTI present on admission found to have contained small bowel leak, acute respiratory failure, toxic metabolic encephalopathy, intubated and sedated, critically ill    Neuro: Will attempt to wean propofol/fentanyl, start precedex, S/p Unsuccessful LP yesterday, will reattempt via IR on Monday if no improvement in AMS: EEG:slowing Neurology consult stating poss LP, and MRI of brain with con. holding:  seroquel, aripiprazole, escitalapram   CV: HD stable s/p severe sepsis, holding amlodipine, 4/19 Echo  65-70%. metolazone 10mg once for diuresis with evidence of hypernatremia  Pulm: intubated, satting well on 40/490/12/5 chlorhex  GI: NPO, protonix, Dobhoff (no feeds), TPN, readjusted tpn due to hypernatremia. Protonix  : Nicole S/p AKF on HD now resolved. Hypernatremia - giving d5 @ 30, decreased Na in TPN and giving metolazone. UTI (present on admission) + Uterine fibroids on CT scan with vaginal bleeding GYN recs are endometrial biopsy once clinical status improved.  ID: ESBL Ecoli in Urine resistant to Zosyn: Jose L( 4/17-) MSSA in blood: Oxacillin ( 4/17-) Kai virus (good hand washing), TTE neg for endocarditis. started Micafungin due to yeast growing in IR guided drain placement   Endo: ISS, Synthroid  Insulin  in TPN: 20   PPx: sqh held for low plts  PE:Sp IVC filter on 3/30 Plts improved today  Lines: PIV Rt IJ TLC from OSH ( 4/15-), s/p L Rad Art line( 4/15-4/18)   Wounds: Wound vac to midline incision , change MWF  PT/OT: ordered currently strict bed res

## 2018-04-28 NOTE — PROGRESS NOTE ADULT - SUBJECTIVE AND OBJECTIVE BOX
SUBJECTIVE: No events reported. LP attempted, unsuccessful. Propofol stopped around 10 AM    ROS: unable to obtain     Medications:  MEDICATIONS  (STANDING):  albumin human 25% IVPB 50 milliLiter(s) IV Intermittent every 8 hours  chlorhexidine 0.12% Liquid 15 milliLiter(s) Swish and Spit two times a day  dexmedetomidine Infusion 0.2 MICROgram(s)/kG/Hr (4.68 mL/Hr) IV Continuous <Continuous>  dextrose 5%. 1000 milliLiter(s) (50 mL/Hr) IV Continuous <Continuous>  dextrose 5%. 1000 milliLiter(s) (30 mL/Hr) IV Continuous <Continuous>  dextrose 50% Injectable 12.5 Gram(s) IV Push once  dextrose 50% Injectable 25 Gram(s) IV Push once  dextrose 50% Injectable 25 Gram(s) IV Push once  fat emulsion (Plant Based) 20% Infusion 0.27 Gm/kG/Day (10.42 mL/Hr) IV Continuous <Continuous>  fentaNYL   Infusion 0.534 MICROgram(s)/kG/Hr (5 mL/Hr) IV Continuous <Continuous>  insulin lispro (HumaLOG) corrective regimen sliding scale   SubCutaneous every 6 hours  levothyroxine Injectable 75 MICROGram(s) IV Push <User Schedule>  meropenem Injectable 1000 milliGRAM(s) IV Push every 12 hours  micafungin IVPB 100 milliGRAM(s) IV Intermittent daily  oxacillin IVPB      oxacillin IVPB 2 Gram(s) IV Intermittent every 4 hours  pantoprazole   Suspension 40 milliGRAM(s) Oral daily  Parenteral Nutrition - Adult 1 Each (47 mL/Hr) TPN Continuous <Continuous>  Parenteral Nutrition - Adult 1 Each (63 mL/Hr) TPN Continuous <Continuous>    MEDICATIONS  (PRN):  ALBUTerol/ipratropium for Nebulization 3 milliLiter(s) Nebulizer every 6 hours PRN Shortness of Breath and/or Wheezing  dextrose Gel 1 Dose(s) Oral once PRN Blood Glucose LESS THAN 70 milliGRAM(s)/deciliter  glucagon  Injectable 1 milliGRAM(s) IntraMuscular once PRN Glucose LESS THAN 70 milligrams/deciliter  ondansetron Injectable 4 milliGRAM(s) IV Push every 6 hours PRN Nausea    Exam:  gen: intubated, sedated on Precedex and Fentanyl drip  HEENT: trachea midline, no jaundice or icterus.  Ext: well-perfused  Alertness: opens eyes briefly to verbal/tactile/noxious. Followed no commands.   II: no BTT  III, IV, VI: No nystagmus. PERRLA 3>2 bilaterally. She did not track. Dolls +, +Gag reflex  VII: No flattening of nasolabial fold.   Motor: no atrophy or fasciculations. No tremor. No hypo/hyperkinesia. Tone normal. She moves all limbs spontaneously without asymmetry, and withdraws all limbs to NB pressure without asymmetry.      Labs:  CBC Full  -  ( 28 Apr 2018 06:14 )  WBC Count : 4.8 K/uL  Hemoglobin : 7.3 g/dL  Hematocrit : 22.9 %  Platelet Count - Automated : 58 K/uL  Mean Cell Volume : 94.6 fL  Mean Cell Hemoglobin : 30.2 pg  Mean Cell Hemoglobin Concentration : 31.9 g/dL  Auto Neutrophil # : x  Auto Lymphocyte # : x  Auto Monocyte # : x  Auto Eosinophil # : x  Auto Basophil # : x  Auto Neutrophil % : x  Auto Lymphocyte % : x  Auto Monocyte % : x  Auto Eosinophil % : x  Auto Basophil % : x    04-28    151<H>  |  108  |  55<H>  ----------------------------<  127<H>  5.2   |  30  |  1.40<H>    Ca    10.1      28 Apr 2018 06:14  Phos  4.1     04-28  Mg     2.3     04-28      CAPILLARY BLOOD GLUCOSE      POCT Blood Glucose.: 189 mg/dL (28 Apr 2018 12:27)  POCT Blood Glucose.: 139 mg/dL (28 Apr 2018 06:16)  POCT Blood Glucose.: 172 mg/dL (28 Apr 2018 00:48)  POCT Blood Glucose.: 179 mg/dL (27 Apr 2018 20:58)  POCT Blood Glucose.: 181 mg/dL (27 Apr 2018 17:20)            Vitals:  Vital Signs Last 24 Hrs  T(C): 36.8 (28 Apr 2018 09:04), Max: 37.1 (28 Apr 2018 00:36)  T(F): 98.3 (28 Apr 2018 09:04), Max: 98.7 (28 Apr 2018 00:36)  HR: 72 (28 Apr 2018 12:00) (60 - 94)  BP: 105/52 (28 Apr 2018 12:00) (102/55 - 136/65)  BP(mean): 76 (28 Apr 2018 12:00) (69 - 114)  RR: 12 (28 Apr 2018 12:00) (11 - 79)  SpO2: 99% (28 Apr 2018 12:00) (94% - 100%)

## 2018-04-28 NOTE — PROGRESS NOTE ADULT - ASSESSMENT
77 y/o woman, HTN, DM, and hypothyroidism who presents as a transfer from OSH for management of multifactorial sepsis. Neurology was consulted for evaluation of altered mental status. CT head 4/22/18 was notable for left parafalcine partially calcified meningioma, mild diffuse volume loss, otherwise unremarkable. The pt's mental status has not been improving despite being on antibiotics. Her exam is currently limited by sedation, but further work-up for AMS is appropriate. The patient had EEG 4/25->4/26 which was unremarkable, though it was a technically poor study,  - Wean sedation to re-assess mental status   - LP is reasonable to assess for an encephalitic process if continued AMS despite being on antibiotics. Please check protein, glucose, cell count, bacterial/fungal culture, Gram stain.   - MRI brain w/o con to assess for structural etiologies of AMS (e.g. infarcts, inflammation, hemorrhage).  - recommend repeat EEG considering the last one was a poor study.

## 2018-04-28 NOTE — PROGRESS NOTE ADULT - SUBJECTIVE AND OBJECTIVE BOX
Seen and examined at bedside. No overnight events. Intubated and sedated.  Unable to obtain ROS due to mental status.     Vital Signs Last 24 Hrs  T(C): 36.8 (28 Apr 2018 09:04), Max: 37.1 (27 Apr 2018 10:52)  T(F): 98.3 (28 Apr 2018 09:04), Max: 98.7 (27 Apr 2018 10:52)  HR: 76 (28 Apr 2018 09:00) (60 - 94)  BP: 109/56 (28 Apr 2018 09:00) (97/63 - 136/65)  BP(mean): 71 (28 Apr 2018 09:00) (69 - 114)  RR: 12 (28 Apr 2018 09:00) (11 - 79)  SpO2: 100% (28 Apr 2018 09:00) (94% - 100%)  CAPILLARY BLOOD GLUCOSE      POCT Blood Glucose.: 139 mg/dL (28 Apr 2018 06:16)  POCT Blood Glucose.: 172 mg/dL (28 Apr 2018 00:48)  POCT Blood Glucose.: 179 mg/dL (27 Apr 2018 20:58)  POCT Blood Glucose.: 181 mg/dL (27 Apr 2018 17:20)  POCT Blood Glucose.: 173 mg/dL (27 Apr 2018 11:11)      04-27 @ 07:01 - 04-28 @ 07:00  --------------------------------------------------------  IN: 2086.1 mL / OUT: 2485 mL / NET: -398.9 mL    04-28 @ 07:01 - 04-28 @ 09:31  --------------------------------------------------------  IN: 135.3 mL / OUT: 175 mL / NET: -39.7 mL    Neuro: Intubated and sedated  Pulm: inspiratory crackles b/l  CV: RRR, reg s1 s2 no murmur  Abd: obese, soft, ND, no focal tenderness, umbilical wound vac in place with good seal, + Ecchymosis to left LQ, +Anasarca  : suazo to gravity  Vasc: b/l DP/PT pulses  MSK: no joint swelling  Extremities: WWP, 3+pitting edema Lext & Uext  b/l   Skin: no rash    Mode: AC/ CMV (Assist Control/ Continuous Mandatory Ventilation)  RR (machine): 12  TV (machine): 490  FiO2: 40  PEEP: 5  ITime: 1  MAP: 9.4  PIP: 26    albumin human 25% IVPB 50 milliLiter(s) IV Intermittent every 8 hours  ALBUTerol/ipratropium for Nebulization 3 milliLiter(s) Nebulizer every 6 hours PRN  chlorhexidine 0.12% Liquid 15 milliLiter(s) Swish and Spit two times a day  dexmedetomidine Infusion 0.2 MICROgram(s)/kG/Hr IV Continuous <Continuous>  dextrose 5%. 1000 milliLiter(s) IV Continuous <Continuous>  dextrose 5%. 1000 milliLiter(s) IV Continuous <Continuous>  dextrose 50% Injectable 12.5 Gram(s) IV Push once  dextrose 50% Injectable 25 Gram(s) IV Push once  dextrose 50% Injectable 25 Gram(s) IV Push once  dextrose Gel 1 Dose(s) Oral once PRN  fat emulsion (Plant Based) 20% Infusion 0.27 Gm/kG/Day IV Continuous <Continuous>  fentaNYL   Infusion 0.534 MICROgram(s)/kG/Hr IV Continuous <Continuous>  glucagon  Injectable 1 milliGRAM(s) IntraMuscular once PRN  insulin lispro (HumaLOG) corrective regimen sliding scale   SubCutaneous every 6 hours  levothyroxine Injectable 75 MICROGram(s) IV Push <User Schedule>  meropenem Injectable 1000 milliGRAM(s) IV Push every 12 hours  metolazone 10 milliGRAM(s) Oral once  micafungin IVPB 100 milliGRAM(s) IV Intermittent daily  ondansetron Injectable 4 milliGRAM(s) IV Push every 6 hours PRN  oxacillin IVPB      oxacillin IVPB 2 Gram(s) IV Intermittent every 4 hours  pantoprazole   Suspension 40 milliGRAM(s) Oral daily  Parenteral Nutrition - Adult 1 Each TPN Continuous <Continuous>  Parenteral Nutrition - Adult 1 Each TPN Continuous <Continuous>    LABS:      CBC Full  -  ( 28 Apr 2018 06:14 )  WBC Count : 4.8 K/uL  Hemoglobin : 7.3 g/dL  Hematocrit : 22.9 %  Platelet Count - Automated : 58 K/uL  Mean Cell Volume : 94.6 fL  Mean Cell Hemoglobin : 30.2 pg  Mean Cell Hemoglobin Concentration : 31.9 g/dL  Auto Neutrophil # : x  Auto Lymphocyte # : x  Auto Monocyte # : x  Auto Eosinophil # : x  Auto Basophil # : x  Auto Neutrophil % : x  Auto Lymphocyte % : x  Auto Monocyte % : x  Auto Eosinophil % : x  Auto Basophil % : x    04-28    151<H>  |  108  |  55<H>  ----------------------------<  127<H>  5.2   |  30  |  1.40<H>    Ca    10.1      28 Apr 2018 06:14  Phos  4.1     04-28  Mg     2.3     04-28

## 2018-04-29 LAB
ANION GAP SERPL CALC-SCNC: 8 MMOL/L — SIGNIFICANT CHANGE UP (ref 5–17)
BLD GP AB SCN SERPL QL: NEGATIVE — SIGNIFICANT CHANGE UP
BUN SERPL-MCNC: 55 MG/DL — HIGH (ref 7–23)
CALCIUM SERPL-MCNC: 10.3 MG/DL — SIGNIFICANT CHANGE UP (ref 8.4–10.5)
CHLORIDE SERPL-SCNC: 106 MMOL/L — SIGNIFICANT CHANGE UP (ref 96–108)
CO2 SERPL-SCNC: 32 MMOL/L — HIGH (ref 22–31)
CREAT SERPL-MCNC: 1.44 MG/DL — HIGH (ref 0.5–1.3)
GLUCOSE BLDC GLUCOMTR-MCNC: 108 MG/DL — HIGH (ref 70–99)
GLUCOSE BLDC GLUCOMTR-MCNC: 115 MG/DL — HIGH (ref 70–99)
GLUCOSE BLDC GLUCOMTR-MCNC: 164 MG/DL — HIGH (ref 70–99)
GLUCOSE BLDC GLUCOMTR-MCNC: 168 MG/DL — HIGH (ref 70–99)
GLUCOSE SERPL-MCNC: 153 MG/DL — HIGH (ref 70–99)
HCT VFR BLD CALC: 22 % — LOW (ref 34.5–45)
HGB BLD-MCNC: 6.7 G/DL — CRITICAL LOW (ref 11.5–15.5)
MAGNESIUM SERPL-MCNC: 2.4 MG/DL — SIGNIFICANT CHANGE UP (ref 1.6–2.6)
MCHC RBC-ENTMCNC: 29.8 PG — SIGNIFICANT CHANGE UP (ref 27–34)
MCHC RBC-ENTMCNC: 30.5 G/DL — LOW (ref 32–36)
MCV RBC AUTO: 97.8 FL — SIGNIFICANT CHANGE UP (ref 80–100)
PHOSPHATE SERPL-MCNC: 3.7 MG/DL — SIGNIFICANT CHANGE UP (ref 2.5–4.5)
PLATELET # BLD AUTO: 69 K/UL — LOW (ref 150–400)
POTASSIUM SERPL-MCNC: 4.8 MMOL/L — SIGNIFICANT CHANGE UP (ref 3.5–5.3)
POTASSIUM SERPL-SCNC: 4.8 MMOL/L — SIGNIFICANT CHANGE UP (ref 3.5–5.3)
RBC # BLD: 2.25 M/UL — LOW (ref 3.8–5.2)
RBC # FLD: 20.2 % — HIGH (ref 10.3–16.9)
RH IG SCN BLD-IMP: POSITIVE — SIGNIFICANT CHANGE UP
SODIUM SERPL-SCNC: 146 MMOL/L — HIGH (ref 135–145)
WBC # BLD: 4.5 K/UL — SIGNIFICANT CHANGE UP (ref 3.8–10.5)
WBC # FLD AUTO: 4.5 K/UL — SIGNIFICANT CHANGE UP (ref 3.8–10.5)

## 2018-04-29 PROCEDURE — 99233 SBSQ HOSP IP/OBS HIGH 50: CPT | Mod: GC

## 2018-04-29 PROCEDURE — 99233 SBSQ HOSP IP/OBS HIGH 50: CPT

## 2018-04-29 PROCEDURE — 71045 X-RAY EXAM CHEST 1 VIEW: CPT | Mod: 26

## 2018-04-29 PROCEDURE — 71045 X-RAY EXAM CHEST 1 VIEW: CPT | Mod: 26,77

## 2018-04-29 RX ORDER — I.V. FAT EMULSION 20 G/100ML
0.54 EMULSION INTRAVENOUS
Qty: 50 | Refills: 0 | Status: DISCONTINUED | OUTPATIENT
Start: 2018-04-29 | End: 2018-04-29

## 2018-04-29 RX ORDER — FUROSEMIDE 40 MG
80 TABLET ORAL ONCE
Qty: 0 | Refills: 0 | Status: COMPLETED | OUTPATIENT
Start: 2018-04-29 | End: 2018-04-29

## 2018-04-29 RX ORDER — ELECTROLYTE SOLUTION,INJ
1 VIAL (ML) INTRAVENOUS
Qty: 0 | Refills: 0 | Status: DISCONTINUED | OUTPATIENT
Start: 2018-04-29 | End: 2018-04-29

## 2018-04-29 RX ADMIN — MEROPENEM 1000 MILLIGRAM(S): 1 INJECTION INTRAVENOUS at 05:56

## 2018-04-29 RX ADMIN — CHLORHEXIDINE GLUCONATE 15 MILLILITER(S): 213 SOLUTION TOPICAL at 05:58

## 2018-04-29 RX ADMIN — Medication 50 MILLILITER(S): at 05:55

## 2018-04-29 RX ADMIN — FENTANYL CITRATE 5 MICROGRAM(S)/KG/HR: 50 INJECTION INTRAVENOUS at 18:56

## 2018-04-29 RX ADMIN — Medication 75 MICROGRAM(S): at 05:56

## 2018-04-29 RX ADMIN — Medication 100 GRAM(S): at 22:33

## 2018-04-29 RX ADMIN — Medication 2: at 06:36

## 2018-04-29 RX ADMIN — Medication 100 GRAM(S): at 00:12

## 2018-04-29 RX ADMIN — PANTOPRAZOLE SODIUM 40 MILLIGRAM(S): 20 TABLET, DELAYED RELEASE ORAL at 12:28

## 2018-04-29 RX ADMIN — Medication 50 MILLILITER(S): at 18:54

## 2018-04-29 RX ADMIN — Medication 100 GRAM(S): at 16:35

## 2018-04-29 RX ADMIN — MICAFUNGIN SODIUM 210 MILLIGRAM(S): 100 INJECTION, POWDER, LYOPHILIZED, FOR SOLUTION INTRAVENOUS at 12:28

## 2018-04-29 RX ADMIN — Medication 100 GRAM(S): at 09:12

## 2018-04-29 RX ADMIN — Medication 50 MILLILITER(S): at 12:29

## 2018-04-29 RX ADMIN — Medication 100 GRAM(S): at 05:56

## 2018-04-29 RX ADMIN — Medication 2: at 17:59

## 2018-04-29 RX ADMIN — Medication 80 MILLIGRAM(S): at 09:08

## 2018-04-29 RX ADMIN — Medication 100 GRAM(S): at 13:59

## 2018-04-29 RX ADMIN — I.V. FAT EMULSION 31.59 GM/KG/DAY: 20 EMULSION INTRAVENOUS at 22:33

## 2018-04-29 RX ADMIN — DEXMEDETOMIDINE HYDROCHLORIDE IN 0.9% SODIUM CHLORIDE 4.68 MICROGRAM(S)/KG/HR: 4 INJECTION INTRAVENOUS at 12:28

## 2018-04-29 RX ADMIN — MEROPENEM 1000 MILLIGRAM(S): 1 INJECTION INTRAVENOUS at 17:59

## 2018-04-29 RX ADMIN — CHLORHEXIDINE GLUCONATE 15 MILLILITER(S): 213 SOLUTION TOPICAL at 17:59

## 2018-04-29 NOTE — PROGRESS NOTE ADULT - ASSESSMENT
79 y/o F with sepsis from open abdominal wound infection, MSSA bacteremia present on admission, and UTI present on admission found to have contained small bowel leak    Neuro: precedex gtt, fentanyl gtt, Zofran prn, AMS: EEG:slowing Neurology consult stating poss LP, and MRI of brain with con. holding:  seroquel, aripiprazole, escitalapram   CV: HD stable s/p severe sepsis, albumin 25%q8, holding amlodipine, 4/19 Echo  65-70%. Lasix prn  Pulm: intubated, satting well on 40/490/12/5 chlorhex  GI: NPO, protonix, Dobhoff (no feeds), TPN Protonix  : Nicole S/p AKF on HD now resolved. UTI (present on admission) + Uterine fibroids on CT scan with vaginal bleeding will consult GYN   ID: ESBL Ecoli in Urine resistant to Zosyn: Jose L( 4/17-) MSSA in blood: Oxacillin ( 4/17-) Kai virus (good hand washing), TTE neg for endocarditis. IR drainage today for pelvic collection drainage.   Endo: ISS, Synthroid  Insulin  in TPN: 25   PPx: sqh held for low plts  PE:Sp IVC filter on 3/30 Plts low- will give 1U b/f IR drainage  Lines: PIV Rt IJ TLC from OSH ( 4/15-), s/p L Rad Art line( 4/15-4/18)   Wounds: Wound vac to midline incision , change MWF  PT/OT: ordered currently strict bed rest. 79 y/o F with sepsis from open abdominal wound infection, MSSA bacteremia present on admission, and UTI present on admission found to have contained small bowel leak now with acute respiratory failure, ATN, fluid overload, toxic metabolic encephalopathy, anemia of acute blood loss    Neuro: precedex gtt, fentanyl gtt, Zofran prn, AMS: EEG:slowing Neurology consult stating poss LP, and MRI of brain with con. holding:  seroquel, aripiprazole, escitalapram   CV: HD stable s/p severe sepsis, albumin 25%q8, holding amlodipine, 4/19 Echo  65-70%. Lasix given to aim for negative fluid balance  Pulm: intubated, satting well on 40/490/12/5 chlorhex  GI: NPO, protonix, Dobhoff (no feeds), TPN Protonix  : Nicole S/p AKF on HD now resolved. UTI (present on admission) + Uterine fibroids on CT scan with vaginal bleeding will consult GYN   ID: ESBL Ecoli in Urine resistant to Zosyn: Jose L( 4/17-) MSSA in blood: Oxacillin ( 4/17-) Kai virus (good hand washing), TTE neg for endocarditis. IR drainage today for pelvic collection drainage.   Endo: ISS, Synthroid  Insulin  in TPN: 25   HEME: transfuse one unit PRBC  PPx: sqh held for low plts  PE:Sp IVC filter on 3/30 Plts low- will give 1U b/f IR drainage  Lines: PIV Rt IJ TLC from OSH ( 4/15-), s/p L Rad Art line( 4/15-4/18)   Wounds: Wound vac to midline incision , change MWF  PT/OT: ordered currently strict bed rest.

## 2018-04-29 NOTE — PROGRESS NOTE ADULT - ASSESSMENT
78 year old female, morbidly obesity, DM, ventral hernia repair with mesh 3/2 c/b wound dehiscence, recent ATN requiring temporary HD (Permacath removed 4/10), who presents as a transfer from OSH for management of multifactorial sepsis and open abdominal wound. Patient found to have ESBL-producing E.Coli UTI and MSSA bacteremia, origin likely coming from abdomen/pelvis vs. prior HD catheter. Patient intubated 4/25 for acute hypoxic respiratory failure.  CT 4/18 showed SB perforation with pelvic collection, still present on CT on 4/25.  She is s/p IR drainage of collection, with culture growing yeast.  Would like to begin removing antibiotics.  Suggest:  - KEENA - if negative, would complete 14 d course of oxacillin from first negative culture (4/17;  last day 4/30)  - CT A/P to eval for persistent/recurrent collection - to be done later in week, per SICU team  - Continue meropenem 1 g IV q12h  - Continue micafungin 100 mg IV q24h.  Will f/u ID/susceptibility of yeast from drainage.  Recommendations discussed with primary team.  Will continue to follow with you – ID service.

## 2018-04-29 NOTE — PROGRESS NOTE ADULT - SUBJECTIVE AND OBJECTIVE BOX
S: Pt unresponsive to verbal stimulus, ROS deferred.     Vitals: Vital Signs Last 24 Hrs  T(C): 36.4 (29 Apr 2018 09:00), Max: 37.7 (28 Apr 2018 18:23)  T(F): 97.5 (29 Apr 2018 09:00), Max: 99.9 (28 Apr 2018 18:23)  HR: 60 (29 Apr 2018 13:00) (60 - 90)  BP: 128/61 (29 Apr 2018 13:00) (93/58 - 149/57)  BP(mean): 82 (29 Apr 2018 13:00) (68 - 95)  RR: 20 (29 Apr 2018 13:00) (11 - 33)  SpO2: 99% (29 Apr 2018 13:00) (96% - 100%)          I&O:  I&O's Detail    28 Apr 2018 07:01  -  29 Apr 2018 07:00  --------------------------------------------------------  IN:    Albumin 25%: 150 mL    dexmedetomidine Infusion: 93.3 mL    dextrose 5%.: 300 mL    Enteral Tube Flush: 30 mL    Fat Emulsion 20%: 83.2 mL    fentaNYL  Infusion: 46.6 mL    propofol Infusion: 10 mL    Solution: 50 mL    Solution: 100 mL    Solution: 350 mL    TPN (Total Parenteral Nutrition): 1368 mL  Total IN: 2581.1 mL    OUT:    Bulb: 125 mL    Indwelling Catheter - Urethral: 1805 mL    Rectal Tube: 200 mL    VAC (Vacuum Assisted Closure) System: 300 mL  Total OUT: 2430 mL    Total NET: 151.1 mL      29 Apr 2018 07:01  -  29 Apr 2018 14:05  --------------------------------------------------------  IN:    Albumin 25%: 50 mL    dexmedetomidine Infusion: 32.9 mL    fentaNYL  Infusion: 12.6 mL    Solution: 50 mL    TPN (Total Parenteral Nutrition): 441 mL  Total IN: 586.5 mL    OUT:    Bulb: 30 mL    Indwelling Catheter - Urethral: 1475 mL  Total OUT: 1505 mL    Total NET: -918.5 mL        ICU Vital Signs Last 24 Hrs  T(C): 36.4 (29 Apr 2018 09:00), Max: 37.7 (28 Apr 2018 18:23)  T(F): 97.5 (29 Apr 2018 09:00), Max: 99.9 (28 Apr 2018 18:23)  HR: 60 (29 Apr 2018 13:00) (60 - 90)  BP: 128/61 (29 Apr 2018 13:00) (93/58 - 149/57)  BP(mean): 82 (29 Apr 2018 13:00) (68 - 95)  ABP: --  ABP(mean): --  RR: 20 (29 Apr 2018 13:00) (11 - 33)  SpO2: 99% (29 Apr 2018 13:00) (96% - 100%)    Mode: AC/ CMV (Assist Control/ Continuous Mandatory Ventilation)  RR (machine): 12  TV (machine): 490  FiO2: 40  PEEP: 5  ITime: 1  MAP: 9  PIP: 26    CAPILLARY BLOOD GLUCOSE      POCT Blood Glucose.: 115 mg/dL (29 Apr 2018 12:12)  POCT Blood Glucose.: 168 mg/dL (29 Apr 2018 06:21)  POCT Blood Glucose.: 140 mg/dL (28 Apr 2018 23:50)  POCT Blood Glucose.: 158 mg/dL (28 Apr 2018 17:08)      Labs:                         6.7    4.5   )-----------( 69       ( 29 Apr 2018 06:43 )             22.0   04-29    146<H>  |  106  |  55<H>  ----------------------------<  153<H>  4.8   |  32<H>  |  1.44<H>    Ca    10.3      29 Apr 2018 06:43  Phos  3.7     04-29  Mg     2.4     04-29        Electrolytes repleated to goals as follows: Potassium 4, Phosphorus 3 and Magnesium 2 where appropriate and necessary    Exam:  Neuro: Turns head and attempts to open eyes without focus to name calling, moves spontaneously but not to command, unable to asses motor strength  HEENT: PERRL < EOMI, MMM  Neck: Supple  CV: RRR, no MRGs  Pulm: Scattered coarse rhonchi, decreased lower field breath soundsA  Abd: Obese, BS (+), Soft, NT, wound vac in place with suction intact, LLQ ecchymosis healing  : Nicole in place  Ext: Global 2+ edema  Vasc: Extremities warm to palpation, 2+ pulses - radial and PT  MSK: no joint swelling  Skin: no rash

## 2018-04-29 NOTE — PROGRESS NOTE ADULT - SUBJECTIVE AND OBJECTIVE BOX
INTERVAL HPI/OVERNIGHT EVENTS:    CONSTITUTIONAL:  No fever, chills, night sweats  EYES:  No photophobia or visual changes  CARDIOVASCULAR:  No chest pain  RESPIRATORY:  No cough, wheezing, or SOB   GASTROINTESTINAL:  No nausea, vomiting, diarrhea, constipation, or abdominal pain  GENITOURINARY:  No frequency, urgency, dysuria or hematuria  NEUROLOGIC:  No headache, lightheadedness      ANTIBIOTICS/RELEVANT:    Oxacillin 2 g IV q4h (4/17-present)  Meropenem 1 g IV q12h (4/17-present)  Micafungin (4/28-present)    Vital Signs Last 24 Hrs  T(C): 36.4 (29 Apr 2018 17:21), Max: 37.2 (29 Apr 2018 01:00)  T(F): 97.5 (29 Apr 2018 17:21), Max: 99 (29 Apr 2018 01:00)  HR: 52 (29 Apr 2018 19:00) (52 - 77)  BP: 131/54 (29 Apr 2018 19:00) (93/58 - 139/59)  BP(mean): 76 (29 Apr 2018 19:00) (68 - 86)  RR: 11 (29 Apr 2018 19:00) (11 - 24)  SpO2: 98% (29 Apr 2018 19:00) (96% - 100%)    PHYSICAL EXAM:  Constitutional:  Obtunded, on vent  Eyes:  Sclerae anicteric, conjunctivae clear, PERRL  Ear/Nose/Throat:  No nasal exudate or sinus tenderness;  No buccal mucosal lesions, no pharyngeal erythema or exudate	  Neck:  Supple, no adenopathy  Respiratory:  Clear bilaterally  Cardiovascular:  RRR, S1S2, no murmur appreciated  Gastrointestinal:  Symmetric, normoactive BS, soft, NT, no masses, guarding or rebound.  No HSM  Extremities:  No edema  Neuro:  Very lethargic, trying to open eyes      LABS:                        6.7    4.5   )-----------( 69       ( 29 Apr 2018 06:43 )             22.0         04-29    146<H>  |  106  |  55<H>  ----------------------------<  153<H>  4.8   |  32<H>  |  1.44<H>    Ca    10.3      29 Apr 2018 06:43  Phos  3.7     04-29  Mg     2.4     04-29            MICROBIOLOGY:    4/26:  left pelvic fluid:  few-mod yeast.    RADIOLOGY & ADDITIONAL STUDIES:  < from: Xray Chest 1 View- PORTABLE-Urgent (04.29.18 @ 08:59) >  An AP portable view of the chest is compared to 4/25/2018. Limited study   since the patient is markedly rotated to the right. Tip of endotracheal   tube is 4 cm above the tonio. NG tube has its tip below the diaphragm   but not included on this film. Right central venous catheter with the tip   in the distal SVC/right atrial junction. No pneumothorax. Moderate right   pleural effusion is unchanged. Mild pulmonary venous congestion is   unchanged. There is an abrupt cut off of the right lower lobe bronchus.   Right lower lobe opacification compatible with atelectasis or infiltrates.    IMPRESSION:  Abrupt cut off of the right lower lobe bronchus suggesting bronchial   occlusion.     < end of copied text > INTERVAL HPI/OVERNIGHT EVENTS:  Afebrile    ROS:  Unobtainable, pt obtunded      ANTIBIOTICS/RELEVANT:    Oxacillin 2 g IV q4h (4/17-present)  Meropenem 1 g IV q12h (4/17-present)  Micafungin (4/28-present)    Vital Signs Last 24 Hrs  T(C): 36.4 (29 Apr 2018 17:21), Max: 37.2 (29 Apr 2018 01:00)  T(F): 97.5 (29 Apr 2018 17:21), Max: 99 (29 Apr 2018 01:00)  HR: 52 (29 Apr 2018 19:00) (52 - 77)  BP: 131/54 (29 Apr 2018 19:00) (93/58 - 139/59)  BP(mean): 76 (29 Apr 2018 19:00) (68 - 86)  RR: 11 (29 Apr 2018 19:00) (11 - 24)  SpO2: 98% (29 Apr 2018 19:00) (96% - 100%)    PHYSICAL EXAM:  Constitutional:  Obtunded, on vent  Eyes:  Sclerae anicteric, conjunctivae clear, PERRL  Ear/Nose/Throat:  No nasal exudate or sinus tenderness;  No buccal mucosal lesions, no pharyngeal erythema or exudate	  Neck:  Supple, no adenopathy  Respiratory:  Clear bilaterally  Cardiovascular:  RRR, S1S2, no murmur appreciated  Gastrointestinal:  Obese, wound vac in place, winces to pressure  Extremities:  No edema  Neuro:  Very lethargic, trying to open eyes to name      LABS:                        6.7    4.5   )-----------( 69       ( 29 Apr 2018 06:43 )             22.0         04-29    146<H>  |  106  |  55<H>  ----------------------------<  153<H>  4.8   |  32<H>  |  1.44<H>    Ca    10.3      29 Apr 2018 06:43  Phos  3.7     04-29  Mg     2.4     04-29            MICROBIOLOGY:    4/26:  left pelvic fluid:  few-mod yeast.    RADIOLOGY & ADDITIONAL STUDIES:  < from: Xray Chest 1 View- PORTABLE-Urgent (04.29.18 @ 08:59) >  An AP portable view of the chest is compared to 4/25/2018. Limited study   since the patient is markedly rotated to the right. Tip of endotracheal   tube is 4 cm above the tonio. NG tube has its tip below the diaphragm   but not included on this film. Right central venous catheter with the tip   in the distal SVC/right atrial junction. No pneumothorax. Moderate right   pleural effusion is unchanged. Mild pulmonary venous congestion is   unchanged. There is an abrupt cut off of the right lower lobe bronchus.   Right lower lobe opacification compatible with atelectasis or infiltrates.    IMPRESSION:  Abrupt cut off of the right lower lobe bronchus suggesting bronchial   occlusion.     < end of copied text >

## 2018-04-30 LAB
ALBUMIN SERPL ELPH-MCNC: 3.1 G/DL — LOW (ref 3.3–5)
ALP SERPL-CCNC: 67 U/L — SIGNIFICANT CHANGE UP (ref 40–120)
ALT FLD-CCNC: 10 U/L — SIGNIFICANT CHANGE UP (ref 10–45)
ANION GAP SERPL CALC-SCNC: 8 MMOL/L — SIGNIFICANT CHANGE UP (ref 5–17)
APPEARANCE CSF: CLEAR — SIGNIFICANT CHANGE UP
APPEARANCE SPUN FLD: COLORLESS — SIGNIFICANT CHANGE UP
AST SERPL-CCNC: 22 U/L — SIGNIFICANT CHANGE UP (ref 10–40)
BILIRUB DIRECT SERPL-MCNC: 0.8 MG/DL — HIGH (ref 0–0.2)
BILIRUB INDIRECT FLD-MCNC: 0.5 MG/DL — SIGNIFICANT CHANGE UP (ref 0.2–1)
BILIRUB SERPL-MCNC: 1.3 MG/DL — HIGH (ref 0.2–1.2)
BUN SERPL-MCNC: 60 MG/DL — HIGH (ref 7–23)
CALCIUM SERPL-MCNC: 10.3 MG/DL — SIGNIFICANT CHANGE UP (ref 8.4–10.5)
CHLORIDE SERPL-SCNC: 101 MMOL/L — SIGNIFICANT CHANGE UP (ref 96–108)
CO2 SERPL-SCNC: 35 MMOL/L — HIGH (ref 22–31)
COLOR CSF: (no result)
CREAT SERPL-MCNC: 1.33 MG/DL — HIGH (ref 0.5–1.3)
CSF COMMENTS: SIGNIFICANT CHANGE UP
CSF PCR RESULT: SIGNIFICANT CHANGE UP
GLUCOSE BLDC GLUCOMTR-MCNC: 122 MG/DL — HIGH (ref 70–99)
GLUCOSE BLDC GLUCOMTR-MCNC: 182 MG/DL — HIGH (ref 70–99)
GLUCOSE CSF-MCNC: 98 MG/DL — HIGH (ref 40–70)
GLUCOSE SERPL-MCNC: 192 MG/DL — HIGH (ref 70–99)
GRAM STN FLD: SIGNIFICANT CHANGE UP
HCT VFR BLD CALC: 23.6 % — LOW (ref 34.5–45)
HGB BLD-MCNC: 7.5 G/DL — LOW (ref 11.5–15.5)
MAGNESIUM SERPL-MCNC: 2.4 MG/DL — SIGNIFICANT CHANGE UP (ref 1.6–2.6)
MCHC RBC-ENTMCNC: 30.5 PG — SIGNIFICANT CHANGE UP (ref 27–34)
MCHC RBC-ENTMCNC: 31.8 G/DL — LOW (ref 32–36)
MCV RBC AUTO: 95.9 FL — SIGNIFICANT CHANGE UP (ref 80–100)
MONOS+MACROS NFR CSF: 1 % — LOW (ref 15–45)
NEUTROPHILS # CSF: 0 % — SIGNIFICANT CHANGE UP (ref 0–6)
NRBC NFR CSF: 1 /UL — SIGNIFICANT CHANGE UP (ref 0–5)
PHOSPHATE SERPL-MCNC: 3.8 MG/DL — SIGNIFICANT CHANGE UP (ref 2.5–4.5)
PLATELET # BLD AUTO: 74 K/UL — LOW (ref 150–400)
POTASSIUM SERPL-MCNC: 3.8 MMOL/L — SIGNIFICANT CHANGE UP (ref 3.5–5.3)
POTASSIUM SERPL-SCNC: 3.8 MMOL/L — SIGNIFICANT CHANGE UP (ref 3.5–5.3)
PROT CSF-MCNC: 30 MG/DL — SIGNIFICANT CHANGE UP (ref 15–45)
PROT SERPL-MCNC: 5.1 G/DL — LOW (ref 6–8.3)
RBC # BLD: 2.46 M/UL — LOW (ref 3.8–5.2)
RBC # CSF: 4600 /UL — HIGH (ref 0–0)
RBC # FLD: 19.4 % — HIGH (ref 10.3–16.9)
SODIUM SERPL-SCNC: 144 MMOL/L — SIGNIFICANT CHANGE UP (ref 135–145)
SPECIMEN SOURCE: SIGNIFICANT CHANGE UP
TUBE TYPE: SIGNIFICANT CHANGE UP
WBC # BLD: 5.7 K/UL — SIGNIFICANT CHANGE UP (ref 3.8–10.5)
WBC # FLD AUTO: 5.7 K/UL — SIGNIFICANT CHANGE UP (ref 3.8–10.5)

## 2018-04-30 PROCEDURE — 93312 ECHO TRANSESOPHAGEAL: CPT | Mod: 26

## 2018-04-30 PROCEDURE — 71045 X-RAY EXAM CHEST 1 VIEW: CPT | Mod: 26

## 2018-04-30 PROCEDURE — 99233 SBSQ HOSP IP/OBS HIGH 50: CPT | Mod: GC

## 2018-04-30 PROCEDURE — 77003 FLUOROGUIDE FOR SPINE INJECT: CPT | Mod: 26

## 2018-04-30 PROCEDURE — 62270 DX LMBR SPI PNXR: CPT

## 2018-04-30 RX ORDER — FUROSEMIDE 40 MG
40 TABLET ORAL ONCE
Qty: 0 | Refills: 0 | Status: COMPLETED | OUTPATIENT
Start: 2018-04-30 | End: 2018-04-30

## 2018-04-30 RX ORDER — FUROSEMIDE 40 MG
60 TABLET ORAL ONCE
Qty: 0 | Refills: 0 | Status: COMPLETED | OUTPATIENT
Start: 2018-04-30 | End: 2018-04-30

## 2018-04-30 RX ORDER — NOREPINEPHRINE BITARTRATE/D5W 8 MG/250ML
0.02 PLASTIC BAG, INJECTION (ML) INTRAVENOUS
Qty: 8 | Refills: 0 | Status: DISCONTINUED | OUTPATIENT
Start: 2018-04-30 | End: 2018-05-01

## 2018-04-30 RX ORDER — ACETAZOLAMIDE 250 MG/1
500 TABLET ORAL ONCE
Qty: 0 | Refills: 0 | Status: COMPLETED | OUTPATIENT
Start: 2018-04-30 | End: 2018-04-30

## 2018-04-30 RX ORDER — ELECTROLYTE SOLUTION,INJ
1 VIAL (ML) INTRAVENOUS
Qty: 0 | Refills: 0 | Status: DISCONTINUED | OUTPATIENT
Start: 2018-04-30 | End: 2018-04-30

## 2018-04-30 RX ORDER — I.V. FAT EMULSION 20 G/100ML
0.54 EMULSION INTRAVENOUS
Qty: 50 | Refills: 0 | Status: DISCONTINUED | OUTPATIENT
Start: 2018-04-30 | End: 2018-04-30

## 2018-04-30 RX ADMIN — Medication 50 MILLILITER(S): at 14:21

## 2018-04-30 RX ADMIN — ACETAZOLAMIDE 110 MILLIGRAM(S): 250 TABLET ORAL at 12:48

## 2018-04-30 RX ADMIN — CHLORHEXIDINE GLUCONATE 15 MILLILITER(S): 213 SOLUTION TOPICAL at 17:39

## 2018-04-30 RX ADMIN — Medication 100 GRAM(S): at 12:30

## 2018-04-30 RX ADMIN — Medication 2: at 07:38

## 2018-04-30 RX ADMIN — CHLORHEXIDINE GLUCONATE 15 MILLILITER(S): 213 SOLUTION TOPICAL at 06:19

## 2018-04-30 RX ADMIN — Medication 75 MICROGRAM(S): at 06:19

## 2018-04-30 RX ADMIN — Medication 40 MILLIGRAM(S): at 21:03

## 2018-04-30 RX ADMIN — Medication 50 MILLILITER(S): at 04:06

## 2018-04-30 RX ADMIN — I.V. FAT EMULSION 31.59 GM/KG/DAY: 20 EMULSION INTRAVENOUS at 21:03

## 2018-04-30 RX ADMIN — Medication 2: at 12:57

## 2018-04-30 RX ADMIN — Medication 100 GRAM(S): at 19:15

## 2018-04-30 RX ADMIN — MICAFUNGIN SODIUM 210 MILLIGRAM(S): 100 INJECTION, POWDER, LYOPHILIZED, FOR SOLUTION INTRAVENOUS at 12:47

## 2018-04-30 RX ADMIN — MEROPENEM 1000 MILLIGRAM(S): 1 INJECTION INTRAVENOUS at 06:49

## 2018-04-30 RX ADMIN — Medication 1 EACH: at 17:39

## 2018-04-30 RX ADMIN — Medication 50 MILLILITER(S): at 19:15

## 2018-04-30 RX ADMIN — Medication 100 GRAM(S): at 23:50

## 2018-04-30 RX ADMIN — Medication 40 MILLIGRAM(S): at 17:40

## 2018-04-30 RX ADMIN — Medication 100 GRAM(S): at 04:06

## 2018-04-30 RX ADMIN — Medication 100 GRAM(S): at 06:19

## 2018-04-30 RX ADMIN — Medication 100 GRAM(S): at 15:22

## 2018-04-30 RX ADMIN — PANTOPRAZOLE SODIUM 40 MILLIGRAM(S): 20 TABLET, DELAYED RELEASE ORAL at 12:49

## 2018-04-30 RX ADMIN — MEROPENEM 1000 MILLIGRAM(S): 1 INJECTION INTRAVENOUS at 17:40

## 2018-04-30 NOTE — CHART NOTE - NSCHARTNOTEFT_GEN_A_CORE
Admitting Diagnosis:   Patient is a 78y old  Female who presents with a chief complaint of Abdominal wound and multifactorial sepsis (17 Apr 2018 21:02)      PAST MEDICAL & SURGICAL HISTORY:  Hypothyroidism  GERD (gastroesophageal reflux disease)  Obesity  DM (diabetes mellitus)  HLD (hyperlipidemia)  HTN (hypertension)  H/O ventral hernia repair      Current Nutrition Order:  TPN via central line @ 47mL/hr:  300g Dex, 60g AA, 50g 20% Lipids to provide in total  1760Kcal, 60g protein, 2.23GIR, 1.0g/kg IBW protein     PO Intake: Good (%) [   ]  Fair (50-75%) [   ] Poor (<25%) [   ]- N/A NPO    GI Issues: Viral diarrhea, resolving     Pain: Unable to assess at this time 2/2 vent     Skin Integrity: Open abdominal wound w/vac, B/L buttocks stage II, generalized 2+ edema       Labs:   04-30    144  |  101  |  60<H>  ----------------------------<  192<H>  3.8   |  35<H>  |  1.33<H>    Ca    10.3      30 Apr 2018 06:28  Phos  3.8     04-30  Mg     2.4     04-30    TPro  5.1<L>  /  Alb  3.1<L>  /  TBili  1.3<H>  /  DBili  0.8<H>  /  AST  22  /  ALT  10  /  AlkPhos  67  04-30    CAPILLARY BLOOD GLUCOSE      POCT Blood Glucose.: 182 mg/dL (30 Apr 2018 12:54)  POCT Blood Glucose.: 108 mg/dL (29 Apr 2018 22:29)  POCT Blood Glucose.: 164 mg/dL (29 Apr 2018 17:12)      Medications:  MEDICATIONS  (STANDING):  albumin human 25% IVPB 50 milliLiter(s) IV Intermittent every 8 hours  chlorhexidine 0.12% Liquid 15 milliLiter(s) Swish and Spit two times a day  dexmedetomidine Infusion 0.2 MICROgram(s)/kG/Hr (4.68 mL/Hr) IV Continuous <Continuous>  dextrose 5%. 1000 milliLiter(s) (50 mL/Hr) IV Continuous <Continuous>  dextrose 50% Injectable 12.5 Gram(s) IV Push once  dextrose 50% Injectable 25 Gram(s) IV Push once  dextrose 50% Injectable 25 Gram(s) IV Push once  fat emulsion (Plant Based) 20% Infusion 0.54 Gm/kG/Day (31.59 mL/Hr) IV Continuous <Continuous>  fentaNYL   Infusion 0.534 MICROgram(s)/kG/Hr (5 mL/Hr) IV Continuous <Continuous>  insulin lispro (HumaLOG) corrective regimen sliding scale   SubCutaneous every 6 hours  levothyroxine Injectable 75 MICROGram(s) IV Push <User Schedule>  meropenem Injectable 1000 milliGRAM(s) IV Push every 12 hours  micafungin IVPB 100 milliGRAM(s) IV Intermittent daily  norepinephrine Infusion 0.017 MICROgram(s)/kG/Min (3 mL/Hr) IV Continuous <Continuous>  oxacillin IVPB      oxacillin IVPB 2 Gram(s) IV Intermittent every 4 hours  pantoprazole   Suspension 40 milliGRAM(s) Oral daily  Parenteral Nutrition - Adult 1 Each (42 mL/Hr) TPN Continuous <Continuous>  Parenteral Nutrition - Adult 1 Each (63 mL/Hr) TPN Continuous <Continuous>    MEDICATIONS  (PRN):  ALBUTerol/ipratropium for Nebulization 3 milliLiter(s) Nebulizer every 6 hours PRN Shortness of Breath and/or Wheezing  dextrose Gel 1 Dose(s) Oral once PRN Blood Glucose LESS THAN 70 milliGRAM(s)/deciliter  glucagon  Injectable 1 milliGRAM(s) IntraMuscular once PRN Glucose LESS THAN 70 milligrams/deciliter  ondansetron Injectable 4 milliGRAM(s) IV Push every 6 hours PRN Nausea      Weight: 93.6kg  Daily     Daily     Weight Change: No new weights recorded since admit     Estimated energy needs: Ideal body weight (61kg) used for calculations as pt >120% of IBW. needs estimated for older age; adjusted for vent, sepsis (protein needs currently estimated lower d/t hepatic and renal dysfunction)  Calories: 25-30 kcal/kg = 3834-7922 kcal/day  Protein: 1.4-1.6 g/kg = 85-98g protein/day  Fluids: 30-35 mL/kg = 5203-1331 mL/day    Subjective: 77 yo/female with PMhx DM, HTN, recent ventral hernia repair c/b PE, anemia, GIB, admitted with sepsis 2/2 abdominal wound, UTI and bacteremia. Also found to have small bowel perf w/contained leak. Pt intubated for airway protection on 4/25. Pt seen in room, intubated on VC/AC mode, sedated on precedex (transitioned off of propofol). TTE currently being performed by MDs. Unable to assess nutrition-related complaints 2/2 vent. TPN running with lipids. Lytes WNL, Cr 1.33 trending down. Pending GOC discussion. Will keep nutrition in line with care at all times.     Previous Nutrition Diagnosis:  Inadequate oral intake RT inability to meet needs via oral intake 2/2 AMS AEB NPO    Active [ X  ]  Resolved [   ]    If resolved, new PES:     Goal: Pt will continue to meet % of EER via tolerated route      Recommendations:  1. Recommend increasing AA to 80g pending BUN/Cr/kidney function (1803 kcal, 80g protein, 1.3 g/kg IBW protein, GIR 2.23).   Recommend checking TG weekly. Check Mg, Phos, K daily and POC BG Q6hrs. Trend daily weights. Fluids and lytes per MD discretion.   2. Monitor for surgical team plan of care; use gut as medically feasible  3. Keep nutrition in line with care at all times     Education: N/A 2/2 vent     Risk Level: High [ X  ] Moderate [   ] Low [   ]

## 2018-04-30 NOTE — PROGRESS NOTE ADULT - ASSESSMENT
79 y/o woman, HTN, DM, and hypothyroidism who presents as a transfer from OSH for management of multifactorial sepsis. Neurology was consulted for evaluation of altered mental status. CT head 4/22/18 was notable for left parafalcine partially calcified meningioma, mild diffuse volume loss, otherwise unremarkable. The pt's mental status has not been improving despite being on antibiotics. Her exam is currently limited by sedation, but further work-up for AMS is appropriate. The patient had EEG 4/25->4/26 which was likely normal, though it was a technically poor study. Now s/p LP which was bland.    - When able, please obtain MRI head w/o contrast to assess for structural etiologies of AMS (e.g. infarcts, inflammation, hemorrhage).

## 2018-04-30 NOTE — PROGRESS NOTE ADULT - SUBJECTIVE AND OBJECTIVE BOX
NEUROLOGY FOLLOW-UP NOTE    INTERVAL HISTORY:      REVIEW OF SYSTEMS:      MEDICATIONS:  acetazolamide  IVPB 500 milliGRAM(s) IV Intermittent once  albumin human 25% IVPB 50 milliLiter(s) IV Intermittent every 8 hours  ALBUTerol/ipratropium for Nebulization 3 milliLiter(s) Nebulizer every 6 hours PRN  chlorhexidine 0.12% Liquid 15 milliLiter(s) Swish and Spit two times a day  dexmedetomidine Infusion 0.2 MICROgram(s)/kG/Hr IV Continuous <Continuous>  dextrose 5%. 1000 milliLiter(s) IV Continuous <Continuous>  dextrose 50% Injectable 12.5 Gram(s) IV Push once  dextrose 50% Injectable 25 Gram(s) IV Push once  dextrose 50% Injectable 25 Gram(s) IV Push once  dextrose Gel 1 Dose(s) Oral once PRN  fat emulsion (Plant Based) 20% Infusion 0.54 Gm/kG/Day IV Continuous <Continuous>  fentaNYL   Infusion 0.534 MICROgram(s)/kG/Hr IV Continuous <Continuous>  glucagon  Injectable 1 milliGRAM(s) IntraMuscular once PRN  insulin lispro (HumaLOG) corrective regimen sliding scale   SubCutaneous every 6 hours  levothyroxine Injectable 75 MICROGram(s) IV Push <User Schedule>  meropenem Injectable 1000 milliGRAM(s) IV Push every 12 hours  metolazone 10 milliGRAM(s) Oral once  micafungin IVPB 100 milliGRAM(s) IV Intermittent daily  ondansetron Injectable 4 milliGRAM(s) IV Push every 6 hours PRN  oxacillin IVPB      oxacillin IVPB 2 Gram(s) IV Intermittent every 4 hours  pantoprazole   Suspension 40 milliGRAM(s) Oral daily  Parenteral Nutrition - Adult 1 Each TPN Continuous <Continuous>  Parenteral Nutrition - Adult 1 Each TPN Continuous <Continuous>    VITAL SIGNS:  Vital Signs Last 24 Hrs  T(C): 37.3 (30 Apr 2018 09:00), Max: 37.3 (30 Apr 2018 09:00)  T(F): 99.2 (30 Apr 2018 09:00), Max: 99.2 (30 Apr 2018 09:00)  HR: 62 (30 Apr 2018 09:00) (52 - 72)  BP: 114/51 (30 Apr 2018 09:00) (104/51 - 139/76)  BP(mean): 76 (30 Apr 2018 09:00) (73 - 106)  RR: 12 (30 Apr 2018 09:00) (11 - 22)  SpO2: 95% (30 Apr 2018 09:00) (95% - 99%)    PHYSICAL EXAMINATION:  General: Well-developed, well nourished, in no acute distress.  Eyes: Conjunctiva and sclera clear.  Cardiovascular: Regular rate and rhythm; S1 and S2 Normal; No murmurs, gallops or rubs.  Neurologic:  - Mental Status:  Alert, awake, oriented to person, place, and time; Speech is fluent with intact naming, repetition, and comprehension; Immediate recall is 3/3 words and delayed recall is 3/3 words at 5 minutes; Able to spell WORLD backwards and perform serial 7 subtraction; Able to read and write a sentence; Able to copy a cube; Good overall fund of knowledge.  - Cranial Nerves II-XII:    II:  Visual acuity is 20/20 bilaterally; Visual fields are full to confrontation; Fundoscopic exam is normal with sharp discs; Pupils are equal, round, and reactive to light.  III, IV, VI:  Extraocular movements are intact without nystagmus.  V:  Facial sensation is intact in the V1-V3 distribution bilaterally.  VII:  Face is symmetric with normal eye closure and smile  VIII:  Hearing is intact to finger rub.  IX, X:  Uvula is midline and soft palate rises symmetrically  XI:  Head turning and shoulder shrug are intact.  XII:  Tongue protrudes in the midline.  - Motor:  Strength is 5/5 throughout.  There is no pronator drift.  Normal muscle bulk and tone throughout.  - Reflexes:  2+ and symmetric at the biceps, triceps, brachioradialis, knees, and ankles.  Plantar responses flexor.  - Sensory:  Intact to light touch, pin prick, vibration, and joint-position sense throughout.  - Coordination:  Finger-nose-finger and heel-knee-shin intact without dysmetria.  Rapid alternating hand movements intact.  - Gait:   Normal steps, base, arm swing, and turning.  Heel and toe walking are normal.  Tandem gait is normal.  Romberg testing is negative.    LABORATORY STUDIES:                          7.5    5.7   )-----------( 74       ( 30 Apr 2018 06:28 )             23.6     04-30    144  |  101  |  60<H>  ----------------------------<  192<H>  3.8   |  35<H>  |  1.33<H>    Ca    10.3      30 Apr 2018 06:28  Phos  3.8     04-30  Mg     2.4     04-30 NEUROLOGY FOLLOW-UP NOTE    INTERVAL HISTORY: patient s/p LP today. Will get KEENA later today.    REVIEW OF SYSTEMS:  unable to obtain since the patient is still sedated/intubated.     MEDICATIONS:  acetazolamide  IVPB 500 milliGRAM(s) IV Intermittent once  albumin human 25% IVPB 50 milliLiter(s) IV Intermittent every 8 hours  ALBUTerol/ipratropium for Nebulization 3 milliLiter(s) Nebulizer every 6 hours PRN  chlorhexidine 0.12% Liquid 15 milliLiter(s) Swish and Spit two times a day  dexmedetomidine Infusion 0.2 MICROgram(s)/kG/Hr IV Continuous <Continuous>  dextrose 5%. 1000 milliLiter(s) IV Continuous <Continuous>  dextrose 50% Injectable 12.5 Gram(s) IV Push once  dextrose 50% Injectable 25 Gram(s) IV Push once  dextrose 50% Injectable 25 Gram(s) IV Push once  dextrose Gel 1 Dose(s) Oral once PRN  fat emulsion (Plant Based) 20% Infusion 0.54 Gm/kG/Day IV Continuous <Continuous>  fentaNYL   Infusion 0.534 MICROgram(s)/kG/Hr IV Continuous <Continuous>  glucagon  Injectable 1 milliGRAM(s) IntraMuscular once PRN  insulin lispro (HumaLOG) corrective regimen sliding scale   SubCutaneous every 6 hours  levothyroxine Injectable 75 MICROGram(s) IV Push <User Schedule>  meropenem Injectable 1000 milliGRAM(s) IV Push every 12 hours  metolazone 10 milliGRAM(s) Oral once  micafungin IVPB 100 milliGRAM(s) IV Intermittent daily  ondansetron Injectable 4 milliGRAM(s) IV Push every 6 hours PRN  oxacillin IVPB      oxacillin IVPB 2 Gram(s) IV Intermittent every 4 hours  pantoprazole   Suspension 40 milliGRAM(s) Oral daily  Parenteral Nutrition - Adult 1 Each TPN Continuous <Continuous>  Parenteral Nutrition - Adult 1 Each TPN Continuous <Continuous>    VITAL SIGNS:  Vital Signs Last 24 Hrs  T(C): 37.3 (30 Apr 2018 09:00), Max: 37.3 (30 Apr 2018 09:00)  T(F): 99.2 (30 Apr 2018 09:00), Max: 99.2 (30 Apr 2018 09:00)  HR: 62 (30 Apr 2018 09:00) (52 - 72)  BP: 114/51 (30 Apr 2018 09:00) (104/51 - 139/76)  BP(mean): 76 (30 Apr 2018 09:00) (73 - 106)  RR: 12 (30 Apr 2018 09:00) (11 - 22)  SpO2: 95% (30 Apr 2018 09:00) (95% - 99%)    PHYSICAL EXAMINATION:  General: patient intubated, sedated, but mildly combative, not following commands.  Eyes: Conjunctiva and sclera clear.  Cardiovascular: Regular rate and rhythm; S1 and S2 Normal; No murmurs, gallops or rubs.  Neurologic:  - Mental Status: patient intubated and sedated. Responsive to tactile stimuli.   - Cranial Nerves II-XII:    II:  Pupils are 3-4mm, equal, round, and reactive to light.  III, IV, VI:  unable to assess since patient is not following commands.   V:  unable to assess since patient is intubated and sedated.   VII:  unable to assess since patient is intubated and sedated.   VIII: unable to assess since patient is intubated and sedated.   IX, X:  unable to assess since patient is intubated.   XI:  unable to assess since patient is sedated. .  XII: unable to assess since patient is intubated and sedated.   - Motor:  moves all extremities upon tactile stimuli.   - Reflexes:  2+ in biceps, brachioradialis, triceps, knees, and ankles.   - Sensory: unable to assess since patient is intubated and sedated.   - Coordination:  unable to assess since patient is intubated and sedated.   - Gait: deferred.      LABORATORY STUDIES:                          7.5    5.7   )-----------( 74       ( 30 Apr 2018 06:28 )             23.6     04-30    144  |  101  |  60<H>  ----------------------------<  192<H>  3.8   |  35<H>  |  1.33<H>    Ca    10.3      30 Apr 2018 06:28  Phos  3.8     04-30  Mg     2.4     04-30

## 2018-04-30 NOTE — PROGRESS NOTE ADULT - ATTENDING COMMENTS
I agree with the assessment and plan as written above. The patient was getting KEENA when I went to round on her so was unable to examine her. I agree with checking MRI brain w/o con when able.

## 2018-04-30 NOTE — PROGRESS NOTE ADULT - ASSESSMENT
79 y/o F with sepsis from open abdominal wound infection, MSSA bacteremia present on admission, and UTI present on admission found to have contained small bowel leak    Neuro: precedex gtt, fentanyl gtt, Zofran prn, AMS: EEG:slowing Neurology consult stating poss LP, and MRI of brain with con. holding:  seroquel, aripiprazole, escitalapram   CV: HD stable s/p severe sepsis, albumin 25%q8, holding amlodipine, 4/19 Echo  65-70%. Lasix prn  Pulm: intubated, satting well on 40/490/12/5 chlorhex  GI: NPO, protonix, Dobhoff (no feeds), TPN Protonix  : Nicole S/p AKF on HD now resolved. UTI (present on admission) + Uterine fibroids on CT scan with vaginal bleeding will consult GYN   ID: ESBL Ecoli in Urine resistant to Zosyn: Jose L( 4/17-) MSSA in blood: Oxacillin ( 4/17-) Kai virus (good hand washing), TTE neg for endocarditis. IR drainage today for pelvic collection drainage.   Endo: ISS, Synthroid  Insulin  in TPN: 25   PPx: sqh held for low plts  PE:Sp IVC filter on 3/30 Plts low- will give 1U b/f IR drainage  Lines: PIV, LIJ TLC (4/29-), /// s/p L Rad Art line( 4/15-4/18),  Rt IJ TLC from OSH ( 4/15-)  Wounds: Wound vac to midline incision , change MWF 77 y/o F with sepsis from open abdominal wound infection, MSSA bacteremia present on admission, and UTI present on admission found to have contained small bowel leak with acute respiratory failure, MSSA bacteremia, yeast in drainage    Neuro: precedex gtt, fentanyl gtt, Zofran prn, AMS: EEG:slowing Neurology consult stating poss LP, and MRI of brain with con. holding:  seroquel, aripiprazole, escitalapram   CV: HD stable s/p severe sepsis, albumin 25%q8, holding amlodipine, 4/19 Echo  65-70%. Lasix prn to achieve negative fluid balance  Pulm: intubated, satting well on 40/490/12/5 chlorhex  GI: NPO, protonix, Dobhoff (no feeds), TPN Protonix  : Nicole S/p AKF on HD now resolved. UTI (present on admission) + Uterine fibroids on CT scan with vaginal bleeding will consult GYN   ID: ESBL Ecoli in Urine resistant to Zosyn: Jose L( 4/17-) MSSA in blood: Oxacillin ( 4/17-) Kai virus (good hand washing), TTE neg for endocarditis. IR drainage today for pelvic collection drainage.   Endo: ISS, Synthroid  Insulin  in TPN: 25   PPx: sqh held for low plts  PE:Sp IVC filter on 3/30 Plts low- will give 1U b/f IR drainage  Lines: PIV, LIJ TLC (4/29-), /// s/p L Rad Art line( 4/15-4/18),  Rt IJ TLC from OSH ( 4/15-29)  Wounds: Wound vac to midline incision , change MWF 79 y/o F with sepsis from open abdominal wound infection, MSSA bacteremia present on admission, and UTI present on admission found to have contained small bowel leak with acute respiratory failure, MSSA bacteremia, yeast in drainage, vagina bleeding with acute blood loss anemia    Neuro: precedex gtt, fentanyl gtt, Zofran prn, AMS: EEG:slowing Neurology consult stating poss LP, and MRI of brain with con. holding:  seroquel, aripiprazole, escitalapram   CV: HD stable s/p severe sepsis, albumin 25%q8, holding amlodipine, 4/19 Echo  65-70%. Lasix prn to achieve negative fluid balance  Pulm: intubated, satting well on 40/490/12/5 chlorhex  GI: NPO, protonix, Dobhoff (no feeds), TPN Protonix  : Nicole S/p AKF on HD now resolved. UTI (present on admission) + Uterine fibroids on CT scan with vaginal bleeding will consult GYN   ID: ESBL Ecoli in Urine resistant to Zosyn: Jose L( 4/17-) MSSA in blood: Oxacillin ( 4/17-) Kai virus (good hand washing), TTE neg for endocarditis. IR drainage today for pelvic collection drainage.   Endo: ISS, Synthroid  Insulin  in TPN: 25   PPx: sqh held for low plts  PE:Sp IVC filter on 3/30 Plts low- will give 1U b/f IR drainage  Lines: PIV, LIJ TLC (4/29-), /// s/p L Rad Art line( 4/15-4/18),  Rt IJ TLC from OSH ( 4/15-29)  Wounds: Wound vac to midline incision , change MWF

## 2018-04-30 NOTE — PROGRESS NOTE ADULT - SUBJECTIVE AND OBJECTIVE BOX
24 hr events:  4/29: increased lipid in TPN d/t off propofol, exchanged TLC  ON 4 runs of V tach, self resolved  4/28: family discussion with HCP nephew about goals of care, will get back to us about directives, transitioned off propofol to precedex, successfully on CPAP but not awake    SUBJECTIVE: Patient is responding to verbal orders. Able to follow command.     MEDICATIONS  (STANDING):  albumin human 25% IVPB 50 milliLiter(s) IV Intermittent every 8 hours  chlorhexidine 0.12% Liquid 15 milliLiter(s) Swish and Spit two times a day  dexmedetomidine Infusion 0.2 MICROgram(s)/kG/Hr (4.68 mL/Hr) IV Continuous <Continuous>  dextrose 5%. 1000 milliLiter(s) (50 mL/Hr) IV Continuous <Continuous>  dextrose 50% Injectable 12.5 Gram(s) IV Push once  dextrose 50% Injectable 25 Gram(s) IV Push once  dextrose 50% Injectable 25 Gram(s) IV Push once  fentaNYL   Infusion 0.534 MICROgram(s)/kG/Hr (5 mL/Hr) IV Continuous <Continuous>  insulin lispro (HumaLOG) corrective regimen sliding scale   SubCutaneous every 6 hours  levothyroxine Injectable 75 MICROGram(s) IV Push <User Schedule>  meropenem Injectable 1000 milliGRAM(s) IV Push every 12 hours  micafungin IVPB 100 milliGRAM(s) IV Intermittent daily  oxacillin IVPB      oxacillin IVPB 2 Gram(s) IV Intermittent every 4 hours  pantoprazole   Suspension 40 milliGRAM(s) Oral daily  Parenteral Nutrition - Adult 1 Each (63 mL/Hr) TPN Continuous <Continuous>    MEDICATIONS  (PRN):  ALBUTerol/ipratropium for Nebulization 3 milliLiter(s) Nebulizer every 6 hours PRN Shortness of Breath and/or Wheezing  dextrose Gel 1 Dose(s) Oral once PRN Blood Glucose LESS THAN 70 milliGRAM(s)/deciliter  glucagon  Injectable 1 milliGRAM(s) IntraMuscular once PRN Glucose LESS THAN 70 milligrams/deciliter  ondansetron Injectable 4 milliGRAM(s) IV Push every 6 hours PRN Nausea      Nicole:	YES, strict I/O	   Central Line: YES    ICU Vital Signs Last 24 Hrs  T(C): 36.7 (30 Apr 2018 05:54), Max: 36.7 (29 Apr 2018 21:55)  T(F): 98 (30 Apr 2018 05:54), Max: 98.1 (29 Apr 2018 21:55)  HR: 64 (30 Apr 2018 07:00) (52 - 72)  BP: 111/59 (30 Apr 2018 07:00) (104/51 - 139/76)  BP(mean): 88 (30 Apr 2018 07:00) (69 - 106)  RR: 12 (30 Apr 2018 07:00) (11 - 24)  SpO2: 98% (30 Apr 2018 07:00) (96% - 99%)      Physical Exam:  General: Intubated, restrained.  HEENT: NC/AT, pint point pupils (on fentanyl), responding to light  Pulmonary: Bibasilar crackles, air entry equal on both sides, intubated.  Cardiovascular: NSR, no murmurs  Abdominal: soft, rashes over the upper abdomen, unable to elicit tenderness, non distended. wound VAC in place, no leak.   Extremities: bilateral arms - restarined, +edema, power 3/5, radial pulses +  Neuro: A/O x 0, follow verbal commands   Pulses: palpable distal pulses    Lines/tubes/drains: Wound VAC at midline (umbilical), LEANN drain at LLQ - dark brown output. (?bilious)    Vent settings:  Mode: AC/ CMV (Assist Control/ Continuous Mandatory Ventilation), RR (machine): 12, TV (machine): 490, FiO2: 40, PEEP: 5, ITime: 1, MAP: 9, PIP: 24    I&O's Summary    29 Apr 2018 07:01  -  30 Apr 2018 07:00  --------------------------------------------------------  IN: 2946.5 mL / OUT: 3495 mL / NET: -548.5 mL    30 Apr 2018 07:01  -  30 Apr 2018 07:25  --------------------------------------------------------  IN: 74 mL / OUT: 75 mL / NET: -1 mL        LABS:                        7.5    5.7   )-----------( 74       ( 30 Apr 2018 06:28 )             23.6     04-30    144  |  101  |  60<H>  ----------------------------<  192<H>  3.8   |  35<H>  |  1.33<H>    Ca    10.3      30 Apr 2018 06:28  Phos  3.8     04-30  Mg     2.4     04-30          CAPILLARY BLOOD GLUCOSE      POCT Blood Glucose.: 108 mg/dL (29 Apr 2018 22:29)  POCT Blood Glucose.: 164 mg/dL (29 Apr 2018 17:12)  POCT Blood Glucose.: 115 mg/dL (29 Apr 2018 12:12) 24 hr events:  4/29: increased lipid in TPN d/t off propofol, exchanged TLC  ON 4 runs of V tach, self resolved  4/28: family discussion with HCP nephew about goals of care, will get back to us about directives, transitioned off propofol to precedex, successfully on CPAP but not awake    SUBJECTIVE: Patient is responding to verbal orders. Able to follow command.     MEDICATIONS  (STANDING):  albumin human 25% IVPB 50 milliLiter(s) IV Intermittent every 8 hours  chlorhexidine 0.12% Liquid 15 milliLiter(s) Swish and Spit two times a day  dexmedetomidine Infusion 0.2 MICROgram(s)/kG/Hr (4.68 mL/Hr) IV Continuous <Continuous>  dextrose 5%. 1000 milliLiter(s) (50 mL/Hr) IV Continuous <Continuous>  dextrose 50% Injectable 12.5 Gram(s) IV Push once  dextrose 50% Injectable 25 Gram(s) IV Push once  dextrose 50% Injectable 25 Gram(s) IV Push once  fentaNYL   Infusion 0.534 MICROgram(s)/kG/Hr (5 mL/Hr) IV Continuous <Continuous>  insulin lispro (HumaLOG) corrective regimen sliding scale   SubCutaneous every 6 hours  levothyroxine Injectable 75 MICROGram(s) IV Push <User Schedule>  meropenem Injectable 1000 milliGRAM(s) IV Push every 12 hours  micafungin IVPB 100 milliGRAM(s) IV Intermittent daily  oxacillin IVPB      oxacillin IVPB 2 Gram(s) IV Intermittent every 4 hours  pantoprazole   Suspension 40 milliGRAM(s) Oral daily  Parenteral Nutrition - Adult 1 Each (63 mL/Hr) TPN Continuous <Continuous>    MEDICATIONS  (PRN):  ALBUTerol/ipratropium for Nebulization 3 milliLiter(s) Nebulizer every 6 hours PRN Shortness of Breath and/or Wheezing  dextrose Gel 1 Dose(s) Oral once PRN Blood Glucose LESS THAN 70 milliGRAM(s)/deciliter  glucagon  Injectable 1 milliGRAM(s) IntraMuscular once PRN Glucose LESS THAN 70 milligrams/deciliter  ondansetron Injectable 4 milliGRAM(s) IV Push every 6 hours PRN Nausea      Suazo:	YES, strict I/O	   Central Line: YES    ICU Vital Signs Last 24 Hrs  T(C): 36.7 (30 Apr 2018 05:54), Max: 36.7 (29 Apr 2018 21:55)  T(F): 98 (30 Apr 2018 05:54), Max: 98.1 (29 Apr 2018 21:55)  HR: 64 (30 Apr 2018 07:00) (52 - 72)  BP: 111/59 (30 Apr 2018 07:00) (104/51 - 139/76)  BP(mean): 88 (30 Apr 2018 07:00) (69 - 106)  RR: 12 (30 Apr 2018 07:00) (11 - 24)  SpO2: 98% (30 Apr 2018 07:00) (96% - 99%)      Physical Exam:  General: Intubated, restrained.  HEENT: NC/AT, pint point pupils (on fentanyl), responding to light  Pulmonary: Bibasilar crackles, air entry equal on both sides, intubated.  Cardiovascular: NSR, no murmurs  Abdominal: soft, rashes over the upper abdomen, unable to elicit tenderness, non distended. wound VAC in place, no leak.   : suazo in place  Extremities: bilateral arms - restarined, +edema, power 3/5, radial pulses +  Neuro: A/O x 0, follow verbal commands   Pulses: palpable distal pulses  Skin: no rash    Lines/tubes/drains: Wound VAC at midline (umbilical), LEANN drain at LLQ - dark brown output. (?bilious)    Vent settings:  Mode: AC/ CMV (Assist Control/ Continuous Mandatory Ventilation), RR (machine): 12, TV (machine): 490, FiO2: 40, PEEP: 5, ITime: 1, MAP: 9, PIP: 24    I&O's Summary    29 Apr 2018 07:01  -  30 Apr 2018 07:00  --------------------------------------------------------  IN: 2946.5 mL / OUT: 3495 mL / NET: -548.5 mL    30 Apr 2018 07:01  -  30 Apr 2018 07:25  --------------------------------------------------------  IN: 74 mL / OUT: 75 mL / NET: -1 mL        LABS:                        7.5    5.7   )-----------( 74       ( 30 Apr 2018 06:28 )             23.6     04-30    144  |  101  |  60<H>  ----------------------------<  192<H>  3.8   |  35<H>  |  1.33<H>    Ca    10.3      30 Apr 2018 06:28  Phos  3.8     04-30  Mg     2.4     04-30          CAPILLARY BLOOD GLUCOSE      POCT Blood Glucose.: 108 mg/dL (29 Apr 2018 22:29)  POCT Blood Glucose.: 164 mg/dL (29 Apr 2018 17:12)  POCT Blood Glucose.: 115 mg/dL (29 Apr 2018 12:12)

## 2018-05-01 ENCOUNTER — RESULT REVIEW (OUTPATIENT)
Age: 78
End: 2018-05-01

## 2018-05-01 LAB
ANION GAP SERPL CALC-SCNC: 11 MMOL/L — SIGNIFICANT CHANGE UP (ref 5–17)
BUN SERPL-MCNC: 54 MG/DL — HIGH (ref 7–23)
CALCIUM SERPL-MCNC: 10.4 MG/DL — SIGNIFICANT CHANGE UP (ref 8.4–10.5)
CHLORIDE SERPL-SCNC: 98 MMOL/L — SIGNIFICANT CHANGE UP (ref 96–108)
CO2 SERPL-SCNC: 33 MMOL/L — HIGH (ref 22–31)
CORTICOSTEROID BINDING GLOBULIN RESULT: 0.6 MG/DL — LOW
CORTICOSTEROID BINDING GLOBULIN RESULT: 0.6 MG/DL — LOW
CORTIS F/TOTAL MFR SERPL: 58 % — SIGNIFICANT CHANGE UP
CORTIS F/TOTAL MFR SERPL: 81 % — SIGNIFICANT CHANGE UP
CORTIS SERPL-MCNC: 21 UG/DL — SIGNIFICANT CHANGE UP
CORTIS SERPL-MCNC: 9.2 UG/DL — SIGNIFICANT CHANGE UP
CORTISOL, FREE RESULT: 17 UG/DL — SIGNIFICANT CHANGE UP
CORTISOL, FREE RESULT: 5.3 UG/DL — SIGNIFICANT CHANGE UP
CREAT SERPL-MCNC: 1.29 MG/DL — SIGNIFICANT CHANGE UP (ref 0.5–1.3)
CULTURE RESULTS: SIGNIFICANT CHANGE UP
CULTURE RESULTS: SIGNIFICANT CHANGE UP
GLUCOSE BLDC GLUCOMTR-MCNC: 154 MG/DL — HIGH (ref 70–99)
GLUCOSE BLDC GLUCOMTR-MCNC: 156 MG/DL — HIGH (ref 70–99)
GLUCOSE BLDC GLUCOMTR-MCNC: 160 MG/DL — HIGH (ref 70–99)
GLUCOSE BLDC GLUCOMTR-MCNC: 168 MG/DL — HIGH (ref 70–99)
GLUCOSE SERPL-MCNC: 150 MG/DL — HIGH (ref 70–99)
HCT VFR BLD CALC: 23.8 % — LOW (ref 34.5–45)
HGB BLD-MCNC: 7.3 G/DL — LOW (ref 11.5–15.5)
MAGNESIUM SERPL-MCNC: 2.4 MG/DL — SIGNIFICANT CHANGE UP (ref 1.6–2.6)
MCHC RBC-ENTMCNC: 30.2 PG — SIGNIFICANT CHANGE UP (ref 27–34)
MCHC RBC-ENTMCNC: 30.7 G/DL — LOW (ref 32–36)
MCV RBC AUTO: 98.3 FL — SIGNIFICANT CHANGE UP (ref 80–100)
PHOSPHATE SERPL-MCNC: 3.9 MG/DL — SIGNIFICANT CHANGE UP (ref 2.5–4.5)
PLATELET # BLD AUTO: 93 K/UL — LOW (ref 150–400)
POTASSIUM SERPL-MCNC: 3.5 MMOL/L — SIGNIFICANT CHANGE UP (ref 3.5–5.3)
POTASSIUM SERPL-SCNC: 3.5 MMOL/L — SIGNIFICANT CHANGE UP (ref 3.5–5.3)
RBC # BLD: 2.42 M/UL — LOW (ref 3.8–5.2)
RBC # FLD: 19.1 % — HIGH (ref 10.3–16.9)
SODIUM SERPL-SCNC: 142 MMOL/L — SIGNIFICANT CHANGE UP (ref 135–145)
SPECIMEN SOURCE: SIGNIFICANT CHANGE UP
SPECIMEN SOURCE: SIGNIFICANT CHANGE UP
TRIGL SERPL-MCNC: 164 MG/DL — HIGH (ref 10–149)
WBC # BLD: 3.8 K/UL — SIGNIFICANT CHANGE UP (ref 3.8–10.5)
WBC # FLD AUTO: 3.8 K/UL — SIGNIFICANT CHANGE UP (ref 3.8–10.5)

## 2018-05-01 PROCEDURE — 99233 SBSQ HOSP IP/OBS HIGH 50: CPT | Mod: GC

## 2018-05-01 PROCEDURE — 99232 SBSQ HOSP IP/OBS MODERATE 35: CPT

## 2018-05-01 PROCEDURE — 71045 X-RAY EXAM CHEST 1 VIEW: CPT | Mod: 26

## 2018-05-01 PROCEDURE — 71045 X-RAY EXAM CHEST 1 VIEW: CPT | Mod: 26,77

## 2018-05-01 RX ORDER — ELECTROLYTE SOLUTION,INJ
1 VIAL (ML) INTRAVENOUS
Qty: 0 | Refills: 0 | Status: DISCONTINUED | OUTPATIENT
Start: 2018-05-01 | End: 2018-05-01

## 2018-05-01 RX ORDER — FUROSEMIDE 40 MG
60 TABLET ORAL ONCE
Qty: 0 | Refills: 0 | Status: COMPLETED | OUTPATIENT
Start: 2018-05-01 | End: 2018-05-01

## 2018-05-01 RX ORDER — POTASSIUM CHLORIDE 20 MEQ
10 PACKET (EA) ORAL
Qty: 0 | Refills: 0 | Status: COMPLETED | OUTPATIENT
Start: 2018-05-01 | End: 2018-05-01

## 2018-05-01 RX ORDER — I.V. FAT EMULSION 20 G/100ML
0.54 EMULSION INTRAVENOUS
Qty: 50 | Refills: 0 | Status: DISCONTINUED | OUTPATIENT
Start: 2018-05-01 | End: 2018-05-01

## 2018-05-01 RX ORDER — PROPOFOL 10 MG/ML
5 INJECTION, EMULSION INTRAVENOUS
Qty: 1000 | Refills: 0 | Status: DISCONTINUED | OUTPATIENT
Start: 2018-05-01 | End: 2018-05-03

## 2018-05-01 RX ORDER — DEXMEDETOMIDINE HYDROCHLORIDE IN 0.9% SODIUM CHLORIDE 4 UG/ML
0.2 INJECTION INTRAVENOUS
Qty: 200 | Refills: 0 | Status: DISCONTINUED | OUTPATIENT
Start: 2018-05-01 | End: 2018-05-09

## 2018-05-01 RX ORDER — NOREPINEPHRINE BITARTRATE/D5W 8 MG/250ML
0.01 PLASTIC BAG, INJECTION (ML) INTRAVENOUS
Qty: 8 | Refills: 0 | Status: DISCONTINUED | OUTPATIENT
Start: 2018-05-01 | End: 2018-05-02

## 2018-05-01 RX ADMIN — MEROPENEM 1000 MILLIGRAM(S): 1 INJECTION INTRAVENOUS at 17:39

## 2018-05-01 RX ADMIN — Medication 2: at 06:54

## 2018-05-01 RX ADMIN — Medication 75 MICROGRAM(S): at 05:41

## 2018-05-01 RX ADMIN — CHLORHEXIDINE GLUCONATE 15 MILLILITER(S): 213 SOLUTION TOPICAL at 17:38

## 2018-05-01 RX ADMIN — Medication 60 MILLIGRAM(S): at 18:38

## 2018-05-01 RX ADMIN — DEXMEDETOMIDINE HYDROCHLORIDE IN 0.9% SODIUM CHLORIDE 4.68 MICROGRAM(S)/KG/HR: 4 INJECTION INTRAVENOUS at 22:05

## 2018-05-01 RX ADMIN — MEROPENEM 1000 MILLIGRAM(S): 1 INJECTION INTRAVENOUS at 05:41

## 2018-05-01 RX ADMIN — MICAFUNGIN SODIUM 210 MILLIGRAM(S): 100 INJECTION, POWDER, LYOPHILIZED, FOR SOLUTION INTRAVENOUS at 11:04

## 2018-05-01 RX ADMIN — Medication 2: at 17:38

## 2018-05-01 RX ADMIN — Medication 100 MILLIEQUIVALENT(S): at 06:55

## 2018-05-01 RX ADMIN — PROPOFOL 2.81 MICROGRAM(S)/KG/MIN: 10 INJECTION, EMULSION INTRAVENOUS at 22:05

## 2018-05-01 RX ADMIN — Medication 50 MILLILITER(S): at 11:22

## 2018-05-01 RX ADMIN — Medication 1 EACH: at 17:41

## 2018-05-01 RX ADMIN — Medication 2: at 11:04

## 2018-05-01 RX ADMIN — Medication 100 MILLIEQUIVALENT(S): at 10:35

## 2018-05-01 RX ADMIN — CHLORHEXIDINE GLUCONATE 15 MILLILITER(S): 213 SOLUTION TOPICAL at 05:41

## 2018-05-01 RX ADMIN — Medication 100 MILLIEQUIVALENT(S): at 09:15

## 2018-05-01 RX ADMIN — Medication 2: at 00:19

## 2018-05-01 RX ADMIN — PANTOPRAZOLE SODIUM 40 MILLIGRAM(S): 20 TABLET, DELAYED RELEASE ORAL at 11:04

## 2018-05-01 RX ADMIN — Medication 50 MILLILITER(S): at 17:40

## 2018-05-01 RX ADMIN — Medication 100 GRAM(S): at 06:55

## 2018-05-01 RX ADMIN — Medication 50 MILLILITER(S): at 02:19

## 2018-05-01 RX ADMIN — Medication 100 GRAM(S): at 02:20

## 2018-05-01 NOTE — CONSULT NOTE ADULT - ASSESSMENT
A/P  78 year old female, morbidly obesity, DM, ventral hernia repair with mesh 3/2 c/b wound dehiscence, recent ATN requiring temporary HD (Permacath removed 4/10), who presents as a transfer from OSH for management of multifactorial sepsis and open abdominal wound. Patient found to have ESBL-producing E.Coli UTI and MSSA bacteremia, origin likely coming from abdomen/pelvis vs. prior HD catheter. Patient intubated 4/25 for acute hypoxic respiratory failure.  CT 4/18 showed SB perforation with pelvic collection, still present on CT on 4/25.  She is s/p IR drainage of collection, with culture growing yeast.  Would like to begin removing antibiotics.  Suggest:  - completed 14 d course of oxacillin from first negative culture (4/17;  last day 4/30)  - CT A/P to eval for persistent/recurrent collection - to be done later in week, per SICU team  - Continue meropenem 1 g IV q12h  - Continue micafungin 100 mg IV q24h.    - Will f/u ID/susceptibility for candida lusitaniae   - ID will follow    case discussed with Dr. Johnson

## 2018-05-01 NOTE — PROGRESS NOTE ADULT - ASSESSMENT
77 y/o F with sepsis from open abdominal wound infection, MSSA bacteremia present on admission, and UTI present on admission found to have contained small bowel leak    Neuro: precedex gtt, fentanyl gtt, Zofran prn, AMS: EEG: slowing LP neg. holding:  seroquel, aripiprazole, escitalapram   CV: HD stable s/p severe sepsis, albumin 25%q8, holding amlodipine, 4/19 Echo  65-70%. Lasix prn  Pulm: intubated, satting well on 40/490/12/5 chlorhex  GI: NPO, protonix, Dobhoff (no feeds), TPN Protonix  : Nicole S/p AKF on HD now resolved. UTI (present on admission) + Uterine fibroids/uterine ca on CT scan with vaginal bleeding, gyn consulted. - not for any urgent intervention yet  ID: ESBL Ecoli in Urine resistant to Zosyn: Jose L( 4/17-) MSSA in blood: Oxacillin ( 4/17-) Kai virus (good hand washing), TTE neg for endocarditis. IR drainage today for pelvic collection drainage.   Endo: ISS, Synthroid  Insulin  in TPN: 25   PPx: sqh held for low plts  PE:Sp IVC filter on 3/30   Lines: PIV, LIJ TLC (4/29-), /// s/p L Rad Art line( 4/15-4/18),  Rt IJ TLC from OSH ( 4/15-)  Wounds: Wound vac to midline incision , change MWF 77 y/o F with sepsis from open abdominal wound infection, MSSA bacteremia present on admission, and UTI present on admission found to have contained small bowel leak    Neuro: precedex gtt, fentanyl gtt, Zofran prn, AMS: EEG: slowing LP neg. holding:  seroquel, aripiprazole, escitalapram   CV: HD stable s/p severe sepsis, albumin 25%q8, holding amlodipine, 4/19 Echo  65-70%. Lasix prn  Pulm: intubated, satting well on 40/490/12/5 chlorhex  GI: NPO, protonix, Dobhoff (no feeds), TPN Protonix  : Nicole S/p AKF on HD now resolved. UTI (present on admission) + Uterine fibroids/uterine ca on CT scan with vaginal bleeding, gyn consulted. - not for any urgent intervention yet  ID: ESBL Ecoli in Urine resistant to Zosyn: Jose L( 4/17-) MSSA in blood: Oxacillin ( 4/17-4/30) Kai virus (good hand washing), TTE neg for endocarditis.   Endo: ISS, Synthroid  Insulin  in TPN: 25   PPx: sqh held for low plts  PE:Sp IVC filter on 3/30   Lines: PIV, LIJ TLC (4/29-), /// s/p L Rad Art line( 4/15-4/18),  Rt IJ TLC from OSH ( 4/15-)  Wounds: Wound vac to midline incision , change MWF 77 y/o F with sepsis from open abdominal wound infection, MSSA bacteremia present on admission, and UTI present on admission found to have contained small bowel leak, now with acute respiratory failure, vaginal bleeding with acute blood loss anemia, ATN    Neuro: precedex gtt, fentanyl gtt, Zofran prn, AMS: EEG: slowing LP neg. holding:  seroquel, aripiprazole, escitalapram. To consider MRI per neurology  CV: HD stable s/p severe sepsis, albumin 25%q8, holding amlodipine, 4/19 Echo  65-70%. Lasix prn to achieve negative fluid balance  Pulm: intubated, satting well on 40/490/12/5 chlorhex  GI: NPO, protonix, Dobhoff (no feeds), TPN Protonix  : Nicole S/p AKF on HD now resolved. UTI (present on admission) + Uterine fibroids/uterine ca on CT scan with vaginal bleeding, gyn consulted. - not for any urgent intervention yet  ID: ESBL Ecoli in Urine resistant to Zosyn: Jose L( 4/17-) MSSA in blood: Oxacillin ( 4/17-4/30) Kai virus (good hand washing), TTE neg for endocarditis.   Endo: ISS, Synthroid  Insulin  in TPN: 25   PPx: sqh held for low plts  PE:Sp IVC filter on 3/30   Lines: PIV, LIJ TLC (4/29-), /// s/p L Rad Art line( 4/15-4/18),  Rt IJ TLC from OSH ( 4/15-)  Wounds: Wound vac to midline incision , change MWF

## 2018-05-01 NOTE — PROGRESS NOTE ADULT - SUBJECTIVE AND OBJECTIVE BOX
24 hr events:  ON: Minimal response to lasix @9pm- and  only 80cc negative at beginning of shift.  given 60 IV. pt I/O negative @ 6 am, -1L. AM K repleted.  4/30: decreased amino acid in TPN to 0.8gm/IBW kg d/t rising BUN, had LP with IR, negative CSF by cell count/protein/glucose, KEENA performed negative for vegetation, 2x diamax, reduce fentanyl to 5, WV changed. UO decreased to 50cc/hr, lasix given.    SUBJECTIVE: Not following any verbal command, arousable.     MEDICATIONS  (STANDING):  albumin human 25% IVPB 50 milliLiter(s) IV Intermittent every 8 hours  chlorhexidine 0.12% Liquid 15 milliLiter(s) Swish and Spit two times a day  dexmedetomidine Infusion 0.2 MICROgram(s)/kG/Hr (4.68 mL/Hr) IV Continuous <Continuous>  dextrose 5%. 1000 milliLiter(s) (50 mL/Hr) IV Continuous <Continuous>  dextrose 50% Injectable 12.5 Gram(s) IV Push once  dextrose 50% Injectable 25 Gram(s) IV Push once  dextrose 50% Injectable 25 Gram(s) IV Push once  fentaNYL   Infusion 0.534 MICROgram(s)/kG/Hr (5 mL/Hr) IV Continuous <Continuous>  insulin lispro (HumaLOG) corrective regimen sliding scale   SubCutaneous every 6 hours  levothyroxine Injectable 75 MICROGram(s) IV Push <User Schedule>  meropenem Injectable 1000 milliGRAM(s) IV Push every 12 hours  micafungin IVPB 100 milliGRAM(s) IV Intermittent daily  pantoprazole   Suspension 40 milliGRAM(s) Oral daily  Parenteral Nutrition - Adult 1 Each (42 mL/Hr) TPN Continuous <Continuous>  potassium chloride  10 mEq/100 mL IVPB 10 milliEquivalent(s) IV Intermittent every 1 hour    MEDICATIONS  (PRN):  ALBUTerol/ipratropium for Nebulization 3 milliLiter(s) Nebulizer every 6 hours PRN Shortness of Breath and/or Wheezing  dextrose Gel 1 Dose(s) Oral once PRN Blood Glucose LESS THAN 70 milliGRAM(s)/deciliter  glucagon  Injectable 1 milliGRAM(s) IntraMuscular once PRN Glucose LESS THAN 70 milligrams/deciliter  ondansetron Injectable 4 milliGRAM(s) IV Push every 6 hours PRN Nausea      Suazo: Daily Suazo Order Placed	   Indication : Strict I and O's  Central Line: yes - Medication / TPN Administration     Drips: fentanyl.    ICU Vital Signs Last 24 Hrs  T(C): 36.2 (01 May 2018 05:53), Max: 37.7 (30 Apr 2018 14:00)  T(F): 97.1 (01 May 2018 05:53), Max: 99.8 (30 Apr 2018 14:00)  HR: 64 (01 May 2018 07:00) (48 - 84)  BP: 96/46 (01 May 2018 07:00) (72/41 - 155/71)  BP(mean): 61 (01 May 2018 07:00) (47 - 117)  ABP: --  ABP(mean): --  RR: 11 (01 May 2018 07:00) (9 - 19)  SpO2: 98% (01 May 2018 06:59) (93% - 100%)      Physical Exam:  General: NAD, sedated and intubated  HEENT: NC/AT, EOMI, PERRLA,   Pulmonary: Nonlabored breathing, on ventilatior, good PIP, no bibasilar crackles, vesicular breath sounds, equal on both sides   Cardiovascular: NSR, no murmurs  Abdominal: soft, NT/ND, wound VAC in place, no leak  Extremities: WWP +pitting pedal edema  Neuro: pupils reacting to lights  Pulses: palpable distal pulses    Lines/tubes/drains: LEANN drain over LLQ, wound VAC at umbilical, suazo and rectal tube    Vent settings:  Mode: AC/ CMV (Assist Control/ Continuous Mandatory Ventilation), RR (machine): 12, TV (machine): 490, FiO2: 40, PEEP: 5, ITime: 1, MAP: 10, PIP: 29    I&O's Summary    30 Apr 2018 07:01  -  01 May 2018 07:00  --------------------------------------------------------  IN: 2009 mL / OUT: 3070 mL / NET: -1061 mL    01 May 2018 07:01  -  01 May 2018 07:37  --------------------------------------------------------  IN: 54 mL / OUT: 0 mL / NET: 54 mL        LABS:                        7.3    3.8   )-----------( 93       ( 01 May 2018 05:38 )             23.8     05-01    142  |  98  |  54<H>  ----------------------------<  150<H>  3.5   |  33<H>  |  1.29    Ca    10.4      01 May 2018 05:38  Phos  3.9     05-01  Mg     2.4     05-01    TPro  5.1<L>  /  Alb  3.1<L>  /  TBili  1.3<H>  /  DBili  0.8<H>  /  AST  22  /  ALT  10  /  AlkPhos  67  04-30        CAPILLARY BLOOD GLUCOSE      POCT Blood Glucose.: 160 mg/dL (01 May 2018 05:35)  POCT Blood Glucose.: 168 mg/dL (30 Apr 2018 23:55)  POCT Blood Glucose.: 122 mg/dL (30 Apr 2018 17:21)  POCT Blood Glucose.: 182 mg/dL (30 Apr 2018 12:54)    LIVER FUNCTIONS - ( 30 Apr 2018 06:28 )  Alb: 3.1 g/dL / Pro: 5.1 g/dL / ALK PHOS: 67 U/L / ALT: 10 U/L / AST: 22 U/L / GGT: x             Cultures:    RADIOLOGY & ADDITIONAL STUDIES: 24 hr events:  ON: Minimal response to lasix @9pm- and  only 80cc negative at beginning of shift.  given 60 IV. pt I/O negative @ 6 am, -1L. AM K repleted.  4/30: decreased amino acid in TPN to 0.8gm/IBW kg d/t rising BUN, had LP with IR, negative CSF by cell count/protein/glucose, KEENA performed negative for vegetation, 2x diamax, reduce fentanyl to 5, WV changed. UO decreased to 50cc/hr, lasix given.    SUBJECTIVE: Not following any verbal command, arousable.     MEDICATIONS  (STANDING):  albumin human 25% IVPB 50 milliLiter(s) IV Intermittent every 8 hours  chlorhexidine 0.12% Liquid 15 milliLiter(s) Swish and Spit two times a day  dexmedetomidine Infusion 0.2 MICROgram(s)/kG/Hr (4.68 mL/Hr) IV Continuous <Continuous>  dextrose 5%. 1000 milliLiter(s) (50 mL/Hr) IV Continuous <Continuous>  dextrose 50% Injectable 12.5 Gram(s) IV Push once  dextrose 50% Injectable 25 Gram(s) IV Push once  dextrose 50% Injectable 25 Gram(s) IV Push once  fentaNYL   Infusion 0.534 MICROgram(s)/kG/Hr (5 mL/Hr) IV Continuous <Continuous>  insulin lispro (HumaLOG) corrective regimen sliding scale   SubCutaneous every 6 hours  levothyroxine Injectable 75 MICROGram(s) IV Push <User Schedule>  meropenem Injectable 1000 milliGRAM(s) IV Push every 12 hours  micafungin IVPB 100 milliGRAM(s) IV Intermittent daily  pantoprazole   Suspension 40 milliGRAM(s) Oral daily  Parenteral Nutrition - Adult 1 Each (42 mL/Hr) TPN Continuous <Continuous>  potassium chloride  10 mEq/100 mL IVPB 10 milliEquivalent(s) IV Intermittent every 1 hour    MEDICATIONS  (PRN):  ALBUTerol/ipratropium for Nebulization 3 milliLiter(s) Nebulizer every 6 hours PRN Shortness of Breath and/or Wheezing  dextrose Gel 1 Dose(s) Oral once PRN Blood Glucose LESS THAN 70 milliGRAM(s)/deciliter  glucagon  Injectable 1 milliGRAM(s) IntraMuscular once PRN Glucose LESS THAN 70 milligrams/deciliter  ondansetron Injectable 4 milliGRAM(s) IV Push every 6 hours PRN Nausea      Suazo: Daily Suazo Order Placed	   Indication : Strict I and O's  Central Line: yes - Medication / TPN Administration       ICU Vital Signs Last 24 Hrs  T(C): 36.2 (01 May 2018 05:53), Max: 37.7 (30 Apr 2018 14:00)  T(F): 97.1 (01 May 2018 05:53), Max: 99.8 (30 Apr 2018 14:00)  HR: 64 (01 May 2018 07:00) (48 - 84)  BP: 96/46 (01 May 2018 07:00) (72/41 - 155/71)  BP(mean): 61 (01 May 2018 07:00) (47 - 117)  ABP: --  ABP(mean): --  RR: 11 (01 May 2018 07:00) (9 - 19)  SpO2: 98% (01 May 2018 06:59) (93% - 100%)      Physical Exam:  General: NAD, sedated and intubated  HEENT: NC/AT, EOMI, PERRLA,   Pulmonary: Nonlabored breathing, on ventilatior, good PIP, minmal bibasilar crackles, vesicular breath sounds, equal on both sides   Cardiovascular: NSR, no murmurs  Abdominal: soft, NT/ND, wound VAC in place, no leak  Extremities: WWP +pitting pedal edema  Neuro: pupils reacting to lights  Pulses: palpable distal pulses    Lines/tubes/drains: LEANN drain over LLQ - succus, wound VAC at umbilical, suazo and rectal tube    Vent settings:  Mode: AC/ CMV (Assist Control/ Continuous Mandatory Ventilation), RR (machine): 12, TV (machine): 490, FiO2: 40, PEEP: 5, ITime: 1, MAP: 10, PIP: 29    I&O's Summary    30 Apr 2018 07:01  -  01 May 2018 07:00  --------------------------------------------------------  IN: 2009 mL / OUT: 3070 mL / NET: -1061 mL    01 May 2018 07:01  -  01 May 2018 07:37  --------------------------------------------------------  IN: 54 mL / OUT: 0 mL / NET: 54 mL        LABS:                        7.3    3.8   )-----------( 93       ( 01 May 2018 05:38 )             23.8     05-01    142  |  98  |  54<H>  ----------------------------<  150<H>  3.5   |  33<H>  |  1.29    Ca    10.4      01 May 2018 05:38  Phos  3.9     05-01  Mg     2.4     05-01    TPro  5.1<L>  /  Alb  3.1<L>  /  TBili  1.3<H>  /  DBili  0.8<H>  /  AST  22  /  ALT  10  /  AlkPhos  67  04-30        CAPILLARY BLOOD GLUCOSE      POCT Blood Glucose.: 160 mg/dL (01 May 2018 05:35)  POCT Blood Glucose.: 168 mg/dL (30 Apr 2018 23:55)  POCT Blood Glucose.: 122 mg/dL (30 Apr 2018 17:21)  POCT Blood Glucose.: 182 mg/dL (30 Apr 2018 12:54)    LIVER FUNCTIONS - ( 30 Apr 2018 06:28 )  Alb: 3.1 g/dL / Pro: 5.1 g/dL / ALK PHOS: 67 U/L / ALT: 10 U/L / AST: 22 U/L / GGT: x             Cultures:    RADIOLOGY & ADDITIONAL STUDIES: 24 hr events:  ON: Minimal response to lasix @9pm- and  only 80cc negative at beginning of shift.  given 60 IV. pt I/O negative @ 6 am, -1L. AM K repleted.  4/30: decreased amino acid in TPN to 0.8gm/IBW kg d/t rising BUN, had LP with IR, negative CSF by cell count/protein/glucose, KEENA performed negative for vegetation, 2x diamax, reduce fentanyl to 5, WV changed. UO decreased to 50cc/hr, lasix given.    SUBJECTIVE: Not following any verbal command, arousable.     MEDICATIONS  (STANDING):  albumin human 25% IVPB 50 milliLiter(s) IV Intermittent every 8 hours  chlorhexidine 0.12% Liquid 15 milliLiter(s) Swish and Spit two times a day  dexmedetomidine Infusion 0.2 MICROgram(s)/kG/Hr (4.68 mL/Hr) IV Continuous <Continuous>  dextrose 5%. 1000 milliLiter(s) (50 mL/Hr) IV Continuous <Continuous>  dextrose 50% Injectable 12.5 Gram(s) IV Push once  dextrose 50% Injectable 25 Gram(s) IV Push once  dextrose 50% Injectable 25 Gram(s) IV Push once  fentaNYL   Infusion 0.534 MICROgram(s)/kG/Hr (5 mL/Hr) IV Continuous <Continuous>  insulin lispro (HumaLOG) corrective regimen sliding scale   SubCutaneous every 6 hours  levothyroxine Injectable 75 MICROGram(s) IV Push <User Schedule>  meropenem Injectable 1000 milliGRAM(s) IV Push every 12 hours  micafungin IVPB 100 milliGRAM(s) IV Intermittent daily  pantoprazole   Suspension 40 milliGRAM(s) Oral daily  Parenteral Nutrition - Adult 1 Each (42 mL/Hr) TPN Continuous <Continuous>  potassium chloride  10 mEq/100 mL IVPB 10 milliEquivalent(s) IV Intermittent every 1 hour    MEDICATIONS  (PRN):  ALBUTerol/ipratropium for Nebulization 3 milliLiter(s) Nebulizer every 6 hours PRN Shortness of Breath and/or Wheezing  dextrose Gel 1 Dose(s) Oral once PRN Blood Glucose LESS THAN 70 milliGRAM(s)/deciliter  glucagon  Injectable 1 milliGRAM(s) IntraMuscular once PRN Glucose LESS THAN 70 milligrams/deciliter  ondansetron Injectable 4 milliGRAM(s) IV Push every 6 hours PRN Nausea      Suazo: Daily Suazo Order Placed	   Indication : Strict I and O's  Central Line: yes - Medication / TPN Administration       ICU Vital Signs Last 24 Hrs  T(C): 36.2 (01 May 2018 05:53), Max: 37.7 (30 Apr 2018 14:00)  T(F): 97.1 (01 May 2018 05:53), Max: 99.8 (30 Apr 2018 14:00)  HR: 64 (01 May 2018 07:00) (48 - 84)  BP: 96/46 (01 May 2018 07:00) (72/41 - 155/71)  BP(mean): 61 (01 May 2018 07:00) (47 - 117)  ABP: --  ABP(mean): --  RR: 11 (01 May 2018 07:00) (9 - 19)  SpO2: 98% (01 May 2018 06:59) (93% - 100%)      Physical Exam:  General: NAD, sedated and intubated  HEENT: NC/AT, EOMI, PERRLA,   Pulmonary: Nonlabored breathing, on ventilatior, good PIP, minmal bibasilar crackles, vesicular breath sounds, equal on both sides   Cardiovascular: NSR, no murmurs  Abdominal: soft, NT/ND, wound VAC in place, no leak  : suazo in place  Extremities: WWP +pitting pedal edema  Neuro: pupils reacting to lights  Pulses: palpable distal pulses  Skin: no rash  Lines/tubes/drains: LEANN drain over LLQ - succus, wound VAC at umbilical, suazo and rectal tube    Vent settings:  Mode: AC/ CMV (Assist Control/ Continuous Mandatory Ventilation), RR (machine): 12, TV (machine): 490, FiO2: 40, PEEP: 5, ITime: 1, MAP: 10, PIP: 29    I&O's Summary    30 Apr 2018 07:01  -  01 May 2018 07:00  --------------------------------------------------------  IN: 2009 mL / OUT: 3070 mL / NET: -1061 mL    01 May 2018 07:01  -  01 May 2018 07:37  --------------------------------------------------------  IN: 54 mL / OUT: 0 mL / NET: 54 mL        LABS:                        7.3    3.8   )-----------( 93       ( 01 May 2018 05:38 )             23.8     05-01    142  |  98  |  54<H>  ----------------------------<  150<H>  3.5   |  33<H>  |  1.29    Ca    10.4      01 May 2018 05:38  Phos  3.9     05-01  Mg     2.4     05-01    TPro  5.1<L>  /  Alb  3.1<L>  /  TBili  1.3<H>  /  DBili  0.8<H>  /  AST  22  /  ALT  10  /  AlkPhos  67  04-30        CAPILLARY BLOOD GLUCOSE      POCT Blood Glucose.: 160 mg/dL (01 May 2018 05:35)  POCT Blood Glucose.: 168 mg/dL (30 Apr 2018 23:55)  POCT Blood Glucose.: 122 mg/dL (30 Apr 2018 17:21)  POCT Blood Glucose.: 182 mg/dL (30 Apr 2018 12:54)    LIVER FUNCTIONS - ( 30 Apr 2018 06:28 )  Alb: 3.1 g/dL / Pro: 5.1 g/dL / ALK PHOS: 67 U/L / ALT: 10 U/L / AST: 22 U/L / GGT: x             Cultures:    RADIOLOGY & ADDITIONAL STUDIES: CXR: decreased effusions

## 2018-05-01 NOTE — PROGRESS NOTE ADULT - ASSESSMENT
79 y/o woman, HTN, DM, and hypothyroidism who presents as a transfer from [facility] for management of multifactorial sepsis. Neurology was consulted for evaluation of altered mental status. CT head 4/22/18 was notable for left parafalcine partially calcified meningioma, mild diffuse volume loss, otherwise unremarkable. Exam limited by sedation, however is awake, opening eyes and moving all extremities spontaneously. EEG 4/25->4/26 which was likely normal, though it was a technically poor study. Now s/p LP which was bland.    Recommend:  - When able, please obtain MRI head w/o contrast to assess for structural etiologies of AMS (e.g. infarcts, inflammation, hemorrhage).    Neurology will follow. 77 y/o woman, HTN, DM, and hypothyroidism who presented as a transfer from [facility] for management of multifactorial sepsis. Neurology was consulted for evaluation of altered mental status. CT head 4/22/18 was notable for left parafalcine partially calcified meningioma, mild diffuse volume loss, otherwise unremarkable. Exam limited by sedation, however the patient is awake, opening eyes, and moving all extremities spontaneously with no obvious asymmetry. EEG 4/25->4/26 was likely normal, though it was a technically poor study. She is s/p LP 4/30 which was bland.    - when able, please obtain MRI brain w/o contrast to assess for structural etiologies of AMS (e.g. infarcts, inflammation, hemorrhage).

## 2018-05-01 NOTE — CONSULT NOTE ADULT - SUBJECTIVE AND OBJECTIVE BOX
INTERVAL HPI/OVERNIGHT EVENTS:  Afebrile, HDS off pressors, sedated on precedex and fentanyl, unable to obtain ROS   pt continued on micafungin for fungus + wound culture and meropenem for MDR E.Coli, fungal speciation sig for candida luitaniae, awaiting sensitivity  s/p LP 4/30, unremarkable result   otherwise EMANUEL    MEDICATIONS  (STANDING):  albumin human 25% IVPB 50 milliLiter(s) IV Intermittent every 8 hours  chlorhexidine 0.12% Liquid 15 milliLiter(s) Swish and Spit two times a day  dexmedetomidine Infusion 0.2 MICROgram(s)/kG/Hr (4.68 mL/Hr) IV Continuous <Continuous>  dextrose 5%. 1000 milliLiter(s) (50 mL/Hr) IV Continuous <Continuous>  dextrose 50% Injectable 12.5 Gram(s) IV Push once  dextrose 50% Injectable 25 Gram(s) IV Push once  dextrose 50% Injectable 25 Gram(s) IV Push once  fat emulsion (Plant Based) 20% Infusion 0.54 Gm/kG/Day (31.59 mL/Hr) IV Continuous <Continuous>  insulin lispro (HumaLOG) corrective regimen sliding scale   SubCutaneous every 6 hours  levothyroxine Injectable 75 MICROGram(s) IV Push <User Schedule>  meropenem Injectable 1000 milliGRAM(s) IV Push every 12 hours  micafungin IVPB 100 milliGRAM(s) IV Intermittent daily  norepinephrine Infusion 0.01 MICROgram(s)/kG/Min (1.755 mL/Hr) IV Continuous <Continuous>  pantoprazole   Suspension 40 milliGRAM(s) Oral daily  Parenteral Nutrition - Adult 1 Each (46 mL/Hr) TPN Continuous <Continuous>  Parenteral Nutrition - Adult 1 Each (42 mL/Hr) TPN Continuous <Continuous>    VITAL SIGNS:  T(C): 36.9 (05-01-18 @ 17:30), Max: 37.1 (05-01-18 @ 14:28)  T(F): 98.5 (05-01-18 @ 17:30), Max: 98.8 (05-01-18 @ 14:28)  HR: 88 (05-01-18 @ 19:00) (60 - 98)  BP: 101/54 (05-01-18 @ 19:00) (71/42 - 151/74)  BP(mean): 82 (05-01-18 @ 18:45) (47 - 105)  RR: 70 (05-01-18 @ 18:45) (7 - 130)  SpO2: 100% (05-01-18 @ 19:00) (79% - 100%)    PHYSICAL EXAM:  Constitutional: mildly sedated/ intubated, following commands to simple verbal orders   Eyes:  Sclerae anicteric, conjunctivae clear, PERRL  Ear/Nose/Throat:  MMM/ No thrush   Pulmonary: Bibasilar crackles, air entry equal on both sides, intubated, Right chest Port, access for HD  Cardiovascular: NSR, no murmurs  Abdominal: soft, obese, unable to elicit tenderness, non distended. wound VAC in place, no leak.   : suazo in place  Extremities: bilateral arms - restarined, +edema, power 3/5, radial pulses and dp +  Neuro: A/O x 0, follow verbal commands     .  LABS:                         7.3    3.8   )-----------( 93       ( 01 May 2018 05:38 )             23.8     05-01    142  |  98  |  54<H>  ----------------------------<  150<H>  3.5   |  33<H>  |  1.29    Ca    10.4      01 May 2018 05:38  Phos  3.9     05-01  Mg     2.4     05-01    TPro  5.1<L>  /  Alb  3.1<L>  /  TBili  1.3<H>  /  DBili  0.8<H>  /  AST  22  /  ALT  10  /  AlkPhos  67  04-3    Microbiology:  U Clx 4/18: + E.coli MDR  left pelvic fluid aspirate: + for yeast/ candida luitaniae      RADIOLOGY, EKG & ADDITIONAL TESTS: Reviewed.

## 2018-05-01 NOTE — PROGRESS NOTE ADULT - SUBJECTIVE AND OBJECTIVE BOX
INTERVAL HPI/OVERNIGHT EVENTS:    OVERNIGHT: No overnight events.  SUBJECTIVE: Patient seen and examined at bedside.     ROS:  CV: Denies chest pain  Resp: Denies SOB  GI: Denies abdominal pain, constipation, diarrhea, nausea, vomiting  : Denies dysuria  ID: Denies fevers, chills  MSK: Denies joint pain     OBJECTIVE:    VITAL SIGNS:  ICU Vital Signs Last 24 Hrs  T(C): 36.2 (01 May 2018 05:53), Max: 37.7 (30 Apr 2018 14:00)  T(F): 97.1 (01 May 2018 05:53), Max: 99.8 (30 Apr 2018 14:00)  HR: 74 (01 May 2018 11:20) (48 - 98)  BP: 108/53 (01 May 2018 11:20) (72/41 - 155/71)  BP(mean): 82 (01 May 2018 11:20) (47 - 117)  ABP: --  ABP(mean): --  RR: 40 (01 May 2018 11:10) (7 - 104)  SpO2: 100% (01 May 2018 11:54) (93% - 100%)    Mode: AC/ CMV (Assist Control/ Continuous Mandatory Ventilation), RR (machine): 12, TV (machine): 490, FiO2: 40, PEEP: 5, ITime: 1, MAP: 10, PIP: 26    04-30 @ 07:01 - 05-01 @ 07:00  --------------------------------------------------------  IN: 2009 mL / OUT: 3070 mL / NET: -1061 mL    05-01 @ 07:01 - 05-01 @ 12:54  --------------------------------------------------------  IN: 515 mL / OUT: 225 mL / NET: 290 mL      CAPILLARY BLOOD GLUCOSE      POCT Blood Glucose.: 154 mg/dL (01 May 2018 10:50)      PHYSICAL EXAM:    General: NAD, comfortable  HEENT: NCAT, PERRL, clear conjunctiva, no scleral icterus  Neck: supple, no JVD  Respiratory: CTA b/l, no wheezing, rhonchi, rales  Cardiovascular: RRR, normal S1S2, no M/R/G  Abdomen: soft, NT/ND, bowel sounds in all four quadrants, no palpable masses  Extremities: WWP, no clubbing, cyanosis, or edema  Neuro: AOx3    MEDICATIONS:  MEDICATIONS  (STANDING):  albumin human 25% IVPB 50 milliLiter(s) IV Intermittent every 8 hours  chlorhexidine 0.12% Liquid 15 milliLiter(s) Swish and Spit two times a day  dextrose 5%. 1000 milliLiter(s) (50 mL/Hr) IV Continuous <Continuous>  dextrose 50% Injectable 12.5 Gram(s) IV Push once  dextrose 50% Injectable 25 Gram(s) IV Push once  dextrose 50% Injectable 25 Gram(s) IV Push once  fat emulsion (Plant Based) 20% Infusion 0.54 Gm/kG/Day (31.59 mL/Hr) IV Continuous <Continuous>  insulin lispro (HumaLOG) corrective regimen sliding scale   SubCutaneous every 6 hours  levothyroxine Injectable 75 MICROGram(s) IV Push <User Schedule>  meropenem Injectable 1000 milliGRAM(s) IV Push every 12 hours  micafungin IVPB 100 milliGRAM(s) IV Intermittent daily  norepinephrine Infusion 0.01 MICROgram(s)/kG/Min (1.755 mL/Hr) IV Continuous <Continuous>  pantoprazole   Suspension 40 milliGRAM(s) Oral daily  Parenteral Nutrition - Adult 1 Each (46 mL/Hr) TPN Continuous <Continuous>  Parenteral Nutrition - Adult 1 Each (42 mL/Hr) TPN Continuous <Continuous>    MEDICATIONS  (PRN):  ALBUTerol/ipratropium for Nebulization 3 milliLiter(s) Nebulizer every 6 hours PRN Shortness of Breath and/or Wheezing  dextrose Gel 1 Dose(s) Oral once PRN Blood Glucose LESS THAN 70 milliGRAM(s)/deciliter  glucagon  Injectable 1 milliGRAM(s) IntraMuscular once PRN Glucose LESS THAN 70 milligrams/deciliter  ondansetron Injectable 4 milliGRAM(s) IV Push every 6 hours PRN Nausea      ALLERGIES:  Allergies    No Known Allergies    Intolerances        LABS:                        7.3    3.8   )-----------( 93       ( 01 May 2018 05:38 )             23.8     05-01    142  |  98  |  54<H>  ----------------------------<  150<H>  3.5   |  33<H>  |  1.29    Ca    10.4      01 May 2018 05:38  Phos  3.9     05-01  Mg     2.4     05-01    TPro  5.1<L>  /  Alb  3.1<L>  /  TBili  1.3<H>  /  DBili  0.8<H>  /  AST  22  /  ALT  10  /  AlkPhos  67  04-30          RADIOLOGY & ADDITIONAL TESTS: Studies reviewed. INTERVAL HPI/OVERNIGHT EVENTS:    OVERNIGHT: No overnight events. Patient afebrile, VSS.   SUBJECTIVE: Patient examined at bedside. Intubated and sedated on Fentanyl and Precedex in AM. Patient moving all four extremities and opening eyes spontaneously. Not following commands and ROS not obtainable.    OBJECTIVE:    VITAL SIGNS:  ICU Vital Signs Last 24 Hrs  T(C): 36.2 (01 May 2018 05:53), Max: 37.7 (30 Apr 2018 14:00)  T(F): 97.1 (01 May 2018 05:53), Max: 99.8 (30 Apr 2018 14:00)  HR: 74 (01 May 2018 11:20) (48 - 98)  BP: 108/53 (01 May 2018 11:20) (72/41 - 155/71)  BP(mean): 82 (01 May 2018 11:20) (47 - 117)  ABP: --  ABP(mean): --  RR: 40 (01 May 2018 11:10) (7 - 104)  SpO2: 100% (01 May 2018 11:54) (93% - 100%)    Mode: AC/ CMV (Assist Control/ Continuous Mandatory Ventilation), RR (machine): 12, TV (machine): 490, FiO2: 40, PEEP: 5, ITime: 1, MAP: 10, PIP: 26    04-30 @ 07:01 - 05-01 @ 07:00  --------------------------------------------------------  IN: 2009 mL / OUT: 3070 mL / NET: -1061 mL    05-01 @ 07:01 - 05-01 @ 12:54  --------------------------------------------------------  IN: 515 mL / OUT: 225 mL / NET: 290 mL      CAPILLARY BLOOD GLUCOSE  POCT Blood Glucose.: 154 mg/dL (01 May 2018 10:50)    PHYSICAL EXAM:    General: appears uncomfortable, moving all four extremities, opening eyes spontaneously  HEENT: NCAT, PERRL, clear conjunctiva, no scleral icterus  Neck: supple, no JVD  Respiratory: CTA b/l  Cardiovascular: RRR, normal S1S2, no M/R/G  Abdomen: soft, NT/ND, bowel sounds in all four quadrants, no palpable masses  Extremities: cool extremities, no clubbing, cyanosis, or edema  Neuro:   - Mental Status: patient intubated and sedated. Responding to voice.  - Cranial Nerves II-XII:    II:  Pupils are 3mm, equal, round, and reactive to light.  III, IV, VI:  unable to assess since patient is not following commands.   V:  unable to assess since patient is intubated and sedated.   VII:  unable to assess since patient is intubated and sedated.   VIII: unable to assess since patient is intubated and sedated.   IX, X:  unable to assess since patient is intubated.   XI:  unable to assess since patient is sedated. .  XII: unable to assess since patient is intubated and sedated.   - Motor:  moves all extremities spontaneously  - Reflexes:  2+ in biceps, brachioradialis, triceps, knees, and ankles.   - Sensory: unable to assess since patient is intubated and sedated.   - Coordination:  unable to assess since patient is intubated and sedated.   - Gait: deferred.    MEDICATIONS:  MEDICATIONS  (STANDING):  albumin human 25% IVPB 50 milliLiter(s) IV Intermittent every 8 hours  chlorhexidine 0.12% Liquid 15 milliLiter(s) Swish and Spit two times a day  dextrose 5%. 1000 milliLiter(s) (50 mL/Hr) IV Continuous <Continuous>  dextrose 50% Injectable 12.5 Gram(s) IV Push once  dextrose 50% Injectable 25 Gram(s) IV Push once  dextrose 50% Injectable 25 Gram(s) IV Push once  fat emulsion (Plant Based) 20% Infusion 0.54 Gm/kG/Day (31.59 mL/Hr) IV Continuous <Continuous>  insulin lispro (HumaLOG) corrective regimen sliding scale   SubCutaneous every 6 hours  levothyroxine Injectable 75 MICROGram(s) IV Push <User Schedule>  meropenem Injectable 1000 milliGRAM(s) IV Push every 12 hours  micafungin IVPB 100 milliGRAM(s) IV Intermittent daily  norepinephrine Infusion 0.01 MICROgram(s)/kG/Min (1.755 mL/Hr) IV Continuous <Continuous>  pantoprazole   Suspension 40 milliGRAM(s) Oral daily  Parenteral Nutrition - Adult 1 Each (46 mL/Hr) TPN Continuous <Continuous>  Parenteral Nutrition - Adult 1 Each (42 mL/Hr) TPN Continuous <Continuous>    MEDICATIONS  (PRN):  ALBUTerol/ipratropium for Nebulization 3 milliLiter(s) Nebulizer every 6 hours PRN Shortness of Breath and/or Wheezing  dextrose Gel 1 Dose(s) Oral once PRN Blood Glucose LESS THAN 70 milliGRAM(s)/deciliter  glucagon  Injectable 1 milliGRAM(s) IntraMuscular once PRN Glucose LESS THAN 70 milligrams/deciliter  ondansetron Injectable 4 milliGRAM(s) IV Push every 6 hours PRN Nausea      ALLERGIES:  Allergies    No Known Allergies    Intolerances        LABS:                        7.3    3.8   )-----------( 93       ( 01 May 2018 05:38 )             23.8     05-01    142  |  98  |  54<H>  ----------------------------<  150<H>  3.5   |  33<H>  |  1.29    Ca    10.4      01 May 2018 05:38  Phos  3.9     05-01  Mg     2.4     05-01    TPro  5.1<L>  /  Alb  3.1<L>  /  TBili  1.3<H>  /  DBili  0.8<H>  /  AST  22  /  ALT  10  /  AlkPhos  67  04-30          RADIOLOGY & ADDITIONAL TESTS: Studies reviewed.

## 2018-05-01 NOTE — PROGRESS NOTE ADULT - ATTENDING COMMENTS
I modified the note above with the exception of the physical exam section. The exam below is my own:    gen: intubated, sedated on Fentanyl and Precedex gtt. She is grimacing and appear uncomfortable.  HEENT: trachea midline, no jaundice or icterus.  Ext: well-perfused  Alertness: eyes remained closed to verbal stimulation, open after touching her left shoulder. Followed no commands.   II: tracks examiner on R and L.   III, IV, VI: No nystagmus. Cannot visualize pupils because she closed her eyes tightly when we tried to examine them.   VII: No flattening of nasolabial fold.   Motor: no atrophy or fasciculations. No tremor. No hypo/hyperkinesia. Tone normal. She moves all limbs spontaneously without asymmetry.

## 2018-05-02 LAB
ALBUMIN SERPL ELPH-MCNC: 2.6 G/DL — LOW (ref 3.3–5)
ALP SERPL-CCNC: 74 U/L — SIGNIFICANT CHANGE UP (ref 40–120)
ALT FLD-CCNC: 14 U/L — SIGNIFICANT CHANGE UP (ref 10–45)
ANION GAP SERPL CALC-SCNC: 11 MMOL/L — SIGNIFICANT CHANGE UP (ref 5–17)
AST SERPL-CCNC: 26 U/L — SIGNIFICANT CHANGE UP (ref 10–40)
BILIRUB SERPL-MCNC: 1.2 MG/DL — SIGNIFICANT CHANGE UP (ref 0.2–1.2)
BLD GP AB SCN SERPL QL: NEGATIVE — SIGNIFICANT CHANGE UP
BUN SERPL-MCNC: 55 MG/DL — HIGH (ref 7–23)
CALCIUM SERPL-MCNC: 10.2 MG/DL — SIGNIFICANT CHANGE UP (ref 8.4–10.5)
CHLORIDE SERPL-SCNC: 97 MMOL/L — SIGNIFICANT CHANGE UP (ref 96–108)
CO2 SERPL-SCNC: 31 MMOL/L — SIGNIFICANT CHANGE UP (ref 22–31)
CREAT SERPL-MCNC: 1.27 MG/DL — SIGNIFICANT CHANGE UP (ref 0.5–1.3)
GLUCOSE BLDC GLUCOMTR-MCNC: 158 MG/DL — HIGH (ref 70–99)
GLUCOSE BLDC GLUCOMTR-MCNC: 159 MG/DL — HIGH (ref 70–99)
GLUCOSE BLDC GLUCOMTR-MCNC: 160 MG/DL — HIGH (ref 70–99)
GLUCOSE BLDC GLUCOMTR-MCNC: 179 MG/DL — HIGH (ref 70–99)
GLUCOSE BLDC GLUCOMTR-MCNC: 186 MG/DL — HIGH (ref 70–99)
GLUCOSE SERPL-MCNC: 130 MG/DL — HIGH (ref 70–99)
HCT VFR BLD CALC: 22.1 % — LOW (ref 34.5–45)
HGB BLD-MCNC: 6.8 G/DL — CRITICAL LOW (ref 11.5–15.5)
MAGNESIUM SERPL-MCNC: 2.4 MG/DL — SIGNIFICANT CHANGE UP (ref 1.6–2.6)
MCHC RBC-ENTMCNC: 30.1 PG — SIGNIFICANT CHANGE UP (ref 27–34)
MCHC RBC-ENTMCNC: 30.8 G/DL — LOW (ref 32–36)
MCV RBC AUTO: 97.8 FL — SIGNIFICANT CHANGE UP (ref 80–100)
PHOSPHATE SERPL-MCNC: 3.9 MG/DL — SIGNIFICANT CHANGE UP (ref 2.5–4.5)
PLATELET # BLD AUTO: 112 K/UL — LOW (ref 150–400)
POTASSIUM SERPL-MCNC: 3.4 MMOL/L — LOW (ref 3.5–5.3)
POTASSIUM SERPL-SCNC: 3.4 MMOL/L — LOW (ref 3.5–5.3)
PROT SERPL-MCNC: 5 G/DL — LOW (ref 6–8.3)
RBC # BLD: 2.26 M/UL — LOW (ref 3.8–5.2)
RBC # FLD: 19.2 % — HIGH (ref 10.3–16.9)
RH IG SCN BLD-IMP: POSITIVE — SIGNIFICANT CHANGE UP
SODIUM SERPL-SCNC: 139 MMOL/L — SIGNIFICANT CHANGE UP (ref 135–145)
VDRL CSF-TITR: NEGATIVE — SIGNIFICANT CHANGE UP
WBC # BLD: 4.5 K/UL — SIGNIFICANT CHANGE UP (ref 3.8–10.5)
WBC # FLD AUTO: 4.5 K/UL — SIGNIFICANT CHANGE UP (ref 3.8–10.5)

## 2018-05-02 PROCEDURE — 71045 X-RAY EXAM CHEST 1 VIEW: CPT | Mod: 26

## 2018-05-02 PROCEDURE — 99233 SBSQ HOSP IP/OBS HIGH 50: CPT | Mod: GC

## 2018-05-02 RX ORDER — ELECTROLYTE SOLUTION,INJ
1 VIAL (ML) INTRAVENOUS
Qty: 0 | Refills: 0 | Status: DISCONTINUED | OUTPATIENT
Start: 2018-05-02 | End: 2018-05-02

## 2018-05-02 RX ORDER — POTASSIUM CHLORIDE 20 MEQ
20 PACKET (EA) ORAL
Qty: 0 | Refills: 0 | Status: COMPLETED | OUTPATIENT
Start: 2018-05-02 | End: 2018-05-02

## 2018-05-02 RX ORDER — I.V. FAT EMULSION 20 G/100ML
0.54 EMULSION INTRAVENOUS
Qty: 50 | Refills: 0 | Status: DISCONTINUED | OUTPATIENT
Start: 2018-05-02 | End: 2018-05-02

## 2018-05-02 RX ORDER — FUROSEMIDE 40 MG
60 TABLET ORAL EVERY 12 HOURS
Qty: 0 | Refills: 0 | Status: DISCONTINUED | OUTPATIENT
Start: 2018-05-02 | End: 2018-05-03

## 2018-05-02 RX ORDER — HYDROMORPHONE HYDROCHLORIDE 2 MG/ML
0.5 INJECTION INTRAMUSCULAR; INTRAVENOUS; SUBCUTANEOUS
Qty: 0 | Refills: 0 | Status: DISCONTINUED | OUTPATIENT
Start: 2018-05-02 | End: 2018-05-05

## 2018-05-02 RX ADMIN — Medication 2: at 13:45

## 2018-05-02 RX ADMIN — CHLORHEXIDINE GLUCONATE 15 MILLILITER(S): 213 SOLUTION TOPICAL at 05:58

## 2018-05-02 RX ADMIN — Medication 50 MILLILITER(S): at 05:57

## 2018-05-02 RX ADMIN — DEXMEDETOMIDINE HYDROCHLORIDE IN 0.9% SODIUM CHLORIDE 4.68 MICROGRAM(S)/KG/HR: 4 INJECTION INTRAVENOUS at 17:37

## 2018-05-02 RX ADMIN — CHLORHEXIDINE GLUCONATE 15 MILLILITER(S): 213 SOLUTION TOPICAL at 17:39

## 2018-05-02 RX ADMIN — Medication 1 EACH: at 18:24

## 2018-05-02 RX ADMIN — Medication 2: at 17:37

## 2018-05-02 RX ADMIN — Medication 75 MICROGRAM(S): at 05:58

## 2018-05-02 RX ADMIN — MEROPENEM 1000 MILLIGRAM(S): 1 INJECTION INTRAVENOUS at 05:57

## 2018-05-02 RX ADMIN — Medication 50 MILLILITER(S): at 10:57

## 2018-05-02 RX ADMIN — Medication 2: at 21:57

## 2018-05-02 RX ADMIN — DEXMEDETOMIDINE HYDROCHLORIDE IN 0.9% SODIUM CHLORIDE 4.68 MICROGRAM(S)/KG/HR: 4 INJECTION INTRAVENOUS at 23:50

## 2018-05-02 RX ADMIN — PANTOPRAZOLE SODIUM 40 MILLIGRAM(S): 20 TABLET, DELAYED RELEASE ORAL at 13:36

## 2018-05-02 RX ADMIN — I.V. FAT EMULSION 31.25 GM/KG/DAY: 20 EMULSION INTRAVENOUS at 21:33

## 2018-05-02 RX ADMIN — PROPOFOL 2.81 MICROGRAM(S)/KG/MIN: 10 INJECTION, EMULSION INTRAVENOUS at 09:45

## 2018-05-02 RX ADMIN — Medication 50 MILLIEQUIVALENT(S): at 11:01

## 2018-05-02 RX ADMIN — Medication 2: at 05:57

## 2018-05-02 RX ADMIN — Medication 50 MILLILITER(S): at 17:41

## 2018-05-02 RX ADMIN — MEROPENEM 1000 MILLIGRAM(S): 1 INJECTION INTRAVENOUS at 17:42

## 2018-05-02 RX ADMIN — Medication 2: at 00:08

## 2018-05-02 RX ADMIN — MICAFUNGIN SODIUM 210 MILLIGRAM(S): 100 INJECTION, POWDER, LYOPHILIZED, FOR SOLUTION INTRAVENOUS at 11:33

## 2018-05-02 RX ADMIN — Medication 60 MILLIGRAM(S): at 17:38

## 2018-05-02 RX ADMIN — Medication 50 MILLIEQUIVALENT(S): at 09:46

## 2018-05-02 RX ADMIN — Medication 50 MILLIEQUIVALENT(S): at 05:56

## 2018-05-02 NOTE — PROGRESS NOTE ADULT - ASSESSMENT
79 y/o F with sepsis from open abdominal wound infection, MSSA bacteremia present on admission, and UTI present on admission found to have contained small bowel leak    Neuro: precedex gtt, , Zofran prn, AMS: EEG: slowing LP neg. holding:  seroquel, aripiprazole, escitalapram, propofol (weaning off), dilaudid.   CV: HD stable s/p severe sepsis, albumin 25%q8, holding amlodipine, 4/19 Echo  65-70%. Lasix 60mg BID  Pulm: intubated, satting well on 40/490/12/5 chlorhex  GI: NPO, protonix, Dobhoff (no feeds), TPN Protonix  : Nicole S/p AKF on HD now resolved. UTI (present on admission) + Uterine fibroids on CT scan with vaginal bleeding, gyn consulted. - follow up endometrial biopsy  ID: ESBL Ecoli in Urine resistant to Zosyn: Jose L( 4/17-) MSSA in blood: Oxacillin ( 4/17-) Kai virus (good hand washing), TTE neg for endocarditis.   Endo: ISS, Synthroid  Insulin  in TPN: 25   PPx: sqh held for low plts  PE:Sp IVC filter on 3/30 Plts low- will give 1U b/f IR drainage  Lines: PIV, LIJ TLC (4/29-), /// s/p L Rad Art line( 4/15-4/18),  Rt IJ TLC from OSH ( 4/15-)  Wounds: Wound vac to midline incision , change MWF 77 y/o F with sepsis from open abdominal wound infection, MSSA bacteremia present on admission, and UTI present on admission found to have contained small bowel leak    Neuro: precedex gtt, , Zofran prn, AMS: EEG: slowing LP neg. holding:  seroquel, aripiprazole, escitalapram, propofol (weaning off), dilaudid.   CV: HD stable s/p severe sepsis, albumin 25%q8, holding amlodipine, 4/19 Echo  65-70%. Lasix 60mg BID  Pulm: intubated, satting well on 40/490/12/5 chlorhex  GI: NPO, protonix, Dobhoff (no feeds), TPN Protonix  : Nicole S/p AKF on HD now resolved. UTI (present on admission) + Uterine fibroids on CT scan with vaginal bleeding, gyn consulted. - follow up endometrial biopsy  ID: ESBL Ecoli in Urine resistant to Zosyn: Jose L( 4/17-) MSSA in blood: Oxacillin ( 4/17-) Kai virus (good hand washing), TTE neg for endocarditis.   Endo: ISS, Synthroid  Insulin  in TPN: 25   PPx: sqh held for low plts  PE:Sp IVC filter on 3/30   Lines: PIV, LIJ TLC (4/29-), /// s/p L Rad Art line( 4/15-4/18),  Rt IJ TLC from OSH ( 4/15-)  Wounds: Wound vac to midline incision , change MWF 79 y/o F with sepsis from open abdominal wound infection, MSSA bacteremia present on admission, and UTI present on admission found to have contained small bowel leak, acute respiratory failure, ATN, vaginal bleeding    Neuro: precedex gtt, , Zofran prn, AMS: EEG: slowing LP neg. holding:  seroquel, aripiprazole, escitalapram, propofol (weaning off), adding dilaudid for presumed pain.  CV: HD stable s/p severe sepsis, albumin 25%q8, holding amlodipine, 4/19 Echo  65-70%. Lasix 60mg BID  Pulm: intubated, satting well on 40/490/12/5 chlorhex  GI: NPO, protonix, Dobhoff (no feeds), TPN Protonix  : Nicole S/p AKF on HD now resolved and renal function stable with diuresis. UTI (present on admission) + Uterine fibroids on CT scan with vaginal bleeding, gyn consulted. - follow up cervical biopsy  ID: ESBL Ecoli in Urine resistant to Zosyn: Jose L( 4/17-) MSSA in blood: Oxacillin ( 4/17-4/30) Kai virus (good hand washing), TTE neg for endocarditis.   Endo: ISS, Synthroid  Insulin  in TPN: 25   PPx: sqh held for low plts  PE:Sp IVC filter on 3/30   Lines: PIV, LIJ TLC (4/29-), /// s/p L Rad Art line( 4/15-4/18),  Rt IJ TLC from OSH ( 4/15-)  Wounds: Wound vac to midline incision , change MWF

## 2018-05-02 NOTE — PROGRESS NOTE ADULT - SUBJECTIVE AND OBJECTIVE BOX
24 hr events:  o/n: off Levophed, propofol added for agitation and continued precedex. L IJ TLC discovered to be out at 15 cm, but functioning and okay on CXR. Re-sutured and secured. No lasix given overnight, -1L in 24 hrs. K repleted. AM Hb 6.8 from 7.3.  5/1: started back on levophed (systolic 78), daley to d/w gyn team regarding goal, endometrial biopsy by OBGYN,TPN ordered, off fentanyl, lasix 60 given    SUBJECTIVE: +eyes opening, not responding to verbal command. On propofol and precedex.  +bowel function (on rectal tube), good urine output. (>1L)      MEDICATIONS  (STANDING):  albumin human 25% IVPB 50 milliLiter(s) IV Intermittent every 8 hours  chlorhexidine 0.12% Liquid 15 milliLiter(s) Swish and Spit two times a day  dexmedetomidine Infusion 0.2 MICROgram(s)/kG/Hr (4.68 mL/Hr) IV Continuous <Continuous>  dextrose 5%. 1000 milliLiter(s) (50 mL/Hr) IV Continuous <Continuous>  dextrose 50% Injectable 12.5 Gram(s) IV Push once  dextrose 50% Injectable 25 Gram(s) IV Push once  dextrose 50% Injectable 25 Gram(s) IV Push once  fat emulsion (Plant Based) 20% Infusion 0.54 Gm/kG/Day (31.59 mL/Hr) IV Continuous <Continuous>  furosemide   Injectable 60 milliGRAM(s) IV Push every 12 hours  insulin lispro (HumaLOG) corrective regimen sliding scale   SubCutaneous every 6 hours  levothyroxine Injectable 75 MICROGram(s) IV Push <User Schedule>  meropenem Injectable 1000 milliGRAM(s) IV Push every 12 hours  micafungin IVPB 100 milliGRAM(s) IV Intermittent daily  norepinephrine Infusion 0.01 MICROgram(s)/kG/Min (1.755 mL/Hr) IV Continuous <Continuous>  pantoprazole   Suspension 40 milliGRAM(s) Oral daily  Parenteral Nutrition - Adult 1 Each (46 mL/Hr) TPN Continuous <Continuous>  Parenteral Nutrition - Adult 1 Each (48 mL/Hr) TPN Continuous <Continuous>  potassium chloride  20 mEq/100 mL IVPB 20 milliEquivalent(s) IV Intermittent every 2 hours  propofol Infusion 5 MICROgram(s)/kG/Min (2.808 mL/Hr) IV Continuous <Continuous>    MEDICATIONS  (PRN):  ALBUTerol/ipratropium for Nebulization 3 milliLiter(s) Nebulizer every 6 hours PRN Shortness of Breath and/or Wheezing  dextrose Gel 1 Dose(s) Oral once PRN Blood Glucose LESS THAN 70 milliGRAM(s)/deciliter  glucagon  Injectable 1 milliGRAM(s) IntraMuscular once PRN Glucose LESS THAN 70 milligrams/deciliter  HYDROmorphone  Injectable 0.5 milliGRAM(s) IV Push every 3 hours PRN Severe Pain (7 - 10)  ondansetron Injectable 4 milliGRAM(s) IV Push every 6 hours PRN Nausea      Nicole:	  Daily Nicole Order Placed	   Indication:	Strict I and O's     Central Line:   Medication / TPN Administration     Drips: propofol and precedex    ICU Vital Signs Last 24 Hrs  T(C): 30.9 (02 May 2018 10:47), Max: 37.8 (02 May 2018 06:00)  T(F): 87.7 (02 May 2018 10:47), Max: 100 (02 May 2018 06:00)  HR: 74 (02 May 2018 10:00) (66 - 98)  BP: 114/59 (02 May 2018 10:00) (71/42 - 151/74)  BP(mean): 93 (02 May 2018 10:00) (47 - 105)  ABP: --  ABP(mean): --  RR: 12 (02 May 2018 10:00) (8 - 130)  SpO2: 99% (02 May 2018 08:33) (79% - 100%)      Physical Exam:  General: NAD  HEENT: NC/AT, EOMI, PERRLA, no oral lesions, neck supple w/o LAD  Pulmonary: Nonlabored breathing, no respiratory distress, on mechanical ventilation, bibasilar crackles  Cardiovascular: NSR, no murmurs  Abdominal: soft, NT/ND, +BS, no organomegaly, wound VAC in place, no leak, +rashes   Extremities: no clubbing/cyanosis/edema  Pulses: palpable distal pulses    Lines/tubes/drains: Wound VAC, LEANN drain over LLQ.     Vent settings:  Mode: AC/ CMV (Assist Control/ Continuous Mandatory Ventilation), RR (machine): 12, TV (machine): 490, FiO2: 40, PEEP: 5, ITime: 1, MAP: 9.2, PIP: 27    I&O's Summary    01 May 2018 07:01  -  02 May 2018 07:00  --------------------------------------------------------  IN: 1557 mL / OUT: 2880 mL / NET: -1323 mL    02 May 2018 07:01  -  02 May 2018 11:01  --------------------------------------------------------  IN: 259 mL / OUT: 210 mL / NET: 49 mL        LABS:                        6.8    4.5   )-----------( 112      ( 02 May 2018 04:33 )             22.1     05-02    139  |  97  |  55<H>  ----------------------------<  130<H>  3.4<L>   |  31  |  1.27    Ca    10.2      02 May 2018 04:33  Phos  3.9     05-02  Mg     2.4     05-02    TPro  5.0<L>  /  Alb  2.6<L>  /  TBili  1.2  /  DBili  x   /  AST  26  /  ALT  14  /  AlkPhos  74  05-02        CAPILLARY BLOOD GLUCOSE      POCT Blood Glucose.: 160 mg/dL (02 May 2018 05:50)  POCT Blood Glucose.: 159 mg/dL (02 May 2018 00:02)  POCT Blood Glucose.: 156 mg/dL (01 May 2018 17:00)    LIVER FUNCTIONS - ( 02 May 2018 04:33 )  Alb: 2.6 g/dL / Pro: 5.0 g/dL / ALK PHOS: 74 U/L / ALT: 14 U/L / AST: 26 U/L / GGT: x             Cultures:Culture Results:   Candida lusitaniae isolated  See [id number] for sensitivity (04-30 @ 18:31)  Culture Results:   Candida lusitaniae isolated  Susceptibility to follow. (04-30 @ 18:31)  Culture Results:   No growth to date (04-30 @ 12:35)      RADIOLOGY & ADDITIONAL STUDIES: 24 hr events:  o/n: off Levophed, propofol added for agitation and continued precedex. L IJ TLC discovered to be out at 15 cm, but functioning and okay on CXR. Re-sutured and secured. No lasix given overnight, -1L in 24 hrs. K repleted. AM Hb 6.8 from 7.3.  5/1: started back on levophed (systolic 78), daley to d/w gyn team regarding goal, endometrial biopsy by OBGYN,TPN ordered, off fentanyl, lasix 60 given    SUBJECTIVE: +eyes opening, not responding to verbal command. On propofol and precedex.  +bowel function (on rectal tube), good urine output. (>1L)      MEDICATIONS  (STANDING):  albumin human 25% IVPB 50 milliLiter(s) IV Intermittent every 8 hours  chlorhexidine 0.12% Liquid 15 milliLiter(s) Swish and Spit two times a day  dexmedetomidine Infusion 0.2 MICROgram(s)/kG/Hr (4.68 mL/Hr) IV Continuous <Continuous>  dextrose 5%. 1000 milliLiter(s) (50 mL/Hr) IV Continuous <Continuous>  dextrose 50% Injectable 12.5 Gram(s) IV Push once  dextrose 50% Injectable 25 Gram(s) IV Push once  dextrose 50% Injectable 25 Gram(s) IV Push once  fat emulsion (Plant Based) 20% Infusion 0.54 Gm/kG/Day (31.59 mL/Hr) IV Continuous <Continuous>  furosemide   Injectable 60 milliGRAM(s) IV Push every 12 hours  insulin lispro (HumaLOG) corrective regimen sliding scale   SubCutaneous every 6 hours  levothyroxine Injectable 75 MICROGram(s) IV Push <User Schedule>  meropenem Injectable 1000 milliGRAM(s) IV Push every 12 hours  micafungin IVPB 100 milliGRAM(s) IV Intermittent daily  norepinephrine Infusion 0.01 MICROgram(s)/kG/Min (1.755 mL/Hr) IV Continuous <Continuous>  pantoprazole   Suspension 40 milliGRAM(s) Oral daily  Parenteral Nutrition - Adult 1 Each (46 mL/Hr) TPN Continuous <Continuous>  Parenteral Nutrition - Adult 1 Each (48 mL/Hr) TPN Continuous <Continuous>  potassium chloride  20 mEq/100 mL IVPB 20 milliEquivalent(s) IV Intermittent every 2 hours  propofol Infusion 5 MICROgram(s)/kG/Min (2.808 mL/Hr) IV Continuous <Continuous>    MEDICATIONS  (PRN):  ALBUTerol/ipratropium for Nebulization 3 milliLiter(s) Nebulizer every 6 hours PRN Shortness of Breath and/or Wheezing  dextrose Gel 1 Dose(s) Oral once PRN Blood Glucose LESS THAN 70 milliGRAM(s)/deciliter  glucagon  Injectable 1 milliGRAM(s) IntraMuscular once PRN Glucose LESS THAN 70 milligrams/deciliter  HYDROmorphone  Injectable 0.5 milliGRAM(s) IV Push every 3 hours PRN Severe Pain (7 - 10)  ondansetron Injectable 4 milliGRAM(s) IV Push every 6 hours PRN Nausea      Nicole:	  Daily Nicole Order Placed	   Indication:	Strict I and O's     Central Line:   Medication / TPN Administration     Drips: propofol and precedex    ICU Vital Signs Last 24 Hrs  T(C): 30.9 (02 May 2018 10:47), Max: 37.8 (02 May 2018 06:00)  T(F): 87.7 (02 May 2018 10:47), Max: 100 (02 May 2018 06:00)  HR: 74 (02 May 2018 10:00) (66 - 98)  BP: 114/59 (02 May 2018 10:00) (71/42 - 151/74)  BP(mean): 93 (02 May 2018 10:00) (47 - 105)  ABP: --  ABP(mean): --  RR: 12 (02 May 2018 10:00) (8 - 130)  SpO2: 99% (02 May 2018 08:33) (79% - 100%)      Physical Exam:  General: NAD  HEENT: NC/AT, EOMI, PERRLA, no oral lesions, neck supple w/o LAD  Pulmonary: Nonlabored breathing, no respiratory distress, on mechanical ventilation, bibasilar crackles  Cardiovascular: NSR, no murmurs  Abdominal: soft, NT/ND, +BS, no organomegaly, wound VAC in place, no leak, +rashes   Extremities: no clubbing/cyanosis/ 3+edema  Pulses: palpable distal pulses  MSK: no joint swelling  GYN: vaginal bleeding  Skin: no rash  Neuro: responds to pain    Lines/tubes/drains: Wound VAC, LEANN drain over LLQ.     Vent settings:  Mode: AC/ CMV (Assist Control/ Continuous Mandatory Ventilation), RR (machine): 12, TV (machine): 490, FiO2: 40, PEEP: 5, ITime: 1, MAP: 9.2, PIP: 27    I&O's Summary    01 May 2018 07:01  -  02 May 2018 07:00  --------------------------------------------------------  IN: 1557 mL / OUT: 2880 mL / NET: -1323 mL    02 May 2018 07:01  -  02 May 2018 11:01  --------------------------------------------------------  IN: 259 mL / OUT: 210 mL / NET: 49 mL        LABS:                        6.8    4.5   )-----------( 112      ( 02 May 2018 04:33 )             22.1     05-02    139  |  97  |  55<H>  ----------------------------<  130<H>  3.4<L>   |  31  |  1.27    Ca    10.2      02 May 2018 04:33  Phos  3.9     05-02  Mg     2.4     05-02    TPro  5.0<L>  /  Alb  2.6<L>  /  TBili  1.2  /  DBili  x   /  AST  26  /  ALT  14  /  AlkPhos  74  05-02        CAPILLARY BLOOD GLUCOSE      POCT Blood Glucose.: 160 mg/dL (02 May 2018 05:50)  POCT Blood Glucose.: 159 mg/dL (02 May 2018 00:02)  POCT Blood Glucose.: 156 mg/dL (01 May 2018 17:00)    LIVER FUNCTIONS - ( 02 May 2018 04:33 )  Alb: 2.6 g/dL / Pro: 5.0 g/dL / ALK PHOS: 74 U/L / ALT: 14 U/L / AST: 26 U/L / GGT: x             Cultures:Culture Results:   Candida lusitaniae isolated  See [id number] for sensitivity (04-30 @ 18:31)  Culture Results:   Candida lusitaniae isolated  Susceptibility to follow. (04-30 @ 18:31)  Culture Results:   No growth to date (04-30 @ 12:35)      RADIOLOGY & ADDITIONAL STUDIES: CXR: no change in bilateral effusions

## 2018-05-03 LAB
-  5-FLUCYTOSINE: SIGNIFICANT CHANGE UP
-  AMPHOTERICIN B: SIGNIFICANT CHANGE UP
-  ANIDULAFUNGIN: SIGNIFICANT CHANGE UP
-  CASPOFUNGIN: SIGNIFICANT CHANGE UP
-  FLUCONAZOLE: SIGNIFICANT CHANGE UP
-  ITRACONAZOLE: SIGNIFICANT CHANGE UP
-  MICAFUNGIN: SIGNIFICANT CHANGE UP
-  POSACONAZOLE: SIGNIFICANT CHANGE UP
-  VORICONAZOLE: SIGNIFICANT CHANGE UP
ALBUMIN SERPL ELPH-MCNC: 2.8 G/DL — LOW (ref 3.3–5)
ALP SERPL-CCNC: 71 U/L — SIGNIFICANT CHANGE UP (ref 40–120)
ALT FLD-CCNC: 13 U/L — SIGNIFICANT CHANGE UP (ref 10–45)
ANION GAP SERPL CALC-SCNC: 9 MMOL/L — SIGNIFICANT CHANGE UP (ref 5–17)
AST SERPL-CCNC: 23 U/L — SIGNIFICANT CHANGE UP (ref 10–40)
BILIRUB SERPL-MCNC: 1 MG/DL — SIGNIFICANT CHANGE UP (ref 0.2–1.2)
BUN SERPL-MCNC: 53 MG/DL — HIGH (ref 7–23)
CALCIUM SERPL-MCNC: 10.2 MG/DL — SIGNIFICANT CHANGE UP (ref 8.4–10.5)
CHLORIDE SERPL-SCNC: 99 MMOL/L — SIGNIFICANT CHANGE UP (ref 96–108)
CO2 SERPL-SCNC: 31 MMOL/L — SIGNIFICANT CHANGE UP (ref 22–31)
CREAT SERPL-MCNC: 1.25 MG/DL — SIGNIFICANT CHANGE UP (ref 0.5–1.3)
CULTURE RESULTS: SIGNIFICANT CHANGE UP
GLUCOSE BLDC GLUCOMTR-MCNC: 125 MG/DL — HIGH (ref 70–99)
GLUCOSE BLDC GLUCOMTR-MCNC: 219 MG/DL — HIGH (ref 70–99)
GLUCOSE BLDC GLUCOMTR-MCNC: 219 MG/DL — HIGH (ref 70–99)
GLUCOSE BLDC GLUCOMTR-MCNC: 259 MG/DL — HIGH (ref 70–99)
GLUCOSE SERPL-MCNC: 242 MG/DL — HIGH (ref 70–99)
HCT VFR BLD CALC: 24.8 % — LOW (ref 34.5–45)
HGB BLD-MCNC: 7.7 G/DL — LOW (ref 11.5–15.5)
MAGNESIUM SERPL-MCNC: 2.4 MG/DL — SIGNIFICANT CHANGE UP (ref 1.6–2.6)
MCHC RBC-ENTMCNC: 30.3 PG — SIGNIFICANT CHANGE UP (ref 27–34)
MCHC RBC-ENTMCNC: 31 G/DL — LOW (ref 32–36)
MCV RBC AUTO: 97.6 FL — SIGNIFICANT CHANGE UP (ref 80–100)
METHOD TYPE: SIGNIFICANT CHANGE UP
ORGANISM # SPEC MICROSCOPIC CNT: SIGNIFICANT CHANGE UP
ORGANISM # SPEC MICROSCOPIC CNT: SIGNIFICANT CHANGE UP
PHOSPHATE SERPL-MCNC: 3.7 MG/DL — SIGNIFICANT CHANGE UP (ref 2.5–4.5)
PLATELET # BLD AUTO: 156 K/UL — SIGNIFICANT CHANGE UP (ref 150–400)
POTASSIUM SERPL-MCNC: 4.2 MMOL/L — SIGNIFICANT CHANGE UP (ref 3.5–5.3)
POTASSIUM SERPL-SCNC: 4.2 MMOL/L — SIGNIFICANT CHANGE UP (ref 3.5–5.3)
PROT SERPL-MCNC: 5.6 G/DL — LOW (ref 6–8.3)
RBC # BLD: 2.54 M/UL — LOW (ref 3.8–5.2)
RBC # FLD: 18.9 % — HIGH (ref 10.3–16.9)
SODIUM SERPL-SCNC: 139 MMOL/L — SIGNIFICANT CHANGE UP (ref 135–145)
SPECIMEN SOURCE: SIGNIFICANT CHANGE UP
SURGICAL PATHOLOGY STUDY: SIGNIFICANT CHANGE UP
TRIGL SERPL-MCNC: 227 MG/DL — HIGH (ref 10–149)
WBC # BLD: 4.2 K/UL — SIGNIFICANT CHANGE UP (ref 3.8–10.5)
WBC # FLD AUTO: 4.2 K/UL — SIGNIFICANT CHANGE UP (ref 3.8–10.5)

## 2018-05-03 PROCEDURE — 74176 CT ABD & PELVIS W/O CONTRAST: CPT | Mod: 26

## 2018-05-03 PROCEDURE — 99232 SBSQ HOSP IP/OBS MODERATE 35: CPT

## 2018-05-03 PROCEDURE — 99291 CRITICAL CARE FIRST HOUR: CPT

## 2018-05-03 PROCEDURE — 71045 X-RAY EXAM CHEST 1 VIEW: CPT | Mod: 26

## 2018-05-03 RX ORDER — DIATRIZOATE MEGLUMINE 180 MG/ML
30 INJECTION, SOLUTION INTRAVESICAL ONCE
Qty: 0 | Refills: 0 | Status: COMPLETED | OUTPATIENT
Start: 2018-05-03 | End: 2018-05-03

## 2018-05-03 RX ORDER — FUROSEMIDE 40 MG
80 TABLET ORAL
Qty: 0 | Refills: 0 | Status: DISCONTINUED | OUTPATIENT
Start: 2018-05-03 | End: 2018-05-09

## 2018-05-03 RX ORDER — ELECTROLYTE SOLUTION,INJ
1 VIAL (ML) INTRAVENOUS
Qty: 0 | Refills: 0 | Status: DISCONTINUED | OUTPATIENT
Start: 2018-05-03 | End: 2018-05-03

## 2018-05-03 RX ORDER — I.V. FAT EMULSION 20 G/100ML
0.54 EMULSION INTRAVENOUS
Qty: 50 | Refills: 0 | Status: DISCONTINUED | OUTPATIENT
Start: 2018-05-03 | End: 2018-05-03

## 2018-05-03 RX ADMIN — CHLORHEXIDINE GLUCONATE 15 MILLILITER(S): 213 SOLUTION TOPICAL at 17:02

## 2018-05-03 RX ADMIN — Medication 75 MICROGRAM(S): at 06:12

## 2018-05-03 RX ADMIN — I.V. FAT EMULSION 31.25 GM/KG/DAY: 20 EMULSION INTRAVENOUS at 21:23

## 2018-05-03 RX ADMIN — Medication 4: at 11:37

## 2018-05-03 RX ADMIN — CHLORHEXIDINE GLUCONATE 15 MILLILITER(S): 213 SOLUTION TOPICAL at 05:15

## 2018-05-03 RX ADMIN — Medication 4: at 17:04

## 2018-05-03 RX ADMIN — PANTOPRAZOLE SODIUM 40 MILLIGRAM(S): 20 TABLET, DELAYED RELEASE ORAL at 11:39

## 2018-05-03 RX ADMIN — DEXMEDETOMIDINE HYDROCHLORIDE IN 0.9% SODIUM CHLORIDE 4.68 MICROGRAM(S)/KG/HR: 4 INJECTION INTRAVENOUS at 12:17

## 2018-05-03 RX ADMIN — Medication 6: at 05:28

## 2018-05-03 RX ADMIN — MEROPENEM 1000 MILLIGRAM(S): 1 INJECTION INTRAVENOUS at 17:02

## 2018-05-03 RX ADMIN — Medication 50 MILLILITER(S): at 11:37

## 2018-05-03 RX ADMIN — Medication 1 EACH: at 18:25

## 2018-05-03 RX ADMIN — DEXMEDETOMIDINE HYDROCHLORIDE IN 0.9% SODIUM CHLORIDE 4.68 MICROGRAM(S)/KG/HR: 4 INJECTION INTRAVENOUS at 17:55

## 2018-05-03 RX ADMIN — Medication 80 MILLIGRAM(S): at 04:43

## 2018-05-03 RX ADMIN — MEROPENEM 1000 MILLIGRAM(S): 1 INJECTION INTRAVENOUS at 05:15

## 2018-05-03 RX ADMIN — DEXMEDETOMIDINE HYDROCHLORIDE IN 0.9% SODIUM CHLORIDE 4.68 MICROGRAM(S)/KG/HR: 4 INJECTION INTRAVENOUS at 04:14

## 2018-05-03 RX ADMIN — MICAFUNGIN SODIUM 210 MILLIGRAM(S): 100 INJECTION, POWDER, LYOPHILIZED, FOR SOLUTION INTRAVENOUS at 11:37

## 2018-05-03 RX ADMIN — Medication 80 MILLIGRAM(S): at 17:02

## 2018-05-03 RX ADMIN — DIATRIZOATE MEGLUMINE 30 MILLILITER(S): 180 INJECTION, SOLUTION INTRAVESICAL at 11:39

## 2018-05-03 RX ADMIN — Medication 50 MILLILITER(S): at 03:49

## 2018-05-03 RX ADMIN — Medication 50 MILLILITER(S): at 18:25

## 2018-05-03 NOTE — PROGRESS NOTE ADULT - SUBJECTIVE AND OBJECTIVE BOX
O/N Events:  Subjective:  EMANUEL, Continues on propofol and precedex, off pressors. on AC mode   seen and examined at bedside. Intubated and sedated, not arousable to voice/touch.     VITALS  Vital Signs Last 24 Hrs  T(C): 37.7 (03 May 2018 02:07), Max: 37.7 (03 May 2018 02:07)  T(F): 99.9 (03 May 2018 02:07), Max: 99.9 (03 May 2018 02:07)  HR: 56 (03 May 2018 11:00) (50 - 84)  BP: 125/58 (03 May 2018 11:00) (100/59 - 145/75)  BP(mean): 94 (03 May 2018 11:00) (68 - 115)  RR: 23 (03 May 2018 11:00) (12 - 128)  SpO2: 95% (03 May 2018 11:00) (95% - 99%)    I&O's Summary    02 May 2018 07:01  -  03 May 2018 07:00  --------------------------------------------------------  IN: 2268.8 mL / OUT: 3155 mL / NET: -886.2 mL    03 May 2018 07:01  -  03 May 2018 12:02  --------------------------------------------------------  IN: 252.8 mL / OUT: 585 mL / NET: -332.2 mL        CAPILLARY BLOOD GLUCOSE      POCT Blood Glucose.: 219 mg/dL (03 May 2018 11:13)  POCT Blood Glucose.: 259 mg/dL (03 May 2018 05:21)  POCT Blood Glucose.: 158 mg/dL (02 May 2018 21:46)  POCT Blood Glucose.: 186 mg/dL (02 May 2018 16:47)      PHYSICAL EXAM  General: A&Ox 3; NAD  Head: NC/AT;   Eyes: PERRL; EOMI; anicteric sclera  Neck: Supple; no JVD  Respiratory: CTA B/L; no wheezes/crackles/rales auscultated w/ good air movement  Cardiovascular: Regular rhythm/rate; S1/S2; no gallops or murmurs auscultated  Gastrointestinal: Soft; NTND w/out rebound tenderness or guarding; bowel sounds normal  Extremities: WWP; no edema or cyanosis; radial/pedal pulses palpable  Neurological:  CNII-XII grossly intact; no obvious focal deficits    MEDICATIONS  (STANDING):  albumin human 25% IVPB 50 milliLiter(s) IV Intermittent every 8 hours  chlorhexidine 0.12% Liquid 15 milliLiter(s) Swish and Spit two times a day  dexmedetomidine Infusion 0.2 MICROgram(s)/kG/Hr (4.68 mL/Hr) IV Continuous <Continuous>  dextrose 5%. 1000 milliLiter(s) (50 mL/Hr) IV Continuous <Continuous>  dextrose 50% Injectable 12.5 Gram(s) IV Push once  dextrose 50% Injectable 25 Gram(s) IV Push once  dextrose 50% Injectable 25 Gram(s) IV Push once  fat emulsion (Plant Based) 20% Infusion 0.54 Gm/kG/Day (31.25 mL/Hr) IV Continuous <Continuous>  furosemide   Injectable 80 milliGRAM(s) IV Push two times a day  insulin lispro (HumaLOG) corrective regimen sliding scale   SubCutaneous every 6 hours  levothyroxine Injectable 75 MICROGram(s) IV Push <User Schedule>  meropenem Injectable 1000 milliGRAM(s) IV Push every 12 hours  micafungin IVPB 100 milliGRAM(s) IV Intermittent daily  pantoprazole   Suspension 40 milliGRAM(s) Oral daily  Parenteral Nutrition - Adult 1 Each (48 mL/Hr) TPN Continuous <Continuous>  Parenteral Nutrition - Adult 1 Each (53 mL/Hr) TPN Continuous <Continuous>    MEDICATIONS  (PRN):  ALBUTerol/ipratropium for Nebulization 3 milliLiter(s) Nebulizer every 6 hours PRN Shortness of Breath and/or Wheezing  dextrose Gel 1 Dose(s) Oral once PRN Blood Glucose LESS THAN 70 milliGRAM(s)/deciliter  glucagon  Injectable 1 milliGRAM(s) IntraMuscular once PRN Glucose LESS THAN 70 milligrams/deciliter  HYDROmorphone  Injectable 0.5 milliGRAM(s) IV Push every 3 hours PRN Severe Pain (7 - 10)  ondansetron Injectable 4 milliGRAM(s) IV Push every 6 hours PRN Nausea      LABS                        7.7    4.2   )-----------( 156      ( 03 May 2018 04:57 )             24.8     05-03    139  |  99  |  53<H>  ----------------------------<  242<H>  4.2   |  31  |  1.25    Ca    10.2      03 May 2018 04:56  Phos  3.7     05-03  Mg     2.4     05-03    TPro  5.6<L>  /  Alb  2.8<L>  /  TBili  1.0  /  DBili  x   /  AST  23  /  ALT  13  /  AlkPhos  71  05-03    LIVER FUNCTIONS - ( 03 May 2018 04:56 )  Alb: 2.8 g/dL / Pro: 5.6 g/dL / ALK PHOS: 71 U/L / ALT: 13 U/L / AST: 23 U/L / GGT: x                     IMAGING/EKG/ETC  EKG:   Xray:  CT: O/N Events:  Subjective:  EMANUEL,on precedex, off pressors. on AC mode   seen and examined at bedside. Intubated and sedated, not arousable to voice/touch.     VITALS  Vital Signs Last 24 Hrs  T(C): 37.7 (03 May 2018 02:07), Max: 37.7 (03 May 2018 02:07)  T(F): 99.9 (03 May 2018 02:07), Max: 99.9 (03 May 2018 02:07)  HR: 56 (03 May 2018 11:00) (50 - 84)  BP: 125/58 (03 May 2018 11:00) (100/59 - 145/75)  BP(mean): 94 (03 May 2018 11:00) (68 - 115)  RR: 23 (03 May 2018 11:00) (12 - 128)  SpO2: 95% (03 May 2018 11:00) (95% - 99%)    PHYSICAL EXAM  Constitutional: mildly sedated/ intubated, minimal mental status , not responsive   Eyes:  Sclerae anicteric, conjunctivae clear, PERRL  Ear/Nose/Throat:  MMM/ No thrush   Pulmonary: Bibasilar crackles, air entry equal on both sides, intubated, Right chest Port, access for HD  Cardiovascular: NSR, no murmurs  Abdominal: soft, obese, unable to elicit tenderness, non distended. wound VAC in place, no leak.   : suazo in place  Extremities: bilateral arms - restarined, +edema, power 3/5, radial pulses and dp +  Neuro: minimal mental status      MEDICATIONS  (STANDING):  albumin human 25% IVPB 50 milliLiter(s) IV Intermittent every 8 hours  chlorhexidine 0.12% Liquid 15 milliLiter(s) Swish and Spit two times a day  dexmedetomidine Infusion 0.2 MICROgram(s)/kG/Hr (4.68 mL/Hr) IV Continuous <Continuous>  dextrose 5%. 1000 milliLiter(s) (50 mL/Hr) IV Continuous <Continuous>  dextrose 50% Injectable 12.5 Gram(s) IV Push once  dextrose 50% Injectable 25 Gram(s) IV Push once  dextrose 50% Injectable 25 Gram(s) IV Push once  fat emulsion (Plant Based) 20% Infusion 0.54 Gm/kG/Day (31.25 mL/Hr) IV Continuous <Continuous>  furosemide   Injectable 80 milliGRAM(s) IV Push two times a day  insulin lispro (HumaLOG) corrective regimen sliding scale   SubCutaneous every 6 hours  levothyroxine Injectable 75 MICROGram(s) IV Push <User Schedule>  meropenem Injectable 1000 milliGRAM(s) IV Push every 12 hours  micafungin IVPB 100 milliGRAM(s) IV Intermittent daily  pantoprazole   Suspension 40 milliGRAM(s) Oral daily  Parenteral Nutrition - Adult 1 Each (48 mL/Hr) TPN Continuous <Continuous>  Parenteral Nutrition - Adult 1 Each (53 mL/Hr) TPN Continuous <Continuous>    MEDICATIONS  (PRN):  ALBUTerol/ipratropium for Nebulization 3 milliLiter(s) Nebulizer every 6 hours PRN Shortness of Breath and/or Wheezing  dextrose Gel 1 Dose(s) Oral once PRN Blood Glucose LESS THAN 70 milliGRAM(s)/deciliter  glucagon  Injectable 1 milliGRAM(s) IntraMuscular once PRN Glucose LESS THAN 70 milligrams/deciliter  HYDROmorphone  Injectable 0.5 milliGRAM(s) IV Push every 3 hours PRN Severe Pain (7 - 10)  ondansetron Injectable 4 milliGRAM(s) IV Push every 6 hours PRN Nausea      LABS                        7.7    4.2   )-----------( 156      ( 03 May 2018 04:57 )             24.8     05-03    139  |  99  |  53<H>  ----------------------------<  242<H>  4.2   |  31  |  1.25    Ca    10.2      03 May 2018 04:56  Phos  3.7     05-03  Mg     2.4     05-03    TPro  5.6<L>  /  Alb  2.8<L>  /  TBili  1.0  /  DBili  x   /  AST  23  /  ALT  13  /  AlkPhos  71  05-03    LIVER FUNCTIONS - ( 03 May 2018 04:56 )  Alb: 2.8 g/dL / Pro: 5.6 g/dL / ALK PHOS: 71 U/L / ALT: 13 U/L / AST: 23 U/L / GGT: x           Microbiology:  U Clx 4/18: + E.coli MDR  left pelvic fluid aspirate: + for yeast/ candida luitaniae and candida Glabrata  awaiting sensitivities

## 2018-05-03 NOTE — CHART NOTE - NSCHARTNOTEFT_GEN_A_CORE
Admitting Diagnosis:   Patient is a 78y old  Female who presents with a chief complaint of Abdominal wound and multifactorial sepsis (2018 21:02)      PAST MEDICAL & SURGICAL HISTORY:  Hypothyroidism  GERD (gastroesophageal reflux disease)  Obesity  DM (diabetes mellitus)  HLD (hyperlipidemia)  HTN (hypertension)  H/O ventral hernia repair      Current Nutrition Order:  TPN via central line @ 60g AA, 300g Dex, 50g Lipids providing 1760 kcal, 60g protein, 2.23GIR, 1.0g/kg IBW protein  Osmolite 1.2 Nicholas @ 10mL/hr x 24hrs via NGT providin mL TV, 288 kcal, 13g protein, 197mL free H2O, .21g/kg IBW protein       PO Intake: Good (%) [   ]  Fair (50-75%) [   ] Poor (<25%) [   ]- N/A NPO    GI Issues: Viral diarrhea, resolving- rectal tube in place    Pain: Unable to assess at this time 2/2 vent     Skin Integrity: Open abdominal wound w/vac, B/L buttocks stage II, generalized 2+ edema, L. IJ drain    Labs:       139  |  99  |  53<H>  ----------------------------<  242<H>  4.2   |  31  |  1.25    Ca    10.2      03 May 2018 04:56  Phos  3.7     05-03  Mg     2.4     05-    TPro  5.6<L>  /  Alb  2.8<L>  /  TBili  1.0  /  DBili  x   /  AST  23  /  ALT  13  /  AlkPhos  71  05-03    CAPILLARY BLOOD GLUCOSE      POCT Blood Glucose.: 219 mg/dL (03 May 2018 11:13)  POCT Blood Glucose.: 259 mg/dL (03 May 2018 05:21)  POCT Blood Glucose.: 158 mg/dL (02 May 2018 21:46)  POCT Blood Glucose.: 186 mg/dL (02 May 2018 16:47)      Medications:  MEDICATIONS  (STANDING):  albumin human 25% IVPB 50 milliLiter(s) IV Intermittent every 8 hours  chlorhexidine 0.12% Liquid 15 milliLiter(s) Swish and Spit two times a day  dexmedetomidine Infusion 0.2 MICROgram(s)/kG/Hr (4.68 mL/Hr) IV Continuous <Continuous>  dextrose 5%. 1000 milliLiter(s) (50 mL/Hr) IV Continuous <Continuous>  dextrose 50% Injectable 12.5 Gram(s) IV Push once  dextrose 50% Injectable 25 Gram(s) IV Push once  dextrose 50% Injectable 25 Gram(s) IV Push once  fat emulsion (Plant Based) 20% Infusion 0.54 Gm/kG/Day (31.25 mL/Hr) IV Continuous <Continuous>  furosemide   Injectable 80 milliGRAM(s) IV Push two times a day  insulin lispro (HumaLOG) corrective regimen sliding scale   SubCutaneous every 6 hours  levothyroxine Injectable 75 MICROGram(s) IV Push <User Schedule>  meropenem Injectable 1000 milliGRAM(s) IV Push every 12 hours  micafungin IVPB 100 milliGRAM(s) IV Intermittent daily  pantoprazole   Suspension 40 milliGRAM(s) Oral daily  Parenteral Nutrition - Adult 1 Each (48 mL/Hr) TPN Continuous <Continuous>  Parenteral Nutrition - Adult 1 Each (53 mL/Hr) TPN Continuous <Continuous>    MEDICATIONS  (PRN):  ALBUTerol/ipratropium for Nebulization 3 milliLiter(s) Nebulizer every 6 hours PRN Shortness of Breath and/or Wheezing  dextrose Gel 1 Dose(s) Oral once PRN Blood Glucose LESS THAN 70 milliGRAM(s)/deciliter  glucagon  Injectable 1 milliGRAM(s) IntraMuscular once PRN Glucose LESS THAN 70 milligrams/deciliter  HYDROmorphone  Injectable 0.5 milliGRAM(s) IV Push every 3 hours PRN Severe Pain (7 - 10)  ondansetron Injectable 4 milliGRAM(s) IV Push every 6 hours PRN Nausea        Weight: 93.6kg  Daily     Daily     Weight Change: No new weights recorded since admit     Estimated energy needs: Ideal body weight (61kg) used for calculations as pt >120% of IBW. needs estimated for older age; adjusted for vent, sepsis (protein needs currently estimated lower d/t hepatic and renal dysfunction)  Calories: 25-30 kcal/kg = 4400-9330 kcal/day  Protein: 1.4-1.6 g/kg = 85-98g protein/day  Fluids: 30-35 mL/kg = 2893-3774 mL/day    Subjective: 79 yo/female with PMhx DM, HTN, recent ventral hernia repair c/b PE, anemia, GIB, admitted with sepsis 2/2 abdominal wound, UTI and bacteremia. Also found to have small bowel perf w/contained leak. Pt intubated for airway protection on . Pt seen in room and discussed during rounds. Remains intubated on CPAP mode, sedated on precedex (transitioned off of propofol). MAP 95; off of levophed. Unable to assess nutrition-related complaints 2/2 vent. TPN running with lipids and TF running at goal w/good tolerance per RN. Lytes WNL, Cr 1.25 trending down. Most recent TG 227mg/dL. POC elevated today- medical team to adjust insulin. Will keep nutrition in line with care at all times.     Previous Nutrition Diagnosis:  Inadequate oral intake RT inability to meet needs via oral intake 2/2 AMS AEB NPO    Active [ X  ]  Resolved [   ]    If resolved, new PES:     Goal: Pt will continue to meet % of EER via tolerated route      Recommendations:  1. As discussed in rounds, change TPN order to 300g Dextrose, 72g AA, 50g Lipids to provide 1808 kcal, 72g protein, GIR 2.23, 1.2g/kg IBW protein   Recommend checking TG weekly (*next due ). Check Mg, Phos, K daily and POC BG Q6hrs. Trend daily weights. Fluids and lytes per MD discretion.   2. Continue with trickle feeds of Osmolite 1.2 Nicholas; continue to use the gut as feasible and tolerated  3. Keep nutrition in line with care at all times   4. Trend weights (RN aware to take new weight)    Education: N/A 2/2 vent     Risk Level: High [ X  ] Moderate [   ] Low [   ]

## 2018-05-03 NOTE — PROGRESS NOTE ADULT - SUBJECTIVE AND OBJECTIVE BOX
INTERVAL HPI/OVERNIGHT EVENTS:    OVERNIGHT: No overnight events. Continues on propofol and precedex, off levo.  SUBJECTIVE: Patient seen and examined at bedside. Intubated and sedated, not arousable to voice/touch.     OBJECTIVE:    VITAL SIGNS:  ICU Vital Signs Last 24 Hrs  T(C): 37.7 (03 May 2018 02:07), Max: 37.7 (03 May 2018 02:07)  T(F): 99.9 (03 May 2018 02:07), Max: 99.9 (03 May 2018 02:07)  HR: 56 (03 May 2018 11:00) (50 - 84)  BP: 125/58 (03 May 2018 11:00) (100/59 - 145/75)  BP(mean): 94 (03 May 2018 11:00) (68 - 115)  ABP: --  ABP(mean): --  RR: 23 (03 May 2018 11:00) (12 - 128)  SpO2: 95% (03 May 2018 11:00) (95% - 99%)    Mode: CPAP with PS, FiO2: 40, PEEP: 5, ITime: 1, MAP: 8, PIP: 14    05-02 @ 07:01 - 05-03 @ 07:00  --------------------------------------------------------  IN: 2268.8 mL / OUT: 3155 mL / NET: -886.2 mL    05-03 @ 07:01 - 05-03 @ 11:52  --------------------------------------------------------  IN: 252.8 mL / OUT: 585 mL / NET: -332.2 mL      CAPILLARY BLOOD GLUCOSE  POCT Blood Glucose.: 219 mg/dL (03 May 2018 11:13)    PHYSICAL EXAM:  General: NAD, intubated, sedated, not arousable to voice/touch, arousable to pain and moves all four extremities  HEENT: NCAT, PERRL, clear conjunctiva, no scleral icterus  Neck: supple, no JVD  Respiratory: CTA b/l  Cardiovascular: RRR, normal S1S2, no M/R/G  Abdomen: soft, NT/ND, bowel sounds in all four quadrants, no palpable masses  Extremities: cool extremities, no clubbing, cyanosis, or edema  Neuro:   - Mental Status: patient intubated and sedated. not arousable to voice/touch, arousable to pain and moves all four extremities  - Cranial Nerves II-XII:    II:  Pupils are 3mm, equal, round, and reactive to light.  III, IV, VI:  unable to assess since patient is not following commands.   V:  unable to assess since patient is intubated and sedated.   VII:  unable to assess since patient is intubated and sedated.   VIII: unable to assess since patient is intubated and sedated.   IX, X:  unable to assess since patient is intubated.   XI:  unable to assess since patient is sedated. .  XII: unable to assess since patient is intubated and sedated.   - Motor:  moves all extremities spontaneously  - Reflexes:  2+ in biceps, brachioradialis, triceps, knees, and ankles.   - Sensory: unable to assess since patient is intubated and sedated.   - Coordination:  unable to assess since patient is intubated and sedated.   - Gait: deferred.    MEDICATIONS:  MEDICATIONS  (STANDING):  albumin human 25% IVPB 50 milliLiter(s) IV Intermittent every 8 hours  chlorhexidine 0.12% Liquid 15 milliLiter(s) Swish and Spit two times a day  dexmedetomidine Infusion 0.2 MICROgram(s)/kG/Hr (4.68 mL/Hr) IV Continuous <Continuous>  dextrose 5%. 1000 milliLiter(s) (50 mL/Hr) IV Continuous <Continuous>  dextrose 50% Injectable 12.5 Gram(s) IV Push once  dextrose 50% Injectable 25 Gram(s) IV Push once  dextrose 50% Injectable 25 Gram(s) IV Push once  fat emulsion (Plant Based) 20% Infusion 0.54 Gm/kG/Day (31.25 mL/Hr) IV Continuous <Continuous>  furosemide   Injectable 80 milliGRAM(s) IV Push two times a day  insulin lispro (HumaLOG) corrective regimen sliding scale   SubCutaneous every 6 hours  levothyroxine Injectable 75 MICROGram(s) IV Push <User Schedule>  meropenem Injectable 1000 milliGRAM(s) IV Push every 12 hours  micafungin IVPB 100 milliGRAM(s) IV Intermittent daily  pantoprazole   Suspension 40 milliGRAM(s) Oral daily  Parenteral Nutrition - Adult 1 Each (48 mL/Hr) TPN Continuous <Continuous>  Parenteral Nutrition - Adult 1 Each (53 mL/Hr) TPN Continuous <Continuous>    MEDICATIONS  (PRN):  ALBUTerol/ipratropium for Nebulization 3 milliLiter(s) Nebulizer every 6 hours PRN Shortness of Breath and/or Wheezing  dextrose Gel 1 Dose(s) Oral once PRN Blood Glucose LESS THAN 70 milliGRAM(s)/deciliter  glucagon  Injectable 1 milliGRAM(s) IntraMuscular once PRN Glucose LESS THAN 70 milligrams/deciliter  HYDROmorphone  Injectable 0.5 milliGRAM(s) IV Push every 3 hours PRN Severe Pain (7 - 10)  ondansetron Injectable 4 milliGRAM(s) IV Push every 6 hours PRN Nausea      ALLERGIES:  Allergies    No Known Allergies    Intolerances        LABS:                        7.7    4.2   )-----------( 156      ( 03 May 2018 04:57 )             24.8     05-03    139  |  99  |  53<H>  ----------------------------<  242<H>  4.2   |  31  |  1.25    Ca    10.2      03 May 2018 04:56  Phos  3.7     05-03  Mg     2.4     05-03    TPro  5.6<L>  /  Alb  2.8<L>  /  TBili  1.0  /  DBili  x   /  AST  23  /  ALT  13  /  AlkPhos  71  05-03          RADIOLOGY & ADDITIONAL TESTS: Studies reviewed. INTERVAL HPI/OVERNIGHT EVENTS:    SUBJECTIVE: Patient seen and examined at bedside. Intubated and sedated. No reported seizures per team. The patient is currently unable to provide information.    OBJECTIVE:    VITAL SIGNS:  ICU Vital Signs Last 24 Hrs  T(C): 37.7 (03 May 2018 02:07), Max: 37.7 (03 May 2018 02:07)  T(F): 99.9 (03 May 2018 02:07), Max: 99.9 (03 May 2018 02:07)  HR: 56 (03 May 2018 11:00) (50 - 84)  BP: 125/58 (03 May 2018 11:00) (100/59 - 145/75)  BP(mean): 94 (03 May 2018 11:00) (68 - 115)  ABP: --  ABP(mean): --  RR: 23 (03 May 2018 11:00) (12 - 128)  SpO2: 95% (03 May 2018 11:00) (95% - 99%)    Mode: CPAP with PS, FiO2: 40, PEEP: 5, ITime: 1, MAP: 8, PIP: 14    05-02 @ 07:01 - 05-03 @ 07:00  --------------------------------------------------------  IN: 2268.8 mL / OUT: 3155 mL / NET: -886.2 mL    05-03 @ 07:01 - 05-03 @ 11:52  --------------------------------------------------------  IN: 252.8 mL / OUT: 585 mL / NET: -332.2 mL      CAPILLARY BLOOD GLUCOSE  POCT Blood Glucose.: 219 mg/dL (03 May 2018 11:13)    PHYSICAL EXAM:  General: NAD, intubated, sedated, not arousable to voice/touch, arousable to pain and moves all four extremities  HEENT: NCAT, PERRL, clear conjunctiva, no scleral icterus  Neck: supple, no JVD  Respiratory: CTA b/l  Cardiovascular: RRR, normal S1S2, no M/R/G  Abdomen: soft, NT/ND, bowel sounds in all four quadrants, no palpable masses  Extremities: cool extremities, no clubbing, cyanosis, or edema  Neuro:   - Mental Status: patient intubated and sedated. not arousable to voice/touch, arousable to pain and moves all four extremities  - Cranial Nerves II-XII:    II:  Pupils are 3mm, equal, round, and reactive to light.  III, IV, VI:  unable to assess since patient is not following commands.   V:  unable to assess since patient is intubated and sedated.   VII:  unable to assess since patient is intubated and sedated.   VIII: unable to assess since patient is intubated and sedated.   IX, X:  unable to assess since patient is intubated.   XI:  unable to assess since patient is sedated. .  XII: unable to assess since patient is intubated and sedated.   - Motor:  moves all extremities spontaneously  - Reflexes:  2+ in biceps, brachioradialis, triceps, knees, and ankles.   - Sensory: unable to assess since patient is intubated and sedated.   - Coordination:  unable to assess since patient is intubated and sedated.   - Gait: deferred.    MEDICATIONS:  MEDICATIONS  (STANDING):  albumin human 25% IVPB 50 milliLiter(s) IV Intermittent every 8 hours  chlorhexidine 0.12% Liquid 15 milliLiter(s) Swish and Spit two times a day  dexmedetomidine Infusion 0.2 MICROgram(s)/kG/Hr (4.68 mL/Hr) IV Continuous <Continuous>  dextrose 5%. 1000 milliLiter(s) (50 mL/Hr) IV Continuous <Continuous>  dextrose 50% Injectable 12.5 Gram(s) IV Push once  dextrose 50% Injectable 25 Gram(s) IV Push once  dextrose 50% Injectable 25 Gram(s) IV Push once  fat emulsion (Plant Based) 20% Infusion 0.54 Gm/kG/Day (31.25 mL/Hr) IV Continuous <Continuous>  furosemide   Injectable 80 milliGRAM(s) IV Push two times a day  insulin lispro (HumaLOG) corrective regimen sliding scale   SubCutaneous every 6 hours  levothyroxine Injectable 75 MICROGram(s) IV Push <User Schedule>  meropenem Injectable 1000 milliGRAM(s) IV Push every 12 hours  micafungin IVPB 100 milliGRAM(s) IV Intermittent daily  pantoprazole   Suspension 40 milliGRAM(s) Oral daily  Parenteral Nutrition - Adult 1 Each (48 mL/Hr) TPN Continuous <Continuous>  Parenteral Nutrition - Adult 1 Each (53 mL/Hr) TPN Continuous <Continuous>    MEDICATIONS  (PRN):  ALBUTerol/ipratropium for Nebulization 3 milliLiter(s) Nebulizer every 6 hours PRN Shortness of Breath and/or Wheezing  dextrose Gel 1 Dose(s) Oral once PRN Blood Glucose LESS THAN 70 milliGRAM(s)/deciliter  glucagon  Injectable 1 milliGRAM(s) IntraMuscular once PRN Glucose LESS THAN 70 milligrams/deciliter  HYDROmorphone  Injectable 0.5 milliGRAM(s) IV Push every 3 hours PRN Severe Pain (7 - 10)  ondansetron Injectable 4 milliGRAM(s) IV Push every 6 hours PRN Nausea      ALLERGIES:  Allergies    No Known Allergies    Intolerances        LABS:                        7.7    4.2   )-----------( 156      ( 03 May 2018 04:57 )             24.8     05-03    139  |  99  |  53<H>  ----------------------------<  242<H>  4.2   |  31  |  1.25    Ca    10.2      03 May 2018 04:56  Phos  3.7     05-03  Mg     2.4     05-03    TPro  5.6<L>  /  Alb  2.8<L>  /  TBili  1.0  /  DBili  x   /  AST  23  /  ALT  13  /  AlkPhos  71  05-03          RADIOLOGY & ADDITIONAL TESTS: Studies reviewed.

## 2018-05-03 NOTE — ADVANCED PRACTICE NURSE CONSULT - RECOMMEDATIONS
Recommend continuing moisture barrier ointment to bilat buttocks bid and prn, covering blisters with dry dressing. Spoke with DORCAS Armenta.

## 2018-05-03 NOTE — PROGRESS NOTE ADULT - ATTENDING COMMENTS
I modified the note above with the exception of the physical exam section. The exam below is my own:    gen: intubated, sedated on Precedex gtt. She is grimacing and appears uncomfortable.  HEENT: trachea midline, no jaundice or icterus.  Ext: well-perfused  Alertness: eyes remained closed to verbal/tactile/noxious stimulation, open to sternal rub. Followed no commands. In response to sterna rub she localized with both arms.   II: unable to assess (pt resists)  III, IV, VI: No nystagmus. PERRL 3>2.   VII: No flattening of nasolabial fold.   Motor: no atrophy or fasciculations. No tremor. No hypo/hyperkinesia. Tone normal. She moves all limbs spontaneously without asymmetry.

## 2018-05-03 NOTE — ADVANCED PRACTICE NURSE CONSULT - ASSESSMENT
Pt now with rectal tube, areas of erosion on bilat buttocks beginning to improve. Pt does have intact and de-roofed blisters over BLE from edema.

## 2018-05-03 NOTE — PROGRESS NOTE ADULT - ASSESSMENT
A/p    78 year old female, morbidly obesity, DM, ventral hernia repair with mesh 3/2 c/b wound dehiscence, recent ATN requiring temporary HD (Permacath removed 4/10), who presents as a transfer from OSH for management of multifactorial sepsis and open abdominal wound. Patient found to have ESBL-producing E.Coli UTI and MSSA bacteremia, origin likely coming from abdomen/pelvis vs. prior HD catheter. Patient intubated 4/25 for acute hypoxic respiratory failure.  CT 4/18 showed SB perforation with pelvic collection, still present on CT on 4/25.  She is s/p IR drainage of collection, with culture growing candida lusitanae and Glabrata.  Would like to begin removing antibiotics, however need repeat abd/pel imaging to re eval abdominal collection.  Suggest:  - completed 14 d course of oxacillin from first negative culture (4/17;  last day 4/30)  - CT A/P to eval for persistent/recurrent collection   - Continue meropenem 1 g IV q12h  - Continue micafungin 100 mg IV q24h.    - Will f/u ID/susceptibility for candida lusitaniae/Glabrata    - ID will follow

## 2018-05-03 NOTE — PROGRESS NOTE ADULT - ATTENDING COMMENTS
ID Attending    Case reviewed, pt examined.    Intubated; does not respond top examination  Chest clear anteriorly  Abd soft; periumbilical vac dressing; LEANN some purulent-appearing drainage    WBC 4K    Rec:    continue current antibiotics pending repeat CT    We will follow

## 2018-05-03 NOTE — PROGRESS NOTE ADULT - ASSESSMENT
77 y/o woman, HTN, DM, and hypothyroidism who presented as a transfer from [facility] for management of multifactorial sepsis. Neurology was consulted for evaluation of altered mental status. CT head 4/22/18 was notable for left parafalcine partially calcified meningioma, mild diffuse volume loss, otherwise unremarkable. Exam limited by sedation, however the patient is awake, opening eyes, and moving all extremities spontaneously with no obvious asymmetry. EEG 4/25->4/26 was likely normal, though it was a technically poor study. She is s/p LP 4/30 which was bland.  - when able, please obtain MRI brain w/o contrast to assess for structural etiologies of AMS (e.g. infarcts, inflammation, hemorrhage).    Neurology will follow. 79 y/o woman, HTN, DM, and hypothyroidism who presented as a transfer from [facility] for management of multifactorial sepsis. Neurology was consulted for evaluation of altered mental status. CT head 4/22/18 was notable for left parafalcine partially calcified meningioma, mild diffuse volume loss, otherwise unremarkable. Exam limited by sedation, however the patient is easily arousable and moving all extremities spontaneously with no obvious asymmetry. EEG 4/25->4/26 was likely normal, though it was a technically poor study. She is s/p LP 4/30 which was bland.    - when able, please obtain MRI brain w/o contrast to assess for structural etiologies of AMS (e.g. infarcts, inflammation, hemorrhage).

## 2018-05-03 NOTE — PROGRESS NOTE ADULT - ASSESSMENT
79 y/o F with sepsis from open abdominal wound infection, MSSA bacteremia present on admission, and UTI present on admission found to have contained small bowel leak, s/p IR drainage, altered mental status, intubated.       Neuro: precedex gtt, off propofol,  dilaudid,  Zofran prn, AMS: EEG: slowing LP neg. holding:  seroquel, aripiprazole, escitalapram   CV: HD stable s/p severe sepsis, albumin 25%q8, holding amlodipine, 4/19 Echo  65-70%. Lasix 80mg BID  Pulm: intubated, satting well on 40/490/12/5 chlorhex  GI: NPO, protonix, trickle TF via dobhoff, TPN Protonix  : Nicole S/p AKF on HD now resolved. UTI (present on admission) + Uterine fibroids on CT scan with vaginal bleeding, gyn consulted - follow up biopsy result  ID: ESBL Ecoli in Urine resistant to Zosyn: Jose L( 4/17-) MSSA in blood: Oxacillin ( 4/17-) Kai virus (good hand washing), TTE neg for endocarditis. --possible to repeat CT today  Endo: ISS, Synthroid  Insulin  in TPN: 25   PPx: sqh held for low plts  PE:Sp IVC filter on 3/30   Lines: PIV, LIJ TLC (4/29-), /// s/p L Rad Art line( 4/15-4/18),  Rt IJ TLC from OSH ( 4/15-)  Wounds: Wound vac to midline incision , change MWF 77 y/o F with sepsis from open abdominal wound infection, MSSA bacteremia present on admission, and UTI present on admission found to have contained small bowel leak, s/p IR drainage, altered mental status, with acute respiratory failure, vaginal bleeding.       Neuro: precedex gtt, off propofol,  dilaudid,  Zofran prn, AMS: EEG: slowing LP neg. holding:  seroquel, aripiprazole, escitalapram   CV: HD stable s/p severe sepsis, albumin 25%q8, holding amlodipine, 4/19 Echo  65-70%. Lasix 80mg BID for diuresis aiming for negative fluid balance  Pulm: intubated, satting well on 40/490/12/5 chlorhex  GI: NPO, protonix, trickle TF via dobhoff, TPN Protonix  : Nicole S/p AKF on HD now resolved. UTI (present on admission) + Uterine fibroids on CT scan with vaginal bleeding, gyn consulted - follow up biopsy result  ID: ESBL Ecoli in Urine resistant to Zosyn: Jose L( 4/17-) MSSA in blood: Oxacillin ( 4/17-) Kai virus (good hand washing), TTE neg for endocarditis. -- to repeat CT today and if no leak can DC meropenem  Endo: ISS, Synthroid  Insulin  in TPN: 25   PPx: sqh held for low plts  PE:Sp IVC filter on 3/30   Lines: PIV, LIJ TLC (4/29-), /// s/p L Rad Art line( 4/15-4/18),  Rt IJ TLC from OSH ( 4/15-)  Wounds: Wound vac to midline incision , change MWF

## 2018-05-03 NOTE — PROGRESS NOTE ADULT - SUBJECTIVE AND OBJECTIVE BOX
24 hr events:  o/n: -650 cc in 24 hrs, lasix 80 given stat, made BID.   5/2: 1 u pRBC, started lasix 60mg BID, VAC changed, started trickle feeds  o/n: off Levophed, propofol added for agitation and continued precedex. L IJ TLC discovered to be out at 15 cm, but functioning and okay on CXR. Re-sutured and secured. No lasix given overnight, -1L in 24 hrs. K repleted. AM Hb 6.8 from 7.3.    SUBJECTIVE: Patient is arousable but unable to follow command, not agitated. off propofol, on precedex, off levo    MEDICATIONS  (STANDING):  albumin human 25% IVPB 50 milliLiter(s) IV Intermittent every 8 hours  chlorhexidine 0.12% Liquid 15 milliLiter(s) Swish and Spit two times a day  dexmedetomidine Infusion 0.2 MICROgram(s)/kG/Hr (4.68 mL/Hr) IV Continuous <Continuous>  dextrose 5%. 1000 milliLiter(s) (50 mL/Hr) IV Continuous <Continuous>  dextrose 50% Injectable 12.5 Gram(s) IV Push once  dextrose 50% Injectable 25 Gram(s) IV Push once  dextrose 50% Injectable 25 Gram(s) IV Push once  furosemide   Injectable 80 milliGRAM(s) IV Push two times a day  insulin lispro (HumaLOG) corrective regimen sliding scale   SubCutaneous every 6 hours  levothyroxine Injectable 75 MICROGram(s) IV Push <User Schedule>  meropenem Injectable 1000 milliGRAM(s) IV Push every 12 hours  micafungin IVPB 100 milliGRAM(s) IV Intermittent daily  pantoprazole   Suspension 40 milliGRAM(s) Oral daily  Parenteral Nutrition - Adult 1 Each (48 mL/Hr) TPN Continuous <Continuous>  propofol Infusion 5 MICROgram(s)/kG/Min (2.808 mL/Hr) IV Continuous <Continuous>    MEDICATIONS  (PRN):  ALBUTerol/ipratropium for Nebulization 3 milliLiter(s) Nebulizer every 6 hours PRN Shortness of Breath and/or Wheezing  dextrose Gel 1 Dose(s) Oral once PRN Blood Glucose LESS THAN 70 milliGRAM(s)/deciliter  glucagon  Injectable 1 milliGRAM(s) IntraMuscular once PRN Glucose LESS THAN 70 milligrams/deciliter  HYDROmorphone  Injectable 0.5 milliGRAM(s) IV Push every 3 hours PRN Severe Pain (7 - 10)  ondansetron Injectable 4 milliGRAM(s) IV Push every 6 hours PRN Nausea      Nicole: Daily Nicole Order Placed	   Indication:Strict I and O's     Central Line:  Medication / TPN Administration       ICU Vital Signs Last 24 Hrs  T(C): 37.7 (03 May 2018 02:07), Max: 37.7 (03 May 2018 02:07)  T(F): 99.9 (03 May 2018 02:07), Max: 99.9 (03 May 2018 02:07)  HR: 60 (03 May 2018 06:03) (50 - 84)  BP: 128/65 (03 May 2018 06:00) (100/59 - 145/75)  BP(mean): 101 (03 May 2018 06:00) (68 - 115)  ABP: --  ABP(mean): --  RR: 12 (03 May 2018 06:00) (11 - 128)  SpO2: 97% (03 May 2018 06:03) (96% - 99%)      Physical Exam:  General: NAD  HEENT: NC/AT, EOMI, PERRLA  Pulmonary: Nonlabored breathing,on mechanical vent, no bibasilar crackles   Cardiovascular: NSR, no murmurs  Abdominal: soft, NT/ND, wound VAC in place, no leak, +rashes   Extremities: WWP  Neuro: intubated and sedated  Pulses: palpable distal pulses    Lines/tubes/drains: wound VAC, LLQ LEANN (bilious)    Vent settings:  Mode: AC/ CMV (Assist Control/ Continuous Mandatory Ventilation), RR (machine): 12, TV (machine): 490, FiO2: 40, PEEP: 5, ITime: 1, MAP: 9, PIP: 25    I&O's Summary    02 May 2018 07:01  -  03 May 2018 07:00  --------------------------------------------------------  IN: 2268.8 mL / OUT: 3155 mL / NET: -886.2 mL        LABS:                        7.7    4.2   )-----------( 156      ( 03 May 2018 04:57 )             24.8     05-03    139  |  99  |  53<H>  ----------------------------<  242<H>  4.2   |  31  |  1.25    Ca    10.2      03 May 2018 04:56  Phos  3.7     05-03  Mg     2.4     05-03    TPro  5.6<L>  /  Alb  2.8<L>  /  TBili  1.0  /  DBili  x   /  AST  23  /  ALT  13  /  AlkPhos  71  05-03        CAPILLARY BLOOD GLUCOSE      POCT Blood Glucose.: 259 mg/dL (03 May 2018 05:21)  POCT Blood Glucose.: 158 mg/dL (02 May 2018 21:46)  POCT Blood Glucose.: 186 mg/dL (02 May 2018 16:47)  POCT Blood Glucose.: 179 mg/dL (02 May 2018 11:31)    LIVER FUNCTIONS - ( 03 May 2018 04:56 )  Alb: 2.8 g/dL / Pro: 5.6 g/dL / ALK PHOS: 71 U/L / ALT: 13 U/L / AST: 23 U/L / GGT: x             Cultures:    RADIOLOGY & ADDITIONAL STUDIES: 24 hr events:  o/n: -650 cc in 24 hrs, lasix 80 given stat, made BID.   5/2: 1 u pRBC, started lasix 60mg BID, VAC changed, started trickle feeds  o/n: off Levophed, propofol added for agitation and continued precedex. L IJ TLC discovered to be out at 15 cm, but functioning and okay on CXR. Re-sutured and secured. No lasix given overnight, -1L in 24 hrs. K repleted. AM Hb 6.8 from 7.3.    SUBJECTIVE: Patient is arousable but unable to follow command, not agitated. off propofol, on precedex, off levo    MEDICATIONS  (STANDING):  albumin human 25% IVPB 50 milliLiter(s) IV Intermittent every 8 hours  chlorhexidine 0.12% Liquid 15 milliLiter(s) Swish and Spit two times a day  dexmedetomidine Infusion 0.2 MICROgram(s)/kG/Hr (4.68 mL/Hr) IV Continuous <Continuous>  dextrose 5%. 1000 milliLiter(s) (50 mL/Hr) IV Continuous <Continuous>  dextrose 50% Injectable 12.5 Gram(s) IV Push once  dextrose 50% Injectable 25 Gram(s) IV Push once  dextrose 50% Injectable 25 Gram(s) IV Push once  furosemide   Injectable 80 milliGRAM(s) IV Push two times a day  insulin lispro (HumaLOG) corrective regimen sliding scale   SubCutaneous every 6 hours  levothyroxine Injectable 75 MICROGram(s) IV Push <User Schedule>  meropenem Injectable 1000 milliGRAM(s) IV Push every 12 hours  micafungin IVPB 100 milliGRAM(s) IV Intermittent daily  pantoprazole   Suspension 40 milliGRAM(s) Oral daily  Parenteral Nutrition - Adult 1 Each (48 mL/Hr) TPN Continuous <Continuous>  propofol Infusion 5 MICROgram(s)/kG/Min (2.808 mL/Hr) IV Continuous <Continuous>    MEDICATIONS  (PRN):  ALBUTerol/ipratropium for Nebulization 3 milliLiter(s) Nebulizer every 6 hours PRN Shortness of Breath and/or Wheezing  dextrose Gel 1 Dose(s) Oral once PRN Blood Glucose LESS THAN 70 milliGRAM(s)/deciliter  glucagon  Injectable 1 milliGRAM(s) IntraMuscular once PRN Glucose LESS THAN 70 milligrams/deciliter  HYDROmorphone  Injectable 0.5 milliGRAM(s) IV Push every 3 hours PRN Severe Pain (7 - 10)  ondansetron Injectable 4 milliGRAM(s) IV Push every 6 hours PRN Nausea      Suazo: Daily Suazo Order Placed	   Indication:Strict I and O's     Central Line:  Medication / TPN Administration       ICU Vital Signs Last 24 Hrs  T(C): 37.7 (03 May 2018 02:07), Max: 37.7 (03 May 2018 02:07)  T(F): 99.9 (03 May 2018 02:07), Max: 99.9 (03 May 2018 02:07)  HR: 60 (03 May 2018 06:03) (50 - 84)  BP: 128/65 (03 May 2018 06:00) (100/59 - 145/75)  BP(mean): 101 (03 May 2018 06:00) (68 - 115)  ABP: --  ABP(mean): --  RR: 12 (03 May 2018 06:00) (11 - 128)  SpO2: 97% (03 May 2018 06:03) (96% - 99%)      Physical Exam:  General: NAD  HEENT: NC/AT, EOMI, PERRLA  Pulmonary: Nonlabored breathing,on mechanical vent, no bibasilar crackles   Cardiovascular: NSR, no murmurs  Abdominal: soft, NT/ND, wound VAC in place, no leak,   : suazo in place  Extremities: WWP with anasarca  Neuro: intubated and sedated  Vasc: palpable distal pulses  Skin: no rash  MSK: no joint swelling    Lines/tubes/drains: wound VAC, LLQ LEANN (bilious)    Vent settings:  Mode: AC/ CMV (Assist Control/ Continuous Mandatory Ventilation), RR (machine): 12, TV (machine): 490, FiO2: 40, PEEP: 5, ITime: 1, MAP: 9, PIP: 25    I&O's Summary    02 May 2018 07:01  -  03 May 2018 07:00  --------------------------------------------------------  IN: 2268.8 mL / OUT: 3155 mL / NET: -886.2 mL        LABS:                        7.7    4.2   )-----------( 156      ( 03 May 2018 04:57 )             24.8     05-03    139  |  99  |  53<H>  ----------------------------<  242<H>  4.2   |  31  |  1.25    Ca    10.2      03 May 2018 04:56  Phos  3.7     05-03  Mg     2.4     05-03    TPro  5.6<L>  /  Alb  2.8<L>  /  TBili  1.0  /  DBili  x   /  AST  23  /  ALT  13  /  AlkPhos  71  05-03        CAPILLARY BLOOD GLUCOSE      POCT Blood Glucose.: 259 mg/dL (03 May 2018 05:21)  POCT Blood Glucose.: 158 mg/dL (02 May 2018 21:46)  POCT Blood Glucose.: 186 mg/dL (02 May 2018 16:47)  POCT Blood Glucose.: 179 mg/dL (02 May 2018 11:31)    LIVER FUNCTIONS - ( 03 May 2018 04:56 )  Alb: 2.8 g/dL / Pro: 5.6 g/dL / ALK PHOS: 71 U/L / ALT: 13 U/L / AST: 23 U/L / GGT: x             Cultures:    RADIOLOGY & ADDITIONAL STUDIES:

## 2018-05-04 LAB
-  5-FLUCYTOSINE: SIGNIFICANT CHANGE UP
-  AMPHOTERICIN B: SIGNIFICANT CHANGE UP
-  ANIDULAFUNGIN: SIGNIFICANT CHANGE UP
-  CASPOFUNGIN: SIGNIFICANT CHANGE UP
-  FLUCONAZOLE: SIGNIFICANT CHANGE UP
-  ITRACONAZOLE: SIGNIFICANT CHANGE UP
-  MICAFUNGIN: SIGNIFICANT CHANGE UP
-  POSACONAZOLE: SIGNIFICANT CHANGE UP
-  VORICONAZOLE: SIGNIFICANT CHANGE UP
ALBUMIN SERPL ELPH-MCNC: 3.1 G/DL — LOW (ref 3.3–5)
ALP SERPL-CCNC: 80 U/L — SIGNIFICANT CHANGE UP (ref 40–120)
ALT FLD-CCNC: 21 U/L — SIGNIFICANT CHANGE UP (ref 10–45)
ANION GAP SERPL CALC-SCNC: 9 MMOL/L — SIGNIFICANT CHANGE UP (ref 5–17)
APPEARANCE UR: CLEAR — SIGNIFICANT CHANGE UP
AST SERPL-CCNC: 29 U/L — SIGNIFICANT CHANGE UP (ref 10–40)
BILIRUB SERPL-MCNC: 0.9 MG/DL — SIGNIFICANT CHANGE UP (ref 0.2–1.2)
BILIRUB UR-MCNC: NEGATIVE — SIGNIFICANT CHANGE UP
BUN SERPL-MCNC: 52 MG/DL — HIGH (ref 7–23)
CALCIUM SERPL-MCNC: 10.6 MG/DL — HIGH (ref 8.4–10.5)
CHLORIDE SERPL-SCNC: 98 MMOL/L — SIGNIFICANT CHANGE UP (ref 96–108)
CO2 SERPL-SCNC: 30 MMOL/L — SIGNIFICANT CHANGE UP (ref 22–31)
COLOR SPEC: YELLOW — SIGNIFICANT CHANGE UP
CORTICOSTEROID BINDING GLOBULIN RESULT: 0.6 MG/DL — LOW
CORTIS F/TOTAL MFR SERPL: 76 % — SIGNIFICANT CHANGE UP
CORTIS SERPL-MCNC: 17 UG/DL — SIGNIFICANT CHANGE UP
CORTISOL, FREE RESULT: 13 UG/DL — SIGNIFICANT CHANGE UP
CREAT SERPL-MCNC: 1.29 MG/DL — SIGNIFICANT CHANGE UP (ref 0.5–1.3)
CULTURE RESULTS: NO GROWTH — SIGNIFICANT CHANGE UP
CULTURE RESULTS: SIGNIFICANT CHANGE UP
CULTURE RESULTS: SIGNIFICANT CHANGE UP
DIFF PNL FLD: (no result)
GLUCOSE BLDC GLUCOMTR-MCNC: 143 MG/DL — HIGH (ref 70–99)
GLUCOSE BLDC GLUCOMTR-MCNC: 167 MG/DL — HIGH (ref 70–99)
GLUCOSE BLDC GLUCOMTR-MCNC: 181 MG/DL — HIGH (ref 70–99)
GLUCOSE BLDC GLUCOMTR-MCNC: 185 MG/DL — HIGH (ref 70–99)
GLUCOSE BLDC GLUCOMTR-MCNC: 185 MG/DL — HIGH (ref 70–99)
GLUCOSE SERPL-MCNC: 183 MG/DL — HIGH (ref 70–99)
GLUCOSE UR QL: NEGATIVE — SIGNIFICANT CHANGE UP
HCT VFR BLD CALC: 23.7 % — LOW (ref 34.5–45)
HGB BLD-MCNC: 7.8 G/DL — LOW (ref 11.5–15.5)
KETONES UR-MCNC: NEGATIVE — SIGNIFICANT CHANGE UP
LEUKOCYTE ESTERASE UR-ACNC: NEGATIVE — SIGNIFICANT CHANGE UP
MAGNESIUM SERPL-MCNC: 2.4 MG/DL — SIGNIFICANT CHANGE UP (ref 1.6–2.6)
MCHC RBC-ENTMCNC: 31 PG — SIGNIFICANT CHANGE UP (ref 27–34)
MCHC RBC-ENTMCNC: 32.9 G/DL — SIGNIFICANT CHANGE UP (ref 32–36)
MCV RBC AUTO: 94 FL — SIGNIFICANT CHANGE UP (ref 80–100)
METHOD TYPE: SIGNIFICANT CHANGE UP
NITRITE UR-MCNC: NEGATIVE — SIGNIFICANT CHANGE UP
ORGANISM # SPEC MICROSCOPIC CNT: SIGNIFICANT CHANGE UP
ORGANISM # SPEC MICROSCOPIC CNT: SIGNIFICANT CHANGE UP
PH UR: 6.5 — SIGNIFICANT CHANGE UP (ref 5–8)
PHOSPHATE SERPL-MCNC: 3.3 MG/DL — SIGNIFICANT CHANGE UP (ref 2.5–4.5)
PLATELET # BLD AUTO: 185 K/UL — SIGNIFICANT CHANGE UP (ref 150–400)
POTASSIUM SERPL-MCNC: 4.7 MMOL/L — SIGNIFICANT CHANGE UP (ref 3.5–5.3)
POTASSIUM SERPL-SCNC: 4.7 MMOL/L — SIGNIFICANT CHANGE UP (ref 3.5–5.3)
PROT SERPL-MCNC: 5.7 G/DL — LOW (ref 6–8.3)
PROT UR-MCNC: 30 MG/DL
RBC # BLD: 2.52 M/UL — LOW (ref 3.8–5.2)
RBC # FLD: 19.1 % — HIGH (ref 10.3–16.9)
SODIUM SERPL-SCNC: 137 MMOL/L — SIGNIFICANT CHANGE UP (ref 135–145)
SP GR SPEC: 1.01 — SIGNIFICANT CHANGE UP (ref 1–1.03)
SPECIMEN SOURCE: SIGNIFICANT CHANGE UP
SPECIMEN SOURCE: SIGNIFICANT CHANGE UP
UROBILINOGEN FLD QL: 0.2 E.U./DL — SIGNIFICANT CHANGE UP
WBC # BLD: 4.1 K/UL — SIGNIFICANT CHANGE UP (ref 3.8–10.5)
WBC # FLD AUTO: 4.1 K/UL — SIGNIFICANT CHANGE UP (ref 3.8–10.5)

## 2018-05-04 PROCEDURE — 71045 X-RAY EXAM CHEST 1 VIEW: CPT | Mod: 26

## 2018-05-04 PROCEDURE — 99233 SBSQ HOSP IP/OBS HIGH 50: CPT | Mod: GC

## 2018-05-04 RX ORDER — ELECTROLYTE SOLUTION,INJ
1 VIAL (ML) INTRAVENOUS
Qty: 0 | Refills: 0 | Status: DISCONTINUED | OUTPATIENT
Start: 2018-05-04 | End: 2018-05-04

## 2018-05-04 RX ORDER — I.V. FAT EMULSION 20 G/100ML
0.54 EMULSION INTRAVENOUS
Qty: 50 | Refills: 0 | Status: DISCONTINUED | OUTPATIENT
Start: 2018-05-04 | End: 2018-05-04

## 2018-05-04 RX ORDER — ACETAMINOPHEN 500 MG
1000 TABLET ORAL ONCE
Qty: 0 | Refills: 0 | Status: COMPLETED | OUTPATIENT
Start: 2018-05-04 | End: 2018-05-04

## 2018-05-04 RX ADMIN — Medication 2: at 07:15

## 2018-05-04 RX ADMIN — HYDROMORPHONE HYDROCHLORIDE 0.5 MILLIGRAM(S): 2 INJECTION INTRAMUSCULAR; INTRAVENOUS; SUBCUTANEOUS at 18:08

## 2018-05-04 RX ADMIN — Medication 1 EACH: at 17:56

## 2018-05-04 RX ADMIN — Medication 50 MILLILITER(S): at 03:11

## 2018-05-04 RX ADMIN — MICAFUNGIN SODIUM 210 MILLIGRAM(S): 100 INJECTION, POWDER, LYOPHILIZED, FOR SOLUTION INTRAVENOUS at 11:59

## 2018-05-04 RX ADMIN — Medication 400 MILLIGRAM(S): at 18:28

## 2018-05-04 RX ADMIN — DEXMEDETOMIDINE HYDROCHLORIDE IN 0.9% SODIUM CHLORIDE 4.68 MICROGRAM(S)/KG/HR: 4 INJECTION INTRAVENOUS at 05:25

## 2018-05-04 RX ADMIN — PANTOPRAZOLE SODIUM 40 MILLIGRAM(S): 20 TABLET, DELAYED RELEASE ORAL at 11:58

## 2018-05-04 RX ADMIN — Medication 75 MICROGRAM(S): at 05:26

## 2018-05-04 RX ADMIN — MEROPENEM 1000 MILLIGRAM(S): 1 INJECTION INTRAVENOUS at 05:24

## 2018-05-04 RX ADMIN — DEXMEDETOMIDINE HYDROCHLORIDE IN 0.9% SODIUM CHLORIDE 4.68 MICROGRAM(S)/KG/HR: 4 INJECTION INTRAVENOUS at 17:59

## 2018-05-04 RX ADMIN — HYDROMORPHONE HYDROCHLORIDE 0.5 MILLIGRAM(S): 2 INJECTION INTRAMUSCULAR; INTRAVENOUS; SUBCUTANEOUS at 18:27

## 2018-05-04 RX ADMIN — Medication 80 MILLIGRAM(S): at 05:25

## 2018-05-04 RX ADMIN — CHLORHEXIDINE GLUCONATE 15 MILLILITER(S): 213 SOLUTION TOPICAL at 17:56

## 2018-05-04 RX ADMIN — I.V. FAT EMULSION 20.83 GM/KG/DAY: 20 EMULSION INTRAVENOUS at 22:50

## 2018-05-04 RX ADMIN — Medication 2: at 17:55

## 2018-05-04 RX ADMIN — CHLORHEXIDINE GLUCONATE 15 MILLILITER(S): 213 SOLUTION TOPICAL at 05:24

## 2018-05-04 RX ADMIN — Medication 2: at 11:58

## 2018-05-04 RX ADMIN — DEXMEDETOMIDINE HYDROCHLORIDE IN 0.9% SODIUM CHLORIDE 4.68 MICROGRAM(S)/KG/HR: 4 INJECTION INTRAVENOUS at 22:50

## 2018-05-04 RX ADMIN — Medication 80 MILLIGRAM(S): at 17:55

## 2018-05-04 RX ADMIN — DEXMEDETOMIDINE HYDROCHLORIDE IN 0.9% SODIUM CHLORIDE 4.68 MICROGRAM(S)/KG/HR: 4 INJECTION INTRAVENOUS at 15:38

## 2018-05-04 RX ADMIN — DEXMEDETOMIDINE HYDROCHLORIDE IN 0.9% SODIUM CHLORIDE 4.68 MICROGRAM(S)/KG/HR: 4 INJECTION INTRAVENOUS at 03:13

## 2018-05-04 RX ADMIN — MEROPENEM 1000 MILLIGRAM(S): 1 INJECTION INTRAVENOUS at 17:55

## 2018-05-04 RX ADMIN — DEXMEDETOMIDINE HYDROCHLORIDE IN 0.9% SODIUM CHLORIDE 4.68 MICROGRAM(S)/KG/HR: 4 INJECTION INTRAVENOUS at 10:12

## 2018-05-04 NOTE — PROGRESS NOTE ADULT - SUBJECTIVE AND OBJECTIVE BOX
O/N Events:  Subjective:  pt still with minimal mental status, not responsive  HDS, afebrile, no leukocytosis, on AC mode, abdomen obese and soft BS present PEG site c/d/i, LLQ drain still draining 100-150 cc per 24 h, TF via PEG   precedex stopped in am  repeat CT abd/pel showing Interval decrease in size of the extraperitoneal gas-filled collection   surrounding the uterus, However, enteric contrast is again seen within this collection on the current study, indicating a persistent enteric   fistulous collection.  continued on merop, candida lusitaniae sensitive to fluc, awaiting candida glabra sensitivity     VITALS  Vital Signs Last 24 Hrs  T(C): 37.8 (04 May 2018 01:29), Max: 37.8 (04 May 2018 01:29)  T(F): 100 (04 May 2018 01:29), Max: 100 (04 May 2018 01:29)  HR: 78 (04 May 2018 10:00) (52 - 80)  BP: 116/53 (04 May 2018 10:00) (92/52 - 143/65)  BP(mean): 74 (04 May 2018 10:00) (63 - 104)  RR: 25 (04 May 2018 10:00) (11 - 25)  SpO2: 99% (04 May 2018 10:00) (96% - 100%)      PHYSICAL EXAM  Constitutional: minimal mental status , not responsive   Eyes:  Sclerae anicteric, conjunctivae clear, PERRL  Ear/Nose/Throat:  MMM/ No thrush   Pulmonary: Bibasilar crackles, air entry equal on both sides, intubated, Right chest Port, access for HD  Cardiovascular: NSR, no murmurs  Abdominal: soft, obese, unable to elicit tenderness, non distended. wound VAC in place, no leak.   : suazo in place  Extremities: bilateral arms - restarined, +edema, pulses intact and symmetric        MEDICATIONS  (STANDING):  chlorhexidine 0.12% Liquid 15 milliLiter(s) Swish and Spit two times a day  dexmedetomidine Infusion 0.2 MICROgram(s)/kG/Hr (4.68 mL/Hr) IV Continuous <Continuous>  dextrose 5%. 1000 milliLiter(s) (50 mL/Hr) IV Continuous <Continuous>  dextrose 50% Injectable 12.5 Gram(s) IV Push once  dextrose 50% Injectable 25 Gram(s) IV Push once  dextrose 50% Injectable 25 Gram(s) IV Push once  furosemide   Injectable 80 milliGRAM(s) IV Push two times a day  insulin lispro (HumaLOG) corrective regimen sliding scale   SubCutaneous every 6 hours  levothyroxine Injectable 75 MICROGram(s) IV Push <User Schedule>  meropenem Injectable 1000 milliGRAM(s) IV Push every 12 hours  metolazone 10 milliGRAM(s) Oral daily  micafungin IVPB 100 milliGRAM(s) IV Intermittent daily  pantoprazole   Suspension 40 milliGRAM(s) Oral daily  Parenteral Nutrition - Adult 1 Each (53 mL/Hr) TPN Continuous <Continuous>    MEDICATIONS  (PRN):  ALBUTerol/ipratropium for Nebulization 3 milliLiter(s) Nebulizer every 6 hours PRN Shortness of Breath and/or Wheezing  dextrose Gel 1 Dose(s) Oral once PRN Blood Glucose LESS THAN 70 milliGRAM(s)/deciliter  glucagon  Injectable 1 milliGRAM(s) IntraMuscular once PRN Glucose LESS THAN 70 milligrams/deciliter  HYDROmorphone  Injectable 0.5 milliGRAM(s) IV Push every 3 hours PRN Severe Pain (7 - 10)  ondansetron Injectable 4 milliGRAM(s) IV Push every 6 hours PRN Nausea      LABS                        7.8    4.1   )-----------( 185      ( 04 May 2018 05:02 )             23.7     05-04    137  |  98  |  52<H>  ----------------------------<  183<H>  4.7   |  30  |  1.29    Ca    10.6<H>      04 May 2018 05:01  Phos  3.3     05-04  Mg     2.4     05-04    TPro  5.7<L>  /  Alb  3.1<L>  /  TBili  0.9  /  DBili  x   /  AST  29  /  ALT  21  /  AlkPhos  80  05-04    LIVER FUNCTIONS - ( 04 May 2018 05:01 )  Alb: 3.1 g/dL / Pro: 5.7 g/dL / ALK PHOS: 80 U/L / ALT: 21 U/L / AST: 29 U/L / GGT: x           Microbiology:  U Clx 4/18: + E.coli MDR  left pelvic fluid aspirate: + for yeast/ candida lusitaniae sensitive to fluc, awaiting sens for candida Glabrata  awaiting sensitivities     CT Abd/Pel with oral contrast 5/3;  IMPRESSION:  Interval decrease in size of the extraperitoneal gas-filled collection   surrounding the enlarged and bulky uterus with an indwelling percutaneous   drainage catheter. However, enteric contrast is again seen within this   collection on the current study, indicating a persistent enteric   fistulous collection.    Increased amount of upper abdominal ascites.    Moderate bilateral pleural effusions with near complete collapse of the   right lower lobe and partial collapse of the left lower lobe.

## 2018-05-04 NOTE — PROGRESS NOTE ADULT - ATTENDING COMMENTS
ID Attending    Clinically stable    CT reviewed    Case discussed with Dr Zamora: re-operation is being considered in order to correct underlying problem of bowel perforation.    We will therefore continue current regimen pending final susceptibilities of the recently isolated C. glabrata

## 2018-05-04 NOTE — PROGRESS NOTE ADULT - ASSESSMENT
A/p    78 year old female, morbidly obesity, DM, ventral hernia repair with mesh 3/2 c/b wound dehiscence, recent ATN requiring temporary HD (Permacath removed 4/10), who presents as a transfer from OSH for management of multifactorial sepsis and open abdominal wound. Patient found to have ESBL-producing E.Coli UTI and MSSA bacteremia, origin likely coming from abdomen/pelvis vs. prior HD catheter. Patient intubated 4/25 for acute hypoxic respiratory failure.  CT 4/18 showed SB perforation with pelvic collection, still present on CT on 4/25.  She is s/p IR drainage of collection, with culture growing candida lusitanae and Glabrata.  Would like to begin removing antibiotics, however need repeat abd/pel imaging to re eval abdominal collection.  completed 14 d course of oxacillin from first negative culture (4/17;  last day 4/30)  repeat Ct abd/pel:  Interval decrease in size of the extraperitoneal gas-filled collection, However, enteric contrast is again seen within this   collection, indicating a persistent enteric fistulous collection    Recommend,   - Continue meropenem 1 g IV q12h  - Continue micafungin 100 mg IV q24h, awaiting sensitivity results   - ID will follow

## 2018-05-04 NOTE — PROGRESS NOTE ADULT - ASSESSMENT
77 y/o F with sepsis from open abdominal wound infection, MSSA bacteremia present on admission, and UTI present on admission found to have contained small bowel leak, s/p IR drainage, altered mental status, with acute respiratory failure, vaginal bleeding.       Neuro: precedex gtt, off propofol,  dilaudid,  Zofran prn, AMS: EEG: slowing LP neg. holding:  seroquel, aripiprazole, escitalapram   CV: HD stable s/p severe sepsis, off albumin holding amlodipine, 4/19 Echo  65-70%. Lasix 80mg BID for diuresis aiming for negative fluid balance  Pulm: intubated, satting well on 40/490/12/5 chlorhex  GI: NPO, protonix, off TF today, TPN Protonix  : Nicole S/p AKF on HD now resolved. UTI (present on admission) + Uterine fibroids on CT scan with vaginal bleeding, gyn consulted - follow up biopsy result  ID: ESBL Ecoli in Urine resistant to Zosyn: Jose L( 4/17-) MSSA in blood: Oxacillin ( 4/17-) Kai virus (good hand washing), TTE neg for endocarditis.   Endo: ISS, Synthroid  Insulin  in TPN: 25   PPx: sqh held for low plts  PE:Sp IVC filter on 3/30   Lines: PIV, LIJ TLC (4/29-), /// s/p L Rad Art line( 4/15-4/18),  Rt IJ TLC from OSH ( 4/15-)  Wounds: Wound vac to midline incision , change MWF    awaiting for family meeting with  regarding surgical intervention 77 y/o F with sepsis from open abdominal wound infection, MSSA bacteremia present on admission, and UTI present on admission found to have contained small bowel leak, s/p IR drainage, altered mental status, with acute respiratory failure, vaginal bleeding.       Neuro: precedex gtt, off propofol,  dilaudid,  Zofran prn, AMS: EEG: slowing LP neg. holding:  seroquel, aripiprazole, escitalapram   CV: HD stable s/p severe sepsis, off albumin holding amlodipine, 4/19 Echo  65-70%. Lasix 80mg BID and metolazone 10 mg for diuresis aiming for negative fluid balance  Pulm: intubated, satting well on 40/490/12/5 chlorhex  GI: NPO, protonix, off TF today, TPN Protonix  : Nicole S/p AKF on HD now resolved. UTI (present on admission) + Uterine fibroids on CT scan with vaginal bleeding, gyn consulted - cervical biopsy with metaplasia  ID: ESBL Ecoli in Urine resistant to Zosyn: Jose L( 4/17-) MSSA in blood: Oxacillin ( 4/17-) Kai virus (good hand washing), TTE neg for endocarditis. Continuing micafungin for candida tropicalis and glabrata.  Endo: ISS, Synthroid  Insulin  in TPN: 50 units with improved control   PPx: sqh held for low plts  PE:Sp IVC filter on 3/30   Lines: PIV, LIJ TLC (4/29-), /// s/p L Rad Art line( 4/15-4/18),  Rt IJ TLC from OSH ( 4/15-)  Wounds: Wound vac to midline incision , change MWF    awaiting for family meeting with  regarding surgical intervention

## 2018-05-04 NOTE — PROGRESS NOTE ADULT - SUBJECTIVE AND OBJECTIVE BOX
24 hr events:  5/4 : Huber will have a family meeting tomorrow for possible another OR with Gyn team, and the need for trach, hold the feeds, albumin dc'ed, TF dc;ed, metolazone 10/daily  ON: EMANUEL    SUBJECTIVE: Patient is off sedation, opening her eyes but not following verbal commands      MEDICATIONS  (STANDING):  chlorhexidine 0.12% Liquid 15 milliLiter(s) Swish and Spit two times a day  dexmedetomidine Infusion 0.2 MICROgram(s)/kG/Hr (4.68 mL/Hr) IV Continuous <Continuous>  dextrose 5%. 1000 milliLiter(s) (50 mL/Hr) IV Continuous <Continuous>  dextrose 50% Injectable 12.5 Gram(s) IV Push once  dextrose 50% Injectable 25 Gram(s) IV Push once  dextrose 50% Injectable 25 Gram(s) IV Push once  fat emulsion (Plant Based) 20% Infusion 0.54 Gm/kG/Day (20.833 mL/Hr) IV Continuous <Continuous>  furosemide   Injectable 80 milliGRAM(s) IV Push two times a day  insulin lispro (HumaLOG) corrective regimen sliding scale   SubCutaneous every 6 hours  levothyroxine Injectable 75 MICROGram(s) IV Push <User Schedule>  meropenem Injectable 1000 milliGRAM(s) IV Push every 12 hours  metolazone 10 milliGRAM(s) Oral daily  micafungin IVPB 100 milliGRAM(s) IV Intermittent daily  pantoprazole   Suspension 40 milliGRAM(s) Oral daily  Parenteral Nutrition - Adult 1 Each (53 mL/Hr) TPN Continuous <Continuous>  Parenteral Nutrition - Adult 1 Each (53 mL/Hr) TPN Continuous <Continuous>    MEDICATIONS  (PRN):  ALBUTerol/ipratropium for Nebulization 3 milliLiter(s) Nebulizer every 6 hours PRN Shortness of Breath and/or Wheezing  dextrose Gel 1 Dose(s) Oral once PRN Blood Glucose LESS THAN 70 milliGRAM(s)/deciliter  glucagon  Injectable 1 milliGRAM(s) IntraMuscular once PRN Glucose LESS THAN 70 milligrams/deciliter  HYDROmorphone  Injectable 0.5 milliGRAM(s) IV Push every 3 hours PRN Severe Pain (7 - 10)  ondansetron Injectable 4 milliGRAM(s) IV Push every 6 hours PRN Nausea      Suazo:	 Daily Suazo Order Placed	   Indication:Strict I and O's     Central Line: Medication / TPN Administration       ICU Vital Signs Last 24 Hrs  T(C): 37.8 (04 May 2018 01:29), Max: 37.8 (04 May 2018 01:29)  T(F): 100 (04 May 2018 01:29), Max: 100 (04 May 2018 01:29)  HR: 78 (04 May 2018 10:00) (52 - 80)  BP: 116/53 (04 May 2018 10:00) (92/52 - 143/65)  BP(mean): 74 (04 May 2018 10:00) (63 - 104)  ABP: --  ABP(mean): --  RR: 25 (04 May 2018 10:00) (11 - 25)  SpO2: 99% (04 May 2018 10:00) (96% - 100%)      Physical Exam:  General: NAD, opening her eyes, not responding  HEENT: NC/AT, EOMI, PERRLA  Pulmonary: Nonlabored breathing, on mechanical vent, no bibasilar crackles   Cardiovascular: NSR, no murmurs  Abdominal: soft, NT/ND, wound VAC in place, no leak,   : suazo in place  Extremities: WWP with anasarca  Neuro: intubated and sedated  Vasc: palpable distal pulses  Skin: +rash   MSK: no joint swelling    Lines/tubes/drains: wound VAC, LLQ LEANN (bilious)      Vent settings:  Mode: CPAP with PS, FiO2: 40, PEEP: 5, PS: 8, MAP: 8.2, PIP: 14    I&O's Summary    03 May 2018 07:01  -  04 May 2018 07:00  --------------------------------------------------------  IN: 3262.3 mL / OUT: 3395 mL / NET: -132.7 mL    04 May 2018 07:01  -  04 May 2018 11:33  --------------------------------------------------------  IN: 280 mL / OUT: 750 mL / NET: -470 mL        LABS:                        7.8    4.1   )-----------( 185      ( 04 May 2018 05:02 )             23.7     05-04    137  |  98  |  52<H>  ----------------------------<  183<H>  4.7   |  30  |  1.29    Ca    10.6<H>      04 May 2018 05:01  Phos  3.3     05-04  Mg     2.4     05-04    TPro  5.7<L>  /  Alb  3.1<L>  /  TBili  0.9  /  DBili  x   /  AST  29  /  ALT  21  /  AlkPhos  80  05-04        CAPILLARY BLOOD GLUCOSE      POCT Blood Glucose.: 185 mg/dL (04 May 2018 11:07)  POCT Blood Glucose.: 185 mg/dL (04 May 2018 07:12)  POCT Blood Glucose.: 167 mg/dL (04 May 2018 05:44)  POCT Blood Glucose.: 125 mg/dL (03 May 2018 22:05)  POCT Blood Glucose.: 219 mg/dL (03 May 2018 16:38)    LIVER FUNCTIONS - ( 04 May 2018 05:01 )  Alb: 3.1 g/dL / Pro: 5.7 g/dL / ALK PHOS: 80 U/L / ALT: 21 U/L / AST: 29 U/L / GGT: x 24 hr events:  5/4 : Huber will have a family meeting tomorrow for possible another OR with Gyn team, and the need for trach, hold the feeds, albumin dc'ed, TF dc'd, metolazone 10/daily  ON: EMANUEL    SUBJECTIVE: Patient is off sedation, opening her eyes but not following verbal commands      MEDICATIONS  (STANDING):  chlorhexidine 0.12% Liquid 15 milliLiter(s) Swish and Spit two times a day  dexmedetomidine Infusion 0.2 MICROgram(s)/kG/Hr (4.68 mL/Hr) IV Continuous <Continuous>  dextrose 5%. 1000 milliLiter(s) (50 mL/Hr) IV Continuous <Continuous>  dextrose 50% Injectable 12.5 Gram(s) IV Push once  dextrose 50% Injectable 25 Gram(s) IV Push once  dextrose 50% Injectable 25 Gram(s) IV Push once  fat emulsion (Plant Based) 20% Infusion 0.54 Gm/kG/Day (20.833 mL/Hr) IV Continuous <Continuous>  furosemide   Injectable 80 milliGRAM(s) IV Push two times a day  insulin lispro (HumaLOG) corrective regimen sliding scale   SubCutaneous every 6 hours  levothyroxine Injectable 75 MICROGram(s) IV Push <User Schedule>  meropenem Injectable 1000 milliGRAM(s) IV Push every 12 hours  metolazone 10 milliGRAM(s) Oral daily  micafungin IVPB 100 milliGRAM(s) IV Intermittent daily  pantoprazole   Suspension 40 milliGRAM(s) Oral daily  Parenteral Nutrition - Adult 1 Each (53 mL/Hr) TPN Continuous <Continuous>  Parenteral Nutrition - Adult 1 Each (53 mL/Hr) TPN Continuous <Continuous>    MEDICATIONS  (PRN):  ALBUTerol/ipratropium for Nebulization 3 milliLiter(s) Nebulizer every 6 hours PRN Shortness of Breath and/or Wheezing  dextrose Gel 1 Dose(s) Oral once PRN Blood Glucose LESS THAN 70 milliGRAM(s)/deciliter  glucagon  Injectable 1 milliGRAM(s) IntraMuscular once PRN Glucose LESS THAN 70 milligrams/deciliter  HYDROmorphone  Injectable 0.5 milliGRAM(s) IV Push every 3 hours PRN Severe Pain (7 - 10)  ondansetron Injectable 4 milliGRAM(s) IV Push every 6 hours PRN Nausea      Suazo:	 Daily Suazo Order Placed	   Indication:Strict I and O's     Central Line: Medication / TPN Administration       ICU Vital Signs Last 24 Hrs  T(C): 37.8 (04 May 2018 01:29), Max: 37.8 (04 May 2018 01:29)  T(F): 100 (04 May 2018 01:29), Max: 100 (04 May 2018 01:29)  HR: 78 (04 May 2018 10:00) (52 - 80)  BP: 116/53 (04 May 2018 10:00) (92/52 - 143/65)  BP(mean): 74 (04 May 2018 10:00) (63 - 104)  ABP: --  ABP(mean): --  RR: 25 (04 May 2018 10:00) (11 - 25)  SpO2: 99% (04 May 2018 10:00) (96% - 100%)      Physical Exam:  General: NAD, opening her eyes, not responding  HEENT: NC/AT, EOMI, PERRLA  Pulmonary: Nonlabored breathing, on mechanical vent, no bibasilar crackles   Cardiovascular: NSR, no murmurs  Abdominal: soft, NT/ND, wound VAC in place, no leak,   : suazo in place  Extremities: WWP with anasarca  Neuro: intubated and sedated  Vasc: palpable distal pulses  Skin: +rash   MSK: no joint swelling    Lines/tubes/drains: wound VAC, LLQ LEANN (bilious)      Vent settings:  Mode: CPAP with PS, FiO2: 40, PEEP: 5, PS: 8, MAP: 8.2, PIP: 14    I&O's Summary    03 May 2018 07:01  -  04 May 2018 07:00  --------------------------------------------------------  IN: 3262.3 mL / OUT: 3395 mL / NET: -132.7 mL    04 May 2018 07:01  -  04 May 2018 11:33  --------------------------------------------------------  IN: 280 mL / OUT: 750 mL / NET: -470 mL        LABS:                        7.8    4.1   )-----------( 185      ( 04 May 2018 05:02 )             23.7     05-04    137  |  98  |  52<H>  ----------------------------<  183<H>  4.7   |  30  |  1.29    Ca    10.6<H>      04 May 2018 05:01  Phos  3.3     05-04  Mg     2.4     05-04    TPro  5.7<L>  /  Alb  3.1<L>  /  TBili  0.9  /  DBili  x   /  AST  29  /  ALT  21  /  AlkPhos  80  05-04        CAPILLARY BLOOD GLUCOSE      POCT Blood Glucose.: 185 mg/dL (04 May 2018 11:07)  POCT Blood Glucose.: 185 mg/dL (04 May 2018 07:12)  POCT Blood Glucose.: 167 mg/dL (04 May 2018 05:44)  POCT Blood Glucose.: 125 mg/dL (03 May 2018 22:05)  POCT Blood Glucose.: 219 mg/dL (03 May 2018 16:38)    LIVER FUNCTIONS - ( 04 May 2018 05:01 )  Alb: 3.1 g/dL / Pro: 5.7 g/dL / ALK PHOS: 80 U/L / ALT: 21 U/L / AST: 29 U/L / GGT: x

## 2018-05-05 LAB
ANION GAP SERPL CALC-SCNC: 12 MMOL/L — SIGNIFICANT CHANGE UP (ref 5–17)
BUN SERPL-MCNC: 59 MG/DL — HIGH (ref 7–23)
CALCIUM SERPL-MCNC: 10.3 MG/DL — SIGNIFICANT CHANGE UP (ref 8.4–10.5)
CHLORIDE SERPL-SCNC: 100 MMOL/L — SIGNIFICANT CHANGE UP (ref 96–108)
CO2 SERPL-SCNC: 29 MMOL/L — SIGNIFICANT CHANGE UP (ref 22–31)
CREAT SERPL-MCNC: 1.33 MG/DL — HIGH (ref 0.5–1.3)
GLUCOSE BLDC GLUCOMTR-MCNC: 126 MG/DL — HIGH (ref 70–99)
GLUCOSE BLDC GLUCOMTR-MCNC: 136 MG/DL — HIGH (ref 70–99)
GLUCOSE BLDC GLUCOMTR-MCNC: 152 MG/DL — HIGH (ref 70–99)
GLUCOSE BLDC GLUCOMTR-MCNC: 170 MG/DL — HIGH (ref 70–99)
GLUCOSE SERPL-MCNC: 144 MG/DL — HIGH (ref 70–99)
HCT VFR BLD CALC: 25.3 % — LOW (ref 34.5–45)
HGB BLD-MCNC: 8 G/DL — LOW (ref 11.5–15.5)
MAGNESIUM SERPL-MCNC: 2.6 MG/DL — SIGNIFICANT CHANGE UP (ref 1.6–2.6)
MCHC RBC-ENTMCNC: 30.1 PG — SIGNIFICANT CHANGE UP (ref 27–34)
MCHC RBC-ENTMCNC: 31.6 G/DL — LOW (ref 32–36)
MCV RBC AUTO: 95.1 FL — SIGNIFICANT CHANGE UP (ref 80–100)
PHOSPHATE SERPL-MCNC: 4.9 MG/DL — HIGH (ref 2.5–4.5)
PLATELET # BLD AUTO: 230 K/UL — SIGNIFICANT CHANGE UP (ref 150–400)
POTASSIUM SERPL-MCNC: 4.4 MMOL/L — SIGNIFICANT CHANGE UP (ref 3.5–5.3)
POTASSIUM SERPL-SCNC: 4.4 MMOL/L — SIGNIFICANT CHANGE UP (ref 3.5–5.3)
RBC # BLD: 2.66 M/UL — LOW (ref 3.8–5.2)
RBC # FLD: 19.1 % — HIGH (ref 10.3–16.9)
SODIUM SERPL-SCNC: 141 MMOL/L — SIGNIFICANT CHANGE UP (ref 135–145)
WBC # BLD: 4.2 K/UL — SIGNIFICANT CHANGE UP (ref 3.8–10.5)
WBC # FLD AUTO: 4.2 K/UL — SIGNIFICANT CHANGE UP (ref 3.8–10.5)

## 2018-05-05 PROCEDURE — 99233 SBSQ HOSP IP/OBS HIGH 50: CPT | Mod: GC

## 2018-05-05 PROCEDURE — 71045 X-RAY EXAM CHEST 1 VIEW: CPT | Mod: 26

## 2018-05-05 RX ORDER — FENTANYL CITRATE 50 UG/ML
1 INJECTION INTRAVENOUS
Qty: 2500 | Refills: 0 | Status: DISCONTINUED | OUTPATIENT
Start: 2018-05-05 | End: 2018-05-06

## 2018-05-05 RX ORDER — ELECTROLYTE SOLUTION,INJ
1 VIAL (ML) INTRAVENOUS
Qty: 0 | Refills: 0 | Status: DISCONTINUED | OUTPATIENT
Start: 2018-05-05 | End: 2018-05-05

## 2018-05-05 RX ORDER — I.V. FAT EMULSION 20 G/100ML
0.54 EMULSION INTRAVENOUS
Qty: 50 | Refills: 0 | Status: DISCONTINUED | OUTPATIENT
Start: 2018-05-05 | End: 2018-05-05

## 2018-05-05 RX ADMIN — MICAFUNGIN SODIUM 210 MILLIGRAM(S): 100 INJECTION, POWDER, LYOPHILIZED, FOR SOLUTION INTRAVENOUS at 11:20

## 2018-05-05 RX ADMIN — HYDROMORPHONE HYDROCHLORIDE 0.5 MILLIGRAM(S): 2 INJECTION INTRAMUSCULAR; INTRAVENOUS; SUBCUTANEOUS at 17:26

## 2018-05-05 RX ADMIN — CHLORHEXIDINE GLUCONATE 15 MILLILITER(S): 213 SOLUTION TOPICAL at 17:04

## 2018-05-05 RX ADMIN — Medication 7.5 MILLIGRAM(S): at 21:56

## 2018-05-05 RX ADMIN — HYDROMORPHONE HYDROCHLORIDE 0.5 MILLIGRAM(S): 2 INJECTION INTRAMUSCULAR; INTRAVENOUS; SUBCUTANEOUS at 17:19

## 2018-05-05 RX ADMIN — Medication 2: at 11:24

## 2018-05-05 RX ADMIN — MEROPENEM 1000 MILLIGRAM(S): 1 INJECTION INTRAVENOUS at 17:04

## 2018-05-05 RX ADMIN — FENTANYL CITRATE 9.36 MICROGRAM(S)/KG/HR: 50 INJECTION INTRAVENOUS at 20:54

## 2018-05-05 RX ADMIN — I.V. FAT EMULSION 20.83 GM/KG/DAY: 20 EMULSION INTRAVENOUS at 22:01

## 2018-05-05 RX ADMIN — CHLORHEXIDINE GLUCONATE 15 MILLILITER(S): 213 SOLUTION TOPICAL at 05:41

## 2018-05-05 RX ADMIN — Medication 2: at 06:29

## 2018-05-05 RX ADMIN — Medication 80 MILLIGRAM(S): at 05:41

## 2018-05-05 RX ADMIN — Medication 80 MILLIGRAM(S): at 17:04

## 2018-05-05 RX ADMIN — Medication 1 EACH: at 17:04

## 2018-05-05 RX ADMIN — HYDROMORPHONE HYDROCHLORIDE 0.5 MILLIGRAM(S): 2 INJECTION INTRAMUSCULAR; INTRAVENOUS; SUBCUTANEOUS at 10:50

## 2018-05-05 RX ADMIN — DEXMEDETOMIDINE HYDROCHLORIDE IN 0.9% SODIUM CHLORIDE 4.68 MICROGRAM(S)/KG/HR: 4 INJECTION INTRAVENOUS at 11:21

## 2018-05-05 RX ADMIN — Medication 75 MICROGRAM(S): at 06:06

## 2018-05-05 RX ADMIN — PANTOPRAZOLE SODIUM 40 MILLIGRAM(S): 20 TABLET, DELAYED RELEASE ORAL at 11:21

## 2018-05-05 RX ADMIN — HYDROMORPHONE HYDROCHLORIDE 0.5 MILLIGRAM(S): 2 INJECTION INTRAMUSCULAR; INTRAVENOUS; SUBCUTANEOUS at 11:21

## 2018-05-05 RX ADMIN — MEROPENEM 1000 MILLIGRAM(S): 1 INJECTION INTRAVENOUS at 05:40

## 2018-05-05 NOTE — PROGRESS NOTE ADULT - SUBJECTIVE AND OBJECTIVE BOX
INTERVAL HPI/OVERNIGHT EVENTS:    Febrile to 101.4 earlier, now afebrile.    Family meeting with Dr. Ngo pending to discuss surgery to correct underlying bowel leak    The C. glabrata isolated is of intermediate susceptibility to fluconazole    ANTIBIOTICS/RELEVANT:    meropenem Injectable 1000 milliGRAM(s) IV Push every 12 hours  micafungin IVPB 100 milliGRAM(s) IV Intermittent daily    Allergies  No Known Allergies    PHYSICAL EXAMINATION:    Vital Signs Last 24 Hrs  T(F): 99 (05 May 2018 06:00), Max: 101.4 (05 May 2018 01:00)  HR: 86 (05 May 2018 14:00) (62 - 88)  BP: 148/73 (05 May 2018 14:00) (91/48 - 148/73)  RR: 15 (05 May 2018 14:00) (12 - 31)  SpO2: 99% (05 May 2018 14:00) (96% - 100%)    Exam no change  LABS:                        8.0    4.2   )-----------( 230      ( 05 May 2018 06:12 )             25.3     05-05    141  |  100  |  59<H>  ----------------------------<  144<H>  4.4   |  29  |  1.33<H>    Ca    10.3      05 May 2018 06:12  Phos  4.9     05-05  Mg     2.6     05-05    TPro  5.7<L>  /  Alb  3.1<L>  /  TBili  0.9  /  DBili  x   /  AST  29  /  ALT  21  /  AlkPhos  80  05-04      Urinalysis Basic - ( 04 May 2018 18:26 )    Color: Yellow / Appearance: Clear / S.010 / pH: x  Gluc: x / Ketone: NEGATIVE  / Bili: Negative / Urobili: 0.2 E.U./dL   Blood: x / Protein: 30 mg/dL / Nitrite: NEGATIVE   Leuk Esterase: NEGATIVE / RBC: Many /HPF / WBC 5-10 /HPF   Sq Epi: x / Non Sq Epi: 0-5 /HPF / Bacteria: Present /HPF    MICROBIOLOGY: as noted above

## 2018-05-05 NOTE — PROGRESS NOTE ADULT - ATTENDING COMMENTS
Pathology reviewed with GYN team.  Will leave recommendation for bleeding for surgical team, however no plan for operative management given current medical state.

## 2018-05-05 NOTE — PROGRESS NOTE ADULT - SUBJECTIVE AND OBJECTIVE BOX
INTERVAL HPI/OVERNIGHT EVENTS:      MEDICATIONS  (STANDING):  chlorhexidine 0.12% Liquid 15 milliLiter(s) Swish and Spit two times a day  dexmedetomidine Infusion 0.2 MICROgram(s)/kG/Hr (4.68 mL/Hr) IV Continuous <Continuous>  dextrose 5%. 1000 milliLiter(s) (50 mL/Hr) IV Continuous <Continuous>  dextrose 50% Injectable 12.5 Gram(s) IV Push once  dextrose 50% Injectable 25 Gram(s) IV Push once  dextrose 50% Injectable 25 Gram(s) IV Push once  furosemide   Injectable 80 milliGRAM(s) IV Push two times a day  insulin lispro (HumaLOG) corrective regimen sliding scale   SubCutaneous every 6 hours  levothyroxine Injectable 75 MICROGram(s) IV Push <User Schedule>  meropenem Injectable 1000 milliGRAM(s) IV Push every 12 hours  metolazone 10 milliGRAM(s) Oral daily  micafungin IVPB 100 milliGRAM(s) IV Intermittent daily  pantoprazole   Suspension 40 milliGRAM(s) Oral daily  Parenteral Nutrition - Adult 1 Each (53 mL/Hr) TPN Continuous <Continuous>    MEDICATIONS  (PRN):  ALBUTerol/ipratropium for Nebulization 3 milliLiter(s) Nebulizer every 6 hours PRN Shortness of Breath and/or Wheezing  dextrose Gel 1 Dose(s) Oral once PRN Blood Glucose LESS THAN 70 milliGRAM(s)/deciliter  glucagon  Injectable 1 milliGRAM(s) IntraMuscular once PRN Glucose LESS THAN 70 milligrams/deciliter  HYDROmorphone  Injectable 0.5 milliGRAM(s) IV Push every 3 hours PRN Severe Pain (7 - 10)  ondansetron Injectable 4 milliGRAM(s) IV Push every 6 hours PRN Nausea      Drug Dosing Weight  Height (cm): 170.2 (2018 16:18)  Weight (kg): 93.6 (2018 16:17)  BMI (kg/m2): 32.3 (2018 16:18)  BSA (m2): 2.05 (2018 16:18)    PAST MEDICAL & SURGICAL HISTORY:  Hypothyroidism  GERD (gastroesophageal reflux disease)  Obesity  DM (diabetes mellitus)  HLD (hyperlipidemia)  HTN (hypertension)  H/O ventral hernia repair      ICU Vital Signs Last 24 Hrs  T(C): 37.2 (05 May 2018 06:00), Max: 38.6 (05 May 2018 01:00)  T(F): 99 (05 May 2018 06:00), Max: 101.4 (05 May 2018 01:00)  HR: 68 (05 May 2018 08:00) (64 - 88)  BP: 120/60 (05 May 2018 08:00) (91/48 - 143/80)  BP(mean): 78 (05 May 2018 08:00) (66 - 114)  ABP: --  ABP(mean): --  RR: 12 (05 May 2018 08:00) (12 - 31)  SpO2: 97% (05 May 2018 08:00) (96% - 100%)            04 May 2018 07:01  -  05 May 2018 07:00  --------------------------------------------------------  IN:    dexmedetomidine Infusion: 289.3 mL    Fat Emulsion 20%: 187.6 mL    Free Water: 100 mL    Osmolite: 40 mL    Solution: 205 mL    Solution: 100 mL    TPN (Total Parenteral Nutrition): 1272 mL  Total IN: 2193.9 mL    OUT:    Bulb: 75 mL    Indwelling Catheter - Urethral: 2925 mL    Rectal Tube: 200 mL    VAC (Vacuum Assisted Closure) System: 150 mL  Total OUT: 3350 mL    Total NET: -1156.1 mL      05 May 2018 07:01  -  05 May 2018 08:35  --------------------------------------------------------  IN:    dexmedetomidine Infusion: 14 mL    Fat Emulsion 20%: 20.8 mL    TPN (Total Parenteral Nutrition): 53 mL  Total IN: 87.8 mL    OUT:    Indwelling Catheter - Urethral: 350 mL  Total OUT: 350 mL    Total NET: -262.2 mL          Mode: AC/ CMV (Assist Control/ Continuous Mandatory Ventilation)  RR (machine): 12  TV (machine): 490  FiO2: 40  PEEP: 5  ITime: 1  MAP: 10  PIP: 25      PHYSICAL EXAM:      Constitutional:    Eyes:    ENMT:    Neck:    Breasts:    Back:    Respiratory:    Cardiovascular:    Gastrointestinal:    Genitourinary:    Rectal:    Extremities:    Vascular:    Neurological:    Skin:    Lymph Nodes:    Musculoskeletal:    Psychiatric:        LABS:  CBC Full  -  ( 05 May 2018 06:12 )  WBC Count : 4.2 K/uL  Hemoglobin : 8.0 g/dL  Hematocrit : 25.3 %  Platelet Count - Automated : 230 K/uL  Mean Cell Volume : 95.1 fL  Mean Cell Hemoglobin : 30.1 pg  Mean Cell Hemoglobin Concentration : 31.6 g/dL  Auto Neutrophil # : x  Auto Lymphocyte # : x  Auto Monocyte # : x  Auto Eosinophil # : x  Auto Basophil # : x  Auto Neutrophil % : x  Auto Lymphocyte % : x  Auto Monocyte % : x  Auto Eosinophil % : x  Auto Basophil % : x    05-05    141  |  100  |  59<H>  ----------------------------<  144<H>  4.4   |  29  |  1.33<H>    Ca    10.3      05 May 2018 06:12  Phos  4.9     05-05  Mg     2.6     05-05    TPro  5.7<L>  /  Alb  3.1<L>  /  TBili  0.9  /  DBili  x   /  AST  29  /  ALT  21  /  AlkPhos  80  05-04      Urinalysis Basic - ( 04 May 2018 18:26 )    Color: Yellow / Appearance: Clear / S.010 / pH: x  Gluc: x / Ketone: NEGATIVE  / Bili: Negative / Urobili: 0.2 E.U./dL   Blood: x / Protein: 30 mg/dL / Nitrite: NEGATIVE   Leuk Esterase: NEGATIVE / RBC: Many /HPF / WBC 5-10 /HPF   Sq Epi: x / Non Sq Epi: 0-5 /HPF / Bacteria: Present /HPF        RADIOLOGY & ADDITIONAL STUDIES: INTERVAL HPI/OVERNIGHT EVENTS:  Overnight, spiked to 101F, pancultured. Albumin and TF stopped. metolazone started. Wound vac changed    MEDICATIONS  (STANDING):  chlorhexidine 0.12% Liquid 15 milliLiter(s) Swish and Spit two times a day  dexmedetomidine Infusion 0.2 MICROgram(s)/kG/Hr (4.68 mL/Hr) IV Continuous <Continuous>  dextrose 5%. 1000 milliLiter(s) (50 mL/Hr) IV Continuous <Continuous>  dextrose 50% Injectable 12.5 Gram(s) IV Push once  dextrose 50% Injectable 25 Gram(s) IV Push once  dextrose 50% Injectable 25 Gram(s) IV Push once  furosemide   Injectable 80 milliGRAM(s) IV Push two times a day  insulin lispro (HumaLOG) corrective regimen sliding scale   SubCutaneous every 6 hours  levothyroxine Injectable 75 MICROGram(s) IV Push <User Schedule>  meropenem Injectable 1000 milliGRAM(s) IV Push every 12 hours  metolazone 10 milliGRAM(s) Oral daily  micafungin IVPB 100 milliGRAM(s) IV Intermittent daily  pantoprazole   Suspension 40 milliGRAM(s) Oral daily  Parenteral Nutrition - Adult 1 Each (53 mL/Hr) TPN Continuous <Continuous>    MEDICATIONS  (PRN):  ALBUTerol/ipratropium for Nebulization 3 milliLiter(s) Nebulizer every 6 hours PRN Shortness of Breath and/or Wheezing  dextrose Gel 1 Dose(s) Oral once PRN Blood Glucose LESS THAN 70 milliGRAM(s)/deciliter  glucagon  Injectable 1 milliGRAM(s) IntraMuscular once PRN Glucose LESS THAN 70 milligrams/deciliter  HYDROmorphone  Injectable 0.5 milliGRAM(s) IV Push every 3 hours PRN Severe Pain (7 - 10)  ondansetron Injectable 4 milliGRAM(s) IV Push every 6 hours PRN Nausea      Drug Dosing Weight  Height (cm): 170.2 (2018 16:18)  Weight (kg): 93.6 (2018 16:17)  BMI (kg/m2): 32.3 (2018 16:18)  BSA (m2): 2.05 (2018 16:18)    PAST MEDICAL & SURGICAL HISTORY:  Hypothyroidism  GERD (gastroesophageal reflux disease)  Obesity  DM (diabetes mellitus)  HLD (hyperlipidemia)  HTN (hypertension)  H/O ventral hernia repair      ICU Vital Signs Last 24 Hrs  T(C): 37.2 (05 May 2018 06:00), Max: 38.6 (05 May 2018 01:00)  T(F): 99 (05 May 2018 06:00), Max: 101.4 (05 May 2018 01:00)  HR: 68 (05 May 2018 08:00) (64 - 88)  BP: 120/60 (05 May 2018 08:00) (91/48 - 143/80)  BP(mean): 78 (05 May 2018 08:00) (66 - 114)  ABP: --  ABP(mean): --  RR: 12 (05 May 2018 08:00) (12 - 31)  SpO2: 97% (05 May 2018 08:00) (96% - 100%)            04 May 2018 07:01  -  05 May 2018 07:00  --------------------------------------------------------  IN:    dexmedetomidine Infusion: 289.3 mL    Fat Emulsion 20%: 187.6 mL    Free Water: 100 mL    Osmolite: 40 mL    Solution: 205 mL    Solution: 100 mL    TPN (Total Parenteral Nutrition): 1272 mL  Total IN: 2193.9 mL    OUT:    Bulb: 75 mL    Indwelling Catheter - Urethral: 2925 mL    Rectal Tube: 200 mL    VAC (Vacuum Assisted Closure) System: 150 mL  Total OUT: 3350 mL    Total NET: -1156.1 mL      05 May 2018 07:01  -  05 May 2018 08:35  --------------------------------------------------------  IN:    dexmedetomidine Infusion: 14 mL    Fat Emulsion 20%: 20.8 mL    TPN (Total Parenteral Nutrition): 53 mL  Total IN: 87.8 mL    OUT:    Indwelling Catheter - Urethral: 350 mL  Total OUT: 350 mL    Total NET: -262.2 mL          Mode: AC/ CMV (Assist Control/ Continuous Mandatory Ventilation)  RR (machine): 12  TV (machine): 490  FiO2: 40  PEEP: 5  ITime: 1  MAP: 10  PIP: 25      PHYSICAL EXAM:      Constitutional:    Eyes:    ENMT:    Neck:    Breasts:    Back:    Respiratory:    Cardiovascular:    Gastrointestinal:    Genitourinary:    Rectal:    Extremities:    Vascular:    Neurological:    Skin:    Lymph Nodes:    Musculoskeletal:    Psychiatric:        LABS:  CBC Full  -  ( 05 May 2018 06:12 )  WBC Count : 4.2 K/uL  Hemoglobin : 8.0 g/dL  Hematocrit : 25.3 %  Platelet Count - Automated : 230 K/uL  Mean Cell Volume : 95.1 fL  Mean Cell Hemoglobin : 30.1 pg  Mean Cell Hemoglobin Concentration : 31.6 g/dL  Auto Neutrophil # : x  Auto Lymphocyte # : x  Auto Monocyte # : x  Auto Eosinophil # : x  Auto Basophil # : x  Auto Neutrophil % : x  Auto Lymphocyte % : x  Auto Monocyte % : x  Auto Eosinophil % : x  Auto Basophil % : x    05-05    141  |  100  |  59<H>  ----------------------------<  144<H>  4.4   |  29  |  1.33<H>    Ca    10.3      05 May 2018 06:12  Phos  4.9     05-05  Mg     2.6     05-05    TPro  5.7<L>  /  Alb  3.1<L>  /  TBili  0.9  /  DBili  x   /  AST  29  /  ALT  21  /  AlkPhos  80  05-04      Urinalysis Basic - ( 04 May 2018 18:26 )    Color: Yellow / Appearance: Clear / S.010 / pH: x  Gluc: x / Ketone: NEGATIVE  / Bili: Negative / Urobili: 0.2 E.U./dL   Blood: x / Protein: 30 mg/dL / Nitrite: NEGATIVE   Leuk Esterase: NEGATIVE / RBC: Many /HPF / WBC 5-10 /HPF   Sq Epi: x / Non Sq Epi: 0-5 /HPF / Bacteria: Present /HPF        RADIOLOGY & ADDITIONAL STUDIES: INTERVAL HPI/OVERNIGHT EVENTS:  Overnight, spiked to 101F, pancultured. Albumin and TF stopped. metolazone started. Wound vac changed. Pending discussion with family regarding next steps (ie trach, RTOR)    MEDICATIONS  (STANDING):  chlorhexidine 0.12% Liquid 15 milliLiter(s) Swish and Spit two times a day  dexmedetomidine Infusion 0.2 MICROgram(s)/kG/Hr (4.68 mL/Hr) IV Continuous <Continuous>  dextrose 5%. 1000 milliLiter(s) (50 mL/Hr) IV Continuous <Continuous>  dextrose 50% Injectable 12.5 Gram(s) IV Push once  dextrose 50% Injectable 25 Gram(s) IV Push once  dextrose 50% Injectable 25 Gram(s) IV Push once  furosemide   Injectable 80 milliGRAM(s) IV Push two times a day  insulin lispro (HumaLOG) corrective regimen sliding scale   SubCutaneous every 6 hours  levothyroxine Injectable 75 MICROGram(s) IV Push <User Schedule>  meropenem Injectable 1000 milliGRAM(s) IV Push every 12 hours  metolazone 10 milliGRAM(s) Oral daily  micafungin IVPB 100 milliGRAM(s) IV Intermittent daily  pantoprazole   Suspension 40 milliGRAM(s) Oral daily  Parenteral Nutrition - Adult 1 Each (53 mL/Hr) TPN Continuous <Continuous>    MEDICATIONS  (PRN):  ALBUTerol/ipratropium for Nebulization 3 milliLiter(s) Nebulizer every 6 hours PRN Shortness of Breath and/or Wheezing  dextrose Gel 1 Dose(s) Oral once PRN Blood Glucose LESS THAN 70 milliGRAM(s)/deciliter  glucagon  Injectable 1 milliGRAM(s) IntraMuscular once PRN Glucose LESS THAN 70 milligrams/deciliter  HYDROmorphone  Injectable 0.5 milliGRAM(s) IV Push every 3 hours PRN Severe Pain (7 - 10)  ondansetron Injectable 4 milliGRAM(s) IV Push every 6 hours PRN Nausea      Drug Dosing Weight  Height (cm): 170.2 (2018 16:18)  Weight (kg): 93.6 (2018 16:17)  BMI (kg/m2): 32.3 (2018 16:18)  BSA (m2): 2.05 (2018 16:18)    PAST MEDICAL & SURGICAL HISTORY:  Hypothyroidism  GERD (gastroesophageal reflux disease)  Obesity  DM (diabetes mellitus)  HLD (hyperlipidemia)  HTN (hypertension)  H/O ventral hernia repair      ICU Vital Signs Last 24 Hrs  T(C): 37.2 (05 May 2018 06:00), Max: 38.6 (05 May 2018 01:00)  T(F): 99 (05 May 2018 06:00), Max: 101.4 (05 May 2018 01:00)  HR: 68 (05 May 2018 08:00) (64 - 88)  BP: 120/60 (05 May 2018 08:00) (91/48 - 143/80)  BP(mean): 78 (05 May 2018 08:00) (66 - 114)  ABP: --  ABP(mean): --  RR: 12 (05 May 2018 08:00) (12 - 31)  SpO2: 97% (05 May 2018 08:00) (96% - 100%)            04 May 2018 07:01  -  05 May 2018 07:00  --------------------------------------------------------  IN:    dexmedetomidine Infusion: 289.3 mL    Fat Emulsion 20%: 187.6 mL    Free Water: 100 mL    Osmolite: 40 mL    Solution: 205 mL    Solution: 100 mL    TPN (Total Parenteral Nutrition): 1272 mL  Total IN: 2193.9 mL    OUT:    Bulb: 75 mL    Indwelling Catheter - Urethral: 2925 mL    Rectal Tube: 200 mL    VAC (Vacuum Assisted Closure) System: 150 mL  Total OUT: 3350 mL    Total NET: -1156.1 mL      05 May 2018 07:01  -  05 May 2018 08:35  --------------------------------------------------------  IN:    dexmedetomidine Infusion: 14 mL    Fat Emulsion 20%: 20.8 mL    TPN (Total Parenteral Nutrition): 53 mL  Total IN: 87.8 mL    OUT:    Indwelling Catheter - Urethral: 350 mL  Total OUT: 350 mL    Total NET: -262.2 mL          Mode: AC/ CMV (Assist Control/ Continuous Mandatory Ventilation)  RR (machine): 12  TV (machine): 490  FiO2: 40  PEEP: 5  ITime: 1  MAP: 10  PIP: 25      PHYSICAL EXAM:      Constitutional:    Eyes:    ENMT:    Neck:    Breasts:    Back:    Respiratory:    Cardiovascular:    Gastrointestinal:    Genitourinary:    Rectal:    Extremities:    Vascular:    Neurological:    Skin:    Lymph Nodes:    Musculoskeletal:    Psychiatric:        LABS:  CBC Full  -  ( 05 May 2018 06:12 )  WBC Count : 4.2 K/uL  Hemoglobin : 8.0 g/dL  Hematocrit : 25.3 %  Platelet Count - Automated : 230 K/uL  Mean Cell Volume : 95.1 fL  Mean Cell Hemoglobin : 30.1 pg  Mean Cell Hemoglobin Concentration : 31.6 g/dL  Auto Neutrophil # : x  Auto Lymphocyte # : x  Auto Monocyte # : x  Auto Eosinophil # : x  Auto Basophil # : x  Auto Neutrophil % : x  Auto Lymphocyte % : x  Auto Monocyte % : x  Auto Eosinophil % : x  Auto Basophil % : x    05-05    141  |  100  |  59<H>  ----------------------------<  144<H>  4.4   |  29  |  1.33<H>    Ca    10.3      05 May 2018 06:12  Phos  4.9     05-05  Mg     2.6     05-05    TPro  5.7<L>  /  Alb  3.1<L>  /  TBili  0.9  /  DBili  x   /  AST  29  /  ALT  21  /  AlkPhos  80  05-04      Urinalysis Basic - ( 04 May 2018 18:26 )    Color: Yellow / Appearance: Clear / S.010 / pH: x  Gluc: x / Ketone: NEGATIVE  / Bili: Negative / Urobili: 0.2 E.U./dL   Blood: x / Protein: 30 mg/dL / Nitrite: NEGATIVE   Leuk Esterase: NEGATIVE / RBC: Many /HPF / WBC 5-10 /HPF   Sq Epi: x / Non Sq Epi: 0-5 /HPF / Bacteria: Present /HPF        RADIOLOGY & ADDITIONAL STUDIES: INTERVAL HPI/OVERNIGHT EVENTS:  Overnight, spiked to 101F, pancultured. Albumin and TF stopped. metolazone started. Wound vac changed. Pending discussion with family regarding next steps (ie trach, RTOR)    MEDICATIONS  (STANDING):  chlorhexidine 0.12% Liquid 15 milliLiter(s) Swish and Spit two times a day  dexmedetomidine Infusion 0.2 MICROgram(s)/kG/Hr (4.68 mL/Hr) IV Continuous <Continuous>  dextrose 5%. 1000 milliLiter(s) (50 mL/Hr) IV Continuous <Continuous>  dextrose 50% Injectable 12.5 Gram(s) IV Push once  dextrose 50% Injectable 25 Gram(s) IV Push once  dextrose 50% Injectable 25 Gram(s) IV Push once  furosemide   Injectable 80 milliGRAM(s) IV Push two times a day  insulin lispro (HumaLOG) corrective regimen sliding scale   SubCutaneous every 6 hours  levothyroxine Injectable 75 MICROGram(s) IV Push <User Schedule>  meropenem Injectable 1000 milliGRAM(s) IV Push every 12 hours  metolazone 10 milliGRAM(s) Oral daily  micafungin IVPB 100 milliGRAM(s) IV Intermittent daily  pantoprazole   Suspension 40 milliGRAM(s) Oral daily  Parenteral Nutrition - Adult 1 Each (53 mL/Hr) TPN Continuous <Continuous>    MEDICATIONS  (PRN):  ALBUTerol/ipratropium for Nebulization 3 milliLiter(s) Nebulizer every 6 hours PRN Shortness of Breath and/or Wheezing  dextrose Gel 1 Dose(s) Oral once PRN Blood Glucose LESS THAN 70 milliGRAM(s)/deciliter  glucagon  Injectable 1 milliGRAM(s) IntraMuscular once PRN Glucose LESS THAN 70 milligrams/deciliter  HYDROmorphone  Injectable 0.5 milliGRAM(s) IV Push every 3 hours PRN Severe Pain (7 - 10)  ondansetron Injectable 4 milliGRAM(s) IV Push every 6 hours PRN Nausea      Drug Dosing Weight  Height (cm): 170.2 (2018 16:18)  Weight (kg): 93.6 (2018 16:17)  BMI (kg/m2): 32.3 (2018 16:18)  BSA (m2): 2.05 (2018 16:18)    PAST MEDICAL & SURGICAL HISTORY:  Hypothyroidism  GERD (gastroesophageal reflux disease)  Obesity  DM (diabetes mellitus)  HLD (hyperlipidemia)  HTN (hypertension)  H/O ventral hernia repair      ICU Vital Signs Last 24 Hrs  T(C): 37.2 (05 May 2018 06:00), Max: 38.6 (05 May 2018 01:00)  T(F): 99 (05 May 2018 06:00), Max: 101.4 (05 May 2018 01:00)  HR: 68 (05 May 2018 08:00) (64 - 88)  BP: 120/60 (05 May 2018 08:00) (91/48 - 143/80)  BP(mean): 78 (05 May 2018 08:00) (66 - 114)  ABP: --  ABP(mean): --  RR: 12 (05 May 2018 08:00) (12 - 31)  SpO2: 97% (05 May 2018 08:00) (96% - 100%)            04 May 2018 07:01  -  05 May 2018 07:00  --------------------------------------------------------  IN:    dexmedetomidine Infusion: 289.3 mL    Fat Emulsion 20%: 187.6 mL    Free Water: 100 mL    Osmolite: 40 mL    Solution: 205 mL    Solution: 100 mL    TPN (Total Parenteral Nutrition): 1272 mL  Total IN: 2193.9 mL    OUT:    Bulb: 75 mL    Indwelling Catheter - Urethral: 2925 mL    Rectal Tube: 200 mL    VAC (Vacuum Assisted Closure) System: 150 mL  Total OUT: 3350 mL    Total NET: -1156.1 mL      05 May 2018 07:01  -  05 May 2018 08:35  --------------------------------------------------------  IN:    dexmedetomidine Infusion: 14 mL    Fat Emulsion 20%: 20.8 mL    TPN (Total Parenteral Nutrition): 53 mL  Total IN: 87.8 mL    OUT:    Indwelling Catheter - Urethral: 350 mL  Total OUT: 350 mL    Total NET: -262.2 mL          Mode: AC/ CMV (Assist Control/ Continuous Mandatory Ventilation)  RR (machine): 12  TV (machine): 490  FiO2: 40  PEEP: 5  ITime: 1  MAP: 10  PIP: 25      PHYSICAL EXAM:  General: NAD, opening her eyes to verbal, responding yes appropriately moving ext at will    HEENT: NC/AT, EOMI, PERRLA  Pulmonary: Nonlabored breathing, on mechanical vent, no bibasilar crackles   Cardiovascular: NSR, no murmurs  Abdominal: soft, NT/ND, wound VAC in place, no leak, LLQ LEANN (bilious)  : suazo in place  Extremities: WWP with anasarca  Neuro: intubated and sedated  Vasc: palpable distal pulses  Skin: +rash   MSK: no joint swelling        LABS:  CBC Full  -  ( 05 May 2018 06:12 )  WBC Count : 4.2 K/uL  Hemoglobin : 8.0 g/dL  Hematocrit : 25.3 %  Platelet Count - Automated : 230 K/uL  Mean Cell Volume : 95.1 fL  Mean Cell Hemoglobin : 30.1 pg  Mean Cell Hemoglobin Concentration : 31.6 g/dL  Auto Neutrophil # : x  Auto Lymphocyte # : x  Auto Monocyte # : x  Auto Eosinophil # : x  Auto Basophil # : x  Auto Neutrophil % : x  Auto Lymphocyte % : x  Auto Monocyte % : x  Auto Eosinophil % : x  Auto Basophil % : x    05-05    141  |  100  |  59<H>  ----------------------------<  144<H>  4.4   |  29  |  1.33<H>    Ca    10.3      05 May 2018 06:12  Phos  4.9     05-05  Mg     2.6     05-05    TPro  5.7<L>  /  Alb  3.1<L>  /  TBili  0.9  /  DBili  x   /  AST  29  /  ALT  21  /  AlkPhos  80  05-04      Urinalysis Basic - ( 04 May 2018 18:26 )    Color: Yellow / Appearance: Clear / S.010 / pH: x  Gluc: x / Ketone: NEGATIVE  / Bili: Negative / Urobili: 0.2 E.U./dL   Blood: x / Protein: 30 mg/dL / Nitrite: NEGATIVE   Leuk Esterase: NEGATIVE / RBC: Many /HPF / WBC 5-10 /HPF   Sq Epi: x / Non Sq Epi: 0-5 /HPF / Bacteria: Present /HPF        RADIOLOGY & ADDITIONAL STUDIES:

## 2018-05-05 NOTE — PROGRESS NOTE ADULT - ASSESSMENT
79 y/o F with sepsis from open abdominal wound infection, MSSA bacteremia present on admission, and UTI present on admission found to have contained small bowel leak    Neuro: precedex gtt,  dilaudid,  Zofran prn, AMS: EEG: slowing LP neg. holding:  seroquel, aripiprazole, escitalapram   CV: HD stable s/p severe sepsis, albumin 25%q8, holding amlodipine, 4/19 Echo  65-70%. Lasix 80mg BID  Pulm: intubated, satting well on 40/490/12/5 chlorhex  GI: NPO, protonix, trickle TF via dobhoff, TPN Protonix  : Nicole S/p AKF on HD now resolved. UTI (present on admission) + Uterine fibroids on CT scan with vaginal bleeding, biopsy revealed tubal metaplasia. lupron 7.5 q month (given 5/5)  ID: Kai virus (good hand washing),  ESBL Ecoli in Urine resistant to Zosyn: Jose L( 4/17-) Eden: ( 4/27-) D/c'd:MSSA in blood: Oxacillin ( 4/17-) TTE neg for endocarditis.  Endo: ISS, Synthroid  Insulin  in TPN: 25   PPx: sqh held for low plts  PE:Sp IVC filter on 3/30   Lines: PIV, LIJ TLC (4/29-), /// s/p L Rad Art line( 4/15-4/18),  Rt IJ TLC from OSH ( 4/15-)  Wounds: Wound vac to midline incision , change MWF  PT/OT: ordered currently strict bed rest.

## 2018-05-05 NOTE — PROGRESS NOTE ADULT - ATTENDING COMMENTS
Patient seen and examined with house-staff during bedside rounds.  Resident note read, including vitals, physical findings, laboratory data, and radiological reports.   Revisions included below.  Direct personal management at bed side and extensive interpretation of the data.  Plan was outlined and discussed in details with the housestaff.  Decision making of high complexity  Action taken for acute disease activity to reflect the level of care provided:  - medication reconciliation  - review laboratory data  - management of respiratory failure and MV  - PSV trial  - Evaluation of the neurological status and she is opens her eyes to command  - Conintue antimicrobial  - on TPN

## 2018-05-05 NOTE — PROGRESS NOTE ADULT - SUBJECTIVE AND OBJECTIVE BOX
GYN team along with Elisa attending, Dr. Arita reviewed pathology of EMB. Currently showing tubal metaplasia which is not concerning for endometrial hyperplasia or carcinoma. At this time due to patients medical and mental status, no surgery indicated. If pt becomes more responsive and medical status becomes better, please reconsult as appropriate. To help pts vaginal bleeding we recommend either provera or lupron (7.5mg IM q monthly). Thank you for including our team in Ms. Baig's care. GYN team along with Pérez attending, Dr. Arita reviewed pathology of EMB. Currently showing tubal metaplasia (benign) which is not concerning for endometrial hyperplasia or carcinoma. Due to patient's medical and mental status at this time no plan for operative management. If pt becomes more responsive, extubated, and improved medical status, please reconsult as appropriate. To help pts vaginal bleeding we recommend either provera or lupron (7.5mg IM q monthly). Thank you for including our team in Ms. Baig's care.

## 2018-05-06 LAB
ANION GAP SERPL CALC-SCNC: 9 MMOL/L — SIGNIFICANT CHANGE UP (ref 5–17)
BUN SERPL-MCNC: 61 MG/DL — HIGH (ref 7–23)
CALCIUM SERPL-MCNC: 9.5 MG/DL — SIGNIFICANT CHANGE UP (ref 8.4–10.5)
CHLORIDE SERPL-SCNC: 97 MMOL/L — SIGNIFICANT CHANGE UP (ref 96–108)
CO2 SERPL-SCNC: 31 MMOL/L — SIGNIFICANT CHANGE UP (ref 22–31)
CREAT SERPL-MCNC: 1.18 MG/DL — SIGNIFICANT CHANGE UP (ref 0.5–1.3)
GLUCOSE BLDC GLUCOMTR-MCNC: 110 MG/DL — HIGH (ref 70–99)
GLUCOSE BLDC GLUCOMTR-MCNC: 110 MG/DL — HIGH (ref 70–99)
GLUCOSE BLDC GLUCOMTR-MCNC: 123 MG/DL — HIGH (ref 70–99)
GLUCOSE BLDC GLUCOMTR-MCNC: 136 MG/DL — HIGH (ref 70–99)
GLUCOSE SERPL-MCNC: 108 MG/DL — HIGH (ref 70–99)
HCT VFR BLD CALC: 24.8 % — LOW (ref 34.5–45)
HGB BLD-MCNC: 7.7 G/DL — LOW (ref 11.5–15.5)
MAGNESIUM SERPL-MCNC: 2.6 MG/DL — SIGNIFICANT CHANGE UP (ref 1.6–2.6)
MCHC RBC-ENTMCNC: 30.6 PG — SIGNIFICANT CHANGE UP (ref 27–34)
MCHC RBC-ENTMCNC: 31 G/DL — LOW (ref 32–36)
MCV RBC AUTO: 98.4 FL — SIGNIFICANT CHANGE UP (ref 80–100)
PHOSPHATE SERPL-MCNC: 4.6 MG/DL — HIGH (ref 2.5–4.5)
PLATELET # BLD AUTO: 231 K/UL — SIGNIFICANT CHANGE UP (ref 150–400)
POTASSIUM SERPL-MCNC: 3.9 MMOL/L — SIGNIFICANT CHANGE UP (ref 3.5–5.3)
POTASSIUM SERPL-SCNC: 3.9 MMOL/L — SIGNIFICANT CHANGE UP (ref 3.5–5.3)
RBC # BLD: 2.52 M/UL — LOW (ref 3.8–5.2)
RBC # FLD: 18.3 % — HIGH (ref 10.3–16.9)
SODIUM SERPL-SCNC: 137 MMOL/L — SIGNIFICANT CHANGE UP (ref 135–145)
WBC # BLD: 5 K/UL — SIGNIFICANT CHANGE UP (ref 3.8–10.5)
WBC # FLD AUTO: 5 K/UL — SIGNIFICANT CHANGE UP (ref 3.8–10.5)

## 2018-05-06 PROCEDURE — 71045 X-RAY EXAM CHEST 1 VIEW: CPT | Mod: 26

## 2018-05-06 PROCEDURE — 99233 SBSQ HOSP IP/OBS HIGH 50: CPT | Mod: GC

## 2018-05-06 RX ORDER — I.V. FAT EMULSION 20 G/100ML
0.54 EMULSION INTRAVENOUS
Qty: 50 | Refills: 0 | Status: DISCONTINUED | OUTPATIENT
Start: 2018-05-06 | End: 2018-05-06

## 2018-05-06 RX ORDER — ELECTROLYTE SOLUTION,INJ
1 VIAL (ML) INTRAVENOUS
Qty: 0 | Refills: 0 | Status: DISCONTINUED | OUTPATIENT
Start: 2018-05-06 | End: 2018-05-06

## 2018-05-06 RX ORDER — FENTANYL CITRATE 50 UG/ML
50 INJECTION INTRAVENOUS EVERY 4 HOURS
Qty: 0 | Refills: 0 | Status: DISCONTINUED | OUTPATIENT
Start: 2018-05-06 | End: 2018-05-08

## 2018-05-06 RX ORDER — FENTANYL CITRATE 50 UG/ML
25 INJECTION INTRAVENOUS EVERY 4 HOURS
Qty: 0 | Refills: 0 | Status: DISCONTINUED | OUTPATIENT
Start: 2018-05-06 | End: 2018-05-08

## 2018-05-06 RX ORDER — HALOPERIDOL DECANOATE 100 MG/ML
1 INJECTION INTRAMUSCULAR EVERY 6 HOURS
Qty: 0 | Refills: 0 | Status: DISCONTINUED | OUTPATIENT
Start: 2018-05-06 | End: 2018-05-10

## 2018-05-06 RX ADMIN — Medication 80 MILLIGRAM(S): at 18:29

## 2018-05-06 RX ADMIN — I.V. FAT EMULSION 20.8 GM/KG/DAY: 20 EMULSION INTRAVENOUS at 21:59

## 2018-05-06 RX ADMIN — MEROPENEM 1000 MILLIGRAM(S): 1 INJECTION INTRAVENOUS at 06:02

## 2018-05-06 RX ADMIN — MICAFUNGIN SODIUM 210 MILLIGRAM(S): 100 INJECTION, POWDER, LYOPHILIZED, FOR SOLUTION INTRAVENOUS at 11:51

## 2018-05-06 RX ADMIN — HALOPERIDOL DECANOATE 1 MILLIGRAM(S): 100 INJECTION INTRAMUSCULAR at 09:00

## 2018-05-06 RX ADMIN — Medication 80 MILLIGRAM(S): at 06:02

## 2018-05-06 RX ADMIN — CHLORHEXIDINE GLUCONATE 15 MILLILITER(S): 213 SOLUTION TOPICAL at 06:02

## 2018-05-06 RX ADMIN — CHLORHEXIDINE GLUCONATE 15 MILLILITER(S): 213 SOLUTION TOPICAL at 18:28

## 2018-05-06 RX ADMIN — PANTOPRAZOLE SODIUM 40 MILLIGRAM(S): 20 TABLET, DELAYED RELEASE ORAL at 11:51

## 2018-05-06 RX ADMIN — DEXMEDETOMIDINE HYDROCHLORIDE IN 0.9% SODIUM CHLORIDE 4.68 MICROGRAM(S)/KG/HR: 4 INJECTION INTRAVENOUS at 06:03

## 2018-05-06 RX ADMIN — Medication 75 MICROGRAM(S): at 06:02

## 2018-05-06 RX ADMIN — DEXMEDETOMIDINE HYDROCHLORIDE IN 0.9% SODIUM CHLORIDE 4.68 MICROGRAM(S)/KG/HR: 4 INJECTION INTRAVENOUS at 01:34

## 2018-05-06 RX ADMIN — MEROPENEM 1000 MILLIGRAM(S): 1 INJECTION INTRAVENOUS at 18:29

## 2018-05-06 NOTE — PROGRESS NOTE ADULT - SUBJECTIVE AND OBJECTIVE BOX
INTERVAL HPI/OVERNIGHT EVENTS:    Afebrile overnight  sedation being tapered  awaiting decision re surgery    ANTIBIOTICS/RELEVANT:    meropenem Injectable 1000 milliGRAM(s) IV Push every 12 hours  micafungin IVPB 100 milliGRAM(s) IV Intermittent daily    Allergies No Known Allergies    PHYSICAL EXAMINATION:    Vital Signs Last 24 Hrs  T(F): 97.8 (06 May 2018 05:12), Max: 100.2 (05 May 2018 16:00)  HR: 72 (06 May 2018 09:25) (66 - 96)  BP: 112/50 (06 May 2018 09:00) (92/49 - 154/84)  RR: 14 (06 May 2018 09:00) (12 - 18)    Intubated  responds to examination - nod's head "yes" to question "are you feeling okay?"  Chest clear  Cor reg  Abdomen soft Exte neg  IV sites clean      LABS:                        7.7    5.0   )-----------( 231      ( 06 May 2018 05:10 )             24.8     05-06    137  |  97  |  61<H>  ----------------------------<  108<H>  3.9   |  31  |  1.18    Ca    9.5      06 May 2018 05:10  Phos  4.6     05-06  Mg     2.6     05-06        Urinalysis Basic - ( 04 May 2018 18:26 )    Color: Yellow / Appearance: Clear / S.010 / pH: x  Gluc: x / Ketone: NEGATIVE  / Bili: Negative / Urobili: 0.2 E.U./dL   Blood: x / Protein: 30 mg/dL / Nitrite: NEGATIVE   Leuk Esterase: NEGATIVE / RBC: Many /HPF / WBC 5-10 /HPF   Sq Epi: x / Non Sq Epi: 0-5 /HPF / Bacteria: Present /HPF

## 2018-05-06 NOTE — PROGRESS NOTE ADULT - SUBJECTIVE AND OBJECTIVE BOX
Interval Events:  Patient seen and examined at bedside.      Allergies    No Known Allergies    Intolerances        Vital Signs Last 24 Hrs  T(C): 36.6 (06 May 2018 05:12), Max: 37.9 (05 May 2018 16:00)  T(F): 97.8 (06 May 2018 05:12), Max: 100.2 (05 May 2018 16:00)  HR: 68 (06 May 2018 08:00) (66 - 96)  BP: 92/49 (06 May 2018 08:00) (92/49 - 154/84)  BP(mean): 72 (06 May 2018 08:00) (64 - 121)  RR: 12 (06 May 2018 08:00) (12 - 18)  SpO2: 98% (06 May 2018 08:00) (96% - 100%)     07:  -   @ 07:00  --------------------------------------------------------  IN: 1933.7 mL / OUT: 4135 mL / NET: -2201.3 mL    06 @ 07:01  -   @ 08:34  --------------------------------------------------------  IN: 89.8 mL / OUT: 280 mL / NET: -190.2 mL       07:  -   @ 07:00  --------------------------------------------------------  IN: 1933.7 mL / OUT: 4135 mL / NET: -2201.3 mL    0506 @ 07:01  -  06 @ 08:34  --------------------------------------------------------  IN: 89.8 mL / OUT: 280 mL / NET: -190.2 mL          LABS:      CBC Full  -  ( 06 May 2018 05:10 )  WBC Count : 5.0 K/uL  Hemoglobin : 7.7 g/dL  Hematocrit : 24.8 %  Platelet Count - Automated : 231 K/uL  Mean Cell Volume : 98.4 fL  Mean Cell Hemoglobin : 30.6 pg  Mean Cell Hemoglobin Concentration : 31.0 g/dL  Auto Neutrophil # : x  Auto Lymphocyte # : x  Auto Monocyte # : x  Auto Eosinophil # : x  Auto Basophil # : x  Auto Neutrophil % : x  Auto Lymphocyte % : x  Auto Monocyte % : x  Auto Eosinophil % : x  Auto Basophil % : x    05-06    137  |  97  |  61<H>  ----------------------------<  108<H>  3.9   |  31  |  1.18    Ca    9.5      06 May 2018 05:10  Phos  4.6     -  Mg     2.6     -06            Urinalysis Basic - ( 04 May 2018 18:26 )    Color: Yellow / Appearance: Clear / S.010 / pH: x  Gluc: x / Ketone: NEGATIVE  / Bili: Negative / Urobili: 0.2 E.U./dL   Blood: x / Protein: 30 mg/dL / Nitrite: NEGATIVE   Leuk Esterase: NEGATIVE / RBC: Many /HPF / WBC 5-10 /HPF   Sq Epi: x / Non Sq Epi: 0-5 /HPF / Bacteria: Present /HPF              RADIOLOGY & ADDITIONAL STUDIES (The following images were personally reviewed):      Physical Exam:     General: NAD, opening her eyes to verbal, responding yes appropriately moving ext at will    HEENT: NC/AT, EOMI, PERRLA  Pulmonary: Nonlabored breathing, on mechanical vent, no bibasilar crackles   Cardiovascular: NSR, no murmurs  Abdominal: soft, NT/ND, wound VAC in place, no leak, LLQ LEANN (bilious)  : suazo in place  Extremities: WWP with anasarca  Neuro: intubated and sedated  Vasc: palpable distal pulses  Skin: +rash   MSK: no joint swelling    A/p: 77 y/o F with sepsis from open abdominal wound infection, MSSA bacteremia present on admission, and UTI present on admission found to have contained small bowel leak    Neuro: precedex gtt, propofol, dilaudid,  Zofran prn, AMS: EEG: slowing LP neg. holding:  seroquel, aripiprazole, escitalapram   CV: HD stable s/p severe sepsis, albumin 25%q8, holding amlodipine,  Echo  65-70%. Lasix 80mg BID  Pulm: intubated, satting well on 40/490// chlorhex  GI: NPO, protonix, trickle TF via dobhoff, TPN Protonix  : Suazo S/p AKF on HD now resolved. UTI (present on admission) + Uterine fibroids on CT scan with vaginal bleeding, biopsy revealed tuball metaplasia. lupron 7.5 q month (given )  ID: Kai virus (good hand washing),  ESBL Ecoli in Urine resistant to Zosyn: Jose L( -) Eden: ( -) D/c'd:MSSA in blood: Oxacillin ( -) TTE neg for endocarditis.  Endo: ISS, Synthroid  Insulin  in TPN: 25. Lupron Qmonthly  PPx: sqh held for low plts  PE:Sp IVC filter on 3/30   Lines: PIV, LIJ TLC (-), /// s/p L Rad Art line( 4/15-),  Rt IJ TLC from OSH ( 4/15-)  Wounds: Wound vac to midline incision , change MWF  PT/OT: ordered currently strict bed rest. Interval Events:  pt became agitated overnight therefore fentanyl given adn precedex restarted. still awaiting family meeting    Patient seen and examined at bedside.      Allergies    No Known Allergies    Intolerances        Vital Signs Last 24 Hrs  T(C): 36.6 (06 May 2018 05:12), Max: 37.9 (05 May 2018 16:00)  T(F): 97.8 (06 May 2018 05:12), Max: 100.2 (05 May 2018 16:00)  HR: 68 (06 May 2018 08:00) (66 - 96)  BP: 92/49 (06 May 2018 08:00) (92/49 - 154/84)  BP(mean): 72 (06 May 2018 08:00) (64 - 121)  RR: 12 (06 May 2018 08:00) (12 - 18)  SpO2: 98% (06 May 2018 08:00) (96% - 100%)     07:01  -   @ 07:00  --------------------------------------------------------  IN: 1933.7 mL / OUT: 4135 mL / NET: -2201.3 mL    0506 @ 07:01  -   @ 08:34  --------------------------------------------------------  IN: 89.8 mL / OUT: 280 mL / NET: -190.2 mL       @ 07:01  -   @ 07:00  --------------------------------------------------------  IN: 1933.7 mL / OUT: 4135 mL / NET: -2201.3 mL    05-06 @ 07:01  -  06 @ 08:34  --------------------------------------------------------  IN: 89.8 mL / OUT: 280 mL / NET: -190.2 mL          LABS:      CBC Full  -  ( 06 May 2018 05:10 )  WBC Count : 5.0 K/uL  Hemoglobin : 7.7 g/dL  Hematocrit : 24.8 %  Platelet Count - Automated : 231 K/uL  Mean Cell Volume : 98.4 fL  Mean Cell Hemoglobin : 30.6 pg  Mean Cell Hemoglobin Concentration : 31.0 g/dL  Auto Neutrophil # : x  Auto Lymphocyte # : x  Auto Monocyte # : x  Auto Eosinophil # : x  Auto Basophil # : x  Auto Neutrophil % : x  Auto Lymphocyte % : x  Auto Monocyte % : x  Auto Eosinophil % : x  Auto Basophil % : x    05-06    137  |  97  |  61<H>  ----------------------------<  108<H>  3.9   |  31  |  1.18    Ca    9.5      06 May 2018 05:10  Phos  4.6     05-06  Mg     2.6     05-06            Urinalysis Basic - ( 04 May 2018 18:26 )    Color: Yellow / Appearance: Clear / S.010 / pH: x  Gluc: x / Ketone: NEGATIVE  / Bili: Negative / Urobili: 0.2 E.U./dL   Blood: x / Protein: 30 mg/dL / Nitrite: NEGATIVE   Leuk Esterase: NEGATIVE / RBC: Many /HPF / WBC 5-10 /HPF   Sq Epi: x / Non Sq Epi: 0-5 /HPF / Bacteria: Present /HPF              RADIOLOGY & ADDITIONAL STUDIES (The following images were personally reviewed):      Physical Exam:     General: NAD, opening her eyes to verbal, responding yes sometimes appropriately sometimes not moving ext at will    HEENT: NC/AT, EOMI, PERRLA  Pulmonary: Nonlabored breathing, on mechanical vent, CTA b/l no w/r/r   Cardiovascular: NSR, no murmurs  Abdominal: soft, NT/ND, wound VAC in place, no leak, LLQ LEANN (bilious/ grey color)  : suazo in place  Extremities: WWP with anasarca  Neuro: intubated and sedated  Vasc: palpable distal pulses  Skin: +rash   MSK: no joint swelling    A/p: 77 y/o F with sepsis from open abdominal wound infection, MSSA bacteremia present on admission, and UTI present on admission found to have contained small bowel leak    Neuro: precedex gtt, propofol, dilaudid,  Zofran prn, AMS: EEG: slowing LP neg started haldol prn. holding:  seroquel, aripiprazole, escitalapram   CV: HD stable s/p severe sepsis, albumin 25%q8, holding amlodipine,  Echo  65-70%. Lasix 80mg BID  Pulm: intubated, satting well on 40/490/12/5 chlorhex  GI: NPO, protonix, trickle TF via dobhoff, TPN Protonix  : Suazo S/p AKF on HD now resolved. UTI (present on admission) + Uterine fibroids on CT scan with vaginal bleeding, biopsy revealed tubal metaplasia. lupron 7.5 q month (given )  ID: Kai virus (good hand washing),  ESBL Ecoli in Urine resistant to Zosyn: Jose L( -) Eden: ( -) D/c'd:MSSA in blood: Oxacillin ( -) TTE neg for endocarditis.  Endo: ISS, Synthroid  Insulin  in TPN: 25. Lupron Qmonthly  PPx: sqh held for low plts  PE:Sp IVC filter on 3/30   Lines: PIV, LIJ TLC (-), /// s/p L Rad Art line( 4/15-),  Rt IJ TLC from OSH ( 4/15-)  Wounds: Wound vac to midline incision , change MWF  PT/OT: ordered currently strict bed rest.

## 2018-05-06 NOTE — PROGRESS NOTE ADULT - ATTENDING COMMENTS
Patient seen and examined with house-staff during bedside rounds.  Resident note read, including vitals, physical findings, laboratory data, and radiological reports.   Revisions included below.  Direct personal management at bed side and extensive interpretation of the data.  Plan was outlined and discussed in details with the housestaff.  Decision making of high complexity  Action taken for acute disease activity to reflect the level of care provided:  - medication reconciliation  - review laboratory data  - Management of respiratory failure and mechanical ventilation  - PSV trial  - Renal function improved with diuresis and decrease in Cr  - MS improved and decrease fentanyl and start Haldol  for agitation  - continue tube feed

## 2018-05-07 LAB
ANION GAP SERPL CALC-SCNC: 11 MMOL/L — SIGNIFICANT CHANGE UP (ref 5–17)
BUN SERPL-MCNC: 60 MG/DL — HIGH (ref 7–23)
CALCIUM SERPL-MCNC: 10.2 MG/DL — SIGNIFICANT CHANGE UP (ref 8.4–10.5)
CHLORIDE SERPL-SCNC: 95 MMOL/L — LOW (ref 96–108)
CO2 SERPL-SCNC: 30 MMOL/L — SIGNIFICANT CHANGE UP (ref 22–31)
CREAT SERPL-MCNC: 1.05 MG/DL — SIGNIFICANT CHANGE UP (ref 0.5–1.3)
GLUCOSE BLDC GLUCOMTR-MCNC: 154 MG/DL — HIGH (ref 70–99)
GLUCOSE BLDC GLUCOMTR-MCNC: 165 MG/DL — HIGH (ref 70–99)
GLUCOSE BLDC GLUCOMTR-MCNC: 190 MG/DL — HIGH (ref 70–99)
GLUCOSE BLDC GLUCOMTR-MCNC: 203 MG/DL — HIGH (ref 70–99)
GLUCOSE SERPL-MCNC: 131 MG/DL — HIGH (ref 70–99)
HCT VFR BLD CALC: 27.4 % — LOW (ref 34.5–45)
HGB BLD-MCNC: 8.7 G/DL — LOW (ref 11.5–15.5)
MAGNESIUM SERPL-MCNC: 2.6 MG/DL — SIGNIFICANT CHANGE UP (ref 1.6–2.6)
MCHC RBC-ENTMCNC: 31.1 PG — SIGNIFICANT CHANGE UP (ref 27–34)
MCHC RBC-ENTMCNC: 31.8 G/DL — LOW (ref 32–36)
MCV RBC AUTO: 97.9 FL — SIGNIFICANT CHANGE UP (ref 80–100)
PHOSPHATE SERPL-MCNC: 3.3 MG/DL — SIGNIFICANT CHANGE UP (ref 2.5–4.5)
PLATELET # BLD AUTO: 296 K/UL — SIGNIFICANT CHANGE UP (ref 150–400)
POTASSIUM SERPL-MCNC: 4 MMOL/L — SIGNIFICANT CHANGE UP (ref 3.5–5.3)
POTASSIUM SERPL-SCNC: 4 MMOL/L — SIGNIFICANT CHANGE UP (ref 3.5–5.3)
RBC # BLD: 2.8 M/UL — LOW (ref 3.8–5.2)
RBC # FLD: 18.1 % — HIGH (ref 10.3–16.9)
SODIUM SERPL-SCNC: 136 MMOL/L — SIGNIFICANT CHANGE UP (ref 135–145)
WBC # BLD: 6.4 K/UL — SIGNIFICANT CHANGE UP (ref 3.8–10.5)
WBC # FLD AUTO: 6.4 K/UL — SIGNIFICANT CHANGE UP (ref 3.8–10.5)

## 2018-05-07 PROCEDURE — 99232 SBSQ HOSP IP/OBS MODERATE 35: CPT

## 2018-05-07 PROCEDURE — 71045 X-RAY EXAM CHEST 1 VIEW: CPT | Mod: 26

## 2018-05-07 PROCEDURE — 99233 SBSQ HOSP IP/OBS HIGH 50: CPT | Mod: GC

## 2018-05-07 RX ORDER — ACETAZOLAMIDE 250 MG/1
500 TABLET ORAL ONCE
Qty: 0 | Refills: 0 | Status: COMPLETED | OUTPATIENT
Start: 2018-05-07 | End: 2018-05-08

## 2018-05-07 RX ORDER — FUROSEMIDE 40 MG
80 TABLET ORAL ONCE
Qty: 0 | Refills: 0 | Status: COMPLETED | OUTPATIENT
Start: 2018-05-07 | End: 2018-05-07

## 2018-05-07 RX ORDER — I.V. FAT EMULSION 20 G/100ML
0.54 EMULSION INTRAVENOUS
Qty: 50 | Refills: 0 | Status: DISCONTINUED | OUTPATIENT
Start: 2018-05-07 | End: 2018-05-07

## 2018-05-07 RX ORDER — ELECTROLYTE SOLUTION,INJ
1 VIAL (ML) INTRAVENOUS
Qty: 0 | Refills: 0 | Status: DISCONTINUED | OUTPATIENT
Start: 2018-05-07 | End: 2018-05-07

## 2018-05-07 RX ADMIN — MICAFUNGIN SODIUM 210 MILLIGRAM(S): 100 INJECTION, POWDER, LYOPHILIZED, FOR SOLUTION INTRAVENOUS at 11:38

## 2018-05-07 RX ADMIN — MEROPENEM 1000 MILLIGRAM(S): 1 INJECTION INTRAVENOUS at 05:00

## 2018-05-07 RX ADMIN — I.V. FAT EMULSION 20.8 GM/KG/DAY: 20 EMULSION INTRAVENOUS at 21:49

## 2018-05-07 RX ADMIN — Medication 2: at 05:09

## 2018-05-07 RX ADMIN — FENTANYL CITRATE 25 MICROGRAM(S): 50 INJECTION INTRAVENOUS at 20:19

## 2018-05-07 RX ADMIN — Medication 80 MILLIGRAM(S): at 17:30

## 2018-05-07 RX ADMIN — PANTOPRAZOLE SODIUM 40 MILLIGRAM(S): 20 TABLET, DELAYED RELEASE ORAL at 11:38

## 2018-05-07 RX ADMIN — Medication 2: at 17:31

## 2018-05-07 RX ADMIN — Medication 75 MICROGRAM(S): at 06:23

## 2018-05-07 RX ADMIN — FENTANYL CITRATE 25 MICROGRAM(S): 50 INJECTION INTRAVENOUS at 20:45

## 2018-05-07 RX ADMIN — CHLORHEXIDINE GLUCONATE 15 MILLILITER(S): 213 SOLUTION TOPICAL at 05:01

## 2018-05-07 RX ADMIN — Medication 4: at 12:07

## 2018-05-07 RX ADMIN — CHLORHEXIDINE GLUCONATE 15 MILLILITER(S): 213 SOLUTION TOPICAL at 17:29

## 2018-05-07 RX ADMIN — MEROPENEM 1000 MILLIGRAM(S): 1 INJECTION INTRAVENOUS at 17:30

## 2018-05-07 RX ADMIN — DEXMEDETOMIDINE HYDROCHLORIDE IN 0.9% SODIUM CHLORIDE 4.68 MICROGRAM(S)/KG/HR: 4 INJECTION INTRAVENOUS at 06:23

## 2018-05-07 RX ADMIN — DEXMEDETOMIDINE HYDROCHLORIDE IN 0.9% SODIUM CHLORIDE 4.68 MICROGRAM(S)/KG/HR: 4 INJECTION INTRAVENOUS at 20:43

## 2018-05-07 RX ADMIN — Medication 80 MILLIGRAM(S): at 03:39

## 2018-05-07 NOTE — PROGRESS NOTE ADULT - ASSESSMENT
78 year old female, morbidly obesity, DM, ventral hernia repair with mesh 3/2 c/b wound dehiscence, recent ATN requiring temporary HD (Permacath removed 4/10), who presents as a transfer from OSH for management of multifactorial sepsis and open abdominal wound. Patient found to have ESBL-producing E.Coli UTI and MSSA bacteremia, origin likely coming from abdomen/pelvis vs. prior HD catheter. Patient intubated 4/25 for acute hypoxic respiratory failure.  CT 4/18 showed SB perforation with pelvic collection, still present on CT on 4/25.  She is s/p IR drainage of collection, with culture growing candida lusitanae and Glabrata.  Would like to begin removing antibiotics, however need repeat abd/pel imaging to re eval abdominal collection.  Suggest:  - completed 14 d course of oxacillin from first negative culture (4/17;  last day 4/30)  - Continue meropenem 1 g IV q12h, awaiting decision regarding surgery   - Consider switching micafungin over fluconazol, will verify dose with pharmacy   - ID will follow 78 year old female, morbidly obesity, DM, ventral hernia repair with mesh 3/2 c/b wound dehiscence, recent ATN requiring temporary HD (Permacath removed 4/10), who presents as a transfer from OSH for management of multifactorial sepsis and open abdominal wound. Patient found to have ESBL-producing E.Coli UTI and MSSA bacteremia, origin likely coming from abdomen/pelvis vs. prior HD catheter. Patient intubated 4/25 for acute hypoxic respiratory failure.  CT 4/18 showed SB perforation with pelvic collection, still present on CT on 4/25.  She is s/p IR drainage of collection, with culture growing candida lusitanae and Glabrata.  Would like to begin removing antibiotics, however need repeat abd/pel imaging to re eval abdominal collection.  Suggest:  - completed 14 d course of oxacillin from first negative culture (4/17;  last day 4/30)  - Continue meropenem 1 g IV q12h, awaiting decision regarding surgery   - Cont Micafungin for now   - ID will follow

## 2018-05-07 NOTE — PROGRESS NOTE ADULT - ATTENDING COMMENTS
I modified the note above with the exception of the physical exam section. The exam below is my own:    Eyes open spontaneously. Intubated, off sedation. She nods head yes/no appropriately to simple questions, and follows most simple commands (sticks out tongue, closes eyes, squeezes both hands). PERRL 4>2, EOMI, face symmetric, full strength throughout.

## 2018-05-07 NOTE — PROGRESS NOTE ADULT - SUBJECTIVE AND OBJECTIVE BOX
24 hr events:  o/n: on CPAP all night, but P supp from 8->10 for better Vt. Precedex increased due to agitation. NGT slipped out 30 cm, replaced, confirmed via CXR, restarted TFs. Given Lasix 80 IV early at 3 AM. - 1.3 L / 24 hrs.  5/6: started haldol, cpap today. dr daley did not have discussion with family as of yet. currently negative 600    SUBJECTIVE:  Patient is more alert, responding to verbal command, feeling better.    MEDICATIONS  (STANDING):  chlorhexidine 0.12% Liquid 15 milliLiter(s) Swish and Spit two times a day  dexmedetomidine Infusion 0.2 MICROgram(s)/kG/Hr (4.68 mL/Hr) IV Continuous <Continuous>  dextrose 5%. 1000 milliLiter(s) (50 mL/Hr) IV Continuous <Continuous>  dextrose 50% Injectable 12.5 Gram(s) IV Push once  dextrose 50% Injectable 25 Gram(s) IV Push once  dextrose 50% Injectable 25 Gram(s) IV Push once  fat emulsion (Plant Based) 20% Infusion 0.535 Gm/kG/Day (20.8 mL/Hr) IV Continuous <Continuous>  furosemide   Injectable 80 milliGRAM(s) IV Push two times a day  insulin lispro (HumaLOG) corrective regimen sliding scale   SubCutaneous every 6 hours  leuprolide depot Injectable (LUPRON-DEPOT) 7.5 milliGRAM(s) IntraMuscular every 4 weeks  levothyroxine Injectable 75 MICROGram(s) IV Push <User Schedule>  meropenem Injectable 1000 milliGRAM(s) IV Push every 12 hours  metolazone 10 milliGRAM(s) Oral daily  micafungin IVPB 100 milliGRAM(s) IV Intermittent daily  pantoprazole   Suspension 40 milliGRAM(s) Oral daily  Parenteral Nutrition - Adult 1 Each (53 mL/Hr) TPN Continuous <Continuous>    MEDICATIONS  (PRN):  ALBUTerol/ipratropium for Nebulization 3 milliLiter(s) Nebulizer every 6 hours PRN Shortness of Breath and/or Wheezing  dextrose Gel 1 Dose(s) Oral once PRN Blood Glucose LESS THAN 70 milliGRAM(s)/deciliter  fentaNYL    Injectable 25 MICROGram(s) IV Push every 4 hours PRN Moderate Pain (4 - 6)  fentaNYL    Injectable 50 MICROGram(s) IV Push every 4 hours PRN Severe Pain (7 - 10)  glucagon  Injectable 1 milliGRAM(s) IntraMuscular once PRN Glucose LESS THAN 70 milligrams/deciliter  haloperidol    Injectable 1 milliGRAM(s) IntraMuscular every 6 hours PRN Agitation  ondansetron Injectable 4 milliGRAM(s) IV Push every 6 hours PRN Nausea      Nicole:	  Daily Nicole Order Placed	   Indication:  Strict I and O's    Central Line: Medication / TPN Administration       ICU Vital Signs Last 24 Hrs  T(C): 37.3 (07 May 2018 05:28), Max: 37.8 (07 May 2018 01:23)  T(F): 99.2 (07 May 2018 05:28), Max: 100.1 (07 May 2018 01:23)  HR: 84 (07 May 2018 06:00) (68 - 89)  BP: 134/60 (07 May 2018 06:00) (92/49 - 148/65)  BP(mean): 77 (07 May 2018 06:00) (67 - 101)  ABP: --  ABP(mean): --  RR: 20 (07 May 2018 06:00) (10 - 30)  SpO2: 98% (07 May 2018 06:00) (95% - 100%)      Physical Exam:  General: NAD, responding to verbal orders, not in acute distress  HEENT: NC/AT, EOMI, PERRLA, normal hearing, no oral lesions, neck supple w/o LAD, intubated, +NGT in place  Pulmonary: Nonlabored breathing, no respiratory distress, intubated on CPAP, bibasilar crackles but improving  Cardiovascular: NSR, no murmurs  Abdominal: soft, mild tender over the wound VAC, +rashes, healing, non distended  Extremities: WWP, 3/5 strength x 4, no clubbing/cyanosis/edema  Neuro: A/O x3, CNs II-XII grossly intact, normal motor/sensation, no focal deficits  Pulses: palpable distal pulses    Lines/tubes/drains: LEANN drain over RLQ, bilious output    Vent settings:  Mode: CPAP with PS, FiO2: 40, PEEP: 5, PS: 10, MAP: 8.7, PIP: 16    I&O's Summary    06 May 2018 07:01  -  07 May 2018 07:00  --------------------------------------------------------  IN: 1884 mL / OUT: 3335 mL / NET: -1451 mL        LABS:                        8.7    6.4   )-----------( 296      ( 07 May 2018 04:46 )             27.4     05-07    136  |  95<L>  |  60<H>  ----------------------------<  131<H>  4.0   |  30  |  1.05    Ca    10.2      07 May 2018 04:46  Phos  3.3     05-07  Mg     2.6     05-07          CAPILLARY BLOOD GLUCOSE      POCT Blood Glucose.: 154 mg/dL (07 May 2018 05:07)  POCT Blood Glucose.: 136 mg/dL (06 May 2018 23:11)  POCT Blood Glucose.: 110 mg/dL (06 May 2018 18:00)  POCT Blood Glucose.: 110 mg/dL (06 May 2018 11:31)        Cultures:    RADIOLOGY & ADDITIONAL STUDIES: 24 hr events:  o/n: on CPAP all night, but P supp from 8->10 for better Vt. Precedex increased due to agitation. NGT slipped out 30 cm, replaced, confirmed via CXR, restarted TFs. Given Lasix 80 IV early at 3 AM. - 1.3 L / 24 hrs.  5/6: started haldol, cpap today. dr daley did not have discussion with family as of yet. currently negative 600    SUBJECTIVE:  Patient is more alert, responding to verbal command, feeling better. ROS not obtainable.    MEDICATIONS  (STANDING):  chlorhexidine 0.12% Liquid 15 milliLiter(s) Swish and Spit two times a day  dexmedetomidine Infusion 0.2 MICROgram(s)/kG/Hr (4.68 mL/Hr) IV Continuous <Continuous>  dextrose 5%. 1000 milliLiter(s) (50 mL/Hr) IV Continuous <Continuous>  dextrose 50% Injectable 12.5 Gram(s) IV Push once  dextrose 50% Injectable 25 Gram(s) IV Push once  dextrose 50% Injectable 25 Gram(s) IV Push once  fat emulsion (Plant Based) 20% Infusion 0.535 Gm/kG/Day (20.8 mL/Hr) IV Continuous <Continuous>  furosemide   Injectable 80 milliGRAM(s) IV Push two times a day  insulin lispro (HumaLOG) corrective regimen sliding scale   SubCutaneous every 6 hours  leuprolide depot Injectable (LUPRON-DEPOT) 7.5 milliGRAM(s) IntraMuscular every 4 weeks  levothyroxine Injectable 75 MICROGram(s) IV Push <User Schedule>  meropenem Injectable 1000 milliGRAM(s) IV Push every 12 hours  metolazone 10 milliGRAM(s) Oral daily  micafungin IVPB 100 milliGRAM(s) IV Intermittent daily  pantoprazole   Suspension 40 milliGRAM(s) Oral daily  Parenteral Nutrition - Adult 1 Each (53 mL/Hr) TPN Continuous <Continuous>    MEDICATIONS  (PRN):  ALBUTerol/ipratropium for Nebulization 3 milliLiter(s) Nebulizer every 6 hours PRN Shortness of Breath and/or Wheezing  dextrose Gel 1 Dose(s) Oral once PRN Blood Glucose LESS THAN 70 milliGRAM(s)/deciliter  fentaNYL    Injectable 25 MICROGram(s) IV Push every 4 hours PRN Moderate Pain (4 - 6)  fentaNYL    Injectable 50 MICROGram(s) IV Push every 4 hours PRN Severe Pain (7 - 10)  glucagon  Injectable 1 milliGRAM(s) IntraMuscular once PRN Glucose LESS THAN 70 milligrams/deciliter  haloperidol    Injectable 1 milliGRAM(s) IntraMuscular every 6 hours PRN Agitation  ondansetron Injectable 4 milliGRAM(s) IV Push every 6 hours PRN Nausea      Nicole:	  Daily Nicole Order Placed	   Indication:  Strict I and O's    Central Line: Medication / TPN Administration       ICU Vital Signs Last 24 Hrs  T(C): 37.3 (07 May 2018 05:28), Max: 37.8 (07 May 2018 01:23)  T(F): 99.2 (07 May 2018 05:28), Max: 100.1 (07 May 2018 01:23)  HR: 84 (07 May 2018 06:00) (68 - 89)  BP: 134/60 (07 May 2018 06:00) (92/49 - 148/65)  BP(mean): 77 (07 May 2018 06:00) (67 - 101)  ABP: --  ABP(mean): --  RR: 20 (07 May 2018 06:00) (10 - 30)  SpO2: 98% (07 May 2018 06:00) (95% - 100%)      Physical Exam:  General: NAD, responding to verbal orders, not in acute distress  HEENT: NC/AT, EOMI, PERRLA, normal hearing, no oral lesions, neck supple w/o LAD, intubated, +NGT in place  Pulmonary: Nonlabored breathing, no respiratory distress, intubated on CPAP, bibasilar crackles but improving  Cardiovascular: NSR, no murmurs  Abdominal: soft, mild tender over the wound VAC, +rashes, healing, non distended  Extremities: WWP, 3/5 strength x 4, no clubbing/cyanosis/edema  Neuro: A/O x3, CNs II-XII grossly intact, normal motor/sensation, no focal deficits  Vasc: palpable distal pulses  Skin: no rash  MSK: no joint swelling    Lines/tubes/drains: LEANN drain over RLQ, bilious output    Vent settings:  Mode: CPAP with PS, FiO2: 40, PEEP: 5, PS: 10, MAP: 8.7, PIP: 16    I&O's Summary    06 May 2018 07:01  -  07 May 2018 07:00  --------------------------------------------------------  IN: 1884 mL / OUT: 3335 mL / NET: -1451 mL        LABS:                        8.7    6.4   )-----------( 296      ( 07 May 2018 04:46 )             27.4     05-07    136  |  95<L>  |  60<H>  ----------------------------<  131<H>  4.0   |  30  |  1.05    Ca    10.2      07 May 2018 04:46  Phos  3.3     05-07  Mg     2.6     05-07          CAPILLARY BLOOD GLUCOSE      POCT Blood Glucose.: 154 mg/dL (07 May 2018 05:07)  POCT Blood Glucose.: 136 mg/dL (06 May 2018 23:11)  POCT Blood Glucose.: 110 mg/dL (06 May 2018 18:00)  POCT Blood Glucose.: 110 mg/dL (06 May 2018 11:31)        Cultures:    RADIOLOGY & ADDITIONAL STUDIES: improving effusions

## 2018-05-07 NOTE — PROGRESS NOTE ADULT - ASSESSMENT
79 y/o F with sepsis from open abdominal wound infection, MSSA bacteremia present on admission, and UTI present on admission found to have contained small bowel leak, s/p IR drainage, altered mental status, with acute respiratory failure, vaginal bleeding. Awaiting for family meeting with  regarding surgical intervention      Neuro: precedex gtt, off propofol,  dilaudid,  Zofran prn, AMS: EEG: slowing LP neg. holding:  seroquel, aripiprazole, escitalapram   CV: HD stable s/p severe sepsis, off albumin holding amlodipine, 4/19 Echo  65-70%. Lasix 80mg BID and metolazone 10 mg for diuresis aiming for negative fluid balance  Pulm: intubated, satting well on 40/490/12/5 chlorhex  GI: NPO, protonix, off TF today, TPN Protonix  : Nicole S/p AKF on HD now resolved. UTI (present on admission) + Uterine fibroids on CT scan with vaginal bleeding, gyn consulted - cervical biopsy with metaplasia  ID: ESBL Ecoli in Urine resistant to Zosyn: Jose L( 4/17-) MSSA in blood: Oxacillin ( 4/17-) Kai virus (good hand washing), TTE neg for endocarditis. Continuing micafungin for candida tropicalis and glabrata.  Endo: ISS, Synthroid  Insulin  in TPN: 50 units with improved control   PPx: sqh held for low plts  PE:Sp IVC filter on 3/30   Lines: PIV, LIJ TLC (4/29-), /// s/p L Rad Art line( 4/15-4/18),  Rt IJ TLC from OSH ( 4/15-)  Wounds: Wound vac to midline incision , change MWF 79 y/o F with sepsis from open abdominal wound infection, MSSA bacteremia present on admission, and UTI present on admission found to have contained small bowel leak, s/p IR drainage, altered mental status, resolved, with acute respiratory failure on CPAP trial,vaginal bleeding s/p endometrial biopsy showing metaplasia. Awaiting for family meeting with  regarding surgical intervention, currently doing well neurologically, able to tolerate CPAP trial and H/H stable.      Neuro: precedex gtt - weaning off, holding:  seroquel, aripiprazole, escitalapram   CV: HD stable s/p severe sepsis, off albumin holding amlodipine, 4/19 Echo  65-70%. Lasix 80mg BID and metolazone 10 mg for diuresis aiming for negative fluid balance (currently -1.4L)  Pulm: intubated on CPAP for the past 24 hours, chlorhex  GI: NPO, protonix, on tube feed at 10cc/hr , on TPN and Protonix  : Nicole (for strict I/O) S/p AKF on HD now resolved. UTI (present on admission) + Uterine fibroids on CT scan with vaginal bleeding, gyn consulted - cervical biopsy with metaplasia  ID: ESBL Ecoli in Urine resistant to Zosyn: Jose L( 4/17-) MSSA in blood: Oxacillin ( 4/17-5/2) Kai virus (good hand washing), TTE neg for endocarditis. Continuing micafungin for candida tropicalis and glabrata.  Endo: ISS, Synthroid  Insulin  in TPN: 50 units with improved control   PPx: sqh held for low plts  PE:Sp IVC filter on 3/30   Lines: PIV, LIJ TLC (4/29-), /// s/p L Rad Art line( 4/15-4/18),  Rt IJ TLC from OSH ( 4/15-)  Wounds: Wound vac to midline incision , change MWF 79 y/o F with sepsis from open abdominal wound infection, MSSA bacteremia present on admission, and UTI present on admission found to have contained small bowel leak, s/p IR drainage, altered mental status, resolved, with acute respiratory failure on CPAP trial,vaginal bleeding s/p endometrial biopsy showing metaplasia. Awaiting for family meeting with  regarding surgical intervention, currently doing well neurologically, able to tolerate CPAP trial and H/H stable.      Neuro: precedex gtt - weaning off, holding:  seroquel, aripiprazole, escitalapram   CV: HD stable s/p severe sepsis, off albumin holding amlodipine, 4/19 Echo  65-70%. Lasix 80mg BID and metolazone 10 mg for diuresis aiming for negative fluid balance (currently -1.4L)  Pulm: intubated on CPAP for the past 24 hours, chlorhex  GI: NPO, protonix, on tube feed at 10cc/hr , on TPN and Protonix  : Nicole (for strict I/O) S/p AKF on HD now resolved. UTI (present on admission) + Uterine fibroids on CT scan with vaginal bleeding, gyn consulted - cervical biopsy with metaplasia  ID: ESBL Ecoli in Urine resistant to Zosyn: Jose L( 4/17-) MSSA in blood: Oxacillin ( 4/17-5/2) Kai virus (good hand washing), TTE neg for endocarditis. Continuing micafungin for candida tropicalis and glabrata.  Endo: ISS, Synthroid  Insulin  in TPN: 50 units with improved control, on leuprolide for uterine fibroid  PPx: sqh held for low plts  PE:Sp IVC filter on 3/30   Lines: PIV, LIJ TLC (4/29-), /// s/p L Rad Art line( 4/15-4/18),  Rt IJ TLC from OSH ( 4/15-)  Wounds: Wound vac to midline incision , change MWF 79 y/o F with sepsis from open abdominal wound infection, MSSA bacteremia present on admission, and UTI present on admission found to have contained small bowel leak, s/p IR drainage, altered mental status, resolved, with acute respiratory failure on CPAP trial,vaginal bleeding s/p endometrial biopsy showing metaplasia. Awaiting for family meeting with  regarding surgical intervention, currently doing well neurologically, able to tolerate CPAP trial and H/H stable.      Neuro: precedex gtt - weaning off, holding:  seroquel, aripiprazole, escitalapram   CV: HD stable s/p severe sepsis, off albumin holding amlodipine, 4/19 Echo  65-70%. Lasix 80mg BID and metolazone 10 mg for diuresis aiming for negative fluid balance (currently -1.4L)  Pulm: intubated on CPAP for the past 24 hours, chlorhex  GI: NPO, protonix, on tube feed at 10cc/hr , on TPN and Protonix. With hypernatremia holding Na.  : Nicole (for strict I/O) S/p AKF on HD now resolved. UTI (present on admission) + Uterine fibroids on CT scan with vaginal bleeding, gyn consulted - cervical biopsy with metaplasia. The BUN/creatinine fairly stable.  ID: ESBL Ecoli in Urine resistant to Zosyn: Jose L( 4/17-) MSSA in blood: Oxacillin ( 4/17-5/2) Kai virus (good hand washing), TTE neg for endocarditis. Continuing micafungin for candida tropicalis and glabrata.  Endo: ISS, Synthroid  Insulin  in TPN: 50 units with improved control, on leuprolide for uterine fibroid  PPx: sqh held for low plts  PE:Sp IVC filter on 3/30   Lines: PIV, LIJ TLC (4/29-), /// s/p L Rad Art line( 4/15-4/18),  Rt IJ TLC from OSH ( 4/15-)  Wounds: Wound vac to midline incision , change MWF

## 2018-05-07 NOTE — PROGRESS NOTE ADULT - SUBJECTIVE AND OBJECTIVE BOX
O/N Events:  Subjective:  sedation being tapered, more awake and alert, follows simple commands ***  afebrile overnight, no leukocytosis, HDS, awaiting decision regarding surgery  C. Glabrata with fluc SDD 4    VITALS  Vital Signs Last 24 Hrs  T(C): 37.6 (07 May 2018 09:08), Max: 37.8 (07 May 2018 01:23)  T(F): 99.6 (07 May 2018 09:08), Max: 100.1 (07 May 2018 01:23)  HR: 80 (07 May 2018 10:00) (68 - 89)  BP: 138/67 (07 May 2018 10:00) (104/52 - 148/65)  BP(mean): 90 (07 May 2018 10:00) (69 - 101)  RR: 24 (07 May 2018 10:00) (11 - 30)  SpO2: 94% (07 May 2018 10:00) (94% - 100%)    I&O's Summary    06 May 2018 07:01  -  07 May 2018 07:00  --------------------------------------------------------  IN: 1884 mL / OUT: 3335 mL / NET: -1451 mL    PHYSICAL EXAM  Constitutional: mildly sedated/ intubated, more responsive and alert  Eyes:  Sclerae anicteric, conjunctivae clear, PERRL  Ear/Nose/Throat:  MMM/ No thrush   Pulmonary: Bibasilar crackles, air entry equal on both sides, intubated, Right chest Port, access for HD  Cardiovascular: NSR, no murmurs  Abdominal: soft, obese, unable to elicit tenderness, non distended. wound VAC in place, no leak.   : suazo in place  Extremities: bilateral arms - restarined***, +edema, radial and dp pulses present     LABS                        8.7    6.4   )-----------( 296      ( 07 May 2018 04:46 )             27.4     05-07    136  |  95<L>  |  60<H>  ----------------------------<  131<H>  4.0   |  30  |  1.05    Ca    10.2      07 May 2018 04:46  Phos  3.3     05-07  Mg     2.6     05-07    Microbiology:  U Clx 4/18: + E.coli MDR  left pelvic fluid aspirate: + for yeast/ candida luitaniae and candida Glabrata  lusitaniae fluc sensitive/ glabrata fluc SDD 4    Imaging:   CT abd/ pelv with oral contrast 5/3  IMPRESSION:  Interval decrease in size of the extraperitoneal gas-filled collection   surrounding the enlarged and bulky uterus with an indwelling percutaneous   drainage catheter. However, enteric contrast is again seen within this   collection on the current study, indicating a persistent enteric   fistulous collection.  Increased amount of upper abdominal ascites.  Moderate bilateral pleural effusions with near complete collapse of the   right lower lobe and partial collapse of the left lower lobe.

## 2018-05-07 NOTE — PROGRESS NOTE ADULT - ATTENDING COMMENTS
ID Attending    Pt is much improved.  We will continue the current antimicrobial regimen until we know the surgical plan of Dr. Ngo.

## 2018-05-07 NOTE — PROGRESS NOTE ADULT - ASSESSMENT
77 y/o woman, HTN, DM, and hypothyroidism who presented as a transfer from OSH for management of multifactorial sepsis. Neurology was consulted for evaluation of altered mental status. CT head 4/22/18 was notable for left parafalcine partially calcified meningioma, mild diffuse volume loss, otherwise unremarkable. Exam limited by sedation, however the patient is easily arousable and moving all extremities spontaneously with no obvious asymmetry. EEG 4/25->4/26 was likely normal, though it was a technically poor study. She is s/p LP 4/30 which was bland.    Recommendations to follow. 77 y/o woman, HTN, DM, and hypothyroidism who presented as a transfer from [facility] for management of multifactorial sepsis. Neurology was consulted for evaluation of altered mental status. CT head 4/22/18 was notable for left parafalcine partially calcified meningioma, mild diffuse volume loss, otherwise unremarkable. Exam limited by sedation, however the patient is easily arousable and moving all extremities spontaneously with no obvious asymmetry. EEG 4/25->4/26 was likely normal, though it was a technically poor study. She is s/p LP 4/30 which was bland.    Recommend:  1. Can hold off on MRI now that patient's mental status is improving. If patient's mental status worsens, can reconsider. 77 y/o woman, HTN, DM, and hypothyroidism who presented as a transfer from [facility] for management of multifactorial sepsis. Neurology was consulted for evaluation of altered mental status. CT head 4/22/18 was notable for left parafalcine partially calcified meningioma, mild diffuse volume loss, otherwise unremarkable. EEG 4/25->4/26 was likely normal, though it was a technically poor study. She is s/p LP 4/30 which was bland. Today the patient's neuro exam is the best it has been since we were first consulted on 4/25. She is following most simple commands and nodding head yes/no correctly to simple questions. She has a nonfocal motor exam. I suspect her AMS was multifactorial toxic/metabolic encephalopathy, which is now improving. If the patient continues to neurologically improve we can likely hold off on MRI brain. If she does not continue to improve then it is reasonable to check.

## 2018-05-07 NOTE — PROGRESS NOTE ADULT - SUBJECTIVE AND OBJECTIVE BOX
INTERVAL HPI/OVERNIGHT EVENTS:    OVERNIGHT: No overnight events.  SUBJECTIVE: Patient seen and examined at bedside. Off sedation. Patient is nodding and shaking head appropriately to questions, following commands, and smiling really big. Nods her head when asked if she understands English, but follows commands more consistently when asked in Georgian. Denies pain, CP, SOB at this time.    OBJECTIVE:    VITAL SIGNS:  ICU Vital Signs Last 24 Hrs  T(C): 37.6 (07 May 2018 09:08), Max: 37.8 (07 May 2018 01:23)  T(F): 99.6 (07 May 2018 09:08), Max: 100.1 (07 May 2018 01:23)  HR: 78 (07 May 2018 12:00) (70 - 89)  BP: 119/59 (07 May 2018 12:00) (108/54 - 148/65)  BP(mean): 78 (07 May 2018 12:00) (69 - 101)  ABP: --  ABP(mean): --  RR: 22 (07 May 2018 12:00) (11 - 30)  SpO2: 98% (07 May 2018 12:00) (94% - 100%)    Mode: CPAP with PS, FiO2: 40, PEEP: 5, PS: 5, MAP: 7.3, PIP: 12    05-06 @ 07:01  -  05-07 @ 07:00  --------------------------------------------------------  IN: 1884 mL / OUT: 3335 mL / NET: -1451 mL    05-07 @ 07:01  -  05-07 @ 12:18  --------------------------------------------------------  IN: 407.8 mL / OUT: 550 mL / NET: -142.2 mL    CAPILLARY BLOOD GLUCOSE  POCT Blood Glucose.: 203 mg/dL (07 May 2018 12:01)    PHYSICAL EXAM:  General: NAD, intubated, off sedation, arousable by voice, nodding/shaking head to questions appropriately, smiling  HEENT: NCAT, PERRL, clear conjunctiva, no scleral icterus  Neck: supple, no JVD  Respiratory: CTA b/l  Cardiovascular: RRR, normal S1S2, no M/R/G  Abdomen: soft, NT/ND, bowel sounds in all four quadrants, no palpable masses  Extremities: cool extremities, no clubbing, cyanosis, or edema  Neuro:   - Mental Status: intubated, off sedation, arousable by voice, nodding/shaking head to questions appropriately, moves all four extremities  - Cranial Nerves II-XII:    II:  Pupils are 4>3mm, equal, round, and reactive to light.  III, IV, VI:  follows commands in Georgian, although nods that she understands English  V:  unable to assess since patient is intubated and sedated.   VII:  unable to assess since patient is intubated and sedated.   VIII: unable to assess since patient is intubated and sedated.   IX, X:  unable to assess since patient is intubated.   XI:  unable to assess since patient is sedated. .  XII: unable to assess since patient is intubated and sedated.   - Motor:  moves all extremities spontaneously  - Reflexes:  2+ in biceps, brachioradialis, triceps, knees, and ankles.   - Sensory: unable to assess  - Coordination:  unable to assess   - Gait: deferred.    MEDICATIONS:  MEDICATIONS  (STANDING):  chlorhexidine 0.12% Liquid 15 milliLiter(s) Swish and Spit two times a day  dexmedetomidine Infusion 0.2 MICROgram(s)/kG/Hr (4.68 mL/Hr) IV Continuous <Continuous>  dextrose 5%. 1000 milliLiter(s) (50 mL/Hr) IV Continuous <Continuous>  dextrose 50% Injectable 12.5 Gram(s) IV Push once  dextrose 50% Injectable 25 Gram(s) IV Push once  dextrose 50% Injectable 25 Gram(s) IV Push once  fat emulsion (Plant Based) 20% Infusion 0.535 Gm/kG/Day (20.8 mL/Hr) IV Continuous <Continuous>  furosemide   Injectable 80 milliGRAM(s) IV Push two times a day  insulin lispro (HumaLOG) corrective regimen sliding scale   SubCutaneous every 6 hours  leuprolide depot Injectable (LUPRON-DEPOT) 7.5 milliGRAM(s) IntraMuscular every 4 weeks  levothyroxine Injectable 75 MICROGram(s) IV Push <User Schedule>  meropenem Injectable 1000 milliGRAM(s) IV Push every 12 hours  metolazone 10 milliGRAM(s) Oral daily  micafungin IVPB 100 milliGRAM(s) IV Intermittent daily  pantoprazole   Suspension 40 milliGRAM(s) Oral daily  Parenteral Nutrition - Adult 1 Each (53 mL/Hr) TPN Continuous <Continuous>  Parenteral Nutrition - Adult 1 Each (53 mL/Hr) TPN Continuous <Continuous>    MEDICATIONS  (PRN):  ALBUTerol/ipratropium for Nebulization 3 milliLiter(s) Nebulizer every 6 hours PRN Shortness of Breath and/or Wheezing  dextrose Gel 1 Dose(s) Oral once PRN Blood Glucose LESS THAN 70 milliGRAM(s)/deciliter  fentaNYL    Injectable 25 MICROGram(s) IV Push every 4 hours PRN Moderate Pain (4 - 6)  fentaNYL    Injectable 50 MICROGram(s) IV Push every 4 hours PRN Severe Pain (7 - 10)  glucagon  Injectable 1 milliGRAM(s) IntraMuscular once PRN Glucose LESS THAN 70 milligrams/deciliter  haloperidol    Injectable 1 milliGRAM(s) IntraMuscular every 6 hours PRN Agitation  ondansetron Injectable 4 milliGRAM(s) IV Push every 6 hours PRN Nausea      ALLERGIES:  Allergies    No Known Allergies    Intolerances        LABS:                        8.7    6.4   )-----------( 296      ( 07 May 2018 04:46 )             27.4     05-07    136  |  95<L>  |  60<H>  ----------------------------<  131<H>  4.0   |  30  |  1.05    Ca    10.2      07 May 2018 04:46  Phos  3.3     05-07  Mg     2.6     05-07            RADIOLOGY & ADDITIONAL TESTS: Studies reviewed. INTERVAL HPI/OVERNIGHT EVENTS:    OVERNIGHT: No overnight events.  SUBJECTIVE: Patient seen and examined at bedside. Off sedation. Patient is nodding and shaking head appropriately to simple questions, and following most simple commands. Nods her head when asked if she understands English, but follows commands more consistently when asked in Marshallese. Denies pain, CP, SOB at this time.    OBJECTIVE:    VITAL SIGNS:  ICU Vital Signs Last 24 Hrs  T(C): 37.6 (07 May 2018 09:08), Max: 37.8 (07 May 2018 01:23)  T(F): 99.6 (07 May 2018 09:08), Max: 100.1 (07 May 2018 01:23)  HR: 78 (07 May 2018 12:00) (70 - 89)  BP: 119/59 (07 May 2018 12:00) (108/54 - 148/65)  BP(mean): 78 (07 May 2018 12:00) (69 - 101)  ABP: --  ABP(mean): --  RR: 22 (07 May 2018 12:00) (11 - 30)  SpO2: 98% (07 May 2018 12:00) (94% - 100%)    Mode: CPAP with PS, FiO2: 40, PEEP: 5, PS: 5, MAP: 7.3, PIP: 12    05-06 @ 07:01  -  05-07 @ 07:00  --------------------------------------------------------  IN: 1884 mL / OUT: 3335 mL / NET: -1451 mL    05-07 @ 07:01  -  05-07 @ 12:18  --------------------------------------------------------  IN: 407.8 mL / OUT: 550 mL / NET: -142.2 mL    CAPILLARY BLOOD GLUCOSE  POCT Blood Glucose.: 203 mg/dL (07 May 2018 12:01)    PHYSICAL EXAM:  General: NAD, intubated, off sedation, arousable by voice, nodding/shaking head to questions appropriately, smiling  HEENT: NCAT, PERRL, clear conjunctiva, no scleral icterus  Neck: supple, no JVD  Respiratory: CTA b/l  Cardiovascular: RRR, normal S1S2, no M/R/G  Abdomen: soft, NT/ND, bowel sounds in all four quadrants, no palpable masses  Extremities: cool extremities, no clubbing, cyanosis, or edema  Neuro:   - Mental Status: intubated, off sedation, arousable by voice, nodding/shaking head to questions appropriately, moves all four extremities  - Cranial Nerves II-XII:    II:  Pupils are 4>3mm, equal, round, and reactive to light.  III, IV, VI:  follows commands in Marshallese, although nods that she understands English  V:  unable to assess since patient is intubated and sedated.   VII:  unable to assess since patient is intubated and sedated.   VIII: unable to assess since patient is intubated and sedated.   IX, X:  unable to assess since patient is intubated.   XI:  unable to assess since patient is sedated. .  XII: unable to assess since patient is intubated and sedated.   - Motor:  moves all extremities spontaneously  - Reflexes:  2+ in biceps, brachioradialis, triceps, knees, and ankles.   - Sensory: unable to assess  - Coordination:  unable to assess   - Gait: deferred.    MEDICATIONS:  MEDICATIONS  (STANDING):  chlorhexidine 0.12% Liquid 15 milliLiter(s) Swish and Spit two times a day  dexmedetomidine Infusion 0.2 MICROgram(s)/kG/Hr (4.68 mL/Hr) IV Continuous <Continuous>  dextrose 5%. 1000 milliLiter(s) (50 mL/Hr) IV Continuous <Continuous>  dextrose 50% Injectable 12.5 Gram(s) IV Push once  dextrose 50% Injectable 25 Gram(s) IV Push once  dextrose 50% Injectable 25 Gram(s) IV Push once  fat emulsion (Plant Based) 20% Infusion 0.535 Gm/kG/Day (20.8 mL/Hr) IV Continuous <Continuous>  furosemide   Injectable 80 milliGRAM(s) IV Push two times a day  insulin lispro (HumaLOG) corrective regimen sliding scale   SubCutaneous every 6 hours  leuprolide depot Injectable (LUPRON-DEPOT) 7.5 milliGRAM(s) IntraMuscular every 4 weeks  levothyroxine Injectable 75 MICROGram(s) IV Push <User Schedule>  meropenem Injectable 1000 milliGRAM(s) IV Push every 12 hours  metolazone 10 milliGRAM(s) Oral daily  micafungin IVPB 100 milliGRAM(s) IV Intermittent daily  pantoprazole   Suspension 40 milliGRAM(s) Oral daily  Parenteral Nutrition - Adult 1 Each (53 mL/Hr) TPN Continuous <Continuous>  Parenteral Nutrition - Adult 1 Each (53 mL/Hr) TPN Continuous <Continuous>    MEDICATIONS  (PRN):  ALBUTerol/ipratropium for Nebulization 3 milliLiter(s) Nebulizer every 6 hours PRN Shortness of Breath and/or Wheezing  dextrose Gel 1 Dose(s) Oral once PRN Blood Glucose LESS THAN 70 milliGRAM(s)/deciliter  fentaNYL    Injectable 25 MICROGram(s) IV Push every 4 hours PRN Moderate Pain (4 - 6)  fentaNYL    Injectable 50 MICROGram(s) IV Push every 4 hours PRN Severe Pain (7 - 10)  glucagon  Injectable 1 milliGRAM(s) IntraMuscular once PRN Glucose LESS THAN 70 milligrams/deciliter  haloperidol    Injectable 1 milliGRAM(s) IntraMuscular every 6 hours PRN Agitation  ondansetron Injectable 4 milliGRAM(s) IV Push every 6 hours PRN Nausea      ALLERGIES:  Allergies    No Known Allergies    Intolerances        LABS:                        8.7    6.4   )-----------( 296      ( 07 May 2018 04:46 )             27.4     05-07    136  |  95<L>  |  60<H>  ----------------------------<  131<H>  4.0   |  30  |  1.05    Ca    10.2      07 May 2018 04:46  Phos  3.3     05-07  Mg     2.6     05-07            RADIOLOGY & ADDITIONAL TESTS: Studies reviewed.

## 2018-05-08 LAB
ANION GAP SERPL CALC-SCNC: 11 MMOL/L — SIGNIFICANT CHANGE UP (ref 5–17)
BUN SERPL-MCNC: 64 MG/DL — HIGH (ref 7–23)
CALCIUM SERPL-MCNC: 10 MG/DL — SIGNIFICANT CHANGE UP (ref 8.4–10.5)
CHLORIDE SERPL-SCNC: 96 MMOL/L — SIGNIFICANT CHANGE UP (ref 96–108)
CO2 SERPL-SCNC: 29 MMOL/L — SIGNIFICANT CHANGE UP (ref 22–31)
CREAT SERPL-MCNC: 1.07 MG/DL — SIGNIFICANT CHANGE UP (ref 0.5–1.3)
GLUCOSE BLDC GLUCOMTR-MCNC: 140 MG/DL — HIGH (ref 70–99)
GLUCOSE BLDC GLUCOMTR-MCNC: 143 MG/DL — HIGH (ref 70–99)
GLUCOSE BLDC GLUCOMTR-MCNC: 162 MG/DL — HIGH (ref 70–99)
GLUCOSE SERPL-MCNC: 145 MG/DL — HIGH (ref 70–99)
HCT VFR BLD CALC: 28.3 % — LOW (ref 34.5–45)
HGB BLD-MCNC: 9 G/DL — LOW (ref 11.5–15.5)
MAGNESIUM SERPL-MCNC: 2.6 MG/DL — SIGNIFICANT CHANGE UP (ref 1.6–2.6)
MCHC RBC-ENTMCNC: 31.4 PG — SIGNIFICANT CHANGE UP (ref 27–34)
MCHC RBC-ENTMCNC: 31.8 G/DL — LOW (ref 32–36)
MCV RBC AUTO: 98.6 FL — SIGNIFICANT CHANGE UP (ref 80–100)
PHOSPHATE SERPL-MCNC: 2.9 MG/DL — SIGNIFICANT CHANGE UP (ref 2.5–4.5)
PLATELET # BLD AUTO: 303 K/UL — SIGNIFICANT CHANGE UP (ref 150–400)
POTASSIUM SERPL-MCNC: 3.9 MMOL/L — SIGNIFICANT CHANGE UP (ref 3.5–5.3)
POTASSIUM SERPL-SCNC: 3.9 MMOL/L — SIGNIFICANT CHANGE UP (ref 3.5–5.3)
RBC # BLD: 2.87 M/UL — LOW (ref 3.8–5.2)
RBC # FLD: 17.6 % — HIGH (ref 10.3–16.9)
SODIUM SERPL-SCNC: 136 MMOL/L — SIGNIFICANT CHANGE UP (ref 135–145)
WBC # BLD: 9 K/UL — SIGNIFICANT CHANGE UP (ref 3.8–10.5)
WBC # FLD AUTO: 9 K/UL — SIGNIFICANT CHANGE UP (ref 3.8–10.5)

## 2018-05-08 PROCEDURE — 71045 X-RAY EXAM CHEST 1 VIEW: CPT | Mod: 26

## 2018-05-08 PROCEDURE — 99233 SBSQ HOSP IP/OBS HIGH 50: CPT | Mod: GC

## 2018-05-08 RX ORDER — I.V. FAT EMULSION 20 G/100ML
0.54 EMULSION INTRAVENOUS
Qty: 50 | Refills: 0 | Status: DISCONTINUED | OUTPATIENT
Start: 2018-05-08 | End: 2018-05-08

## 2018-05-08 RX ORDER — ACETAMINOPHEN 500 MG
1000 TABLET ORAL ONCE
Qty: 0 | Refills: 0 | Status: COMPLETED | OUTPATIENT
Start: 2018-05-08 | End: 2018-05-09

## 2018-05-08 RX ORDER — ELECTROLYTE SOLUTION,INJ
1 VIAL (ML) INTRAVENOUS
Qty: 0 | Refills: 0 | Status: DISCONTINUED | OUTPATIENT
Start: 2018-05-08 | End: 2018-05-09

## 2018-05-08 RX ADMIN — DEXMEDETOMIDINE HYDROCHLORIDE IN 0.9% SODIUM CHLORIDE 4.68 MICROGRAM(S)/KG/HR: 4 INJECTION INTRAVENOUS at 02:39

## 2018-05-08 RX ADMIN — ACETAZOLAMIDE 110 MILLIGRAM(S): 250 TABLET ORAL at 02:05

## 2018-05-08 RX ADMIN — Medication 75 MICROGRAM(S): at 06:01

## 2018-05-08 RX ADMIN — Medication 2: at 06:08

## 2018-05-08 RX ADMIN — PANTOPRAZOLE SODIUM 40 MILLIGRAM(S): 20 TABLET, DELAYED RELEASE ORAL at 13:13

## 2018-05-08 RX ADMIN — MEROPENEM 1000 MILLIGRAM(S): 1 INJECTION INTRAVENOUS at 17:20

## 2018-05-08 RX ADMIN — Medication 2: at 00:01

## 2018-05-08 RX ADMIN — CHLORHEXIDINE GLUCONATE 15 MILLILITER(S): 213 SOLUTION TOPICAL at 05:52

## 2018-05-08 RX ADMIN — DEXMEDETOMIDINE HYDROCHLORIDE IN 0.9% SODIUM CHLORIDE 4.68 MICROGRAM(S)/KG/HR: 4 INJECTION INTRAVENOUS at 06:27

## 2018-05-08 RX ADMIN — I.V. FAT EMULSION 20.8 GM/KG/DAY: 20 EMULSION INTRAVENOUS at 21:27

## 2018-05-08 RX ADMIN — Medication 80 MILLIGRAM(S): at 05:52

## 2018-05-08 RX ADMIN — FENTANYL CITRATE 50 MICROGRAM(S): 50 INJECTION INTRAVENOUS at 00:27

## 2018-05-08 RX ADMIN — MEROPENEM 1000 MILLIGRAM(S): 1 INJECTION INTRAVENOUS at 06:17

## 2018-05-08 RX ADMIN — Medication 80 MILLIGRAM(S): at 17:20

## 2018-05-08 RX ADMIN — FENTANYL CITRATE 50 MICROGRAM(S): 50 INJECTION INTRAVENOUS at 00:03

## 2018-05-08 RX ADMIN — MICAFUNGIN SODIUM 210 MILLIGRAM(S): 100 INJECTION, POWDER, LYOPHILIZED, FOR SOLUTION INTRAVENOUS at 13:14

## 2018-05-08 NOTE — PROGRESS NOTE ADULT - ASSESSMENT
77 y/o F with sepsis from open abdominal wound infection, MSSA bacteremia present on admission, and UTI present on admission found to have contained small bowel leak, s/p IR drainage, altered mental status, resolved, with acute respiratory failure on CPAP trial,vaginal bleeding s/p endometrial biopsy showing metaplasia. Awaiting for family meeting with  regarding surgical intervention and with GYN, currently doing well neurologically, extubated.       Neuro: hold seroquel, aripiprazole, escitalapram   CV: HD stable s/p severe sepsis, off albumin holding amlodipine, 4/19 Echo  65-70%. Lasix 80mg BID and metolazone 10 mg for diuresis aiming for negative fluid balance  Pulm: extubated, on high flow.  GI: NPO, protonix, on tube feed at 10cc/hr , on TPN and Protonix.   : Nicole (for strict I/O) S/p AKF on HD now resolved. UTI (present on admission) + Uterine fibroids on CT scan with vaginal bleeding, gyn consulted - cervical biopsy with metaplasia. The BUN/creatinine fairly stable.  ID: [id number] Ecoli in Urine resistant to Zosyn: Jose L( 4/17-) MSSA in blood: Oxacillin ( 4/17-5/2) Kai virus (good hand washing), TTE neg for endocarditis. Continuing micafungin for candida tropicalis and glabrata.  Endo: ISS, Synthroid  Insulin  in TPN: 50 units with improved control, on leuprolide for uterine fibroid  PPx: sqh held for low plts  PE:Sp IVC filter on 3/30   Lines: PIV, LIJ TLC (4/29-), /// s/p L Rad Art line( 4/15-4/18),  Rt IJ TLC from OSH ( 4/15-)  Wounds: Wound vac to midline incision , change MW

## 2018-05-08 NOTE — PROGRESS NOTE ADULT - SUBJECTIVE AND OBJECTIVE BOX
24 hr events:  o/n: -300 overnight. -1.4 L / 24 hrs. Still on CPAP  5/7 : cont TF, awaiting family meeting done, need more time to decide about RTOR, W[place] changed, for extubation.  will d/w with private gyn for OR planning. diomax given.    SUBJECTIVE: Extubated, alert, able to follow commands, no pain, no nausea/vomiting.      MEDICATIONS  (STANDING):  dexmedetomidine Infusion 0.2 MICROgram(s)/kG/Hr (4.68 mL/Hr) IV Continuous <Continuous>  dextrose 5%. 1000 milliLiter(s) (50 mL/Hr) IV Continuous <Continuous>  dextrose 50% Injectable 12.5 Gram(s) IV Push once  dextrose 50% Injectable 25 Gram(s) IV Push once  dextrose 50% Injectable 25 Gram(s) IV Push once  fat emulsion (Plant Based) 20% Infusion 0.535 Gm/kG/Day (20.8 mL/Hr) IV Continuous <Continuous>  furosemide   Injectable 80 milliGRAM(s) IV Push two times a day  insulin lispro (HumaLOG) corrective regimen sliding scale   SubCutaneous every 6 hours  leuprolide depot Injectable (LUPRON-DEPOT) 7.5 milliGRAM(s) IntraMuscular every 4 weeks  levothyroxine Injectable 75 MICROGram(s) IV Push <User Schedule>  meropenem Injectable 1000 milliGRAM(s) IV Push every 12 hours  micafungin IVPB 100 milliGRAM(s) IV Intermittent daily  pantoprazole   Suspension 40 milliGRAM(s) Oral daily  Parenteral Nutrition - Adult 1 Each (53 mL/Hr) TPN Continuous <Continuous>  Parenteral Nutrition - Adult 1 Each (50 mL/Hr) TPN Continuous <Continuous>    MEDICATIONS  (PRN):  acetaminophen  IVPB. 1000 milliGRAM(s) IV Intermittent once PRN Mild Pain (1 - 3)  ALBUTerol/ipratropium for Nebulization 3 milliLiter(s) Nebulizer every 6 hours PRN Shortness of Breath and/or Wheezing  dextrose Gel 1 Dose(s) Oral once PRN Blood Glucose LESS THAN 70 milliGRAM(s)/deciliter  glucagon  Injectable 1 milliGRAM(s) IntraMuscular once PRN Glucose LESS THAN 70 milligrams/deciliter  haloperidol    Injectable 1 milliGRAM(s) IntraMuscular every 6 hours PRN Agitation  ondansetron Injectable 4 milliGRAM(s) IV Push every 6 hours PRN Nausea      Nicole:	  [ ] None	[ ] Daily Nicole Order Placed	   Indication:	  [ ] Strict I and O's    [ ] Obstruction     [ ] Incontinence + Stage 3 or 4 Decubitus  Central Line:  [ ] None	   [ ]  Medication / TPN Administration     Drips:     ICU Vital Signs Last 24 Hrs  T(C): 37.7 (08 May 2018 09:00), Max: 37.9 (07 May 2018 21:25)  T(F): 99.8 (08 May 2018 09:00), Max: 100.2 (07 May 2018 21:25)  HR: 82 (08 May 2018 09:00) (70 - 92)  BP: 134/59 (08 May 2018 09:00) (90/67 - 152/75)  BP(mean): 90 (08 May 2018 09:00) (68 - 102)  ABP: --  ABP(mean): --  RR: 18 (08 May 2018 09:00) (12 - 24)  SpO2: 99% (08 May 2018 09:00) (94% - 100%)      Physical Exam:  General: NAD  HEENT: NC/AT, EOMI, PERRLA, normal hearing, no oral lesions, neck supple w/o LAD  Pulmonary: Nonlabored breathing, no respiratory distress, on high flow oxygen  Cardiovascular: NSR, no murmurs  Abdominal: soft, NT/ND, +BS, wound VAC in place, no leak, LEANN drain at LLQ, no leak.   Extremities: WWP, 5/5 strength x 4, no clubbing/cyanosis/edema  Neuro: A/O x3, CNs II-XII grossly intact, normal motor/sensation, no focal deficits  Pulses: palpable distal pulses    Lines/tubes/drains: wound VAC and LEANN drain LLQ    Vent settings:  Mode: CPAP with PS, FiO2: 40, PEEP: 5, PS: 5, MAP: 7.3, PIP: 11    I&O's Summary    07 May 2018 07:01  -  08 May 2018 07:00  --------------------------------------------------------  IN: 2017 mL / OUT: 3685 mL / NET: -1668 mL    08 May 2018 07:01  -  08 May 2018 10:31  --------------------------------------------------------  IN: 73.8 mL / OUT: 450 mL / NET: -376.2 mL        LABS:                        9.0    9.0   )-----------( 303      ( 08 May 2018 05:30 )             28.3     05-08    136  |  96  |  64<H>  ----------------------------<  145<H>  3.9   |  29  |  1.07    Ca    10.0      08 May 2018 05:30  Phos  2.9     05-08  Mg     2.6     05-08          CAPILLARY BLOOD GLUCOSE      POCT Blood Glucose.: 162 mg/dL (08 May 2018 06:05)  POCT Blood Glucose.: 165 mg/dL (07 May 2018 23:56)  POCT Blood Glucose.: 190 mg/dL (07 May 2018 17:18)  POCT Blood Glucose.: 203 mg/dL (07 May 2018 12:01)        Cultures:    RADIOLOGY & ADDITIONAL STUDIES:

## 2018-05-08 NOTE — PROGRESS NOTE ADULT - ASSESSMENT
· Assessment		  78 year old female, morbidly obesity, DM, ventral hernia repair with mesh 3/2 c/b wound dehiscence, recent ATN requiring temporary HD (Permacath removed 4/10), who presents as a transfer from OSH for management of multifactorial sepsis and open abdominal wound. Patient found to have ESBL-producing E.Coli UTI and MSSA bacteremia, origin likely coming from abdomen/pelvis vs. prior HD catheter. Patient intubated 4/25 for acute hypoxic respiratory failure.  CT 4/18 showed SB perforation with pelvic collection, still present on CT on 4/25.  She is s/p IR drainage of collection, with culture growing candida lusitanae and Glabrata.  Would like to begin removing antibiotics, however awaiting plan from surgery team about OR.  completed 14 d course of oxacillin from first negative culture (4/17;  last day 4/30)  Suggest:  - Continue meropenem 1 g IV q12h, awaiting decision regarding surgery   - Cont Micafungin for now   - ID will follow

## 2018-05-08 NOTE — PROGRESS NOTE ADULT - SUBJECTIVE AND OBJECTIVE BOX
O/N Events:  Subjective:  extubated successfully saturating well on 5 L NC , Alert and awake follows simple command   HDS, afebrile, no leukocytosis    VITALS  Vital Signs Last 24 Hrs  T(C): 37.7 (08 May 2018 09:00), Max: 37.9 (07 May 2018 21:25)  T(F): 99.8 (08 May 2018 09:00), Max: 100.2 (07 May 2018 21:25)  HR: 82 (08 May 2018 11:00) (70 - 92)  BP: 132/61 (08 May 2018 11:00) (90/67 - 152/75)  BP(mean): 89 (08 May 2018 11:00) (68 - 115)  RR: 23 (08 May 2018 11:00) (15 - 24)  SpO2: 94% (08 May 2018 11:00) (94% - 100%)    I&O's Summary    07 May 2018 07:01  -  08 May 2018 07:00  --------------------------------------------------------  IN: 2017 mL / OUT: 3685 mL / NET: -1668 mL    08 May 2018 07:01  -  08 May 2018 11:34  --------------------------------------------------------  IN: 73.8 mL / OUT: 450 mL / NET: -376.2 mL    PHYSICAL EXAM  Constitutional: extubated on NC/ alert and awake, not oriented, mild delirium present   Eyes:  Sclerae anicteric, conjunctivae clear, PERRL  Ear/Nose/Throat:  MMM/ No thrush   Pulmonary: Bibasilar crackles, air entry equal on both sides, intubated, Right chest Port, access for HD  Cardiovascular: NSR, no murmurs  Abdominal: soft, obese, ND/NT wound VAC in place, no leak.   : suazo in place  Extremities: WWP, palpable dp and radial pulses BL, 1+ pitting edema present       LABS                        9.0    9.0   )-----------( 303      ( 08 May 2018 05:30 )             28.3     05-08    136  |  96  |  64<H>  ----------------------------<  145<H>  3.9   |  29  |  1.07    Ca    10.0      08 May 2018 05:30  Phos  2.9     05-08  Mg     2.6     05-08    Microbiology:  U Clx 4/18: + E.coli MDR  left pelvic fluid aspirate: + for yeast/ candida luitaniae and candida Glabrata  lusitaniae fluc sensitive/ glabrata fluc SDD 4    Imaging:   CT abd/ pelv with oral contrast 5/3  IMPRESSION:  Interval decrease in size of the extraperitoneal gas-filled collection   surrounding the enlarged and bulky uterus with an indwelling percutaneous   drainage catheter. However, enteric contrast is again seen within this   collection on the current study, indicating a persistent enteric   fistulous collection.  Increased amount of upper abdominal ascites.  Moderate bilateral pleural effusions with near complete collapse of the   right lower lobe and partial collapse of the left lower lobe.

## 2018-05-09 LAB
ANION GAP SERPL CALC-SCNC: 14 MMOL/L — SIGNIFICANT CHANGE UP (ref 5–17)
APPEARANCE UR: CLEAR — SIGNIFICANT CHANGE UP
BASOPHILS NFR BLD AUTO: 0.8 % — SIGNIFICANT CHANGE UP (ref 0–2)
BILIRUB UR-MCNC: NEGATIVE — SIGNIFICANT CHANGE UP
BUN SERPL-MCNC: 62 MG/DL — HIGH (ref 7–23)
CALCIUM SERPL-MCNC: 10.4 MG/DL — SIGNIFICANT CHANGE UP (ref 8.4–10.5)
CHLORIDE SERPL-SCNC: 93 MMOL/L — LOW (ref 96–108)
CO2 SERPL-SCNC: 28 MMOL/L — SIGNIFICANT CHANGE UP (ref 22–31)
COLOR SPEC: YELLOW — SIGNIFICANT CHANGE UP
CREAT SERPL-MCNC: 1.08 MG/DL — SIGNIFICANT CHANGE UP (ref 0.5–1.3)
CULTURE RESULTS: SIGNIFICANT CHANGE UP
CULTURE RESULTS: SIGNIFICANT CHANGE UP
DIFF PNL FLD: (no result)
EOSINOPHIL NFR BLD AUTO: 1.4 % — SIGNIFICANT CHANGE UP (ref 0–6)
GLUCOSE BLDC GLUCOMTR-MCNC: 141 MG/DL — HIGH (ref 70–99)
GLUCOSE BLDC GLUCOMTR-MCNC: 146 MG/DL — HIGH (ref 70–99)
GLUCOSE BLDC GLUCOMTR-MCNC: 150 MG/DL — HIGH (ref 70–99)
GLUCOSE BLDC GLUCOMTR-MCNC: 155 MG/DL — HIGH (ref 70–99)
GLUCOSE BLDC GLUCOMTR-MCNC: 178 MG/DL — HIGH (ref 70–99)
GLUCOSE SERPL-MCNC: 169 MG/DL — HIGH (ref 70–99)
GLUCOSE UR QL: NEGATIVE — SIGNIFICANT CHANGE UP
HCT VFR BLD CALC: 28.1 % — LOW (ref 34.5–45)
HGB BLD-MCNC: 8.8 G/DL — LOW (ref 11.5–15.5)
KETONES UR-MCNC: NEGATIVE — SIGNIFICANT CHANGE UP
LEUKOCYTE ESTERASE UR-ACNC: NEGATIVE — SIGNIFICANT CHANGE UP
LYMPHOCYTES # BLD AUTO: 19.6 % — SIGNIFICANT CHANGE UP (ref 13–44)
MAGNESIUM SERPL-MCNC: 2.5 MG/DL — SIGNIFICANT CHANGE UP (ref 1.6–2.6)
MCHC RBC-ENTMCNC: 30.6 PG — SIGNIFICANT CHANGE UP (ref 27–34)
MCHC RBC-ENTMCNC: 31.3 G/DL — LOW (ref 32–36)
MCV RBC AUTO: 97.6 FL — SIGNIFICANT CHANGE UP (ref 80–100)
MONOCYTES NFR BLD AUTO: 5.9 % — SIGNIFICANT CHANGE UP (ref 2–14)
NEUTROPHILS NFR BLD AUTO: 72.3 % — SIGNIFICANT CHANGE UP (ref 43–77)
NITRITE UR-MCNC: NEGATIVE — SIGNIFICANT CHANGE UP
PH UR: 6.5 — SIGNIFICANT CHANGE UP (ref 5–8)
PHOSPHATE SERPL-MCNC: 3.1 MG/DL — SIGNIFICANT CHANGE UP (ref 2.5–4.5)
PLATELET # BLD AUTO: 356 K/UL — SIGNIFICANT CHANGE UP (ref 150–400)
POTASSIUM SERPL-MCNC: 4 MMOL/L — SIGNIFICANT CHANGE UP (ref 3.5–5.3)
POTASSIUM SERPL-SCNC: 4 MMOL/L — SIGNIFICANT CHANGE UP (ref 3.5–5.3)
PROT UR-MCNC: (no result) MG/DL
RBC # BLD: 2.88 M/UL — LOW (ref 3.8–5.2)
RBC # FLD: 17.8 % — HIGH (ref 10.3–16.9)
SODIUM SERPL-SCNC: 135 MMOL/L — SIGNIFICANT CHANGE UP (ref 135–145)
SP GR SPEC: 1.01 — SIGNIFICANT CHANGE UP (ref 1–1.03)
SPECIMEN SOURCE: SIGNIFICANT CHANGE UP
SPECIMEN SOURCE: SIGNIFICANT CHANGE UP
UROBILINOGEN FLD QL: 0.2 E.U./DL — SIGNIFICANT CHANGE UP
WBC # BLD: 12.5 K/UL — HIGH (ref 3.8–10.5)
WBC # FLD AUTO: 12.5 K/UL — HIGH (ref 3.8–10.5)

## 2018-05-09 PROCEDURE — 36569 INSJ PICC 5 YR+ W/O IMAGING: CPT

## 2018-05-09 PROCEDURE — 76937 US GUIDE VASCULAR ACCESS: CPT | Mod: 26

## 2018-05-09 PROCEDURE — 71045 X-RAY EXAM CHEST 1 VIEW: CPT | Mod: 26,76

## 2018-05-09 PROCEDURE — 99233 SBSQ HOSP IP/OBS HIGH 50: CPT | Mod: GC

## 2018-05-09 RX ORDER — SODIUM CHLORIDE 9 MG/ML
10 INJECTION INTRAMUSCULAR; INTRAVENOUS; SUBCUTANEOUS EVERY 12 HOURS
Qty: 0 | Refills: 0 | Status: DISCONTINUED | OUTPATIENT
Start: 2018-05-09 | End: 2018-06-04

## 2018-05-09 RX ORDER — I.V. FAT EMULSION 20 G/100ML
0.54 EMULSION INTRAVENOUS
Qty: 50 | Refills: 0 | Status: DISCONTINUED | OUTPATIENT
Start: 2018-05-09 | End: 2018-05-09

## 2018-05-09 RX ORDER — SODIUM CHLORIDE 9 MG/ML
10 INJECTION INTRAMUSCULAR; INTRAVENOUS; SUBCUTANEOUS
Qty: 0 | Refills: 0 | Status: DISCONTINUED | OUTPATIENT
Start: 2018-05-09 | End: 2018-06-04

## 2018-05-09 RX ORDER — SODIUM CHLORIDE 9 MG/ML
20 INJECTION INTRAMUSCULAR; INTRAVENOUS; SUBCUTANEOUS ONCE
Qty: 0 | Refills: 0 | Status: DISCONTINUED | OUTPATIENT
Start: 2018-05-09 | End: 2018-06-04

## 2018-05-09 RX ORDER — ELECTROLYTE SOLUTION,INJ
1 VIAL (ML) INTRAVENOUS
Qty: 0 | Refills: 0 | Status: DISCONTINUED | OUTPATIENT
Start: 2018-05-09 | End: 2018-05-09

## 2018-05-09 RX ADMIN — PANTOPRAZOLE SODIUM 40 MILLIGRAM(S): 20 TABLET, DELAYED RELEASE ORAL at 17:52

## 2018-05-09 RX ADMIN — Medication 75 MICROGRAM(S): at 06:28

## 2018-05-09 RX ADMIN — Medication 400 MILLIGRAM(S): at 10:06

## 2018-05-09 RX ADMIN — Medication 50 EACH: at 17:56

## 2018-05-09 RX ADMIN — Medication 1000 MILLIGRAM(S): at 11:00

## 2018-05-09 RX ADMIN — Medication 2: at 06:28

## 2018-05-09 RX ADMIN — MEROPENEM 1000 MILLIGRAM(S): 1 INJECTION INTRAVENOUS at 17:53

## 2018-05-09 RX ADMIN — Medication 80 MILLIGRAM(S): at 02:27

## 2018-05-09 RX ADMIN — MEROPENEM 1000 MILLIGRAM(S): 1 INJECTION INTRAVENOUS at 06:27

## 2018-05-09 RX ADMIN — I.V. FAT EMULSION 20.8 GM/KG/DAY: 20 EMULSION INTRAVENOUS at 21:00

## 2018-05-09 RX ADMIN — Medication 2: at 03:42

## 2018-05-09 RX ADMIN — MICAFUNGIN SODIUM 210 MILLIGRAM(S): 100 INJECTION, POWDER, LYOPHILIZED, FOR SOLUTION INTRAVENOUS at 12:20

## 2018-05-09 NOTE — PROGRESS NOTE ADULT - ATTENDING COMMENTS
ID Attending    I agree with plans above    I discussed case with Dr. Ngo and we both feel it advisable to continue antibiotics until after his surgical propcedure next week.

## 2018-05-09 NOTE — PROGRESS NOTE ADULT - SUBJECTIVE AND OBJECTIVE BOX
O/N Events:  Subjective:  awakes to verbal commands, however more confused and somnolent than yesterday  HDS, Temp 100.2 x 2 in am, developed leukocytosis 12.5 K     VITALS  Vital Signs Last 24 Hrs  T(C): 37.9 (09 May 2018 09:14), Max: 38.1 (09 May 2018 01:16)  T(F): 100.2 (09 May 2018 09:14), Max: 100.5 (09 May 2018 01:16)  HR: 92 (09 May 2018 10:00) (82 - 110)  BP: 159/106 (09 May 2018 10:00) (127/64 - 165/74)  BP(mean): 133 (09 May 2018 10:00) (78 - 133)  RR: 25 (09 May 2018 10:00) (20 - 141)  SpO2: 96% (09 May 2018 10:00) (93% - 98%)    I&O's Summary    08 May 2018 07:01  -  09 May 2018 07:00  --------------------------------------------------------  IN: 1733.8 mL / OUT: 3030 mL / NET: -1296.2 mL    09 May 2018 07:01  -  09 May 2018 10:32  --------------------------------------------------------  IN: 305 mL / OUT: 230 mL / NET: 75 mL      PHYSICAL EXAM  General: alert and awake, nods head to questions however not really communicative, on 2 L NC  Eyes:  Sclerae anicteric, conjunctivae clear, PERRL  Ear/Nose/Throat:  MMM/ No thrush   Pulmonary: Bibasilar crackles, air entry equal on both sides, Right chest Port, access for HD  Cardiovascular: NSR, no murmurs  Abdominal: soft, obese, ND/NT wound VAC in place, no leak.   : suazo in place  Extremities: WWP, palpable dp and radial pulses BL, 1+ pitting edema present     LABS                        8.8    12.5  )-----------( 356      ( 09 May 2018 04:11 )             28.1     05-09    135  |  93<L>  |  62<H>  ----------------------------<  169<H>  4.0   |  28  |  1.08    Ca    10.4      09 May 2018 04:11  Phos  3.1     05-09  Mg     2.5     05-09      Microbiology:  U Clx 4/18: + E.coli MDR  left pelvic fluid aspirate: + for yeast/ candida luitaniae and candida Glabrata  lusitaniae fluc sensitive/ glabrata fluc SDD 4    Imaging:   CT abd/ pelv with oral contrast 5/3  IMPRESSION:  Interval decrease in size of the extraperitoneal gas-filled collection   surrounding the enlarged and bulky uterus with an indwelling percutaneous   drainage catheter. However, enteric contrast is again seen within this   collection on the current study, indicating a persistent enteric   fistulous collection.  Increased amount of upper abdominal ascites.  Moderate bilateral pleural effusions with near complete collapse of the   right lower lobe and partial collapse of the left lower lobe.

## 2018-05-09 NOTE — PROGRESS NOTE ADULT - ASSESSMENT
77 y/o F with sepsis from open abdominal wound infection, MSSA bacteremia present on admission, and UTI present on admission found to have contained small bowel leak, s/p IR drainage, altered mental status, resolved, with acute respiratory failure on CPAP trial,vaginal bleeding s/p endometrial biopsy showing metaplasia. Currently doing well neurologically, extubated and alert.     Neuro: off precedex, holding:  seroquel, aripiprazole, escitalapram   CV: HD stable s/p severe sepsis, off albumin holding amlodipine, 4/19 Echo  65-70%. Lasix 80mg BID and off metolazone 10 mg - aiming for negative fluid balance >1L  Pulm: On NC 2L, satting well, +crackles and secretion around trachea, need good suctioning and pulmonary toilet  GI: NPO, protonix, on tube feed at 10cc/hr , on TPN and Protonix.   : Nicole (for strict I/O) S/p AKF on HD now resolved. UTI (present on admission) + Uterine fibroids on CT scan with vaginal bleeding, gyn consulted - cervical biopsy with metaplasia. The BUN/creatinine fairly stable.  ID: ESBL Ecoli in Urine resistant to Zosyn: Jose L( 4/17-) MSSA in blood: Oxacillin ( 4/17-5/2) Kai virus (good hand washing), TTE neg for endocarditis. Continuing micafungin for candida tropicalis and glabrata.  Endo: ISS, Synthroid  Insulin  in TPN: 50 units with improved control, on leuprolide for uterine fibroid  PPx: sqh held for low plts  PE:Sp IVC filter on 3/30   Lines: PIV, LIJ TLC (4/29-), /// s/p L Rad Art line( 4/15-4/18),  Rt IJ TLC from OSH ( 4/15-)  Wounds: Wound vac to midline incision , change MWF

## 2018-05-09 NOTE — PROGRESS NOTE ADULT - ASSESSMENT
A/P  78 year old female, morbid obesity, DM, ventral hernia repair with mesh 3/2 c/b wound dehiscence, recent ATN requiring temporary HD (Permacath removed 4/10), who presents as a transfer from OSH for management of multifactorial sepsis and open abdominal wound. Patient found to have ESBL-producing E.Coli UTI and MSSA bacteremia, origin likely coming from abdomen/pelvis vs. prior HD catheter. Patient intubated 4/25 for acute hypoxic respiratory failure.  CT 4/18 showed SB perforation with pelvic collection, still present on CT on 4/25.  She is s/p IR drainage of collection, with culture growing candida lusitaniae and Glabrata.  Would like to begin removing antibiotics, however awaiting plan from surgery.  completed 14 d course of oxacillin from first negative culture (4/17;  last day 4/30)  pt with increasing fever curve and new leukocytosis  CXR improved from previous studies, no new cough   wound VAC changed today, shows improvement  LEANN still draining brown/dark green liquid however output is stable     Recommend:  - please remove triple lumen and send cath tip for Clx  - monitor fever curve and leukocytosis consider to reculture if spikes fever or WBC count worsens   - Continue meropenem and micafungin at current dose until surgery   - Plan for OR on Tuesday 5/15 with Dr. Ngo   - ID will follow

## 2018-05-09 NOTE — PROGRESS NOTE ADULT - SUBJECTIVE AND OBJECTIVE BOX
24 hr events:  o/n: alert, awake, calm. - 1 L / 24 hrs, but -200 cc overnight, 80 Lasix given early. Tmax 100.5.   5/8: extubated, PT ordered - OOB to chair, raising BUN/Cr - metolazone dc'ed    SUBJECTIVE:  Patient is alert, and respond to verbal command, but not really communicative as before. Seemed confused and disoriented, no agitation, denies any abdominal pain      MEDICATIONS  (STANDING):  dexmedetomidine Infusion 0.2 MICROgram(s)/kG/Hr (4.68 mL/Hr) IV Continuous <Continuous>  dextrose 5%. 1000 milliLiter(s) (50 mL/Hr) IV Continuous <Continuous>  dextrose 50% Injectable 12.5 Gram(s) IV Push once  dextrose 50% Injectable 25 Gram(s) IV Push once  dextrose 50% Injectable 25 Gram(s) IV Push once  furosemide   Injectable 80 milliGRAM(s) IV Push two times a day  insulin lispro (HumaLOG) corrective regimen sliding scale   SubCutaneous every 6 hours  leuprolide depot Injectable (LUPRON-DEPOT) 7.5 milliGRAM(s) IntraMuscular every 4 weeks  levothyroxine Injectable 75 MICROGram(s) IV Push <User Schedule>  meropenem Injectable 1000 milliGRAM(s) IV Push every 12 hours  micafungin IVPB 100 milliGRAM(s) IV Intermittent daily  pantoprazole   Suspension 40 milliGRAM(s) Oral daily  Parenteral Nutrition - Adult 1 Each (50 mL/Hr) TPN Continuous <Continuous>    MEDICATIONS  (PRN):  acetaminophen  IVPB. 1000 milliGRAM(s) IV Intermittent once PRN Mild Pain (1 - 3)  ALBUTerol/ipratropium for Nebulization 3 milliLiter(s) Nebulizer every 6 hours PRN Shortness of Breath and/or Wheezing  dextrose Gel 1 Dose(s) Oral once PRN Blood Glucose LESS THAN 70 milliGRAM(s)/deciliter  glucagon  Injectable 1 milliGRAM(s) IntraMuscular once PRN Glucose LESS THAN 70 milligrams/deciliter  haloperidol    Injectable 1 milliGRAM(s) IntraMuscular every 6 hours PRN Agitation  ondansetron Injectable 4 milliGRAM(s) IV Push every 6 hours PRN Nausea      Nicole: Daily Nicole Order Placed	   Indication:Strict I and O's   Central Line:  Medication / TPN Administration         ICU Vital Signs Last 24 Hrs  T(C): 38.1 (09 May 2018 04:36), Max: 38.1 (09 May 2018 01:16)  T(F): 100.5 (09 May 2018 04:36), Max: 100.5 (09 May 2018 01:16)  HR: 92 (09 May 2018 07:00) (70 - 110)  BP: 165/74 (09 May 2018 07:00) (92/49 - 165/74)  BP(mean): 93 (09 May 2018 07:00) (68 - 128)  ABP: --  ABP(mean): --  RR: 83 (09 May 2018 07:00) (16 - 141)  SpO2: 98% (09 May 2018 07:00) (93% - 99%)      Physical Exam:    General: NAD, responding to verbal orders, not in acute distress  HEENT: NC/AT, EOMI, PERRLA, normal hearing, no oral lesions, neck supple w/o LAD, intubated, +NGT in place  Pulmonary: Nonlabored breathing, no respiratory distress, on 2L NC, bibasilar crackles but improving  Cardiovascular: NSR, no murmurs  Abdominal: soft, mild tender over the wound VAC, +rashes, healing, non distended  Extremities: WWP, 3/5 strength x 4, no clubbing/cyanosis/edema  Neuro: A/O x3, CNs II-XII grossly intact, normal motor/sensation, no focal deficits  Vasc: palpable distal pulses  Skin: no rash  MSK: no joint swelling    Lines/tubes/drains: LEANN drain over RLQ, bilious output      I&O's Summary    08 May 2018 07:01  -  09 May 2018 07:00  --------------------------------------------------------  IN: 1733.8 mL / OUT: 3030 mL / NET: -1296.2 mL    09 May 2018 07:01  -  09 May 2018 07:52  --------------------------------------------------------  IN: 84 mL / OUT: 50 mL / NET: 34 mL        LABS:                        8.8    12.5  )-----------( 356      ( 09 May 2018 04:11 )             28.1     05-09    135  |  93<L>  |  62<H>  ----------------------------<  169<H>  4.0   |  28  |  1.08    Ca    10.4      09 May 2018 04:11  Phos  3.1     05-09  Mg     2.5     05-09          CAPILLARY BLOOD GLUCOSE      POCT Blood Glucose.: 155 mg/dL (09 May 2018 06:23)  POCT Blood Glucose.: 178 mg/dL (09 May 2018 03:20)  POCT Blood Glucose.: 140 mg/dL (08 May 2018 17:06)  POCT Blood Glucose.: 143 mg/dL (08 May 2018 11:19)        Cultures:    RADIOLOGY & ADDITIONAL STUDIES:

## 2018-05-09 NOTE — CONSULT NOTE ADULT - SUBJECTIVE AND OBJECTIVE BOX
Vascular Access Service Consult Note    78yFemaleHEALTH ISSUES - PROBLEM Dx:             Diagnosis:  infected abdominal wound    Indications for Vascular Access (Check all that apply)  [  ]  Antibiotic Therapy       Antibiotic Prescribed:                                                                             Expected Duration of Therapy:               [  ]  IV Hydration  [ X ]  Total Parenteral Nutrition  [  ]  Chemotherapy  [  ]  Difficult Venous Access  [  ]  CVP monitoring  [  ]  Medications with high potential for tissue necrosis on extravasation  [  ]  Other    Screening (Check all that apply)  Previous Radiation to chest  [  ] Yes      [ X ]  No  Breast Cancer                          [  ] Left     [  ]  Right    [ X ]  No  Lymph Node Dissection         [  ] Left     [  ]  Right    [ X ]  No  Pacemaker or ICD                   [  ] Left     [  ]  Right    [X  ]  No  Upper Extremity DVT             [  ] Left     [  ]  Right    [ X ]  No  Chronic Kidney Disease         [  ]  Yes     [  X]  No  Hemodialysis                           [  ]  Yes     [ X ]  No  AV Fistula/ Graft                     [  ]  Left    [  ]  Right    [X  ]  No  Temp>101F in past 24 H       [  ]  Yes     [  X]  No  H/O PICC/Midline                   [  ]  Yes     [ X ]  No    Lab data:                        8.8    12.5  )-----------( 356      ( 09 May 2018 04:11 )             28.1     05-09    135  |  93<L>  |  62<H>  ----------------------------<  169<H>  4.0   |  28  |  1.08    Ca    10.4      09 May 2018 04:11  Phos  3.1     05-09  Mg     2.5     05-09                I have reviewed the chart, interviewed and examined the patient and determined that this patient:  [X  ] Is a candidate for a PICC line  [  ] Is a candidate for a Midline  [  ] Is not a candidate for vascular access device (reason)    Lumens:    [  ] Single  [ X ] Double

## 2018-05-09 NOTE — PROCEDURE NOTE - NSPROCDETAILS_GEN_ALL_CORE
sterile dressing applied/guidewire recovered/lumen(s) aspirated and flushed/ultrasound guidance/sterile technique, catheter placed
location identified, draped/prepped, sterile technique used/sterile dressing applied/ultrasound assessment/sterile technique, catheter placed/supine position/ultrasound guidance

## 2018-05-10 LAB
ANION GAP SERPL CALC-SCNC: 13 MMOL/L — SIGNIFICANT CHANGE UP (ref 5–17)
BUN SERPL-MCNC: 61 MG/DL — HIGH (ref 7–23)
C DIFF BY PCR RESULT: NEGATIVE — SIGNIFICANT CHANGE UP
C DIFF GDH STL QL: SIGNIFICANT CHANGE UP
C DIFF GDH STL QL: SIGNIFICANT CHANGE UP
C DIFF TOX GENS STL QL NAA+PROBE: SIGNIFICANT CHANGE UP
CALCIUM SERPL-MCNC: 10.3 MG/DL — SIGNIFICANT CHANGE UP (ref 8.4–10.5)
CHLORIDE SERPL-SCNC: 99 MMOL/L — SIGNIFICANT CHANGE UP (ref 96–108)
CO2 SERPL-SCNC: 28 MMOL/L — SIGNIFICANT CHANGE UP (ref 22–31)
CREAT SERPL-MCNC: 1.01 MG/DL — SIGNIFICANT CHANGE UP (ref 0.5–1.3)
GLUCOSE BLDC GLUCOMTR-MCNC: 135 MG/DL — HIGH (ref 70–99)
GLUCOSE BLDC GLUCOMTR-MCNC: 155 MG/DL — HIGH (ref 70–99)
GLUCOSE BLDC GLUCOMTR-MCNC: 181 MG/DL — HIGH (ref 70–99)
GLUCOSE SERPL-MCNC: 128 MG/DL — HIGH (ref 70–99)
HCT VFR BLD CALC: 26.3 % — LOW (ref 34.5–45)
HGB BLD-MCNC: 8.2 G/DL — LOW (ref 11.5–15.5)
MAGNESIUM SERPL-MCNC: 2.6 MG/DL — SIGNIFICANT CHANGE UP (ref 1.6–2.6)
MCHC RBC-ENTMCNC: 30.8 PG — SIGNIFICANT CHANGE UP (ref 27–34)
MCHC RBC-ENTMCNC: 31.2 G/DL — LOW (ref 32–36)
MCV RBC AUTO: 98.9 FL — SIGNIFICANT CHANGE UP (ref 80–100)
PHOSPHATE SERPL-MCNC: 3.2 MG/DL — SIGNIFICANT CHANGE UP (ref 2.5–4.5)
PLATELET # BLD AUTO: 335 K/UL — SIGNIFICANT CHANGE UP (ref 150–400)
POTASSIUM SERPL-MCNC: 4.3 MMOL/L — SIGNIFICANT CHANGE UP (ref 3.5–5.3)
POTASSIUM SERPL-SCNC: 4.3 MMOL/L — SIGNIFICANT CHANGE UP (ref 3.5–5.3)
RBC # BLD: 2.66 M/UL — LOW (ref 3.8–5.2)
RBC # FLD: 17.9 % — HIGH (ref 10.3–16.9)
SODIUM SERPL-SCNC: 140 MMOL/L — SIGNIFICANT CHANGE UP (ref 135–145)
WBC # BLD: 11 K/UL — HIGH (ref 3.8–10.5)
WBC # FLD AUTO: 11 K/UL — HIGH (ref 3.8–10.5)

## 2018-05-10 PROCEDURE — 71045 X-RAY EXAM CHEST 1 VIEW: CPT | Mod: 26

## 2018-05-10 PROCEDURE — 99233 SBSQ HOSP IP/OBS HIGH 50: CPT | Mod: GC

## 2018-05-10 RX ORDER — QUETIAPINE FUMARATE 200 MG/1
12.5 TABLET, FILM COATED ORAL EVERY 12 HOURS
Qty: 0 | Refills: 0 | Status: DISCONTINUED | OUTPATIENT
Start: 2018-05-10 | End: 2018-05-10

## 2018-05-10 RX ORDER — ELECTROLYTE SOLUTION,INJ
1 VIAL (ML) INTRAVENOUS
Qty: 0 | Refills: 0 | Status: DISCONTINUED | OUTPATIENT
Start: 2018-05-10 | End: 2018-05-10

## 2018-05-10 RX ORDER — I.V. FAT EMULSION 20 G/100ML
0.54 EMULSION INTRAVENOUS
Qty: 50 | Refills: 0 | Status: DISCONTINUED | OUTPATIENT
Start: 2018-05-10 | End: 2018-05-10

## 2018-05-10 RX ORDER — ACETAMINOPHEN 500 MG
650 TABLET ORAL EVERY 6 HOURS
Qty: 0 | Refills: 0 | Status: DISCONTINUED | OUTPATIENT
Start: 2018-05-10 | End: 2018-06-04

## 2018-05-10 RX ADMIN — MEROPENEM 1000 MILLIGRAM(S): 1 INJECTION INTRAVENOUS at 19:09

## 2018-05-10 RX ADMIN — PANTOPRAZOLE SODIUM 40 MILLIGRAM(S): 20 TABLET, DELAYED RELEASE ORAL at 12:18

## 2018-05-10 RX ADMIN — Medication 2: at 05:00

## 2018-05-10 RX ADMIN — I.V. FAT EMULSION 20.8 GM/KG/DAY: 20 EMULSION INTRAVENOUS at 22:03

## 2018-05-10 RX ADMIN — MICAFUNGIN SODIUM 210 MILLIGRAM(S): 100 INJECTION, POWDER, LYOPHILIZED, FOR SOLUTION INTRAVENOUS at 12:18

## 2018-05-10 RX ADMIN — Medication 650 MILLIGRAM(S): at 11:15

## 2018-05-10 RX ADMIN — MEROPENEM 1000 MILLIGRAM(S): 1 INJECTION INTRAVENOUS at 06:05

## 2018-05-10 RX ADMIN — Medication 2: at 12:18

## 2018-05-10 RX ADMIN — Medication 650 MILLIGRAM(S): at 10:14

## 2018-05-10 RX ADMIN — Medication 1 EACH: at 20:23

## 2018-05-10 RX ADMIN — Medication 75 MICROGRAM(S): at 06:00

## 2018-05-10 NOTE — CHART NOTE - NSCHARTNOTEFT_GEN_A_CORE
Admitting Diagnosis:   Patient is a 78y old  Female who presents with a chief complaint of Abdominal wound and multifactorial sepsis (2018 21:02)      PAST MEDICAL & SURGICAL HISTORY:  Hypothyroidism  GERD (gastroesophageal reflux disease)  Obesity  DM (diabetes mellitus)  HLD (hyperlipidemia)  HTN (hypertension)  H/O ventral hernia repair      Current Nutrition Order:  TPN via central line @ 300g Dextrose, 72g AA, 50g Lipids to provide 1808 kcal, 72g protein, GIR 2.23, 1.2g/kg IBW protein   Osmolite 1.2 Nicholas @ 10mL/hr x 24hrs via NGT providin mL TV, 288 kcal, 13g protein, 197mL free H2O, .21g/kg IBW protein     Total nutrition support providin kcal, 85g protein. 1.39g/kg IBW protein    PO Intake: Good (%) [   ]  Fair (50-75%) [   ] Poor (<25%) [   ]- N/A NPO    GI Issues: Rectal tube in place    Pain: Unable to assess at this time 2/2 AMS; pt slightly restless/agitated today but does not appear in pain     Skin Integrity: Open abdominal wound w/vac, B/L buttocks stage II, generalized 2+ edema, L. IJ drain      Labs:   05-10    140  |  99  |  61<H>  ----------------------------<  128<H>  4.3   |  28  |  1.01    Ca    10.3      10 May 2018 03:44  Phos  3.2     05-10  Mg     2.6     05-10      CAPILLARY BLOOD GLUCOSE      POCT Blood Glucose.: 181 mg/dL (10 May 2018 11:41)  POCT Blood Glucose.: 155 mg/dL (10 May 2018 04:18)  POCT Blood Glucose.: 141 mg/dL (09 May 2018 23:15)  POCT Blood Glucose.: 150 mg/dL (09 May 2018 16:45)      Medications:  MEDICATIONS  (STANDING):  dextrose 5%. 1000 milliLiter(s) (50 mL/Hr) IV Continuous <Continuous>  dextrose 50% Injectable 12.5 Gram(s) IV Push once  dextrose 50% Injectable 25 Gram(s) IV Push once  dextrose 50% Injectable 25 Gram(s) IV Push once  fat emulsion (Plant Based) 20% Infusion 0.535 Gm/kG/Day (20.8 mL/Hr) IV Continuous <Continuous>  insulin lispro (HumaLOG) corrective regimen sliding scale   SubCutaneous every 6 hours  leuprolide depot Injectable (LUPRON-DEPOT) 7.5 milliGRAM(s) IntraMuscular every 4 weeks  levothyroxine Injectable 75 MICROGram(s) IV Push <User Schedule>  meropenem Injectable 1000 milliGRAM(s) IV Push every 12 hours  micafungin IVPB 100 milliGRAM(s) IV Intermittent daily  pantoprazole   Suspension 40 milliGRAM(s) Oral daily  Parenteral Nutrition - Adult 1 Each (51 mL/Hr) TPN Continuous <Continuous>  Parenteral Nutrition - Adult 1 Each (50 mL/Hr) TPN Continuous <Continuous>  sodium chloride 0.9% lock flush 20 milliLiter(s) IV Push once    MEDICATIONS  (PRN):  acetaminophen    Suspension. 650 milliGRAM(s) Oral every 6 hours PRN Moderate Pain (4 - 6)  ALBUTerol/ipratropium for Nebulization 3 milliLiter(s) Nebulizer every 6 hours PRN Shortness of Breath and/or Wheezing  dextrose Gel 1 Dose(s) Oral once PRN Blood Glucose LESS THAN 70 milliGRAM(s)/deciliter  glucagon  Injectable 1 milliGRAM(s) IntraMuscular once PRN Glucose LESS THAN 70 milligrams/deciliter  ondansetron Injectable 4 milliGRAM(s) IV Push every 6 hours PRN Nausea  sodium chloride 0.9% lock flush 10 milliLiter(s) IV Push every 1 hour PRN After each medication administration  sodium chloride 0.9% lock flush 10 milliLiter(s) IV Push every 12 hours PRN Lumen of catheter NOT used      Weight: 93.6kg  Daily     Daily     Weight Change: No new weights recorded since admit     Estimated energy needs: Ideal body weight (61kg) used for calculations as pt >120% of IBW. needs estimated for older age; adjusted for sepsis, wound healing  Calories: 25-30 kcal/kg = 8667-2492 kcal/day  Protein: 1.2-1.4 g/kg = 73-85g protein/day  Fluids: 30-35 mL/kg = 6450-9805 mL/day    Subjective: 77 yo/female with PMhx DM, HTN, recent ventral hernia repair c/b PE, anemia, GIB, admitted with sepsis 2/2 abdominal wound, UTI and bacteremia. Also found to have small bowel perf w/contained leak. Pt intubated for airway protection on . Successfully extubated on . PICC line placed. Breathing comfortably on room air, though agitated/restless today. TPN running with lipids. TF running at goal rate of 10ml/hr with no residuals noted per RN. Lytes WNL. , 141mg/dL, BUN/Cr WNL.    Previous Nutrition Diagnosis:  Inadequate oral intake RT inability to meet needs via oral intake 2/2 AMS AEB NPO    Active [ X- nutrition support dependent  ]  Resolved [   ]    If resolved, new PES:     Goal: Pt will continue to meet % of EER via tolerated route(s)     Recommendations:  1. Continue with TPN order of 300g Dextrose, 72g AA, 50g Lipids to provide 1808 kcal, 72g protein, GIR 2.23, 1.2g/kg IBW protein   Recommend checking TG weekly. Check Mg, Phos, K daily and POC BG Q6hrs. Trend daily weights. Fluids and lytes per MD discretion.   2. Continue with trickle feeds of Osmolite 1.2 Nicholas; continue to use the gut as feasible and tolerated (increase to 20mL/hr as tolerated and feasible)  3. Keep nutrition in line with care at all times   4. Trend weights (RN aware to take new weight)    Education: N/A 2/2 AMS    Risk Level: High [ X  ] Moderate [   ] Low [   ]

## 2018-05-10 NOTE — PROGRESS NOTE ADULT - ASSESSMENT
79 y/o F with sepsis from open abdominal wound infection, MSSA bacteremia present on admission, and UTI present on admission found to have contained small bowel leak, s/p IR drainage, altered mental status, resolved, with acute respiratory failure on CPAP trial,vaginal bleeding s/p endometrial biopsy showing metaplasia. Currently doing well neurologically, extubated and alert.     Neuro: off precedex, holding:  seroquel, aripiprazole, escitalapram   CV: HD stable s/p severe sepsis, off albumin holding amlodipine, 4/19 Echo  65-70%. Lasix 80mg BID and off metolazone 10 mg - aiming for negative fluid balance >1L  Pulm: On NC 2L, satting well, +crackles and secretion around trachea, need good suctioning and pulmonary toilet  GI: NPO, protonix, on tube feed at 10cc/hr , on TPN and Protonix.   : Nicole (for strict I/O) S/p AKF on HD now resolved. UTI (present on admission) + Uterine fibroids on CT scan with vaginal bleeding, gyn consulted - cervical biopsy with metaplasia. The BUN/creatinine fairly stable.  ID: ESBL Ecoli in Urine resistant to Zosyn: Jose L( 4/17-) MSSA in blood: Oxacillin ( 4/17-5/2) Kai virus (good hand washing), TTE neg for endocarditis. Continuing micafungin for candida tropicalis and glabrata.  Endo: ISS, Synthroid  Insulin  in TPN: 50 units with improved control, on leuprolide for uterine fibroid  PPx: sqh held for low plts  PE:Sp IVC filter on 3/30   Lines: PIV, LIJ TLC (4/29-), /// s/p L Rad Art line( 4/15-4/18),  Rt IJ TLC from OSH ( 4/15-)  Wounds: Wound vac to midline incision , change MWF

## 2018-05-10 NOTE — PROGRESS NOTE ADULT - ASSESSMENT
A/P  78 year old female, morbid obesity, DM, ventral hernia repair with mesh 3/2 c/b wound dehiscence, recent ATN requiring temporary HD (Permacath removed 4/10), who presents as a transfer from OSH for management of multifactorial sepsis and open abdominal wound. Patient found to have ESBL-producing E.Coli UTI and MSSA bacteremia, origin likely coming from abdomen/pelvis vs. prior HD catheter. Patient intubated 4/25 for acute hypoxic respiratory failure.  CT 4/18 showed SB perforation with pelvic collection, still present on CT on 4/25.  She is s/p IR drainage of collection, with culture growing candida lusitaniae and Glabrata.  Would like to begin removing antibiotics, however awaiting plan from surgery.  completed 14 d course of oxacillin from first negative culture (4/17;  last day 4/30)  pt with increasing fever curve and leukocytosis  wound VAC changed yesterday, shows improvement, triple lumen dc'd, PICC line placed  LEANN still draining brown/dark green liquid however output is stable   repeat Clx : NGTD    Recommend:  - Continue meropenem and micafungin at current dose until surgery   - BPH  - will monitor fever curve and leukocytosis will consider CT chest if remained febrile or leukocytosis worsens   - Plan for OR on Tuesday 5/15 with Dr. Ngo   - ID will

## 2018-05-10 NOTE — PROGRESS NOTE ADULT - SUBJECTIVE AND OBJECTIVE BOX
24 hr events:  o/n: SBPs to 80s transiently around 7 pm. Mentation not worse, EKG unchanged, no signs of bleeding. Improved to 130s by 8 pm on its own. Lasix not given due to low BPs. Temp 101 rectal at 6 AM.    : WBC up to 12, UO -1.3, blood cultures, UA done, wound vac changed, as per edi, tentatively RTOR with GYn on tuesday (5/15), NGT replaced, central line removed, PICC placed, redeuced lasix to 80mg QD, OOB with PT    SUBJECTIVE: No nausea/vomiting, no pain, not able to follow verbal command but responding to calls.     MEDICATIONS  (STANDING):  dextrose 5%. 1000 milliLiter(s) (50 mL/Hr) IV Continuous <Continuous>  dextrose 50% Injectable 12.5 Gram(s) IV Push once  dextrose 50% Injectable 25 Gram(s) IV Push once  dextrose 50% Injectable 25 Gram(s) IV Push once  insulin lispro (HumaLOG) corrective regimen sliding scale   SubCutaneous every 6 hours  leuprolide depot Injectable (LUPRON-DEPOT) 7.5 milliGRAM(s) IntraMuscular every 4 weeks  levothyroxine Injectable 75 MICROGram(s) IV Push <User Schedule>  meropenem Injectable 1000 milliGRAM(s) IV Push every 12 hours  micafungin IVPB 100 milliGRAM(s) IV Intermittent daily  pantoprazole   Suspension 40 milliGRAM(s) Oral daily  Parenteral Nutrition - Adult 1 Each (50 mL/Hr) TPN Continuous <Continuous>  sodium chloride 0.9% lock flush 20 milliLiter(s) IV Push once    MEDICATIONS  (PRN):  ALBUTerol/ipratropium for Nebulization 3 milliLiter(s) Nebulizer every 6 hours PRN Shortness of Breath and/or Wheezing  dextrose Gel 1 Dose(s) Oral once PRN Blood Glucose LESS THAN 70 milliGRAM(s)/deciliter  glucagon  Injectable 1 milliGRAM(s) IntraMuscular once PRN Glucose LESS THAN 70 milligrams/deciliter  haloperidol    Injectable 1 milliGRAM(s) IntraMuscular every 6 hours PRN Agitation  ondansetron Injectable 4 milliGRAM(s) IV Push every 6 hours PRN Nausea  sodium chloride 0.9% lock flush 10 milliLiter(s) IV Push every 1 hour PRN After each medication administration  sodium chloride 0.9% lock flush 10 milliLiter(s) IV Push every 12 hours PRN Lumen of catheter NOT used      Nicole:	 Daily Nicole Order Placed	   Indication:	  [ ] Strict I and O's     Central Line:  Medication / TPN Administration         ICU Vital Signs Last 24 Hrs  T(C): 38.3 (10 May 2018 05:00), Max: 38.3 (10 May 2018 05:00)  T(F): 101 (10 May 2018 05:00), Max: 101 (10 May 2018 05:00)  HR: 84 (10 May 2018 09:00) (78 - 96)  BP: 131/85 (10 May 2018 09:00) (83/66 - 163/78)  BP(mean): 107 (10 May 2018 09:00) (56 - 133)  ABP: --  ABP(mean): --  RR: 24 (10 May 2018 09:00) (20 - 34)  SpO2: 92% (10 May 2018 09:00) (92% - 98%)      Physical Exam:    General: NAD, responding to verbal orders, not in acute distress  HEENT: NC/AT, EOMI, PERRLA, normal hearing, no oral lesions, neck supple w/o LAD, intubated, +NGT in place  Pulmonary: Nonlabored breathing, no respiratory distress, on 2L NC, bibasilar crackles but improving  Cardiovascular: NSR, no murmurs  Abdominal: soft, mild tender over the wound VAC, +rashes, healing, non distended  Extremities: WWP, 3/5 strength x 4, no clubbing/cyanosis/edema  Neuro: A/O x3, CNs II-XII grossly intact, normal motor/sensation, no focal deficits  Vasc: palpable distal pulses  Skin: no rash  MSK: no joint swelling    Lines/tubes/drains: LEANN drain over RLQ, bilious output          I&O's Summary    09 May 2018 07:  -  10 May 2018 07:00  --------------------------------------------------------  IN: 1896 mL / OUT: 1680 mL / NET: 216 mL    10 May 2018 07:01  -  10 May 2018 09:27  --------------------------------------------------------  IN: 81 mL / OUT: 40 mL / NET: 41 mL        LABS:                        8.2    11.0  )-----------( 335      ( 10 May 2018 03:44 )             26.3     05-10    140  |  99  |  61<H>  ----------------------------<  128<H>  4.3   |  28  |  1.01    Ca    10.3      10 May 2018 03:44  Phos  3.2     05-10  Mg     2.6     05-10        Urinalysis Basic - ( 09 May 2018 09:56 )    Color: Yellow / Appearance: Clear / S.010 / pH: x  Gluc: x / Ketone: NEGATIVE  / Bili: Negative / Urobili: 0.2 E.U./dL   Blood: x / Protein: Trace mg/dL / Nitrite: NEGATIVE   Leuk Esterase: NEGATIVE / RBC: Many /HPF / WBC > 10 /HPF   Sq Epi: x / Non Sq Epi: 0-5 /HPF / Bacteria: Present /HPF      CAPILLARY BLOOD GLUCOSE      POCT Blood Glucose.: 155 mg/dL (10 May 2018 04:18)  POCT Blood Glucose.: 141 mg/dL (09 May 2018 23:15)  POCT Blood Glucose.: 150 mg/dL (09 May 2018 16:45)  POCT Blood Glucose.: 146 mg/dL (09 May 2018 12:22)        Cultures:Culture Results:   No growth at 12 hours ( @ 12:41)  Culture Results:   No growth at 12 hours ( @ 12:41)      RADIOLOGY & ADDITIONAL STUDIES:

## 2018-05-10 NOTE — PROGRESS NOTE ADULT - SUBJECTIVE AND OBJECTIVE BOX
O/N Events:  Subjective:    VITALS  Vital Signs Last 24 Hrs  T(C): 37.7 (10 May 2018 09:59), Max: 38.3 (10 May 2018 05:00)  T(F): 99.9 (10 May 2018 09:59), Max: 101 (10 May 2018 05:00)  HR: 64 (10 May 2018 12:00) (64 - 96)  BP: 114/73 (10 May 2018 12:00) (83/66 - 141/68)  BP(mean): 92 (10 May 2018 12:00) (56 - 113)  RR: 24 (10 May 2018 12:00) (20 - 34)  SpO2: 94% (10 May 2018 12:00) (92% - 98%)    I&O's Summary    09 May 2018 07:01  -  10 May 2018 07:00  --------------------------------------------------------  IN: 1896 mL / OUT: 1680 mL / NET: 216 mL    10 May 2018 07:01  -  10 May 2018 12:28  --------------------------------------------------------  IN: 261 mL / OUT: 290 mL / NET: -29 mL        CAPILLARY BLOOD GLUCOSE      POCT Blood Glucose.: 181 mg/dL (10 May 2018 11:41)  POCT Blood Glucose.: 155 mg/dL (10 May 2018 04:18)  POCT Blood Glucose.: 141 mg/dL (09 May 2018 23:15)  POCT Blood Glucose.: 150 mg/dL (09 May 2018 16:45)      PHYSICAL EXAM  General: A&Ox 3; NAD  Head: NC/AT;   Eyes: PERRL; EOMI; anicteric sclera  Neck: Supple; no JVD  Respiratory: CTA B/L; no wheezes/crackles/rales auscultated w/ good air movement  Cardiovascular: Regular rhythm/rate; S1/S2; no gallops or murmurs auscultated  Gastrointestinal: Soft; NTND w/out rebound tenderness or guarding; bowel sounds normal  Extremities: WWP; no edema or cyanosis; radial/pedal pulses palpable  Neurological:  CNII-XII grossly intact; no obvious focal deficits    MEDICATIONS  (STANDING):  dextrose 5%. 1000 milliLiter(s) (50 mL/Hr) IV Continuous <Continuous>  dextrose 50% Injectable 12.5 Gram(s) IV Push once  dextrose 50% Injectable 25 Gram(s) IV Push once  dextrose 50% Injectable 25 Gram(s) IV Push once  fat emulsion (Plant Based) 20% Infusion 0.535 Gm/kG/Day (20.8 mL/Hr) IV Continuous <Continuous>  insulin lispro (HumaLOG) corrective regimen sliding scale   SubCutaneous every 6 hours  leuprolide depot Injectable (LUPRON-DEPOT) 7.5 milliGRAM(s) IntraMuscular every 4 weeks  levothyroxine Injectable 75 MICROGram(s) IV Push <User Schedule>  meropenem Injectable 1000 milliGRAM(s) IV Push every 12 hours  micafungin IVPB 100 milliGRAM(s) IV Intermittent daily  pantoprazole   Suspension 40 milliGRAM(s) Oral daily  Parenteral Nutrition - Adult 1 Each (51 mL/Hr) TPN Continuous <Continuous>  Parenteral Nutrition - Adult 1 Each (50 mL/Hr) TPN Continuous <Continuous>  sodium chloride 0.9% lock flush 20 milliLiter(s) IV Push once    MEDICATIONS  (PRN):  acetaminophen    Suspension. 650 milliGRAM(s) Oral every 6 hours PRN Moderate Pain (4 - 6)  ALBUTerol/ipratropium for Nebulization 3 milliLiter(s) Nebulizer every 6 hours PRN Shortness of Breath and/or Wheezing  dextrose Gel 1 Dose(s) Oral once PRN Blood Glucose LESS THAN 70 milliGRAM(s)/deciliter  glucagon  Injectable 1 milliGRAM(s) IntraMuscular once PRN Glucose LESS THAN 70 milligrams/deciliter  ondansetron Injectable 4 milliGRAM(s) IV Push every 6 hours PRN Nausea  sodium chloride 0.9% lock flush 10 milliLiter(s) IV Push every 1 hour PRN After each medication administration  sodium chloride 0.9% lock flush 10 milliLiter(s) IV Push every 12 hours PRN Lumen of catheter NOT used      LABS                        8.2    11.0  )-----------( 335      ( 10 May 2018 03:44 )             26.3     05-10    140  |  99  |  61<H>  ----------------------------<  128<H>  4.3   |  28  |  1.01    Ca    10.3      10 May 2018 03:44  Phos  3.2     05-10  Mg     2.6     05-10          Urinalysis Basic - ( 09 May 2018 09:56 )    Color: Yellow / Appearance: Clear / S.010 / pH: x  Gluc: x / Ketone: NEGATIVE  / Bili: Negative / Urobili: 0.2 E.U./dL   Blood: x / Protein: Trace mg/dL / Nitrite: NEGATIVE   Leuk Esterase: NEGATIVE / RBC: Many /HPF / WBC > 10 /HPF   Sq Epi: x / Non Sq Epi: 0-5 /HPF / Bacteria: Present /HPF            IMAGING/EKG/ETC  EKG:   Xray:  CT: O/N Events:  Subjective:  pt awake, not communicative, spiked fever of 101in am, HDS,O2 requirement stable  leukocytosis trending down.  pt reculture: NGTD, C.diff negative, UA with pyuria negative nitrite /leukocyte esterase   CXR: with ? Left perihilar infiltrate/air bronchogram, no increase in sputum production or hypoxia  lungs clear anteriorly/ abdomen soft and nontender    VITALS  Vital Signs Last 24 Hrs  T(C): 37.7 (10 May 2018 09:59), Max: 38.3 (10 May 2018 05:00)  T(F): 99.9 (10 May 2018 09:59), Max: 101 (10 May 2018 05:00)  HR: 64 (10 May 2018 12:00) (64 - 96)  BP: 114/73 (10 May 2018 12:00) (83/66 - 141/68)  BP(mean): 92 (10 May 2018 12:00) (56 - 113)  RR: 24 (10 May 2018 12:00) (20 - 34)  SpO2: 94% (10 May 2018 12:00) (92% - 98%)  I&O's Summary    09 May 2018 07:01  -  10 May 2018 07:00  --------------------------------------------------------  IN: 1896 mL / OUT: 1680 mL / NET: 216 mL    10 May 2018 07:01  -  10 May 2018 12:28  --------------------------------------------------------  IN: 261 mL / OUT: 290 mL / NET: -29 mL    PHYSICAL EXAM  General: awake, nods head to questions however not really communicative, on 2 L NC  Eyes:  Sclerae anicteric, conjunctivae clear, PERRL  Ear/Nose/Throat:  MMM/ No thrush   Pulmonary: Bibasilar crackles, air entry equal on both sides, Right chest Port, access for HD  Cardiovascular: NSR, no murmurs  Abdominal: soft, obese, ND/NT wound VAC in place, no leak.   : suazo in place  Extremities: WWP, palpable dp and radial pulses BL, trace LE edema present       LABS                        8.2    11.0  )-----------( 335      ( 10 May 2018 03:44 )             26.3     05-10    140  |  99  |  61<H>  ----------------------------<  128<H>  4.3   |  28  |  1.01    Ca    10.3      10 May 2018 03:44  Phos  3.2     05-10  Mg     2.6     05-10    Urinalysis Basic - ( 09 May 2018 09:56 )    Color: Yellow / Appearance: Clear / S.010 / pH: x  Gluc: x / Ketone: NEGATIVE  / Bili: Negative / Urobili: 0.2 E.U./dL   Blood: x / Protein: Trace mg/dL / Nitrite: NEGATIVE   Leuk Esterase: NEGATIVE / RBC: Many /HPF / WBC > 10 /HPF   Sq Epi: x / Non Sq Epi: 0-5 /HPF / Bacteria: Present /HPF    Microbiology:  U Clx : + E.coli MDR  left pelvic fluid aspirate: + for yeast/ candida luitaniae and candida Glabrata  lusitaniae fluc sensitive/ glabrata fluc SDD 4    Imaging:   CT abd/ pelv with oral contrast 5/3  IMPRESSION:  Interval decrease in size of the extraperitoneal gas-filled collection   surrounding the enlarged and bulky uterus with an indwelling percutaneous   drainage catheter. However, enteric contrast is again seen within this   collection on the current study, indicating a persistent enteric   fistulous collection.  Increased amount of upper abdominal ascites.  Moderate bilateral pleural effusions with near complete collapse of the   right lower lobe and partial collapse of the left lower lobe.

## 2018-05-10 NOTE — PROGRESS NOTE ADULT - ATTENDING COMMENTS
Infectious Diseases Attending    Febrile overnight , but WBC slightly lower at 11,000.  All recent cultures are negative so far.    CXR shows ? air bronchogram left mid chest field - chest examonation is clear, however.    Plan:  continue current Rx, following Temp curve and CXR.  If status changes, would consider chest CT and/or bronchoscopy.    Will follow

## 2018-05-11 LAB
ANION GAP SERPL CALC-SCNC: 12 MMOL/L — SIGNIFICANT CHANGE UP (ref 5–17)
BUN SERPL-MCNC: 58 MG/DL — HIGH (ref 7–23)
C DIFF GDH STL QL: NEGATIVE — SIGNIFICANT CHANGE UP
C DIFF GDH STL QL: SIGNIFICANT CHANGE UP
CALCIUM SERPL-MCNC: 10.2 MG/DL — SIGNIFICANT CHANGE UP (ref 8.4–10.5)
CHLORIDE SERPL-SCNC: 103 MMOL/L — SIGNIFICANT CHANGE UP (ref 96–108)
CO2 SERPL-SCNC: 27 MMOL/L — SIGNIFICANT CHANGE UP (ref 22–31)
CREAT SERPL-MCNC: 0.89 MG/DL — SIGNIFICANT CHANGE UP (ref 0.5–1.3)
GLUCOSE BLDC GLUCOMTR-MCNC: 114 MG/DL — HIGH (ref 70–99)
GLUCOSE BLDC GLUCOMTR-MCNC: 124 MG/DL — HIGH (ref 70–99)
GLUCOSE BLDC GLUCOMTR-MCNC: 149 MG/DL — HIGH (ref 70–99)
GLUCOSE BLDC GLUCOMTR-MCNC: 150 MG/DL — HIGH (ref 70–99)
GLUCOSE BLDC GLUCOMTR-MCNC: 154 MG/DL — HIGH (ref 70–99)
GLUCOSE SERPL-MCNC: 135 MG/DL — HIGH (ref 70–99)
HCT VFR BLD CALC: 25.3 % — LOW (ref 34.5–45)
HGB BLD-MCNC: 8 G/DL — LOW (ref 11.5–15.5)
MAGNESIUM SERPL-MCNC: 2.6 MG/DL — SIGNIFICANT CHANGE UP (ref 1.6–2.6)
MCHC RBC-ENTMCNC: 31.4 PG — SIGNIFICANT CHANGE UP (ref 27–34)
MCHC RBC-ENTMCNC: 31.6 G/DL — LOW (ref 32–36)
MCV RBC AUTO: 99.2 FL — SIGNIFICANT CHANGE UP (ref 80–100)
PHOSPHATE SERPL-MCNC: 3.2 MG/DL — SIGNIFICANT CHANGE UP (ref 2.5–4.5)
PLATELET # BLD AUTO: 303 K/UL — SIGNIFICANT CHANGE UP (ref 150–400)
POTASSIUM SERPL-MCNC: 4.3 MMOL/L — SIGNIFICANT CHANGE UP (ref 3.5–5.3)
POTASSIUM SERPL-SCNC: 4.3 MMOL/L — SIGNIFICANT CHANGE UP (ref 3.5–5.3)
RBC # BLD: 2.55 M/UL — LOW (ref 3.8–5.2)
RBC # FLD: 17.6 % — HIGH (ref 10.3–16.9)
SODIUM SERPL-SCNC: 142 MMOL/L — SIGNIFICANT CHANGE UP (ref 135–145)
TRIGL SERPL-MCNC: 169 MG/DL — HIGH (ref 10–149)
WBC # BLD: 9.9 K/UL — SIGNIFICANT CHANGE UP (ref 3.8–10.5)
WBC # FLD AUTO: 9.9 K/UL — SIGNIFICANT CHANGE UP (ref 3.8–10.5)

## 2018-05-11 PROCEDURE — 93970 EXTREMITY STUDY: CPT | Mod: 26

## 2018-05-11 PROCEDURE — 71045 X-RAY EXAM CHEST 1 VIEW: CPT | Mod: 26,76

## 2018-05-11 PROCEDURE — 99233 SBSQ HOSP IP/OBS HIGH 50: CPT | Mod: GC

## 2018-05-11 RX ORDER — I.V. FAT EMULSION 20 G/100ML
0.54 EMULSION INTRAVENOUS
Qty: 50 | Refills: 0 | Status: DISCONTINUED | OUTPATIENT
Start: 2018-05-11 | End: 2018-05-11

## 2018-05-11 RX ORDER — ELECTROLYTE SOLUTION,INJ
1 VIAL (ML) INTRAVENOUS
Qty: 0 | Refills: 0 | Status: DISCONTINUED | OUTPATIENT
Start: 2018-05-11 | End: 2018-05-11

## 2018-05-11 RX ORDER — QUETIAPINE FUMARATE 200 MG/1
12.5 TABLET, FILM COATED ORAL EVERY 12 HOURS
Qty: 0 | Refills: 0 | Status: DISCONTINUED | OUTPATIENT
Start: 2018-05-11 | End: 2018-05-11

## 2018-05-11 RX ADMIN — Medication 2: at 12:50

## 2018-05-11 RX ADMIN — PANTOPRAZOLE SODIUM 40 MILLIGRAM(S): 20 TABLET, DELAYED RELEASE ORAL at 13:20

## 2018-05-11 RX ADMIN — QUETIAPINE FUMARATE 12.5 MILLIGRAM(S): 200 TABLET, FILM COATED ORAL at 13:19

## 2018-05-11 RX ADMIN — I.V. FAT EMULSION 20.83 GM/KG/DAY: 20 EMULSION INTRAVENOUS at 22:30

## 2018-05-11 RX ADMIN — MEROPENEM 1000 MILLIGRAM(S): 1 INJECTION INTRAVENOUS at 05:01

## 2018-05-11 RX ADMIN — MICAFUNGIN SODIUM 210 MILLIGRAM(S): 100 INJECTION, POWDER, LYOPHILIZED, FOR SOLUTION INTRAVENOUS at 13:20

## 2018-05-11 RX ADMIN — Medication 75 MICROGRAM(S): at 06:06

## 2018-05-11 RX ADMIN — MEROPENEM 1000 MILLIGRAM(S): 1 INJECTION INTRAVENOUS at 18:53

## 2018-05-11 NOTE — PROGRESS NOTE ADULT - SUBJECTIVE AND OBJECTIVE BOX
O/N Events:  Subjective:  afebrile, however fever curve is trending up, wbc count down to 9K, C.diff GDH +/ Toxins negative   CXR radiology read, clear     VITALS  Vital Signs Last 24 Hrs  T(C): 37.8 (11 May 2018 06:00), Max: 37.8 (11 May 2018 06:00)  T(F): 100 (11 May 2018 06:00), Max: 100 (11 May 2018 06:00)  HR: 72 (11 May 2018 08:00) (64 - 78)  BP: 149/63 (11 May 2018 08:00) (113/59 - 168/69)  BP(mean): 83 (11 May 2018 08:00) (75 - 115)  RR: 26 (11 May 2018 08:00) (16 - 33)  SpO2: 95% (11 May 2018 08:00) (94% - 98%)    I&O's Summary    10 May 2018 07:01  -  11 May 2018 07:00  --------------------------------------------------------  IN: 1939 mL / OUT: 1685 mL / NET: 254 mL    11 May 2018 07:01  -  11 May 2018 12:07  --------------------------------------------------------  IN: 80.8 mL / OUT: 35 mL / NET: 45.8 mL      PHYSICAL EXAM  General: awake, nods head to questions however not really communicative, on 2 L NC  Eyes:  Sclerae anicteric, conjunctivae clear, PERRL  Ear/Nose/Throat:  MMM/ No thrush   Pulmonary: Bibasilar crackles, air entry equal on both sides, Right chest Port, access for HD  Cardiovascular: NSR, no murmurs  Abdominal: soft, obese, ND/NT wound VAC in place, no leak.   : suazo in place  Extremities: WWP, palpable dp and radial pulses BL, trace LE edema present     LABS                        8.0    9.9   )-----------( 303      ( 11 May 2018 05:02 )             25.3     05-11    142  |  103  |  58<H>  ----------------------------<  135<H>  4.3   |  27  |  0.89    Ca    10.2      11 May 2018 05:02  Phos  3.2     05-11  Mg     2.6     05-11      Microbiology:  U Clx 4/18: + E.coli MDR  left pelvic fluid aspirate: + for yeast/ candida luitaniae and candida Glabrata  lusitaniae fluc sensitive/ glabrata fluc SDD 4

## 2018-05-11 NOTE — PROGRESS NOTE ADULT - SUBJECTIVE AND OBJECTIVE BOX
Overnight Events: N/A  Remains hemodynamically stable, afebrile.   Seroquel 12.5Q12 started, VSS, patient is more awake, VAC changed  Plan for OR Tuesday    Allergies    No Known Allergies    Intolerances        Vital Signs Last 24 Hrs  T(C): 37.8 (11 May 2018 06:00), Max: 37.8 (11 May 2018 06:00)  T(F): 100 (11 May 2018 06:00), Max: 100 (11 May 2018 06:00)  HR: 72 (11 May 2018 08:00) (64 - 78)  BP: 149/63 (11 May 2018 08:00) (113/59 - 168/69)  BP(mean): 83 (11 May 2018 08:00) (75 - 115)  RR: 26 (11 May 2018 08:00) (16 - 33)  SpO2: 95% (11 May 2018 08:00) (94% - 98%)    05-10 @ 07:01  -  05-11 @ 07:00  --------------------------------------------------------  IN: 1939 mL / OUT: 1685 mL / NET: 254 mL    05-11 @ 07:01 - 05-11 @ 11:55  --------------------------------------------------------  IN: 80.8 mL / OUT: 35 mL / NET: 45.8 mL      05-10 @ 07:01 - 05-11 @ 07:00  --------------------------------------------------------  IN: 1939 mL / OUT: 1685 mL / NET: 254 mL    05-11 @ 07:01 - 05-11 @ 11:55  --------------------------------------------------------  IN: 80.8 mL / OUT: 35 mL / NET: 45.8 mL          LABS:      CBC Full  -  ( 11 May 2018 05:02 )  WBC Count : 9.9 K/uL  Hemoglobin : 8.0 g/dL  Hematocrit : 25.3 %  Platelet Count - Automated : 303 K/uL  Mean Cell Volume : 99.2 fL  Mean Cell Hemoglobin : 31.4 pg  Mean Cell Hemoglobin Concentration : 31.6 g/dL  Auto Neutrophil # : x  Auto Lymphocyte # : x  Auto Monocyte # : x  Auto Eosinophil # : x  Auto Basophil # : x  Auto Neutrophil % : x  Auto Lymphocyte % : x  Auto Monocyte % : x  Auto Eosinophil % : x  Auto Basophil % : x    05-11    142  |  103  |  58<H>  ----------------------------<  135<H>  4.3   |  27  |  0.89    Ca    10.2      11 May 2018 05:02  Phos  3.2     05-11  Mg     2.6     05-11      RADIOLOGY & ADDITIONAL STUDIES (The following images were personally reviewed):      Physical Exam:   General: NAD, more awake, responding to voice, not in acute distress  HEENT: NC/AT, EOMI, PERRLA, normal hearing, no oral lesions, neck supple w/o LAD, intubated, +NGT in place  Pulmonary: Nonlabored breathing, no respiratory distress, on 2L NC, bibasilar crackles but improving  Cardiovascular: NSR, no murmurs  Abdominal: soft, non-tender throughout, no rebound or guarding, wound VAC holding suction,  Extremities: WWP, 3/5 strength x 4, no clubbing/cyanosis/edema  Neuro: A/O x3, CNs II-XII grossly intact, normal motor/sensation, no focal deficits  Vasc: palpable distal pulses  Skin: no rash  MSK: no joint swelling      A/p:  79 y/o F with sepsis from open abdominal wound infection, MSSA bacteremia present on admission, and UTI present on admission found to have contained small bowel leak    Neuro: off sedation. seroquel 12.5 BID. holding: aripiprazole, escitalapram   CV: HD stable s/p severe sepsis, holding amlodipine, 4/19 Echo  65-70%.  Pulm: extubated, satting well with NC 3L  GI: NPO, protonix, trickle TF via dobhoff, TPN   : Nicole S/p AKF on HD now resolved. UTI (present on admission) + Uterine fibroids on CT scan with vaginal bleeding, biopsy revealed tuball metaplasia. lupron 7.5 q month (given 5/5) - tentative RTOT on 5/15  ID: Kai virus (good hand washing),  ESBL Ecoli in Urine resistant to Zosyn: Jose L( 4/17-) Eden: ( 4/27-) D/c'd:MSSA in blood: Oxacillin ( 4/17-) TTE neg for endocarditis.  Endo: ISS, Synthroid  Insulin  in TPN: 25. Lupron Qmonthly  PPx: sqh held for low plts  PE:Sp IVC filter on 3/30   Lines: PIV, PICC (5/9-) /// s/p L Rad Art line( 4/15-4/18),  Rt IJ TLC from OSH ( 4/15-), LIJ TLC (4/29-5/9)  Wounds: Wound vac to midline incision , change MWF  PT/OT: PT ordered .

## 2018-05-11 NOTE — PROGRESS NOTE ADULT - ATTENDING COMMENTS
ID Attending    Stable.    Temp curve better.  WBC has fallen.    C.difficile PCR negative    Plan: continue current regimen          surgery per Dr. Ngo

## 2018-05-11 NOTE — PROGRESS NOTE ADULT - ASSESSMENT
A/P    78 year old female, morbid obesity, DM, ventral hernia repair with mesh 3/2 c/b wound dehiscence, recent ATN requiring temporary HD (Permacath removed 4/10), who presents as a transfer from OSH for management of multifactorial sepsis and open abdominal wound. Patient found to have ESBL-producing E.Coli UTI and MSSA bacteremia, origin likely coming from abdomen/pelvis vs. prior HD catheter. Patient intubated 4/25 for acute hypoxic respiratory failure.  CT 4/18 showed SB perforation with pelvic collection, still present on CT on 4/25.  She is s/p IR drainage of collection, with culture growing candida lusitaniae and Glabrata.  Would like to begin removing antibiotics, however awaiting plan from surgery.  completed 14 d course of oxacillin from first negative culture (4/17;  last day 4/30)  pt with increasing fever curve and leukocytosis  wound VAC changed yesterday, shows improvement, triple lumen dc'd, PICC line placed  LEANN still draining brown/dark green liquid however output is stable   repeat Clx : NGTD    Recommend:  - Continue meropenem and micafungin at current dose until surgery   - BPH  - please send C.diff PCR  - will monitor fever curve and leukocytosis, improving today   - Plan for OR on Tuesday 5/15 with Dr. Ngo   - ID will follow

## 2018-05-12 LAB
ALBUMIN SERPL ELPH-MCNC: 2.6 G/DL — LOW (ref 3.3–5)
ALP SERPL-CCNC: 118 U/L — SIGNIFICANT CHANGE UP (ref 40–120)
ALT FLD-CCNC: 67 U/L — HIGH (ref 10–45)
ANION GAP SERPL CALC-SCNC: 11 MMOL/L — SIGNIFICANT CHANGE UP (ref 5–17)
AST SERPL-CCNC: 61 U/L — HIGH (ref 10–40)
BILIRUB SERPL-MCNC: 0.6 MG/DL — SIGNIFICANT CHANGE UP (ref 0.2–1.2)
BUN SERPL-MCNC: 54 MG/DL — HIGH (ref 7–23)
CALCIUM SERPL-MCNC: 10.5 MG/DL — SIGNIFICANT CHANGE UP (ref 8.4–10.5)
CHLORIDE SERPL-SCNC: 106 MMOL/L — SIGNIFICANT CHANGE UP (ref 96–108)
CO2 SERPL-SCNC: 24 MMOL/L — SIGNIFICANT CHANGE UP (ref 22–31)
CREAT SERPL-MCNC: 0.88 MG/DL — SIGNIFICANT CHANGE UP (ref 0.5–1.3)
CULTURE RESULTS: NO GROWTH — SIGNIFICANT CHANGE UP
GLUCOSE BLDC GLUCOMTR-MCNC: 118 MG/DL — HIGH (ref 70–99)
GLUCOSE BLDC GLUCOMTR-MCNC: 130 MG/DL — HIGH (ref 70–99)
GLUCOSE BLDC GLUCOMTR-MCNC: 142 MG/DL — HIGH (ref 70–99)
GLUCOSE BLDC GLUCOMTR-MCNC: 145 MG/DL — HIGH (ref 70–99)
GLUCOSE SERPL-MCNC: 109 MG/DL — HIGH (ref 70–99)
HCT VFR BLD CALC: 28.4 % — LOW (ref 34.5–45)
HGB BLD-MCNC: 8.8 G/DL — LOW (ref 11.5–15.5)
MAGNESIUM SERPL-MCNC: 2.4 MG/DL — SIGNIFICANT CHANGE UP (ref 1.6–2.6)
MCHC RBC-ENTMCNC: 31 G/DL — LOW (ref 32–36)
MCHC RBC-ENTMCNC: 31 PG — SIGNIFICANT CHANGE UP (ref 27–34)
MCV RBC AUTO: 100 FL — SIGNIFICANT CHANGE UP (ref 80–100)
PHOSPHATE SERPL-MCNC: 3.2 MG/DL — SIGNIFICANT CHANGE UP (ref 2.5–4.5)
PLATELET # BLD AUTO: 328 K/UL — SIGNIFICANT CHANGE UP (ref 150–400)
POTASSIUM SERPL-MCNC: 4.6 MMOL/L — SIGNIFICANT CHANGE UP (ref 3.5–5.3)
POTASSIUM SERPL-SCNC: 4.6 MMOL/L — SIGNIFICANT CHANGE UP (ref 3.5–5.3)
PROT SERPL-MCNC: 7 G/DL — SIGNIFICANT CHANGE UP (ref 6–8.3)
RBC # BLD: 2.84 M/UL — LOW (ref 3.8–5.2)
RBC # FLD: 17.7 % — HIGH (ref 10.3–16.9)
SODIUM SERPL-SCNC: 141 MMOL/L — SIGNIFICANT CHANGE UP (ref 135–145)
SPECIMEN SOURCE: SIGNIFICANT CHANGE UP
WBC # BLD: 11.8 K/UL — HIGH (ref 3.8–10.5)
WBC # FLD AUTO: 11.8 K/UL — HIGH (ref 3.8–10.5)

## 2018-05-12 PROCEDURE — 71045 X-RAY EXAM CHEST 1 VIEW: CPT | Mod: 26

## 2018-05-12 PROCEDURE — 99233 SBSQ HOSP IP/OBS HIGH 50: CPT | Mod: GC

## 2018-05-12 RX ORDER — ELECTROLYTE SOLUTION,INJ
1 VIAL (ML) INTRAVENOUS
Qty: 0 | Refills: 0 | Status: DISCONTINUED | OUTPATIENT
Start: 2018-05-12 | End: 2018-05-13

## 2018-05-12 RX ORDER — HALOPERIDOL DECANOATE 100 MG/ML
0.5 INJECTION INTRAMUSCULAR ONCE
Qty: 0 | Refills: 0 | Status: COMPLETED | OUTPATIENT
Start: 2018-05-12 | End: 2018-05-12

## 2018-05-12 RX ORDER — QUETIAPINE FUMARATE 200 MG/1
12.5 TABLET, FILM COATED ORAL AT BEDTIME
Qty: 0 | Refills: 0 | Status: DISCONTINUED | OUTPATIENT
Start: 2018-05-12 | End: 2018-05-14

## 2018-05-12 RX ORDER — I.V. FAT EMULSION 20 G/100ML
0.54 EMULSION INTRAVENOUS
Qty: 50 | Refills: 0 | Status: DISCONTINUED | OUTPATIENT
Start: 2018-05-12 | End: 2018-05-12

## 2018-05-12 RX ORDER — PANTOPRAZOLE SODIUM 20 MG/1
40 TABLET, DELAYED RELEASE ORAL DAILY
Qty: 0 | Refills: 0 | Status: DISCONTINUED | OUTPATIENT
Start: 2018-05-12 | End: 2018-05-18

## 2018-05-12 RX ADMIN — MEROPENEM 1000 MILLIGRAM(S): 1 INJECTION INTRAVENOUS at 18:16

## 2018-05-12 RX ADMIN — MEROPENEM 1000 MILLIGRAM(S): 1 INJECTION INTRAVENOUS at 06:21

## 2018-05-12 RX ADMIN — PANTOPRAZOLE SODIUM 40 MILLIGRAM(S): 20 TABLET, DELAYED RELEASE ORAL at 11:32

## 2018-05-12 RX ADMIN — HALOPERIDOL DECANOATE 0.5 MILLIGRAM(S): 100 INJECTION INTRAMUSCULAR at 12:44

## 2018-05-12 RX ADMIN — MICAFUNGIN SODIUM 210 MILLIGRAM(S): 100 INJECTION, POWDER, LYOPHILIZED, FOR SOLUTION INTRAVENOUS at 11:33

## 2018-05-12 RX ADMIN — PANTOPRAZOLE SODIUM 40 MILLIGRAM(S): 20 TABLET, DELAYED RELEASE ORAL at 18:16

## 2018-05-12 RX ADMIN — Medication 75 MICROGRAM(S): at 06:20

## 2018-05-12 RX ADMIN — QUETIAPINE FUMARATE 12.5 MILLIGRAM(S): 200 TABLET, FILM COATED ORAL at 03:49

## 2018-05-12 RX ADMIN — I.V. FAT EMULSION 20.83 GM/KG/DAY: 20 EMULSION INTRAVENOUS at 22:15

## 2018-05-12 RX ADMIN — QUETIAPINE FUMARATE 12.5 MILLIGRAM(S): 200 TABLET, FILM COATED ORAL at 22:15

## 2018-05-12 NOTE — PROGRESS NOTE ADULT - SUBJECTIVE AND OBJECTIVE BOX
INTERVAL HPI/OVERNIGHT EVENTS: O/N: Dobhoff clogged - replaced  5/11: Seroquel 12.5Q12 started, VSS, patient is more awake, VAC changed    Pt seen and examined on AM rounds.      MEDICATIONS  (STANDING):  dextrose 5%. 1000 milliLiter(s) (50 mL/Hr) IV Continuous <Continuous>  dextrose 50% Injectable 12.5 Gram(s) IV Push once  dextrose 50% Injectable 25 Gram(s) IV Push once  dextrose 50% Injectable 25 Gram(s) IV Push once  fat emulsion (Plant Based) 20% Infusion 0.535 Gm/kG/Day (20.833 mL/Hr) IV Continuous <Continuous>  insulin lispro (HumaLOG) corrective regimen sliding scale   SubCutaneous every 6 hours  leuprolide depot Injectable (LUPRON-DEPOT) 7.5 milliGRAM(s) IntraMuscular every 4 weeks  levothyroxine Injectable 75 MICROGram(s) IV Push <User Schedule>  meropenem Injectable 1000 milliGRAM(s) IV Push every 12 hours  micafungin IVPB 100 milliGRAM(s) IV Intermittent daily  pantoprazole   Suspension 40 milliGRAM(s) Oral daily  Parenteral Nutrition - Adult 1 Each (51 mL/Hr) TPN Continuous <Continuous>  Parenteral Nutrition - Adult 1 Each (51 mL/Hr) TPN Continuous <Continuous>  QUEtiapine 12.5 milliGRAM(s) Oral at bedtime  sodium chloride 0.9% lock flush 20 milliLiter(s) IV Push once    MEDICATIONS  (PRN):  acetaminophen    Suspension. 650 milliGRAM(s) Oral every 6 hours PRN Moderate Pain (4 - 6)  ALBUTerol/ipratropium for Nebulization 3 milliLiter(s) Nebulizer every 6 hours PRN Shortness of Breath and/or Wheezing  dextrose Gel 1 Dose(s) Oral once PRN Blood Glucose LESS THAN 70 milliGRAM(s)/deciliter  glucagon  Injectable 1 milliGRAM(s) IntraMuscular once PRN Glucose LESS THAN 70 milligrams/deciliter  ondansetron Injectable 4 milliGRAM(s) IV Push every 6 hours PRN Nausea  sodium chloride 0.9% lock flush 10 milliLiter(s) IV Push every 1 hour PRN After each medication administration  sodium chloride 0.9% lock flush 10 milliLiter(s) IV Push every 12 hours PRN Lumen of catheter NOT used      Drug Dosing Weight  Height (cm): 170.2 (17 Apr 2018 16:18)  Weight (kg): 93.6 (17 Apr 2018 16:17)  BMI (kg/m2): 32.3 (17 Apr 2018 16:18)  BSA (m2): 2.05 (17 Apr 2018 16:18)      PAST MEDICAL & SURGICAL HISTORY:  Hypothyroidism  GERD (gastroesophageal reflux disease)  Obesity  DM (diabetes mellitus)  HLD (hyperlipidemia)  HTN (hypertension)  H/O ventral hernia repair      ICU Vital Signs Last 24 Hrs  T(C): 37.1 (12 May 2018 06:10), Max: 37.8 (12 May 2018 01:00)  T(F): 98.7 (12 May 2018 06:10), Max: 100 (12 May 2018 01:00)  HR: 70 (12 May 2018 08:00) (58 - 76)  BP: 169/88 (12 May 2018 08:00) (113/70 - 169/88)  BP(mean): 119 (12 May 2018 08:00) (83 - 137)  ABP: --  ABP(mean): --  RR: 28 (12 May 2018 08:00) (14 - 34)  SpO2: 98% (12 May 2018 08:00) (95% - 100%)            11 May 2018 07:01  -  12 May 2018 07:00  --------------------------------------------------------  IN:    Enteral Tube Flush: 60 mL    Fat Emulsion 20%: 270.4 mL    Free Water: 40 mL    Osmolite: 200 mL    Solution: 120 mL    TPN (Total Parenteral Nutrition): 1211 mL  Total IN: 1901.4 mL    OUT:    Bulb: 45 mL    Indwelling Catheter - Urethral: 1415 mL    Rectal Tube: 200 mL    VAC (Vacuum Assisted Closure) System: 105 mL  Total OUT: 1765 mL    Total NET: 136.4 mL      12 May 2018 07:01  -  12 May 2018 09:19  --------------------------------------------------------  IN:    Fat Emulsion 20%: 20.8 mL    Osmolite: 10 mL    TPN (Total Parenteral Nutrition): 51 mL  Total IN: 81.8 mL    OUT:  Total OUT: 0 mL    Total NET: 81.8 mL        PHYSICAL EXAM:  General: NAD, arousable and responding to voice  Neuro: somnolent but arousable. not following commands.  HEENT: NC/AT, EOMI, PERRLA, no oral lesions, neck supple w/o LAD, intubated, +NGT in place  Pulmonary: Nonlabored breathing, no respiratory distress, on 2L NC, bibasilar crackles but improving  Cardiovascular: NSR, no murmurs  Abdominal: soft, non-tender throughout, no rebound or guarding, wound VAC holding suction,  Extremities: WWP, 3/5 strength x 4, no clubbing/cyanosis/edema  Vasc: palpable distal pulses  Skin: no rash  MSK: no joint swelling          LABS:  CBC Full  -  ( 12 May 2018 05:44 )  WBC Count : 11.8 K/uL  Hemoglobin : 8.8 g/dL  Hematocrit : 28.4 %  Platelet Count - Automated : 328 K/uL  Mean Cell Volume : 100.0 fL  Mean Cell Hemoglobin : 31.0 pg  Mean Cell Hemoglobin Concentration : 31.0 g/dL      05-12    141  |  106  |  54<H>  ----------------------------<  109<H>  4.6   |  24  |  0.88    Ca    10.5      12 May 2018 05:44  Phos  3.2     05-12  Mg     2.4     05-12    TPro  7.0  /  Alb  2.6<L>  /  TBili  0.6  /  DBili  x   /  AST  61<H>  /  ALT  67<H>  /  AlkPhos  118  05-12

## 2018-05-12 NOTE — PROGRESS NOTE ADULT - ASSESSMENT
77 y/o F with sepsis from open abdominal wound infection, MSSA bacteremia present on admission, and UTI present on admission found to have contained small bowel leak    Neuro: off sedation. seroquel 12.5 BID. holding: aripiprazole, escitalapram   CV: HD stable s/p severe sepsis, holding amlodipine, 4/19 Echo  65-70%.  Pulm: extubated, satting well with NC 3L  GI: NPO, protonix, trickle TF via dobhoff, TPN   : Nicole S/p AKF on HD now resolved. UTI (present on admission) + Uterine fibroids on CT scan with vaginal bleeding, biopsy revealed tuball metaplasia. lupron 7.5 q month (given 5/5) - tentative RTOT on 5/15  ID: Kai virus (good hand washing),  ESBL Ecoli in Urine resistant to Zosyn: Jose L( 4/17-) Eden: ( 4/27-) D/c'd:MSSA in blood: Oxacillin ( 4/17-) TTE neg for endocarditis.  Endo: ISS, Synthroid  Insulin  in TPN: 25. Lupron Qmonthly  PPx: sqh held for low plts  PE:Sp IVC filter on 3/30   Lines: PIV, PICC (5/9-) /// s/p L Rad Art line( 4/15-4/18),  Rt IJ TLC from OSH ( 4/15-), [facility] TLC (4/29-5/9)  Wounds: Wound vac to midline incision , change MWF  PT/OT: PT ordered . 77 y/o F with sepsis from open abdominal wound infection, MSSA bacteremia present on admission, and UTI present on admission found to have contained small bowel leak    Neuro: off sedation. seroquel 12.5 BID. holding: aripiprazole, escitalapram   CV: HD stable s/p severe sepsis, holding amlodipine, 4/19 Echo  65-70%.  Pulm: extubated, satting well with NC 3L  GI: NPO, protonix, trickle TF via dobhoff, TPN   : Suazo S/p AKF on HD now resolved. Will remove suazo today; TOV.  UTI (present on admission) + Uterine fibroids on CT scan with vaginal bleeding, biopsy revealed tuball metaplasia. lupron 7.5 q month (given 5/5) - tentative RTOT on 5/15  ID: Kai virus (good hand washing),  ESBL Ecoli in Urine resistant to Zosyn: Jose L( 4/17-) Eden: ( 4/27-) D/c'd:MSSA in blood: Oxacillin ( 4/17-) TTE neg for endocarditis.  Endo: ISS, Synthroid  Insulin  in TPN: 25. Lupron Qmonthly  PPx: sqh held for low plts  PE:Sp IVC filter on 3/30   Lines: PIV, PICC (5/9-) /// s/p L Rad Art line( 4/15-4/18),  Rt IJ TLC from OSH ( 4/15-), [facility] TLC (4/29-5/9)  Wounds: Wound vac to midline incision , change MWF  PT/OT: PT ordered .

## 2018-05-13 LAB
ANION GAP SERPL CALC-SCNC: 12 MMOL/L — SIGNIFICANT CHANGE UP (ref 5–17)
BUN SERPL-MCNC: 53 MG/DL — HIGH (ref 7–23)
CALCIUM SERPL-MCNC: 10.4 MG/DL — SIGNIFICANT CHANGE UP (ref 8.4–10.5)
CHLORIDE SERPL-SCNC: 106 MMOL/L — SIGNIFICANT CHANGE UP (ref 96–108)
CO2 SERPL-SCNC: 23 MMOL/L — SIGNIFICANT CHANGE UP (ref 22–31)
CREAT SERPL-MCNC: 0.82 MG/DL — SIGNIFICANT CHANGE UP (ref 0.5–1.3)
GLUCOSE BLDC GLUCOMTR-MCNC: 115 MG/DL — HIGH (ref 70–99)
GLUCOSE BLDC GLUCOMTR-MCNC: 177 MG/DL — HIGH (ref 70–99)
GLUCOSE BLDC GLUCOMTR-MCNC: 181 MG/DL — HIGH (ref 70–99)
GLUCOSE SERPL-MCNC: 122 MG/DL — HIGH (ref 70–99)
HCT VFR BLD CALC: 27.5 % — LOW (ref 34.5–45)
HGB BLD-MCNC: 8.6 G/DL — LOW (ref 11.5–15.5)
MAGNESIUM SERPL-MCNC: 2.1 MG/DL — SIGNIFICANT CHANGE UP (ref 1.6–2.6)
MCHC RBC-ENTMCNC: 31.2 PG — SIGNIFICANT CHANGE UP (ref 27–34)
MCHC RBC-ENTMCNC: 31.3 G/DL — LOW (ref 32–36)
MCV RBC AUTO: 99.6 FL — SIGNIFICANT CHANGE UP (ref 80–100)
PHOSPHATE SERPL-MCNC: 3.5 MG/DL — SIGNIFICANT CHANGE UP (ref 2.5–4.5)
PLATELET # BLD AUTO: 295 K/UL — SIGNIFICANT CHANGE UP (ref 150–400)
POTASSIUM SERPL-MCNC: 4.6 MMOL/L — SIGNIFICANT CHANGE UP (ref 3.5–5.3)
POTASSIUM SERPL-SCNC: 4.6 MMOL/L — SIGNIFICANT CHANGE UP (ref 3.5–5.3)
RBC # BLD: 2.76 M/UL — LOW (ref 3.8–5.2)
RBC # FLD: 17.5 % — HIGH (ref 10.3–16.9)
SODIUM SERPL-SCNC: 141 MMOL/L — SIGNIFICANT CHANGE UP (ref 135–145)
WBC # BLD: 11.1 K/UL — HIGH (ref 3.8–10.5)
WBC # FLD AUTO: 11.1 K/UL — HIGH (ref 3.8–10.5)

## 2018-05-13 PROCEDURE — 99233 SBSQ HOSP IP/OBS HIGH 50: CPT | Mod: GC

## 2018-05-13 RX ORDER — I.V. FAT EMULSION 20 G/100ML
0.54 EMULSION INTRAVENOUS
Qty: 50 | Refills: 0 | Status: DISCONTINUED | OUTPATIENT
Start: 2018-05-13 | End: 2018-05-13

## 2018-05-13 RX ORDER — ELECTROLYTE SOLUTION,INJ
1 VIAL (ML) INTRAVENOUS
Qty: 0 | Refills: 0 | Status: DISCONTINUED | OUTPATIENT
Start: 2018-05-13 | End: 2018-05-13

## 2018-05-13 RX ORDER — HALOPERIDOL DECANOATE 100 MG/ML
0.5 INJECTION INTRAMUSCULAR EVERY 4 HOURS
Qty: 0 | Refills: 0 | Status: DISCONTINUED | OUTPATIENT
Start: 2018-05-13 | End: 2018-05-13

## 2018-05-13 RX ADMIN — MEROPENEM 1000 MILLIGRAM(S): 1 INJECTION INTRAVENOUS at 17:46

## 2018-05-13 RX ADMIN — I.V. FAT EMULSION 20.83 GM/KG/DAY: 20 EMULSION INTRAVENOUS at 23:30

## 2018-05-13 RX ADMIN — Medication 75 MICROGRAM(S): at 06:40

## 2018-05-13 RX ADMIN — Medication 1 EACH: at 05:34

## 2018-05-13 RX ADMIN — MEROPENEM 1000 MILLIGRAM(S): 1 INJECTION INTRAVENOUS at 05:34

## 2018-05-13 RX ADMIN — Medication 2: at 17:46

## 2018-05-13 RX ADMIN — Medication 650 MILLIGRAM(S): at 15:58

## 2018-05-13 RX ADMIN — Medication 650 MILLIGRAM(S): at 16:32

## 2018-05-13 RX ADMIN — PANTOPRAZOLE SODIUM 40 MILLIGRAM(S): 20 TABLET, DELAYED RELEASE ORAL at 11:15

## 2018-05-13 RX ADMIN — Medication 2: at 11:22

## 2018-05-13 RX ADMIN — MICAFUNGIN SODIUM 210 MILLIGRAM(S): 100 INJECTION, POWDER, LYOPHILIZED, FOR SOLUTION INTRAVENOUS at 11:15

## 2018-05-13 NOTE — PROGRESS NOTE ADULT - SUBJECTIVE AND OBJECTIVE BOX
INTERVAL HPI/OVERNIGHT EVENTS: O/N: EMANUEL, voiding into kei, awaiting bladder scan. Slept with seroquel.  5/12: Nicole removed. given haldol 0.5 for agitation.     Pt seen and examined on AM rounds.       MEDICATIONS  (STANDING):  dextrose 5%. 1000 milliLiter(s) (50 mL/Hr) IV Continuous <Continuous>  dextrose 50% Injectable 12.5 Gram(s) IV Push once  dextrose 50% Injectable 25 Gram(s) IV Push once  dextrose 50% Injectable 25 Gram(s) IV Push once  fat emulsion (Plant Based) 20% Infusion 0.535 Gm/kG/Day (20.833 mL/Hr) IV Continuous <Continuous>  insulin lispro (HumaLOG) corrective regimen sliding scale   SubCutaneous every 6 hours  leuprolide depot Injectable (LUPRON-DEPOT) 7.5 milliGRAM(s) IntraMuscular every 4 weeks  levothyroxine Injectable 75 MICROGram(s) IV Push <User Schedule>  meropenem Injectable 1000 milliGRAM(s) IV Push every 12 hours  micafungin IVPB 100 milliGRAM(s) IV Intermittent daily  pantoprazole  Injectable 40 milliGRAM(s) IV Push daily  Parenteral Nutrition - Adult 1 Each (51 mL/Hr) TPN Continuous <Continuous>  Parenteral Nutrition - Adult 1 Each (51 mL/Hr) TPN Continuous <Continuous>  QUEtiapine 12.5 milliGRAM(s) Oral at bedtime  sodium chloride 0.9% lock flush 20 milliLiter(s) IV Push once    MEDICATIONS  (PRN):  acetaminophen    Suspension. 650 milliGRAM(s) Oral every 6 hours PRN Moderate Pain (4 - 6)  ALBUTerol/ipratropium for Nebulization 3 milliLiter(s) Nebulizer every 6 hours PRN Shortness of Breath and/or Wheezing  dextrose Gel 1 Dose(s) Oral once PRN Blood Glucose LESS THAN 70 milliGRAM(s)/deciliter  glucagon  Injectable 1 milliGRAM(s) IntraMuscular once PRN Glucose LESS THAN 70 milligrams/deciliter  ondansetron Injectable 4 milliGRAM(s) IV Push every 6 hours PRN Nausea  sodium chloride 0.9% lock flush 10 milliLiter(s) IV Push every 1 hour PRN After each medication administration  sodium chloride 0.9% lock flush 10 milliLiter(s) IV Push every 12 hours PRN Lumen of catheter NOT used      Drug Dosing Weight  Height (cm): 170.2 (17 Apr 2018 16:18)  Weight (kg): 93.6 (17 Apr 2018 16:17)  BMI (kg/m2): 32.3 (17 Apr 2018 16:18)  BSA (m2): 2.05 (17 Apr 2018 16:18)      PAST MEDICAL & SURGICAL HISTORY:  Hypothyroidism  GERD (gastroesophageal reflux disease)  Obesity  DM (diabetes mellitus)  HLD (hyperlipidemia)  HTN (hypertension)  H/O ventral hernia repair      ICU Vital Signs Last 24 Hrs  T(C): 37.4 (13 May 2018 09:52), Max: 37.7 (12 May 2018 22:33)  T(F): 99.3 (13 May 2018 09:52), Max: 99.9 (12 May 2018 22:33)  HR: 70 (13 May 2018 10:00) (60 - 82)  BP: 147/79 (13 May 2018 09:00) (127/73 - 157/81)  BP(mean): 106 (13 May 2018 09:00) (88 - 129)  ABP: --  ABP(mean): --  RR: 40 (13 May 2018 10:00) (18 - 45)  SpO2: 97% (13 May 2018 10:00) (93% - 99%)            12 May 2018 07:01  -  13 May 2018 07:00  --------------------------------------------------------  IN:    Enteral Tube Flush: 40 mL    Fat Emulsion 20%: 291.2 mL    Free Water: 60 mL    Osmolite: 240 mL    Solution: 100 mL    Solution: 70 mL    TPN (Total Parenteral Nutrition): 1224 mL  Total IN: 2025.2 mL    OUT:    Bulb: 25 mL    Indwelling Catheter - Urethral: 500 mL    Rectal Tube: 400 mL    VAC (Vacuum Assisted Closure) System: 50 mL  Total OUT: 975 mL    Total NET: 1050.2 mL        PHYSICAL EXAM:  General: NAD  Neuro: somnolent but arousable. not following commands.  HEENT: NC/AT, EOMI, PERRLA, no oral lesions, neck supple w/o LAD, intubated, +NGT in place  Pulm: Nonlabored breathing, no respiratory distress, on 2L NC, bibasilar crackles but improving  CV: NSR, no murmurs  Abdominal: soft, non-tender throughout, no rebound or guarding, wound VAC to midline holding suction, pelvic LEANN with yellow/brown cloudy output  Extremities: WWP, no clubbing/cyanosis/edema  Vasc: palpable distal pulses  Skin: no rash  MSK: no joint swelling      LABS:  CBC Full  -  ( 13 May 2018 04:58 )  WBC Count : 11.1 K/uL  Hemoglobin : 8.6 g/dL  Hematocrit : 27.5 %  Platelet Count - Automated : 295 K/uL  Mean Cell Volume : 99.6 fL  Mean Cell Hemoglobin : 31.2 pg  Mean Cell Hemoglobin Concentration : 31.3 g/dL      05-13    141  |  106  |  53<H>  ----------------------------<  122<H>  4.6   |  23  |  0.82    Ca    10.4      13 May 2018 04:57  Phos  3.5     05-13  Mg     2.1     05-13    TPro  7.0  /  Alb  2.6<L>  /  TBili  0.6  /  DBili  x   /  AST  61<H>  /  ALT  67<H>  /  AlkPhos  118  05-12

## 2018-05-13 NOTE — PROGRESS NOTE ADULT - ASSESSMENT
77 y/o F with sepsis from open abdominal wound infection, MSSA bacteremia present on admission, and UTI present on admission found to have contained small bowel leak    Neuro: off sedation. seroquel 12.5 BID. holding: aripiprazole, escitalapram   CV: HD stable s/p severe sepsis, holding amlodipine, 4/19 Echo  65-70%.  Pulm: extubated, satting well with NC 3L  GI: NPO, protonix, trickle TF via dobhoff, TPN   : Nicole S/p AKF on HD now resolved. UTI (present on admission) + Uterine fibroids on CT scan with vaginal bleeding, biopsy revealed tuball metaplasia. lupron 7.5 q month (given 5/5) - tentative RTOT on 5/15  ID: Kai virus (good hand washing),  ESBL Ecoli in Urine resistant to Zosyn: Jose L( 4/17-) Eden: ( 4/27-) D/c'd:MSSA in blood: Oxacillin ( 4/17-) TTE neg for endocarditis.  Endo: ISS, Synthroid  Insulin  in TPN: 25. Lupron Qmonthly  PPx: sqh held for low plts  PE:Sp IVC filter on 3/30   Lines: PIV, PICC (5/9-) /// s/p L Rad Art line( 4/15-4/18),  Rt IJ TLC from OSH ( 4/15-), [facility] TLC (4/29-5/9)  Wounds: Wound vac to midline incision , change MWF  PT/OT: PT ordered .

## 2018-05-14 LAB
ANION GAP SERPL CALC-SCNC: 11 MMOL/L — SIGNIFICANT CHANGE UP (ref 5–17)
BUN SERPL-MCNC: 50 MG/DL — HIGH (ref 7–23)
CALCIUM SERPL-MCNC: 9.8 MG/DL — SIGNIFICANT CHANGE UP (ref 8.4–10.5)
CHLORIDE SERPL-SCNC: 108 MMOL/L — SIGNIFICANT CHANGE UP (ref 96–108)
CO2 SERPL-SCNC: 22 MMOL/L — SIGNIFICANT CHANGE UP (ref 22–31)
CREAT SERPL-MCNC: 0.8 MG/DL — SIGNIFICANT CHANGE UP (ref 0.5–1.3)
CULTURE RESULTS: SIGNIFICANT CHANGE UP
CULTURE RESULTS: SIGNIFICANT CHANGE UP
GLUCOSE BLDC GLUCOMTR-MCNC: 101 MG/DL — HIGH (ref 70–99)
GLUCOSE BLDC GLUCOMTR-MCNC: 109 MG/DL — HIGH (ref 70–99)
GLUCOSE BLDC GLUCOMTR-MCNC: 114 MG/DL — HIGH (ref 70–99)
GLUCOSE BLDC GLUCOMTR-MCNC: 125 MG/DL — HIGH (ref 70–99)
GLUCOSE BLDC GLUCOMTR-MCNC: 135 MG/DL — HIGH (ref 70–99)
GLUCOSE SERPL-MCNC: 121 MG/DL — HIGH (ref 70–99)
HCT VFR BLD CALC: 25.4 % — LOW (ref 34.5–45)
HGB BLD-MCNC: 8.1 G/DL — LOW (ref 11.5–15.5)
MAGNESIUM SERPL-MCNC: 1.9 MG/DL — SIGNIFICANT CHANGE UP (ref 1.6–2.6)
MCHC RBC-ENTMCNC: 30.5 PG — SIGNIFICANT CHANGE UP (ref 27–34)
MCHC RBC-ENTMCNC: 31.9 G/DL — LOW (ref 32–36)
MCV RBC AUTO: 95.5 FL — SIGNIFICANT CHANGE UP (ref 80–100)
PHOSPHATE SERPL-MCNC: 3.3 MG/DL — SIGNIFICANT CHANGE UP (ref 2.5–4.5)
PLATELET # BLD AUTO: 307 K/UL — SIGNIFICANT CHANGE UP (ref 150–400)
POTASSIUM SERPL-MCNC: 4.5 MMOL/L — SIGNIFICANT CHANGE UP (ref 3.5–5.3)
POTASSIUM SERPL-SCNC: 4.5 MMOL/L — SIGNIFICANT CHANGE UP (ref 3.5–5.3)
PREALB SERPL-MCNC: 14 MG/DL — LOW (ref 20–40)
RBC # BLD: 2.66 M/UL — LOW (ref 3.8–5.2)
RBC # FLD: 17.7 % — HIGH (ref 10.3–16.9)
SODIUM SERPL-SCNC: 141 MMOL/L — SIGNIFICANT CHANGE UP (ref 135–145)
SPECIMEN SOURCE: SIGNIFICANT CHANGE UP
SPECIMEN SOURCE: SIGNIFICANT CHANGE UP
WBC # BLD: 12.2 K/UL — HIGH (ref 3.8–10.5)
WBC # FLD AUTO: 12.2 K/UL — HIGH (ref 3.8–10.5)

## 2018-05-14 PROCEDURE — 71045 X-RAY EXAM CHEST 1 VIEW: CPT | Mod: 26

## 2018-05-14 PROCEDURE — 99233 SBSQ HOSP IP/OBS HIGH 50: CPT | Mod: GC

## 2018-05-14 RX ORDER — HALOPERIDOL DECANOATE 100 MG/ML
0.5 INJECTION INTRAMUSCULAR ONCE
Qty: 0 | Refills: 0 | Status: COMPLETED | OUTPATIENT
Start: 2018-05-14 | End: 2018-05-14

## 2018-05-14 RX ORDER — I.V. FAT EMULSION 20 G/100ML
0.54 EMULSION INTRAVENOUS
Qty: 50 | Refills: 0 | Status: DISCONTINUED | OUTPATIENT
Start: 2018-05-14 | End: 2018-05-14

## 2018-05-14 RX ORDER — ELECTROLYTE SOLUTION,INJ
1 VIAL (ML) INTRAVENOUS
Qty: 0 | Refills: 0 | Status: DISCONTINUED | OUTPATIENT
Start: 2018-05-14 | End: 2018-05-15

## 2018-05-14 RX ORDER — HALOPERIDOL DECANOATE 100 MG/ML
0.5 INJECTION INTRAMUSCULAR ONCE
Qty: 0 | Refills: 0 | Status: COMPLETED | OUTPATIENT
Start: 2018-05-14 | End: 2018-05-15

## 2018-05-14 RX ORDER — ACETAMINOPHEN 500 MG
1000 TABLET ORAL ONCE
Qty: 0 | Refills: 0 | Status: COMPLETED | OUTPATIENT
Start: 2018-05-14 | End: 2018-05-14

## 2018-05-14 RX ORDER — HALOPERIDOL DECANOATE 100 MG/ML
0.5 INJECTION INTRAMUSCULAR ONCE
Qty: 0 | Refills: 0 | Status: DISCONTINUED | OUTPATIENT
Start: 2018-05-14 | End: 2018-05-14

## 2018-05-14 RX ORDER — QUETIAPINE FUMARATE 200 MG/1
25 TABLET, FILM COATED ORAL AT BEDTIME
Qty: 0 | Refills: 0 | Status: DISCONTINUED | OUTPATIENT
Start: 2018-05-14 | End: 2018-05-16

## 2018-05-14 RX ADMIN — MICAFUNGIN SODIUM 210 MILLIGRAM(S): 100 INJECTION, POWDER, LYOPHILIZED, FOR SOLUTION INTRAVENOUS at 12:36

## 2018-05-14 RX ADMIN — HALOPERIDOL DECANOATE 0.5 MILLIGRAM(S): 100 INJECTION INTRAMUSCULAR at 11:00

## 2018-05-14 RX ADMIN — MEROPENEM 1000 MILLIGRAM(S): 1 INJECTION INTRAVENOUS at 18:08

## 2018-05-14 RX ADMIN — HALOPERIDOL DECANOATE 0.5 MILLIGRAM(S): 100 INJECTION INTRAMUSCULAR at 08:59

## 2018-05-14 RX ADMIN — I.V. FAT EMULSION 20.83 GM/KG/DAY: 20 EMULSION INTRAVENOUS at 22:40

## 2018-05-14 RX ADMIN — QUETIAPINE FUMARATE 12.5 MILLIGRAM(S): 200 TABLET, FILM COATED ORAL at 00:28

## 2018-05-14 RX ADMIN — PANTOPRAZOLE SODIUM 40 MILLIGRAM(S): 20 TABLET, DELAYED RELEASE ORAL at 12:36

## 2018-05-14 RX ADMIN — Medication 75 MICROGRAM(S): at 06:39

## 2018-05-14 RX ADMIN — Medication 400 MILLIGRAM(S): at 15:58

## 2018-05-14 RX ADMIN — Medication 1000 MILLIGRAM(S): at 16:28

## 2018-05-14 RX ADMIN — MEROPENEM 1000 MILLIGRAM(S): 1 INJECTION INTRAVENOUS at 06:42

## 2018-05-14 RX ADMIN — QUETIAPINE FUMARATE 25 MILLIGRAM(S): 200 TABLET, FILM COATED ORAL at 21:03

## 2018-05-14 NOTE — PROGRESS NOTE ADULT - ASSESSMENT
79 y/o F with sepsis from open abdominal wound infection, MSSA bacteremia present on admission, and UTI present on admission found to have contained small bowel leak. Tentative plan is to RTOR on 5/15/18 for dx lap.     Neuro: off sedation. currently on seroquel 12.5 BID. dilaudid PRN for pain, haldol PRN for agitation holding: aripiprazole, escitalapram   CV: HD stable s/p severe sepsis, holding amlodipine, 4/19 Echo  65-70%.  Pulm: extubated, satting well at RA  GI: NPO, protonix, trickle TF via dobhoff, TPN via PICC   : Voiding (incontinence), Nicole dc-ed on 5/12 S/p AKF on HD now resolved. UTI (present on admission) + Uterine fibroids on CT scan with vaginal bleeding, biopsy revealed tubal metaplasia. lupron 7.5 q month (given 5/5) - tentative RTOT on 5/15  ID: Kai virus (good hand washing),  ESBL Ecoli in Urine resistant to Zosyn: Jose L( 4/17-) Eden: ( 4/27-) D/c'd:MSSA in blood: Oxacillin ( 4/17-) TTE neg for endocarditis.  Endo: ISS, Synthroid  Insulin  in TPN: 25. Lupron monthly  PPx: sqh held for low plts  PE:Sp IVC filter on 3/30   Lines: PIV, PICC (5/9-) /// s/p L Rad Art line( 4/15-4/18),  Rt IJ TLC from OSH ( 4/15-), [facility] TLC (4/29-5/9)  Wounds: Wound vac to midline incision , change MWF  PT/OT: PT ordered .

## 2018-05-14 NOTE — PROGRESS NOTE ADULT - SUBJECTIVE AND OBJECTIVE BOX
24 hr events:  o/n: EMANUEL, no Haldol given  5/13: more alert today. TPN reordered.     SUBJECTIVE:  Patient is more alert today, however unable to respond to command, urinary incontinence.    MEDICATIONS  (STANDING):  dextrose 5%. 1000 milliLiter(s) (50 mL/Hr) IV Continuous <Continuous>  dextrose 50% Injectable 12.5 Gram(s) IV Push once  dextrose 50% Injectable 25 Gram(s) IV Push once  dextrose 50% Injectable 25 Gram(s) IV Push once  insulin lispro (HumaLOG) corrective regimen sliding scale   SubCutaneous every 6 hours  leuprolide depot Injectable (LUPRON-DEPOT) 7.5 milliGRAM(s) IntraMuscular every 4 weeks  levothyroxine Injectable 75 MICROGram(s) IV Push <User Schedule>  meropenem Injectable 1000 milliGRAM(s) IV Push every 12 hours  micafungin IVPB 100 milliGRAM(s) IV Intermittent daily  pantoprazole  Injectable 40 milliGRAM(s) IV Push daily  Parenteral Nutrition - Adult 1 Each (51 mL/Hr) TPN Continuous <Continuous>  QUEtiapine 12.5 milliGRAM(s) Oral at bedtime  sodium chloride 0.9% lock flush 20 milliLiter(s) IV Push once    MEDICATIONS  (PRN):  acetaminophen    Suspension. 650 milliGRAM(s) Oral every 6 hours PRN Moderate Pain (4 - 6)  ALBUTerol/ipratropium for Nebulization 3 milliLiter(s) Nebulizer every 6 hours PRN Shortness of Breath and/or Wheezing  dextrose Gel 1 Dose(s) Oral once PRN Blood Glucose LESS THAN 70 milliGRAM(s)/deciliter  glucagon  Injectable 1 milliGRAM(s) IntraMuscular once PRN Glucose LESS THAN 70 milligrams/deciliter  ondansetron Injectable 4 milliGRAM(s) IV Push every 6 hours PRN Nausea  sodium chloride 0.9% lock flush 10 milliLiter(s) IV Push every 1 hour PRN After each medication administration  sodium chloride 0.9% lock flush 10 milliLiter(s) IV Push every 12 hours PRN Lumen of catheter NOT used      ICU Vital Signs Last 24 Hrs  T(C): 37.3 (14 May 2018 01:10), Max: 37.7 (13 May 2018 18:00)  T(F): 99.1 (14 May 2018 01:10), Max: 99.8 (13 May 2018 18:00)  HR: 74 (14 May 2018 05:00) (64 - 76)  BP: 113/57 (14 May 2018 05:00) (101/61 - 149/98)  BP(mean): 86 (14 May 2018 05:00) (73 - 129)  ABP: --  ABP(mean): --  RR: 36 (14 May 2018 05:00) (22 - 40)  SpO2: 97% (14 May 2018 05:00) (97% - 100%)      Physical Exam:  General: NAD  HEENT: NC/AT, EOMI, PERRLA, normal hearing, no oral lesions, neck supple w/o LAD  Pulmonary: Mouth breather, +bibasilar crackles   Cardiovascular: NSR, no murmurs  Abdominal: soft, NT/ND, +BS, no organomegaly, wound VAC in place, no leak.   Extremities: WWP, motor 3/5, sensory intact  Neuro: A/O x 0 - unable to assess.   Pulses: palpable distal pulses    Lines/tubes/drains: LEANN drain over LLQ, greenish-fecal-like material, wound VAC midline at the umbilicus       I&O's Summary    13 May 2018 07:01  -  14 May 2018 07:00  --------------------------------------------------------  IN: 1534.6 mL / OUT: 200 mL / NET: 1334.6 mL        LABS:                        8.1    12.2  )-----------( 307      ( 14 May 2018 05:47 )             25.4     05-14    141  |  108  |  50<H>  ----------------------------<  121<H>  4.5   |  22  |  0.80    Ca    9.8      14 May 2018 05:47  Phos  3.3     05-14  Mg     1.9     05-14          CAPILLARY BLOOD GLUCOSE      POCT Blood Glucose.: 125 mg/dL (14 May 2018 06:29)  POCT Blood Glucose.: 109 mg/dL (14 May 2018 00:11)  POCT Blood Glucose.: 177 mg/dL (13 May 2018 17:12)  POCT Blood Glucose.: 181 mg/dL (13 May 2018 11:11)        Cultures:    RADIOLOGY & ADDITIONAL STUDIES:

## 2018-05-15 ENCOUNTER — RESULT REVIEW (OUTPATIENT)
Age: 78
End: 2018-05-15

## 2018-05-15 LAB
ALBUMIN SERPL ELPH-MCNC: 1.9 G/DL — LOW (ref 3.3–5)
ALBUMIN SERPL ELPH-MCNC: 2.7 G/DL — LOW (ref 3.3–5)
ALP SERPL-CCNC: 121 U/L — HIGH (ref 40–120)
ALP SERPL-CCNC: 85 U/L — SIGNIFICANT CHANGE UP (ref 40–120)
ALT FLD-CCNC: 49 U/L — HIGH (ref 10–45)
ALT FLD-CCNC: 71 U/L — HIGH (ref 10–45)
ANION GAP SERPL CALC-SCNC: 11 MMOL/L — SIGNIFICANT CHANGE UP (ref 5–17)
ANION GAP SERPL CALC-SCNC: 8 MMOL/L — SIGNIFICANT CHANGE UP (ref 5–17)
APTT BLD: 37.5 SEC — HIGH (ref 27.5–37.4)
APTT BLD: 38.4 SEC — HIGH (ref 27.5–37.4)
AST SERPL-CCNC: 35 U/L — SIGNIFICANT CHANGE UP (ref 10–40)
AST SERPL-CCNC: 50 U/L — HIGH (ref 10–40)
BASE EXCESS BLDA CALC-SCNC: -4.8 MMOL/L — LOW (ref -2–3)
BASE EXCESS BLDA CALC-SCNC: -8.7 MMOL/L — LOW (ref -2–3)
BASE EXCESS BLDA CALC-SCNC: -9.1 MMOL/L — LOW (ref -2–3)
BILIRUB DIRECT SERPL-MCNC: 0.2 MG/DL — SIGNIFICANT CHANGE UP (ref 0–0.2)
BILIRUB INDIRECT FLD-MCNC: 0.4 MG/DL — SIGNIFICANT CHANGE UP (ref 0.2–1)
BILIRUB SERPL-MCNC: 0.6 MG/DL — SIGNIFICANT CHANGE UP (ref 0.2–1.2)
BILIRUB SERPL-MCNC: 0.6 MG/DL — SIGNIFICANT CHANGE UP (ref 0.2–1.2)
BLD GP AB SCN SERPL QL: NEGATIVE — SIGNIFICANT CHANGE UP
BUN SERPL-MCNC: 44 MG/DL — HIGH (ref 7–23)
BUN SERPL-MCNC: 47 MG/DL — HIGH (ref 7–23)
CA-I BLDA-SCNC: 1.38 MMOL/L — HIGH (ref 1.12–1.3)
CALCIUM SERPL-MCNC: 10.3 MG/DL — SIGNIFICANT CHANGE UP (ref 8.4–10.5)
CALCIUM SERPL-MCNC: 8.6 MG/DL — SIGNIFICANT CHANGE UP (ref 8.4–10.5)
CHLORIDE SERPL-SCNC: 110 MMOL/L — HIGH (ref 96–108)
CHLORIDE SERPL-SCNC: 110 MMOL/L — HIGH (ref 96–108)
CO2 SERPL-SCNC: 19 MMOL/L — LOW (ref 22–31)
CO2 SERPL-SCNC: 20 MMOL/L — LOW (ref 22–31)
COHGB MFR BLDA: 0.3 % — SIGNIFICANT CHANGE UP
CREAT SERPL-MCNC: 0.77 MG/DL — SIGNIFICANT CHANGE UP (ref 0.5–1.3)
CREAT SERPL-MCNC: 0.83 MG/DL — SIGNIFICANT CHANGE UP (ref 0.5–1.3)
CULTURE RESULTS: NO GROWTH — SIGNIFICANT CHANGE UP
FIBRINOGEN PPP-MCNC: 237 MG/DL — LOW (ref 258–438)
GAS PNL BLDA: SIGNIFICANT CHANGE UP
GLUCOSE BLDC GLUCOMTR-MCNC: 114 MG/DL — HIGH (ref 70–99)
GLUCOSE BLDC GLUCOMTR-MCNC: 129 MG/DL — HIGH (ref 70–99)
GLUCOSE BLDC GLUCOMTR-MCNC: 130 MG/DL — HIGH (ref 70–99)
GLUCOSE BLDC GLUCOMTR-MCNC: 172 MG/DL — HIGH (ref 70–99)
GLUCOSE BLDC GLUCOMTR-MCNC: 213 MG/DL — HIGH (ref 70–99)
GLUCOSE SERPL-MCNC: 111 MG/DL — HIGH (ref 70–99)
GLUCOSE SERPL-MCNC: 220 MG/DL — HIGH (ref 70–99)
HCO3 BLDA-SCNC: 16 MMOL/L — LOW (ref 21–28)
HCO3 BLDA-SCNC: 17 MMOL/L — LOW (ref 21–28)
HCO3 BLDA-SCNC: 20 MMOL/L — LOW (ref 21–28)
HCT VFR BLD CALC: 20.8 % — CRITICAL LOW (ref 34.5–45)
HCT VFR BLD CALC: 27.1 % — LOW (ref 34.5–45)
HCT VFR BLD CALC: 27.2 % — LOW (ref 34.5–45)
HGB BLD-MCNC: 6.4 G/DL — CRITICAL LOW (ref 11.5–15.5)
HGB BLD-MCNC: 8.4 G/DL — LOW (ref 11.5–15.5)
HGB BLD-MCNC: 8.9 G/DL — LOW (ref 11.5–15.5)
HGB BLDA-MCNC: 8.5 G/DL — LOW (ref 11.5–15.5)
INR BLD: 1.31 — HIGH (ref 0.88–1.16)
INR BLD: 1.36 — HIGH (ref 0.88–1.16)
LACTATE SERPL-SCNC: 0.9 MMOL/L — SIGNIFICANT CHANGE UP (ref 0.5–2)
MAGNESIUM SERPL-MCNC: 1.6 MG/DL — SIGNIFICANT CHANGE UP (ref 1.6–2.6)
MAGNESIUM SERPL-MCNC: 1.9 MG/DL — SIGNIFICANT CHANGE UP (ref 1.6–2.6)
MCHC RBC-ENTMCNC: 30.8 G/DL — LOW (ref 32–36)
MCHC RBC-ENTMCNC: 30.9 PG — SIGNIFICANT CHANGE UP (ref 27–34)
MCHC RBC-ENTMCNC: 31 G/DL — LOW (ref 32–36)
MCHC RBC-ENTMCNC: 31 PG — SIGNIFICANT CHANGE UP (ref 27–34)
MCHC RBC-ENTMCNC: 32.7 G/DL — SIGNIFICANT CHANGE UP (ref 32–36)
MCHC RBC-ENTMCNC: 32.8 PG — SIGNIFICANT CHANGE UP (ref 27–34)
MCV RBC AUTO: 100 FL — SIGNIFICANT CHANGE UP (ref 80–100)
MCV RBC AUTO: 100.4 FL — HIGH (ref 80–100)
MCV RBC AUTO: 100.5 FL — HIGH (ref 80–100)
METHGB MFR BLDA: 0.1 % — SIGNIFICANT CHANGE UP
O2 CT VFR BLDA CALC: SIGNIFICANT CHANGE UP (ref 15–23)
OXYHGB MFR BLDA: 99 % — SIGNIFICANT CHANGE UP (ref 94–100)
PCO2 BLDA: 32 MMHG — SIGNIFICANT CHANGE UP (ref 32–45)
PCO2 BLDA: 37 MMHG — SIGNIFICANT CHANGE UP (ref 32–45)
PCO2 BLDA: 37 MMHG — SIGNIFICANT CHANGE UP (ref 32–45)
PH BLDA: 7.28 — LOW (ref 7.35–7.45)
PH BLDA: 7.33 — LOW (ref 7.35–7.45)
PH BLDA: 7.35 — SIGNIFICANT CHANGE UP (ref 7.35–7.45)
PHOSPHATE SERPL-MCNC: 3.6 MG/DL — SIGNIFICANT CHANGE UP (ref 2.5–4.5)
PHOSPHATE SERPL-MCNC: 4.5 MG/DL — SIGNIFICANT CHANGE UP (ref 2.5–4.5)
PLATELET # BLD AUTO: 305 K/UL — SIGNIFICANT CHANGE UP (ref 150–400)
PLATELET # BLD AUTO: 307 K/UL — SIGNIFICANT CHANGE UP (ref 150–400)
PLATELET # BLD AUTO: 413 K/UL — HIGH (ref 150–400)
PO2 BLDA: 164 MMHG — HIGH (ref 83–108)
PO2 BLDA: 167 MMHG — HIGH (ref 83–108)
PO2 BLDA: 308 MMHG — HIGH (ref 83–108)
POTASSIUM BLDA-SCNC: 4.8 MMOL/L — SIGNIFICANT CHANGE UP (ref 3.5–4.9)
POTASSIUM SERPL-MCNC: 4.8 MMOL/L — SIGNIFICANT CHANGE UP (ref 3.5–5.3)
POTASSIUM SERPL-MCNC: 5.4 MMOL/L — HIGH (ref 3.5–5.3)
POTASSIUM SERPL-SCNC: 4.8 MMOL/L — SIGNIFICANT CHANGE UP (ref 3.5–5.3)
POTASSIUM SERPL-SCNC: 5.4 MMOL/L — HIGH (ref 3.5–5.3)
PROT SERPL-MCNC: 4.5 G/DL — LOW (ref 6–8.3)
PROT SERPL-MCNC: 6.6 G/DL — SIGNIFICANT CHANGE UP (ref 6–8.3)
PROTHROM AB SERPL-ACNC: 14.6 SEC — HIGH (ref 9.8–12.7)
PROTHROM AB SERPL-ACNC: 15.2 SEC — HIGH (ref 9.8–12.7)
RBC # BLD: 2.07 M/UL — LOW (ref 3.8–5.2)
RBC # BLD: 2.71 M/UL — LOW (ref 3.8–5.2)
RBC # BLD: 2.71 M/UL — LOW (ref 3.8–5.2)
RBC # FLD: 17 % — HIGH (ref 10.3–16.9)
RBC # FLD: 17.3 % — HIGH (ref 10.3–16.9)
RBC # FLD: 17.3 % — HIGH (ref 10.3–16.9)
RH IG SCN BLD-IMP: POSITIVE — SIGNIFICANT CHANGE UP
SAO2 % BLDA: 100 % — SIGNIFICANT CHANGE UP (ref 95–100)
SAO2 % BLDA: 99 % — SIGNIFICANT CHANGE UP (ref 95–100)
SAO2 % BLDA: 99 % — SIGNIFICANT CHANGE UP (ref 95–100)
SODIUM BLDA-SCNC: 138 MMOL/L — SIGNIFICANT CHANGE UP (ref 138–146)
SODIUM SERPL-SCNC: 137 MMOL/L — SIGNIFICANT CHANGE UP (ref 135–145)
SODIUM SERPL-SCNC: 141 MMOL/L — SIGNIFICANT CHANGE UP (ref 135–145)
WBC # BLD: 11.7 K/UL — HIGH (ref 3.8–10.5)
WBC # BLD: 12.3 K/UL — HIGH (ref 3.8–10.5)
WBC # BLD: 52.4 K/UL — CRITICAL HIGH (ref 3.8–10.5)
WBC # FLD AUTO: 11.7 K/UL — HIGH (ref 3.8–10.5)
WBC # FLD AUTO: 12.3 K/UL — HIGH (ref 3.8–10.5)
WBC # FLD AUTO: 52.4 K/UL — CRITICAL HIGH (ref 3.8–10.5)

## 2018-05-15 PROCEDURE — 71045 X-RAY EXAM CHEST 1 VIEW: CPT | Mod: 26

## 2018-05-15 PROCEDURE — 99233 SBSQ HOSP IP/OBS HIGH 50: CPT | Mod: GC

## 2018-05-15 RX ORDER — CHLORHEXIDINE GLUCONATE 213 G/1000ML
15 SOLUTION TOPICAL
Qty: 0 | Refills: 0 | Status: DISCONTINUED | OUTPATIENT
Start: 2018-05-15 | End: 2018-05-20

## 2018-05-15 RX ORDER — NOREPINEPHRINE BITARTRATE/D5W 8 MG/250ML
0.05 PLASTIC BAG, INJECTION (ML) INTRAVENOUS
Qty: 8 | Refills: 0 | Status: DISCONTINUED | OUTPATIENT
Start: 2018-05-15 | End: 2018-05-17

## 2018-05-15 RX ORDER — ELECTROLYTE SOLUTION,INJ
1 VIAL (ML) INTRAVENOUS
Qty: 0 | Refills: 0 | Status: DISCONTINUED | OUTPATIENT
Start: 2018-05-15 | End: 2018-05-16

## 2018-05-15 RX ORDER — ELECTROLYTE SOLUTION,INJ
1 VIAL (ML) INTRAVENOUS
Qty: 0 | Refills: 0 | Status: DISCONTINUED | OUTPATIENT
Start: 2018-05-15 | End: 2018-05-15

## 2018-05-15 RX ORDER — PROPOFOL 10 MG/ML
3.56 INJECTION, EMULSION INTRAVENOUS
Qty: 1000 | Refills: 0 | Status: DISCONTINUED | OUTPATIENT
Start: 2018-05-15 | End: 2018-05-17

## 2018-05-15 RX ORDER — MAGNESIUM SULFATE 500 MG/ML
2 VIAL (ML) INJECTION ONCE
Qty: 0 | Refills: 0 | Status: COMPLETED | OUTPATIENT
Start: 2018-05-15 | End: 2018-05-15

## 2018-05-15 RX ORDER — NICARDIPINE HYDROCHLORIDE 30 MG/1
5 CAPSULE, EXTENDED RELEASE ORAL
Qty: 40 | Refills: 0 | Status: DISCONTINUED | OUTPATIENT
Start: 2018-05-15 | End: 2018-05-15

## 2018-05-15 RX ORDER — SODIUM CHLORIDE 9 MG/ML
1000 INJECTION INTRAMUSCULAR; INTRAVENOUS; SUBCUTANEOUS ONCE
Qty: 0 | Refills: 0 | Status: COMPLETED | OUTPATIENT
Start: 2018-05-15 | End: 2018-05-15

## 2018-05-15 RX ORDER — I.V. FAT EMULSION 20 G/100ML
0.54 EMULSION INTRAVENOUS
Qty: 50 | Refills: 0 | Status: DISCONTINUED | OUTPATIENT
Start: 2018-05-15 | End: 2018-05-15

## 2018-05-15 RX ORDER — FENTANYL CITRATE 50 UG/ML
0.5 INJECTION INTRAVENOUS
Qty: 2500 | Refills: 0 | Status: DISCONTINUED | OUTPATIENT
Start: 2018-05-15 | End: 2018-05-18

## 2018-05-15 RX ADMIN — HALOPERIDOL DECANOATE 0.5 MILLIGRAM(S): 100 INJECTION INTRAMUSCULAR at 00:03

## 2018-05-15 RX ADMIN — PANTOPRAZOLE SODIUM 40 MILLIGRAM(S): 20 TABLET, DELAYED RELEASE ORAL at 11:34

## 2018-05-15 RX ADMIN — CHLORHEXIDINE GLUCONATE 15 MILLILITER(S): 213 SOLUTION TOPICAL at 18:23

## 2018-05-15 RX ADMIN — MEROPENEM 1000 MILLIGRAM(S): 1 INJECTION INTRAVENOUS at 18:26

## 2018-05-15 RX ADMIN — Medication 650 MILLIGRAM(S): at 03:20

## 2018-05-15 RX ADMIN — Medication 8.78 MICROGRAM(S)/KG/MIN: at 16:50

## 2018-05-15 RX ADMIN — FENTANYL CITRATE 4.68 MICROGRAM(S)/KG/HR: 50 INJECTION INTRAVENOUS at 19:25

## 2018-05-15 RX ADMIN — PROPOFOL 2 MICROGRAM(S)/KG/MIN: 10 INJECTION, EMULSION INTRAVENOUS at 17:22

## 2018-05-15 RX ADMIN — Medication 650 MILLIGRAM(S): at 03:50

## 2018-05-15 RX ADMIN — Medication 75 MICROGRAM(S): at 06:28

## 2018-05-15 RX ADMIN — SODIUM CHLORIDE 4000 MILLILITER(S): 9 INJECTION INTRAMUSCULAR; INTRAVENOUS; SUBCUTANEOUS at 18:23

## 2018-05-15 RX ADMIN — Medication 4: at 18:22

## 2018-05-15 RX ADMIN — I.V. FAT EMULSION 20.83 GM/KG/DAY: 20 EMULSION INTRAVENOUS at 22:38

## 2018-05-15 RX ADMIN — Medication 1 EACH: at 06:29

## 2018-05-15 RX ADMIN — MEROPENEM 1000 MILLIGRAM(S): 1 INJECTION INTRAVENOUS at 06:29

## 2018-05-15 RX ADMIN — MICAFUNGIN SODIUM 210 MILLIGRAM(S): 100 INJECTION, POWDER, LYOPHILIZED, FOR SOLUTION INTRAVENOUS at 11:34

## 2018-05-15 RX ADMIN — Medication 50 GRAM(S): at 20:36

## 2018-05-15 NOTE — BRIEF OPERATIVE NOTE - OPERATION/FINDINGS
Lower midline incision connecting with area of tissue defect that was previously granulating with vac dressing. Segment of small bowel noted with several areas of perforation with leakage of bowel contents. Large, inflamed uterus removed in supracervical manner with bilateral salpingo-oophorectomy. Segment of small bowel resected with primary stapled anastomosis, right before terminal ileum. Appendectomy performed. Omental specimen sent for frozen section due to thickening, revealed inflammatory changes only. Uterus sent to pathology, had endometrial polyp and large fibroid, but not concerning for malignant process. Abdomen washed out and Magdaleno drain placed in pelvis.

## 2018-05-15 NOTE — PROGRESS NOTE ADULT - SUBJECTIVE AND OBJECTIVE BOX
24 hr events:  ON: Given additional 0.5 of haldol @11pm bld cx's from 5/14: NGTD  5/14: Bcx sent, VAC changed, Haldol for agitation, Seroquel dose increased 25Q12, isolation precautions dc'ed, pre-op labs ordered for tomorrow OR, consented.    SUBJECTIVE: Patient is comfortably sleeping, able to wake up upon calls, unable to follow verbal command.       MEDICATIONS  (STANDING):  dextrose 5%. 1000 milliLiter(s) (50 mL/Hr) IV Continuous <Continuous>  dextrose 50% Injectable 12.5 Gram(s) IV Push once  dextrose 50% Injectable 25 Gram(s) IV Push once  dextrose 50% Injectable 25 Gram(s) IV Push once  insulin lispro (HumaLOG) corrective regimen sliding scale   SubCutaneous every 6 hours  leuprolide depot Injectable (LUPRON-DEPOT) 7.5 milliGRAM(s) IntraMuscular every 4 weeks  levothyroxine Injectable 75 MICROGram(s) IV Push <User Schedule>  meropenem Injectable 1000 milliGRAM(s) IV Push every 12 hours  micafungin IVPB 100 milliGRAM(s) IV Intermittent daily  pantoprazole  Injectable 40 milliGRAM(s) IV Push daily  Parenteral Nutrition - Adult 1 Each (50 mL/Hr) TPN Continuous <Continuous>  QUEtiapine 25 milliGRAM(s) Oral at bedtime  sodium chloride 0.9% lock flush 20 milliLiter(s) IV Push once    MEDICATIONS  (PRN):  acetaminophen    Suspension. 650 milliGRAM(s) Oral every 6 hours PRN Moderate Pain (4 - 6)  ALBUTerol/ipratropium for Nebulization 3 milliLiter(s) Nebulizer every 6 hours PRN Shortness of Breath and/or Wheezing  dextrose Gel 1 Dose(s) Oral once PRN Blood Glucose LESS THAN 70 milliGRAM(s)/deciliter  glucagon  Injectable 1 milliGRAM(s) IntraMuscular once PRN Glucose LESS THAN 70 milligrams/deciliter  ondansetron Injectable 4 milliGRAM(s) IV Push every 6 hours PRN Nausea  sodium chloride 0.9% lock flush 10 milliLiter(s) IV Push every 1 hour PRN After each medication administration  sodium chloride 0.9% lock flush 10 milliLiter(s) IV Push every 12 hours PRN Lumen of catheter NOT used      ICU Vital Signs Last 24 Hrs  T(C): 37.2 (15 May 2018 05:11), Max: 38.2 (14 May 2018 08:00)  T(F): 98.9 (15 May 2018 05:11), Max: 100.7 (14 May 2018 08:00)  HR: 70 (15 May 2018 07:00) (68 - 88)  BP: 138/98 (15 May 2018 07:00) (101/74 - 191/81)  BP(mean): 114 (15 May 2018 07:00) (76 - 137)  ABP: --  ABP(mean): --  RR: 21 (15 May 2018 07:00) (18 - 55)  SpO2: 97% (15 May 2018 07:00) (93% - 100%)      Physical Exam:  General: NAD, comfortable lying on bed   HEENT: NC/AT, EOMI, PERRLA, normal hearing, no oral lesions, neck supple w/o LAD  Pulmonary: Mouth breather, lungs are clear, normal breath sound equal on both sides   Cardiovascular: NSR, no murmurs  Abdominal: soft, NT/ND, +BS, no organomegaly, wound VAC and LLQ LEANN drain intact (feculent output), no leak  Extremities: WWP, 5/5 strength x 4, no clubbing/cyanosis/edema  Neuro: A/O x0  Pulses: palpable distal pulses    Lines/tubes/drains:    Vent settings:      I&O's Summary    14 May 2018 07:01  -  15 May 2018 07:00  --------------------------------------------------------  IN: 1889.4 mL / OUT: 135 mL / NET: 1754.4 mL    15 May 2018 07:01  -  15 May 2018 07:52  --------------------------------------------------------  IN: 71.8 mL / OUT: 0 mL / NET: 71.8 mL        LABS:                        8.4    12.3  )-----------( 305      ( 15 May 2018 06:20 )             27.1     05-15    141  |  110<H>  |  47<H>  ----------------------------<  111<H>  4.8   |  20<L>  |  0.77    Ca    10.3      15 May 2018 06:20  Phos  3.6     05-15  Mg     1.9     05-15    TPro  6.6  /  Alb  2.7<L>  /  TBili  0.6  /  DBili  x   /  AST  50<H>  /  ALT  71<H>  /  AlkPhos  121<H>  05-15    PT/INR - ( 15 May 2018 04:46 )   PT: 14.6 sec;   INR: 1.31          PTT - ( 15 May 2018 04:46 )  PTT:37.5 sec    CAPILLARY BLOOD GLUCOSE      POCT Blood Glucose.: 114 mg/dL (15 May 2018 06:21)  POCT Blood Glucose.: 101 mg/dL (14 May 2018 23:33)  POCT Blood Glucose.: 114 mg/dL (14 May 2018 18:04)  POCT Blood Glucose.: 135 mg/dL (14 May 2018 11:46)    LIVER FUNCTIONS - ( 15 May 2018 06:20 )  Alb: 2.7 g/dL / Pro: 6.6 g/dL / ALK PHOS: 121 U/L / ALT: 71 U/L / AST: 50 U/L / GGT: x             Cultures:Culture Results:   No growth at 12 hours (05-14 @ 12:39)  Culture Results:   No growth at 12 hours (05-14 @ 12:39)      RADIOLOGY & ADDITIONAL STUDIES:

## 2018-05-15 NOTE — PROGRESS NOTE ADULT - ASSESSMENT
79 y/o F with sepsis from open abdominal wound infection, MSSA bacteremia present on admission, and UTI present on admission found to have contained small bowel leak, with vaginal bleeding 2/2 uterine fibroids. for OR today - exlap, hysterectomy, small bowek resection and placement of feeding tube     Neuro: off sedation, pn haldol PRN for agitation, seroquel 25 BID. holding: aripiprazole, escitalapram   CV: HD stable s/p severe sepsis, holding amlodipine, 4/19 Echo  65-70%.  Pulm: extubated, satting well with NC 3L  GI: NPO, protonix, trickle TF via dobhoff, TPN daily with lipids   : Nicole S/p AKF on HD now resolved. UTI (present on admission) + Uterine fibroids on CT scan with vaginal bleeding, biopsy revealed tubal metaplasia. lupron 7.5 q month (given 5/5) - RTOR today, pre-op  ID: Jose L( 4/17-) Eden: ( 4/27-) D/c'd:MSSA in blood: Oxacillin ( 4/17-) TTE neg for endocarditis.  Endo: ISS, Synthroid  Insulin  in TPN: 25. Lupron Qmonthly  PPx: sqh held for low plts  PE:Sp IVC filter on 3/30   Lines: PIV, PICC (5/9-) /// s/p L Rad Art line( 4/15-4/18),  Rt IJ TLC from OSH ( 4/15-), LIJ TLC (4/29-5/9)  Wounds: Wound vac to midline incision , change MWF

## 2018-05-15 NOTE — BRIEF OPERATIVE NOTE - PROCEDURE
<<-----Click on this checkbox to enter Procedure Abdominal washout  05/15/2018    Active  NALPER  Appendectomy  05/15/2018    Active  NALPER  Small bowel resection  05/15/2018    Active  NALPER  Supracervical hysterectomy with removal of both tubes and ovaries  05/15/2018    Active  NALPER  Exploratory laparotomy  05/15/2018    Active  NALPER

## 2018-05-15 NOTE — PRE-OP CHECKLIST - SELECT TESTS ORDERED
Type and Screen/CXR/POCT Blood Glucose/INR/PT/PTT/BMP/CBC POCT Blood Glucose/BMP/CBC/Type and Screen/CXR/PT/PTT/INR/EKG

## 2018-05-15 NOTE — CHART NOTE - NSCHARTNOTEFT_GEN_A_CORE
Admitting Diagnosis:   Patient is a 78y old  Female who presents with a chief complaint of Abdominal wound and multifactorial sepsis (2018 21:02)      PAST MEDICAL & SURGICAL HISTORY:  Hypothyroidism  GERD (gastroesophageal reflux disease)  Obesity  DM (diabetes mellitus)  HLD (hyperlipidemia)  HTN (hypertension)  H/O ventral hernia repair      Current Nutrition Order:  TPN via central line @ 300g Dextrose, 72g AA, 50g Lipids to provide 1808 kcal, 72g protein, GIR 2.23, 1.2g/kg IBW protein   Osmolite 1.2 Nicholas @ 10mL/hr x 24hrs via NGT providin mL TV, 288 kcal, 13g protein, 197mL free H2O, .21g/kg IBW protein   *TF currently on hold for OR*    Total nutrition support providin kcal, 85g protein. 1.39g/kg IBW protein    PO Intake: Good (%) [   ]  Fair (50-75%) [   ] Poor (<25%) [   ]- N/A NPO    GI Issues: No N/V/C/D noted; good tolerance per RN     Pain: Unable to assess at this time 2/2 AMS; pt slightly restless/agitated today but does not appear in pain     Skin Integrity: Open abdominal wound w/vac, B/L buttocks stage II, generalized 2+ edema, L. IJ drain      Labs:   05-15    141  |  110<H>  |  47<H>  ----------------------------<  111<H>  4.8   |  20<L>  |  0.77    Ca    10.3      15 May 2018 06:20  Phos  3.6     05-15  Mg     1.9     05-15    TPro  6.6  /  Alb  2.7<L>  /  TBili  0.6  /  DBili  x   /  AST  50<H>  /  ALT  71<H>  /  AlkPhos  121<H>  05-15    CAPILLARY BLOOD GLUCOSE      POCT Blood Glucose.: 129 mg/dL (15 May 2018 11:13)  POCT Blood Glucose.: 114 mg/dL (15 May 2018 06:21)  POCT Blood Glucose.: 101 mg/dL (14 May 2018 23:33)  POCT Blood Glucose.: 114 mg/dL (14 May 2018 18:04)      Medications:  MEDICATIONS  (STANDING):  dextrose 5%. 1000 milliLiter(s) (50 mL/Hr) IV Continuous <Continuous>  dextrose 50% Injectable 12.5 Gram(s) IV Push once  dextrose 50% Injectable 25 Gram(s) IV Push once  dextrose 50% Injectable 25 Gram(s) IV Push once  fat emulsion (Plant Based) 20% Infusion 0.535 Gm/kG/Day (20.833 mL/Hr) IV Continuous <Continuous>  insulin lispro (HumaLOG) corrective regimen sliding scale   SubCutaneous every 6 hours  leuprolide depot Injectable (LUPRON-DEPOT) 7.5 milliGRAM(s) IntraMuscular every 4 weeks  levothyroxine Injectable 75 MICROGram(s) IV Push <User Schedule>  meropenem Injectable 1000 milliGRAM(s) IV Push every 12 hours  micafungin IVPB 100 milliGRAM(s) IV Intermittent daily  pantoprazole  Injectable 40 milliGRAM(s) IV Push daily  Parenteral Nutrition - Adult 1 Each (50 mL/Hr) TPN Continuous <Continuous>  Parenteral Nutrition - Adult 1 Each (50 mL/Hr) TPN Continuous <Continuous>  QUEtiapine 25 milliGRAM(s) Oral at bedtime  sodium chloride 0.9% lock flush 20 milliLiter(s) IV Push once    MEDICATIONS  (PRN):  acetaminophen    Suspension. 650 milliGRAM(s) Oral every 6 hours PRN Moderate Pain (4 - 6)  ALBUTerol/ipratropium for Nebulization 3 milliLiter(s) Nebulizer every 6 hours PRN Shortness of Breath and/or Wheezing  dextrose Gel 1 Dose(s) Oral once PRN Blood Glucose LESS THAN 70 milliGRAM(s)/deciliter  glucagon  Injectable 1 milliGRAM(s) IntraMuscular once PRN Glucose LESS THAN 70 milligrams/deciliter  ondansetron Injectable 4 milliGRAM(s) IV Push every 6 hours PRN Nausea  sodium chloride 0.9% lock flush 10 milliLiter(s) IV Push every 1 hour PRN After each medication administration  sodium chloride 0.9% lock flush 10 milliLiter(s) IV Push every 12 hours PRN Lumen of catheter NOT used    Weight: 93.6kg  Daily     Daily     Weight Change: No new weights recorded since admit     Estimated energy needs: Ideal body weight (61kg) used for calculations as pt >120% of IBW. needs estimated for older age; adjusted for sepsis, wound healing  Calories: 25-30 kcal/kg = 6264-5276 kcal/day  Protein: 1.2-1.4 g/kg = 73-85g protein/day  Fluids: 30-35 mL/kg = 2497-2639 mL/day    Subjective: 77 yo/female with PMhx DM, HTN, recent ventral hernia repair c/b PE, anemia, GIB, admitted with sepsis 2/2 abdominal wound, UTI and bacteremia. Also found to have small bowel perf w/contained leak. Pt intubated for airway protection on . Successfully extubated on . PICC line placed. Plan to go to OR today for ex lap, hysterectomy, small bowel resection and PEG placement. Pt seen in room and discussed during rounds. Pt awake, alert, but does not follow commands and is agitated. Breathing comfortably on room air. TPN running with lipids. TF on hold for OR. No intolerances noted per RN. Lytes WNL, LFTs elevated but trending down, TG from  169mg/dL.     Previous Nutrition Diagnosis:  Inadequate oral intake RT inability to meet needs via oral intake 2/2 AMS AEB NPO    Active [ X- nutrition support dependent  ]  Resolved [   ]    If resolved, new PES:     Goal: Pt will continue to meet % of EER via tolerated route(s)     Recommendations:  1. Continue with TPN order of 300g Dextrose, 72g AA, 50g Lipids to provide 1808 kcal, 72g protein, GIR 2.23, 1.2g/kg IBW protein   Recommend checking TG weekly (due ). Check Mg, Phos, K daily and POC BG Q6hrs. Trend daily weights. Fluids and lytes per MD discretion.   2. Continue with trickle feeds of Osmolite 1.2 Nicholas; continue to use the gut as feasible and tolerated (increase to 20mL/hr as tolerated and feasible)  *As feasible, start feeds via PEG  *Once patient able to tolerate 50-75% of EER via EN, cut TPN by half for last day to prevent electrolyte and glucose abnormalities   3. Keep nutrition in line with care at all times   4. Trend weights    Education: N/A 2/2 AMS    Risk Level: High [ X  ] Moderate [   ] Low [   ]

## 2018-05-16 LAB
ALBUMIN SERPL ELPH-MCNC: 2.1 G/DL — LOW (ref 3.3–5)
ALP SERPL-CCNC: 110 U/L — SIGNIFICANT CHANGE UP (ref 40–120)
ALT FLD-CCNC: 53 U/L — HIGH (ref 10–45)
ANION GAP SERPL CALC-SCNC: 11 MMOL/L — SIGNIFICANT CHANGE UP (ref 5–17)
ANION GAP SERPL CALC-SCNC: 12 MMOL/L — SIGNIFICANT CHANGE UP (ref 5–17)
ANION GAP SERPL CALC-SCNC: 12 MMOL/L — SIGNIFICANT CHANGE UP (ref 5–17)
ANISOCYTOSIS BLD QL: SLIGHT — SIGNIFICANT CHANGE UP
APPEARANCE UR: (no result)
APPEARANCE UR: CLEAR — SIGNIFICANT CHANGE UP
AST SERPL-CCNC: 57 U/L — HIGH (ref 10–40)
BASE EXCESS BLDA CALC-SCNC: -8.3 MMOL/L — LOW (ref -2–3)
BASOPHILS NFR BLD AUTO: 0 % — SIGNIFICANT CHANGE UP (ref 0–2)
BILIRUB SERPL-MCNC: 0.8 MG/DL — SIGNIFICANT CHANGE UP (ref 0.2–1.2)
BILIRUB UR-MCNC: NEGATIVE — SIGNIFICANT CHANGE UP
BILIRUB UR-MCNC: NEGATIVE — SIGNIFICANT CHANGE UP
BUN SERPL-MCNC: 47 MG/DL — HIGH (ref 7–23)
BUN SERPL-MCNC: 48 MG/DL — HIGH (ref 7–23)
BUN SERPL-MCNC: 49 MG/DL — HIGH (ref 7–23)
BUN SERPL-MCNC: 55 MG/DL — HIGH (ref 7–23)
BUN SERPL-MCNC: 55 MG/DL — HIGH (ref 7–23)
CALCIUM SERPL-MCNC: 8.4 MG/DL — SIGNIFICANT CHANGE UP (ref 8.4–10.5)
CALCIUM SERPL-MCNC: 8.4 MG/DL — SIGNIFICANT CHANGE UP (ref 8.4–10.5)
CALCIUM SERPL-MCNC: 8.6 MG/DL — SIGNIFICANT CHANGE UP (ref 8.4–10.5)
CALCIUM SERPL-MCNC: 8.6 MG/DL — SIGNIFICANT CHANGE UP (ref 8.4–10.5)
CALCIUM SERPL-MCNC: 9.1 MG/DL — SIGNIFICANT CHANGE UP (ref 8.4–10.5)
CHLORIDE SERPL-SCNC: 106 MMOL/L — SIGNIFICANT CHANGE UP (ref 96–108)
CHLORIDE SERPL-SCNC: 107 MMOL/L — SIGNIFICANT CHANGE UP (ref 96–108)
CHLORIDE SERPL-SCNC: 107 MMOL/L — SIGNIFICANT CHANGE UP (ref 96–108)
CHLORIDE SERPL-SCNC: 108 MMOL/L — SIGNIFICANT CHANGE UP (ref 96–108)
CHLORIDE SERPL-SCNC: 108 MMOL/L — SIGNIFICANT CHANGE UP (ref 96–108)
CHLORIDE UR-SCNC: 20 MMOL/L — SIGNIFICANT CHANGE UP
CHLORIDE UR-SCNC: 28 MMOL/L — SIGNIFICANT CHANGE UP
CO2 SERPL-SCNC: 15 MMOL/L — LOW (ref 22–31)
CO2 SERPL-SCNC: 15 MMOL/L — LOW (ref 22–31)
CO2 SERPL-SCNC: 16 MMOL/L — LOW (ref 22–31)
CO2 SERPL-SCNC: 17 MMOL/L — LOW (ref 22–31)
CO2 SERPL-SCNC: 18 MMOL/L — LOW (ref 22–31)
COLOR SPEC: YELLOW — SIGNIFICANT CHANGE UP
COLOR SPEC: YELLOW — SIGNIFICANT CHANGE UP
CREAT SERPL-MCNC: 1.19 MG/DL — SIGNIFICANT CHANGE UP (ref 0.5–1.3)
CREAT SERPL-MCNC: 1.21 MG/DL — SIGNIFICANT CHANGE UP (ref 0.5–1.3)
CREAT SERPL-MCNC: 1.31 MG/DL — HIGH (ref 0.5–1.3)
CREAT SERPL-MCNC: 1.32 MG/DL — HIGH (ref 0.5–1.3)
CREAT SERPL-MCNC: 1.39 MG/DL — HIGH (ref 0.5–1.3)
DIFF PNL FLD: (no result)
DIFF PNL FLD: (no result)
DOHLE BOD BLD QL SMEAR: SIGNIFICANT CHANGE UP
EOSINOPHIL NFR BLD AUTO: 1 % — SIGNIFICANT CHANGE UP (ref 0–6)
GAS PNL BLDA: SIGNIFICANT CHANGE UP
GAS PNL BLDA: SIGNIFICANT CHANGE UP
GLUCOSE BLDC GLUCOMTR-MCNC: 149 MG/DL — HIGH (ref 70–99)
GLUCOSE BLDC GLUCOMTR-MCNC: 158 MG/DL — HIGH (ref 70–99)
GLUCOSE BLDC GLUCOMTR-MCNC: 205 MG/DL — HIGH (ref 70–99)
GLUCOSE BLDC GLUCOMTR-MCNC: 222 MG/DL — HIGH (ref 70–99)
GLUCOSE SERPL-MCNC: 154 MG/DL — HIGH (ref 70–99)
GLUCOSE SERPL-MCNC: 166 MG/DL — HIGH (ref 70–99)
GLUCOSE SERPL-MCNC: 172 MG/DL — HIGH (ref 70–99)
GLUCOSE SERPL-MCNC: 212 MG/DL — HIGH (ref 70–99)
GLUCOSE SERPL-MCNC: 251 MG/DL — HIGH (ref 70–99)
GLUCOSE UR QL: NEGATIVE — SIGNIFICANT CHANGE UP
GLUCOSE UR QL: NEGATIVE — SIGNIFICANT CHANGE UP
HCO3 BLDA-SCNC: 16 MMOL/L — LOW (ref 21–28)
HCT VFR BLD CALC: 32.6 % — LOW (ref 34.5–45)
HCT VFR BLD CALC: 33.8 % — LOW (ref 34.5–45)
HGB BLD-MCNC: 10.8 G/DL — LOW (ref 11.5–15.5)
HGB BLD-MCNC: 11.1 G/DL — LOW (ref 11.5–15.5)
KETONES UR-MCNC: NEGATIVE — SIGNIFICANT CHANGE UP
KETONES UR-MCNC: NEGATIVE — SIGNIFICANT CHANGE UP
LEUKOCYTE ESTERASE UR-ACNC: NEGATIVE — SIGNIFICANT CHANGE UP
LEUKOCYTE ESTERASE UR-ACNC: NEGATIVE — SIGNIFICANT CHANGE UP
LG PLATELETS BLD QL AUTO: PRESENT — SIGNIFICANT CHANGE UP
LYMPHOCYTES # BLD AUTO: 5 % — LOW (ref 13–44)
MAGNESIUM SERPL-MCNC: 2.2 MG/DL — SIGNIFICANT CHANGE UP (ref 1.6–2.6)
MANUAL DIF COMMENT BLD-IMP: SIGNIFICANT CHANGE UP
MANUAL SMEAR VERIFICATION: SIGNIFICANT CHANGE UP
MCHC RBC-ENTMCNC: 31.3 PG — SIGNIFICANT CHANGE UP (ref 27–34)
MCHC RBC-ENTMCNC: 31.7 PG — SIGNIFICANT CHANGE UP (ref 27–34)
MCHC RBC-ENTMCNC: 32.8 G/DL — SIGNIFICANT CHANGE UP (ref 32–36)
MCHC RBC-ENTMCNC: 33.1 G/DL — SIGNIFICANT CHANGE UP (ref 32–36)
MCV RBC AUTO: 95.2 FL — SIGNIFICANT CHANGE UP (ref 80–100)
MCV RBC AUTO: 95.6 FL — SIGNIFICANT CHANGE UP (ref 80–100)
METAMYELOCYTES # FLD: 2 % — HIGH
MONOCYTES NFR BLD AUTO: 2 % — SIGNIFICANT CHANGE UP (ref 2–14)
MYELOCYTES NFR BLD: 2 % — HIGH
NEUTROPHILS NFR BLD AUTO: 83 % — HIGH (ref 43–77)
NEUTS BAND # BLD: 5 % — SIGNIFICANT CHANGE UP
NEUTS VAC BLD QL SMEAR: PRESENT — SIGNIFICANT CHANGE UP
NITRITE UR-MCNC: NEGATIVE — SIGNIFICANT CHANGE UP
NITRITE UR-MCNC: NEGATIVE — SIGNIFICANT CHANGE UP
PCO2 BLDA: 29 MMHG — LOW (ref 32–45)
PH BLDA: 7.36 — SIGNIFICANT CHANGE UP (ref 7.35–7.45)
PH UR: 5 — SIGNIFICANT CHANGE UP (ref 5–8)
PH UR: 5 — SIGNIFICANT CHANGE UP (ref 5–8)
PHOSPHATE SERPL-MCNC: 5.1 MG/DL — HIGH (ref 2.5–4.5)
PLAT MORPH BLD: (no result)
PLATELET # BLD AUTO: 350 K/UL — SIGNIFICANT CHANGE UP (ref 150–400)
PLATELET # BLD AUTO: 393 K/UL — SIGNIFICANT CHANGE UP (ref 150–400)
PLATELET CLUMP BLD QL SMEAR: PRESENT
PO2 BLDA: 136 MMHG — HIGH (ref 83–108)
POIKILOCYTOSIS BLD QL AUTO: SLIGHT — SIGNIFICANT CHANGE UP
POLYCHROMASIA BLD QL SMEAR: SLIGHT — SIGNIFICANT CHANGE UP
POTASSIUM SERPL-MCNC: 5.5 MMOL/L — HIGH (ref 3.5–5.3)
POTASSIUM SERPL-MCNC: 5.7 MMOL/L — HIGH (ref 3.5–5.3)
POTASSIUM SERPL-MCNC: 6.1 MMOL/L — HIGH (ref 3.5–5.3)
POTASSIUM SERPL-MCNC: 6.8 MMOL/L — CRITICAL HIGH (ref 3.5–5.3)
POTASSIUM SERPL-MCNC: 7 MMOL/L — CRITICAL HIGH (ref 3.5–5.3)
POTASSIUM SERPL-SCNC: 5.5 MMOL/L — HIGH (ref 3.5–5.3)
POTASSIUM SERPL-SCNC: 5.7 MMOL/L — HIGH (ref 3.5–5.3)
POTASSIUM SERPL-SCNC: 6.1 MMOL/L — HIGH (ref 3.5–5.3)
POTASSIUM SERPL-SCNC: 6.8 MMOL/L — CRITICAL HIGH (ref 3.5–5.3)
POTASSIUM SERPL-SCNC: 7 MMOL/L — CRITICAL HIGH (ref 3.5–5.3)
POTASSIUM UR-SCNC: 75 MMOL/L — SIGNIFICANT CHANGE UP
PROT SERPL-MCNC: 5.4 G/DL — LOW (ref 6–8.3)
PROT UR-MCNC: (no result) MG/DL
PROT UR-MCNC: 30 MG/DL
RBC # BLD: 3.41 M/UL — LOW (ref 3.8–5.2)
RBC # BLD: 3.55 M/UL — LOW (ref 3.8–5.2)
RBC # FLD: 16.6 % — SIGNIFICANT CHANGE UP (ref 10.3–16.9)
RBC # FLD: 16.7 % — SIGNIFICANT CHANGE UP (ref 10.3–16.9)
RBC BLD AUTO: (no result)
SAO2 % BLDA: 99 % — SIGNIFICANT CHANGE UP (ref 95–100)
SMUDGE CELLS # BLD: SIGNIFICANT CHANGE UP
SODIUM SERPL-SCNC: 133 MMOL/L — LOW (ref 135–145)
SODIUM SERPL-SCNC: 134 MMOL/L — LOW (ref 135–145)
SODIUM SERPL-SCNC: 135 MMOL/L — SIGNIFICANT CHANGE UP (ref 135–145)
SODIUM SERPL-SCNC: 136 MMOL/L — SIGNIFICANT CHANGE UP (ref 135–145)
SODIUM SERPL-SCNC: 136 MMOL/L — SIGNIFICANT CHANGE UP (ref 135–145)
SODIUM UR-SCNC: 33 MMOL/L — SIGNIFICANT CHANGE UP
SP GR SPEC: 1.02 — SIGNIFICANT CHANGE UP (ref 1–1.03)
SP GR SPEC: >=1.03 — SIGNIFICANT CHANGE UP (ref 1–1.03)
SPHEROCYTES BLD QL SMEAR: SLIGHT — SIGNIFICANT CHANGE UP
TRIGL SERPL-MCNC: 183 MG/DL — HIGH (ref 10–149)
UROBILINOGEN FLD QL: 0.2 E.U./DL — SIGNIFICANT CHANGE UP
UROBILINOGEN FLD QL: 0.2 E.U./DL — SIGNIFICANT CHANGE UP
WBC # BLD: 51.1 K/UL — CRITICAL HIGH (ref 3.8–10.5)
WBC # BLD: 55.3 K/UL — CRITICAL HIGH (ref 3.8–10.5)
WBC # FLD AUTO: 51.1 K/UL — CRITICAL HIGH (ref 3.8–10.5)
WBC # FLD AUTO: 55.3 K/UL — CRITICAL HIGH (ref 3.8–10.5)

## 2018-05-16 PROCEDURE — 71045 X-RAY EXAM CHEST 1 VIEW: CPT | Mod: 26

## 2018-05-16 PROCEDURE — 99233 SBSQ HOSP IP/OBS HIGH 50: CPT | Mod: GC

## 2018-05-16 RX ORDER — INSULIN HUMAN 100 [IU]/ML
10 INJECTION, SOLUTION SUBCUTANEOUS ONCE
Qty: 0 | Refills: 0 | Status: DISCONTINUED | OUTPATIENT
Start: 2018-05-16 | End: 2018-05-16

## 2018-05-16 RX ORDER — SODIUM POLYSTYRENE SULFONATE 4.1 MEQ/G
30 POWDER, FOR SUSPENSION ORAL ONCE
Qty: 0 | Refills: 0 | Status: COMPLETED | OUTPATIENT
Start: 2018-05-16 | End: 2018-05-16

## 2018-05-16 RX ORDER — SODIUM CHLORIDE 9 MG/ML
1000 INJECTION INTRAMUSCULAR; INTRAVENOUS; SUBCUTANEOUS ONCE
Qty: 0 | Refills: 0 | Status: COMPLETED | OUTPATIENT
Start: 2018-05-16 | End: 2018-05-16

## 2018-05-16 RX ORDER — SODIUM CHLORIDE 9 MG/ML
500 INJECTION INTRAMUSCULAR; INTRAVENOUS; SUBCUTANEOUS ONCE
Qty: 0 | Refills: 0 | Status: COMPLETED | OUTPATIENT
Start: 2018-05-16 | End: 2018-05-16

## 2018-05-16 RX ORDER — SODIUM BICARBONATE 1 MEQ/ML
100 SYRINGE (ML) INTRAVENOUS ONCE
Qty: 0 | Refills: 0 | Status: COMPLETED | OUTPATIENT
Start: 2018-05-16 | End: 2018-05-16

## 2018-05-16 RX ORDER — DEXTROSE 10 % IN WATER 10 %
1000 INTRAVENOUS SOLUTION INTRAVENOUS
Qty: 0 | Refills: 0 | Status: DISCONTINUED | OUTPATIENT
Start: 2018-05-16 | End: 2018-05-16

## 2018-05-16 RX ORDER — ALBUMIN HUMAN 25 %
250 VIAL (ML) INTRAVENOUS ONCE
Qty: 0 | Refills: 0 | Status: DISCONTINUED | OUTPATIENT
Start: 2018-05-16 | End: 2018-05-16

## 2018-05-16 RX ORDER — MICAFUNGIN SODIUM 100 MG/1
100 INJECTION, POWDER, LYOPHILIZED, FOR SOLUTION INTRAVENOUS DAILY
Qty: 0 | Refills: 0 | Status: DISCONTINUED | OUTPATIENT
Start: 2018-05-16 | End: 2018-05-21

## 2018-05-16 RX ORDER — ELECTROLYTE SOLUTION,INJ
1 VIAL (ML) INTRAVENOUS
Qty: 0 | Refills: 0 | Status: DISCONTINUED | OUTPATIENT
Start: 2018-05-16 | End: 2018-05-16

## 2018-05-16 RX ORDER — ACETAMINOPHEN 500 MG
1000 TABLET ORAL ONCE
Qty: 0 | Refills: 0 | Status: COMPLETED | OUTPATIENT
Start: 2018-05-16 | End: 2018-05-16

## 2018-05-16 RX ORDER — DEXTROSE 50 % IN WATER 50 %
50 SYRINGE (ML) INTRAVENOUS ONCE
Qty: 0 | Refills: 0 | Status: COMPLETED | OUTPATIENT
Start: 2018-05-16 | End: 2018-05-16

## 2018-05-16 RX ORDER — INSULIN LISPRO 100/ML
10 VIAL (ML) SUBCUTANEOUS ONCE
Qty: 0 | Refills: 0 | Status: COMPLETED | OUTPATIENT
Start: 2018-05-16 | End: 2018-05-16

## 2018-05-16 RX ORDER — FENTANYL CITRATE 50 UG/ML
25 INJECTION INTRAVENOUS ONCE
Qty: 0 | Refills: 0 | Status: DISCONTINUED | OUTPATIENT
Start: 2018-05-16 | End: 2018-05-16

## 2018-05-16 RX ADMIN — Medication 400 MILLIGRAM(S): at 04:59

## 2018-05-16 RX ADMIN — PROPOFOL 2 MICROGRAM(S)/KG/MIN: 10 INJECTION, EMULSION INTRAVENOUS at 19:36

## 2018-05-16 RX ADMIN — PROPOFOL 2 MICROGRAM(S)/KG/MIN: 10 INJECTION, EMULSION INTRAVENOUS at 12:49

## 2018-05-16 RX ADMIN — Medication 100 MILLIEQUIVALENT(S): at 11:24

## 2018-05-16 RX ADMIN — PROPOFOL 2 MICROGRAM(S)/KG/MIN: 10 INJECTION, EMULSION INTRAVENOUS at 12:48

## 2018-05-16 RX ADMIN — Medication 8.78 MICROGRAM(S)/KG/MIN: at 03:27

## 2018-05-16 RX ADMIN — SODIUM CHLORIDE 3000 MILLILITER(S): 9 INJECTION INTRAMUSCULAR; INTRAVENOUS; SUBCUTANEOUS at 21:09

## 2018-05-16 RX ADMIN — MEROPENEM 1000 MILLIGRAM(S): 1 INJECTION INTRAVENOUS at 17:37

## 2018-05-16 RX ADMIN — FENTANYL CITRATE 25 MICROGRAM(S): 50 INJECTION INTRAVENOUS at 03:27

## 2018-05-16 RX ADMIN — Medication 8.78 MICROGRAM(S)/KG/MIN: at 17:46

## 2018-05-16 RX ADMIN — PROPOFOL 2 MICROGRAM(S)/KG/MIN: 10 INJECTION, EMULSION INTRAVENOUS at 14:45

## 2018-05-16 RX ADMIN — MEROPENEM 1000 MILLIGRAM(S): 1 INJECTION INTRAVENOUS at 06:55

## 2018-05-16 RX ADMIN — PANTOPRAZOLE SODIUM 40 MILLIGRAM(S): 20 TABLET, DELAYED RELEASE ORAL at 11:24

## 2018-05-16 RX ADMIN — Medication 1 DROP(S): at 17:58

## 2018-05-16 RX ADMIN — Medication 50 MILLILITER(S): at 08:03

## 2018-05-16 RX ADMIN — Medication 10 UNIT(S): at 08:23

## 2018-05-16 RX ADMIN — Medication 4: at 17:58

## 2018-05-16 RX ADMIN — Medication 75 MICROGRAM(S): at 06:55

## 2018-05-16 RX ADMIN — CHLORHEXIDINE GLUCONATE 15 MILLILITER(S): 213 SOLUTION TOPICAL at 17:38

## 2018-05-16 RX ADMIN — SODIUM POLYSTYRENE SULFONATE 30 GRAM(S): 4.1 POWDER, FOR SUSPENSION ORAL at 14:21

## 2018-05-16 RX ADMIN — SODIUM CHLORIDE 4000 MILLILITER(S): 9 INJECTION INTRAMUSCULAR; INTRAVENOUS; SUBCUTANEOUS at 17:45

## 2018-05-16 RX ADMIN — Medication 50 MILLILITER(S): at 08:02

## 2018-05-16 RX ADMIN — MICAFUNGIN SODIUM 210 MILLIGRAM(S): 100 INJECTION, POWDER, LYOPHILIZED, FOR SOLUTION INTRAVENOUS at 11:24

## 2018-05-16 RX ADMIN — Medication 50 EACH: at 10:00

## 2018-05-16 RX ADMIN — CHLORHEXIDINE GLUCONATE 15 MILLILITER(S): 213 SOLUTION TOPICAL at 06:54

## 2018-05-16 RX ADMIN — Medication 2: at 23:36

## 2018-05-16 RX ADMIN — Medication 2: at 00:31

## 2018-05-16 RX ADMIN — Medication 4: at 11:24

## 2018-05-16 NOTE — PROGRESS NOTE ADULT - SUBJECTIVE AND OBJECTIVE BOX
ID PROGRESS NOTE    INTERVAL EVENTS/SUBJECTIVE: Yesterday night 5/15 went to OR (ex lab, abdominal washout) - found areas of bowel leak s/p partial sm bowel resection, also had supracervical hysterectomy with b/l salpingo-oophorectomy for inflamed uterus. WBC now elevated to 55k.    VITALS  Vital Signs Last 24 Hrs  T(C): 37.6 (16 May 2018 10:00), Max: 38.3 (16 May 2018 04:00)  T(F): 99.6 (16 May 2018 10:00), Max: 101 (16 May 2018 04:00)  HR: 94 (16 May 2018 12:00) (78 - 122)  BP: 95/60 (16 May 2018 11:00) (81/58 - 97/59)  BP(mean): 69 (16 May 2018 11:00) (64 - 70)  RR: 17 (16 May 2018 12:00) (9 - 19)  SpO2: 99% (16 May 2018 12:00) (98% - 100%)    PHYSICAL EXAM  General: awake, nods head to questions however not really communicative, on 2 L NC  Eyes:  Sclerae anicteric, conjunctivae clear, PERRL  Ear/Nose/Throat:  MMM/ No thrush   Pulmonary: Bibasilar crackles, air entry equal on both sides, Right chest Port, access for HD  Cardiovascular: NSR, no murmurs  Abdominal: soft, obese, ND/NT wound VAC in place, no leak.   : suazo in place  Extremities: WWP, palpable dp and radial pulses BL, trace LE edema present     LABS                        11.1   55.3  )-----------( 393      ( 16 May 2018 05:13 )             33.8     05-16    133<L>  |  107  |  49<H>  ----------------------------<  251<H>  6.1<H>   |  15<L>  |  1.31<H>    Ca    8.4      16 May 2018 11:14  Phos  5.1     05-16  Mg     2.2     05-16    TPro  5.4<L>  /  Alb  2.1<L>  /  TBili  0.8  /  DBili  x   /  AST  57<H>  /  ALT  53<H>  /  AlkPhos  110  05-16    PT/INR - ( 15 May 2018 18:39 )   PT: 15.2 sec;   INR: 1.36          PTT - ( 15 May 2018 18:39 )  PTT:38.4 sec  LIVER FUNCTIONS - ( 16 May 2018 05:13 )  Alb: 2.1 g/dL / Pro: 5.4 g/dL / ALK PHOS: 110 U/L / ALT: 53 U/L / AST: 57 U/L / GGT: x           ABG - ( 16 May 2018 09:03 )  pH, Arterial: 7.38  pH, Blood: x     /  pCO2: 28    /  pO2: 148   / HCO3: 16    / Base Excess: -8.2  /  SaO2: 99        Urinalysis Basic - ( 16 May 2018 05:59 )    Color: Yellow / Appearance: SL Cloudy / SG: >=1.030 / pH: x  Gluc: x / Ketone: NEGATIVE  / Bili: Negative / Urobili: 0.2 E.U./dL   Blood: x / Protein: Trace mg/dL / Nitrite: NEGATIVE   Leuk Esterase: NEGATIVE / RBC: > 10 /HPF / WBC < 5 /HPF   Sq Epi: x / Non Sq Epi: 0-5 /HPF / Bacteria: Present /HPF    Culture - Blood (collected 14 May 2018 12:39)  Source: .Blood Left Arm  Preliminary Report (16 May 2018 13:02):    No growth at 2 days.    Culture - Blood (collected 14 May 2018 12:39)  Source: .Blood Right Arm  Preliminary Report (16 May 2018 13:02):    No growth at 2 days.    Microbiology:  U Clx 4/18: + E.coli MDR  left pelvic fluid aspirate: + for yeast/ candida luitaniae and candida Glabrata  lusitaniae fluc sensitive/ glabrata fluc SDD 4    CT Abdomen and Pelvis w/ Oral Cont (05.03.18 @ 15:38) >  Interval decrease in size of the extraperitoneal gas-filled collection   surrounding the enlarged and bulky uterus with an indwelling percutaneous   drainage catheter. However, enteric contrast is again seen within this   collection on the current study, indicating a persistent enteric   fistulous collection.    Increased amount of upper abdominal ascites.    Moderate bilateral pleural effusions with near complete collapse of the   right lower lobe and partial collapse of the left lower lobe. ID PROGRESS NOTE    INTERVAL EVENTS/SUBJECTIVE: Yesterday night 5/15 went to OR (ex lap, abdominal washout) - found areas of bowel leak s/p partial sm bowel resection, also had supracervical hysterectomy with b/l salpingo-oophorectomy for inflamed uterus. WBC now elevated to 55k.    VITALS  Vital Signs Last 24 Hrs  T(C): 37.6 (16 May 2018 10:00), Max: 38.3 (16 May 2018 04:00)  T(F): 99.6 (16 May 2018 10:00), Max: 101 (16 May 2018 04:00)  HR: 94 (16 May 2018 12:00) (78 - 122)  BP: 95/60 (16 May 2018 11:00) (81/58 - 97/59)  BP(mean): 69 (16 May 2018 11:00) (64 - 70)  RR: 17 (16 May 2018 12:00) (9 - 19)  SpO2: 99% (16 May 2018 12:00) (98% - 100%)    PHYSICAL EXAM  General: awake, nods head to questions however not really communicative, on 2 L NC  Eyes:  Sclerae anicteric, conjunctivae clear, PERRL  Ear/Nose/Throat:  MMM/ No thrush   Pulmonary: Bibasilar crackles, air entry equal on both sides, Right chest Port, access for HD  Cardiovascular: NSR, no murmurs  Abdominal: soft, obese, ND/NT wound VAC in place, no leak.   : suazo in place  Extremities: WWP, palpable dp and radial pulses BL, trace LE edema present     LABS                        11.1   55.3  )-----------( 393      ( 16 May 2018 05:13 )             33.8     05-16    133<L>  |  107  |  49<H>  ----------------------------<  251<H>  6.1<H>   |  15<L>  |  1.31<H>    Ca    8.4      16 May 2018 11:14  Phos  5.1     05-16  Mg     2.2     05-16    TPro  5.4<L>  /  Alb  2.1<L>  /  TBili  0.8  /  DBili  x   /  AST  57<H>  /  ALT  53<H>  /  AlkPhos  110  05-16    PT/INR - ( 15 May 2018 18:39 )   PT: 15.2 sec;   INR: 1.36          PTT - ( 15 May 2018 18:39 )  PTT:38.4 sec  LIVER FUNCTIONS - ( 16 May 2018 05:13 )  Alb: 2.1 g/dL / Pro: 5.4 g/dL / ALK PHOS: 110 U/L / ALT: 53 U/L / AST: 57 U/L / GGT: x           ABG - ( 16 May 2018 09:03 )  pH, Arterial: 7.38  pH, Blood: x     /  pCO2: 28    /  pO2: 148   / HCO3: 16    / Base Excess: -8.2  /  SaO2: 99        Urinalysis Basic - ( 16 May 2018 05:59 )    Color: Yellow / Appearance: SL Cloudy / SG: >=1.030 / pH: x  Gluc: x / Ketone: NEGATIVE  / Bili: Negative / Urobili: 0.2 E.U./dL   Blood: x / Protein: Trace mg/dL / Nitrite: NEGATIVE   Leuk Esterase: NEGATIVE / RBC: > 10 /HPF / WBC < 5 /HPF   Sq Epi: x / Non Sq Epi: 0-5 /HPF / Bacteria: Present /HPF    Culture - Blood (collected 14 May 2018 12:39)  Source: .Blood Left Arm  Preliminary Report (16 May 2018 13:02):    No growth at 2 days.    Culture - Blood (collected 14 May 2018 12:39)  Source: .Blood Right Arm  Preliminary Report (16 May 2018 13:02):    No growth at 2 days.    Microbiology:  U Clx 4/18: + E.coli MDR  left pelvic fluid aspirate: + for yeast/ candida luitaniae and candida Glabrata  lusitaniae fluc sensitive/ glabrata fluc SDD 4    CT Abdomen and Pelvis w/ Oral Cont (05.03.18 @ 15:38) >  Interval decrease in size of the extraperitoneal gas-filled collection   surrounding the enlarged and bulky uterus with an indwelling percutaneous   drainage catheter. However, enteric contrast is again seen within this   collection on the current study, indicating a persistent enteric   fistulous collection.    Increased amount of upper abdominal ascites.    Moderate bilateral pleural effusions with near complete collapse of the   right lower lobe and partial collapse of the left lower lobe.

## 2018-05-16 NOTE — PROGRESS NOTE ADULT - ATTENDING COMMENTS
ID Attending    Operative note reviewed    septic shower likely in larry-operatve period.    Cultures pending    Recommend    Continue current antimicrobial regimen    Dr. Murphy assumes ID Service tomorrow

## 2018-05-16 NOTE — PROGRESS NOTE ADULT - SUBJECTIVE AND OBJECTIVE BOX
24 hr events:  ON: Hgb 6.4- given 2 uPRBC LEANN drainage improving Levo @16mcg->6. La 0.9 repeat post tx hgb @2am:10.4 . ETT adjusted, Vent adjusted for non compensated metabolic acidosis( poss hyperchloremic) and resp acidosis.  Tachycardic @3am - given fentanyl. spiked fever ordered UA, bld cx's done given tylenol and 1L bolus. Hgb in am 11  5/15: s/p DAVY SBO, SBR and washout, post-op 1 L for low UOP, post-op labs    SUBJECTIVE: Patient was intubated and sedated. WBC 55, K 6.8, EKG WNL, D50/insulin given, started on D10@50, kayaxalate enema given + 2amp of bicarb, Seroquel dc;'ed for now, will keep intubated, plan for PEG, repeated K 6.1, BG>200 - D10 dc'ed    MEDICATIONS  (STANDING):  chlorhexidine 0.12% Liquid 15 milliLiter(s) Swish and Spit two times a day  dextrose 5%. 1000 milliLiter(s) (50 mL/Hr) IV Continuous <Continuous>  dextrose 50% Injectable 12.5 Gram(s) IV Push once  dextrose 50% Injectable 25 Gram(s) IV Push once  dextrose 50% Injectable 25 Gram(s) IV Push once  fentaNYL   Infusion 0.5 MICROgram(s)/kG/Hr (4.68 mL/Hr) IV Continuous <Continuous>  insulin lispro (HumaLOG) corrective regimen sliding scale   SubCutaneous every 6 hours  leuprolide depot Injectable (LUPRON-DEPOT) 7.5 milliGRAM(s) IntraMuscular every 4 weeks  levothyroxine Injectable 75 MICROGram(s) IV Push <User Schedule>  meropenem Injectable 1000 milliGRAM(s) IV Push every 12 hours  micafungin IVPB 100 milliGRAM(s) IV Intermittent daily  norepinephrine Infusion 0.05 MICROgram(s)/kG/Min (8.775 mL/Hr) IV Continuous <Continuous>  pantoprazole  Injectable 40 milliGRAM(s) IV Push daily  Parenteral Nutrition - Adult 1 Each (50 mL/Hr) TPN Continuous <Continuous>  propofol Infusion 3.561 MICROgram(s)/kG/Min (2 mL/Hr) IV Continuous <Continuous>  sodium chloride 0.9% lock flush 20 milliLiter(s) IV Push once  sodium polystyrene sulfonate Enema 30 Gram(s) Rectal once    MEDICATIONS  (PRN):  acetaminophen    Suspension. 650 milliGRAM(s) Oral every 6 hours PRN Moderate Pain (4 - 6)  ALBUTerol/ipratropium for Nebulization 3 milliLiter(s) Nebulizer every 6 hours PRN Shortness of Breath and/or Wheezing  dextrose Gel 1 Dose(s) Oral once PRN Blood Glucose LESS THAN 70 milliGRAM(s)/deciliter  glucagon  Injectable 1 milliGRAM(s) IntraMuscular once PRN Glucose LESS THAN 70 milligrams/deciliter  ondansetron Injectable 4 milliGRAM(s) IV Push every 6 hours PRN Nausea  sodium chloride 0.9% lock flush 10 milliLiter(s) IV Push every 1 hour PRN After each medication administration  sodium chloride 0.9% lock flush 10 milliLiter(s) IV Push every 12 hours PRN Lumen of catheter NOT used      Nicole:	Daily Nicole Order Placed	   Indication: Strict I and O's     Central Line:  Medication / TPN Administration       ICU Vital Signs Last 24 Hrs  T(C): 37.6 (16 May 2018 10:00), Max: 38.3 (16 May 2018 04:00)  T(F): 99.6 (16 May 2018 10:00), Max: 101 (16 May 2018 04:00)  HR: 94 (16 May 2018 12:00) (78 - 122)  BP: 95/60 (16 May 2018 11:00) (81/58 - 97/59)  BP(mean): 69 (16 May 2018 11:00) (64 - 70)  ABP: 126/58 (16 May 2018 12:00) (83/54 - 144/74)  ABP(mean): 78 (16 May 2018 12:00) (62 - 96)  RR: 17 (16 May 2018 12:00) (9 - 19)  SpO2: 99% (16 May 2018 12:00) (98% - 100%)      Physical Exam:  General: NAD, sedated and intubated  Pulmonary: Nonlabored breathing, no respiratory distress, intubated, bibasilar crackles   Cardiovascular: NSR, no murmurs  Abdominal: soft, NT/ND, wound VAC in place, no leak, staple lines intact and no leak, rectal tube in place  Extremities: WWP, no clubbing/cyanosis/edema  Neuro: intubated and sedated  Pulses: palpable distal pulses    Lines/tubes/drains: wound VAC    Vent settings:  Mode: AC/ CMV (Assist Control/ Continuous Mandatory Ventilation), RR (machine): 18, TV (machine): 490, FiO2: 40, PEEP: 5, ITime: 1, MAP: 9.1, PIP: 19    I&O's Summary    15 May 2018 07:01  -  16 May 2018 07:00  --------------------------------------------------------  IN: 4165 mL / OUT: 870 mL / NET: 3295 mL    16 May 2018 07:01  -  16 May 2018 13:30  --------------------------------------------------------  IN: 253.2 mL / OUT: 40 mL / NET: 213.2 mL        LABS:                        11.1   55.3  )-----------( 393      ( 16 May 2018 05:13 )             33.8     05-16    133<L>  |  107  |  49<H>  ----------------------------<  251<H>  6.1<H>   |  15<L>  |  1.31<H>    Ca    8.4      16 May 2018 11:14  Phos  5.1     05-16  Mg     2.2     05-16    TPro  5.4<L>  /  Alb  2.1<L>  /  TBili  0.8  /  DBili  x   /  AST  57<H>  /  ALT  53<H>  /  AlkPhos  110  05-16    PT/INR - ( 15 May 2018 18:39 )   PT: 15.2 sec;   INR: 1.36          PTT - ( 15 May 2018 18:39 )  PTT:38.4 sec  Urinalysis Basic - ( 16 May 2018 05:59 )    Color: Yellow / Appearance: SL Cloudy / SG: >=1.030 / pH: x  Gluc: x / Ketone: NEGATIVE  / Bili: Negative / Urobili: 0.2 E.U./dL   Blood: x / Protein: Trace mg/dL / Nitrite: NEGATIVE   Leuk Esterase: NEGATIVE / RBC: > 10 /HPF / WBC < 5 /HPF   Sq Epi: x / Non Sq Epi: 0-5 /HPF / Bacteria: Present /HPF      CAPILLARY BLOOD GLUCOSE      POCT Blood Glucose.: 222 mg/dL (16 May 2018 11:19)  POCT Blood Glucose.: 149 mg/dL (16 May 2018 05:56)  POCT Blood Glucose.: 172 mg/dL (15 May 2018 23:52)  POCT Blood Glucose.: 213 mg/dL (15 May 2018 17:49)  POCT Blood Glucose.: 130 mg/dL (15 May 2018 14:33)    LIVER FUNCTIONS - ( 16 May 2018 05:13 )  Alb: 2.1 g/dL / Pro: 5.4 g/dL / ALK PHOS: 110 U/L / ALT: 53 U/L / AST: 57 U/L / GGT: x             Cultures:    RADIOLOGY & ADDITIONAL STUDIES:

## 2018-05-16 NOTE — PROGRESS NOTE ADULT - ASSESSMENT
78 year old female, morbid obesity, DM, ventral hernia repair with mesh 3/2 c/b wound dehiscence, recent ATN requiring temporary HD (Permacath removed 4/10), who presents as a transfer from OSH for management of multifactorial sepsis and open abdominal wound. Patient found to have ESBL-producing E.Coli UTI and MSSA bacteremia, origin likely coming from abdomen/pelvis vs. prior HD catheter. Patient intubated 4/25 for acute hypoxic respiratory failure.  CT 4/18 showed SB perforation with pelvic collection, still present on CT on 4/25.  She is s/p IR drainage of collection, with culture growing candida lusitaniae and Glabrata.  Would like to begin removing antibiotics, however awaiting plan from surgery.  completed 14 d course of oxacillin from first negative culture (4/17;  last day 4/30)  pt with increasing fever curve and leukocytosis  wound VAC changed yesterday, shows improvement, triple lumen dc'd, PICC line placed  LEANN still draining brown/dark green liquid however output is stable   repeat Clx : NGTD    Recommend:  - Continue meropenem and micafungin at current dose until surgery   - BPH  - please send C.diff PCR  - will monitor fever curve and leukocytosis, improving today   - Plan for OR on Tuesday 5/15 with Dr. Ngo   - ID will follow 78 year old female, morbid obesity, DM, ventral hernia repair with mesh 3/2 c/b wound dehiscence, recent ATN requiring temporary HD (Permacath removed 4/10), who presents as a transfer from OSH for management of multifactorial sepsis and open abdominal wound. Patient found to have ESBL-producing E.Coli UTI and MSSA bacteremia, origin likely coming from abdomen/pelvis vs. prior HD catheter. Patient intubated 4/25 for acute hypoxic respiratory failure.  CT 4/18 showed SB perforation with pelvic collection, still present on CT on 4/25.  She is s/p IR drainage of collection, with culture growing candida lusitaniae and Glabrata.  Would like to begin removing antibiotics, however awaiting plan from surgery.  completed 14 d course of oxacillin from first negative culture (4/17;  last day 4/30)  pt with increasing fever curve and leukocytosis  wound VAC changed yesterday, shows improvement, triple lumen dc'd, PICC line placed  LEANN still draining brown/dark green liquid however output is stable   repeat Clx : NGTD    Recommend:  - Continue meropenem and micafungin at current dose for now  - Follow intra-op cultures from 5/15  - Attending recommendations to follow 78 year old female, morbid obesity, DM, ventral hernia repair with mesh 3/2 c/b wound dehiscence, recent ATN requiring temporary HD (Permacath removed 4/10), who presents as a transfer from OSH for management of multifactorial sepsis and open abdominal wound. Patient found to have ESBL-producing E.Coli UTI and MSSA bacteremia, origin likely coming from abdomen/pelvis vs. prior HD catheter. Patient intubated 4/25 for acute hypoxic respiratory failure.  CT 4/18 showed SB perforation with pelvic collection, still present on CT on 4/25.  She is s/p IR drainage of collection, with culture growing candida lusitaniae and Glabrata. Completed 14 d course of oxacillin from first negative culture (4/17;  last day 4/30). Went to OR 5/15 for ex lap, abdominal washout - found areas of bowel leak s/p partial sm bowel resection, also had supracervical hysterectomy with b/l salpingo-oophorectomy for inflamed uterus.    Recommend:  - Continue meropenem and micafungin at current dose for now  - Follow intra-op cultures from 5/15  - Attending recommendations to follow 78 year old female, morbid obesity, DM, ventral hernia repair with mesh 3/2 c/b wound dehiscence, recent ATN requiring temporary HD (Permacath removed 4/10), who presents as a transfer from OSH for management of multifactorial sepsis and open abdominal wound. Patient found to have ESBL-producing E.Coli UTI and MSSA bacteremia, origin likely coming from abdomen/pelvis vs. prior HD catheter. Patient intubated 4/25 for acute hypoxic respiratory failure.  CT 4/18 showed SB perforation with pelvic collection, still present on CT on 4/25.  She is s/p IR drainage of collection, with culture growing candida lusitaniae and Glabrata. Completed 14 d course of oxacillin from first negative culture (4/17;  last day 4/30). Went to OR 5/15 for ex lap, abdominal washout - found areas of bowel leak s/p partial sm bowel resection, also had supracervical hysterectomy with b/l salpingo-oophorectomy for inflamed uterus.    Recommend:  - Continue meropenem and micafungin at current dose for now  - Follow intra-op cultures from 5/15  - ID will follow

## 2018-05-16 NOTE — CHART NOTE - NSCHARTNOTEFT_GEN_A_CORE
patient's potassium noted to be 7, repeated 6.8, given 10units insulin, 50mL of 50% dextrose and started on D10@50cc/hr as preliminary measures to reducing potassium, longer term potassium reduction will be planned on rounds

## 2018-05-16 NOTE — PROGRESS NOTE ADULT - ASSESSMENT
77 y/o F with sepsis from open abdominal wound infection, MSSA bacteremia present on admission, and UTI present on admission found to have contained small bowel leak    Neuro: off sedation. dc-ed seroquel 25 QHS, zofran prn, tylenol prnholding: aripiprazole, escitalapram  CV: HD stable s/p severe sepsis, holding amlodipine, 4/19 Echo  65-70%.  Pulm: 40/490/18/5  GI: NPO, protonix, TPN   : Nicole S/p AKF on HD now resolved. UTI (present on admission) + Uterine fibroids on CT scan with vaginal bleeding, biopsy revealed tubal metaplasia. lupron 7.5 q month (given 5/5) - tentative RTOT on 5/15  ID: Jose L( 4/17-) Eden: ( 4/27-) D/c'd:MSSA in blood: Oxacillin ( 4/17-) TTE neg for endocarditis.  Endo: ISS, Synthroid  Insulin  in TPN: 25. Lupron Qmonthly  PPx: sqh held for low plts  PE:Sp IVC filter on 3/30   Lines: PIV, PICC (5/9-) /// s/p L Rad Art line( 4/15-4/18),  Rt IJ TLC from OSH ( 4/15-), LIJ TLC (4/29-5/9)  Wounds: Wound vac to midline incision , change MWF  PT/OT: PT ordered

## 2018-05-17 LAB
ALBUMIN SERPL ELPH-MCNC: 1.9 G/DL — LOW (ref 3.3–5)
ALP SERPL-CCNC: 102 U/L — SIGNIFICANT CHANGE UP (ref 40–120)
ALT FLD-CCNC: 31 U/L — SIGNIFICANT CHANGE UP (ref 10–45)
ANION GAP SERPL CALC-SCNC: 12 MMOL/L — SIGNIFICANT CHANGE UP (ref 5–17)
ANION GAP SERPL CALC-SCNC: 13 MMOL/L — SIGNIFICANT CHANGE UP (ref 5–17)
AST SERPL-CCNC: 23 U/L — SIGNIFICANT CHANGE UP (ref 10–40)
BASOPHILS NFR BLD AUTO: 0 % — SIGNIFICANT CHANGE UP (ref 0–2)
BILIRUB SERPL-MCNC: 0.7 MG/DL — SIGNIFICANT CHANGE UP (ref 0.2–1.2)
BUN SERPL-MCNC: 61 MG/DL — HIGH (ref 7–23)
BUN SERPL-MCNC: 61 MG/DL — HIGH (ref 7–23)
CALCIUM SERPL-MCNC: 9.2 MG/DL — SIGNIFICANT CHANGE UP (ref 8.4–10.5)
CALCIUM SERPL-MCNC: 9.2 MG/DL — SIGNIFICANT CHANGE UP (ref 8.4–10.5)
CHLORIDE SERPL-SCNC: 105 MMOL/L — SIGNIFICANT CHANGE UP (ref 96–108)
CHLORIDE SERPL-SCNC: 105 MMOL/L — SIGNIFICANT CHANGE UP (ref 96–108)
CO2 SERPL-SCNC: 18 MMOL/L — LOW (ref 22–31)
CO2 SERPL-SCNC: 18 MMOL/L — LOW (ref 22–31)
CREAT SERPL-MCNC: 1.45 MG/DL — HIGH (ref 0.5–1.3)
CREAT SERPL-MCNC: 1.46 MG/DL — HIGH (ref 0.5–1.3)
EOSINOPHIL NFR BLD AUTO: 0 % — SIGNIFICANT CHANGE UP (ref 0–6)
GLUCOSE BLDC GLUCOMTR-MCNC: 111 MG/DL — HIGH (ref 70–99)
GLUCOSE BLDC GLUCOMTR-MCNC: 129 MG/DL — HIGH (ref 70–99)
GLUCOSE BLDC GLUCOMTR-MCNC: 131 MG/DL — HIGH (ref 70–99)
GLUCOSE SERPL-MCNC: 109 MG/DL — HIGH (ref 70–99)
GLUCOSE SERPL-MCNC: 118 MG/DL — HIGH (ref 70–99)
HCT VFR BLD CALC: 25.1 % — LOW (ref 34.5–45)
HGB BLD-MCNC: 8.4 G/DL — LOW (ref 11.5–15.5)
LYMPHOCYTES # BLD AUTO: 11 % — LOW (ref 13–44)
MAGNESIUM SERPL-MCNC: 2.1 MG/DL — SIGNIFICANT CHANGE UP (ref 1.6–2.6)
MCHC RBC-ENTMCNC: 30.9 PG — SIGNIFICANT CHANGE UP (ref 27–34)
MCHC RBC-ENTMCNC: 33.5 G/DL — SIGNIFICANT CHANGE UP (ref 32–36)
MCV RBC AUTO: 92.3 FL — SIGNIFICANT CHANGE UP (ref 80–100)
MONOCYTES NFR BLD AUTO: 4 % — SIGNIFICANT CHANGE UP (ref 2–14)
NEUTROPHILS NFR BLD AUTO: 77 % — SIGNIFICANT CHANGE UP (ref 43–77)
PHOSPHATE SERPL-MCNC: 4.8 MG/DL — HIGH (ref 2.5–4.5)
PLATELET # BLD AUTO: 263 K/UL — SIGNIFICANT CHANGE UP (ref 150–400)
POTASSIUM SERPL-MCNC: 5.2 MMOL/L — SIGNIFICANT CHANGE UP (ref 3.5–5.3)
POTASSIUM SERPL-MCNC: 5.7 MMOL/L — HIGH (ref 3.5–5.3)
POTASSIUM SERPL-SCNC: 5.2 MMOL/L — SIGNIFICANT CHANGE UP (ref 3.5–5.3)
POTASSIUM SERPL-SCNC: 5.7 MMOL/L — HIGH (ref 3.5–5.3)
PROT SERPL-MCNC: 5 G/DL — LOW (ref 6–8.3)
RBC # BLD: 2.72 M/UL — LOW (ref 3.8–5.2)
RBC # FLD: 17.5 % — HIGH (ref 10.3–16.9)
SODIUM SERPL-SCNC: 135 MMOL/L — SIGNIFICANT CHANGE UP (ref 135–145)
SODIUM SERPL-SCNC: 136 MMOL/L — SIGNIFICANT CHANGE UP (ref 135–145)
WBC # BLD: 31.6 K/UL — HIGH (ref 3.8–10.5)
WBC # FLD AUTO: 31.6 K/UL — HIGH (ref 3.8–10.5)

## 2018-05-17 PROCEDURE — 71045 X-RAY EXAM CHEST 1 VIEW: CPT | Mod: 26,76

## 2018-05-17 PROCEDURE — 99233 SBSQ HOSP IP/OBS HIGH 50: CPT | Mod: GC

## 2018-05-17 PROCEDURE — 99232 SBSQ HOSP IP/OBS MODERATE 35: CPT | Mod: GC

## 2018-05-17 RX ORDER — CALCIUM GLUCONATE 100 MG/ML
1 VIAL (ML) INTRAVENOUS ONCE
Qty: 0 | Refills: 0 | Status: COMPLETED | OUTPATIENT
Start: 2018-05-17 | End: 2018-05-17

## 2018-05-17 RX ORDER — FUROSEMIDE 40 MG
40 TABLET ORAL ONCE
Qty: 0 | Refills: 0 | Status: COMPLETED | OUTPATIENT
Start: 2018-05-17 | End: 2018-05-17

## 2018-05-17 RX ORDER — DEXMEDETOMIDINE HYDROCHLORIDE IN 0.9% SODIUM CHLORIDE 4 UG/ML
0.2 INJECTION INTRAVENOUS
Qty: 200 | Refills: 0 | Status: DISCONTINUED | OUTPATIENT
Start: 2018-05-17 | End: 2018-05-18

## 2018-05-17 RX ORDER — SODIUM CHLORIDE 9 MG/ML
500 INJECTION INTRAMUSCULAR; INTRAVENOUS; SUBCUTANEOUS ONCE
Qty: 0 | Refills: 0 | Status: COMPLETED | OUTPATIENT
Start: 2018-05-17 | End: 2018-05-17

## 2018-05-17 RX ORDER — SODIUM POLYSTYRENE SULFONATE 4.1 MEQ/G
15 POWDER, FOR SUSPENSION ORAL ONCE
Qty: 0 | Refills: 0 | Status: COMPLETED | OUTPATIENT
Start: 2018-05-17 | End: 2018-05-17

## 2018-05-17 RX ADMIN — Medication 1 DROP(S): at 17:34

## 2018-05-17 RX ADMIN — SODIUM CHLORIDE 1000 MILLILITER(S): 9 INJECTION INTRAMUSCULAR; INTRAVENOUS; SUBCUTANEOUS at 03:19

## 2018-05-17 RX ADMIN — Medication 200 GRAM(S): at 00:41

## 2018-05-17 RX ADMIN — PANTOPRAZOLE SODIUM 40 MILLIGRAM(S): 20 TABLET, DELAYED RELEASE ORAL at 12:29

## 2018-05-17 RX ADMIN — CHLORHEXIDINE GLUCONATE 15 MILLILITER(S): 213 SOLUTION TOPICAL at 06:21

## 2018-05-17 RX ADMIN — DEXMEDETOMIDINE HYDROCHLORIDE IN 0.9% SODIUM CHLORIDE 4.68 MICROGRAM(S)/KG/HR: 4 INJECTION INTRAVENOUS at 17:42

## 2018-05-17 RX ADMIN — DEXMEDETOMIDINE HYDROCHLORIDE IN 0.9% SODIUM CHLORIDE 4.68 MICROGRAM(S)/KG/HR: 4 INJECTION INTRAVENOUS at 10:33

## 2018-05-17 RX ADMIN — Medication 1 DROP(S): at 06:21

## 2018-05-17 RX ADMIN — Medication 40 MILLIGRAM(S): at 07:32

## 2018-05-17 RX ADMIN — MEROPENEM 1000 MILLIGRAM(S): 1 INJECTION INTRAVENOUS at 06:23

## 2018-05-17 RX ADMIN — SODIUM POLYSTYRENE SULFONATE 15 GRAM(S): 4.1 POWDER, FOR SUSPENSION ORAL at 00:09

## 2018-05-17 RX ADMIN — CHLORHEXIDINE GLUCONATE 15 MILLILITER(S): 213 SOLUTION TOPICAL at 17:34

## 2018-05-17 RX ADMIN — MICAFUNGIN SODIUM 210 MILLIGRAM(S): 100 INJECTION, POWDER, LYOPHILIZED, FOR SOLUTION INTRAVENOUS at 12:29

## 2018-05-17 RX ADMIN — FENTANYL CITRATE 4.68 MICROGRAM(S)/KG/HR: 50 INJECTION INTRAVENOUS at 17:42

## 2018-05-17 RX ADMIN — Medication 75 MICROGRAM(S): at 06:23

## 2018-05-17 RX ADMIN — MEROPENEM 1000 MILLIGRAM(S): 1 INJECTION INTRAVENOUS at 17:34

## 2018-05-17 NOTE — PROGRESS NOTE ADULT - SUBJECTIVE AND OBJECTIVE BOX
24 hr events:  o/n: 500 cc bolus at 2030 for oliguria, UOP improved. 11 pm K: 5.7, given Ca gluc 1 g and kayexelate 15 via PEG. levo downtrending. TPN off at midnight. given 500cc bolus for low UO at 3AM. Weaning sedation to extubate.  : WBC 55, K 6.8, EKG WNL, D50/insulin given, started on D10@50, kayaxalate enema given + 2amp of bicarb, Seroquel dc;'ed for now, will keep intubated, plan for PEG, repeated K 6.1, BG>200 - D10 dc'ed, repeated K 5.5    SUBJECTIVE:      MEDICATIONS  (STANDING):  artificial  tears Solution 1 Drop(s) Both EYES two times a day  chlorhexidine 0.12% Liquid 15 milliLiter(s) Swish and Spit two times a day  dextrose 5%. 1000 milliLiter(s) (50 mL/Hr) IV Continuous <Continuous>  dextrose 50% Injectable 12.5 Gram(s) IV Push once  dextrose 50% Injectable 25 Gram(s) IV Push once  dextrose 50% Injectable 25 Gram(s) IV Push once  fentaNYL   Infusion 0.5 MICROgram(s)/kG/Hr (4.68 mL/Hr) IV Continuous <Continuous>  insulin lispro (HumaLOG) corrective regimen sliding scale   SubCutaneous every 6 hours  leuprolide depot Injectable (LUPRON-DEPOT) 7.5 milliGRAM(s) IntraMuscular every 4 weeks  levothyroxine Injectable 75 MICROGram(s) IV Push <User Schedule>  meropenem Injectable 1000 milliGRAM(s) IV Push every 12 hours  micafungin IVPB 100 milliGRAM(s) IV Intermittent daily  norepinephrine Infusion 0.05 MICROgram(s)/kG/Min (8.775 mL/Hr) IV Continuous <Continuous>  pantoprazole  Injectable 40 milliGRAM(s) IV Push daily  Parenteral Nutrition - Adult 1 Each (50 mL/Hr) TPN Continuous <Continuous>  propofol Infusion 3.561 MICROgram(s)/kG/Min (2 mL/Hr) IV Continuous <Continuous>  sodium chloride 0.9% lock flush 20 milliLiter(s) IV Push once    MEDICATIONS  (PRN):  acetaminophen    Suspension. 650 milliGRAM(s) Oral every 6 hours PRN Moderate Pain (4 - 6)  ALBUTerol/ipratropium for Nebulization 3 milliLiter(s) Nebulizer every 6 hours PRN Shortness of Breath and/or Wheezing  dextrose Gel 1 Dose(s) Oral once PRN Blood Glucose LESS THAN 70 milliGRAM(s)/deciliter  glucagon  Injectable 1 milliGRAM(s) IntraMuscular once PRN Glucose LESS THAN 70 milligrams/deciliter  ondansetron Injectable 4 milliGRAM(s) IV Push every 6 hours PRN Nausea  sodium chloride 0.9% lock flush 10 milliLiter(s) IV Push every 1 hour PRN After each medication administration  sodium chloride 0.9% lock flush 10 milliLiter(s) IV Push every 12 hours PRN Lumen of catheter NOT used      Nicole:	  [ ] None	[ ] Daily Nicole Order Placed	   Indication:	  [ ] Strict I and O's    [ ] Obstruction     [ ] Incontinence + Stage 3 or 4 Decubitus  Central Line:  [ ] None	   [ ]  Medication / TPN Administration     Drips:     ICU Vital Signs Last 24 Hrs  T(C): 37.9 (17 May 2018 06:00), Max: 38.1 (17 May 2018 01:53)  T(F): 100.3 (17 May 2018 06:00), Max: 100.5 (17 May 2018 01:53)  HR: 118 (17 May 2018 07:00) (84 - 118)  BP: 156/74 (17 May 2018 07:00) (95/60 - 156/74)  BP(mean): 124 (17 May 2018 07:00) (67 - 124)  ABP: 130/68 (16 May 2018 16:10) (84/80 - 130/68)  ABP(mean): 92 (16 May 2018 16:10) (70 - 92)  RR: 16 (17 May 2018 07:00) (14 - 21)  SpO2: 99% (17 May 2018 07:00) (97% - 100%)      Physical Exam:  General: NAD  HEENT: NC/AT, EOMI, PERRLA, normal hearing, no oral lesions, neck supple w/o LAD  Pulmonary: Nonlabored breathing, no respiratory distress, CTA-B  Cardiovascular: NSR, no murmurs  Abdominal: soft, NT/ND, +BS, no organomegaly  Extremities: WWP, 5/5 strength x 4, no clubbing/cyanosis/edema  Neuro: A/O x3, CNs II-XII grossly intact, normal motor/sensation, no focal deficits  Pulses: palpable distal pulses    Lines/tubes/drains:    Vent settings:  Mode: AC/ CMV (Assist Control/ Continuous Mandatory Ventilation), RR (machine): 18, TV (machine): 490, FiO2: 40, PEEP: 5, ITime: 1, MAP: 10, PIP: 22    I&O's Summary    16 May 2018 07:01  -  17 May 2018 07:00  --------------------------------------------------------  IN: 3775.9 mL / OUT: 1080 mL / NET: 2695.9 mL        LABS:                        8.4    31.6  )-----------( 263      ( 17 May 2018 06:12 )             25.1     05-    135  |  105  |  61<H>  ----------------------------<  109<H>  5.7<H>   |  18<L>  |  1.46<H>    Ca    9.2      17 May 2018 06:12  Phos  4.8     -  Mg     2.1         TPro  5.0<L>  /  Alb  1.9<L>  /  TBili  0.7  /  DBili  x   /  AST  23  /  ALT  31  /  AlkPhos  102  05-17    PT/INR - ( 15 May 2018 18:39 )   PT: 15.2 sec;   INR: 1.36          PTT - ( 15 May 2018 18:39 )  PTT:38.4 sec  Urinalysis Basic - ( 16 May 2018 16:57 )    Color: Yellow / Appearance: Clear / S.025 / pH: x  Gluc: x / Ketone: NEGATIVE  / Bili: Negative / Urobili: 0.2 E.U./dL   Blood: x / Protein: 30 mg/dL / Nitrite: NEGATIVE   Leuk Esterase: NEGATIVE / RBC: > 10 /HPF / WBC < 5 /HPF   Sq Epi: x / Non Sq Epi: 0-5 /HPF / Bacteria: Present /HPF      CAPILLARY BLOOD GLUCOSE      POCT Blood Glucose.: 111 mg/dL (17 May 2018 06:16)  POCT Blood Glucose.: 158 mg/dL (16 May 2018 23:28)  POCT Blood Glucose.: 205 mg/dL (16 May 2018 17:54)  POCT Blood Glucose.: 222 mg/dL (16 May 2018 11:19)    LIVER FUNCTIONS - ( 17 May 2018 06:12 )  Alb: 1.9 g/dL / Pro: 5.0 g/dL / ALK PHOS: 102 U/L / ALT: 31 U/L / AST: 23 U/L / GGT: x             Cultures:Culture Results:   No growth at 12 hours ( @ 07:37)  Culture Results:   No growth at 12 hours ( @ 07:37)      RADIOLOGY & ADDITIONAL STUDIES: 24 hr events:  o/n: 500 cc bolus at 2030 for oliguria, UOP improved. 11 pm K: 5.7, given Ca gluc 1 g and kayexelate 15 via PEG. levo downtrending. TPN off at midnight. given 500cc bolus for low UO at 3AM. Weaning sedation to extubate.  : WBC 55, K 6.8, EKG WNL, D50/insulin given, started on D10@50, kayaxalate enema given + 2amp of bicarb, Seroquel dc;'ed for now, will keep intubated, plan for PEG, repeated K 6.1, BG>200 - D10 dc'ed, repeated K 5.5    SUBJECTIVE:  Patient is off sedation, moving around and opening her eyes, not following commands, no nausea/vomiting, no bowel function yet, no changes on ENG    MEDICATIONS  (STANDING):  artificial  tears Solution 1 Drop(s) Both EYES two times a day  chlorhexidine 0.12% Liquid 15 milliLiter(s) Swish and Spit two times a day  dextrose 5%. 1000 milliLiter(s) (50 mL/Hr) IV Continuous <Continuous>  dextrose 50% Injectable 12.5 Gram(s) IV Push once  dextrose 50% Injectable 25 Gram(s) IV Push once  dextrose 50% Injectable 25 Gram(s) IV Push once  fentaNYL   Infusion 0.5 MICROgram(s)/kG/Hr (4.68 mL/Hr) IV Continuous <Continuous>  insulin lispro (HumaLOG) corrective regimen sliding scale   SubCutaneous every 6 hours  leuprolide depot Injectable (LUPRON-DEPOT) 7.5 milliGRAM(s) IntraMuscular every 4 weeks  levothyroxine Injectable 75 MICROGram(s) IV Push <User Schedule>  meropenem Injectable 1000 milliGRAM(s) IV Push every 12 hours  micafungin IVPB 100 milliGRAM(s) IV Intermittent daily  norepinephrine Infusion 0.05 MICROgram(s)/kG/Min (8.775 mL/Hr) IV Continuous <Continuous>  pantoprazole  Injectable 40 milliGRAM(s) IV Push daily  Parenteral Nutrition - Adult 1 Each (50 mL/Hr) TPN Continuous <Continuous>  propofol Infusion 3.561 MICROgram(s)/kG/Min (2 mL/Hr) IV Continuous <Continuous>  sodium chloride 0.9% lock flush 20 milliLiter(s) IV Push once    MEDICATIONS  (PRN):  acetaminophen    Suspension. 650 milliGRAM(s) Oral every 6 hours PRN Moderate Pain (4 - 6)  ALBUTerol/ipratropium for Nebulization 3 milliLiter(s) Nebulizer every 6 hours PRN Shortness of Breath and/or Wheezing  dextrose Gel 1 Dose(s) Oral once PRN Blood Glucose LESS THAN 70 milliGRAM(s)/deciliter  glucagon  Injectable 1 milliGRAM(s) IntraMuscular once PRN Glucose LESS THAN 70 milligrams/deciliter  ondansetron Injectable 4 milliGRAM(s) IV Push every 6 hours PRN Nausea  sodium chloride 0.9% lock flush 10 milliLiter(s) IV Push every 1 hour PRN After each medication administration  sodium chloride 0.9% lock flush 10 milliLiter(s) IV Push every 12 hours PRN Lumen of catheter NOT used      Nicole:	  [ ] None	[ ] Daily Nicole Order Placed	   Indication:	  [ ] Strict I and O's    [ ] Obstruction     [ ] Incontinence + Stage 3 or 4 Decubitus  Central Line:  [ ] None	   [ ]  Medication / TPN Administration     Drips:     ICU Vital Signs Last 24 Hrs  T(C): 37.9 (17 May 2018 06:00), Max: 38.1 (17 May 2018 01:53)  T(F): 100.3 (17 May 2018 06:00), Max: 100.5 (17 May 2018 01:53)  HR: 118 (17 May 2018 07:00) (84 - 118)  BP: 156/74 (17 May 2018 07:00) (95/60 - 156/74)  BP(mean): 124 (17 May 2018 07:00) (67 - 124)  ABP: 130/68 (16 May 2018 16:10) (84/80 - 130/68)  ABP(mean): 92 (16 May 2018 16:10) (70 - 92)  RR: 16 (17 May 2018 07:00) (14 - 21)  SpO2: 99% (17 May 2018 07:00) (97% - 100%)      Physical Exam:  General: NAD  HEENT: NC/AT, EOMI, PERRLA, normal hearing, no oral lesions, neck supple w/o LAD  Pulmonary: Nonlabored breathing, no respiratory distress, CTA-B  Cardiovascular: NSR, no murmurs  Abdominal: soft, NT/ND, +BS, no organomegaly  Extremities: WWP, 5/5 strength x 4, no clubbing/cyanosis/edema  Neuro: A/O x3, CNs II-XII grossly intact, normal motor/sensation, no focal deficits  Pulses: palpable distal pulses    Lines/tubes/drains:    Vent settings:  Mode: AC/ CMV (Assist Control/ Continuous Mandatory Ventilation), RR (machine): 18, TV (machine): 490, FiO2: 40, PEEP: 5, ITime: 1, MAP: 10, PIP: 22    I&O's Summary    16 May 2018 07:01  -  17 May 2018 07:00  --------------------------------------------------------  IN: 3775.9 mL / OUT: 1080 mL / NET: 2695.9 mL        LABS:                        8.4    31.6  )-----------( 263      ( 17 May 2018 06:12 )             25.1     05-17    135  |  105  |  61<H>  ----------------------------<  109<H>  5.7<H>   |  18<L>  |  1.46<H>    Ca    9.2      17 May 2018 06:12  Phos  4.8     -  Mg     2.1     -    TPro  5.0<L>  /  Alb  1.9<L>  /  TBili  0.7  /  DBili  x   /  AST  23  /  ALT  31  /  AlkPhos  102  05-17    PT/INR - ( 15 May 2018 18:39 )   PT: 15.2 sec;   INR: 1.36          PTT - ( 15 May 2018 18:39 )  PTT:38.4 sec  Urinalysis Basic - ( 16 May 2018 16:57 )    Color: Yellow / Appearance: Clear / S.025 / pH: x  Gluc: x / Ketone: NEGATIVE  / Bili: Negative / Urobili: 0.2 E.U./dL   Blood: x / Protein: 30 mg/dL / Nitrite: NEGATIVE   Leuk Esterase: NEGATIVE / RBC: > 10 /HPF / WBC < 5 /HPF   Sq Epi: x / Non Sq Epi: 0-5 /HPF / Bacteria: Present /HPF      CAPILLARY BLOOD GLUCOSE      POCT Blood Glucose.: 111 mg/dL (17 May 2018 06:16)  POCT Blood Glucose.: 158 mg/dL (16 May 2018 23:28)  POCT Blood Glucose.: 205 mg/dL (16 May 2018 17:54)  POCT Blood Glucose.: 222 mg/dL (16 May 2018 11:19)    LIVER FUNCTIONS - ( 17 May 2018 06:12 )  Alb: 1.9 g/dL / Pro: 5.0 g/dL / ALK PHOS: 102 U/L / ALT: 31 U/L / AST: 23 U/L / GGT: x             Cultures:Culture Results:   No growth at 12 hours ( @ 07:37)  Culture Results:   No growth at 12 hours ( @ 07:37)      RADIOLOGY & ADDITIONAL STUDIES: 24 hr events:  o/n: 500 cc bolus at 2030 for oliguria, UOP improved. 11 pm K: 5.7, given Ca gluc 1 g and kayexelate 15 via PEG. levo downtrending. TPN off at midnight. given 500cc bolus for low UO at 3AM. Weaning sedation to extubate.  : WBC 55, K 6.8, EKG WNL, D50/insulin given, started on D10@50, kayaxalate enema given + 2amp of bicarb, Seroquel dc;'ed for now, will keep intubated, plan for PEG, repeated K 6.1, BG>200 - D10 dc'ed, repeated K 5.5    SUBJECTIVE:  Patient is off sedation, moving around and opening her eyes, not following commands, no nausea/vomiting, no bowel function yet, no changes on EKG    MEDICATIONS  (STANDING):  artificial  tears Solution 1 Drop(s) Both EYES two times a day  chlorhexidine 0.12% Liquid 15 milliLiter(s) Swish and Spit two times a day  dextrose 5%. 1000 milliLiter(s) (50 mL/Hr) IV Continuous <Continuous>  dextrose 50% Injectable 12.5 Gram(s) IV Push once  dextrose 50% Injectable 25 Gram(s) IV Push once  dextrose 50% Injectable 25 Gram(s) IV Push once  fentaNYL   Infusion 0.5 MICROgram(s)/kG/Hr (4.68 mL/Hr) IV Continuous <Continuous>  insulin lispro (HumaLOG) corrective regimen sliding scale   SubCutaneous every 6 hours  leuprolide depot Injectable (LUPRON-DEPOT) 7.5 milliGRAM(s) IntraMuscular every 4 weeks  levothyroxine Injectable 75 MICROGram(s) IV Push <User Schedule>  meropenem Injectable 1000 milliGRAM(s) IV Push every 12 hours  micafungin IVPB 100 milliGRAM(s) IV Intermittent daily  norepinephrine Infusion 0.05 MICROgram(s)/kG/Min (8.775 mL/Hr) IV Continuous <Continuous>  pantoprazole  Injectable 40 milliGRAM(s) IV Push daily  Parenteral Nutrition - Adult 1 Each (50 mL/Hr) TPN Continuous <Continuous>  propofol Infusion 3.561 MICROgram(s)/kG/Min (2 mL/Hr) IV Continuous <Continuous>  sodium chloride 0.9% lock flush 20 milliLiter(s) IV Push once    MEDICATIONS  (PRN):  acetaminophen    Suspension. 650 milliGRAM(s) Oral every 6 hours PRN Moderate Pain (4 - 6)  ALBUTerol/ipratropium for Nebulization 3 milliLiter(s) Nebulizer every 6 hours PRN Shortness of Breath and/or Wheezing  dextrose Gel 1 Dose(s) Oral once PRN Blood Glucose LESS THAN 70 milliGRAM(s)/deciliter  glucagon  Injectable 1 milliGRAM(s) IntraMuscular once PRN Glucose LESS THAN 70 milligrams/deciliter  ondansetron Injectable 4 milliGRAM(s) IV Push every 6 hours PRN Nausea  sodium chloride 0.9% lock flush 10 milliLiter(s) IV Push every 1 hour PRN After each medication administration  sodium chloride 0.9% lock flush 10 milliLiter(s) IV Push every 12 hours PRN Lumen of catheter NOT used      Suazo:	 Daily Suazo Order Placed	   Indication:	Strict I and O's   Central Line:  Medication / TPN Administration     Drips: TPN    ICU Vital Signs Last 24 Hrs  T(C): 37.9 (17 May 2018 06:00), Max: 38.1 (17 May 2018 01:53)  T(F): 100.3 (17 May 2018 06:00), Max: 100.5 (17 May 2018 01:53)  HR: 118 (17 May 2018 07:00) (84 - 118)  BP: 156/74 (17 May 2018 07:00) (95/60 - 156/74)  BP(mean): 124 (17 May 2018 07:00) (67 - 124)  ABP: 130/68 (16 May 2018 16:10) (84/80 - 130/68)  ABP(mean): 92 (16 May 2018 16:10) (70 - 92)  RR: 16 (17 May 2018 07:00) (14 - 21)  SpO2: 99% (17 May 2018 07:00) (97% - 100%)      Physical Exam:  General: NAD, slightly agitated, moving arounf and opening her eyes   HEENT: NC/AT, EOMI, PERRLA, no oral lesions, neck supple w/o LAD, right eye has some mild secretion   Pulmonary: Nonlabored breathing, no respiratory distress,intubated on CPAP  trial, bibasilar crackles   Cardiovascular: NSR, no murmurs  Abdominal: soft, non tender, non distended, LEANN drain intact, serosanguinous output, wound VAC at midline - intact, no leak, staple lines intact. not erythematous, no discharge,    Extremities: WWP, 3-4/5 strength x 4, no clubbing/cyanosis/edema  Neuro: A/O x 0, unable to asses neurological status  Pulses: palpable distal pulses    Lines/tubes/drains: LEANN drain x1 at RLQ and wound VAC. rectal tube and suazo catheter     Vent settings:  Mode: AC/ CMV (Assist Control/ Continuous Mandatory Ventilation), RR (machine): 18, TV (machine): 490, FiO2: 40, PEEP: 5, ITime: 1, MAP: 10, PIP: 22    I&O's Summary    16 May 2018 07:01  -  17 May 2018 07:00  --------------------------------------------------------  IN: 3775.9 mL / OUT: 1080 mL / NET: 2695.9 mL        LABS:                        8.4    31.6  )-----------( 263      ( 17 May 2018 06:12 )             25.1         135  |  105  |  61<H>  ----------------------------<  109<H>  5.7<H>   |  18<L>  |  1.46<H>    Ca    9.2      17 May 2018 06:12  Phos  4.8       Mg     2.1         TPro  5.0<L>  /  Alb  1.9<L>  /  TBili  0.7  /  DBili  x   /  AST  23  /  ALT  31  /  AlkPhos  102      PT/INR - ( 15 May 2018 18:39 )   PT: 15.2 sec;   INR: 1.36          PTT - ( 15 May 2018 18:39 )  PTT:38.4 sec  Urinalysis Basic - ( 16 May 2018 16:57 )    Color: Yellow / Appearance: Clear / S.025 / pH: x  Gluc: x / Ketone: NEGATIVE  / Bili: Negative / Urobili: 0.2 E.U./dL   Blood: x / Protein: 30 mg/dL / Nitrite: NEGATIVE   Leuk Esterase: NEGATIVE / RBC: > 10 /HPF / WBC < 5 /HPF   Sq Epi: x / Non Sq Epi: 0-5 /HPF / Bacteria: Present /HPF      CAPILLARY BLOOD GLUCOSE      POCT Blood Glucose.: 111 mg/dL (17 May 2018 06:16)  POCT Blood Glucose.: 158 mg/dL (16 May 2018 23:28)  POCT Blood Glucose.: 205 mg/dL (16 May 2018 17:54)  POCT Blood Glucose.: 222 mg/dL (16 May 2018 11:19)    LIVER FUNCTIONS - ( 17 May 2018 06:12 )  Alb: 1.9 g/dL / Pro: 5.0 g/dL / ALK PHOS: 102 U/L / ALT: 31 U/L / AST: 23 U/L / GGT: x             Cultures:Culture Results:   No growth at 12 hours ( @ 07:37)  Culture Results:   No growth at 12 hours ( @ 07:37)      RADIOLOGY & ADDITIONAL STUDIES:

## 2018-05-17 NOTE — PROGRESS NOTE ADULT - SUBJECTIVE AND OBJECTIVE BOX
ID PROGRESS NOTE    INTERVAL EVENTS/SUBJECTIVE: WBC improved from 55k to 31k, still with intermittent low grade fevers (100.5F TMax today). Unable to obtain ROS.    VITALS  Vital Signs Last 24 Hrs  T(C): 37.5 (17 May 2018 13:05), Max: 38.1 (17 May 2018 01:53)  T(F): 99.5 (17 May 2018 13:05), Max: 100.5 (17 May 2018 01:53)  HR: 84 (17 May 2018 13:00) (84 - 124)  BP: 98/53 (17 May 2018 13:00) (96/62 - 163/83)  BP(mean): 74 (17 May 2018 13:00) (67 - 124)  RR: 19 (17 May 2018 13:00) (14 - 24)  SpO2: 98% (17 May 2018 13:00) (94% - 99%)    PHYSICAL EXAM  General: awake, nods head to questions however not really communicative, on 2 L NC  Eyes:  Sclerae anicteric, conjunctivae clear, PERRL  Ear/Nose/Throat:  MMM/ No thrush   Pulmonary: Bibasilar crackles, air entry equal on both sides, Right chest Port, access for HD  Cardiovascular: NSR, no murmurs  Abdominal: soft, obese, ND/NT wound VAC in place, no leak.   : suazo in place  Extremities: WWP, palpable dp and radial pulses BL, trace LE edema present     Labs: reviewed.                       8.4    31.6  )-----------( 263      ( 17 May 2018 06:12 )             25.1     -    135  |  105  |  61<H>  ----------------------------<  109<H>  5.7<H>   |  18<L>  |  1.46<H>    Ca    9.2      17 May 2018 06:12  Phos  4.8     -  Mg     2.1         TPro  5.0<L>  /  Alb  1.9<L>  /  TBili  0.7  /  DBili  x   /  AST  23  /  ALT  31  /  AlkPhos  102  -    PT/INR - ( 15 May 2018 18:39 )   PT: 15.2 sec;   INR: 1.36          PTT - ( 15 May 2018 18:39 )  PTT:38.4 sec  LIVER FUNCTIONS - ( 17 May 2018 06:12 )  Alb: 1.9 g/dL / Pro: 5.0 g/dL / ALK PHOS: 102 U/L / ALT: 31 U/L / AST: 23 U/L / GGT: x           ABG - ( 16 May 2018 09:03 )  pH, Arterial: 7.38  pH, Blood: x     /  pCO2: 28    /  pO2: 148   / HCO3: 16    / Base Excess: -8.2  /  SaO2: 99        Urinalysis Basic - ( 16 May 2018 16:57 )    Color: Yellow / Appearance: Clear / S.025 / pH: x  Gluc: x / Ketone: NEGATIVE  / Bili: Negative / Urobili: 0.2 E.U./dL   Blood: x / Protein: 30 mg/dL / Nitrite: NEGATIVE   Leuk Esterase: NEGATIVE / RBC: > 10 /HPF / WBC < 5 /HPF   Sq Epi: x / Non Sq Epi: 0-5 /HPF / Bacteria: Present /HPF    Culture - Blood (collected 16 May 2018 07:37)  Source: .Blood Blood  Preliminary Report (17 May 2018 08:01):    No growth at 1 day.    Culture - Blood (collected 16 May 2018 07:37)  Source: .Blood Blood  Preliminary Report (17 May 2018 08:01):    No growth at 1 day.    Microbiology:  U Clx : + E.coli MDR  left pelvic fluid aspirate: + for yeast/ candida luitaniae and candida Glabrata  lusitaniae fluc sensitive/ glabrata fluc SDD 4    CT Abdomen and Pelvis w/ Oral Cont (18 @ 15:38) >  Interval decrease in size of the extraperitoneal gas-filled collection   surrounding the enlarged and bulky uterus with an indwelling percutaneous   drainage catheter. However, enteric contrast is again seen within this   collection on the current study, indicating a persistent enteric   fistulous collection.    Increased amount of upper abdominal ascites.    Moderate bilateral pleural effusions with near complete collapse of the   right lower lobe and partial collapse of the left lower lobe.

## 2018-05-17 NOTE — PROGRESS NOTE ADULT - ASSESSMENT
78 year old female, morbid obesity, DM, ventral hernia repair with mesh 3/2 c/b wound dehiscence, recent ATN requiring temporary HD (Permacath removed 4/10), who presented as a transfer from [facility] for management of multifactorial sepsis and open abdominal wound. Patient found to have ESBL-producing E.Coli UTI and MSSA bacteremia, origin likely coming from abdomen/pelvis vs. prior HD catheter. Patient intubated 4/25 for acute hypoxic respiratory failure.  CT 4/18 showed SB perforation with pelvic collection, still present on CT on 4/25.  She is s/p IR drainage of collection, with culture growing candida lusitaniae and Glabrata. Completed 14 d course of oxacillin from first negative culture (4/17;  last day 4/30). Went to OR 5/15 for ex lap, abdominal washout - found areas of bowel leak s/p partial sm bowel resection, also had supracervical hysterectomy with b/l salpingo-oophorectomy for inflamed uterus.    Impression: Pt is s/p treatment (as above) for MSSA bacteremia with no recurrence on recent blood cultures. Pt with recent (5/15) abdominal surgery (as above) without intra-operative culture data, though prior abdominal cultures with multiple species of candida, and pt also with MDR E. Coli UTI. Given persistence of leukocytosis and intermittent low-grade fevers, will opt to continue current treatment.    Recommend:  - Continue meropenem (for MDR e. coli UTI) and micafungin (for candidal intra-abdominal infection) at current doses for now  - Follow blood cultures (5/14, 5/16) with no growth to date  - Surgery to continue to re-assess wound site

## 2018-05-17 NOTE — PROGRESS NOTE ADULT - ASSESSMENT
79 y/o F with sepsis from open abdominal wound infection, MSSA bacteremia present on admission, and UTI present on admission found to have contained small bowel leak s/p exlap, DAVY BSO, sb resection with primary anastamosis, washout, LEANN drain placement    Neuro: off sedation. off seroquel 25 QHS, zofran prn, tylenol prnholding: aripiprazole, escitalapram  CV: HD stable s/p severe sepsis, holding amlodipine, 4/19 Echo  65-70%.  Pulm: 40/490/18/5, currently on CPAP trial  GI: NPO, PEG, protonix, TPN   : Nicole S/p AKF on HD now resolved. UTI (present on admission) + Uterine fibroids on CT scan with vaginal bleeding, biopsy revealed tubal metaplasia. lupron 7.5 q month (given 5/5)   ID: Jose L( 4/17-) [place]: ( 4/27-) D/c'd:MSSA in blood: Oxacillin ( 4/17-) TTE neg for endocarditis.  Endo: ISS, Synthroid  Insulin  in TPN: 25. Lupron Qmonthly  PPx: sqh held for low plts  PE:Sp IVC filter on 3/30   Lines: PIV, R IJ TLC (5/15-), PICC (5/9-) [place] (5/15-) /// s/p L Rad Art line( 4/15-4/18),  Rt IJ TLC from OSH ( 4/15-), LIJ TLC (4/29-5/9)  Wounds/drains: Wound vac to midline incision , change MWF , LEANN drain  PT/OT: PT ordered .

## 2018-05-17 NOTE — PROGRESS NOTE ADULT - ATTENDING COMMENTS
Unfortunately no cultures done from recent OR with which to narrow treatment.  WBC improved today.  Continue meropenem and micafungin for now.  Will follow

## 2018-05-18 LAB
ALBUMIN SERPL ELPH-MCNC: 1.8 G/DL — LOW (ref 3.3–5)
ALP SERPL-CCNC: 79 U/L — SIGNIFICANT CHANGE UP (ref 40–120)
ALT FLD-CCNC: 27 U/L — SIGNIFICANT CHANGE UP (ref 10–45)
ANION GAP SERPL CALC-SCNC: 12 MMOL/L — SIGNIFICANT CHANGE UP (ref 5–17)
AST SERPL-CCNC: 23 U/L — SIGNIFICANT CHANGE UP (ref 10–40)
BASOPHILS NFR BLD AUTO: 0.2 % — SIGNIFICANT CHANGE UP (ref 0–2)
BILIRUB SERPL-MCNC: 0.7 MG/DL — SIGNIFICANT CHANGE UP (ref 0.2–1.2)
BUN SERPL-MCNC: 60 MG/DL — HIGH (ref 7–23)
CALCIUM SERPL-MCNC: 9.2 MG/DL — SIGNIFICANT CHANGE UP (ref 8.4–10.5)
CHLORIDE SERPL-SCNC: 108 MMOL/L — SIGNIFICANT CHANGE UP (ref 96–108)
CO2 SERPL-SCNC: 19 MMOL/L — LOW (ref 22–31)
CREAT SERPL-MCNC: 1.38 MG/DL — HIGH (ref 0.5–1.3)
EOSINOPHIL NFR BLD AUTO: 0.8 % — SIGNIFICANT CHANGE UP (ref 0–6)
GLUCOSE BLDC GLUCOMTR-MCNC: 116 MG/DL — HIGH (ref 70–99)
GLUCOSE BLDC GLUCOMTR-MCNC: 127 MG/DL — HIGH (ref 70–99)
GLUCOSE BLDC GLUCOMTR-MCNC: 139 MG/DL — HIGH (ref 70–99)
GLUCOSE BLDC GLUCOMTR-MCNC: 97 MG/DL — SIGNIFICANT CHANGE UP (ref 70–99)
GLUCOSE BLDC GLUCOMTR-MCNC: 99 MG/DL — SIGNIFICANT CHANGE UP (ref 70–99)
GLUCOSE SERPL-MCNC: 128 MG/DL — HIGH (ref 70–99)
HCT VFR BLD CALC: 20.9 % — CRITICAL LOW (ref 34.5–45)
HCT VFR BLD CALC: 24 % — LOW (ref 34.5–45)
HGB BLD-MCNC: 6.7 G/DL — CRITICAL LOW (ref 11.5–15.5)
HGB BLD-MCNC: 8 G/DL — LOW (ref 11.5–15.5)
LYMPHOCYTES # BLD AUTO: 13.5 % — SIGNIFICANT CHANGE UP (ref 13–44)
MAGNESIUM SERPL-MCNC: 2 MG/DL — SIGNIFICANT CHANGE UP (ref 1.6–2.6)
MCHC RBC-ENTMCNC: 31 PG — SIGNIFICANT CHANGE UP (ref 27–34)
MCHC RBC-ENTMCNC: 31.6 PG — SIGNIFICANT CHANGE UP (ref 27–34)
MCHC RBC-ENTMCNC: 32.1 G/DL — SIGNIFICANT CHANGE UP (ref 32–36)
MCHC RBC-ENTMCNC: 33.3 G/DL — SIGNIFICANT CHANGE UP (ref 32–36)
MCV RBC AUTO: 94.9 FL — SIGNIFICANT CHANGE UP (ref 80–100)
MCV RBC AUTO: 96.8 FL — SIGNIFICANT CHANGE UP (ref 80–100)
MONOCYTES NFR BLD AUTO: 4.2 % — SIGNIFICANT CHANGE UP (ref 2–14)
NEUTROPHILS NFR BLD AUTO: 81.3 % — HIGH (ref 43–77)
PHOSPHATE SERPL-MCNC: 4.7 MG/DL — HIGH (ref 2.5–4.5)
PLATELET # BLD AUTO: 183 K/UL — SIGNIFICANT CHANGE UP (ref 150–400)
PLATELET # BLD AUTO: 199 K/UL — SIGNIFICANT CHANGE UP (ref 150–400)
POTASSIUM SERPL-MCNC: 4.6 MMOL/L — SIGNIFICANT CHANGE UP (ref 3.5–5.3)
POTASSIUM SERPL-SCNC: 4.6 MMOL/L — SIGNIFICANT CHANGE UP (ref 3.5–5.3)
PROT SERPL-MCNC: 4.9 G/DL — LOW (ref 6–8.3)
RBC # BLD: 2.16 M/UL — LOW (ref 3.8–5.2)
RBC # BLD: 2.53 M/UL — LOW (ref 3.8–5.2)
RBC # FLD: 17.1 % — HIGH (ref 10.3–16.9)
RBC # FLD: 17.3 % — HIGH (ref 10.3–16.9)
SODIUM SERPL-SCNC: 139 MMOL/L — SIGNIFICANT CHANGE UP (ref 135–145)
WBC # BLD: 16.3 K/UL — HIGH (ref 3.8–10.5)
WBC # BLD: 18.1 K/UL — HIGH (ref 3.8–10.5)
WBC # FLD AUTO: 16.3 K/UL — HIGH (ref 3.8–10.5)
WBC # FLD AUTO: 18.1 K/UL — HIGH (ref 3.8–10.5)

## 2018-05-18 PROCEDURE — 71045 X-RAY EXAM CHEST 1 VIEW: CPT | Mod: 26

## 2018-05-18 PROCEDURE — 99233 SBSQ HOSP IP/OBS HIGH 50: CPT | Mod: GC

## 2018-05-18 PROCEDURE — 99232 SBSQ HOSP IP/OBS MODERATE 35: CPT | Mod: GC

## 2018-05-18 RX ORDER — PANTOPRAZOLE SODIUM 20 MG/1
40 TABLET, DELAYED RELEASE ORAL DAILY
Qty: 0 | Refills: 0 | Status: DISCONTINUED | OUTPATIENT
Start: 2018-05-18 | End: 2018-06-04

## 2018-05-18 RX ADMIN — Medication 1 DROP(S): at 18:27

## 2018-05-18 RX ADMIN — Medication 650 MILLIGRAM(S): at 16:18

## 2018-05-18 RX ADMIN — MEROPENEM 1000 MILLIGRAM(S): 1 INJECTION INTRAVENOUS at 05:32

## 2018-05-18 RX ADMIN — Medication 650 MILLIGRAM(S): at 15:41

## 2018-05-18 RX ADMIN — MEROPENEM 1000 MILLIGRAM(S): 1 INJECTION INTRAVENOUS at 18:27

## 2018-05-18 RX ADMIN — Medication 75 MICROGRAM(S): at 07:29

## 2018-05-18 RX ADMIN — MICAFUNGIN SODIUM 210 MILLIGRAM(S): 100 INJECTION, POWDER, LYOPHILIZED, FOR SOLUTION INTRAVENOUS at 11:54

## 2018-05-18 RX ADMIN — CHLORHEXIDINE GLUCONATE 15 MILLILITER(S): 213 SOLUTION TOPICAL at 05:33

## 2018-05-18 RX ADMIN — Medication 1 DROP(S): at 05:32

## 2018-05-18 RX ADMIN — PANTOPRAZOLE SODIUM 40 MILLIGRAM(S): 20 TABLET, DELAYED RELEASE ORAL at 11:54

## 2018-05-18 NOTE — PROGRESS NOTE ADULT - SUBJECTIVE AND OBJECTIVE BOX
ID PROGRESS NOTE    INTERVAL EVENTS/SUBJECTIVE: WBC improved from 31k to 16k, now afebrile > 24 hours. Unable to obtain ROS. Off sedation. Patient alert, appears clinically improved from prior.    VITALS  Vital Signs Last 24 Hrs  T(C): 37 (18 May 2018 10:00), Max: 37.5 (17 May 2018 13:05)  T(F): 98.6 (18 May 2018 10:00), Max: 99.5 (17 May 2018 13:05)  HR: 66 (18 May 2018 10:00) (52 - 90)  BP: 140/67 (18 May 2018 10:00) (92/47 - 140/87)  BP(mean): 95 (18 May 2018 10:00) (61 - 112)  RR: 8 (18 May 2018 10:00) (6 - 28)  SpO2: 99% (18 May 2018 10:00) (98% - 100%)    PHYSICAL EXAM  General: awake, nods head to questions however not really communicative, on 2 L NC  Eyes:  Sclerae anicteric, conjunctivae clear, PERRL  Ear/Nose/Throat:  MMM/ No thrush   Pulmonary: Bibasilar crackles, air entry equal on both sides, Right chest Port, access for HD  Cardiovascular: NSR, no murmurs  Abdominal: soft, obese, ND/NT wound VAC in place, no leak, R sided LEANN draining serosanguinous fluid  : suazo in place  Extremities: WWP, palpable dp and radial pulses BL, trace LE edema present     Labs: reviewed.                        6.7    16.3  )-----------( 183      ( 18 May 2018 04:56 )             20.9     05-18    139  |  108  |  60<H>  ----------------------------<  128<H>  4.6   |  19<L>  |  1.38<H>    Ca    9.2      18 May 2018 04:56  Phos  4.7     05-18  Mg     2.0     05-18    TPro  4.9<L>  /  Alb  1.8<L>  /  TBili  0.7  /  DBili  x   /  AST  23  /  ALT  27  /  AlkPhos  79  05-18    LIVER FUNCTIONS - ( 18 May 2018 04:56 )  Alb: 1.8 g/dL / Pro: 4.9 g/dL / ALK PHOS: 79 U/L / ALT: 27 U/L / AST: 23 U/L / GGT: x           Urinalysis Basic - ( 16 May 2018 16:57 )    Color: Yellow / Appearance: Clear / S.025 / pH: x  Gluc: x / Ketone: NEGATIVE  / Bili: Negative / Urobili: 0.2 E.U./dL   Blood: x / Protein: 30 mg/dL / Nitrite: NEGATIVE   Leuk Esterase: NEGATIVE / RBC: > 10 /HPF / WBC < 5 /HPF   Sq Epi: x / Non Sq Epi: 0-5 /HPF / Bacteria: Present /HPF    Culture - Blood (collected 16 May 2018 07:37)  Source: .Blood Blood  Preliminary Report (18 May 2018 08:01):    No growth at 2 days.    Culture - Blood (collected 16 May 2018 07:37)  Source: .Blood Blood  Preliminary Report (18 May 2018 08:01):    No growth at 2 days.    Radiology and additional tests: reviewed.    Microbiology:  U Clx : + E.coli MDR  left pelvic fluid aspirate: + for yeast/ candida luitaniae and candida Glabrata  lusitaniae fluc sensitive/ glabrata fluc SDD 4    CT Abdomen and Pelvis w/ Oral Cont (18 @ 15:38) >  Interval decrease in size of the extraperitoneal gas-filled collection   surrounding the enlarged and bulky uterus with an indwelling percutaneous   drainage catheter. However, enteric contrast is again seen within this   collection on the current study, indicating a persistent enteric   fistulous collection.    Increased amount of upper abdominal ascites.    Moderate bilateral pleural effusions with near complete collapse of the   right lower lobe and partial collapse of the left lower lobe.

## 2018-05-18 NOTE — PROGRESS NOTE ADULT - SUBJECTIVE AND OBJECTIVE BOX
Overnight Events: weaned precedex, cpap       Hemodynamically stable, afebrile.  1 u pRBC for Hgb 6.7 will repeat CBC at 12pm  TF held, plan for extubation later today. Patient is off propofol/fentanyl  Will follow-up ID recs regarding abx.      Allergies    No Known Allergies    Intolerances        Vital Signs Last 24 Hrs  T(C): 37 (18 May 2018 10:00), Max: 37.5 (17 May 2018 13:05)  T(F): 98.6 (18 May 2018 10:00), Max: 99.5 (17 May 2018 13:05)  HR: 66 (18 May 2018 10:00) (52 - 90)  BP: 140/67 (18 May 2018 10:00) (92/47 - 140/87)  BP(mean): 95 (18 May 2018 10:00) (61 - 112)  RR: 8 (18 May 2018 10:00) (6 - 28)  SpO2: 99% (18 May 2018 10:00) (98% - 100%)     @ 07:  -   @ 07:00  --------------------------------------------------------  IN: 943.1 mL / OUT: 2125 mL / NET: -1181.9 mL     @ 07: @ 11:26  --------------------------------------------------------  IN: 392 mL / OUT: 100 mL / NET: 292 mL       @ 07:  -   @ 07:00  --------------------------------------------------------  IN: 943.1 mL / OUT: 2125 mL / NET: -1181.9 mL     @ 07:  -  05-18 @ 11:26  --------------------------------------------------------  IN: 392 mL / OUT: 100 mL / NET: 292 mL          LABS:      CBC Full  -  ( 18 May 2018 04:56 )  WBC Count : 16.3 K/uL  Hemoglobin : 6.7 g/dL  Hematocrit : 20.9 %  Platelet Count - Automated : 183 K/uL  Mean Cell Volume : 96.8 fL  Mean Cell Hemoglobin : 31.0 pg  Mean Cell Hemoglobin Concentration : 32.1 g/dL  Auto Neutrophil # : x  Auto Lymphocyte # : x  Auto Monocyte # : x  Auto Eosinophil # : x  Auto Basophil # : x  Auto Neutrophil % : 81.3 %  Auto Lymphocyte % : 13.5 %  Auto Monocyte % : 4.2 %  Auto Eosinophil % : 0.8 %  Auto Basophil % : 0.2 %    0518    139  |  108  |  60<H>  ----------------------------<  128<H>  4.6   |  19<L>  |  1.38<H>    Ca    9.2      18 May 2018 04:56  Phos  4.7     -  Mg     2.0     18    TPro  4.9<L>  /  Alb  1.8<L>  /  TBili  0.7  /  DBili  x   /  AST  23  /  ALT  27  /  AlkPhos  79  05-18          Urinalysis Basic - ( 16 May 2018 16:57 )    Color: Yellow / Appearance: Clear / S.025 / pH: x  Gluc: x / Ketone: NEGATIVE  / Bili: Negative / Urobili: 0.2 E.U./dL   Blood: x / Protein: 30 mg/dL / Nitrite: NEGATIVE   Leuk Esterase: NEGATIVE / RBC: > 10 /HPF / WBC < 5 /HPF   Sq Epi: x / Non Sq Epi: 0-5 /HPF / Bacteria: Present /HPF        Physical Exam:  General: in no acute distress, moving extremities and opening her eyes   HEENT: NC/AT, EOMI, PERRLA, no oral lesions, neck supple w/o LAD, right eye has some mild secretion   Pulmonary: Nonlabored breathing, no respiratory distress, intubated on CPAP  trial, some crackles bibasilar  Cardiovascular: NSR, no murmurs  Abdominal: soft, non tender, non distended, LEANN drain intact, serosanguinous output, wound VAC at midline - intact, no leak, staple lines intact. no surrounding erythema or edema, no discharge,    Extremities: WWP, 3-4/5 strength x 4, no clubbing/cyanosis/edema  Pulses: palpable distal pulses        A/p:   77 y/o F with sepsis from open abdominal wound infection, MSSA bacteremia present on admission, and UTI present on admission found to have contained small bowel leak, s/p exlap, TAHBSO, SBR 5/15, s/p PEG     Neuro: seroquel 25 QHS, zofran prn, tylenol prn, holding: aripiprazole, escitalapram  CV: HD stable s/p severe sepsis, holding amlodipine,  Echo  65-70%.  Pulm: CPAP, plan for extubation  GI: NPO, protonix, PEG on TF (goal 59)  : Nicole S/p AKF on HD now resolved. UTI (present on admission)    ID: Jose L( -) [place]: ( -) D/c'd:MSSA in blood: Oxacillin ( -) TTE neg for endocarditis.  Endo: ISS, Synthroid  Insulin  in TPN: 25. Lupron 5/15.  PPx: sqh held for low plts  PE:Sp IVC filter on 3/30   Lines: PIV, R IJ TLC (5/15-), PICC (-) [place] (5/15-) /// s/p L Rad Art line( 4/15-),  Rt IJ TLC from OSH ( 4/15-), LIJ TLC (-)  Wounds: Wound vac to midline incision , change MWF  PT/OT: PT ordered

## 2018-05-18 NOTE — PROGRESS NOTE ADULT - ASSESSMENT
79 y/o F with sepsis from open abdominal wound infection, MSSA bacteremia present on admission, and UTI present on admission found to have contained small bowel leak, s/p exlap, TAHBSO, SBR 5/15, s/p PEG 5/16  - management per primary team- general surgery  - no additional recommendations at this time  - d/w Dr. Christian, please contact GYN if any further questions

## 2018-05-18 NOTE — PROGRESS NOTE ADULT - SUBJECTIVE AND OBJECTIVE BOX
DELAYED ENTRY DUE TO PATIENT CARE  Pt evaluated at bedside. Extubated w/ CPAP, responds to commands, denies pain, nausea/vomiting.     T(C): 37.3 (05-18-18 @ 19:20), Max: 37.3 (05-18-18 @ 19:20)  HR: 72 (05-18-18 @ 21:00) (60 - 76)  BP: 141/61 (05-18-18 @ 21:00) (127/95 - 150/60)  RR: 15 (05-18-18 @ 21:00) (5 - 20)  SpO2: 98% (05-18-18 @ 21:00) (97% - 100%)  General: in no acute distress, moving extremities and opening her eyes   Pulm: poor inspiratory effort, crackles at lung bases  CV: RRR, no mrg  Abdominal: soft, non tender, non distended, LEANN drain intact, serosanguinous output, wound VAC at midline - intact, no leak, staple lines intact. no surrounding erythema or edema, no discharge,    Extremities: no calf tenderness    05-18 @ 07:01  -  05-18 @ 21:28  --------------------------------------------------------  IN: 872 mL / OUT: 655 mL / NET: 217 mL                          8.0    18.1  )-----------( 199      ( 18 May 2018 11:36 )             24.0     05-18    139  |  108  |  60<H>  ----------------------------<  128<H>  4.6   |  19<L>  |  1.38<H>    Ca    9.2      18 May 2018 04:56  Phos  4.7     05-18  Mg     2.0     05-18    TPro  4.9<L>  /  Alb  1.8<L>  /  TBili  0.7  /  DBili  x   /  AST  23  /  ALT  27  /  AlkPhos  79  05-18    MEDICATIONS  (STANDING):  artificial  tears Solution 1 Drop(s) Both EYES two times a day  chlorhexidine 0.12% Liquid 15 milliLiter(s) Swish and Spit two times a day  dextrose 50% Injectable 12.5 Gram(s) IV Push once  dextrose 50% Injectable 25 Gram(s) IV Push once  insulin lispro (HumaLOG) corrective regimen sliding scale   SubCutaneous every 6 hours  levothyroxine Injectable 75 MICROGram(s) IV Push <User Schedule>  meropenem Injectable 1000 milliGRAM(s) IV Push every 12 hours  micafungin IVPB 100 milliGRAM(s) IV Intermittent daily  pantoprazole   Suspension 40 milliGRAM(s) Oral daily  sodium chloride 0.9% lock flush 20 milliLiter(s) IV Push once    MEDICATIONS  (PRN):  acetaminophen    Suspension. 650 milliGRAM(s) Oral every 6 hours PRN Moderate Pain (4 - 6)  ALBUTerol/ipratropium for Nebulization 3 milliLiter(s) Nebulizer every 6 hours PRN Shortness of Breath and/or Wheezing  ondansetron Injectable 4 milliGRAM(s) IV Push every 6 hours PRN Nausea  sodium chloride 0.9% lock flush 10 milliLiter(s) IV Push every 1 hour PRN After each medication administration  sodium chloride 0.9% lock flush 10 milliLiter(s) IV Push every 12 hours PRN Lumen of catheter NOT used

## 2018-05-18 NOTE — CHART NOTE - NSCHARTNOTEFT_GEN_A_CORE
Admitting Diagnosis:   Patient is a 78y old  Female who presents with a chief complaint of Abdominal wound and multifactorial sepsis (17 Apr 2018 21:02)      PAST MEDICAL & SURGICAL HISTORY:  Hypothyroidism  GERD (gastroesophageal reflux disease)  Obesity  DM (diabetes mellitus)  HLD (hyperlipidemia)  HTN (hypertension)  H/O ventral hernia repair      Current Nutrition Order:  Osmolite 1.2 Nicholas @ 59mL/hr x 24hrs via PEG (1416mL TV, 1699 kcal, 78.5g protein, 1161 mL free H2O, 142% RDI, 1.29g/kg IBW protein)    PO Intake: Good (%) [   ]  Fair (50-75%) [   ] Poor (<25%) [   ]- N/A NPO    GI Issues: No N/V/C noted; rectal tube in place    Pain: Unable to assess at this time 2/2 vent    Skin Integrity: Open abdominal wound w/vac, B/L buttocks stage II, generalized 2+ edema, L. IJ drain    Labs:   05-18    139  |  108  |  60<H>  ----------------------------<  128<H>  4.6   |  19<L>  |  1.38<H>    Ca    9.2      18 May 2018 04:56  Phos  4.7     05-18  Mg     2.0     05-18    TPro  4.9<L>  /  Alb  1.8<L>  /  TBili  0.7  /  DBili  x   /  AST  23  /  ALT  27  /  AlkPhos  79  05-18    CAPILLARY BLOOD GLUCOSE      POCT Blood Glucose.: 127 mg/dL (18 May 2018 11:42)  POCT Blood Glucose.: 139 mg/dL (18 May 2018 05:44)  POCT Blood Glucose.: 116 mg/dL (18 May 2018 00:04)  POCT Blood Glucose.: 129 mg/dL (17 May 2018 19:15)      Medications:  MEDICATIONS  (STANDING):  artificial  tears Solution 1 Drop(s) Both EYES two times a day  chlorhexidine 0.12% Liquid 15 milliLiter(s) Swish and Spit two times a day  dextrose 50% Injectable 12.5 Gram(s) IV Push once  dextrose 50% Injectable 25 Gram(s) IV Push once  insulin lispro (HumaLOG) corrective regimen sliding scale   SubCutaneous every 6 hours  levothyroxine Injectable 75 MICROGram(s) IV Push <User Schedule>  meropenem Injectable 1000 milliGRAM(s) IV Push every 12 hours  micafungin IVPB 100 milliGRAM(s) IV Intermittent daily  pantoprazole   Suspension 40 milliGRAM(s) Oral daily  sodium chloride 0.9% lock flush 20 milliLiter(s) IV Push once    MEDICATIONS  (PRN):  acetaminophen    Suspension. 650 milliGRAM(s) Oral every 6 hours PRN Moderate Pain (4 - 6)  ALBUTerol/ipratropium for Nebulization 3 milliLiter(s) Nebulizer every 6 hours PRN Shortness of Breath and/or Wheezing  ondansetron Injectable 4 milliGRAM(s) IV Push every 6 hours PRN Nausea  sodium chloride 0.9% lock flush 10 milliLiter(s) IV Push every 1 hour PRN After each medication administration  sodium chloride 0.9% lock flush 10 milliLiter(s) IV Push every 12 hours PRN Lumen of catheter NOT used      Weight: 93.6kg  Daily     Daily     Weight Change: No new weights recorded since admit     Estimated energy needs: Ideal body weight (61kg) used for calculations as pt >120% of IBW. needs estimated for older age; adjusted for sepsis, wound healing  Calories: 25-30 kcal/kg = 8132-7729 kcal/day  Protein: 1.2-1.4 g/kg = 73-85g protein/day  Fluids: 30-35 mL/kg = 2995-5442 mL/day    Subjective: 79 yo/female with PMhx DM, HTN, recent ventral hernia repair c/b PE, anemia, GIB, admitted with sepsis 2/2 abdominal wound, UTI and bacteremia. Also found to have small bowel perf w/contained leak. Pt intubated for airway protection on 4/25. Successfully extubated on 5/8. POD3 s/p DAVY, SBR, washout and PEG placement on 5/16. Pt remained intubated post-op. Pt has successfully been weaned off of TPN and is receiving 100% of EER via EN. Pt seen in room, awake, but remains intubated on CPAP mode with plan for extubation later today per team. TF off for extubation. Off of sedation and pressors. Rectal tube in place as pt with chronic diarrhea. Na/K/Mg WNL, Phos 4.7 (H), BUN 60, Cr 1.38 (trending down).    Previous Nutrition Diagnosis:  Inadequate oral intake RT inability to meet needs via oral intake 2/2 AMS AEB NPO    Active [ X- nutrition support dependent  ]  Resolved [   ]    If resolved, new PES:     Goal: Pt will continue to meet % of EER via tolerated route(s)     Recommendations:  1. Re-start enteral nutrition at goal rate of 59mL/hr as medically feasible; monitor for s/s intolerance, maintain aspiration precautions at all times   2. Ensure pain control   3. Keep nutrition in line with care at all times   4. Trend weights    Education: N/A 2/2 vent     Risk Level: High [ X  ] Moderate [   ] Low [   ]

## 2018-05-18 NOTE — PROGRESS NOTE ADULT - ASSESSMENT
78 year old female, morbid obesity, DM, ventral hernia repair with mesh 3/2 c/b wound dehiscence, recent ATN requiring temporary HD (Permacath removed 4/10), who presented as a transfer from [facility] for management of multifactorial sepsis and open abdominal wound. Patient found to have ESBL-producing E.Coli UTI and MSSA bacteremia, origin likely coming from abdomen/pelvis vs. prior HD catheter. Patient intubated 4/25 for acute hypoxic respiratory failure.  CT 4/18 showed SB perforation with pelvic collection, still present on CT on 4/25.  She is s/p IR drainage of collection, with culture growing candida lusitaniae and Glabrata. Completed 14 d course of oxacillin from first negative culture (4/17;  last day 4/30). Went to OR 5/15 for ex lap, abdominal washout - found areas of bowel leak s/p partial sm bowel resection, also had supracervical hysterectomy with b/l salpingo-oophorectomy for inflamed uterus.    Impression: Pt is s/p treatment (as above) for MSSA bacteremia with no recurrence on recent blood cultures. Pt with recent (5/15) abdominal surgery (as above) unfortunately without intra-operative culture data, though prior abdominal cultures with multiple species of candida, and pt also with MDR E. Coli UTI. Pt with improvement in leukocytosis and now afebrile > 24 hours. For now, will opt to continue current treatment as there are no 5/15 OR cultures from which to de-escalate antibiotics. As of now, primary team states there is no current plan to take patient back to OR.    Recommend:  - Continue meropenem (for MDR e. coli UTI) and micafungin (for candidal intra-abdominal infection) at current doses for now  - Follow blood cultures (5/14, 5/16) with no growth to date  - Surgery to continue to re-assess wound site  - IF patient to return to OR at any given time, PLEASE send intra-operative cultures

## 2018-05-18 NOTE — PROGRESS NOTE ADULT - ATTENDING COMMENTS
Agree with above.  Improving from ID standpoint.  It's difficult to deescalate antibiotics as there are no intraoperative cultures from recent surgery and prior cultures show MDR bacteria.  Will likely stop antibiotics in next couple days now that source control is achieved

## 2018-05-19 LAB
ANION GAP SERPL CALC-SCNC: 11 MMOL/L — SIGNIFICANT CHANGE UP (ref 5–17)
BUN SERPL-MCNC: 56 MG/DL — HIGH (ref 7–23)
CALCIUM SERPL-MCNC: 9.3 MG/DL — SIGNIFICANT CHANGE UP (ref 8.4–10.5)
CHLORIDE SERPL-SCNC: 108 MMOL/L — SIGNIFICANT CHANGE UP (ref 96–108)
CO2 SERPL-SCNC: 20 MMOL/L — LOW (ref 22–31)
CREAT SERPL-MCNC: 1.08 MG/DL — SIGNIFICANT CHANGE UP (ref 0.5–1.3)
CULTURE RESULTS: SIGNIFICANT CHANGE UP
CULTURE RESULTS: SIGNIFICANT CHANGE UP
GLUCOSE BLDC GLUCOMTR-MCNC: 130 MG/DL — HIGH (ref 70–99)
GLUCOSE BLDC GLUCOMTR-MCNC: 159 MG/DL — HIGH (ref 70–99)
GLUCOSE BLDC GLUCOMTR-MCNC: 198 MG/DL — HIGH (ref 70–99)
GLUCOSE BLDC GLUCOMTR-MCNC: 199 MG/DL — HIGH (ref 70–99)
GLUCOSE SERPL-MCNC: 144 MG/DL — HIGH (ref 70–99)
HCT VFR BLD CALC: 25.6 % — LOW (ref 34.5–45)
HGB BLD-MCNC: 8.3 G/DL — LOW (ref 11.5–15.5)
MAGNESIUM SERPL-MCNC: 2 MG/DL — SIGNIFICANT CHANGE UP (ref 1.6–2.6)
MCHC RBC-ENTMCNC: 31.3 PG — SIGNIFICANT CHANGE UP (ref 27–34)
MCHC RBC-ENTMCNC: 32.4 G/DL — SIGNIFICANT CHANGE UP (ref 32–36)
MCV RBC AUTO: 96.6 FL — SIGNIFICANT CHANGE UP (ref 80–100)
PHOSPHATE SERPL-MCNC: 4.7 MG/DL — HIGH (ref 2.5–4.5)
PLATELET # BLD AUTO: 236 K/UL — SIGNIFICANT CHANGE UP (ref 150–400)
POTASSIUM SERPL-MCNC: 4 MMOL/L — SIGNIFICANT CHANGE UP (ref 3.5–5.3)
POTASSIUM SERPL-SCNC: 4 MMOL/L — SIGNIFICANT CHANGE UP (ref 3.5–5.3)
RBC # BLD: 2.65 M/UL — LOW (ref 3.8–5.2)
RBC # FLD: 17.9 % — HIGH (ref 10.3–16.9)
SODIUM SERPL-SCNC: 139 MMOL/L — SIGNIFICANT CHANGE UP (ref 135–145)
SPECIMEN SOURCE: SIGNIFICANT CHANGE UP
SPECIMEN SOURCE: SIGNIFICANT CHANGE UP
WBC # BLD: 14.6 K/UL — HIGH (ref 3.8–10.5)
WBC # FLD AUTO: 14.6 K/UL — HIGH (ref 3.8–10.5)

## 2018-05-19 PROCEDURE — 71045 X-RAY EXAM CHEST 1 VIEW: CPT | Mod: 26

## 2018-05-19 PROCEDURE — 99233 SBSQ HOSP IP/OBS HIGH 50: CPT | Mod: GC

## 2018-05-19 PROCEDURE — 99232 SBSQ HOSP IP/OBS MODERATE 35: CPT

## 2018-05-19 RX ORDER — FUROSEMIDE 40 MG
20 TABLET ORAL ONCE
Qty: 0 | Refills: 0 | Status: COMPLETED | OUTPATIENT
Start: 2018-05-19 | End: 2018-05-19

## 2018-05-19 RX ORDER — AMLODIPINE BESYLATE 2.5 MG/1
10 TABLET ORAL DAILY
Qty: 0 | Refills: 0 | Status: DISCONTINUED | OUTPATIENT
Start: 2018-05-19 | End: 2018-05-19

## 2018-05-19 RX ORDER — AMLODIPINE BESYLATE 2.5 MG/1
5 TABLET ORAL DAILY
Qty: 0 | Refills: 0 | Status: DISCONTINUED | OUTPATIENT
Start: 2018-05-19 | End: 2018-05-21

## 2018-05-19 RX ADMIN — Medication 1 DROP(S): at 17:59

## 2018-05-19 RX ADMIN — Medication 2: at 11:49

## 2018-05-19 RX ADMIN — Medication 20 MILLIGRAM(S): at 11:49

## 2018-05-19 RX ADMIN — Medication 1 DROP(S): at 06:34

## 2018-05-19 RX ADMIN — MICAFUNGIN SODIUM 210 MILLIGRAM(S): 100 INJECTION, POWDER, LYOPHILIZED, FOR SOLUTION INTRAVENOUS at 11:21

## 2018-05-19 RX ADMIN — PANTOPRAZOLE SODIUM 40 MILLIGRAM(S): 20 TABLET, DELAYED RELEASE ORAL at 11:21

## 2018-05-19 RX ADMIN — Medication 75 MICROGRAM(S): at 06:31

## 2018-05-19 RX ADMIN — MEROPENEM 1000 MILLIGRAM(S): 1 INJECTION INTRAVENOUS at 06:32

## 2018-05-19 RX ADMIN — Medication 2: at 17:58

## 2018-05-19 RX ADMIN — MEROPENEM 1000 MILLIGRAM(S): 1 INJECTION INTRAVENOUS at 17:58

## 2018-05-19 RX ADMIN — AMLODIPINE BESYLATE 5 MILLIGRAM(S): 2.5 TABLET ORAL at 15:54

## 2018-05-19 NOTE — PROGRESS NOTE ADULT - SUBJECTIVE AND OBJECTIVE BOX
Interval Events:  No issues overnight Passed TOV  Patient seen and examined at bedside.      Allergies    No Known Allergies    Intolerances        Vital Signs Last 24 Hrs  T(C): 37.2 (19 May 2018 10:00), Max: 37.3 (18 May 2018 19:20)  T(F): 98.9 (19 May 2018 10:00), Max: 99.2 (18 May 2018 19:20)  HR: 72 (19 May 2018 14:00) (60 - 76)  BP: 158/62 (19 May 2018 14:00) (100/67 - 169/73)  BP(mean): 95 (19 May 2018 14:00) (75 - 127)  RR: 26 (19 May 2018 14:00) (5 - 35)  SpO2: 98% (19 May 2018 14:00) (96% - 100%)    05-18 @ 07:01  -  05-19 @ 07:00  --------------------------------------------------------  IN: 1566 mL / OUT: 945 mL / NET: 621 mL    05-19 @ 07:01 - 05-19 @ 14:20  --------------------------------------------------------  IN: 553 mL / OUT: 190 mL / NET: 363 mL      05-18 @ 07:01 - 05-19 @ 07:00  --------------------------------------------------------  IN: 1566 mL / OUT: 945 mL / NET: 621 mL    05-19 @ 07:01  -  05-19 @ 14:20  --------------------------------------------------------  IN: 553 mL / OUT: 190 mL / NET: 363 mL          LABS:      CBC Full  -  ( 19 May 2018 06:28 )  WBC Count : 14.6 K/uL  Hemoglobin : 8.3 g/dL  Hematocrit : 25.6 %  Platelet Count - Automated : 236 K/uL  Mean Cell Volume : 96.6 fL  Mean Cell Hemoglobin : 31.3 pg  Mean Cell Hemoglobin Concentration : 32.4 g/dL  Auto Neutrophil # : x  Auto Lymphocyte # : x  Auto Monocyte # : x  Auto Eosinophil # : x  Auto Basophil # : x  Auto Neutrophil % : x  Auto Lymphocyte % : x  Auto Monocyte % : x  Auto Eosinophil % : x  Auto Basophil % : x    05-19    139  |  108  |  56<H>  ----------------------------<  144<H>  4.0   |  20<L>  |  1.08    Ca    9.3      19 May 2018 06:27  Phos  4.7     05-19  Mg     2.0     05-19    TPro  4.9<L>  /  Alb  1.8<L>  /  TBili  0.7  /  DBili  x   /  AST  23  /  ALT  27  /  AlkPhos  79  05-18                    RADIOLOGY & ADDITIONAL STUDIES (The following images were personally reviewed):      Physical Exam:     General: in no acute distress, moving extremities and opening her eyes nodding sometimes appropriately some times not  HEENT: NC/AT, EOMI, PERRLA, no oral lesions, neck supple w/o LAD, right eye has some mild secretion   Pulmonary: CTA no w/r/r/ + rales in LB b/l  Cardiovascular: NSR, no murmurs  Abdominal: soft, non tender, non distended, LEANN drain intact, serosanguinous output, wound VAC at midline - intact, no leak, staple lines intact. no surrounding erythema or edema, no discharge,    Extremities: WWP, no clubbing/cyanosis/edema  Pulses: palpable distal pulses    A/p: 79 y/o F with sepsis from open abdominal wound infection, MSSA bacteremia present on admission, and UTI present on admission found to have contained small bowel leak, s/p exlap, TAHBSO, SBR 5/15, s/p PEG 5/16    Neuro: seroquel 25 QHS, zofran prn, tylenol prn, holding: aripiprazole, escitalapram  CV: HD stable s/p severe sepsis, holding amlodipine, 4/19 Echo  65-70%.  Pulm: satting well on NC   GI: NPO, protonix, PEG on TF (goal 59)  : Nicole S/p AKF on HD now resolved. UTI (present on admission)    ID: Jose L( 4/17-) [place]: ( 4/27-) D/c'd:MSSA in blood: Oxacillin ( 4/17-) TTE neg for endocarditis.  Endo: ISS, Synthroid  Insulin  in TPN: 25. Lupron 5/15.  PPx: sqh held for low plts  PE:Sp IVC filter on 3/30   Lines: PIV, R IJ TLC (5/15-), PICC (5/9-) [place] (5/15-) /// s/p L Rad Art line( 4/15-4/18),  Rt IJ TLC from OSH ( 4/15-), LIJ TLC (4/29-5/9)  Wounds: Wound vac to midline incision , change MWF  PT/OT: PT ordered .

## 2018-05-19 NOTE — PROGRESS NOTE ADULT - ASSESSMENT
IMPRESSION:  Abdominal wound infection.  Patient clinically improving and WBC decreasing.  Day 5 of antibiotics following OR     Recommend:  1.  Continue meropenem and micafungin for now  2.  Will continue to monitor clinical response and recommend antibiotic duration

## 2018-05-19 NOTE — PROGRESS NOTE ADULT - SUBJECTIVE AND OBJECTIVE BOX
INTERVAL HPI/OVERNIGHT EVENTS:    Patient seen and examined at bedside.  No events.  Afebrile    ROS:    unable to obtain     ANTIBIOTICS/RELEVANT:    MEDICATIONS  (STANDING):  artificial  tears Solution 1 Drop(s) Both EYES two times a day  chlorhexidine 0.12% Liquid 15 milliLiter(s) Swish and Spit two times a day  dextrose 50% Injectable 12.5 Gram(s) IV Push once  dextrose 50% Injectable 25 Gram(s) IV Push once  insulin lispro (HumaLOG) corrective regimen sliding scale   SubCutaneous every 6 hours  levothyroxine Injectable 75 MICROGram(s) IV Push <User Schedule>  meropenem Injectable 1000 milliGRAM(s) IV Push every 12 hours  micafungin IVPB 100 milliGRAM(s) IV Intermittent daily  pantoprazole   Suspension 40 milliGRAM(s) Oral daily  sodium chloride 0.9% lock flush 20 milliLiter(s) IV Push once    MEDICATIONS  (PRN):  acetaminophen    Suspension. 650 milliGRAM(s) Oral every 6 hours PRN Moderate Pain (4 - 6)  ALBUTerol/ipratropium for Nebulization 3 milliLiter(s) Nebulizer every 6 hours PRN Shortness of Breath and/or Wheezing  ondansetron Injectable 4 milliGRAM(s) IV Push every 6 hours PRN Nausea  sodium chloride 0.9% lock flush 10 milliLiter(s) IV Push every 1 hour PRN After each medication administration  sodium chloride 0.9% lock flush 10 milliLiter(s) IV Push every 12 hours PRN Lumen of catheter NOT used        Vital Signs Last 24 Hrs  T(C): 37.2 (19 May 2018 10:00), Max: 37.3 (18 May 2018 19:20)  T(F): 98.9 (19 May 2018 10:00), Max: 99.2 (18 May 2018 19:20)  HR: 70 (19 May 2018 12:00) (60 - 76)  BP: 167/95 (19 May 2018 12:00) (100/67 - 169/73)  BP(mean): 120 (19 May 2018 12:00) (75 - 127)  RR: 30 (19 May 2018 12:00) (5 - 30)  SpO2: 98% (19 May 2018 12:00) (96% - 100%)    PHYSICAL EXAM:  Constitutional: chronically ill appearing   Eyes:ZABRINA, EOMI  Ear/Nose/Throat:  NG tube in place   Neck:no JVD, no lymphadenopathy, supple  Respiratory: clear   Cardiovascular: S1S2 RRR, no murmurs  Gastrointestinal:soft, small wound vac in place; LEANN drain with reddish fluid   Extremities:no e/e/c  Vascular: DP Pulse:	right normal; left normal      LABS:                        8.3    14.6  )-----------( 236      ( 19 May 2018 06:28 )             25.6     05-19    139  |  108  |  56<H>  ----------------------------<  144<H>  4.0   |  20<L>  |  1.08    Ca    9.3      19 May 2018 06:27  Phos  4.7     05-19  Mg     2.0     05-19    TPro  4.9<L>  /  Alb  1.8<L>  /  TBili  0.7  /  DBili  x   /  AST  23  /  ALT  27  /  AlkPhos  79  05-18          MICROBIOLOGY:  Culture - Blood (05.16.18 @ 07:37)    Specimen Source: .Blood Blood    Culture Results:   No growth at 3 days.          RADIOLOGY & ADDITIONAL STUDIES:

## 2018-05-20 DIAGNOSIS — K21.9 GASTRO-ESOPHAGEAL REFLUX DISEASE WITHOUT ESOPHAGITIS: ICD-10-CM

## 2018-05-20 DIAGNOSIS — E03.9 HYPOTHYROIDISM, UNSPECIFIED: ICD-10-CM

## 2018-05-20 DIAGNOSIS — E11.9 TYPE 2 DIABETES MELLITUS WITHOUT COMPLICATIONS: ICD-10-CM

## 2018-05-20 DIAGNOSIS — K63.1 PERFORATION OF INTESTINE (NONTRAUMATIC): ICD-10-CM

## 2018-05-20 LAB
ANION GAP SERPL CALC-SCNC: 11 MMOL/L — SIGNIFICANT CHANGE UP (ref 5–17)
BUN SERPL-MCNC: 50 MG/DL — HIGH (ref 7–23)
CALCIUM SERPL-MCNC: 9.3 MG/DL — SIGNIFICANT CHANGE UP (ref 8.4–10.5)
CHLORIDE SERPL-SCNC: 113 MMOL/L — HIGH (ref 96–108)
CO2 SERPL-SCNC: 22 MMOL/L — SIGNIFICANT CHANGE UP (ref 22–31)
CREAT SERPL-MCNC: 0.84 MG/DL — SIGNIFICANT CHANGE UP (ref 0.5–1.3)
GLUCOSE BLDC GLUCOMTR-MCNC: 200 MG/DL — HIGH (ref 70–99)
GLUCOSE BLDC GLUCOMTR-MCNC: 220 MG/DL — HIGH (ref 70–99)
GLUCOSE BLDC GLUCOMTR-MCNC: 230 MG/DL — HIGH (ref 70–99)
GLUCOSE SERPL-MCNC: 212 MG/DL — HIGH (ref 70–99)
HCT VFR BLD CALC: 26 % — LOW (ref 34.5–45)
HGB BLD-MCNC: 8.3 G/DL — LOW (ref 11.5–15.5)
MAGNESIUM SERPL-MCNC: 1.7 MG/DL — SIGNIFICANT CHANGE UP (ref 1.6–2.6)
MCHC RBC-ENTMCNC: 30.1 PG — SIGNIFICANT CHANGE UP (ref 27–34)
MCHC RBC-ENTMCNC: 31.9 G/DL — LOW (ref 32–36)
MCV RBC AUTO: 94.2 FL — SIGNIFICANT CHANGE UP (ref 80–100)
PHOSPHATE SERPL-MCNC: 3.6 MG/DL — SIGNIFICANT CHANGE UP (ref 2.5–4.5)
PLATELET # BLD AUTO: 225 K/UL — SIGNIFICANT CHANGE UP (ref 150–400)
POTASSIUM SERPL-MCNC: 3.9 MMOL/L — SIGNIFICANT CHANGE UP (ref 3.5–5.3)
POTASSIUM SERPL-SCNC: 3.9 MMOL/L — SIGNIFICANT CHANGE UP (ref 3.5–5.3)
RBC # BLD: 2.76 M/UL — LOW (ref 3.8–5.2)
RBC # FLD: 18.4 % — HIGH (ref 10.3–16.9)
SODIUM SERPL-SCNC: 146 MMOL/L — HIGH (ref 135–145)
WBC # BLD: 11.1 K/UL — HIGH (ref 3.8–10.5)
WBC # FLD AUTO: 11.1 K/UL — HIGH (ref 3.8–10.5)

## 2018-05-20 PROCEDURE — 71045 X-RAY EXAM CHEST 1 VIEW: CPT | Mod: 26

## 2018-05-20 PROCEDURE — 99232 SBSQ HOSP IP/OBS MODERATE 35: CPT

## 2018-05-20 PROCEDURE — 99233 SBSQ HOSP IP/OBS HIGH 50: CPT | Mod: GC

## 2018-05-20 RX ORDER — POTASSIUM CHLORIDE 20 MEQ
10 PACKET (EA) ORAL ONCE
Qty: 0 | Refills: 0 | Status: COMPLETED | OUTPATIENT
Start: 2018-05-20 | End: 2018-05-20

## 2018-05-20 RX ORDER — FUROSEMIDE 40 MG
40 TABLET ORAL ONCE
Qty: 0 | Refills: 0 | Status: DISCONTINUED | OUTPATIENT
Start: 2018-05-20 | End: 2018-05-20

## 2018-05-20 RX ORDER — INSULIN GLARGINE 100 [IU]/ML
10 INJECTION, SOLUTION SUBCUTANEOUS AT BEDTIME
Qty: 0 | Refills: 0 | Status: DISCONTINUED | OUTPATIENT
Start: 2018-05-20 | End: 2018-06-04

## 2018-05-20 RX ORDER — MAGNESIUM SULFATE 500 MG/ML
2 VIAL (ML) INJECTION ONCE
Qty: 0 | Refills: 0 | Status: COMPLETED | OUTPATIENT
Start: 2018-05-20 | End: 2018-05-20

## 2018-05-20 RX ADMIN — Medication 1 DROP(S): at 17:38

## 2018-05-20 RX ADMIN — Medication 100 MILLIEQUIVALENT(S): at 08:53

## 2018-05-20 RX ADMIN — CHLORHEXIDINE GLUCONATE 15 MILLILITER(S): 213 SOLUTION TOPICAL at 06:06

## 2018-05-20 RX ADMIN — Medication 2: at 00:06

## 2018-05-20 RX ADMIN — Medication 4: at 12:09

## 2018-05-20 RX ADMIN — Medication 4: at 07:21

## 2018-05-20 RX ADMIN — MEROPENEM 1000 MILLIGRAM(S): 1 INJECTION INTRAVENOUS at 06:05

## 2018-05-20 RX ADMIN — MEROPENEM 1000 MILLIGRAM(S): 1 INJECTION INTRAVENOUS at 17:38

## 2018-05-20 RX ADMIN — Medication 75 MICROGRAM(S): at 09:37

## 2018-05-20 RX ADMIN — INSULIN GLARGINE 10 UNIT(S): 100 INJECTION, SOLUTION SUBCUTANEOUS at 22:33

## 2018-05-20 RX ADMIN — Medication 1 DROP(S): at 06:07

## 2018-05-20 RX ADMIN — Medication 50 GRAM(S): at 07:24

## 2018-05-20 RX ADMIN — AMLODIPINE BESYLATE 5 MILLIGRAM(S): 2.5 TABLET ORAL at 06:06

## 2018-05-20 RX ADMIN — PANTOPRAZOLE SODIUM 40 MILLIGRAM(S): 20 TABLET, DELAYED RELEASE ORAL at 12:09

## 2018-05-20 RX ADMIN — MICAFUNGIN SODIUM 210 MILLIGRAM(S): 100 INJECTION, POWDER, LYOPHILIZED, FOR SOLUTION INTRAVENOUS at 12:10

## 2018-05-20 RX ADMIN — Medication 2: at 17:38

## 2018-05-20 NOTE — PROGRESS NOTE ADULT - SUBJECTIVE AND OBJECTIVE BOX
INTERVAL HPI/OVERNIGHT EVENTS:    Patient was seen and examined at bedside.  No events     ROS:  unable to obtain as patient not responding    ANTIBIOTICS/RELEVANT:    MEDICATIONS  (STANDING):  amLODIPine   Tablet 5 milliGRAM(s) Oral daily  artificial  tears Solution 1 Drop(s) Both EYES two times a day  chlorhexidine 0.12% Liquid 15 milliLiter(s) Swish and Spit two times a day  dextrose 50% Injectable 12.5 Gram(s) IV Push once  dextrose 50% Injectable 25 Gram(s) IV Push once  insulin glargine Injectable (LANTUS) 10 Unit(s) SubCutaneous at bedtime  insulin lispro (HumaLOG) corrective regimen sliding scale   SubCutaneous every 6 hours  levothyroxine Injectable 75 MICROGram(s) IV Push <User Schedule>  meropenem Injectable 1000 milliGRAM(s) IV Push every 12 hours  micafungin IVPB 100 milliGRAM(s) IV Intermittent daily  pantoprazole   Suspension 40 milliGRAM(s) Oral daily  sodium chloride 0.9% lock flush 20 milliLiter(s) IV Push once    MEDICATIONS  (PRN):  acetaminophen    Suspension. 650 milliGRAM(s) Oral every 6 hours PRN Moderate Pain (4 - 6)  ALBUTerol/ipratropium for Nebulization 3 milliLiter(s) Nebulizer every 6 hours PRN Shortness of Breath and/or Wheezing  ondansetron Injectable 4 milliGRAM(s) IV Push every 6 hours PRN Nausea  sodium chloride 0.9% lock flush 10 milliLiter(s) IV Push every 1 hour PRN After each medication administration  sodium chloride 0.9% lock flush 10 milliLiter(s) IV Push every 12 hours PRN Lumen of catheter NOT used        Vital Signs Last 24 Hrs  T(C): 36.9 (20 May 2018 13:00), Max: 37.4 (19 May 2018 18:16)  T(F): 98.5 (20 May 2018 13:00), Max: 99.4 (19 May 2018 18:16)  HR: 74 (20 May 2018 12:00) (60 - 82)  BP: 157/79 (20 May 2018 12:00) (147/75 - 175/76)  BP(mean): 128 (20 May 2018 12:00) (87 - 142)  RR: 16 (20 May 2018 12:00) (8 - 27)  SpO2: 96% (20 May 2018 12:00) (95% - 99%)    PHYSICAL EXAM:  Constitutional  awake, alert  Eyes:ZABRINA, EOMI  Ear/Nose/Throat: no sinus tenderness on percussion	  Neck:no JVD, no lymphadenopathy, supple  Respiratory: CTA maris  Cardiovascular: S1S2 RRR, no murmurs  Gastrointestinal:soft, small wound vac in place; LEANN drain in place   Extremities:no e/e/c  Vascular: DP Pulse:	right normal; left normal      LABS:                        8.3    11.1  )-----------( 225      ( 20 May 2018 05:48 )             26.0     05-20    146<H>  |  113<H>  |  50<H>  ----------------------------<  212<H>  3.9   |  22  |  0.84    Ca    9.3      20 May 2018 05:48  Phos  3.6     05-20  Mg     1.7     05-20            MICROBIOLOGY:  Culture - Blood (05.16.18 @ 07:37)    Specimen Source: .Blood Blood    Culture Results:   No growth at 4 days.          RADIOLOGY & ADDITIONAL STUDIES:    < from: CT Abdomen and Pelvis w/ Oral Cont (05.03.18 @ 15:38) >  Interval decrease in size of the extraperitoneal gas-filled collection   surrounding the enlarged and bulky uterus with an indwelling percutaneous   drainage catheter. However, enteric contrast is again seen within this   collection on the current study, indicating a persistent enteric   fistulous collection.

## 2018-05-20 NOTE — PROGRESS NOTE ADULT - SUBJECTIVE AND OBJECTIVE BOX
INTERVAL HPI/OVERNIGHT EVENTS:  Interim reviewed; Awake and comfortable> On tube feeds at goal rate;         MEDICATIONS  (STANDING):  amLODIPine   Tablet 5 milliGRAM(s) Oral daily  artificial  tears Solution 1 Drop(s) Both EYES two times a day  chlorhexidine 0.12% Liquid 15 milliLiter(s) Swish and Spit two times a day  dextrose 50% Injectable 12.5 Gram(s) IV Push once  dextrose 50% Injectable 25 Gram(s) IV Push once  insulin glargine Injectable (LANTUS) 10 Unit(s) SubCutaneous at bedtime  insulin lispro (HumaLOG) corrective regimen sliding scale   SubCutaneous every 6 hours  levothyroxine Injectable 75 MICROGram(s) IV Push <User Schedule>  meropenem Injectable 1000 milliGRAM(s) IV Push every 12 hours  micafungin IVPB 100 milliGRAM(s) IV Intermittent daily  pantoprazole   Suspension 40 milliGRAM(s) Oral daily  sodium chloride 0.9% lock flush 20 milliLiter(s) IV Push once    MEDICATIONS  (PRN):  acetaminophen    Suspension. 650 milliGRAM(s) Oral every 6 hours PRN Moderate Pain (4 - 6)  ALBUTerol/ipratropium for Nebulization 3 milliLiter(s) Nebulizer every 6 hours PRN Shortness of Breath and/or Wheezing  ondansetron Injectable 4 milliGRAM(s) IV Push every 6 hours PRN Nausea  sodium chloride 0.9% lock flush 10 milliLiter(s) IV Push every 1 hour PRN After each medication administration  sodium chloride 0.9% lock flush 10 milliLiter(s) IV Push every 12 hours PRN Lumen of catheter NOT used      Allergies    No Known Allergies    Intolerances        Vital Signs Last 24 Hrs  T(C): 36.9 (20 May 2018 13:00), Max: 37.4 (19 May 2018 18:16)  T(F): 98.5 (20 May 2018 13:00), Max: 99.4 (19 May 2018 18:16)  HR: 74 (20 May 2018 16:00) (60 - 82)  BP: 163/79 (20 May 2018 16:00) (147/75 - 175/76)  BP(mean): 116 (20 May 2018 16:00) (87 - 142)  RR: 17 (20 May 2018 16:00) (8 - 32)  SpO2: 97% (20 May 2018 16:00) (95% - 97%)          Constitutional:Awake    Eyes: ZABRINA    ENMT: Negative    Neck: Supple    Back:  no tenderness     Respiratory:   clear    Cardiovascular: S1 S2    Gastrointestinal:  soft    Genitourinary:    Extremities: no brandan zaragoza    Vascular:    Neurological:    Skin:    Lymph Nodes:            05-19 @ 07:01  -  05-20 @ 07:00  --------------------------------------------------------  IN: 1756 mL / OUT: 805 mL / NET: 951 mL    05-20 @ 07:01 - 05-20 @ 16:35  --------------------------------------------------------  IN: 1411 mL / OUT: 160 mL / NET: 1251 mL      LABS:                        8.3    11.1  )-----------( 225      ( 20 May 2018 05:48 )             26.0     05-20    146<H>  |  113<H>  |  50<H>  ----------------------------<  212<H>  3.9   |  22  |  0.84    Ca    9.3      20 May 2018 05:48  Phos  3.6     05-20  Mg     1.7     05-20            RADIOLOGY & ADDITIONAL TESTS:

## 2018-05-20 NOTE — PROGRESS NOTE ADULT - ASSESSMENT
IMPRESSION:  Intraabdominal collection.  Patient clinically improving. Not febrile.  WBC now normalized.    Recommend:  1.  Continue meropenem and micafungin  2.  Can stop antibiotics on 5/21 and observe clinically

## 2018-05-21 LAB
ANION GAP SERPL CALC-SCNC: 10 MMOL/L — SIGNIFICANT CHANGE UP (ref 5–17)
BUN SERPL-MCNC: 42 MG/DL — HIGH (ref 7–23)
CALCIUM SERPL-MCNC: 9.2 MG/DL — SIGNIFICANT CHANGE UP (ref 8.4–10.5)
CHLORIDE SERPL-SCNC: 112 MMOL/L — HIGH (ref 96–108)
CO2 SERPL-SCNC: 23 MMOL/L — SIGNIFICANT CHANGE UP (ref 22–31)
CREAT SERPL-MCNC: 0.73 MG/DL — SIGNIFICANT CHANGE UP (ref 0.5–1.3)
CULTURE RESULTS: SIGNIFICANT CHANGE UP
CULTURE RESULTS: SIGNIFICANT CHANGE UP
GLUCOSE BLDC GLUCOMTR-MCNC: 139 MG/DL — HIGH (ref 70–99)
GLUCOSE BLDC GLUCOMTR-MCNC: 141 MG/DL — HIGH (ref 70–99)
GLUCOSE BLDC GLUCOMTR-MCNC: 178 MG/DL — HIGH (ref 70–99)
GLUCOSE BLDC GLUCOMTR-MCNC: 183 MG/DL — HIGH (ref 70–99)
GLUCOSE BLDC GLUCOMTR-MCNC: 193 MG/DL — HIGH (ref 70–99)
GLUCOSE SERPL-MCNC: 172 MG/DL — HIGH (ref 70–99)
HCT VFR BLD CALC: 26.6 % — LOW (ref 34.5–45)
HGB BLD-MCNC: 8.3 G/DL — LOW (ref 11.5–15.5)
MAGNESIUM SERPL-MCNC: 1.9 MG/DL — SIGNIFICANT CHANGE UP (ref 1.6–2.6)
MCHC RBC-ENTMCNC: 30.9 PG — SIGNIFICANT CHANGE UP (ref 27–34)
MCHC RBC-ENTMCNC: 31.2 G/DL — LOW (ref 32–36)
MCV RBC AUTO: 98.9 FL — SIGNIFICANT CHANGE UP (ref 80–100)
PHOSPHATE SERPL-MCNC: 3.1 MG/DL — SIGNIFICANT CHANGE UP (ref 2.5–4.5)
PLATELET # BLD AUTO: 230 K/UL — SIGNIFICANT CHANGE UP (ref 150–400)
POTASSIUM SERPL-MCNC: 4.1 MMOL/L — SIGNIFICANT CHANGE UP (ref 3.5–5.3)
POTASSIUM SERPL-SCNC: 4.1 MMOL/L — SIGNIFICANT CHANGE UP (ref 3.5–5.3)
RBC # BLD: 2.69 M/UL — LOW (ref 3.8–5.2)
RBC # FLD: 18.5 % — HIGH (ref 10.3–16.9)
SODIUM SERPL-SCNC: 145 MMOL/L — SIGNIFICANT CHANGE UP (ref 135–145)
SPECIMEN SOURCE: SIGNIFICANT CHANGE UP
SPECIMEN SOURCE: SIGNIFICANT CHANGE UP
WBC # BLD: 12.7 K/UL — HIGH (ref 3.8–10.5)
WBC # FLD AUTO: 12.7 K/UL — HIGH (ref 3.8–10.5)

## 2018-05-21 PROCEDURE — 99233 SBSQ HOSP IP/OBS HIGH 50: CPT | Mod: GC

## 2018-05-21 PROCEDURE — 99232 SBSQ HOSP IP/OBS MODERATE 35: CPT

## 2018-05-21 PROCEDURE — 71045 X-RAY EXAM CHEST 1 VIEW: CPT | Mod: 26

## 2018-05-21 RX ORDER — MAGNESIUM SULFATE 500 MG/ML
2 VIAL (ML) INJECTION ONCE
Qty: 0 | Refills: 0 | Status: COMPLETED | OUTPATIENT
Start: 2018-05-21 | End: 2018-05-21

## 2018-05-21 RX ORDER — AMLODIPINE BESYLATE 2.5 MG/1
5 TABLET ORAL ONCE
Qty: 0 | Refills: 0 | Status: COMPLETED | OUTPATIENT
Start: 2018-05-21 | End: 2018-05-21

## 2018-05-21 RX ORDER — FUROSEMIDE 40 MG
20 TABLET ORAL ONCE
Qty: 0 | Refills: 0 | Status: COMPLETED | OUTPATIENT
Start: 2018-05-21 | End: 2018-05-21

## 2018-05-21 RX ORDER — AMLODIPINE BESYLATE 2.5 MG/1
10 TABLET ORAL DAILY
Qty: 0 | Refills: 0 | Status: DISCONTINUED | OUTPATIENT
Start: 2018-05-22 | End: 2018-06-04

## 2018-05-21 RX ADMIN — Medication 75 MICROGRAM(S): at 06:59

## 2018-05-21 RX ADMIN — AMLODIPINE BESYLATE 5 MILLIGRAM(S): 2.5 TABLET ORAL at 04:09

## 2018-05-21 RX ADMIN — INSULIN GLARGINE 10 UNIT(S): 100 INJECTION, SOLUTION SUBCUTANEOUS at 21:46

## 2018-05-21 RX ADMIN — Medication 50 GRAM(S): at 10:11

## 2018-05-21 RX ADMIN — Medication 20 MILLIGRAM(S): at 10:11

## 2018-05-21 RX ADMIN — Medication 2: at 17:50

## 2018-05-21 RX ADMIN — Medication 2: at 07:51

## 2018-05-21 RX ADMIN — Medication 1 DROP(S): at 06:57

## 2018-05-21 RX ADMIN — Medication 1 DROP(S): at 17:51

## 2018-05-21 RX ADMIN — AMLODIPINE BESYLATE 5 MILLIGRAM(S): 2.5 TABLET ORAL at 10:11

## 2018-05-21 RX ADMIN — PANTOPRAZOLE SODIUM 40 MILLIGRAM(S): 20 TABLET, DELAYED RELEASE ORAL at 11:15

## 2018-05-21 RX ADMIN — MEROPENEM 1000 MILLIGRAM(S): 1 INJECTION INTRAVENOUS at 06:55

## 2018-05-21 RX ADMIN — Medication 2: at 11:14

## 2018-05-21 NOTE — PROGRESS NOTE ADULT - SUBJECTIVE AND OBJECTIVE BOX
INTERVAL HPI/OVERNIGHT EVENTS:    Patient seen and examined at bedside.  No events.  Antibiotics stopped this morning     ROS:    unable to obtain     ANTIBIOTICS/RELEVANT:    MEDICATIONS  (STANDING):  artificial  tears Solution 1 Drop(s) Both EYES two times a day  dextrose 50% Injectable 12.5 Gram(s) IV Push once  dextrose 50% Injectable 25 Gram(s) IV Push once  insulin glargine Injectable (LANTUS) 10 Unit(s) SubCutaneous at bedtime  insulin lispro (HumaLOG) corrective regimen sliding scale   SubCutaneous every 6 hours  levothyroxine Injectable 75 MICROGram(s) IV Push <User Schedule>  pantoprazole   Suspension 40 milliGRAM(s) Oral daily  sodium chloride 0.9% lock flush 20 milliLiter(s) IV Push once    MEDICATIONS  (PRN):  acetaminophen    Suspension. 650 milliGRAM(s) Oral every 6 hours PRN Moderate Pain (4 - 6)  ALBUTerol/ipratropium for Nebulization 3 milliLiter(s) Nebulizer every 6 hours PRN Shortness of Breath and/or Wheezing  ondansetron Injectable 4 milliGRAM(s) IV Push every 6 hours PRN Nausea  sodium chloride 0.9% lock flush 10 milliLiter(s) IV Push every 1 hour PRN After each medication administration  sodium chloride 0.9% lock flush 10 milliLiter(s) IV Push every 12 hours PRN Lumen of catheter NOT used        Vital Signs Last 24 Hrs  T(C): 37.3 (21 May 2018 10:16), Max: 37.5 (20 May 2018 19:16)  T(F): 99.1 (21 May 2018 10:16), Max: 99.5 (20 May 2018 19:16)  HR: 74 (21 May 2018 10:00) (66 - 78)  BP: 111/65 (21 May 2018 10:00) (111/65 - 184/89)  BP(mean): 86 (21 May 2018 10:00) (86 - 145)  RR: 36 (21 May 2018 10:00) (13 - 36)  SpO2: 95% (21 May 2018 10:00) (95% - 100%)    PHYSICAL EXAM:  Constitutional:Well-developed, well nourished, no distress  Eyes:  no icterus   Ear/Nose/Throat: no sinus tenderness on percussion; NG tube in place   Neck:no JVD, no lymphadenopathy, supple  Respiratory: clear  Cardiovascular: S1S2 RRR, no murmurs  Gastrointestinal:soft, (+) BS, no HSM; small abdominal wound with wound vac in place   Extremities:no edema  Vascular: DP Pulse:	right normal; left normal      LABS:                        8.3    12.7  )-----------( 230      ( 21 May 2018 06:14 )             26.6     05-21    145  |  112<H>  |  42<H>  ----------------------------<  172<H>  4.1   |  23  |  0.73    Ca    9.2      21 May 2018 06:14  Phos  3.1     05-21  Mg     1.9     05-21            MICROBIOLOGY:    RADIOLOGY & ADDITIONAL STUDIES:

## 2018-05-21 NOTE — SWALLOW BEDSIDE ASSESSMENT ADULT - ADDITIONAL RECOMMENDATIONS
Patient received in bed.  RN reports patient more alert.  ABX for sepsis have been completed - mental status could be commensurate with pt.'s baseline.  Unable to complete OM exam.  Attempted to give pt. ice chips and puree.  Pt. with closed mouth for all attempts, no PO acceptance today.  This service will FU in 48-72 hours to monitor for any potential improvement in mental status and subsequent PO acceptance.

## 2018-05-21 NOTE — PROGRESS NOTE ADULT - ASSESSMENT
IMPRESSION:  Small bowel leak s/p repair.  Clinically improved    Recommend:  1.  Can hold antibiotics and monitor patient clinically    ID will sign off.  Reconsult as needed

## 2018-05-21 NOTE — PROGRESS NOTE ADULT - ASSESSMENT
77 y/o F with sepsis from open abdominal wound infection, MSSA bacteremia present on admission, and UTI present on admission found to have contained small bowel leak, s/p exlap, TAHBSO, SBR 5/15, s/p PEG 5/16. Currently doing well.     Neuro: seroquel 25 QHS, zofran prn, tylenol prn, holding: aripiprazole, escitalapram  CV: HD stable s/p severe sepsis, amlodipine5 , 4/19 Echo  65-70%.  Pulm: satting well on RA  GI: NPO, protonix, PEG on TF (goal 59) - for speech and swallow study today  : incontinence  ID: Jose L( 4/17-) Eden: ( 4/27-) D/c'd:MSSA in blood: Oxacillin ( 4/17-) TTE neg for endocarditis. ---will dc today  Endo: ISS, Synthroid  Insulin  in TPN: 25. Lupron 5/15.Lantus 10 qhs   PPx: sqh held for low plts  PE:Sp IVC filter on 3/30   Lines: PIV, R IJ TLC (5/15-), PICC (5/9-) [place] (5/15-) /// s/p L Rad Art line( 4/15-4/18),  Rt IJ TLC from OSH ( 4/15-), LIJ TLC (4/29-5/9)  Wounds/drains: Wound vac to midline incision , change MWF, LEANN RLQ  PT/OT: PT ordered .

## 2018-05-21 NOTE — PROGRESS NOTE ADULT - SUBJECTIVE AND OBJECTIVE BOX
24 hr events:  ON: Daytime FS 200s, bedtime 140. Lantus 10 given.  5/20:fs high started lantus 10  Ordered for Sp& sw on monday    SUBJECTIVE: Patient is alert and following command. No pain, no nausea/vomiting. Having bowel function.       MEDICATIONS  (STANDING):  amLODIPine   Tablet 5 milliGRAM(s) Oral daily  artificial  tears Solution 1 Drop(s) Both EYES two times a day  dextrose 50% Injectable 12.5 Gram(s) IV Push once  dextrose 50% Injectable 25 Gram(s) IV Push once  insulin glargine Injectable (LANTUS) 10 Unit(s) SubCutaneous at bedtime  insulin lispro (HumaLOG) corrective regimen sliding scale   SubCutaneous every 6 hours  levothyroxine Injectable 75 MICROGram(s) IV Push <User Schedule>  meropenem Injectable 1000 milliGRAM(s) IV Push every 12 hours  micafungin IVPB 100 milliGRAM(s) IV Intermittent daily  pantoprazole   Suspension 40 milliGRAM(s) Oral daily  sodium chloride 0.9% lock flush 20 milliLiter(s) IV Push once    MEDICATIONS  (PRN):  acetaminophen    Suspension. 650 milliGRAM(s) Oral every 6 hours PRN Moderate Pain (4 - 6)  ALBUTerol/ipratropium for Nebulization 3 milliLiter(s) Nebulizer every 6 hours PRN Shortness of Breath and/or Wheezing  ondansetron Injectable 4 milliGRAM(s) IV Push every 6 hours PRN Nausea  sodium chloride 0.9% lock flush 10 milliLiter(s) IV Push every 1 hour PRN After each medication administration  sodium chloride 0.9% lock flush 10 milliLiter(s) IV Push every 12 hours PRN Lumen of catheter NOT used    ICU Vital Signs Last 24 Hrs  T(C): 36.9 (21 May 2018 01:20), Max: 37.5 (20 May 2018 19:16)  T(F): 98.4 (21 May 2018 01:20), Max: 99.5 (20 May 2018 19:16)  HR: 68 (21 May 2018 06:00) (66 - 82)  BP: 160/76 (21 May 2018 06:00) (147/75 - 179/89)  BP(mean): 120 (21 May 2018 06:00) (94 - 145)  ABP: --  ABP(mean): --  RR: 16 (21 May 2018 06:00) (10 - 32)  SpO2: 96% (21 May 2018 06:00) (95% - 100%)      Physical Exam:  General: NAD, lying on bed comfortably   HEENT: NC/AT, EOMI, PERRLA, normal hearing, no oral lesions, neck supple w/o LAD  Pulmonary: Nonlabored breathing, no respiratory distress, CTA-B, no bibasilar crackles heard, normal breath sounds, equal on both sides  Cardiovascular: NSR, no murmurs  Abdominal: soft, NT/ND, +BS, no organomegaly, midline staple line intact and clean, no erythematous, no discharge, VAC at the superior part of the incision, intact, no leak.   LEANN drain over the RLQ, milky-pinkish output. (50cc), on rectal tube, +urinary incontinence. PEG tube intact, no leak, on TF at goal.  Extremities: WWP, 3/5 strength x 4, no clubbing/cyanosis/edema  Neuro: A/O x0, CNs II-XII grossly intact, normal motor/sensation, no focal deficits  Pulses: palpable distal pulses    Lines/tubes/drains: LEANN drain, wound VAC and PEG      I&O's Summary    20 May 2018 07:01  -  21 May 2018 07:00  --------------------------------------------------------  IN: 2698 mL / OUT: 410 mL / NET: 2288 mL        LABS:                        8.3    12.7  )-----------( 230      ( 21 May 2018 06:14 )             26.6     05-21    145  |  112<H>  |  42<H>  ----------------------------<  172<H>  4.1   |  23  |  0.73    Ca    9.2      21 May 2018 06:14  Phos  3.1     05-21  Mg     1.9     05-21          CAPILLARY BLOOD GLUCOSE      POCT Blood Glucose.: 141 mg/dL (20 May 2018 23:55)  POCT Blood Glucose.: 200 mg/dL (20 May 2018 17:30)  POCT Blood Glucose.: 230 mg/dL (20 May 2018 12:05)  POCT Blood Glucose.: 220 mg/dL (20 May 2018 07:16)        Cultures:    RADIOLOGY & ADDITIONAL STUDIES:

## 2018-05-22 LAB
ANION GAP SERPL CALC-SCNC: 9 MMOL/L — SIGNIFICANT CHANGE UP (ref 5–17)
APPEARANCE UR: CLEAR — SIGNIFICANT CHANGE UP
BASOPHILS NFR BLD AUTO: 0.4 % — SIGNIFICANT CHANGE UP (ref 0–2)
BILIRUB UR-MCNC: NEGATIVE — SIGNIFICANT CHANGE UP
BUN SERPL-MCNC: 38 MG/DL — HIGH (ref 7–23)
CALCIUM SERPL-MCNC: 9.6 MG/DL — SIGNIFICANT CHANGE UP (ref 8.4–10.5)
CHLORIDE SERPL-SCNC: 109 MMOL/L — HIGH (ref 96–108)
CO2 SERPL-SCNC: 24 MMOL/L — SIGNIFICANT CHANGE UP (ref 22–31)
COLOR SPEC: YELLOW — SIGNIFICANT CHANGE UP
CREAT SERPL-MCNC: 0.68 MG/DL — SIGNIFICANT CHANGE UP (ref 0.5–1.3)
DIFF PNL FLD: (no result)
EOSINOPHIL NFR BLD AUTO: 3 % — SIGNIFICANT CHANGE UP (ref 0–6)
GLUCOSE BLDC GLUCOMTR-MCNC: 111 MG/DL — HIGH (ref 70–99)
GLUCOSE BLDC GLUCOMTR-MCNC: 120 MG/DL — HIGH (ref 70–99)
GLUCOSE BLDC GLUCOMTR-MCNC: 140 MG/DL — HIGH (ref 70–99)
GLUCOSE SERPL-MCNC: 116 MG/DL — HIGH (ref 70–99)
GLUCOSE UR QL: NEGATIVE — SIGNIFICANT CHANGE UP
HCT VFR BLD CALC: 29.6 % — LOW (ref 34.5–45)
HGB BLD-MCNC: 9.5 G/DL — LOW (ref 11.5–15.5)
KETONES UR-MCNC: NEGATIVE — SIGNIFICANT CHANGE UP
LEUKOCYTE ESTERASE UR-ACNC: NEGATIVE — SIGNIFICANT CHANGE UP
LYMPHOCYTES # BLD AUTO: 17.2 % — SIGNIFICANT CHANGE UP (ref 13–44)
MAGNESIUM SERPL-MCNC: 2.2 MG/DL — SIGNIFICANT CHANGE UP (ref 1.6–2.6)
MCHC RBC-ENTMCNC: 32.1 G/DL — SIGNIFICANT CHANGE UP (ref 32–36)
MCHC RBC-ENTMCNC: 32.1 PG — SIGNIFICANT CHANGE UP (ref 27–34)
MCV RBC AUTO: 100 FL — SIGNIFICANT CHANGE UP (ref 80–100)
MONOCYTES NFR BLD AUTO: 4 % — SIGNIFICANT CHANGE UP (ref 2–14)
NEUTROPHILS NFR BLD AUTO: 75.4 % — SIGNIFICANT CHANGE UP (ref 43–77)
NITRITE UR-MCNC: NEGATIVE — SIGNIFICANT CHANGE UP
PH UR: 5 — SIGNIFICANT CHANGE UP (ref 5–8)
PHOSPHATE SERPL-MCNC: 3.5 MG/DL — SIGNIFICANT CHANGE UP (ref 2.5–4.5)
PLATELET # BLD AUTO: 239 K/UL — SIGNIFICANT CHANGE UP (ref 150–400)
POTASSIUM SERPL-MCNC: 4.3 MMOL/L — SIGNIFICANT CHANGE UP (ref 3.5–5.3)
POTASSIUM SERPL-SCNC: 4.3 MMOL/L — SIGNIFICANT CHANGE UP (ref 3.5–5.3)
PROT UR-MCNC: 30 MG/DL
RBC # BLD: 2.96 M/UL — LOW (ref 3.8–5.2)
RBC # FLD: 18.2 % — HIGH (ref 10.3–16.9)
SODIUM SERPL-SCNC: 142 MMOL/L — SIGNIFICANT CHANGE UP (ref 135–145)
SP GR SPEC: 1.02 — SIGNIFICANT CHANGE UP (ref 1–1.03)
UROBILINOGEN FLD QL: 0.2 E.U./DL — SIGNIFICANT CHANGE UP
WBC # BLD: 15.2 K/UL — HIGH (ref 3.8–10.5)
WBC # FLD AUTO: 15.2 K/UL — HIGH (ref 3.8–10.5)

## 2018-05-22 PROCEDURE — 99232 SBSQ HOSP IP/OBS MODERATE 35: CPT

## 2018-05-22 PROCEDURE — 71045 X-RAY EXAM CHEST 1 VIEW: CPT | Mod: 26

## 2018-05-22 RX ORDER — DIPHENHYDRAMINE HCL 50 MG
25 CAPSULE ORAL ONCE
Qty: 0 | Refills: 0 | Status: DISCONTINUED | OUTPATIENT
Start: 2018-05-22 | End: 2018-05-23

## 2018-05-22 RX ORDER — LABETALOL HCL 100 MG
10 TABLET ORAL ONCE
Qty: 0 | Refills: 0 | Status: COMPLETED | OUTPATIENT
Start: 2018-05-22 | End: 2018-05-22

## 2018-05-22 RX ORDER — LOPERAMIDE HCL 2 MG
4 TABLET ORAL EVERY 8 HOURS
Qty: 0 | Refills: 0 | Status: DISCONTINUED | OUTPATIENT
Start: 2018-05-22 | End: 2018-05-22

## 2018-05-22 RX ORDER — HYDRALAZINE HCL 50 MG
10 TABLET ORAL ONCE
Qty: 0 | Refills: 0 | Status: COMPLETED | OUTPATIENT
Start: 2018-05-22 | End: 2018-05-22

## 2018-05-22 RX ADMIN — Medication 10 MILLIGRAM(S): at 18:11

## 2018-05-22 RX ADMIN — Medication 1 DROP(S): at 13:57

## 2018-05-22 RX ADMIN — Medication 1 DROP(S): at 22:29

## 2018-05-22 RX ADMIN — PANTOPRAZOLE SODIUM 40 MILLIGRAM(S): 20 TABLET, DELAYED RELEASE ORAL at 13:57

## 2018-05-22 RX ADMIN — Medication 10 MILLIGRAM(S): at 19:00

## 2018-05-22 RX ADMIN — Medication 75 MICROGRAM(S): at 10:04

## 2018-05-22 RX ADMIN — INSULIN GLARGINE 10 UNIT(S): 100 INJECTION, SOLUTION SUBCUTANEOUS at 22:28

## 2018-05-22 RX ADMIN — AMLODIPINE BESYLATE 10 MILLIGRAM(S): 2.5 TABLET ORAL at 05:54

## 2018-05-22 NOTE — PROGRESS NOTE ADULT - SUBJECTIVE AND OBJECTIVE BOX
O/N: step down, EMANUEL  5/21: SDU, antibiotics dc'ed per ID, VAC changed, FS: 170-180 changed feeds to glucerna 1.2. OOB to stretcher chair x2 hrs, Lasix 20 for edema. failed sp& sw 2* refusing to eat/drink.increased norvasc to 10. O/N: step down, EMANUEL  5/21: SDU, antibiotics dc'ed per ID, VAC changed, FS: 170-180 changed feeds to glucerna 1.2. OOB to stretcher chair x2 hrs, Lasix 20 for edema. failed sp& sw 2* refusing to eat/drink.increased norvasc to 10.      SUBJECTIVE: Patient seen and examined bedside by chief resident.    amLODIPine   Tablet 10 milliGRAM(s) Oral daily      Vital Signs Last 24 Hrs  T(C): 36.2 (22 May 2018 05:00), Max: 37.3 (21 May 2018 10:16)  T(F): 97.1 (22 May 2018 05:00), Max: 99.2 (21 May 2018 19:00)  HR: 80 (22 May 2018 04:43) (68 - 92)  BP: 192/90 (22 May 2018 04:43) (111/65 - 192/90)  BP(mean): 126 (21 May 2018 23:00) (86 - 131)  RR: 16 (22 May 2018 04:43) (12 - 36)  SpO2: 98% (22 May 2018 04:43) (95% - 98%)  I&O's Detail    20 May 2018 07:01  -  21 May 2018 07:00  --------------------------------------------------------  IN:    Enteral Tube Flush: 80 mL    Free Water: 1250 mL    Osmolite: 1416 mL    Solution: 200 mL    Solution: 100 mL    Solution: 120 mL  Total IN: 3166 mL    OUT:    Bulb: 375 mL    Rectal Tube: 150 mL    VAC (Vacuum Assisted Closure) System: 25 mL  Total OUT: 550 mL    Total NET: 2616 mL      21 May 2018 07:01  -  22 May 2018 06:55  --------------------------------------------------------  IN:    Enteral Tube Flush: 90 mL    Free Water: 1500 mL    Glucerna: 767 mL    Osmolite: 587 mL    Solution: 100 mL  Total IN: 3044 mL    OUT:    Bulb: 585 mL    Rectal Tube: 300 mL    VAC (Vacuum Assisted Closure) System: 125 mL  Total OUT: 1010 mL    Total NET: 2034 mL          General: NAD, resting comfortably in bed  Pulm: Nonlabored breathing, no respiratory distress  Abd: soft, NT/ND, wound vac functioning, LEANN serous, incision cdi          LABS:                        9.5    15.2  )-----------( 239      ( 22 May 2018 06:37 )             29.6     05-21    145  |  112<H>  |  42<H>  ----------------------------<  172<H>  4.1   |  23  |  0.73    Ca    9.2      21 May 2018 06:14  Phos  3.1     05-21  Mg     1.9     05-21            RADIOLOGY & ADDITIONAL STUDIES:

## 2018-05-22 NOTE — PROGRESS NOTE ADULT - SUBJECTIVE AND OBJECTIVE BOX
INTERVAL HPI/OVERNIGHT EVENTS:      Patient seen and examined at bedside.  Has some diarrhea.  No events overnight       ROS:    unable to obtain       ANTIBIOTICS/RELEVANT:    MEDICATIONS  (STANDING):  amLODIPine   Tablet 10 milliGRAM(s) Oral daily  artificial  tears Solution 1 Drop(s) Both EYES two times a day  dextrose 50% Injectable 12.5 Gram(s) IV Push once  dextrose 50% Injectable 25 Gram(s) IV Push once  insulin glargine Injectable (LANTUS) 10 Unit(s) SubCutaneous at bedtime  insulin lispro (HumaLOG) corrective regimen sliding scale   SubCutaneous every 6 hours  levothyroxine Injectable 75 MICROGram(s) IV Push <User Schedule>  pantoprazole   Suspension 40 milliGRAM(s) Oral daily  sodium chloride 0.9% lock flush 20 milliLiter(s) IV Push once    MEDICATIONS  (PRN):  acetaminophen    Suspension. 650 milliGRAM(s) Oral every 6 hours PRN Moderate Pain (4 - 6)  ALBUTerol/ipratropium for Nebulization 3 milliLiter(s) Nebulizer every 6 hours PRN Shortness of Breath and/or Wheezing  ondansetron Injectable 4 milliGRAM(s) IV Push every 6 hours PRN Nausea  sodium chloride 0.9% lock flush 10 milliLiter(s) IV Push every 1 hour PRN After each medication administration  sodium chloride 0.9% lock flush 10 milliLiter(s) IV Push every 12 hours PRN Lumen of catheter NOT used        Vital Signs Last 24 Hrs  T(C): 36.2 (22 May 2018 05:00), Max: 37.3 (21 May 2018 19:00)  T(F): 97.1 (22 May 2018 05:00), Max: 99.2 (21 May 2018 19:00)  HR: 80 (22 May 2018 04:43) (68 - 92)  BP: 192/90 (22 May 2018 04:43) (149/70 - 192/90)  BP(mean): 126 (21 May 2018 23:00) (96 - 127)  RR: 16 (22 May 2018 04:43) (12 - 33)  SpO2: 98% (22 May 2018 04:43) (96% - 98%)    PHYSICAL EXAM:  Constitutional:  non-toxic, no distress  Eyes:  no icterus   Ear/Nose/Throat: no oral lesion, no sinus tenderness on percussion	  Neck:no JVD, no lymphadenopathy, supple  Respiratory: CTA maris  Cardiovascular: S1S2 RRR, no murmurs  Gastrointestinal:soft, small wound vac in place; drains in place; + rectal tube   Extremities:no edema   Vascular: DP Pulse:	right normal; left normal      LABS:                        9.5    15.2  )-----------( 239      ( 22 May 2018 06:37 )             29.6     05-22    142  |  109<H>  |  38<H>  ----------------------------<  116<H>  4.3   |  24  |  0.68    Ca    9.6      22 May 2018 06:38  Phos  3.5     05-22  Mg     2.2     05-22            MICROBIOLOGY:    RADIOLOGY & ADDITIONAL STUDIES:

## 2018-05-22 NOTE — ADVANCED PRACTICE NURSE CONSULT - ASSESSMENT
Pt's bilat buttocks with healing IAD, not pressure injury. Now with rectal tube which had been leaking previously. New 45 cc tap water inserted into bulb after bulb deflated to stop leakage.  Wound vac in place at mid abdomen.

## 2018-05-22 NOTE — PROGRESS NOTE ADULT - ASSESSMENT
79 y/o F with sepsis from open abdominal wound infection, MSSA bacteremia present on admission, and UTI present on admission found to have contained small bowel leak, s/p exlap, TAHBSO, SBR 5/15, s/p PEG 5/16    Neuro: seroquel 25 QHS, zofran prn, tylenol prn, holding: aripiprazole, escitalapram  CV: HD stable s/p severe sepsis, [id number] , 4/19 Echo  65-70%.  Pulm: CPAP, plan for extubation  GI: NPO, protonix, PEG on glucerna 1.2 TF (goal 59) f/u Sp& sw  : Nicole S/p AKF on HD now resolved. UTI (present on admission)    ID: None D/c'd:MSSA in blood: Oxacillin ( 4/17-) TTE neg for endocarditis. Jose L( 4/17-5/21) Eden: ( 4/27-5/21)   Endo: ISS, Synthroid  Insulin  in TPN: 25. Lupron 5/15.Lantus 10 qhs   PPx: sqh held for low plts  PE:Sp IVC filter on 3/30   Lines: PIV,  PICC (5/9-)  /// D/c'd: Sherley (5/15-?)R IJ TLC (5/15-5/?),L Rad Art line( 4/15-4/18),  Rt IJ TLC from OSH ( 4/15-), LIJ TLC (4/29-5/9)  Wounds: Wound vac to midline incision , change MWF  PT/OT: PT ordered 5/19

## 2018-05-22 NOTE — ADVANCED PRACTICE NURSE CONSULT - RECOMMEDATIONS
Continue to use moisture barrier ointment to bilat buttocks bid and prn. Spoke with covering RN Sona and house staff.

## 2018-05-22 NOTE — PROGRESS NOTE ADULT - ASSESSMENT
IMPRESSION:  Rising leukocytosis, off antibiotics; unclear etiology at this time    Recommend:  1.  Hold antibiotics as etiology unclear and patient stable  2.  Check 2 sets of blood cultures  3.  Check CT abd/pelvis to eval for collection/abscess  4.  If diarrhea increases, check stool for C. Diff    Will follow

## 2018-05-22 NOTE — PROVIDER CONTACT NOTE (OTHER) - ACTION/TREATMENT ORDERED:
Continue to monitor.
Place patient on aerosol mask at this time and SICU team to do rounds on patient with MD Zamora to assess for intubation.

## 2018-05-23 DIAGNOSIS — E87.70 FLUID OVERLOAD, UNSPECIFIED: ICD-10-CM

## 2018-05-23 DIAGNOSIS — E11.9 TYPE 2 DIABETES MELLITUS WITHOUT COMPLICATIONS: ICD-10-CM

## 2018-05-23 DIAGNOSIS — G92 TOXIC ENCEPHALOPATHY: ICD-10-CM

## 2018-05-23 DIAGNOSIS — K21.9 GASTRO-ESOPHAGEAL REFLUX DISEASE WITHOUT ESOPHAGITIS: ICD-10-CM

## 2018-05-23 DIAGNOSIS — T81.4XXD INFECTION FOLLOWING A PROCEDURE, SUBSEQUENT ENCOUNTER: ICD-10-CM

## 2018-05-23 DIAGNOSIS — E78.5 HYPERLIPIDEMIA, UNSPECIFIED: ICD-10-CM

## 2018-05-23 DIAGNOSIS — E03.9 HYPOTHYROIDISM, UNSPECIFIED: ICD-10-CM

## 2018-05-23 DIAGNOSIS — I16.0 HYPERTENSIVE URGENCY: ICD-10-CM

## 2018-05-23 LAB
ANION GAP SERPL CALC-SCNC: 11 MMOL/L — SIGNIFICANT CHANGE UP (ref 5–17)
BUN SERPL-MCNC: 37 MG/DL — HIGH (ref 7–23)
CALCIUM SERPL-MCNC: 9.9 MG/DL — SIGNIFICANT CHANGE UP (ref 8.4–10.5)
CHLORIDE SERPL-SCNC: 109 MMOL/L — HIGH (ref 96–108)
CO2 SERPL-SCNC: 24 MMOL/L — SIGNIFICANT CHANGE UP (ref 22–31)
CREAT SERPL-MCNC: 0.64 MG/DL — SIGNIFICANT CHANGE UP (ref 0.5–1.3)
GLUCOSE BLDC GLUCOMTR-MCNC: 136 MG/DL — HIGH (ref 70–99)
GLUCOSE BLDC GLUCOMTR-MCNC: 140 MG/DL — HIGH (ref 70–99)
GLUCOSE BLDC GLUCOMTR-MCNC: 146 MG/DL — HIGH (ref 70–99)
GLUCOSE BLDC GLUCOMTR-MCNC: 149 MG/DL — HIGH (ref 70–99)
GLUCOSE BLDC GLUCOMTR-MCNC: 154 MG/DL — HIGH (ref 70–99)
GLUCOSE SERPL-MCNC: 141 MG/DL — HIGH (ref 70–99)
HCT VFR BLD CALC: 31.2 % — LOW (ref 34.5–45)
HGB BLD-MCNC: 9.9 G/DL — LOW (ref 11.5–15.5)
MAGNESIUM SERPL-MCNC: 1.8 MG/DL — SIGNIFICANT CHANGE UP (ref 1.6–2.6)
MCHC RBC-ENTMCNC: 30.6 PG — SIGNIFICANT CHANGE UP (ref 27–34)
MCHC RBC-ENTMCNC: 31.7 G/DL — LOW (ref 32–36)
MCV RBC AUTO: 96.3 FL — SIGNIFICANT CHANGE UP (ref 80–100)
PHOSPHATE SERPL-MCNC: 4.1 MG/DL — SIGNIFICANT CHANGE UP (ref 2.5–4.5)
PLATELET # BLD AUTO: 245 K/UL — SIGNIFICANT CHANGE UP (ref 150–400)
POTASSIUM SERPL-MCNC: 4.4 MMOL/L — SIGNIFICANT CHANGE UP (ref 3.5–5.3)
POTASSIUM SERPL-SCNC: 4.4 MMOL/L — SIGNIFICANT CHANGE UP (ref 3.5–5.3)
RBC # BLD: 3.24 M/UL — LOW (ref 3.8–5.2)
RBC # FLD: 18.1 % — HIGH (ref 10.3–16.9)
SODIUM SERPL-SCNC: 144 MMOL/L — SIGNIFICANT CHANGE UP (ref 135–145)
WBC # BLD: 15 K/UL — HIGH (ref 3.8–10.5)
WBC # FLD AUTO: 15 K/UL — HIGH (ref 3.8–10.5)

## 2018-05-23 PROCEDURE — 71045 X-RAY EXAM CHEST 1 VIEW: CPT | Mod: 26

## 2018-05-23 PROCEDURE — 99222 1ST HOSP IP/OBS MODERATE 55: CPT

## 2018-05-23 RX ORDER — LABETALOL HCL 100 MG
20 TABLET ORAL ONCE
Qty: 0 | Refills: 0 | Status: COMPLETED | OUTPATIENT
Start: 2018-05-23 | End: 2018-05-23

## 2018-05-23 RX ORDER — HYDRALAZINE HCL 50 MG
10 TABLET ORAL ONCE
Qty: 0 | Refills: 0 | Status: COMPLETED | OUTPATIENT
Start: 2018-05-23 | End: 2018-05-23

## 2018-05-23 RX ORDER — MAGNESIUM SULFATE 500 MG/ML
1 VIAL (ML) INJECTION ONCE
Qty: 0 | Refills: 0 | Status: COMPLETED | OUTPATIENT
Start: 2018-05-23 | End: 2018-05-23

## 2018-05-23 RX ORDER — HYDRALAZINE HCL 50 MG
10 TABLET ORAL ONCE
Qty: 0 | Refills: 0 | Status: COMPLETED | OUTPATIENT
Start: 2018-05-23 | End: 2018-05-24

## 2018-05-23 RX ADMIN — Medication 1 DROP(S): at 10:06

## 2018-05-23 RX ADMIN — INSULIN GLARGINE 10 UNIT(S): 100 INJECTION, SOLUTION SUBCUTANEOUS at 21:42

## 2018-05-23 RX ADMIN — PANTOPRAZOLE SODIUM 40 MILLIGRAM(S): 20 TABLET, DELAYED RELEASE ORAL at 09:55

## 2018-05-23 RX ADMIN — AMLODIPINE BESYLATE 10 MILLIGRAM(S): 2.5 TABLET ORAL at 06:36

## 2018-05-23 RX ADMIN — Medication 20 MILLIGRAM(S): at 00:29

## 2018-05-23 RX ADMIN — Medication 650 MILLIGRAM(S): at 22:00

## 2018-05-23 RX ADMIN — Medication 650 MILLIGRAM(S): at 21:39

## 2018-05-23 RX ADMIN — Medication 10 MILLIGRAM(S): at 21:38

## 2018-05-23 RX ADMIN — Medication 1 DROP(S): at 21:39

## 2018-05-23 RX ADMIN — Medication 75 MICROGRAM(S): at 06:36

## 2018-05-23 RX ADMIN — Medication 10 MILLIGRAM(S): at 01:32

## 2018-05-23 RX ADMIN — Medication 100 GRAM(S): at 09:55

## 2018-05-23 NOTE — CONSULT NOTE ADULT - PROBLEM SELECTOR PROBLEM 6
Hyperlipidemia, unspecified hyperlipidemia type Type 2 diabetes mellitus without complication, with long-term current use of insulin

## 2018-05-23 NOTE — CONSULT NOTE ADULT - SUBJECTIVE AND OBJECTIVE BOX
THIS NOTE IS IN PROGRESS     EFRA GEE  78y  Female      Patient is a 78y old  Female who presents with a chief complaint of Abdominal wound and multifactorial sepsis (2018 21:02)    HPI: Pt is a 77 yo Belarusian speaking F w/pmhx of morbid obesity, DM, HTN, hypothyroidism, with multiple admissions to [facility] for: sepsis secondary to ventral hernia infection now s/p repair, subsequent PE, acute blood loss anemia from hemorrhagic uterine fibroids and UGIB s/p (EGD unrevealing) course complicated by ATN requiring HD, IVC filter placement and recent sepsis at outside hospital ([facility]) secondary to MSSA bacteremia and MDR E coli UTI with concern for reinfected ventral hernia wound transferred back to [facility] for continued care. On initial presentation patient was found to be altered.       Home Medications:     Past Medical History:     Surgical History:    Family History:     Social History:      REVIEW OF SYSTEMS:  CONSTITUTIONAL: No fever, weight loss, or fatigue  EYES: No eye pain, visual disturbances, or discharge  ENMT:  No difficulty hearing, tinnitus, vertigo; No sinus or throat pain  NECK: No pain or stiffness  BREASTS: No pain, masses, or nipple discharge  RESPIRATORY: No cough, wheezing, chills or hemoptysis; No shortness of breath  CARDIOVASCULAR: No chest pain, palpitations, dizziness, or leg swelling  GASTROINTESTINAL: No abdominal or epigastric pain. No nausea, vomiting, or hematemesis; No diarrhea or constipation. No melena or hematochezia.  GENITOURINARY: No dysuria, frequency, hematuria, or incontinence  NEUROLOGICAL: No headaches, memory loss, loss of strength, numbness, or tremors  SKIN: No itching, burning, rashes, or lesions   LYMPH NODES: No enlarged glands  ENDOCRINE: No heat or cold intolerance; No hair loss  MUSCULOSKELETAL: No joint pain or swelling; No muscle, back, or extremity pain  PSYCHIATRIC: No depression, anxiety, mood swings, or difficulty sleeping  HEME/LYMPH: No easy bruising, or bleeding gums  ALLERY AND IMMUNOLOGIC: No hives or eczema    T(C): 36.4 (18 @ 10:00), Max: 36.7 (18 @ 17:30)  HR: 74 (18 @ 08:45) (72 - 92)  BP: 174/77 (18 @ 08:45) (153/84 - 211/91)  RR: 18 (18 @ 08:45) (16 - 18)  SpO2: 97% (18 @ 08:45) (94% - 99%)  Wt(kg): --Vital Signs Last 24 Hrs  T(C): 36.4 (23 May 2018 10:00), Max: 36.7 (22 May 2018 17:30)  T(F): 97.6 (23 May 2018 10:00), Max: 98 (22 May 2018 17:30)  HR: 74 (23 May 2018 08:45) (72 - 92)  BP: 174/77 (23 May 2018 08:45) (153/84 - 211/91)  BP(mean): 111 (23 May 2018 08:45) (101 - 138)  RR: 18 (23 May 2018 08:45) (16 - 18)  SpO2: 97% (23 May 2018 08:45) (94% - 99%)    PHYSICAL EXAM:  GENERAL: NAD, well-groomed, well-developed  HEAD:  Atraumatic, Normocephalic  EYES: EOMI, PERRLA, conjunctiva and sclera clear  ENMT: No tonsillar erythema, exudates, or enlargement; Moist mucous membranes, Good dentition, No lesions  NECK: Supple, No JVD, Normal thyroid  NERVOUS SYSTEM:  Alert & Oriented X3, Good concentration; Motor Strength 5/5 B/L upper and lower extremities; DTRs 2+ intact and symmetric  CHEST/LUNG: Clear to percussion bilaterally; No rales, rhonchi, wheezing, or rubs  HEART: Regular rate and rhythm; No murmurs, rubs, or gallops  ABDOMEN: Soft, Nontender, Nondistended; Bowel sounds present  EXTREMITIES:  2+ Peripheral Pulses, No clubbing, cyanosis, or edema  LYMPH: No lymphadenopathy noted  SKIN: No rashes or lesions    Consultant(s) Notes Reviewed:  [x ] YES  [ ] NO  Care Discussed with Consultants/Other Providers [ x] YES  [ ] NO    LABS:                        9.9    15.0  )-----------( 245      ( 23 May 2018 07:21 )             31.2     -    144  |  109<H>  |  37<H>  ----------------------------<  141<H>  4.4   |  24  |  0.64    Ca    9.9      23 May 2018 07:21  Phos  4.1     05-23  Mg     1.8     05-23        Urinalysis Basic - ( 22 May 2018 17:31 )    Color: Yellow / Appearance: Clear / S.020 / pH: x  Gluc: x / Ketone: NEGATIVE  / Bili: Negative / Urobili: 0.2 E.U./dL   Blood: x / Protein: 30 mg/dL / Nitrite: NEGATIVE   Leuk Esterase: NEGATIVE / RBC: 5-10 /HPF / WBC < 5 /HPF   Sq Epi: x / Non Sq Epi: 0-5 /HPF / Bacteria: Present /HPF      CAPILLARY BLOOD GLUCOSE      POCT Blood Glucose.: 146 mg/dL (23 May 2018 11:41)  POCT Blood Glucose.: 149 mg/dL (23 May 2018 06:28)  POCT Blood Glucose.: 136 mg/dL (23 May 2018 00:39)  POCT Blood Glucose.: 120 mg/dL (22 May 2018 16:49)        Urinalysis Basic - ( 22 May 2018 17:31 )    Color: Yellow / Appearance: Clear / S.020 / pH: x  Gluc: x / Ketone: NEGATIVE  / Bili: Negative / Urobili: 0.2 E.U./dL   Blood: x / Protein: 30 mg/dL / Nitrite: NEGATIVE   Leuk Esterase: NEGATIVE / RBC: 5-10 /HPF / WBC < 5 /HPF   Sq Epi: x / Non Sq Epi: 0-5 /HPF / Bacteria: Present /HPF        RADIOLOGY & ADDITIONAL TESTS:    Imaging Personally Reviewed:  [ ] YES  [ ] NO THIS NOTE IS IN PROGRESS     EFRA GEE  78y  Female      Patient is a 78y old  Female who presents with a chief complaint of Abdominal wound and multifactorial sepsis (2018 21:02)    HPI: Pt is a 79 yo Vietnamese speaking F w/pmhx of morbid obesity, DM, HTN, hypothyroidism, with multiple admissions to [facility] for: sepsis secondary to ventral hernia infection now s/p repair, subsequent PE, acute blood loss anemia from hemorrhagic uterine fibroids and UGIB s/p (EGD unrevealing) course complicated by ATN requiring HD, IVC filter placement and recent sepsis at outside hospital ([facility]) secondary to MSSA bacteremia and MDR E coli UTI with concern for reinfected ventral hernia wound transferred back to [facility] for continued care. On initial presentation patient was found to be altered. During this hospital course pt underwent TAHBSO and small bowel resection on 5/15 followed by  PEG placement on .     Overnight on - - pt was found to be in hypertensive urgency w/SBP of 211 and was given Labetalol 10mg , Hydralazine 10mg given shortly after for 203, Labetalol 20mg given for sbp 187, Hydralazine 10mg given for 195,    Home Medications:     · 	acetaminophen 325 mg oral tablet: 2 tab(s) orally every 6 hours, As needed, Mild Pain (1 - 3)  · 	cholecalciferol oral tablet: 1000 unit(s) orally once a day  · 	levothyroxine 150 mcg (0.15 mg) oral tablet: 1 tab(s) orally once a day  · 	sodium chloride 0.65% nasal spray: 2 spray(s) nasal once a day  · 	pantoprazole 40 mg oral delayed release tablet: 1 tab(s) orally once a day (before a meal)  · 	medroxyPROGESTERone 10 mg oral tablet: 1 tab(s) orally once a day  · 	amLODIPine 10 mg oral tablet: 1 tab(s) orally once a day  · 	QUEtiapine:   · 	ARIPiprazole 5 mg oral tablet: 1 tab(s) orally once a day  · 	escitalopram 10 mg oral tablet: 1 tab(s) orally once a day  · 	bisacodyl 5 mg oral delayed release tablet: 1 tab(s) orally every 12 hours, As needed, Constipation  · 	Aspirin Enteric Coated 81 mg oral delayed release tablet: 1 tab(s) orally once a day  · 	glipiZIDE 5 mg oral tablet: 1 tab(s) orally once a day      Past Medical History:     DM (diabetes mellitus)    GERD (gastroesophageal reflux disease)    HLD (hyperlipidemia)    HTN (hypertension)    Hypothyroidism    Obesity.    Surgical History:  H/O ventral hernia repair.Family History:     Social History:      REVIEW OF SYSTEMS:  CONSTITUTIONAL: No fever, weight loss, or fatigue  EYES: No eye pain, visual disturbances, or discharge  ENMT:  No difficulty hearing, tinnitus, vertigo; No sinus or throat pain  NECK: No pain or stiffness  BREASTS: No pain, masses, or nipple discharge  RESPIRATORY: No cough, wheezing, chills or hemoptysis; No shortness of breath  CARDIOVASCULAR: No chest pain, palpitations, dizziness, or leg swelling  GASTROINTESTINAL: No abdominal or epigastric pain. No nausea, vomiting, or hematemesis; No diarrhea or constipation. No melena or hematochezia.  GENITOURINARY: No dysuria, frequency, hematuria, or incontinence  NEUROLOGICAL: No headaches, memory loss, loss of strength, numbness, or tremors  SKIN: No itching, burning, rashes, or lesions   LYMPH NODES: No enlarged glands  ENDOCRINE: No heat or cold intolerance; No hair loss  MUSCULOSKELETAL: No joint pain or swelling; No muscle, back, or extremity pain  PSYCHIATRIC: No depression, anxiety, mood swings, or difficulty sleeping  HEME/LYMPH: No easy bruising, or bleeding gums  ALLERY AND IMMUNOLOGIC: No hives or eczema    T(C): 36.4 (18 @ 10:00), Max: 36.7 (18 @ 17:30)  HR: 74 (18 @ 08:45) (72 - 92)  BP: 174/77 (18 @ 08:45) (153/84 - 211/91)  RR: 18 (18 @ 08:45) (16 - 18)  SpO2: 97% (18 @ 08:45) (94% - 99%)  Wt(kg): --Vital Signs Last 24 Hrs  T(C): 36.4 (23 May 2018 10:00), Max: 36.7 (22 May 2018 17:30)  T(F): 97.6 (23 May 2018 10:00), Max: 98 (22 May 2018 17:30)  HR: 74 (23 May 2018 08:45) (72 - 92)  BP: 174/77 (23 May 2018 08:45) (153/84 - 211/91)  BP(mean): 111 (23 May 2018 08:45) (101 - 138)  RR: 18 (23 May 2018 08:45) (16 - 18)  SpO2: 97% (23 May 2018 08:45) (94% - 99%)    PHYSICAL EXAM:  GENERAL: NAD, well-groomed, well-developed  HEAD:  Atraumatic, Normocephalic  EYES: EOMI, PERRLA, conjunctiva and sclera clear  ENMT: No tonsillar erythema, exudates, or enlargement; Moist mucous membranes, Good dentition, No lesions  NECK: Supple, No JVD, Normal thyroid  NERVOUS SYSTEM:  Alert & Oriented X3, Good concentration; Motor Strength 5/5 B/L upper and lower extremities; DTRs 2+ intact and symmetric  CHEST/LUNG: Clear to percussion bilaterally; No rales, rhonchi, wheezing, or rubs  HEART: Regular rate and rhythm; No murmurs, rubs, or gallops  ABDOMEN: Soft, Nontender, Nondistended; Bowel sounds present  EXTREMITIES:  2+ Peripheral Pulses, No clubbing, cyanosis, or edema  LYMPH: No lymphadenopathy noted  SKIN: No rashes or lesions    Consultant(s) Notes Reviewed:  [x ] YES  [ ] NO  Care Discussed with Consultants/Other Providers [ x] YES  [ ] NO    LABS:                        9.9    15.0  )-----------( 245      ( 23 May 2018 07:21 )             31.2     05-23    144  |  109<H>  |  37<H>  ----------------------------<  141<H>  4.4   |  24  |  0.64    Ca    9.9      23 May 2018 07:21  Phos  4.1     05-23  Mg     1.8     05-23        Urinalysis Basic - ( 22 May 2018 17:31 )    Color: Yellow / Appearance: Clear / S.020 / pH: x  Gluc: x / Ketone: NEGATIVE  / Bili: Negative / Urobili: 0.2 E.U./dL   Blood: x / Protein: 30 mg/dL / Nitrite: NEGATIVE   Leuk Esterase: NEGATIVE / RBC: 5-10 /HPF / WBC < 5 /HPF   Sq Epi: x / Non Sq Epi: 0-5 /HPF / Bacteria: Present /HPF      CAPILLARY BLOOD GLUCOSE      POCT Blood Glucose.: 146 mg/dL (23 May 2018 11:41)  POCT Blood Glucose.: 149 mg/dL (23 May 2018 06:28)  POCT Blood Glucose.: 136 mg/dL (23 May 2018 00:39)  POCT Blood Glucose.: 120 mg/dL (22 May 2018 16:49)        Urinalysis Basic - ( 22 May 2018 17:31 )    Color: Yellow / Appearance: Clear / S.020 / pH: x  Gluc: x / Ketone: NEGATIVE  / Bili: Negative / Urobili: 0.2 E.U./dL   Blood: x / Protein: 30 mg/dL / Nitrite: NEGATIVE   Leuk Esterase: NEGATIVE / RBC: 5-10 /HPF / WBC < 5 /HPF   Sq Epi: x / Non Sq Epi: 0-5 /HPF / Bacteria: Present /HPF        RADIOLOGY & ADDITIONAL TESTS:    Imaging Personally Reviewed:  [ ] YES  [ ] NO THIS NOTE IS IN PROGRESS     EFRA GEE  78y  Female      Patient is a 78y old  Female who presents with a chief complaint of Abdominal wound and multifactorial sepsis (2018 21:02)    HPI: Pt is a 77 yo Luxembourgish speaking F w/pmhx of morbid obesity, DM, HTN, hypothyroidism, with multiple admissions to [facility] for: sepsis secondary to ventral hernia infection now s/p repair, subsequent PE, acute blood loss anemia from hemorrhagic uterine fibroids and UGIB s/p (EGD unrevealing) course complicated by ATN requiring HD, IVC filter placement and recent sepsis at outside hospital ([facility]) secondary to MSSA bacteremia and MDR E coli UTI with concern for reinfected ventral hernia wound transferred back to [facility] for continued care. On initial presentation patient was found to be altered. During this hospital course pt underwent TAHBSO and small bowel resection on 5/15 followed by  PEG placement on .     Overnight on - - pt was found to be in hypertensive urgency w/SBP of 211 and was given Labetalol 10mg , Hydralazine 10mg given shortly after for 203, Labetalol 20mg given for sbp 187, Hydralazine 10mg given for 195,    Home Medications:     · 	acetaminophen 325 mg oral tablet: 2 tab(s) orally every 6 hours, As needed, Mild Pain (1 - 3)  · 	cholecalciferol oral tablet: 1000 unit(s) orally once a day  · 	levothyroxine 150 mcg (0.15 mg) oral tablet: 1 tab(s) orally once a day  · 	sodium chloride 0.65% nasal spray: 2 spray(s) nasal once a day  · 	pantoprazole 40 mg oral delayed release tablet: 1 tab(s) orally once a day (before a meal)  · 	medroxyPROGESTERone 10 mg oral tablet: 1 tab(s) orally once a day  · 	amLODIPine 10 mg oral tablet: 1 tab(s) orally once a day  · 	QUEtiapine:   · 	ARIPiprazole 5 mg oral tablet: 1 tab(s) orally once a day  · 	escitalopram 10 mg oral tablet: 1 tab(s) orally once a day  · 	bisacodyl 5 mg oral delayed release tablet: 1 tab(s) orally every 12 hours, As needed, Constipation  · 	Aspirin Enteric Coated 81 mg oral delayed release tablet: 1 tab(s) orally once a day  · 	glipiZIDE 5 mg oral tablet: 1 tab(s) orally once a day      Past Medical History:     DM (diabetes mellitus)    GERD (gastroesophageal reflux disease)    HLD (hyperlipidemia)    HTN (hypertension)    Hypothyroidism    Obesity.    Surgical History:  H/O ventral hernia repair.Family History:     Social History:  as per chart - denies etoh abuse and smoking     REVIEW OF SYSTEMS: unable to obtain as patient is not responding to questions       T(C): 36.4 (18 @ 10:00), Max: 36.7 (18 @ 17:30)  HR: 74 (18 @ 08:45) (72 - 92)  BP: 174/77 (18 @ 08:45) (153/84 - 211/91)  RR: 18 (18 @ 08:45) (16 - 18)  SpO2: 97% (18 @ 08:45) (94% - 99%)  Wt(kg): --Vital Signs Last 24 Hrs  T(C): 36.4 (23 May 2018 10:00), Max: 36.7 (22 May 2018 17:30)  T(F): 97.6 (23 May 2018 10:00), Max: 98 (22 May 2018 17:30)  HR: 74 (23 May 2018 08:45) (72 - 92)  BP: 174/77 (23 May 2018 08:45) (153/84 - 211/91)  BP(mean): 111 (23 May 2018 08:45) (101 - 138)  RR: 18 (23 May 2018 08:45) (16 - 18)  SpO2: 97% (23 May 2018 08:45) (94% - 99%)    PHYSICAL EXAM:  GENERAL: obese, in NAD in bed, not responded to questions   HEAD:  Atraumatic, Normocephalic, facial hirsutism   EYES: EOMI, PERRLA, conjunctiva and sclera clear  ENMT: dry tongue, does open mouth upon command   NECK: Supple,    NERVOUS SYSTEM:  Alert unable to assess orientation, moves all extremities, responds to pain  CHEST/LUNG: equal air movement bilaterally  HEART: Regular rate and rhythm; No murmurs, rubs, or gallops  ABDOMEN: Soft, Nontender, with ventral hernia wound clean dry, rectal tube in place, peg in place   - evidence of incontinence   EXTREMITIES:  2+ Peripheral Pulses, No pitting edema edema  LYMPH: No lymphadenopathy noted  SKIN: No rashes or lesions    Consultant(s) Notes Reviewed:  [x ] YES  [ ] NO  Care Discussed with Consultants/Other Providers [ x] YES  [ ] NO    LABS:                        9.9    15.0  )-----------( 245      ( 23 May 2018 07:21 )             31.2     05-23    144  |  109<H>  |  37<H>  ----------------------------<  141<H>  4.4   |  24  |  0.64    Ca    9.9      23 May 2018 07:21  Phos  4.1     05-  Mg     1.8             Urinalysis Basic - ( 22 May 2018 17:31 )    Color: Yellow / Appearance: Clear / S.020 / pH: x  Gluc: x / Ketone: NEGATIVE  / Bili: Negative / Urobili: 0.2 E.U./dL   Blood: x / Protein: 30 mg/dL / Nitrite: NEGATIVE   Leuk Esterase: NEGATIVE / RBC: 5-10 /HPF / WBC < 5 /HPF   Sq Epi: x / Non Sq Epi: 0-5 /HPF / Bacteria: Present /HPF      CAPILLARY BLOOD GLUCOSE      POCT Blood Glucose.: 146 mg/dL (23 May 2018 11:41)  POCT Blood Glucose.: 149 mg/dL (23 May 2018 06:28)  POCT Blood Glucose.: 136 mg/dL (23 May 2018 00:39)  POCT Blood Glucose.: 120 mg/dL (22 May 2018 16:49)        Urinalysis Basic - ( 22 May 2018 17:31 )    Color: Yellow / Appearance: Clear / S.020 / pH: x  Gluc: x / Ketone: NEGATIVE  / Bili: Negative / Urobili: 0.2 E.U./dL   Blood: x / Protein: 30 mg/dL / Nitrite: NEGATIVE   Leuk Esterase: NEGATIVE / RBC: 5-10 /HPF / WBC < 5 /HPF   Sq Epi: x / Non Sq Epi: 0-5 /HPF / Bacteria: Present /HPF        RADIOLOGY & ADDITIONAL TESTS:    Imaging Personally Reviewed:  [X] YES  [ ] NO EFRA GEE  78y Female    Patient is a 78y old  Female who presents with a chief complaint of Abdominal wound and multifactorial sepsis (2018 21:02)    HPI: Pt is a 77 yo Martiniquais speaking F w/pmhx of morbid obesity, DM, HTN, hypothyroidism, with multiple admissions to [facility] for: sepsis secondary to ventral hernia infection now s/p repair, subsequent PE, acute blood loss anemia from hemorrhagic uterine fibroids and UGIB s/p (EGD unrevealing) course complicated by ATN requiring HD, IVC filter placement and recent sepsis at outside hospital ([facility]) secondary to MSSA bacteremia and MDR E coli UTI with concern for reinfected ventral hernia wound transferred back to [facility] for continued care. On initial presentation patient was found to be altered. During this hospital course pt underwent TAHBSO and small bowel resection on 5/15 followed by  PEG placement on .     Overnight on - - pt was found to be in hypertensive urgency w/SBP of 211 and was given Labetalol 10mg , Hydralazine 10mg given shortly after for 203, Labetalol 20mg given for sbp 187, Hydralazine 10mg given for 195,    Home Medications:     · 	acetaminophen 325 mg oral tablet: 2 tab(s) orally every 6 hours, As needed, Mild Pain (1 - 3)  · 	cholecalciferol oral tablet: 1000 unit(s) orally once a day  · 	levothyroxine 150 mcg (0.15 mg) oral tablet: 1 tab(s) orally once a day  · 	sodium chloride 0.65% nasal spray: 2 spray(s) nasal once a day  · 	pantoprazole 40 mg oral delayed release tablet: 1 tab(s) orally once a day (before a meal)  · 	medroxyPROGESTERone 10 mg oral tablet: 1 tab(s) orally once a day  · 	amLODIPine 10 mg oral tablet: 1 tab(s) orally once a day  · 	QUEtiapine:   · 	ARIPiprazole 5 mg oral tablet: 1 tab(s) orally once a day  · 	escitalopram 10 mg oral tablet: 1 tab(s) orally once a day  · 	bisacodyl 5 mg oral delayed release tablet: 1 tab(s) orally every 12 hours, As needed, Constipation  · 	Aspirin Enteric Coated 81 mg oral delayed release tablet: 1 tab(s) orally once a day  · 	glipiZIDE 5 mg oral tablet: 1 tab(s) orally once a day      Past Medical History:     DM (diabetes mellitus)    GERD (gastroesophageal reflux disease)    HLD (hyperlipidemia)    HTN (hypertension)    Hypothyroidism    Obesity.    Surgical History:  H/O ventral hernia repair.Family History:     Social History:  as per chart - denies etoh abuse and smoking     REVIEW OF SYSTEMS: unable to obtain as patient is not responding to questions       T(C): 36.4 (18 @ 10:00), Max: 36.7 (18 @ 17:30)  HR: 74 (18 @ 08:45) (72 - 92)  BP: 174/77 (18 @ 08:45) (153/84 - 211/91)  RR: 18 (18 @ 08:45) (16 - 18)  SpO2: 97% (18 @ 08:45) (94% - 99%)  Wt(kg): --Vital Signs Last 24 Hrs  T(C): 36.4 (23 May 2018 10:00), Max: 36.7 (22 May 2018 17:30)  T(F): 97.6 (23 May 2018 10:00), Max: 98 (22 May 2018 17:30)  HR: 74 (23 May 2018 08:45) (72 - 92)  BP: 174/77 (23 May 2018 08:45) (153/84 - 211/91)  BP(mean): 111 (23 May 2018 08:45) (101 - 138)  RR: 18 (23 May 2018 08:45) (16 - 18)  SpO2: 97% (23 May 2018 08:45) (94% - 99%)    PHYSICAL EXAM:  GENERAL: obese, in NAD in bed, not responded to questions   HEAD:  Atraumatic, Normocephalic, facial hirsutism   EYES: EOMI, PERRLA, conjunctiva and sclera clear  ENMT: dry tongue, does open mouth upon command   NECK: Supple,    NERVOUS SYSTEM:  Alert unable to assess orientation, moves all extremities, responds to pain  CHEST/LUNG: equal air movement bilaterally  HEART: Regular rate and rhythm; No murmurs, rubs, or gallops  ABDOMEN: Soft, Nontender, with ventral hernia wound clean dry, rectal tube in place, peg in place   - evidence of incontinence   EXTREMITIES:  2+ Peripheral Pulses, No pitting edema edema  LYMPH: No lymphadenopathy noted  SKIN: No rashes or lesions    Consultant(s) Notes Reviewed:  [x ] YES  [ ] NO  Care Discussed with Consultants/Other Providers [ x] YES  [ ] NO    LABS:                        9.9    15.0  )-----------( 245      ( 23 May 2018 07:21 )             31.2     05-23    144  |  109<H>  |  37<H>  ----------------------------<  141<H>  4.4   |  24  |  0.64    Ca    9.9      23 May 2018 07:21  Phos  4.1     -  Mg     1.8             Urinalysis Basic - ( 22 May 2018 17:31 )    Color: Yellow / Appearance: Clear / S.020 / pH: x  Gluc: x / Ketone: NEGATIVE  / Bili: Negative / Urobili: 0.2 E.U./dL   Blood: x / Protein: 30 mg/dL / Nitrite: NEGATIVE   Leuk Esterase: NEGATIVE / RBC: 5-10 /HPF / WBC < 5 /HPF   Sq Epi: x / Non Sq Epi: 0-5 /HPF / Bacteria: Present /HPF      CAPILLARY BLOOD GLUCOSE      POCT Blood Glucose.: 146 mg/dL (23 May 2018 11:41)  POCT Blood Glucose.: 149 mg/dL (23 May 2018 06:28)  POCT Blood Glucose.: 136 mg/dL (23 May 2018 00:39)  POCT Blood Glucose.: 120 mg/dL (22 May 2018 16:49)        Urinalysis Basic - ( 22 May 2018 17:31 )    Color: Yellow / Appearance: Clear / S.020 / pH: x  Gluc: x / Ketone: NEGATIVE  / Bili: Negative / Urobili: 0.2 E.U./dL   Blood: x / Protein: 30 mg/dL / Nitrite: NEGATIVE   Leuk Esterase: NEGATIVE / RBC: 5-10 /HPF / WBC < 5 /HPF   Sq Epi: x / Non Sq Epi: 0-5 /HPF / Bacteria: Present /HPF        RADIOLOGY & ADDITIONAL TESTS:    Imaging Personally Reviewed:  [X] YES  [ ] NO

## 2018-05-23 NOTE — PROGRESS NOTE ADULT - SUBJECTIVE AND OBJECTIVE BOX
O/N: Labetalol 10mg given for , Hydralazine 10mg given shortly after for 203, Labetalol 20mg given for sbp 187, Hydralazine 10mg given for 195,   5/22: WBC inc to 15 from 12, ID recs fever work up and CT, CXR done, BCx, UA, UCx sent, no CT for now, erythema noticed on L cheek, benadryl given will cont to monitor O/N: Labetalol 10mg given for , Hydralazine 10mg given shortly after for 203, Labetalol 20mg given for sbp 187, Hydralazine 10mg given for 195,   5/22: WBC inc to 15 from 12, ID recs fever work up and CT, CXR done, BCx, UA, UCx sent, no CT for now, erythema noticed on L cheek, benadryl given will cont to monitor    Surgery:   4/26: IR drain   4/30: LP with IR  5/15: DAVY BSO, sb resection with primary anastomosis washout, LEANN drain placement O/N: Labetalol 10mg given for , Hydralazine 10mg given shortly after for 203, Labetalol 20mg given for sbp 187, Hydralazine 10mg given for 195,   : WBC inc to 15 from 12, ID recs fever work up and CT, CXR done, BCx, UA, UCx sent, no CT for now, erythema noticed on L cheek, benadryl given will cont to monitor    Surgery:   : IR drain   : LP with IR  5/15: DAVY BSO, sb resection with primary anastomosis washout, LEANN drain placement    SUBJECTIVE: Patient seen and examined bedside by chief resident.    amLODIPine   Tablet 10 milliGRAM(s) Oral daily      Vital Signs Last 24 Hrs  T(C): 36.2 (23 May 2018 04:49), Max: 36.7 (22 May 2018 17:30)  T(F): 97.2 (23 May 2018 04:49), Max: 98 (22 May 2018 17:30)  HR: 72 (23 May 2018 05:00) (72 - 92)  BP: 153/84 (23 May 2018 05:00) (153/84 - 211/91)  BP(mean): 101 (23 May 2018 05:00) (101 - 138)  RR: 18 (23 May 2018 05:00) (16 - 26)  SpO2: 96% (23 May 2018 05:00) (94% - 99%)  I&O's Detail    22 May 2018 07:01  -  23 May 2018 07:00  --------------------------------------------------------  IN:    Free Water: 1000 mL    Glucerna: 1298 mL  Total IN: 2298 mL    OUT:    Bulb: 1560 mL    Rectal Tube: 200 mL    VAC (Vacuum Assisted Closure) System: 25 mL  Total OUT: 1785 mL    Total NET: 513 mL          General: NAD, resting comfortably in bed  C/V: NSR  Pulm:  minimally labored breathing, no respiratory distress  Abd: soft, NT/ND, wound vac in place and functional, LEANN w/ serous output  Extrem: WWP, no edema, SCDs in place        LABS:                        9.5    15.2  )-----------( 239      ( 22 May 2018 06:37 )             29.6     -    142  |  109<H>  |  38<H>  ----------------------------<  116<H>  4.3   |  24  |  0.68    Ca    9.6      22 May 2018 06:38  Phos  3.5       Mg     2.2             Urinalysis Basic - ( 22 May 2018 17:31 )    Color: Yellow / Appearance: Clear / S.020 / pH: x  Gluc: x / Ketone: NEGATIVE  / Bili: Negative / Urobili: 0.2 E.U./dL   Blood: x / Protein: 30 mg/dL / Nitrite: NEGATIVE   Leuk Esterase: NEGATIVE / RBC: 5-10 /HPF / WBC < 5 /HPF   Sq Epi: x / Non Sq Epi: 0-5 /HPF / Bacteria: Present /HPF        RADIOLOGY & ADDITIONAL STUDIES:

## 2018-05-23 NOTE — CONSULT NOTE ADULT - ATTENDING COMMENTS
I have reviewed the medical record, including laboratory and radiographic studies, examined the patient and discussed the plan with Dr. Garcia, the ID Resident.  Agree with above.  Will continue to follow with you – ID service.
Patient seen and examined with house-staff during bedside rounds.  Resident note read, including vitals, physical findings, laboratory data, and radiological reports.   Revisions included below.  Direct personal management at bed side and extensive interpretation of the data.  Plan was outlined and discussed in details with the housestaff.  Decision making of high complexity  Action taken for acute disease activity to reflect the level of care provided:  - medication reconciliation  - review laboratory data  - wound care  - follow on antibiotics  - continue oxygen supplementation  - hemodynamically stable  - CT scan abdomen
I modified the note above with the exception of the physical exam section. The exam below is my own:    gen: intubated, sedated on Prop gtt  HEENT: trachea midline, no jaundice or icterus.  CV: RRR, s1+s2, - m/r/g  Pulm: CTAB  Abd: soft  Ext: well-perfused  Alertness: eyes closed. Remain closed to voice/tactile/noxious. In response to tactile stimulation she shrugged her shoulders. She localized with both hands to sternal rub. Followed no commands.   II: no BTT  III, IV, VI: Dolls positive. No nystagmus. PERRLA 3>2 bilaterally.  VII: No flattening of nasolabial fold. Corneal reflex positive R/L.  Motor: no atrophy or fasciculations. No tremor. No hypo/hyperkinesia. No pronator drift. Tone normal. She withdraws all limbs symmetrically to NB pressure  Reflexes: biceps 2R/2L, triceps 2R/2L, brachioradialis 2R/2L, patellar 2R/2L, ankles unable to check because of poor positioning.
Pt seen and examined, VS improved since overnight, exam as above, labs reviewed, agree with plan as above per Dr Wilson.

## 2018-05-23 NOTE — PROGRESS NOTE ADULT - ASSESSMENT
79 y/o F with sepsis from open abdominal wound infection, MSSA bacteremia present on admission, and UTI present on admission found to have contained small bowel leak, s/p exlap, TAHBSO, SBR 5/15, s/p PEG 5/16    Neuro: seroquel 25 QHS, zofran prn, tylenol prn, holding: aripiprazole, escitalapram  CV: HD stable s/p severe sepsis, [id number] , 4/19 Echo  65-70%.  Pulm: RA  GI: NPO, protonix, PEG on glucerna 1.2 TF (goal 59) failed S&S  : Voiding, S/p AKF on HD now resolved. UTI (present on admission)    ID: None D/c'd:MSSA in blood: Oxacillin ( 4/17-) TTE neg for endocarditis. Jose L( 4/17-5/21) Eden: ( 4/27-5/21)   Endo: ISS, Synthroid  Insulin  in TPN: 25. Lupron 5/15.Lantus 10 qhs   PPx: sqh held for low plts  PE:Sp IVC filter on 3/30   Lines: PIV,  PICC (5/9-)  /// D/c'd: Sherley (5/15-?)R IJ TLC (5/15-5/?),L Rad Art line( 4/15-4/18),  Rt IJ TLC from OSH ( 4/15-), LIJ TLC (4/29-5/9)  Wounds: Wound vac to midline incision , change MWF  PT/OT: PT ordered 5/19

## 2018-05-23 NOTE — CHART NOTE - NSCHARTNOTEFT_GEN_A_CORE
Admitting Diagnosis:   Patient is a 78y old  Female who presents with a chief complaint of Abdominal wound and multifactorial sepsis (17 Apr 2018 21:02)      PAST MEDICAL & SURGICAL HISTORY:  Hypothyroidism  GERD (gastroesophageal reflux disease)  Obesity  DM (diabetes mellitus)  HLD (hyperlipidemia)  HTN (hypertension)  H/O ventral hernia repair      Current Nutrition Order:  glucerna 1.2 via PEG at 59ml/hr x 24hr; infusing at ordered goal rate     GI Issues:   TF tolerated pr RN  None noted     Pain:  Pt appears to be resting comfortably     Skin Integrity:  L thigh skin tear  Abd ASW with VAC  PEG site   IAD perineum  RLQ LEANN      Labs:   05-23    144  |  109<H>  |  37<H>  ----------------------------<  141<H>  4.4   |  24  |  0.64    Ca    9.9      23 May 2018 07:21  Phos  4.1     05-23  Mg     1.8     05-23      CAPILLARY BLOOD GLUCOSE      POCT Blood Glucose.: 146 mg/dL (23 May 2018 11:41)  POCT Blood Glucose.: 149 mg/dL (23 May 2018 06:28)  POCT Blood Glucose.: 136 mg/dL (23 May 2018 00:39)  POCT Blood Glucose.: 120 mg/dL (22 May 2018 16:49)      Medications:  MEDICATIONS  (STANDING):  amLODIPine   Tablet 10 milliGRAM(s) Oral daily  artificial  tears Solution 1 Drop(s) Both EYES two times a day  dextrose 50% Injectable 12.5 Gram(s) IV Push once  dextrose 50% Injectable 25 Gram(s) IV Push once  insulin glargine Injectable (LANTUS) 10 Unit(s) SubCutaneous at bedtime  insulin lispro (HumaLOG) corrective regimen sliding scale   SubCutaneous every 6 hours  levothyroxine Injectable 75 MICROGram(s) IV Push <User Schedule>  pantoprazole   Suspension 40 milliGRAM(s) Oral daily  sodium chloride 0.9% lock flush 20 milliLiter(s) IV Push once    MEDICATIONS  (PRN):  acetaminophen    Suspension. 650 milliGRAM(s) Oral every 6 hours PRN Moderate Pain (4 - 6)  ALBUTerol/ipratropium for Nebulization 3 milliLiter(s) Nebulizer every 6 hours PRN Shortness of Breath and/or Wheezing  ondansetron Injectable 4 milliGRAM(s) IV Push every 6 hours PRN Nausea  sodium chloride 0.9% lock flush 10 milliLiter(s) IV Push every 1 hour PRN After each medication administration  sodium chloride 0.9% lock flush 10 milliLiter(s) IV Push every 12 hours PRN Lumen of catheter NOT used      Weight:  90.5kg (4/21)  93.6kg (5/15)    Weight Change:   Please trend current wts    Estimated energy needs:   IBW 61kg used for EER and adjusted for post-op/VAC  Calories: 25-30 kcal/kg = 1525-1830kcal/day  Protein: 1.2-1.4 g/kg = 73-85g protein/day  Fluids: 30-35 mL/kg = 1830-2135mL/day    Subjective:   79y/o F with sepsis from open abdominal wound infection, MSSA bacteremia present on admission, and UTI present on admission found to have contained small bowel leak, s/p exlap, TAHBSO, SBR 5/15, s/p PEG 5/16. Pt seen asleep in bed on NC. Glucerna 1.2 infusing via PEG at ordered goal rate. TF tolerated well per RN and no GI distress noted. Continue with current EN regimen to meet EER. Will follow.     Previous Nutrition Diagnosis:  Inadequate oral intake RT inability to meet needs via oral intake 2/2 AMS AEB NPO    Active [   ]  Resolved [X]    If resolved, new PES:   Increased nutrient needs RT increased demand AEB post-op    Goal:   Pt to meet % of EER via EN     Recommendations:  1. Glucerna 1.2 via PEG at goal rate of 59ml/hr x 24hr (1416ml TV, 1699kcal, 84g, 1139ml water). Additional fluid per team. Monitor for s/s of intolerance and maintain aspiration precautions.   2. Monitor lytes and replete prn.   3. Trend wts 3x/week.     Education:   N/A-asleep      Risk Level: High [X] Moderate [   ] Low [   ].

## 2018-05-23 NOTE — CONSULT NOTE ADULT - PROBLEM SELECTOR RECOMMENDATION 4
-plan as per surgery/primary team -plan as per surgery/primary team    #leukocytosis (w/MSSA bacteremia and e.coli UTI)- improving  -ID following, plan as per ID, not recommending antibiotics at this time, prior extensive course of antibiotics and antifungals

## 2018-05-23 NOTE — CONSULT NOTE ADULT - PROBLEM SELECTOR PROBLEM 5
Type 2 diabetes mellitus without complication, with long-term current use of insulin Toxic metabolic encephalopathy

## 2018-05-23 NOTE — CONSULT NOTE ADULT - PROBLEM SELECTOR RECOMMENDATION 9
unclear etiology, pt w/baseline hypertension and severe sepsis upon initial presentation now on home medication of Norvasc increased to home dose on 5/22 of 10mg, w/hypertensive urgency overnight, possibly   -would consider pain as a possible etiology as patient is not able to verbalize her complaints  - unclear etiology, pt w/baseline hypertension and severe sepsis upon initial presentation now on home medication of Norvasc increased to home dose on 5/22 of 10mg, w/hypertensive urgency overnight, unclear etiology at this time   -would obtain EKG and cardiac enzymes (would add on to am labs) to assess for cardiac etiology  -if hypertensive again would consider starting lisinopril 5mg daily  -if patient becomes persistently hypertensive to SBP > 180 or DBP >110 would consider possible neurologic etiology, especially if less responsive, and would obtain head CT  -if cardiac work up negative other possible etiologies include pain as a possible etiology as patient is not able to verbalize her complaints

## 2018-05-23 NOTE — CONSULT NOTE ADULT - PROBLEM SELECTOR RECOMMENDATION 6
-c/w statin blood sugars well controlled on current regimen c/w lantus 10units and ISS w/plan to adjust as appropriate

## 2018-05-23 NOTE — CONSULT NOTE ADULT - PROBLEM SELECTOR RECOMMENDATION 2
possible fluid overload while in the SICU secondary to fluid shifts w/intraabdominal surgeries and sepsis, pt w/normal EF from [facility] during this admission (EF = 60-65%), currently euvolemic on physical exam and w/high normal urine specific gravity on UA  -would hold off on additional diuresis at this time and reassess fluid status tomorrow

## 2018-05-23 NOTE — CONSULT NOTE ADULT - CONSULT REQUESTED DATE/TIME
01-May-2018 19:44
09-May-2018 10:56
18-Apr-2018 09:44
23-May-2018 13:36
25-Apr-2018 11:01
26-Apr-2018 15:38
17-Apr-2018 16:00

## 2018-05-23 NOTE — CONSULT NOTE ADULT - PROBLEM SELECTOR RECOMMENDATION 5
blood sugars well controlled on current regimen c/w lantus 10units and ISS w/plan to adjust as appropriate pt presented w/AMS secondary to sepsis and acute infectious process w/bacteremia and UTI, now improving but still not at baseline not following commands  -would c/w treatment for as per primary team of abdominal wounds and would continue to monitor

## 2018-05-23 NOTE — CONSULT NOTE ADULT - ASSESSMENT
79 yo Gabonese speaking F w/pmhx of morbid obesity, DM, HTN, hypothyroidism, sepsis secondary to ventral hernia infection s/p repair, PE s/p IVC filter, acute blood loss anemia from hemorrhagic uterine fibroids and UGIB, ATN requiring HD, sepsis secondary to MSSA bacteremia and MDR E coli UTI now w/AMS and s/p TAHBSO and small bowel resection on 5/15 followed by  PEG placement on 5/16 and medicine consulted for HTN urgency and fluid management.

## 2018-05-24 DIAGNOSIS — R19.7 DIARRHEA, UNSPECIFIED: ICD-10-CM

## 2018-05-24 LAB
ANION GAP SERPL CALC-SCNC: 12 MMOL/L — SIGNIFICANT CHANGE UP (ref 5–17)
BUN SERPL-MCNC: 40 MG/DL — HIGH (ref 7–23)
CALCIUM SERPL-MCNC: 10 MG/DL — SIGNIFICANT CHANGE UP (ref 8.4–10.5)
CHLORIDE SERPL-SCNC: 109 MMOL/L — HIGH (ref 96–108)
CO2 SERPL-SCNC: 23 MMOL/L — SIGNIFICANT CHANGE UP (ref 22–31)
CREAT SERPL-MCNC: 0.66 MG/DL — SIGNIFICANT CHANGE UP (ref 0.5–1.3)
CULTURE RESULTS: NO GROWTH — SIGNIFICANT CHANGE UP
GLUCOSE BLDC GLUCOMTR-MCNC: 134 MG/DL — HIGH (ref 70–99)
GLUCOSE BLDC GLUCOMTR-MCNC: 145 MG/DL — HIGH (ref 70–99)
GLUCOSE BLDC GLUCOMTR-MCNC: 147 MG/DL — HIGH (ref 70–99)
GLUCOSE BLDC GLUCOMTR-MCNC: 160 MG/DL — HIGH (ref 70–99)
GLUCOSE BLDC GLUCOMTR-MCNC: 91 MG/DL — SIGNIFICANT CHANGE UP (ref 70–99)
GLUCOSE SERPL-MCNC: 157 MG/DL — HIGH (ref 70–99)
HCT VFR BLD CALC: 32.8 % — LOW (ref 34.5–45)
HGB BLD-MCNC: 10.3 G/DL — LOW (ref 11.5–15.5)
MAGNESIUM SERPL-MCNC: 1.9 MG/DL — SIGNIFICANT CHANGE UP (ref 1.6–2.6)
MCHC RBC-ENTMCNC: 31.4 G/DL — LOW (ref 32–36)
MCHC RBC-ENTMCNC: 31.4 PG — SIGNIFICANT CHANGE UP (ref 27–34)
MCV RBC AUTO: 100 FL — SIGNIFICANT CHANGE UP (ref 80–100)
PHOSPHATE SERPL-MCNC: 4.4 MG/DL — SIGNIFICANT CHANGE UP (ref 2.5–4.5)
PLATELET # BLD AUTO: 292 K/UL — SIGNIFICANT CHANGE UP (ref 150–400)
POTASSIUM SERPL-MCNC: 3.9 MMOL/L — SIGNIFICANT CHANGE UP (ref 3.5–5.3)
POTASSIUM SERPL-SCNC: 3.9 MMOL/L — SIGNIFICANT CHANGE UP (ref 3.5–5.3)
RBC # BLD: 3.28 M/UL — LOW (ref 3.8–5.2)
RBC # FLD: 17.6 % — HIGH (ref 10.3–16.9)
SODIUM SERPL-SCNC: 144 MMOL/L — SIGNIFICANT CHANGE UP (ref 135–145)
SPECIMEN SOURCE: SIGNIFICANT CHANGE UP
TROPONIN T SERPL-MCNC: 0.04 NG/ML — HIGH (ref 0–0.01)
WBC # BLD: 16.4 K/UL — HIGH (ref 3.8–10.5)
WBC # FLD AUTO: 16.4 K/UL — HIGH (ref 3.8–10.5)

## 2018-05-24 PROCEDURE — 99233 SBSQ HOSP IP/OBS HIGH 50: CPT

## 2018-05-24 PROCEDURE — 99232 SBSQ HOSP IP/OBS MODERATE 35: CPT

## 2018-05-24 PROCEDURE — 70450 CT HEAD/BRAIN W/O DYE: CPT | Mod: 26

## 2018-05-24 PROCEDURE — 74177 CT ABD & PELVIS W/CONTRAST: CPT | Mod: 26

## 2018-05-24 PROCEDURE — 93010 ELECTROCARDIOGRAM REPORT: CPT

## 2018-05-24 RX ORDER — DIATRIZOATE MEGLUMINE 180 MG/ML
30 INJECTION, SOLUTION INTRAVESICAL ONCE
Qty: 0 | Refills: 0 | Status: COMPLETED | OUTPATIENT
Start: 2018-05-24 | End: 2018-05-24

## 2018-05-24 RX ORDER — LISINOPRIL 2.5 MG/1
5 TABLET ORAL DAILY
Qty: 0 | Refills: 0 | Status: DISCONTINUED | OUTPATIENT
Start: 2018-05-24 | End: 2018-05-25

## 2018-05-24 RX ORDER — MAGNESIUM SULFATE 500 MG/ML
1 VIAL (ML) INJECTION ONCE
Qty: 0 | Refills: 0 | Status: COMPLETED | OUTPATIENT
Start: 2018-05-24 | End: 2018-05-24

## 2018-05-24 RX ORDER — HYDRALAZINE HCL 50 MG
10 TABLET ORAL ONCE
Qty: 0 | Refills: 0 | Status: COMPLETED | OUTPATIENT
Start: 2018-05-24 | End: 2018-05-24

## 2018-05-24 RX ORDER — POTASSIUM CHLORIDE 20 MEQ
10 PACKET (EA) ORAL
Qty: 0 | Refills: 0 | Status: COMPLETED | OUTPATIENT
Start: 2018-05-24 | End: 2018-05-24

## 2018-05-24 RX ADMIN — AMLODIPINE BESYLATE 10 MILLIGRAM(S): 2.5 TABLET ORAL at 06:39

## 2018-05-24 RX ADMIN — Medication 100 MILLIEQUIVALENT(S): at 09:48

## 2018-05-24 RX ADMIN — Medication 75 MICROGRAM(S): at 06:40

## 2018-05-24 RX ADMIN — Medication 1 DROP(S): at 10:01

## 2018-05-24 RX ADMIN — PANTOPRAZOLE SODIUM 40 MILLIGRAM(S): 20 TABLET, DELAYED RELEASE ORAL at 14:19

## 2018-05-24 RX ADMIN — Medication 2: at 06:49

## 2018-05-24 RX ADMIN — INSULIN GLARGINE 10 UNIT(S): 100 INJECTION, SOLUTION SUBCUTANEOUS at 23:54

## 2018-05-24 RX ADMIN — Medication 10 MILLIGRAM(S): at 21:35

## 2018-05-24 RX ADMIN — DIATRIZOATE MEGLUMINE 30 MILLILITER(S): 180 INJECTION, SOLUTION INTRAVESICAL at 10:00

## 2018-05-24 RX ADMIN — Medication 100 GRAM(S): at 09:48

## 2018-05-24 RX ADMIN — Medication 10 MILLIGRAM(S): at 00:10

## 2018-05-24 RX ADMIN — LISINOPRIL 5 MILLIGRAM(S): 2.5 TABLET ORAL at 09:48

## 2018-05-24 NOTE — PROGRESS NOTE ADULT - PROBLEM SELECTOR PLAN 5
improved from prior exam as pt is now intermittently responding to commands with head nods  -as pt presented w/AMS secondary to sepsis and acute infectious process w/bacteremia and UTI, continue w/treatment as per primary team of abdominal wound and continue to monitor s/p partial bowel resection, plan as per primary team.

## 2018-05-24 NOTE — PROGRESS NOTE ADULT - PROBLEM SELECTOR PLAN 7
c/w synthroid improved from prior exam as pt is now intermittently responding to commands with head nods  -as pt presented w/AMS secondary to sepsis and acute infectious process w/bacteremia and UTI, continue w/treatment as per primary team of abdominal wound and continue to monitor

## 2018-05-24 NOTE — PROGRESS NOTE ADULT - PROBLEM SELECTOR PLAN 6
blood sugars well controlled on current regimen c/w lantus 10units   -would change ISS to regular insulin instead of lantus [id number] w/plan to adjust as appropriate. -plan as per surgery/primary team and ID team, CT abd/pelvis pending, holding off on antibiotics/antifungals at this time

## 2018-05-24 NOTE — PROGRESS NOTE ADULT - PROBLEM SELECTOR PROBLEM 8
Gastroesophageal reflux disease, esophagitis presence not specified Hypothyroidism, unspecified type

## 2018-05-24 NOTE — PROGRESS NOTE ADULT - ASSESSMENT
77 y/o F with sepsis from open abdominal wound infection, MSSA bacteremia present on admission, and UTI present on admission found to have contained small bowel leak, s/p exlap, TAHBSO, SBR 5/15, s/p PEG 5/16    Neuro: seroquel 25 QHS, zofran prn, tylenol prn, holding: aripiprazole, escitalapram  CV: HD stable s/p severe sepsis, [id number] , 4/19 Echo  65-70%.  Pulm: NC  GI: NPO, protonix, PEG on glucerna 1.2 TF (goal 59) failed S&S  : Voiding, S/p AKF on HD now resolved. UTI (present on admission)    ID: None D/c'd:MSSA in blood: Oxacillin ( 4/17-) TTE neg for endocarditis. Jose L( 4/17-5/21) Eden: ( 4/27-5/21)   Endo: ISS, Synthroid  Insulin  in TPN: 25. Lupron 5/15.Lantus 10 qhs   PPx: sqh held for low plts  PE:Sp IVC filter on 3/30   Lines: PIV,  PICC (5/9-)  /// D/c'd: Sherley (5/15-?)R IJ TLC (5/15-5/?),L Rad Art line( 4/15-4/18),  Rt IJ TLC from OSH ( 4/15-), LIJ TLC (4/29-5/9)  Wounds: Wound vac to midline incision , change MWF  PT/OT: PT ordered 5/19

## 2018-05-24 NOTE — PROGRESS NOTE ADULT - SUBJECTIVE AND OBJECTIVE BOX
INTERVAL HPI/OVERNIGHT EVENTS:    Patient was seen and examined earlier in the day.  Has had an increase in diarrhea compared to prior days     ROS:    unable to obtain     ANTIBIOTICS/RELEVANT:    MEDICATIONS  (STANDING):  amLODIPine   Tablet 10 milliGRAM(s) Oral daily  artificial  tears Solution 1 Drop(s) Both EYES two times a day  dextrose 50% Injectable 12.5 Gram(s) IV Push once  dextrose 50% Injectable 25 Gram(s) IV Push once  insulin glargine Injectable (LANTUS) 10 Unit(s) SubCutaneous at bedtime  insulin lispro (HumaLOG) corrective regimen sliding scale   SubCutaneous every 6 hours  levothyroxine Injectable 75 MICROGram(s) IV Push <User Schedule>  lisinopril 5 milliGRAM(s) Enteral Tube daily  pantoprazole   Suspension 40 milliGRAM(s) Oral daily  sodium chloride 0.9% lock flush 20 milliLiter(s) IV Push once    MEDICATIONS  (PRN):  acetaminophen    Suspension. 650 milliGRAM(s) Oral every 6 hours PRN Moderate Pain (4 - 6)  ALBUTerol/ipratropium for Nebulization 3 milliLiter(s) Nebulizer every 6 hours PRN Shortness of Breath and/or Wheezing  ondansetron Injectable 4 milliGRAM(s) IV Push every 6 hours PRN Nausea  sodium chloride 0.9% lock flush 10 milliLiter(s) IV Push every 1 hour PRN After each medication administration  sodium chloride 0.9% lock flush 10 milliLiter(s) IV Push every 12 hours PRN Lumen of catheter NOT used        Vital Signs Last 24 Hrs  T(C): 36.4 (24 May 2018 14:00), Max: 36.8 (24 May 2018 10:00)  T(F): 97.6 (24 May 2018 14:00), Max: 98.2 (24 May 2018 10:00)  HR: 76 (24 May 2018 14:15) (74 - 92)  BP: 188/80 (24 May 2018 14:15) (139/65 - 188/86)  BP(mean): 115 (24 May 2018 14:15) (99 - 124)  RR: 18 (24 May 2018 14:15) (16 - 18)  SpO2: 98% (24 May 2018 14:15) (95% - 100%)    PHYSICAL EXAM:  Constitutional:  chronically ill appearing, no distress  Eyes:  no icterus   Ear/Nose/Throat: no sinus tenderness on percussion	  Neck:  supple  Respiratory: clear to auscultation   Cardiovascular: S1S2 RRR, no murmurs  Gastrointestinal:soft, small wound vac; rectal tube   Extremities:no edema  Vascular: DP Pulse:	right normal; left normal      LABS:                        10.3   16.4  )-----------( 292      ( 24 May 2018 06:25 )             32.8     05-24    144  |  109<H>  |  40<H>  ----------------------------<  157<H>  3.9   |  23  |  0.66    Ca    10.0      24 May 2018 06:25  Phos  4.4       Mg     1.9             Urinalysis Basic - ( 22 May 2018 17:31 )    Color: Yellow / Appearance: Clear / S.020 / pH: x  Gluc: x / Ketone: NEGATIVE  / Bili: Negative / Urobili: 0.2 E.U./dL   Blood: x / Protein: 30 mg/dL / Nitrite: NEGATIVE   Leuk Esterase: NEGATIVE / RBC: 5-10 /HPF / WBC < 5 /HPF   Sq Epi: x / Non Sq Epi: 0-5 /HPF / Bacteria: Present /HPF        MICROBIOLOGY:  Culture - Urine (18 @ 22:40)    Specimen Source: Catheterized Catheterized    Culture Results:   No growth    Culture - Blood (18 @ 17:44)    Specimen Source: .Blood Blood    Culture Results:   No growth at 1 day.          RADIOLOGY & ADDITIONAL STUDIES:

## 2018-05-24 NOTE — PROGRESS NOTE ADULT - ASSESSMENT
IMPRESSION:  Leukocytosis without fever.  Last differential on CBC did not show neutrophil predominance.  Unclear source of leukocytosis    Recommend:  1.  Can hold antibiotics at this time  2.  Follow up blood cultures (prelim negative)  3.  Follow up CT abd/pelvis  4.  If diarrhea increases, will consider sending off C. diff although this could be due to feeds

## 2018-05-24 NOTE — PROGRESS NOTE ADULT - ASSESSMENT
77 yo Swazi speaking F w/pmhx of morbid obesity, DM, HTN, hypothyroidism, sepsis secondary to ventral hernia infection s/p repair, PE s/p IVC filter, acute blood loss anemia from hemorrhagic uterine fibroids and UGIB, ATN requiring HD, sepsis secondary to MSSA bacteremia and MDR E coli UTI now w/AMS and s/p TAHBSO and small bowel resection on 5/15 followed by  PEG placement on 5/16 and medicine consulted for HTN urgency and fluid management.

## 2018-05-24 NOTE — PROGRESS NOTE ADULT - SUBJECTIVE AND OBJECTIVE BOX
OVERNIGHT EVENTS: Pt was hypertensive overnight, given hydralazine 10mg IV twice, started on lisinopril 5mg this am.     SUBJECTIVE / INTERVAL HPI: Patient seen and examined at bedside. Pt moderately responsive to questions, intermittently nodding in response to questions about pain.     VITAL SIGNS:  Vital Signs Last 24 Hrs  T(C): 36.8 (24 May 2018 10:00), Max: 36.8 (24 May 2018 10:00)  T(F): 98.2 (24 May 2018 10:00), Max: 98.2 (24 May 2018 10:00)  HR: 76 (24 May 2018 08:53) (72 - 92)  BP: 144/69 (24 May 2018 08:53) (139/65 - 188/86)  BP(mean): 99 (24 May 2018 08:53) (99 - 124)  RR: 17 (24 May 2018 08:53) (16 - 18)  SpO2: 99% (24 May 2018 08:53) (95% - 100%)    PHYSICAL EXAM:    General: obese, resting in bed, opening eyes, looking around the room and nodding head in response to questions about pain  HEENT: NC/AT; PERRL, clear conjunctiva  Neck: supple  Cardiovascular: +S1/S2; RRR  Respiratory: equal air movement bilaterally   Gastrointestinal: soft, NT/ND; +BSx4  Extremities: WWP; 2+ peripheral pulses; no edema   Neurological: alert, moving all extremities but not moving them in response to commands, improved exam from prior as patient is intermittently responding to questions w/head nodding    MEDICATIONS:  MEDICATIONS  (STANDING):  amLODIPine   Tablet 10 milliGRAM(s) Oral daily  artificial  tears Solution 1 Drop(s) Both EYES two times a day  dextrose 50% Injectable 12.5 Gram(s) IV Push once  dextrose 50% Injectable 25 Gram(s) IV Push once  diatrizoate meglumine/diatrizoate sodium. 30 milliLiter(s) Oral once  insulin glargine Injectable (LANTUS) 10 Unit(s) SubCutaneous at bedtime  insulin lispro (HumaLOG) corrective regimen sliding scale   SubCutaneous every 6 hours  levothyroxine Injectable 75 MICROGram(s) IV Push <User Schedule>  lisinopril 5 milliGRAM(s) Enteral Tube daily  pantoprazole   Suspension 40 milliGRAM(s) Oral daily  sodium chloride 0.9% lock flush 20 milliLiter(s) IV Push once    MEDICATIONS  (PRN):  acetaminophen    Suspension. 650 milliGRAM(s) Oral every 6 hours PRN Moderate Pain (4 - 6)  ALBUTerol/ipratropium for Nebulization 3 milliLiter(s) Nebulizer every 6 hours PRN Shortness of Breath and/or Wheezing  ondansetron Injectable 4 milliGRAM(s) IV Push every 6 hours PRN Nausea  sodium chloride 0.9% lock flush 10 milliLiter(s) IV Push every 1 hour PRN After each medication administration  sodium chloride 0.9% lock flush 10 milliLiter(s) IV Push every 12 hours PRN Lumen of catheter NOT used      ALLERGIES:  Allergies    No Known Allergies    Intolerances        LABS:                        10.3   16.4  )-----------( 292      ( 24 May 2018 06:25 )             32.8     05-24    144  |  109<H>  |  40<H>  ----------------------------<  157<H>  3.9   |  23  |  0.66    Ca    10.0      24 May 2018 06:25  Phos  4.4       Mg     1.9     24        Urinalysis Basic - ( 22 May 2018 17:31 )    Color: Yellow / Appearance: Clear / S.020 / pH: x  Gluc: x / Ketone: NEGATIVE  / Bili: Negative / Urobili: 0.2 E.U./dL   Blood: x / Protein: 30 mg/dL / Nitrite: NEGATIVE   Leuk Esterase: NEGATIVE / RBC: 5-10 /HPF / WBC < 5 /HPF   Sq Epi: x / Non Sq Epi: 0-5 /HPF / Bacteria: Present /HPF      CAPILLARY BLOOD GLUCOSE      POCT Blood Glucose.: 134 mg/dL (24 May 2018 11:39)      RADIOLOGY & ADDITIONAL TESTS: Reviewed.    Troponin T, Serum: 0.04: Reference interval for troponin T is </= 0.01 ng/mL which includes the  99th percentile of a healthy population. Troponin T results are not  interchangeable with troponin I results. ng/mL (18 @ 06:25)

## 2018-05-24 NOTE — PROGRESS NOTE ADULT - PROBLEM SELECTOR PROBLEM 6
Type 2 diabetes mellitus without complication, with long-term current use of insulin Postoperative wound infection, subsequent encounter

## 2018-05-24 NOTE — CHART NOTE - NSCHARTNOTEFT_GEN_A_CORE
Admitting Diagnosis:   Patient is a 78y old  Female who presents with a chief complaint of Abdominal wound and multifactorial sepsis (17 Apr 2018 21:02)      PAST MEDICAL & SURGICAL HISTORY:  Hypothyroidism  GERD (gastroesophageal reflux disease)  Obesity  DM (diabetes mellitus)  HLD (hyperlipidemia)  HTN (hypertension)  H/O ventral hernia repair      Current Nutrition Order:  Glucerna 1.2 via PEG at 59ml/hr x 24hr; infusing at ordered goal rate     GI Issues:   TF tolerated pr RN  None noted     Pain:  Pt appears to be resting comfortably     Skin Integrity:  L thigh skin tear  Abd ASW with VAC  PEG site   IAD perineum  RLQ LEANN    Labs:   05-24    144  |  109<H>  |  40<H>  ----------------------------<  157<H>  3.9   |  23  |  0.66    Ca    10.0      24 May 2018 06:25  Phos  4.4     05-24  Mg     1.9     05-24      CAPILLARY BLOOD GLUCOSE      POCT Blood Glucose.: 134 mg/dL (24 May 2018 11:39)  POCT Blood Glucose.: 147 mg/dL (24 May 2018 07:30)  POCT Blood Glucose.: 160 mg/dL (24 May 2018 06:25)  POCT Blood Glucose.: 145 mg/dL (24 May 2018 00:18)  POCT Blood Glucose.: 154 mg/dL (23 May 2018 21:56)  POCT Blood Glucose.: 140 mg/dL (23 May 2018 16:40)      Medications:  MEDICATIONS  (STANDING):  amLODIPine   Tablet 10 milliGRAM(s) Oral daily  artificial  tears Solution 1 Drop(s) Both EYES two times a day  dextrose 50% Injectable 12.5 Gram(s) IV Push once  dextrose 50% Injectable 25 Gram(s) IV Push once  diatrizoate meglumine/diatrizoate sodium. 30 milliLiter(s) Oral once  insulin glargine Injectable (LANTUS) 10 Unit(s) SubCutaneous at bedtime  insulin lispro (HumaLOG) corrective regimen sliding scale   SubCutaneous every 6 hours  levothyroxine Injectable 75 MICROGram(s) IV Push <User Schedule>  lisinopril 5 milliGRAM(s) Enteral Tube daily  pantoprazole   Suspension 40 milliGRAM(s) Oral daily  sodium chloride 0.9% lock flush 20 milliLiter(s) IV Push once    MEDICATIONS  (PRN):  acetaminophen    Suspension. 650 milliGRAM(s) Oral every 6 hours PRN Moderate Pain (4 - 6)  ALBUTerol/ipratropium for Nebulization 3 milliLiter(s) Nebulizer every 6 hours PRN Shortness of Breath and/or Wheezing  ondansetron Injectable 4 milliGRAM(s) IV Push every 6 hours PRN Nausea  sodium chloride 0.9% lock flush 10 milliLiter(s) IV Push every 1 hour PRN After each medication administration  sodium chloride 0.9% lock flush 10 milliLiter(s) IV Push every 12 hours PRN Lumen of catheter NOT used      Weight:  90.5kg (4/21)  93.6kg (5/15)    Weight Change:   Please trend current wts    Estimated energy needs:   IBW 61kg used for EER and adjusted for post-op/VAC  Calories: 25-30 kcal/kg = 1525-1830kcal/day  Protein: 1.2-1.4 g/kg = 73-85g protein/day  Fluids: 30-35 mL/kg = 1830-2135mL/day    Subjective:   79y/o F with sepsis from open abdominal wound infection, MSSA bacteremia present on admission, and UTI present on admission found to have contained small bowel leak, s/p exlap, TAHBSO, SBR 5/15, s/p PEG 5/16. Pt seen resting in bed. She is not very responsive to questions; will intermittently nod. Glucerna 1.2 infusing via PEG at ordered goal rate. Pt nods to feeling well and denies GI distress. TF tolerated well per RN. Nutritionally, pt meeting needs with current EN regimen. Will continue to follow.     Previous Nutrition Diagnosis:  Increased nutrient needs RT increased demand AEB post-op    Active [X]  Resolved [   ]    If resolved, new PES:     Goal:   Pt to meet % of EER via EN     Recommendations:  1. Continue Glucerna 1.2 via PEG at goal rate of 59ml/hr x 24hr (1416ml TV, 1699kcal, 84g, 1139ml water). Additional fluid per team. Monitor for s/s of intolerance and maintain aspiration precautions.   2. Monitor lytes and replete prn.   3. Trend wts 3x/week.     Education:   N/A    Risk Level: High [   ] Moderate [X] Low [   ].

## 2018-05-24 NOTE — PROGRESS NOTE ADULT - PROBLEM SELECTOR PLAN 4
-plan as per surgery/primary team and ID team, CT abd/pelvis pending, holding off on antibiotics/antifungals at this time blood sugars well controlled on current regimen c/w lantus 10units   -would change ISS to regular insulin instead of lantus [id number] w/plan to adjust as appropriate.

## 2018-05-24 NOTE — PROGRESS NOTE ADULT - SUBJECTIVE AND OBJECTIVE BOX
O/N: hypertensive-hydralazine 10mg x 2-responded  5/23: WBC stable at 15, BCx/UCx NGTD, UA negative, CXR wnl, wound vac changed O/N: hypertensive-hydralazine 10mg x 2-responded  : WBC stable at 15, BCx/UCx NGTD, UA negative, CXR wnl, wound vac changed    SUBJECTIVE: Patient seen and examined bedside by chief resident.    amLODIPine   Tablet 10 milliGRAM(s) Oral daily  lisinopril 5 milliGRAM(s) Enteral Tube daily      Vital Signs Last 24 Hrs  T(C): 36.3 (24 May 2018 05:00), Max: 36.6 (23 May 2018 21:51)  T(F): 97.3 (24 May 2018 05:00), Max: 97.8 (23 May 2018 21:51)  HR: 86 (24 May 2018 01:33) (72 - 92)  BP: 139/65 (24 May 2018 05:00) (139/65 - 188/86)  BP(mean): 109 (24 May 2018 01:33) (102 - 124)  RR: 16 (24 May 2018 05:00) (16 - 18)  SpO2: 100% (24 May 2018 05:00) (95% - 100%)  I&O's Detail    23 May 2018 07:01  -  24 May 2018 07:00  --------------------------------------------------------  IN:    Free Water: 750 mL    Glucerna: 708 mL  Total IN: 1458 mL    OUT:    Bulb: 610 mL    Rectal Tube: 450 mL    VAC (Vacuum Assisted Closure) System: 10 mL  Total OUT: 1070 mL    Total NET: 388 mL          General: NAD, resting comfortably in bed  Pulm: Nonlabored breathing, no respiratory distress  Abd: soft, NT/ND< LEANN SS, vac functioning, rectal tube in place      LABS:                        10.3   16.4  )-----------( 292      ( 24 May 2018 06:25 )             32.8     05-24    144  |  109<H>  |  40<H>  ----------------------------<  157<H>  3.9   |  23  |  0.66    Ca    10.0      24 May 2018 06:25  Phos  4.4     05-24  Mg     1.9     05-24        Urinalysis Basic - ( 22 May 2018 17:31 )    Color: Yellow / Appearance: Clear / S.020 / pH: x  Gluc: x / Ketone: NEGATIVE  / Bili: Negative / Urobili: 0.2 E.U./dL   Blood: x / Protein: 30 mg/dL / Nitrite: NEGATIVE   Leuk Esterase: NEGATIVE / RBC: 5-10 /HPF / WBC < 5 /HPF   Sq Epi: x / Non Sq Epi: 0-5 /HPF / Bacteria: Present /HPF        RADIOLOGY & ADDITIONAL STUDIES:

## 2018-05-24 NOTE — PROGRESS NOTE ADULT - PROBLEM SELECTOR PLAN 2
pt continues to appear euvolemic on PE, CXR from yesterday pt continues to appear euvolemic on PE, CXR from yesterday  -possible fluid overload while in the SICU secondary to fluid shifts w/intraabdominal surgeries and sepsis, pt w/normal EF from [facility] during this admission (EF = 60-65%), currently euvolemic on physical exam and w/high normal urine specific gravity on UA  -would hold off on additional diuresis at this time and reassess fluid status tomorrow.

## 2018-05-24 NOTE — PROGRESS NOTE ADULT - PROBLEM SELECTOR PLAN 1
w/another episode of HTN urgency overnight, resolved w/hydralazine and now on lisinopril 5mg  --EKG consistent w/NSR and w/o ST segment abnormalities, troponin mildly elevated to 0.4, would trend to peak, --> given lack of evidence of ischemia on EKG, troponin elevation possibly secondary to demand ischemia from HTN urgency  -would c/w lisinopril 5mg daily w/plan to increase to 10mg daily if needed  -if patient becomes persistently hypertensive to SBP > 180 or DBP >110 would consider possible neurologic etiology, especially if less responsive, and would obtain head CT  -if cardiac work up negative other possible etiologies include pain as likely etiology as patient nodding yes that she is in pain this am w/another episode of HTN urgency overnight, resolved w/hydralazine and now on lisinopril 5mg  --EKG consistent w/NSR and w/o ST segment abnormalities, troponin mildly elevated to 0.4, would trend to peak, --> given lack of evidence of ischemia on EKG, troponin elevation possibly secondary to demand ischemia from HTN urgency  -would c/w lisinopril 5mg daily w/plan to increase to 20mg daily if still hypertensive  -if patient becomes persistently hypertensive to SBP > 180 or DBP >110 would consider possible neurologic etiology, especially if less responsive, and would obtain head CT  -if cardiac work up negative other possible etiologies include pain as likely etiology as patient nodding yes that she is in pain this am

## 2018-05-24 NOTE — PROGRESS NOTE ADULT - PROBLEM SELECTOR PROBLEM 4
Postoperative wound infection, subsequent encounter Type 2 diabetes mellitus without complication, with long-term current use of insulin

## 2018-05-24 NOTE — PROGRESS NOTE ADULT - PROBLEM SELECTOR PLAN 3
s/p partial bowel resection, plan as per primary team. recommend nutrition consult to evaluate for possible change in tube feeds to control diarrhea  -as per ID would send C.diff diarrhea increases

## 2018-05-25 DIAGNOSIS — Z71.89 OTHER SPECIFIED COUNSELING: ICD-10-CM

## 2018-05-25 LAB
ANION GAP SERPL CALC-SCNC: 13 MMOL/L — SIGNIFICANT CHANGE UP (ref 5–17)
BUN SERPL-MCNC: 32 MG/DL — HIGH (ref 7–23)
C DIFF GDH STL QL: NEGATIVE — SIGNIFICANT CHANGE UP
C DIFF GDH STL QL: SIGNIFICANT CHANGE UP
CALCIUM SERPL-MCNC: 9.8 MG/DL — SIGNIFICANT CHANGE UP (ref 8.4–10.5)
CHLORIDE SERPL-SCNC: 107 MMOL/L — SIGNIFICANT CHANGE UP (ref 96–108)
CO2 SERPL-SCNC: 20 MMOL/L — LOW (ref 22–31)
CREAT SERPL-MCNC: 0.65 MG/DL — SIGNIFICANT CHANGE UP (ref 0.5–1.3)
GLUCOSE BLDC GLUCOMTR-MCNC: 107 MG/DL — HIGH (ref 70–99)
GLUCOSE BLDC GLUCOMTR-MCNC: 113 MG/DL — HIGH (ref 70–99)
GLUCOSE BLDC GLUCOMTR-MCNC: 114 MG/DL — HIGH (ref 70–99)
GLUCOSE BLDC GLUCOMTR-MCNC: 94 MG/DL — SIGNIFICANT CHANGE UP (ref 70–99)
GLUCOSE SERPL-MCNC: 90 MG/DL — SIGNIFICANT CHANGE UP (ref 70–99)
HCT VFR BLD CALC: 30.4 % — LOW (ref 34.5–45)
HGB BLD-MCNC: 9.5 G/DL — LOW (ref 11.5–15.5)
MAGNESIUM SERPL-MCNC: 1.8 MG/DL — SIGNIFICANT CHANGE UP (ref 1.6–2.6)
MCHC RBC-ENTMCNC: 30.8 PG — SIGNIFICANT CHANGE UP (ref 27–34)
MCHC RBC-ENTMCNC: 31.3 G/DL — LOW (ref 32–36)
MCV RBC AUTO: 98.7 FL — SIGNIFICANT CHANGE UP (ref 80–100)
PHOSPHATE SERPL-MCNC: 3.6 MG/DL — SIGNIFICANT CHANGE UP (ref 2.5–4.5)
PLATELET # BLD AUTO: 311 K/UL — SIGNIFICANT CHANGE UP (ref 150–400)
POTASSIUM SERPL-MCNC: 3.8 MMOL/L — SIGNIFICANT CHANGE UP (ref 3.5–5.3)
POTASSIUM SERPL-SCNC: 3.8 MMOL/L — SIGNIFICANT CHANGE UP (ref 3.5–5.3)
RBC # BLD: 3.08 M/UL — LOW (ref 3.8–5.2)
RBC # FLD: 17.3 % — HIGH (ref 10.3–16.9)
SODIUM SERPL-SCNC: 140 MMOL/L — SIGNIFICANT CHANGE UP (ref 135–145)
WBC # BLD: 17 K/UL — HIGH (ref 3.8–10.5)
WBC # FLD AUTO: 17 K/UL — HIGH (ref 3.8–10.5)

## 2018-05-25 PROCEDURE — 99233 SBSQ HOSP IP/OBS HIGH 50: CPT

## 2018-05-25 PROCEDURE — 99232 SBSQ HOSP IP/OBS MODERATE 35: CPT

## 2018-05-25 RX ORDER — HYDRALAZINE HCL 50 MG
10 TABLET ORAL ONCE
Qty: 0 | Refills: 0 | Status: COMPLETED | OUTPATIENT
Start: 2018-05-25 | End: 2018-05-25

## 2018-05-25 RX ORDER — POTASSIUM CHLORIDE 20 MEQ
10 PACKET (EA) ORAL ONCE
Qty: 0 | Refills: 0 | Status: COMPLETED | OUTPATIENT
Start: 2018-05-25 | End: 2018-05-25

## 2018-05-25 RX ORDER — MAGNESIUM SULFATE 500 MG/ML
1 VIAL (ML) INJECTION ONCE
Qty: 0 | Refills: 0 | Status: COMPLETED | OUTPATIENT
Start: 2018-05-25 | End: 2018-05-25

## 2018-05-25 RX ORDER — LISINOPRIL 2.5 MG/1
20 TABLET ORAL DAILY
Qty: 0 | Refills: 0 | Status: DISCONTINUED | OUTPATIENT
Start: 2018-05-25 | End: 2018-05-29

## 2018-05-25 RX ADMIN — Medication 100 GRAM(S): at 12:27

## 2018-05-25 RX ADMIN — INSULIN GLARGINE 10 UNIT(S): 100 INJECTION, SOLUTION SUBCUTANEOUS at 23:00

## 2018-05-25 RX ADMIN — Medication 10 MILLIGRAM(S): at 03:04

## 2018-05-25 RX ADMIN — Medication 100 MILLIEQUIVALENT(S): at 18:24

## 2018-05-25 RX ADMIN — PANTOPRAZOLE SODIUM 40 MILLIGRAM(S): 20 TABLET, DELAYED RELEASE ORAL at 12:26

## 2018-05-25 RX ADMIN — Medication 75 MICROGRAM(S): at 06:42

## 2018-05-25 RX ADMIN — Medication 1 DROP(S): at 23:00

## 2018-05-25 RX ADMIN — AMLODIPINE BESYLATE 10 MILLIGRAM(S): 2.5 TABLET ORAL at 06:43

## 2018-05-25 RX ADMIN — Medication 1 DROP(S): at 12:15

## 2018-05-25 RX ADMIN — LISINOPRIL 20 MILLIGRAM(S): 2.5 TABLET ORAL at 12:27

## 2018-05-25 RX ADMIN — LISINOPRIL 5 MILLIGRAM(S): 2.5 TABLET ORAL at 06:43

## 2018-05-25 NOTE — PROGRESS NOTE ADULT - ASSESSMENT
77 y/o F with sepsis from open abdominal wound infection, MSSA bacteremia present on admission, and UTI present on admission found to have contained small bowel leak, s/p exlap, TAHBSO, SBR 5/15, s/p PEG 5/16    Neuro: seroquel 25 QHS, zofran prn, tylenol prn, holding: aripiprazole, escitalapram  CV: HD stable s/p severe sepsis, [id number] , 4/19 Echo  65-70%.  Pulm: NC  GI: NPO, protonix, PEG on glucerna 1.2 TF (goal 59) failed S&S  : Voiding, S/p AKF on HD now resolved. UTI (present on admission)    ID: None D/c'd:MSSA in blood: Oxacillin ( 4/17-) TTE neg for endocarditis. Jose L( 4/17-5/21) [place]: ( 4/27-5/21)   Endo: ISS, Synthroid  Insulin  in TPN: 25. Lupron 5/15.Lantus 10 qhs   PPx: sqh held for low plts  PE:Sp IVC filter on 3/30   Lines: PIV,  PICC (5/9-)  /// D/c'd: Sherley (5/15-?)R IJ TLC (5/15-5/?),L Rad Art line( 4/15-4/18),  Rt IJ TLC from OSH ( 4/15-), LIJ TLC (4/29-5/9)  Wounds: Wound vac to midline incision , change MWF  PT/OT: PT ordered 5/19

## 2018-05-25 NOTE — PROGRESS NOTE ADULT - PROBLEM SELECTOR PLAN 8
c/w synthroid s/p partial bowel resection, w/evidence of possible colitis on CT, plan as per primary team.

## 2018-05-25 NOTE — PROGRESS NOTE ADULT - ASSESSMENT
79 yo Russian speaking F w/pmhx of morbid obesity, DM, HTN, hypothyroidism, sepsis secondary to ventral hernia infection s/p repair, PE s/p IVC filter, acute blood loss anemia from hemorrhagic uterine fibroids and UGIB, ATN requiring HD, sepsis secondary to MSSA bacteremia and MDR E coli UTI now w/AMS and s/p TAHBSO and small bowel resection on 5/15 followed by  PEG placement on 5/16 and medicine consulted for HTN urgency and fluid management.

## 2018-05-25 NOTE — PROGRESS NOTE ADULT - SUBJECTIVE AND OBJECTIVE BOX
O/N: hypertensive-hydralazine 10 x 2, mittens placed patient pulling off LEANN drain bulb  5/24: started on lisinopril 5, CT negative for collection or extravasation O/N: hypertensive-hydralazine 10 x 2, mittens placed patient pulling off LEANN drain bulb  5/24: started on lisinopril 5, CT negative for collection or extravasation    SUBJECTIVE: Patient seen and examined bedside by chief resident.    amLODIPine   Tablet 10 milliGRAM(s) Oral daily  lisinopril 5 milliGRAM(s) Enteral Tube daily      Vital Signs Last 24 Hrs  T(C): 36.2 (25 May 2018 05:11), Max: 36.8 (24 May 2018 10:00)  T(F): 97.2 (25 May 2018 05:11), Max: 98.2 (24 May 2018 10:00)  HR: 94 (25 May 2018 04:05) (74 - 94)  BP: 165/77 (25 May 2018 04:05) (144/69 - 188/80)  BP(mean): 113 (25 May 2018 04:05) (99 - 125)  RR: 18 (25 May 2018 04:05) (16 - 18)  SpO2: 96% (25 May 2018 04:05) (95% - 99%)  I&O's Detail    24 May 2018 07:01  -  25 May 2018 07:00  --------------------------------------------------------  IN:    Enteral Tube Flush: 300 mL    Free Water: 150 mL    Glucerna: 944 mL    Solution: 100 mL    Solution: 100 mL  Total IN: 1594 mL    OUT:    Bulb: 750 mL    Rectal Tube: 600 mL    VAC (Vacuum Assisted Closure) System: 140 mL  Total OUT: 1490 mL    Total NET: 104 mL          General: NAD, resting comfortably in bed  Pulm: Nonlabored breathing, no respiratory distress  Abd: soft, NT/ND, wound vac, LEANN serous        LABS:                        9.5    17.0  )-----------( 311      ( 25 May 2018 06:11 )             30.4     05-25    140  |  107  |  32<H>  ----------------------------<  90  3.8   |  20<L>  |  0.65    Ca    9.8      25 May 2018 06:12  Phos  3.6     05-25  Mg     1.8     05-25            RADIOLOGY & ADDITIONAL STUDIES:

## 2018-05-25 NOTE — PROGRESS NOTE ADULT - SUBJECTIVE AND OBJECTIVE BOX
SUBJECTIVE / INTERVAL HPI: CT abdomen/pelvis and CT head done yesterday evening. Pt hypertensive overnight given hydralazine 10mg IV twice.    This morning, patient seen and examined at bedside. Pt tracks with her eyes around the room. Not responding to questions.       VITAL SIGNS:  Vital Signs Last 24 Hrs  T(C): 37.2 (25 May 2018 09:00), Max: 37.2 (25 May 2018 09:00)  T(F): 98.9 (25 May 2018 09:00), Max: 98.9 (25 May 2018 09:00)  HR: 88 (25 May 2018 08:52) (74 - 94)  BP: 162/79 (25 May 2018 08:52) (162/79 - 188/80)  BP(mean): 113 (25 May 2018 08:52) (113 - 125)  RR: 18 (25 May 2018 08:52) (16 - 18)  SpO2: 100% (25 May 2018 08:52) (95% - 100%)    PHYSICAL EXAM:    General: obese, somnolent, opening eyes but not responding to commands, w/mittens on hands bilaterally, moving upper extremities  HEENT: NC/AT; clear conjunctiva, pupils equal  Neck: supple  Cardiovascular: +S1/S2; RRR  Respiratory: CTA b/l; no W/R/R  Gastrointestinal: obese, midline wound vac in place, PEG tube functions w/tube feeds running, soft, non-tender, obese  Extremities: WWP;  no pitting edema in the lower extremities   Neurological: intermittently alert, somnolent,     MEDICATIONS:  MEDICATIONS  (STANDING):  amLODIPine   Tablet 10 milliGRAM(s) Oral daily  artificial  tears Solution 1 Drop(s) Both EYES two times a day  dextrose 50% Injectable 12.5 Gram(s) IV Push once  dextrose 50% Injectable 25 Gram(s) IV Push once  insulin glargine Injectable (LANTUS) 10 Unit(s) SubCutaneous at bedtime  insulin lispro (HumaLOG) corrective regimen sliding scale   SubCutaneous every 6 hours  levothyroxine Injectable 75 MICROGram(s) IV Push <User Schedule>  lisinopril 20 milliGRAM(s) Enteral Tube daily  magnesium sulfate  IVPB 1 Gram(s) IV Intermittent once  pantoprazole   Suspension 40 milliGRAM(s) Oral daily  potassium chloride  10 mEq/100 mL IVPB 10 milliEquivalent(s) IV Intermittent once  sodium chloride 0.9% lock flush 20 milliLiter(s) IV Push once    MEDICATIONS  (PRN):  acetaminophen    Suspension. 650 milliGRAM(s) Oral every 6 hours PRN Moderate Pain (4 - 6)  ALBUTerol/ipratropium for Nebulization 3 milliLiter(s) Nebulizer every 6 hours PRN Shortness of Breath and/or Wheezing  ondansetron Injectable 4 milliGRAM(s) IV Push every 6 hours PRN Nausea  sodium chloride 0.9% lock flush 10 milliLiter(s) IV Push every 1 hour PRN After each medication administration  sodium chloride 0.9% lock flush 10 milliLiter(s) IV Push every 12 hours PRN Lumen of catheter NOT used      ALLERGIES:  Allergies    No Known Allergies    Intolerances        LABS:                        9.5    17.0  )-----------( 311      ( 25 May 2018 06:11 )             30.4     05-25    140  |  107  |  32<H>  ----------------------------<  90  3.8   |  20<L>  |  0.65    Ca    9.8      25 May 2018 06:12  Phos  3.6     05-25  Mg     1.8     05-25    CAPILLARY BLOOD GLUCOSE    POCT Blood Glucose.: 94 mg/dL (25 May 2018 06:41)    RADIOLOGY & ADDITIONAL TESTS: Reviewed.  < from: CT Head No Cont (05.24.18 @ 16:30) >    EXAM:  CT BRAIN                          PROCEDURE DATE:  05/24/2018            < end of copied text >  < from: CT Head No Cont (05.24.18 @ 16:30) >  IMPRESSION: No intracranial hemorrhage or acute transcortical infarct.   Age appropriate volume loss with small vessel ischemic disease. Left   parafalcine partially calcified meningioma.    < end of copied text >  < from: CT Abdomen and Pelvis w/ Oral Cont and w/ IV Cont (05.24.18 @ 16:31) >  EXAM:  CT ABDOMEN AND PELVIS OC IC                          PROCEDURE DATE:  05/24/2018      < end of copied text >  < from: CT Abdomen and Pelvis w/ Oral Cont and w/ IV Cont (05.24.18 @ 16:31) >  IMPRESSION:  No walled off intra-abdominal fluid collections are seen to suggest an   abscess. No extravasation of oral contrast.    Small fluid collection/phlegmon in the anterior pelvic wall. Trace   ascites including a small, somewhat loculated area of ascites in the   right lower quadrant anteriorly.    Small bowel anastomosis in the right lower quadrant with associated wall   thickening and small bowel distortion, consistent with a postsurgical   change and postsurgical edema.    Wall thickening of the hepatic flexure and proximal transverse colon   compatible with nonspecific colitis.    Trace fat stranding surrounding the duodenum and pancreas, correlation   with amylase and lipase level is suggested to exclude mild pancreatitis.    Pericholecystic fluid in the setting of small ascites. Consider   gallbladder ultrasound if clinically indicated.    Extensive edema/ill-defined fluid in the subcutaneous tissues of the   flanks, right greater than left.        < end of copied text >

## 2018-05-25 NOTE — PROGRESS NOTE ADULT - PROBLEM SELECTOR PLAN 4
blood sugars well controlled on current regimen c/w lantus 10units   -would change ISS to regular insulin instead of lantus [id number] w/plan to adjust as appropriate. improved from prior exam as pt is now intermittently responding to commands with head nods, CT head negative for acute pathology  -as pt presented w/AMS secondary to sepsis and acute infectious process w/bacteremia and UTI, now w/increasing leukocytosis and persistent AMS  -continue w/treatment as per primary team of abdominal wound and continue to monitor  -would consider palliative consult (as above)

## 2018-05-25 NOTE — PROGRESS NOTE ADULT - SUBJECTIVE AND OBJECTIVE BOX
INTERVAL HPI/OVERNIGHT EVENTS:    Patient seen and examined at bedside.  With over 500 cc from rectal tube since last night     ROS:    unable to obtain       ANTIBIOTICS/RELEVANT:        MEDICATIONS  (STANDING):  amLODIPine   Tablet 10 milliGRAM(s) Oral daily  artificial  tears Solution 1 Drop(s) Both EYES two times a day  dextrose 50% Injectable 12.5 Gram(s) IV Push once  dextrose 50% Injectable 25 Gram(s) IV Push once  insulin glargine Injectable (LANTUS) 10 Unit(s) SubCutaneous at bedtime  insulin lispro (HumaLOG) corrective regimen sliding scale   SubCutaneous every 6 hours  levothyroxine Injectable 75 MICROGram(s) IV Push <User Schedule>  lisinopril 5 milliGRAM(s) Enteral Tube daily  magnesium sulfate  IVPB 1 Gram(s) IV Intermittent once  pantoprazole   Suspension 40 milliGRAM(s) Oral daily  potassium chloride  10 mEq/100 mL IVPB 10 milliEquivalent(s) IV Intermittent once  sodium chloride 0.9% lock flush 20 milliLiter(s) IV Push once    MEDICATIONS  (PRN):  acetaminophen    Suspension. 650 milliGRAM(s) Oral every 6 hours PRN Moderate Pain (4 - 6)  ALBUTerol/ipratropium for Nebulization 3 milliLiter(s) Nebulizer every 6 hours PRN Shortness of Breath and/or Wheezing  ondansetron Injectable 4 milliGRAM(s) IV Push every 6 hours PRN Nausea  sodium chloride 0.9% lock flush 10 milliLiter(s) IV Push every 1 hour PRN After each medication administration  sodium chloride 0.9% lock flush 10 milliLiter(s) IV Push every 12 hours PRN Lumen of catheter NOT used        Vital Signs Last 24 Hrs  T(C): 37.2 (25 May 2018 09:00), Max: 37.2 (25 May 2018 09:00)  T(F): 98.9 (25 May 2018 09:00), Max: 98.9 (25 May 2018 09:00)  HR: 88 (25 May 2018 08:52) (74 - 94)  BP: 162/79 (25 May 2018 08:52) (162/79 - 188/80)  BP(mean): 113 (25 May 2018 08:52) (113 - 125)  RR: 18 (25 May 2018 08:52) (16 - 18)  SpO2: 100% (25 May 2018 08:52) (95% - 100%)    PHYSICAL EXAM:  Constitutional:  non-toxic, no distress  Eyes:  no icterus   Ear/Nose/Throat:  no sinus tenderness on percussion	  Neck:  supple  Respiratory: CTA maris  Cardiovascular: S1S2 RRR, no murmurs  Gastrointestinal:soft, small wound vac in place; rectal tube in place  Extremities:no e/e/c  Vascular: DP Pulse:	right normal; left normal      LABS:                        9.5    17.0  )-----------( 311      ( 25 May 2018 06:11 )             30.4     05-25    140  |  107  |  32<H>  ----------------------------<  90  3.8   |  20<L>  |  0.65    Ca    9.8      25 May 2018 06:12  Phos  3.6     05-25  Mg     1.8     05-25            MICROBIOLOGY:    RADIOLOGY & ADDITIONAL STUDIES:    < from: CT Abdomen and Pelvis w/ Oral Cont and w/ IV Cont (05.24.18 @ 16:31) >  No walled off intra-abdominal fluid collections are seen to suggest an   abscess. No extravasation of oral contrast.    Small fluid collection/phlegmon in the anterior pelvic wall. Trace   ascites including a small, somewhat loculated area of ascites in the   right lower quadrant anteriorly.    Small bowel anastomosis in the right lower quadrant with associated wall   thickening and small bowel distortion, consistent with a postsurgical   change and postsurgical edema.    Wall thickening of the hepatic flexure and proximal transverse colon   compatible with nonspecific colitis.    Trace fat stranding surrounding the duodenum and pancreas, correlation   with amylase and lipase level is suggested to exclude mild pancreatitis.    Pericholecystic fluid in the setting of small ascites. Consider   gallbladder ultrasound if clinically indicated.    Extensive edema/ill-defined fluid in the subcutaneous tissues of the   flanks, right greater than left.

## 2018-05-25 NOTE — PROGRESS NOTE ADULT - PROBLEM SELECTOR PLAN 2
pt continues to appear euvolemic on PE, CXR from yesterday  -possible fluid overload while in the SICU secondary to fluid shifts w/intraabdominal surgeries and sepsis, pt w/normal EF from [facility] during this admission (EF = 60-65%), currently euvolemic on physical exam and w/high normal urine specific gravity on UA  -would hold off on additional diuresis at this time and reassess fluid status tomorrow. pt continues to appear euvolemic on PE,    -possible fluid overload while in the SICU secondary to fluid shifts w/intraabdominal surgeries and sepsis, pt w/normal EF from [facility] during this admission (EF = 60-65%), currently euvolemic on physical exam and w/high normal urine specific gravity on UA  -would hold off on additional diuresis at this time and reassess fluid status tomorrow. -give 20mg lasix IV today and monitor UOP    pt continues to appear euvolemic on PE, but with pleural effusions on CT  -possible fluid overload while in the SICU secondary to fluid shifts w/intraabdominal surgeries and sepsis, pt w/normal EF from [facility] during this admission (EF = 60-65%), currently euvolemic on physical exam and w/high normal urine specific gravity on UA

## 2018-05-25 NOTE — PROGRESS NOTE ADULT - ASSESSMENT
IMPRESSION:  Patient with persistent leukocytosis, mild colitis on CT and increased stool output.  Concern for C. Diff    Recommend:  1.  Check C. Diff GDH/toxin.  If positive can start vancomycin 125 mg PO q6hrs   2.  Contact precautions

## 2018-05-25 NOTE — PROGRESS NOTE ADULT - PROBLEM SELECTOR PLAN 7
improved from prior exam as pt is now intermittently responding to commands with head nods  -as pt presented w/AMS secondary to sepsis and acute infectious process w/bacteremia and UTI, continue w/treatment as per primary team of abdominal wound and continue to monitor blood sugars well controlled on current regimen c/w lantus 10units   -would change ISS to regular insulin instead of lantus [id number] w/plan to adjust as appropriate.

## 2018-05-25 NOTE — PROGRESS NOTE ADULT - PROBLEM SELECTOR PROBLEM 7
Toxic metabolic encephalopathy Type 2 diabetes mellitus without complication, with long-term current use of insulin

## 2018-05-25 NOTE — PROGRESS NOTE ADULT - PROBLEM SELECTOR PLAN 5
s/p partial bowel resection, plan as per primary team. recommend nutrition consult to evaluate for possible change in tube feeds to control diarrhea  -as per ID - Ct consistent w/possible colitis, would send C.diff to evaluate even though previously negative was many weeks ago and pt has received additional antibiotics in the interm. recommend nutrition consult to evaluate for possible change in tube feeds to control diarrhea  - Ct consistent w/possible colitis, C.diff negative, plan per ID

## 2018-05-25 NOTE — PROGRESS NOTE ADULT - ATTENDING COMMENTS
Pt seen and examined, agree with plan as above. Concern for evolving intra-abd infection based on CT, surgery team to decide.  Will f/u response to BP meds.  Would stronger consider nutrition for alterative feeds in case this is contributing to diarrhea.  Would consider involvement of palliative care.

## 2018-05-25 NOTE — PROGRESS NOTE ADULT - PROBLEM SELECTOR PLAN 6
-plan as per surgery/primary team and ID team, CT abd/pelvis pending, holding off on antibiotics/antifungals at this time now w/increasing leukocytosis  would add on differential to cbc,   -plan as per surgery/primary team and ID team,

## 2018-05-25 NOTE — PROGRESS NOTE ADULT - PROBLEM SELECTOR PLAN 1
hypertensive overnight, given w/hydralazine 10mg IV twice  -on lisinopril 5mg would increase to 20mg  (can given additional 10mg now as patient already received 5mg this am)  --EKG consistent w/NSR and w/o ST segment abnormalities, troponin mildly elevated to 0.4, would trend to peak, --> given lack of evidence of ischemia on EKG, troponin elevation possibly secondary to demand ischemia from HTN urgency  -would c/w lisinopril 5mg daily w/plan to increase to 20mg daily if still hypertensive  -if patient becomes persistently hypertensive to SBP > 180 or DBP >110 would consider possible neurologic etiology, especially if less responsive, and would obtain head CT  -if cardiac work up negative other possible etiologies include pain as likely etiology as patient nodding yes that she is in pain this am hypertensive overnight, given w/hydralazine 10mg IV twice  -on lisinopril 5mg would increase to 20mg  (can given additional 10mg now as patient already received 5mg this am)  --EKG consistent w/NSR and w/o ST segment abnormalities, troponin mildly elevated to 0.4, would trend to peak, --> given lack of evidence of ischemia on EKG, troponin elevation possibly secondary to demand ischemia from HTN urgency  -would c/w lisinopril 5mg daily w/plan to increase to 20mg daily if still hypertensive  -CT head unchanged from prior (with meningioma) no acute pathology, neurologic etiology unlikely cause of HTN  -given negative cardiac an neuro work ups possible etiology include pain related elevations in BP vs worsening essential hypertension

## 2018-05-25 NOTE — PROGRESS NOTE ADULT - PROBLEM SELECTOR PLAN 3
recommend nutrition consult to evaluate for possible change in tube feeds to control diarrhea  -as per ID would send C.diff diarrhea increases -would consider Palliative consult given lack of improvement in mental status throughout long admission with multiple surgical procedures and now w/ continued concerns for infection - would address goals of care and prognosis -would consider Palliative consult given lack of improvement in mental status throughout long admission with multiple surgical procedures and now w/ continued concerns for infection   - would address goals of care and prognosis as well as identify a decision maker/next of kin as patient cannot make decisions for herself given her AMS

## 2018-05-26 LAB
ANION GAP SERPL CALC-SCNC: 10 MMOL/L — SIGNIFICANT CHANGE UP (ref 5–17)
BUN SERPL-MCNC: 31 MG/DL — HIGH (ref 7–23)
CALCIUM SERPL-MCNC: 9.7 MG/DL — SIGNIFICANT CHANGE UP (ref 8.4–10.5)
CHLORIDE SERPL-SCNC: 111 MMOL/L — HIGH (ref 96–108)
CO2 SERPL-SCNC: 23 MMOL/L — SIGNIFICANT CHANGE UP (ref 22–31)
CREAT SERPL-MCNC: 0.67 MG/DL — SIGNIFICANT CHANGE UP (ref 0.5–1.3)
GLUCOSE BLDC GLUCOMTR-MCNC: 140 MG/DL — HIGH (ref 70–99)
GLUCOSE BLDC GLUCOMTR-MCNC: 142 MG/DL — HIGH (ref 70–99)
GLUCOSE BLDC GLUCOMTR-MCNC: 143 MG/DL — HIGH (ref 70–99)
GLUCOSE BLDC GLUCOMTR-MCNC: 157 MG/DL — HIGH (ref 70–99)
GLUCOSE BLDC GLUCOMTR-MCNC: 158 MG/DL — HIGH (ref 70–99)
GLUCOSE SERPL-MCNC: 145 MG/DL — HIGH (ref 70–99)
HCT VFR BLD CALC: 28.9 % — LOW (ref 34.5–45)
HGB BLD-MCNC: 9.3 G/DL — LOW (ref 11.5–15.5)
MAGNESIUM SERPL-MCNC: 1.8 MG/DL — SIGNIFICANT CHANGE UP (ref 1.6–2.6)
MCHC RBC-ENTMCNC: 31 PG — SIGNIFICANT CHANGE UP (ref 27–34)
MCHC RBC-ENTMCNC: 32.2 G/DL — SIGNIFICANT CHANGE UP (ref 32–36)
MCV RBC AUTO: 96.3 FL — SIGNIFICANT CHANGE UP (ref 80–100)
PHOSPHATE SERPL-MCNC: 3.4 MG/DL — SIGNIFICANT CHANGE UP (ref 2.5–4.5)
PLATELET # BLD AUTO: 279 K/UL — SIGNIFICANT CHANGE UP (ref 150–400)
POTASSIUM SERPL-MCNC: 3.7 MMOL/L — SIGNIFICANT CHANGE UP (ref 3.5–5.3)
POTASSIUM SERPL-SCNC: 3.7 MMOL/L — SIGNIFICANT CHANGE UP (ref 3.5–5.3)
RBC # BLD: 3 M/UL — LOW (ref 3.8–5.2)
RBC # FLD: 17.3 % — HIGH (ref 10.3–16.9)
SODIUM SERPL-SCNC: 144 MMOL/L — SIGNIFICANT CHANGE UP (ref 135–145)
WBC # BLD: 11 K/UL — HIGH (ref 3.8–10.5)
WBC # FLD AUTO: 11 K/UL — HIGH (ref 3.8–10.5)

## 2018-05-26 PROCEDURE — 99232 SBSQ HOSP IP/OBS MODERATE 35: CPT

## 2018-05-26 RX ORDER — POTASSIUM CHLORIDE 20 MEQ
10 PACKET (EA) ORAL ONCE
Qty: 0 | Refills: 0 | Status: COMPLETED | OUTPATIENT
Start: 2018-05-26 | End: 2018-05-26

## 2018-05-26 RX ADMIN — Medication 2: at 07:08

## 2018-05-26 RX ADMIN — AMLODIPINE BESYLATE 10 MILLIGRAM(S): 2.5 TABLET ORAL at 05:55

## 2018-05-26 RX ADMIN — Medication 100 MILLIEQUIVALENT(S): at 09:19

## 2018-05-26 RX ADMIN — Medication 1 DROP(S): at 09:20

## 2018-05-26 RX ADMIN — INSULIN GLARGINE 10 UNIT(S): 100 INJECTION, SOLUTION SUBCUTANEOUS at 22:18

## 2018-05-26 RX ADMIN — Medication 75 MICROGRAM(S): at 05:57

## 2018-05-26 RX ADMIN — Medication 1 DROP(S): at 22:17

## 2018-05-26 RX ADMIN — LISINOPRIL 20 MILLIGRAM(S): 2.5 TABLET ORAL at 05:55

## 2018-05-26 RX ADMIN — PANTOPRAZOLE SODIUM 40 MILLIGRAM(S): 20 TABLET, DELAYED RELEASE ORAL at 12:27

## 2018-05-26 NOTE — PROGRESS NOTE ADULT - SUBJECTIVE AND OBJECTIVE BOX
O/N: EMANUEL  5/25: c diff negative, lisinopril inc to 20mg per medicine, wound vac changed O/N: EMANUEL  5/25: c diff negative, lisinopril inc to 20mg per medicine, wound vac changed       SUBJECTIVE: Patient seen and examined bedside by chief resident. No acute events, vitals stable.    amLODIPine   Tablet 10 milliGRAM(s) Oral daily  lisinopril 20 milliGRAM(s) Enteral Tube daily      Vital Signs Last 24 Hrs  T(C): 37.3 (26 May 2018 05:00), Max: 37.3 (26 May 2018 05:00)  T(F): 99.1 (26 May 2018 05:00), Max: 99.1 (26 May 2018 05:00)  HR: 94 (26 May 2018 05:48) (84 - 94)  BP: 118/68 (26 May 2018 05:48) (118/68 - 166/73)  BP(mean): 84 (26 May 2018 05:48) (84 - 113)  RR: 19 (26 May 2018 05:48) (17 - 20)  SpO2: 96% (26 May 2018 05:48) (96% - 100%)  I&O's Detail    25 May 2018 07:01  -  26 May 2018 07:00  --------------------------------------------------------  IN:  Total IN: 0 mL    OUT:    Bulb: 370 mL    Rectal Tube: 200 mL  Total OUT: 570 mL    Total NET: -570 mL      26 May 2018 07:01  -  26 May 2018 08:43  --------------------------------------------------------  IN:  Total IN: 0 mL    OUT:    Bulb: 50 mL  Total OUT: 50 mL    Total NET: -50 mL          General: NAD, resting comfortably in bed  C/V: NSR  Pulm: Nonlabored breathing, no respiratory distress  Abd: soft, NT/ND. Incisions clean, dry, and intact.  Extrem: WWP, no edema, SCDs in place        LABS:                        9.3    11.0  )-----------( 279      ( 26 May 2018 07:06 )             28.9     05-26    144  |  111<H>  |  31<H>  ----------------------------<  145<H>  3.7   |  23  |  0.67    Ca    9.7      26 May 2018 07:06  Phos  3.4     05-26  Mg     1.8     05-26            RADIOLOGY & ADDITIONAL STUDIES:

## 2018-05-26 NOTE — PROGRESS NOTE ADULT - PROBLEM SELECTOR PLAN 4
pt continues to respond intermittently to commands with head nods, CT head negative for acute pathology  -as pt presented w/AMS secondary to sepsis and acute infectious process w/bacteremia and UTI, now w/ persistent AMS  -continue w/treatment as per primary team of abdominal wound and continue to monitor  -would consider palliative consult (as above)

## 2018-05-26 NOTE — PROGRESS NOTE ADULT - PROBLEM SELECTOR PLAN 5
recommend nutrition consult to evaluate for possible change in tube feeds to control diarrhea  - Ct consistent w/possible colitis, C.diff negative, would call nutrition to consider dietary modifications of tube feeds to help control diarrhea

## 2018-05-26 NOTE — PROGRESS NOTE ADULT - ATTENDING COMMENTS
Pt seen and examined  Agree with above  - pt with continued diarrhea,   - htn improved  - pt appears euvolemic today  - consider palliative consult

## 2018-05-26 NOTE — PROGRESS NOTE ADULT - PROBLEM SELECTOR PLAN 2
-given pleural effusion seen on CT, would give 20mg lasix IV today and monitor UOP and would obtain CXR tomorrow morning to assess pleural effusions  -unclear etiology as patient has normal EF on echocardiogram, however, had been requiring lasix while in the SICU, possibly secondary to HTN and fluid shifts after multiple surgeries, would treat as above and continue to monitor

## 2018-05-26 NOTE — PROGRESS NOTE ADULT - PROBLEM SELECTOR PLAN 3
-would consider Palliative consult given lack of improvement in mental status throughout long admission with multiple surgical procedures and now w/ continued concerns for infection   - would address goals of care and prognosis as well as identify a decision maker/next of kin as patient cannot make decisions for herself given her AMS

## 2018-05-26 NOTE — PROGRESS NOTE ADULT - SUBJECTIVE AND OBJECTIVE BOX
SUBJECTIVE / INTERVAL HPI: Pt had one episode of hypertension overnight, lisinopril increased to 20mg daily. Patient seen and examined at bedside.     VITAL SIGNS:  Vital Signs Last 24 Hrs  T(C): 36.6 (26 May 2018 09:05), Max: 37.3 (26 May 2018 05:00)  T(F): 97.9 (26 May 2018 09:05), Max: 99.1 (26 May 2018 05:00)  HR: 94 (26 May 2018 05:48) (84 - 94)  BP: 118/68 (26 May 2018 05:48) (118/68 - 166/73)  BP(mean): 84 (26 May 2018 05:48) (84 - 113)  RR: 19 (26 May 2018 05:48) (17 - 20)  SpO2: 96% (26 May 2018 05:48) (96% - 100%)    PHYSICAL EXAM: in progress     General: WDWN  HEENT: NC/AT; PERRL, clear conjunctiva  Neck: supple  Cardiovascular: +S1/S2; RRR  Respiratory: CTA b/l; no W/R/R  Gastrointestinal: soft, NT/ND; +BSx4  Extremities: WWP; 2+ peripheral pulses; no edema   Neurological: AAOx3; no focal deficits    MEDICATIONS:  MEDICATIONS  (STANDING):  amLODIPine   Tablet 10 milliGRAM(s) Oral daily  artificial  tears Solution 1 Drop(s) Both EYES two times a day  dextrose 50% Injectable 12.5 Gram(s) IV Push once  dextrose 50% Injectable 25 Gram(s) IV Push once  insulin glargine Injectable (LANTUS) 10 Unit(s) SubCutaneous at bedtime  insulin lispro (HumaLOG) corrective regimen sliding scale   SubCutaneous every 6 hours  levothyroxine Injectable 75 MICROGram(s) IV Push <User Schedule>  lisinopril 20 milliGRAM(s) Enteral Tube daily  pantoprazole   Suspension 40 milliGRAM(s) Oral daily  sodium chloride 0.9% lock flush 20 milliLiter(s) IV Push once    MEDICATIONS  (PRN):  acetaminophen    Suspension. 650 milliGRAM(s) Oral every 6 hours PRN Moderate Pain (4 - 6)  ALBUTerol/ipratropium for Nebulization 3 milliLiter(s) Nebulizer every 6 hours PRN Shortness of Breath and/or Wheezing  ondansetron Injectable 4 milliGRAM(s) IV Push every 6 hours PRN Nausea  sodium chloride 0.9% lock flush 10 milliLiter(s) IV Push every 1 hour PRN After each medication administration  sodium chloride 0.9% lock flush 10 milliLiter(s) IV Push every 12 hours PRN Lumen of catheter NOT used      ALLERGIES:  Allergies    No Known Allergies    Intolerances        LABS:                        9.3    11.0  )-----------( 279      ( 26 May 2018 07:06 )             28.9     05-26    144  |  111<H>  |  31<H>  ----------------------------<  145<H>  3.7   |  23  |  0.67    Ca    9.7      26 May 2018 07:06  Phos  3.4     05-26  Mg     1.8     05-26          CAPILLARY BLOOD GLUCOSE      POCT Blood Glucose.: 158 mg/dL (26 May 2018 06:22)      RADIOLOGY & ADDITIONAL TESTS: Reviewed. SUBJECTIVE / INTERVAL HPI: Pt had one episode of hypertension overnight, lisinopril increased to 20mg daily. Patient seen and examined at bedside.     VITAL SIGNS:  Vital Signs Last 24 Hrs  T(C): 36.6 (26 May 2018 09:05), Max: 37.3 (26 May 2018 05:00)  T(F): 97.9 (26 May 2018 09:05), Max: 99.1 (26 May 2018 05:00)  HR: 94 (26 May 2018 05:48) (84 - 94)  BP: 118/68 (26 May 2018 05:48) (118/68 - 166/73)  BP(mean): 84 (26 May 2018 05:48) (84 - 113)  RR: 19 (26 May 2018 05:48) (17 - 20)  SpO2: 96% (26 May 2018 05:48) (96% - 100%)    PHYSICAL EXAM:      General: resting in bed, eyes tracking around the room, in NAD  HEENT: NC/AT; PERRL, clear conjunctiva, dry tongue  Neck: supple  Cardiovascular: +S1/S2; RRR  Respiratory: equal air movement bilaterally  Gastrointestinal: soft, midline wound vac in place, peg tube functioning w/tubes running (new dressing in place to protect skin)   Extremities: WWP; 2+ peripheral pulses; no edema   Neurological: alert, non-verbal cannot further assess mental status    MEDICATIONS:  MEDICATIONS  (STANDING):  amLODIPine   Tablet 10 milliGRAM(s) Oral daily  artificial  tears Solution 1 Drop(s) Both EYES two times a day  dextrose 50% Injectable 12.5 Gram(s) IV Push once  dextrose 50% Injectable 25 Gram(s) IV Push once  insulin glargine Injectable (LANTUS) 10 Unit(s) SubCutaneous at bedtime  insulin lispro (HumaLOG) corrective regimen sliding scale   SubCutaneous every 6 hours  levothyroxine Injectable 75 MICROGram(s) IV Push <User Schedule>  lisinopril 20 milliGRAM(s) Enteral Tube daily  pantoprazole   Suspension 40 milliGRAM(s) Oral daily  sodium chloride 0.9% lock flush 20 milliLiter(s) IV Push once    MEDICATIONS  (PRN):  acetaminophen    Suspension. 650 milliGRAM(s) Oral every 6 hours PRN Moderate Pain (4 - 6)  ALBUTerol/ipratropium for Nebulization 3 milliLiter(s) Nebulizer every 6 hours PRN Shortness of Breath and/or Wheezing  ondansetron Injectable 4 milliGRAM(s) IV Push every 6 hours PRN Nausea  sodium chloride 0.9% lock flush 10 milliLiter(s) IV Push every 1 hour PRN After each medication administration  sodium chloride 0.9% lock flush 10 milliLiter(s) IV Push every 12 hours PRN Lumen of catheter NOT used      ALLERGIES:  Allergies    No Known Allergies    Intolerances        LABS:                        9.3    11.0  )-----------( 279      ( 26 May 2018 07:06 )             28.9     05-26    144  |  111<H>  |  31<H>  ----------------------------<  145<H>  3.7   |  23  |  0.67    Ca    9.7      26 May 2018 07:06  Phos  3.4     05-26  Mg     1.8     05-26          CAPILLARY BLOOD GLUCOSE      POCT Blood Glucose.: 158 mg/dL (26 May 2018 06:22)      RADIOLOGY & ADDITIONAL TESTS: Reviewed.

## 2018-05-26 NOTE — PROGRESS NOTE ADULT - ASSESSMENT
79 y/o F with sepsis from open abdominal wound infection, MSSA bacteremia present on admission, and UTI present on admission found to have contained small bowel leak, s/p exlap, TAHBSO, SBR 5/15, s/p PEG 5/16    Neuro: seroquel 25 QHS, zofran prn, tylenol prn, holding: aripiprazole, escitalapram  CV: HD stable s/p severe sepsis, [id number] , 4/19 Echo  65-70%.  Pulm: NC  GI: NPO, protonix, PEG on glucerna 1.2 TF (goal 59) failed S&S  : Voiding, S/p AKF on HD now resolved. UTI (present on admission)    ID: None D/c'd:MSSA in blood: Oxacillin ( 4/17-) TTE neg for endocarditis. Jose L( 4/17-5/21) Eden: ( 4/27-5/21)   Endo: ISS, Synthroid  Insulin  in TPN: 25. Lupron 5/15.Lantus 10 qhs   PPx: sqh held for low plts  PE:Sp IVC filter on 3/30   Lines: PIV,  PICC (5/9-)  /// D/c'd: Sherley (5/15-?)R IJ TLC (5/15-5/?),L Rad Art line( 4/15-4/18),  Rt IJ TLC from OSH ( 4/15-), LIJ TLC (4/29-5/9)  Wounds: Wound vac to midline incision , change MWF  PT/OT: PT ordered 5/19

## 2018-05-26 NOTE — PROGRESS NOTE ADULT - PROBLEM SELECTOR PLAN 1
1 episode of hypertension overnight, increased lisinopril to 20mg daily, now normotensive  -would c/w lisinopril 20mg daily w/plan to increase as needed if pt continues to have episodes of hypertension   -EKG consistent w/NSR and w/o ST segment abnormalities, troponin mildly elevated to 0.4, given lack of evidence of ischemia on EKG, troponin elevation possibly secondary to demand ischemia from HTN urgency  -CT head unchanged from prior (with meningioma) no acute pathology, neurologic etiology unlikely cause of HTN  ---> given negative cardiac an neuro work ups possible etiology include pain related elevations in BP vs worsening essential hypertension

## 2018-05-26 NOTE — PROGRESS NOTE ADULT - SUBJECTIVE AND OBJECTIVE BOX
INTERVAL HPI/OVERNIGHT EVENTS:    Patient seen and examined.   No events      ROS:      unable to obtain         ANTIBIOTICS/RELEVANT:    MEDICATIONS  (STANDING):  amLODIPine   Tablet 10 milliGRAM(s) Oral daily  artificial  tears Solution 1 Drop(s) Both EYES two times a day  dextrose 50% Injectable 12.5 Gram(s) IV Push once  dextrose 50% Injectable 25 Gram(s) IV Push once  insulin glargine Injectable (LANTUS) 10 Unit(s) SubCutaneous at bedtime  insulin lispro (HumaLOG) corrective regimen sliding scale   SubCutaneous every 6 hours  levothyroxine Injectable 75 MICROGram(s) IV Push <User Schedule>  lisinopril 20 milliGRAM(s) Enteral Tube daily  pantoprazole   Suspension 40 milliGRAM(s) Oral daily  sodium chloride 0.9% lock flush 20 milliLiter(s) IV Push once    MEDICATIONS  (PRN):  acetaminophen    Suspension. 650 milliGRAM(s) Oral every 6 hours PRN Moderate Pain (4 - 6)  ALBUTerol/ipratropium for Nebulization 3 milliLiter(s) Nebulizer every 6 hours PRN Shortness of Breath and/or Wheezing  ondansetron Injectable 4 milliGRAM(s) IV Push every 6 hours PRN Nausea  sodium chloride 0.9% lock flush 10 milliLiter(s) IV Push every 1 hour PRN After each medication administration  sodium chloride 0.9% lock flush 10 milliLiter(s) IV Push every 12 hours PRN Lumen of catheter NOT used        Vital Signs Last 24 Hrs  T(C): 36.6 (26 May 2018 09:05), Max: 37.3 (26 May 2018 05:00)  T(F): 97.9 (26 May 2018 09:05), Max: 99.1 (26 May 2018 05:00)  HR: 86 (26 May 2018 08:20) (84 - 94)  BP: 157/81 (26 May 2018 08:20) (118/68 - 166/73)  BP(mean): 108 (26 May 2018 08:20) (84 - 113)  RR: 19 (26 May 2018 08:20) (17 - 20)  SpO2: 98% (26 May 2018 08:20) (96% - 100%)    PHYSICAL EXAM:  Constitutional:  non-toxic, chronically ill appearing  Eyes: no icterus  Ear/Nose/Throat: no sinus tenderness	  Neck:no JVD, no lymphadenopathy, supple  Respiratory: CTA maris  Cardiovascular: S1S2 RRR, no murmurs  Gastrointestinal:soft, small wound vac in place   Extremities:no e/e/c  Vascular: DP Pulse:	right normal; left normal      LABS:                        9.3    11.0  )-----------( 279      ( 26 May 2018 07:06 )             28.9     05-26    144  |  111<H>  |  31<H>  ----------------------------<  145<H>  3.7   |  23  |  0.67    Ca    9.7      26 May 2018 07:06  Phos  3.4     05-26  Mg     1.8     05-26      C. Diff:  negative       MICROBIOLOGY:    Culture - Urine (05.22.18 @ 22:40)    Specimen Source: Catheterized Catheterized    Culture Results:   No growth        Culture - Blood (05.22.18 @ 17:44)    Specimen Source: .Blood Blood    Culture Results:   No growth at 3 days.    RADIOLOGY & ADDITIONAL STUDIES:

## 2018-05-26 NOTE — PROGRESS NOTE ADULT - ASSESSMENT
IMPRESSION:  Intraabdominal infection s/p antibiotics.  Patient stable off antibiotics.  No fevers, decreasing WBC.  C. Diff negative    Recommend:  1. No antibiotics at this time  2. Monitor clinically    Will sign off. Reconsult as needed

## 2018-05-26 NOTE — PROGRESS NOTE ADULT - PROBLEM SELECTOR PLAN 7
blood sugars well controlled on current regimen c/w lantus 10units   -would change ISS to regular insulin instead of lantus [id number] w/plan to adjust as appropriate.

## 2018-05-26 NOTE — PROGRESS NOTE ADULT - ASSESSMENT
77 yo [place] speaking F w/pmhx of morbid obesity, DM, HTN, hypothyroidism, sepsis secondary to ventral hernia infection s/p repair, PE s/p IVC filter, acute blood loss anemia from hemorrhagic uterine fibroids and UGIB, ATN requiring HD, sepsis secondary to MSSA bacteremia and MDR E coli UTI now w/AMS and s/p TAHBSO and small bowel resection on 5/15 followed by  PEG placement on 5/16 and medicine consulted for HTN urgency and fluid management.

## 2018-05-27 LAB
ANION GAP SERPL CALC-SCNC: 12 MMOL/L — SIGNIFICANT CHANGE UP (ref 5–17)
BUN SERPL-MCNC: 30 MG/DL — HIGH (ref 7–23)
CALCIUM SERPL-MCNC: 9.8 MG/DL — SIGNIFICANT CHANGE UP (ref 8.4–10.5)
CHLORIDE SERPL-SCNC: 112 MMOL/L — HIGH (ref 96–108)
CO2 SERPL-SCNC: 22 MMOL/L — SIGNIFICANT CHANGE UP (ref 22–31)
CREAT SERPL-MCNC: 0.67 MG/DL — SIGNIFICANT CHANGE UP (ref 0.5–1.3)
CULTURE RESULTS: SIGNIFICANT CHANGE UP
CULTURE RESULTS: SIGNIFICANT CHANGE UP
GLUCOSE BLDC GLUCOMTR-MCNC: 134 MG/DL — HIGH (ref 70–99)
GLUCOSE BLDC GLUCOMTR-MCNC: 145 MG/DL — HIGH (ref 70–99)
GLUCOSE BLDC GLUCOMTR-MCNC: 169 MG/DL — HIGH (ref 70–99)
GLUCOSE SERPL-MCNC: 128 MG/DL — HIGH (ref 70–99)
HCT VFR BLD CALC: 31.2 % — LOW (ref 34.5–45)
HGB BLD-MCNC: 9.8 G/DL — LOW (ref 11.5–15.5)
MAGNESIUM SERPL-MCNC: 1.7 MG/DL — SIGNIFICANT CHANGE UP (ref 1.6–2.6)
MCHC RBC-ENTMCNC: 31.1 PG — SIGNIFICANT CHANGE UP (ref 27–34)
MCHC RBC-ENTMCNC: 31.4 G/DL — LOW (ref 32–36)
MCV RBC AUTO: 99 FL — SIGNIFICANT CHANGE UP (ref 80–100)
PHOSPHATE SERPL-MCNC: 3.4 MG/DL — SIGNIFICANT CHANGE UP (ref 2.5–4.5)
PLATELET # BLD AUTO: 300 K/UL — SIGNIFICANT CHANGE UP (ref 150–400)
POTASSIUM SERPL-MCNC: 3.9 MMOL/L — SIGNIFICANT CHANGE UP (ref 3.5–5.3)
POTASSIUM SERPL-SCNC: 3.9 MMOL/L — SIGNIFICANT CHANGE UP (ref 3.5–5.3)
RBC # BLD: 3.15 M/UL — LOW (ref 3.8–5.2)
RBC # FLD: 16.6 % — SIGNIFICANT CHANGE UP (ref 10.3–16.9)
SODIUM SERPL-SCNC: 146 MMOL/L — HIGH (ref 135–145)
SPECIMEN SOURCE: SIGNIFICANT CHANGE UP
SPECIMEN SOURCE: SIGNIFICANT CHANGE UP
WBC # BLD: 14 K/UL — HIGH (ref 3.8–10.5)
WBC # FLD AUTO: 14 K/UL — HIGH (ref 3.8–10.5)

## 2018-05-27 PROCEDURE — 99232 SBSQ HOSP IP/OBS MODERATE 35: CPT

## 2018-05-27 RX ORDER — HYDRALAZINE HCL 50 MG
10 TABLET ORAL ONCE
Qty: 0 | Refills: 0 | Status: COMPLETED | OUTPATIENT
Start: 2018-05-27 | End: 2018-05-27

## 2018-05-27 RX ADMIN — Medication 1 DROP(S): at 21:26

## 2018-05-27 RX ADMIN — Medication 2: at 00:06

## 2018-05-27 RX ADMIN — Medication 10 MILLIGRAM(S): at 18:32

## 2018-05-27 RX ADMIN — INSULIN GLARGINE 10 UNIT(S): 100 INJECTION, SOLUTION SUBCUTANEOUS at 21:25

## 2018-05-27 RX ADMIN — Medication 1 DROP(S): at 11:10

## 2018-05-27 RX ADMIN — Medication 75 MICROGRAM(S): at 06:08

## 2018-05-27 RX ADMIN — PANTOPRAZOLE SODIUM 40 MILLIGRAM(S): 20 TABLET, DELAYED RELEASE ORAL at 11:10

## 2018-05-27 RX ADMIN — AMLODIPINE BESYLATE 10 MILLIGRAM(S): 2.5 TABLET ORAL at 06:08

## 2018-05-27 RX ADMIN — LISINOPRIL 20 MILLIGRAM(S): 2.5 TABLET ORAL at 06:08

## 2018-05-27 RX ADMIN — Medication 2: at 12:27

## 2018-05-27 RX ADMIN — Medication 10 MILLIGRAM(S): at 08:33

## 2018-05-27 NOTE — PROGRESS NOTE ADULT - ATTENDING COMMENTS
77 yo F with hx of abdominal wound infection with course complicated by sepsis and altered mental status, now obtunded, responsive to painful stimuli.   - would recommend palliative care consult  - for continued htn, please increase lisinopril to 40mg daily  - for hypernatremia and fluid balance, pt may be dry, would recommend starting free water through the PEG tube of 125ccc every 6 hours as pt likely not receiving enough water with her feeds  - will continue to monitor

## 2018-05-27 NOTE — PROGRESS NOTE ADULT - ASSESSMENT
79 y/o F with sepsis from open abdominal wound infection, MSSA bacteremia present on admission, and UTI present on admission found to have contained small bowel leak, s/p exlap, TAHBSO, SBR 5/15, s/p PEG 5/16    Neuro: seroquel 25 QHS, zofran prn, tylenol prn, holding: aripiprazole, escitalapram  CV: HD stable s/p severe sepsis, [id number] , 4/19 Echo  65-70%.  Pulm: NC  GI: NPO, protonix, PEG on glucerna 1.2 TF (goal 59) failed S&S  : Voiding, S/p AKF on HD now resolved. UTI (present on admission)    ID: None D/c'd:MSSA in blood: Oxacillin ( 4/17-) TTE neg for endocarditis. Jose L( 4/17-5/21) Eden: ( 4/27-5/21)   Endo: ISS, Synthroid  Insulin  in TPN: 25. Lupron 5/15.Lantus 10 qhs   PPx: sqh held for low plts  PE:Sp IVC filter on 3/30   Lines: PIV,  PICC (5/9-)  /// D/c'd: Sherley (5/15-?)R IJ TLC (5/15-5/?),L Rad Art line( 4/15-4/18),  Rt IJ TLC from OSH ( 4/15-), LIJ TLC (4/29-5/9)  Wounds: Wound vac to midline incision , change MWF  PT/OT: PT ordered 5/19

## 2018-05-27 NOTE — PROGRESS NOTE ADULT - SUBJECTIVE AND OBJECTIVE BOX
5/26 day/ovn: EMANUEL, VSS. BCx negative for 4 days.    79 y/o F with sepsis from open abdominal wound infection, MSSA bacteremia present on admission, and UTI present on admission found to have contained small bowel leak, s/p exlap, TAHBSO, SBR 5/15, s/p PEG 5/16    Neuro: seroquel 25 QHS, zofran prn, tylenol prn, holding: aripiprazole, escitalapram  CV: HD stable s/p severe sepsis, [id number] , 4/19 Echo  65-70%.  Pulm: NC  GI: NPO, protonix, PEG on glucerna 1.2 TF (goal 59) failed S&S  : Voiding, S/p AKF on HD now resolved. UTI (present on admission)    ID: None D/c'd:MSSA in blood: Oxacillin ( 4/17-) TTE neg for endocarditis. Jose L( 4/17-5/21) Eden: ( 4/27-5/21)   Endo: ISS, Synthroid  Insulin  in TPN: 25. Lupron 5/15.Lantus 10 qhs   PPx: sqh held for low plts  PE:Sp IVC filter on 3/30   Lines: PIV,  PICC (5/9-)  /// D/c'd: [place] (5/15-?)R IJ TLC (5/15-5/?),L Rad Art line( 4/15-4/18),  Rt IJ TLC from OSH ( 4/15-), LIJ TLC (4/29-5/9)  Wounds: Wound vac to midline incision , change MWF  PT/OT: PT ordered 5/19 5/26 day/ovn: EMANUEL, VSS. BCx negative for 4 days.    STATUS POST:    Surgery:   4/26: IR drain   4/30: LP with IR  5/15: DAVY BSO, sb resection with primary anastamosis, washout, LEANN drain placement    SUBJECTIVE: Patient seen and examined bedside by chief resident and surgical team.      amLODIPine   Tablet 10 milliGRAM(s) Oral daily  lisinopril 20 milliGRAM(s) Enteral Tube daily      Vital Signs Last 24 Hrs  T(C): 36.1 (27 May 2018 05:00), Max: 36.9 (26 May 2018 13:53)  T(F): 97 (27 May 2018 05:00), Max: 98.5 (26 May 2018 13:53)  HR: 84 (27 May 2018 05:30) (84 - 90)  BP: 179/85 (27 May 2018 05:30) (138/84 - 179/85)  BP(mean): 115 (27 May 2018 00:40) (99 - 115)  RR: 18 (27 May 2018 05:30) (18 - 19)  SpO2: 100% (27 May 2018 05:30) (96% - 100%)  I&O's Detail    26 May 2018 07:01  -  27 May 2018 07:00  --------------------------------------------------------  IN:    Enteral Tube Flush: 120 mL    Glucerna: 472 mL  Total IN: 592 mL    OUT:    Bulb: 490 mL    Rectal Tube: 100 mL    VAC (Vacuum Assisted Closure) System: 50 mL  Total OUT: 640 mL    Total NET: -48 mL      General: NAD, resting comfortably in bed  C/V: NSR  Pulm: Nonlabored breathing, no respiratory distress  Abd: soft, NT/ND. Wound vac in place and to suction  Extrem: WWP, no edema, SCDs in place    LABS:                        9.8    14.0  )-----------( 300      ( 27 May 2018 05:43 )             31.2     05-27    146<H>  |  112<H>  |  30<H>  ----------------------------<  128<H>  3.9   |  22  |  0.67    Ca    9.8      27 May 2018 05:43  Phos  3.4     05-27  Mg     1.7     05-27            RADIOLOGY & ADDITIONAL STUDIES:

## 2018-05-27 NOTE — PROGRESS NOTE ADULT - SUBJECTIVE AND OBJECTIVE BOX
Patient is a 78y old  Female who presents with a chief complaint of Abdominal wound and multifactorial sepsis, now with altered mental status, not able to participate in interview.   S: not able to answer questions 2/2 to lethargy    MEDICATIONS  (STANDING):  amLODIPine   Tablet 10 milliGRAM(s) Oral daily  artificial  tears Solution 1 Drop(s) Both EYES two times a day  dextrose 50% Injectable 12.5 Gram(s) IV Push once  dextrose 50% Injectable 25 Gram(s) IV Push once  insulin glargine Injectable (LANTUS) 10 Unit(s) SubCutaneous at bedtime  insulin lispro (HumaLOG) corrective regimen sliding scale   SubCutaneous every 6 hours  levothyroxine Injectable 75 MICROGram(s) IV Push <User Schedule>  lisinopril 20 milliGRAM(s) Enteral Tube daily  pantoprazole   Suspension 40 milliGRAM(s) Oral daily  sodium chloride 0.9% lock flush 20 milliLiter(s) IV Push once    MEDICATIONS  (PRN):  acetaminophen    Suspension. 650 milliGRAM(s) Oral every 6 hours PRN Moderate Pain (4 - 6)  ALBUTerol/ipratropium for Nebulization 3 milliLiter(s) Nebulizer every 6 hours PRN Shortness of Breath and/or Wheezing  ondansetron Injectable 4 milliGRAM(s) IV Push every 6 hours PRN Nausea  sodium chloride 0.9% lock flush 10 milliLiter(s) IV Push every 1 hour PRN After each medication administration  sodium chloride 0.9% lock flush 10 milliLiter(s) IV Push every 12 hours PRN Lumen of catheter NOT used      Allergies    No Known Allergies    Intolerances    Vital Signs Last 24 Hrs  T(C): 35.9 (27 May 2018 09:49), Max: 36.9 (26 May 2018 13:53)  T(F): 96.6 (27 May 2018 09:49), Max: 98.5 (26 May 2018 13:53)  HR: 98 (27 May 2018 13:10) (84 - 98)  BP: 151/77 (27 May 2018 13:10) (138/84 - 182/84)  BP(mean): 108 (27 May 2018 13:10) (99 - 120)  RR: 18 (27 May 2018 13:10) (18 - 19)  SpO2: 100% (27 May 2018 13:10) (96% - 100%)  CAPILLARY BLOOD GLUCOSE      POCT Blood Glucose.: 169 mg/dL (27 May 2018 12:06)  POCT Blood Glucose.: 134 mg/dL (27 May 2018 07:07)  POCT Blood Glucose.: 157 mg/dL (26 May 2018 23:50)  POCT Blood Glucose.: 142 mg/dL (26 May 2018 21:26)  POCT Blood Glucose.: 143 mg/dL (26 May 2018 16:42)      05-26 @ 07:01 - 05-27 @ 07:00  --------------------------------------------------------  IN: 592 mL / OUT: 640 mL / NET: -48 mL    05-27 @ 07:01 - 05-27 @ 13:29  --------------------------------------------------------  IN: 413 mL / OUT: 85 mL / NET: 328 mL    Physical Exam:    Daily     Daily   General:  Well appearing, NAD, not cachetic  HEENT:  Nonicteric, PERRLA  CV:  RRR, no murmur, no JVD  Lungs:  CTA B/L, no wheezes, rales, rhonchi  Abdomen:  wound vac in place  Extremities:  2+ pulses, no c/c, no edema  Skin:  Warm and dry, no rashes    Neuro:  responds to painful stimuli, hyperreflexia  No Restraints    LABS:                        9.8    14.0  )-----------( 300      ( 27 May 2018 05:43 )             31.2     05-27    146<H>  |  112<H>  |  30<H>  ----------------------------<  128<H>  3.9   |  22  |  0.67    Ca    9.8      27 May 2018 05:43  Phos  3.4     05-27  Mg     1.7     05-27

## 2018-05-28 LAB
ALBUMIN SERPL ELPH-MCNC: 2.6 G/DL — LOW (ref 3.3–5)
ALP SERPL-CCNC: 137 U/L — HIGH (ref 40–120)
ALT FLD-CCNC: 53 U/L — HIGH (ref 10–45)
ANION GAP SERPL CALC-SCNC: 10 MMOL/L — SIGNIFICANT CHANGE UP (ref 5–17)
AST SERPL-CCNC: 35 U/L — SIGNIFICANT CHANGE UP (ref 10–40)
BILIRUB DIRECT SERPL-MCNC: 0.3 MG/DL — HIGH (ref 0–0.2)
BILIRUB INDIRECT FLD-MCNC: 0.4 MG/DL — SIGNIFICANT CHANGE UP (ref 0.2–1)
BILIRUB SERPL-MCNC: 0.7 MG/DL — SIGNIFICANT CHANGE UP (ref 0.2–1.2)
BUN SERPL-MCNC: 30 MG/DL — HIGH (ref 7–23)
CALCIUM SERPL-MCNC: 10.3 MG/DL — SIGNIFICANT CHANGE UP (ref 8.4–10.5)
CHLORIDE SERPL-SCNC: 118 MMOL/L — HIGH (ref 96–108)
CO2 SERPL-SCNC: 22 MMOL/L — SIGNIFICANT CHANGE UP (ref 22–31)
CREAT SERPL-MCNC: 0.7 MG/DL — SIGNIFICANT CHANGE UP (ref 0.5–1.3)
GLUCOSE BLDC GLUCOMTR-MCNC: 142 MG/DL — HIGH (ref 70–99)
GLUCOSE BLDC GLUCOMTR-MCNC: 146 MG/DL — HIGH (ref 70–99)
GLUCOSE BLDC GLUCOMTR-MCNC: 149 MG/DL — HIGH (ref 70–99)
GLUCOSE BLDC GLUCOMTR-MCNC: 158 MG/DL — HIGH (ref 70–99)
GLUCOSE BLDC GLUCOMTR-MCNC: 159 MG/DL — HIGH (ref 70–99)
GLUCOSE BLDC GLUCOMTR-MCNC: 160 MG/DL — HIGH (ref 70–99)
GLUCOSE SERPL-MCNC: 156 MG/DL — HIGH (ref 70–99)
HCT VFR BLD CALC: 31.8 % — LOW (ref 34.5–45)
HGB BLD-MCNC: 9.9 G/DL — LOW (ref 11.5–15.5)
MAGNESIUM SERPL-MCNC: 1.7 MG/DL — SIGNIFICANT CHANGE UP (ref 1.6–2.6)
MCHC RBC-ENTMCNC: 30.2 PG — SIGNIFICANT CHANGE UP (ref 27–34)
MCHC RBC-ENTMCNC: 31.1 G/DL — LOW (ref 32–36)
MCV RBC AUTO: 97 FL — SIGNIFICANT CHANGE UP (ref 80–100)
PHOSPHATE SERPL-MCNC: 3.4 MG/DL — SIGNIFICANT CHANGE UP (ref 2.5–4.5)
PLATELET # BLD AUTO: 371 K/UL — SIGNIFICANT CHANGE UP (ref 150–400)
POTASSIUM SERPL-MCNC: 4.2 MMOL/L — SIGNIFICANT CHANGE UP (ref 3.5–5.3)
POTASSIUM SERPL-SCNC: 4.2 MMOL/L — SIGNIFICANT CHANGE UP (ref 3.5–5.3)
PROT SERPL-MCNC: 6.1 G/DL — SIGNIFICANT CHANGE UP (ref 6–8.3)
RBC # BLD: 3.28 M/UL — LOW (ref 3.8–5.2)
RBC # FLD: 17.2 % — HIGH (ref 10.3–16.9)
SODIUM SERPL-SCNC: 150 MMOL/L — HIGH (ref 135–145)
WBC # BLD: 16 K/UL — HIGH (ref 3.8–10.5)
WBC # FLD AUTO: 16 K/UL — HIGH (ref 3.8–10.5)

## 2018-05-28 PROCEDURE — 99232 SBSQ HOSP IP/OBS MODERATE 35: CPT

## 2018-05-28 PROCEDURE — 76705 ECHO EXAM OF ABDOMEN: CPT | Mod: 26

## 2018-05-28 RX ORDER — LABETALOL HCL 100 MG
10 TABLET ORAL ONCE
Qty: 0 | Refills: 0 | Status: COMPLETED | OUTPATIENT
Start: 2018-05-28 | End: 2018-05-28

## 2018-05-28 RX ORDER — HYDRALAZINE HCL 50 MG
10 TABLET ORAL ONCE
Qty: 0 | Refills: 0 | Status: COMPLETED | OUTPATIENT
Start: 2018-05-28 | End: 2018-05-28

## 2018-05-28 RX ORDER — MAGNESIUM SULFATE 500 MG/ML
1 VIAL (ML) INJECTION ONCE
Qty: 0 | Refills: 0 | Status: COMPLETED | OUTPATIENT
Start: 2018-05-28 | End: 2018-05-28

## 2018-05-28 RX ORDER — HEPARIN SODIUM 5000 [USP'U]/ML
5000 INJECTION INTRAVENOUS; SUBCUTANEOUS EVERY 8 HOURS
Qty: 0 | Refills: 0 | Status: DISCONTINUED | OUTPATIENT
Start: 2018-05-28 | End: 2018-06-04

## 2018-05-28 RX ADMIN — Medication 10 MILLIGRAM(S): at 04:22

## 2018-05-28 RX ADMIN — Medication 10 MILLIGRAM(S): at 01:24

## 2018-05-28 RX ADMIN — HEPARIN SODIUM 5000 UNIT(S): 5000 INJECTION INTRAVENOUS; SUBCUTANEOUS at 13:24

## 2018-05-28 RX ADMIN — LISINOPRIL 20 MILLIGRAM(S): 2.5 TABLET ORAL at 05:40

## 2018-05-28 RX ADMIN — Medication 75 MICROGRAM(S): at 06:15

## 2018-05-28 RX ADMIN — INSULIN GLARGINE 10 UNIT(S): 100 INJECTION, SOLUTION SUBCUTANEOUS at 22:58

## 2018-05-28 RX ADMIN — PANTOPRAZOLE SODIUM 40 MILLIGRAM(S): 20 TABLET, DELAYED RELEASE ORAL at 13:24

## 2018-05-28 RX ADMIN — Medication 100 GRAM(S): at 13:24

## 2018-05-28 RX ADMIN — HEPARIN SODIUM 5000 UNIT(S): 5000 INJECTION INTRAVENOUS; SUBCUTANEOUS at 21:52

## 2018-05-28 RX ADMIN — Medication 1 DROP(S): at 21:53

## 2018-05-28 RX ADMIN — Medication 2: at 06:46

## 2018-05-28 RX ADMIN — Medication 1 DROP(S): at 13:24

## 2018-05-28 RX ADMIN — Medication 2: at 01:06

## 2018-05-28 RX ADMIN — Medication 10 MILLIGRAM(S): at 03:24

## 2018-05-28 RX ADMIN — AMLODIPINE BESYLATE 10 MILLIGRAM(S): 2.5 TABLET ORAL at 05:40

## 2018-05-28 RX ADMIN — Medication 10 MILLIGRAM(S): at 18:32

## 2018-05-28 NOTE — PROGRESS NOTE ADULT - ASSESSMENT
77 y/o F with sepsis from open abdominal wound infection, MSSA bacteremia present on admission, and UTI present on admission found to have contained small bowel leak, s/p exlap, TAHBSO, SBR 5/15, s/p PEG 5/16    NPO/Tube Feeds/Free Water 125ml Q6H  Telemetry/BP control  Home meds  : Voiding  PPx: PE:Sp IVC filter on 3/30   Wound vac to midline incision , change MWF  Continue PT/OT

## 2018-05-28 NOTE — PROGRESS NOTE ADULT - SUBJECTIVE AND OBJECTIVE BOX
INTERVAL HPI/OVERNIGHT EVENTS: Multiple episodes of hypertension and tachycardia to 120s, resolved with hydralazine and labetolol    STATUS POST:    4/26: IR drain   4/30: LP with IR  5/15: DAVY BSO, sb resection with primary anastamosis, washout, LEANN drain placement    SUBJECTIVE:  Flatus: [ ] YES [ ] NO             Bowel Movement: [ ] YES [ ] NO  Pain (0-10):            Pain Control Adequate: [ ] YES [ ] NO  Nausea: [ ] YES [ ] NO            Vomiting: [ ] YES [ ] NO  Diarrhea: [ ] YES [ ] NO         Constipation: [ ] YES [ ] NO     Chest Pain: [ ] YES [ ] NO    SOB:  [ ] YES [ ] NO    MEDICATIONS  (STANDING):  amLODIPine   Tablet 10 milliGRAM(s) Oral daily  artificial  tears Solution 1 Drop(s) Both EYES two times a day  dextrose 50% Injectable 12.5 Gram(s) IV Push once  dextrose 50% Injectable 25 Gram(s) IV Push once  insulin glargine Injectable (LANTUS) 10 Unit(s) SubCutaneous at bedtime  insulin lispro (HumaLOG) corrective regimen sliding scale   SubCutaneous every 6 hours  levothyroxine Injectable 75 MICROGram(s) IV Push <User Schedule>  lisinopril 20 milliGRAM(s) Enteral Tube daily  pantoprazole   Suspension 40 milliGRAM(s) Oral daily  sodium chloride 0.9% lock flush 20 milliLiter(s) IV Push once    MEDICATIONS  (PRN):  acetaminophen    Suspension. 650 milliGRAM(s) Oral every 6 hours PRN Moderate Pain (4 - 6)  ALBUTerol/ipratropium for Nebulization 3 milliLiter(s) Nebulizer every 6 hours PRN Shortness of Breath and/or Wheezing  ondansetron Injectable 4 milliGRAM(s) IV Push every 6 hours PRN Nausea  sodium chloride 0.9% lock flush 10 milliLiter(s) IV Push every 1 hour PRN After each medication administration  sodium chloride 0.9% lock flush 10 milliLiter(s) IV Push every 12 hours PRN Lumen of catheter NOT used      Vital Signs Last 24 Hrs  T(C): 36.1 (27 May 2018 23:08), Max: 36.7 (27 May 2018 14:32)  T(F): 96.9 (27 May 2018 23:08), Max: 98 (27 May 2018 14:32)  HR: 92 (28 May 2018 04:28) (84 - 126)  BP: 143/76 (28 May 2018 04:28) (143/76 - 184/111)  BP(mean): 101 (28 May 2018 04:28) (101 - 149)  RR: 19 (28 May 2018 04:02) (16 - 19)  SpO2: 100% (28 May 2018 04:28) (99% - 100%)    PHYSICAL EXAM:      Constitutional: A&Ox3    Breasts:    Respiratory: non labored breathing, no respiratory distress    Cardiovascular: NSR, RRR    Gastrointestinal:                 Incision:    Genitourinary:    Extremities: (-) edema                  I&O's Detail    26 May 2018 07:01  -  27 May 2018 07:00  --------------------------------------------------------  IN:    Enteral Tube Flush: 120 mL    Glucerna: 472 mL  Total IN: 592 mL    OUT:    Bulb: 490 mL    Rectal Tube: 100 mL    VAC (Vacuum Assisted Closure) System: 50 mL  Total OUT: 640 mL    Total NET: -48 mL      27 May 2018 07:01  -  28 May 2018 05:09  --------------------------------------------------------  IN:    Glucerna: 1180 mL  Total IN: 1180 mL    OUT:    Bulb: 375 mL    Rectal Tube: 325 mL    VAC (Vacuum Assisted Closure) System: 125 mL  Total OUT: 825 mL    Total NET: 355 mL          LABS:                        9.8    14.0  )-----------( 300      ( 27 May 2018 05:43 )             31.2     05-27    146<H>  |  112<H>  |  30<H>  ----------------------------<  128<H>  3.9   |  22  |  0.67    Ca    9.8      27 May 2018 05:43  Phos  3.4     05-27  Mg     1.7     05-27            RADIOLOGY & ADDITIONAL STUDIES: O/N:   BP poorly controlled Hydralazine 10mg given x 3 and Labetolol 10mg for sbps for >180s  No DVT PPx? f/u if should start HSQ  5/27: Hydralazine 10mg given for sbp 182, BCx neg for 5 days    SUBJECTIVE: Patient seen and examined bedside by chief resident.    amLODIPine   Tablet 10 milliGRAM(s) Oral daily  lisinopril 20 milliGRAM(s) Enteral Tube daily      Vital Signs Last 24 Hrs  T(C): 37.6 (28 May 2018 05:21), Max: 37.6 (28 May 2018 05:21)  T(F): 99.7 (28 May 2018 05:21), Max: 99.7 (28 May 2018 05:21)  HR: 82 (28 May 2018 05:42) (82 - 126)  BP: 121/65 (28 May 2018 05:42) (121/65 - 184/111)  BP(mean): 87 (28 May 2018 05:42) (87 - 149)  RR: 19 (28 May 2018 04:02) (16 - 19)  SpO2: 100% (28 May 2018 05:42) (99% - 100%)  I&O's Detail    27 May 2018 07:01  -  28 May 2018 07:00  --------------------------------------------------------  IN:    Free Water: 200 mL    Glucerna: 1416 mL    Solution: 100 mL  Total IN: 1716 mL    OUT:    Bulb: 455 mL    Rectal Tube: 325 mL    VAC (Vacuum Assisted Closure) System: 125 mL  Total OUT: 905 mL    Total NET: 811 mL          General: NAD, resting comfortably in bed  Pulm: Nonlabored breathing, no respiratory distress  Abd: soft, NT/ND, wound vac functioning, LEANN serous         LABS:                        9.8    14.0  )-----------( 300      ( 27 May 2018 05:43 )             31.2     05-28    150<H>  |  118<H>  |  30<H>  ----------------------------<  156<H>  4.2   |  22  |  0.70    Ca    10.3      28 May 2018 06:50  Phos  3.4     05-28  Mg     1.7     05-28            RADIOLOGY & ADDITIONAL STUDIES:

## 2018-05-28 NOTE — PROGRESS NOTE ADULT - NSHPATTENDINGPLANDISCUSS_GEN_ALL_CORE
ICU Team
Housestaff
ICU Team
as above
housestaff
team
Dr Ngo
Dr. Ngo
Dr. Zamora
Dr. Ngo
Dr. Ngo
as above
as above
team
Dr. Ngo
Team
housestaff

## 2018-05-28 NOTE — PROGRESS NOTE ADULT - ATTENDING COMMENTS
77 yo F admitted for surgical management of hernia complicated by severe infections and now with continued altered mental status.   Pt has improved BP today, but worsening hypernatremia and appears dry, this is likely 2/2 to a free water deficit.   Please continued free water of 125cc every 6 hours via PEG  Pt has poor prognosis at this time - d/w with family at bedside (HCP) her poor prognosis and considerations for goals of care  Recommend palliative care consult for further discussion  Will continue to monitor

## 2018-05-28 NOTE — PROGRESS NOTE ADULT - MINUTES
30
25
30
45
25
30
35
40
45
45
10
10
25
30
45
50
30
35
40
45
30
30
40
45
45
30
40
45
45
15

## 2018-05-28 NOTE — PROGRESS NOTE ADULT - SUBJECTIVE AND OBJECTIVE BOX
Patient is a 78y old  Female who presents with a chief complaint of Abdominal wound and multifactorial sepsis, now with continued lethargy and altered mental status.   pt is unable to participate in interview.     INTERVAL HPI/OVERNIGHT EVENTS:   No overnight events.     Review of Systems: 12 point review of systems otherwise negative  ( - )fevers/chills  ( - ) dyspnea  ( - ) cough  ( - ) chest pain  ( - ) palpatations  ( - ) dizziness/lightheadedness  ( - ) nausea/vomiting  ( - ) abd pain  ( - ) diarrhea  ( - ) melena  ( - ) hematochezia  ( - ) dysuria  ( - ) hematuria  ( - ) leg swelling  ( -) calf tenderness  ( - ) motor weakness  ( - ) extremity numbness  ( - ) back pain  ( + ) tolerating POs  ( + ) BM    MEDICATIONS  (STANDING):  amLODIPine   Tablet 10 milliGRAM(s) Oral daily  artificial  tears Solution 1 Drop(s) Both EYES two times a day  dextrose 50% Injectable 12.5 Gram(s) IV Push once  dextrose 50% Injectable 25 Gram(s) IV Push once  heparin  Injectable 5000 Unit(s) SubCutaneous every 8 hours  insulin glargine Injectable (LANTUS) 10 Unit(s) SubCutaneous at bedtime  insulin lispro (HumaLOG) corrective regimen sliding scale   SubCutaneous every 6 hours  levothyroxine Injectable 75 MICROGram(s) IV Push <User Schedule>  lisinopril 20 milliGRAM(s) Enteral Tube daily  pantoprazole   Suspension 40 milliGRAM(s) Oral daily  sodium chloride 0.9% lock flush 20 milliLiter(s) IV Push once    MEDICATIONS  (PRN):  acetaminophen    Suspension. 650 milliGRAM(s) Oral every 6 hours PRN Moderate Pain (4 - 6)  ALBUTerol/ipratropium for Nebulization 3 milliLiter(s) Nebulizer every 6 hours PRN Shortness of Breath and/or Wheezing  ondansetron Injectable 4 milliGRAM(s) IV Push every 6 hours PRN Nausea  sodium chloride 0.9% lock flush 10 milliLiter(s) IV Push every 1 hour PRN After each medication administration  sodium chloride 0.9% lock flush 10 milliLiter(s) IV Push every 12 hours PRN Lumen of catheter NOT used      Allergies    No Known Allergies    Intolerances          Vital Signs Last 24 Hrs  T(C): 36.4 (28 May 2018 14:00), Max: 37.6 (28 May 2018 05:21)  T(F): 97.6 (28 May 2018 14:00), Max: 99.7 (28 May 2018 05:21)  HR: 102 (28 May 2018 08:28) (82 - 126)  BP: 153/72 (28 May 2018 08:28) (121/65 - 184/111)  BP(mean): 98 (28 May 2018 08:28) (87 - 149)  RR: 18 (28 May 2018 08:28) (16 - 19)  SpO2: 100% (28 May 2018 08:28) (99% - 100%)  CAPILLARY BLOOD GLUCOSE      POCT Blood Glucose.: 149 mg/dL (28 May 2018 12:00)  POCT Blood Glucose.: 159 mg/dL (28 May 2018 06:43)  POCT Blood Glucose.: 160 mg/dL (28 May 2018 01:02)  POCT Blood Glucose.: 145 mg/dL (27 May 2018 16:26)      05-27 @ 07:01 - 05-28 @ 07:00  --------------------------------------------------------  IN: 1716 mL / OUT: 905 mL / NET: 811 mL    05-28 @ 07:01 - 05-28 @ 14:48  --------------------------------------------------------  IN: 0 mL / OUT: 100 mL / NET: -100 mL    Physical Exam:    General:  Well appearing, NAD, not cachetic  HEENT:  Nonicteric, PERRLA  CV:  RRR, no murmur, no JVD  Lungs:  CTA B/L, no wheezes, rales, rhonchi  Extremities:  2+ pulses, no c/c, no edema  Skin:  Warm and dry, no rashes  Neuro:  AAOx0, but responsive to tactile stimulus  No Restraints    LABS:                        9.9    16.0  )-----------( 371      ( 28 May 2018 06:50 )             31.8     05-28    150<H>  |  118<H>  |  30<H>  ----------------------------<  156<H>  4.2   |  22  |  0.70    Ca    10.3      28 May 2018 06:50  Phos  3.4     05-28  Mg     1.7     05-28    TPro  6.1  /  Alb  2.6<L>  /  TBili  0.7  /  DBili  0.3<H>  /  AST  35  /  ALT  53<H>  /  AlkPhos  137<H>  05-28

## 2018-05-29 DIAGNOSIS — E87.0 HYPEROSMOLALITY AND HYPERNATREMIA: ICD-10-CM

## 2018-05-29 LAB
ANION GAP SERPL CALC-SCNC: 14 MMOL/L — SIGNIFICANT CHANGE UP (ref 5–17)
BUN SERPL-MCNC: 32 MG/DL — HIGH (ref 7–23)
CALCIUM SERPL-MCNC: 10.2 MG/DL — SIGNIFICANT CHANGE UP (ref 8.4–10.5)
CHLORIDE SERPL-SCNC: 116 MMOL/L — HIGH (ref 96–108)
CO2 SERPL-SCNC: 20 MMOL/L — LOW (ref 22–31)
CREAT SERPL-MCNC: 0.66 MG/DL — SIGNIFICANT CHANGE UP (ref 0.5–1.3)
GLUCOSE BLDC GLUCOMTR-MCNC: 132 MG/DL — HIGH (ref 70–99)
GLUCOSE BLDC GLUCOMTR-MCNC: 139 MG/DL — HIGH (ref 70–99)
GLUCOSE BLDC GLUCOMTR-MCNC: 150 MG/DL — HIGH (ref 70–99)
GLUCOSE BLDC GLUCOMTR-MCNC: 161 MG/DL — HIGH (ref 70–99)
GLUCOSE SERPL-MCNC: 147 MG/DL — HIGH (ref 70–99)
HCT VFR BLD CALC: 31.3 % — LOW (ref 34.5–45)
HGB BLD-MCNC: 9.9 G/DL — LOW (ref 11.5–15.5)
MAGNESIUM SERPL-MCNC: 1.7 MG/DL — SIGNIFICANT CHANGE UP (ref 1.6–2.6)
MCHC RBC-ENTMCNC: 30.7 PG — SIGNIFICANT CHANGE UP (ref 27–34)
MCHC RBC-ENTMCNC: 31.6 G/DL — LOW (ref 32–36)
MCV RBC AUTO: 96.9 FL — SIGNIFICANT CHANGE UP (ref 80–100)
PHOSPHATE SERPL-MCNC: 3.3 MG/DL — SIGNIFICANT CHANGE UP (ref 2.5–4.5)
PLATELET # BLD AUTO: 332 K/UL — SIGNIFICANT CHANGE UP (ref 150–400)
POTASSIUM SERPL-MCNC: 3.6 MMOL/L — SIGNIFICANT CHANGE UP (ref 3.5–5.3)
POTASSIUM SERPL-SCNC: 3.6 MMOL/L — SIGNIFICANT CHANGE UP (ref 3.5–5.3)
RBC # BLD: 3.23 M/UL — LOW (ref 3.8–5.2)
RBC # FLD: 17.3 % — HIGH (ref 10.3–16.9)
SODIUM SERPL-SCNC: 150 MMOL/L — HIGH (ref 135–145)
SURGICAL PATHOLOGY STUDY: SIGNIFICANT CHANGE UP
WBC # BLD: 13.2 K/UL — HIGH (ref 3.8–10.5)
WBC # FLD AUTO: 13.2 K/UL — HIGH (ref 3.8–10.5)

## 2018-05-29 PROCEDURE — 99233 SBSQ HOSP IP/OBS HIGH 50: CPT

## 2018-05-29 RX ORDER — LACTOBACILLUS ACIDOPHILUS 100MM CELL
1 CAPSULE ORAL DAILY
Qty: 0 | Refills: 0 | Status: DISCONTINUED | OUTPATIENT
Start: 2018-05-29 | End: 2018-06-04

## 2018-05-29 RX ORDER — LISINOPRIL 2.5 MG/1
40 TABLET ORAL DAILY
Qty: 0 | Refills: 0 | Status: DISCONTINUED | OUTPATIENT
Start: 2018-05-29 | End: 2018-06-04

## 2018-05-29 RX ORDER — HYDRALAZINE HCL 50 MG
10 TABLET ORAL ONCE
Qty: 0 | Refills: 0 | Status: COMPLETED | OUTPATIENT
Start: 2018-05-29 | End: 2018-05-29

## 2018-05-29 RX ORDER — MAGNESIUM SULFATE 500 MG/ML
1 VIAL (ML) INJECTION ONCE
Qty: 0 | Refills: 0 | Status: COMPLETED | OUTPATIENT
Start: 2018-05-29 | End: 2018-05-29

## 2018-05-29 RX ORDER — HYDRALAZINE HCL 50 MG
10 TABLET ORAL ONCE
Qty: 0 | Refills: 0 | Status: DISCONTINUED | OUTPATIENT
Start: 2018-05-29 | End: 2018-05-29

## 2018-05-29 RX ORDER — PSYLLIUM SEED (WITH DEXTROSE)
1 POWDER (GRAM) ORAL DAILY
Qty: 0 | Refills: 0 | Status: DISCONTINUED | OUTPATIENT
Start: 2018-05-29 | End: 2018-06-01

## 2018-05-29 RX ORDER — LABETALOL HCL 100 MG
100 TABLET ORAL
Qty: 0 | Refills: 0 | Status: DISCONTINUED | OUTPATIENT
Start: 2018-05-29 | End: 2018-05-30

## 2018-05-29 RX ORDER — POTASSIUM CHLORIDE 20 MEQ
10 PACKET (EA) ORAL
Qty: 0 | Refills: 0 | Status: COMPLETED | OUTPATIENT
Start: 2018-05-29 | End: 2018-05-29

## 2018-05-29 RX ADMIN — HEPARIN SODIUM 5000 UNIT(S): 5000 INJECTION INTRAVENOUS; SUBCUTANEOUS at 05:52

## 2018-05-29 RX ADMIN — PANTOPRAZOLE SODIUM 40 MILLIGRAM(S): 20 TABLET, DELAYED RELEASE ORAL at 12:16

## 2018-05-29 RX ADMIN — Medication 2: at 07:05

## 2018-05-29 RX ADMIN — Medication 100 MILLIGRAM(S): at 19:38

## 2018-05-29 RX ADMIN — SODIUM CHLORIDE 10 MILLILITER(S): 9 INJECTION INTRAMUSCULAR; INTRAVENOUS; SUBCUTANEOUS at 05:51

## 2018-05-29 RX ADMIN — Medication 1 DROP(S): at 09:39

## 2018-05-29 RX ADMIN — HEPARIN SODIUM 5000 UNIT(S): 5000 INJECTION INTRAVENOUS; SUBCUTANEOUS at 21:59

## 2018-05-29 RX ADMIN — Medication 75 MICROGRAM(S): at 07:05

## 2018-05-29 RX ADMIN — HEPARIN SODIUM 5000 UNIT(S): 5000 INJECTION INTRAVENOUS; SUBCUTANEOUS at 15:43

## 2018-05-29 RX ADMIN — Medication 1 DROP(S): at 21:59

## 2018-05-29 RX ADMIN — LISINOPRIL 20 MILLIGRAM(S): 2.5 TABLET ORAL at 09:38

## 2018-05-29 RX ADMIN — LISINOPRIL 20 MILLIGRAM(S): 2.5 TABLET ORAL at 05:52

## 2018-05-29 RX ADMIN — Medication 100 MILLIEQUIVALENT(S): at 10:15

## 2018-05-29 RX ADMIN — Medication 10 MILLIGRAM(S): at 21:59

## 2018-05-29 RX ADMIN — Medication 100 GRAM(S): at 09:35

## 2018-05-29 RX ADMIN — AMLODIPINE BESYLATE 10 MILLIGRAM(S): 2.5 TABLET ORAL at 05:52

## 2018-05-29 RX ADMIN — Medication 100 MILLIEQUIVALENT(S): at 09:35

## 2018-05-29 RX ADMIN — Medication 1 TABLET(S): at 15:43

## 2018-05-29 RX ADMIN — Medication 100 MILLIEQUIVALENT(S): at 09:38

## 2018-05-29 RX ADMIN — Medication 1 PACKET(S): at 15:43

## 2018-05-29 RX ADMIN — Medication 2: at 00:13

## 2018-05-29 RX ADMIN — INSULIN GLARGINE 10 UNIT(S): 100 INJECTION, SOLUTION SUBCUTANEOUS at 21:59

## 2018-05-29 RX ADMIN — Medication 10 MILLIGRAM(S): at 05:11

## 2018-05-29 NOTE — PROGRESS NOTE ADULT - PROBLEM SELECTOR PLAN 1
Continued despite being afebrile, WBC trending down. Electrolytes abnormality could be contributing.  Would recommend goal of care discussion with family, would involve palliative. Continued despite being afebrile, WBC trending down.   CT head negative  Consider further encephalopathy w/o with ammonia, TSH, B12, folate  Consider neuro eval  Would recommend goal of care discussion with family, would involve palliative care at this point.

## 2018-05-29 NOTE — PROGRESS NOTE ADULT - PROBLEM SELECTOR PLAN 2
-190's systolic, was given labatalel 10 and hydralazine 10 IV overnight.   Max on amlodipine and lisinopril. HCTZ would be the next step however patient could be hypovolemic due to persistent diarrhea.  Would start Coreg 3.25 BID, titrate up as tolerated. Goal BP <160 systolic. -190's systolic, was given labatalel 10 and hydralazine 10 IV overnight.   Max on amlodipine and lisinopril. HCTZ would be the next step however patient could be hypovolemic due to persistent diarrhea.  Would start labatalol 100mg BID. -190's systolic, was given labetalol 10 and hydralazine 10 IV overnight.   Max on amlodipine and lisinopril.   Would start labetalol 100mg BID.

## 2018-05-29 NOTE — PROGRESS NOTE ADULT - PROBLEM SELECTOR PLAN 3
Water deficit of 3L, was started on 125cc free water Q6H yesterday without improvement.   Would try to correct 1.5L free water deficit today, recommend 375cc free water Q6H. Water deficit of 3L, was started on 125cc free water Q6H yesterday without improvement.   recommend continue 250cc free water Q6H.

## 2018-05-29 NOTE — PROGRESS NOTE ADULT - SUBJECTIVE AND OBJECTIVE BOX
O/N: labetalol 10mg given good response,   5/28: SQH started, wound vac changed, u/s performed O/N: labetalol 10mg given good response, Hydralazine 10 mg for sbp 190  5/28: SQH started, wound vac changed, u/s performed O/N: labetalol 10mg given good response, Hydralazine 10 mg for sbp 190  5/28: SQH started, wound vac changed, u/s performed       SUBJECTIVE: Patient seen and examined bedside by chief resident.    amLODIPine   Tablet 10 milliGRAM(s) Oral daily  heparin  Injectable 5000 Unit(s) SubCutaneous every 8 hours  lisinopril 40 milliGRAM(s) Enteral Tube daily      Vital Signs Last 24 Hrs  T(C): 36.1 (29 May 2018 04:17), Max: 36.4 (28 May 2018 14:00)  T(F): 97 (29 May 2018 04:17), Max: 97.6 (28 May 2018 14:00)  HR: 84 (29 May 2018 05:53) (72 - 102)  BP: 160/81 (29 May 2018 05:53) (144/89 - 193/91)  BP(mean): 112 (29 May 2018 05:53) (98 - 134)  RR: 18 (29 May 2018 05:53) (18 - 19)  SpO2: 100% (29 May 2018 05:53) (98% - 100%)  I&O's Detail    28 May 2018 07:01  -  29 May 2018 07:00  --------------------------------------------------------  IN:    Enteral Tube Flush: 90 mL    Glucerna: 708 mL    Solution: 100 mL  Total IN: 898 mL    OUT:    Bulb: 375 mL    VAC (Vacuum Assisted Closure) System: 75 mL  Total OUT: 450 mL    Total NET: 448 mL          General: NAD, resting comfortably in bed  C/V: NSR  Pulm: Nonlabored breathing, no respiratory distress  Abd: soft, NT/ND, incisions CDI, LEANN SS, wound vac in place  Extrem: WWP, no edema, SCDs in place        LABS:                        9.9    16.0  )-----------( 371      ( 28 May 2018 06:50 )             31.8     05-28    150<H>  |  118<H>  |  30<H>  ----------------------------<  156<H>  4.2   |  22  |  0.70    Ca    10.3      28 May 2018 06:50  Phos  3.4     05-28  Mg     1.7     05-28    TPro  6.1  /  Alb  2.6<L>  /  TBili  0.7  /  DBili  0.3<H>  /  AST  35  /  ALT  53<H>  /  AlkPhos  137<H>  05-28          RADIOLOGY & ADDITIONAL STUDIES:

## 2018-05-29 NOTE — PROGRESS NOTE ADULT - SUBJECTIVE AND OBJECTIVE BOX
Pt examined at bedside.   Patient not responding to question.    ROS: unable to assess due to non-verval    V/S    T(F): 98.8 (05-29-18 @ 14:33), Max: 98.8 (05-29-18 @ 14:33)  HR: 78 (05-29-18 @ 16:10)  BP: 174/77 (05-29-18 @ 16:10)  RR: 18 (05-29-18 @ 16:10)  SpO2: 100% (05-29-18 @ 16:10)  Wt(kg): --  PE     GEN - Appears stated age. appeared comfortable.           HEENT - NCAT, EOMI, PERRLA, MMM           CVS - RRR, no M/R/G, no JVD           PULM - CTA B/L, equal air entry, no increased work of breathing           ABD - Soft, nontender, nondistended, normal BS           EXT - Distal extremities warm, no cyanosis, no edema.           NEURO - AOx3, Moves all extremities.     LAB                        9.9    13.2  )-----------( 332      ( 29 May 2018 07:35 )             31.3               05-29    150<H>  |  116<H>  |  32<H>  ----------------------------<  147<H>  3.6   |  20<L>  |  0.66    Ca    10.2      29 May 2018 07:35  Phos  3.3     05-29  Mg     1.7     05-29    TPro  6.1  /  Alb  2.6<L>  /  TBili  0.7  /  DBili  0.3<H>  /  AST  35  /  ALT  53<H>  /  AlkPhos  137<H>  05-28                          LIVER FUNCTIONS - ( 28 May 2018 06:50 )  Alb: 2.6 g/dL / Pro: 6.1 g/dL / ALK PHOS: 137 U/L / ALT: 53 U/L / AST: 35 U/L / GGT: x             Additional tests & radiology reviewed.

## 2018-05-29 NOTE — PROGRESS NOTE ADULT - ATTENDING COMMENTS
Pt seen and examined, VS reviewed, exam as above, labs and imaging reviewed, plan d/w Dr Lin and modified above.

## 2018-05-29 NOTE — PROGRESS NOTE ADULT - ASSESSMENT
77 y/o F with sepsis from open abdominal wound infection, MSSA bacteremia present on admission, and UTI present on admission found to have contained small bowel leak, s/p exlap, TAHBSO, SBR 5/15, s/p PEG 5/16    Neuro: seroquel 25 QHS, zofran prn, tylenol prn, holding: aripiprazole, escitalapram  CV: HD stable s/p severe sepsis, [id number] , 4/19 Echo  65-70%.  Pulm: NC  GI: NPO, protonix, PEG on glucerna 1.2 TF (goal 59) failed S&S  : Voiding, S/p AKF on HD now resolved. UTI (present on admission)    ID: None D/c'd:MSSA in blood: Oxacillin ( 4/17-) TTE neg for endocarditis. Jose L( 4/17-5/21) [place]: ( 4/27-5/21)   Endo: ISS, Synthroid  Insulin  in TPN: 25. Lupron 5/15.Lantus 10 qhs   PPx: sqh held for low plts  PE:Sp IVC filter on 3/30   Lines: PIV,  PICC (5/9-)  /// D/c'd: Sherley (5/15-?)R IJ TLC (5/15-5/?),L Rad Art line( 4/15-4/18),  Rt IJ TLC from OSH ( 4/15-), LIJ TLC (4/29-5/9)  Wounds: Wound vac to midline incision , change MWF  PT/OT: PT ordered 5/19

## 2018-05-29 NOTE — PROGRESS NOTE ADULT - PROBLEM SELECTOR PLAN 4
Persistent, with rectal tube brown loose stool. C- diff work up negative. Past CT scan showed non-specific colitis.  No antibotics per past ID note.  Could be 2/2 to feeding modality, would consult nutrition for more bulk forming formulary. Persistent, with rectal tube brown loose stool. C- diff work up negative. Past CT scan showed non-specific colitis.  No antibotics per past ID note.  Could be 2/2 to feeding modality, would consult nutrition for more less osmotic formulary. Persistent, with rectal tube brown loose stool. C- diff work up negative. Past CT scan showed non-specific colitis.  No antibiotics per past ID notes  Could be 2/2 to feeds, would consult nutrition for more less osmotic formulary  Could also increase psyllium powder

## 2018-05-30 LAB
ANION GAP SERPL CALC-SCNC: 9 MMOL/L — SIGNIFICANT CHANGE UP (ref 5–17)
BUN SERPL-MCNC: 31 MG/DL — HIGH (ref 7–23)
CALCIUM SERPL-MCNC: 9.7 MG/DL — SIGNIFICANT CHANGE UP (ref 8.4–10.5)
CHLORIDE SERPL-SCNC: 111 MMOL/L — HIGH (ref 96–108)
CO2 SERPL-SCNC: 21 MMOL/L — LOW (ref 22–31)
CREAT SERPL-MCNC: 0.7 MG/DL — SIGNIFICANT CHANGE UP (ref 0.5–1.3)
GLUCOSE BLDC GLUCOMTR-MCNC: 121 MG/DL — HIGH (ref 70–99)
GLUCOSE BLDC GLUCOMTR-MCNC: 123 MG/DL — HIGH (ref 70–99)
GLUCOSE BLDC GLUCOMTR-MCNC: 148 MG/DL — HIGH (ref 70–99)
GLUCOSE BLDC GLUCOMTR-MCNC: 93 MG/DL — SIGNIFICANT CHANGE UP (ref 70–99)
GLUCOSE BLDC GLUCOMTR-MCNC: 97 MG/DL — SIGNIFICANT CHANGE UP (ref 70–99)
GLUCOSE SERPL-MCNC: 95 MG/DL — SIGNIFICANT CHANGE UP (ref 70–99)
HCT VFR BLD CALC: 29.2 % — LOW (ref 34.5–45)
HGB BLD-MCNC: 8.9 G/DL — LOW (ref 11.5–15.5)
MAGNESIUM SERPL-MCNC: 1.8 MG/DL — SIGNIFICANT CHANGE UP (ref 1.6–2.6)
MCHC RBC-ENTMCNC: 30.5 G/DL — LOW (ref 32–36)
MCHC RBC-ENTMCNC: 30.8 PG — SIGNIFICANT CHANGE UP (ref 27–34)
MCV RBC AUTO: 101 FL — HIGH (ref 80–100)
PHOSPHATE SERPL-MCNC: 3.6 MG/DL — SIGNIFICANT CHANGE UP (ref 2.5–4.5)
PLATELET # BLD AUTO: 280 K/UL — SIGNIFICANT CHANGE UP (ref 150–400)
POTASSIUM SERPL-MCNC: 3.7 MMOL/L — SIGNIFICANT CHANGE UP (ref 3.5–5.3)
POTASSIUM SERPL-SCNC: 3.7 MMOL/L — SIGNIFICANT CHANGE UP (ref 3.5–5.3)
RBC # BLD: 2.89 M/UL — LOW (ref 3.8–5.2)
RBC # FLD: 17.2 % — HIGH (ref 10.3–16.9)
SODIUM SERPL-SCNC: 141 MMOL/L — SIGNIFICANT CHANGE UP (ref 135–145)
WBC # BLD: 9.6 K/UL — SIGNIFICANT CHANGE UP (ref 3.8–10.5)
WBC # FLD AUTO: 9.6 K/UL — SIGNIFICANT CHANGE UP (ref 3.8–10.5)

## 2018-05-30 PROCEDURE — 99233 SBSQ HOSP IP/OBS HIGH 50: CPT

## 2018-05-30 RX ORDER — MAGNESIUM SULFATE 500 MG/ML
1 VIAL (ML) INJECTION ONCE
Qty: 0 | Refills: 0 | Status: COMPLETED | OUTPATIENT
Start: 2018-05-30 | End: 2018-05-30

## 2018-05-30 RX ORDER — POTASSIUM CHLORIDE 20 MEQ
10 PACKET (EA) ORAL
Qty: 0 | Refills: 0 | Status: COMPLETED | OUTPATIENT
Start: 2018-05-30 | End: 2018-05-30

## 2018-05-30 RX ORDER — LABETALOL HCL 100 MG
200 TABLET ORAL
Qty: 0 | Refills: 0 | Status: DISCONTINUED | OUTPATIENT
Start: 2018-05-30 | End: 2018-06-04

## 2018-05-30 RX ADMIN — HEPARIN SODIUM 5000 UNIT(S): 5000 INJECTION INTRAVENOUS; SUBCUTANEOUS at 06:12

## 2018-05-30 RX ADMIN — PANTOPRAZOLE SODIUM 40 MILLIGRAM(S): 20 TABLET, DELAYED RELEASE ORAL at 11:56

## 2018-05-30 RX ADMIN — HEPARIN SODIUM 5000 UNIT(S): 5000 INJECTION INTRAVENOUS; SUBCUTANEOUS at 13:07

## 2018-05-30 RX ADMIN — Medication 100 MILLIEQUIVALENT(S): at 14:22

## 2018-05-30 RX ADMIN — HEPARIN SODIUM 5000 UNIT(S): 5000 INJECTION INTRAVENOUS; SUBCUTANEOUS at 23:26

## 2018-05-30 RX ADMIN — Medication 100 MILLIEQUIVALENT(S): at 13:08

## 2018-05-30 RX ADMIN — Medication 1 TABLET(S): at 11:57

## 2018-05-30 RX ADMIN — Medication 1 DROP(S): at 10:19

## 2018-05-30 RX ADMIN — Medication 75 MICROGRAM(S): at 06:13

## 2018-05-30 RX ADMIN — Medication 100 MILLIGRAM(S): at 06:12

## 2018-05-30 RX ADMIN — Medication 100 MILLIEQUIVALENT(S): at 11:52

## 2018-05-30 RX ADMIN — Medication 100 GRAM(S): at 10:18

## 2018-05-30 RX ADMIN — Medication 1 PACKET(S): at 11:56

## 2018-05-30 RX ADMIN — Medication 200 MILLIGRAM(S): at 23:26

## 2018-05-30 RX ADMIN — INSULIN GLARGINE 10 UNIT(S): 100 INJECTION, SOLUTION SUBCUTANEOUS at 23:25

## 2018-05-30 RX ADMIN — AMLODIPINE BESYLATE 10 MILLIGRAM(S): 2.5 TABLET ORAL at 06:12

## 2018-05-30 RX ADMIN — LISINOPRIL 40 MILLIGRAM(S): 2.5 TABLET ORAL at 06:12

## 2018-05-30 NOTE — PROGRESS NOTE ADULT - SUBJECTIVE AND OBJECTIVE BOX
Pt examined at bedside.   Patient mumbled, not responding to questions.    V/S    T(F): 97.3 (05-30-18 @ 14:17), Max: 97.5 (05-29-18 @ 22:09)  HR: 82 (05-30-18 @ 08:19)  BP: 149/77 (05-30-18 @ 08:19)  RR: 18 (05-30-18 @ 08:19)  SpO2: 100% (05-30-18 @ 08:19)  Wt(kg): --  PE     GEN - Appears stated age. No distress, intermittent shriveling            HEENT - NCAT, EOMI, PERRLA, MMM           CVS - RRR, no M/R/G, no JVD           PULM - Clear to auscutation, however poor inspiration effort           ABD - Soft, obese abdomen, LEANN tube drain intact, wound appeared intact           EXT - Distal extremities warm, no cyanosis, no edema.           NEURO - AOx0, not moving extremities on command    LAB                        8.9    9.6   )-----------( 280      ( 30 May 2018 07:45 )             29.2               05-30    141  |  111<H>  |  31<H>  ----------------------------<  95  3.7   |  21<L>  |  0.70    Ca    9.7      30 May 2018 07:45  Phos  3.6     05-30  Mg     1.8     05-30                                Additional tests & radiology reviewed.

## 2018-05-30 NOTE — CHART NOTE - NSCHARTNOTEFT_GEN_A_CORE
Admitting Diagnosis:   Patient is a 78y old  Female who presents with a chief complaint of Abdominal wound and multifactorial sepsis (17 Apr 2018 21:02)      PAST MEDICAL & SURGICAL HISTORY:  Hypothyroidism  GERD (gastroesophageal reflux disease)  Obesity  DM (diabetes mellitus)  HLD (hyperlipidemia)  HTN (hypertension)  H/O ventral hernia repair      Current Nutrition Order:  Glucerna 1.2 via PEG at 59ml/hr x 24hr; infusing at ordered goal rate     GI Issues:   Rectal tube removed   Per RN, pt needing frequent cleaning 2/2 loose stool    Pain:  Pt appears to be resting comfortably; sleeping     Skin Integrity:  L thigh skin tear  Abd ASW with VAC  PEG site   IAD perineum  RLQ LEANN    Labs:   05-30    141  |  111<H>  |  31<H>  ----------------------------<  95  3.7   |  21<L>  |  0.70    Ca    9.7      30 May 2018 07:45  Phos  3.6     05-30  Mg     1.8     05-30      CAPILLARY BLOOD GLUCOSE      POCT Blood Glucose.: 123 mg/dL (30 May 2018 11:42)  POCT Blood Glucose.: 93 mg/dL (30 May 2018 06:48)  POCT Blood Glucose.: 121 mg/dL (30 May 2018 00:17)  POCT Blood Glucose.: 139 mg/dL (29 May 2018 21:56)  POCT Blood Glucose.: 150 mg/dL (29 May 2018 16:40)      Medications:  MEDICATIONS  (STANDING):  amLODIPine   Tablet 10 milliGRAM(s) Oral daily  artificial  tears Solution 1 Drop(s) Both EYES two times a day  dextrose 50% Injectable 12.5 Gram(s) IV Push once  dextrose 50% Injectable 25 Gram(s) IV Push once  heparin  Injectable 5000 Unit(s) SubCutaneous every 8 hours  insulin glargine Injectable (LANTUS) 10 Unit(s) SubCutaneous at bedtime  insulin lispro (HumaLOG) corrective regimen sliding scale   SubCutaneous every 6 hours  labetalol 100 milliGRAM(s) Enteral Tube two times a day  lactobacillus acidophilus 1 Tablet(s) Oral daily  levothyroxine Injectable 75 MICROGram(s) IV Push <User Schedule>  lisinopril 40 milliGRAM(s) Enteral Tube daily  pantoprazole   Suspension 40 milliGRAM(s) Oral daily  psyllium Powder 1 Packet(s) Oral daily  sodium chloride 0.9% lock flush 20 milliLiter(s) IV Push once    MEDICATIONS  (PRN):  acetaminophen    Suspension. 650 milliGRAM(s) Oral every 6 hours PRN Moderate Pain (4 - 6)  ALBUTerol/ipratropium for Nebulization 3 milliLiter(s) Nebulizer every 6 hours PRN Shortness of Breath and/or Wheezing  ondansetron Injectable 4 milliGRAM(s) IV Push every 6 hours PRN Nausea  sodium chloride 0.9% lock flush 10 milliLiter(s) IV Push every 1 hour PRN After each medication administration  sodium chloride 0.9% lock flush 10 milliLiter(s) IV Push every 12 hours PRN Lumen of catheter NOT used      Weight:  90.5kg (4/21)  93.6kg (5/15)    Weight Change:   ~3kg wt gain noted; please continue to trend 3x week     Estimated energy needs:   IBW 61kg used for EER and adjusted for post-op/VAC  Calories: 25-30 kcal/kg = 1525-1830kcal/day  Protein: 1.2-1.4 g/kg = 73-85g protein/day  Fluids: 30-35 mL/kg = 1830-2135mL/day    Subjective:   77y/o F with sepsis from open abdominal wound infection, MSSA bacteremia present on admission, and UTI present on admission found to have contained small bowel leak, s/p exlap, TAHBSO, SBR 5/15, s/p PEG 5/16. Pt seen asleep in bed. Rectal tube removed today and pt continues with frequent loose stools per RN. RN reports that she has been giving the metamucil. Can trial Osmolite formula to see if stool output improves. If glucerna is changes to osmolite, closely monitor BG as osmolite is not a carb steady formula. Will follow.     Previous Nutrition Diagnosis:  Increased nutrient needs RT increased demand AEB post-op    Active [X]  Resolved [   ]    If resolved, new PES:     Goal:   Pt to meet % of EER via EN     Recommendations:  1. Change formula to Osmolite 1.2 via PEG at goal rate of 60ml/hr x 24hr (1440ml TV, 1728kcal, 79g, 1180ml water). Additional fluid per team. Monitor for s/s of intolerance and maintain aspiration precautions.   2. Monitor lytes and replete prn.   3. Trend wts 3x/week.     *d/w RN; attempted to page team x2; order verification assigned to MD*    Education:   N/A    Risk Level: High [X] Moderate [   ] Low [   ]. Admitting Diagnosis:   Patient is a 78y old  Female who presents with a chief complaint of Abdominal wound and multifactorial sepsis (17 Apr 2018 21:02)      PAST MEDICAL & SURGICAL HISTORY:  Hypothyroidism  GERD (gastroesophageal reflux disease)  Obesity  DM (diabetes mellitus)  HLD (hyperlipidemia)  HTN (hypertension)  H/O ventral hernia repair      Current Nutrition Order:  Glucerna 1.2 via PEG at 59ml/hr x 24hr; infusing at ordered goal rate     GI Issues:   Rectal tube removed   Per RN, pt needing frequent cleaning 2/2 loose stool    Pain:  Pt appears to be resting comfortably; sleeping     Skin Integrity:  L thigh skin tear  Abd ASW with VAC  PEG site   IAD perineum  RLQ LEANN    Labs:   05-30    141  |  111<H>  |  31<H>  ----------------------------<  95  3.7   |  21<L>  |  0.70    Ca    9.7      30 May 2018 07:45  Phos  3.6     05-30  Mg     1.8     05-30      CAPILLARY BLOOD GLUCOSE      POCT Blood Glucose.: 123 mg/dL (30 May 2018 11:42)  POCT Blood Glucose.: 93 mg/dL (30 May 2018 06:48)  POCT Blood Glucose.: 121 mg/dL (30 May 2018 00:17)  POCT Blood Glucose.: 139 mg/dL (29 May 2018 21:56)  POCT Blood Glucose.: 150 mg/dL (29 May 2018 16:40)      Medications:  MEDICATIONS  (STANDING):  amLODIPine   Tablet 10 milliGRAM(s) Oral daily  artificial  tears Solution 1 Drop(s) Both EYES two times a day  dextrose 50% Injectable 12.5 Gram(s) IV Push once  dextrose 50% Injectable 25 Gram(s) IV Push once  heparin  Injectable 5000 Unit(s) SubCutaneous every 8 hours  insulin glargine Injectable (LANTUS) 10 Unit(s) SubCutaneous at bedtime  insulin lispro (HumaLOG) corrective regimen sliding scale   SubCutaneous every 6 hours  labetalol 100 milliGRAM(s) Enteral Tube two times a day  lactobacillus acidophilus 1 Tablet(s) Oral daily  levothyroxine Injectable 75 MICROGram(s) IV Push <User Schedule>  lisinopril 40 milliGRAM(s) Enteral Tube daily  pantoprazole   Suspension 40 milliGRAM(s) Oral daily  psyllium Powder 1 Packet(s) Oral daily  sodium chloride 0.9% lock flush 20 milliLiter(s) IV Push once    MEDICATIONS  (PRN):  acetaminophen    Suspension. 650 milliGRAM(s) Oral every 6 hours PRN Moderate Pain (4 - 6)  ALBUTerol/ipratropium for Nebulization 3 milliLiter(s) Nebulizer every 6 hours PRN Shortness of Breath and/or Wheezing  ondansetron Injectable 4 milliGRAM(s) IV Push every 6 hours PRN Nausea  sodium chloride 0.9% lock flush 10 milliLiter(s) IV Push every 1 hour PRN After each medication administration  sodium chloride 0.9% lock flush 10 milliLiter(s) IV Push every 12 hours PRN Lumen of catheter NOT used      Weight:  90.5kg (4/21)  93.6kg (5/15)    Weight Change:   ~3kg wt gain noted; please continue to trend 3x week     Estimated energy needs:   IBW 61kg used for EER and adjusted for post-op/VAC  Calories: 25-30 kcal/kg = 1525-1830kcal/day  Protein: 1.2-1.4 g/kg = 73-85g protein/day  Fluids: 30-35 mL/kg = 1830-2135mL/day    Subjective:   77y/o F with sepsis from open abdominal wound infection, MSSA bacteremia present on admission, and UTI present on admission found to have contained small bowel leak, s/p exlap, TAHBSO, SBR 5/15, s/p PEG 5/16. Pt seen asleep in bed. Rectal tube removed today and pt continues with frequent loose stools per RN. RN reports that she has been giving the metamucil. Can trial Osmolite formula to see if stool output improves. If glucerna is changes to osmolite, closely monitor BG as osmolite is not a carb steady formula. Will follow.     Previous Nutrition Diagnosis:  Increased nutrient needs RT increased demand AEB post-op    Active [X]  Resolved [   ]    If resolved, new PES:     Goal:   Pt to meet % of EER via EN     Recommendations:  1. Change formula to Osmolite 1.2 via PEG at goal rate of 60ml/hr x 24hr (1440ml TV, 1728kcal, 79g, 1180ml water). Additional fluid per team. Monitor for s/s of intolerance and maintain aspiration precautions.   2. Monitor lytes and replete prn.   3. Trend wts 3x/week.   4. Consider imodium in loose stool does not improve with osmolite.     *d/w RN; attempted to page team x2; order verification assigned to MD*    Education:   N/A    Risk Level: High [X] Moderate [   ] Low [   ]. Admitting Diagnosis:   Patient is a 78y old  Female who presents with a chief complaint of Abdominal wound and multifactorial sepsis (17 Apr 2018 21:02)      PAST MEDICAL & SURGICAL HISTORY:  Hypothyroidism  GERD (gastroesophageal reflux disease)  Obesity  DM (diabetes mellitus)  HLD (hyperlipidemia)  HTN (hypertension)  H/O ventral hernia repair      Current Nutrition Order:  Glucerna 1.2 via PEG at 59ml/hr x 24hr; infusing at ordered goal rate     GI Issues:   Rectal tube removed   Per RN, pt needing frequent cleaning 2/2 loose stool    Pain:  Pt appears to be resting comfortably; sleeping     Skin Integrity:  L thigh skin tear  Abd ASW with VAC  PEG site   IAD perineum  RLQ LEANN    Labs:   05-30    141  |  111<H>  |  31<H>  ----------------------------<  95  3.7   |  21<L>  |  0.70    Ca    9.7      30 May 2018 07:45  Phos  3.6     05-30  Mg     1.8     05-30      CAPILLARY BLOOD GLUCOSE      POCT Blood Glucose.: 123 mg/dL (30 May 2018 11:42)  POCT Blood Glucose.: 93 mg/dL (30 May 2018 06:48)  POCT Blood Glucose.: 121 mg/dL (30 May 2018 00:17)  POCT Blood Glucose.: 139 mg/dL (29 May 2018 21:56)  POCT Blood Glucose.: 150 mg/dL (29 May 2018 16:40)      Medications:  MEDICATIONS  (STANDING):  amLODIPine   Tablet 10 milliGRAM(s) Oral daily  artificial  tears Solution 1 Drop(s) Both EYES two times a day  dextrose 50% Injectable 12.5 Gram(s) IV Push once  dextrose 50% Injectable 25 Gram(s) IV Push once  heparin  Injectable 5000 Unit(s) SubCutaneous every 8 hours  insulin glargine Injectable (LANTUS) 10 Unit(s) SubCutaneous at bedtime  insulin lispro (HumaLOG) corrective regimen sliding scale   SubCutaneous every 6 hours  labetalol 100 milliGRAM(s) Enteral Tube two times a day  lactobacillus acidophilus 1 Tablet(s) Oral daily  levothyroxine Injectable 75 MICROGram(s) IV Push <User Schedule>  lisinopril 40 milliGRAM(s) Enteral Tube daily  pantoprazole   Suspension 40 milliGRAM(s) Oral daily  psyllium Powder 1 Packet(s) Oral daily  sodium chloride 0.9% lock flush 20 milliLiter(s) IV Push once    MEDICATIONS  (PRN):  acetaminophen    Suspension. 650 milliGRAM(s) Oral every 6 hours PRN Moderate Pain (4 - 6)  ALBUTerol/ipratropium for Nebulization 3 milliLiter(s) Nebulizer every 6 hours PRN Shortness of Breath and/or Wheezing  ondansetron Injectable 4 milliGRAM(s) IV Push every 6 hours PRN Nausea  sodium chloride 0.9% lock flush 10 milliLiter(s) IV Push every 1 hour PRN After each medication administration  sodium chloride 0.9% lock flush 10 milliLiter(s) IV Push every 12 hours PRN Lumen of catheter NOT used      Weight:  90.5kg (4/21)  93.6kg (5/15)    Weight Change:   ~3kg wt gain noted; please continue to trend 3x week     Estimated energy needs:   IBW 61kg used for EER and adjusted for post-op/VAC  Calories: 25-30 kcal/kg = 1525-1830kcal/day  Protein: 1.2-1.4 g/kg = 73-85g protein/day  Fluids: 30-35 mL/kg = 1830-2135mL/day    Subjective:   79y/o F with sepsis from open abdominal wound infection, MSSA bacteremia present on admission, and UTI present on admission found to have contained small bowel leak, s/p exlap, TAHBSO, SBR 5/15, s/p PEG 5/16. Pt seen asleep in bed. Rectal tube removed today and pt continues with frequent loose stools per RN. RN reports that she has been giving the metamucil. Can trial Osmolite formula to see if stool output improves. If glucerna is changed to osmolite, closely monitor BG as osmolite is not a carb steady formula. Will follow.     Previous Nutrition Diagnosis:  Increased nutrient needs RT increased demand AEB post-op    Active [X]  Resolved [   ]    If resolved, new PES:     Goal:   Pt to meet % of EER via EN     Recommendations:  1. Change formula to Osmolite 1.2 via PEG at goal rate of 60ml/hr x 24hr (1440ml TV, 1728kcal, 79g, 1180ml water). Additional fluid per team. Monitor for s/s of intolerance and maintain aspiration precautions.   2. Monitor lytes and replete prn.   3. Trend wts 3x/week.   4. Consider imodium if loose stool does not improve with osmolite.     *d/w RN; attempted to page team x2; order verification assigned to MD*    Education:   N/A    Risk Level: High [X] Moderate [   ] Low [   ].

## 2018-05-30 NOTE — PROGRESS NOTE ADULT - SUBJECTIVE AND OBJECTIVE BOX
O/N: f/u with Dr. Ngo as to the plan for discharge (son is interested as to course), Hydralazine 10mg for sbp 181  5/29: lisinopril increased to 40mg, abd US showing GB sludge, no acute diana, per nutrition recs- added lactobacillus and metamucil to try to decrease rectal tube output, free water inc for Na 150, figure of 8 stitch placed s/p LEANN removal, added labetalol 100mg BID O/N: f/u with Dr. Ngo as to the plan for discharge (son is interested as to course), Hydralazine 10mg for sbp 181  5/29: lisinopril increased to 40mg, abd US showing GB sludge, no acute diana, per nutrition recs- added lactobacillus and metamucil to try to decrease rectal tube output, free water inc for Na 150, figure of 8 stitch placed s/p LEANN removal, added labetalol 100mg BID     SUBJECTIVE: Patient seen and examined bedside by chief resident.    amLODIPine   Tablet 10 milliGRAM(s) Oral daily  heparin  Injectable 5000 Unit(s) SubCutaneous every 8 hours  labetalol 100 milliGRAM(s) Enteral Tube two times a day  lisinopril 40 milliGRAM(s) Enteral Tube daily      Vital Signs Last 24 Hrs  T(C): 36.2 (30 May 2018 05:12), Max: 37.1 (29 May 2018 14:33)  T(F): 97.1 (30 May 2018 05:12), Max: 98.8 (29 May 2018 14:33)  HR: 106 (30 May 2018 05:35) (74 - 106)  BP: 155/87 (30 May 2018 05:35) (132/70 - 181/84)  BP(mean): 115 (30 May 2018 05:35) (92 - 121)  RR: 15 (30 May 2018 05:35) (15 - 19)  SpO2: 100% (30 May 2018 05:35) (100% - 100%)  I&O's Detail    29 May 2018 07:01  -  30 May 2018 07:00  --------------------------------------------------------  IN:    Free Water: 1000 mL    Glucerna: 1416 mL    Solution: 400 mL  Total IN: 2816 mL    OUT:    Bulb: 240 mL    Rectal Tube: 400 mL    VAC (Vacuum Assisted Closure) System: 25 mL  Total OUT: 665 mL    Total NET: 2151 mL          General: NAD, resting comfortably in bed  C/V: NSR  Pulm: Nonlabored breathing, no respiratory distress  Abd: soft, NT/ND, incisions CDI, wound vac in place  Extrem: WWP, no edema, SCDs in place        LABS:                        9.9    13.2  )-----------( 332      ( 29 May 2018 07:35 )             31.3     05-29    150<H>  |  116<H>  |  32<H>  ----------------------------<  147<H>  3.6   |  20<L>  |  0.66    Ca    10.2      29 May 2018 07:35  Phos  3.3     05-29  Mg     1.7     05-29

## 2018-05-30 NOTE — PROGRESS NOTE ADULT - PROBLEM SELECTOR PLAN 2
BP is now more controlled after starting labetalol 100mg BID, can increase to 150mg BID if SBP >160. BP is now more controlled after starting labetalol 100mg BID, can increase to 200mg BID if SBP >160.

## 2018-05-30 NOTE — PROGRESS NOTE ADULT - PROBLEM SELECTOR PLAN 4
Rectal tube removed, patient is still have significant lose stool and bedding was soaked with stool this AM. Would recommend putting rectal tube back on to avoid skin infection.  Past CT scan showed non-specific colitis.  No antibiotics per past ID notes  Again would consult nutrition for more less osmotic formulary  Could also increase psyllium powder Rectal tube removed, patient is still have significant lose stool and bedding was soaked with stool this AM. Would recommend putting rectal tube back on to avoid decubital infection  Past CT scan showed non-specific colitis.  No antibiotics per past ID notes  Again would consult nutrition for more less osmotic formulary  Could also increase psyllium powder

## 2018-05-30 NOTE — PROGRESS NOTE ADULT - ASSESSMENT
79 y/o F with sepsis from open abdominal wound infection, MSSA bacteremia present on admission, and UTI present on admission found to have contained small bowel leak, s/p exlap, TAHBSO, SBR 5/15, s/p PEG 5/16    Neuro: seroquel 25 QHS, zofran prn, tylenol prn, holding: aripiprazole, escitalapram  CV: HD stable s/p severe sepsis, amlodipine, 4/19 Echo  65-70%.  Pulm: NC prn  GI: NPO, protonix, PEG on glucerna 1.2 TF (goal 59) failed S&S  : Voiding, S/p AKF on HD now resolved. s/p UTI (present on admission)    ID: None D/c'd:MSSA in blood: s/p Oxacillin,  TTE neg for endocarditis. Jose L( 4/17-5/21) [place]: ( 4/27-5/21)   Endo: ISS, Synthroid  Insulin  in TPN: 25. Lupron 5/15.Lantus 10 qhs   PPx: sqh held for low plts  PE:Sp IVC filter on 3/30   Lines: PIV,  PICC (5/9-)  /// D/c'd: Sherley (5/15-?)R IJ TLC (5/15-5/?),L Rad Art line( 4/15-4/18),  Rt IJ TLC from OSH ( 4/15-), LIJ TLC (4/29-5/9)  Wounds: Wound vac to midline incision , change MWF  PT/OT: PT ordered 5/19

## 2018-05-30 NOTE — PROGRESS NOTE ADULT - ASSESSMENT
77 yo [place]ian speaking F w/pmhx of morbid obesity, DM, HTN, hypothyroidism, sepsis secondary to ventral hernia infection s/p repair, PE s/p IVC filter, acute blood loss anemia from hemorrhagic uterine fibroids and UGIB, ATN requiring HD, sepsis secondary to MSSA bacteremia and MDR E coli UTI now w/AMS and s/p TAHBSO and small bowel resection on 5/15 followed by  PEG placement on 5/16 and medicine consulted for HTN urgency and fluid management.

## 2018-05-30 NOTE — PROGRESS NOTE ADULT - ATTENDING COMMENTS
Pt seen and examined, VS reviewed, agree with exam as above, labs reviewed, plan as discussed above with Dr Lin.  Nutrition advised a new regimen for PEG feeds, would reinsert rectal tube for now and consider seroquel instead of restraints.  Strongly urge palliative care consultation for GOC and long term planning for a declining pt.

## 2018-05-30 NOTE — PROGRESS NOTE ADULT - PROBLEM SELECTOR PLAN 1
Persistent AMS despite electrolytes improving, afebrile, WBC trending down.  CT head negative  Consider further encephalopathy w/o with ammonia, TSH, B12, folate  Consider neuro eval  Would recommend goal of care discussion with family, would involve palliative care at this point.

## 2018-05-31 LAB
AMMONIA BLD-MCNC: 48 UMOL/L — SIGNIFICANT CHANGE UP (ref 11–55)
ANION GAP SERPL CALC-SCNC: 11 MMOL/L — SIGNIFICANT CHANGE UP (ref 5–17)
BUN SERPL-MCNC: 29 MG/DL — HIGH (ref 7–23)
CALCIUM SERPL-MCNC: 9.2 MG/DL — SIGNIFICANT CHANGE UP (ref 8.4–10.5)
CHLORIDE SERPL-SCNC: 109 MMOL/L — HIGH (ref 96–108)
CO2 SERPL-SCNC: 20 MMOL/L — LOW (ref 22–31)
CREAT SERPL-MCNC: 0.76 MG/DL — SIGNIFICANT CHANGE UP (ref 0.5–1.3)
FOLATE SERPL-MCNC: 14.9 NG/ML — SIGNIFICANT CHANGE UP
GLUCOSE BLDC GLUCOMTR-MCNC: 107 MG/DL — HIGH (ref 70–99)
GLUCOSE BLDC GLUCOMTR-MCNC: 131 MG/DL — HIGH (ref 70–99)
GLUCOSE BLDC GLUCOMTR-MCNC: 150 MG/DL — HIGH (ref 70–99)
GLUCOSE BLDC GLUCOMTR-MCNC: 157 MG/DL — HIGH (ref 70–99)
GLUCOSE BLDC GLUCOMTR-MCNC: 174 MG/DL — HIGH (ref 70–99)
GLUCOSE SERPL-MCNC: 142 MG/DL — HIGH (ref 70–99)
POTASSIUM SERPL-MCNC: 4.5 MMOL/L — SIGNIFICANT CHANGE UP (ref 3.5–5.3)
POTASSIUM SERPL-SCNC: 4.5 MMOL/L — SIGNIFICANT CHANGE UP (ref 3.5–5.3)
SODIUM SERPL-SCNC: 140 MMOL/L — SIGNIFICANT CHANGE UP (ref 135–145)
T3 SERPL-MCNC: 63 NG/DL — LOW (ref 80–200)
T4 AB SER-ACNC: 4.44 UG/DL — SIGNIFICANT CHANGE UP (ref 3.17–11.72)
TSH SERPL-MCNC: 18.66 UIU/ML — HIGH (ref 0.35–4.94)
VIT B12 SERPL-MCNC: 412 PG/ML — SIGNIFICANT CHANGE UP (ref 232–1245)

## 2018-05-31 PROCEDURE — 99233 SBSQ HOSP IP/OBS HIGH 50: CPT

## 2018-05-31 RX ORDER — NYSTATIN CREAM 100000 [USP'U]/G
1 CREAM TOPICAL
Qty: 0 | Refills: 0 | Status: DISCONTINUED | OUTPATIENT
Start: 2018-05-31 | End: 2018-06-04

## 2018-05-31 RX ADMIN — Medication 200 MILLIGRAM(S): at 19:06

## 2018-05-31 RX ADMIN — NYSTATIN CREAM 1 APPLICATION(S): 100000 CREAM TOPICAL at 19:02

## 2018-05-31 RX ADMIN — Medication 650 MILLIGRAM(S): at 06:47

## 2018-05-31 RX ADMIN — Medication 1 DROP(S): at 10:42

## 2018-05-31 RX ADMIN — HEPARIN SODIUM 5000 UNIT(S): 5000 INJECTION INTRAVENOUS; SUBCUTANEOUS at 06:21

## 2018-05-31 RX ADMIN — LISINOPRIL 40 MILLIGRAM(S): 2.5 TABLET ORAL at 06:21

## 2018-05-31 RX ADMIN — HEPARIN SODIUM 5000 UNIT(S): 5000 INJECTION INTRAVENOUS; SUBCUTANEOUS at 22:54

## 2018-05-31 RX ADMIN — Medication 200 MILLIGRAM(S): at 06:22

## 2018-05-31 RX ADMIN — Medication 2: at 11:38

## 2018-05-31 RX ADMIN — Medication 1 TABLET(S): at 14:47

## 2018-05-31 RX ADMIN — HEPARIN SODIUM 5000 UNIT(S): 5000 INJECTION INTRAVENOUS; SUBCUTANEOUS at 14:46

## 2018-05-31 RX ADMIN — INSULIN GLARGINE 10 UNIT(S): 100 INJECTION, SOLUTION SUBCUTANEOUS at 22:54

## 2018-05-31 RX ADMIN — AMLODIPINE BESYLATE 10 MILLIGRAM(S): 2.5 TABLET ORAL at 06:21

## 2018-05-31 RX ADMIN — Medication 1 DROP(S): at 01:08

## 2018-05-31 RX ADMIN — Medication 1 PACKET(S): at 14:46

## 2018-05-31 RX ADMIN — Medication 650 MILLIGRAM(S): at 08:13

## 2018-05-31 RX ADMIN — Medication 1 DROP(S): at 22:54

## 2018-05-31 RX ADMIN — Medication 2: at 01:08

## 2018-05-31 RX ADMIN — PANTOPRAZOLE SODIUM 40 MILLIGRAM(S): 20 TABLET, DELAYED RELEASE ORAL at 14:47

## 2018-05-31 RX ADMIN — Medication 75 MICROGRAM(S): at 06:20

## 2018-05-31 NOTE — PROGRESS NOTE ADULT - PROBLEM SELECTOR PLAN 2
BP is now more controlled  Continue with Labetalol 200 BID, Lisinopril 40mg daily and Norvasc 10mg daily.

## 2018-05-31 NOTE — PROGRESS NOTE ADULT - PROBLEM SELECTOR PLAN 1
Persistent AMS despite electrolytes improving, afebrile, WBC trending down.  CT head negative  F/u with Amonia, B12, folate, RPR  Consider neuro eval  Would recommend goal of care discussion with family, would involve palliative care at this point.

## 2018-05-31 NOTE — PROGRESS NOTE ADULT - ASSESSMENT
79 yo [place] speaking F w/pmhx of morbid obesity, DM, HTN, hypothyroidism, sepsis secondary to ventral hernia infection s/p repair, PE s/p IVC filter, acute blood loss anemia from hemorrhagic uterine fibroids and UGIB, ATN requiring HD, sepsis secondary to MSSA bacteremia and MDR E coli UTI now w/AMS and s/p TAHBSO and small bowel resection on 5/15 followed by  PEG placement on 5/16 and medicine consulted for HTN urgency and fluid management.

## 2018-05-31 NOTE — ADVANCED PRACTICE NURSE CONSULT - RECOMMEDATIONS
Bilateral buttocks - apply Nystatin powder and antifungal cream twice daily.   Bilateral inguinal area - apply Nystatin powder twice daily.   Discussed assessment and recommendations with RNDior and surgery PANithya.

## 2018-05-31 NOTE — PROGRESS NOTE ADULT - PROBLEM SELECTOR PLAN 5
Since patient is off Glucerna, might need to increase insulin.  Cont mISS for now, adjust as needed tomorrow.

## 2018-05-31 NOTE — PROGRESS NOTE ADULT - SUBJECTIVE AND OBJECTIVE BOX
O/N: EMANUEL, VSS  5/31: vac changed, restraints removed, abd binder in place, step down, decreased free water, rectal tube out, will consider replacing O/N: EMANUEL, VSS  5/31: vac changed, restraints removed, abd binder in place, step down, decreased free water, rectal tube out, will consider replacing     SUBJECTIVE: Patient seen and examined bedside by chief resident.     amLODIPine   Tablet 10 milliGRAM(s) Oral daily  heparin  Injectable 5000 Unit(s) SubCutaneous every 8 hours  labetalol 200 milliGRAM(s) Oral two times a day  lisinopril 40 milliGRAM(s) Enteral Tube daily      Vital Signs Last 24 Hrs  T(C): 37.1 (31 May 2018 08:31), Max: 37.1 (31 May 2018 08:31)  T(F): 98.7 (31 May 2018 08:31), Max: 98.7 (31 May 2018 08:31)  HR: 79 (31 May 2018 08:31) (79 - 83)  BP: 133/81 (31 May 2018 08:31) (109/61 - 165/87)  BP(mean): 102 (30 May 2018 14:30) (102 - 102)  RR: 17 (31 May 2018 08:31) (16 - 17)  SpO2: 99% (31 May 2018 08:31) (96% - 100%)  I&O's Detail    30 May 2018 07:01  -  31 May 2018 07:00  --------------------------------------------------------  IN:    Free Water: 240 mL    Glucerna: 590 mL    Solution: 100 mL    Solution: 300 mL  Total IN: 1230 mL    OUT:  Total OUT: 0 mL    Total NET: 1230 mL          General: NAD, resting comfortably in bed  C/V: NSR  Pulm: Nonlabored breathing, no respiratory distress  Abd: soft, NT/ND, vac in place, incisions C/D/I, abdominal binder in place   Extrem: WWP, no edema, SCDs in place        LABS:                        8.9    9.6   )-----------( 280      ( 30 May 2018 07:45 )             29.2     05-30    141  |  111<H>  |  31<H>  ----------------------------<  95  3.7   |  21<L>  |  0.70    Ca    9.7      30 May 2018 07:45  Phos  3.6     05-30  Mg     1.8     05-30

## 2018-05-31 NOTE — ADVANCED PRACTICE NURSE CONSULT - ASSESSMENT
Patient incontinent of bowel and bladder presented with fungal rash to bilateral inguinal area and bilateral buttocks with scattered areas of denuded skin to bilateral buttocks. Patient requires 2 person assisted when turning in bed. Wearing bilateral heel protectors to offload heels.

## 2018-05-31 NOTE — PROGRESS NOTE ADULT - SUBJECTIVE AND OBJECTIVE BOX
Pt examined at bedside.   Patient not responding to questions.    V/S    T(F): 98.7 (05-31-18 @ 08:31), Max: 98.7 (05-31-18 @ 08:31)  HR: 79 (05-31-18 @ 08:31)  BP: 133/81 (05-31-18 @ 08:31)  RR: 17 (05-31-18 @ 08:31)  SpO2: 99% (05-31-18 @ 08:31)  Wt(kg): --  PE     GEN - Appears stated age. No distress. Awake to command, however not answering questions, non-verbal.           HEENT - NCAT, EOMI, PERRLA, MMM           CVS - RRR, no M/R/G, no JVD           PULM - CTA B/L, poor inspiratory effort           ABD - Soft, nontender, nondistended, normal BS           EXT - Distal extremities warm, no cyanosis, no edema.           NEURO - AOx0    LAB                        8.9    9.6   )-----------( 280      ( 30 May 2018 07:45 )             29.2               05-30    141  |  111<H>  |  31<H>  ----------------------------<  95  3.7   |  21<L>  |  0.70    Ca    9.7      30 May 2018 07:45  Phos  3.6     05-30  Mg     1.8     05-30                                Additional tests & radiology reviewed.

## 2018-05-31 NOTE — ADVANCED PRACTICE NURSE CONSULT - REASON FOR CONSULT
WO nurse consult to assess bilateral buttocks denuded skin. Patient is a 78y old female who presents with a chief complaint of Abdominal wound and multifactorial sepsis.

## 2018-05-31 NOTE — PROGRESS NOTE ADULT - PROBLEM SELECTOR PLAN 4
Feeding modality changed to Osmolite yesterday, no reported of lose stool per Nurse since last night.  Would continue to monitor.  Could also increase psyllium powder

## 2018-06-01 LAB
ANION GAP SERPL CALC-SCNC: 9 MMOL/L — SIGNIFICANT CHANGE UP (ref 5–17)
BUN SERPL-MCNC: 26 MG/DL — HIGH (ref 7–23)
CALCIUM SERPL-MCNC: 9.2 MG/DL — SIGNIFICANT CHANGE UP (ref 8.4–10.5)
CHLORIDE SERPL-SCNC: 110 MMOL/L — HIGH (ref 96–108)
CO2 SERPL-SCNC: 21 MMOL/L — LOW (ref 22–31)
CREAT SERPL-MCNC: 0.74 MG/DL — SIGNIFICANT CHANGE UP (ref 0.5–1.3)
GLUCOSE BLDC GLUCOMTR-MCNC: 137 MG/DL — HIGH (ref 70–99)
GLUCOSE BLDC GLUCOMTR-MCNC: 150 MG/DL — HIGH (ref 70–99)
GLUCOSE BLDC GLUCOMTR-MCNC: 156 MG/DL — HIGH (ref 70–99)
GLUCOSE BLDC GLUCOMTR-MCNC: 167 MG/DL — HIGH (ref 70–99)
GLUCOSE SERPL-MCNC: 145 MG/DL — HIGH (ref 70–99)
HCT VFR BLD CALC: 25.4 % — LOW (ref 34.5–45)
HGB BLD-MCNC: 8.2 G/DL — LOW (ref 11.5–15.5)
MAGNESIUM SERPL-MCNC: 1.6 MG/DL — SIGNIFICANT CHANGE UP (ref 1.6–2.6)
MCHC RBC-ENTMCNC: 31.2 PG — SIGNIFICANT CHANGE UP (ref 27–34)
MCHC RBC-ENTMCNC: 32.3 G/DL — SIGNIFICANT CHANGE UP (ref 32–36)
MCV RBC AUTO: 96.6 FL — SIGNIFICANT CHANGE UP (ref 80–100)
PHOSPHATE SERPL-MCNC: 3.2 MG/DL — SIGNIFICANT CHANGE UP (ref 2.5–4.5)
PLATELET # BLD AUTO: 263 K/UL — SIGNIFICANT CHANGE UP (ref 150–400)
POTASSIUM SERPL-MCNC: 4.3 MMOL/L — SIGNIFICANT CHANGE UP (ref 3.5–5.3)
POTASSIUM SERPL-SCNC: 4.3 MMOL/L — SIGNIFICANT CHANGE UP (ref 3.5–5.3)
RBC # BLD: 2.63 M/UL — LOW (ref 3.8–5.2)
RBC # FLD: 17 % — HIGH (ref 10.3–16.9)
SODIUM SERPL-SCNC: 140 MMOL/L — SIGNIFICANT CHANGE UP (ref 135–145)
WBC # BLD: 6.7 K/UL — SIGNIFICANT CHANGE UP (ref 3.8–10.5)
WBC # FLD AUTO: 6.7 K/UL — SIGNIFICANT CHANGE UP (ref 3.8–10.5)

## 2018-06-01 PROCEDURE — 99233 SBSQ HOSP IP/OBS HIGH 50: CPT

## 2018-06-01 RX ADMIN — PANTOPRAZOLE SODIUM 40 MILLIGRAM(S): 20 TABLET, DELAYED RELEASE ORAL at 12:27

## 2018-06-01 RX ADMIN — HEPARIN SODIUM 5000 UNIT(S): 5000 INJECTION INTRAVENOUS; SUBCUTANEOUS at 14:43

## 2018-06-01 RX ADMIN — NYSTATIN CREAM 1 APPLICATION(S): 100000 CREAM TOPICAL at 06:18

## 2018-06-01 RX ADMIN — AMLODIPINE BESYLATE 10 MILLIGRAM(S): 2.5 TABLET ORAL at 06:17

## 2018-06-01 RX ADMIN — Medication 200 MILLIGRAM(S): at 06:17

## 2018-06-01 RX ADMIN — HEPARIN SODIUM 5000 UNIT(S): 5000 INJECTION INTRAVENOUS; SUBCUTANEOUS at 23:11

## 2018-06-01 RX ADMIN — Medication 2: at 12:26

## 2018-06-01 RX ADMIN — INSULIN GLARGINE 10 UNIT(S): 100 INJECTION, SOLUTION SUBCUTANEOUS at 23:11

## 2018-06-01 RX ADMIN — Medication 200 MILLIGRAM(S): at 18:44

## 2018-06-01 RX ADMIN — NYSTATIN CREAM 1 APPLICATION(S): 100000 CREAM TOPICAL at 18:44

## 2018-06-01 RX ADMIN — Medication 1 PACKET(S): at 12:27

## 2018-06-01 RX ADMIN — LISINOPRIL 40 MILLIGRAM(S): 2.5 TABLET ORAL at 06:17

## 2018-06-01 RX ADMIN — Medication 75 MICROGRAM(S): at 06:20

## 2018-06-01 RX ADMIN — Medication 1 DROP(S): at 23:11

## 2018-06-01 RX ADMIN — HEPARIN SODIUM 5000 UNIT(S): 5000 INJECTION INTRAVENOUS; SUBCUTANEOUS at 06:18

## 2018-06-01 RX ADMIN — Medication 1 DROP(S): at 12:26

## 2018-06-01 RX ADMIN — Medication 1 TABLET(S): at 12:27

## 2018-06-01 NOTE — PROGRESS NOTE ADULT - SUBJECTIVE AND OBJECTIVE BOX
O/N: EMANUEL, VSS  5/31: EMANUEL, VSS O/N: ANNA CASTELLANOS  5/31: ANNA CASTELLANOS    SUBJECTIVE: Patient seen and examined bedside by chief resident.    amLODIPine   Tablet 10 milliGRAM(s) Oral daily  heparin  Injectable 5000 Unit(s) SubCutaneous every 8 hours  labetalol 200 milliGRAM(s) Oral two times a day  lisinopril 40 milliGRAM(s) Enteral Tube daily      Vital Signs Last 24 Hrs  T(C): 35.8 (01 Jun 2018 08:45), Max: 37 (01 Jun 2018 05:26)  T(F): 96.5 (01 Jun 2018 08:45), Max: 98.6 (01 Jun 2018 05:26)  HR: 74 (01 Jun 2018 08:45) (73 - 83)  BP: 110/57 (01 Jun 2018 08:45) (110/57 - 151/85)  BP(mean): --  RR: 17 (01 Jun 2018 08:45) (16 - 17)  SpO2: 96% (01 Jun 2018 08:45) (96% - 99%)  I&O's Detail    31 May 2018 07:01  -  01 Jun 2018 07:00  --------------------------------------------------------  IN:  Total IN: 0 mL    OUT:    VAC (Vacuum Assisted Closure) System: 120 mL  Total OUT: 120 mL    Total NET: -120 mL          General: NAD, resting comfortably in bed  Pulm: Nonlabored breathing, no respiratory distress  Abd: wound vac functioning, PEG tube, s NT nonobese      LABS:                        8.2    6.7   )-----------( 263      ( 01 Jun 2018 06:38 )             25.4     06-01    140  |  110<H>  |  26<H>  ----------------------------<  145<H>  4.3   |  21<L>  |  0.74    Ca    9.2      01 Jun 2018 06:38  Phos  3.2     06-01  Mg     1.6     06-01            RADIOLOGY & ADDITIONAL STUDIES:

## 2018-06-01 NOTE — PROGRESS NOTE ADULT - SUBJECTIVE AND OBJECTIVE BOX
Pt seen and examined at bedside. No complaints- no n/v, no pain, not hungry per yes/no questions.      INTERVAL HPI/OVERNIGHT EVENTS:    Review of Systems: 12 point review of systems otherwise negative    MEDICATIONS  (STANDING):  amLODIPine   Tablet 10 milliGRAM(s) Oral daily  artificial  tears Solution 1 Drop(s) Both EYES two times a day  dextrose 50% Injectable 12.5 Gram(s) IV Push once  dextrose 50% Injectable 25 Gram(s) IV Push once  heparin  Injectable 5000 Unit(s) SubCutaneous every 8 hours  insulin glargine Injectable (LANTUS) 10 Unit(s) SubCutaneous at bedtime  insulin lispro (HumaLOG) corrective regimen sliding scale   SubCutaneous every 6 hours  labetalol 200 milliGRAM(s) Oral two times a day  lactobacillus acidophilus 1 Tablet(s) Oral daily  levothyroxine Injectable 75 MICROGram(s) IV Push <User Schedule>  lisinopril 40 milliGRAM(s) Enteral Tube daily  nystatin Powder 1 Application(s) Topical two times a day  pantoprazole   Suspension 40 milliGRAM(s) Oral daily  sodium chloride 0.9% lock flush 20 milliLiter(s) IV Push once    MEDICATIONS  (PRN):  acetaminophen    Suspension. 650 milliGRAM(s) Oral every 6 hours PRN Moderate Pain (4 - 6)  ALBUTerol/ipratropium for Nebulization 3 milliLiter(s) Nebulizer every 6 hours PRN Shortness of Breath and/or Wheezing  ondansetron Injectable 4 milliGRAM(s) IV Push every 6 hours PRN Nausea  sodium chloride 0.9% lock flush 10 milliLiter(s) IV Push every 1 hour PRN After each medication administration  sodium chloride 0.9% lock flush 10 milliLiter(s) IV Push every 12 hours PRN Lumen of catheter NOT used      Allergies    No Known Allergies    Intolerances        Vital Signs Last 24 Hrs  T(C): 35.8 (01 Jun 2018 08:45), Max: 37 (01 Jun 2018 05:26)  T(F): 96.5 (01 Jun 2018 08:45), Max: 98.6 (01 Jun 2018 05:26)  HR: 74 (01 Jun 2018 08:45) (73 - 83)  BP: 110/57 (01 Jun 2018 08:45) (110/57 - 151/85)  BP(mean): --  RR: 17 (01 Jun 2018 08:45) (16 - 17)  SpO2: 96% (01 Jun 2018 08:45) (96% - 99%)  CAPILLARY BLOOD GLUCOSE      POCT Blood Glucose.: 167 mg/dL (01 Jun 2018 12:17)  POCT Blood Glucose.: 150 mg/dL (01 Jun 2018 06:46)  POCT Blood Glucose.: 150 mg/dL (31 May 2018 23:48)  POCT Blood Glucose.: 107 mg/dL (31 May 2018 16:42)      05-31 @ 07:01  -  06-01 @ 07:00  --------------------------------------------------------  IN: 0 mL / OUT: 120 mL / NET: -120 mL        LABS:                        8.2    6.7   )-----------( 263      ( 01 Jun 2018 06:38 )             25.4     06-01    140  |  110<H>  |  26<H>  ----------------------------<  145<H>  4.3   |  21<L>  |  0.74    Ca    9.2      01 Jun 2018 06:38  Phos  3.2     06-01  Mg     1.6     06-01                RADIOLOGY & ADDITIONAL TESTS:    ---------------------------------------------------------------------------  I personally reviewed: [  ]EKG   [  ]CXR    [  ] CT    [  ]Other  ---------------------------------------------------------------------------

## 2018-06-01 NOTE — PROGRESS NOTE ADULT - ASSESSMENT
77 yo Anguillan speaking F w/pmhx of morbid obesity, DM, HTN, hypothyroidism, sepsis secondary to ventral hernia infection s/p repair, PE s/p IVC filter, acute blood loss anemia from hemorrhagic uterine fibroids and UGIB, ATN requiring HD, sepsis secondary to MSSA bacteremia and MDR E coli UTI now w/AMS and s/p TAHBSO and small bowel resection on 5/15 followed by  PEG placement on 5/16 and medicine consulted for HTN urgency and fluid management.

## 2018-06-01 NOTE — PROGRESS NOTE ADULT - ADDITIONAL PE
mouth dry, RRR, CTAB but difficult to hear 2/2 effort and habitus, abd soft and obese with vac and peg, no le edema, has boots

## 2018-06-02 LAB
ANION GAP SERPL CALC-SCNC: 12 MMOL/L — SIGNIFICANT CHANGE UP (ref 5–17)
BUN SERPL-MCNC: 23 MG/DL — SIGNIFICANT CHANGE UP (ref 7–23)
CALCIUM SERPL-MCNC: 9.1 MG/DL — SIGNIFICANT CHANGE UP (ref 8.4–10.5)
CHLORIDE SERPL-SCNC: 105 MMOL/L — SIGNIFICANT CHANGE UP (ref 96–108)
CO2 SERPL-SCNC: 22 MMOL/L — SIGNIFICANT CHANGE UP (ref 22–31)
CREAT SERPL-MCNC: 0.69 MG/DL — SIGNIFICANT CHANGE UP (ref 0.5–1.3)
GLUCOSE BLDC GLUCOMTR-MCNC: 137 MG/DL — HIGH (ref 70–99)
GLUCOSE BLDC GLUCOMTR-MCNC: 179 MG/DL — HIGH (ref 70–99)
GLUCOSE BLDC GLUCOMTR-MCNC: 83 MG/DL — SIGNIFICANT CHANGE UP (ref 70–99)
GLUCOSE SERPL-MCNC: 135 MG/DL — HIGH (ref 70–99)
HCT VFR BLD CALC: 26.6 % — LOW (ref 34.5–45)
HGB BLD-MCNC: 8.5 G/DL — LOW (ref 11.5–15.5)
MAGNESIUM SERPL-MCNC: 1.7 MG/DL — SIGNIFICANT CHANGE UP (ref 1.6–2.6)
MCHC RBC-ENTMCNC: 31 PG — SIGNIFICANT CHANGE UP (ref 27–34)
MCHC RBC-ENTMCNC: 32 G/DL — SIGNIFICANT CHANGE UP (ref 32–36)
MCV RBC AUTO: 97.1 FL — SIGNIFICANT CHANGE UP (ref 80–100)
PHOSPHATE SERPL-MCNC: 3.4 MG/DL — SIGNIFICANT CHANGE UP (ref 2.5–4.5)
PLATELET # BLD AUTO: 261 K/UL — SIGNIFICANT CHANGE UP (ref 150–400)
POTASSIUM SERPL-MCNC: 4.1 MMOL/L — SIGNIFICANT CHANGE UP (ref 3.5–5.3)
POTASSIUM SERPL-SCNC: 4.1 MMOL/L — SIGNIFICANT CHANGE UP (ref 3.5–5.3)
RBC # BLD: 2.74 M/UL — LOW (ref 3.8–5.2)
RBC # FLD: 17.1 % — HIGH (ref 10.3–16.9)
SODIUM SERPL-SCNC: 139 MMOL/L — SIGNIFICANT CHANGE UP (ref 135–145)
WBC # BLD: 6.5 K/UL — SIGNIFICANT CHANGE UP (ref 3.8–10.5)
WBC # FLD AUTO: 6.5 K/UL — SIGNIFICANT CHANGE UP (ref 3.8–10.5)

## 2018-06-02 RX ORDER — MAGNESIUM SULFATE 500 MG/ML
1 VIAL (ML) INJECTION ONCE
Qty: 0 | Refills: 0 | Status: COMPLETED | OUTPATIENT
Start: 2018-06-02 | End: 2018-06-02

## 2018-06-02 RX ADMIN — AMLODIPINE BESYLATE 10 MILLIGRAM(S): 2.5 TABLET ORAL at 05:39

## 2018-06-02 RX ADMIN — Medication 1 TABLET(S): at 11:40

## 2018-06-02 RX ADMIN — NYSTATIN CREAM 1 APPLICATION(S): 100000 CREAM TOPICAL at 17:16

## 2018-06-02 RX ADMIN — HEPARIN SODIUM 5000 UNIT(S): 5000 INJECTION INTRAVENOUS; SUBCUTANEOUS at 05:39

## 2018-06-02 RX ADMIN — NYSTATIN CREAM 1 APPLICATION(S): 100000 CREAM TOPICAL at 05:39

## 2018-06-02 RX ADMIN — Medication 75 MICROGRAM(S): at 06:32

## 2018-06-02 RX ADMIN — PANTOPRAZOLE SODIUM 40 MILLIGRAM(S): 20 TABLET, DELAYED RELEASE ORAL at 11:43

## 2018-06-02 RX ADMIN — Medication 2: at 00:42

## 2018-06-02 RX ADMIN — Medication 200 MILLIGRAM(S): at 05:39

## 2018-06-02 RX ADMIN — HEPARIN SODIUM 5000 UNIT(S): 5000 INJECTION INTRAVENOUS; SUBCUTANEOUS at 21:23

## 2018-06-02 RX ADMIN — Medication 200 MILLIGRAM(S): at 17:16

## 2018-06-02 RX ADMIN — Medication 1 DROP(S): at 21:23

## 2018-06-02 RX ADMIN — LISINOPRIL 40 MILLIGRAM(S): 2.5 TABLET ORAL at 05:40

## 2018-06-02 RX ADMIN — Medication 2: at 13:00

## 2018-06-02 RX ADMIN — Medication 1 DROP(S): at 11:40

## 2018-06-02 RX ADMIN — HEPARIN SODIUM 5000 UNIT(S): 5000 INJECTION INTRAVENOUS; SUBCUTANEOUS at 13:30

## 2018-06-02 RX ADMIN — Medication 100 GRAM(S): at 11:40

## 2018-06-02 NOTE — PROGRESS NOTE ADULT - ASSESSMENT
77 y/o F with sepsis from open abdominal wound infection, MSSA bacteremia present on admission, and UTI present on admission found to have contained small bowel leak, s/p exlap, TAHBSO, SBR 5/15, s/p PEG 5/16    Neuro: seroquel 25 QHS, zofran prn, tylenol prn, holding: aripiprazole, escitalapram  CV: HD stable s/p severe sepsis, amlodipine, 4/19 Echo  65-70%.  Pulm: NC prn  GI: NPO, protonix, PEG on glucerna 1.2 TF (goal 59) failed S&S  : Voiding, S/p AKF on HD now resolved. s/p UTI (present on admission)    ID: None D/c'd:MSSA in blood: s/p Oxacillin,  TTE neg for endocarditis. Jose L( 4/17-5/21) Eden: ( 4/27-5/21)   Endo: ISS, Synthroid  Insulin  in TPN: 25. Lupron 5/15.Lantus 10 qhs   PPx: sqh held for low plts  PE:Sp IVC filter on 3/30   Lines: PIV,  PICC (5/9-)  /// D/c'd: Sherley (5/15-?)R IJ TLC (5/15-5/?),L Rad Art line( 4/15-4/18),  Rt IJ TLC from OSH ( 4/15-), LIJ TLC (4/29-5/9)  Wounds: Wound vac to midline incision , change MWF  PT/OT: PT ordered 5/19

## 2018-06-02 NOTE — PROGRESS NOTE ADULT - SUBJECTIVE AND OBJECTIVE BOX
O/N: EMANUEL  6/1: EMANUEL, dispo planning, wound vac changed O/N: EMANUEL  6/1: EMANUEL, dispo planning, wound vac changed    SUBJECTIVE: Patient seen and examined bedside by chief resident.    amLODIPine   Tablet 10 milliGRAM(s) Oral daily  heparin  Injectable 5000 Unit(s) SubCutaneous every 8 hours  labetalol 200 milliGRAM(s) Oral two times a day  lisinopril 40 milliGRAM(s) Enteral Tube daily      Vital Signs Last 24 Hrs  T(C): 35.6 (02 Jun 2018 05:20), Max: 36.8 (01 Jun 2018 12:37)  T(F): 96 (02 Jun 2018 05:20), Max: 98.3 (01 Jun 2018 12:37)  HR: 75 (02 Jun 2018 05:20) (74 - 81)  BP: 144/78 (02 Jun 2018 05:20) (110/57 - 147/88)  BP(mean): --  RR: 17 (02 Jun 2018 05:20) (16 - 18)  SpO2: 97% (02 Jun 2018 05:20) (96% - 99%)  I&O's Detail    01 Jun 2018 07:01  -  02 Jun 2018 07:00  --------------------------------------------------------  IN:    Free Water: 250 mL    Glucerna: 720 mL  Total IN: 970 mL    OUT:    VAC (Vacuum Assisted Closure) System: 30 mL  Total OUT: 30 mL    Total NET: 940 mL          General: NAD, resting comfortably in bed  Pulm: Nonlabored breathing, no respiratory distress  Abd: soft, NT/ND, wound vac functioning, PEG functioning          LABS:                        8.2    6.7   )-----------( 263      ( 01 Jun 2018 06:38 )             25.4     06-01    140  |  110<H>  |  26<H>  ----------------------------<  145<H>  4.3   |  21<L>  |  0.74    Ca    9.2      01 Jun 2018 06:38  Phos  3.2     06-01  Mg     1.6     06-01            RADIOLOGY & ADDITIONAL STUDIES:

## 2018-06-03 LAB
ANION GAP SERPL CALC-SCNC: 10 MMOL/L — SIGNIFICANT CHANGE UP (ref 5–17)
BUN SERPL-MCNC: 21 MG/DL — SIGNIFICANT CHANGE UP (ref 7–23)
CALCIUM SERPL-MCNC: 9.3 MG/DL — SIGNIFICANT CHANGE UP (ref 8.4–10.5)
CHLORIDE SERPL-SCNC: 107 MMOL/L — SIGNIFICANT CHANGE UP (ref 96–108)
CO2 SERPL-SCNC: 24 MMOL/L — SIGNIFICANT CHANGE UP (ref 22–31)
CREAT SERPL-MCNC: 0.68 MG/DL — SIGNIFICANT CHANGE UP (ref 0.5–1.3)
GLUCOSE BLDC GLUCOMTR-MCNC: 131 MG/DL — HIGH (ref 70–99)
GLUCOSE BLDC GLUCOMTR-MCNC: 152 MG/DL — HIGH (ref 70–99)
GLUCOSE BLDC GLUCOMTR-MCNC: 176 MG/DL — HIGH (ref 70–99)
GLUCOSE BLDC GLUCOMTR-MCNC: 186 MG/DL — HIGH (ref 70–99)
GLUCOSE BLDC GLUCOMTR-MCNC: 195 MG/DL — HIGH (ref 70–99)
GLUCOSE SERPL-MCNC: 140 MG/DL — HIGH (ref 70–99)
HCT VFR BLD CALC: 27.4 % — LOW (ref 34.5–45)
HGB BLD-MCNC: 8.5 G/DL — LOW (ref 11.5–15.5)
MAGNESIUM SERPL-MCNC: 1.8 MG/DL — SIGNIFICANT CHANGE UP (ref 1.6–2.6)
MCHC RBC-ENTMCNC: 31 G/DL — LOW (ref 32–36)
MCHC RBC-ENTMCNC: 31 PG — SIGNIFICANT CHANGE UP (ref 27–34)
MCV RBC AUTO: 100 FL — SIGNIFICANT CHANGE UP (ref 80–100)
PHOSPHATE SERPL-MCNC: 3.4 MG/DL — SIGNIFICANT CHANGE UP (ref 2.5–4.5)
PLATELET # BLD AUTO: 247 K/UL — SIGNIFICANT CHANGE UP (ref 150–400)
POTASSIUM SERPL-MCNC: 4.1 MMOL/L — SIGNIFICANT CHANGE UP (ref 3.5–5.3)
POTASSIUM SERPL-SCNC: 4.1 MMOL/L — SIGNIFICANT CHANGE UP (ref 3.5–5.3)
RBC # BLD: 2.74 M/UL — LOW (ref 3.8–5.2)
RBC # FLD: 16.6 % — SIGNIFICANT CHANGE UP (ref 10.3–16.9)
SODIUM SERPL-SCNC: 141 MMOL/L — SIGNIFICANT CHANGE UP (ref 135–145)
WBC # BLD: 6.4 K/UL — SIGNIFICANT CHANGE UP (ref 3.8–10.5)
WBC # FLD AUTO: 6.4 K/UL — SIGNIFICANT CHANGE UP (ref 3.8–10.5)

## 2018-06-03 RX ADMIN — LISINOPRIL 40 MILLIGRAM(S): 2.5 TABLET ORAL at 05:36

## 2018-06-03 RX ADMIN — NYSTATIN CREAM 1 APPLICATION(S): 100000 CREAM TOPICAL at 19:10

## 2018-06-03 RX ADMIN — Medication 75 MICROGRAM(S): at 06:28

## 2018-06-03 RX ADMIN — Medication 1 TABLET(S): at 11:52

## 2018-06-03 RX ADMIN — Medication 2: at 11:51

## 2018-06-03 RX ADMIN — Medication 2: at 00:43

## 2018-06-03 RX ADMIN — Medication 200 MILLIGRAM(S): at 19:10

## 2018-06-03 RX ADMIN — Medication 1 DROP(S): at 21:41

## 2018-06-03 RX ADMIN — HEPARIN SODIUM 5000 UNIT(S): 5000 INJECTION INTRAVENOUS; SUBCUTANEOUS at 21:41

## 2018-06-03 RX ADMIN — NYSTATIN CREAM 1 APPLICATION(S): 100000 CREAM TOPICAL at 05:37

## 2018-06-03 RX ADMIN — Medication 2: at 06:28

## 2018-06-03 RX ADMIN — HEPARIN SODIUM 5000 UNIT(S): 5000 INJECTION INTRAVENOUS; SUBCUTANEOUS at 05:36

## 2018-06-03 RX ADMIN — AMLODIPINE BESYLATE 10 MILLIGRAM(S): 2.5 TABLET ORAL at 05:36

## 2018-06-03 RX ADMIN — HEPARIN SODIUM 5000 UNIT(S): 5000 INJECTION INTRAVENOUS; SUBCUTANEOUS at 15:13

## 2018-06-03 RX ADMIN — PANTOPRAZOLE SODIUM 40 MILLIGRAM(S): 20 TABLET, DELAYED RELEASE ORAL at 11:52

## 2018-06-03 RX ADMIN — Medication 1 DROP(S): at 08:56

## 2018-06-03 RX ADMIN — INSULIN GLARGINE 10 UNIT(S): 100 INJECTION, SOLUTION SUBCUTANEOUS at 00:43

## 2018-06-03 RX ADMIN — Medication 200 MILLIGRAM(S): at 05:36

## 2018-06-03 NOTE — PROGRESS NOTE ADULT - ASSESSMENT
79 y/o F with sepsis from open abdominal wound infection, MSSA bacteremia present on admission, and UTI present on admission found to have contained small bowel leak, s/p exlap, TAHBSO, SBR 5/15, s/p PEG 5/16    Neuro: seroquel 25 QHS, zofran prn, tylenol prn, holding: aripiprazole, escitalapram  CV: HD stable s/p severe sepsis, amlodipine, 4/19 Echo  65-70%.  Pulm: NC prn  GI: NPO, protonix, PEG on glucerna 1.2 TF (goal 59) failed S&S  : Voiding, S/p AKF on HD now resolved. s/p UTI (present on admission)    ID: None D/c'd:MSSA in blood: s/p Oxacillin,  TTE neg for endocarditis. Jose L( 4/17-5/21) Eden: ( 4/27-5/21)   Endo: ISS, Synthroid  Insulin  in TPN: 25. Lupron 5/15.Lantus 10 qhs   PPx: sqh held for low plts  PE:Sp IVC filter on 3/30   Lines: PIV,  PICC (5/9-)  /// D/c'd: Sherley (5/15-?)R IJ TLC (5/15-5/?),L Rad Art line( 4/15-4/18),  Rt IJ TLC from OSH ( 4/15-), LIJ TLC (4/29-5/9)  Wounds: Wound vac to midline incision , change MWF  PT/OT: PT ordered 5/19 77 y/o F with sepsis from open abdominal wound infection, MSSA bacteremia present on admission, and UTI present on admission found to have contained small bowel leak, s/p exlap, TAHBSO, SBR 5/15, s/p PEG 5/16    Neuro: seroquel 25 QHS, zofran prn, tylenol prn, holding: aripiprazole, escitalapram  CV: HD stable s/p severe sepsis, amlodipine, 4/19 Echo  65-70%.  Pulm: NC prn  GI: NPO, protonix, PEG on glucerna 1.2 TF (goal 59) failed S&S  : Voiding, S/p AKF on HD now resolved. s/p UTI (present on admission)    ID: None D/c'd:MSSA in blood: s/p Oxacillin,  TTE neg for endocarditis. Jose L( 4/17-5/21) Eden: ( 4/27-5/21)   Endo: ISS, Synthroid  Insulin  in TPN: 25. Lupron 5/15.Lantus 10 qhs   PPx: sqh held for low plts  PE:Sp IVC filter on 3/30   Lines: PIV,  PICC (5/9-)  /// D/c'd: Sherley (5/15-?)R IJ TLC (5/15-5/?),L Rad Art line( 4/15-4/18),  Rt IJ TLC from OSH ( 4/15-), LIJ TLC (4/29-5/9)  Wounds: Wound vac to midline incision , change MWF  PT/OT: PT ordered 5/19  plan for d/c 6/4 to [facility]

## 2018-06-03 NOTE — PROGRESS NOTE ADULT - SUBJECTIVE AND OBJECTIVE BOX
6/2: Lizette CASTELLANOS talking with son, dispo early this week 6/2: Lizette CASTELLANOS talking with son, dispo early this week      SUBJECTIVE: denies any complaints, sg TF    MEDICATIONS  (STANDING):  amLODIPine   Tablet 10 milliGRAM(s) Oral daily  artificial  tears Solution 1 Drop(s) Both EYES two times a day  dextrose 50% Injectable 12.5 Gram(s) IV Push once  dextrose 50% Injectable 25 Gram(s) IV Push once  heparin  Injectable 5000 Unit(s) SubCutaneous every 8 hours  insulin glargine Injectable (LANTUS) 10 Unit(s) SubCutaneous at bedtime  insulin lispro (HumaLOG) corrective regimen sliding scale   SubCutaneous every 6 hours  labetalol 200 milliGRAM(s) Oral two times a day  lactobacillus acidophilus 1 Tablet(s) Oral daily  levothyroxine Injectable 75 MICROGram(s) IV Push <User Schedule>  lisinopril 40 milliGRAM(s) Enteral Tube daily  nystatin Powder 1 Application(s) Topical two times a day  pantoprazole   Suspension 40 milliGRAM(s) Oral daily  sodium chloride 0.9% lock flush 20 milliLiter(s) IV Push once    MEDICATIONS  (PRN):  acetaminophen    Suspension. 650 milliGRAM(s) Oral every 6 hours PRN Moderate Pain (4 - 6)  ALBUTerol/ipratropium for Nebulization 3 milliLiter(s) Nebulizer every 6 hours PRN Shortness of Breath and/or Wheezing  ondansetron Injectable 4 milliGRAM(s) IV Push every 6 hours PRN Nausea  sodium chloride 0.9% lock flush 10 milliLiter(s) IV Push every 1 hour PRN After each medication administration  sodium chloride 0.9% lock flush 10 milliLiter(s) IV Push every 12 hours PRN Lumen of catheter NOT used      Vital Signs Last 24 Hrs  T(C): 36.5 (03 Jun 2018 05:30), Max: 36.5 (03 Jun 2018 05:30)  T(F): 97.7 (03 Jun 2018 05:30), Max: 97.7 (03 Jun 2018 05:30)  HR: 78 (03 Jun 2018 05:30) (74 - 78)  BP: 165/78 (03 Jun 2018 05:30) (152/78 - 165/78)  BP(mean): --  RR: 16 (03 Jun 2018 05:30) (16 - 17)  SpO2: 98% (03 Jun 2018 05:30) (97% - 98%)    PHYSICAL EXAM:      Constitutional: A&Ox3      Respiratory: non labored breathing, no respiratory distress    Cardiovascular: NSR, RRR    Gastrointestinal: soft ND,NT                 Incision: VAC intact    Genitourinary: voiding    Extremities: (-) edema                  I&O's Detail    02 Jun 2018 07:01  -  03 Jun 2018 07:00  --------------------------------------------------------  IN:    Enteral Tube Flush: 500 mL    Glucerna: 1440 mL  Total IN: 1940 mL    OUT:    VAC (Vacuum Assisted Closure) System: 150 mL  Total OUT: 150 mL    Total NET: 1790 mL          LABS:                        8.5    6.4   )-----------( 247      ( 03 Jun 2018 06:56 )             27.4     06-03    141  |  107  |  21  ----------------------------<  140<H>  4.1   |  24  |  0.68    Ca    9.3      03 Jun 2018 06:56  Phos  3.4     06-03  Mg     1.8     06-03            RADIOLOGY & ADDITIONAL STUDIES:

## 2018-06-04 ENCOUNTER — TRANSCRIPTION ENCOUNTER (OUTPATIENT)
Age: 78
End: 2018-06-04

## 2018-06-04 VITALS
DIASTOLIC BLOOD PRESSURE: 81 MMHG | TEMPERATURE: 99 F | OXYGEN SATURATION: 100 % | RESPIRATION RATE: 18 BRPM | SYSTOLIC BLOOD PRESSURE: 154 MMHG | HEART RATE: 78 BPM

## 2018-06-04 LAB
GLUCOSE BLDC GLUCOMTR-MCNC: 153 MG/DL — HIGH (ref 70–99)
GLUCOSE BLDC GLUCOMTR-MCNC: 161 MG/DL — HIGH (ref 70–99)
GLUCOSE BLDC GLUCOMTR-MCNC: 163 MG/DL — HIGH (ref 70–99)

## 2018-06-04 PROCEDURE — 82330 ASSAY OF CALCIUM: CPT

## 2018-06-04 PROCEDURE — 94002 VENT MGMT INPAT INIT DAY: CPT

## 2018-06-04 PROCEDURE — 84100 ASSAY OF PHOSPHORUS: CPT

## 2018-06-04 PROCEDURE — 82962 GLUCOSE BLOOD TEST: CPT

## 2018-06-04 PROCEDURE — 88307 TISSUE EXAM BY PATHOLOGIST: CPT

## 2018-06-04 PROCEDURE — C1769: CPT

## 2018-06-04 PROCEDURE — 88341 IMHCHEM/IMCYTCHM EA ADD ANTB: CPT

## 2018-06-04 PROCEDURE — 84132 ASSAY OF SERUM POTASSIUM: CPT

## 2018-06-04 PROCEDURE — 99233 SBSQ HOSP IP/OBS HIGH 50: CPT

## 2018-06-04 PROCEDURE — 74177 CT ABD & PELVIS W/CONTRAST: CPT

## 2018-06-04 PROCEDURE — 49406 IMAGE CATH FLUID PERI/RETRO: CPT

## 2018-06-04 PROCEDURE — 80048 BASIC METABOLIC PNL TOTAL CA: CPT

## 2018-06-04 PROCEDURE — 84133 ASSAY OF URINE POTASSIUM: CPT

## 2018-06-04 PROCEDURE — 87070 CULTURE OTHR SPECIMN AEROBIC: CPT

## 2018-06-04 PROCEDURE — P9045: CPT

## 2018-06-04 PROCEDURE — 70450 CT HEAD/BRAIN W/O DYE: CPT

## 2018-06-04 PROCEDURE — 84480 ASSAY TRIIODOTHYRONINE (T3): CPT

## 2018-06-04 PROCEDURE — 82140 ASSAY OF AMMONIA: CPT

## 2018-06-04 PROCEDURE — 71045 X-RAY EXAM CHEST 1 VIEW: CPT

## 2018-06-04 PROCEDURE — 74176 CT ABD & PELVIS W/O CONTRAST: CPT

## 2018-06-04 PROCEDURE — 84300 ASSAY OF URINE SODIUM: CPT

## 2018-06-04 PROCEDURE — 74018 RADEX ABDOMEN 1 VIEW: CPT

## 2018-06-04 PROCEDURE — 93970 EXTREMITY STUDY: CPT

## 2018-06-04 PROCEDURE — 83605 ASSAY OF LACTIC ACID: CPT

## 2018-06-04 PROCEDURE — 88305 TISSUE EXAM BY PATHOLOGIST: CPT

## 2018-06-04 PROCEDURE — 85379 FIBRIN DEGRADATION QUANT: CPT

## 2018-06-04 PROCEDURE — 87449 NOS EACH ORGANISM AG IA: CPT

## 2018-06-04 PROCEDURE — 80053 COMPREHEN METABOLIC PANEL: CPT

## 2018-06-04 PROCEDURE — 88331 PATH CONSLTJ SURG 1 BLK 1SPC: CPT

## 2018-06-04 PROCEDURE — 87075 CULTR BACTERIA EXCEPT BLOOD: CPT

## 2018-06-04 PROCEDURE — 84484 ASSAY OF TROPONIN QUANT: CPT

## 2018-06-04 PROCEDURE — P9016: CPT

## 2018-06-04 PROCEDURE — 82436 ASSAY OF URINE CHLORIDE: CPT

## 2018-06-04 PROCEDURE — C1889: CPT

## 2018-06-04 PROCEDURE — 85384 FIBRINOGEN ACTIVITY: CPT

## 2018-06-04 PROCEDURE — 97164 PT RE-EVAL EST PLAN CARE: CPT

## 2018-06-04 PROCEDURE — 85730 THROMBOPLASTIN TIME PARTIAL: CPT

## 2018-06-04 PROCEDURE — 92610 EVALUATE SWALLOWING FUNCTION: CPT | Mod: GN

## 2018-06-04 PROCEDURE — 84157 ASSAY OF PROTEIN OTHER: CPT

## 2018-06-04 PROCEDURE — 80076 HEPATIC FUNCTION PANEL: CPT

## 2018-06-04 PROCEDURE — P9035: CPT

## 2018-06-04 PROCEDURE — 87493 C DIFF AMPLIFIED PROBE: CPT

## 2018-06-04 PROCEDURE — 94003 VENT MGMT INPAT SUBQ DAY: CPT

## 2018-06-04 PROCEDURE — 86592 SYPHILIS TEST NON-TREP QUAL: CPT

## 2018-06-04 PROCEDURE — 85610 PROTHROMBIN TIME: CPT

## 2018-06-04 PROCEDURE — 88360 TUMOR IMMUNOHISTOCHEM/MANUAL: CPT

## 2018-06-04 PROCEDURE — 76705 ECHO EXAM OF ABDOMEN: CPT

## 2018-06-04 PROCEDURE — 87040 BLOOD CULTURE FOR BACTERIA: CPT

## 2018-06-04 PROCEDURE — 86923 COMPATIBILITY TEST ELECTRIC: CPT

## 2018-06-04 PROCEDURE — C8925: CPT

## 2018-06-04 PROCEDURE — 71260 CT THORAX DX C+: CPT

## 2018-06-04 PROCEDURE — 82595 ASSAY OF CRYOGLOBULIN: CPT

## 2018-06-04 PROCEDURE — 87483 CNS DNA AMP PROBE TYPE 12-25: CPT

## 2018-06-04 PROCEDURE — 89051 BODY FLUID CELL COUNT: CPT

## 2018-06-04 PROCEDURE — 93306 TTE W/DOPPLER COMPLETE: CPT

## 2018-06-04 PROCEDURE — 92526 ORAL FUNCTION THERAPY: CPT | Mod: GN

## 2018-06-04 PROCEDURE — 81001 URINALYSIS AUTO W/SCOPE: CPT

## 2018-06-04 PROCEDURE — 82585 ASSAY OF CRYOFIBRINOGEN: CPT

## 2018-06-04 PROCEDURE — 84443 ASSAY THYROID STIM HORMONE: CPT

## 2018-06-04 PROCEDURE — 85025 COMPLETE CBC W/AUTO DIFF WBC: CPT

## 2018-06-04 PROCEDURE — 84134 ASSAY OF PREALBUMIN: CPT

## 2018-06-04 PROCEDURE — 95951: CPT

## 2018-06-04 PROCEDURE — 88329 PATH CONSLTJ DRG SURG: CPT

## 2018-06-04 PROCEDURE — L8699: CPT

## 2018-06-04 PROCEDURE — 36415 COLL VENOUS BLD VENIPUNCTURE: CPT

## 2018-06-04 PROCEDURE — 82945 GLUCOSE OTHER FLUID: CPT

## 2018-06-04 PROCEDURE — 87106 FUNGI IDENTIFICATION YEAST: CPT

## 2018-06-04 PROCEDURE — 82803 BLOOD GASES ANY COMBINATION: CPT

## 2018-06-04 PROCEDURE — C1729: CPT

## 2018-06-04 PROCEDURE — 86022 PLATELET ANTIBODIES: CPT

## 2018-06-04 PROCEDURE — 62270 DX LMBR SPI PNXR: CPT

## 2018-06-04 PROCEDURE — 83615 LACTATE (LD) (LDH) ENZYME: CPT

## 2018-06-04 PROCEDURE — 87324 CLOSTRIDIUM AG IA: CPT

## 2018-06-04 PROCEDURE — 97161 PT EVAL LOW COMPLEX 20 MIN: CPT

## 2018-06-04 PROCEDURE — C1894: CPT

## 2018-06-04 PROCEDURE — 97530 THERAPEUTIC ACTIVITIES: CPT

## 2018-06-04 PROCEDURE — 87205 SMEAR GRAM STAIN: CPT

## 2018-06-04 PROCEDURE — 88302 TISSUE EXAM BY PATHOLOGIST: CPT

## 2018-06-04 PROCEDURE — 82607 VITAMIN B-12: CPT

## 2018-06-04 PROCEDURE — 86850 RBC ANTIBODY SCREEN: CPT

## 2018-06-04 PROCEDURE — 84436 ASSAY OF TOTAL THYROXINE: CPT

## 2018-06-04 PROCEDURE — 87181 SC STD AGAR DILUTION PER AGT: CPT

## 2018-06-04 PROCEDURE — 84295 ASSAY OF SERUM SODIUM: CPT

## 2018-06-04 PROCEDURE — 87507 IADNA-DNA/RNA PROBE TQ 12-25: CPT

## 2018-06-04 PROCEDURE — 83010 ASSAY OF HAPTOGLOBIN QUANT: CPT

## 2018-06-04 PROCEDURE — 82746 ASSAY OF FOLIC ACID SERUM: CPT

## 2018-06-04 PROCEDURE — 87186 SC STD MICRODIL/AGAR DIL: CPT

## 2018-06-04 PROCEDURE — 97110 THERAPEUTIC EXERCISES: CPT

## 2018-06-04 PROCEDURE — 36430 TRANSFUSION BLD/BLD COMPNT: CPT

## 2018-06-04 PROCEDURE — 85018 HEMOGLOBIN: CPT

## 2018-06-04 PROCEDURE — 84478 ASSAY OF TRIGLYCERIDES: CPT

## 2018-06-04 PROCEDURE — 83735 ASSAY OF MAGNESIUM: CPT

## 2018-06-04 PROCEDURE — 77003 FLUOROGUIDE FOR SPINE INJECT: CPT

## 2018-06-04 PROCEDURE — 93005 ELECTROCARDIOGRAM TRACING: CPT

## 2018-06-04 PROCEDURE — 85027 COMPLETE CBC AUTOMATED: CPT

## 2018-06-04 PROCEDURE — 86901 BLOOD TYPING SEROLOGIC RH(D): CPT

## 2018-06-04 PROCEDURE — 86900 BLOOD TYPING SEROLOGIC ABO: CPT

## 2018-06-04 PROCEDURE — P9047: CPT

## 2018-06-04 PROCEDURE — 87086 URINE CULTURE/COLONY COUNT: CPT

## 2018-06-04 RX ORDER — LACTOBACILLUS ACIDOPHILUS 100MM CELL
1 CAPSULE ORAL
Qty: 0 | Refills: 0 | COMMUNITY
Start: 2018-06-04

## 2018-06-04 RX ORDER — LISINOPRIL 2.5 MG/1
1 TABLET ORAL
Qty: 0 | Refills: 0 | COMMUNITY
Start: 2018-06-04

## 2018-06-04 RX ORDER — LABETALOL HCL 100 MG
1 TABLET ORAL
Qty: 0 | Refills: 0 | COMMUNITY
Start: 2018-06-04

## 2018-06-04 RX ADMIN — Medication 200 MILLIGRAM(S): at 05:17

## 2018-06-04 RX ADMIN — HEPARIN SODIUM 5000 UNIT(S): 5000 INJECTION INTRAVENOUS; SUBCUTANEOUS at 05:17

## 2018-06-04 RX ADMIN — Medication 2: at 00:20

## 2018-06-04 RX ADMIN — Medication 1 DROP(S): at 10:00

## 2018-06-04 RX ADMIN — Medication 75 MICROGRAM(S): at 06:47

## 2018-06-04 RX ADMIN — AMLODIPINE BESYLATE 10 MILLIGRAM(S): 2.5 TABLET ORAL at 05:17

## 2018-06-04 RX ADMIN — Medication 1 TABLET(S): at 12:19

## 2018-06-04 RX ADMIN — LISINOPRIL 40 MILLIGRAM(S): 2.5 TABLET ORAL at 05:17

## 2018-06-04 RX ADMIN — PANTOPRAZOLE SODIUM 40 MILLIGRAM(S): 20 TABLET, DELAYED RELEASE ORAL at 12:19

## 2018-06-04 RX ADMIN — Medication 2: at 12:19

## 2018-06-04 RX ADMIN — HEPARIN SODIUM 5000 UNIT(S): 5000 INJECTION INTRAVENOUS; SUBCUTANEOUS at 13:08

## 2018-06-04 RX ADMIN — Medication 2: at 06:08

## 2018-06-04 RX ADMIN — NYSTATIN CREAM 1 APPLICATION(S): 100000 CREAM TOPICAL at 05:18

## 2018-06-04 RX ADMIN — INSULIN GLARGINE 10 UNIT(S): 100 INJECTION, SOLUTION SUBCUTANEOUS at 00:20

## 2018-06-04 NOTE — PROGRESS NOTE ADULT - ASSESSMENT
79 yo Panamanian speaking F w/pmhx of morbid obesity, DM, HTN, hypothyroidism, sepsis secondary to ventral hernia infection s/p repair, PE s/p IVC filter, acute blood loss anemia from hemorrhagic uterine fibroids and UGIB, ATN requiring HD, sepsis secondary to MSSA bacteremia and MDR E coli UTI now w/AMS and s/p TAHBSO and small bowel resection on 5/15 followed by  PEG placement on 5/16 and medicine consulted for HTN urgency and fluid management.

## 2018-06-04 NOTE — PROGRESS NOTE ADULT - ATTENDING COMMENTS
Pt seen and examined, V reviewed- BP above goal, exam as above, agree with plan as outlined by Dr Lin.

## 2018-06-04 NOTE — PROGRESS NOTE ADULT - SUBJECTIVE AND OBJECTIVE BOX
O/N: EMANUEL  6/3: passed bedside dysphagia done by nurses. pending ERNESTINE O/N: EMANUEL  6/3: passed bedside dysphagia done by nurses. pending ERNESTINE      INTERVAL HPI/OVERNIGHT EVENTS:    STATUS POST:  [facility] BSO    POST OPERATIVE DAY #: 20    SUBJECTIVE: Pt seen and examined at bedside. No acute complaints.   Flatus: [x ] YES [ ] NO             Bowel Movement: [x ] YES [ ] NO  Pain (0-10):            Pain Control Adequate: [x ] YES [ ] NO  Nausea: [ ] YES [x ] NO            Vomiting: [ ] YES [x ] NO  Diarrhea: [ ] YES [x ] NO         Constipation: [ ] YES [x ] NO     Chest Pain: [ ] YES [x ] NO    SOB:  [ ] YES [ x] NO    MEDICATIONS  (STANDING):  amLODIPine   Tablet 10 milliGRAM(s) Oral daily  artificial  tears Solution 1 Drop(s) Both EYES two times a day  dextrose 50% Injectable 12.5 Gram(s) IV Push once  dextrose 50% Injectable 25 Gram(s) IV Push once  heparin  Injectable 5000 Unit(s) SubCutaneous every 8 hours  insulin glargine Injectable (LANTUS) 10 Unit(s) SubCutaneous at bedtime  insulin lispro (HumaLOG) corrective regimen sliding scale   SubCutaneous every 6 hours  labetalol 200 milliGRAM(s) Oral two times a day  lactobacillus acidophilus 1 Tablet(s) Oral daily  levothyroxine Injectable 75 MICROGram(s) IV Push <User Schedule>  lisinopril 40 milliGRAM(s) Enteral Tube daily  nystatin Powder 1 Application(s) Topical two times a day  pantoprazole   Suspension 40 milliGRAM(s) Oral daily  sodium chloride 0.9% lock flush 20 milliLiter(s) IV Push once    MEDICATIONS  (PRN):  acetaminophen    Suspension. 650 milliGRAM(s) Oral every 6 hours PRN Moderate Pain (4 - 6)  ALBUTerol/ipratropium for Nebulization 3 milliLiter(s) Nebulizer every 6 hours PRN Shortness of Breath and/or Wheezing  ondansetron Injectable 4 milliGRAM(s) IV Push every 6 hours PRN Nausea  sodium chloride 0.9% lock flush 10 milliLiter(s) IV Push every 1 hour PRN After each medication administration  sodium chloride 0.9% lock flush 10 milliLiter(s) IV Push every 12 hours PRN Lumen of catheter NOT used      Vital Signs Last 24 Hrs  T(C): 36.3 (04 Jun 2018 10:40), Max: 36.3 (04 Jun 2018 04:56)  T(F): 97.4 (04 Jun 2018 10:40), Max: 97.4 (04 Jun 2018 04:56)  HR: 82 (04 Jun 2018 10:40) (79 - 84)  BP: 166/82 (04 Jun 2018 10:40) (152/86 - 175/84)  BP(mean): --  RR: 16 (04 Jun 2018 10:40) (16 - 16)  SpO2: 97% (04 Jun 2018 10:40) (97% - 98%)    PHYSICAL EXAM:      Constitutional: A&Ox3    Respiratory: non labored breathing, no respiratory distress    Cardiovascular:  RRR    Gastrointestinal: Soft NT ND                 Incision: cdi vac in place.     Genitourinary: voiding    Extremities: (-) edema                  I&O's Detail    03 Jun 2018 07:01  -  04 Jun 2018 07:00  --------------------------------------------------------  IN:    Enteral Tube Flush: 375 mL    Glucerna: 1320 mL  Total IN: 1695 mL    OUT:  Total OUT: 0 mL    Total NET: 1695 mL          LABS:                        8.5    6.4   )-----------( 247      ( 03 Jun 2018 06:56 )             27.4     06-03    141  |  107  |  21  ----------------------------<  140<H>  4.1   |  24  |  0.68    Ca    9.3      03 Jun 2018 06:56  Phos  3.4     06-03  Mg     1.8     06-03            RADIOLOGY & ADDITIONAL STUDIES:

## 2018-06-04 NOTE — PROGRESS NOTE ADULT - PROBLEM SELECTOR PROBLEM 2
DM (diabetes mellitus)
Hypertensive urgency
Hypervolemia, unspecified hypervolemia type
Hypervolemia, unspecified hypervolemia type
Hypertensive urgency
Hypervolemia, unspecified hypervolemia type

## 2018-06-04 NOTE — SWALLOW BEDSIDE ASSESSMENT ADULT - SWALLOW EVAL: DIAGNOSIS
Patient presents with suspected oropharyneal dysphagia, characterized by audible swallows and multiple swallows. Patient accepted PO trials of puree solids and thin liquids. Adequate oral acceptance, AP transport, and bolus propulsion. Suspect timely swallow initiation with functional hyolaryngeal elevation. No overt clinical s/s aspiration/penetration across consistencies administered (thin liquids and puree solids). Patient refused additional PO trials. Patient presents with suspected oropharyneal dysphagia, characterized by audible swallows and multiple swallows. Patient accepted PO trials of puree solids and thin liquids. Adequate oral acceptance, AP transport, and bolus propulsion. Suspect timely swallow initiation with functional hyolaryngeal elevation. No overt clinical s/s aspiration/penetration across consistencies administered (thin liquids and puree solids). Patient refused additional PO trials. Recommend puree solids and thin liquids at this time until further PO trials can be performed.

## 2018-06-04 NOTE — CHART NOTE - NSCHARTNOTESELECT_GEN_ALL_CORE
Nutrition Follow Up/Nutrition Services
Event Note
Nutrition Follow Up/Nutrition Services
Nutrition Follow Up/Nutrition Services
Nutrition Services
Nutrition Services/Nutrition Follow Up
Nutrition Services/Nutrition Follow-Up Note

## 2018-06-04 NOTE — SWALLOW BEDSIDE ASSESSMENT ADULT - SPECIFY REASON(S)
This is a 78 y.o female admitted with sepsis from open abdominal wound infection. Recent admission. MSSA bacteremia and UTI
to determine safest least restrictive diet

## 2018-06-04 NOTE — PROGRESS NOTE ADULT - SUBJECTIVE AND OBJECTIVE BOX
Pt examined at bedside.   Patient is minimally verbal, more conversation today comparing to past,  denied pain, no respiratory complaint.    V/S    T(F): 97.4 (06-04-18 @ 10:40), Max: 97.4 (06-04-18 @ 04:56)  HR: 82 (06-04-18 @ 10:40)  BP: 166/82 (06-04-18 @ 10:40)  RR: 16 (06-04-18 @ 10:40)  SpO2: 97% (06-04-18 @ 10:40)  Wt(kg): --  PE     GEN - Appears stated age. No distress.           HEENT - NCAT, EOM grossly intact, PERRLA, MMM           CVS - RRR, no M/R/G, no JVD           PULM - CTA B/L, equal air entry, no increased work of breathing           ABD - Soft, nontender, obese, normal BS           EXT - Distal extremities warm, no cyanosis, no edema.           NEURO - AOx0, moves all extremity spontaneously, strength 2/5 diffusely.     LAB                        8.5    6.4   )-----------( 247      ( 03 Jun 2018 06:56 )             27.4               06-03    141  |  107  |  21  ----------------------------<  140<H>  4.1   |  24  |  0.68    Ca    9.3      03 Jun 2018 06:56  Phos  3.4     06-03  Mg     1.8     06-03                                Additional tests & radiology reviewed.

## 2018-06-04 NOTE — PROGRESS NOTE ADULT - PROVIDER SPECIALTY LIST ADULT
GYN
GYN
Hospitalist
Infectious Disease
Internal Medicine
Neurology
SICU
Surgery
Infectious Disease
Neurology
SICU
Infectious Disease
Surgery
Infectious Disease
Critical Care
Internal Medicine

## 2018-06-04 NOTE — PROGRESS NOTE ADULT - PROBLEM SELECTOR PLAN 5
Required 6units of lispro past 24H, with 84 morning AM FS reading  C/w lantus 10u and please use regular ISS Q6hrs as on tube feeds, will reassess.         reconsult prn

## 2018-06-04 NOTE — DISCHARGE NOTE ADULT - PATIENT PORTAL LINK FT
You can access the [facility] Patient Portal, offered by [facility], by registering with the following website: [email or web address]

## 2018-06-04 NOTE — DISCHARGE NOTE ADULT - CARE PLAN
Principal Discharge DX:	Bowel perforation Principal Discharge DX:	Bowel perforation  Goal:	recovery  Assessment and plan of treatment:	Follow up with Dr. Ngo in 1-2 weeks. Call the office at [phone] to schedule your appointment. Instructions for follow-up, activity and diet:	Please continue TF osmolyte 1.2 1440ml or 6/ can /24hr. Please resume all regular home medications unless specifically advised not to take a particular medication. Also, please take any new medications as prescribed.  Please get plenty of rest, continue to ambulate several times per day, and drink adequate amounts of fluids. Avoid lifting weights greater than 5-10 lbs until you follow-up with your surgeon, who will instruct you further regarding activity restrictions.       Wound care: midline wound with vac dressing approximately half medium sponge beneath skin extending further distally than proximal. Please change Monday, wednesday and friday.

## 2018-06-04 NOTE — SWALLOW BEDSIDE ASSESSMENT ADULT - ORAL PREPARATORY PHASE
WFL for puree solids and thin liquids, refused to accept mechanical soft solids/Within functional limits

## 2018-06-04 NOTE — DISCHARGE NOTE ADULT - PLAN OF CARE
recovery Follow up with Dr. Ngo in 1-2 weeks. Call the office at [phone] to schedule your appointment. Instructions for follow-up, activity and diet:	Please continue TF osmolyte 1.2 1440ml or 6/ can /24hr. Please resume all regular home medications unless specifically advised not to take a particular medication. Also, please take any new medications as prescribed.  Please get plenty of rest, continue to ambulate several times per day, and drink adequate amounts of fluids. Avoid lifting weights greater than 5-10 lbs until you follow-up with your surgeon, who will instruct you further regarding activity restrictions.       Wound care: midline wound with vac dressing approximately half medium sponge beneath skin extending further distally than proximal. Please change Monday, wednesday and friday.

## 2018-06-04 NOTE — DISCHARGE NOTE ADULT - HOSPITAL COURSE
78F with MO, DM, HTN, and hypothyroidism presenting from OSH on 4/15 for management of multifactorial sepsis and abdominal wound. On 3/2, she underwent repair of ventral hernia with progrip mesh, robotic converted to open. She regained bowel function and was discharge 3/4. On 3/12, she presented to [facility] with SOB, ultimately diagnosed as PE. She was started on hep gtt and eventually switched to NOAC. She was discharged on 3/16. On 3/23, she returned to [facility] with anemia 2/2 acute blood loss anemia from vaginal bleeding and upper GI bleed. EGD showed ulcers, no active bleed. Hospital course was complicated by ATN requiring temporary HD. HD catheter ultimately removed. She underwent IVC filter placement 3/30. She was was discharged 4/11. She presented to OSH from her rehab on 4/15 with sepsis, which was found to be 2/2 MSSA bactermia and MDR e coli in urine. She was treated with IV Zosyn, but urine cx was resistant. CT showed gas and free fluid. She was stabilized and transferred to [facility] for this hospitalization.    During hospitalization, CT revealed extravasation and patient underwent IR drain placement. Patient was treated with iv abx during SICU stay. Patient underwent DAVY BSO 2/2 tubal metaplasia from bx with obgyn, SBR with primary anastomosis, and abdominal washout. Patient was stepped down to telemetry where she remained afebrile. Her WBC downtrended. CT head 2/2 AMS negative. Abdominal ultrasound did not reveal CBD stone. Wound vac was changed three times weekly. Patient had not been verbal for almost the entire month but became verbal during end of hospital stay. Patient continued with tube feeds and tolerated. Patient's clinical condition improved during hospital stay. Patient was hemodynamically stable, afebrile, and with a plan for followup upon discharge.

## 2018-06-04 NOTE — PROGRESS NOTE ADULT - PROBLEM SELECTOR PLAN 2
BP more elevated today  can increase Labetalol 300 BID, continue with Lisinopril 40mg daily and Norvasc 10mg daily.

## 2018-06-04 NOTE — PROGRESS NOTE ADULT - PROBLEM SELECTOR PROBLEM 1
Toxic metabolic encephalopathy
Hypertensive urgency
Hypertensive urgency
Toxic metabolic encephalopathy
Hypothyroidism
Hypertensive urgency

## 2018-06-04 NOTE — SWALLOW BEDSIDE ASSESSMENT ADULT - SLP PERTINENT HISTORY OF CURRENT PROBLEM
Also found to have a small bowel leak s/p exlap, TAHBSO, SBR on 5/15.  PEG 5/16.  Reduced cognitive function. Extubated 5/18.
sepsis from open abdominal wound infection, small bowel leak

## 2018-06-04 NOTE — DISCHARGE NOTE ADULT - MEDICATION SUMMARY - MEDICATIONS TO TAKE
I will START or STAY ON the medications listed below when I get home from the hospital:    Aspirin Enteric Coated 81 mg oral delayed release tablet  -- 1 tab(s) by mouth once a day  -- Indication: For Heart health    acetaminophen 325 mg oral tablet  -- 2 tab(s) by mouth every 6 hours, As needed, Mild Pain (1 - 3)  -- Indication: For mild pain    lisinopril 40 mg oral tablet  -- 1 tab(s) by mouth once a day  -- Indication: For Htn    escitalopram 10 mg oral tablet  -- 1 tab(s) by mouth once a day  -- Indication: For antidepressant    glipiZIDE 5 mg oral tablet  -- 1 tab(s) by mouth once a day  -- Indication: For DM (diabetes mellitus)    medroxyPROGESTERone 10 mg oral tablet  -- 1 tab(s) by mouth once a day  -- Indication: For antineoplastic    ARIPiprazole 5 mg oral tablet  -- 1 tab(s) by mouth once a day  -- Indication: For antiphycotic    QUEtiapine  -- Indication: For antipsychotic    labetalol 200 mg oral tablet  -- 1 tab(s) by mouth 2 times a day  -- Indication: For Htn    bisacodyl 5 mg oral delayed release tablet  -- 1 tab(s) by mouth every 12 hours, As needed, Constipation  -- Indication: For laxative    sodium chloride 0.65% nasal spray  -- 2 spray(s) into nose once a day  -- Indication: For nasal irritation    ocular lubricant ophthalmic solution  -- 1 drop(s) to each affected eye 2 times a day  -- Indication: For occular irritation/ dryness    lactobacillus acidophilus oral capsule  -- 1 tab(s) by mouth once a day  -- Indication: For Probiotic    pantoprazole 40 mg oral delayed release tablet  -- 1 tab(s) by mouth once a day (before a meal)  -- Indication: For GERD (gastroesophageal reflux disease)    levothyroxine 150 mcg (0.15 mg) oral tablet  -- 1 tab(s) by mouth once a day  -- Indication: For Hypothyroidism    cholecalciferol oral tablet  -- 1000 unit(s) by mouth once a day  -- Indication: For Home medicine

## 2018-06-05 NOTE — PRE-OP CHECKLIST - HEIGHT IN CM
Medical Weight Loss Multi-Disciplinary Program    Name: Miguel A Conroy   : 1967    Session# 2  Date: 2018     Height: 5' 6\" (167.6 cm)    Weight: 93.9 kg (207 lb) lbs. Body mass index is 33.41 kg/(m^2). Pounds Lost: 5     Dietary Instructions    Reviewed intake  Understanding low carbohydrates, low sugar, higher protein meals  Understanding proper portions  Instruction given for personal dietary changes  Discussed perceived compliance  Comments: Diet hx reviewed and personal dietary changes discussed. Physical Activity/Exercise    Reviewed Activity Log  Discussed Perceived Compliance  Reasonable Goals Set  Motivation  Comments: Pt is riding a stationary bike for 15 minutes daily. She is 1 week s/p gastric band removal. Discussed not to increase activity until cleared at physician visit next week and to stop activity if has any pain. Behavior Modification    Achieving/Rewarding goals met  Positive attitude  Discussed perceived compliance  Comments: Pt is exercising and plans to continue. She has decreased [employer] frappuchinos, continued decreased sweets and junk food, increased vegetables, and purchased Flintstones complete multivitamin but has not tried them yet. This month she continues to work to decrease sweets and junk food, continue to increase vegetables, and try [place] multivitamin right before bed to see if gives her a stomach ache.      Candidate for surgery (per RD): pending    Dietitian: Luz Maria Jaquez RD 170.2

## 2018-06-07 DIAGNOSIS — E87.70 FLUID OVERLOAD, UNSPECIFIED: ICD-10-CM

## 2018-06-07 DIAGNOSIS — E87.0 HYPEROSMOLALITY AND HYPERNATREMIA: ICD-10-CM

## 2018-06-07 DIAGNOSIS — K21.9 GASTRO-ESOPHAGEAL REFLUX DISEASE WITHOUT ESOPHAGITIS: ICD-10-CM

## 2018-06-07 DIAGNOSIS — B96.20 UNSPECIFIED ESCHERICHIA COLI [E. COLI] AS THE CAUSE OF DISEASES CLASSIFIED ELSEWHERE: ICD-10-CM

## 2018-06-07 DIAGNOSIS — K66.0 PERITONEAL ADHESIONS (POSTPROCEDURAL) (POSTINFECTION): ICD-10-CM

## 2018-06-07 DIAGNOSIS — G92 TOXIC ENCEPHALOPATHY: ICD-10-CM

## 2018-06-07 DIAGNOSIS — T81.4XXA INFECTION FOLLOWING A PROCEDURE, INITIAL ENCOUNTER: ICD-10-CM

## 2018-06-07 DIAGNOSIS — E66.9 OBESITY, UNSPECIFIED: ICD-10-CM

## 2018-06-07 DIAGNOSIS — N95.0 POSTMENOPAUSAL BLEEDING: ICD-10-CM

## 2018-06-07 DIAGNOSIS — E03.9 HYPOTHYROIDISM, UNSPECIFIED: ICD-10-CM

## 2018-06-07 DIAGNOSIS — K65.1 PERITONEAL ABSCESS: ICD-10-CM

## 2018-06-07 DIAGNOSIS — T81.32XA DISRUPTION OF INTERNAL OPERATION (SURGICAL) WOUND, NOT ELSEWHERE CLASSIFIED, INITIAL ENCOUNTER: ICD-10-CM

## 2018-06-07 DIAGNOSIS — A41.01 SEPSIS DUE TO METHICILLIN SUSCEPTIBLE STAPHYLOCOCCUS AUREUS: ICD-10-CM

## 2018-06-07 DIAGNOSIS — E11.9 TYPE 2 DIABETES MELLITUS WITHOUT COMPLICATIONS: ICD-10-CM

## 2018-06-07 DIAGNOSIS — N82.4 OTHER FEMALE INTESTINAL-GENITAL TRACT FISTULAE: ICD-10-CM

## 2018-06-07 DIAGNOSIS — I10 ESSENTIAL (PRIMARY) HYPERTENSION: ICD-10-CM

## 2018-06-07 DIAGNOSIS — E46 UNSPECIFIED PROTEIN-CALORIE MALNUTRITION: ICD-10-CM

## 2018-06-07 DIAGNOSIS — K63.1 PERFORATION OF INTESTINE (NONTRAUMATIC): ICD-10-CM

## 2018-06-07 DIAGNOSIS — I16.0 HYPERTENSIVE URGENCY: ICD-10-CM

## 2018-06-07 DIAGNOSIS — Z16.35 RESISTANCE TO MULTIPLE ANTIMICROBIAL DRUGS: ICD-10-CM

## 2018-06-07 DIAGNOSIS — J96.01 ACUTE RESPIRATORY FAILURE WITH HYPOXIA: ICD-10-CM

## 2018-06-07 DIAGNOSIS — N39.0 URINARY TRACT INFECTION, SITE NOT SPECIFIED: ICD-10-CM
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
## 2018-06-07 DIAGNOSIS — Z79.4 LONG TERM (CURRENT) USE OF INSULIN: ICD-10-CM

## 2018-06-07 DIAGNOSIS — D25.9 LEIOMYOMA OF UTERUS, UNSPECIFIED: ICD-10-CM

## 2018-06-07 DIAGNOSIS — D62 ACUTE POSTHEMORRHAGIC ANEMIA: ICD-10-CM

## 2018-06-07 DIAGNOSIS — A41.9 SEPSIS, UNSPECIFIED ORGANISM: ICD-10-CM

## 2018-07-17 NOTE — PHYSICAL THERAPY INITIAL EVALUATION ADULT - PLANNED THERAPY INTERVENTIONS, PT EVAL
Provider at bedside       Rain Gtz RN  07/17/18 7876 gait training/transfer training/postural re-education/bed mobility training

## 2018-09-21 NOTE — PROGRESS NOTE ADULT - PROBLEM SELECTOR PROBLEM 2
Deep vein thrombosis (DVT) of both lower extremities, unspecified chronicity, unspecified vein Acute renal failure, unspecified acute renal failure type Admitted

## 2018-10-02 ENCOUNTER — EMERGENCY (EMERGENCY)
Facility: HOSPITAL | Age: 78
LOS: 1 days | Discharge: HOME | End: 2018-10-02
Attending: EMERGENCY MEDICINE | Admitting: EMERGENCY MEDICINE
Payer: MEDICARE

## 2018-10-02 VITALS
OXYGEN SATURATION: 97 % | DIASTOLIC BLOOD PRESSURE: 90 MMHG | SYSTOLIC BLOOD PRESSURE: 167 MMHG | TEMPERATURE: 100 F | HEART RATE: 95 BPM | RESPIRATION RATE: 20 BRPM

## 2018-10-02 VITALS
TEMPERATURE: 98 F | SYSTOLIC BLOOD PRESSURE: 146 MMHG | RESPIRATION RATE: 22 BRPM | DIASTOLIC BLOOD PRESSURE: 87 MMHG | OXYGEN SATURATION: 98 % | HEART RATE: 95 BPM

## 2018-10-02 DIAGNOSIS — Z79.82 LONG TERM (CURRENT) USE OF ASPIRIN: ICD-10-CM

## 2018-10-02 DIAGNOSIS — K94.23 GASTROSTOMY MALFUNCTION: ICD-10-CM

## 2018-10-02 DIAGNOSIS — Z79.899 OTHER LONG TERM (CURRENT) DRUG THERAPY: ICD-10-CM

## 2018-10-02 DIAGNOSIS — I10 ESSENTIAL (PRIMARY) HYPERTENSION: ICD-10-CM

## 2018-10-02 DIAGNOSIS — E03.9 HYPOTHYROIDISM, UNSPECIFIED: ICD-10-CM

## 2018-10-02 DIAGNOSIS — Z98.890 OTHER SPECIFIED POSTPROCEDURAL STATES: Chronic | ICD-10-CM

## 2018-10-02 DIAGNOSIS — E11.9 TYPE 2 DIABETES MELLITUS WITHOUT COMPLICATIONS: ICD-10-CM

## 2018-10-02 DIAGNOSIS — E78.5 HYPERLIPIDEMIA, UNSPECIFIED: ICD-10-CM

## 2018-10-02 PROCEDURE — 99283 EMERGENCY DEPT VISIT LOW MDM: CPT

## 2018-10-02 PROCEDURE — 74018 RADEX ABDOMEN 1 VIEW: CPT | Mod: 26

## 2018-10-02 PROCEDURE — 74018 RADEX ABDOMEN 1 VIEW: CPT

## 2018-10-02 RX ORDER — IOHEXOL 300 MG/ML
30 INJECTION, SOLUTION INTRAVENOUS ONCE
Qty: 0 | Refills: 0 | Status: COMPLETED | OUTPATIENT
Start: 2018-10-02 | End: 2018-10-02

## 2018-10-02 RX ADMIN — IOHEXOL 30 MILLILITER(S): 300 INJECTION, SOLUTION INTRAVENOUS at 19:47

## 2018-10-02 NOTE — ED ADULT NURSE NOTE - NSIMPLEMENTINTERV_GEN_ALL_ED
Implemented All Fall with Harm Risk Interventions:  [place] to call system. Call bell, personal items and telephone within reach. Instruct patient to call for assistance. Room bathroom lighting operational. Non-slip footwear when patient is off stretcher. Physically safe environment: no spills, clutter or unnecessary equipment. Stretcher in lowest position, wheels locked, appropriate side rails in place. Provide visual cue, wrist band, yellow gown, etc. Monitor gait and stability. Monitor for mental status changes and reorient to person, place, and time. Review medications for side effects contributing to fall risk. Reinforce activity limits and safety measures with patient and family. Provide visual clues: red socks.

## 2018-10-02 NOTE — ED PROVIDER NOTE - OBJECTIVE STATEMENT
79 y/o F s/p exlap May 2018 for bowel perf/peritonitis/sepsis, TAHBSO, SBR 5/15, s/p PEG, coming from NH, BIBEMS for reported PEG dysfunction/dislodgement. Pt denies pain of any kind. Either PEG never came out completely or it was placed back through mature fistula site. Pt is unable to give further information. No pain of any kind. No vomiting. No fever. Vitals normal at NH.

## 2018-10-02 NOTE — ED ADULT NURSE NOTE - OBJECTIVE STATEMENT
77 y/o female BIBEMS from NH for "pulling out G tube last night." pt G tube still in place with distal tip ripped off. NAD, VSS, pt a&ox2 (disoriented to time), per EMS and NH record, this is pt baseline. afebrile, no bleeding.

## 2018-10-02 NOTE — ED PROVIDER NOTE - PHYSICAL EXAMINATION
VITAL SIGNS: I have reviewed nursing notes and confirm.  CONSTITUTIONAL: Well-developed; well-nourished elderly female lying calmly in stretcher moving all ext speaking clearly in complete sentences; in no acute distress.  SKIN: Agree with RN documentation regarding decubitus evaluation. Remainder of skin exam is warm and dry, no acute rash.  HEAD: Normocephalic; atraumatic.  EYES: PERRL, EOM intact; conjunctiva and sclera clear.  ENT: No nasal discharge; airway clear.  NECK: Supple; non tender.  CARD: S1, S2 normal; no murmurs, gallops, or rubs. Regular rate and rhythm.  RESP: No wheezes, rales or rhonchi.  ABD: Normal bowel sounds; soft; non-distended; non-tender, PEG in place though kinked w/intact fistula site, no surrounding erythema/discharge/bleeding  EXT: Normal ROM. No clubbing, cyanosis or edema.  LYMPH: No acute cervical adenopathy.  NEURO: Alert, oriented. Grossly unremarkable.  PSYCH: Cooperative, appropriate.

## 2018-10-02 NOTE — ED PROVIDER NOTE - MEDICAL DECISION MAKING DETAILS
PEG in place, confirmed on xray, safe to d/c home with return precautions. No indication for labs or other studies.

## 2018-10-02 NOTE — CONSULT NOTE ADULT - ASSESSMENT
78 F PMHx MO, DM, HTN, Hypothyroid sepsis 2/2 abd wound s/p RA ventral hernia repair converted to open, SBO, PE, Bowel resection, appendectomy, hysterectomy BL oophorectomy, feeding tube, surgery consulted to confirm tube placement.    -Bedside tube study with 30 mL Omnipaque followed by 30 Ml sterile water, with good flow, confirms all contrast to have traveled intraluminally   -Okay to continue TF  -No surgical intervention needed at this time  -Please continue to follow up with Dr Ngo as scheduled.   -Plan discussed with Chief Resident

## 2018-10-02 NOTE — CONSULT NOTE ADULT - SUBJECTIVE AND OBJECTIVE BOX
HPI:  78 F PMHx MO, DM, HTN, Hypothyroid sepsis 2/2 abd wound s/p RA ventral hernia repair converted to open, SBO, PE, Bowel resection, appendectomy, hysterectomy BL oophorectomy, Feeding tube placement presents from nursing home with concerns for feeding tube dislodgement. Her and son state feedings have progressed well meeting goals having regular BM, no acute complaints. Denies F/C, N/V D/C, cp sob, cough, dysuria.    PAST MEDICAL & SURGICAL HISTORY:  Hypothyroidism  GERD (gastroesophageal reflux disease)  Obesity  DM (diabetes mellitus)  HLD (hyperlipidemia)  HTN (hypertension)  H/O ventral hernia repair          MEDICATIONS  (STANDING):  iohexol 300 mG (iodine)/mL Oral Solution 30 milliLiter(s) Oral once    MEDICATIONS  (PRN):      Allergies    No Known Allergies    Intolerances        SOCIAL HISTORY:    FAMILY HISTORY:  No pertinent family history in first degree relatives      Vital Signs Last 24 Hrs  T(C): 36.8 (02 Oct 2018 16:38), Max: 36.8 (02 Oct 2018 16:38)  T(F): 98.2 (02 Oct 2018 16:38), Max: 98.2 (02 Oct 2018 16:38)  HR: 95 (02 Oct 2018 16:38) (95 - 95)  BP: 146/87 (02 Oct 2018 16:38) (146/87 - 146/87)  BP(mean): --  RR: 22 (02 Oct 2018 16:38) (22 - 22)  SpO2: 98% (02 Oct 2018 16:38) (98% - 98%)      Physical exam  General: NAD, resting comfortably in bed  Abd: soft, NT/ND, feeding tube in place, no surrounding bleeding, discoloration, masses, discharge          LABS:                RADIOLOGY & ADDITIONAL STUDIES:

## 2018-10-03 PROBLEM — E03.9 HYPOTHYROIDISM, UNSPECIFIED: Chronic | Status: ACTIVE | Noted: 2018-04-17

## 2018-10-03 PROBLEM — K21.9 GASTRO-ESOPHAGEAL REFLUX DISEASE WITHOUT ESOPHAGITIS: Chronic | Status: ACTIVE | Noted: 2018-04-17

## 2018-11-16 ENCOUNTER — INPATIENT (INPATIENT)
Facility: HOSPITAL | Age: 78
LOS: 33 days | Discharge: NURSING, REHAB OR LONG-TERM CARE | DRG: 871 | End: 2018-12-20
Attending: INTERNAL MEDICINE | Admitting: INTERNAL MEDICINE
Payer: MEDICARE

## 2018-11-16 VITALS
HEART RATE: 119 BPM | DIASTOLIC BLOOD PRESSURE: 56 MMHG | OXYGEN SATURATION: 89 % | SYSTOLIC BLOOD PRESSURE: 79 MMHG | RESPIRATION RATE: 26 BRPM

## 2018-11-16 DIAGNOSIS — Z98.890 OTHER SPECIFIED POSTPROCEDURAL STATES: Chronic | ICD-10-CM

## 2018-11-16 DIAGNOSIS — J96.01 ACUTE RESPIRATORY FAILURE WITH HYPOXIA: ICD-10-CM

## 2018-11-16 DIAGNOSIS — A41.9 SEPSIS, UNSPECIFIED ORGANISM: ICD-10-CM

## 2018-11-16 DIAGNOSIS — E87.8 OTHER DISORDERS OF ELECTROLYTE AND FLUID BALANCE, NOT ELSEWHERE CLASSIFIED: ICD-10-CM

## 2018-11-16 DIAGNOSIS — Z90.49 ACQUIRED ABSENCE OF OTHER SPECIFIED PARTS OF DIGESTIVE TRACT: Chronic | ICD-10-CM

## 2018-11-16 DIAGNOSIS — E87.2 ACIDOSIS: ICD-10-CM

## 2018-11-16 DIAGNOSIS — Z90.710 ACQUIRED ABSENCE OF BOTH CERVIX AND UTERUS: Chronic | ICD-10-CM

## 2018-11-16 DIAGNOSIS — G93.41 METABOLIC ENCEPHALOPATHY: ICD-10-CM

## 2018-11-16 DIAGNOSIS — N17.9 ACUTE KIDNEY FAILURE, UNSPECIFIED: ICD-10-CM

## 2018-11-16 LAB
ALBUMIN SERPL ELPH-MCNC: 3.3 G/DL — SIGNIFICANT CHANGE UP (ref 3.3–5)
ALP SERPL-CCNC: 66 U/L — SIGNIFICANT CHANGE UP (ref 40–120)
ALT FLD-CCNC: 21 U/L — SIGNIFICANT CHANGE UP (ref 10–45)
ANION GAP SERPL CALC-SCNC: 21 MMOL/L — HIGH (ref 5–17)
APPEARANCE UR: ABNORMAL
AST SERPL-CCNC: 25 U/L — SIGNIFICANT CHANGE UP (ref 10–40)
BASE EXCESS BLDA CALC-SCNC: -9.8 MMOL/L — LOW (ref -2–3)
BILIRUB SERPL-MCNC: 0.7 MG/DL — SIGNIFICANT CHANGE UP (ref 0.2–1.2)
BILIRUB UR-MCNC: ABNORMAL
BUN SERPL-MCNC: 145 MG/DL — HIGH (ref 7–23)
CALCIUM SERPL-MCNC: 11.4 MG/DL — HIGH (ref 8.4–10.5)
CHLORIDE SERPL-SCNC: 117 MMOL/L — HIGH (ref 96–108)
CO2 SERPL-SCNC: 16 MMOL/L — LOW (ref 22–31)
COLOR SPEC: YELLOW — SIGNIFICANT CHANGE UP
CREAT SERPL-MCNC: 3.38 MG/DL — HIGH (ref 0.5–1.3)
DIFF PNL FLD: ABNORMAL
GAS PNL BLDA: SIGNIFICANT CHANGE UP
GLUCOSE BLDC GLUCOMTR-MCNC: 351 MG/DL — HIGH (ref 70–99)
GLUCOSE SERPL-MCNC: 330 MG/DL — HIGH (ref 70–99)
GLUCOSE UR QL: NEGATIVE — SIGNIFICANT CHANGE UP
HCO3 BLDA-SCNC: 15 MMOL/L — LOW (ref 21–28)
HCT VFR BLD CALC: 39.2 % — SIGNIFICANT CHANGE UP (ref 34.5–45)
HGB BLD-MCNC: 11 G/DL — LOW (ref 11.5–15.5)
KETONES UR-MCNC: ABNORMAL MG/DL
LACTATE SERPL-SCNC: 3.1 MMOL/L — HIGH (ref 0.5–2)
LACTATE SERPL-SCNC: 4.5 MMOL/L — CRITICAL HIGH (ref 0.5–2)
LACTATE SERPL-SCNC: 5.4 MMOL/L — CRITICAL HIGH (ref 0.5–2)
LEUKOCYTE ESTERASE UR-ACNC: ABNORMAL
LYMPHOCYTES # BLD AUTO: 16 % — SIGNIFICANT CHANGE UP (ref 13–44)
MAGNESIUM SERPL-MCNC: 2 MG/DL — SIGNIFICANT CHANGE UP (ref 1.6–2.6)
MCHC RBC-ENTMCNC: 28.1 G/DL — LOW (ref 32–36)
MCHC RBC-ENTMCNC: 28.4 PG — SIGNIFICANT CHANGE UP (ref 27–34)
MCV RBC AUTO: 101.3 FL — HIGH (ref 80–100)
MONOCYTES NFR BLD AUTO: 3 % — SIGNIFICANT CHANGE UP (ref 2–14)
NEUTROPHILS NFR BLD AUTO: 42 % — LOW (ref 43–77)
NITRITE UR-MCNC: NEGATIVE — SIGNIFICANT CHANGE UP
OSMOLALITY UR: 427 MOSMOL/KG — SIGNIFICANT CHANGE UP (ref 100–650)
PCO2 BLDA: 29 MMHG — LOW (ref 32–45)
PH BLDA: 7.32 — LOW (ref 7.35–7.45)
PH UR: 5 — SIGNIFICANT CHANGE UP (ref 5–8)
PHOSPHATE SERPL-MCNC: 3.6 MG/DL — SIGNIFICANT CHANGE UP (ref 2.5–4.5)
PLATELET # BLD AUTO: 279 K/UL — SIGNIFICANT CHANGE UP (ref 150–400)
PO2 BLDA: 138 MMHG — HIGH (ref 83–108)
POTASSIUM SERPL-MCNC: 5.6 MMOL/L — HIGH (ref 3.5–5.3)
POTASSIUM SERPL-SCNC: 5.6 MMOL/L — HIGH (ref 3.5–5.3)
PROT SERPL-MCNC: 8 G/DL — SIGNIFICANT CHANGE UP (ref 6–8.3)
PROT UR-MCNC: 100 MG/DL
RBC # BLD: 3.87 M/UL — SIGNIFICANT CHANGE UP (ref 3.8–5.2)
RBC # FLD: 19.4 % — HIGH (ref 10.3–16.9)
SAO2 % BLDA: 99 % — SIGNIFICANT CHANGE UP (ref 95–100)
SODIUM SERPL-SCNC: 154 MMOL/L — HIGH (ref 135–145)
SODIUM UR-SCNC: <20 MMOL/L — SIGNIFICANT CHANGE UP
SP GR SPEC: >=1.03 — SIGNIFICANT CHANGE UP (ref 1–1.03)
TROPONIN T SERPL-MCNC: 0.11 NG/ML — CRITICAL HIGH (ref 0–0.01)
TROPONIN T SERPL-MCNC: 0.14 NG/ML — CRITICAL HIGH (ref 0–0.01)
UROBILINOGEN FLD QL: 1 E.U./DL — SIGNIFICANT CHANGE UP
WBC # BLD: 27.9 K/UL — HIGH (ref 3.8–10.5)
WBC # FLD AUTO: 27.9 K/UL — HIGH (ref 3.8–10.5)

## 2018-11-16 PROCEDURE — 71045 X-RAY EXAM CHEST 1 VIEW: CPT | Mod: 26,76

## 2018-11-16 PROCEDURE — 99291 CRITICAL CARE FIRST HOUR: CPT | Mod: 25

## 2018-11-16 PROCEDURE — 93010 ELECTROCARDIOGRAM REPORT: CPT | Mod: 59

## 2018-11-16 PROCEDURE — 31500 INSERT EMERGENCY AIRWAY: CPT

## 2018-11-16 PROCEDURE — 99292 CRITICAL CARE ADDL 30 MIN: CPT | Mod: 25

## 2018-11-16 PROCEDURE — 36556 INSERT NON-TUNNEL CV CATH: CPT

## 2018-11-16 RX ORDER — DOPAMINE HYDROCHLORIDE 40 MG/ML
5 INJECTION, SOLUTION, CONCENTRATE INTRAVENOUS
Qty: 400 | Refills: 0 | Status: DISCONTINUED | OUTPATIENT
Start: 2018-11-16 | End: 2018-11-16

## 2018-11-16 RX ORDER — DEXTROSE 50 % IN WATER 50 %
15 SYRINGE (ML) INTRAVENOUS ONCE
Qty: 0 | Refills: 0 | Status: DISCONTINUED | OUTPATIENT
Start: 2018-11-16 | End: 2018-12-20

## 2018-11-16 RX ORDER — CHLORHEXIDINE GLUCONATE 213 G/1000ML
15 SOLUTION TOPICAL
Qty: 0 | Refills: 0 | Status: DISCONTINUED | OUTPATIENT
Start: 2018-11-16 | End: 2018-11-20

## 2018-11-16 RX ORDER — AZITHROMYCIN 500 MG/1
500 TABLET, FILM COATED ORAL ONCE
Qty: 0 | Refills: 0 | Status: COMPLETED | OUTPATIENT
Start: 2018-11-16 | End: 2018-11-16

## 2018-11-16 RX ORDER — PANTOPRAZOLE SODIUM 20 MG/1
40 TABLET, DELAYED RELEASE ORAL DAILY
Qty: 0 | Refills: 0 | Status: DISCONTINUED | OUTPATIENT
Start: 2018-11-16 | End: 2018-11-17

## 2018-11-16 RX ORDER — HEPARIN SODIUM 5000 [USP'U]/ML
5000 INJECTION INTRAVENOUS; SUBCUTANEOUS EVERY 8 HOURS
Qty: 0 | Refills: 0 | Status: DISCONTINUED | OUTPATIENT
Start: 2018-11-16 | End: 2018-11-30

## 2018-11-16 RX ORDER — LEVOTHYROXINE SODIUM 125 MCG
150 TABLET ORAL DAILY
Qty: 0 | Refills: 0 | Status: DISCONTINUED | OUTPATIENT
Start: 2018-11-16 | End: 2018-12-20

## 2018-11-16 RX ORDER — DEXTROSE 50 % IN WATER 50 %
12.5 SYRINGE (ML) INTRAVENOUS ONCE
Qty: 0 | Refills: 0 | Status: DISCONTINUED | OUTPATIENT
Start: 2018-11-16 | End: 2018-12-20

## 2018-11-16 RX ORDER — INSULIN LISPRO 100/ML
VIAL (ML) SUBCUTANEOUS EVERY 6 HOURS
Qty: 0 | Refills: 0 | Status: DISCONTINUED | OUTPATIENT
Start: 2018-11-16 | End: 2018-12-20

## 2018-11-16 RX ORDER — SODIUM CHLORIDE 9 MG/ML
500 INJECTION INTRAMUSCULAR; INTRAVENOUS; SUBCUTANEOUS ONCE
Qty: 0 | Refills: 0 | Status: COMPLETED | OUTPATIENT
Start: 2018-11-16 | End: 2018-11-16

## 2018-11-16 RX ORDER — PIPERACILLIN AND TAZOBACTAM 4; .5 G/20ML; G/20ML
2.25 INJECTION, POWDER, LYOPHILIZED, FOR SOLUTION INTRAVENOUS EVERY 6 HOURS
Qty: 0 | Refills: 0 | Status: DISCONTINUED | OUTPATIENT
Start: 2018-11-16 | End: 2018-11-19

## 2018-11-16 RX ORDER — KETAMINE HYDROCHLORIDE 100 MG/ML
200 INJECTION INTRAMUSCULAR; INTRAVENOUS ONCE
Qty: 0 | Refills: 0 | Status: DISCONTINUED | OUTPATIENT
Start: 2018-11-16 | End: 2018-11-16

## 2018-11-16 RX ORDER — PIPERACILLIN AND TAZOBACTAM 4; .5 G/20ML; G/20ML
3.38 INJECTION, POWDER, LYOPHILIZED, FOR SOLUTION INTRAVENOUS ONCE
Qty: 0 | Refills: 0 | Status: COMPLETED | OUTPATIENT
Start: 2018-11-16 | End: 2018-11-16

## 2018-11-16 RX ORDER — SODIUM CHLORIDE 9 MG/ML
1000 INJECTION, SOLUTION INTRAVENOUS
Qty: 0 | Refills: 0 | Status: DISCONTINUED | OUTPATIENT
Start: 2018-11-16 | End: 2018-12-20

## 2018-11-16 RX ORDER — SODIUM CHLORIDE 9 MG/ML
1000 INJECTION INTRAMUSCULAR; INTRAVENOUS; SUBCUTANEOUS
Qty: 0 | Refills: 0 | Status: DISCONTINUED | OUTPATIENT
Start: 2018-11-16 | End: 2018-11-17

## 2018-11-16 RX ORDER — SODIUM CHLORIDE 9 MG/ML
1000 INJECTION INTRAMUSCULAR; INTRAVENOUS; SUBCUTANEOUS ONCE
Qty: 0 | Refills: 0 | Status: COMPLETED | OUTPATIENT
Start: 2018-11-16 | End: 2018-11-16

## 2018-11-16 RX ORDER — LEVOTHYROXINE SODIUM 125 MCG
75 TABLET ORAL AT BEDTIME
Qty: 0 | Refills: 0 | Status: DISCONTINUED | OUTPATIENT
Start: 2018-11-16 | End: 2018-11-16

## 2018-11-16 RX ORDER — CHLORHEXIDINE GLUCONATE 213 G/1000ML
1 SOLUTION TOPICAL DAILY
Qty: 0 | Refills: 0 | Status: DISCONTINUED | OUTPATIENT
Start: 2018-11-16 | End: 2018-11-28

## 2018-11-16 RX ORDER — SODIUM CHLORIDE 9 MG/ML
1500 INJECTION INTRAMUSCULAR; INTRAVENOUS; SUBCUTANEOUS ONCE
Qty: 0 | Refills: 0 | Status: COMPLETED | OUTPATIENT
Start: 2018-11-16 | End: 2018-11-16

## 2018-11-16 RX ORDER — GLUCAGON INJECTION, SOLUTION 0.5 MG/.1ML
1 INJECTION, SOLUTION SUBCUTANEOUS ONCE
Qty: 0 | Refills: 0 | Status: DISCONTINUED | OUTPATIENT
Start: 2018-11-16 | End: 2018-12-20

## 2018-11-16 RX ORDER — ACETAMINOPHEN 500 MG
1000 TABLET ORAL ONCE
Qty: 0 | Refills: 0 | Status: COMPLETED | OUTPATIENT
Start: 2018-11-16 | End: 2018-11-16

## 2018-11-16 RX ORDER — MEDROXYPROGESTERONE ACETATE 150 MG/ML
10 INJECTION, SUSPENSION, EXTENDED RELEASE INTRAMUSCULAR DAILY
Qty: 0 | Refills: 0 | Status: COMPLETED | OUTPATIENT
Start: 2018-11-16 | End: 2018-11-17

## 2018-11-16 RX ORDER — CIPROFLOXACIN LACTATE 400MG/40ML
400 VIAL (ML) INTRAVENOUS DAILY
Qty: 0 | Refills: 0 | Status: COMPLETED | OUTPATIENT
Start: 2018-11-16 | End: 2018-11-22

## 2018-11-16 RX ORDER — DEXTROSE 50 % IN WATER 50 %
25 SYRINGE (ML) INTRAVENOUS ONCE
Qty: 0 | Refills: 0 | Status: DISCONTINUED | OUTPATIENT
Start: 2018-11-16 | End: 2018-12-20

## 2018-11-16 RX ORDER — SUCCINYLCHOLINE CHLORIDE 100 MG/5ML
100 SYRINGE (ML) INTRAVENOUS ONCE
Qty: 0 | Refills: 0 | Status: COMPLETED | OUTPATIENT
Start: 2018-11-16 | End: 2018-11-16

## 2018-11-16 RX ORDER — NOREPINEPHRINE BITARTRATE/D5W 8 MG/250ML
0.05 PLASTIC BAG, INJECTION (ML) INTRAVENOUS
Qty: 8 | Refills: 0 | Status: DISCONTINUED | OUTPATIENT
Start: 2018-11-16 | End: 2018-11-20

## 2018-11-16 RX ORDER — PROPOFOL 10 MG/ML
5 INJECTION, EMULSION INTRAVENOUS
Qty: 1000 | Refills: 0 | Status: DISCONTINUED | OUTPATIENT
Start: 2018-11-16 | End: 2018-11-17

## 2018-11-16 RX ORDER — AZITHROMYCIN 500 MG/1
500 TABLET, FILM COATED ORAL EVERY 24 HOURS
Qty: 0 | Refills: 0 | Status: DISCONTINUED | OUTPATIENT
Start: 2018-11-17 | End: 2018-11-17

## 2018-11-16 RX ORDER — VANCOMYCIN HCL 1 G
1000 VIAL (EA) INTRAVENOUS ONCE
Qty: 0 | Refills: 0 | Status: COMPLETED | OUTPATIENT
Start: 2018-11-16 | End: 2018-11-16

## 2018-11-16 RX ORDER — ETOMIDATE 2 MG/ML
20 INJECTION INTRAVENOUS ONCE
Qty: 0 | Refills: 0 | Status: COMPLETED | OUTPATIENT
Start: 2018-11-16 | End: 2018-11-16

## 2018-11-16 RX ORDER — AZITHROMYCIN 500 MG/1
TABLET, FILM COATED ORAL
Qty: 0 | Refills: 0 | Status: DISCONTINUED | OUTPATIENT
Start: 2018-11-16 | End: 2018-11-17

## 2018-11-16 RX ADMIN — Medication 10: at 19:00

## 2018-11-16 RX ADMIN — AZITHROMYCIN 255 MILLIGRAM(S): 500 TABLET, FILM COATED ORAL at 17:31

## 2018-11-16 RX ADMIN — Medication 100 MILLIGRAM(S): at 15:06

## 2018-11-16 RX ADMIN — PIPERACILLIN AND TAZOBACTAM 200 GRAM(S): 4; .5 INJECTION, POWDER, LYOPHILIZED, FOR SOLUTION INTRAVENOUS at 14:56

## 2018-11-16 RX ADMIN — KETAMINE HYDROCHLORIDE 200 MILLIGRAM(S): 100 INJECTION INTRAMUSCULAR; INTRAVENOUS at 15:22

## 2018-11-16 RX ADMIN — PIPERACILLIN AND TAZOBACTAM 200 GRAM(S): 4; .5 INJECTION, POWDER, LYOPHILIZED, FOR SOLUTION INTRAVENOUS at 21:16

## 2018-11-16 RX ADMIN — Medication 250 MILLIGRAM(S): at 15:40

## 2018-11-16 RX ADMIN — SODIUM CHLORIDE 3000 MILLILITER(S): 9 INJECTION INTRAMUSCULAR; INTRAVENOUS; SUBCUTANEOUS at 14:56

## 2018-11-16 RX ADMIN — KETAMINE HYDROCHLORIDE 200 MILLIGRAM(S): 100 INJECTION INTRAMUSCULAR; INTRAVENOUS at 15:39

## 2018-11-16 RX ADMIN — ETOMIDATE 20 MILLIGRAM(S): 2 INJECTION INTRAVENOUS at 15:06

## 2018-11-16 RX ADMIN — Medication 200 MILLIGRAM(S): at 19:01

## 2018-11-16 RX ADMIN — Medication 6.66 MICROGRAM(S)/KG/MIN: at 16:17

## 2018-11-16 RX ADMIN — PROPOFOL 2.13 MICROGRAM(S)/KG/MIN: 10 INJECTION, EMULSION INTRAVENOUS at 16:18

## 2018-11-16 RX ADMIN — SODIUM CHLORIDE 500 MILLILITER(S): 9 INJECTION INTRAMUSCULAR; INTRAVENOUS; SUBCUTANEOUS at 14:56

## 2018-11-16 RX ADMIN — Medication 400 MILLIGRAM(S): at 15:06

## 2018-11-16 RX ADMIN — Medication 6.66 MICROGRAM(S)/KG/MIN: at 21:25

## 2018-11-16 RX ADMIN — DOPAMINE HYDROCHLORIDE 13.31 MICROGRAM(S)/KG/MIN: 40 INJECTION, SOLUTION, CONCENTRATE INTRAVENOUS at 16:17

## 2018-11-16 RX ADMIN — SODIUM CHLORIDE 1000 MILLILITER(S): 9 INJECTION INTRAMUSCULAR; INTRAVENOUS; SUBCUTANEOUS at 17:22

## 2018-11-16 NOTE — H&P ADULT - NSHPLABSRESULTS_GEN_ALL_CORE
.  LABS:                         11.0   27.9  )-----------( 279      ( 16 Nov 2018 15:08 )             39.2     11-16    154<H>  |  117<H>  |  145<H>  ----------------------------<  330<H>  5.6<H>   |  16<L>  |  3.38<H>    Ca    11.4<H>      16 Nov 2018 15:08    TPro  8.0  /  Alb  3.3  /  TBili  0.7  /  DBili  x   /  AST  25  /  ALT  21  /  AlkPhos  66  11-16      Urinalysis Basic - ( 16 Nov 2018 15:53 )    Color: Yellow / Appearance: Cloudy / SG: >=1.030 / pH: x  Gluc: x / Ketone: Trace mg/dL  / Bili: Small / Urobili: 1.0 E.U./dL   Blood: x / Protein: 100 mg/dL / Nitrite: NEGATIVE   Leuk Esterase: Moderate / RBC: < 5 /HPF / WBC Many /HPF   Sq Epi: x / Non Sq Epi: 0-5 /HPF / Bacteria: Present /HPF      CARDIAC MARKERS ( 16 Nov 2018 15:08 )  x     / 0.14 ng/mL / x     / x     / x            11-16-18 @ 07:01  -  11-16-18 @ 23:39  --------------------------------------------------------  IN: 583.4 mL / OUT: 48 mL / NET: 535.4 mL        Lactate, Blood: 3.1 mmoL/L (11-16 @ 23:04)  Lactate, Blood: 4.5 mmoL/L (11-16 @ 18:25)  Lactate, Blood: 5.4 mmoL/L (11-16 @ 15:06)      RADIOLOGY, EKG & ADDITIONAL TESTS: Reviewed.

## 2018-11-16 NOTE — ED PROVIDER NOTE - MEDICAL DECISION MAKING DETAILS
78F pmh obesity, DM, HTN, hypothyroidism, hx prior sepsis, ATN requiring HD BIBA for AMS, fever, tachycardia, hypotension, hypoxia. Doc review shows full code. Improved in transport w/ NRB. Intubated in ER for airway protection after minimal improvement w/ brief NRB trial, started on IVF bolus, pressors, Abx for septic shock. Poor/challenging access with multipel medication requirements and pressors, thus R central line placed (not completely sterile). Large thick yellow laryngeal secretions at intubation and decub ulcer. UA pending. ICU consulted for admission. Family notified by staff.

## 2018-11-16 NOTE — H&P ADULT - NSHPPHYSICALEXAM_GEN_ALL_CORE
.  VITAL SIGNS:  T(C): 37.5 (11-16-18 @ 20:30), Max: 38.6 (11-16-18 @ 14:56)  T(F): 99.5 (11-16-18 @ 20:30), Max: 101.5 (11-16-18 @ 14:56)  HR: 74 (11-16-18 @ 23:00) (74 - 119)  BP: 105/50 (11-16-18 @ 23:00) (49/32 - 157/79)  BP(mean): 70 (11-16-18 @ 23:00) (70 - 88)  RR: 10 (11-16-18 @ 23:00) (10 - 32)  SpO2: 95% (11-16-18 @ 23:00) (89% - 100%)  Wt(kg): --    PHYSICAL EXAM:    General: intubated and sedated  Head: normocephalic, atraumatic   Eyes: pupils small w/ (+) dolls eyes, EOMI, anicteric sclera, conjunctiva pink  ENT: no nasal discharge; uvula midline, no oropharyngeal erythema or exudates; mucus membranes moist  Neck: supple; no JVD or thyromegaly  Respiratory: diffuse rhonchi, no rales, no retractions  Cardiac: +S1/S2; regular rate and rhythm; no murmur, no rub, no gallop  Gastrointestinal: obese, abdomen soft, non-tender, non-distended, bowel sounds active, no organomegaly, no masses, no rebound or guarding, (+) PEG in place no drainage or eyrthema to the site  Genitourinary: normal external genitalia, suazo in place  Extremities: warm, no clubbing or cyanosis; no peripheral edema  Vascular: 2+ radial, DP pulses bilaterally  Dermatologic: skin warm, dry and intact; no rashes, wounds,   Lymphatic: no submandibular or cervical LAD  Neurologic: sedated

## 2018-11-16 NOTE — ED ADULT TRIAGE NOTE - OTHER COMPLAINTS
sent in from [facility] for r/o sepsis, reports hypotensive and hypoxic, baseline A&Ox1.

## 2018-11-16 NOTE — ED PROVIDER NOTE - PROGRESS NOTE DETAILS
Started on levophed.   Intubated due to concern airway protection w/ etomidate & succ.  Ketamine utilized for initial sedation due to hypotension. RIJ placed for access, pressors, IVF and abx. Probe cover malfunction during placement thus sterility broken, procedure finished without further issue. Discussed w/ ICU.  BP improved, weaned off dopamine onto levophed only. Propofol initiated for sedation. accepted to ICU

## 2018-11-16 NOTE — ED PROCEDURE NOTE - CPROC ED INFUS LINE DETAIL1
The guidewire was recovered./Ultrasound guidance was used during placement./Started as sterile procedure/The location was identified, and the area was draped and prepped./All lumen(s) aspirated and flushed without difficulty.

## 2018-11-16 NOTE — ED ADULT NURSE NOTE - NSIMPLEMENTINTERV_GEN_ALL_ED
Implemented All Fall Risk Interventions:  [place] to call system. Call bell, personal items and telephone within reach. Instruct patient to call for assistance. Room bathroom lighting operational. Non-slip footwear when patient is off stretcher. Physically safe environment: no spills, clutter or unnecessary equipment. Stretcher in lowest position, wheels locked, appropriate side rails in place. Provide visual cue, wrist band, yellow gown, etc. Monitor gait and stability. Monitor for mental status changes and reorient to person, place, and time. Review medications for side effects contributing to fall risk. Reinforce activity limits and safety measures with patient and family.

## 2018-11-16 NOTE — H&P ADULT - CLICK TO LAUNCH ORM
"2017       RE: Zach Gurrola  : 1996   MRN: [id number]      Dear Colleague,    Thank you for referring your patient, Zach Gurrola, to the [facility] EPILE[facility] at [facility]. Please see a copy of my visit note below.    Interval HX: No daytime sz since 2015. This is coincident with starting peramampanel. Mom  notes some \"jerking\" at night but it does not wake Zach    Prior to Admission medications    Medication Sig Start Date End Date Taking? Authorizing Provider   Perampanel (FYCOMPA) 4 MG tablet Take 1 tablet (4 mg) by mouth At Bedtime 16  Yes Panda Holden MD   zonisamide (ZONEGRAN) 100 MG capsule TAKE TWO CAPSULES BY MOUTH THREE TIMES DAILY 16  Yes Panda Holden MD   LAMICTAL 200 MG tablet 1 in Am 1 around noon  2 at night.YULY only. Patient taking differently, taking 200mg TID. 16  Yes Panda Holden MD       HISTORY REVIEWED 17: Zach   has symptomatic generalized brittle epilepsy.  Age of onset was 5 years.  He has allergies to Dilantin and Depakote and he has failed generic Lamictal.  Neuropsychological testing 2010 indicated full scale IQ of 66, but this may have been somewhat influenced by his lack of cooperation.  Nevertheless, the impression was that the test results revealed considerable evidence of generalized cerebral dysfunction, moderate in overall degree and reflected in widespread intellectual limitations and cognitive deficits and scattered diffuse features.He also has ADHD.      His last EEG was in 2016 and was interpreted as moderate disturbance of background activity with frequent electrographic interictal and ictal activity which was generalized. Not significantly changed from previous in    He had a presurgical evaluation at [facility] in  and also had a [facility] evaluation in .  MRI scan at [facility] 2011 was essentially normal, with no evidence of mesial temporal sclerosis.    He has " "been tried on a number of medications but mother feels that the current medications are the best in terms of seizure control, though they are certainly not fully controlling seizures.     Another issue is financial.  Because he failed the generic Lamictal, their drug costs are quite high and he needs annual preauthorization.  This creates a great deal of headache and red tape for our clinic, [employer], and pharmacists.  Unfortunately the system is such that even though his situation is chronic and not changing, an annual review is done.    ROS: has small lesions on arm and back. Bone in left wrist \"no blood supply\" Otherwise negative for remainder of 14 point ros   EXAMINATION: Now tall, thin. He was more verbal and interacted well and cooperated with the examination than last time.  Speech was fluent.  Extraocular movements were full without nystagmus.  Pupils reacted briskly to light.  Facial movements were symmetrical.  Swallowing was normal.  Hearing was intact.  Visual fields were full to confrontation.  Finger-nose-finger was intact.  Tandem gait was normal. Muscle tone and strength and coordination in the upper and lower extremities were normal.      ASSESSMENT:  Daytime Sz now under control !!! Begin taper of ZNS as mom feels this has affected behavior to a small degree   Plan:  1) ZNS drop to 100 at noon for 2 weeks the D/C noon dose. Continue 200 AM and 200 HS   2) Continue perampanel at 4 mg hs   3) RTC 3mo   4)ASD levels        Again, thank you for allowing me to participate in the care of your patient.      Sincerely,    Panda Holden MD      " .

## 2018-11-16 NOTE — CONSULT NOTE ADULT - PROBLEM SELECTOR RECOMMENDATION 4
Baseline 0.6, now 3.38, likely component of pre-Renal with intra renal causes  -UOP low at the moment continue to monitor  -Send urine lytes

## 2018-11-16 NOTE — ED PROCEDURE NOTE - CPROC ED INDICATIONS1
volume resuscitation/emergency venous access/hypertonic/irritant infusion
airway protection
critical patient

## 2018-11-16 NOTE — ED ADULT NURSE NOTE - OBJECTIVE STATEMENT
Patient is a 79yo F, BIBA from NH, AAOx1 at baseline, hypotensive, tachycardic, febrile, presenting for possible sepsis per NH transfer papers. Unable to assess patient complaints due to critical condition.  Patient presents with RUQ PEG tube and approximately [id number] and [id number] sacral pressure wounds.  PIVs placed, labs drawn and sent, EKG done, placed on cardiac monitor.

## 2018-11-16 NOTE — H&P ADULT - PSH
H/O ventral hernia repair    S/P small bowel resection    Status post total abdominal hysterectomy and bilateral salpingo-oophorectomy

## 2018-11-16 NOTE — ED PROVIDER NOTE - OBJECTIVE STATEMENT
per chart review:   obesity, DM, HTN, hypothyroidism, sepsis secondary to ventral hernia infection s/p repair, PE s/p IVC filter, acute blood loss anemia from hemorrhagic uterine fibroids and UGIB, ATN requiring HD, sepsis secondary to MSSA bacteremia and MDR E  Today BIBA from nursing home for AMS and concern sepsis. 10:30 am noted by satff for ams (less interactive, not speaking), hypoxia, hypotensive, tachycardic. EMS placed on NRB improved O2 86% -> 95%.  Per report, baseline mental status is talking +/- confusion  Per June 2018, pt full code.

## 2018-11-16 NOTE — CONSULT NOTE ADULT - SUBJECTIVE AND OBJECTIVE BOX
Patient is Albanian speaking, intubated and unable to provide history, history taken from chart    HPI: 78F w/pmhx of morbid obesity, DM, HTN, hypothyroidism, sepsis secondary to ventral hernia infection s/p repair, PE s/p IVC filter, acute blood loss anemia from hemorrhagic uterine fibroids and UGIB, ATN requiring HD, sepsis secondary to MSSA bacteremia and MDR E coli UTI now w/AMS and s/p TAHBSO and small bowel resection s/p PEG. Patient presented from [facility] with concern for sepsis and hypoxia. As reported by EMS patient was saturating 85% on RA on arrival and was placed on NRB and improved to 95%.  On arrival patient was altered and saturating 89%. She was intubated in the ED with suctioning revealing purulent material. T was 101.5, , BP79/59. She was started on Levo gtt and Dopamine and subsequently was titrated off the Dopamine. She was given Vancomycin and Zosyn and a 500cc NS Bolus.     ROS:  Otherwise negative, except as specified in HPI.    PMH: Morbid Obesity, DM, HTN, Hypothyroid, ATN requiring HD, previous MDR Ecoli    PSH: Vental Hernia Repair, PEG insertion, TAHBSO, Small Bowel Ressection    FH:    Meds:   Aspirin Enteric Coated 81 mg Qd  acetaminophen 325 mg oral tablet, 2 tab(s) by mouth every 6 hours, As needed, Mild Pain (1 - 3)  lisinopril 40 mg oral tablet, 1 tab(s) by mouth once a day  escitalopram 10 mg oral tablet, 1 tab(s) by mouth once a day  glipiZIDE 5 mg oral tablet, 1 tab(s) by mouth once a day  medroxyPROGESTERone 10 mg oral tablet, 1 tab(s) by mouth once a day  ARIPiprazole 5 mg oral tablet, 1 tab(s) by mouth once a day  QUEtiapine  -- Indication: For antipsychotic  labetalol 200 mg oral tablet, 1 tab(s) by mouth 2 times a day  bisacodyl 5 mg oral delayed release tablet, 1 tab(s) by mouth every 12 hours, As needed, Constipation  levothyroxine 150 mcg (0.15 mg) oral tablet, 1 tab(s) by mouth once a day    SH: Lives in [facility]    ALLERGIES: NKA    VITAL SIGNS:  ICU Vital Signs Last 24 Hrs  T(C): 38.6 (16 Nov 2018 14:56), Max: 38.6 (16 Nov 2018 14:56)  T(F): 101.5 (16 Nov 2018 14:56), Max: 101.5 (16 Nov 2018 14:56)  HR: 94 (16 Nov 2018 15:42) (92 - 119)  BP: 137/64 (16 Nov 2018 15:42) (60/39 - 137/64)  BP(mean): --  ABP: --  ABP(mean): --  RR: 20 (16 Nov 2018 15:42) (20 - 26)  SpO2: 94% (16 Nov 2018 15:42) (89% - 100%)    CAPILLARY BLOOD GLUCOSE          PHYSICAL EXAM:  Constitutional: Lying in bed, intubated, not responding to stimuli  HEENT: NC/AT; PERRL, anicteric sclera; no oropharyngeal erythema or exudates; MMM  Neck: supple, no appreciable JVD  Respiratory: Diffuse Rhonchi heard throughout all lung fields, no wheezing   Cardiovascular: +S1/S2, RRR  Gastrointestinal: abdomen soft, NT/ND  Extremities: WWP; no clubbing, cyanosis or edema; cool and dry  Vascular: 2+ radial, femoral, and DP/PT pulses B/L  Dermatologic: skin normal color and turgor; no visible rashes  Neurological:     LABS:                        11.0   27.9  )-----------( 279      ( 16 Nov 2018 15:08 )             39.2     11-16    154<H>  |  117<H>  |  145<H>  ----------------------------<  330<H>  5.6<H>   |  16<L>  |  3.38<H>    Ca    11.4<H>      16 Nov 2018 15:08    TPro  8.0  /  Alb  3.3  /  TBili  0.7  /  DBili  x   /  AST  25  /  ALT  21  /  AlkPhos  66  11-16      Lactate, Blood: 5.4 mmoL/L (11-16-18 @ 15:06)    CARDIAC MARKERS ( 16 Nov 2018 15:08 )  x     / 0.14 ng/mL / x     / x     / x        EKG: Reviewed.    RADIOLOGY & ADDITIONAL TESTS: Reviewed. Patient is Maltese speaking, intubated and unable to provide history, history taken from chart    HPI: 78F w/pmhx of morbid obesity, DM, HTN, hypothyroidism, sepsis secondary to ventral hernia infection s/p repair, PE s/p IVC filter, acute blood loss anemia from hemorrhagic uterine fibroids and UGIB, ATN requiring HD, sepsis secondary to MSSA bacteremia and MDR E coli UTI now w/AMS and s/p TAHBSO and small bowel resection s/p PEG. Patient presented from [facility] with concern for sepsis and hypoxia. As reported by EMS patient was saturating 85% on RA on arrival and was placed on NRB and improved to 95%.  On arrival patient was altered and saturating 89%. She was intubated in the ED with suctioning revealing purulent material. T was 101.5, , BP79/59. She was started on Levo gtt and Dopamine and subsequently was titrated off the Dopamine. She was given Vancomycin and Zosyn and a 500cc NS Bolus.     ROS:  Otherwise negative, except as specified in HPI.    PMH: Morbid Obesity, DM, HTN, Hypothyroid, ATN requiring HD, previous MDR Ecoli    PSH: Vental Hernia Repair, PEG insertion, TAHBSO, Small Bowel Ressection    FH:    Meds:   Aspirin Enteric Coated 81 mg Qd  acetaminophen 325 mg oral tablet, 2 tab(s) by mouth every 6 hours, As needed, Mild Pain (1 - 3)  lisinopril 40 mg oral tablet, 1 tab(s) by mouth once a day  escitalopram 10 mg oral tablet, 1 tab(s) by mouth once a day  glipiZIDE 5 mg oral tablet, 1 tab(s) by mouth once a day  medroxyPROGESTERone 10 mg oral tablet, 1 tab(s) by mouth once a day  ARIPiprazole 5 mg oral tablet, 1 tab(s) by mouth once a day  QUEtiapine  -- Indication: For antipsychotic  labetalol 200 mg oral tablet, 1 tab(s) by mouth 2 times a day  bisacodyl 5 mg oral delayed release tablet, 1 tab(s) by mouth every 12 hours, As needed, Constipation  levothyroxine 150 mcg (0.15 mg) oral tablet, 1 tab(s) by mouth once a day    SH: Lives in [facility]    ALLERGIES: NKA    VITAL SIGNS:  ICU Vital Signs Last 24 Hrs  T(C): 38.6 (16 Nov 2018 14:56), Max: 38.6 (16 Nov 2018 14:56)  T(F): 101.5 (16 Nov 2018 14:56), Max: 101.5 (16 Nov 2018 14:56)  HR: 94 (16 Nov 2018 15:42) (92 - 119)  BP: 137/64 (16 Nov 2018 15:42) (60/39 - 137/64)  BP(mean): --  ABP: --  ABP(mean): --  RR: 20 (16 Nov 2018 15:42) (20 - 26)  SpO2: 94% (16 Nov 2018 15:42) (89% - 100%)    CAPILLARY BLOOD GLUCOSE          PHYSICAL EXAM:  Constitutional: Lying in bed, intubated, not responding to stimuli  HEENT: NC/AT; PERRL, anicteric sclera; no oropharyngeal erythema or exudates; MMM  Neck: supple, no appreciable JVD  Respiratory: Diffuse Rhonchi heard throughout all lung fields, no wheezing   Cardiovascular: +S1/S2, RRR  Gastrointestinal: abdomen soft, NT/ND; PEG site without induration, bile drainage around site  Extremities: WWP; no clubbing, cyanosis or edema; cool and dry  Vascular: 2+ radial, femoral, and DP/PT pulses B/L  Dermatologic: skin normal color and turgor; no visible rashes  Neurological: sedated with propofol    LABS:                        11.0   27.9  )-----------( 279      ( 16 Nov 2018 15:08 )             39.2     11-16    154<H>  |  117<H>  |  145<H>  ----------------------------<  330<H>  5.6<H>   |  16<L>  |  3.38<H>    Ca    11.4<H>      16 Nov 2018 15:08    TPro  8.0  /  Alb  3.3  /  TBili  0.7  /  DBili  x   /  AST  25  /  ALT  21  /  AlkPhos  66  11-16      Lactate, Blood: 5.4 mmoL/L (11-16-18 @ 15:06)    CARDIAC MARKERS ( 16 Nov 2018 15:08 )  x     / 0.14 ng/mL / x     / x     / x        EKG: Reviewed.    RADIOLOGY & ADDITIONAL TESTS: Reviewed.

## 2018-11-16 NOTE — H&P ADULT - ASSESSMENT
78F w/ PMH obesity, DM, HTN, hypothyroidism, sepsis secondary to ventral hernia infection s/p repair, PE s/p IVC filter, acute blood loss anemia from hemorrhagic uterine fibroids and UGIB, ATN requiring HD, sepsis secondary to MSSA bacteremia and MDR E coli UTI now w/AMS and s/p TAHBSO and small bowel resection s/p PEG. Presenting with septic shock likely 2/2 PNA vs UTI and acute hypoxic respiratory failure and is now currently intubated requiring pressure support.      Neuro  #sedated  on propofol and fentanyl at this time    Respiratory  #acute hypoxic respiratory failure  increased work of breathing on presentation requiring more invasive assistance in breathing. pH;7.32  continue mech vent vc-ac 78F w/ PMH obesity, DM, HTN, hypothyroidism, sepsis secondary to ventral hernia infection s/p repair, PE s/p IVC filter, acute blood loss anemia from hemorrhagic uterine fibroids and UGIB, ATN requiring HD, sepsis secondary to MSSA bacteremia and MDR E coli UTI now w/AMS and s/p TAHBSO and small bowel resection s/p PEG. Presenting with septic shock likely 2/2 PNA vs UTI and acute hypoxic respiratory failure and is now currently intubated requiring pressure support.      Neuro  #sedated  on propofol and fentanyl at this time    Respiratory  #acute hypoxic respiratory failure  increased work of breathing on presentation requiring more invasive assistance in breathing. pH;7.32  continue mech vent vc-ac  likely secondary to septic shock 78F w/ PMH obesity, DM, HTN, hypothyroidism, sepsis secondary to ventral hernia infection s/p repair, PE s/p IVC filter, acute blood loss anemia from hemorrhagic uterine fibroids and UGIB, ATN requiring HD, sepsis secondary to MSSA bacteremia and MDR E coli UTI now w/AMS and s/p TAHBSO and small bowel resection s/p PEG. Presenting with septic shock likely 2/2 PNA vs UTI and acute hypoxic respiratory failure and is now currently intubated requiring pressure support.      Neuro  #Toxic metabolic encephalopathy  initially presenting w/ toxic metabolic encephalopathy likely 2/2 to septic shock. Unable to assess at this time due to patient sedation w/ propofol and fentanyl  -Continue sedation at this time as patient is on mech vent. wean as tolerated  -Re-evaluate mental status upon waking    Respiratory  #acute hypoxic respiratory failure  increased work of breathing on presentation requiring more invasive assistance in breathing.    likely secondary to septic shock  -continue mech vent vc-ac    CV  #hypotension  s/p 4L and now on pressors likely 2/2 to septic shock  has history of HTN at baseline, hold all anti-hypertensives in setting of septic shock    Perfusion   UOP is decreased for weight  Extremities are mildly cool  Now on levophed  Lactate is now decreasing    Renal  #Anion gap metabolic acidosis   likely 2/2 to lactic acidosis 2/2 to decreased perfusion 2/2 to septic shock  AG initially 21 now closed after 4L NS    #Electrolyte abnormalities  hypernatremic at 154 and Hyperchloremic at 117 on arrival  -trend BMP  -continue maintenance fluids    #BUN/Cr elevation  Initial  and Cr of 3.38 increased from baseline of 21/0.68 in June 2018. Likely 2/2 to both pre-renal and intrinsic renal -possibly ATN.  BUN/Cr improving after IVF hydration to 129/2.99  -Trend BMP  -avoid nephrotoxins    ID  #Septic shock  Initially leukocytosis of 27.9 Tachycardic and tachypneic w/ lactate of 5.4.   Now s/p 4L NS, vanc, zosyn, azithromycin, and ciprofloxacin. Reperfusion assessment was completed  Most likely 2/2 to pna with aspiration suspected due to return of purulent material while suctioning during intubation and UTI due to UA w/ (+) leuk esterase and bacteria. Of note, prior UTIs have shown zosyn resistance but sensitive to cipro  -f/u BCx2, UC, sputum culture, legionella  -Continue Vanc, Zosyn, Azithromycin for atypical coverage, Cipro for UTI given history    Endo  #hyperglycemia  likely 2/2 to septic shock  patient w/ history of DM as well. Not in DKA. Beta hydroxybutyrate is 0.2.   -ISS  -FSBS    #hypothyroidism  Takes synthroid 150mcg PO daily at home  -Continue synthroid    Heme:  #anemia  macrocytic anemia noted on initial labs  -f/u b12, folate      F:  s/p 4L NS and now on 1/4 NS  E: replete prn until K>4.0, Mg >2.0  N: NPO    Dvt ppx: hep sq  GI ppx: protonix    Lines:   YOHANA (11/16-)      Dispo: MICU 78F w/ PMH obesity, DM, HTN, hypothyroidism, sepsis secondary to ventral hernia infection s/p repair, PE s/p IVC filter, acute blood loss anemia from hemorrhagic uterine fibroids and UGIB, ATN requiring HD, sepsis secondary to MSSA bacteremia and MDR E coli UTI now w/AMS and s/p TAHBSO and small bowel resection s/p PEG. Presenting with septic shock likely 2/2 PNA vs UTI and acute hypoxic respiratory failure and is now currently intubated requiring pressure support.      Neuro  #Toxic metabolic encephalopathy  initially presenting w/ toxic metabolic encephalopathy likely 2/2 to septic shock. Unable to assess at this time due to patient sedation w/ propofol and fentanyl  -Continue sedation at this time as patient is on mech vent. wean as tolerated  -Re-evaluate mental status upon waking    Respiratory  #acute hypoxic respiratory failure  increased work of breathing on presentation requiring more invasive assistance in breathing.    likely secondary to septic shock  -continue mech vent vc-ac    CV  #hypotension  s/p 4L and now on pressors likely 2/2 to septic shock  has history of HTN at baseline, hold all anti-hypertensives in setting of septic shock    Perfusion   UOP is decreased for weight  Extremities are mildly cool  Now on levophed  Lactate is now decreasing    Renal  #Anion gap metabolic acidosis   likely 2/2 to lactic acidosis 2/2 to decreased perfusion 2/2 to septic shock  AG initially 21 now closed after 4L NS    #Electrolyte abnormalities  hypernatremic at 154 and Hyperchloremic at 117 on arrival  Repeat BMP w/ hyperna at 152 and hypercl at 122. Free water deficit of 1.3L at this time  -Free water flush 200cc q 6hr x 7  -trend BMP      #BUN/Cr elevation  Initial  and Cr of 3.38 increased from baseline of 21/0.68 in June 2018. Likely 2/2 to both pre-renal and intrinsic renal -possibly ATN.  BUN/Cr improving after IVF hydration to 129/2.99  -Trend BMP  -avoid nephrotoxins    ID  #Septic shock  Initially leukocytosis of 27.9 Tachycardic and tachypneic w/ lactate of 5.4.   Now s/p 4L NS, vanc, zosyn, azithromycin, and ciprofloxacin. Reperfusion assessment was completed  Most likely 2/2 to pna with aspiration suspected due to return of purulent material while suctioning during intubation and UTI due to UA w/ (+) leuk esterase and bacteria. Of note, prior UTIs have shown zosyn resistance but sensitive to cipro  -f/u BCx2, UC, sputum culture, legionella  -Continue Vanc, Zosyn, Azithromycin for atypical coverage, Cipro for UTI given history    Endo  #hyperglycemia  likely 2/2 to septic shock  patient w/ history of DM as well. Not in DKA. Beta hydroxybutyrate is 0.2.   -ISS  -FSBS    #hypothyroidism  Takes synthroid 150mcg PO daily at home  -Continue synthroid    Heme:  #anemia  macrocytic anemia noted on initial labs  -f/u b12, folate      F:  s/p 4L NS and now on 1/4 NS  E: replete prn until K>4.0, Mg >2.0  N: NPO    Dvt ppx: hep sq  GI ppx: protonix    Lines:   YOHANA (11/16-)      Dispo: MICU 78F w/ PMH obesity, DM, HTN, hypothyroidism, sepsis secondary to ventral hernia infection s/p repair, PE s/p IVC filter, acute blood loss anemia from hemorrhagic uterine fibroids and UGIB, ATN requiring HD, sepsis secondary to MSSA bacteremia and MDR E coli UTI now w/AMS and s/p TAHBSO and small bowel resection s/p PEG. Presenting with septic shock likely 2/2 PNA vs UTI and acute hypoxic respiratory failure and is now currently intubated requiring pressure support.      Neuro  #Toxic metabolic encephalopathy  initially presenting w/ toxic metabolic encephalopathy likely 2/2 to septic shock. Unable to assess at this time due to patient sedation w/ propofol and fentanyl  -Continue sedation at this time as patient is on mech vent. wean as tolerated  -Re-evaluate mental status upon waking    Respiratory  #acute hypoxic respiratory failure  increased work of breathing on presentation requiring more invasive assistance in breathing.    likely secondary to septic shock  -continue mech vent vc-ac    CV  #hypotension  s/p 4L and now on pressors likely 2/2 to septic shock  has history of HTN at baseline, hold all anti-hypertensives in setting of septic shock    Perfusion   UOP is decreased for weight  Extremities are mildly cool  Now on levophed  Lactate is now decreasing    Renal  #Anion gap metabolic acidosis   likely 2/2 to lactic acidosis 2/2 to decreased perfusion 2/2 to septic shock  AG initially 21 now closed after 4L NS    #Electrolyte abnormalities  hypernatremic at 154 and Hyperchloremic at 117 on arrival  Repeat BMP w/ hyperna at 152 and hypercl at 122. Free water deficit of 1.3L at this time  -Free water flush 250cc q 6hr x 6  -trend BMP      #BUN/Cr elevation  Initial  and Cr of 3.38 increased from baseline of 21/0.68 in June 2018. Likely 2/2 to both pre-renal and intrinsic renal -possibly ATN.  BUN/Cr improving after IVF hydration to 129/2.99  -Trend BMP  -avoid nephrotoxins    ID  #Septic shock  Initially leukocytosis of 27.9 Tachycardic and tachypneic w/ lactate of 5.4.   Now s/p 4L NS, vanc, zosyn, azithromycin, and ciprofloxacin. Reperfusion assessment was completed  Most likely 2/2 to pna with aspiration suspected due to return of purulent material while suctioning during intubation and UTI due to UA w/ (+) leuk esterase and bacteria. Of note, prior UTIs have shown zosyn resistance but sensitive to cipro  -f/u BCx2, UC, sputum culture, legionella  -Continue Vanc, Zosyn, Azithromycin for atypical coverage, Cipro for UTI given history    Endo  #hyperglycemia  likely 2/2 to septic shock  patient w/ history of DM as well. Not in DKA. Beta hydroxybutyrate is 0.2.   -ISS  -FSBS    #hypothyroidism  Takes synthroid 150mcg PO daily at home  -Continue synthroid    Heme:  #anemia  macrocytic anemia noted on initial labs  -f/u b12, folate      F:  s/p 4L NS and now on 1/4 NS  E: replete prn until K>4.0, Mg >2.0  N: NPO    Dvt ppx: hep sq  GI ppx: protonix    Lines:   YOHANA (11/16-)      Dispo: MICU 78F w/ PMH obesity, DM, HTN, hypothyroidism, sepsis secondary to ventral hernia infection s/p repair, PE s/p IVC filter, acute blood loss anemia from hemorrhagic uterine fibroids and UGIB, ATN requiring HD, sepsis secondary to MSSA bacteremia and MDR E coli UTI now w/AMS and s/p TAHBSO and small bowel resection s/p PEG. Presenting with septic shock likely 2/2 PNA vs UTI and acute hypoxic respiratory failure and is now currently intubated requiring pressure support.      Neuro  #Toxic metabolic encephalopathy  initially presenting w/ toxic metabolic encephalopathy likely 2/2 to septic shock. Unable to assess at this time due to patient sedation w/ propofol and fentanyl  -Continue sedation at this time as patient is on mech vent. wean as tolerated  -Re-evaluate mental status upon waking    Respiratory  #acute hypoxic respiratory failure  increased work of breathing on presentation requiring more invasive assistance in breathing.    likely secondary to septic shock  -continue mech vent vc-ac    CV  #hypotension  s/p 4L and now on pressors likely 2/2 to septic shock  has history of HTN at baseline, hold all anti-hypertensives in setting of septic shock    Perfusion   UOP is decreased for weight  Extremities are mildly cool  Now on levophed  Lactate is now decreasing    Renal  #Anion gap metabolic acidosis   likely 2/2 to lactic acidosis 2/2 to decreased perfusion 2/2 to septic shock  AG initially 21 now closed after 4L NS    #Electrolyte abnormalities  hypernatremic at 154 and Hyperchloremic at 117 on arrival  Repeat BMP w/ hyperna at 152 and hypercl at 122. Free water deficit of 1.3L at this time  -Free water flush 250cc q 6hr x 6  -trend BMP      #BUN/Cr elevation  Initial  and Cr of 3.38 increased from baseline of 21/0.68 in June 2018. Likely 2/2 to both pre-renal and intrinsic renal -possibly ATN.  BUN/Cr improving after IVF hydration to 129/2.99  -Trend BMP  -avoid nephrotoxins    ID  #Septic shock  Initially leukocytosis of 27.9 Tachycardic and tachypneic w/ lactate of 5.4.   Now s/p 4L NS, vanc, zosyn, azithromycin, and ciprofloxacin. Reperfusion assessment was completed  Most likely 2/2 to pna with aspiration suspected due to return of purulent material while suctioning during intubation and UTI due to UA w/ (+) leuk esterase and bacteria. Of note, prior UTIs have shown zosyn resistance but sensitive to cipro  -f/u BCx2, UC, sputum culture, legionella  -Continue Vanc, Zosyn, Cipro for UTI given history of MDR UTI and atypical coverage    Endo  #hyperglycemia  likely 2/2 to septic shock  patient w/ history of DM as well. Not in DKA. Beta hydroxybutyrate is 0.2.   -ISS  -FSBS    #hypothyroidism  Takes synthroid 150mcg PO daily at home  -Continue synthroid    Heme:  #anemia  macrocytic anemia noted on initial labs  -f/u b12, folate      F:  s/p 4L NS and now on 1/4 NS  E: replete prn until K>4.0, Mg >2.0  N: NPO    Dvt ppx: hep sq  GI ppx: protonix    Lines:   YOHANA (11/16-)      Dispo: MICU

## 2018-11-16 NOTE — ED PROCEDURE NOTE - PROCEDURE ADDITIONAL DETAILS
Sterile technique utilized, however probe cover torn mid procedure. Given necessity of immediate access requirement, procedure completed. Pt has been started on broad spec abx. No other complications during procedure.

## 2018-11-16 NOTE — CONSULT NOTE ADULT - ASSESSMENT
78F w/pmhx of morbid obesity, DM, HTN, hypothyroidism, sepsis secondary to ventral hernia infection s/p repair, PE s/p IVC filter, acute blood loss anemia from hemorrhagic uterine fibroids and UGIB, ATN requiring HD, sepsis secondary to MSSA bacteremia and MDR E coli UTI now w/AMS and s/p TAHBSO and small bowel resection s/p PEG. 78F w/pmhx of morbid obesity, DM, HTN, hypothyroidism, sepsis secondary to ventral hernia infection s/p repair, PE s/p IVC filter, acute blood loss anemia from hemorrhagic uterine fibroids and UGIB, ATN requiring HD, sepsis secondary to MSSA bacteremia and MDR E coli UTI now w/AMS and s/p TAHBSO and small bowel resection s/p PEG. Presenting with septic shock likely from PNA vs UTI, now currently intubated requiring pressure support.

## 2018-11-16 NOTE — ED ADULT NURSE REASSESSMENT NOTE - NS ED NURSE REASSESS COMMENT FT1
Patient respiratory status decline, MD Bruno intubated, 7Fr tube, 25cm at the lip, positive capnography color change, placement confirmed via ascultation.

## 2018-11-16 NOTE — CONSULT NOTE ADULT - PROBLEM SELECTOR RECOMMENDATION 9
Criteria: Febrile, Tachycardic, Tachypneic, Leukocytosis, Multiple organs showing poor perfusion, Lactate level, and hypotension  Source: Unclear; however patient had purulent material aspirated during intubation could be 2/2 to aspiration  Abx: Vanc/Zosyn  Fluids: Would continue to give fluids 30cc/kg goal, will need weight  -Trend lactate to clearance  -F/U BCx/UCx Criteria: Febrile, Tachycardic, Tachypneic, Leukocytosis, Multiple organs showing poor perfusion, Lactate level, and hypotension  Source: Unclear; however patient had purulent material aspirated during intubation could be 2/2 to aspiration PNA vs other causes of PNA  Abx: Vanc/Zosyn recommend addition of Azithromycin for atypical coverage  Fluids: Would continue to give fluids 30cc/kg goal, s/p 2L would give additional Liter  -Trend lactate to clearance  -F/U BCx/UCx  -Legionella Urine Ag  -Patient with large amounts of secretion, will send sputum culture Criteria: Febrile, Tachycardic, Tachypneic, Leukocytosis, Multiple organs showing poor perfusion, Lactate level, and hypotension  Source: Unclear; however patient had purulent material aspirated during intubation could be 2/2 to aspiration PNA vs other causes of PNA, also patient with positive UA and Zosyn resistance in past, sensitive to Cipro  Abx: Vanc/Zosyn recommend addition of Azithromycin for atypical coverage; Cipro to cover possible bugs in UTI. Dose Zosyn 2.25, Vanc by level, Cipro [id number]  Fluids: Would continue to give fluids 30cc/kg goal, s/p 2L would give additional Liter  -Trend lactate to clearance  -F/U BCx/UCx  -Legionella Urine Ag  -Patient with large amounts of secretion, will send sputum culture

## 2018-11-16 NOTE — ED ADULT NURSE NOTE - OTHER COMPLAINTS
sent in from [facility] for r/o sepsis, reports hypotensive and hypoxic, baseline A&Ox1.

## 2018-11-17 DIAGNOSIS — I95.9 HYPOTENSION, UNSPECIFIED: ICD-10-CM

## 2018-11-17 LAB
ANION GAP SERPL CALC-SCNC: 14 MMOL/L — SIGNIFICANT CHANGE UP (ref 5–17)
ANION GAP SERPL CALC-SCNC: 15 MMOL/L — SIGNIFICANT CHANGE UP (ref 5–17)
ANION GAP SERPL CALC-SCNC: 15 MMOL/L — SIGNIFICANT CHANGE UP (ref 5–17)
ANION GAP SERPL CALC-SCNC: 16 MMOL/L — SIGNIFICANT CHANGE UP (ref 5–17)
APPEARANCE UR: ABNORMAL
APTT BLD: 20.8 SEC — LOW (ref 27.5–36.3)
B-OH-BUTYR SERPL-SCNC: 0.2 MMOL/L — SIGNIFICANT CHANGE UP
BASOPHILS NFR BLD AUTO: SIGNIFICANT CHANGE UP % (ref 0–2)
BILIRUB UR-MCNC: NEGATIVE — SIGNIFICANT CHANGE UP
BUN SERPL-MCNC: 108 MG/DL — HIGH (ref 7–23)
BUN SERPL-MCNC: 121 MG/DL — HIGH (ref 7–23)
BUN SERPL-MCNC: 127 MG/DL — HIGH (ref 7–23)
BUN SERPL-MCNC: 129 MG/DL — HIGH (ref 7–23)
CALCIUM SERPL-MCNC: 9.4 MG/DL — SIGNIFICANT CHANGE UP (ref 8.4–10.5)
CALCIUM SERPL-MCNC: 9.4 MG/DL — SIGNIFICANT CHANGE UP (ref 8.4–10.5)
CALCIUM SERPL-MCNC: 9.7 MG/DL — SIGNIFICANT CHANGE UP (ref 8.4–10.5)
CALCIUM SERPL-MCNC: 9.7 MG/DL — SIGNIFICANT CHANGE UP (ref 8.4–10.5)
CHLORIDE SERPL-SCNC: 116 MMOL/L — HIGH (ref 96–108)
CHLORIDE SERPL-SCNC: 116 MMOL/L — HIGH (ref 96–108)
CHLORIDE SERPL-SCNC: 119 MMOL/L — HIGH (ref 96–108)
CHLORIDE SERPL-SCNC: 122 MMOL/L — HIGH (ref 96–108)
CO2 SERPL-SCNC: 14 MMOL/L — LOW (ref 22–31)
CO2 SERPL-SCNC: 14 MMOL/L — LOW (ref 22–31)
CO2 SERPL-SCNC: 15 MMOL/L — LOW (ref 22–31)
CO2 SERPL-SCNC: 16 MMOL/L — LOW (ref 22–31)
COLOR SPEC: YELLOW — SIGNIFICANT CHANGE UP
CREAT SERPL-MCNC: 2.89 MG/DL — HIGH (ref 0.5–1.3)
CREAT SERPL-MCNC: 2.9 MG/DL — HIGH (ref 0.5–1.3)
CREAT SERPL-MCNC: 2.97 MG/DL — HIGH (ref 0.5–1.3)
CREAT SERPL-MCNC: 2.99 MG/DL — HIGH (ref 0.5–1.3)
CULTURE RESULTS: SIGNIFICANT CHANGE UP
DIFF PNL FLD: ABNORMAL
EOSINOPHIL NFR BLD AUTO: SIGNIFICANT CHANGE UP % (ref 0–6)
FOLATE SERPL-MCNC: >20 NG/ML — SIGNIFICANT CHANGE UP
GLUCOSE BLDC GLUCOMTR-MCNC: 108 MG/DL — HIGH (ref 70–99)
GLUCOSE BLDC GLUCOMTR-MCNC: 141 MG/DL — HIGH (ref 70–99)
GLUCOSE BLDC GLUCOMTR-MCNC: 145 MG/DL — HIGH (ref 70–99)
GLUCOSE BLDC GLUCOMTR-MCNC: 147 MG/DL — HIGH (ref 70–99)
GLUCOSE BLDC GLUCOMTR-MCNC: 174 MG/DL — HIGH (ref 70–99)
GLUCOSE BLDC GLUCOMTR-MCNC: 202 MG/DL — HIGH (ref 70–99)
GLUCOSE SERPL-MCNC: 112 MG/DL — HIGH (ref 70–99)
GLUCOSE SERPL-MCNC: 129 MG/DL — HIGH (ref 70–99)
GLUCOSE SERPL-MCNC: 142 MG/DL — HIGH (ref 70–99)
GLUCOSE SERPL-MCNC: 232 MG/DL — HIGH (ref 70–99)
GLUCOSE UR QL: NEGATIVE — SIGNIFICANT CHANGE UP
HCT VFR BLD CALC: 28.3 % — LOW (ref 34.5–45)
HGB BLD-MCNC: 8.4 G/DL — LOW (ref 11.5–15.5)
INR BLD: 1.24 — HIGH (ref 0.88–1.16)
KETONES UR-MCNC: NEGATIVE — SIGNIFICANT CHANGE UP
LACTATE SERPL-SCNC: 2.3 MMOL/L — HIGH (ref 0.5–2)
LEGIONELLA AG UR QL: NEGATIVE — SIGNIFICANT CHANGE UP
LEUKOCYTE ESTERASE UR-ACNC: ABNORMAL
LYMPHOCYTES # BLD AUTO: SIGNIFICANT CHANGE UP % (ref 13–44)
MAGNESIUM SERPL-MCNC: 2 MG/DL — SIGNIFICANT CHANGE UP (ref 1.6–2.6)
MCHC RBC-ENTMCNC: 29 PG — SIGNIFICANT CHANGE UP (ref 27–34)
MCHC RBC-ENTMCNC: 29.7 G/DL — LOW (ref 32–36)
MCV RBC AUTO: 97.6 FL — SIGNIFICANT CHANGE UP (ref 80–100)
MONOCYTES NFR BLD AUTO: SIGNIFICANT CHANGE UP % (ref 2–14)
NEUTROPHILS NFR BLD AUTO: SIGNIFICANT CHANGE UP % (ref 43–77)
NITRITE UR-MCNC: NEGATIVE — SIGNIFICANT CHANGE UP
PH UR: 6 — SIGNIFICANT CHANGE UP (ref 5–8)
PHOSPHATE SERPL-MCNC: 4.4 MG/DL — SIGNIFICANT CHANGE UP (ref 2.5–4.5)
PLATELET # BLD AUTO: 241 K/UL — SIGNIFICANT CHANGE UP (ref 150–400)
POTASSIUM SERPL-MCNC: 3.9 MMOL/L — SIGNIFICANT CHANGE UP (ref 3.5–5.3)
POTASSIUM SERPL-MCNC: 3.9 MMOL/L — SIGNIFICANT CHANGE UP (ref 3.5–5.3)
POTASSIUM SERPL-MCNC: 4.3 MMOL/L — SIGNIFICANT CHANGE UP (ref 3.5–5.3)
POTASSIUM SERPL-MCNC: 4.3 MMOL/L — SIGNIFICANT CHANGE UP (ref 3.5–5.3)
POTASSIUM SERPL-SCNC: 3.9 MMOL/L — SIGNIFICANT CHANGE UP (ref 3.5–5.3)
POTASSIUM SERPL-SCNC: 3.9 MMOL/L — SIGNIFICANT CHANGE UP (ref 3.5–5.3)
POTASSIUM SERPL-SCNC: 4.3 MMOL/L — SIGNIFICANT CHANGE UP (ref 3.5–5.3)
POTASSIUM SERPL-SCNC: 4.3 MMOL/L — SIGNIFICANT CHANGE UP (ref 3.5–5.3)
PROT UR-MCNC: 30 MG/DL
PROTHROM AB SERPL-ACNC: 14.1 SEC — HIGH (ref 10–12.9)
RBC # BLD: 2.9 M/UL — LOW (ref 3.8–5.2)
RBC # FLD: 18.7 % — HIGH (ref 10.3–16.9)
SODIUM SERPL-SCNC: 145 MMOL/L — SIGNIFICANT CHANGE UP (ref 135–145)
SODIUM SERPL-SCNC: 145 MMOL/L — SIGNIFICANT CHANGE UP (ref 135–145)
SODIUM SERPL-SCNC: 150 MMOL/L — HIGH (ref 135–145)
SODIUM SERPL-SCNC: 152 MMOL/L — HIGH (ref 135–145)
SP GR SPEC: 1.02 — SIGNIFICANT CHANGE UP (ref 1–1.03)
SPECIMEN SOURCE: SIGNIFICANT CHANGE UP
UROBILINOGEN FLD QL: 0.2 E.U./DL — SIGNIFICANT CHANGE UP
VIT B12 SERPL-MCNC: 324 PG/ML — SIGNIFICANT CHANGE UP (ref 232–1245)
WBC # BLD: 27 K/UL — HIGH (ref 3.8–10.5)
WBC # FLD AUTO: 27 K/UL — HIGH (ref 3.8–10.5)

## 2018-11-17 PROCEDURE — 99291 CRITICAL CARE FIRST HOUR: CPT

## 2018-11-17 PROCEDURE — 71045 X-RAY EXAM CHEST 1 VIEW: CPT | Mod: 26,76

## 2018-11-17 PROCEDURE — 99223 1ST HOSP IP/OBS HIGH 75: CPT | Mod: GC

## 2018-11-17 RX ORDER — ACETAMINOPHEN 500 MG
650 TABLET ORAL EVERY 6 HOURS
Qty: 0 | Refills: 0 | Status: COMPLETED | OUTPATIENT
Start: 2018-11-17 | End: 2018-11-19

## 2018-11-17 RX ORDER — SODIUM CHLORIDE 9 MG/ML
1000 INJECTION INTRAMUSCULAR; INTRAVENOUS; SUBCUTANEOUS
Qty: 0 | Refills: 0 | Status: DISCONTINUED | OUTPATIENT
Start: 2018-11-17 | End: 2018-11-17

## 2018-11-17 RX ORDER — POTASSIUM CHLORIDE 20 MEQ
40 PACKET (EA) ORAL EVERY 4 HOURS
Qty: 0 | Refills: 0 | Status: DISCONTINUED | OUTPATIENT
Start: 2018-11-17 | End: 2018-11-17

## 2018-11-17 RX ORDER — PANTOPRAZOLE SODIUM 20 MG/1
40 TABLET, DELAYED RELEASE ORAL ONCE
Qty: 0 | Refills: 0 | Status: COMPLETED | OUTPATIENT
Start: 2018-11-17 | End: 2018-11-17

## 2018-11-17 RX ORDER — SODIUM CHLORIDE 9 MG/ML
1000 INJECTION INTRAMUSCULAR; INTRAVENOUS; SUBCUTANEOUS
Qty: 0 | Refills: 0 | Status: DISCONTINUED | OUTPATIENT
Start: 2018-11-17 | End: 2018-11-18

## 2018-11-17 RX ORDER — PANTOPRAZOLE SODIUM 20 MG/1
40 TABLET, DELAYED RELEASE ORAL
Qty: 0 | Refills: 0 | Status: DISCONTINUED | OUTPATIENT
Start: 2018-11-17 | End: 2018-11-18

## 2018-11-17 RX ORDER — ACETAMINOPHEN 500 MG
1000 TABLET ORAL ONCE
Qty: 0 | Refills: 0 | Status: COMPLETED | OUTPATIENT
Start: 2018-11-17 | End: 2018-11-17

## 2018-11-17 RX ORDER — POTASSIUM CHLORIDE 20 MEQ
10 PACKET (EA) ORAL ONCE
Qty: 0 | Refills: 0 | Status: DISCONTINUED | OUTPATIENT
Start: 2018-11-17 | End: 2018-11-17

## 2018-11-17 RX ADMIN — Medication 6.66 MICROGRAM(S)/KG/MIN: at 08:35

## 2018-11-17 RX ADMIN — HEPARIN SODIUM 5000 UNIT(S): 5000 INJECTION INTRAVENOUS; SUBCUTANEOUS at 21:47

## 2018-11-17 RX ADMIN — Medication 4: at 00:07

## 2018-11-17 RX ADMIN — HEPARIN SODIUM 5000 UNIT(S): 5000 INJECTION INTRAVENOUS; SUBCUTANEOUS at 15:24

## 2018-11-17 RX ADMIN — Medication 650 MILLIGRAM(S): at 16:59

## 2018-11-17 RX ADMIN — HEPARIN SODIUM 5000 UNIT(S): 5000 INJECTION INTRAVENOUS; SUBCUTANEOUS at 06:18

## 2018-11-17 RX ADMIN — CHLORHEXIDINE GLUCONATE 15 MILLILITER(S): 213 SOLUTION TOPICAL at 06:17

## 2018-11-17 RX ADMIN — Medication 400 MILLIGRAM(S): at 02:52

## 2018-11-17 RX ADMIN — Medication 6.66 MICROGRAM(S)/KG/MIN: at 23:07

## 2018-11-17 RX ADMIN — PIPERACILLIN AND TAZOBACTAM 200 GRAM(S): 4; .5 INJECTION, POWDER, LYOPHILIZED, FOR SOLUTION INTRAVENOUS at 10:26

## 2018-11-17 RX ADMIN — PANTOPRAZOLE SODIUM 40 MILLIGRAM(S): 20 TABLET, DELAYED RELEASE ORAL at 17:30

## 2018-11-17 RX ADMIN — SODIUM CHLORIDE 80 MILLILITER(S): 9 INJECTION INTRAMUSCULAR; INTRAVENOUS; SUBCUTANEOUS at 15:32

## 2018-11-17 RX ADMIN — Medication 200 MILLIGRAM(S): at 11:45

## 2018-11-17 RX ADMIN — MEDROXYPROGESTERONE ACETATE 10 MILLIGRAM(S): 150 INJECTION, SUSPENSION, EXTENDED RELEASE INTRAMUSCULAR at 11:45

## 2018-11-17 RX ADMIN — PIPERACILLIN AND TAZOBACTAM 200 GRAM(S): 4; .5 INJECTION, POWDER, LYOPHILIZED, FOR SOLUTION INTRAVENOUS at 02:52

## 2018-11-17 RX ADMIN — PIPERACILLIN AND TAZOBACTAM 200 GRAM(S): 4; .5 INJECTION, POWDER, LYOPHILIZED, FOR SOLUTION INTRAVENOUS at 21:47

## 2018-11-17 RX ADMIN — CHLORHEXIDINE GLUCONATE 15 MILLILITER(S): 213 SOLUTION TOPICAL at 18:13

## 2018-11-17 RX ADMIN — Medication 150 MICROGRAM(S): at 06:18

## 2018-11-17 RX ADMIN — Medication 650 MILLIGRAM(S): at 18:14

## 2018-11-17 RX ADMIN — PIPERACILLIN AND TAZOBACTAM 200 GRAM(S): 4; .5 INJECTION, POWDER, LYOPHILIZED, FOR SOLUTION INTRAVENOUS at 15:24

## 2018-11-17 RX ADMIN — Medication 1000 MILLIGRAM(S): at 03:50

## 2018-11-17 NOTE — PROGRESS NOTE ADULT - ASSESSMENT
78F w/ PMH obesity, DM, HTN, hypothyroidism, sepsis secondary to ventral hernia infection s/p repair, PE s/p IVC filter, acute blood loss anemia from hemorrhagic uterine fibroids and UGIB, ATN requiring HD, sepsis secondary to MSSA bacteremia and MDR E coli UTI now w/AMS and s/p TAHBSO and small bowel resection s/p PEG. Presenting with septic shock likely 2/2 PNA vs UTI and acute hypoxic respiratory failure and is now currently intubated requiring pressure support.      Neuro  #Toxic metabolic encephalopathy  initially presenting w/ toxic metabolic encephalopathy likely 2/2 to septic shock. Unable to assess at this time due to patient sedation w/ propofol  -Continue sedation at this time as patient is on mech vent.  - wean vent as tolerated    Respiratory  #acute hypoxic respiratory failure  Increased work of breathing on presentation requiring more invasive assistance in breathing.    likely secondary to septic shock  -continue mech vent vc-ac    CV  IMPROVED   #hypotension  - s/p 4L and now on pressors likely 2/2 to septic shock  - has history of HTN at baseline, hold all anti-hypertensives in setting of septic shock  - still requiring Levophed    Renal  #Anion gap metabolic acidosis   - likely 2/2 to lactic acidosis 2/2 to decreased perfusion 2/2 to septic shock  - AG initially 21, closed s/p IVF    #Electrolyte abnormalities  - hypernatremic at 154 and Hyperchloremic at 117 on arrival  - s/p free water flushes, now with hypochloremic hyponatremia  - switch to 1/4 NS @ 80  - repeat BMP 14:00 today 11/17  - renal consulted 11/17, f/u recs  - f/u urine studies sent 11/17    #BUN/Cr elevation  Initial  and Cr of 3.38 increased from baseline of 21/0.68 in June 2018. Likely 2/2 to both pre-renal and intrinsic renal -possibly ATN.  BUN/Cr improving after IVF hydration to 129/2.99  -Trend BMP  -avoid nephrotoxins    ID  #Septic shock  Initially leukocytosis of 27.9 Tachycardic and tachypneic w/ lactate of 5.4.   Now s/p 4L NS, vanc, zosyn, azithromycin, and ciprofloxacin. Reperfusion assessment was completed  Most likely 2/2 to pna with aspiration suspected due to return of purulent material while suctioning during intubation and UTI due to UA w/ (+) leuk esterase and bacteria. Of note, prior UTIs have shown zosyn resistance but sensitive to cipro  -f/u BCx2, UC, sputum culture, legionella  -Continue cipro and Zosyn    Endo  #hyperglycemia  likely 2/2 to septic shock  patient w/ history of DM as well. Not in DKA. Beta hydroxybutyrate is 0.2.   -ISS  -FSBS    #hypothyroidism  Takes synthroid 150mcg PO daily at home  -Continue synthroid    Heme:  #anemia  macrocytic anemia noted on initial labs  -f/u b12, folate      F: 1/4 NS @ 80  E: replete cautiously given CrCl  N: NPO    Dvt ppx: hep sq  GI ppx: protonix    Lines:   YOHANA (11/16-)      Dispo: MICU

## 2018-11-17 NOTE — PROGRESS NOTE ADULT - SUBJECTIVE AND OBJECTIVE BOX
Patient is a 78y old  Female who presents with a chief complaint of Septic shock and acute respiratory failure (2018 23:57)      INTERVAL HPI/OVERNIGHT EVENTS: admitted overnight. This AM, unable to participate in ROS due to sedation.    ICU Vital Signs Last 24 Hrs  T(C): 37.1 (2018 01:30), Max: 38.9 (2018 18:00)  T(F): 98.7 (2018 01:30), Max: 102 (2018 18:00)  HR: 66 (:00) (64 - 84)  BP: 134/64 (:00) (85/44 - 134/64)  BP(mean): 102 (:) (57 - 102)  ABP: --  ABP(mean): --  RR: 18 (2018 01:00) (9 - 29)  SpO2: 97% (:) (95% - 98%)    I&O's Summary    2018 07:01  -  2018 07:00  --------------------------------------------------------  IN: 1651.4 mL / OUT: 364 mL / NET: 1287.4 mL    2018 07:01  -  2018 01:45  --------------------------------------------------------  IN: 2389 mL / OUT: 845 mL / NET: 1544 mL      Mode: AC/ CMV (Assist Control/ Continuous Mandatory Ventilation)  RR (machine): 12  TV (machine): 450  FiO2: 40  PEEP: 5  ITime: 1  PIP: 18      LABS:                        8.4    27.0  )-----------( 241      ( 2018 09:12 )             28.3     11-17    145  |  116<H>  |  108<H>  ----------------------------<  112<H>  3.9   |  15<L>  |  2.89<H>    Ca    9.4      2018 22:39  Phos  4.4     11-  Mg     2.0     -    TPro  8.0  /  Alb  3.3  /  TBili  0.7  /  DBili  x   /  AST  25  /  ALT  21  /  AlkPhos  66  11-16    PT/INR - ( 2018 23:04 )   PT: 14.1 sec;   INR: 1.24          PTT - ( 2018 23:04 )  PTT:20.8 sec  Urinalysis Basic - ( 2018 17:35 )    Color: Yellow / Appearance: SL Cloudy / S.020 / pH: x  Gluc: x / Ketone: NEGATIVE  / Bili: Negative / Urobili: 0.2 E.U./dL   Blood: x / Protein: 30 mg/dL / Nitrite: NEGATIVE   Leuk Esterase: Small / RBC: > 10 /HPF / WBC > 10 /HPF   Sq Epi: x / Non Sq Epi: 0-5 /HPF / Bacteria: Present /HPF      CAPILLARY BLOOD GLUCOSE      POCT Blood Glucose.: 108 mg/dL (2018 23:26)  POCT Blood Glucose.: 141 mg/dL (2018 17:17)  POCT Blood Glucose.: 147 mg/dL (2018 14:19)  POCT Blood Glucose.: 174 mg/dL (2018 12:24)  POCT Blood Glucose.: 145 mg/dL (2018 06:13)    ABG - ( 2018 17:14 )  pH, Arterial: 7.32  pH, Blood: x     /  pCO2: 29    /  pO2: 138   / HCO3: 15    / Base Excess: -9.8  /  SaO2: 99                  RADIOLOGY & ADDITIONAL TESTS: Reviewed.    Consultant(s) Notes Reviewed:  [x ] YES  [ ] NO    MEDICATIONS  (STANDING):  chlorhexidine 0.12% Liquid 15 milliLiter(s) Swish and Spit two times a day  chlorhexidine 2% Cloths 1 Application(s) Topical daily  ciprofloxacin   IVPB 400 milliGRAM(s) IV Intermittent daily  dextrose 5%. 1000 milliLiter(s) (50 mL/Hr) IV Continuous <Continuous>  dextrose 50% Injectable 12.5 Gram(s) IV Push once  dextrose 50% Injectable 25 Gram(s) IV Push once  dextrose 50% Injectable 25 Gram(s) IV Push once  heparin  Injectable 5000 Unit(s) SubCutaneous every 8 hours  insulin lispro (HumaLOG) corrective regimen sliding scale   SubCutaneous every 6 hours  levothyroxine 150 MICROGram(s) Oral daily  norepinephrine Infusion 0.05 MICROgram(s)/kG/Min (6.656 mL/Hr) IV Continuous <Continuous>  pantoprazole   Suspension 40 milliGRAM(s) Oral before breakfast  piperacillin/tazobactam IVPB. 2.25 Gram(s) IV Intermittent every 6 hours  sodium chloride 0.225%. 1000 milliLiter(s) (80 mL/Hr) IV Continuous <Continuous>    MEDICATIONS  (PRN):  acetaminophen    Suspension .. 650 milliGRAM(s) Oral every 6 hours PRN Temp greater or equal to 38C (100.4F)  dextrose 40% Gel 15 Gram(s) Oral once PRN Blood Glucose LESS THAN 70 milliGRAM(s)/deciliter  glucagon  Injectable 1 milliGRAM(s) IntraMuscular once PRN Glucose LESS THAN 70 milligrams/deciliter      PHYSICAL EXAM:  GENERAL: Obese adult White female, intubated and sedated, in NAD  HEAD: atraumatic  EYES: PERRL, resists passive opening of eyes, conjunctiva pink  ENT: moist mucous membranes  NECK: supple  CHEST/LUNG: rhonchi bilaterally, L > R, with decreased breath sounds at bases bilaterally  HEART: regular tachycardia, no murmur or rub  ABDOMEN: obese, soft, non-tender, non-distended, without rebound or guarding  EXTREMITIES: warm, well-perfused, without edema  NERVOUS SYSTEM: sedated; arouses to voice, does not follow commands or answer questions  SKIN: No rashes    Care Discussed with Consultants/Other Providers [ x] YES  [ ] NO

## 2018-11-17 NOTE — CONSULT NOTE ADULT - PROBLEM SELECTOR RECOMMENDATION 2
s/p 4L and now on pressors likely 2/2 to septic shock  has history of HTN at baseline, hold all anti-hypertensives in setting of septic shock  Now on levophed  Lactate trending down

## 2018-11-17 NOTE — DIETITIAN INITIAL EVALUATION ADULT. - NS AS NUTRI INTERV MEALS SNACK
Fat - modified diet/If pt is extubated, Dash/TLC, CST CHO diet per appropriate consistency/Carbohydrate - modified diet/Mineral - modified diet

## 2018-11-17 NOTE — CONSULT NOTE ADULT - PROBLEM SELECTOR RECOMMENDATION 9
GONZALEZ likely prerenal in patient with septic shock secondary to UTI and pneumonia vs intrinsic renal   s/p vanco  now on zosyn  Cr 2.9 today, baseline 0.6 in 6/18  oliguric today - uop 364 mls over past 24 hrs  please send for renal sono, and ulytes - urine Cr, urine urea, urine na  avoid nephrotoxic meds  renal dose adjustment of antibiotics  monitor I/O nonoliguric GONZALEZ likely prerenal in patient with septic shock secondary to UTI and pneumonia vs intrinsic renal   s/p vanco  now on zosyn  Cr 2.9 today, baseline 0.6 in 6/18  uop 364 mls over past shift  please send for renal sono, and ulytes - urine Cr, urine urea, urine na  avoid nephrotoxic meds  renal dose adjustment of antibiotics  monitor I/O

## 2018-11-17 NOTE — DIETITIAN INITIAL EVALUATION ADULT. - NS AS NUTRI INTERV ENTERAL NUTRITION
If pt remains intubated recommend glucerna 1.2 via PEG with goal rate of 55ml/hr x 24hr (1320ml TV, 1584kcal, 79g, 1062ml water). Start at 30ml/hr and advance by 20ml q4h until goal rate. Please place order for volume based feeding protocol. Once pt tolerating TF at goal rate for 24hr without s/s of intolerance, abide by volume based feeding protocol. Monitor for s/s of intolerance and maintain aspiration precautions.

## 2018-11-17 NOTE — CONSULT NOTE ADULT - SUBJECTIVE AND OBJECTIVE BOX
Patient is a 78y old  Female who presents with a chief complaint of Septic shock and acute respiratory failure (16 Nov 2018 23:11)      HPI:  Patient is currently intubated and unable to participate in interview. History taken from chart and MICU consult.    78F w/ PMH of morbid obesity, DM, HTN, hypothyroidism, sepsis secondary to ventral hernia infection s/p repair, PE s/p IVC filter, acute blood loss anemia from hemorrhagic uterine fibroids and UGIB, ATN requiring HD, sepsis secondary to MSSA bacteremia and MDR E coli UTI now w/AMS and s/p TAHBSO and small bowel resection s/p PEG. Patient presented from [facility] with concern for sepsis and hypoxia. Per EMS, patient was saturating 85% on RA at [facility] with some improvement to 95% after being placed on NRB.  On arrival to the ED, patient was altered and saturating 89%. She was intubated in the ED with suctioning revealing purulent material.       T was 101.5, , BP79/59. WBC 27.9 Hgb 11.0 .3 Lactate 5.4 Na 154 K 5.6 CO2 16 AG [id number] Cr 3.38 Trop 0.14 ABG 7.32 pCO2 29 pO2 138 HCO3 15  UA: (+) leuk est, (+) bacteria  She was started on Levo gtt and Dopamine and subsequently was titrated off the Dopamine. She was given Azithromycin, Cipro, Vancomycin and Zosyn, 4L nS, and Insulin 10U. (16 Nov 2018 23:11)      PAST MEDICAL & SURGICAL HISTORY:  Hypothyroidism  GERD (gastroesophageal reflux disease)  Obesity  DM (diabetes mellitus)  HLD (hyperlipidemia)  HTN (hypertension)  Status post total abdominal hysterectomy and bilateral salpingo-oophorectomy  S/P small bowel resection  H/O ventral hernia repair      Allergies    No Known Allergies    Intolerances        FAMILY HISTORY:  No pertinent family history in first degree relatives      SOCIAL HISTORY:    MEDICATIONS  (STANDING):  chlorhexidine 0.12% Liquid 15 milliLiter(s) Swish and Spit two times a day  chlorhexidine 2% Cloths 1 Application(s) Topical daily  ciprofloxacin   IVPB 400 milliGRAM(s) IV Intermittent daily  dextrose 5%. 1000 milliLiter(s) (50 mL/Hr) IV Continuous <Continuous>  dextrose 50% Injectable 12.5 Gram(s) IV Push once  dextrose 50% Injectable 25 Gram(s) IV Push once  dextrose 50% Injectable 25 Gram(s) IV Push once  heparin  Injectable 5000 Unit(s) SubCutaneous every 8 hours  insulin lispro (HumaLOG) corrective regimen sliding scale   SubCutaneous every 6 hours  levothyroxine 150 MICROGram(s) Oral daily  norepinephrine Infusion 0.05 MICROgram(s)/kG/Min (6.656 mL/Hr) IV Continuous <Continuous>  pantoprazole   Suspension 40 milliGRAM(s) Oral before breakfast  pantoprazole  Injectable 40 milliGRAM(s) IV Push once  piperacillin/tazobactam IVPB. 2.25 Gram(s) IV Intermittent every 6 hours  sodium chloride 0.225%. 1000 milliLiter(s) (80 mL/Hr) IV Continuous <Continuous>    MEDICATIONS  (PRN):  acetaminophen    Suspension .. 650 milliGRAM(s) Oral every 6 hours PRN Temp greater or equal to 38C (100.4F)  dextrose 40% Gel 15 Gram(s) Oral once PRN Blood Glucose LESS THAN 70 milliGRAM(s)/deciliter  glucagon  Injectable 1 milliGRAM(s) IntraMuscular once PRN Glucose LESS THAN 70 milligrams/deciliter      Vital Signs Last 24 Hrs  T(C): 38.6 (17 Nov 2018 15:00), Max: 38.6 (17 Nov 2018 15:00)  T(F): 101.5 (17 Nov 2018 15:00), Max: 101.5 (17 Nov 2018 15:00)  HR: 84 (17 Nov 2018 15:00) (70 - 90)  BP: 114/57 (17 Nov 2018 15:00) (49/32 - 157/79)  BP(mean): 86 (17 Nov 2018 15:00) (61 - 88)  RR: 21 (17 Nov 2018 15:00) (10 - 32)  SpO2: 96% (17 Nov 2018 15:00) (93% - 100%)    REVIEW OF SYSTEMS:  SEDATED      PHYSICAL EXAM:  General: intubated and sedated  Respiratory: chest clear   Cardiac: +S1/S2; regular rate and rhythm; no murmur, no rub, no gallop  Gastrointestinal: obese, abdomen soft, non-tender, non-distended, bowel sounds active, no organomegaly, no masses,    (+) PEG in place no drainage or eyrthema to the site  Genitourinary: normal external genitalia, suazo in place  Extremities: warm, no clubbing or cyanosis; no peripheral edema  vascular: 2+ radial, DP pulses bilaterally  neuro: sedated      CAPILLARY BLOOD GLUCOSE      POCT Blood Glucose.: 147 mg/dL (17 Nov 2018 14:19)  POCT Blood Glucose.: 174 mg/dL (17 Nov 2018 12:24)  POCT Blood Glucose.: 145 mg/dL (17 Nov 2018 06:13)  POCT Blood Glucose.: 202 mg/dL (17 Nov 2018 00:01)  POCT Blood Glucose.: 351 mg/dL (16 Nov 2018 18:01)      I&O's Summary    16 Nov 2018 07:01  -  17 Nov 2018 07:00  --------------------------------------------------------  IN: 1651.4 mL / OUT: 364 mL / NET: 1287.4 mL    17 Nov 2018 07:01  -  17 Nov 2018 16:16  --------------------------------------------------------  IN: 792 mL / OUT: 365 mL / NET: 427 mL          LABS:                                                   11-17-18 @ 15:46    145  |  116<H>  |  121<H>  ----------------------------<  129<H>  3.9   |  14<L>  |  2.90<H>                                                 11-17-18 @ 09:12    150<H>  |  119<H>  |  127<H>  ----------------------------<  142<H>  4.3   |  16<L>  |  2.97<H>                                                 11-16-18 @ 23:04    152<H>  |  122<H>  |  129<H>  ----------------------------<  232<H>  4.3   |  14<L>  |  2.99<H>                                                 11-16-18 @ 15:08    154<H>  |  117<H>  |  145<H>  ----------------------------<  330<H>  5.6<H>   |  16<L>  |  3.38<H>    Ca    9.7      17 Nov 2018 15:46  Ca    9.7      17 Nov 2018 09:12  Ca    9.4      16 Nov 2018 23:04  Ca    11.4<H>      16 Nov 2018 15:08  Phos  4.4     11-17  Phos  3.6     11-16  Mg     2.0     11-17  Mg     2.0     11-16    TPro  8.0  /  Alb  3.3  /  TBili  0.7  /  DBili  x   /  AST  25  /  ALT  21  /  AlkPhos  66  11-16                          8.4    27.0  )-----------( 241      ( 17 Nov 2018 09:12 )             28.3     CBC Full  -  ( 17 Nov 2018 09:12 )  WBC Count : 27.0 K/uL  Hemoglobin : 8.4 g/dL  Hematocrit : 28.3 %  Platelet Count - Automated : 241 K/uL  Mean Cell Volume : 97.6 fL      CBC Full  -  ( 16 Nov 2018 15:08 )  WBC Count : 27.9 K/uL  Hemoglobin : 11.0 g/dL  Hematocrit : 39.2 %  Platelet Count - Automated : 279 K/uL  Mean Cell Volume : 101.3 fL        PT/INR - ( 16 Nov 2018 23:04 )   PT: 14.1 sec;   INR: 1.24          PTT - ( 16 Nov 2018 23:04 )  PTT:20.8 sec  CARDIAC MARKERS ( 16 Nov 2018 23:04 )  x     / 0.11 ng/mL / x     / x     / x      CARDIAC MARKERS ( 16 Nov 2018 15:08 )  x     / 0.14 ng/mL / x     / x     / x          Urinalysis Basic - ( 16 Nov 2018 15:53 )    Color: Yellow / Appearance: Cloudy / SG: >=1.030 / pH: x  Gluc: x / Ketone: Trace mg/dL  / Bili: Small / Urobili: 1.0 E.U./dL   Blood: x / Protein: 100 mg/dL / Nitrite: NEGATIVE   Leuk Esterase: Moderate / RBC: < 5 /HPF / WBC Many /HPF   Sq Epi: x / Non Sq Epi: 0-5 /HPF / Bacteria: Present /HPF Patient is a 78y old  Female who presents with a chief complaint of Septic shock and acute respiratory failure (16 Nov 2018 23:11)    Renal Consult was called for GONZALEZ in patient admitted for septic shock likely due to UTI and pneumonia.  Initial  and Cr of 3.38 increased from baseline of 21/0.68 in June 2018. Likely 2/2 to both pre-renal and intrinsic renal -possibly ATN.  BUN/Cr improving after IVF hydration to 129/2.99  Patient is making urine at an appropriate rate as of this shift.        HPI:  Patient is currently intubated and unable to participate in interview. History taken from chart and MICU consult.    78F w/ PMH of morbid obesity, DM, HTN, hypothyroidism, sepsis secondary to ventral hernia infection s/p repair, PE s/p IVC filter, acute blood loss anemia from hemorrhagic uterine fibroids and UGIB, ATN requiring HD, sepsis secondary to MSSA bacteremia and MDR E coli UTI now w/AMS and s/p TAHBSO and small bowel resection s/p PEG. Patient presented from [facility] with concern for sepsis and hypoxia. Per EMS, patient was saturating 85% on RA at [facility] with some improvement to 95% after being placed on NRB.  On arrival to the ED, patient was altered and saturating 89%. She was intubated in the ED with suctioning revealing purulent material.       T was 101.5, , BP79/59. WBC 27.9 Hgb 11.0 .3 Lactate 5.4 Na 154 K 5.6 CO2 16 AG [id number] Cr 3.38 Trop 0.14 ABG 7.32 pCO2 29 pO2 138 HCO3 15  UA: (+) leuk est, (+) bacteria  She was started on Levo gtt and Dopamine and subsequently was titrated off the Dopamine. She was given Azithromycin, Cipro, Vancomycin and Zosyn, 4L nS, and Insulin 10U. (16 Nov 2018 23:11)      PAST MEDICAL & SURGICAL HISTORY:  Hypothyroidism  GERD (gastroesophageal reflux disease)  Obesity  DM (diabetes mellitus)  HLD (hyperlipidemia)  HTN (hypertension)  Status post total abdominal hysterectomy and bilateral salpingo-oophorectomy  S/P small bowel resection  H/O ventral hernia repair      Allergies    No Known Allergies    Intolerances        FAMILY HISTORY:  No pertinent family history in first degree relatives      SOCIAL HISTORY:    MEDICATIONS  (STANDING):  chlorhexidine 0.12% Liquid 15 milliLiter(s) Swish and Spit two times a day  chlorhexidine 2% Cloths 1 Application(s) Topical daily  ciprofloxacin   IVPB 400 milliGRAM(s) IV Intermittent daily  dextrose 5%. 1000 milliLiter(s) (50 mL/Hr) IV Continuous <Continuous>  dextrose 50% Injectable 12.5 Gram(s) IV Push once  dextrose 50% Injectable 25 Gram(s) IV Push once  dextrose 50% Injectable 25 Gram(s) IV Push once  heparin  Injectable 5000 Unit(s) SubCutaneous every 8 hours  insulin lispro (HumaLOG) corrective regimen sliding scale   SubCutaneous every 6 hours  levothyroxine 150 MICROGram(s) Oral daily  norepinephrine Infusion 0.05 MICROgram(s)/kG/Min (6.656 mL/Hr) IV Continuous <Continuous>  pantoprazole   Suspension 40 milliGRAM(s) Oral before breakfast  pantoprazole  Injectable 40 milliGRAM(s) IV Push once  piperacillin/tazobactam IVPB. 2.25 Gram(s) IV Intermittent every 6 hours  sodium chloride 0.225%. 1000 milliLiter(s) (80 mL/Hr) IV Continuous <Continuous>    MEDICATIONS  (PRN):  acetaminophen    Suspension .. 650 milliGRAM(s) Oral every 6 hours PRN Temp greater or equal to 38C (100.4F)  dextrose 40% Gel 15 Gram(s) Oral once PRN Blood Glucose LESS THAN 70 milliGRAM(s)/deciliter  glucagon  Injectable 1 milliGRAM(s) IntraMuscular once PRN Glucose LESS THAN 70 milligrams/deciliter      Vital Signs Last 24 Hrs  T(C): 38.6 (17 Nov 2018 15:00), Max: 38.6 (17 Nov 2018 15:00)  T(F): 101.5 (17 Nov 2018 15:00), Max: 101.5 (17 Nov 2018 15:00)  HR: 84 (17 Nov 2018 15:00) (70 - 90)  BP: 114/57 (17 Nov 2018 15:00) (49/32 - 157/79)  BP(mean): 86 (17 Nov 2018 15:00) (61 - 88)  RR: 21 (17 Nov 2018 15:00) (10 - 32)  SpO2: 96% (17 Nov 2018 15:00) (93% - 100%)    REVIEW OF SYSTEMS:  SEDATED      PHYSICAL EXAM:  General: intubated and sedated  Respiratory: chest clear   Cardiac: +S1/S2; regular rate and rhythm; no murmur, no rub, no gallop  Gastrointestinal: obese, abdomen soft, non-tender, non-distended, bowel sounds active, no organomegaly, no masses,    (+) PEG in place no drainage or eyrthema to the site  Genitourinary: normal external genitalia, suazo in place  Extremities: warm, no clubbing or cyanosis; no peripheral edema  vascular: 2+ radial, DP pulses bilaterally  neuro: sedated      CAPILLARY BLOOD GLUCOSE      POCT Blood Glucose.: 147 mg/dL (17 Nov 2018 14:19)  POCT Blood Glucose.: 174 mg/dL (17 Nov 2018 12:24)  POCT Blood Glucose.: 145 mg/dL (17 Nov 2018 06:13)  POCT Blood Glucose.: 202 mg/dL (17 Nov 2018 00:01)  POCT Blood Glucose.: 351 mg/dL (16 Nov 2018 18:01)      I&O's Summary    16 Nov 2018 07:01  -  17 Nov 2018 07:00  --------------------------------------------------------  IN: 1651.4 mL / OUT: 364 mL / NET: 1287.4 mL    17 Nov 2018 07:01  -  17 Nov 2018 16:16  --------------------------------------------------------  IN: 792 mL / OUT: 365 mL / NET: 427 mL          LABS:                                                   11-17-18 @ 15:46    145  |  116<H>  |  121<H>  ----------------------------<  129<H>  3.9   |  14<L>  |  2.90<H>                                                 11-17-18 @ 09:12    150<H>  |  119<H>  |  127<H>  ----------------------------<  142<H>  4.3   |  16<L>  |  2.97<H>                                                 11-16-18 @ 23:04    152<H>  |  122<H>  |  129<H>  ----------------------------<  232<H>  4.3   |  14<L>  |  2.99<H>                                                 11-16-18 @ 15:08    154<H>  |  117<H>  |  145<H>  ----------------------------<  330<H>  5.6<H>   |  16<L>  |  3.38<H>    Ca    9.7      17 Nov 2018 15:46  Ca    9.7      17 Nov 2018 09:12  Ca    9.4      16 Nov 2018 23:04  Ca    11.4<H>      16 Nov 2018 15:08  Phos  4.4     11-17  Phos  3.6     11-16  Mg     2.0     11-17  Mg     2.0     11-16    TPro  8.0  /  Alb  3.3  /  TBili  0.7  /  DBili  x   /  AST  25  /  ALT  21  /  AlkPhos  66  11-16                          8.4    27.0  )-----------( 241      ( 17 Nov 2018 09:12 )             28.3     CBC Full  -  ( 17 Nov 2018 09:12 )  WBC Count : 27.0 K/uL  Hemoglobin : 8.4 g/dL  Hematocrit : 28.3 %  Platelet Count - Automated : 241 K/uL  Mean Cell Volume : 97.6 fL      CBC Full  -  ( 16 Nov 2018 15:08 )  WBC Count : 27.9 K/uL  Hemoglobin : 11.0 g/dL  Hematocrit : 39.2 %  Platelet Count - Automated : 279 K/uL  Mean Cell Volume : 101.3 fL        PT/INR - ( 16 Nov 2018 23:04 )   PT: 14.1 sec;   INR: 1.24          PTT - ( 16 Nov 2018 23:04 )  PTT:20.8 sec  CARDIAC MARKERS ( 16 Nov 2018 23:04 )  x     / 0.11 ng/mL / x     / x     / x      CARDIAC MARKERS ( 16 Nov 2018 15:08 )  x     / 0.14 ng/mL / x     / x     / x          Urinalysis Basic - ( 16 Nov 2018 15:53 )    Color: Yellow / Appearance: Cloudy / SG: >=1.030 / pH: x  Gluc: x / Ketone: Trace mg/dL  / Bili: Small / Urobili: 1.0 E.U./dL   Blood: x / Protein: 100 mg/dL / Nitrite: NEGATIVE   Leuk Esterase: Moderate / RBC: < 5 /HPF / WBC Many /HPF   Sq Epi: x / Non Sq Epi: 0-5 /HPF / Bacteria: Present /HPF Patient is a 78y old  Female who presents with a chief complaint of Septic shock and acute respiratory failure (16 Nov 2018 23:11)-    HPI:  Renal Consult was called for GONZALEZ in patient admitted for septic shock likely due to UTI and pneumonia.  Initial  and Cr of 3.38 increased from baseline of 21/0.68 in June 2018. Likely 2/2 to both pre-renal and intrinsic renal -possibly ATN.  BUN/Cr improving after IVF hydration to 129/2.99  Patient is making urine at an appropriate rate as of this shift.          Patient is currently intubated and unable to participate in interview. History taken from chart and MICU consult.    78F w/ PMH of morbid obesity, DM, HTN, hypothyroidism, sepsis secondary to ventral hernia infection s/p repair, PE s/p IVC filter, acute blood loss anemia from hemorrhagic uterine fibroids and UGIB, ATN requiring HD, sepsis secondary to MSSA bacteremia and MDR E coli UTI now w/AMS and s/p TAHBSO and small bowel resection s/p PEG. Patient presented from [facility] with concern for sepsis and hypoxia. Per EMS, patient was saturating 85% on RA at [facility] with some improvement to 95% after being placed on NRB.  On arrival to the ED, patient was altered and saturating 89%. She was intubated in the ED with suctioning revealing purulent material.       T was 101.5, , BP79/59. WBC 27.9 Hgb 11.0 .3 Lactate 5.4 Na 154 K 5.6 CO2 16 AG [id number] Cr 3.38 Trop 0.14 ABG 7.32 pCO2 29 pO2 138 HCO3 15  UA: (+) leuk est, (+) bacteria  She was started on Levo gtt and Dopamine and subsequently was titrated off the Dopamine. She was given Azithromycin, Cipro, Vancomycin and Zosyn, 4L nS, and Insulin 10U. (16 Nov 2018 23:11)      PAST MEDICAL & SURGICAL HISTORY:  Hypothyroidism  GERD (gastroesophageal reflux disease)  Obesity  DM (diabetes mellitus)  HLD (hyperlipidemia)  HTN (hypertension)  Status post total abdominal hysterectomy and bilateral salpingo-oophorectomy  S/P small bowel resection  H/O ventral hernia repair      Allergies    No Known Allergies    Intolerances        FAMILY HISTORY:  No pertinent family history in first degree relatives      SOCIAL HISTORY: unable to obtain as intubated ,sedated     MEDICATIONS  (STANDING):  chlorhexidine 0.12% Liquid 15 milliLiter(s) Swish and Spit two times a day  chlorhexidine 2% Cloths 1 Application(s) Topical daily  ciprofloxacin   IVPB 400 milliGRAM(s) IV Intermittent daily  dextrose 5%. 1000 milliLiter(s) (50 mL/Hr) IV Continuous <Continuous>  dextrose 50% Injectable 12.5 Gram(s) IV Push once  dextrose 50% Injectable 25 Gram(s) IV Push once  dextrose 50% Injectable 25 Gram(s) IV Push once  heparin  Injectable 5000 Unit(s) SubCutaneous every 8 hours  insulin lispro (HumaLOG) corrective regimen sliding scale   SubCutaneous every 6 hours  levothyroxine 150 MICROGram(s) Oral daily  norepinephrine Infusion 0.05 MICROgram(s)/kG/Min (6.656 mL/Hr) IV Continuous <Continuous>  pantoprazole   Suspension 40 milliGRAM(s) Oral before breakfast  pantoprazole  Injectable 40 milliGRAM(s) IV Push once  piperacillin/tazobactam IVPB. 2.25 Gram(s) IV Intermittent every 6 hours  sodium chloride 0.225%. 1000 milliLiter(s) (80 mL/Hr) IV Continuous <Continuous>    MEDICATIONS  (PRN):  acetaminophen    Suspension .. 650 milliGRAM(s) Oral every 6 hours PRN Temp greater or equal to 38C (100.4F)  dextrose 40% Gel 15 Gram(s) Oral once PRN Blood Glucose LESS THAN 70 milliGRAM(s)/deciliter  glucagon  Injectable 1 milliGRAM(s) IntraMuscular once PRN Glucose LESS THAN 70 milligrams/deciliter      Vital Signs Last 24 Hrs  T(C): 38.6 (17 Nov 2018 15:00), Max: 38.6 (17 Nov 2018 15:00)  T(F): 101.5 (17 Nov 2018 15:00), Max: 101.5 (17 Nov 2018 15:00)  HR: 84 (17 Nov 2018 15:00) (70 - 90)  BP: 114/57 (17 Nov 2018 15:00) (49/32 - 157/79)  BP(mean): 86 (17 Nov 2018 15:00) (61 - 88)  RR: 21 (17 Nov 2018 15:00) (10 - 32)  SpO2: 96% (17 Nov 2018 15:00) (93% - 100%)    REVIEW OF SYSTEMS:  SEDATED      PHYSICAL EXAM:  General: intubated and sedated  Respiratory: chest clear   Cardiac: +S1/S2; regular rate and rhythm; no murmur, no rub, no gallop  Gastrointestinal: obese, abdomen soft, non-tender, non-distended, bowel sounds active, no organomegaly, no masses,    (+) PEG in place no drainage or eyrthema to the site  Genitourinary: normal external genitalia, suazo in place  Extremities: warm, no clubbing or cyanosis; no peripheral edema  vascular: 2+ radial, DP pulses bilaterally  neuro: sedated  skin no rash        CAPILLARY BLOOD GLUCOSE      POCT Blood Glucose.: 147 mg/dL (17 Nov 2018 14:19)  POCT Blood Glucose.: 174 mg/dL (17 Nov 2018 12:24)  POCT Blood Glucose.: 145 mg/dL (17 Nov 2018 06:13)  POCT Blood Glucose.: 202 mg/dL (17 Nov 2018 00:01)  POCT Blood Glucose.: 351 mg/dL (16 Nov 2018 18:01)      I&O's Summary    16 Nov 2018 07:01  -  17 Nov 2018 07:00  --------------------------------------------------------  IN: 1651.4 mL / OUT: 364 mL / NET: 1287.4 mL    17 Nov 2018 07:01  -  17 Nov 2018 16:16  --------------------------------------------------------  IN: 792 mL / OUT: 365 mL / NET: 427 mL          LABS:                                                   11-17-18 @ 15:46    145  |  116<H>  |  121<H>  ----------------------------<  129<H>  3.9   |  14<L>  |  2.90<H>                                                 11-17-18 @ 09:12    150<H>  |  119<H>  |  127<H>  ----------------------------<  142<H>  4.3   |  16<L>  |  2.97<H>                                                 11-16-18 @ 23:04    152<H>  |  122<H>  |  129<H>  ----------------------------<  232<H>  4.3   |  14<L>  |  2.99<H>                                                 11-16-18 @ 15:08    154<H>  |  117<H>  |  145<H>  ----------------------------<  330<H>  5.6<H>   |  16<L>  |  3.38<H>    Ca    9.7      17 Nov 2018 15:46  Ca    9.7      17 Nov 2018 09:12  Ca    9.4      16 Nov 2018 23:04  Ca    11.4<H>      16 Nov 2018 15:08  Phos  4.4     11-17  Phos  3.6     11-16  Mg     2.0     11-17  Mg     2.0     11-16    TPro  8.0  /  Alb  3.3  /  TBili  0.7  /  DBili  x   /  AST  25  /  ALT  21  /  AlkPhos  66  11-16                          8.4    27.0  )-----------( 241      ( 17 Nov 2018 09:12 )             28.3     CBC Full  -  ( 17 Nov 2018 09:12 )  WBC Count : 27.0 K/uL  Hemoglobin : 8.4 g/dL  Hematocrit : 28.3 %  Platelet Count - Automated : 241 K/uL  Mean Cell Volume : 97.6 fL      CBC Full  -  ( 16 Nov 2018 15:08 )  WBC Count : 27.9 K/uL  Hemoglobin : 11.0 g/dL  Hematocrit : 39.2 %  Platelet Count - Automated : 279 K/uL  Mean Cell Volume : 101.3 fL        PT/INR - ( 16 Nov 2018 23:04 )   PT: 14.1 sec;   INR: 1.24          PTT - ( 16 Nov 2018 23:04 )  PTT:20.8 sec  CARDIAC MARKERS ( 16 Nov 2018 23:04 )  x     / 0.11 ng/mL / x     / x     / x      CARDIAC MARKERS ( 16 Nov 2018 15:08 )  x     / 0.14 ng/mL / x     / x     / x          Urinalysis Basic - ( 16 Nov 2018 15:53 )    Color: Yellow / Appearance: Cloudy / SG: >=1.030 / pH: x  Gluc: x / Ketone: Trace mg/dL  / Bili: Small / Urobili: 1.0 E.U./dL   Blood: x / Protein: 100 mg/dL / Nitrite: NEGATIVE   Leuk Esterase: Moderate / RBC: < 5 /HPF / WBC Many /HPF   Sq Epi: x / Non Sq Epi: 0-5 /HPF / Bacteria: Present /HPF

## 2018-11-17 NOTE — DIETITIAN INITIAL EVALUATION ADULT. - OTHER INFO
77y/o F presenting with septic shock likely 2/2 PNA vs UTI and acute hypoxic respiratory failure and is now currently intubated requiring pressure support. Pt remains intubated on Cpap. Propofol off; pt remains on pressor support. With continued intubation, recommend EN initiation with route per MD as medically feasible. If pt able to be safely extubated, perform dysphagia screen vs formal S&S prior to PO. RD to follow per policy.

## 2018-11-17 NOTE — CONSULT NOTE ADULT - ASSESSMENT
78F w/ PMH obesity, DM, HTN, hypothyroidism, sepsis secondary to ventral hernia infection s/p repair, PE s/p IVC filter, acute blood loss anemia from hemorrhagic uterine fibroids and UGIB, ATN requiring HD, sepsis secondary to MSSA bacteremia and MDR E coli UTI now w/AMS and s/p TAHBSO and small bowel resection s/p PEG. Presenting with septic shock likely 2/2 PNA vs UTI and acute hypoxic respiratory failure and is now currently intubated requiring pressure support.

## 2018-11-17 NOTE — DIETITIAN INITIAL EVALUATION ADULT. - ENERGY NEEDS
Ht 162.56cm; Wt 69.2Kg  IBW 54.5Kg; %%  BMI 26.2    Utilized IBW to calculate needs, pt >120% of IBW. Adjusted for sepsis/vent.

## 2018-11-18 LAB
ANION GAP SERPL CALC-SCNC: 13 MMOL/L — SIGNIFICANT CHANGE UP (ref 5–17)
ANISOCYTOSIS BLD QL: SLIGHT — SIGNIFICANT CHANGE UP
BASE EXCESS BLDA CALC-SCNC: -8.9 MMOL/L — LOW (ref -2–3)
BILIRUB DIRECT SERPL-MCNC: 0.2 MG/DL — SIGNIFICANT CHANGE UP (ref 0–0.2)
BILIRUB INDIRECT FLD-MCNC: 0.3 MG/DL — SIGNIFICANT CHANGE UP (ref 0.2–1)
BILIRUB SERPL-MCNC: 0.5 MG/DL — SIGNIFICANT CHANGE UP (ref 0.2–1.2)
BUN SERPL-MCNC: 107 MG/DL — HIGH (ref 7–23)
C DIFF GDH STL QL: NEGATIVE — SIGNIFICANT CHANGE UP
C DIFF GDH STL QL: SIGNIFICANT CHANGE UP
CALCIUM SERPL-MCNC: 10.1 MG/DL — SIGNIFICANT CHANGE UP (ref 8.4–10.5)
CHLORIDE SERPL-SCNC: 114 MMOL/L — HIGH (ref 96–108)
CO2 SERPL-SCNC: 15 MMOL/L — LOW (ref 22–31)
CREAT SERPL-MCNC: 2.85 MG/DL — HIGH (ref 0.5–1.3)
DOHLE BOD BLD QL SMEAR: SIGNIFICANT CHANGE UP
EOSINOPHIL NFR BLD AUTO: 1 % — SIGNIFICANT CHANGE UP (ref 0–6)
GAS PNL BLDA: SIGNIFICANT CHANGE UP
GIANT PLATELETS BLD QL SMEAR: PRESENT — SIGNIFICANT CHANGE UP
GLUCOSE BLDC GLUCOMTR-MCNC: 105 MG/DL — HIGH (ref 70–99)
GLUCOSE BLDC GLUCOMTR-MCNC: 108 MG/DL — HIGH (ref 70–99)
GLUCOSE BLDC GLUCOMTR-MCNC: 84 MG/DL — SIGNIFICANT CHANGE UP (ref 70–99)
GLUCOSE SERPL-MCNC: 112 MG/DL — HIGH (ref 70–99)
GRAM STN FLD: SIGNIFICANT CHANGE UP
HCO3 BLDA-SCNC: 14 MMOL/L — LOW (ref 21–28)
HCT VFR BLD CALC: 26.2 % — LOW (ref 34.5–45)
HGB BLD-MCNC: 7.7 G/DL — LOW (ref 11.5–15.5)
HYPOCHROMIA BLD QL: SLIGHT — SIGNIFICANT CHANGE UP
LG PLATELETS BLD QL AUTO: PRESENT — SIGNIFICANT CHANGE UP
LYMPHOCYTES # BLD AUTO: 8 % — LOW (ref 13–44)
MACROCYTES BLD QL: SLIGHT — SIGNIFICANT CHANGE UP
MANUAL DIF COMMENT BLD-IMP: SIGNIFICANT CHANGE UP
MANUAL SMEAR VERIFICATION: SIGNIFICANT CHANGE UP
MCHC RBC-ENTMCNC: 27.7 PG — SIGNIFICANT CHANGE UP (ref 27–34)
MCHC RBC-ENTMCNC: 29.4 G/DL — LOW (ref 32–36)
MCV RBC AUTO: 94.2 FL — SIGNIFICANT CHANGE UP (ref 80–100)
METAMYELOCYTES # FLD: 1 % — HIGH
MICROCYTES BLD QL: SLIGHT — SIGNIFICANT CHANGE UP
MONOCYTES NFR BLD AUTO: 4 % — SIGNIFICANT CHANGE UP (ref 2–14)
NEUTROPHILS NFR BLD AUTO: 71 % — SIGNIFICANT CHANGE UP (ref 43–77)
NEUTS BAND # BLD: 15 % — HIGH
OVALOCYTES BLD QL SMEAR: SLIGHT — SIGNIFICANT CHANGE UP
PCO2 BLDA: 25 MMHG — LOW (ref 32–45)
PH BLDA: 7.38 — SIGNIFICANT CHANGE UP (ref 7.35–7.45)
PHOSPHATE SERPL-MCNC: 5 MG/DL — HIGH (ref 2.5–4.5)
PLAT MORPH BLD: ABNORMAL
PLATELET # BLD AUTO: 224 K/UL — SIGNIFICANT CHANGE UP (ref 150–400)
PO2 BLDA: 84 MMHG — SIGNIFICANT CHANGE UP (ref 83–108)
POIKILOCYTOSIS BLD QL AUTO: SLIGHT — SIGNIFICANT CHANGE UP
POLYCHROMASIA BLD QL SMEAR: SLIGHT — SIGNIFICANT CHANGE UP
POTASSIUM SERPL-MCNC: 3.5 MMOL/L — SIGNIFICANT CHANGE UP (ref 3.5–5.3)
POTASSIUM SERPL-SCNC: 3.5 MMOL/L — SIGNIFICANT CHANGE UP (ref 3.5–5.3)
RBC # BLD: 2.78 M/UL — LOW (ref 3.8–5.2)
RBC # FLD: 18.6 % — HIGH (ref 10.3–16.9)
RBC BLD AUTO: ABNORMAL
SAO2 % BLDA: 96 % — SIGNIFICANT CHANGE UP (ref 95–100)
SODIUM SERPL-SCNC: 142 MMOL/L — SIGNIFICANT CHANGE UP (ref 135–145)
SPECIMEN SOURCE: SIGNIFICANT CHANGE UP
SPHEROCYTES BLD QL SMEAR: SLIGHT — SIGNIFICANT CHANGE UP
TOXIC GRANULES BLD QL SMEAR: SLIGHT — SIGNIFICANT CHANGE UP
VANCOMYCIN FLD-MCNC: 9.2 UG/ML — SIGNIFICANT CHANGE UP
WBC # BLD: 27.3 K/UL — HIGH (ref 3.8–10.5)
WBC # FLD AUTO: 27.3 K/UL — HIGH (ref 3.8–10.5)

## 2018-11-18 PROCEDURE — 71045 X-RAY EXAM CHEST 1 VIEW: CPT | Mod: 26,76

## 2018-11-18 PROCEDURE — 99232 SBSQ HOSP IP/OBS MODERATE 35: CPT | Mod: GC

## 2018-11-18 PROCEDURE — 99291 CRITICAL CARE FIRST HOUR: CPT

## 2018-11-18 PROCEDURE — 74176 CT ABD & PELVIS W/O CONTRAST: CPT | Mod: 26

## 2018-11-18 RX ORDER — SODIUM BICARBONATE 1 MEQ/ML
650 SYRINGE (ML) INTRAVENOUS THREE TIMES A DAY
Qty: 0 | Refills: 0 | Status: DISCONTINUED | OUTPATIENT
Start: 2018-11-18 | End: 2018-11-20

## 2018-11-18 RX ORDER — POTASSIUM CHLORIDE 20 MEQ
20 PACKET (EA) ORAL ONCE
Qty: 0 | Refills: 0 | Status: COMPLETED | OUTPATIENT
Start: 2018-11-18 | End: 2018-11-18

## 2018-11-18 RX ORDER — VANCOMYCIN HCL 1 G
VIAL (EA) INTRAVENOUS
Qty: 0 | Refills: 0 | Status: DISCONTINUED | OUTPATIENT
Start: 2018-11-18 | End: 2018-11-19

## 2018-11-18 RX ORDER — VANCOMYCIN HCL 1 G
1000 VIAL (EA) INTRAVENOUS ONCE
Qty: 0 | Refills: 0 | Status: COMPLETED | OUTPATIENT
Start: 2018-11-18 | End: 2018-11-18

## 2018-11-18 RX ORDER — VANCOMYCIN HCL 1 G
1000 VIAL (EA) INTRAVENOUS EVERY 24 HOURS
Qty: 0 | Refills: 0 | Status: DISCONTINUED | OUTPATIENT
Start: 2018-11-19 | End: 2018-11-19

## 2018-11-18 RX ORDER — MIDODRINE HYDROCHLORIDE 2.5 MG/1
5 TABLET ORAL THREE TIMES A DAY
Qty: 0 | Refills: 0 | Status: DISCONTINUED | OUTPATIENT
Start: 2018-11-18 | End: 2018-11-23

## 2018-11-18 RX ORDER — PROPOFOL 10 MG/ML
5 INJECTION, EMULSION INTRAVENOUS
Qty: 1000 | Refills: 0 | Status: DISCONTINUED | OUTPATIENT
Start: 2018-11-18 | End: 2018-11-19

## 2018-11-18 RX ORDER — IOHEXOL 300 MG/ML
30 INJECTION, SOLUTION INTRAVENOUS ONCE
Qty: 0 | Refills: 0 | Status: COMPLETED | OUTPATIENT
Start: 2018-11-18 | End: 2018-11-18

## 2018-11-18 RX ORDER — PANTOPRAZOLE SODIUM 20 MG/1
40 TABLET, DELAYED RELEASE ORAL DAILY
Qty: 0 | Refills: 0 | Status: DISCONTINUED | OUTPATIENT
Start: 2018-11-18 | End: 2018-12-20

## 2018-11-18 RX ADMIN — CHLORHEXIDINE GLUCONATE 15 MILLILITER(S): 213 SOLUTION TOPICAL at 06:28

## 2018-11-18 RX ADMIN — Medication 650 MILLIGRAM(S): at 13:34

## 2018-11-18 RX ADMIN — Medication 150 MICROGRAM(S): at 06:29

## 2018-11-18 RX ADMIN — Medication 650 MILLIGRAM(S): at 03:41

## 2018-11-18 RX ADMIN — Medication 200 MILLIGRAM(S): at 12:24

## 2018-11-18 RX ADMIN — Medication 650 MILLIGRAM(S): at 04:00

## 2018-11-18 RX ADMIN — Medication 250 MILLIGRAM(S): at 18:10

## 2018-11-18 RX ADMIN — Medication 20 MILLIEQUIVALENT(S): at 09:00

## 2018-11-18 RX ADMIN — Medication 650 MILLIGRAM(S): at 22:31

## 2018-11-18 RX ADMIN — PANTOPRAZOLE SODIUM 40 MILLIGRAM(S): 20 TABLET, DELAYED RELEASE ORAL at 18:09

## 2018-11-18 RX ADMIN — IOHEXOL 30 MILLILITER(S): 300 INJECTION, SOLUTION INTRAVENOUS at 10:50

## 2018-11-18 RX ADMIN — HEPARIN SODIUM 5000 UNIT(S): 5000 INJECTION INTRAVENOUS; SUBCUTANEOUS at 06:29

## 2018-11-18 RX ADMIN — MIDODRINE HYDROCHLORIDE 5 MILLIGRAM(S): 2.5 TABLET ORAL at 10:51

## 2018-11-18 RX ADMIN — PIPERACILLIN AND TAZOBACTAM 200 GRAM(S): 4; .5 INJECTION, POWDER, LYOPHILIZED, FOR SOLUTION INTRAVENOUS at 18:09

## 2018-11-18 RX ADMIN — PIPERACILLIN AND TAZOBACTAM 200 GRAM(S): 4; .5 INJECTION, POWDER, LYOPHILIZED, FOR SOLUTION INTRAVENOUS at 09:00

## 2018-11-18 RX ADMIN — PROPOFOL 2.08 MICROGRAM(S)/KG/MIN: 10 INJECTION, EMULSION INTRAVENOUS at 08:31

## 2018-11-18 RX ADMIN — CHLORHEXIDINE GLUCONATE 1 APPLICATION(S): 213 SOLUTION TOPICAL at 12:24

## 2018-11-18 RX ADMIN — Medication 650 MILLIGRAM(S): at 10:51

## 2018-11-18 RX ADMIN — MIDODRINE HYDROCHLORIDE 5 MILLIGRAM(S): 2.5 TABLET ORAL at 13:34

## 2018-11-18 RX ADMIN — SODIUM CHLORIDE 80 MILLILITER(S): 9 INJECTION INTRAMUSCULAR; INTRAVENOUS; SUBCUTANEOUS at 02:21

## 2018-11-18 RX ADMIN — Medication 6.66 MICROGRAM(S)/KG/MIN: at 18:10

## 2018-11-18 RX ADMIN — HEPARIN SODIUM 5000 UNIT(S): 5000 INJECTION INTRAVENOUS; SUBCUTANEOUS at 13:34

## 2018-11-18 RX ADMIN — HEPARIN SODIUM 5000 UNIT(S): 5000 INJECTION INTRAVENOUS; SUBCUTANEOUS at 22:32

## 2018-11-18 RX ADMIN — MIDODRINE HYDROCHLORIDE 5 MILLIGRAM(S): 2.5 TABLET ORAL at 22:31

## 2018-11-18 RX ADMIN — CHLORHEXIDINE GLUCONATE 15 MILLILITER(S): 213 SOLUTION TOPICAL at 18:09

## 2018-11-18 RX ADMIN — PIPERACILLIN AND TAZOBACTAM 200 GRAM(S): 4; .5 INJECTION, POWDER, LYOPHILIZED, FOR SOLUTION INTRAVENOUS at 03:41

## 2018-11-18 NOTE — PROGRESS NOTE ADULT - SUBJECTIVE AND OBJECTIVE BOX
Patient is a 78y old  Female who presents with a chief complaint of Septic shock and acute respiratory failure (2018 23:57)      INTERVAL HPI/OVERNIGHT EVENTS: ET tube was moved up by 1 cm, had ct abdomen that showed gallbladder debris vs stones, resolution of bowel wall thickening. Otherwise patient was resting well.     ICU Vital Signs Last 24 Hrs  T(C): 37.1 (2018 01:30), Max: 38.9 (2018 18:00)  T(F): 98.7 (2018 01:30), Max: 102 (2018 18:00)  HR: 66 (:) (64 - 84)  BP: 134/64 (:) (85/44 - 134/64)  BP(mean): 102 (:) (57 - 102)  ABP: --  ABP(mean): --  RR: 18 (:00) (9 - 29)  SpO2: 97% (:) (95% - 98%)    I&O's Summary    2018 07:01  -  2018 07:00  --------------------------------------------------------  IN: 1651.4 mL / OUT: 364 mL / NET: 1287.4 mL    2018 07:01  -  2018 01:45  --------------------------------------------------------  IN: 2389 mL / OUT: 845 mL / NET: 1544 mL      Mode: AC/ CMV (Assist Control/ Continuous Mandatory Ventilation)  RR (machine): 12  TV (machine): 450  FiO2: 40  PEEP: 5  ITime: 1  PIP: 18      LABS:                        8.4    27.0  )-----------( 241      ( 2018 09:12 )             28.3     11-17    145  |  116<H>  |  108<H>  ----------------------------<  112<H>  3.9   |  15<L>  |  2.89<H>    Ca    9.4      2018 22:39  Phos  4.4       Mg     2.0         TPro  8.0  /  Alb  3.3  /  TBili  0.7  /  DBili  x   /  AST  25  /  ALT  21  /  AlkPhos  66  11-16    PT/INR - ( 2018 23:04 )   PT: 14.1 sec;   INR: 1.24          PTT - ( 2018 23:04 )  PTT:20.8 sec  Urinalysis Basic - ( 2018 17:35 )    Color: Yellow / Appearance: SL Cloudy / S.020 / pH: x  Gluc: x / Ketone: NEGATIVE  / Bili: Negative / Urobili: 0.2 E.U./dL   Blood: x / Protein: 30 mg/dL / Nitrite: NEGATIVE   Leuk Esterase: Small / RBC: > 10 /HPF / WBC > 10 /HPF   Sq Epi: x / Non Sq Epi: 0-5 /HPF / Bacteria: Present /HPF      CAPILLARY BLOOD GLUCOSE      POCT Blood Glucose.: 108 mg/dL (2018 23:26)  POCT Blood Glucose.: 141 mg/dL (2018 17:17)  POCT Blood Glucose.: 147 mg/dL (2018 14:19)  POCT Blood Glucose.: 174 mg/dL (2018 12:24)  POCT Blood Glucose.: 145 mg/dL (2018 06:13)    ABG - ( 2018 17:14 )  pH, Arterial: 7.32  pH, Blood: x     /  pCO2: 29    /  pO2: 138   / HCO3: 15    / Base Excess: -9.8  /  SaO2: 99                  RADIOLOGY & ADDITIONAL TESTS: Reviewed.    Consultant(s) Notes Reviewed:  [x ] YES  [ ] NO    MEDICATIONS  (STANDING):  chlorhexidine 0.12% Liquid 15 milliLiter(s) Swish and Spit two times a day  chlorhexidine 2% Cloths 1 Application(s) Topical daily  ciprofloxacin   IVPB 400 milliGRAM(s) IV Intermittent daily  dextrose 5%. 1000 milliLiter(s) (50 mL/Hr) IV Continuous <Continuous>  dextrose 50% Injectable 12.5 Gram(s) IV Push once  dextrose 50% Injectable 25 Gram(s) IV Push once  dextrose 50% Injectable 25 Gram(s) IV Push once  heparin  Injectable 5000 Unit(s) SubCutaneous every 8 hours  insulin lispro (HumaLOG) corrective regimen sliding scale   SubCutaneous every 6 hours  levothyroxine 150 MICROGram(s) Oral daily  norepinephrine Infusion 0.05 MICROgram(s)/kG/Min (6.656 mL/Hr) IV Continuous <Continuous>  pantoprazole   Suspension 40 milliGRAM(s) Oral before breakfast  piperacillin/tazobactam IVPB. 2.25 Gram(s) IV Intermittent every 6 hours  sodium chloride 0.225%. 1000 milliLiter(s) (80 mL/Hr) IV Continuous <Continuous>    MEDICATIONS  (PRN):  acetaminophen    Suspension .. 650 milliGRAM(s) Oral every 6 hours PRN Temp greater or equal to 38C (100.4F)  dextrose 40% Gel 15 Gram(s) Oral once PRN Blood Glucose LESS THAN 70 milliGRAM(s)/deciliter  glucagon  Injectable 1 milliGRAM(s) IntraMuscular once PRN Glucose LESS THAN 70 milligrams/deciliter      PHYSICAL EXAM:  GENERAL: Obese adult White female, intubated and sedated, in NAD  HEAD: atraumatic  EYES: PERRL, resists passive opening of eyes, conjunctiva pink  ENT: moist mucous membranes  NECK: supple  CHEST/LUNG: rhonchi bilaterally, L > R, with decreased breath sounds at bases bilaterally  HEART: regular tachycardia, no murmur or rub  ABDOMEN: obese, soft, non-tender, non-distended, without rebound or guarding  EXTREMITIES: warm, well-perfused, without edema  NERVOUS SYSTEM: sedated; arouses to voice, does not follow commands or answer questions  SKIN: No rashes    Care Discussed with Consultants/Other Providers [ x] YES  [ ] NO Patient is a 78y old  Female who presents with a chief complaint of Septic shock and acute respiratory failure (2018 23:57)      INTERVAL HPI/OVERNIGHT EVENTS: ET tube was moved up by 1 cm, had ct abdomen that showed gallbladder debris vs stones, resolution of bowel wall thickening. Otherwise patient was resting well.     ICU Vital Signs Last 24 Hrs  T(C): 37.1 (2018 01:30), Max: 38.9 (2018 18:00)  T(F): 98.7 (2018 01:30), Max: 102 (2018 18:00)  HR: 66 (:) (64 - 84)  BP: 134/64 (:00) (85/44 - 134/64)  BP(mean): 102 (:) (57 - 102)  ABP: --  ABP(mean): --  RR: 18 (2018 01:00) (9 - 29)  SpO2: 97% (:00) (95% - 98%)      2018 07:01  -  2018 07:00  --------------------------------------------------------  IN:    Enteral Tube Flush: 200 mL    Free Water: 500 mL    IV PiggyBack: 600 mL    norepinephrine Infusion: 397 mL    sodium chloride 0.225%: 1280 mL  Total IN: 2977 mL    OUT:    Indwelling Catheter - Urethral: 1045 mL  Total OUT: 1045 mL    Total NET: 1932 mL      2018 07:01  -  2018 21:20  --------------------------------------------------------  IN:    Enteral Tube Flush: 900 mL    IV PiggyBack: 750 mL    norepinephrine Infusion: 117 mL    propofol Infusion: 28 mL    sodium chloride 0.225%: 160 mL  Total IN: 1955 mL    OUT:    Indwelling Catheter - Urethral: 655 mL  Total OUT: 655 mL    Total NET: 1300 mL      Mode: AC/ CMV (Assist Control/ Continuous Mandatory Ventilation)  RR (machine): 12  TV (machine): 440  FiO2: 40  PEEP: 5  ITime: 1  MAP: 10  PIP: 20    .  LABS:                         7.7    27.3  )-----------( 224      ( 2018 05:42 )             26.2     11-18    142  |  114<H>  |  107<H>  ----------------------------<  112<H>  3.5   |  15<L>  |  2.85<H>    Ca    10.1      2018 05:42  Phos  5.0       Mg     2.0         TPro  x   /  Alb  x   /  TBili  0.5  /  DBili  0.2  /  AST  x   /  ALT  x   /  AlkPhos  x   18    PT/INR - ( 2018 23:04 )   PT: 14.1 sec;   INR: 1.24          PTT - ( 2018 23:04 )  PTT:20.8 sec  Urinalysis Basic - ( 2018 17:35 )    Color: Yellow / Appearance: SL Cloudy / S.020 / pH: x  Gluc: x / Ketone: NEGATIVE  / Bili: Negative / Urobili: 0.2 E.U./dL   Blood: x / Protein: 30 mg/dL / Nitrite: NEGATIVE   Leuk Esterase: Small / RBC: > 10 /HPF / WBC > 10 /HPF   Sq Epi: x / Non Sq Epi: 0-5 /HPF / Bacteria: Present /HPF      CARDIAC MARKERS ( 2018 23:04 )  x     / 0.11 ng/mL / x     / x     / x          CAPILLARY BLOOD GLUCOSE      POCT Blood Glucose.: 84 mg/dL (2018 18:13)  POCT Blood Glucose.: 105 mg/dL (2018 11:39)  POCT Blood Glucose.: 108 mg/dL (2018 06:31)  POCT Blood Glucose.: 108 mg/dL (2018 23:26)    MEDICATIONS  (STANDING):  chlorhexidine 0.12% Liquid 15 milliLiter(s) Swish and Spit two times a day  chlorhexidine 2% Cloths 1 Application(s) Topical daily  ciprofloxacin   IVPB 400 milliGRAM(s) IV Intermittent daily  dextrose 5%. 1000 milliLiter(s) (50 mL/Hr) IV Continuous <Continuous>  dextrose 50% Injectable 12.5 Gram(s) IV Push once  dextrose 50% Injectable 25 Gram(s) IV Push once  dextrose 50% Injectable 25 Gram(s) IV Push once  heparin  Injectable 5000 Unit(s) SubCutaneous every 8 hours  insulin lispro (HumaLOG) corrective regimen sliding scale   SubCutaneous every 6 hours  levothyroxine 150 MICROGram(s) Oral daily  midodrine 5 milliGRAM(s) Oral three times a day  norepinephrine Infusion 0.05 MICROgram(s)/kG/Min (6.656 mL/Hr) IV Continuous <Continuous>  pantoprazole   Suspension 40 milliGRAM(s) Enteral Tube daily  piperacillin/tazobactam IVPB. 2.25 Gram(s) IV Intermittent every 6 hours  propofol Infusion 5 MICROgram(s)/kG/Min (2.076 mL/Hr) IV Continuous <Continuous>  sodium bicarbonate 650 milliGRAM(s) Oral three times a day  vancomycin  IVPB        MEDICATIONS  (PRN):  acetaminophen    Suspension .. 650 milliGRAM(s) Oral every 6 hours PRN Temp greater or equal to 38C (100.4F)  dextrose 40% Gel 15 Gram(s) Oral once PRN Blood Glucose LESS THAN 70 milliGRAM(s)/deciliter  glucagon  Injectable 1 milliGRAM(s) IntraMuscular once PRN Glucose LESS THAN 70 milligrams/deciliter    PHYSICAL EXAM:  GENERAL: Obese adult White female, intubated and sedated, in NAD  HEAD: atraumatic  EYES: PERRL, resists passive opening of eyes, conjunctiva pink  ENT: moist mucous membranes  NECK: supple  CHEST/LUNG: rhonchi bilaterally, L > R, with decreased breath sounds at bases bilaterally  HEART: regular tachycardia, no murmur or rub  ABDOMEN: obese, soft, non-tender, non-distended, without rebound or guarding  EXTREMITIES: warm, well-perfused, without edema  NERVOUS SYSTEM: sedated; arouses to voice, does not follow commands or answer questions  SKIN: No rashes    Care Discussed with Consultants/Other Providers [ x] YES  [ ] NO  CT abdomen with oral contrast.   Impression: Hyperdense material in the gallbladder which may represent   debris or stones    Resolution of bowel wall thickening.    No significant ascites or drainable fluid collection.    Increased pleural effusions and adjacent consolidations.

## 2018-11-18 NOTE — PROGRESS NOTE ADULT - PROBLEM SELECTOR PLAN 2
likely 2/2 to septic shock  has history of HTN at baseline, hold all anti-hypertensives in setting of septic shock  on norepi  Lactate trending down.

## 2018-11-18 NOTE — PROGRESS NOTE ADULT - PROBLEM SELECTOR PLAN 1
nonoliguric GONZALEZ likely prerenal in patient with septic shock secondary to UTI and pneumonia vs intrinsic renal   s/p vanco  now on zosyn  Cr 2.9> 2.8 >2.8 today, baseline 0.6 in 6/18  UOP 1045 mls over past shift  avoid nephrotoxic meds  renal dose adjustment of antibiotics  monitor I/O  Maintain renal perfusion

## 2018-11-18 NOTE — PROGRESS NOTE ADULT - ATTENDING COMMENTS
renal fxn stable, nonoliguric   no sign fluid overload - consider restart IVF   cont antibiotics for pneumonia/UTI renal fxn stable, nonoliguric   no sign fluid overload - consider restart IVF (wth hco3) [place] with diarrhea now  continue supportive care -pressors as needed  cont antibiotics for pneumonia/UTI

## 2018-11-18 NOTE — PROGRESS NOTE ADULT - ATTENDING COMMENTS
Agree with abd Ct to r/o intraabd source of sepsis with complicated prior hx post hernia repair. Continue abx for UTI and pna as outlined with Cx pending. D/C sedation and assess for CPAP trial.

## 2018-11-18 NOTE — CONSULT NOTE ADULT - SUBJECTIVE AND OBJECTIVE BOX
INCOMPLETE    HPI: 78 year old woman with a history of MO, DM, HTN, hypothyroidism with recent prolonged hospital admissions:    3/2 underwent ventral hernia repair with mesh, robotic converted to open, discharged on 3/4  3/12 admitted for PE, started on heparin gtt, switched to NOAC, discharged on 3/16  3/23 admitted for acute blood loss anemia from vaginal and UGI bleed, EGD with ulceration; c/b GONZALEZ requiring temporary HD; IVC filter placement on 3/30; discharged 4/11  4/15 admitted to OSH for sepsis 2/ MSSA bacteremia and MDR E Coli in urine, transferred to [facility], CT with extravasation, IR drain placement, underwent DAVY BSO 2/2 tubal metaplasia, SBR with primary anastomosis, abdominal washout, PEG placement, discharged on     admitted  with respiratory failure with desaturation to 85% on room air, altered mental status, requiring intubation in the ED with purulent fluid on suctioning.        PMHx:  PSHx:  Meds:  Allergies:  Social:  Family:    PRESSORS: [x] YES [ ] NO  WHICH: levophed  DOSE:    ANTIBIOTICS:                  DATE STARTED:  ANTIBIOTICS:                  DATE STARTED:  ANTIBIOTICS:                  DATE STARTED:    MEDICATIONS  (STANDING):  chlorhexidine 0.12% Liquid 15 milliLiter(s) Swish and Spit two times a day  chlorhexidine 2% Cloths 1 Application(s) Topical daily  ciprofloxacin   IVPB 400 milliGRAM(s) IV Intermittent daily  dextrose 5%. 1000 milliLiter(s) (50 mL/Hr) IV Continuous <Continuous>  dextrose 50% Injectable 12.5 Gram(s) IV Push once  dextrose 50% Injectable 25 Gram(s) IV Push once  dextrose 50% Injectable 25 Gram(s) IV Push once  heparin  Injectable 5000 Unit(s) SubCutaneous every 8 hours  insulin lispro (HumaLOG) corrective regimen sliding scale   SubCutaneous every 6 hours  levothyroxine 150 MICROGram(s) Oral daily  midodrine 5 milliGRAM(s) Oral three times a day  norepinephrine Infusion 0.05 MICROgram(s)/kG/Min (6.656 mL/Hr) IV Continuous <Continuous>  pantoprazole   Suspension 40 milliGRAM(s) Oral before breakfast  piperacillin/tazobactam IVPB. 2.25 Gram(s) IV Intermittent every 6 hours  propofol Infusion 5 MICROgram(s)/kG/Min (2.076 mL/Hr) IV Continuous <Continuous>  sodium bicarbonate 650 milliGRAM(s) Oral three times a day    MEDICATIONS  (PRN):  acetaminophen    Suspension .. 650 milliGRAM(s) Oral every 6 hours PRN Temp greater or equal to 38C (100.4F)  dextrose 40% Gel 15 Gram(s) Oral once PRN Blood Glucose LESS THAN 70 milliGRAM(s)/deciliter  glucagon  Injectable 1 milliGRAM(s) IntraMuscular once PRN Glucose LESS THAN 70 milligrams/deciliter      Drug Dosing Weight  Height (cm): 162.56 (2018 20:30)  Weight (kg): 69.2 (2018 20:30)  BMI (kg/m2): 26.2 (2018 20:30)  BSA (m2): 1.74 (2018 20:30)    CENTRAL LINE: [ ] YES [ ] NO  LOCATION:   DATE INSERTED:  REMOVE: [ ] YES [ ] NO  EXPLAIN:    BOLIVAR: [ ] YES [ ] NO    DATE INSERTED:  REMOVE: [ ] YES [ ] NO  EXPLAIN:    A-LINE: [ ] YES [ ] NO  LOCATION:   DATE INSERTED:  REMOVE: [ ] YES [ ] NO  EXPLAIN:    PAST MEDICAL & SURGICAL HISTORY:  Hypothyroidism  GERD (gastroesophageal reflux disease)  Obesity  DM (diabetes mellitus)  HLD (hyperlipidemia)  HTN (hypertension)  Status post total abdominal hysterectomy and bilateral salpingo-oophorectomy  S/P small bowel resection  H/O ventral hernia repair      ICU Vital Signs Last 24 Hrs  T(C): 37.2 (2018 11:00), Max: 38.9 (2018 18:00)  T(F): 99 (2018 11:00), Max: 102 (2018 18:00)  HR: 58 (2018 14:00) (52 - 84)  BP: 110/50 (2018 14:00) (101/51 - 148/78)  BP(mean): 59 (2018 14:00) (57 - 111)  ABP: --  ABP(mean): --  RR: 16 (2018 14:00) (9 - 32)  SpO2: 96% (2018 14:00) (96% - 100%)      ABG - ( 2018 17:14 )  pH, Arterial: 7.32  pH, Blood: x     /  pCO2: 29    /  pO2: 138   / HCO3: 15    / Base Excess: -9.8  /  SaO2: 99                    2018 07:01  -  2018 07:00  --------------------------------------------------------  IN:    Enteral Tube Flush: 200 mL    Free Water: 500 mL    IV PiggyBack: 600 mL    norepinephrine Infusion: 397 mL    sodium chloride 0.225%: 1280 mL  Total IN: 2977 mL    OUT:    Indwelling Catheter - Urethral: 1045 mL  Total OUT: 1045 mL    Total NET: 1932 mL      2018 07:  -  2018 14:08  --------------------------------------------------------  IN:    IV PiggyBack: 300 mL    norepinephrine Infusion: 69 mL    sodium chloride 0.225%: 160 mL  Total IN: 529 mL    OUT:    Indwelling Catheter - Urethral: 315 mL  Total OUT: 315 mL    Total NET: 214 mL          Mode: AC/ CMV (Assist Control/ Continuous Mandatory Ventilation)  RR (machine): 12  TV (machine): 440  FiO2: 40  PEEP: 5  ITime: 1  MAP: 9  PIP: 18      PHYSICAL EXAM:      Constitutional:    Eyes:    ENMT:    Neck:    Breasts:    Back:    Respiratory:    Cardiovascular:    Gastrointestinal:    Genitourinary:    Rectal:    Extremities:    Vascular:    Neurological:    Skin:    Lymph Nodes:    Musculoskeletal:    Psychiatric:        LABS:  CBC Full  -  ( 2018 05:42 )  WBC Count : 27.3 K/uL  Hemoglobin : 7.7 g/dL  Hematocrit : 26.2 %  Platelet Count - Automated : 224 K/uL  Mean Cell Volume : 94.2 fL  Mean Cell Hemoglobin : 27.7 pg  Mean Cell Hemoglobin Concentration : 29.4 g/dL  Auto Neutrophil # : x  Auto Lymphocyte # : x  Auto Monocyte # : x  Auto Eosinophil # : x  Auto Basophil # : x  Auto Neutrophil % : 71.0 %  Auto Lymphocyte % : 8.0 %  Auto Monocyte % : 4.0 %  Auto Eosinophil % : 1.0 %  Auto Basophil % : x    11-18    142  |  114<H>  |  107<H>  ----------------------------<  112<H>  3.5   |  15<L>  |  2.85<H>    Ca    10.1      2018 05:42  Phos  5.0     11-18  Mg     2.0     -    TPro  8.0  /  Alb  3.3  /  TBili  0.7  /  DBili  x   /  AST  25  /  ALT  21  /  AlkPhos  66  11-16    PT/INR - ( 2018 23:04 )   PT: 14.1 sec;   INR: 1.24          PTT - ( 2018 23:04 )  PTT:20.8 sec  Urinalysis Basic - ( 2018 17:35 )    Color: Yellow / Appearance: SL Cloudy / S.020 / pH: x  Gluc: x / Ketone: NEGATIVE  / Bili: Negative / Urobili: 0.2 E.U./dL   Blood: x / Protein: 30 mg/dL / Nitrite: NEGATIVE   Leuk Esterase: Small / RBC: > 10 /HPF / WBC > 10 /HPF   Sq Epi: x / Non Sq Epi: 0-5 /HPF / Bacteria: Present /HPF        RADIOLOGY & ADDITIONAL STUDIES:    CRITICAL CARE TIME SPENT: INCOMPLETE    HPI: 78 year old woman with a history of MO, DM, HTN, hypothyroidism with recent prolonged hospital admissions:    3/2 underwent ventral hernia repair with mesh, robotic converted to open, discharged on 3/4  3/12 admitted for PE, started on heparin gtt, switched to NOAC, discharged on 3/16  3/23 admitted for acute blood loss anemia from vaginal and UGI bleed, EGD with ulceration; c/b GONZALEZ requiring temporary HD; IVC filter placement on 3/30; discharged 4/11  4/15 admitted to OSH for sepsis 2/2 MSSA bacteremia and MDR E Coli in urine, transferred to [facility], CT with extravasation, IR drain placement, underwent DAVY BSO 2/2 tubal metaplasia, SBR with primary anastomosis, abdominal washout, PEG placement, discharged on     admitted  with respiratory failure with desaturation to 85% on room air, altered mental status, requiring intubation in the ED with purulent fluid on suctioning. No bloody bowel movements, no obstipation, no jaundice.    PMHx: MO, DM, HTN, hypothyroidism  PSHx: ventral hernia repair, DAVY/BSO, SBR, PEG  Meds: unable to obtain 2/2 sedation  Allergies: NKDA  Social: unable to obtain 2/2 sedation  Family: unable to obtain 2/2 sedation      PRESSORS: [x] YES [ ] NO  WHICH: levophed  DOSE: 4 mcg    ANTIBIOTICS: vanc                   DATE STARTED:   ANTIBIOTICS: cipro                  DATE STARTED:   ANTIBIOTICS: zosyn                 DATE STARTED:     MEDICATIONS  (STANDING):  chlorhexidine 0.12% Liquid 15 milliLiter(s) Swish and Spit two times a day  chlorhexidine 2% Cloths 1 Application(s) Topical daily  ciprofloxacin   IVPB 400 milliGRAM(s) IV Intermittent daily  dextrose 5%. 1000 milliLiter(s) (50 mL/Hr) IV Continuous <Continuous>  dextrose 50% Injectable 12.5 Gram(s) IV Push once  dextrose 50% Injectable 25 Gram(s) IV Push once  dextrose 50% Injectable 25 Gram(s) IV Push once  heparin  Injectable 5000 Unit(s) SubCutaneous every 8 hours  insulin lispro (HumaLOG) corrective regimen sliding scale   SubCutaneous every 6 hours  levothyroxine 150 MICROGram(s) Oral daily  midodrine 5 milliGRAM(s) Oral three times a day  norepinephrine Infusion 0.05 MICROgram(s)/kG/Min (6.656 mL/Hr) IV Continuous <Continuous>  pantoprazole   Suspension 40 milliGRAM(s) Oral before breakfast  piperacillin/tazobactam IVPB. 2.25 Gram(s) IV Intermittent every 6 hours  propofol Infusion 5 MICROgram(s)/kG/Min (2.076 mL/Hr) IV Continuous <Continuous>  sodium bicarbonate 650 milliGRAM(s) Oral three times a day    MEDICATIONS  (PRN):  acetaminophen    Suspension .. 650 milliGRAM(s) Oral every 6 hours PRN Temp greater or equal to 38C (100.4F)  dextrose 40% Gel 15 Gram(s) Oral once PRN Blood Glucose LESS THAN 70 milliGRAM(s)/deciliter  glucagon  Injectable 1 milliGRAM(s) IntraMuscular once PRN Glucose LESS THAN 70 milligrams/deciliter      Drug Dosing Weight  Height (cm): 162.56 (2018 20:30)  Weight (kg): 69.2 (2018 20:30)  BMI (kg/m2): 26.2 (2018 20:30)  BSA (m2): 1.74 (2018 20:30)    CENTRAL LINE: [x] YES [ ] NO  LOCATION: [facility]   DATE INSERTED:     BOLIVAR: [x] YES [ ] NO    PAST MEDICAL & SURGICAL HISTORY:  Hypothyroidism  GERD (gastroesophageal reflux disease)  Obesity  DM (diabetes mellitus)  HLD (hyperlipidemia)  HTN (hypertension)  Status post total abdominal hysterectomy and bilateral salpingo-oophorectomy  S/P small bowel resection  H/O ventral hernia repair      ICU Vital Signs Last 24 Hrs  T(C): 37.2 (2018 11:00), Max: 38.9 (2018 18:00)  T(F): 99 (2018 11:00), Max: 102 (2018 18:00)  HR: 58 (2018 14:00) (52 - 84)  BP: 110/50 (2018 14:00) (101/51 - 148/78)  BP(mean): 59 (2018 14:00) (57 - 111)  ABP: --  ABP(mean): --  RR: 16 (2018 14:00) (9 - 32)  SpO2: 96% (2018 14:00) (96% - 100%)      ABG - ( 2018 17:14 )  pH, Arterial: 7.32  pH, Blood: x     /  pCO2: 29    /  pO2: 138   / HCO3: 15    / Base Excess: -9.8  /  SaO2: 99                    2018 07:01  -  2018 07:00  --------------------------------------------------------  IN:    Enteral Tube Flush: 200 mL    Free Water: 500 mL    IV PiggyBack: 600 mL    norepinephrine Infusion: 397 mL    sodium chloride 0.225%: 1280 mL  Total IN: 2977 mL    OUT:    Indwelling Catheter - Urethral: 1045 mL  Total OUT: 1045 mL    Total NET: 1932 mL      2018 07:01  -  2018 14:08  --------------------------------------------------------  IN:    IV PiggyBack: 300 mL    norepinephrine Infusion: 69 mL    sodium chloride 0.225%: 160 mL  Total IN: 529 mL    OUT:    Indwelling Catheter - Urethral: 315 mL  Total OUT: 315 mL    Total NET: 214 mL          Mode: AC/ CMV (Assist Control/ Continuous Mandatory Ventilation)  RR (machine): 12  TV (machine): 440  FiO2: 40  PEEP: 5  ITime: 1  MAP: 9  PIP: 18      PHYSICAL EXAM:      Constitutional:  Eyes:  ENMT:    Neck:    Breasts:    Back:    Respiratory:    Cardiovascular:    Gastrointestinal:    Genitourinary:    Rectal:    Extremities:    Vascular:    Neurological:    Skin:    Lymph Nodes:    Musculoskeletal:    Psychiatric:        LABS:  CBC Full  -  ( 2018 05:42 )  WBC Count : 27.3 K/uL  Hemoglobin : 7.7 g/dL  Hematocrit : 26.2 %  Platelet Count - Automated : 224 K/uL  Mean Cell Volume : 94.2 fL  Mean Cell Hemoglobin : 27.7 pg  Mean Cell Hemoglobin Concentration : 29.4 g/dL  Auto Neutrophil # : x  Auto Lymphocyte # : x  Auto Monocyte # : x  Auto Eosinophil # : x  Auto Basophil # : x  Auto Neutrophil % : 71.0 %  Auto Lymphocyte % : 8.0 %  Auto Monocyte % : 4.0 %  Auto Eosinophil % : 1.0 %  Auto Basophil % : x    11-18    142  |  114<H>  |  107<H>  ----------------------------<  112<H>  3.5   |  15<L>  |  2.85<H>    Ca    10.1      2018 05:42  Phos  5.0       Mg     2.0         TPro  8.0  /  Alb  3.3  /  TBili  0.7  /  DBili  x   /  AST  25  /  ALT  21  /  AlkPhos  66  11-16    PT/INR - ( 2018 23:04 )   PT: 14.1 sec;   INR: 1.24          PTT - ( 2018 23:04 )  PTT:20.8 sec  Urinalysis Basic - ( 2018 17:35 )    Color: Yellow / Appearance: SL Cloudy / S.020 / pH: x  Gluc: x / Ketone: NEGATIVE  / Bili: Negative / Urobili: 0.2 E.U./dL   Blood: x / Protein: 30 mg/dL / Nitrite: NEGATIVE   Leuk Esterase: Small / RBC: > 10 /HPF / WBC > 10 /HPF   Sq Epi: x / Non Sq Epi: 0-5 /HPF / Bacteria: Present /HPF        RADIOLOGY & ADDITIONAL STUDIES:    CRITICAL CARE TIME SPENT: INCOMPLETE    HPI: 78 year old woman with a history of MO, DM, HTN, hypothyroidism with recent prolonged hospital admissions:    3/2 underwent ventral hernia repair with mesh, robotic converted to open, discharged on 3/4  3/12 admitted for PE, started on heparin gtt, switched to NOAC, discharged on 3/16  3/23 admitted for acute blood loss anemia from vaginal and UGI bleed, EGD with ulceration; c/b GONZALEZ requiring temporary HD; IVC filter placement on 3/30; discharged 4/11  4/15 admitted to OSH for sepsis 2/2 MSSA bacteremia and MDR E Coli in urine, transferred to [facility], CT with extravasation, IR drain placement, underwent DAVY BSO 2/2 tubal metaplasia, SBR with primary anastomosis, abdominal washout, PEG placement, discharged on     admitted  with respiratory failure with desaturation to 85% on room air, altered mental status, requiring intubation in the ED with purulent fluid on suctioning. No bloody bowel movements, no obstipation, no jaundice.    PMHx: MO, DM, HTN, hypothyroidism  PSHx: ventral hernia repair, DAVY/BSO, SBR, PEG  Meds: unable to obtain 2/2 sedation  Allergies: NKDA  Social: unable to obtain 2/2 sedation  Family: unable to obtain 2/2 sedation      PRESSORS: [x] YES [ ] NO  WHICH: levophed  DOSE: 4 mcg    ANTIBIOTICS: vanc                   DATE STARTED:   ANTIBIOTICS: cipro                  DATE STARTED:   ANTIBIOTICS: zosyn                 DATE STARTED:     MEDICATIONS  (STANDING):  chlorhexidine 0.12% Liquid 15 milliLiter(s) Swish and Spit two times a day  chlorhexidine 2% Cloths 1 Application(s) Topical daily  ciprofloxacin   IVPB 400 milliGRAM(s) IV Intermittent daily  dextrose 5%. 1000 milliLiter(s) (50 mL/Hr) IV Continuous <Continuous>  dextrose 50% Injectable 12.5 Gram(s) IV Push once  dextrose 50% Injectable 25 Gram(s) IV Push once  dextrose 50% Injectable 25 Gram(s) IV Push once  heparin  Injectable 5000 Unit(s) SubCutaneous every 8 hours  insulin lispro (HumaLOG) corrective regimen sliding scale   SubCutaneous every 6 hours  levothyroxine 150 MICROGram(s) Oral daily  midodrine 5 milliGRAM(s) Oral three times a day  norepinephrine Infusion 0.05 MICROgram(s)/kG/Min (6.656 mL/Hr) IV Continuous <Continuous>  pantoprazole   Suspension 40 milliGRAM(s) Oral before breakfast  piperacillin/tazobactam IVPB. 2.25 Gram(s) IV Intermittent every 6 hours  propofol Infusion 5 MICROgram(s)/kG/Min (2.076 mL/Hr) IV Continuous <Continuous>  sodium bicarbonate 650 milliGRAM(s) Oral three times a day    MEDICATIONS  (PRN):  acetaminophen    Suspension .. 650 milliGRAM(s) Oral every 6 hours PRN Temp greater or equal to 38C (100.4F)  dextrose 40% Gel 15 Gram(s) Oral once PRN Blood Glucose LESS THAN 70 milliGRAM(s)/deciliter  glucagon  Injectable 1 milliGRAM(s) IntraMuscular once PRN Glucose LESS THAN 70 milligrams/deciliter      Drug Dosing Weight  Height (cm): 162.56 (2018 20:30)  Weight (kg): 69.2 (2018 20:30)  BMI (kg/m2): 26.2 (2018 20:30)  BSA (m2): 1.74 (2018 20:30)    CENTRAL LINE: [x] YES [ ] NO  LOCATION: [facility] IJ   DATE INSERTED:     BOLIVAR: [x] YES [ ] NO    PAST MEDICAL & SURGICAL HISTORY:  Hypothyroidism  GERD (gastroesophageal reflux disease)  Obesity  DM (diabetes mellitus)  HLD (hyperlipidemia)  HTN (hypertension)  Status post total abdominal hysterectomy and bilateral salpingo-oophorectomy  S/P small bowel resection  H/O ventral hernia repair      ICU Vital Signs Last 24 Hrs  T(C): 37.2 (2018 11:00), Max: 38.9 (2018 18:00)  T(F): 99 (2018 11:00), Max: 102 (2018 18:00)  HR: 58 (2018 14:00) (52 - 84)  BP: 110/50 (2018 14:00) (101/51 - 148/78)  BP(mean): 59 (2018 14:00) (57 - 111)  ABP: --  ABP(mean): --  RR: 16 (2018 14:00) (9 - 32)  SpO2: 96% (2018 14:00) (96% - 100%)      ABG - ( 2018 17:14 )  pH, Arterial: 7.32  pH, Blood: x     /  pCO2: 29    /  pO2: 138   / HCO3: 15    / Base Excess: -9.8  /  SaO2: 99                    2018 07:01  -  2018 07:00  --------------------------------------------------------  IN:    Enteral Tube Flush: 200 mL    Free Water: 500 mL    IV PiggyBack: 600 mL    norepinephrine Infusion: 397 mL    sodium chloride 0.225%: 1280 mL  Total IN: 2977 mL    OUT:    Indwelling Catheter - Urethral: 1045 mL  Total OUT: 1045 mL    Total NET: 1932 mL      2018 07:01  -  2018 14:08  --------------------------------------------------------  IN:    IV PiggyBack: 300 mL    norepinephrine Infusion: 69 mL    sodium chloride 0.225%: 160 mL  Total IN: 529 mL    OUT:    Indwelling Catheter - Urethral: 315 mL  Total OUT: 315 mL    Total NET: 214 mL          Mode: AC/ CMV (Assist Control/ Continuous Mandatory Ventilation)  RR (machine): 12  TV (machine): 440  FiO2: 40  PEEP: 5  ITime: 1  MAP: 9  PIP: 18      PHYSICAL EXAM:      Constitutional: NAD, resting comfortably, vented on VC-AC, sedated  HEENT: MMM  CV: RRR  Resp: nonlabored breathing    LABS:  CBC Full  -  ( 2018 05:42 )  WBC Count : 27.3 K/uL  Hemoglobin : 7.7 g/dL  Hematocrit : 26.2 %  Platelet Count - Automated : 224 K/uL  Mean Cell Volume : 94.2 fL  Mean Cell Hemoglobin : 27.7 pg  Mean Cell Hemoglobin Concentration : 29.4 g/dL  Auto Neutrophil # : x  Auto Lymphocyte # : x  Auto Monocyte # : x  Auto Eosinophil # : x  Auto Basophil # : x  Auto Neutrophil % : 71.0 %  Auto Lymphocyte % : 8.0 %  Auto Monocyte % : 4.0 %  Auto Eosinophil % : 1.0 %  Auto Basophil % : x    11-18    142  |  114<H>  |  107<H>  ----------------------------<  112<H>  3.5   |  15<L>  |  2.85<H>    Ca    10.1      2018 05:42  Phos  5.0     -  Mg     2.0     -    TPro  8.0  /  Alb  3.3  /  TBili  0.7  /  DBili  x   /  AST  25  /  ALT  21  /  AlkPhos  66  11-16    PT/INR - ( 2018 23:04 )   PT: 14.1 sec;   INR: 1.24          PTT - ( 2018 23:04 )  PTT:20.8 sec  Urinalysis Basic - ( 2018 17:35 )    Color: Yellow / Appearance: SL Cloudy / S.020 / pH: x  Gluc: x / Ketone: NEGATIVE  / Bili: Negative / Urobili: 0.2 E.U./dL   Blood: x / Protein: 30 mg/dL / Nitrite: NEGATIVE   Leuk Esterase: Small / RBC: > 10 /HPF / WBC > 10 /HPF   Sq Epi: x / Non Sq Epi: 0-5 /HPF / Bacteria: Present /HPF        RADIOLOGY & ADDITIONAL STUDIES:    CRITICAL CARE TIME SPENT: HPI: 78 year old woman with a history of MO, DM, HTN, hypothyroidism with recent prolonged hospital admissions:    3/2 underwent ventral hernia repair with mesh, robotic converted to open, discharged on 3/4  3/12 admitted for PE, started on heparin gtt, switched to NOAC, discharged on 3/16  3/23 admitted for acute blood loss anemia from vaginal and UGI bleed, EGD with ulceration; c/b GONZALEZ requiring temporary HD; IVC filter placement on 3/30; discharged 4/11  4/15 admitted to OSH for sepsis 2/2 MSSA bacteremia and MDR E Coli in urine, transferred to [facility], CT with extravasation, IR drain placement, underwent DAVY BSO 2/2 tubal metaplasia, SBR with primary anastomosis, abdominal washout, PEG placement, discharged on     admitted  with respiratory failure with desaturation to 85% on room air, altered mental status, requiring intubation in the ED with purulent fluid on suctioning. No bloody bowel movements, no obstipation, no jaundice.    PMHx: MO, DM, HTN, hypothyroidism  PSHx: ventral hernia repair, DAVY/BSO, SBR, PEG  Meds: unable to obtain 2/2 sedation  Allergies: NKDA  Social: unable to obtain 2/2 sedation  Family: unable to obtain 2/2 sedation      PRESSORS: [x] YES [ ] NO  WHICH: levophed  DOSE: 4 mcg    ANTIBIOTICS: vanc                   DATE STARTED:   ANTIBIOTICS: cipro                  DATE STARTED:   ANTIBIOTICS: zosyn                 DATE STARTED:     MEDICATIONS  (STANDING):  chlorhexidine 0.12% Liquid 15 milliLiter(s) Swish and Spit two times a day  chlorhexidine 2% Cloths 1 Application(s) Topical daily  ciprofloxacin   IVPB 400 milliGRAM(s) IV Intermittent daily  dextrose 5%. 1000 milliLiter(s) (50 mL/Hr) IV Continuous <Continuous>  dextrose 50% Injectable 12.5 Gram(s) IV Push once  dextrose 50% Injectable 25 Gram(s) IV Push once  dextrose 50% Injectable 25 Gram(s) IV Push once  heparin  Injectable 5000 Unit(s) SubCutaneous every 8 hours  insulin lispro (HumaLOG) corrective regimen sliding scale   SubCutaneous every 6 hours  levothyroxine 150 MICROGram(s) Oral daily  midodrine 5 milliGRAM(s) Oral three times a day  norepinephrine Infusion 0.05 MICROgram(s)/kG/Min (6.656 mL/Hr) IV Continuous <Continuous>  pantoprazole   Suspension 40 milliGRAM(s) Oral before breakfast  piperacillin/tazobactam IVPB. 2.25 Gram(s) IV Intermittent every 6 hours  propofol Infusion 5 MICROgram(s)/kG/Min (2.076 mL/Hr) IV Continuous <Continuous>  sodium bicarbonate 650 milliGRAM(s) Oral three times a day    MEDICATIONS  (PRN):  acetaminophen    Suspension .. 650 milliGRAM(s) Oral every 6 hours PRN Temp greater or equal to 38C (100.4F)  dextrose 40% Gel 15 Gram(s) Oral once PRN Blood Glucose LESS THAN 70 milliGRAM(s)/deciliter  glucagon  Injectable 1 milliGRAM(s) IntraMuscular once PRN Glucose LESS THAN 70 milligrams/deciliter      Drug Dosing Weight  Height (cm): 162.56 (2018 20:30)  Weight (kg): 69.2 (2018 20:30)  BMI (kg/m2): 26.2 (2018 20:30)  BSA (m2): 1.74 (2018 20:30)    CENTRAL LINE: [x] YES [ ] NO  LOCATION: [facility] IJ   DATE INSERTED:     BOLIVAR: [x] YES [ ] NO    PAST MEDICAL & SURGICAL HISTORY:  Hypothyroidism  GERD (gastroesophageal reflux disease)  Obesity  DM (diabetes mellitus)  HLD (hyperlipidemia)  HTN (hypertension)  Status post total abdominal hysterectomy and bilateral salpingo-oophorectomy  S/P small bowel resection  H/O ventral hernia repair      ICU Vital Signs Last 24 Hrs  T(C): 37.2 (2018 11:00), Max: 38.9 (2018 18:00)  T(F): 99 (2018 11:00), Max: 102 (2018 18:00)  HR: 58 (2018 14:00) (52 - 84)  BP: 110/50 (2018 14:00) (101/51 - 148/78)  BP(mean): 59 (2018 14:00) (57 - 111)  ABP: --  ABP(mean): --  RR: 16 (2018 14:00) (9 - 32)  SpO2: 96% (2018 14:00) (96% - 100%)      ABG - ( 2018 17:14 )  pH, Arterial: 7.32  pH, Blood: x     /  pCO2: 29    /  pO2: 138   / HCO3: 15    / Base Excess: -9.8  /  SaO2: 99                    2018 07:01  -  2018 07:00  --------------------------------------------------------  IN:    Enteral Tube Flush: 200 mL    Free Water: 500 mL    IV PiggyBack: 600 mL    norepinephrine Infusion: 397 mL    sodium chloride 0.225%: 1280 mL  Total IN: 2977 mL    OUT:    Indwelling Catheter - Urethral: 1045 mL  Total OUT: 1045 mL    Total NET: 1932 mL      2018 07:01  -  2018 14:08  --------------------------------------------------------  IN:    IV PiggyBack: 300 mL    norepinephrine Infusion: 69 mL    sodium chloride 0.225%: 160 mL  Total IN: 529 mL    OUT:    Indwelling Catheter - Urethral: 315 mL  Total OUT: 315 mL    Total NET: 214 mL          Mode: AC/ CMV (Assist Control/ Continuous Mandatory Ventilation)  RR (machine): 12  TV (machine): 440  FiO2: 40  PEEP: 5  ITime: 1  MAP: 9  PIP: 18      PHYSICAL EXAM:      Constitutional: NAD, resting comfortably, vented on VC-AC, sedated  HEENT: MMM  CV: RRR on monitor  Resp: nonlabored breathing  Abd: soft, nondistended, nontender, incontinent of brown stool  Ext: WWP, no edema, 2+ radial pulses bilaterally    LABS:  CBC Full  -  ( 2018 05:42 )  WBC Count : 27.3 K/uL  Hemoglobin : 7.7 g/dL  Hematocrit : 26.2 %  Platelet Count - Automated : 224 K/uL  Mean Cell Volume : 94.2 fL  Mean Cell Hemoglobin : 27.7 pg  Mean Cell Hemoglobin Concentration : 29.4 g/dL  Auto Neutrophil # : x  Auto Lymphocyte # : x  Auto Monocyte # : x  Auto Eosinophil # : x  Auto Basophil # : x  Auto Neutrophil % : 71.0 %  Auto Lymphocyte % : 8.0 %  Auto Monocyte % : 4.0 %  Auto Eosinophil % : 1.0 %  Auto Basophil % : x    11    142  |  114<H>  |  107<H>  ----------------------------<  112<H>  3.5   |  15<L>  |  2.85<H>    Ca    10.1      2018 05:42  Phos  5.0       Mg     2.0         TPro  8.0  /  Alb  3.3  /  TBili  0.7  /  DBili  x   /  AST  25  /  ALT  21  /  [employer]  66  -    PT/INR - ( 2018 23:04 )   PT: 14.1 sec;   INR: 1.24          PTT - ( 2018 23:04 )  PTT:20.8 sec  Urinalysis Basic - ( 2018 17:35 )    Color: Yellow / Appearance: SL Cloudy / S.020 / pH: x  Gluc: x / Ketone: NEGATIVE  / Bili: Negative / Urobili: 0.2 E.U./dL   Blood: x / Protein: 30 mg/dL / Nitrite: NEGATIVE   Leuk Esterase: Small / RBC: > 10 /HPF / WBC > 10 /HPF   Sq Epi: x / Non Sq Epi: 0-5 /HPF / Bacteria: Present /HPF        RADIOLOGY & ADDITIONAL STUDIES:  < from: CT Abdomen and Pelvis w/ Oral Cont (18 @ 14:44) >  Hyperdense material in the gallbladder which may represent   debris or stones    Resolution of bowel wall thickening.    No significant ascites or drainable fluid collection.    Increased pleural effusions and adjacent consolidations.    < end of copied text >

## 2018-11-18 NOTE — PROGRESS NOTE ADULT - SUBJECTIVE AND OBJECTIVE BOX
Patient is a 78y old  Female who presents with a chief complaint of Septic shock and acute respiratory failure (2018 23:11)-    Renal Consult was called for GONZALEZ in patient admitted for septic shock likely due to UTI and possible aspiration pneumonia.  Initial  and Cr of 3.38 increased from baseline of 21/0.68 in 2018.  Likely 2/2 to both pre-renal and intrinsic renal -possibly ATN.    Patient was seen and examined at bedside. Remains intubated. Cr stable today at 2.8.      HPI:    78F w/ PMH of morbid obesity, DM, HTN, hypothyroidism, sepsis secondary to ventral hernia infection s/p repair, PE s/p IVC filter, acute blood loss anemia from hemorrhagic uterine fibroids and UGIB, ATN requiring HD, sepsis secondary to MSSA bacteremia and MDR E coli UTI now w/AMS and s/p TAHBSO and small bowel resection s/p PEG. Patient presented from [facility] with concern for sepsis and hypoxia. Per EMS, patient was saturating 85% on RA at [facility] with some improvement to 95% after being placed on NRB.  On arrival to the ED, patient was altered and saturating 89%. She was intubated in the ED with suctioning revealing purulent material.       Meds:    acetaminophen    Suspension .. 650 milliGRAM(s) Oral every 6 hours PRN  chlorhexidine 0.12% Liquid 15 milliLiter(s) Swish and Spit two times a day  chlorhexidine 2% Cloths 1 Application(s) Topical daily  ciprofloxacin   IVPB 400 milliGRAM(s) IV Intermittent daily  dextrose 40% Gel 15 Gram(s) Oral once PRN  dextrose 5%. 1000 milliLiter(s) IV Continuous <Continuous>  dextrose 50% Injectable 12.5 Gram(s) IV Push once  dextrose 50% Injectable 25 Gram(s) IV Push once  dextrose 50% Injectable 25 Gram(s) IV Push once  glucagon  Injectable 1 milliGRAM(s) IntraMuscular once PRN  heparin  Injectable 5000 Unit(s) SubCutaneous every 8 hours  insulin lispro (HumaLOG) corrective regimen sliding scale   SubCutaneous every 6 hours  levothyroxine 150 MICROGram(s) Oral daily  midodrine 5 milliGRAM(s) Oral three times a day  norepinephrine Infusion 0.05 MICROgram(s)/kG/Min IV Continuous <Continuous>  pantoprazole   Suspension 40 milliGRAM(s) Enteral Tube daily  piperacillin/tazobactam IVPB. 2.25 Gram(s) IV Intermittent every 6 hours  propofol Infusion 5 MICROgram(s)/kG/Min IV Continuous <Continuous>  sodium bicarbonate 650 milliGRAM(s) Oral three times a day  vancomycin  IVPB          T(C): 37.8 (18 @ 15:00), Max: 38.4 (18 @ 03:37)  HR: 76 (18 @ 17:00) (52 - 84)  BP: 107/45 (18 @ 17:00) (101/51 - 148/78)  RR: 28 (18 @ 17:00) (9 - 33)  SpO2: 95% (18 @ 17:00) (95% - 100%)    Input/Output      18 @ 07:01  -  18 @ 07:00  --------------------------------------------------------  IN: 2977 mL / OUT: 1045 mL / NET: 1932 mL    18 @ 07:01  -  18 @ 18:41  --------------------------------------------------------  IN: 694 mL / OUT: 555 mL / NET: 139 mL        ROS:   intubed      PHYSICAL EXAM:  General: intubated and sedated  Respiratory: chest clear   Cardiac: +S1/S2; regular rate and rhythm; no murmur, no rub, no gallop  Gastrointestinal: obese, abdomen soft, non-tender, non-distended, bowel sounds active, no organomegaly, no masses,    (+) PEG in place no drainage or eyrthema to the site  Genitourinary: normal external genitalia, suazo in place  Extremities: warm, no clubbing or cyanosis; no peripheral edema  vascular: 2+ radial, DP pulses bilaterally  neuro: sedated  skin no rash      LABS:                                       7.7    27.3  )-----------( 224      ( 2018 05:42 )             26.2       11-18    142  |  114<H>  |  107<H>  ----------------------------<  112<H>  3.5   |  15<L>  |  2.85<H>    Ca    10.1      2018 05:42  Phos  5.0     -  Mg     2.0     -        PT/INR - ( 2018 23:04 )   PT: 14.1 sec;   INR: 1.24          PTT - ( 2018 23:04 )  PTT:20.8 sec    Urinalysis Basic - ( 2018 17:35 )    Color: Yellow / Appearance: SL Cloudy / S.020 / pH: x  Gluc: x / Ketone: NEGATIVE  / Bili: Negative / Urobili: 0.2 E.U./dL   Blood: x / Protein: 30 mg/dL / Nitrite: NEGATIVE   Leuk Esterase: Small / RBC: > 10 /HPF / WBC > 10 /HPF   Sq Epi: x / Non Sq Epi: 0-5 /HPF / Bacteria: Present /HPF        CARDIAC MARKERS ( 2018 23:04 )  x     / 0.11 ng/mL / x     / x     / x Patient is a 78y old  Female who presents with a chief complaint of Septic shock and acute respiratory failure (2018 23:11)-    Renal Consult was called for GONZALEZ in patient admitted for septic shock likely due to UTI and possible aspiration pneumonia.  Initial  and Cr of 3.38 increased from baseline of 21/0.68 in 2018.  Likely 2/2 to both pre-renal and intrinsic renal -possibly ATN.    Patient was seen and examined at bedside. Remains intubated. Cr stable today at 2.8.      HPI:    78F w/ PMH of morbid obesity, DM, HTN, hypothyroidism, sepsis secondary to ventral hernia infection s/p repair, PE s/p IVC filter, acute blood loss anemia from hemorrhagic uterine fibroids and UGIB, ATN requiring HD, sepsis secondary to MSSA bacteremia and MDR E coli UTI now w/AMS and s/p TAHBSO and small bowel resection s/p PEG. Patient presented from [facility] with concern for sepsis and hypoxia. Per EMS, patient was saturating 85% on RA at [facility] with some improvement to 95% after being placed on NRB.  On arrival to the ED, patient was altered and saturating 89%. She was intubated in the ED with suctioning revealing purulent material.       Meds:    acetaminophen    Suspension .. 650 milliGRAM(s) Oral every 6 hours PRN  chlorhexidine 0.12% Liquid 15 milliLiter(s) Swish and Spit two times a day  chlorhexidine 2% Cloths 1 Application(s) Topical daily  ciprofloxacin   IVPB 400 milliGRAM(s) IV Intermittent daily  dextrose 40% Gel 15 Gram(s) Oral once PRN  dextrose 5%. 1000 milliLiter(s) IV Continuous <Continuous>  dextrose 50% Injectable 12.5 Gram(s) IV Push once  dextrose 50% Injectable 25 Gram(s) IV Push once  dextrose 50% Injectable 25 Gram(s) IV Push once  glucagon  Injectable 1 milliGRAM(s) IntraMuscular once PRN  heparin  Injectable 5000 Unit(s) SubCutaneous every 8 hours  insulin lispro (HumaLOG) corrective regimen sliding scale   SubCutaneous every 6 hours  levothyroxine 150 MICROGram(s) Oral daily  midodrine 5 milliGRAM(s) Oral three times a day  norepinephrine Infusion 0.05 MICROgram(s)/kG/Min IV Continuous <Continuous>  pantoprazole   Suspension 40 milliGRAM(s) Enteral Tube daily  piperacillin/tazobactam IVPB. 2.25 Gram(s) IV Intermittent every 6 hours  propofol Infusion 5 MICROgram(s)/kG/Min IV Continuous <Continuous>  sodium bicarbonate 650 milliGRAM(s) Oral three times a day  vancomycin  IVPB          T(C): 37.8 (18 @ 15:00), Max: 38.4 (18 @ 03:37)  HR: 76 (18 @ 17:00) (52 - 84)  BP: 107/45 (18 @ 17:00) (101/51 - 148/78)  RR: 28 (18 @ 17:00) (9 - 33)  SpO2: 95% (18 @ 17:00) (95% - 100%)    Input/Output      18 @ 07:01  -  18 @ 07:00  --------------------------------------------------------  IN: 2977 mL / OUT: 1045 mL / NET: 1932 mL    18 @ 07:01  -  18 @ 18:41  --------------------------------------------------------  IN: 694 mL / OUT: 555 mL / NET: 139 mL        ROS:   intubed      PHYSICAL EXAM:  General: intubated and sedated  Respiratory: chest decreased BS , rhonchi right   Cardiac: +S1/S2; regular rate and rhythm; no murmur, no rub, no gallop  Gastrointestinal: obese, abdomen soft, non-tender, non-distended, bowel sounds active, no organomegaly, no masses,    (+) PEG in place no drainage or eyrthema to the site  Genitourinary: normal external genitalia, suazo in place  Extremities: warm, no clubbing or cyanosis; no peripheral edema  vascular: 2+ radial, DP pulses bilaterally  neuro: sedated  skin no rash      LABS:                                       7.7    27.3  )-----------( 224      ( 2018 05:42 )             26.2       11-18    142  |  114<H>  |  107<H>  ----------------------------<  112<H>  3.5   |  15<L>  |  2.85<H>    Ca    10.1      2018 05:42  Phos  5.0     -  Mg     2.0             PT/INR - ( 2018 23:04 )   PT: 14.1 sec;   INR: 1.24          PTT - ( 2018 23:04 )  PTT:20.8 sec    Urinalysis Basic - ( 2018 17:35 )    Color: Yellow / Appearance: SL Cloudy / S.020 / pH: x  Gluc: x / Ketone: NEGATIVE  / Bili: Negative / Urobili: 0.2 E.U./dL   Blood: x / Protein: 30 mg/dL / Nitrite: NEGATIVE   Leuk Esterase: Small / RBC: > 10 /HPF / WBC > 10 /HPF   Sq Epi: x / Non Sq Epi: 0-5 /HPF / Bacteria: Present /HPF        CARDIAC MARKERS ( 2018 23:04 )  x     / 0.11 ng/mL / x     / x     / x Patient is a 78y old  Female who presents with a chief complaint of Septic shock and acute respiratory failure (2018 23:11)-    Renal Consult was called for GONZALEZ in patient admitted for septic shock likely due to UTI and possible aspiration pneumonia.  Initial  and Cr of 3.38 increased from baseline of 21/0.68 in 2018.  Likely 2/2 to both pre-renal and intrinsic renal -possibly ATN.    Patient was seen and examined at bedside. Remains intubated. Cr stable today at 2.8.      HPI:    78F w/ PMH of morbid obesity, DM, HTN, hypothyroidism, sepsis secondary to ventral hernia infection s/p repair, PE s/p IVC filter, acute blood loss anemia from hemorrhagic uterine fibroids and UGIB, ATN requiring HD, sepsis secondary to MSSA bacteremia and MDR E coli UTI now w/AMS and s/p TAHBSO and small bowel resection s/p PEG. Patient presented from [facility] with concern for sepsis and hypoxia. Per EMS, patient was saturating 85% on RA at [facility] with some improvement to 95% after being placed on NRB.  On arrival to the ED, patient was altered and saturating 89%. She was intubated in the ED with suctioning revealing purulent material.       Meds:      acetaminophen    Suspension .. 650 milliGRAM(s) Oral every 6 hours PRN  chlorhexidine 0.12% Liquid 15 milliLiter(s) Swish and Spit two times a day  chlorhexidine 2% Cloths 1 Application(s) Topical daily  ciprofloxacin   IVPB 400 milliGRAM(s) IV Intermittent daily  dextrose 40% Gel 15 Gram(s) Oral once PRN  dextrose 5%. 1000 milliLiter(s) IV Continuous <Continuous>  dextrose 50% Injectable 12.5 Gram(s) IV Push once  dextrose 50% Injectable 25 Gram(s) IV Push once  dextrose 50% Injectable 25 Gram(s) IV Push once  glucagon  Injectable 1 milliGRAM(s) IntraMuscular once PRN  heparin  Injectable 5000 Unit(s) SubCutaneous every 8 hours  insulin lispro (HumaLOG) corrective regimen sliding scale   SubCutaneous every 6 hours  levothyroxine 150 MICROGram(s) Oral daily  midodrine 5 milliGRAM(s) Oral three times a day  norepinephrine Infusion 0.05 MICROgram(s)/kG/Min IV Continuous <Continuous>  pantoprazole   Suspension 40 milliGRAM(s) Enteral Tube daily  piperacillin/tazobactam IVPB. 2.25 Gram(s) IV Intermittent every 6 hours  propofol Infusion 5 MICROgram(s)/kG/Min IV Continuous <Continuous>  sodium bicarbonate 650 milliGRAM(s) Oral three times a day  vancomycin  IVPB          T(C): 37.8 (18 @ 15:00), Max: 38.4 (18 @ 03:37)  HR: 76 (18 @ 17:00) (52 - 84)  BP: 107/45 (18 @ 17:00) (101/51 - 148/78)  RR: 28 (18 @ 17:00) (9 - 33)  SpO2: 95% (18 @ 17:00) (95% - 100%)    Input/Output      18 @ 07:01  -  18 @ 07:00  --------------------------------------------------------  IN: 2977 mL / OUT: 1045 mL / NET: 1932 mL    18 @ 07:01  -  18 @ 18:41  --------------------------------------------------------  IN: 694 mL / OUT: 555 mL / NET: 139 mL        ROS:   intubed      PHYSICAL EXAM:  General: intubated and sedated  Respiratory: chest decreased BS , rhonchi right   Cardiac: +S1/S2; regular rate and rhythm; no murmur, no rub, no gallop  Gastrointestinal: obese, abdomen soft, non-tender, non-distended, bowel sounds active, no organomegaly, no masses,    (+) PEG in place no drainage or eyrthema to the site  Genitourinary: normal external genitalia, suazo in place  Extremities: warm, no clubbing or cyanosis; no peripheral edema  vascular: 2+ radial, DP pulses bilaterally  neuro: sedated  skin no rash      LABS:                                       7.7    27.3  )-----------( 224      ( 2018 05:42 )             26.2       11-18    142  |  114<H>  |  107<H>  ----------------------------<  112<H>  3.5   |  15<L>  |  2.85<H>    Ca    10.1      2018 05:42  Phos  5.0     -  Mg     2.0             PT/INR - ( 2018 23:04 )   PT: 14.1 sec;   INR: 1.24          PTT - ( 2018 23:04 )  PTT:20.8 sec    Urinalysis Basic - ( 2018 17:35 )    Color: Yellow / Appearance: SL Cloudy / S.020 / pH: x  Gluc: x / Ketone: NEGATIVE  / Bili: Negative / Urobili: 0.2 E.U./dL   Blood: x / Protein: 30 mg/dL / Nitrite: NEGATIVE   Leuk Esterase: Small / RBC: > 10 /HPF / WBC > 10 /HPF   Sq Epi: x / Non Sq Epi: 0-5 /HPF / Bacteria: Present /HPF        CARDIAC MARKERS ( 2018 23:04 )  x     / 0.11 ng/mL / x     / x     / x

## 2018-11-18 NOTE — PROGRESS NOTE ADULT - ASSESSMENT
78F w/ PMH obesity, DM, HTN, hypothyroidism, sepsis secondary to ventral hernia infection s/p repair, PE s/p IVC filter, acute blood loss anemia from hemorrhagic uterine fibroids and UGIB, ATN requiring HD, sepsis secondary to MSSA bacteremia and MDR E coli UTI now w/AMS and s/p TAHBSO and small bowel resection s/p PEG. Presenting with septic shock likely 2/2 PNA vs UTI and acute hypoxic respiratory failure and is now currently intubated requiring pressure support.      Neuro  #Toxic metabolic encephalopathy  initially presenting w/ toxic metabolic encephalopathy likely 2/2 to septic shock. Unable to assess at this time due to patient sedation w/ propofol  -Continue sedation at this time as patient is on mech vent.  - wean vent as tolerated    Respiratory  #acute hypoxic respiratory failure  Increased work of breathing on presentation requiring more invasive assistance in breathing.    likely secondary to septic shock  -continue mech vent vc-ac    CV  IMPROVED   #hypotension  - s/p 4L and now on pressors likely 2/2 to septic shock  - has history of HTN at baseline, hold all anti-hypertensives in setting of septic shock  - still requiring Levophed    GI  -ct scan showed debris vs stone in GB and resolution of bowel wall thickening  -surgery stated that likely not surgical candidate  -bilirubin <0.2, f/u CMP daily  -f/u abdominal US of liver /gb         Renal  #Anion gap metabolic acidosis   - likely 2/2 to lactic acidosis 2/2 to decreased perfusion 2/2 to septic shock  - AG initially 21, closed s/p IVF    #Electrolyte abnormalities  - hypernatremic at 154 and Hyperchloremic at 117 on arrival  - s/p free water flushes, now with hypochloremic hyponatremia  - switch to 1/4 NS @ 80  - repeat BMP 14:00 today 11/17  - renal consulted 11/17, f/u recs  - f/u urine studies sent 11/17    #BUN/Cr elevation  Initial  and Cr of 3.38 increased from baseline of 21/0.68 in June 2018. Likely 2/2 to both pre-renal and intrinsic renal -possibly ATN.  BUN/Cr improving after IVF hydration to 129/2.99  -Trend BMP  -avoid nephrotoxins    ID  #Septic shock  Initially leukocytosis of 27.9 Tachycardic and tachypneic w/ lactate of 5.4.   Now s/p 4L NS, vanc, zosyn, azithromycin, and ciprofloxacin. Reperfusion assessment was completed  Most likely 2/2 to pna with aspiration suspected due to return of purulent material while suctioning during intubation and UTI due to UA w/ (+) leuk esterase and bacteria. Of note, prior UTIs have shown zosyn resistance but sensitive to cipro  -f/u BCx2, UC, sputum culture- has gram positive cocci in pairs and clusters  -Continue cipro, Zosyn, and vanc     Endo  #hyperglycemia  likely 2/2 to septic shock  patient w/ history of DM as well. Not in DKA. Beta hydroxybutyrate is 0.2.   -ISS  -FSBS    #hypothyroidism  Takes synthroid 150mcg PO daily at home  -Continue synthroid    Heme:  #anemia  macrocytic anemia noted on initial labs  -f/u b12, folate      F: 1/4 NS @ 80  E: replete cautiously given CrCl  N: NPO    Dvt ppx: hep sq  GI ppx: protonix    Lines:   YOHANA (11/16-)      Dispo: MICU

## 2018-11-18 NOTE — CONSULT NOTE ADULT - ASSESSMENT
INCOMPLETE    78F PMHx MO, DM, HTN, hypothyroidism, PE s/p IVC filter, recent admissions for sepsis and acute blood loss anemia s/p SBR with primary anastomosis and PEG, a/w septic shock likely 2/2 PNA vs UTI and acute hypoxic respiratory failure.    care per primary team  case discussed with chief resident, attending 78F PMHx MO, DM, HTN, hypothyroidism, PE s/p IVC filter, recent admissions for sepsis and acute blood loss anemia s/p SBR with primary anastomosis and PEG, a/w septic shock likely 2/2 PNA vs UTI and acute hypoxic respiratory failure.    no acute surgical intervention, unlikely surgical pathology  care per primary team  case discussed with chief resident, attending  pending final attending recommendations

## 2018-11-19 LAB
-  CEFAZOLIN: SIGNIFICANT CHANGE UP
-  CLINDAMYCIN: SIGNIFICANT CHANGE UP
-  ERYTHROMYCIN: SIGNIFICANT CHANGE UP
-  LINEZOLID: SIGNIFICANT CHANGE UP
-  OXACILLIN: SIGNIFICANT CHANGE UP
-  RIFAMPIN: SIGNIFICANT CHANGE UP
-  TRIMETHOPRIM/SULFAMETHOXAZOLE: SIGNIFICANT CHANGE UP
-  VANCOMYCIN: SIGNIFICANT CHANGE UP
ALBUMIN SERPL ELPH-MCNC: 2 G/DL — LOW (ref 3.3–5)
ALP SERPL-CCNC: 152 U/L — HIGH (ref 40–120)
ALT FLD-CCNC: 11 U/L — SIGNIFICANT CHANGE UP (ref 10–45)
ANION GAP SERPL CALC-SCNC: 12 MMOL/L — SIGNIFICANT CHANGE UP (ref 5–17)
AST SERPL-CCNC: 10 U/L — SIGNIFICANT CHANGE UP (ref 10–40)
BILIRUB SERPL-MCNC: 0.4 MG/DL — SIGNIFICANT CHANGE UP (ref 0.2–1.2)
BUN SERPL-MCNC: 88 MG/DL — HIGH (ref 7–23)
CALCIUM SERPL-MCNC: 9.8 MG/DL — SIGNIFICANT CHANGE UP (ref 8.4–10.5)
CHLORIDE SERPL-SCNC: 113 MMOL/L — HIGH (ref 96–108)
CO2 SERPL-SCNC: 17 MMOL/L — LOW (ref 22–31)
CREAT SERPL-MCNC: 2.65 MG/DL — HIGH (ref 0.5–1.3)
CULTURE RESULTS: SIGNIFICANT CHANGE UP
EOSINOPHIL NFR BLD AUTO: 2 % — SIGNIFICANT CHANGE UP (ref 0–6)
GLUCOSE BLDC GLUCOMTR-MCNC: 81 MG/DL — SIGNIFICANT CHANGE UP (ref 70–99)
GLUCOSE BLDC GLUCOMTR-MCNC: 85 MG/DL — SIGNIFICANT CHANGE UP (ref 70–99)
GLUCOSE BLDC GLUCOMTR-MCNC: 88 MG/DL — SIGNIFICANT CHANGE UP (ref 70–99)
GLUCOSE BLDC GLUCOMTR-MCNC: 97 MG/DL — SIGNIFICANT CHANGE UP (ref 70–99)
GLUCOSE BLDC GLUCOMTR-MCNC: 97 MG/DL — SIGNIFICANT CHANGE UP (ref 70–99)
GLUCOSE SERPL-MCNC: 93 MG/DL — SIGNIFICANT CHANGE UP (ref 70–99)
HCT VFR BLD CALC: 24.4 % — LOW (ref 34.5–45)
HGB BLD-MCNC: 7.5 G/DL — LOW (ref 11.5–15.5)
LYMPHOCYTES # BLD AUTO: 10 % — LOW (ref 13–44)
MAGNESIUM SERPL-MCNC: 1.9 MG/DL — SIGNIFICANT CHANGE UP (ref 1.6–2.6)
MCHC RBC-ENTMCNC: 28.8 PG — SIGNIFICANT CHANGE UP (ref 27–34)
MCHC RBC-ENTMCNC: 30.7 G/DL — LOW (ref 32–36)
MCV RBC AUTO: 93.8 FL — SIGNIFICANT CHANGE UP (ref 80–100)
METHOD TYPE: SIGNIFICANT CHANGE UP
METHOD TYPE: SIGNIFICANT CHANGE UP
MONOCYTES NFR BLD AUTO: 2 % — SIGNIFICANT CHANGE UP (ref 2–14)
NEUTROPHILS NFR BLD AUTO: 75 % — SIGNIFICANT CHANGE UP (ref 43–77)
ORGANISM # SPEC MICROSCOPIC CNT: SIGNIFICANT CHANGE UP
PLATELET # BLD AUTO: 201 K/UL — SIGNIFICANT CHANGE UP (ref 150–400)
POTASSIUM SERPL-MCNC: 3.3 MMOL/L — LOW (ref 3.5–5.3)
POTASSIUM SERPL-SCNC: 3.3 MMOL/L — LOW (ref 3.5–5.3)
PROT SERPL-MCNC: 5.6 G/DL — LOW (ref 6–8.3)
RBC # BLD: 2.6 M/UL — LOW (ref 3.8–5.2)
RBC # FLD: 18.3 % — HIGH (ref 10.3–16.9)
SODIUM SERPL-SCNC: 142 MMOL/L — SIGNIFICANT CHANGE UP (ref 135–145)
SPECIMEN SOURCE: SIGNIFICANT CHANGE UP
WBC # BLD: 22.4 K/UL — HIGH (ref 3.8–10.5)
WBC # FLD AUTO: 22.4 K/UL — HIGH (ref 3.8–10.5)

## 2018-11-19 PROCEDURE — 99232 SBSQ HOSP IP/OBS MODERATE 35: CPT | Mod: GC

## 2018-11-19 PROCEDURE — 71045 X-RAY EXAM CHEST 1 VIEW: CPT | Mod: 26

## 2018-11-19 PROCEDURE — 99233 SBSQ HOSP IP/OBS HIGH 50: CPT | Mod: GC

## 2018-11-19 RX ORDER — DEXTROSE 50 % IN WATER 50 %
50 SYRINGE (ML) INTRAVENOUS ONCE
Qty: 0 | Refills: 0 | Status: COMPLETED | OUTPATIENT
Start: 2018-11-19 | End: 2018-11-19

## 2018-11-19 RX ORDER — MAGNESIUM SULFATE 500 MG/ML
1 VIAL (ML) INJECTION ONCE
Qty: 0 | Refills: 0 | Status: COMPLETED | OUTPATIENT
Start: 2018-11-19 | End: 2018-11-19

## 2018-11-19 RX ORDER — CEFAZOLIN SODIUM 1 G
1000 VIAL (EA) INJECTION EVERY 12 HOURS
Qty: 0 | Refills: 0 | Status: DISCONTINUED | OUTPATIENT
Start: 2018-11-19 | End: 2018-11-24

## 2018-11-19 RX ORDER — POTASSIUM CHLORIDE 20 MEQ
20 PACKET (EA) ORAL
Qty: 0 | Refills: 0 | Status: COMPLETED | OUTPATIENT
Start: 2018-11-19 | End: 2018-11-19

## 2018-11-19 RX ORDER — POTASSIUM CHLORIDE 20 MEQ
40 PACKET (EA) ORAL ONCE
Qty: 0 | Refills: 0 | Status: COMPLETED | OUTPATIENT
Start: 2018-11-19 | End: 2018-11-19

## 2018-11-19 RX ADMIN — PIPERACILLIN AND TAZOBACTAM 200 GRAM(S): 4; .5 INJECTION, POWDER, LYOPHILIZED, FOR SOLUTION INTRAVENOUS at 00:08

## 2018-11-19 RX ADMIN — HEPARIN SODIUM 5000 UNIT(S): 5000 INJECTION INTRAVENOUS; SUBCUTANEOUS at 05:56

## 2018-11-19 RX ADMIN — Medication 150 MICROGRAM(S): at 05:54

## 2018-11-19 RX ADMIN — Medication 200 MILLIGRAM(S): at 12:41

## 2018-11-19 RX ADMIN — MIDODRINE HYDROCHLORIDE 5 MILLIGRAM(S): 2.5 TABLET ORAL at 21:52

## 2018-11-19 RX ADMIN — MIDODRINE HYDROCHLORIDE 5 MILLIGRAM(S): 2.5 TABLET ORAL at 05:54

## 2018-11-19 RX ADMIN — CHLORHEXIDINE GLUCONATE 1 APPLICATION(S): 213 SOLUTION TOPICAL at 06:00

## 2018-11-19 RX ADMIN — Medication 650 MILLIGRAM(S): at 07:39

## 2018-11-19 RX ADMIN — PROPOFOL 2.08 MICROGRAM(S)/KG/MIN: 10 INJECTION, EMULSION INTRAVENOUS at 05:51

## 2018-11-19 RX ADMIN — Medication 40 MILLIEQUIVALENT(S): at 07:39

## 2018-11-19 RX ADMIN — Medication 50 MILLIEQUIVALENT(S): at 08:56

## 2018-11-19 RX ADMIN — Medication 650 MILLIGRAM(S): at 07:38

## 2018-11-19 RX ADMIN — Medication 50 MILLIEQUIVALENT(S): at 14:41

## 2018-11-19 RX ADMIN — HEPARIN SODIUM 5000 UNIT(S): 5000 INJECTION INTRAVENOUS; SUBCUTANEOUS at 21:52

## 2018-11-19 RX ADMIN — PANTOPRAZOLE SODIUM 40 MILLIGRAM(S): 20 TABLET, DELAYED RELEASE ORAL at 14:41

## 2018-11-19 RX ADMIN — Medication 650 MILLIGRAM(S): at 14:41

## 2018-11-19 RX ADMIN — CHLORHEXIDINE GLUCONATE 15 MILLILITER(S): 213 SOLUTION TOPICAL at 05:53

## 2018-11-19 RX ADMIN — Medication 650 MILLIGRAM(S): at 08:47

## 2018-11-19 RX ADMIN — HEPARIN SODIUM 5000 UNIT(S): 5000 INJECTION INTRAVENOUS; SUBCUTANEOUS at 14:41

## 2018-11-19 RX ADMIN — Medication 100 GRAM(S): at 07:38

## 2018-11-19 RX ADMIN — Medication 50 MILLILITER(S): at 00:00

## 2018-11-19 RX ADMIN — PIPERACILLIN AND TAZOBACTAM 200 GRAM(S): 4; .5 INJECTION, POWDER, LYOPHILIZED, FOR SOLUTION INTRAVENOUS at 05:55

## 2018-11-19 RX ADMIN — Medication 50 MILLIEQUIVALENT(S): at 11:26

## 2018-11-19 RX ADMIN — Medication 100 MILLIGRAM(S): at 21:52

## 2018-11-19 RX ADMIN — CHLORHEXIDINE GLUCONATE 15 MILLILITER(S): 213 SOLUTION TOPICAL at 18:22

## 2018-11-19 RX ADMIN — MIDODRINE HYDROCHLORIDE 5 MILLIGRAM(S): 2.5 TABLET ORAL at 14:41

## 2018-11-19 RX ADMIN — Medication 100 MILLIGRAM(S): at 11:26

## 2018-11-19 RX ADMIN — Medication 650 MILLIGRAM(S): at 21:52

## 2018-11-19 NOTE — PROGRESS NOTE ADULT - PROBLEM SELECTOR PLAN 1
nonoliguric GONZALEZ likely prerenal in patient with septic shock secondary to UTI and pneumonia vs ATN  s/p vanco  now on zosyn  Cr 2.9> 2.8 >2.8 > 2.6 today, baseline 0.6 in 6/18  UOP 1.4 L over the past 24 hrs  Avoid nephrotoxic meds  Renal dose adjustment of antibiotics  Monitor I/O  Maintain renal perfusion

## 2018-11-19 NOTE — PROGRESS NOTE ADULT - ASSESSMENT
78F PMHx MO, DM, HTN, hypothyroidism, PE s/p IVC filter, recent admissions for sepsis and acute blood loss anemia s/p SBR with primary anastomosis and PEG, a/w septic shock likely 2/2 PNA  and acute hypoxic respiratory failure.    no acute surgical intervention, unlikely surgical pathology  care per primary team  discussed with Dr Ngo

## 2018-11-19 NOTE — ADVANCED PRACTICE NURSE CONSULT - ASSESSMENT
Patient currently extubated, on oxygen via face mask. Patient presented on admission with severe IAD to bilateral buttocks secondary to bowel incontinence. Linear DTPI noted along upper right buttock extending across sacrum to left buttock measuring 4 cm x 0.2 cm. Indwelling urinary catheter in place. Patient requires 2 person assist to turn in bed. Patient is on AirTAP positioning system with wedges to turn and position in bed; heel protectors on bilaterally to offload heels. Applied zinc based moisture barrier cream to denuded skin and DTPI, pending receiving Cavilon Liquid Skin Barrier. Unable to apply dressing due to bowel incontinence. Kenn TOSCANO present and assisted during assessment.

## 2018-11-19 NOTE — PROGRESS NOTE ADULT - SUBJECTIVE AND OBJECTIVE BOX
Patient is a 78y old  Female who presents with a chief complaint of Septic shock and acute respiratory failure (2018 23:57)      INTERVAL HPI/OVERNIGHT EVENTS: No acute events overnight, patient awake and responsive  Denies any current pain  Remained afebrile overnight  extubated successfully.       ICU Vital Signs Last 24 Hrs  T(C): 36.6 (2018 14:00), Max: 38.4 (2018 22:59)  T(F): 97.8 (2018 14:00), Max: 101.1 (2018 22:59)  HR: 58 (2018 15:00) (50 - 88)  BP: 112/57 (2018 14:00) (83/50 - 159/71)  BP(mean): 75 (2018 14:00) (58 - 95)  ABP: --  ABP(mean): --  RR: 26 (2018 15:00) (6 - 28)  SpO2: 100% (2018 15:00) (90% - 100%)        2018 07:01  -  2018 07:00  --------------------------------------------------------  IN:    Enteral Tube Flush: 900 mL    IV PiggyBack: 750 mL    norepinephrine Infusion: 213 mL    propofol Infusion: 58 mL    sodium chloride 0.225%: 160 mL  Total IN: 2081 mL    OUT:    Indwelling Catheter - Urethral: 1450 mL  Total OUT: 1450 mL    Total NET: 631 mL      2018 07:01  -  2018 16:03  --------------------------------------------------------  IN:    IV PiggyBack: 450 mL    norepinephrine Infusion: 27 mL  Total IN: 477 mL    OUT:    Indwelling Catheter - Urethral: 320 mL  Total OUT: 320 mL    Total NET: 157 mL    .  LABS:                         7.5    22.4  )-----------( 201      ( 2018 05:02 )             24.4     11-19    142  |  113<H>  |  88<H>  ----------------------------<  93  3.3<L>   |  17<L>  |  2.65<H>    Ca    9.8      2018 05:02  Phos  5.0     11-18  Mg     1.9         TPro  5.6<L>  /  Alb  2.0<L>  /  TBili  0.4  /  DBili  x   /  AST  10  /  ALT  11  /  AlkPhos  152<H>  -19      Urinalysis Basic - ( 2018 17:35 )    Color: Yellow / Appearance: SL Cloudy / S.020 / pH: x  Gluc: x / Ketone: NEGATIVE  / Bili: Negative / Urobili: 0.2 E.U./dL   Blood: x / Protein: 30 mg/dL / Nitrite: NEGATIVE   Leuk Esterase: Small / RBC: > 10 /HPF / WBC > 10 /HPF   Sq Epi: x / Non Sq Epi: 0-5 /HPF / Bacteria: Present /HPF      RADIOLOGY, EKG & ADDITIONAL TESTS: Reviewed.   MEDICATIONS  (STANDING):  chlorhexidine 0.12% Liquid 15 milliLiter(s) Swish and Spit two times a day  chlorhexidine 2% Cloths 1 Application(s) Topical daily  ciprofloxacin   IVPB 400 milliGRAM(s) IV Intermittent daily  dextrose 5%. 1000 milliLiter(s) (50 mL/Hr) IV Continuous <Continuous>  dextrose 50% Injectable 12.5 Gram(s) IV Push once  dextrose 50% Injectable 25 Gram(s) IV Push once  dextrose 50% Injectable 25 Gram(s) IV Push once  heparin  Injectable 5000 Unit(s) SubCutaneous every 8 hours  insulin lispro (HumaLOG) corrective regimen sliding scale   SubCutaneous every 6 hours  levothyroxine 150 MICROGram(s) Oral daily  midodrine 5 milliGRAM(s) Oral three times a day  norepinephrine Infusion 0.05 MICROgram(s)/kG/Min (6.656 mL/Hr) IV Continuous <Continuous>  pantoprazole   Suspension 40 milliGRAM(s) Enteral Tube daily  piperacillin/tazobactam IVPB. 2.25 Gram(s) IV Intermittent every 6 hours  propofol Infusion 5 MICROgram(s)/kG/Min (2.076 mL/Hr) IV Continuous <Continuous>  sodium bicarbonate 650 milliGRAM(s) Oral three times a day  vancomycin  IVPB        MEDICATIONS  (PRN):  acetaminophen    Suspension .. 650 milliGRAM(s) Oral every 6 hours PRN Temp greater or equal to 38C (100.4F)  dextrose 40% Gel 15 Gram(s) Oral once PRN Blood Glucose LESS THAN 70 milliGRAM(s)/deciliter  glucagon  Injectable 1 milliGRAM(s) IntraMuscular once PRN Glucose LESS THAN 70 milligrams/deciliter    PHYSICAL EXAM:  GENERAL: Obese adult White female, intubated and sedated, in NAD  HEAD: atraumatic  EYES: PERRL, resists passive opening of eyes, conjunctiva pink  ENT: moist mucous membranes  NECK: supple  CHEST/LUNG: rhonchi bilaterally, R > L lower lobe  HEART: regular tachycardia, no murmur or rub  ABDOMEN: obese, soft, non-tender, non-distended, without rebound or guarding  EXTREMITIES: warm, well-perfused, without edema  NERVOUS SYSTEM: sedated; arouses to voice, does not follow commands or answer questions  SKIN: No rashes    Care Discussed with Consultants/Other Providers [ x] YES  [ ] NO  CT abdomen with oral contrast.   Impression: Hyperdense material in the gallbladder which may represent   debris or stones    Resolution of bowel wall thickening.    No significant ascites or drainable fluid collection.    Increased pleural effusions and adjacent consolidations. Patient is a 78y old  Female who presents with a chief complaint of Septic shock and acute respiratory failure (2018 23:57)  INTERVAL HPI/OVERNIGHT EVENTS: No acute events overnight, patient awake and responsive  Denies any current pain  Remained afebrile overnight  extubated successfully.     ICU Vital Signs Last 24 Hrs  T(C): 36.6 (2018 14:00), Max: 38.4 (2018 22:59)  T(F): 97.8 (2018 14:00), Max: 101.1 (2018 22:59)  HR: 58 (2018 16:00) (50 - 88)  BP: 116/52 (2018 16:00) (83/50 - 159/71)  BP(mean): 86 (2018 16:00) (58 - 95)  ABP: --  ABP(mean): --  RR: 22 (2018 16:00) (6 - 28)  SpO2: 98% (2018 16:00) (90% - 100%)          2018 07:01  -  2018 07:00  --------------------------------------------------------  IN:    Enteral Tube Flush: 900 mL    IV PiggyBack: 750 mL    norepinephrine Infusion: 213 mL    propofol Infusion: 58 mL    sodium chloride 0.225%: 160 mL  Total IN: 2081 mL    OUT:    Indwelling Catheter - Urethral: 1450 mL  Total OUT: 1450 mL    Total NET: 631 mL      2018 07:01  -  2018 16:03  --------------------------------------------------------  IN:    IV PiggyBack: 450 mL    norepinephrine Infusion: 27 mL  Total IN: 477 mL    OUT:    Indwelling Catheter - Urethral: 320 mL  Total OUT: 320 mL    Total NET: 157 mL      LABS:                         7.5    22.4  )-----------( 201      ( 2018 05:02 )             24.4     11-19    142  |  113<H>  |  88<H>  ----------------------------<  93  3.3<L>   |  17<L>  |  2.65<H>    Ca    9.8      2018 05:02  Phos  5.0     11-18  Mg     1.9         TPro  5.6<L>  /  Alb  2.0<L>  /  TBili  0.4  /  DBili  x   /  AST  10  /  ALT  11  /  AlkPhos  152<H>  -19      Urinalysis Basic - ( 2018 17:35 )    Color: Yellow / Appearance: SL Cloudy / S.020 / pH: x  Gluc: x / Ketone: NEGATIVE  / Bili: Negative / Urobili: 0.2 E.U./dL   Blood: x / Protein: 30 mg/dL / Nitrite: NEGATIVE   Leuk Esterase: Small / RBC: > 10 /HPF / WBC > 10 /HPF   Sq Epi: x / Non Sq Epi: 0-5 /HPF / Bacteria: Present /HPF      RADIOLOGY, EKG & ADDITIONAL TESTS: Reviewed.   MEDICATIONS  (STANDING):  chlorhexidine 0.12% Liquid 15 milliLiter(s) Swish and Spit two times a day  chlorhexidine 2% Cloths 1 Application(s) Topical daily  ciprofloxacin   IVPB 400 milliGRAM(s) IV Intermittent daily  dextrose 5%. 1000 milliLiter(s) (50 mL/Hr) IV Continuous <Continuous>  dextrose 50% Injectable 12.5 Gram(s) IV Push once  dextrose 50% Injectable 25 Gram(s) IV Push once  dextrose 50% Injectable 25 Gram(s) IV Push once  heparin  Injectable 5000 Unit(s) SubCutaneous every 8 hours  insulin lispro (HumaLOG) corrective regimen sliding scale   SubCutaneous every 6 hours  levothyroxine 150 MICROGram(s) Oral daily  midodrine 5 milliGRAM(s) Oral three times a day  norepinephrine Infusion 0.05 MICROgram(s)/kG/Min (6.656 mL/Hr) IV Continuous <Continuous>  pantoprazole   Suspension 40 milliGRAM(s) Enteral Tube daily  piperacillin/tazobactam IVPB. 2.25 Gram(s) IV Intermittent every 6 hours  propofol Infusion 5 MICROgram(s)/kG/Min (2.076 mL/Hr) IV Continuous <Continuous>  sodium bicarbonate 650 milliGRAM(s) Oral three times a day  vancomycin  IVPB        MEDICATIONS  (PRN):  acetaminophen    Suspension .. 650 milliGRAM(s) Oral every 6 hours PRN Temp greater or equal to 38C (100.4F)  dextrose 40% Gel 15 Gram(s) Oral once PRN Blood Glucose LESS THAN 70 milliGRAM(s)/deciliter  glucagon  Injectable 1 milliGRAM(s) IntraMuscular once PRN Glucose LESS THAN 70 milligrams/deciliter    PHYSICAL EXAM:  GENERAL: Obese adult White female, intubated and sedated, in NAD  HEAD: atraumatic  EYES: PERRL, resists passive opening of eyes, conjunctiva pink  ENT: moist mucous membranes  NECK: supple  CHEST/LUNG: rhonchi bilaterally, R > L lower lobe  HEART: regular tachycardia, no murmur or rub  ABDOMEN: obese, soft, non-tender, non-distended, without rebound or guarding  EXTREMITIES: warm, well-perfused, without edema  NERVOUS SYSTEM: sedated; arouses to voice, does not follow commands or answer questions  SKIN: No rashes    Care Discussed with Consultants/Other Providers [ x] YES  [ ] NO  CT abdomen with oral contrast.   Impression: Hyperdense material in the gallbladder which may represent   debris or stones    Resolution of bowel wall thickening.    No significant ascites or drainable fluid collection.    Increased pleural effusions and adjacent consolidations. Patient is a 78y old  Female who presents with a chief complaint of Septic shock and acute respiratory failure (2018 23:57)  INTERVAL HPI/OVERNIGHT EVENTS: No acute events overnight, patient awake and responsive  Denies any current pain or SOB  Remained afebrile overnight  extubated successfully.     ICU Vital Signs Last 24 Hrs  T(C): 36.6 (2018 14:00), Max: 38.4 (2018 22:59)  T(F): 97.8 (2018 14:00), Max: 101.1 (2018 22:59)  HR: 58 (2018 16:00) (50 - 88)  BP: 116/52 (2018 16:00) (83/50 - 159/71)  BP(mean): 86 (2018 16:00) (58 - 95)  ABP: --  ABP(mean): --  RR: 22 (2018 16:00) (6 - 28)  SpO2: 98% (2018 16:00) (90% - 100%)          2018 07:01  -  2018 07:00  --------------------------------------------------------  IN:    Enteral Tube Flush: 900 mL    IV PiggyBack: 750 mL    norepinephrine Infusion: 213 mL    propofol Infusion: 58 mL    sodium chloride 0.225%: 160 mL  Total IN: 2081 mL    OUT:    Indwelling Catheter - Urethral: 1450 mL  Total OUT: 1450 mL    Total NET: 631 mL      2018 07:01  -  2018 16:03  --------------------------------------------------------  IN:    IV PiggyBack: 450 mL    norepinephrine Infusion: 27 mL  Total IN: 477 mL    OUT:    Indwelling Catheter - Urethral: 320 mL  Total OUT: 320 mL    Total NET: 157 mL      LABS:                         7.5    22.4  )-----------( 201      ( 2018 05:02 )             24.4     11-    142  |  113<H>  |  88<H>  ----------------------------<  93  3.3<L>   |  17<L>  |  2.65<H>    Ca    9.8      2018 05:02  Phos  5.0     11-18  Mg     1.9         TPro  5.6<L>  /  Alb  2.0<L>  /  TBili  0.4  /  DBili  x   /  AST  10  /  ALT  11  /  AlkPhos  152<H>  -19      Urinalysis Basic - ( 2018 17:35 )    Color: Yellow / Appearance: SL Cloudy / S.020 / pH: x  Gluc: x / Ketone: NEGATIVE  / Bili: Negative / Urobili: 0.2 E.U./dL   Blood: x / Protein: 30 mg/dL / Nitrite: NEGATIVE   Leuk Esterase: Small / RBC: > 10 /HPF / WBC > 10 /HPF   Sq Epi: x / Non Sq Epi: 0-5 /HPF / Bacteria: Present /HPF      RADIOLOGY, EKG & ADDITIONAL TESTS: Reviewed.   MEDICATIONS  (STANDING):  chlorhexidine 0.12% Liquid 15 milliLiter(s) Swish and Spit two times a day  chlorhexidine 2% Cloths 1 Application(s) Topical daily  ciprofloxacin   IVPB 400 milliGRAM(s) IV Intermittent daily  dextrose 5%. 1000 milliLiter(s) (50 mL/Hr) IV Continuous <Continuous>  dextrose 50% Injectable 12.5 Gram(s) IV Push once  dextrose 50% Injectable 25 Gram(s) IV Push once  dextrose 50% Injectable 25 Gram(s) IV Push once  heparin  Injectable 5000 Unit(s) SubCutaneous every 8 hours  insulin lispro (HumaLOG) corrective regimen sliding scale   SubCutaneous every 6 hours  levothyroxine 150 MICROGram(s) Oral daily  midodrine 5 milliGRAM(s) Oral three times a day  norepinephrine Infusion 0.05 MICROgram(s)/kG/Min (6.656 mL/Hr) IV Continuous <Continuous>  pantoprazole   Suspension 40 milliGRAM(s) Enteral Tube daily  piperacillin/tazobactam IVPB. 2.25 Gram(s) IV Intermittent every 6 hours  propofol Infusion 5 MICROgram(s)/kG/Min (2.076 mL/Hr) IV Continuous <Continuous>  sodium bicarbonate 650 milliGRAM(s) Oral three times a day  vancomycin  IVPB        MEDICATIONS  (PRN):  acetaminophen    Suspension .. 650 milliGRAM(s) Oral every 6 hours PRN Temp greater or equal to 38C (100.4F)  dextrose 40% Gel 15 Gram(s) Oral once PRN Blood Glucose LESS THAN 70 milliGRAM(s)/deciliter  glucagon  Injectable 1 milliGRAM(s) IntraMuscular once PRN Glucose LESS THAN 70 milligrams/deciliter    PHYSICAL EXAM:  GENERAL: Obese adult White female, intubated and sedated, in NAD  HEAD: atraumatic  EYES: PERRL, resists passive opening of eyes, conjunctiva pink  ENT: moist mucous membranes  NECK: supple  CHEST/LUNG: rhonchi bilaterally, R > L lower lobe  HEART: regular tachycardia, no murmur or rub  ABDOMEN: obese, soft, non-tender, non-distended, without rebound or guarding  EXTREMITIES: warm, well-perfused, without edema  NERVOUS SYSTEM: sedated; arouses to voice, does not follow commands or answer questions  SKIN: No rashes    Care Discussed with Consultants/Other Providers [ x] YES  [ ] NO  CT abdomen with oral contrast.   Impression: Hyperdense material in the gallbladder which may represent   debris or stones    Resolution of bowel wall thickening.    No significant ascites or drainable fluid collection.    Increased pleural effusions and adjacent consolidations.

## 2018-11-19 NOTE — PROGRESS NOTE ADULT - ATTENDING COMMENTS
renal fxn stable, nonoliguric   no sign fluid overload - consider restart IVF (wth hco3) [place] with diarrhea now  continue supportive care -pressors as needed  cont antibiotics for pneumonia/UTI renal fxn stable to improving - , nonoliguric  no sign fluid overload   continue supportive care -pressors as needed  cont antibiotics for pneumonia/UTI  follow chem, uo

## 2018-11-19 NOTE — PROGRESS NOTE ADULT - ASSESSMENT
78F w/ PMH obesity, DM, HTN, hypothyroidism, sepsis secondary to ventral hernia infection s/p repair, PE s/p IVC filter, acute blood loss anemia from hemorrhagic uterine fibroids and UGIB, ATN requiring HD, sepsis secondary to MSSA bacteremia and MDR E coli UTI now w/AMS and s/p TAHBSO and small bowel resection s/p PEG. Presenting with septic shock likely 2/2 PNA vs UTI and acute hypoxic respiratory failure now extubated.      Neuro  #Toxic metabolic encephalopathy 2/2 septic shock  -Resolved  -patient is being treated for sepsis as below, post extubation.     Respiratory  #aspiration pna  -sputum positive for staph aureus, prior u/a in past showed ESBL, both sensitive to cefazolin.  -original u/a contaminant and resent one, but that was after antibiotics were given  -continue with cefazolin      CV  IMPROVED   #hypotension  - s/p 4L and now on pressors likely 2/2 to septic shock  - has history of HTN at baseline, hold all anti-hypertensives in setting of septic shock  - still requiring Levophed    GI  -ct scan showed debris vs stone in GB and resolution of bowel wall thickening  -surgery stated that likely not surgical candidate  -bilirubin <0.2, f/u CMP daily  -f/u abdominal US of liver /gb         Renal  #Anion gap metabolic acidosis   - likely 2/2 to lactic acidosis 2/2 to decreased perfusion 2/2 to septic shock  - AG initially 21, closed s/p IVF    #Electrolyte abnormalities  - hypernatremic at 154 and Hyperchloremic at 117 on arrival  - s/p free water flushes, now with hypochloremic hyponatremia  - switch to 1/4 NS @ 80  - repeat BMP 14:00 today 11/17  - renal consulted 11/17, f/u recs  - f/u urine studies sent 11/17    #BUN/Cr elevation  Initial  and Cr of 3.38 increased from baseline of 21/0.68 in June 2018. Likely 2/2 to both pre-renal and intrinsic renal -possibly ATN.  BUN/Cr improving after IVF hydration to 129/2.99  -Trend BMP  -avoid nephrotoxins    ID  #Septic shock  Initially leukocytosis of 27.9 Tachycardic and tachypneic w/ lactate of 5.4.   Now s/p 4L NS, vanc, zosyn, azithromycin, and ciprofloxacin. Reperfusion assessment was completed  Most likely 2/2 to pna with aspiration suspected due to return of purulent material while suctioning during intubation and UTI due to UA w/ (+) leuk esterase and bacteria. Of note, prior UTIs have shown zosyn resistance but sensitive to cipro  -f/u BCx2, UC, sputum culture- has gram positive cocci in pairs and clusters  -Continue cipro, Zosyn, and vanc     Endo  #hyperglycemia  likely 2/2 to septic shock  patient w/ history of DM as well. Not in DKA. Beta hydroxybutyrate is 0.2.   -ISS  -FSBS    #hypothyroidism  Takes synthroid 150mcg PO daily at home  -Continue synthroid    Heme:  #anemia  macrocytic anemia noted on initial labs  -f/u b12, folate      F: 1/4 NS @ 80  E: replete cautiously given CrCl  N: NPO    Dvt ppx: hep sq  GI ppx: protonix    Lines:   YOHANA (11/16-)      Dispo: MICU 78F w/ PMH obesity, DM, HTN, hypothyroidism, sepsis secondary to ventral hernia infection s/p repair, PE s/p IVC filter, acute blood loss anemia from hemorrhagic uterine fibroids and UGIB, ATN requiring HD, sepsis secondary to MSSA bacteremia and MDR E coli UTI now w/AMS and s/p TAHBSO and small bowel resection s/p PEG. Presenting with septic shock likely 2/2 PNA vs UTI and acute hypoxic respiratory failure now extubated.      Neuro  #Toxic metabolic encephalopathy 2/2 septic shock  -Resolved  -patient is being treated for sepsis as below, post extubation.     Respiratory  #HAP with sputum culture staph aureus sn to cefazolin.   -sputum positive for staph aureus, prior u/a in past showed ESBL, both sensitive to cefazolin.  -original u/a contaminant and resent one, but that was after antibiotics were given  -continue with cefazolin    CV  #hypotension  -resolved  -blood pressure currently 110s-120s/60s  - has history of HTN at baseline, hold all anti-hypertensives for now       GI  -ct scan showed debris vs stone in GB and resolution of bowel wall thickening  -surgery stated that likely not surgical candidate, abdominal exam unremarkable  -bilirubin <0.2, f/u CMP daily  -f/u abdominal US of liver /gb   -peg tube functional.         Renal  #Anion gap metabolic acidosis   resolved  - likely 2/2 to lactic acidosis 2/2 to decreased perfusion 2/2 to septic shock  - AG initially 21, closed s/p IVF    #BUN/Cr elevation  Initial  and Cr of 3.38 increased from baseline of 21/0.68 in June 2018. Likely 2/2 to both pre-renal and intrinsic renal -possibly ATN.  BUN/Cr improving after IVF hydration to 88/2.65  -Trend BMP  -avoid nephrotoxins    ID  #Sepsis 2/2 HAP and concern for UTI  -sputum cx staph aureus sn to cefazolin  -prior urine cx ESBL sn to cefazolin, however contaminant u/a sent, and repeat was sent post antibiotics  -c/w cefazolin    Endo  #hyperglycemia  patient w/ history of DM as well. Not in DKA. Beta hydroxybutyrate is 0.2.   -ISS  -FSBS    #hypothyroidism  Takes synthroid 150mcg PO daily at home  -Continue synthroid    Heme:  #anemia  macrocytic anemia noted on initial labs  -f/u b12, folate wnl      F: none  E: replete cautiously given CrCl  N: glucerna 55 cc/hr    Dvt ppx: hep sq  GI ppx: protonix    Dispo: MICU 78F w/ PMH obesity, DM, HTN, hypothyroidism, sepsis secondary to ventral hernia infection s/p repair, PE s/p IVC filter, acute blood loss anemia from hemorrhagic uterine fibroids and UGIB, ATN requiring HD, sepsis secondary to MSSA bacteremia and MDR E coli UTI now w/AMS and s/p TAHBSO and small bowel resection s/p PEG. Presenting with septic shock likely 2/2 PNA vs UTI and acute hypoxic respiratory failure now extubated.      Neuro  #Toxic metabolic encephalopathy 2/2 septic shock  -Resolved  -patient is being treated for sepsis as below, post extubation.     Respiratory  #HAP with sputum culture staph aureus sn to cefazolin.   -sputum positive for staph aureus, prior u/a in past showed ESBL, continue cefazolin and cipro, DC zosyn.  -original u/a contaminated and resent one, but that was after antibiotics were given  -continue with cefazolin/cipro    CV  #hypotension  -resolved  -blood pressure currently 110s-120s/60s  - has history of HTN at baseline, hold all anti-hypertensives for now       GI  -ct scan showed debris vs stone in GB and resolution of bowel wall thickening  -surgery stated that likely not surgical candidate, abdominal exam unremarkable  -bilirubin <0.2, f/u CMP daily  -f/u abdominal US of liver /gb   -peg tube functional.         Renal  #Anion gap metabolic acidosis   resolved  - likely 2/2 to lactic acidosis 2/2 to decreased perfusion 2/2 to septic shock  - AG initially 21, closed s/p IVF    #BUN/Cr elevation  Initial  and Cr of 3.38 increased from baseline of 21/0.68 in June 2018. Likely 2/2 to both pre-renal and intrinsic renal -possibly ATN.  BUN/Cr improving after IVF hydration to 88/2.65  -Trend BMP  -avoid nephrotoxins    ID  #Sepsis 2/2 HAP and concern for UTI  -sputum cx staph aureus sn to cefazolin  - 1st urine culture rejected, and repeat was sent post antibiotics but await results  -c/w cefazolin/cipro    Endo  #hyperglycemia  patient w/ history of DM as well. Not in DKA. Beta hydroxybutyrate is 0.2.   -ISS  -FSBS    #hypothyroidism  Takes synthroid 150mcg PO daily at home  -Continue synthroid    Heme:  #anemia  macrocytic anemia noted on initial labs  -f/u b12, folate wnl      F: none  E: replete cautiously given CrCl  N: glucerna 55 cc/hr    Dvt ppx: hep sq  GI ppx: protonix    Dispo: MICU

## 2018-11-19 NOTE — PROGRESS NOTE ADULT - SUBJECTIVE AND OBJECTIVE BOX
GENERAL SURGERY CONSULT PROGRESS NOTE    SUBJECTIVE:   Pt seen & examined at bedside. intubated, sedated, on pressors    MEDICATIONS:  ---NEURO-  ---CV-  midodrine 5 milliGRAM(s) Oral three times a day  norepinephrine Infusion 0.05 MICROgram(s)/kG/Min (6.656 mL/Hr) IV Continuous <Continuous>  ---PULM-  ---GI/FEN-  pantoprazole   Suspension 40 milliGRAM(s) Enteral Tube daily  dextrose 5%. 1000 milliLiter(s) IV Continuous <Continuous>  sodium bicarbonate 650 milliGRAM(s) Oral three times a day  ----  ---ID-   ceFAZolin   IVPB 1000 milliGRAM(s) IV Intermittent every 12 hours  ciprofloxacin   IVPB 400 milliGRAM(s) IV Intermittent daily  ---ENDO-  dextrose 40% Gel 15 Gram(s) Oral once PRN  dextrose 50% Injectable 12.5 Gram(s) IV Push once  dextrose 50% Injectable 25 Gram(s) IV Push once  dextrose 50% Injectable 25 Gram(s) IV Push once  glucagon  Injectable 1 milliGRAM(s) IntraMuscular once PRN  insulin lispro (HumaLOG) corrective regimen sliding scale   SubCutaneous every 6 hours  levothyroxine 150 MICROGram(s) Oral daily  ---HEME-  heparin  Injectable 5000 Unit(s) SubCutaneous every 8 hours  ---OTHER-  chlorhexidine 0.12% Liquid 15 milliLiter(s) Swish and Spit two times a day  chlorhexidine 2% Cloths 1 Application(s) Topical daily        VOLUME STATUS:  I&O's Detail    2018 07:  -  2018 07:00  --------------------------------------------------------  IN:    Enteral Tube Flush: 900 mL    IV PiggyBack: 750 mL    norepinephrine Infusion: 213 mL    propofol Infusion: 58 mL    sodium chloride 0.225%: 160 mL  Total IN: 2081 mL    OUT:    Indwelling Catheter - Urethral: 1450 mL  Total OUT: 1450 mL    Total NET: 631 mL      2018 07:  -  2018 16:07  --------------------------------------------------------  IN:    IV PiggyBack: 450 mL    norepinephrine Infusion: 27 mL  Total IN: 477 mL    OUT:    Indwelling Catheter - Urethral: 320 mL  Total OUT: 320 mL    Total NET: 157 mL          VITALS:  Vital Signs Last 24 Hrs  T(C): 36.6 (2018 14:00), Max: 38.4 (2018 22:59)  T(F): 97.8 (2018 14:00), Max: 101.1 (2018 22:59)  HR: 58 (2018 15:00) (50 - 88)  BP: 112/57 (2018 14:00) (83/50 - 159/71)  BP(mean): 75 (2018 14:00) (58 - 95)  RR: 26 (2018 15:00) (6 - 28)  SpO2: 100% (2018 15:00) (90% - 100%)    PHYSICAL EXAM:  Constitutional: NAD, resting comfortably, vented on VC-AC, sedated  HEENT: MMM  CV: RRR on monitor  Resp: nonlabored breathing  Abd: soft, nondistended, nontender, incontinent of brown stool with rectal tube  Ext: WWP, no edema, 2+ radial pulses bilaterally  LABS:                        7.5    22.4  )-----------( 201      ( 2018 05:02 )             24.4     11-19    142  |  113<H>  |  88<H>  ----------------------------<  93  3.3<L>   |  17<L>  |  2.65<H>    Ca    9.8      2018 05:02  Phos  5.0     11-18  Mg     1.9     -    TPro  5.6<L>  /  Alb  2.0<L>  /  TBili  0.4  /  DBili  x   /  AST  10  /  ALT  11  /  AlkPhos  152<H>  11-19      Urinalysis Basic - ( 2018 17:35 )    Color: Yellow / Appearance: SL Cloudy / S.020 / pH: x  Gluc: x / Ketone: NEGATIVE  / Bili: Negative / Urobili: 0.2 E.U./dL   Blood: x / Protein: 30 mg/dL / Nitrite: NEGATIVE   Leuk Esterase: Small / RBC: > 10 /HPF / WBC > 10 /HPF   Sq Epi: x / Non Sq Epi: 0-5 /HPF / Bacteria: Present /HPF

## 2018-11-19 NOTE — ADVANCED PRACTICE NURSE CONSULT - REASON FOR CONSULT
WOC nurse consult for 78F w/ PMH of morbid obesity, DM, HTN, hypothyroidism, sepsis secondary to ventral hernia infection s/p repair, PE s/p IVC filter, acute blood loss anemia from hemorrhagic uterine fibroids and UGIB, ATN requiring HD, sepsis secondary to MSSA bacteremia and MDR E coli UTI now w/AMS and s/p TAHBSO and small bowel resection s/p PEG. Patient presented from [facility] with concern for sepsis and hypoxia. Per EMS, patient was saturating 85% on RA at [facility] with some improvement to 95% after being placed on NRB.  On arrival to the ED, patient was altered and saturating 89%. She was intubated in the ED with suctioning revealing purulent material.

## 2018-11-19 NOTE — PROGRESS NOTE ADULT - PROBLEM SELECTOR PLAN 2
likely 2/2 to septic shock  has history of HTN at baseline, hold all anti-hypertensives in setting of septic shock  Taken ff pressors today  Lactate trending down.

## 2018-11-19 NOTE — PROGRESS NOTE ADULT - PROBLEM SELECTOR PLAN 3
k 3.3 - supplement as needed  ABG - suggestive of mixed respiratory alkalosis/metabolic acidoss with normal anion gap  recheck ABG when off vent

## 2018-11-19 NOTE — PROGRESS NOTE ADULT - SUBJECTIVE AND OBJECTIVE BOX
Patient is a 78y old  Female who presents with a chief complaint of Septic shock and acute respiratory failure (2018 23:11)-    Renal Consult was called for GONZALEZ in patient admitted for septic shock likely due to UTI and possible aspiration pneumonia.  Initial  and Cr of 3.38 increased from baseline of 21/0.68 in 2018.  Likely 2/2 to both pre-renal and intrinsic renal - possibly ATN due to hypoperfusion.    Patient was seen and examined at bedside.  She remains intubated with stable oxygen requirements, but planned for extubation today.  Cr improved slightly to 2.6.      HPI:    78F w/ PMH of morbid obesity, DM, HTN, hypothyroidism, sepsis secondary to ventral hernia infection s/p repair, PE s/p IVC filter, acute blood loss anemia from hemorrhagic uterine fibroids and UGIB, ATN requiring HD, sepsis secondary to MSSA bacteremia and MDR E coli UTI now w/AMS and s/p TAHBSO and small bowel resection s/p PEG. Patient presented from [facility] with concern for sepsis and hypoxia. Per EMS, patient was saturating 85% on RA at [facility] with some improvement to 95% after being placed on NRB.  On arrival to the ED, patient was altered and saturating 89%. She was intubated in the ED with suctioning revealing purulent material.       Meds:    ceFAZolin   IVPB 1000 milliGRAM(s) IV Intermittent every 12 hours  chlorhexidine 0.12% Liquid 15 milliLiter(s) Swish and Spit two times a day  chlorhexidine 2% Cloths 1 Application(s) Topical daily  ciprofloxacin   IVPB 400 milliGRAM(s) IV Intermittent daily  dextrose 40% Gel 15 Gram(s) Oral once PRN  dextrose 5%. 1000 milliLiter(s) IV Continuous <Continuous>  dextrose 50% Injectable 12.5 Gram(s) IV Push once  dextrose 50% Injectable 25 Gram(s) IV Push once  dextrose 50% Injectable 25 Gram(s) IV Push once  glucagon  Injectable 1 milliGRAM(s) IntraMuscular once PRN  heparin  Injectable 5000 Unit(s) SubCutaneous every 8 hours  insulin lispro (HumaLOG) corrective regimen sliding scale   SubCutaneous every 6 hours  levothyroxine 150 MICROGram(s) Oral daily  midodrine 5 milliGRAM(s) Oral three times a day  norepinephrine Infusion 0.05 MICROgram(s)/kG/Min IV Continuous <Continuous>  pantoprazole   Suspension 40 milliGRAM(s) Enteral Tube daily  potassium chloride  20 mEq/100 mL IVPB 20 milliEquivalent(s) IV Intermittent every 2 hours  sodium bicarbonate 650 milliGRAM(s) Oral three times a day      T(C): 37.4 (18 @ 09:00), Max: 38.4 (18 @ 22:59)  HR: 56 (18 @ 13:00) (50 - 88)  BP: 124/60 (18 @ 13:00) (83/50 - 159/71)  RR: 22 (18 @ 13:00) (6 - 33)  SpO2: 97% (18 @ 13:00) (90% - 97%)    Input/Output      18 @ 07:01  -  18 @ 07:00  --------------------------------------------------------  IN: 2081 mL / OUT: 1450 mL / NET: 631 mL    18 @ 07:01  -  18 @ 13:52  --------------------------------------------------------  IN: 469 mL / OUT: 195 mL / NET: 274 mL              ROS:   intubed      PHYSICAL EXAM:  General: intubated   Respiratory: wheezing in bases bilaterally  Cardiac: +S1/S2; regular rate and rhythm; no murmur, no rub, no gallop  Gastrointestinal: obese, abdomen soft (+) PEG in place no drainage or eyrthema to the site  Genitourinary: normal external genitalia, suazo in place  neuro: drowsy        LABS:                                       7.5    22.4  )-----------( 201      ( 2018 05:02 )             24.4           142  |  113<H>  |  88<H>  ----------------------------<  93  3.3<L>   |  17<L>  |  2.65<H>    Ca    9.8      2018 05:02  Phos  5.0     11-18  Mg     1.9         TPro  5.6<L>  /  Alb  2.0<L>  /  TBili  0.4  /  DBili  x   /  AST  10  /  ALT  11  /  AlkPhos  152<H>  -          Urinalysis Basic - ( 2018 17:35 )    Color: Yellow / Appearance: SL Cloudy / S.020 / pH: x  Gluc: x / Ketone: NEGATIVE  / Bili: Negative / Urobili: 0.2 E.U./dL   Blood: x / Protein: 30 mg/dL / Nitrite: NEGATIVE   Leuk Esterase: Small / RBC: > 10 /HPF / WBC > 10 /HPF   Sq Epi: x / Non Sq Epi: 0-5 /HPF / Bacteria: Present /HPF Patient is a 78y old  Female who presents with a chief complaint of Septic shock and acute respiratory failure (2018 23:11)-    Renal Consult was called for GONZALEZ in patient admitted for septic shock likely due to UTI and possible aspiration pneumonia.  CXR: suggestive of RLL pneumonia.  Initial  and Cr of 3.38 increased from baseline of 21/0.68 in 2018.  Likely 2/2 to both pre-renal and intrinsic renal - possibly ATN due to hypoperfusion.    Patient was seen and examined at bedside.  She remains intubated with stable oxygen requirements, but planned for extubation today.  Cr improved slightly to 2.6.          HPI:    78F w/ PMH of morbid obesity, DM, HTN, hypothyroidism, sepsis secondary to ventral hernia infection s/p repair, PE s/p IVC filter, acute blood loss anemia from hemorrhagic uterine fibroids and UGIB, ATN requiring HD, sepsis secondary to MSSA bacteremia and MDR E coli UTI now w/AMS and s/p TAHBSO and small bowel resection s/p PEG. Patient presented from [facility] with concern for sepsis and hypoxia. Per EMS, patient was saturating 85% on RA at [facility] with some improvement to 95% after being placed on NRB.  On arrival to the ED, patient was altered and saturating 89%. She was intubated in the ED with suctioning revealing purulent material.       Meds:    ceFAZolin   IVPB 1000 milliGRAM(s) IV Intermittent every 12 hours  chlorhexidine 0.12% Liquid 15 milliLiter(s) Swish and Spit two times a day  chlorhexidine 2% Cloths 1 Application(s) Topical daily  ciprofloxacin   IVPB 400 milliGRAM(s) IV Intermittent daily  dextrose 40% Gel 15 Gram(s) Oral once PRN  dextrose 5%. 1000 milliLiter(s) IV Continuous <Continuous>  dextrose 50% Injectable 12.5 Gram(s) IV Push once  dextrose 50% Injectable 25 Gram(s) IV Push once  dextrose 50% Injectable 25 Gram(s) IV Push once  glucagon  Injectable 1 milliGRAM(s) IntraMuscular once PRN  heparin  Injectable 5000 Unit(s) SubCutaneous every 8 hours  insulin lispro (HumaLOG) corrective regimen sliding scale   SubCutaneous every 6 hours  levothyroxine 150 MICROGram(s) Oral daily  midodrine 5 milliGRAM(s) Oral three times a day  norepinephrine Infusion 0.05 MICROgram(s)/kG/Min IV Continuous <Continuous>  pantoprazole   Suspension 40 milliGRAM(s) Enteral Tube daily  potassium chloride  20 mEq/100 mL IVPB 20 milliEquivalent(s) IV Intermittent every 2 hours  sodium bicarbonate 650 milliGRAM(s) Oral three times a day      T(C): 37.4 (18 @ 09:00), Max: 38.4 (18 @ 22:59)  HR: 56 (18 @ 13:00) (50 - 88)  BP: 124/60 (18 @ 13:00) (83/50 - 159/71)  RR: 22 (18 @ 13:00) (6 - 33)  SpO2: 97% (18 @ 13:00) (90% - 97%)    Input/Output      18 @ 07:01  -  18 @ 07:00  --------------------------------------------------------  IN: 2081 mL / OUT: 1450 mL / NET: 631 mL    18 @ 07:01  -  18 @ 13:52  --------------------------------------------------------  IN: 469 mL / OUT: 195 mL / NET: 274 mL              ROS:   intubed      PHYSICAL EXAM:  General: intubated   Respiratory: wheezing in bases bilaterally  Cardiac: +S1/S2; regular rate and rhythm; no murmur, no rub, no gallop  Gastrointestinal: obese, abdomen soft (+) PEG in place no drainage or eyrthema to the site  Genitourinary: normal external genitalia, suazo in place  neuro: drowsy        LABS:                                       7.5    22.4  )-----------( 201      ( 2018 05:02 )             24.4           142  |  113<H>  |  88<H>  ----------------------------<  93  3.3<L>   |  17<L>  |  2.65<H>    Ca    9.8      2018 05:02  Phos  5.0     11-18  Mg     1.9         TPro  5.6<L>  /  Alb  2.0<L>  /  TBili  0.4  /  DBili  x   /  AST  10  /  ALT  11  /  AlkPhos  152<H>            Urinalysis Basic - ( 2018 17:35 )    Color: Yellow / Appearance: SL Cloudy / S.020 / pH: x  Gluc: x / Ketone: NEGATIVE  / Bili: Negative / Urobili: 0.2 E.U./dL   Blood: x / Protein: 30 mg/dL / Nitrite: NEGATIVE   Leuk Esterase: Small / RBC: > 10 /HPF / WBC > 10 /HPF   Sq Epi: x / Non Sq Epi: 0-5 /HPF / Bacteria: Present /HPF Patient is a 78y old  Female who presents with a chief complaint of Septic shock and acute respiratory failure (2018 23:11)-    Renal Consult was called for GONZALEZ in patient admitted for septic shock likely due to UTI and possible aspiration pneumonia.  CXR: suggestive of RLL pneumonia.  Initial  and Cr of 3.38 increased from baseline of 21/0.68 in 2018.  Likely 2/2 to both pre-renal and intrinsic renal - possibly ATN due to hypoperfusion.    Patient was seen and examined at bedside.  She remains intubated with stable oxygen requirements, but planned for extubation today.  Cr improved slightly to 2.6.          HPI:    78F w/ PMH of morbid obesity, DM, HTN, hypothyroidism, sepsis secondary to ventral hernia infection s/p repair, PE s/p IVC filter, acute blood loss anemia from hemorrhagic uterine fibroids and UGIB, ATN requiring HD, sepsis secondary to MSSA bacteremia and MDR E coli UTI now w/AMS and s/p TAHBSO and small bowel resection s/p PEG. Patient presented from [facility] with concern for sepsis and hypoxia. Per EMS, patient was saturating 85% on RA at [facility] with some improvement to 95% after being placed on NRB.  On arrival to the ED, patient was altered and saturating 89%. She was intubated in the ED with suctioning revealing purulent material.       Meds:    ceFAZolin   IVPB 1000 milliGRAM(s) IV Intermittent every 12 hours  chlorhexidine 0.12% Liquid 15 milliLiter(s) Swish and Spit two times a day  chlorhexidine 2% Cloths 1 Application(s) Topical daily  ciprofloxacin   IVPB 400 milliGRAM(s) IV Intermittent daily  dextrose 40% Gel 15 Gram(s) Oral once PRN  dextrose 5%. 1000 milliLiter(s) IV Continuous <Continuous>  dextrose 50% Injectable 12.5 Gram(s) IV Push once  dextrose 50% Injectable 25 Gram(s) IV Push once  dextrose 50% Injectable 25 Gram(s) IV Push once  glucagon  Injectable 1 milliGRAM(s) IntraMuscular once PRN  heparin  Injectable 5000 Unit(s) SubCutaneous every 8 hours  insulin lispro (HumaLOG) corrective regimen sliding scale   SubCutaneous every 6 hours  levothyroxine 150 MICROGram(s) Oral daily  midodrine 5 milliGRAM(s) Oral three times a day  norepinephrine Infusion 0.05 MICROgram(s)/kG/Min IV Continuous <Continuous>  pantoprazole   Suspension 40 milliGRAM(s) Enteral Tube daily  potassium chloride  20 mEq/100 mL IVPB 20 milliEquivalent(s) IV Intermittent every 2 hours  sodium bicarbonate 650 milliGRAM(s) Oral three times a day      T(C): 37.4 (18 @ 09:00), Max: 38.4 (18 @ 22:59)  HR: 56 (18 @ 13:00) (50 - 88)  BP: 124/60 (18 @ 13:00) (83/50 - 159/71)  RR: 22 (18 @ 13:00) (6 - 33)  SpO2: 97% (18 @ 13:00) (90% - 97%)    Input/Output      18 @ 07:01  -  18 @ 07:00  --------------------------------------------------------  IN: 2081 mL / OUT: 1450 mL / NET: 631 mL    18 @ 07:01  -  18 @ 13:52  --------------------------------------------------------  IN: 469 mL / OUT: 195 mL / NET: 274 mL              ROS:   intubed      PHYSICAL EXAM:  General: intubated   Respiratory: wheezing in bases bilaterally  Cardiac: +S1/S2; regular rate and rhythm; no murmur, no rub, no gallop  Gastrointestinal: obese, abdomen soft (+) PEG in place no drainage or eyrthema to the site  Genitourinary: normal external genitalia, suazo in place  neuro: drowsy  ext no edema b/l  no rash        LABS:                                       7.5    22.4  )-----------( 201      ( 2018 05:02 )             24.4           142  |  113<H>  |  88<H>  ----------------------------<  93  3.3<L>   |  17<L>  |  2.65<H>    Ca    9.8      2018 05:02  Phos  5.0     11-18  Mg     1.9         TPro  5.6<L>  /  Alb  2.0<L>  /  TBili  0.4  /  DBili  x   /  AST  10  /  ALT  11  /  AlkPhos  152<H>            Urinalysis Basic - ( 2018 17:35 )    Color: Yellow / Appearance: SL Cloudy / S.020 / pH: x  Gluc: x / Ketone: NEGATIVE  / Bili: Negative / Urobili: 0.2 E.U./dL   Blood: x / Protein: 30 mg/dL / Nitrite: NEGATIVE   Leuk Esterase: Small / RBC: > 10 /HPF / WBC > 10 /HPF   Sq Epi: x / Non Sq Epi: 0-5 /HPF / Bacteria: Present /HPF

## 2018-11-19 NOTE — PROGRESS NOTE ADULT - ASSESSMENT
78F w/ PMH obesity, DM, HTN, hypothyroidism, sepsis secondary to ventral hernia infection s/p repair, PE s/p IVC filter, acute blood loss anemia from hemorrhagic uterine fibroids and UGIB, ATN requiring HD, sepsis secondary to MSSA bacteremia and MDR E coli UTI now w/AMS and s/p TAHBSO and small bowel resection s/p PEG. Presenting with septic shock likely 2/2 PNA vs UTI and acute hypoxic respiratory failure initially requiring intubated requiring pressure support, now off pressors.

## 2018-11-20 LAB
ALBUMIN SERPL ELPH-MCNC: 2 G/DL — LOW (ref 3.3–5)
ALP SERPL-CCNC: 64 U/L — SIGNIFICANT CHANGE UP (ref 40–120)
ALT FLD-CCNC: 7 U/L — LOW (ref 10–45)
ANION GAP SERPL CALC-SCNC: 10 MMOL/L — SIGNIFICANT CHANGE UP (ref 5–17)
AST SERPL-CCNC: 9 U/L — LOW (ref 10–40)
BASE EXCESS BLDA CALC-SCNC: -9.1 MMOL/L — LOW (ref -2–3)
BASOPHILS NFR BLD AUTO: 0.2 % — SIGNIFICANT CHANGE UP (ref 0–2)
BILIRUB SERPL-MCNC: 0.3 MG/DL — SIGNIFICANT CHANGE UP (ref 0.2–1.2)
BLD GP AB SCN SERPL QL: NEGATIVE — SIGNIFICANT CHANGE UP
BLD GP AB SCN SERPL QL: NEGATIVE — SIGNIFICANT CHANGE UP
BUN SERPL-MCNC: 74 MG/DL — HIGH (ref 7–23)
CALCIUM SERPL-MCNC: 9.9 MG/DL — SIGNIFICANT CHANGE UP (ref 8.4–10.5)
CHLORIDE SERPL-SCNC: 120 MMOL/L — HIGH (ref 96–108)
CO2 SERPL-SCNC: 15 MMOL/L — LOW (ref 22–31)
CREAT SERPL-MCNC: 2.42 MG/DL — HIGH (ref 0.5–1.3)
EOSINOPHIL NFR BLD AUTO: 2.1 % — SIGNIFICANT CHANGE UP (ref 0–6)
GLUCOSE BLDC GLUCOMTR-MCNC: 108 MG/DL — HIGH (ref 70–99)
GLUCOSE BLDC GLUCOMTR-MCNC: 85 MG/DL — SIGNIFICANT CHANGE UP (ref 70–99)
GLUCOSE BLDC GLUCOMTR-MCNC: 87 MG/DL — SIGNIFICANT CHANGE UP (ref 70–99)
GLUCOSE BLDC GLUCOMTR-MCNC: 95 MG/DL — SIGNIFICANT CHANGE UP (ref 70–99)
GLUCOSE SERPL-MCNC: 78 MG/DL — SIGNIFICANT CHANGE UP (ref 70–99)
HCO3 BLDA-SCNC: 15 MMOL/L — LOW (ref 21–28)
HCT VFR BLD CALC: 24.2 % — LOW (ref 34.5–45)
HGB BLD-MCNC: 7.2 G/DL — LOW (ref 11.5–15.5)
LYMPHOCYTES # BLD AUTO: 7.9 % — LOW (ref 13–44)
MAGNESIUM SERPL-MCNC: 2.1 MG/DL — SIGNIFICANT CHANGE UP (ref 1.6–2.6)
MCHC RBC-ENTMCNC: 28.3 PG — SIGNIFICANT CHANGE UP (ref 27–34)
MCHC RBC-ENTMCNC: 29.8 G/DL — LOW (ref 32–36)
MCV RBC AUTO: 95.3 FL — SIGNIFICANT CHANGE UP (ref 80–100)
MONOCYTES NFR BLD AUTO: 4.7 % — SIGNIFICANT CHANGE UP (ref 2–14)
NEUTROPHILS NFR BLD AUTO: 85.1 % — HIGH (ref 43–77)
PCO2 BLDA: 27 MMHG — LOW (ref 32–45)
PH BLDA: 7.37 — SIGNIFICANT CHANGE UP (ref 7.35–7.45)
PHOSPHATE SERPL-MCNC: 4.2 MG/DL — SIGNIFICANT CHANGE UP (ref 2.5–4.5)
PLATELET # BLD AUTO: 178 K/UL — SIGNIFICANT CHANGE UP (ref 150–400)
PO2 BLDA: 103 MMHG — SIGNIFICANT CHANGE UP (ref 83–108)
POTASSIUM SERPL-MCNC: 4.4 MMOL/L — SIGNIFICANT CHANGE UP (ref 3.5–5.3)
POTASSIUM SERPL-SCNC: 4.4 MMOL/L — SIGNIFICANT CHANGE UP (ref 3.5–5.3)
PROT SERPL-MCNC: 5.7 G/DL — LOW (ref 6–8.3)
RBC # BLD: 2.54 M/UL — LOW (ref 3.8–5.2)
RBC # FLD: 18.5 % — HIGH (ref 10.3–16.9)
RH IG SCN BLD-IMP: POSITIVE — SIGNIFICANT CHANGE UP
RH IG SCN BLD-IMP: POSITIVE — SIGNIFICANT CHANGE UP
SAO2 % BLDA: 98 % — SIGNIFICANT CHANGE UP (ref 95–100)
SODIUM SERPL-SCNC: 145 MMOL/L — SIGNIFICANT CHANGE UP (ref 135–145)
WBC # BLD: 11.8 K/UL — HIGH (ref 3.8–10.5)
WBC # FLD AUTO: 11.8 K/UL — HIGH (ref 3.8–10.5)

## 2018-11-20 PROCEDURE — 76770 US EXAM ABDO BACK WALL COMP: CPT | Mod: 26

## 2018-11-20 PROCEDURE — 71045 X-RAY EXAM CHEST 1 VIEW: CPT | Mod: 26

## 2018-11-20 PROCEDURE — 99291 CRITICAL CARE FIRST HOUR: CPT

## 2018-11-20 PROCEDURE — 71045 X-RAY EXAM CHEST 1 VIEW: CPT | Mod: 26,77

## 2018-11-20 PROCEDURE — 99231 SBSQ HOSP IP/OBS SF/LOW 25: CPT | Mod: GC

## 2018-11-20 RX ORDER — SODIUM BICARBONATE 1 MEQ/ML
1300 SYRINGE (ML) INTRAVENOUS THREE TIMES A DAY
Qty: 0 | Refills: 0 | Status: DISCONTINUED | OUTPATIENT
Start: 2018-11-20 | End: 2018-11-21

## 2018-11-20 RX ORDER — MIDODRINE HYDROCHLORIDE 2.5 MG/1
5 TABLET ORAL ONCE
Qty: 0 | Refills: 0 | Status: COMPLETED | OUTPATIENT
Start: 2018-11-20 | End: 2018-11-20

## 2018-11-20 RX ADMIN — MIDODRINE HYDROCHLORIDE 5 MILLIGRAM(S): 2.5 TABLET ORAL at 05:51

## 2018-11-20 RX ADMIN — HEPARIN SODIUM 5000 UNIT(S): 5000 INJECTION INTRAVENOUS; SUBCUTANEOUS at 05:52

## 2018-11-20 RX ADMIN — Medication 1300 MILLIGRAM(S): at 14:07

## 2018-11-20 RX ADMIN — Medication 200 MILLIGRAM(S): at 11:51

## 2018-11-20 RX ADMIN — MIDODRINE HYDROCHLORIDE 5 MILLIGRAM(S): 2.5 TABLET ORAL at 14:07

## 2018-11-20 RX ADMIN — CHLORHEXIDINE GLUCONATE 1 APPLICATION(S): 213 SOLUTION TOPICAL at 05:52

## 2018-11-20 RX ADMIN — MIDODRINE HYDROCHLORIDE 5 MILLIGRAM(S): 2.5 TABLET ORAL at 21:20

## 2018-11-20 RX ADMIN — HEPARIN SODIUM 5000 UNIT(S): 5000 INJECTION INTRAVENOUS; SUBCUTANEOUS at 21:22

## 2018-11-20 RX ADMIN — Medication 150 MICROGRAM(S): at 05:52

## 2018-11-20 RX ADMIN — HEPARIN SODIUM 5000 UNIT(S): 5000 INJECTION INTRAVENOUS; SUBCUTANEOUS at 14:07

## 2018-11-20 RX ADMIN — Medication 650 MILLIGRAM(S): at 05:52

## 2018-11-20 RX ADMIN — Medication 100 MILLIGRAM(S): at 09:43

## 2018-11-20 RX ADMIN — MIDODRINE HYDROCHLORIDE 5 MILLIGRAM(S): 2.5 TABLET ORAL at 02:35

## 2018-11-20 RX ADMIN — CHLORHEXIDINE GLUCONATE 15 MILLILITER(S): 213 SOLUTION TOPICAL at 05:51

## 2018-11-20 RX ADMIN — Medication 100 MILLIGRAM(S): at 21:22

## 2018-11-20 RX ADMIN — PANTOPRAZOLE SODIUM 40 MILLIGRAM(S): 20 TABLET, DELAYED RELEASE ORAL at 11:51

## 2018-11-20 RX ADMIN — Medication 1300 MILLIGRAM(S): at 21:21

## 2018-11-20 NOTE — PROGRESS NOTE ADULT - PROBLEM SELECTOR PLAN 3
k 4.4   mixed respiratory alkalosis/metabolic acidoss with normal anion gap  - improving Hc03 15  suggest NaHCO3 - 650 mg po tid

## 2018-11-20 NOTE — PROGRESS NOTE ADULT - PROBLEM SELECTOR PLAN 1
nonoliguric GONZALEZ likely prerenal in patient with septic shock secondary to UTI and pneumonia vs ATN  s/p vanco  now on zosyn  Cr 2.9> 2.8 >2.8 > 2.6 > 2.4 today, baseline 0.6 in 6/18  UOP 1.4 L over the past 24 hrs  Avoid nephrotoxic meds  Renal dose adjustment of antibiotics  Monitor I/O  Maintain renal perfusion

## 2018-11-20 NOTE — PROGRESS NOTE ADULT - SUBJECTIVE AND OBJECTIVE BOX
Patient is a 78y old  Female who presents with a chief complaint of Septic shock and acute respiratory failure (2018 23:11)-    Renal Consult was called for GONZALEZ in patient admitted for septic shock likely due to UTI and possible aspiration pneumonia.  CXR: suggestive of RLL pneumonia.    Initial  and Cr of 3.38 increased from baseline of 21/0.68 in 2018.  Likely 2/2 to both pre-renal and intrinsic renal - possibly ATN due to hypoperfusion.    Patient was seen and examined at bedside. She was extubated yesterday and oxygenating on NC today.    Cr improved slightly to 2.6> 2.4 today.        HPI:    78F w/ PMH of morbid obesity, DM, HTN, hypothyroidism, sepsis secondary to ventral hernia infection s/p repair, PE s/p IVC filter, acute blood loss anemia from hemorrhagic uterine fibroids and UGIB, ATN requiring HD, sepsis secondary to MSSA bacteremia and MDR E coli UTI now w/AMS and s/p TAHBSO and small bowel resection s/p PEG. Patient presented from [facility] with concern for sepsis and hypoxia. Per EMS, patient was saturating 85% on RA at [facility] with some improvement to 95% after being placed on NRB.  On arrival to the ED, patient was altered and saturating 89%. She was intubated in the ED with suctioning revealing purulent material.       Meds:    ceFAZolin   IVPB 1000 milliGRAM(s) IV Intermittent every 12 hours  chlorhexidine 2% Cloths 1 Application(s) Topical daily  ciprofloxacin   IVPB 400 milliGRAM(s) IV Intermittent daily  dextrose 40% Gel 15 Gram(s) Oral once PRN  dextrose 5%. 1000 milliLiter(s) IV Continuous <Continuous>  dextrose 50% Injectable 12.5 Gram(s) IV Push once  dextrose 50% Injectable 25 Gram(s) IV Push once  dextrose 50% Injectable 25 Gram(s) IV Push once  glucagon  Injectable 1 milliGRAM(s) IntraMuscular once PRN  heparin  Injectable 5000 Unit(s) SubCutaneous every 8 hours  insulin lispro (HumaLOG) corrective regimen sliding scale   SubCutaneous every 6 hours  levothyroxine 150 MICROGram(s) Oral daily  midodrine 5 milliGRAM(s) Oral three times a day  pantoprazole   Suspension 40 milliGRAM(s) Enteral Tube daily  sodium bicarbonate 1300 milliGRAM(s) Oral three times a day      T(C): 36.9 (18 @ 18:14), Max: 37.2 (18 @ 22:53)  HR: 76 (18 @ 19:00) (54 - 76)  BP: 113/52 (18 @ 19:00) (94/44 - 127/56)  RR: 20 (18 @ 19:00) (19 - 45)  SpO2: 96% (18 @ 19:00) (91% - 100%)    Input/Output      18 @ 07:01  -  18 @ 07:00  --------------------------------------------------------  IN: 538 mL / OUT: 1470 mL / NET: -932 mL    18 @ 07:01  -  18 @ 21:14  --------------------------------------------------------  IN: 855 mL / OUT: 1280 mL / NET: -425 mL        ROS:     Gen: no fever  Cardiovascular: no chest pain  Respiratory: no cough, no sputum  Gastrointestinal: no nausea, no vomiting, no change in bowel habits  Neurologic: no headache  : no urinary symptoms        PHYSICAL EXAM:  General: intubated   Respiratory: wheezing in bases bilaterally  Cardiac: +S1/S2; regular rate and rhythm; no murmur, no rub, no gallop  Gastrointestinal: obese, abdomen soft (+) PEG in place no drainage or eyrthema to the site  Genitourinary: normal external genitalia, suazo in place  neuro: drowsy  ext no edema b/l  no rash        LABS:                                     7.2    11.8  )-----------( 178      ( 2018 06:21 )             24.2       11-20    145  |  120<H>  |  74<H>  ----------------------------<  78  4.4   |  15<L>  |  2.42<H>    Ca    9.9      2018 06:21  Phos  4.2     -  Mg     2.1         TPro  5.7<L>  /  Alb  2.0<L>  /  TBili  0.3  /  DBili  x   /  AST  9<L>  /  ALT  7<L>  /  AlkPhos  64  -              Urinalysis Basic - ( 2018 17:35 )    Color: Yellow / Appearance: SL Cloudy / S.020 / pH: x  Gluc: x / Ketone: NEGATIVE  / Bili: Negative / Urobili: 0.2 E.U./dL   Blood: x / Protein: 30 mg/dL / Nitrite: NEGATIVE   Leuk Esterase: Small / RBC: > 10 /HPF / WBC > 10 /HPF   Sq Epi: x / Non Sq Epi: 0-5 /HPF / Bacteria: Present /HPF

## 2018-11-20 NOTE — PROGRESS NOTE ADULT - ASSESSMENT
78F w/ PMH obesity, DM, HTN, hypothyroidism, sepsis secondary to ventral hernia infection s/p repair, PE s/p IVC filter, acute blood loss anemia from hemorrhagic uterine fibroids and UGIB, ATN requiring HD, sepsis secondary to MSSA bacteremia and MDR E coli UTI w/AMS and s/p TAHBSO and small bowel resection s/p PEG. Presenting with septic shock likely 2/2 PNA vs UTI and acute hypoxic respiratory failure now extubated.      Neuro  #Toxic metabolic encephalopathy 2/2 septic shock  -Resolved  -patient is being treated for sepsis as below, post extubation.     Respiratory  #HAP with sputum culture staph aureus sn to cefazolin.   -sputum positive for staph aureus, prior u/a in past showed ESBL, continue cefazolin and cipro, DC zosyn.  -original u/a contaminated and resent one, but that was after antibiotics were given  -continue with cefazolin/cipro    CV  #hypotension  -resolved  -blood pressure currently 110s-120s/60s  - has history of HTN at baseline, hold all anti-hypertensives for now       GI  -ct scan showed debris vs stone in GB and resolution of bowel wall thickening  -surgery stated that likely not surgical candidate, abdominal exam unremarkable  -bilirubin <0.2, f/u CMP daily  -f/u abdominal US of liver /gb   -peg tube functional.         Renal  #Anion gap metabolic acidosis   resolved  - likely 2/2 to lactic acidosis 2/2 to decreased perfusion 2/2 to septic shock  - AG initially 21, closed s/p IVF    #BUN/Cr elevation  Initial  and Cr of 3.38 increased from baseline of 21/0.68 in June 2018. Likely 2/2 to both pre-renal and intrinsic renal -possibly ATN.  BUN/Cr improving after IVF hydration to 88/2.65  -Trend BMP  -avoid nephrotoxins    ID  #Sepsis 2/2 HAP and concern for UTI  -sputum cx staph aureus sn to cefazolin  - 1st urine culture rejected, and repeat was sent post antibiotics but await results  -c/w cefazolin/cipro    Endo  #hyperglycemia  patient w/ history of DM as well. Not in DKA. Beta hydroxybutyrate is 0.2.   -ISS  -FSBS    #hypothyroidism  Takes synthroid 150mcg PO daily at home  -Continue synthroid    Heme:  #anemia  macrocytic anemia noted on initial labs  -f/u b12, folate wnl      F: none  E: K>4 Mag >2  N: glucerna 55 cc/hr    Dvt ppx: hep sq  GI ppx: protonix

## 2018-11-20 NOTE — PROGRESS NOTE ADULT - SUBJECTIVE AND OBJECTIVE BOX
HOSPITAL COURSE  78F w/ PMH of morbid obesity, DM, HTN, hypothyroidism, sepsis secondary to ventral hernia infection s/p repair, PE s/p IVC filter, acute blood loss anemia from hemorrhagic uterine fibroids and UGIB, ATN requiring HD, sepsis secondary to MSSA bacteremia and MDR E coli UTI (4-18)  now w/AMS and s/p TAHBSO and small bowel resection s/p PEG. Patient presented from [facility] with concern for sepsis 2/2 aspiration pna and hypoxia. Per EMS, patient was saturating 85% on RA at [facility] with some improvement to 95% after being placed on NRB.  On arrival to the ED, patient was altered and saturating 89%. She was intubated in the ED with suctioning revealing purulent material. She was started on vanc/azithro/cipro (due to prior ESBL that was resistant to zosyn but sn to cipro) and that was deescalated to cefazolin once sputum had speciation to staph aureus sensitive to that. She was successfully extubated, and tolerating nasal cannula.     Patient is a 78y old  Female who presents with a chief complaint of Septic shock and acute respiratory failure (17 Nov 2018 23:57)  INTERVAL HPI/OVERNIGHT EVENTS: No acute events overnight, patient awake and responsive  Denies any current pain or SOB  Remained afebrile overnight  extubated successfully.       PHYSICAL EXAM:  GENERAL: Obese adult White female, intubated and sedated, in NAD  HEAD: atraumatic  EYES: PERRL, resists passive opening of eyes, conjunctiva pink  ENT: moist mucous membranes  NECK: supple  CHEST/LUNG: rhonchi bilaterally, R > L lower lobe  HEART: regular tachycardia, no murmur or rub  ABDOMEN: obese, soft, non-tender, non-distended, without rebound or guarding  EXTREMITIES: warm, well-perfused, without edema  NERVOUS SYSTEM: sedated; arouses to voice, does not follow commands or answer questions  SKIN: No rashes      ICU Vital Signs Last 24 Hrs  T(C): 36.5 (20 Nov 2018 09:37), Max: 37.3 (19 Nov 2018 17:30)  T(F): 97.7 (20 Nov 2018 09:37), Max: 99.1 (19 Nov 2018 17:30)  HR: 60 (20 Nov 2018 12:00) (54 - 74)  BP: 99/42 (20 Nov 2018 12:00) (94/44 - 124/60)  BP(mean): 67 (20 Nov 2018 12:00) (60 - 102)  ABP: --  ABP(mean): --  RR: 22 (20 Nov 2018 12:00) (19 - 45)  SpO2: 98% (20 Nov 2018 12:00) (91% - 100%)      19 Nov 2018 07:01  -  20 Nov 2018 07:00  --------------------------------------------------------  IN:    IV PiggyBack: 500 mL    norepinephrine Infusion: 38 mL  Total IN: 538 mL    OUT:    Indwelling Catheter - Urethral: 1470 mL  Total OUT: 1470 mL    Total NET: -932 mL      20 Nov 2018 07:01  -  20 Nov 2018 12:45  --------------------------------------------------------  IN:    Glucerna 1.5: 120 mL    IV PiggyBack: 50 mL  Total IN: 170 mL    OUT:    Indwelling Catheter - Urethral: 305 mL  Total OUT: 305 mL    Total NET: -135 mL      .  LABS:                         7.2    11.8  )-----------( 178      ( 20 Nov 2018 06:21 )             24.2     11-20    145  |  120<H>  |  74<H>  ----------------------------<  78  4.4   |  15<L>  |  2.42<H>    Ca    9.9      20 Nov 2018 06:21  Phos  4.2     11-20  Mg     2.1     11-20    TPro  5.7<L>  /  Alb  2.0<L>  /  TBili  0.3  /  DBili  x   /  AST  9<L>  /  ALT  7<L>  /  AlkPhos  64  11-20        Care Discussed with Consultants/Other Providers [ x] YES  [ ] NO  CT abdomen with oral contrast.   Impression: Hyperdense material in the gallbladder which may represent   debris or stones    Resolution of bowel wall thickening.    No significant ascites or drainable fluid collection.    Increased pleural effusions and adjacent consolidations.

## 2018-11-21 DIAGNOSIS — E87.0 HYPEROSMOLALITY AND HYPERNATREMIA: ICD-10-CM

## 2018-11-21 LAB
ANION GAP SERPL CALC-SCNC: 11 MMOL/L — SIGNIFICANT CHANGE UP (ref 5–17)
ANION GAP SERPL CALC-SCNC: 8 MMOL/L — SIGNIFICANT CHANGE UP (ref 5–17)
ANION GAP SERPL CALC-SCNC: 8 MMOL/L — SIGNIFICANT CHANGE UP (ref 5–17)
BLD GP AB SCN SERPL QL: NEGATIVE — SIGNIFICANT CHANGE UP
BUN SERPL-MCNC: 56 MG/DL — HIGH (ref 7–23)
BUN SERPL-MCNC: 62 MG/DL — HIGH (ref 7–23)
BUN SERPL-MCNC: 67 MG/DL — HIGH (ref 7–23)
CALCIUM SERPL-MCNC: 10.1 MG/DL — SIGNIFICANT CHANGE UP (ref 8.4–10.5)
CALCIUM SERPL-MCNC: 10.5 MG/DL — SIGNIFICANT CHANGE UP (ref 8.4–10.5)
CALCIUM SERPL-MCNC: 9.8 MG/DL — SIGNIFICANT CHANGE UP (ref 8.4–10.5)
CHLORIDE SERPL-SCNC: 117 MMOL/L — HIGH (ref 96–108)
CHLORIDE SERPL-SCNC: 120 MMOL/L — HIGH (ref 96–108)
CHLORIDE SERPL-SCNC: 123 MMOL/L — HIGH (ref 96–108)
CO2 SERPL-SCNC: 16 MMOL/L — LOW (ref 22–31)
CO2 SERPL-SCNC: 20 MMOL/L — LOW (ref 22–31)
CO2 SERPL-SCNC: 21 MMOL/L — LOW (ref 22–31)
CREAT SERPL-MCNC: 1.69 MG/DL — HIGH (ref 0.5–1.3)
CREAT SERPL-MCNC: 1.95 MG/DL — HIGH (ref 0.5–1.3)
CREAT SERPL-MCNC: 2.02 MG/DL — HIGH (ref 0.5–1.3)
CULTURE RESULTS: SIGNIFICANT CHANGE UP
CULTURE RESULTS: SIGNIFICANT CHANGE UP
GLUCOSE BLDC GLUCOMTR-MCNC: 132 MG/DL — HIGH (ref 70–99)
GLUCOSE BLDC GLUCOMTR-MCNC: 136 MG/DL — HIGH (ref 70–99)
GLUCOSE BLDC GLUCOMTR-MCNC: 160 MG/DL — HIGH (ref 70–99)
GLUCOSE BLDC GLUCOMTR-MCNC: 201 MG/DL — HIGH (ref 70–99)
GLUCOSE SERPL-MCNC: 131 MG/DL — HIGH (ref 70–99)
GLUCOSE SERPL-MCNC: 171 MG/DL — HIGH (ref 70–99)
GLUCOSE SERPL-MCNC: 206 MG/DL — HIGH (ref 70–99)
HCT VFR BLD CALC: 26.4 % — LOW (ref 34.5–45)
HGB BLD-MCNC: 7.6 G/DL — LOW (ref 11.5–15.5)
MAGNESIUM SERPL-MCNC: 2.1 MG/DL — SIGNIFICANT CHANGE UP (ref 1.6–2.6)
MCHC RBC-ENTMCNC: 27.8 PG — SIGNIFICANT CHANGE UP (ref 27–34)
MCHC RBC-ENTMCNC: 28.8 G/DL — LOW (ref 32–36)
MCV RBC AUTO: 96.7 FL — SIGNIFICANT CHANGE UP (ref 80–100)
PHOSPHATE SERPL-MCNC: 3.9 MG/DL — SIGNIFICANT CHANGE UP (ref 2.5–4.5)
PLATELET # BLD AUTO: 202 K/UL — SIGNIFICANT CHANGE UP (ref 150–400)
POTASSIUM SERPL-MCNC: 3.8 MMOL/L — SIGNIFICANT CHANGE UP (ref 3.5–5.3)
POTASSIUM SERPL-MCNC: 3.9 MMOL/L — SIGNIFICANT CHANGE UP (ref 3.5–5.3)
POTASSIUM SERPL-MCNC: 4.1 MMOL/L — SIGNIFICANT CHANGE UP (ref 3.5–5.3)
POTASSIUM SERPL-SCNC: 3.8 MMOL/L — SIGNIFICANT CHANGE UP (ref 3.5–5.3)
POTASSIUM SERPL-SCNC: 3.9 MMOL/L — SIGNIFICANT CHANGE UP (ref 3.5–5.3)
POTASSIUM SERPL-SCNC: 4.1 MMOL/L — SIGNIFICANT CHANGE UP (ref 3.5–5.3)
RBC # BLD: 2.73 M/UL — LOW (ref 3.8–5.2)
RBC # FLD: 18.8 % — HIGH (ref 10.3–16.9)
RH IG SCN BLD-IMP: POSITIVE — SIGNIFICANT CHANGE UP
SODIUM SERPL-SCNC: 146 MMOL/L — HIGH (ref 135–145)
SODIUM SERPL-SCNC: 148 MMOL/L — HIGH (ref 135–145)
SODIUM SERPL-SCNC: 150 MMOL/L — HIGH (ref 135–145)
SPECIMEN SOURCE: SIGNIFICANT CHANGE UP
SPECIMEN SOURCE: SIGNIFICANT CHANGE UP
WBC # BLD: 13.4 K/UL — HIGH (ref 3.8–10.5)
WBC # FLD AUTO: 13.4 K/UL — HIGH (ref 3.8–10.5)

## 2018-11-21 PROCEDURE — 71045 X-RAY EXAM CHEST 1 VIEW: CPT | Mod: 26

## 2018-11-21 PROCEDURE — 99233 SBSQ HOSP IP/OBS HIGH 50: CPT | Mod: GC

## 2018-11-21 PROCEDURE — 99232 SBSQ HOSP IP/OBS MODERATE 35: CPT | Mod: GC

## 2018-11-21 RX ORDER — ONDANSETRON 8 MG/1
4 TABLET, FILM COATED ORAL ONCE
Qty: 0 | Refills: 0 | Status: COMPLETED | OUTPATIENT
Start: 2018-11-21 | End: 2018-11-21

## 2018-11-21 RX ORDER — ACETYLCYSTEINE 200 MG/ML
4 VIAL (ML) MISCELLANEOUS THREE TIMES A DAY
Qty: 0 | Refills: 0 | Status: DISCONTINUED | OUTPATIENT
Start: 2018-11-21 | End: 2018-11-22

## 2018-11-21 RX ORDER — SODIUM CHLORIDE 9 MG/ML
1000 INJECTION, SOLUTION INTRAVENOUS
Qty: 0 | Refills: 0 | Status: DISCONTINUED | OUTPATIENT
Start: 2018-11-21 | End: 2018-11-22

## 2018-11-21 RX ORDER — SODIUM CHLORIDE 9 MG/ML
1000 INJECTION, SOLUTION INTRAVENOUS
Qty: 0 | Refills: 0 | Status: DISCONTINUED | OUTPATIENT
Start: 2018-11-21 | End: 2018-11-21

## 2018-11-21 RX ADMIN — PANTOPRAZOLE SODIUM 40 MILLIGRAM(S): 20 TABLET, DELAYED RELEASE ORAL at 12:14

## 2018-11-21 RX ADMIN — Medication 4 MILLILITER(S): at 15:26

## 2018-11-21 RX ADMIN — SODIUM CHLORIDE 125 MILLILITER(S): 9 INJECTION, SOLUTION INTRAVENOUS at 08:59

## 2018-11-21 RX ADMIN — MIDODRINE HYDROCHLORIDE 5 MILLIGRAM(S): 2.5 TABLET ORAL at 05:16

## 2018-11-21 RX ADMIN — HEPARIN SODIUM 5000 UNIT(S): 5000 INJECTION INTRAVENOUS; SUBCUTANEOUS at 05:16

## 2018-11-21 RX ADMIN — HEPARIN SODIUM 5000 UNIT(S): 5000 INJECTION INTRAVENOUS; SUBCUTANEOUS at 23:26

## 2018-11-21 RX ADMIN — ONDANSETRON 4 MILLIGRAM(S): 8 TABLET, FILM COATED ORAL at 01:00

## 2018-11-21 RX ADMIN — Medication 1300 MILLIGRAM(S): at 05:16

## 2018-11-21 RX ADMIN — Medication 100 MILLIGRAM(S): at 23:22

## 2018-11-21 RX ADMIN — MIDODRINE HYDROCHLORIDE 5 MILLIGRAM(S): 2.5 TABLET ORAL at 23:23

## 2018-11-21 RX ADMIN — Medication 100 MILLIGRAM(S): at 10:01

## 2018-11-21 RX ADMIN — SODIUM CHLORIDE 125 MILLILITER(S): 9 INJECTION, SOLUTION INTRAVENOUS at 18:03

## 2018-11-21 RX ADMIN — MIDODRINE HYDROCHLORIDE 5 MILLIGRAM(S): 2.5 TABLET ORAL at 14:35

## 2018-11-21 RX ADMIN — Medication 4: at 23:37

## 2018-11-21 RX ADMIN — Medication 2: at 13:51

## 2018-11-21 RX ADMIN — Medication 150 MICROGRAM(S): at 05:16

## 2018-11-21 RX ADMIN — Medication 200 MILLIGRAM(S): at 12:14

## 2018-11-21 RX ADMIN — CHLORHEXIDINE GLUCONATE 1 APPLICATION(S): 213 SOLUTION TOPICAL at 05:13

## 2018-11-21 RX ADMIN — HEPARIN SODIUM 5000 UNIT(S): 5000 INJECTION INTRAVENOUS; SUBCUTANEOUS at 14:35

## 2018-11-21 NOTE — CONSULT NOTE ADULT - SUBJECTIVE AND OBJECTIVE BOX
Patient is a 78y old  Female who presents with a chief complaint of Septic shock and acute respiratory failure (20 Nov 2018 13:05)       HPI:  Patient is currently intubated and unable to participate in interview. History taken from chart and MICU consult.    78F w/ PMH of morbid obesity, DM, HTN, hypothyroidism, sepsis secondary to ventral hernia infection s/p repair, PE s/p IVC filter, acute blood loss anemia from hemorrhagic uterine fibroids and UGIB, ATN requiring HD, sepsis secondary to MSSA bacteremia and MDR E coli UTI now w/AMS and s/p TAHBSO and small bowel resection s/p PEG. Patient presented from [facility] with concern for sepsis and hypoxia. Per EMS, patient was saturating 85% on RA at [facility] with some improvement to 95% after being placed on NRB.  On arrival to the ED, patient was altered and saturating 89%. She was intubated in the ED with suctioning revealing purulent material.       T was 101.5, , BP79/59. WBC 27.9 Hgb 11.0 .3 Lactate 5.4 Na 154 K 5.6 CO2 16 AG [id number] Cr 3.38 Trop 0.14 ABG 7.32 pCO2 29 pO2 138 HCO3 15  UA: (+) leuk est, (+) bacteria  She was started on Levo gtt and Dopamine and subsequently was titrated off the Dopamine. She was given Azithromycin, Cipro, Vancomycin and Zosyn, 4L nS, and Insulin 10U. (16 Nov 2018 23:11)      PAST MEDICAL & SURGICAL HISTORY:  Hypothyroidism  GERD (gastroesophageal reflux disease)  Obesity  DM (diabetes mellitus)  HLD (hyperlipidemia)  HTN (hypertension)  Status post total abdominal hysterectomy and bilateral salpingo-oophorectomy  S/P small bowel resection  H/O ventral hernia repair      MEDICATIONS  (STANDING):  ceFAZolin   IVPB 1000 milliGRAM(s) IV Intermittent every 12 hours  chlorhexidine 2% Cloths 1 Application(s) Topical daily  ciprofloxacin   IVPB 400 milliGRAM(s) IV Intermittent daily  dextrose 5% + sodium chloride 0.225% 1000 milliLiter(s) (125 mL/Hr) IV Continuous <Continuous>  dextrose 5%. 1000 milliLiter(s) (50 mL/Hr) IV Continuous <Continuous>  dextrose 50% Injectable 12.5 Gram(s) IV Push once  dextrose 50% Injectable 25 Gram(s) IV Push once  dextrose 50% Injectable 25 Gram(s) IV Push once  heparin  Injectable 5000 Unit(s) SubCutaneous every 8 hours  insulin lispro (HumaLOG) corrective regimen sliding scale   SubCutaneous every 6 hours  levothyroxine 150 MICROGram(s) Oral daily  midodrine 5 milliGRAM(s) Oral three times a day  pantoprazole   Suspension 40 milliGRAM(s) Enteral Tube daily    MEDICATIONS  (PRN):  dextrose 40% Gel 15 Gram(s) Oral once PRN Blood Glucose LESS THAN 70 milliGRAM(s)/deciliter  glucagon  Injectable 1 milliGRAM(s) IntraMuscular once PRN Glucose LESS THAN 70 milligrams/deciliter      Social History: transferred from [facility] subacute rehab, per records: , has a son Hema, lived alone in an elevator accessible apartment    Functional Level Prior to Admission: per NH record: required assistance with ADL's, 2 person assist with ambulation    FAMILY HISTORY:  No pertinent family history in first degree relatives      CBC Full  -  ( 21 Nov 2018 05:41 )  WBC Count : 13.4 K/uL  Hemoglobin : 7.6 g/dL  Hematocrit : 26.4 %  Platelet Count - Automated : 202 K/uL  Mean Cell Volume : 96.7 fL  Mean Cell Hemoglobin : 27.8 pg  Mean Cell Hemoglobin Concentration : 28.8 g/dL  Auto Neutrophil # : x  Auto Lymphocyte # : x  Auto Monocyte # : x  Auto Eosinophil # : x  Auto Basophil # : x  Auto Neutrophil % : x  Auto Lymphocyte % : x  Auto Monocyte % : x  Auto Eosinophil % : x  Auto Basophil % : x      11-21    150<H>  |  123<H>  |  67<H>  ----------------------------<  131<H>  4.1   |  16<L>  |  2.02<H>    Ca    10.5      21 Nov 2018 05:41  Phos  3.9     11-21  Mg     2.1     11-21    TPro  5.7<L>  /  Alb  2.0<L>  /  TBili  0.3  /  DBili  x   /  AST  9<L>  /  ALT  7<L>  /  AlkPhos  64  11-20            Radiology:    < from: Xray Chest 1 View- PORTABLE-Routine (11.20.18 @ 06:21) >  EXAM:  XR CHEST PORTABLE ROUTINE 1V                          PROCEDURE DATE:  11/20/2018          INTERPRETATION:  Chest x-ray    Indication: Septic shock    A portable frontal view of the chest is compared to the prior study dated   11/20/2018. Left IJ line is unchanged. Stable heart size. Persistent   moderate to large right pleural effusion. Left lung is clear. No   pneumothorax.      IMPRESSION: Persistent right pleural effusion.        < from: CT Abdomen and Pelvis w/ Oral Cont (11.18.18 @ 14:44) >  EXAM:  CT ABDOMEN AND PELVIS OC                          PROCEDURE DATE:  11/18/2018          INTERPRETATION:  CT scan of the abdomen and pelvis with oral contrast    Clinical indication: Septic shock, multiple abdominal surgeries    Technique: Axial imaging was obtained from the lung bases through the   pubic symphysis. Oral contrast was administered. No intravenous contrast   was administered. The data was reformatted into sagittal and coronal   planes.    Prior imaging including the most recent CT scan dated 5/24/2018 is   retrieved for correlation.    Findings: Bilateral pleural effusions are present. These are increased   from the prior study. There is adjacent consolidation in the right lung   base, also increased from the prior study.    Evaluation of the abdominal viscera is limited without contrast. Within   this limitation no hepatic or splenic lesion is identified. There is   hyperdense material layering in the gallbladder which may represent   sludge or small stones. There is no pericholecystic fluid or intrahepatic   biliary ductal dilatation. The pancreas is within normal limits. The   adrenal glands are intact.    No hydronephrosis is present. There is mild bilateral perinephric   stranding.    A percutaneous gastrostomy is present, similar to the prior study. There   is contrast in distal loops of small bowel and segments of the colon   without evidence of obstruction. Wall thickening of the bowel in right   lower quadrant has resolved since the prior study. The right lower   quadrant drainage catheter has been removed.    There is no free fluid in the abdomen or pelvis.    Dense atherosclerotic calcifications are present in the aorta and its   branches. An IVC filter is present and is unchanged.    A Nicole catheter is present in the collapsed bladder.    Postsurgical changes are noted adjacent the anterior abdominal wall. No   fluid collection is present.    Productive changes are present throughout the spine. There are is   osteoarthritis of the hips.    Impression: Hyperdense material in the gallbladder which may represent   debris or stones    Resolution of bowel wall thickening.    No significant ascites or drainable fluid collection.    Increased pleural effusions and adjacent consolidations.            Vital Signs Last 24 Hrs  T(C): 36.2 (21 Nov 2018 05:57), Max: 36.9 (20 Nov 2018 18:14)  T(F): 97.2 (21 Nov 2018 05:57), Max: 98.4 (20 Nov 2018 18:14)  HR: 74 (21 Nov 2018 07:00) (56 - 104)  BP: 115/53 (21 Nov 2018 07:00) (93/44 - 129/66)  BP(mean): 72 (21 Nov 2018 07:00) (54 - 93)  RR: 20 (21 Nov 2018 07:00) (11 - 48)  SpO2: 96% (21 Nov 2018 07:00) (92% - 100%)    REVIEW OF SYSTEMS:    CONSTITUTIONAL: No fever, weight loss, or fatigue  EYES: No eye pain, visual disturbances, or discharge  ENMT:  No difficulty hearing, tinnitus, vertigo; No sinus or throat pain  NECK: No pain or stiffness  BREASTS: No pain, masses, or nipple discharge  RESPIRATORY: No cough, wheezing, chills or hemoptysis; No shortness of breath  CARDIOVASCULAR: No chest pain, palpitations, dizziness, or leg swelling  GASTROINTESTINAL: No abdominal or epigastric pain. No nausea, vomiting, or hematemesis; No diarrhea or constipation. No melena or hematochezia.  GENITOURINARY: No dysuria, frequency, hematuria, or incontinence  NEUROLOGICAL: No headaches, memory loss, loss of strength, numbness, or tremors  SKIN: No itching, burning, rashes, or lesions   LYMPH NODES: No enlarged glands  ENDOCRINE: No heat or cold intolerance; No hair loss  MUSCULOSKELETAL: No joint pain or swelling; No muscle, back, or extremity pain  PSYCHIATRIC: No depression, anxiety, mood swings, or difficulty sleeping  HEME/LYMPH: No easy bruising, or bleeding gums  ALLERGY AND IMMUNOLOGIC: No hives or eczema  VASCULAR: no swelling, erythema      Physical Exam: obese 77 yo  woman lying in bed, somnolent    Head: normocephalic, atraumatic    Eyes: PERRLA, EOMI, no nystagmus, sclera anicteric    ENT: nasal discharge, uvula midline, no oropharyngeal erythema/exudate    Neck: supple, negative JVD, negative carotid bruits, no thyromegaly    Chest:  crackles right base    Cardiovascular: regular rate and rhythm, neg murmurs/rubs/gallops    Abdomen: soft, obese, + PEG, non tender, negative rebound/guarding, normal bowel sounds    Extremities: WWP, neg cyanosis/clubbing/edema, negative calf tenderness to palpation, negative Meredith's sign    :     Neurologic Exam:    somnolent, follows commands    Cranial Nerves:     II:                       pupils equal, round and reactive to light, visual fields intact   III/ IV/VI:            extraocular movements intact, neg nystagmus, ptosis  V:                       facial sensation intact, V1-3 normal  VII:                     face symmetric, no droop, normal eye closure and smile  VIII:                    hearing intact to finger rub bilaterally  IX/ X:                 soft palate rise symmetrical  XI:                      head turning, shoulder shrug normal  XII:                     tongue midline    Motor Exam:    Upper Extremities:              Right:    poor effort > 3/5                                               Left:      poor effort > 3/5      Lower Extremities:             Right:     poor effort > 3/5                                               Left:        poor effort > 3/5    Sensory:              intact to LT/PP in all UE/LE dermatomes    DTR:                   = biceps/     triceps/     brachioradialis                            = patella/   medial hamstring/    ankle                            neg clonus                            neg Babinski                            neg Hoffmans      Romberg:  not tested    Tandem Walking:  not tested    Gait:  not tested        PM&R Impression:    1) deconditioned  2) no focal weakness  3) sepsis      Recommendations:    1) Physical therapy focusing on therapeutic exercises, bed mobility/transfer out of bed evaluation, progressive ambulation with assistive devices.    2) Anticipated Disposition Plan/Recs: subacute rehab placement, son is requesting alternate [facility] other than [facility]

## 2018-11-21 NOTE — PHYSICAL THERAPY INITIAL EVALUATION ADULT - GENERAL OBSERVATIONS, REHAB EVAL
Pt received supine in bed with +b/l SCDs, +EKG, +TLC, +PEG, +Nicole, +NC~3.5L, NAD. Pt left as found, NAD, call bell in reach, line intact, RN awares.

## 2018-11-21 NOTE — PROGRESS NOTE ADULT - PROBLEM SELECTOR PLAN 3
k 4.1  mixed respiratory alkalosis/metabolic acidoss with normal anion gap  - improving [id number] 16  suggest NaHCO3 - 650 mg po tid

## 2018-11-21 NOTE — PROGRESS NOTE ADULT - SUBJECTIVE AND OBJECTIVE BOX
SUBJECTIVE: Pt seen and examined at bedside. Smiles and orients to questions, no meaningful ROS obtainable.   Flatus: [ ] YES [ ] NO             Bowel Movement: [ ] YES [ ] NO  Pain (0-10):            Pain Control Adequate: [ ] YES [ ] NO  Nausea: [ ] YES [ ] NO            Vomiting: [ ] YES [ ] NO  Diarrhea: [ ] YES [ ] NO         Constipation: [ ] YES [ ] NO     Chest Pain: [ ] YES [ ] NO    SOB:  [ ] YES [ ] NO    MEDICATIONS  (STANDING):  acetylcysteine 20% Inhalation 4 milliLiter(s) Inhalation three times a day  ceFAZolin   IVPB 1000 milliGRAM(s) IV Intermittent every 12 hours  chlorhexidine 2% Cloths 1 Application(s) Topical daily  ciprofloxacin   IVPB 400 milliGRAM(s) IV Intermittent daily  dextrose 5% + sodium chloride 0.225% 1000 milliLiter(s) (125 mL/Hr) IV Continuous <Continuous>  dextrose 5%. 1000 milliLiter(s) (50 mL/Hr) IV Continuous <Continuous>  dextrose 50% Injectable 12.5 Gram(s) IV Push once  dextrose 50% Injectable 25 Gram(s) IV Push once  dextrose 50% Injectable 25 Gram(s) IV Push once  heparin  Injectable 5000 Unit(s) SubCutaneous every 8 hours  insulin lispro (HumaLOG) corrective regimen sliding scale   SubCutaneous every 6 hours  levothyroxine 150 MICROGram(s) Oral daily  midodrine 5 milliGRAM(s) Oral three times a day  pantoprazole   Suspension 40 milliGRAM(s) Enteral Tube daily    MEDICATIONS  (PRN):  dextrose 40% Gel 15 Gram(s) Oral once PRN Blood Glucose LESS THAN 70 milliGRAM(s)/deciliter  glucagon  Injectable 1 milliGRAM(s) IntraMuscular once PRN Glucose LESS THAN 70 milligrams/deciliter      Vital Signs Last 24 Hrs  T(C): 37.4 (21 Nov 2018 14:52), Max: 37.4 (21 Nov 2018 14:52)  T(F): 99.4 (21 Nov 2018 14:52), Max: 99.4 (21 Nov 2018 14:52)  HR: 72 (21 Nov 2018 15:15) (56 - 104)  BP: 114/58 (21 Nov 2018 15:15) (93/44 - 136/64)  BP(mean): 78 (21 Nov 2018 15:15) (54 - 97)  RR: 35 (21 Nov 2018 15:15) (11 - 48)  SpO2: 95% (21 Nov 2018 15:15) (92% - 99%)    PHYSICAL EXAM:    Constitutional: NAD, resting comfortably, on NC, orients and smiles to verbal stimulli  HEENT: MMM  CV: RRR on monitor  Resp: nonlabored breathing  Abd: soft, nondistended, nontender, PEG in place incontinent of brown stool with rectal tube  Ext: WWP, no edema, 2+ radial pulses bilaterally  LABS:                  I&O's Detail    20 Nov 2018 07:01  -  21 Nov 2018 07:00  --------------------------------------------------------  IN:    Enteral Tube Flush: 180 mL    Glucerna 1.5: 735 mL    IV PiggyBack: 250 mL  Total IN: 1165 mL    OUT:    Indwelling Catheter - Urethral: 1880 mL  Total OUT: 1880 mL    Total NET: -715 mL      21 Nov 2018 07:01  -  21 Nov 2018 16:56  --------------------------------------------------------  IN:    dextrose 5% + sodium chloride 0.225%: 875 mL    Enteral Tube Flush: 60 mL    Glucerna 1.5: 335 mL    IV PiggyBack: 250 mL  Total IN: 1520 mL    OUT:    Indwelling Catheter - Urethral: 345 mL  Total OUT: 345 mL    Total NET: 1175 mL          LABS:                        7.6    13.4  )-----------( 202      ( 21 Nov 2018 05:41 )             26.4     11-21    148<H>  |  120<H>  |  62<H>  ----------------------------<  171<H>  3.8   |  20<L>  |  1.95<H>    Ca    10.1      21 Nov 2018 12:40  Phos  3.9     11-21  Mg     2.1     11-21    TPro  5.7<L>  /  Alb  2.0<L>  /  TBili  0.3  /  DBili  x   /  AST  9<L>  /  ALT  7<L>  /  AlkPhos  64  11-20          RADIOLOGY & ADDITIONAL STUDIES:

## 2018-11-21 NOTE — PROGRESS NOTE ADULT - ASSESSMENT
78F PMHx MO, DM, HTN, hypothyroidism, PE s/p IVC filter, recent admissions for sepsis and acute blood loss anemia s/p SBR with primary anastomosis and PEG, p/w septic shock likely 2/2 PNA  and acute hypoxic respiratory failure. Improving    no acute surgical intervention, unlikely surgical pathology  care per primary team

## 2018-11-21 NOTE — PROGRESS NOTE ADULT - ASSESSMENT
78F w/ PMH obesity, DM, HTN, hypothyroidism, sepsis secondary to ventral hernia infection s/p repair, PE s/p IVC filter, acute blood loss anemia from hemorrhagic uterine fibroids and UGIB, ATN requiring HD, sepsis secondary to MSSA bacteremia and MDR E coli UTI w/AMS and s/p TAHBSO and small bowel resection s/p PEG. Presenting with septic shock likely 2/2 PNA vs UTI and acute hypoxic respiratory failure now extubated.      Neuro  #Toxic metabolic encephalopathy 2/2 septic shock  -Resolved  -baseline dementia  -patient is being treated for sepsis as below, post extubation.     Respiratory  #HAP with sputum culture staph aureus sn to cefazolin.   -sputum positive for staph aureus, prior u/a in past showed ESBL, continue cefazolin and cipro, DC zosyn.  -original u/a contaminated and resent one, but that was after antibiotics were given  -continue with cefazolin/cipro    CV  #hypotension  -resolved  -blood pressure currently 110s-120s/60s  - has history of HTN at baseline, hold all anti-hypertensives for now       GI  -ct scan showed debris vs stone in GB and resolution of bowel wall thickening  -surgery stated that likely not surgical candidate, abdominal exam unremarkable  -bilirubin <0.2, f/u CMP daily  -peg tube functional.         Renal  #Anion gap metabolic acidosis   resolved  - likely 2/2 to lactic acidosis 2/2 to decreased perfusion 2/2 to septic shock  - AG initially 21, closed s/p IVF    #BUN/Cr elevation  Initial  and Cr of 3.38 increased from baseline of 21/0.68 in June 2018. Likely 2/2 to both pre-renal and intrinsic renal -possibly ATN.  BUN/Cr improving after IVF hydration to 66/2  -Trend BMP  -avoid nephrotoxins    ID  #Sepsis 2/2 HAP and concern for UTI  -sputum cx staph aureus sn to cefazolin  - 1st urine culture rejected, and repeat was sent post antibiotics but await results  -c/w cefazolin/cipro    Endo  #hyperglycemia  patient w/ history of DM as well. Not in DKA. Beta hydroxybutyrate is 0.2.   -ISS  -FSBS    #hypothyroidism  Takes synthroid 150mcg PO daily at home  -Continue synthroid    Heme:  #anemia  macrocytic anemia noted on initial labs  -f/u b12, folate wnl      F: none  E: K>4 Mag >2  N: glucerna 55 cc/hr  Will need goals of care discussion with the son due to patient having frequent aspiration events, and multiple hospitalizations.     Dvt ppx: hep sq  GI ppx: protonix 78F w/ PMH obesity, DM, HTN, hypothyroidism, sepsis secondary to ventral hernia infection s/p repair, PE s/p IVC filter, acute blood loss anemia from hemorrhagic uterine fibroids and UGIB, ATN requiring HD, sepsis secondary to MSSA bacteremia and MDR E coli UTI w/AMS and s/p TAHBSO and small bowel resection s/p PEG. Presenting with septic shock likely 2/2 PNA vs UTI and acute hypoxic respiratory failure now extubated.      Neuro  #Toxic metabolic encephalopathy 2/2 septic shock  -Resolved  -baseline dementia  -patient is being treated for sepsis as below, post extubation.     Respiratory  #HAP with sputum culture staph aureus sn to cefazolin.   -sputum positive for staph aureus, prior u/a in past showed ESBL, continue cefazolin and cipro, DC zosyn.  -original u/a contaminated and resent one, but that was after antibiotics were given  -continue with cefazolin/cipro    CV  #hypotension  -resolved  -blood pressure currently 110s-120s/60s  - has history of HTN at baseline, hold all anti-hypertensives for now       GI  -ct scan showed debris vs stone in GB and resolution of bowel wall thickening  -surgery stated that likely not surgical candidate, abdominal exam unremarkable  -bilirubin <0.2, f/u CMP daily  -peg tube functional.         Renal  #Anion gap metabolic acidosis   resolved  - likely 2/2 to lactic acidosis 2/2 to decreased perfusion 2/2 to septic shock  - AG initially 21, closed s/p IVF    #BUN/Cr elevation  Initial  and Cr of 3.38 increased from baseline of 21/0.68 in June 2018. Likely 2/2 to both pre-renal and intrinsic renal -possibly ATN.  BUN/Cr improving after IVF hydration to 66/2  -Trend BMP  -avoid nephrotoxins    #Hypernatremia  -Na 150, D5/ 1/4 NS at 125 cc/hr   -recheck serum BMP    ID  #Sepsis 2/2 HAP and concern for UTI  -sputum cx staph aureus sn to cefazolin  - 1st urine culture rejected, and repeat was sent post antibiotics but await results  -c/w cefazolin/cipro    Endo  #hyperglycemia  patient w/ history of DM as well. Not in DKA. Beta hydroxybutyrate is 0.2.   -ISS  -FSBS    #hypothyroidism  Takes synthroid 150mcg PO daily at home  -Continue synthroid    Heme:  #anemia  macrocytic anemia noted on initial labs  -f/u b12, folate wnl      F: none  E: K>4 Mag >2  N: glucerna 55 cc/hr  Will need goals of care discussion with the son due to patient having frequent aspiration events, and multiple hospitalizations.     Dvt ppx: hep sq  GI ppx: protonix

## 2018-11-21 NOTE — PROGRESS NOTE ADULT - PROBLEM SELECTOR PLAN 1
nonoliguric GONZALEZ likely prerenal in patient with septic shock secondary to UTI and pneumonia vs ATN  s/p vanco  now on zosyn  Cr 2.9> 2.8 >2.8 > 2.6 > 2.4 > 2.0 today, baseline 0.6 in 6/18  UOP 1.4 L over the past 24 hrs  Avoid nephrotoxic meds  Renal dose adjustment of antibiotics  Monitor I/O  Maintain renal perfusion

## 2018-11-21 NOTE — PROGRESS NOTE ADULT - PROBLEM SELECTOR PLAN 2
likely 2/2 to septic shock  has history of HTN at baseline, hold all anti-hypertensives in setting of septic shock  now off pressors  Lactate trending down.

## 2018-11-21 NOTE — PROGRESS NOTE ADULT - SUBJECTIVE AND OBJECTIVE BOX
Overnight: Patient had aspiration from PEG tube feeds, and had secretions present as well, desaturated to 88% and was placed on nonrebreather, and feeds were held  Saturation improved to 95%+ and patient was on 3 L nasal cannula in the morning. Chest xray shows increased consolidation on right lower lung field.  Likely chronic aspiration.         PHYSICAL EXAM:  GENERAL: Obese adult White female, intubated and sedated, in NAD  HEAD: atraumatic  EYES: PERRL, resists passive opening of eyes, conjunctiva pink  ENT: moist mucous membranes  NECK: supple  CHEST/LUNG: rhonchi bilaterally, R > L lower lobe  HEART: regular tachycardia, no murmur or rub  ABDOMEN: obese, soft, non-tender, non-distended, without rebound or guarding  EXTREMITIES: warm, well-perfused, without edema  NERVOUS SYSTEM: sedated; arouses to voice, does not follow commands or answer questions  SKIN: No rashes      ICU Vital Signs Last 24 Hrs  T(C): 37 (21 Nov 2018 09:50), Max: 37 (21 Nov 2018 09:50)  T(F): 98.6 (21 Nov 2018 09:50), Max: 98.6 (21 Nov 2018 09:50)  HR: 72 (21 Nov 2018 10:00) (56 - 104)  BP: 133/65 (21 Nov 2018 10:00) (93/44 - 133/65)  BP(mean): 82 (21 Nov 2018 10:00) (54 - 93)  ABP: --  ABP(mean): --  RR: 15 (21 Nov 2018 10:00) (11 - 48)  SpO2: 97% (21 Nov 2018 10:00) (92% - 99%)      20 Nov 2018 07:01  -  21 Nov 2018 07:00  --------------------------------------------------------  IN:    Enteral Tube Flush: 180 mL    Glucerna 1.5: 735 mL    IV PiggyBack: 250 mL  Total IN: 1165 mL    OUT:    Indwelling Catheter - Urethral: 1880 mL  Total OUT: 1880 mL    Total NET: -715 mL      21 Nov 2018 07:01  -  21 Nov 2018 10:57  --------------------------------------------------------  IN:    dextrose 5% + sodium chloride 0.225%: 250 mL    Glucerna 1.5: 90 mL    IV PiggyBack: 50 mL  Total IN: 390 mL    OUT:    Indwelling Catheter - Urethral: 115 mL  Total OUT: 115 mL    Total NET: 275 mL      .  LABS:                         7.6    13.4  )-----------( 202      ( 21 Nov 2018 05:41 )             26.4     11-21    150<H>  |  123<H>  |  67<H>  ----------------------------<  131<H>  4.1   |  16<L>  |  2.02<H>    Ca    10.5      21 Nov 2018 05:41  Phos  3.9     11-21  Mg     2.1     11-21    TPro  5.7<L>  /  Alb  2.0<L>  /  TBili  0.3  /  DBili  x   /  AST  9<L>  /  ALT  7<L>  /  AlkPhos  64  11-20    CT abdomen with oral contrast.   Impression: Hyperdense material in the gallbladder which may represent   debris or stones    Resolution of bowel wall thickening.    No significant ascites or drainable fluid collection.    Increased pleural effusions and adjacent consolidations.

## 2018-11-21 NOTE — PROGRESS NOTE ADULT - SUBJECTIVE AND OBJECTIVE BOX
Patient is a 78y old  Female who presents with a chief complaint of Septic shock and acute respiratory failure (16 Nov 2018 23:11)-    Renal Consult was called for GONZALEZ in patient admitted for septic shock likely due to UTI and possible aspiration pneumonia.  CXR: suggestive of RLL pneumonia. Patient was seen and examined at bedside. She was extubated and oxygenating on NC today.    Initial  and Cr of 3.38 increased from baseline of 21/0.68 in June 2018.  Likely 2/2 to both pre-renal and intrinsic renal - possibly ATN due to hypoperfusion.  Cr improved slightly to 2.6> 2.4 > 2.0 today. However, noted to be hypernatremic, Na 150, likely due to dehydration.        HPI:    78F w/ PMH of morbid obesity, DM, HTN, hypothyroidism, sepsis secondary to ventral hernia infection s/p repair, PE s/p IVC filter, acute blood loss anemia from hemorrhagic uterine fibroids and UGIB, ATN requiring HD, sepsis secondary to MSSA bacteremia and MDR E coli UTI now w/AMS and s/p TAHBSO and small bowel resection s/p PEG. Patient presented from [facility] with concern for sepsis and hypoxia. Per EMS, patient was saturating 85% on RA at [facility] with some improvement to 95% after being placed on NRB.  On arrival to the ED, patient was altered and saturating 89%. She was intubated in the ED with suctioning revealing purulent material.       Meds:    acetylcysteine 20% Inhalation 4 milliLiter(s) Inhalation three times a day  ceFAZolin   IVPB 1000 milliGRAM(s) IV Intermittent every 12 hours  chlorhexidine 2% Cloths 1 Application(s) Topical daily  ciprofloxacin   IVPB 400 milliGRAM(s) IV Intermittent daily  dextrose 40% Gel 15 Gram(s) Oral once PRN  dextrose 5% + sodium chloride 0.225% 1000 milliLiter(s) IV Continuous <Continuous>  dextrose 5%. 1000 milliLiter(s) IV Continuous <Continuous>  dextrose 50% Injectable 12.5 Gram(s) IV Push once  dextrose 50% Injectable 25 Gram(s) IV Push once  dextrose 50% Injectable 25 Gram(s) IV Push once  glucagon  Injectable 1 milliGRAM(s) IntraMuscular once PRN  heparin  Injectable 5000 Unit(s) SubCutaneous every 8 hours  insulin lispro (HumaLOG) corrective regimen sliding scale   SubCutaneous every 6 hours  levothyroxine 150 MICROGram(s) Oral daily  midodrine 5 milliGRAM(s) Oral three times a day  pantoprazole   Suspension 40 milliGRAM(s) Enteral Tube daily      T(C): 37 (11-21-18 @ 09:50), Max: 37 (11-21-18 @ 09:50)  HR: 70 (11-21-18 @ 11:00) (56 - 104)  BP: 125/66 (11-21-18 @ 11:00) (93/44 - 133/65)  RR: 15 (11-21-18 @ 11:00) (11 - 48)  SpO2: 98% (11-21-18 @ 11:00) (92% - 99%)    Input/Output      11-20-18 @ 07:01  -  11-21-18 @ 07:00  --------------------------------------------------------  IN: 1165 mL / OUT: 1880 mL / NET: -715 mL    11-21-18 @ 07:01  -  11-21-18 @ 11:33  --------------------------------------------------------  IN: 545 mL / OUT: 155 mL / NET: 390 mL        ROS:     Gen: no fever  Cardiovascular: no chest pain  Respiratory: no cough, no sputum  Gastrointestinal: no nausea, no vomiting, no change in bowel habits  Neurologic: no headache  : no urinary symptoms        PHYSICAL EXAM:  elderly lady, on NC, lying in bed, not in distress  Respiratory: air entry adequate, no creps, no wheeze  Cardiac: +S1/S2; regular rate and rhythm; no murmur, no rub, no gallop  Gastrointestinal: obese, abdomen soft (+) PEG in place no drainage or eyrthema to the site  Genitourinary: normal external genitalia, suazo in place  neuro: drowsy  ext no edema b/l  no rash        LABS:                                     7.6    13.4  )-----------( 202      ( 21 Nov 2018 05:41 )             26.4       11-21    150<H>  |  123<H>  |  67<H>  ----------------------------<  131<H>  4.1   |  16<L>  |  2.02<H>    Ca    10.5      21 Nov 2018 05:41  Phos  3.9     11-21  Mg     2.1     11-21    TPro  5.7<L>  /  Alb  2.0<L>  /  TBili  0.3  /  DBili  x   /  AST  9<L>  /  ALT  7<L>  /  AlkPhos  64  11-20

## 2018-11-21 NOTE — PHYSICAL THERAPY INITIAL EVALUATION ADULT - BED MOBILITY LIMITATIONS, REHAB EVAL
decreased ability to use arms for pushing/pulling/impaired ability to control trunk for mobility/decreased ability to use legs for bridging/pushing/Pt dangled at edge of bed for~8 mins with mod A x 1, +left listing and requires max tactile cues to maintain upright posture,.

## 2018-11-21 NOTE — PROGRESS NOTE ADULT - PROBLEM SELECTOR PLAN 4
Na 150  free water deficit - 1.2 L for goal Na 145  Suggest D5W 1.2 L or 0.25% saline 2 L ; 80 - 100 CC/HR

## 2018-11-21 NOTE — PHYSICAL THERAPY INITIAL EVALUATION ADULT - MANUAL MUSCLE TESTING RESULTS, REHAB EVAL
unable to perform formal MMT, strength assessed through functional assessment, b/l UEs~3/5 grossly, RLE~2/5 grossly, LLE~3-/5 grossly

## 2018-11-21 NOTE — CONSULT NOTE ADULT - ASSESSMENT
78F w/ PMH obesity, DM, HTN, hypothyroidism, sepsis secondary to ventral hernia infection s/p repair, PE s/p IVC filter, acute blood loss anemia from hemorrhagic uterine fibroids and UGIB, ATN requiring HD, sepsis secondary to MSSA bacteremia and MDR E coli UTI w/AMS and s/p TAHBSO and small bowel resection s/p PEG. Presenting with septic shock likely 2/2 PNA vs UTI and acute hypoxic respiratory failure now extubated.      Neuro  #Toxic metabolic encephalopathy 2/2 septic shock  -Resolved  -patient is being treated for sepsis as below, post extubation.     Respiratory  #HAP with sputum culture staph aureus sn to cefazolin.   -sputum positive for staph aureus, prior u/a in past showed ESBL, continue cefazolin and cipro, DC zosyn.  -original u/a contaminated and resent one, but that was after antibiotics were given  -continue with cefazolin/cipro    CV  #hypotension  -resolved  -blood pressure currently 110s-120s/60s  - has history of HTN at baseline, hold all anti-hypertensives for now       GI  -ct scan showed debris vs stone in GB and resolution of bowel wall thickening  -surgery stated that likely not surgical candidate, abdominal exam unremarkable  -bilirubin <0.2, f/u CMP daily  -f/u abdominal US of liver /gb   -peg tube functional.         Renal  #Anion gap metabolic acidosis   resolved  - likely 2/2 to lactic acidosis 2/2 to decreased perfusion 2/2 to septic shock  - AG initially 21, closed s/p IVF    #BUN/Cr elevation  Initial  and Cr of 3.38 increased from baseline of 21/0.68 in June 2018. Likely 2/2 to both pre-renal and intrinsic renal -possibly ATN.  BUN/Cr improving after IVF hydration to 88/2.65  -Trend BMP  -avoid nephrotoxins    ID  #Sepsis 2/2 HAP and concern for UTI  -sputum cx staph aureus sn to cefazolin  - 1st urine culture rejected, and repeat was sent post antibiotics but await results  -c/w cefazolin/cipro

## 2018-11-21 NOTE — CHART NOTE - NSCHARTNOTEFT_GEN_A_CORE
Admitting Diagnosis:   Patient is a 78y old  Female who presents with a chief complaint of Septic shock and acute respiratory failure (21 Nov 2018 11:31)      PAST MEDICAL & SURGICAL HISTORY:  Hypothyroidism  GERD (gastroesophageal reflux disease)  Obesity  DM (diabetes mellitus)  HLD (hyperlipidemia)  HTN (hypertension)  Status post total abdominal hysterectomy and bilateral salpingo-oophorectomy  S/P small bowel resection  H/O ventral hernia repair      Current Nutrition Order:  Glucerna 1.5 Nicholas @ 55mL/hr x 24hrs via PEG (1320 mL TV, 1980kcal, 109g protein, 1002mL free H2O)       PO Intake: Good (%) [   ]  Fair (50-75%) [   ] Poor (<25%) [   ]- N/A NPO w/EN     GI Issues: 1 episode of vomiting overnight. C/f aspiration of TF     Pain: No pain endorsed    Skin Integrity: Sacrum DTI and R. buttock stage II pressure injury     Labs:   11-21    148<H>  |  120<H>  |  62<H>  ----------------------------<  171<H>  3.8   |  20<L>  |  1.95<H>    Ca    10.1      21 Nov 2018 12:40  Phos  3.9     11-21  Mg     2.1     11-21    TPro  5.7<L>  /  Alb  2.0<L>  /  TBili  0.3  /  DBili  x   /  AST  9<L>  /  ALT  7<L>  /  AlkPhos  64  11-20    CAPILLARY BLOOD GLUCOSE      POCT Blood Glucose.: 160 mg/dL (21 Nov 2018 11:12)  POCT Blood Glucose.: 136 mg/dL (21 Nov 2018 04:59)  POCT Blood Glucose.: 108 mg/dL (20 Nov 2018 23:01)  POCT Blood Glucose.: 95 mg/dL (20 Nov 2018 17:42)      Medications:  MEDICATIONS  (STANDING):  acetylcysteine 20% Inhalation 4 milliLiter(s) Inhalation three times a day  ceFAZolin   IVPB 1000 milliGRAM(s) IV Intermittent every 12 hours  chlorhexidine 2% Cloths 1 Application(s) Topical daily  ciprofloxacin   IVPB 400 milliGRAM(s) IV Intermittent daily  dextrose 5% + sodium chloride 0.225% 1000 milliLiter(s) (125 mL/Hr) IV Continuous <Continuous>  dextrose 5%. 1000 milliLiter(s) (50 mL/Hr) IV Continuous <Continuous>  dextrose 50% Injectable 12.5 Gram(s) IV Push once  dextrose 50% Injectable 25 Gram(s) IV Push once  dextrose 50% Injectable 25 Gram(s) IV Push once  heparin  Injectable 5000 Unit(s) SubCutaneous every 8 hours  insulin lispro (HumaLOG) corrective regimen sliding scale   SubCutaneous every 6 hours  levothyroxine 150 MICROGram(s) Oral daily  midodrine 5 milliGRAM(s) Oral three times a day  pantoprazole   Suspension 40 milliGRAM(s) Enteral Tube daily    MEDICATIONS  (PRN):  dextrose 40% Gel 15 Gram(s) Oral once PRN Blood Glucose LESS THAN 70 milliGRAM(s)/deciliter  glucagon  Injectable 1 milliGRAM(s) IntraMuscular once PRN Glucose LESS THAN 70 milligrams/deciliter      Weight: 69.2kg   Daily     Daily     Weight Change: No new weights recorded since admit     Estimated energy needs: Utilized IBW (54.5kg) to calculate needs, pt >120% of IBW. Adjusted for sepsis/wound healing  Calories: 25-30 kcal/kg =1362-1635kcal/day  Protein: 1.2-1.4 g/kg = 65-76g protein/day  Fluids: 30-35 mL/kg = 5089-1402 mL/day    Subjective: 79y/o F presenting with septic shock likely 2/2 PNA vs UTI and acute hypoxic respiratory failure initially requiring intubation. Pt extubated on 11/19, now on NC. EN running at 30mL/hr with improved tolerance per RN. Noted to have 1 vomiting episode O/N. Recommend addition of promotility agent. +BM 11/21. Na 150(H)- recommend additional free H2O flushes.     Previous Nutrition Diagnosis:  Inadequate energy intake RT no nutrition order placed AEB pt meeting 0% of EER    Active [   ]  Resolved [ X  ]    If resolved, new PES: Increased protein-calorie needs RT increased demand for protein-calorie intake AEB sepsis/wound healing     Goal: Pt will meet % of EER per day via tolerated route     Recommendations:  1. *PER DISCUSSION WITH PHARMACIST CONCEPCION, FEEDS NEED TO BE HELD FOR 1 HR PRE AND POST LEVOTHYROXINE ADMIN* TUBE FEED RECS BELOW ARE ADJUSTED FOR 22HRS  RECOMMEND: Jevity 1.2 Nicholas @ 55mL/hr x 24hrs via PEG (1320mL TV, 1584kcal, 73g protein, 1065mL free H2O, 132% RDI, 1.34g/kg IBW protein)  *recommend pro-motility agent and bowel regimen per MD discretion  *please volume VBF protocol   2. Monitor for s/s intolerance; maintain aspiration precautions at all times  3. Monitor lytes and replete prn.  4. Trend weights    Education: N/A- not indicated at this time     Risk Level: High [  X ] Moderate [   ] Low [   ] Admitting Diagnosis:   Patient is a 78y old  Female who presents with a chief complaint of Septic shock and acute respiratory failure (21 Nov 2018 11:31)      PAST MEDICAL & SURGICAL HISTORY:  Hypothyroidism  GERD (gastroesophageal reflux disease)  Obesity  DM (diabetes mellitus)  HLD (hyperlipidemia)  HTN (hypertension)  Status post total abdominal hysterectomy and bilateral salpingo-oophorectomy  S/P small bowel resection  H/O ventral hernia repair      Current Nutrition Order:  Glucerna 1.5 Nicholas @ 55mL/hr x 24hrs via PEG (1320 mL TV, 1980kcal, 109g protein, 1002mL free H2O)       PO Intake: Good (%) [   ]  Fair (50-75%) [   ] Poor (<25%) [   ]- N/A NPO w/EN     GI Issues: 1 episode of vomiting overnight. C/f aspiration of TF     Pain: No pain endorsed    Skin Integrity: Sacrum DTI and R. buttock stage II pressure injury     Labs:   11-21    148<H>  |  120<H>  |  62<H>  ----------------------------<  171<H>  3.8   |  20<L>  |  1.95<H>    Ca    10.1      21 Nov 2018 12:40  Phos  3.9     11-21  Mg     2.1     11-21    TPro  5.7<L>  /  Alb  2.0<L>  /  TBili  0.3  /  DBili  x   /  AST  9<L>  /  ALT  7<L>  /  AlkPhos  64  11-20    CAPILLARY BLOOD GLUCOSE      POCT Blood Glucose.: 160 mg/dL (21 Nov 2018 11:12)  POCT Blood Glucose.: 136 mg/dL (21 Nov 2018 04:59)  POCT Blood Glucose.: 108 mg/dL (20 Nov 2018 23:01)  POCT Blood Glucose.: 95 mg/dL (20 Nov 2018 17:42)      Medications:  MEDICATIONS  (STANDING):  acetylcysteine 20% Inhalation 4 milliLiter(s) Inhalation three times a day  ceFAZolin   IVPB 1000 milliGRAM(s) IV Intermittent every 12 hours  chlorhexidine 2% Cloths 1 Application(s) Topical daily  ciprofloxacin   IVPB 400 milliGRAM(s) IV Intermittent daily  dextrose 5% + sodium chloride 0.225% 1000 milliLiter(s) (125 mL/Hr) IV Continuous <Continuous>  dextrose 5%. 1000 milliLiter(s) (50 mL/Hr) IV Continuous <Continuous>  dextrose 50% Injectable 12.5 Gram(s) IV Push once  dextrose 50% Injectable 25 Gram(s) IV Push once  dextrose 50% Injectable 25 Gram(s) IV Push once  heparin  Injectable 5000 Unit(s) SubCutaneous every 8 hours  insulin lispro (HumaLOG) corrective regimen sliding scale   SubCutaneous every 6 hours  levothyroxine 150 MICROGram(s) Oral daily  midodrine 5 milliGRAM(s) Oral three times a day  pantoprazole   Suspension 40 milliGRAM(s) Enteral Tube daily    MEDICATIONS  (PRN):  dextrose 40% Gel 15 Gram(s) Oral once PRN Blood Glucose LESS THAN 70 milliGRAM(s)/deciliter  glucagon  Injectable 1 milliGRAM(s) IntraMuscular once PRN Glucose LESS THAN 70 milligrams/deciliter      Weight: 69.2kg   Daily     Daily     Weight Change: No new weights recorded since admit     Estimated energy needs: Utilized IBW (54.5kg) to calculate needs, pt >120% of IBW. Adjusted for sepsis/wound healing  Calories: 25-30 kcal/kg =1362-1635kcal/day  Protein: 1.2-1.4 g/kg = 65-76g protein/day  Fluids: 30-35 mL/kg = 4515-1643 mL/day    Subjective: 77y/o F presenting with septic shock likely 2/2 PNA vs UTI and acute hypoxic respiratory failure initially requiring intubation. Pt extubated on 11/19, now on NC. EN running at 30mL/hr with improved tolerance per RN. Noted to have 1 vomiting episode O/N. Recommend addition of promotility agent. +BM 11/21. Na 150(H)- recommend additional free H2O flushes.     Previous Nutrition Diagnosis:  Inadequate energy intake RT no nutrition order placed AEB pt meeting 0% of EER    Active [   ]  Resolved [ X  ]    If resolved, new PES: Increased protein-calorie needs RT increased demand for protein-calorie intake AEB sepsis/wound healing     Goal: Pt will meet % of EER per day via tolerated route     Recommendations:  1. *PER DISCUSSION WITH PHARMACIST CONCEPCION, FEEDS NEED TO BE HELD FOR 1 HR PRE AND POST LEVOTHYROXINE ADMIN* TUBE FEED RECS BELOW ARE ADJUSTED FOR 22HRS  RECOMMEND: Jevity 1.2 Nicholas @ 60mL/hr x 22hrs via PEG (1320mL TV, 1584kcal, 73g protein, 1065mL free H2O, 132% RDI, 1.34g/kg IBW protein)  *recommend pro-motility agent and bowel regimen per MD discretion  *please volume VBF protocol   2. Monitor for s/s intolerance; maintain aspiration precautions at all times  3. Monitor lytes and replete prn.  4. Trend weights    Education: N/A- not indicated at this time     Risk Level: High [  X ] Moderate [   ] Low [   ]

## 2018-11-21 NOTE — PHYSICAL THERAPY INITIAL EVALUATION ADULT - PERTINENT HX OF CURRENT PROBLEM, REHAB EVAL
Pt is a 79yo female presented from [facility] with concern for sepsis and hypoxia.

## 2018-11-21 NOTE — PHYSICAL THERAPY INITIAL EVALUATION ADULT - ADDITIONAL COMMENTS
unable to obtain social history from pt secondary pt is a poor historian, According to chart, pt came from rehab.

## 2018-11-22 LAB
ALBUMIN SERPL ELPH-MCNC: 2.1 G/DL — LOW (ref 3.3–5)
ALP SERPL-CCNC: 119 U/L — SIGNIFICANT CHANGE UP (ref 40–120)
ALT FLD-CCNC: 7 U/L — LOW (ref 10–45)
ANION GAP SERPL CALC-SCNC: 10 MMOL/L — SIGNIFICANT CHANGE UP (ref 5–17)
AST SERPL-CCNC: 14 U/L — SIGNIFICANT CHANGE UP (ref 10–40)
BASOPHILS NFR BLD AUTO: 0.2 % — SIGNIFICANT CHANGE UP (ref 0–2)
BILIRUB SERPL-MCNC: 0.2 MG/DL — SIGNIFICANT CHANGE UP (ref 0.2–1.2)
BUN SERPL-MCNC: 53 MG/DL — HIGH (ref 7–23)
CALCIUM SERPL-MCNC: 9.9 MG/DL — SIGNIFICANT CHANGE UP (ref 8.4–10.5)
CHLORIDE SERPL-SCNC: 114 MMOL/L — HIGH (ref 96–108)
CO2 SERPL-SCNC: 22 MMOL/L — SIGNIFICANT CHANGE UP (ref 22–31)
CREAT SERPL-MCNC: 1.63 MG/DL — HIGH (ref 0.5–1.3)
EOSINOPHIL NFR BLD AUTO: 1.8 % — SIGNIFICANT CHANGE UP (ref 0–6)
GLUCOSE BLDC GLUCOMTR-MCNC: 118 MG/DL — HIGH (ref 70–99)
GLUCOSE BLDC GLUCOMTR-MCNC: 129 MG/DL — HIGH (ref 70–99)
GLUCOSE BLDC GLUCOMTR-MCNC: 178 MG/DL — HIGH (ref 70–99)
GLUCOSE BLDC GLUCOMTR-MCNC: 221 MG/DL — HIGH (ref 70–99)
GLUCOSE SERPL-MCNC: 230 MG/DL — HIGH (ref 70–99)
HCT VFR BLD CALC: 23.2 % — LOW (ref 34.5–45)
HGB BLD-MCNC: 6.8 G/DL — CRITICAL LOW (ref 11.5–15.5)
LYMPHOCYTES # BLD AUTO: 8.4 % — LOW (ref 13–44)
MAGNESIUM SERPL-MCNC: 2 MG/DL — SIGNIFICANT CHANGE UP (ref 1.6–2.6)
MCHC RBC-ENTMCNC: 28.5 PG — SIGNIFICANT CHANGE UP (ref 27–34)
MCHC RBC-ENTMCNC: 29.3 G/DL — LOW (ref 32–36)
MCV RBC AUTO: 97.1 FL — SIGNIFICANT CHANGE UP (ref 80–100)
MONOCYTES NFR BLD AUTO: 3.6 % — SIGNIFICANT CHANGE UP (ref 2–14)
NEUTROPHILS NFR BLD AUTO: 86 % — HIGH (ref 43–77)
PLATELET # BLD AUTO: 200 K/UL — SIGNIFICANT CHANGE UP (ref 150–400)
POTASSIUM SERPL-MCNC: 3.6 MMOL/L — SIGNIFICANT CHANGE UP (ref 3.5–5.3)
POTASSIUM SERPL-SCNC: 3.6 MMOL/L — SIGNIFICANT CHANGE UP (ref 3.5–5.3)
PROT SERPL-MCNC: 5.4 G/DL — LOW (ref 6–8.3)
RBC # BLD: 2.39 M/UL — LOW (ref 3.8–5.2)
RBC # FLD: 18.4 % — HIGH (ref 10.3–16.9)
SODIUM SERPL-SCNC: 146 MMOL/L — HIGH (ref 135–145)
WBC # BLD: 13.1 K/UL — HIGH (ref 3.8–10.5)
WBC # FLD AUTO: 13.1 K/UL — HIGH (ref 3.8–10.5)

## 2018-11-22 PROCEDURE — 99233 SBSQ HOSP IP/OBS HIGH 50: CPT | Mod: GC

## 2018-11-22 PROCEDURE — 71045 X-RAY EXAM CHEST 1 VIEW: CPT | Mod: 26

## 2018-11-22 RX ORDER — POTASSIUM CHLORIDE 20 MEQ
20 PACKET (EA) ORAL
Qty: 0 | Refills: 0 | Status: DISCONTINUED | OUTPATIENT
Start: 2018-11-22 | End: 2018-11-22

## 2018-11-22 RX ORDER — LACTOBACILLUS ACIDOPHILUS 100MM CELL
1 CAPSULE ORAL EVERY 8 HOURS
Qty: 0 | Refills: 0 | Status: DISCONTINUED | OUTPATIENT
Start: 2018-11-22 | End: 2018-11-22

## 2018-11-22 RX ORDER — SODIUM CHLORIDE 9 MG/ML
1000 INJECTION INTRAMUSCULAR; INTRAVENOUS; SUBCUTANEOUS
Qty: 0 | Refills: 0 | Status: DISCONTINUED | OUTPATIENT
Start: 2018-11-22 | End: 2018-11-23

## 2018-11-22 RX ADMIN — PANTOPRAZOLE SODIUM 40 MILLIGRAM(S): 20 TABLET, DELAYED RELEASE ORAL at 12:34

## 2018-11-22 RX ADMIN — MIDODRINE HYDROCHLORIDE 5 MILLIGRAM(S): 2.5 TABLET ORAL at 21:26

## 2018-11-22 RX ADMIN — Medication 2: at 12:31

## 2018-11-22 RX ADMIN — MIDODRINE HYDROCHLORIDE 5 MILLIGRAM(S): 2.5 TABLET ORAL at 06:56

## 2018-11-22 RX ADMIN — Medication 100 MILLIGRAM(S): at 21:26

## 2018-11-22 RX ADMIN — HEPARIN SODIUM 5000 UNIT(S): 5000 INJECTION INTRAVENOUS; SUBCUTANEOUS at 14:05

## 2018-11-22 RX ADMIN — Medication 150 MICROGRAM(S): at 07:38

## 2018-11-22 RX ADMIN — SODIUM CHLORIDE 50 MILLILITER(S): 9 INJECTION INTRAMUSCULAR; INTRAVENOUS; SUBCUTANEOUS at 12:34

## 2018-11-22 RX ADMIN — Medication 50 MILLIEQUIVALENT(S): at 08:31

## 2018-11-22 RX ADMIN — CHLORHEXIDINE GLUCONATE 1 APPLICATION(S): 213 SOLUTION TOPICAL at 06:57

## 2018-11-22 RX ADMIN — HEPARIN SODIUM 5000 UNIT(S): 5000 INJECTION INTRAVENOUS; SUBCUTANEOUS at 21:26

## 2018-11-22 RX ADMIN — Medication 200 MILLIGRAM(S): at 12:34

## 2018-11-22 RX ADMIN — Medication 4 MILLILITER(S): at 05:40

## 2018-11-22 RX ADMIN — Medication 4: at 06:59

## 2018-11-22 RX ADMIN — Medication 50 MILLIEQUIVALENT(S): at 07:03

## 2018-11-22 RX ADMIN — Medication 100 MILLIGRAM(S): at 10:22

## 2018-11-22 RX ADMIN — MIDODRINE HYDROCHLORIDE 5 MILLIGRAM(S): 2.5 TABLET ORAL at 14:05

## 2018-11-22 RX ADMIN — HEPARIN SODIUM 5000 UNIT(S): 5000 INJECTION INTRAVENOUS; SUBCUTANEOUS at 06:56

## 2018-11-22 RX ADMIN — SODIUM CHLORIDE 125 MILLILITER(S): 9 INJECTION, SOLUTION INTRAVENOUS at 02:34

## 2018-11-22 NOTE — PROGRESS NOTE ADULT - ASSESSMENT
The patient with non oliguric GONZALEZ - secondary to ATN from septic shock now improving. Hypernatremia - mild - please consider stopping the bicarb drip and switch to d5 %  70 ml/h or free water 250 ml every 4 hours or encourage oral intake to 1.5 liter a day

## 2018-11-22 NOTE — PROGRESS NOTE ADULT - ASSESSMENT
78F w/ PMH obesity, DM, HTN, hypothyroidism, sepsis secondary to ventral hernia infection s/p repair, PE s/p IVC filter, acute blood loss anemia from hemorrhagic uterine fibroids and UGIB, ATN requiring HD, sepsis secondary to MSSA bacteremia and MDR E coli UTI w/AMS and s/p TAHBSO and small bowel resection s/p PEG. Presenting with septic shock likely 2/2 PNA vs UTI and acute hypoxic respiratory failure now extubated.    Neuro  #Toxic metabolic encephalopathy 2/2 septic shock  -Resolved  -baseline dementia  -patient is being treated for sepsis as below, post extubation.     Respiratory  #HAP with sputum culture staph aureus sn to cefazolin.   -sputum positive for staph aureus  -continue with cefazolin  -currently afebrile, chest xray shows infiltrate on right lung field.   -percussion vest for mucous    CV  #hypotension  -resolved, no need for pressors currently   -on midodrine 5 Q8, can reduce as appropriate.   -blood pressure currently 110s-120s/60s  - has history of HTN at baseline, hold all anti-hypertensives for now       GI  -ct scan showed debris vs stone in GB and resolution of bowel wall thickening  -surgery stated that likely not surgical candidate, abdominal exam unremarkable  -bilirubin <0.2, f/u CMP daily  -peg tube functional.     Renal  #Anion gap metabolic acidosis   resolved  - likely 2/2 to lactic acidosis 2/2 to decreased perfusion 2/2 to septic shock  - AG initially 21, closed s/p IVF    #BUN/Cr elevation  Initial  and Cr of 3.38 increased from baseline of 21/0.68 in June 2018. Likely 2/2 to both pre-renal and intrinsic renal.  BUN/Cr now currently 53/1.63  -appreciate nephrology recommendations  -Trend BMP  -avoid nephrotoxins    #Hypernatremia  -Na 150 11/21  -today Na of 146 (post D51/4 NS at 125 cc/hr)  -c/w 1/4 NS at 50 cc/hr  -recheck serum BMP    ID  #Sepsis 2/2 HAP and concern for UTI  -sputum cx staph aureus sn to cefazolin  -c/w cefazolin  - 1st urine culture rejected, and repeat was sent post antibiotics but await results  -hx of ESBL uti that is sn to cipro  -c/w ciprofloxacin for UTI (last day is today).     Endo  #hyperglycemia  patient w/ history of DM as well. Not in DKA. Beta hydroxybutyrate is 0.2.   -ISS  -FSBS    #hypothyroidism  Takes synthroid 150mcg PO daily at home  -Continue synthroid    Heme:  #anemia  macrocytic anemia noted on initial labs  -f/u b12, folate wnl  -had Hg of 6.8 likely due to dilutional and chronic disease  -no signs of active bleeding  -1 unit of pRBC  -active type and screen.     GI  #Diarrhea  chronic in nature likely 2/2 small bowel resection  consider probiotics once peripheral iv access is obtained.       F: 1/4 NS 50 cc/hr  E: cautious repletion in setting of GONZALEZ  N: glucerna 1.2 55 cc/hr  Full code    Dvt ppx: hep sq  GI ppx: protonix 78F w/ PMH obesity, DM, HTN, hypothyroidism, sepsis secondary to ventral hernia infection s/p repair, PE s/p IVC filter, acute blood loss anemia from hemorrhagic uterine fibroids and UGIB, ATN requiring HD, sepsis secondary to MSSA bacteremia and MDR E coli UTI w/AMS and s/p TAHBSO and small bowel resection s/p PEG. Presenting with septic shock likely 2/2 PNA vs UTI and acute hypoxic respiratory failure now extubated.    Neuro  #Toxic metabolic encephalopathy 2/2 septic shock  -Resolved  -baseline dementia  -patient is being treated for sepsis as below, post extubation.     Respiratory  #HAP with sputum culture staph aureus sn to cefazolin.   -sputum positive for staph aureus  -continue with cefazolin  -currently afebrile, chest xray shows infiltrate on right lung field.   -percussion vest for mucous    CV  #hypotension  -resolved, no need for pressors currently   -on midodrine 5 Q8, can reduce as appropriate.   -blood pressure currently 110s-120s/60s  - has history of HTN at baseline, hold all anti-hypertensives for now       GI  -ct scan showed debris vs stone in GB and resolution of bowel wall thickening  -surgery stated that likely not surgical candidate, abdominal exam unremarkable  -bilirubin <0.2, f/u CMP daily  -peg tube functional.     Renal  #Anion gap metabolic acidosis   resolved  - likely 2/2 to lactic acidosis 2/2 to decreased perfusion 2/2 to septic shock  - AG initially 21, closed s/p IVF    #BUN/Cr elevation  Initial  and Cr of 3.38 increased from baseline of 21/0.68 in June 2018. Likely 2/2 to both pre-renal and intrinsic renal.  BUN/Cr now currently 53/1.63  -appreciate nephrology recommendations  -Trend BMP  -avoid nephrotoxins    #Hypernatremia  -Na 150 11/21  -today Na of 146 (post D51/4 NS at 125 cc/hr)  -c/w 1/4 NS at 50 cc/hr  -recheck serum BMP    ID  #Sepsis 2/2 HAP and concern for UTI  -sputum cx staph aureus sn to cefazolin  -c/w cefazolin  - 1st urine culture rejected, and repeat was sent post antibiotics but await results  -hx of ESBL uti that is sn to cipro  -c/w ciprofloxacin for UTI (last day is today).     Endo  #hyperglycemia  patient w/ history of DM as well. Not in DKA. Beta hydroxybutyrate is 0.2.   -ISS  -FSBS    #hypothyroidism  Takes synthroid 150mcg PO daily at home  -Continue synthroid    Heme:  #anemia 2/2 chronic disease  -f/u b12, folate wnl  -had Hg of 6.8 likely due to dilutional and chronic disease  -no signs of active bleeding  -1 unit of pRBC given in am  -active type and screen.     GI  #Diarrhea  chronic in nature likely 2/2 small bowel resection  consider probiotics once peripheral iv access is obtained.       F: 1/4 NS 50 cc/hr  E: cautious repletion in setting of GONZALEZ  N: glucerna 1.2 55 cc/hr  Full code    Dvt ppx: hep sq  GI ppx: protonix

## 2018-11-22 NOTE — PROGRESS NOTE ADULT - SUBJECTIVE AND OBJECTIVE BOX
Seen in the morning in her bed, follows commands, making urine, does not endorse any complaints     The renal service follws the case for GONZALEZ  - secondary ATN from shock - now improving     Patient seen and examined at bedside.     ceFAZolin   IVPB 1000 milliGRAM(s) every 12 hours  chlorhexidine 2% Cloths 1 Application(s) daily  ciprofloxacin   IVPB 400 milliGRAM(s) daily  dextrose 40% Gel 15 Gram(s) once PRN  dextrose 5%. 1000 milliLiter(s) <Continuous>  dextrose 50% Injectable 12.5 Gram(s) once  dextrose 50% Injectable 25 Gram(s) once  dextrose 50% Injectable 25 Gram(s) once  glucagon  Injectable 1 milliGRAM(s) once PRN  heparin  Injectable 5000 Unit(s) every 8 hours  insulin lispro (HumaLOG) corrective regimen sliding scale   every 6 hours  levothyroxine 150 MICROGram(s) daily  midodrine 5 milliGRAM(s) three times a day  pantoprazole   Suspension 40 milliGRAM(s) daily  sodium chloride 0.225%. 1000 milliLiter(s) <Continuous>      Allergies    No Known Allergies    Intolerances        T(C): , Max: 37.4 (11-21-18 @ 14:52)  T(F): , Max: 99.4 (11-21-18 @ 14:52)  HR: 64 (11-22-18 @ 10:00)  BP: 125/57 (11-22-18 @ 10:00)  BP(mean): 69 (11-22-18 @ 10:00)  RR: 34 (11-22-18 @ 10:00)  SpO2: 98% (11-22-18 @ 10:00)  Wt(kg): --    11-21 @ 07:01  -  11-22 @ 07:00  --------------------------------------------------------  IN:    dextrose 5% + sodium chloride 0.225%: 2750 mL    Enteral Tube Flush: 60 mL    Glucerna 1.5: 980 mL    IV PiggyBack: 350 mL  Total IN: 4140 mL    OUT:    Indwelling Catheter - Urethral: 1190 mL  Total OUT: 1190 mL    Total NET: 2950 mL      11-22 @ 07:01  -  11-22 @ 10:50  --------------------------------------------------------  IN:    dextrose 5% + sodium chloride 0.225%: 250 mL    Glucerna 1.5: 86 mL    IV PiggyBack: 100 mL  Total IN: 436 mL    OUT:    Indwelling Catheter - Urethral: 105 mL  Total OUT: 105 mL    Total NET: 331 mL    Physical exam;   Alert and follows commands  No JVD   Tongue dry   Normal air entry into the lungs, no wheezing, noc rackles   RRR, normal s1/s2, no murmurs, rubs or gallops   Abdomen - soft, not tender, not distended   Extremities: no edema   Has a suazo catheter           LABS:                        6.8    13.1  )-----------( 200      ( 22 Nov 2018 05:12 )             23.2     11-22    146<H>  |  114<H>  |  53<H>  ----------------------------<  230<H>  3.6   |  22  |  1.63<H>    Ca    9.9      22 Nov 2018 05:12  Phos  3.9     11-21  Mg     2.0     11-22    TPro  5.4<L>  /  Alb  2.1<L>  /  TBili  0.2  /  DBili  x   /  AST  14  /  ALT  7<L>  /  AlkPhos  119  11-22                RADIOLOGY & ADDITIONAL STUDIES:

## 2018-11-22 NOTE — PROGRESS NOTE ADULT - ATTENDING COMMENTS
I independently performed the key portions of the evaluation and management service provided. I agree with the above history, physical, and plan which I have reviewed and edited where appropriate. I find GONZALEZ, hypotension, hypokalemia. Follow SCr. Continue midodrine. Keep K>4 mg>2. See full note.

## 2018-11-22 NOTE — PROGRESS NOTE ADULT - PROBLEM SELECTOR PLAN 1
- see above   - now improving   - volume status - on the dry side   - see above for water repalcement   - please consider stopping the bicarb drip   - in and outs   - daily weight   - renal diet

## 2018-11-22 NOTE — PROGRESS NOTE ADULT - SUBJECTIVE AND OBJECTIVE BOX
Overnight:   Patient tolerating nasal cannula well, no episodes of fevers or chills.  Hemoglobin was 6.8 in the am  No nausea, vomiting, chest pain, or abdominal pain.  Has loose stools that is present at her baseline.         PHYSICAL EXAM:  GENERAL: Obese adult White female  HEAD: atraumatic  EYES: PERRL, resists passive opening of eyes, conjunctiva pink  ENT: moist mucous membranes  NECK: supple  CHEST/LUNG: rhonchi bilaterally, R > L upper, mid, and lower lobe  HEART: regular tachycardia, no murmur or rub  ABDOMEN: obese, soft, non-tender, non-distended, without rebound or guarding  EXTREMITIES: warm, well-perfused, without edema  NERVOUS SYSTEM: sedated; arouses to voice, does not follow commands or answer questions  SKIN: rash near buttocks region and low back due to stool, no erythema, discharge or purulence. No sacral ulcers.     ICU Vital Signs Last 24 Hrs  T(C): 36.5 (22 Nov 2018 09:38), Max: 37.4 (21 Nov 2018 14:52)  T(F): 97.7 (22 Nov 2018 09:38), Max: 99.4 (21 Nov 2018 14:52)  HR: 64 (22 Nov 2018 11:00) (64 - 82)  BP: 155/64 (22 Nov 2018 11:00) (112/75 - 155/64)  BP(mean): 85 (22 Nov 2018 11:00) (69 - 106)  ABP: --  ABP(mean): --  RR: 26 (22 Nov 2018 11:00) (19 - 48)  SpO2: 96% (22 Nov 2018 11:00) (93% - 100%)      21 Nov 2018 07:01  -  22 Nov 2018 07:00  --------------------------------------------------------  IN:    dextrose 5% + sodium chloride 0.225%: 2750 mL    Enteral Tube Flush: 60 mL    Glucerna 1.5: 980 mL    IV PiggyBack: 350 mL  Total IN: 4140 mL    OUT:    Indwelling Catheter - Urethral: 1190 mL  Total OUT: 1190 mL    Total NET: 2950 mL      22 Nov 2018 07:01  -  22 Nov 2018 12:52  --------------------------------------------------------  IN:    dextrose 5% + sodium chloride 0.225%: 250 mL    Glucerna 1.5: 172 mL    IV PiggyBack: 100 mL  Total IN: 522 mL    OUT:    Indwelling Catheter - Urethral: 145 mL  Total OUT: 145 mL    Total NET: 377 mL    .  LABS:                         6.8    13.1  )-----------( 200      ( 22 Nov 2018 05:12 )             23.2     11-22    146<H>  |  114<H>  |  53<H>  ----------------------------<  230<H>  3.6   |  22  |  1.63<H>    Ca    9.9      22 Nov 2018 05:12  Phos  3.9     11-21  Mg     2.0     11-22    TPro  5.4<L>  /  Alb  2.1<L>  /  TBili  0.2  /  DBili  x   /  AST  14  /  ALT  7<L>  /  AlkPhos  119  11-22        RADIOLOGY, EKG & ADDITIONAL TESTS: Reviewed.     CT abdomen with oral contrast.   Impression: Hyperdense material in the gallbladder which may represent   debris or stones    Resolution of bowel wall thickening.    No significant ascites or drainable fluid collection.    Increased pleural effusions and adjacent consolidations. Overnight:   Patient tolerating nasal cannula well, no episodes of fevers or chills.  Hemoglobin was 6.8 in the am  No nausea, vomiting, chest pain, or abdominal pain.  Has loose stools that is present at her baseline.           PHYSICAL EXAM:  GENERAL: Obese adult White female  HEAD: atraumatic  EYES: PERRL, resists passive opening of eyes, conjunctiva pink  ENT: moist mucous membranes  NECK: supple  CHEST/LUNG: rhonchi bilaterally, R > L upper, mid, and lower lobe  HEART: regular tachycardia, no murmur or rub  ABDOMEN: obese, soft, non-tender, non-distended, without rebound or guarding  EXTREMITIES: warm, well-perfused, without edema  NERVOUS SYSTEM: sedated; arouses to voice, does not follow commands or answer questions  SKIN: rash near buttocks region and low back due to stool, no erythema, discharge or purulence. No sacral ulcers.     ICU Vital Signs Last 24 Hrs  T(C): 36.5 (22 Nov 2018 09:38), Max: 37.4 (21 Nov 2018 14:52)  T(F): 97.7 (22 Nov 2018 09:38), Max: 99.4 (21 Nov 2018 14:52)  HR: 64 (22 Nov 2018 11:00) (64 - 82)  BP: 155/64 (22 Nov 2018 11:00) (112/75 - 155/64)  BP(mean): 85 (22 Nov 2018 11:00) (69 - 106)  ABP: --  ABP(mean): --  RR: 26 (22 Nov 2018 11:00) (19 - 48)  SpO2: 96% (22 Nov 2018 11:00) (93% - 100%)      21 Nov 2018 07:01  -  22 Nov 2018 07:00  --------------------------------------------------------  IN:    dextrose 5% + sodium chloride 0.225%: 2750 mL    Enteral Tube Flush: 60 mL    Glucerna 1.5: 980 mL    IV PiggyBack: 350 mL  Total IN: 4140 mL    OUT:    Indwelling Catheter - Urethral: 1190 mL  Total OUT: 1190 mL    Total NET: 2950 mL      22 Nov 2018 07:01  -  22 Nov 2018 12:52  --------------------------------------------------------  IN:    dextrose 5% + sodium chloride 0.225%: 250 mL    Glucerna 1.5: 172 mL    IV PiggyBack: 100 mL  Total IN: 522 mL    OUT:    Indwelling Catheter - Urethral: 145 mL  Total OUT: 145 mL    Total NET: 377 mL    LABS:                         6.8    13.1  )-----------( 200      ( 22 Nov 2018 05:12 )             23.2     11-22    146<H>  |  114<H>  |  53<H>  ----------------------------<  230<H>  3.6   |  22  |  1.63<H>    Ca    9.9      22 Nov 2018 05:12  Phos  3.9     11-21  Mg     2.0     11-22    TPro  5.4<L>  /  Alb  2.1<L>  /  TBili  0.2  /  DBili  x   /  AST  14  /  ALT  7<L>  /  AlkPhos  119  11-22        RADIOLOGY, EKG & ADDITIONAL TESTS: Reviewed.     CT abdomen with oral contrast.   Impression: Hyperdense material in the gallbladder which may represent   debris or stones    Resolution of bowel wall thickening.    No significant ascites or drainable fluid collection.    Increased pleural effusions and adjacent consolidations.

## 2018-11-23 DIAGNOSIS — Z74.09 OTHER REDUCED MOBILITY: ICD-10-CM

## 2018-11-23 DIAGNOSIS — Z51.5 ENCOUNTER FOR PALLIATIVE CARE: ICD-10-CM

## 2018-11-23 DIAGNOSIS — N39.0 URINARY TRACT INFECTION, SITE NOT SPECIFIED: ICD-10-CM

## 2018-11-23 DIAGNOSIS — Z71.89 OTHER SPECIFIED COUNSELING: ICD-10-CM

## 2018-11-23 DIAGNOSIS — J18.9 PNEUMONIA, UNSPECIFIED ORGANISM: ICD-10-CM

## 2018-11-23 DIAGNOSIS — R41.0 DISORIENTATION, UNSPECIFIED: ICD-10-CM

## 2018-11-23 LAB
ALBUMIN SERPL ELPH-MCNC: 2 G/DL — LOW (ref 3.3–5)
ALP SERPL-CCNC: 143 U/L — HIGH (ref 40–120)
ALT FLD-CCNC: 13 U/L — SIGNIFICANT CHANGE UP (ref 10–45)
ANION GAP SERPL CALC-SCNC: 8 MMOL/L — SIGNIFICANT CHANGE UP (ref 5–17)
AST SERPL-CCNC: 22 U/L — SIGNIFICANT CHANGE UP (ref 10–40)
BASE EXCESS BLDA CALC-SCNC: -2.3 MMOL/L — LOW (ref -2–3)
BILIRUB SERPL-MCNC: 0.2 MG/DL — SIGNIFICANT CHANGE UP (ref 0.2–1.2)
BUN SERPL-MCNC: 46 MG/DL — HIGH (ref 7–23)
CALCIUM SERPL-MCNC: 9.8 MG/DL — SIGNIFICANT CHANGE UP (ref 8.4–10.5)
CHLORIDE SERPL-SCNC: 117 MMOL/L — HIGH (ref 96–108)
CO2 SERPL-SCNC: 22 MMOL/L — SIGNIFICANT CHANGE UP (ref 22–31)
CREAT SERPL-MCNC: 1.34 MG/DL — HIGH (ref 0.5–1.3)
EOSINOPHIL NFR BLD AUTO: 1 % — SIGNIFICANT CHANGE UP (ref 0–6)
GAS PNL BLDA: SIGNIFICANT CHANGE UP
GLUCOSE BLDC GLUCOMTR-MCNC: 122 MG/DL — HIGH (ref 70–99)
GLUCOSE BLDC GLUCOMTR-MCNC: 125 MG/DL — HIGH (ref 70–99)
GLUCOSE BLDC GLUCOMTR-MCNC: 133 MG/DL — HIGH (ref 70–99)
GLUCOSE BLDC GLUCOMTR-MCNC: 141 MG/DL — HIGH (ref 70–99)
GLUCOSE SERPL-MCNC: 154 MG/DL — HIGH (ref 70–99)
HCO3 BLDA-SCNC: 23 MMOL/L — SIGNIFICANT CHANGE UP (ref 21–28)
HCT VFR BLD CALC: 26.5 % — LOW (ref 34.5–45)
HGB BLD-MCNC: 8.1 G/DL — LOW (ref 11.5–15.5)
LYMPHOCYTES # BLD AUTO: 20 % — SIGNIFICANT CHANGE UP (ref 13–44)
MAGNESIUM SERPL-MCNC: 1.8 MG/DL — SIGNIFICANT CHANGE UP (ref 1.6–2.6)
MCHC RBC-ENTMCNC: 28.7 PG — SIGNIFICANT CHANGE UP (ref 27–34)
MCHC RBC-ENTMCNC: 30.6 G/DL — LOW (ref 32–36)
MCV RBC AUTO: 94 FL — SIGNIFICANT CHANGE UP (ref 80–100)
MONOCYTES NFR BLD AUTO: 1 % — LOW (ref 2–14)
NEUTROPHILS NFR BLD AUTO: 75 % — SIGNIFICANT CHANGE UP (ref 43–77)
PCO2 BLDA: 41 MMHG — SIGNIFICANT CHANGE UP (ref 32–45)
PH BLDA: 7.36 — SIGNIFICANT CHANGE UP (ref 7.35–7.45)
PLATELET # BLD AUTO: 212 K/UL — SIGNIFICANT CHANGE UP (ref 150–400)
PO2 BLDA: 138 MMHG — HIGH (ref 83–108)
POTASSIUM SERPL-MCNC: 4.4 MMOL/L — SIGNIFICANT CHANGE UP (ref 3.5–5.3)
POTASSIUM SERPL-SCNC: 4.4 MMOL/L — SIGNIFICANT CHANGE UP (ref 3.5–5.3)
PROT SERPL-MCNC: 5.5 G/DL — LOW (ref 6–8.3)
RBC # BLD: 2.82 M/UL — LOW (ref 3.8–5.2)
RBC # FLD: 18.6 % — HIGH (ref 10.3–16.9)
SAO2 % BLDA: 99 % — SIGNIFICANT CHANGE UP (ref 95–100)
SODIUM SERPL-SCNC: 147 MMOL/L — HIGH (ref 135–145)
T4 FREE SERPL-MCNC: 0.7 NG/DL — SIGNIFICANT CHANGE UP (ref 0.7–1.48)
TSH SERPL-MCNC: 9.58 UIU/ML — HIGH (ref 0.35–4.94)
WBC # BLD: 12 K/UL — HIGH (ref 3.8–10.5)
WBC # FLD AUTO: 12 K/UL — HIGH (ref 3.8–10.5)

## 2018-11-23 PROCEDURE — 99497 ADVNCD CARE PLAN 30 MIN: CPT | Mod: 25

## 2018-11-23 PROCEDURE — 71045 X-RAY EXAM CHEST 1 VIEW: CPT | Mod: 26

## 2018-11-23 PROCEDURE — 99291 CRITICAL CARE FIRST HOUR: CPT

## 2018-11-23 PROCEDURE — 99223 1ST HOSP IP/OBS HIGH 75: CPT | Mod: GC

## 2018-11-23 RX ORDER — MIDODRINE HYDROCHLORIDE 2.5 MG/1
2.5 TABLET ORAL EVERY 8 HOURS
Qty: 0 | Refills: 0 | Status: DISCONTINUED | OUTPATIENT
Start: 2018-11-23 | End: 2018-11-24

## 2018-11-23 RX ORDER — PSYLLIUM SEED (WITH DEXTROSE)
1 POWDER (GRAM) ORAL DAILY
Qty: 0 | Refills: 0 | Status: DISCONTINUED | OUTPATIENT
Start: 2018-11-23 | End: 2018-11-27

## 2018-11-23 RX ORDER — ACETAMINOPHEN 500 MG
650 TABLET ORAL ONCE
Qty: 0 | Refills: 0 | Status: COMPLETED | OUTPATIENT
Start: 2018-11-23 | End: 2018-11-23

## 2018-11-23 RX ORDER — IPRATROPIUM/ALBUTEROL SULFATE 18-103MCG
3 AEROSOL WITH ADAPTER (GRAM) INHALATION EVERY 6 HOURS
Qty: 0 | Refills: 0 | Status: DISCONTINUED | OUTPATIENT
Start: 2018-11-23 | End: 2018-12-20

## 2018-11-23 RX ORDER — SODIUM CHLORIDE 9 MG/ML
1000 INJECTION, SOLUTION INTRAVENOUS
Qty: 0 | Refills: 0 | Status: DISCONTINUED | OUTPATIENT
Start: 2018-11-23 | End: 2018-11-24

## 2018-11-23 RX ORDER — HYDRALAZINE HCL 50 MG
5 TABLET ORAL ONCE
Qty: 0 | Refills: 0 | Status: DISCONTINUED | OUTPATIENT
Start: 2018-11-23 | End: 2018-11-23

## 2018-11-23 RX ORDER — MAGNESIUM SULFATE 500 MG/ML
2 VIAL (ML) INJECTION ONCE
Qty: 0 | Refills: 0 | Status: COMPLETED | OUTPATIENT
Start: 2018-11-23 | End: 2018-11-23

## 2018-11-23 RX ADMIN — Medication 650 MILLIGRAM(S): at 00:55

## 2018-11-23 RX ADMIN — Medication 150 MICROGRAM(S): at 06:50

## 2018-11-23 RX ADMIN — CHLORHEXIDINE GLUCONATE 1 APPLICATION(S): 213 SOLUTION TOPICAL at 06:51

## 2018-11-23 RX ADMIN — SODIUM CHLORIDE 50 MILLILITER(S): 9 INJECTION, SOLUTION INTRAVENOUS at 10:26

## 2018-11-23 RX ADMIN — Medication 650 MILLIGRAM(S): at 02:00

## 2018-11-23 RX ADMIN — PANTOPRAZOLE SODIUM 40 MILLIGRAM(S): 20 TABLET, DELAYED RELEASE ORAL at 13:02

## 2018-11-23 RX ADMIN — Medication 100 MILLIGRAM(S): at 22:24

## 2018-11-23 RX ADMIN — MIDODRINE HYDROCHLORIDE 2.5 MILLIGRAM(S): 2.5 TABLET ORAL at 22:23

## 2018-11-23 RX ADMIN — HEPARIN SODIUM 5000 UNIT(S): 5000 INJECTION INTRAVENOUS; SUBCUTANEOUS at 22:23

## 2018-11-23 RX ADMIN — Medication 100 MILLIGRAM(S): at 10:47

## 2018-11-23 RX ADMIN — HEPARIN SODIUM 5000 UNIT(S): 5000 INJECTION INTRAVENOUS; SUBCUTANEOUS at 06:50

## 2018-11-23 RX ADMIN — MIDODRINE HYDROCHLORIDE 2.5 MILLIGRAM(S): 2.5 TABLET ORAL at 14:46

## 2018-11-23 RX ADMIN — Medication 3 MILLILITER(S): at 23:31

## 2018-11-23 RX ADMIN — HEPARIN SODIUM 5000 UNIT(S): 5000 INJECTION INTRAVENOUS; SUBCUTANEOUS at 14:46

## 2018-11-23 RX ADMIN — Medication 50 GRAM(S): at 06:50

## 2018-11-23 NOTE — CONSULT NOTE ADULT - PROBLEM SELECTOR RECOMMENDATION 6
-Driven by GONZALEZ with inorganic acids, lactate  -Will need BHB given elevated AG and Glc -> for now Sliding scale and will continue to monitor FS q6h
No family at bedside. Patient is confused, unable to participate in goals of care conversation.  No MOLST on file, patient is currently full code. Will like to have a family meeting if possible. As per SW/CM and PT notes family wishing for [facility]. Will also like to discuss code status to determine if the patient would need to have MOLST completed prior to transfer to [facility].

## 2018-11-23 NOTE — CONSULT NOTE ADULT - ASSESSMENT
78F w/ PMH of morbid obesity, DM, HTN, hypothyroidism, sepsis secondary to ventral hernia infection s/p repair, PE s/p IVC filter, acute blood loss anemia from hemorrhagic uterine fibroids and UGIB,  s/p TAHBSO and small bowel resection s/p PEG ATN requiring HD,  sepsis secondary to MSSA bacteremia and MDR E coli UTI, admitted for UTI and Pneumonia, required intubation and pressors, now extubated and off pressors.

## 2018-11-23 NOTE — CONSULT NOTE ADULT - PROBLEM SELECTOR PROBLEM 6
Metabolic acidosis with increased anion gap and accumulation of organic acids
Goals of care, counseling/discussion

## 2018-11-23 NOTE — CONSULT NOTE ADULT - PROBLEM SELECTOR RECOMMENDATION 9
pt now extubated, in ICU, off pressors.  management per MICU team pt now extubated, in ICU, off pressors. Tolerating NC.   Management per MICU team

## 2018-11-23 NOTE — CONSULT NOTE ADULT - SUBJECTIVE AND OBJECTIVE BOX
EFRA GEE          MRN-[id number]            (1940)    HPI:  Patient is unable to participate in interview. History taken from chart and MICU consult.    78F w/ PMH of morbid obesity, DM, HTN, hypothyroidism, sepsis secondary to ventral hernia infection s/p repair, PE s/p IVC filter, acute blood loss anemia from hemorrhagic uterine fibroids and UGIB, ATN requiring HD, sepsis secondary to MSSA bacteremia and MDR E coli UTI now w/AMS and s/p TAHBSO and small bowel resection s/p PEG. Patient presented from [facility] with concern for sepsis and hypoxia. Per EMS, patient was saturating 85% on RA at [facility] with some improvement to 95% after being placed on NRB.  On arrival to the ED, patient was altered and saturating 89%. She was intubated in the ED with suctioning revealing purulent material.   T was 101.5, , BP79/59. WBC 27.9 Hgb 11.0 .3 Lactate 5.4 Na 154 K 5.6 CO2 16 AG [id number] Cr 3.38 Trop 0.14 ABG 7.32 pCO2 29 pO2 138 HCO3 15  UA: (+) leuk est, (+) bacteria  She was started on Levo gtt and Dopamine and subsequently was titrated off the Dopamine. She was given Azithromycin, Cipro, Vancomycin and Zosyn, 4L nS, and Insulin 10U. (2018 23:11)    During admission patient has remained in ICU, now extubated and off pressors. Pt completed course of cipro for UTI, is on cefazolin for HCAP.  Patient has had episodes of bradycardia to 30s with systolic blood pressure in 170s, midodrine was decreased to 2.5mg 3x/day.  Pt sitting in ICU chair, awake.       PAST MEDICAL & SURGICAL HISTORY:  Hypothyroidism  GERD (gastroesophageal reflux disease)  Obesity  DM (diabetes mellitus)  HLD (hyperlipidemia)  HTN (hypertension)  Status post total abdominal hysterectomy and bilateral salpingo-oophorectomy  S/P small bowel resection  H/O ventral hernia repair      FAMILY HISTORY:  No pertinent family history in first degree relatives    SOCIAL HISTORY:  pt was living in nursing home    ROS:  denies pain or shortness of breath, however accuracy of ROS limited by patient's mental status                    Dyspnea (Deniz 0-10):     0                   N/V (Y/N):  n                            Secretions (Y/N) : n               Agitation(Y/N): n  Pain (Y/N):     n  -Provocation/Palliation:  -Quality/Quantity:  -Radiating:  -Severity:  -Timing/Frequency:  -Impact on ADLs:    General:  Denied  HEENT:    Denied  Neck:  Denied  CVS:  Denied  Resp:  Denied  GI:  Denied  :  Denied  Musc:  Denied  Neuro:  Denied  Psych:  Denied  Skin:  Denied  Lymph:  Denied    Allergies    No Known Allergies    Intolerances        Opiate Naive (Y/N): Y  -iStop reviewed (Y/N): (Ref#: [id number],  patient had one prescription for percocet in 2018 )    Medications:      MEDICATIONS  (STANDING):  ALBUTerol/ipratropium for Nebulization 3 milliLiter(s) Nebulizer every 6 hours  ceFAZolin   IVPB 1000 milliGRAM(s) IV Intermittent every 12 hours  chlorhexidine 2% Cloths 1 Application(s) Topical daily  dextrose 5%. 1000 milliLiter(s) (50 mL/Hr) IV Continuous <Continuous>  dextrose 5%. 1000 milliLiter(s) (50 mL/Hr) IV Continuous <Continuous>  dextrose 50% Injectable 12.5 Gram(s) IV Push once  dextrose 50% Injectable 25 Gram(s) IV Push once  dextrose 50% Injectable 25 Gram(s) IV Push once  heparin  Injectable 5000 Unit(s) SubCutaneous every 8 hours  insulin lispro (HumaLOG) corrective regimen sliding scale   SubCutaneous every 6 hours  levothyroxine 150 MICROGram(s) Oral daily  midodrine 2.5 milliGRAM(s) Oral every 8 hours  pantoprazole   Suspension 40 milliGRAM(s) Enteral Tube daily  psyllium Powder 1 Packet(s) Oral daily    MEDICATIONS  (PRN):  dextrose 40% Gel 15 Gram(s) Oral once PRN Blood Glucose LESS THAN 70 milliGRAM(s)/deciliter  glucagon  Injectable 1 milliGRAM(s) IntraMuscular once PRN Glucose LESS THAN 70 milligrams/deciliter      Labs:    CBC:                        8.1    12.0  )-----------( 212      ( 2018 05:00 )             26.5     CMP:    11-    147<H>  |  117<H>  |  46<H>  ----------------------------<  154<H>  4.4   |  22  |  1.34<H>    Ca    9.8      2018 05:00  Mg     1.8         TPro  5.5<L>  /  Alb  2.0<L>  /  TBili  0.2  /  DBili  x   /  AST  22  /  ALT  13  /  AlkPhos  143<H>            Imaging:  Reviewed    CXR : IMPRESSION: Persistent right pleural effusion with underlying pulmonary   consolidation.    Retroperitoneal us 18: IMPRESSION:  No hydronephrosis.    Bilateral pleural effusions.      CT a/p 18: Prior imaging including the most recent CT scan dated 2018 is   retrieved for correlation.    Findings: Bilateral pleural effusions are present. These are increased   from the prior study. There is adjacent consolidation in the right lung   base, also increased from the prior study.    Evaluation of the abdominal viscera is limited without contrast. Within   this limitation no hepatic or splenic lesion is identified. There is   hyperdense material layering in the gallbladder which may represent   sludge or small stones. There is no pericholecystic fluid or intrahepatic   biliary ductal dilatation. The pancreas is within normal limits. The   adrenal glands are intact.    No hydronephrosis is present. There is mild bilateral perinephric   stranding.    A percutaneous gastrostomy is present, similar to the prior study. There   is contrast in distal loops of small bowel and segments of the colon   without evidence of obstruction. Wall thickening of the bowel in right   lower quadrant has resolved since the prior study. The right lower   quadrant drainage catheter has been removed.    There is no free fluid in the abdomen or pelvis.    Dense atherosclerotic calcifications are present in the aorta and its   branches. An IVC filter is present and is unchanged.    A Suazo catheter is present in the collapsed bladder.    Postsurgical changes are noted adjacent the anterior abdominal wall. No   fluid collection is present.    Productive changes are present throughout the spine. There are is   osteoarthritis of the hips.    Impression: Hyperdense material in the gallbladder which may represent   debris or stones    Resolution of bowel wall thickening.    No significant ascites or drainable fluid collection.    Increased pleural effusions and adjacent consolidations.      PEx:  T(C): 36.7 (18 @ 10:40), Max: 37.4 (18 @ 00:00)  HR: 60 (18 @ 12:00) (46 - 82)  BP: 167/72 (18 @ 12:00) (119/63 - 177/66)  RR: 42 (18 @ 12:00) (14 - 42)  SpO2: 100% (18 @ 11:00) (96% - 100%)        POCT Blood Glucose.: 133 mg/dL (2018 05:13)    I&O's Summary    2018 07:  -  2018 07:00  --------------------------------------------------------  IN: 3332 mL / OUT: 345 mL / NET: 2987 mL    2018 07:  -  2018 12:57  --------------------------------------------------------  IN: 465 mL / OUT: 250 mL / NET: 215 mL        General: sitting in chair in icu, wearing nasal cannula, able to speak however difficult to understand what she says  HEENT:  wearing nasal cannula, NC, AT, pt does not spontaneously open her right eye, when asked to open her eyes, she closes both eyes tightly  Neck:  central line  CVS:  s1, s2  no m/r/g  Resp:  rhonchi b/l  GI:   soft, nontender, + PEG  :  suazo  Musc:   no edema, unable to accurately assess strength as patient does not follow commands, however moves arms spontaneously  Neuro: alert and oriented x 1  Psych:   calm  Skin:  pressure ulcer on buttock per records  Lymph:  Preadmit Karnofsky: 50 %           Current Karnofsky: 30%  Cachexia (Y/N): N  BMI: 26.2    Advanced Directives:     Full Code      Decision maker: patient does not have capacity to make complex medical decisions at this time   Legal surrogate:    GOALS OF CARE DISCUSSION       Palliative care info/counseling provided	           Family meeting- pending                 REFERRALS	              Unit SW/Case Mgmt              Speech/Swallow       Patient/Family Support       Massage Therapy       Music Therapy       Nutrition       PT/OT EFRA GEE          MRN-[id number]            (1940)    HPI:  Patient is unable to participate in interview. History taken from chart and MICU consult.  78F w/ PMH of morbid obesity, DM, HTN, hypothyroidism, sepsis secondary to ventral hernia infection s/p repair, PE s/p IVC filter, acute blood loss anemia from hemorrhagic uterine fibroids and UGIB, ATN requiring HD, sepsis secondary to MSSA bacteremia and MDR E coli UTI now w/AMS and s/p TAHBSO and small bowel resection s/p PEG. Patient presented from [facility] with concern for sepsis and hypoxia. Per EMS, patient was saturating 85% on RA at [facility] with some improvement to 95% after being placed on NRB.  On arrival to the ED, patient was altered and saturating 89%. She was intubated in the ED with suctioning revealing purulent material.   T was 101.5, , BP79/59. WBC 27.9 Hgb 11.0 .3 Lactate 5.4 Na 154 K 5.6 CO2 16 AG [id number] Cr 3.38 Trop 0.14 ABG 7.32 pCO2 29 pO2 138 HCO3 15  UA: (+) leuk est, (+) bacteria  She was started on Levo gtt and Dopamine and subsequently was titrated off the Dopamine. She was given Azithromycin, Cipro, Vancomycin and Zosyn, 4L nS, and Insulin 10U. (2018 23:11)  During admission patient has remained in ICU, now extubated and off pressors. Pt completed course of cipro for UTI, is on cefazolin for HCAP.  Patient has had episodes of bradycardia to 30s with systolic blood pressure in 170s, midodrine was decreased to 2.5mg 3x/day.  Pt sitting in ICU chair, awake.     PAST MEDICAL & SURGICAL HISTORY:  Hypothyroidism  GERD (gastroesophageal reflux disease)  Obesity  DM (diabetes mellitus)  HLD (hyperlipidemia)  HTN (hypertension)  Status post total abdominal hysterectomy and bilateral salpingo-oophorectomy  S/P small bowel resection  H/O ventral hernia repair    FAMILY HISTORY:  No pertinent family history in first degree relatives    SOCIAL HISTORY:  . Has adult son Martin Boggs  [phone] involved in her care. Patient came from a nursing home but previously was living in at [facility] [place] apartment. This is the 5th admission this year. Medicare/[employer]    ROS:  denies pain or shortness of breath, however accuracy of ROS limited by patient's mental status  Dyspnea (Deniz 0-10): 0                   N/V (Y/N):  No                            Secretions (Y/N) : No               Agitation(Y/N): No  Pain (Y/N): No  -Provocation/Palliation: N/A  -Quality/Quantity: N/A  -Radiating: N/A  -Severity: No pain  -Timing/Frequency: N/A  -Impact on ADLs: N/A    General:  Denied  HEENT:    Denied  Neck:  Denied  CVS:  Denied  Resp:  Denied  GI:  Denied  :  Denied  Musc:  Denied  Neuro:  Denied  Psych:  Denied  Skin:  Denied  Lymph:  Denied    Allergies: No Known Allergies or Intolerances    Opiate Naive (Y/N): Yes  -iStop reviewed (Y/N): Yes. Found one prescription for percocet in 2018 on iStop review (Ref#:[id number])    Medications:      MEDICATIONS  (STANDING):  ALBUTerol/ipratropium for Nebulization 3 milliLiter(s) Nebulizer every 6 hours  ceFAZolin   IVPB 1000 milliGRAM(s) IV Intermittent every 12 hours  chlorhexidine 2% Cloths 1 Application(s) Topical daily  heparin  Injectable 5000 Unit(s) SubCutaneous every 8 hours  insulin lispro (HumaLOG) corrective regimen sliding scale   SubCutaneous every 6 hours  levothyroxine 150 MICROGram(s) Oral daily  midodrine 2.5 milliGRAM(s) Oral every 8 hours  pantoprazole   Suspension 40 milliGRAM(s) Enteral Tube daily  psyllium Powder 1 Packet(s) Oral daily    MEDICATIONS  (PRN):  dextrose 40% Gel 15 Gram(s) Oral once PRN Blood Glucose LESS THAN 70 milliGRAM(s)/deciliter  glucagon  Injectable 1 milliGRAM(s) IntraMuscular once PRN Glucose LESS THAN 70 milligrams/deciliter    Labs:    CBC:                        8.1    12.0  )-----------( 212      ( 2018 05:00 )             26.5     CMP:    11-23    147<H>  |  117<H>  |  46<H>  ----------------------------<  154<H>  4.4   |  22  |  1.34<H>    Ca    9.8      2018 05:00  Mg     1.8         TPro  5.5<L>  /  Alb  2.0<L>  /  TBili  0.2  /  DBili  x   /  AST  22  /  ALT  13  /  AlkPhos  143<H>      POCT Blood Glucose.: 141 mg/dL (18 @ 13:26)    Free Thyroxine, Serum: 0.70 ng/dL (18 @ 05:00)  Thyroid Stimulating Hormone, Serum: 9.582 uIU/mL (18 @ 05:00)    Blood Gas Profile - Arterial (18 @ 04:48)    pH, Arterial: 7.36    pCO2, Arterial: 41 mmHg    pO2, Arterial: 138 mmHg    HCO3, Arterial: 23 mmol/L    Base Excess, Arterial: -2.3 mmol/L    Oxygen Saturation, Arterial: 99 %    Blood Gas Source Arterial: BLDA    Type + Screen (18 @ 05:30)    ABO Interpretation: B    Rh Interpretation: Positive    Antibody Screen: Negative    C. difficile GDH &amp; toxins A/B by EIA . (18 @ 11:11)    Clostridium difficile GDH Toxins A&amp;B, EIA:   Negative    Clostridium difficile GDH Interpretation: Negative for toxigenic C. Difficile    Folate, Serum: >20.0 ng/mL (18 @ 13:47)    Vitamin B12, Serum: 324: Note: Reference Range Change on 2017. pg/mL (18 @ 13:47)    Culture - Sputum . (18 @ 09:25)    -  RIF- Rifampin: S <=1 Should not be used as monotherapy    -  Trimethoprim/Sulfamethoxazole: S <=0.5/9.5    -  Vancomycin: S 2    -  Oxacillin: S <=0.25    -  Linezolid: S 2    Gram Stain:   No epithelial cells  Numerous White blood cells  Moderate Gram positive cocci in pairs and clusters    -  Erythromycin: S <=0.25    -  Clindamycin: S 0.5    -  Cefazolin: S <=4    Specimen Source: .Sputum Sputum    Culture Results:   Numerous Staphylococcus aureus  Normal Respiratory Tanya absent    Organism Identification: Staphylococcus aureus  Staphylococcus aureus    Organism: Staphylococcus aureus    Organism: Staphylococcus aureus    Method Type: MARY    Method Type: MARY    Lactate, Blood: 2.3 mmoL/L (11.17.18 @ 09:10)  Lactate, Blood: 3.1 mmoL/L (11.16.18 @ 23:04)  Lactate, Blood: 4.5 mmoL/L (11.16.18 @ 18:25)  Lactate, Blood: 5.4 mmoL/L (11.16.18 @ 15:06)    Imaging:  Reviewed    CXR : IMPRESSION: Persistent right pleural effusion with underlying pulmonary   consolidation.  Retroperitoneal us 18: IMPRESSION:  No hydronephrosis. Bilateral pleural effusions.    CT a/p 18: Prior imaging including the most recent CT scan dated 2018 is   retrieved for correlation.  Findings: Bilateral pleural effusions are present. These are increased   from the prior study. There is adjacent consolidation in the right lung   base, also increased from the prior study.  Evaluation of the abdominal viscera is limited without contrast. Within   this limitation no hepatic or splenic lesion is identified. There is   hyperdense material layering in the gallbladder which may represent   sludge or small stones. There is no pericholecystic fluid or intrahepatic   biliary ductal dilatation. The pancreas is within normal limits. The   adrenal glands are intact.  No hydronephrosis is present. There is mild bilateral perinephric   stranding.  A percutaneous gastrostomy is present, similar to the prior study. There   is contrast in distal loops of small bowel and segments of the colon   without evidence of obstruction. Wall thickening of the bowel in right   lower quadrant has resolved since the prior study. The right lower   quadrant drainage catheter has been removed.  There is no free fluid in the abdomen or pelvis.  Dense atherosclerotic calcifications are present in the aorta and its   branches. An IVC filter is present and is unchanged.  A Nicole catheter is present in the collapsed bladder.  Postsurgical changes are noted adjacent the anterior abdominal wall. No   fluid collection is present.  Productive changes are present throughout the spine. There are is   osteoarthritis of the hips.  Impression: Hyperdense material in the gallbladder which may represent   debris or stones  Resolution of bowel wall thickening.  No significant ascites or drainable fluid collection.  Increased pleural effusions and adjacent consolidations.    EXAM:  XR ABDOMEN PORTABLE ROUTINE 1V                        PROCEDURE DATE:  10/02/2018    INTERPRETATION:  XR ABDOMEN PORTABLE ROUTINE 1V dated 10/2/2018 7:19 PM  INDICATION: g-tube study     PRIOR STUDIES: CT abdomen pelvis 2018.  FINDINGS:   Intraluminal oral contrast material seen within small bowel loops in the   left and mid lower abdomen.  No oral contrast extravasation or   extraluminal gas. IVC filter noted. Degenerative changes lower lumbar   spine.  IMPRESSION:  No evidence of leak.    EXAM:  ESOPHAGEAL ECHO W CONTRAST                        PROCEDURE DATE:  2018    INTERPRETATION:  Patient Height: 170.0 cm  Patient Weight: 94.0 kg  BSA: 2.1 m^2  Interpretation Summary  KEENA to evaluate for possible endocarditis.No clot seen in the left atrium   or in the left atrial appendage. Left atrial appendage emptying velocities are   normal.  There is mild aortic valve thickening.There is trace to mild   aortic regurgitation.  Structurally normal mitral valve. There is trace mitral   regurgitation.Structurally normal tricuspid valve. There is mild   tricuspid regurgitation.Structurally normal pulmonic valve. No pulmonic   regurgitation noted.The right ventricular systolic function is normal.The left   ventricular ejection fraction is estimated to be 60-65%Mild atherosclerotic   plaque(s) in the descending aorta.Moderate atherosclerotic plaque(s) in the aortic   arch.There is no pericardial effusion.No vegetations seen on any of the   cardiac valves.    12 Lead ECG 111618 @ 15:00   Ventricular Rate 115 BPM  Atrial Rate 129 BPM  P-R Interval 124 ms  QRS Duration 82 ms  Q-T Interval 320 ms  QTC Calculation(Bezet) 442 ms  P Axis 42 degrees  R Axis -22 degrees  T Axis 48 degrees  Diagnosis Line Sinus tachycardia  Otherwise normal ECG  Confirmed by BEATRIZ DUNHAM MDABHIJEET ([id number]) on 2018 9:42:26 AM    PEx:  T(C): 36.7 (18 @ 10:40), Max: 37.4 (18 @ 00:00)  HR: 60 (18 @ 12:00) (46 - 82)  BP: 167/72 (18 @ 12:00) (119/63 - 177/66)  RR: 42 (18 @ 12:00) (14 - 42)  SpO2: 100% (18 @ 11:00) (96% - 100%)    I&O's Summary  2018 07:01  -  2018 07:00  --------------------------------------------------------  IN: 3332 mL / OUT: 345 mL / NET: 2987 mL    2018 07:  -  2018 12:57  --------------------------------------------------------  IN: 465 mL / OUT: 250 mL / NET: 215 mL    General: Overweight woman sitting in chair in icu, wearing nasal cannula, able to speak however difficult to understand what she says at times in NAD.  HEENT:  wearing nasal cannula, NC, AT, pt does not spontaneously open her right eye, when asked to open her eyes, she closes both eyes tightly  Neck:  central line  No masses  CVS:  s1, s2  no m/r/g  Resp:  rhonchi b/l Unlabored  GI:   soft, nontender, + PEG  : Nicole+  Musc:   no edema, unable to accurately assess strength as patient does not follow commands, however moves arms spontaneously  Neuro: alert and oriented x 1  Psych:   calm  Skin:  pressure ulcer on buttock per records  Lymph: Normal  Preadmit Karnofsky: 50 %           Current Karnofsky: 30%  Cachexia (Y/N): N  BMI: 26.2    Advanced Directives:     Full Code    Decision maker: patient does not have capacity to make complex medical decisions at this time   Legal surrogate:     GOALS OF CARE DISCUSSION       Palliative care info/counseling provided	           Family meeting- TBD               REFERRALS	        Unit SW/Case Mgmt              Speech/Swallow       Patient/Family Support       Massage Therapy       Music Therapy       Nutrition       Dietician       PT/OT

## 2018-11-23 NOTE — PROGRESS NOTE ADULT - SUBJECTIVE AND OBJECTIVE BOX
Patient seen and examined at bedside. She had no complaints.  GONZALEZ  - secondary ATN from shock - now improving, Cr 1.3.  She remains nonoliguric - UOP 1031 CC/24 hrs    Meds:    ALBUTerol/ipratropium for Nebulization 3 milliLiter(s) Nebulizer every 6 hours  ceFAZolin   IVPB 1000 milliGRAM(s) IV Intermittent every 12 hours  chlorhexidine 2% Cloths 1 Application(s) Topical daily  dextrose 40% Gel 15 Gram(s) Oral once PRN  dextrose 5%. 1000 milliLiter(s) IV Continuous <Continuous>  dextrose 5%. 1000 milliLiter(s) IV Continuous <Continuous>  dextrose 50% Injectable 12.5 Gram(s) IV Push once  dextrose 50% Injectable 25 Gram(s) IV Push once  dextrose 50% Injectable 25 Gram(s) IV Push once  glucagon  Injectable 1 milliGRAM(s) IntraMuscular once PRN  heparin  Injectable 5000 Unit(s) SubCutaneous every 8 hours  insulin lispro (HumaLOG) corrective regimen sliding scale   SubCutaneous every 6 hours  levothyroxine 150 MICROGram(s) Oral daily  midodrine 2.5 milliGRAM(s) Oral every 8 hours  pantoprazole   Suspension 40 milliGRAM(s) Enteral Tube daily  psyllium Powder 1 Packet(s) Oral daily      T(C): 36.3 (11-23-18 @ 14:53), Max: 37.4 (11-23-18 @ 00:00)  HR: 58 (11-23-18 @ 16:31) (46 - 76)  BP: 155/58 (11-23-18 @ 16:00) (137/62 - 200/100)  RR: 43 (11-23-18 @ 16:31) (14 - 44)  SpO2: 99% (11-23-18 @ 16:31) (93% - 100%)    Input/Output      11-22-18 @ 07:01  -  11-23-18 @ 07:00  --------------------------------------------------------  IN: 3332 mL / OUT: 345 mL / NET: 2987 mL    11-23-18 @ 07:01  -  11-23-18 @ 16:59  --------------------------------------------------------  IN: 551 mL / OUT: 250 mL / NET: 301 mL        ROS:     Gen: no fever  Cardiovascular: no chest pain  Respiratory: no cough, no sputum  Gastrointestinal: no nausea, no vomiting, no change in bowel habits  Neurologic: no headache  : no urinary symptoms      Physical exam:    Elderly lady in no distress  No JVD, euvolemic  Normal air entry into the lungs, no wheezing, no creps  RRR, normal s1/s2, no murmurs, rubs or gallops   Abdomen - soft, not tender, not distended   Extremities: no edema     Has a suazo catheter           LABS:                            8.1    12.0  )-----------( 212      ( 23 Nov 2018 05:00 )             26.5       11-23    147<H>  |  117<H>  |  46<H>  ----------------------------<  154<H>  4.4   |  22  |  1.34<H>    Ca    9.8      23 Nov 2018 05:00  Mg     1.8     11-23    TPro  5.5<L>  /  Alb  2.0<L>  /  TBili  0.2  /  DBili  x   /  AST  22  /  ALT  13  /  AlkPhos  143<H>  11-23                                  6.8    13.1  )-----------( 200      ( 22 Nov 2018 05:12 )             23.2     11-22    146<H>  |  114<H>  |  53<H>  ----------------------------<  230<H>  3.6   |  22  |  1.63<H>    Ca    9.9      22 Nov 2018 05:12  Phos  3.9     11-21  Mg     2.0     11-22    TPro  5.4<L>  /  Alb  2.1<L>  /  TBili  0.2  /  DBili  x   /  AST  14  /  ALT  7<L>  /  AlkPhos  119  11-22                RADIOLOGY & ADDITIONAL STUDIES:

## 2018-11-23 NOTE — CONSULT NOTE ADULT - PROBLEM SELECTOR RECOMMENDATION 3
on Cefazolin (day 5 today)  management per MICU team Currently on Cefazolin (day 5 today)  Management per MICU team

## 2018-11-23 NOTE — CONSULT NOTE ADULT - PROBLEM SELECTOR RECOMMENDATION 5
-Likely 2/2 to GONZALEZ and free water down  -Maintainence fluids 1/4NS @ 125cc  -BMP q6-q8h
Requires assistance from nursing staff and aides for ADLs. Would benefit from ongoing ERNESTINE as per PM&R notes. As per conversations with family and PM&R they wish to look for another NH different that from where the patient was.

## 2018-11-23 NOTE — PROGRESS NOTE ADULT - PROBLEM SELECTOR PLAN 1
- Cr 1.3 - improving  - monitor intake/output  - renal diet  - avoid nephrotoxic substances  - Renal Consult as needed

## 2018-11-23 NOTE — CONSULT NOTE ADULT - PROBLEM SELECTOR RECOMMENDATION 4
patient is confused, unable to participate in goals of care conversation.  No MOLST on file, patient is currently full code.  awaiting family meeting to address goals of care. Likely multifactorial given acute issues. Will continue to reassess to determine if wishes could be ascertained. Otherwise will need to rely on family for decision making.

## 2018-11-23 NOTE — PROGRESS NOTE ADULT - ASSESSMENT
78F w/ PMH obesity, DM, HTN, hypothyroidism, sepsis secondary to ventral hernia infection s/p repair, PE s/p IVC filter, acute blood loss anemia from hemorrhagic uterine fibroids and UGIB, ATN requiring HD, sepsis secondary to MSSA bacteremia and MDR E coli UTI w/AMS and s/p TAHBSO and small bowel resection s/p PEG. Presenting with septic shock likely 2/2 PNA vs UTI and acute hypoxic respiratory failure now extubated.    Neuro  #Toxic metabolic encephalopathy 2/2 septic shock  -Resolved  -baseline dementia  -patient is being treated for sepsis as below, post extubation.     Respiratory  #HAP with sputum culture staph aureus sn to cefazolin.   -sputum positive for staph aureus  -continue with cefazolin  -currently afebrile, chest xray shows infiltrate on right lung field.   -percussion vest for mucous    CV  #hypotension  -resolved, no need for pressors currently   -on midodrine 5 Q8, can reduce as appropriate.   -blood pressure currently 110s-120s/60s  - has history of HTN at baseline, hold all anti-hypertensives for now       GI  -ct scan showed debris vs stone in GB and resolution of bowel wall thickening  -surgery stated that likely not surgical candidate, abdominal exam unremarkable  -bilirubin <0.2, f/u CMP daily  -peg tube functional.     Renal  #Anion gap metabolic acidosis   resolved  - likely 2/2 to lactic acidosis 2/2 to decreased perfusion 2/2 to septic shock  - AG initially 21, closed s/p IVF    #BUN/Cr elevation  Initial  and Cr of 3.38 increased from baseline of 21/0.68 in June 2018. Likely 2/2 to both pre-renal and intrinsic renal.  BUN/Cr now currently 53/1.63  -appreciate nephrology recommendations  -Trend BMP  -avoid nephrotoxins    #Hypernatremia  -Na 150 11/21  -today Na of 146 (post D51/4 NS at 125 cc/hr)  -c/w 1/4 NS at 50 cc/hr  -recheck serum BMP    ID  #Sepsis 2/2 HAP and concern for UTI  -sputum cx staph aureus sn to cefazolin  -c/w cefazolin  - 1st urine culture rejected, and repeat was sent post antibiotics but await results  -hx of ESBL uti that is sn to cipro  -c/w ciprofloxacin for UTI (last day is today).     Endo  #hyperglycemia  patient w/ history of DM as well. Not in DKA. Beta hydroxybutyrate is 0.2.   -ISS  -FSBS    #hypothyroidism  Takes synthroid 150mcg PO daily at home  -Continue synthroid    Heme:  #anemia 2/2 chronic disease  -f/u b12, folate wnl  -had Hg of 6.8 likely due to dilutional and chronic disease  -no signs of active bleeding  -1 unit of pRBC given in am  -active type and screen.     GI  #Diarrhea  chronic in nature likely 2/2 small bowel resection  consider probiotics once peripheral iv access is obtained.       F: 1/4 NS 50 cc/hr  E: cautious repletion in setting of GONZALEZ  N: glucerna 1.2 55 cc/hr  Full code    Dvt ppx: hep sq  GI ppx: protonix 78F w/ PMH obesity, DM, HTN, hypothyroidism, sepsis secondary to ventral hernia infection s/p repair, PE s/p IVC filter, acute blood loss anemia from hemorrhagic uterine fibroids and UGIB, ATN requiring HD, sepsis secondary to MSSA bacteremia and MDR E coli UTI w/AMS and s/p TAHBSO and small bowel resection s/p PEG. Presenting with septic shock likely 2/2 PNA vs UTI and acute hypoxic respiratory failure now extubated.    Neuro  #Toxic metabolic encephalopathy 2/2 septic shock  -Resolved  -baseline dementia  -patient is being treated for sepsis as below, post extubation.     Respiratory  #HAP with sputum culture staph aureus sn to cefazolin.   -sputum positive for staph aureus  -continue with cefazolin  -currently afebrile, chest xray shows infiltrate on right lung field.   -percussion vest for mucous    CV  #hypotension  -resolved, no need for pressors currently   -on midodrine 2.5 Q8  -blood pressure currently 170s/90      #Bradycardia  -had heart rate to 30s overnight  -midodrine went down to 2.5 TID instead of 5, likely component of BP at 170 and heart rate to 30s  -currently hr at 60s     GI  -ct scan showed debris vs stone in GB and resolution of bowel wall thickening  -surgery stated that likely not surgical candidate, abdominal exam unremarkable  -bilirubin <0.2, f/u CMP daily  -peg tube functional.     Renal  #Anion gap metabolic acidosis   resolved  - likely 2/2 to lactic acidosis 2/2 to decreased perfusion 2/2 to septic shock  - AG initially 21, closed s/p IVF    #BUN/Cr elevation  Initial  and Cr of 3.38 increased from baseline of 21/0.68 in June 2018. Likely 2/2 to both pre-renal and intrinsic renal.  BUN/Cr now currently 53/1.63  -appreciate nephrology recommendations  -Trend BMP  -avoid nephrotoxins    #Hypernatremia  -Na 147   -150 cc free water flushes Q6.  -recheck serum BMP    ID  #Sepsis 2/2 HAP and concern for UTI  -sputum cx staph aureus sn to cefazolin  -c/w cefazolin  - 1st urine culture rejected, and repeat was sent post antibiotics but await results  -hx of ESBL uti  -completed full course of ciprofloxacin for UTI concern.     Endo  #hyperglycemia  patient w/ history of DM as well. Not in DKA. Beta hydroxybutyrate is 0.2.   -ISS  -FSBS    #hypothyroidism  tsh elevated to 9, f/u T4 and T3 levels  Takes synthroid 150mcg PO daily at home  -Continue synthroid currently 150 mcg per day oral.     Heme:  #anemia 2/2 chronic disease  -f/u b12, folate wnl  -no signs of active bleeding  -1 unit of pRBC given in am  -active type and screen.     GI  #Diarrhea  chronic in nature likely 2/2 small bowel resection  consider probiotics once peripheral iv access is obtained and central line removed  fiber daily.       F: 1/4 NS 50 cc/hr  E: cautious repletion in setting of GONZALEZ  N: glucerna 1.2 55 cc/hr  Full code    Dvt ppx: hep sq  GI ppx: protonix

## 2018-11-23 NOTE — PROGRESS NOTE ADULT - SUBJECTIVE AND OBJECTIVE BOX
Overnight:   Patient tolerating nasal cannula well, no episodes of fevers or chills.  Patient went bradycardic to the 30s, with systolic bp at 170s, midodrine was decreased from 5 TID to 2.5 TID  Patient         PHYSICAL EXAM:  GENERAL: Obese adult White female  HEAD: atraumatic  EYES: PERRL, resists passive opening of eyes, conjunctiva pink  ENT: moist mucous membranes  NECK: supple  CHEST/LUNG: rhonchi bilaterally, R > L upper, mid, and lower lobe  HEART: regular tachycardia, no murmur or rub  ABDOMEN: obese, soft, non-tender, non-distended, without rebound or guarding  EXTREMITIES: warm, well-perfused, without edema  NERVOUS SYSTEM: sedated; arouses to voice, does not follow commands or answer questions  SKIN: rash near buttocks region and low back due to stool, no erythema, discharge or purulence. No sacral ulcers.     ICU Vital Signs Last 24 Hrs  T(C): 36.5 (22 Nov 2018 09:38), Max: 37.4 (21 Nov 2018 14:52)  T(F): 97.7 (22 Nov 2018 09:38), Max: 99.4 (21 Nov 2018 14:52)  HR: 64 (22 Nov 2018 11:00) (64 - 82)  BP: 155/64 (22 Nov 2018 11:00) (112/75 - 155/64)  BP(mean): 85 (22 Nov 2018 11:00) (69 - 106)  ABP: --  ABP(mean): --  RR: 26 (22 Nov 2018 11:00) (19 - 48)  SpO2: 96% (22 Nov 2018 11:00) (93% - 100%)      21 Nov 2018 07:01  -  22 Nov 2018 07:00  --------------------------------------------------------  IN:    dextrose 5% + sodium chloride 0.225%: 2750 mL    Enteral Tube Flush: 60 mL    Glucerna 1.5: 980 mL    IV PiggyBack: 350 mL  Total IN: 4140 mL    OUT:    Indwelling Catheter - Urethral: 1190 mL  Total OUT: 1190 mL    Total NET: 2950 mL      22 Nov 2018 07:01  -  22 Nov 2018 12:52  --------------------------------------------------------  IN:    dextrose 5% + sodium chloride 0.225%: 250 mL    Glucerna 1.5: 172 mL    IV PiggyBack: 100 mL  Total IN: 522 mL    OUT:    Indwelling Catheter - Urethral: 145 mL  Total OUT: 145 mL    Total NET: 377 mL    LABS:                         6.8    13.1  )-----------( 200      ( 22 Nov 2018 05:12 )             23.2     11-22    146<H>  |  114<H>  |  53<H>  ----------------------------<  230<H>  3.6   |  22  |  1.63<H>    Ca    9.9      22 Nov 2018 05:12  Phos  3.9     11-21  Mg     2.0     11-22    TPro  5.4<L>  /  Alb  2.1<L>  /  TBili  0.2  /  DBili  x   /  AST  14  /  ALT  7<L>  /  AlkPhos  119  11-22        RADIOLOGY, EKG & ADDITIONAL TESTS: Reviewed.     CT abdomen with oral contrast.   Impression: Hyperdense material in the gallbladder which may represent   debris or stones    Resolution of bowel wall thickening.    No significant ascites or drainable fluid collection.    Increased pleural effusions and adjacent consolidations. Overnight:   Patient tolerating nasal cannula well, no episodes of fevers or chills.  Patient went bradycardic to the 30s, with systolic bp at 170s, midodrine was decreased from 5 TID to 2.5 TID          PHYSICAL EXAM:  GENERAL: Obese adult White female  HEAD: atraumatic  EYES: PERRL, resists passive opening of eyes, conjunctiva pink  ENT: moist mucous membranes  NECK: supple  CHEST/LUNG: rhonchi bilaterally, R > L upper, mid, and lower lobe  HEART: regular tachycardia, no murmur or rub  ABDOMEN: obese, soft, non-tender, non-distended, without rebound or guarding  EXTREMITIES: warm, well-perfused, without edema  NERVOUS SYSTEM: sedated; arouses to voice, does not follow commands or answer questions  SKIN: rash near buttocks region and low back due to stool, no erythema, discharge or purulence. No sacral ulcers.     ICU Vital Signs Last 24 Hrs  T(C): 36.7 (23 Nov 2018 10:40), Max: 37.4 (23 Nov 2018 00:00)  T(F): 98 (23 Nov 2018 10:40), Max: 99.4 (23 Nov 2018 00:00)  HR: 54 (23 Nov 2018 11:00) (46 - 82)  BP: 170/71 (23 Nov 2018 11:00) (119/63 - 177/66)  BP(mean): 99 (23 Nov 2018 11:00) (79 - 114)  ABP: --  ABP(mean): --  RR: 28 (23 Nov 2018 11:00) (14 - 32)  SpO2: 100% (23 Nov 2018 11:00) (96% - 100%)    22 Nov 2018 07:01  -  23 Nov 2018 07:00  --------------------------------------------------------  IN:    dextrose 5% + sodium chloride 0.225%: 250 mL    Enteral Tube Flush: 250 mL    Glucerna 1.5: 1032 mL    IV PiggyBack: 500 mL    Packed Red Blood Cells: 300 mL    sodium chloride 0.225%: 1000 mL  Total IN: 3332 mL    OUT:    Indwelling Catheter - Urethral: 145 mL    Voided: 200 mL  Total OUT: 345 mL    Total NET: 2987 mL      23 Nov 2018 07:01  -  23 Nov 2018 12:38  --------------------------------------------------------  IN:    Glucerna 1.5: 43 mL    sodium chloride 0.225%: 150 mL  Total IN: 193 mL    OUT:  Total OUT: 0 mL    Total NET: 193 mL    .  LABS:                         8.1    12.0  )-----------( 212      ( 23 Nov 2018 05:00 )             26.5     11-23    147<H>  |  117<H>  |  46<H>  ----------------------------<  154<H>  4.4   |  22  |  1.34<H>    Ca    9.8      23 Nov 2018 05:00  Mg     1.8     11-23    TPro  5.5<L>  /  Alb  2.0<L>  /  TBili  0.2  /  DBili  x   /  AST  22  /  ALT  13  /  AlkPhos  143<H>  11-23      RADIOLOGY, EKG & ADDITIONAL TESTS: Reviewed.     CT abdomen with oral contrast.   Impression: Hyperdense material in the gallbladder which may represent   debris or stones    Resolution of bowel wall thickening.    No significant ascites or drainable fluid collection.    Increased pleural effusions and adjacent consolidations.

## 2018-11-23 NOTE — PROGRESS NOTE ADULT - SUBJECTIVE AND OBJECTIVE BOX
GENERAL SURGERY CONSULT PROGRESS NOTE    SUBJECTIVE:   Pt seen & examined at bedside. Pt seen and examined at bedside. Smiles and says hello to questions, no meaningful ROS obtainable.     MEDICATIONS:  ---NEURO-  ---CV-  midodrine 2.5 milliGRAM(s) Oral every 8 hours  ---PULM-  ALBUTerol/ipratropium for Nebulization 3 milliLiter(s) Nebulizer every 6 hours  ---GI/FEN-  pantoprazole   Suspension 40 milliGRAM(s) Enteral Tube daily  psyllium Powder 1 Packet(s) Oral daily  dextrose 5%. 1000 milliLiter(s) IV Continuous <Continuous>  dextrose 5%. 1000 milliLiter(s) IV Continuous <Continuous>  ----  ---ID-   ceFAZolin   IVPB 1000 milliGRAM(s) IV Intermittent every 12 hours  ---ENDO-  dextrose 40% Gel 15 Gram(s) Oral once PRN  dextrose 50% Injectable 12.5 Gram(s) IV Push once  dextrose 50% Injectable 25 Gram(s) IV Push once  dextrose 50% Injectable 25 Gram(s) IV Push once  glucagon  Injectable 1 milliGRAM(s) IntraMuscular once PRN  insulin lispro (HumaLOG) corrective regimen sliding scale   SubCutaneous every 6 hours  levothyroxine 150 MICROGram(s) Oral daily  ---HEME-  heparin  Injectable 5000 Unit(s) SubCutaneous every 8 hours  ---OTHER-  chlorhexidine 2% Cloths 1 Application(s) Topical daily        VOLUME STATUS:  I&O's Detail    22 Nov 2018 07:01  -  23 Nov 2018 07:00  --------------------------------------------------------  IN:    dextrose 5% + sodium chloride 0.225%: 250 mL    Enteral Tube Flush: 250 mL    Glucerna 1.5: 1032 mL    IV PiggyBack: 500 mL    Packed Red Blood Cells: 300 mL    sodium chloride 0.225%: 1000 mL  Total IN: 3332 mL    OUT:    Indwelling Catheter - Urethral: 145 mL    Voided: 200 mL  Total OUT: 345 mL    Total NET: 2987 mL      23 Nov 2018 07:01  -  23 Nov 2018 17:37  --------------------------------------------------------  IN:    dextrose 5%.: 350 mL    Glucerna 1.5: 430 mL    sodium chloride 0.225%: 150 mL  Total IN: 930 mL    OUT:    Indwelling Catheter - Urethral: 536 mL  Total OUT: 536 mL    Total NET: 394 mL          VITALS:  Vital Signs Last 24 Hrs  T(C): 36.3 (23 Nov 2018 14:53), Max: 37.4 (23 Nov 2018 00:00)  T(F): 97.3 (23 Nov 2018 14:53), Max: 99.4 (23 Nov 2018 00:00)  HR: 56 (23 Nov 2018 17:00) (46 - 76)  BP: 187/68 (23 Nov 2018 17:00) (137/62 - 200/100)  BP(mean): 87 (23 Nov 2018 17:00) (80 - 142)  RR: 43 (23 Nov 2018 17:00) (14 - 44)  SpO2: 99% (23 Nov 2018 17:00) (90% - 100%)    PHYSICAL EXAM:  Constitutional: NAD, resting comfortably, on NC, orients and verbally responds minimally to verbal stimulus   HEENT: MMM  CV: RRR on monitor  Resp: nonlabored breathing  Abd: soft, nondistended, nontender, PEG in place incontinent   Ext: WWP, no edema, 2+ radial pulses bilaterally    LABS:                        8.1    12.0  )-----------( 212      ( 23 Nov 2018 05:00 )             26.5     11-23    147<H>  |  117<H>  |  46<H>  ----------------------------<  154<H>  4.4   |  22  |  1.34<H>    Ca    9.8      23 Nov 2018 05:00  Mg     1.8     11-23    TPro  5.5<L>  /  Alb  2.0<L>  /  TBili  0.2  /  DBili  x   /  AST  22  /  ALT  13  /  AlkPhos  143<H>  11-23

## 2018-11-24 LAB
-  CEFAZOLIN: SIGNIFICANT CHANGE UP
-  LINEZOLID: SIGNIFICANT CHANGE UP
-  OXACILLIN: SIGNIFICANT CHANGE UP
-  PENICILLIN: SIGNIFICANT CHANGE UP
-  RIFAMPIN: SIGNIFICANT CHANGE UP
-  TRIMETHOPRIM/SULFAMETHOXAZOLE: SIGNIFICANT CHANGE UP
-  VANCOMYCIN: SIGNIFICANT CHANGE UP
ALBUMIN SERPL ELPH-MCNC: 2 G/DL — LOW (ref 3.3–5)
ALP SERPL-CCNC: 142 U/L — HIGH (ref 40–120)
ALT FLD-CCNC: 10 U/L — SIGNIFICANT CHANGE UP (ref 10–45)
ANION GAP SERPL CALC-SCNC: 6 MMOL/L — SIGNIFICANT CHANGE UP (ref 5–17)
AST SERPL-CCNC: 20 U/L — SIGNIFICANT CHANGE UP (ref 10–40)
BILIRUB SERPL-MCNC: 0.2 MG/DL — SIGNIFICANT CHANGE UP (ref 0.2–1.2)
BLD GP AB SCN SERPL QL: NEGATIVE — SIGNIFICANT CHANGE UP
BLD GP AB SCN SERPL QL: NEGATIVE — SIGNIFICANT CHANGE UP
BUN SERPL-MCNC: 43 MG/DL — HIGH (ref 7–23)
CALCIUM SERPL-MCNC: 9.7 MG/DL — SIGNIFICANT CHANGE UP (ref 8.4–10.5)
CHLORIDE SERPL-SCNC: 111 MMOL/L — HIGH (ref 96–108)
CO2 SERPL-SCNC: 25 MMOL/L — SIGNIFICANT CHANGE UP (ref 22–31)
CREAT SERPL-MCNC: 1.16 MG/DL — SIGNIFICANT CHANGE UP (ref 0.5–1.3)
CULTURE RESULTS: SIGNIFICANT CHANGE UP
EOSINOPHIL NFR BLD AUTO: 2 % — SIGNIFICANT CHANGE UP (ref 0–6)
GLUCOSE BLDC GLUCOMTR-MCNC: 116 MG/DL — HIGH (ref 70–99)
GLUCOSE BLDC GLUCOMTR-MCNC: 117 MG/DL — HIGH (ref 70–99)
GLUCOSE BLDC GLUCOMTR-MCNC: 141 MG/DL — HIGH (ref 70–99)
GLUCOSE BLDC GLUCOMTR-MCNC: 148 MG/DL — HIGH (ref 70–99)
GLUCOSE SERPL-MCNC: 154 MG/DL — HIGH (ref 70–99)
HCT VFR BLD CALC: 26.2 % — LOW (ref 34.5–45)
HGB BLD-MCNC: 7.8 G/DL — LOW (ref 11.5–15.5)
LYMPHOCYTES # BLD AUTO: 12 % — LOW (ref 13–44)
MAGNESIUM SERPL-MCNC: 2.4 MG/DL — SIGNIFICANT CHANGE UP (ref 1.6–2.6)
MCHC RBC-ENTMCNC: 28 PG — SIGNIFICANT CHANGE UP (ref 27–34)
MCHC RBC-ENTMCNC: 29.8 G/DL — LOW (ref 32–36)
MCV RBC AUTO: 93.9 FL — SIGNIFICANT CHANGE UP (ref 80–100)
METHOD TYPE: SIGNIFICANT CHANGE UP
MONOCYTES NFR BLD AUTO: 3 % — SIGNIFICANT CHANGE UP (ref 2–14)
NEUTROPHILS NFR BLD AUTO: 76 % — SIGNIFICANT CHANGE UP (ref 43–77)
ORGANISM # SPEC MICROSCOPIC CNT: SIGNIFICANT CHANGE UP
ORGANISM # SPEC MICROSCOPIC CNT: SIGNIFICANT CHANGE UP
PLATELET # BLD AUTO: 217 K/UL — SIGNIFICANT CHANGE UP (ref 150–400)
POTASSIUM SERPL-MCNC: 4.4 MMOL/L — SIGNIFICANT CHANGE UP (ref 3.5–5.3)
POTASSIUM SERPL-SCNC: 4.4 MMOL/L — SIGNIFICANT CHANGE UP (ref 3.5–5.3)
PROT SERPL-MCNC: 5.5 G/DL — LOW (ref 6–8.3)
RBC # BLD: 2.79 M/UL — LOW (ref 3.8–5.2)
RBC # FLD: 18.2 % — HIGH (ref 10.3–16.9)
RH IG SCN BLD-IMP: POSITIVE — SIGNIFICANT CHANGE UP
RH IG SCN BLD-IMP: POSITIVE — SIGNIFICANT CHANGE UP
SODIUM SERPL-SCNC: 142 MMOL/L — SIGNIFICANT CHANGE UP (ref 135–145)
SPECIMEN SOURCE: SIGNIFICANT CHANGE UP
WBC # BLD: 11 K/UL — HIGH (ref 3.8–10.5)
WBC # FLD AUTO: 11 K/UL — HIGH (ref 3.8–10.5)

## 2018-11-24 PROCEDURE — 99233 SBSQ HOSP IP/OBS HIGH 50: CPT | Mod: GC

## 2018-11-24 RX ORDER — ROBINUL 0.2 MG/ML
0.2 INJECTION INTRAMUSCULAR; INTRAVENOUS EVERY 6 HOURS
Qty: 0 | Refills: 0 | Status: DISCONTINUED | OUTPATIENT
Start: 2018-11-24 | End: 2018-12-07

## 2018-11-24 RX ORDER — CEFAZOLIN SODIUM 1 G
VIAL (EA) INJECTION
Qty: 0 | Refills: 0 | Status: DISCONTINUED | OUTPATIENT
Start: 2018-11-24 | End: 2018-11-26

## 2018-11-24 RX ORDER — LISINOPRIL 2.5 MG/1
40 TABLET ORAL DAILY
Qty: 0 | Refills: 0 | Status: DISCONTINUED | OUTPATIENT
Start: 2018-11-24 | End: 2018-12-20

## 2018-11-24 RX ORDER — LACTOBACILLUS ACIDOPHILUS 100MM CELL
1 CAPSULE ORAL EVERY 8 HOURS
Qty: 0 | Refills: 0 | Status: DISCONTINUED | OUTPATIENT
Start: 2018-11-24 | End: 2018-12-20

## 2018-11-24 RX ORDER — CEFAZOLIN SODIUM 1 G
1000 VIAL (EA) INJECTION EVERY 8 HOURS
Qty: 0 | Refills: 0 | Status: DISCONTINUED | OUTPATIENT
Start: 2018-11-24 | End: 2018-11-26

## 2018-11-24 RX ORDER — CEFAZOLIN SODIUM 1 G
1000 VIAL (EA) INJECTION ONCE
Qty: 0 | Refills: 0 | Status: COMPLETED | OUTPATIENT
Start: 2018-11-24 | End: 2018-11-24

## 2018-11-24 RX ADMIN — HEPARIN SODIUM 5000 UNIT(S): 5000 INJECTION INTRAVENOUS; SUBCUTANEOUS at 13:58

## 2018-11-24 RX ADMIN — Medication 3 MILLILITER(S): at 21:27

## 2018-11-24 RX ADMIN — Medication 100 MILLIGRAM(S): at 11:31

## 2018-11-24 RX ADMIN — PANTOPRAZOLE SODIUM 40 MILLIGRAM(S): 20 TABLET, DELAYED RELEASE ORAL at 12:08

## 2018-11-24 RX ADMIN — Medication 3 MILLILITER(S): at 09:43

## 2018-11-24 RX ADMIN — Medication 3 MILLILITER(S): at 06:36

## 2018-11-24 RX ADMIN — HEPARIN SODIUM 5000 UNIT(S): 5000 INJECTION INTRAVENOUS; SUBCUTANEOUS at 05:51

## 2018-11-24 RX ADMIN — Medication 3 MILLILITER(S): at 16:42

## 2018-11-24 RX ADMIN — Medication 100 MILLIGRAM(S): at 22:10

## 2018-11-24 RX ADMIN — LISINOPRIL 40 MILLIGRAM(S): 2.5 TABLET ORAL at 12:09

## 2018-11-24 RX ADMIN — CHLORHEXIDINE GLUCONATE 1 APPLICATION(S): 213 SOLUTION TOPICAL at 05:40

## 2018-11-24 RX ADMIN — ROBINUL 0.2 MILLIGRAM(S): 0.2 INJECTION INTRAMUSCULAR; INTRAVENOUS at 07:54

## 2018-11-24 RX ADMIN — ROBINUL 0.2 MILLIGRAM(S): 0.2 INJECTION INTRAMUSCULAR; INTRAVENOUS at 18:00

## 2018-11-24 RX ADMIN — Medication 1 TABLET(S): at 22:12

## 2018-11-24 RX ADMIN — HEPARIN SODIUM 5000 UNIT(S): 5000 INJECTION INTRAVENOUS; SUBCUTANEOUS at 22:12

## 2018-11-24 RX ADMIN — SODIUM CHLORIDE 50 MILLILITER(S): 9 INJECTION, SOLUTION INTRAVENOUS at 07:55

## 2018-11-24 RX ADMIN — ROBINUL 0.2 MILLIGRAM(S): 0.2 INJECTION INTRAMUSCULAR; INTRAVENOUS at 12:08

## 2018-11-24 RX ADMIN — Medication 150 MICROGRAM(S): at 05:51

## 2018-11-24 RX ADMIN — Medication 1 PACKET(S): at 12:08

## 2018-11-24 NOTE — PROGRESS NOTE ADULT - SUBJECTIVE AND OBJECTIVE BOX
Overnight:   Patient tolerating nasal cannula well, no episodes of fevers or chills.  Patient went bradycardic to the 40s, with systolic bp at 170s, midodrine was discontinued/lisinopril was started.             PHYSICAL EXAM:  GENERAL: Obese adult White female  HEAD: atraumatic  EYES: PERRL, resists passive opening of eyes, conjunctiva pink  ENT: moist mucous membranes  NECK: supple  CHEST/LUNG: rhonchi bilaterally, R > L upper, mid, and lower lobe  HEART: regular tachycardia, no murmur or rub  ABDOMEN: obese, soft, non-tender, non-distended, without rebound or guarding  EXTREMITIES: warm, well-perfused, without edema  NERVOUS SYSTEM:  does not follow commands or answer questions at baseline due to dementia. 5/5 strength in upper and lower extremities  sensation grossly intact  PERRLA present and EOMI   SKIN: rash near buttocks region and low back due to stool, no erythema, discharge or purulence. No sacral ulcers.     ICU Vital Signs Last 24 Hrs  T(C): 36.1 (24 Nov 2018 09:55), Max: 37.2 (24 Nov 2018 01:14)  T(F): 97 (24 Nov 2018 09:55), Max: 99 (24 Nov 2018 01:14)  HR: 72 (24 Nov 2018 11:00) (40 - 76)  BP: 158/78 (24 Nov 2018 11:00) (141/60 - 200/100)  BP(mean): 99 (24 Nov 2018 11:00) (87 - 143)  ABP: --  ABP(mean): --  RR: 37 (24 Nov 2018 11:00) (16 - 44)  SpO2: 93% (24 Nov 2018 11:00) (90% - 100%)      23 Nov 2018 07:01  -  24 Nov 2018 07:00  --------------------------------------------------------  IN:    dextrose 5%.: 1050 mL    Enteral Tube Flush: 150 mL    Glucerna 1.5: 1032 mL    IV PiggyBack: 50 mL    sodium chloride 0.225%: 150 mL  Total IN: 2432 mL    OUT:    Indwelling Catheter - Urethral: 1411 mL  Total OUT: 1411 mL    Total NET: 1021 mL      24 Nov 2018 07:01  -  24 Nov 2018 13:22  --------------------------------------------------------  IN:    Glucerna 1.5: 43 mL  Total IN: 43 mL    OUT:    Indwelling Catheter - Urethral: 310 mL  Total OUT: 310 mL    Total NET: -267 mL    TPro  5.5<L>  /  Alb  2.0<L>  /  TBili  0.2  /  DBili  x   /  AST  22  /  ALT  13  /  AlkPhos  143<H>  11-23      RADIOLOGY, EKG & ADDITIONAL TESTS: Reviewed.     CT abdomen with oral contrast.   Impression: Hyperdense material in the gallbladder which may represent   debris or stones    Resolution of bowel wall thickening.    No significant ascites or drainable fluid collection.    Increased pleural effusions and adjacent consolidations.    .  LABS:                         7.8    11.0  )-----------( 217      ( 24 Nov 2018 06:18 )             26.2     11-24    142  |  111<H>  |  43<H>  ----------------------------<  154<H>  4.4   |  25  |  1.16    Ca    9.7      24 Nov 2018 06:18  Mg     2.4     11-24    TPro  5.5<L>  /  Alb  2.0<L>  /  TBili  0.2  /  DBili  x   /  AST  20  /  ALT  10  /  AlkPhos  142<H>  11-24

## 2018-11-24 NOTE — PROGRESS NOTE ADULT - ASSESSMENT
78F w/ PMH obesity, DM, HTN, hypothyroidism, sepsis secondary to ventral hernia infection s/p repair, PE s/p IVC filter, acute blood loss anemia from hemorrhagic uterine fibroids and UGIB, ATN requiring HD, sepsis secondary to MSSA bacteremia and MDR E coli UTI w/AMS and s/p TAHBSO and small bowel resection s/p PEG. Presenting with septic shock likely 2/2 PNA vs UTI and acute hypoxic respiratory failure now extubated.    Neuro  #Toxic metabolic encephalopathy 2/2 septic shock  -Resolved  -baseline dementia  -patient is being treated for sepsis as below, post extubation.     Respiratory  #HAP with sputum culture staph aureus sn to cefazolin.   -sputum positive for staph aureus  -continue with cefazolin 50 Q8 due to improvement of kidney function.   -currently afebrile, chest xray shows infiltrate on right lung field.   -percussion vest for mucous/ glycopyrrolate 0.2 Q6 iv.     CV  #hypotension  -resolved, no need for pressors currently   -on midodrine 2.5 Q8  -blood pressure currently 170s/90  -restarted home lisinopril 40 mg daily.     #Hypertension  -BP 170s/90s  -restarted home lisinopril 40 mg daily due to improved kidney function.     #Bradycardia  -had heart rate to 40s overnight, currently 70s   -midodrine d/trav    GI  -ct scan showed debris vs stone in GB and resolution of bowel wall thickening  -surgery stated that likely not surgical candidate, abdominal exam unremarkable  -bilirubin <0.2, f/u CMP daily  -peg tube functional.     Renal  #Anion gap metabolic acidosis   resolved  - likely 2/2 to lactic acidosis 2/2 to decreased perfusion 2/2 to septic shock  - AG initially 21, closed s/p IVF    #BUN/Cr elevation  Initial  and Cr of 3.38 increased from baseline of 21/0.68 in June 2018. Likely 2/2 to both pre-renal and intrinsic renal.  BUN/Cr now currently 53/1.63  -appreciate nephrology recommendations  -Trend BMP  -avoid nephrotoxins    #Hypernatremia  -resolved post 150 cc free water flushes Q6 and dextrose 5 at 50 cc/hr  -Na 142       ID  #Sepsis 2/2 HAP and concern for UTI  -sputum cx staph aureus sn to cefazolin  -c/w cefazolin 50 Q8  - 1st urine culture rejected, and repeat was sent post antibiotics but await results  -hx of ESBL uti  -completed full course of ciprofloxacin for UTI concern.     Endo  #hyperglycemia  patient w/ history of DM as well. Not in DKA. Beta hydroxybutyrate is 0.2.   -ISS  -FSBS    #hypothyroidism  tsh elevated to 9  Takes synthroid 150mcg PO daily at home  -Continue synthroid currently 150 mcg per day oral.   -free t4 wnl.     Heme:  #anemia 2/2 chronic disease  -f/u b12, folate wnl  -no signs of active bleeding  -active type and screen.     GI  #Diarrhea  -chronic in nature likely 2/2 small bowel resection  -probiotics once peripheral iv access is obtained and central line removed  -fiber daily.       F: none  E: K>4 Mag>2 caution if changes in kidney function.   N: glucerna 1.2 55 cc/hr  Full code    Dvt ppx: hep sq  GI ppx: protonix 78F w/ PMH obesity, DM, HTN, hypothyroidism, sepsis secondary to ventral hernia infection s/p repair, PE s/p IVC filter, acute blood loss anemia from hemorrhagic uterine fibroids and UGIB, ATN requiring HD, sepsis secondary to MSSA bacteremia and MDR E coli UTI w/AMS and s/p TAHBSO and small bowel resection s/p PEG. Presenting with septic shock likely 2/2 PNA vs UTI and acute hypoxic respiratory failure now extubated.    Neuro  #Toxic metabolic encephalopathy 2/2 septic shock  -Resolved  -baseline dementia  -patient is being treated for sepsis as below, post extubation.     Respiratory  #HAP with sputum culture staph aureus sn to cefazolin.   -sputum positive for staph aureus  -continue with cefazolin 50 Q8 due to improvement of kidney function.   -currently afebrile, chest xray shows infiltrate on right lung field.   -percussion vest for mucous/ glycopyrrolate 0.2 Q6 iv.     CV  #hypotension  -resolved, no need for pressors currently   -on midodrine 2.5 Q8  -blood pressure currently 170s/90  -restarted home lisinopril 40 mg daily.     #Hypertension  -BP 170s/90s  -restarted home lisinopril 40 mg daily due to improved kidney function.     #Bradycardia  -had heart rate to 40s overnight, currently 70s   -midodrine d/trav    GI  -ct scan showed debris vs stone in GB and resolution of bowel wall thickening  -surgery stated that likely not surgical candidate, abdominal exam unremarkable  -bilirubin <0.2, f/u CMP daily  -peg tube functional.     Renal  #Anion gap metabolic acidosis   resolved  - likely 2/2 to lactic acidosis 2/2 to decreased perfusion 2/2 to septic shock  - AG initially 21, closed s/p IVF    #BUN/Cr elevation  Initial  and Cr of 3.38 increased from baseline of 21/0.68 in June 2018. Likely 2/2 to both pre-renal and intrinsic renal.  BUN/Cr now currently 43/1.16  -appreciate nephrology recommendations  -Trend BMP  -avoid nephrotoxins    #Hypernatremia  -resolved post 150 cc free water flushes Q6 and dextrose 5 at 50 cc/hr  -Na 142   -BMP in the morning.       ID  #Sepsis 2/2 HAP and concern for UTI  -sputum cx staph aureus sn to cefazolin  -c/w cefazolin 50 Q8  - 1st urine culture rejected, and repeat was sent post antibiotics but await results  -hx of ESBL uti  -completed full course of ciprofloxacin for UTI concern.     Endo  #hyperglycemia  patient w/ history of DM as well. Not in DKA. Beta hydroxybutyrate is 0.2.   -ISS  -FSBS    #hypothyroidism  tsh elevated to 9  Takes synthroid 150mcg PO daily at home  -Continue synthroid currently 150 mcg per day oral.   -free t4 wnl.     Heme:  #anemia 2/2 chronic disease  -f/u b12, folate wnl  -no signs of active bleeding  -active type and screen.     GI  #Diarrhea  -chronic in nature likely 2/2 small bowel resection  -probiotics once peripheral iv access is obtained and central line removed  -fiber daily.       F: none  E: K>4 Mag>2 caution if changes in kidney function.   N: glucerna 1.2 55 cc/hr  Full code    Dvt ppx: hep sq  GI ppx: protonix

## 2018-11-25 LAB
ALBUMIN SERPL ELPH-MCNC: 2.3 G/DL — LOW (ref 3.3–5)
ALP SERPL-CCNC: 145 U/L — HIGH (ref 40–120)
ALT FLD-CCNC: 11 U/L — SIGNIFICANT CHANGE UP (ref 10–45)
ANION GAP SERPL CALC-SCNC: 12 MMOL/L — SIGNIFICANT CHANGE UP (ref 5–17)
AST SERPL-CCNC: 21 U/L — SIGNIFICANT CHANGE UP (ref 10–40)
BILIRUB SERPL-MCNC: 0.3 MG/DL — SIGNIFICANT CHANGE UP (ref 0.2–1.2)
BUN SERPL-MCNC: 39 MG/DL — HIGH (ref 7–23)
CALCIUM SERPL-MCNC: 9.9 MG/DL — SIGNIFICANT CHANGE UP (ref 8.4–10.5)
CHLORIDE SERPL-SCNC: 104 MMOL/L — SIGNIFICANT CHANGE UP (ref 96–108)
CO2 SERPL-SCNC: 23 MMOL/L — SIGNIFICANT CHANGE UP (ref 22–31)
CREAT SERPL-MCNC: 1.1 MG/DL — SIGNIFICANT CHANGE UP (ref 0.5–1.3)
GLUCOSE BLDC GLUCOMTR-MCNC: 130 MG/DL — HIGH (ref 70–99)
GLUCOSE BLDC GLUCOMTR-MCNC: 135 MG/DL — HIGH (ref 70–99)
GLUCOSE BLDC GLUCOMTR-MCNC: 162 MG/DL — HIGH (ref 70–99)
GLUCOSE BLDC GLUCOMTR-MCNC: 187 MG/DL — HIGH (ref 70–99)
GLUCOSE SERPL-MCNC: 118 MG/DL — HIGH (ref 70–99)
HCT VFR BLD CALC: 27.6 % — LOW (ref 34.5–45)
HGB BLD-MCNC: 8.3 G/DL — LOW (ref 11.5–15.5)
MAGNESIUM SERPL-MCNC: 2 MG/DL — SIGNIFICANT CHANGE UP (ref 1.6–2.6)
MCHC RBC-ENTMCNC: 28 PG — SIGNIFICANT CHANGE UP (ref 27–34)
MCHC RBC-ENTMCNC: 30.1 G/DL — LOW (ref 32–36)
MCV RBC AUTO: 93.2 FL — SIGNIFICANT CHANGE UP (ref 80–100)
PLATELET # BLD AUTO: 222 K/UL — SIGNIFICANT CHANGE UP (ref 150–400)
POTASSIUM SERPL-MCNC: 4.3 MMOL/L — SIGNIFICANT CHANGE UP (ref 3.5–5.3)
POTASSIUM SERPL-SCNC: 4.3 MMOL/L — SIGNIFICANT CHANGE UP (ref 3.5–5.3)
PROT SERPL-MCNC: 5.9 G/DL — LOW (ref 6–8.3)
RBC # BLD: 2.96 M/UL — LOW (ref 3.8–5.2)
RBC # FLD: 17.5 % — HIGH (ref 10.3–16.9)
SODIUM SERPL-SCNC: 139 MMOL/L — SIGNIFICANT CHANGE UP (ref 135–145)
WBC # BLD: 11 K/UL — HIGH (ref 3.8–10.5)
WBC # FLD AUTO: 11 K/UL — HIGH (ref 3.8–10.5)

## 2018-11-25 PROCEDURE — 99233 SBSQ HOSP IP/OBS HIGH 50: CPT | Mod: GC

## 2018-11-25 PROCEDURE — 71045 X-RAY EXAM CHEST 1 VIEW: CPT | Mod: 26

## 2018-11-25 RX ORDER — HYDRALAZINE HCL 50 MG
5 TABLET ORAL ONCE
Qty: 0 | Refills: 0 | Status: COMPLETED | OUTPATIENT
Start: 2018-11-25 | End: 2018-11-25

## 2018-11-25 RX ORDER — OCTREOTIDE ACETATE 200 UG/ML
100 INJECTION, SOLUTION INTRAVENOUS; SUBCUTANEOUS EVERY 8 HOURS
Qty: 0 | Refills: 0 | Status: DISCONTINUED | OUTPATIENT
Start: 2018-11-25 | End: 2018-11-27

## 2018-11-25 RX ORDER — AMLODIPINE BESYLATE 2.5 MG/1
5 TABLET ORAL EVERY 24 HOURS
Qty: 0 | Refills: 0 | Status: DISCONTINUED | OUTPATIENT
Start: 2018-11-26 | End: 2018-11-26

## 2018-11-25 RX ORDER — AMLODIPINE BESYLATE 2.5 MG/1
2.5 TABLET ORAL DAILY
Qty: 0 | Refills: 0 | Status: DISCONTINUED | OUTPATIENT
Start: 2018-11-25 | End: 2018-11-25

## 2018-11-25 RX ORDER — AMLODIPINE BESYLATE 2.5 MG/1
2.5 TABLET ORAL ONCE
Qty: 0 | Refills: 0 | Status: COMPLETED | OUTPATIENT
Start: 2018-11-25 | End: 2018-11-25

## 2018-11-25 RX ADMIN — ROBINUL 0.2 MILLIGRAM(S): 0.2 INJECTION INTRAMUSCULAR; INTRAVENOUS at 13:05

## 2018-11-25 RX ADMIN — ROBINUL 0.2 MILLIGRAM(S): 0.2 INJECTION INTRAMUSCULAR; INTRAVENOUS at 23:57

## 2018-11-25 RX ADMIN — Medication 100 MILLIGRAM(S): at 06:32

## 2018-11-25 RX ADMIN — Medication 5 MILLIGRAM(S): at 22:43

## 2018-11-25 RX ADMIN — HEPARIN SODIUM 5000 UNIT(S): 5000 INJECTION INTRAVENOUS; SUBCUTANEOUS at 14:02

## 2018-11-25 RX ADMIN — ROBINUL 0.2 MILLIGRAM(S): 0.2 INJECTION INTRAMUSCULAR; INTRAVENOUS at 18:00

## 2018-11-25 RX ADMIN — Medication 5 MILLIGRAM(S): at 23:57

## 2018-11-25 RX ADMIN — Medication 1 TABLET(S): at 06:48

## 2018-11-25 RX ADMIN — CHLORHEXIDINE GLUCONATE 1 APPLICATION(S): 213 SOLUTION TOPICAL at 06:51

## 2018-11-25 RX ADMIN — Medication 3 MILLILITER(S): at 18:00

## 2018-11-25 RX ADMIN — ROBINUL 0.2 MILLIGRAM(S): 0.2 INJECTION INTRAMUSCULAR; INTRAVENOUS at 06:34

## 2018-11-25 RX ADMIN — Medication 1 TABLET(S): at 22:01

## 2018-11-25 RX ADMIN — Medication 100 MILLIGRAM(S): at 14:01

## 2018-11-25 RX ADMIN — Medication 2: at 18:01

## 2018-11-25 RX ADMIN — ROBINUL 0.2 MILLIGRAM(S): 0.2 INJECTION INTRAMUSCULAR; INTRAVENOUS at 01:44

## 2018-11-25 RX ADMIN — Medication 3 MILLILITER(S): at 12:00

## 2018-11-25 RX ADMIN — Medication 3 MILLILITER(S): at 23:05

## 2018-11-25 RX ADMIN — HEPARIN SODIUM 5000 UNIT(S): 5000 INJECTION INTRAVENOUS; SUBCUTANEOUS at 06:34

## 2018-11-25 RX ADMIN — Medication 1 PACKET(S): at 13:04

## 2018-11-25 RX ADMIN — OCTREOTIDE ACETATE 100 MICROGRAM(S): 200 INJECTION, SOLUTION INTRAVENOUS; SUBCUTANEOUS at 09:40

## 2018-11-25 RX ADMIN — Medication 1 TABLET(S): at 14:01

## 2018-11-25 RX ADMIN — AMLODIPINE BESYLATE 2.5 MILLIGRAM(S): 2.5 TABLET ORAL at 22:00

## 2018-11-25 RX ADMIN — Medication 2: at 23:56

## 2018-11-25 RX ADMIN — HEPARIN SODIUM 5000 UNIT(S): 5000 INJECTION INTRAVENOUS; SUBCUTANEOUS at 22:03

## 2018-11-25 RX ADMIN — OCTREOTIDE ACETATE 100 MICROGRAM(S): 200 INJECTION, SOLUTION INTRAVENOUS; SUBCUTANEOUS at 15:59

## 2018-11-25 RX ADMIN — LISINOPRIL 40 MILLIGRAM(S): 2.5 TABLET ORAL at 06:33

## 2018-11-25 RX ADMIN — Medication 150 MICROGRAM(S): at 06:32

## 2018-11-25 RX ADMIN — Medication 3 MILLILITER(S): at 05:43

## 2018-11-25 RX ADMIN — AMLODIPINE BESYLATE 2.5 MILLIGRAM(S): 2.5 TABLET ORAL at 17:56

## 2018-11-25 RX ADMIN — OCTREOTIDE ACETATE 100 MICROGRAM(S): 200 INJECTION, SOLUTION INTRAVENOUS; SUBCUTANEOUS at 22:01

## 2018-11-25 RX ADMIN — PANTOPRAZOLE SODIUM 40 MILLIGRAM(S): 20 TABLET, DELAYED RELEASE ORAL at 13:06

## 2018-11-25 RX ADMIN — Medication 100 MILLIGRAM(S): at 22:01

## 2018-11-25 NOTE — PROGRESS NOTE ADULT - SUBJECTIVE AND OBJECTIVE BOX
Patient is a 78y old  Female who presents with a chief complaint of Septic shock and acute respiratory failure (24 Nov 2018 13:20)      INTERVAL HPI/OVERNIGHT EVENTS: *****************************************    ROS: Denies fever, chills, HA, CP, SOB, abd pain, nausea, vomiting, diarrhea, constipation, dysuria.    ICU Vital Signs Last 24 Hrs  T(C): 36.9 (25 Nov 2018 05:47), Max: 37.6 (24 Nov 2018 21:00)  T(F): 98.4 (25 Nov 2018 05:47), Max: 99.7 (25 Nov 2018 01:41)  HR: 68 (25 Nov 2018 05:42) (46 - 94)  BP: 145/65 (25 Nov 2018 05:00) (135/68 - 187/74)  BP(mean): 103 (25 Nov 2018 05:00) (89 - 135)  ABP: --  ABP(mean): --  RR: 18 (25 Nov 2018 05:42) (17 - 94)  SpO2: 95% (25 Nov 2018 05:42) (92% - 100%)    I&O's Summary    23 Nov 2018 07:01  -  24 Nov 2018 07:00  --------------------------------------------------------  IN: 2432 mL / OUT: 1411 mL / NET: 1021 mL    24 Nov 2018 07:01  -  25 Nov 2018 06:57  --------------------------------------------------------  IN: 632 mL / OUT: 1287 mL / NET: -655 mL          LABS:                        8.3    11.0  )-----------( 222      ( 25 Nov 2018 03:47 )             27.6     11-25    139  |  104  |  39<H>  ----------------------------<  118<H>  4.3   |  23  |  1.10    Ca    9.9      25 Nov 2018 03:47  Mg     2.0     11-25    TPro  5.9<L>  /  Alb  2.3<L>  /  TBili  0.3  /  DBili  x   /  AST  21  /  ALT  11  /  AlkPhos  145<H>  11-25        CAPILLARY BLOOD GLUCOSE      POCT Blood Glucose.: 141 mg/dL (24 Nov 2018 22:09)  POCT Blood Glucose.: 117 mg/dL (24 Nov 2018 17:18)  POCT Blood Glucose.: 116 mg/dL (24 Nov 2018 11:50)        RADIOLOGY & ADDITIONAL TESTS: Reviewed.    Consultant(s) Notes Reviewed:  [x ] YES  [ ] NO    MEDICATIONS  (STANDING):  ALBUTerol/ipratropium for Nebulization 3 milliLiter(s) Nebulizer every 6 hours  ceFAZolin   IVPB 1000 milliGRAM(s) IV Intermittent every 8 hours  ceFAZolin   IVPB      chlorhexidine 2% Cloths 1 Application(s) Topical daily  dextrose 5%. 1000 milliLiter(s) (50 mL/Hr) IV Continuous <Continuous>  dextrose 50% Injectable 12.5 Gram(s) IV Push once  dextrose 50% Injectable 25 Gram(s) IV Push once  dextrose 50% Injectable 25 Gram(s) IV Push once  glycopyrrolate Injectable 0.2 milliGRAM(s) IV Push every 6 hours  heparin  Injectable 5000 Unit(s) SubCutaneous every 8 hours  insulin lispro (HumaLOG) corrective regimen sliding scale   SubCutaneous every 6 hours  lactobacillus acidophilus 1 Tablet(s) Oral every 8 hours  levothyroxine 150 MICROGram(s) Oral daily  lisinopril 40 milliGRAM(s) Oral daily  pantoprazole   Suspension 40 milliGRAM(s) Enteral Tube daily  psyllium Powder 1 Packet(s) Oral daily    MEDICATIONS  (PRN):  dextrose 40% Gel 15 Gram(s) Oral once PRN Blood Glucose LESS THAN 70 milliGRAM(s)/deciliter  glucagon  Injectable 1 milliGRAM(s) IntraMuscular once PRN Glucose LESS THAN 70 milligrams/deciliter      PHYSICAL EXAM:  ************************TEMPLATED LANGUAGE, WILL BE UPDATED WITH PHYSICAL EXAM FINDINGS  GENERAL: well-developed, well nourished, in no acute distress  HEAD: atraumatic  EYES: EOMI, PERRL, conjunctiva pink, sclera anicteric  ENT: moist mucous membranes  NECK: supple, no JVD  CHEST/LUNG: clear to auscultation bilaterally, no rales, rhonchi, or wheezing  HEART: regular rate and rhythm; no murmurs, rubs, or gallops  ABDOMEN: soft, non-tender, non-distended, without rebound or guarding  : normal external genitalia  EXTREMITIES: warm, well-perfused, without edema  NERVOUS SYSTEM:  alert & oriented; moving extremities x4 to command  SKIN: No rashes  Lines/Drains/Tubes:    Care Discussed with Consultants/Other Providers [ x] YES  [ ] NO Patient is a 78y old  Female who presents with a chief complaint of Septic shock and acute respiratory failure (24 Nov 2018 13:20)    INTERVAL HPI/OVERNIGHT EVENTS: No acute events overnight. Removed CVL. On aerosol mask to humidify secretions.    ROS: Unable to participate in full ROS due to cognitive impairment. Denies pain and dyspnea.    ICU Vital Signs Last 24 Hrs  T(C): 36.9 (25 Nov 2018 05:47), Max: 37.6 (24 Nov 2018 21:00)  T(F): 98.4 (25 Nov 2018 05:47), Max: 99.7 (25 Nov 2018 01:41)  HR: 68 (25 Nov 2018 05:42) (46 - 94)  BP: 145/65 (25 Nov 2018 05:00) (135/68 - 187/74)  BP(mean): 103 (25 Nov 2018 05:00) (89 - 135)  ABP: --  ABP(mean): --  RR: 18 (25 Nov 2018 05:42) (17 - 94)  SpO2: 95% (25 Nov 2018 05:42) (92% - 100%)    I&O's Summary    23 Nov 2018 07:01  -  24 Nov 2018 07:00  --------------------------------------------------------  IN: 2432 mL / OUT: 1411 mL / NET: 1021 mL    24 Nov 2018 07:01  -  25 Nov 2018 06:57  --------------------------------------------------------  IN: 632 mL / OUT: 1287 mL / NET: -655 mL          LABS:                        8.3    11.0  )-----------( 222      ( 25 Nov 2018 03:47 )             27.6     11-25    139  |  104  |  39<H>  ----------------------------<  118<H>  4.3   |  23  |  1.10    Ca    9.9      25 Nov 2018 03:47  Mg     2.0     11-25    TPro  5.9<L>  /  Alb  2.3<L>  /  TBili  0.3  /  DBili  x   /  AST  21  /  ALT  11  /  AlkPhos  145<H>  11-25        CAPILLARY BLOOD GLUCOSE      POCT Blood Glucose.: 141 mg/dL (24 Nov 2018 22:09)  POCT Blood Glucose.: 117 mg/dL (24 Nov 2018 17:18)  POCT Blood Glucose.: 116 mg/dL (24 Nov 2018 11:50)        RADIOLOGY & ADDITIONAL TESTS: Reviewed.    Consultant(s) Notes Reviewed:  [x ] YES  [ ] NO    MEDICATIONS  (STANDING):  ALBUTerol/ipratropium for Nebulization 3 milliLiter(s) Nebulizer every 6 hours  ceFAZolin   IVPB 1000 milliGRAM(s) IV Intermittent every 8 hours  ceFAZolin   IVPB      chlorhexidine 2% Cloths 1 Application(s) Topical daily  dextrose 5%. 1000 milliLiter(s) (50 mL/Hr) IV Continuous <Continuous>  dextrose 50% Injectable 12.5 Gram(s) IV Push once  dextrose 50% Injectable 25 Gram(s) IV Push once  dextrose 50% Injectable 25 Gram(s) IV Push once  glycopyrrolate Injectable 0.2 milliGRAM(s) IV Push every 6 hours  heparin  Injectable 5000 Unit(s) SubCutaneous every 8 hours  insulin lispro (HumaLOG) corrective regimen sliding scale   SubCutaneous every 6 hours  lactobacillus acidophilus 1 Tablet(s) Oral every 8 hours  levothyroxine 150 MICROGram(s) Oral daily  lisinopril 40 milliGRAM(s) Oral daily  pantoprazole   Suspension 40 milliGRAM(s) Enteral Tube daily  psyllium Powder 1 Packet(s) Oral daily    MEDICATIONS  (PRN):  dextrose 40% Gel 15 Gram(s) Oral once PRN Blood Glucose LESS THAN 70 milliGRAM(s)/deciliter  glucagon  Injectable 1 milliGRAM(s) IntraMuscular once PRN Glucose LESS THAN 70 milligrams/deciliter      PHYSICAL EXAM:  GENERAL: Obese adult White female, resting comfortably in bed; no acute distress  HEAD: atraumatic  EYES: PERRL, EOMI conjunctiva pink  ENT: moist mucous membranes  NECK: supple  CHEST/LUNG: rhonchi bilaterally throughout, no wheezing  HEART: RRR; no murmur or rub  ABDOMEN: obese, soft, non-tender, non-distended, without rebound or guarding  EXTREMITIES: warm, well-perfused, without edema  NERVOUS SYSTEM: follows some simple commands, answers questions inconsistently  SKIN: rash near buttocks region and low back due to stool, no erythema, discharge or purulence    Care Discussed with Consultants/Other Providers [ x] YES  [ ] NO

## 2018-11-25 NOTE — PROGRESS NOTE ADULT - ASSESSMENT
78F w/ PMH obesity, DM, HTN, hypothyroidism, sepsis secondary to ventral hernia infection s/p repair, PE s/p IVC filter, acute blood loss anemia from hemorrhagic uterine fibroids and UGIB, ATN requiring HD, sepsis secondary to MSSA bacteremia and MDR E coli UTI w/AMS and s/p TAHBSO and small bowel resection s/p PEG. Presenting with septic shock likely 2/2 PNA vs UTI and acute hypoxic respiratory failure now extubated.    Neuro  #Toxic metabolic encephalopathy 2/2 septic shock  RESOLVED  - mental status consistent with patient's baseline dementia    Respiratory  #HAP with sputum culture with MSSA sensitive to cefazolin.   -percussion vest for mucous/ glycopyrrolate 0.2 Q6 IV  - octreotide for secretions 100 TID  - abx as below    CV  #HTN  -BP 170s/90s  -continue home lisinopril 40 mg daily    GI  - PEG in situ    Renal  - Cr at baseline 1.10 as of 11/25    ID  #Sepsis 2/2 HAP  IMPROVED  -sputum cx staph aureus sn to cefazolin  -c/w cefazolin 50 Q8h    Endo  #T2DM  -ISS  -FSBS    #hypothyroidism  -Continue synthroid currently 150 mcg per day oral.     Heme:  # Anemia of chronic disease  -no signs of active bleeding  -active type and screen    GI  # Chronic diarrhea 2/2 small bowel resection  -probiotics daily  -fiber daily.     FEN  F: none  E: K>4 Mag>2 caution given recent GONZALEZ  N: glucerna 1.5 via PEG    Dvt ppx: hep sq  GI ppx: protonix    Code Status: Full code    Dispo: MICU

## 2018-11-26 DIAGNOSIS — Z78.9 OTHER SPECIFIED HEALTH STATUS: ICD-10-CM

## 2018-11-26 DIAGNOSIS — Z71.0 PERSON ENCOUNTERING HEALTH SERVICES TO CONSULT ON BEHALF OF ANOTHER PERSON: ICD-10-CM

## 2018-11-26 LAB
ANION GAP SERPL CALC-SCNC: 8 MMOL/L — SIGNIFICANT CHANGE UP (ref 5–17)
BUN SERPL-MCNC: 37 MG/DL — HIGH (ref 7–23)
CALCIUM SERPL-MCNC: 10.2 MG/DL — SIGNIFICANT CHANGE UP (ref 8.4–10.5)
CHLORIDE SERPL-SCNC: 106 MMOL/L — SIGNIFICANT CHANGE UP (ref 96–108)
CO2 SERPL-SCNC: 26 MMOL/L — SIGNIFICANT CHANGE UP (ref 22–31)
CREAT SERPL-MCNC: 1.1 MG/DL — SIGNIFICANT CHANGE UP (ref 0.5–1.3)
EOSINOPHIL NFR BLD AUTO: 1 % — SIGNIFICANT CHANGE UP (ref 0–6)
GLUCOSE BLDC GLUCOMTR-MCNC: 144 MG/DL — HIGH (ref 70–99)
GLUCOSE BLDC GLUCOMTR-MCNC: 180 MG/DL — HIGH (ref 70–99)
GLUCOSE BLDC GLUCOMTR-MCNC: 214 MG/DL — HIGH (ref 70–99)
GLUCOSE SERPL-MCNC: 212 MG/DL — HIGH (ref 70–99)
HCT VFR BLD CALC: 28.3 % — LOW (ref 34.5–45)
HGB BLD-MCNC: 8.4 G/DL — LOW (ref 11.5–15.5)
LYMPHOCYTES # BLD AUTO: 10 % — LOW (ref 13–44)
MAGNESIUM SERPL-MCNC: 1.9 MG/DL — SIGNIFICANT CHANGE UP (ref 1.6–2.6)
MCHC RBC-ENTMCNC: 27.5 PG — SIGNIFICANT CHANGE UP (ref 27–34)
MCHC RBC-ENTMCNC: 29.7 G/DL — LOW (ref 32–36)
MCV RBC AUTO: 92.8 FL — SIGNIFICANT CHANGE UP (ref 80–100)
MONOCYTES NFR BLD AUTO: 5 % — SIGNIFICANT CHANGE UP (ref 2–14)
NEUTROPHILS NFR BLD AUTO: 76 % — SIGNIFICANT CHANGE UP (ref 43–77)
PHOSPHATE SERPL-MCNC: 3.3 MG/DL — SIGNIFICANT CHANGE UP (ref 2.5–4.5)
PLATELET # BLD AUTO: 248 K/UL — SIGNIFICANT CHANGE UP (ref 150–400)
POTASSIUM SERPL-MCNC: 4.8 MMOL/L — SIGNIFICANT CHANGE UP (ref 3.5–5.3)
POTASSIUM SERPL-SCNC: 4.8 MMOL/L — SIGNIFICANT CHANGE UP (ref 3.5–5.3)
RBC # BLD: 3.05 M/UL — LOW (ref 3.8–5.2)
RBC # FLD: 17.2 % — HIGH (ref 10.3–16.9)
SODIUM SERPL-SCNC: 140 MMOL/L — SIGNIFICANT CHANGE UP (ref 135–145)
WBC # BLD: 12.5 K/UL — HIGH (ref 3.8–10.5)
WBC # FLD AUTO: 12.5 K/UL — HIGH (ref 3.8–10.5)

## 2018-11-26 PROCEDURE — 99233 SBSQ HOSP IP/OBS HIGH 50: CPT

## 2018-11-26 PROCEDURE — 71045 X-RAY EXAM CHEST 1 VIEW: CPT | Mod: 26

## 2018-11-26 PROCEDURE — 99291 CRITICAL CARE FIRST HOUR: CPT

## 2018-11-26 PROCEDURE — 99497 ADVNCD CARE PLAN 30 MIN: CPT

## 2018-11-26 RX ORDER — AMLODIPINE BESYLATE 2.5 MG/1
5 TABLET ORAL ONCE
Qty: 0 | Refills: 0 | Status: COMPLETED | OUTPATIENT
Start: 2018-11-26 | End: 2018-11-26

## 2018-11-26 RX ORDER — AMLODIPINE BESYLATE 2.5 MG/1
10 TABLET ORAL EVERY 24 HOURS
Qty: 0 | Refills: 0 | Status: DISCONTINUED | OUTPATIENT
Start: 2018-11-27 | End: 2018-12-20

## 2018-11-26 RX ORDER — CEFAZOLIN SODIUM 1 G
1000 VIAL (EA) INJECTION EVERY 8 HOURS
Qty: 0 | Refills: 0 | Status: COMPLETED | OUTPATIENT
Start: 2018-11-26 | End: 2018-11-26

## 2018-11-26 RX ADMIN — Medication 100 MILLIGRAM(S): at 06:09

## 2018-11-26 RX ADMIN — Medication 1 TABLET(S): at 21:57

## 2018-11-26 RX ADMIN — ROBINUL 0.2 MILLIGRAM(S): 0.2 INJECTION INTRAMUSCULAR; INTRAVENOUS at 18:14

## 2018-11-26 RX ADMIN — Medication 100 MILLIGRAM(S): at 21:58

## 2018-11-26 RX ADMIN — Medication 1 PACKET(S): at 12:18

## 2018-11-26 RX ADMIN — OCTREOTIDE ACETATE 100 MICROGRAM(S): 200 INJECTION, SOLUTION INTRAVENOUS; SUBCUTANEOUS at 21:58

## 2018-11-26 RX ADMIN — ROBINUL 0.2 MILLIGRAM(S): 0.2 INJECTION INTRAMUSCULAR; INTRAVENOUS at 12:18

## 2018-11-26 RX ADMIN — Medication 4: at 06:17

## 2018-11-26 RX ADMIN — AMLODIPINE BESYLATE 5 MILLIGRAM(S): 2.5 TABLET ORAL at 18:15

## 2018-11-26 RX ADMIN — HEPARIN SODIUM 5000 UNIT(S): 5000 INJECTION INTRAVENOUS; SUBCUTANEOUS at 14:18

## 2018-11-26 RX ADMIN — OCTREOTIDE ACETATE 100 MICROGRAM(S): 200 INJECTION, SOLUTION INTRAVENOUS; SUBCUTANEOUS at 14:18

## 2018-11-26 RX ADMIN — Medication 1 TABLET(S): at 06:10

## 2018-11-26 RX ADMIN — Medication 3 MILLILITER(S): at 23:45

## 2018-11-26 RX ADMIN — Medication 150 MICROGRAM(S): at 06:10

## 2018-11-26 RX ADMIN — Medication 1 TABLET(S): at 14:18

## 2018-11-26 RX ADMIN — Medication 3 MILLILITER(S): at 05:17

## 2018-11-26 RX ADMIN — Medication 2: at 18:14

## 2018-11-26 RX ADMIN — LISINOPRIL 40 MILLIGRAM(S): 2.5 TABLET ORAL at 06:10

## 2018-11-26 RX ADMIN — AMLODIPINE BESYLATE 5 MILLIGRAM(S): 2.5 TABLET ORAL at 21:57

## 2018-11-26 RX ADMIN — CHLORHEXIDINE GLUCONATE 1 APPLICATION(S): 213 SOLUTION TOPICAL at 06:09

## 2018-11-26 RX ADMIN — PANTOPRAZOLE SODIUM 40 MILLIGRAM(S): 20 TABLET, DELAYED RELEASE ORAL at 12:18

## 2018-11-26 RX ADMIN — Medication 3 MILLILITER(S): at 18:29

## 2018-11-26 RX ADMIN — HEPARIN SODIUM 5000 UNIT(S): 5000 INJECTION INTRAVENOUS; SUBCUTANEOUS at 21:57

## 2018-11-26 RX ADMIN — ROBINUL 0.2 MILLIGRAM(S): 0.2 INJECTION INTRAMUSCULAR; INTRAVENOUS at 06:10

## 2018-11-26 RX ADMIN — HEPARIN SODIUM 5000 UNIT(S): 5000 INJECTION INTRAVENOUS; SUBCUTANEOUS at 06:10

## 2018-11-26 RX ADMIN — Medication 3 MILLILITER(S): at 12:24

## 2018-11-26 RX ADMIN — Medication 100 MILLIGRAM(S): at 14:18

## 2018-11-26 NOTE — PROGRESS NOTE ADULT - PROBLEM SELECTOR PLAN 3
Episodes of lucidity throughout the day. Appeared more awake and alert after PT evaluation. Patient bilingual but currently responding more to Icelandic. Recommend reorientation and frequent reassessments.

## 2018-11-26 NOTE — CHART NOTE - NSCHARTNOTEFT_GEN_A_CORE
Admitting Diagnosis:   Patient is a 78y old  Female who presents with a chief complaint of Septic shock and acute respiratory failure (2018 12:15)      PAST MEDICAL & SURGICAL HISTORY:  Hypothyroidism  GERD (gastroesophageal reflux disease)  Obesity  DM (diabetes mellitus)  HLD (hyperlipidemia)  HTN (hypertension)  Status post total abdominal hysterectomy and bilateral salpingo-oophorectomy  S/P small bowel resection  H/O ventral hernia repair      Current Nutrition Order:  Glucerna 1.5 Nicholas @ 43mL/hr x 22hrs via PEG (946ml TV, 1419kcal, 78g protein, 718mL free H2O, 95% RDI, 1.43g/kg IBW protein)       PO Intake: Good (%) [   ]  Fair (50-75%) [   ] Poor (<25%) [   ]- N/A NPO w/EN    GI Issues: Unable to assess at this time 2/2 AMS, +rectal tube, -residuals     Pain: Unable to assess at this time 2/2 AMS; appears comfortable at this time     Skin Integrity: Cecilio 15, IAD, sacrum DTI     Labs:       140  |  106  |  37<H>  ----------------------------<  212<H>  4.8   |  26  |  1.10    Ca    10.2      2018 05:58  Phos  3.3       Mg     1.9         TPro  5.9<L>  /  Alb  2.3<L>  /  TBili  0.3  /  DBili  x   /  AST  21  /  ALT  11  /  AlkPhos  145<H>      CAPILLARY BLOOD GLUCOSE      POCT Blood Glucose.: 144 mg/dL (2018 11:22)  POCT Blood Glucose.: 214 mg/dL (2018 06:14)  POCT Blood Glucose.: 162 mg/dL (2018 21:57)  POCT Blood Glucose.: 187 mg/dL (2018 17:36)      Medications:  MEDICATIONS  (STANDING):  ALBUTerol/ipratropium for Nebulization 3 milliLiter(s) Nebulizer every 6 hours  amLODIPine   Tablet 5 milliGRAM(s) Oral every 24 hours  ceFAZolin   IVPB 1000 milliGRAM(s) IV Intermittent every 8 hours  chlorhexidine 2% Cloths 1 Application(s) Topical daily  dextrose 5%. 1000 milliLiter(s) (50 mL/Hr) IV Continuous <Continuous>  dextrose 50% Injectable 12.5 Gram(s) IV Push once  dextrose 50% Injectable 25 Gram(s) IV Push once  dextrose 50% Injectable 25 Gram(s) IV Push once  glycopyrrolate Injectable 0.2 milliGRAM(s) IV Push every 6 hours  heparin  Injectable 5000 Unit(s) SubCutaneous every 8 hours  insulin lispro (HumaLOG) corrective regimen sliding scale   SubCutaneous every 6 hours  lactobacillus acidophilus 1 Tablet(s) Oral every 8 hours  levothyroxine 150 MICROGram(s) Oral daily  lisinopril 40 milliGRAM(s) Oral daily  octreotide  Injectable 100 MICROGram(s) SubCutaneous every 8 hours  pantoprazole   Suspension 40 milliGRAM(s) Enteral Tube daily  psyllium Powder 1 Packet(s) Oral daily    MEDICATIONS  (PRN):  dextrose 40% Gel 15 Gram(s) Oral once PRN Blood Glucose LESS THAN 70 milliGRAM(s)/deciliter  glucagon  Injectable 1 milliGRAM(s) IntraMuscular once PRN Glucose LESS THAN 70 milligrams/deciliter      Weight:  Daily     Daily Weight in k.6 (2018 05:00)    Weight: 69.2kg   Daily     Daily     Weight Change: Large weight discrepancy noted; RN aware to take new weight     Estimated energy needs: Utilized IBW (54.5kg) to calculate needs, pt >120% of IBW. Adjusted for sepsis/wound healing  Calories: 25-30 kcal/kg =1362-1635kcal/day  Protein: 1.2-1.4 g/kg = 65-76g protein/day  Fluids: 30-35 mL/kg = 2453-7169 mL/day    Subjective: 77y/o F presenting with septic shock likely 2/2 PNA vs UTI and acute hypoxic respiratory failure initially requiring intubation. Pt extubated on , now on NC. Pt seen in room, breathing w/NC, awake, alert, but does not answer questions appropriately. EN running at goal rate of 43mL/hr w/good tolerance per RN. +Rectal tube w/loose stool; on octreotide and lactobacillus. . Palliative following; will continue to keep nutrition aligned with GOC at all times.     Previous Nutrition Diagnosis:   Increased protein-calorie needs RT increased demand for protein-calorie intake AEB sepsis/wound healing     Active [X   ]  Resolved [   ]    If resolved, new PES:    Goal: Pt will meet % of EER per day via tolerated route     Recommendations:  1. Continue with current EN order; anti-diarrheal per team discretion  *please place VBF order*  2. Monitor for s/s intolerance; maintain aspiration precautions at all times  3. Monitor lytes and replete prn.  4. Trend weights weekly  5. Keep nutrition aligned with GOC at all times     Education: N/A- not indicated at this time 2/2 AMS    Risk Level: High [   ] Moderate [ X  ] Low [   ].

## 2018-11-26 NOTE — PROGRESS NOTE ADULT - PROBLEM SELECTOR PLAN 2
Worsening aspiration episodes. Patient and family would like to pursue reintubation if warranted. Currently on Cefazolin only.   Management as per MICU team.

## 2018-11-26 NOTE — PROGRESS NOTE ADULT - PROBLEM SELECTOR PLAN 4
Dependent for all ADLs. Mostly bedridden. Was able to work with PT minimally with 2 person assist. May continue to require rehab level of care and around the clock nursing care.

## 2018-11-26 NOTE — PROGRESS NOTE ADULT - PROBLEM SELECTOR PLAN 8
Support provided to patient and family. Patient to have access to supportive services during rest of hospital stay as the patient/family deemed necessary ie. Chaplaincy, Massage therapy, Music therapy, Patient and family supportive services, Palliative SW, etc.    As discussed during the palliative IDT meeting and as identified during the patients PSSA screening the patient would benefit from massage therapy, patient and family support, and music therapy.

## 2018-11-26 NOTE — PROGRESS NOTE ADULT - PROBLEM SELECTOR PLAN 7
In addition to the EM visit, an advance care planning meeting was performed  Start time: 2:55pm  End time: 3:55pm  Total time: 60min  A face to face meeting to discuss advance care planning was held today regarding: EFRA BAIG  Primary decision maker: HCP Nephew Mr. Boggs  Alternate/surrogate: Son Hema Baig  Discussed advance directives including, but not limited to, healthcare proxy and code status.  Decision regarding code status: Full code  Documentation completed today: None

## 2018-11-26 NOTE — PROGRESS NOTE ADULT - SUBJECTIVE AND OBJECTIVE BOX
GENERAL SURGERY CONSULT PROGRESS NOTE    SUBJECTIVE:   Pt seen & examined at bedside. Pt seen and examined at bedside. Smiles and says hello to questions, no meaningful ROS obtainable.     MEDICATIONS:  ---NEURO-  ---CV-  amLODIPine   Tablet 5 milliGRAM(s) Oral every 24 hours  lisinopril 40 milliGRAM(s) Oral daily  ---PULM-  ALBUTerol/ipratropium for Nebulization 3 milliLiter(s) Nebulizer every 6 hours  ---GI/FEN-  glycopyrrolate Injectable 0.2 milliGRAM(s) IV Push every 6 hours  pantoprazole   Suspension 40 milliGRAM(s) Enteral Tube daily  psyllium Powder 1 Packet(s) Oral daily  dextrose 5%. 1000 milliLiter(s) IV Continuous <Continuous>  ----  ---ID-   ceFAZolin   IVPB 1000 milliGRAM(s) IV Intermittent every 8 hours  ---ENDO-  dextrose 40% Gel 15 Gram(s) Oral once PRN  dextrose 50% Injectable 12.5 Gram(s) IV Push once  dextrose 50% Injectable 25 Gram(s) IV Push once  dextrose 50% Injectable 25 Gram(s) IV Push once  glucagon  Injectable 1 milliGRAM(s) IntraMuscular once PRN  insulin lispro (HumaLOG) corrective regimen sliding scale   SubCutaneous every 6 hours  levothyroxine 150 MICROGram(s) Oral daily  octreotide  Injectable 100 MICROGram(s) SubCutaneous every 8 hours  ---HEME-  heparin  Injectable 5000 Unit(s) SubCutaneous every 8 hours  ---OTHER-  chlorhexidine 2% Cloths 1 Application(s) Topical daily  lactobacillus acidophilus 1 Tablet(s) Oral every 8 hours        VOLUME STATUS:  I&O's Detail    25 Nov 2018 07:01  -  26 Nov 2018 07:00  --------------------------------------------------------  IN:    Enteral Tube Flush: 120 mL    Glucerna 1.5: 1032 mL    IV PiggyBack: 100 mL  Total IN: 1252 mL    OUT:    Indwelling Catheter - Urethral: 1455 mL  Total OUT: 1455 mL    Total NET: -203 mL      26 Nov 2018 07:01  -  26 Nov 2018 12:15  --------------------------------------------------------  IN:    Glucerna 1.5: 172 mL  Total IN: 172 mL    OUT:    Indwelling Catheter - Urethral: 200 mL  Total OUT: 200 mL    Total NET: -28 mL          VITALS:  Vital Signs Last 24 Hrs  T(C): 37 (26 Nov 2018 09:26), Max: 37.6 (26 Nov 2018 00:23)  T(F): 98.6 (26 Nov 2018 09:26), Max: 99.6 (26 Nov 2018 00:23)  HR: 64 (26 Nov 2018 10:00) (58 - 92)  BP: 165/74 (26 Nov 2018 10:00) (140/81 - 195/89)  BP(mean): 118 (26 Nov 2018 10:00) (92 - 154)  RR: 20 (26 Nov 2018 10:00) (14 - 41)  SpO2: 98% (26 Nov 2018 10:00) (90% - 100%)    PHYSICAL EXAM:    Constitutional: NAD, resting comfortably, on NC, orients and verbally responds minimally to verbal stimulus   HEENT: MMM  CV: RRR on monitor  Resp: nonlabored breathing  Abd: soft, nondistended, nontender, PEG in place, incontinent   Ext: WWP, no edema, 2+ radial pulses bilaterally  LABS:                        8.4    12.5  )-----------( 248      ( 26 Nov 2018 05:58 )             28.3     11-26    140  |  106  |  37<H>  ----------------------------<  212<H>  4.8   |  26  |  1.10    Ca    10.2      26 Nov 2018 05:58  Phos  3.3     11-26  Mg     1.9     11-26    TPro  5.9<L>  /  Alb  2.3<L>  /  TBili  0.3  /  DBili  x   /  AST  21  /  ALT  11  /  AlkPhos  145<H>  11-25

## 2018-11-26 NOTE — PROGRESS NOTE ADULT - SUBJECTIVE AND OBJECTIVE BOX
EFRA BAIG             MRN-[id number]    CC: Resp failure, AMS, HCAP, GOC/AD, Support    HPI:  Patient is currently intubated and unable to participate in interview. History taken from chart and MICU consult.    78F w/ PMH of morbid obesity, DM, HTN, hypothyroidism, sepsis secondary to ventral hernia infection s/p repair, PE s/p IVC filter, acute blood loss anemia from hemorrhagic uterine fibroids and UGIB, ATN requiring HD, sepsis secondary to MSSA bacteremia and MDR E coli UTI now w/AMS and s/p TAHBSO and small bowel resection s/p PEG. Patient presented from [facility] with concern for sepsis and hypoxia. Per EMS, patient was saturating 85% on RA at [facility] with some improvement to 95% after being placed on NRB.  On arrival to the ED, patient was altered and saturating 89%. She was intubated in the ED with suctioning revealing purulent material.     SUBJECTIVE: Saw and evaluated Mrs Baig at bedside on several occasions today. Earlier in the day she was found mostly inattentive and unable to follow line of questioning. Later in the afternoon ~3pm the patient's son was at bedside and we had a conversation with the patient in her primary Kosovan language. We discussed code status as well as concern from primary team regarding imminent need for reintubation. The patient's HCP is the patient's nephew and we had a conversation with him earlier in the day ~12:45pm. He mentioned that the patient had been intubated in the past and it was his understanding she would want to be resuscitated and intubated once again if required. We discussed this possibility with the patient's son at bedside and he voiced the same understanding. We asked the patient in the afternoon with her son present if she would agree to be reintubated even when it could mean she could not come off the ventilator and she nodded she would want to be reintubated. Present was the primary team intern and resident.     ROS:  DYSPNEA: No Deniz: 3  NAUS/VOM: No  SECRETIONS: Yes	  AGITATION: No  Pain (Y/N): Pain      -Provocation/Palliation: N/A  -Quality/Quantity: N/A  -Radiating: N/A  -Severity: No pain  -Timing/Frequency: N/A  -Impact on ADLs: N/A    OTHER REVIEW OF SYSTEMS: Denied    PEx:  T(C): 36.8 (11-26-18 @ 14:49), Max: 37.6 (11-26-18 @ 00:23)  HR: 84 (11-26-18 @ 15:37) (58 - 104)  BP: 161/89 (11-26-18 @ 15:37) (140/81 - 195/89)  RR: 21 (11-26-18 @ 15:37) (14 - 41)  SpO2: 97% (11-26-18 @ 15:37) (91% - 100%)  Wt(kg): 69.2    General: Overweight woman initially sitting in chair in icu later found in bed in NAD. Able to answer simple yes/no questions.   HEENT:  wearing nasal cannula, NCAT, PERRL Dry lips Edentulous  Neck:  central line  No masses Skin abrasion on left.  CVS:  Irregularly irregular S1S2   Resp:  Unlabored  Rhonchi + Decreased breath sounds on Right  GI:   soft, nontender, + PEG, +rectal tube  : Nicole+  Musc: Noo edema, does not follow commands to move extremities.  Neuro: alert and awake.   Psych: Calm  Skin:  pressure ulcer on buttock per records  Lymph: Normal 	     ALLERGIES: No Known Allergies    OPIATE NAÏVE (Y/N): Yes    MEDICATIONS: REVIEWED  MEDICATIONS  (STANDING):  ALBUTerol/ipratropium for Nebulization 3 milliLiter(s) Nebulizer every 6 hours  amLODIPine   Tablet 5 milliGRAM(s) Oral every 24 hours  ceFAZolin   IVPB 1000 milliGRAM(s) IV Intermittent every 8 hours  chlorhexidine 2% Cloths 1 Application(s) Topical daily  glycopyrrolate Injectable 0.2 milliGRAM(s) IV Push every 6 hours  heparin  Injectable 5000 Unit(s) SubCutaneous every 8 hours  insulin lispro (HumaLOG) corrective regimen sliding scale   SubCutaneous every 6 hours  lactobacillus acidophilus 1 Tablet(s) Oral every 8 hours  levothyroxine 150 MICROGram(s) Oral daily  lisinopril 40 milliGRAM(s) Oral daily  octreotide  Injectable 100 MICROGram(s) SubCutaneous every 8 hours  pantoprazole   Suspension 40 milliGRAM(s) Enteral Tube daily  psyllium Powder 1 Packet(s) Oral daily    MEDICATIONS  (PRN):  dextrose 40% Gel 15 Gram(s) Oral once PRN Blood Glucose LESS THAN 70 milliGRAM(s)/deciliter  glucagon  Injectable 1 milliGRAM(s) IntraMuscular once PRN Glucose LESS THAN 70 milligrams/deciliter    LABS: REVIEWED  CBC:                        8.4    12.5  )-----------( 248      ( 26 Nov 2018 05:58 )             28.3     CMP:    11-26    140  |  106  |  37<H>  ----------------------------<  212<H>  4.8   |  26  |  1.10    Ca    10.2      26 Nov 2018 05:58  Phos  3.3     11-26  Mg     1.9     11-26    TPro  5.9<L>  /  Alb  2.3<L>  /  TBili  0.3  /  DBili  x   /  AST  21  /  ALT  11  /  AlkPhos  145<H>  11-25    POCT Blood Glucose.: 144 mg/dL (11.26.18 @ 11:22)    IMAGING: REVIEWED    EXAM:  XR CHEST PORTABLE ROUTNE 1V                        PROCEDURE DATE:  11/26/2018    INTERPRETATION:  Portable Chest X-Ray dated 11/26/2018 6:29 AM  Indication: s/p intubation  Prior studies:11/25/2018  An AP portable view of thechest reveals haziness of the right lung   consistent with a large right pleural effusion. There is mild pulmonary   venous congestion. Probable atelectasis in the right lung base. No left   pleural effusion. Scoliosis convex to the right. Degenerative changes of   the spine. Calcified thoracic aorta.  IMPRESSION:  No significant interval change.    EXAM:  XR CHEST PORTABLE ROUTINE 1V                        PROCEDURE DATE:  11/25/2018    INTERPRETATION:  Clinical History: Pneumonia  Portable exam of the chest demonstrates limited inspiration. Right   pleural effusion. Underlying infiltrates cannot be excluded. The heart is   within normal limits in transverse diameter.  Impression: Right pleural effusion. Underlying infiltrates cannot be   excluded    Advanced Directives:     Full Code    Decision maker: The patient does not have capacity to make complex medical decisions at this time   Legal surrogate: HCP is the patient's nephew  Martin Boggs [phone]  Alternate surrogate: Son Hema Baig [phone]    GOALS OF CARE DISCUSSION       Palliative care info/counseling provided	           Family meeting- done today       Documentation of GOC: Full code.                REFERRALS	        Unit SW/Case Mgmt              Speech/Swallow       Patient/Family Support       Massage Therapy       Music Therapy       Nutrition       Dietician       PT/OT

## 2018-11-26 NOTE — PROGRESS NOTE ADULT - SUBJECTIVE AND OBJECTIVE BOX
Patient is a 78y old  Female who presents with a chief complaint of Septic shock and acute respiratory failure (24 Nov 2018 13:20)    INTERVAL HPI/OVERNIGHT EVENTS: No acute events overnight, diarrhea has improved    ROS: Unable to participate in full ROS due to cognitive impairment, is able to state name and communicates somewhat. Denies pain and dyspnea.    PHYSICAL EXAM:  GENERAL: Obese adult White female, resting comfortably in bed; no acute distress  HEAD: atraumatic  EYES: PERRL, EOMI conjunctiva pink  ENT: moist mucous membranes  NECK: supple  CHEST/LUNG: rhonchi bilaterally throughout, no wheezing  HEART: RRR; no murmur or rub  ABDOMEN: obese, soft, non-tender, non-distended, without rebound or guarding  EXTREMITIES: warm, well-perfused, without edema  NERVOUS SYSTEM: follows some simple commands, answers questions inconsistently  SKIN: rash near buttocks region and low back due to stool, no erythema, discharge or purulence    ICU Vital Signs Last 24 Hrs  T(C): 36.7 (26 Nov 2018 04:44), Max: 37.6 (26 Nov 2018 00:23)  T(F): 98.1 (26 Nov 2018 04:44), Max: 99.6 (26 Nov 2018 00:23)  HR: 84 (26 Nov 2018 07:00) (58 - 92)  BP: 140/81 (26 Nov 2018 07:00) (140/81 - 195/89)  BP(mean): 95 (26 Nov 2018 07:00) (92 - 154)  ABP: --  ABP(mean): --  RR: 18 (26 Nov 2018 07:00) (14 - 41)  SpO2: 96% (26 Nov 2018 07:00) (90% - 97%)      25 Nov 2018 07:01  -  26 Nov 2018 07:00  --------------------------------------------------------  IN:    Enteral Tube Flush: 60 mL    Glucerna 1.5: 903 mL    IV PiggyBack: 50 mL  Total IN: 1013 mL    OUT:    Indwelling Catheter - Urethral: 1260 mL  Total OUT: 1260 mL    Total NET: -247 mL      .  LABS:                         8.4    12.5  )-----------( 248      ( 26 Nov 2018 05:58 )             28.3     11-26    140  |  106  |  37<H>  ----------------------------<  212<H>  4.8   |  26  |  1.10    Ca    10.2      26 Nov 2018 05:58  Phos  3.3     11-26  Mg     1.9     11-26    TPro  5.9<L>  /  Alb  2.3<L>  /  TBili  0.3  /  DBili  x   /  AST  21  /  ALT  11  /  AlkPhos  145<H>  11-25    MEDICATIONS  (STANDING):  ALBUTerol/ipratropium for Nebulization 3 milliLiter(s) Nebulizer every 6 hours  amLODIPine   Tablet 5 milliGRAM(s) Oral every 24 hours  ceFAZolin   IVPB 1000 milliGRAM(s) IV Intermittent every 8 hours  ceFAZolin   IVPB      chlorhexidine 2% Cloths 1 Application(s) Topical daily  dextrose 5%. 1000 milliLiter(s) (50 mL/Hr) IV Continuous <Continuous>  dextrose 50% Injectable 12.5 Gram(s) IV Push once  dextrose 50% Injectable 25 Gram(s) IV Push once  dextrose 50% Injectable 25 Gram(s) IV Push once  glycopyrrolate Injectable 0.2 milliGRAM(s) IV Push every 6 hours  heparin  Injectable 5000 Unit(s) SubCutaneous every 8 hours  insulin lispro (HumaLOG) corrective regimen sliding scale   SubCutaneous every 6 hours  lactobacillus acidophilus 1 Tablet(s) Oral every 8 hours  levothyroxine 150 MICROGram(s) Oral daily  lisinopril 40 milliGRAM(s) Oral daily  octreotide  Injectable 100 MICROGram(s) SubCutaneous every 8 hours  pantoprazole   Suspension 40 milliGRAM(s) Enteral Tube daily  psyllium Powder 1 Packet(s) Oral daily    MEDICATIONS  (PRN):  dextrose 40% Gel 15 Gram(s) Oral once PRN Blood Glucose LESS THAN 70 milliGRAM(s)/deciliter  glucagon  Injectable 1 milliGRAM(s) IntraMuscular once PRN Glucose LESS THAN 70 milligrams/deciliter

## 2018-11-26 NOTE — PROGRESS NOTE ADULT - ASSESSMENT
78F w/ PMH obesity, DM, HTN, hypothyroidism, sepsis secondary to ventral hernia infection s/p repair, PE s/p IVC filter, acute blood loss anemia from hemorrhagic uterine fibroids and UGIB, ATN requiring HD, sepsis secondary to MSSA bacteremia and MDR E coli UTI w/AMS and s/p TAHBSO and small bowel resection s/p PEG. Presenting with septic shock likely 2/2 PNA vs UTI and acute hypoxic respiratory failure now extubated.    Neuro  #Toxic metabolic encephalopathy 2/2 septic shock  RESOLVED  - mental status consistent with patient's baseline dementia    Respiratory  #HAP with sputum culture with MSSA sensitive to cefazolin.   -percussion vest for mucous/ glycopyrrolate 0.2 Q6 IV  - octreotide for secretions 100 TID  - abx as below    CV  #HTN  -BP 170s/90s  -continue home lisinopril 40 mg daily    GI  - PEG in situ    Renal  - Cr at baseline 1.10 as of 11/25    ID  #Sepsis 2/2 HAP  IMPROVED  -sputum cx staph aureus sn to cefazolin  -c/w cefazolin 50 Q8h    Endo  #T2DM  -ISS  -FSBS    #hypothyroidism  -Continue synthroid currently 150 mcg per day oral.     Heme:  # Anemia of chronic disease  -no signs of active bleeding  -active type and screen    GI  # Chronic diarrhea 2/2 small bowel resection  -probiotics daily  -fiber daily.     FEN  F: none  E: K>4 Mag>2 caution given recent GONZALEZ  N: glucerna 1.5 via PEG    Dvt ppx: hep sq  GI ppx: protonix    Code Status: Full code    Dispo: MICU 78F w/ PMH obesity, DM, HTN, hypothyroidism, sepsis secondary to ventral hernia infection s/p repair, PE s/p IVC filter, acute blood loss anemia from hemorrhagic uterine fibroids and UGIB, ATN requiring HD, sepsis secondary to MSSA bacteremia and MDR E coli UTI w/AMS and s/p TAHBSO and small bowel resection s/p PEG. Presenting with septic shock likely 2/2 PNA vs UTI and acute hypoxic respiratory failure now extubated.    Neuro  #Toxic metabolic encephalopathy 2/2 septic shock  RESOLVED  - mental status consistent with patient's baseline dementia    Respiratory  #HAP with sputum culture with MSSA sensitive to cefazolin.   -percussion vest for mucous/ glycopyrrolate 0.2 Q6 IV  - octreotide for secretions 100 TID  - abx as below    CV  #HTN  -BP 150s/80s  -continue home lisinopril 40 mg daily  -amlodipine 5 mg daily    GI  - PEG in situ    Renal  - Cr at baseline 1.10 as of 11/25    ID  #Sepsis 2/2 HAP  IMPROVED  -sputum cx staph aureus sn to cefazolin  -c/w cefazolin 50 Q8h    Endo  #T2DM  -ISS  -FSBS    #hypothyroidism  -Continue synthroid currently 150 mcg per day oral.     Heme:  # Anemia of chronic disease  -no signs of active bleeding  -active type and screen    GI  # Chronic diarrhea 2/2 small bowel resection  -probiotics daily  -fiber daily.     FEN  F: none  E: K>4 Mag>2 caution given recent GONZALEZ  N: glucerna 1.5 via PEG    Dvt ppx: hep sq  GI ppx: protonix    Code Status: Full code, will have palliative have discussions for long term care of patient due to chronic aspiration pna    Dispo: MICU

## 2018-11-26 NOTE — PROGRESS NOTE ADULT - PROBLEM SELECTOR PLAN 1
Currently tolerating NC. Unlabored. Concerns about airway protection explained to the patient's son and HCP nephew.   Management as per MICU team.

## 2018-11-27 LAB
ALBUMIN SERPL ELPH-MCNC: 2.8 G/DL — LOW (ref 3.3–5)
ALP SERPL-CCNC: 139 U/L — HIGH (ref 40–120)
ALT FLD-CCNC: 9 U/L — LOW (ref 10–45)
ANION GAP SERPL CALC-SCNC: 8 MMOL/L — SIGNIFICANT CHANGE UP (ref 5–17)
AST SERPL-CCNC: 18 U/L — SIGNIFICANT CHANGE UP (ref 10–40)
BILIRUB SERPL-MCNC: 0.3 MG/DL — SIGNIFICANT CHANGE UP (ref 0.2–1.2)
BLD GP AB SCN SERPL QL: NEGATIVE — SIGNIFICANT CHANGE UP
BUN SERPL-MCNC: 35 MG/DL — HIGH (ref 7–23)
CALCIUM SERPL-MCNC: 10.4 MG/DL — SIGNIFICANT CHANGE UP (ref 8.4–10.5)
CHLORIDE SERPL-SCNC: 106 MMOL/L — SIGNIFICANT CHANGE UP (ref 96–108)
CO2 SERPL-SCNC: 27 MMOL/L — SIGNIFICANT CHANGE UP (ref 22–31)
CREAT SERPL-MCNC: 1.09 MG/DL — SIGNIFICANT CHANGE UP (ref 0.5–1.3)
GLUCOSE BLDC GLUCOMTR-MCNC: 153 MG/DL — HIGH (ref 70–99)
GLUCOSE BLDC GLUCOMTR-MCNC: 154 MG/DL — HIGH (ref 70–99)
GLUCOSE BLDC GLUCOMTR-MCNC: 168 MG/DL — HIGH (ref 70–99)
GLUCOSE BLDC GLUCOMTR-MCNC: 178 MG/DL — HIGH (ref 70–99)
GLUCOSE BLDC GLUCOMTR-MCNC: 207 MG/DL — HIGH (ref 70–99)
GLUCOSE SERPL-MCNC: 146 MG/DL — HIGH (ref 70–99)
HCT VFR BLD CALC: 30.8 % — LOW (ref 34.5–45)
HGB BLD-MCNC: 9.2 G/DL — LOW (ref 11.5–15.5)
LYMPHOCYTES # BLD AUTO: 9 % — LOW (ref 13–44)
MAGNESIUM SERPL-MCNC: 1.8 MG/DL — SIGNIFICANT CHANGE UP (ref 1.6–2.6)
MCHC RBC-ENTMCNC: 28 PG — SIGNIFICANT CHANGE UP (ref 27–34)
MCHC RBC-ENTMCNC: 29.9 G/DL — LOW (ref 32–36)
MCV RBC AUTO: 93.6 FL — SIGNIFICANT CHANGE UP (ref 80–100)
MONOCYTES NFR BLD AUTO: 4 % — SIGNIFICANT CHANGE UP (ref 2–14)
NEUTROPHILS NFR BLD AUTO: 83 % — HIGH (ref 43–77)
PLATELET # BLD AUTO: 285 K/UL — SIGNIFICANT CHANGE UP (ref 150–400)
POTASSIUM SERPL-MCNC: 4.6 MMOL/L — SIGNIFICANT CHANGE UP (ref 3.5–5.3)
POTASSIUM SERPL-SCNC: 4.6 MMOL/L — SIGNIFICANT CHANGE UP (ref 3.5–5.3)
PROT SERPL-MCNC: 7.1 G/DL — SIGNIFICANT CHANGE UP (ref 6–8.3)
RBC # BLD: 3.29 M/UL — LOW (ref 3.8–5.2)
RBC # FLD: 17 % — HIGH (ref 10.3–16.9)
RH IG SCN BLD-IMP: POSITIVE — SIGNIFICANT CHANGE UP
SODIUM SERPL-SCNC: 141 MMOL/L — SIGNIFICANT CHANGE UP (ref 135–145)
T4 AB SER-ACNC: 6.71 UG/DL — SIGNIFICANT CHANGE UP (ref 3.17–11.72)
WBC # BLD: 11.4 K/UL — HIGH (ref 3.8–10.5)
WBC # FLD AUTO: 11.4 K/UL — HIGH (ref 3.8–10.5)

## 2018-11-27 PROCEDURE — 74018 RADEX ABDOMEN 1 VIEW: CPT | Mod: 26

## 2018-11-27 PROCEDURE — 71045 X-RAY EXAM CHEST 1 VIEW: CPT | Mod: 26

## 2018-11-27 PROCEDURE — 99233 SBSQ HOSP IP/OBS HIGH 50: CPT | Mod: GC

## 2018-11-27 RX ORDER — MAGNESIUM SULFATE 500 MG/ML
1 VIAL (ML) INJECTION ONCE
Qty: 0 | Refills: 0 | Status: COMPLETED | OUTPATIENT
Start: 2018-11-27 | End: 2018-11-27

## 2018-11-27 RX ORDER — HYDRALAZINE HCL 50 MG
5 TABLET ORAL ONCE
Qty: 0 | Refills: 0 | Status: COMPLETED | OUTPATIENT
Start: 2018-11-27 | End: 2018-11-27

## 2018-11-27 RX ORDER — METOCLOPRAMIDE HCL 10 MG
5 TABLET ORAL EVERY 6 HOURS
Qty: 0 | Refills: 0 | Status: DISCONTINUED | OUTPATIENT
Start: 2018-11-27 | End: 2018-12-01

## 2018-11-27 RX ORDER — ONDANSETRON 8 MG/1
8 TABLET, FILM COATED ORAL ONCE
Qty: 0 | Refills: 0 | Status: COMPLETED | OUTPATIENT
Start: 2018-11-27 | End: 2018-11-27

## 2018-11-27 RX ADMIN — Medication 2: at 06:03

## 2018-11-27 RX ADMIN — LISINOPRIL 40 MILLIGRAM(S): 2.5 TABLET ORAL at 05:57

## 2018-11-27 RX ADMIN — Medication 1 DROP(S): at 21:15

## 2018-11-27 RX ADMIN — HEPARIN SODIUM 5000 UNIT(S): 5000 INJECTION INTRAVENOUS; SUBCUTANEOUS at 21:15

## 2018-11-27 RX ADMIN — OCTREOTIDE ACETATE 100 MICROGRAM(S): 200 INJECTION, SOLUTION INTRAVENOUS; SUBCUTANEOUS at 05:57

## 2018-11-27 RX ADMIN — Medication 4: at 00:37

## 2018-11-27 RX ADMIN — Medication 2: at 23:41

## 2018-11-27 RX ADMIN — Medication 3 MILLILITER(S): at 11:43

## 2018-11-27 RX ADMIN — HEPARIN SODIUM 5000 UNIT(S): 5000 INJECTION INTRAVENOUS; SUBCUTANEOUS at 17:38

## 2018-11-27 RX ADMIN — Medication 150 MICROGRAM(S): at 05:57

## 2018-11-27 RX ADMIN — Medication 1 TABLET(S): at 21:16

## 2018-11-27 RX ADMIN — Medication 1 TABLET(S): at 17:40

## 2018-11-27 RX ADMIN — ONDANSETRON 8 MILLIGRAM(S): 8 TABLET, FILM COATED ORAL at 23:14

## 2018-11-27 RX ADMIN — HEPARIN SODIUM 5000 UNIT(S): 5000 INJECTION INTRAVENOUS; SUBCUTANEOUS at 05:57

## 2018-11-27 RX ADMIN — Medication 3 MILLILITER(S): at 17:27

## 2018-11-27 RX ADMIN — Medication 3 MILLILITER(S): at 05:16

## 2018-11-27 RX ADMIN — Medication 3 MILLILITER(S): at 21:11

## 2018-11-27 RX ADMIN — ROBINUL 0.2 MILLIGRAM(S): 0.2 INJECTION INTRAMUSCULAR; INTRAVENOUS at 17:38

## 2018-11-27 RX ADMIN — CHLORHEXIDINE GLUCONATE 1 APPLICATION(S): 213 SOLUTION TOPICAL at 06:03

## 2018-11-27 RX ADMIN — Medication 5 MILLIGRAM(S): at 21:06

## 2018-11-27 RX ADMIN — Medication 2: at 17:38

## 2018-11-27 RX ADMIN — Medication 2: at 11:13

## 2018-11-27 RX ADMIN — ROBINUL 0.2 MILLIGRAM(S): 0.2 INJECTION INTRAMUSCULAR; INTRAVENOUS at 00:37

## 2018-11-27 RX ADMIN — Medication 1 DROP(S): at 16:17

## 2018-11-27 RX ADMIN — PANTOPRAZOLE SODIUM 40 MILLIGRAM(S): 20 TABLET, DELAYED RELEASE ORAL at 11:14

## 2018-11-27 RX ADMIN — Medication 1 TABLET(S): at 05:57

## 2018-11-27 RX ADMIN — ROBINUL 0.2 MILLIGRAM(S): 0.2 INJECTION INTRAMUSCULAR; INTRAVENOUS at 05:57

## 2018-11-27 RX ADMIN — AMLODIPINE BESYLATE 10 MILLIGRAM(S): 2.5 TABLET ORAL at 17:38

## 2018-11-27 RX ADMIN — Medication 100 GRAM(S): at 11:14

## 2018-11-27 RX ADMIN — Medication 5 MILLIGRAM(S): at 00:36

## 2018-11-27 RX ADMIN — Medication 5 MILLIGRAM(S): at 23:41

## 2018-11-27 RX ADMIN — ROBINUL 0.2 MILLIGRAM(S): 0.2 INJECTION INTRAMUSCULAR; INTRAVENOUS at 11:14

## 2018-11-27 RX ADMIN — ROBINUL 0.2 MILLIGRAM(S): 0.2 INJECTION INTRAMUSCULAR; INTRAVENOUS at 23:44

## 2018-11-27 NOTE — PROGRESS NOTE ADULT - ASSESSMENT
78F w/ PMH obesity, DM, HTN, hypothyroidism, sepsis secondary to ventral hernia infection s/p repair, PE s/p IVC filter, acute blood loss anemia from hemorrhagic uterine fibroids and UGIB, ATN requiring HD, sepsis secondary to MSSA bacteremia and MDR E coli UTI w/AMS and s/p TAHBSO and small bowel resection s/p PEG. Presenting with septic shock likely 2/2 PNA vs UTI and acute hypoxic respiratory failure now extubated.    Neuro  #Toxic metabolic encephalopathy 2/2 septic shock  RESOLVED  - mental status consistent with patient's baseline dementia    Respiratory  #HAP with sputum culture with MSSA sensitive to cefazolin.   -percussion vest for mucous/ glycopyrrolate 0.2 Q6 IV  - octreotide for secretions 100 TID  - abx as below    CV  #HTN  -BP 150s/80s  -continue home lisinopril 40 mg daily  -amlodipine 5 mg daily    GI  - PEG in situ    Renal  - Cr at baseline 1.10 as of 11/25    ID  #Sepsis 2/2 HAP  IMPROVED  -sputum cx staph aureus sn to cefazolin  -c/w cefazolin 50 Q8h    Endo  #T2DM  -ISS  -FSBS    #hypothyroidism  -Continue synthroid currently 150 mcg per day oral.     Heme:  # Anemia of chronic disease  -no signs of active bleeding  -active type and screen    GI  # Chronic diarrhea 2/2 small bowel resection  -probiotics daily  -fiber daily.     FEN  F: none  E: K>4 Mag>2 caution given recent GONZALEZ  N: glucerna 1.5 via PEG    Dvt ppx: hep sq  GI ppx: protonix    Code Status: Full code, will have palliative have discussions for long term care of patient due to chronic aspiration pna    Dispo: MICU 78F w/ PMH obesity, DM, HTN, hypothyroidism, sepsis secondary to ventral hernia infection s/p repair, PE s/p IVC filter, acute blood loss anemia from hemorrhagic uterine fibroids and UGIB, ATN requiring HD, sepsis secondary to MSSA bacteremia and MDR E coli UTI w/AMS and s/p TAHBSO and small bowel resection s/p PEG. Presenting with septic shock likely 2/2 PNA vs UTI and acute hypoxic respiratory failure now extubated.    Neuro  #Toxic metabolic encephalopathy 2/2 septic shock  RESOLVED  - mental status consistent with patient's baseline dementia    Respiratory  #HAP with sputum culture with MSSA sensitive to cefazolin.   -percussion vest for mucous/ glycopyrrolate 0.2 Q6 IV  - octreotide for secretions 100 TID  - abx as below  -mucous plugging- had percussion vest and glycopyrrolate that is helping.     CV  #HTN  -BP 150s/80s  -continue home lisinopril 40 mg daily  -amlodipine 10 mg daily    GI  - PEG in situ    Renal  - Cr at baseline 1.10 as of 11/25    ID  #Sepsis 2/2 HAP and ESBL UTI  resolved  -sputum cx staph aureus sn to cefazolin  -completed course of cefazolin for MSSA and ciprofloxacin for UTI (ESBL sn to this).     Endo  #T2DM  -ISS  -FSBS    #hypothyroidism  -Continue synthroid currently 150 mcg per day oral.     Heme:  # Anemia of chronic disease  -no signs of active bleeding  -active type and screen    GI  # Chronic diarrhea 2/2 small bowel resection  -probiotics daily      FEN  F: none  E: K>4 Mag>2 caution given recent GONZALEZ  N: glucerna 1.5 via PEG    Dvt ppx: hep sq  GI ppx: protonix    Code Status: Full code, will have palliative have discussions for long term care of patient due to chronic aspiration pna    Dispo: MICU 78F w/ PMH obesity, DM, HTN, hypothyroidism, sepsis secondary to ventral hernia infection s/p repair, PE s/p IVC filter, acute blood loss anemia from hemorrhagic uterine fibroids and UGIB, ATN requiring HD, sepsis secondary to MSSA bacteremia and MDR E coli UTI w/AMS and s/p TAHBSO and small bowel resection s/p PEG. Presenting with septic shock likely 2/2 PNA vs UTI and acute hypoxic respiratory failure now extubated.    Neuro  #Toxic metabolic encephalopathy 2/2 septic shock  RESOLVED  - mental status consistent with patient's baseline dementia    Respiratory  #HAP with sputum culture with MSSA sensitive to cefazolin.   -percussion vest for mucous/ glycopyrrolate 0.2 Q6 IV  - octreotide for secretions 100 TID  - abx as below  -mucous plugging- had percussion vest and glycopyrrolate that is helping.     CV  #HTN  -BP 150s/80s  -continue home lisinopril 40 mg daily  -amlodipine 10 mg daily    GI  - PEG in site        Renal  - Cr at baseline 1.10 as of 11/25    ID  #Sepsis 2/2 HAP and ESBL UTI  resolved  -sputum cx staph aureus sn to cefazolin  -completed course of cefazolin for MSSA and ciprofloxacin for UTI (ESBL sn to this).     Endo  #T2DM  -ISS  -FSBS      #hypothyroidism  -Continue synthroid currently 150 mcg per day oral.     Derm  #incontinent dermatitis of b/l buttocks  -daily wound care  -rectal tube in place    Heme:  # Anemia of chronic disease  -no signs of active bleeding  -active type and screen    GI  # Chronic diarrhea 2/2 small bowel resection  -probiotics daily      FEN  F: none  E: K>4 Mag>2 caution given recent GONZALEZ  N: glucerna 1.5 via PEG    Dvt ppx: hep sq  GI ppx: protonix    Code Status: Full code, will have palliative have discussions for long term care of patient due to chronic aspiration pna    Dispo: MICU

## 2018-11-27 NOTE — PROGRESS NOTE ADULT - SUBJECTIVE AND OBJECTIVE BOX
Patient is a 78y old  Female who presents with a chief complaint of Septic shock and acute respiratory failure (24 Nov 2018 13:20)    INTERVAL HPI/OVERNIGHT EVENTS: No acute events overnight, diarrhea has improved, patient continues to have multiple secretions and coughing.  Discussion with palliative yesterday patient and son want to be full code.    PHYSICAL EXAM:  GENERAL: Obese adult White female, resting comfortably in bed; no acute distress  HEAD: atraumatic  EYES: PERRL, EOMI conjunctiva pink  ENT: moist mucous membranes  NECK: supple  CHEST/LUNG: rhonchi bilaterally throughout more prominent in right lung field than left, no wheezing  HEART: RRR; no murmur or rub  ABDOMEN: obese, soft, non-tender, non-distended, without rebound or guarding  EXTREMITIES: warm, well-perfused, without edema  NERVOUS SYSTEM: follows some simple commands, answers questions inconsistently  SKIN: rash near buttocks region and low back due to stool, no erythema, discharge or purulence    ICU Vital Signs Last 24 Hrs  T(C): 37.6 (27 Nov 2018 05:47), Max: 37.6 (27 Nov 2018 05:47)  T(F): 99.7 (27 Nov 2018 05:47), Max: 99.7 (27 Nov 2018 05:47)  HR: 80 (27 Nov 2018 08:00) (62 - 108)  BP: 130/51 (27 Nov 2018 08:00) (102/57 - 180/86)  BP(mean): 78 (27 Nov 2018 08:00) (74 - 119)  ABP: --  ABP(mean): --  RR: 37 (27 Nov 2018 08:00) (11 - 38)  SpO2: 97% (27 Nov 2018 08:00) (93% - 100%)      26 Nov 2018 07:01  -  27 Nov 2018 07:00  --------------------------------------------------------  IN:    Glucerna 1.5: 1032 mL    IV PiggyBack: 100 mL  Total IN: 1132 mL    OUT:    Indwelling Catheter - Urethral: 1720 mL  Total OUT: 1720 mL    Total NET: -588 mL      27 Nov 2018 07:01  -  27 Nov 2018 08:17  --------------------------------------------------------  IN:    Glucerna 1.5: 43 mL  Total IN: 43 mL    OUT:    Indwelling Catheter - Urethral: 60 mL  Total OUT: 60 mL    Total NET: -17 mL      LABS:                         8.4    12.5  )-----------( 248      ( 26 Nov 2018 05:58 )             28.3     11-26    140  |  106  |  37<H>  ----------------------------<  212<H>  4.8   |  26  |  1.10    Ca    10.2      26 Nov 2018 05:58  Phos  3.3     11-26  Mg     1.9     11-26      MEDICATIONS  (STANDING):  ALBUTerol/ipratropium for Nebulization 3 milliLiter(s) Nebulizer every 6 hours  amLODIPine   Tablet 10 milliGRAM(s) Oral every 24 hours  chlorhexidine 2% Cloths 1 Application(s) Topical daily  dextrose 5%. 1000 milliLiter(s) (50 mL/Hr) IV Continuous <Continuous>  dextrose 50% Injectable 12.5 Gram(s) IV Push once  dextrose 50% Injectable 25 Gram(s) IV Push once  dextrose 50% Injectable 25 Gram(s) IV Push once  glycopyrrolate Injectable 0.2 milliGRAM(s) IV Push every 6 hours  heparin  Injectable 5000 Unit(s) SubCutaneous every 8 hours  insulin lispro (HumaLOG) corrective regimen sliding scale   SubCutaneous every 6 hours  lactobacillus acidophilus 1 Tablet(s) Oral every 8 hours  levothyroxine 150 MICROGram(s) Oral daily  lisinopril 40 milliGRAM(s) Oral daily  octreotide  Injectable 100 MICROGram(s) SubCutaneous every 8 hours  pantoprazole   Suspension 40 milliGRAM(s) Enteral Tube daily  psyllium Powder 1 Packet(s) Oral daily    MEDICATIONS  (PRN):  dextrose 40% Gel 15 Gram(s) Oral once PRN Blood Glucose LESS THAN 70 milliGRAM(s)/deciliter  glucagon  Injectable 1 milliGRAM(s) IntraMuscular once PRN Glucose LESS THAN 70 milligrams/deciliter Patient is a 78y old  Female who presents with a chief complaint of Septic shock and acute respiratory failure (24 Nov 2018 13:20)    78F w/ PMH of morbid obesity, DM, HTN, hypothyroidism, sepsis secondary to ventral hernia infection s/p repair, PE s/p IVC filter, acute blood loss anemia from hemorrhagic uterine fibroids and UGIB, ATN requiring HD, sepsis secondary to MSSA bacteremia and MDR E coli UTI (4-18)  now w/AMS and s/p TAHBSO and small bowel resection s/p PEG. Patient presented from [facility] with concern for sepsis 2/2 aspiration pna and hypoxia. Per EMS, patient was saturating 85% on RA at [facility] with some improvement to 95% after being placed on NRB.  On arrival to the ED, patient was altered and saturating 89%. She was intubated in the ED with suctioning revealing purulent material. She was started on vanc/azithro/cipro (due to prior ESBL that was resistant to zosyn but sn to cipro, cipro course was finished) and that was deescalated to cefazolin once sputum had speciation to staph aureus sensitive to that and completed cefazolin course. She was successfully extubated, and tolerating nasal cannula. She had diarrhea that was present prior to admission as well (due to hx of SB resection), that improved with probiotics. Had continued secretions and completed cefazolin course. Was also started on glycopyrrolate. She had mucous plugging with white out of right side of lung, this improved with percussion vest and glycopyrrolate 11/27. 11/26 patient and son had long discussion with palliative care and it was decided to keep patient full code currently.      INTERVAL HPI/OVERNIGHT EVENTS: No acute events overnight, diarrhea has improved, patient continues to have secretions and has mild coughing.  Patient's xray improved in the am with decreased mucous plugging.  Patient received percussion vest yesterday (did not prior).   Discussion with palliative yesterday patient and son want to be full code.    PHYSICAL EXAM:  GENERAL: Obese adult White female, resting comfortably in bed; no acute distress  HEAD: atraumatic  EYES: PERRL, EOMI conjunctiva pink  ENT: moist mucous membranes  NECK: supple  CHEST/LUNG: rhonchi bilaterally throughout more prominent in right lung field than left, no wheezing  HEART: RRR; no murmur or rub  ABDOMEN: obese, soft, non-tender, non-distended, without rebound or guarding  EXTREMITIES: warm, well-perfused, without edema  NERVOUS SYSTEM: follows some simple commands, answers questions inconsistently  SKIN: rash near buttocks region and low back due to stool, no erythema, discharge or purulence    ICU Vital Signs Last 24 Hrs  T(C): 37.6 (27 Nov 2018 05:47), Max: 37.6 (27 Nov 2018 05:47)  T(F): 99.7 (27 Nov 2018 05:47), Max: 99.7 (27 Nov 2018 05:47)  HR: 80 (27 Nov 2018 08:00) (62 - 108)  BP: 130/51 (27 Nov 2018 08:00) (102/57 - 180/86)  BP(mean): 78 (27 Nov 2018 08:00) (74 - 119)  ABP: --  ABP(mean): --  RR: 37 (27 Nov 2018 08:00) (11 - 38)  SpO2: 97% (27 Nov 2018 08:00) (93% - 100%)      26 Nov 2018 07:01  -  27 Nov 2018 07:00  --------------------------------------------------------  IN:    Glucerna 1.5: 1032 mL    IV PiggyBack: 100 mL  Total IN: 1132 mL    OUT:    Indwelling Catheter - Urethral: 1720 mL  Total OUT: 1720 mL    Total NET: -588 mL      27 Nov 2018 07:01  -  27 Nov 2018 08:17  --------------------------------------------------------  IN:    Glucerna 1.5: 43 mL  Total IN: 43 mL    OUT:    Indwelling Catheter - Urethral: 60 mL  Total OUT: 60 mL    Total NET: -17 mL      LABS:                         8.4    12.5  )-----------( 248      ( 26 Nov 2018 05:58 )             28.3     11-26    140  |  106  |  37<H>  ----------------------------<  212<H>  4.8   |  26  |  1.10    Ca    10.2      26 Nov 2018 05:58  Phos  3.3     11-26  Mg     1.9     11-26      MEDICATIONS  (STANDING):  ALBUTerol/ipratropium for Nebulization 3 milliLiter(s) Nebulizer every 6 hours  amLODIPine   Tablet 10 milliGRAM(s) Oral every 24 hours  chlorhexidine 2% Cloths 1 Application(s) Topical daily  dextrose 5%. 1000 milliLiter(s) (50 mL/Hr) IV Continuous <Continuous>  dextrose 50% Injectable 12.5 Gram(s) IV Push once  dextrose 50% Injectable 25 Gram(s) IV Push once  dextrose 50% Injectable 25 Gram(s) IV Push once  glycopyrrolate Injectable 0.2 milliGRAM(s) IV Push every 6 hours  heparin  Injectable 5000 Unit(s) SubCutaneous every 8 hours  insulin lispro (HumaLOG) corrective regimen sliding scale   SubCutaneous every 6 hours  lactobacillus acidophilus 1 Tablet(s) Oral every 8 hours  levothyroxine 150 MICROGram(s) Oral daily  lisinopril 40 milliGRAM(s) Oral daily  octreotide  Injectable 100 MICROGram(s) SubCutaneous every 8 hours  pantoprazole   Suspension 40 milliGRAM(s) Enteral Tube daily  psyllium Powder 1 Packet(s) Oral daily    MEDICATIONS  (PRN):  dextrose 40% Gel 15 Gram(s) Oral once PRN Blood Glucose LESS THAN 70 milliGRAM(s)/deciliter  glucagon  Injectable 1 milliGRAM(s) IntraMuscular once PRN Glucose LESS THAN 70 milligrams/deciliter Patient is a 78y old  Female who presents with a chief complaint of Septic shock and acute respiratory failure (24 Nov 2018 13:20)    78F w/ PMH of morbid obesity, DM, HTN, hypothyroidism, sepsis secondary to ventral hernia infection s/p repair, PE s/p IVC filter, acute blood loss anemia from hemorrhagic uterine fibroids and UGIB, ATN requiring HD, sepsis secondary to MSSA bacteremia and MDR E coli UTI (4-18)  now w/AMS and s/p TAHBSO and small bowel resection s/p PEG. Patient presented from [facility] with concern for sepsis 2/2 aspiration pna and hypoxia. Per EMS, patient was saturating 85% on RA at [facility] with some improvement to 95% after being placed on NRB.  On arrival to the ED, patient was altered and saturating 89%. She was intubated in the ED with suctioning revealing purulent material. She was started on vanc/azithro/cipro (due to prior ESBL that was resistant to zosyn but sn to cipro, cipro course was finished) and that was deescalated to cefazolin once sputum had speciation to staph aureus sensitive to that and completed cefazolin course. She also completed ciprofloxacin for concern of ESBL (that was sn to this). She was successfully extubated, and tolerating nasal cannula. She had diarrhea that was present prior to admission as well (due to hx of SB resection), that improved with probiotics. Had continued secretions and completed cefazolin course. Was also started on glycopyrrolate. She had mucous plugging with white out of right side of lung, this improved with percussion vest and glycopyrrolate 11/27. 11/26 patient and son had long discussion with palliative care and it was decided to keep patient full code currently. After extubation patient also was started on midodrine for blood pressure support and to take of levophed. She was able to come off of levophed. Patient had bradycardia to mid 30s, and blood pressure to 170s, and patient's midodrine was discontinued and heart rate improved to the 60s. She became hypertensive to the 170s again, and home lisinopril 40 mg and amlodipine 10 mg daily was started.     INTERVAL HPI/OVERNIGHT EVENTS: No acute events overnight, diarrhea has improved, patient continues to have secretions and has mild coughing.  Patient's xray improved in the am with decreased mucous plugging.  Patient received percussion vest yesterday (did not prior).   Discussion with palliative yesterday patient and son want to be full code.    PHYSICAL EXAM:  GENERAL: Obese adult White female, resting comfortably in bed; no acute distress  HEAD: atraumatic  EYES: PERRL, EOMI conjunctiva pink  ENT: moist mucous membranes  NECK: supple  CHEST/LUNG: rhonchi bilaterally throughout more prominent in right lung field than left, no wheezing  HEART: RRR; no murmur or rub  ABDOMEN: obese, soft, non-tender, non-distended, without rebound or guarding  EXTREMITIES: warm, well-perfused, without edema  NERVOUS SYSTEM: follows some simple commands, answers questions inconsistently  SKIN: rash near buttocks region and low back due to stool, no erythema, discharge or purulence    ICU Vital Signs Last 24 Hrs  T(C): 37.6 (27 Nov 2018 05:47), Max: 37.6 (27 Nov 2018 05:47)  T(F): 99.7 (27 Nov 2018 05:47), Max: 99.7 (27 Nov 2018 05:47)  HR: 80 (27 Nov 2018 08:00) (62 - 108)  BP: 130/51 (27 Nov 2018 08:00) (102/57 - 180/86)  BP(mean): 78 (27 Nov 2018 08:00) (74 - 119)  ABP: --  ABP(mean): --  RR: 37 (27 Nov 2018 08:00) (11 - 38)  SpO2: 97% (27 Nov 2018 08:00) (93% - 100%)      26 Nov 2018 07:01  -  27 Nov 2018 07:00  --------------------------------------------------------  IN:    Glucerna 1.5: 1032 mL    IV PiggyBack: 100 mL  Total IN: 1132 mL    OUT:    Indwelling Catheter - Urethral: 1720 mL  Total OUT: 1720 mL    Total NET: -588 mL      27 Nov 2018 07:01  -  27 Nov 2018 08:17  --------------------------------------------------------  IN:    Glucerna 1.5: 43 mL  Total IN: 43 mL    OUT:    Indwelling Catheter - Urethral: 60 mL  Total OUT: 60 mL    Total NET: -17 mL      LABS:                         8.4    12.5  )-----------( 248      ( 26 Nov 2018 05:58 )             28.3     11-26    140  |  106  |  37<H>  ----------------------------<  212<H>  4.8   |  26  |  1.10    Ca    10.2      26 Nov 2018 05:58  Phos  3.3     11-26  Mg     1.9     11-26      MEDICATIONS  (STANDING):  ALBUTerol/ipratropium for Nebulization 3 milliLiter(s) Nebulizer every 6 hours  amLODIPine   Tablet 10 milliGRAM(s) Oral every 24 hours  chlorhexidine 2% Cloths 1 Application(s) Topical daily  dextrose 5%. 1000 milliLiter(s) (50 mL/Hr) IV Continuous <Continuous>  dextrose 50% Injectable 12.5 Gram(s) IV Push once  dextrose 50% Injectable 25 Gram(s) IV Push once  dextrose 50% Injectable 25 Gram(s) IV Push once  glycopyrrolate Injectable 0.2 milliGRAM(s) IV Push every 6 hours  heparin  Injectable 5000 Unit(s) SubCutaneous every 8 hours  insulin lispro (HumaLOG) corrective regimen sliding scale   SubCutaneous every 6 hours  lactobacillus acidophilus 1 Tablet(s) Oral every 8 hours  levothyroxine 150 MICROGram(s) Oral daily  lisinopril 40 milliGRAM(s) Oral daily  octreotide  Injectable 100 MICROGram(s) SubCutaneous every 8 hours  pantoprazole   Suspension 40 milliGRAM(s) Enteral Tube daily  psyllium Powder 1 Packet(s) Oral daily    MEDICATIONS  (PRN):  dextrose 40% Gel 15 Gram(s) Oral once PRN Blood Glucose LESS THAN 70 milliGRAM(s)/deciliter  glucagon  Injectable 1 milliGRAM(s) IntraMuscular once PRN Glucose LESS THAN 70 milligrams/deciliter

## 2018-11-27 NOTE — PROGRESS NOTE ADULT - ASSESSMENT
78F w/ PMH obesity, DM, HTN, hypothyroidism, sepsis secondary to ventral hernia infection s/p repair, PE s/p IVC filter, acute blood loss anemia from hemorrhagic uterine fibroids and UGIB, ATN requiring HD, sepsis secondary to MSSA bacteremia and MDR E coli UTI w/AMS and s/p TAHBSO and small bowel resection s/p PEG. Presenting with septic shock likely 2/2 PNA vs UTI and acute hypoxic respiratory failure now extubated.    Neuro  #Toxic metabolic encephalopathy 2/2 septic shock  RESOLVED  - mental status consistent with patient's baseline dementia    Respiratory  #HAP with sputum culture with MSSA sensitive to cefazolin.   -percussion vest for mucous/ glycopyrrolate 0.2 Q6 IV  - octreotide for secretions 100 TID  - abx as below  -mucous plugging- had percussion vest and glycopyrrolate that is helping.     CV  #HTN  -BP 150s/80s  -continue home lisinopril 40 mg daily  -amlodipine 10 mg daily    GI  - PEG in site        Renal  - Cr at baseline 1.10 as of 11/25    ID  #Sepsis 2/2 HAP and ESBL UTI  resolved  -sputum cx staph aureus sn to cefazolin  -completed course of cefazolin for MSSA and ciprofloxacin for UTI (ESBL sn to this).

## 2018-11-27 NOTE — PROGRESS NOTE ADULT - SUBJECTIVE AND OBJECTIVE BOX
Physical Medicine and Rehabilitation Progress Note:    Patient is a 78y old  Female who presents with a chief complaint of Septic shock and acute respiratory failure (2018 08:16)      HPI:  Patient is currently intubated and unable to participate in interview. History taken from chart and MICU consult.    78F w/ PMH of morbid obesity, DM, HTN, hypothyroidism, sepsis secondary to ventral hernia infection s/p repair, PE s/p IVC filter, acute blood loss anemia from hemorrhagic uterine fibroids and UGIB, ATN requiring HD, sepsis secondary to MSSA bacteremia and MDR E coli UTI now w/AMS and s/p TAHBSO and small bowel resection s/p PEG. Patient presented from [facility] with concern for sepsis and hypoxia. Per EMS, patient was saturating 85% on RA at [facility] with some improvement to 95% after being placed on NRB.  On arrival to the ED, patient was altered and saturating 89%. She was intubated in the ED with suctioning revealing purulent material.       T was 101.5, , BP79/59. WBC 27.9 Hgb 11.0 .3 Lactate 5.4 Na 154 K 5.6 CO2 16 AG [id number] Cr 3.38 Trop 0.14 ABG 7.32 pCO2 29 pO2 138 HCO3 15  UA: (+) leuk est, (+) bacteria  She was started on Levo gtt and Dopamine and subsequently was titrated off the Dopamine. She was given Azithromycin, Cipro, Vancomycin and Zosyn, 4L nS, and Insulin 10U. (2018 23:11)                            9.2    11.4  )-----------( 285      ( 2018 10:12 )             30.8           141  |  106  |  35<H>  ----------------------------<  146<H>  4.6   |  27  |  1.09    Ca    10.4      2018 10:12  Phos  3.3     11-  Mg     1.8         TPro  7.1  /  Alb  2.8<L>  /  TBili  0.3  /  DBili  x   /  AST  18  /  ALT  9<L>  /  AlkPhos  139<H>      Vital Signs Last 24 Hrs  T(C): 37.5 (2018 14:07), Max: 37.6 (2018 05:47)  T(F): 99.5 (2018 14:07), Max: 99.7 (2018 05:47)  HR: 84 (2018 16:00) (62 - 108)  BP: 168/78 (2018 16:00) (102/57 - 181/78)  BP(mean): 111 (2018 16:00) (74 - 115)  RR: 20 (2018 16:00) (11 - 38)  SpO2: 95% (2018 16:00) (91% - 100%)    MEDICATIONS  (STANDING):  ALBUTerol/ipratropium for Nebulization 3 milliLiter(s) Nebulizer every 6 hours  amLODIPine   Tablet 10 milliGRAM(s) Oral every 24 hours  artificial tears (preservative free) Ophthalmic Solution 1 Drop(s) Both EYES three times a day  chlorhexidine 2% Cloths 1 Application(s) Topical daily  dextrose 5%. 1000 milliLiter(s) (50 mL/Hr) IV Continuous <Continuous>  dextrose 50% Injectable 12.5 Gram(s) IV Push once  dextrose 50% Injectable 25 Gram(s) IV Push once  dextrose 50% Injectable 25 Gram(s) IV Push once  glycopyrrolate Injectable 0.2 milliGRAM(s) IV Push every 6 hours  heparin  Injectable 5000 Unit(s) SubCutaneous every 8 hours  insulin lispro (HumaLOG) corrective regimen sliding scale   SubCutaneous every 6 hours  lactobacillus acidophilus 1 Tablet(s) Oral every 8 hours  levothyroxine 150 MICROGram(s) Oral daily  lisinopril 40 milliGRAM(s) Oral daily  magnesium sulfate  IVPB 1 Gram(s) IV Intermittent once  pantoprazole   Suspension 40 milliGRAM(s) Enteral Tube daily    MEDICATIONS  (PRN):  dextrose 40% Gel 15 Gram(s) Oral once PRN Blood Glucose LESS THAN 70 milliGRAM(s)/deciliter  glucagon  Injectable 1 milliGRAM(s) IntraMuscular once PRN Glucose LESS THAN 70 milligrams/deciliter    Currently Undergoing Physical Therapy at bedside.    Functional Status Assessment: on 2018      Coping  Observed Emotional State: calm  Verbalized Emotional State: acceptance    Plan of Care Reviewed with: patient    Therapeutic Interventions    Bed Mobility  Bed Mobility Training Rolling/Turning: maximum assist (25% patient effort);  1 person assist;  verbal cues;  nonverbal cues (demo/gestures)  Bed Mobility Training Scootin person assist;  nonverbal cues (demo/gestures);  verbal cues;  dependent (less than 25% patient effort)  Bed Mobility Training Sit-to-Supine: dependent (less than 25% patient effort);  2 person assist;  verbal cues;  nonverbal cues (demo/gestures)  Bed Mobility Training Supine-to-Sit: dependent (less than 25% patient effort);  2 person assist;  verbal cues;  nonverbal cues (demo/gestures)  Bed Mobility Training Limitations: decreased ability to use arms for pushing/pulling;  decreased ability to use legs for bridging/pushing;  impaired ability to control trunk for mobility;  decreased strength;  impaired balance;  impaired postural control    Therapeutic Exercise  Therapeutic Exercise Detail: Pt dangled at edge of bed for~10 mins with max A x 1. Seated ther ex: LAQ, hip flexion, ankle pump x 10 bilaterally PROM.shoulder flexion/extension, elbow flexion/extension x 10 bilaterally PROM. unable to perform transfer training with pt secondary pt presents with poor sitting balance.           PM&R Impression: as above    Disposition Plan Recommendations:  subacute rehab placement

## 2018-11-28 DIAGNOSIS — R63.8 OTHER SYMPTOMS AND SIGNS CONCERNING FOOD AND FLUID INTAKE: ICD-10-CM

## 2018-11-28 DIAGNOSIS — E11.9 TYPE 2 DIABETES MELLITUS WITHOUT COMPLICATIONS: ICD-10-CM

## 2018-11-28 DIAGNOSIS — Z29.9 ENCOUNTER FOR PROPHYLACTIC MEASURES, UNSPECIFIED: ICD-10-CM

## 2018-11-28 DIAGNOSIS — E78.5 HYPERLIPIDEMIA, UNSPECIFIED: ICD-10-CM

## 2018-11-28 DIAGNOSIS — E03.9 HYPOTHYROIDISM, UNSPECIFIED: ICD-10-CM

## 2018-11-28 DIAGNOSIS — Z91.89 OTHER SPECIFIED PERSONAL RISK FACTORS, NOT ELSEWHERE CLASSIFIED: ICD-10-CM

## 2018-11-28 DIAGNOSIS — K63.1 PERFORATION OF INTESTINE (NONTRAUMATIC): ICD-10-CM

## 2018-11-28 DIAGNOSIS — G92 TOXIC ENCEPHALOPATHY: ICD-10-CM

## 2018-11-28 DIAGNOSIS — I10 ESSENTIAL (PRIMARY) HYPERTENSION: ICD-10-CM

## 2018-11-28 LAB
ANION GAP SERPL CALC-SCNC: 12 MMOL/L — SIGNIFICANT CHANGE UP (ref 5–17)
BASOPHILS NFR BLD AUTO: 0.2 % — SIGNIFICANT CHANGE UP (ref 0–2)
BASOPHILS NFR BLD AUTO: 0.2 % — SIGNIFICANT CHANGE UP (ref 0–2)
BUN SERPL-MCNC: 35 MG/DL — HIGH (ref 7–23)
CALCIUM SERPL-MCNC: 10 MG/DL — SIGNIFICANT CHANGE UP (ref 8.4–10.5)
CHLORIDE SERPL-SCNC: 100 MMOL/L — SIGNIFICANT CHANGE UP (ref 96–108)
CO2 SERPL-SCNC: 26 MMOL/L — SIGNIFICANT CHANGE UP (ref 22–31)
CREAT SERPL-MCNC: 1.1 MG/DL — SIGNIFICANT CHANGE UP (ref 0.5–1.3)
EOSINOPHIL NFR BLD AUTO: 1.3 % — SIGNIFICANT CHANGE UP (ref 0–6)
EOSINOPHIL NFR BLD AUTO: 1.5 % — SIGNIFICANT CHANGE UP (ref 0–6)
GLUCOSE BLDC GLUCOMTR-MCNC: 112 MG/DL — HIGH (ref 70–99)
GLUCOSE BLDC GLUCOMTR-MCNC: 120 MG/DL — HIGH (ref 70–99)
GLUCOSE BLDC GLUCOMTR-MCNC: 130 MG/DL — HIGH (ref 70–99)
GLUCOSE SERPL-MCNC: 127 MG/DL — HIGH (ref 70–99)
HCT VFR BLD CALC: 27.9 % — LOW (ref 34.5–45)
HCT VFR BLD CALC: 29.8 % — LOW (ref 34.5–45)
HGB BLD-MCNC: 8.3 G/DL — LOW (ref 11.5–15.5)
HGB BLD-MCNC: 8.3 G/DL — LOW (ref 11.5–15.5)
LYMPHOCYTES # BLD AUTO: 11.5 % — LOW (ref 13–44)
LYMPHOCYTES # BLD AUTO: 9.5 % — LOW (ref 13–44)
MAGNESIUM SERPL-MCNC: 1.8 MG/DL — SIGNIFICANT CHANGE UP (ref 1.6–2.6)
MCHC RBC-ENTMCNC: 27.9 G/DL — LOW (ref 32–36)
MCHC RBC-ENTMCNC: 28.1 PG — SIGNIFICANT CHANGE UP (ref 27–34)
MCHC RBC-ENTMCNC: 28.2 PG — SIGNIFICANT CHANGE UP (ref 27–34)
MCHC RBC-ENTMCNC: 29.7 G/DL — LOW (ref 32–36)
MCV RBC AUTO: 101.4 FL — HIGH (ref 80–100)
MCV RBC AUTO: 94.6 FL — SIGNIFICANT CHANGE UP (ref 80–100)
MONOCYTES NFR BLD AUTO: 3.5 % — SIGNIFICANT CHANGE UP (ref 2–14)
MONOCYTES NFR BLD AUTO: 4.4 % — SIGNIFICANT CHANGE UP (ref 2–14)
NEUTROPHILS NFR BLD AUTO: 83.3 % — HIGH (ref 43–77)
NEUTROPHILS NFR BLD AUTO: 84.6 % — HIGH (ref 43–77)
PHOSPHATE SERPL-MCNC: 3.3 MG/DL — SIGNIFICANT CHANGE UP (ref 2.5–4.5)
PLATELET # BLD AUTO: 240 K/UL — SIGNIFICANT CHANGE UP (ref 150–400)
PLATELET # BLD AUTO: 286 K/UL — SIGNIFICANT CHANGE UP (ref 150–400)
POTASSIUM SERPL-MCNC: 4.4 MMOL/L — SIGNIFICANT CHANGE UP (ref 3.5–5.3)
POTASSIUM SERPL-SCNC: 4.4 MMOL/L — SIGNIFICANT CHANGE UP (ref 3.5–5.3)
RBC # BLD: 2.94 M/UL — LOW (ref 3.8–5.2)
RBC # BLD: 2.95 M/UL — LOW (ref 3.8–5.2)
RBC # FLD: 17 % — HIGH (ref 10.3–16.9)
RBC # FLD: 17.5 % — HIGH (ref 10.3–16.9)
SODIUM SERPL-SCNC: 138 MMOL/L — SIGNIFICANT CHANGE UP (ref 135–145)
T3 SERPL-MCNC: 49 NG/DL — LOW (ref 80–200)
WBC # BLD: 11.7 K/UL — HIGH (ref 3.8–10.5)
WBC # BLD: 12 K/UL — HIGH (ref 3.8–10.5)
WBC # FLD AUTO: 11.7 K/UL — HIGH (ref 3.8–10.5)
WBC # FLD AUTO: 12 K/UL — HIGH (ref 3.8–10.5)

## 2018-11-28 PROCEDURE — 99233 SBSQ HOSP IP/OBS HIGH 50: CPT | Mod: GC

## 2018-11-28 RX ORDER — SODIUM CHLORIDE 9 MG/ML
500 INJECTION INTRAMUSCULAR; INTRAVENOUS; SUBCUTANEOUS ONCE
Qty: 0 | Refills: 0 | Status: COMPLETED | OUTPATIENT
Start: 2018-11-28 | End: 2018-11-28

## 2018-11-28 RX ORDER — ACETAMINOPHEN 500 MG
650 TABLET ORAL ONCE
Qty: 0 | Refills: 0 | Status: COMPLETED | OUTPATIENT
Start: 2018-11-28 | End: 2018-11-28

## 2018-11-28 RX ORDER — MAGNESIUM SULFATE 500 MG/ML
2 VIAL (ML) INJECTION ONCE
Qty: 0 | Refills: 0 | Status: COMPLETED | OUTPATIENT
Start: 2018-11-28 | End: 2018-11-28

## 2018-11-28 RX ORDER — POLYETHYLENE GLYCOL 3350 17 G/17G
17 POWDER, FOR SOLUTION ORAL DAILY
Qty: 0 | Refills: 0 | Status: DISCONTINUED | OUTPATIENT
Start: 2018-11-28 | End: 2018-12-10

## 2018-11-28 RX ADMIN — Medication 3 MILLILITER(S): at 18:33

## 2018-11-28 RX ADMIN — HEPARIN SODIUM 5000 UNIT(S): 5000 INJECTION INTRAVENOUS; SUBCUTANEOUS at 12:56

## 2018-11-28 RX ADMIN — Medication 1 DROP(S): at 22:27

## 2018-11-28 RX ADMIN — HEPARIN SODIUM 5000 UNIT(S): 5000 INJECTION INTRAVENOUS; SUBCUTANEOUS at 05:52

## 2018-11-28 RX ADMIN — ROBINUL 0.2 MILLIGRAM(S): 0.2 INJECTION INTRAMUSCULAR; INTRAVENOUS at 19:29

## 2018-11-28 RX ADMIN — LISINOPRIL 40 MILLIGRAM(S): 2.5 TABLET ORAL at 05:51

## 2018-11-28 RX ADMIN — AMLODIPINE BESYLATE 10 MILLIGRAM(S): 2.5 TABLET ORAL at 18:35

## 2018-11-28 RX ADMIN — PANTOPRAZOLE SODIUM 40 MILLIGRAM(S): 20 TABLET, DELAYED RELEASE ORAL at 12:56

## 2018-11-28 RX ADMIN — Medication 1 TABLET(S): at 06:08

## 2018-11-28 RX ADMIN — HEPARIN SODIUM 5000 UNIT(S): 5000 INJECTION INTRAVENOUS; SUBCUTANEOUS at 22:26

## 2018-11-28 RX ADMIN — Medication 650 MILLIGRAM(S): at 12:57

## 2018-11-28 RX ADMIN — Medication 650 MILLIGRAM(S): at 13:58

## 2018-11-28 RX ADMIN — Medication 5 MILLIGRAM(S): at 05:51

## 2018-11-28 RX ADMIN — Medication 1 DROP(S): at 05:54

## 2018-11-28 RX ADMIN — Medication 1 TABLET(S): at 22:27

## 2018-11-28 RX ADMIN — Medication 150 MICROGRAM(S): at 05:53

## 2018-11-28 RX ADMIN — Medication 5 MILLIGRAM(S): at 18:33

## 2018-11-28 RX ADMIN — POLYETHYLENE GLYCOL 3350 17 GRAM(S): 17 POWDER, FOR SOLUTION ORAL at 18:33

## 2018-11-28 RX ADMIN — ROBINUL 0.2 MILLIGRAM(S): 0.2 INJECTION INTRAMUSCULAR; INTRAVENOUS at 05:52

## 2018-11-28 RX ADMIN — Medication 3 MILLILITER(S): at 12:35

## 2018-11-28 RX ADMIN — Medication 3 MILLILITER(S): at 05:48

## 2018-11-28 RX ADMIN — ROBINUL 0.2 MILLIGRAM(S): 0.2 INJECTION INTRAMUSCULAR; INTRAVENOUS at 12:59

## 2018-11-28 RX ADMIN — Medication 1 TABLET(S): at 12:57

## 2018-11-28 RX ADMIN — CHLORHEXIDINE GLUCONATE 1 APPLICATION(S): 213 SOLUTION TOPICAL at 05:53

## 2018-11-28 RX ADMIN — Medication 1 DROP(S): at 12:56

## 2018-11-28 RX ADMIN — Medication 50 GRAM(S): at 05:51

## 2018-11-28 RX ADMIN — SODIUM CHLORIDE 2000 MILLILITER(S): 9 INJECTION INTRAMUSCULAR; INTRAVENOUS; SUBCUTANEOUS at 15:02

## 2018-11-28 RX ADMIN — Medication 5 MILLIGRAM(S): at 12:58

## 2018-11-28 NOTE — PROGRESS NOTE ADULT - SUBJECTIVE AND OBJECTIVE BOX
GENERAL SURGERY CONSULT PROGRESS NOTE    SUBJECTIVE:   Pt seen & examined at bedside. Pt seen & examined at bedside. Pt seen and examined at bedside. Smiles and says hello to questions, no meaningful ROS obtainable.     MEDICATIONS:  ---NEURO-  metoclopramide Injectable 5 milliGRAM(s) IV Push every 6 hours  ---CV-  amLODIPine   Tablet 10 milliGRAM(s) Oral every 24 hours  lisinopril 40 milliGRAM(s) Oral daily  ---PULM-  ALBUTerol/ipratropium for Nebulization 3 milliLiter(s) Nebulizer every 6 hours  ---GI/FEN-  glycopyrrolate Injectable 0.2 milliGRAM(s) IV Push every 6 hours  pantoprazole   Suspension 40 milliGRAM(s) Enteral Tube daily  dextrose 5%. 1000 milliLiter(s) IV Continuous <Continuous>  ----  ---ID-   ---ENDO-  dextrose 40% Gel 15 Gram(s) Oral once PRN  dextrose 50% Injectable 12.5 Gram(s) IV Push once  dextrose 50% Injectable 25 Gram(s) IV Push once  dextrose 50% Injectable 25 Gram(s) IV Push once  glucagon  Injectable 1 milliGRAM(s) IntraMuscular once PRN  insulin lispro (HumaLOG) corrective regimen sliding scale   SubCutaneous every 6 hours  levothyroxine 150 MICROGram(s) Oral daily  ---HEME-  heparin  Injectable 5000 Unit(s) SubCutaneous every 8 hours  ---OTHER-  artificial tears (preservative free) Ophthalmic Solution 1 Drop(s) Both EYES three times a day  chlorhexidine 2% Cloths 1 Application(s) Topical daily  lactobacillus acidophilus 1 Tablet(s) Oral every 8 hours        VOLUME STATUS:  I&O's Detail    27 Nov 2018 07:01  -  28 Nov 2018 07:00  --------------------------------------------------------  IN:    Enteral Tube Flush: 120 mL    Glucerna 1.5: 292 mL    IV PiggyBack: 50 mL  Total IN: 462 mL    OUT:    Indwelling Catheter - Urethral: 1080 mL  Total OUT: 1080 mL    Total NET: -618 mL      28 Nov 2018 07:01  -  28 Nov 2018 11:38  --------------------------------------------------------  IN:    Glucerna 1.5: 20 mL  Total IN: 20 mL    OUT:    Indwelling Catheter - Urethral: 210 mL  Total OUT: 210 mL    Total NET: -190 mL          VITALS:  Vital Signs Last 24 Hrs  T(C): 36.9 (28 Nov 2018 09:00), Max: 37.9 (27 Nov 2018 17:32)  T(F): 98.5 (28 Nov 2018 09:00), Max: 100.3 (27 Nov 2018 17:32)  HR: 90 (28 Nov 2018 10:00) (78 - 114)  BP: 147/82 (28 Nov 2018 10:00) (117/59 - 181/80)  BP(mean): 101 (28 Nov 2018 10:00) (76 - 133)  RR: 32 (28 Nov 2018 10:00) (14 - 41)  SpO2: 97% (28 Nov 2018 10:00) (89% - 100%)    PHYSICAL EXAM:  Constitutional: NAD, resting comfortably, on NC, orients and verbally responds minimally to verbal stimulus   HEENT: MMM  CV: RRR on monitor  Resp: nonlabored breathing  Abd: soft, nondistended, nontender, PEG in place, incontinent     LABS:                        8.3    12.0  )-----------( 286      ( 28 Nov 2018 05:06 )             27.9     11-28    138  |  100  |  35<H>  ----------------------------<  127<H>  4.4   |  26  |  1.10    Ca    10.0      28 Nov 2018 05:06  Phos  3.3     11-28  Mg     1.8     11-28    TPro  7.1  /  Alb  2.8<L>  /  TBili  0.3  /  DBili  x   /  AST  18  /  ALT  9<L>  /  AlkPhos  139<H>  11-27

## 2018-11-28 NOTE — PROGRESS NOTE ADULT - SUBJECTIVE AND OBJECTIVE BOX
Physical Medicine and Rehabilitation Progress Note:    Patient is a 78y old  Female who presents with a chief complaint of Septic shock and acute respiratory failure (28 Nov 2018 11:37)      HPI:  Patient is currently intubated and unable to participate in interview. History taken from chart and MICU consult.    78F w/ PMH of morbid obesity, DM, HTN, hypothyroidism, sepsis secondary to ventral hernia infection s/p repair, PE s/p IVC filter, acute blood loss anemia from hemorrhagic uterine fibroids and UGIB, ATN requiring HD, sepsis secondary to MSSA bacteremia and MDR E coli UTI now w/AMS and s/p TAHBSO and small bowel resection s/p PEG. Patient presented from [facility] with concern for sepsis and hypoxia. Per EMS, patient was saturating 85% on RA at [facility] with some improvement to 95% after being placed on NRB.  On arrival to the ED, patient was altered and saturating 89%. She was intubated in the ED with suctioning revealing purulent material.       T was 101.5, , BP79/59. WBC 27.9 Hgb 11.0 .3 Lactate 5.4 Na 154 K 5.6 CO2 16 AG [id number] Cr 3.38 Trop 0.14 ABG 7.32 pCO2 29 pO2 138 HCO3 15  UA: (+) leuk est, (+) bacteria  She was started on Levo gtt and Dopamine and subsequently was titrated off the Dopamine. She was given Azithromycin, Cipro, Vancomycin and Zosyn, 4L nS, and Insulin 10U. (16 Nov 2018 23:11)                            8.3    12.0  )-----------( 286      ( 28 Nov 2018 05:06 )             27.9       11-28    138  |  100  |  35<H>  ----------------------------<  127<H>  4.4   |  26  |  1.10    Ca    10.0      28 Nov 2018 05:06  Phos  3.3     11-28  Mg     1.8     11-28    TPro  7.1  /  Alb  2.8<L>  /  TBili  0.3  /  DBili  x   /  AST  18  /  ALT  9<L>  /  AlkPhos  139<H>  11-27    Vital Signs Last 24 Hrs  T(C): 37.3 (28 Nov 2018 14:04), Max: 37.9 (27 Nov 2018 17:32)  T(F): 99.2 (28 Nov 2018 14:04), Max: 100.3 (27 Nov 2018 17:32)  HR: 104 (28 Nov 2018 14:00) (78 - 114)  BP: 145/93 (28 Nov 2018 13:58) (117/59 - 181/80)  BP(mean): 116 (28 Nov 2018 13:58) (76 - 133)  RR: 21 (28 Nov 2018 14:00) (14 - 41)  SpO2: 95% (28 Nov 2018 14:00) (87% - 100%)    MEDICATIONS  (STANDING):  ALBUTerol/ipratropium for Nebulization 3 milliLiter(s) Nebulizer every 6 hours  amLODIPine   Tablet 10 milliGRAM(s) Oral every 24 hours  artificial tears (preservative free) Ophthalmic Solution 1 Drop(s) Both EYES three times a day  dextrose 5%. 1000 milliLiter(s) (50 mL/Hr) IV Continuous <Continuous>  dextrose 50% Injectable 12.5 Gram(s) IV Push once  dextrose 50% Injectable 25 Gram(s) IV Push once  dextrose 50% Injectable 25 Gram(s) IV Push once  glycopyrrolate Injectable 0.2 milliGRAM(s) IV Push every 6 hours  heparin  Injectable 5000 Unit(s) SubCutaneous every 8 hours  insulin lispro (HumaLOG) corrective regimen sliding scale   SubCutaneous every 6 hours  lactobacillus acidophilus 1 Tablet(s) Oral every 8 hours  levothyroxine 150 MICROGram(s) Oral daily  lisinopril 40 milliGRAM(s) Oral daily  metoclopramide Injectable 5 milliGRAM(s) IV Push every 6 hours  pantoprazole   Suspension 40 milliGRAM(s) Enteral Tube daily  polyethylene glycol 3350 17 Gram(s) Oral daily    MEDICATIONS  (PRN):  dextrose 40% Gel 15 Gram(s) Oral once PRN Blood Glucose LESS THAN 70 milliGRAM(s)/deciliter  glucagon  Injectable 1 milliGRAM(s) IntraMuscular once PRN Glucose LESS THAN 70 milligrams/deciliter    Currently Undergoing Physical Therapy at bedside.    Functional Status Assessment:    Therapeutic Interventions      Bed Mobility  Bed Mobility Training Sit-to-Supine: maximum assist (25% patient effort);  2 person assist  Bed Mobility Training Supine-to-Sit: dependent (less than 25% patient effort);  2 person assist  Bed Mobility Training Limitations: decreased ability to use legs for bridging/pushing;  decreased ability to use arms for pushing/pulling;  impaired ability to control trunk for mobility;  decreased strength;  impaired balance;  cognitive, decreased safety awareness    Therapeutic Exercise  Therapeutic Exercise Detail: Seated: Long Arc Quads - 2 sets of 10 reps, BLE, AAROM | Forward Punches - 15 reps, BUE, AAROM | Trunk Leaning - 10 reps, forward/backward, PROM.           PM&R Impression: as above    Disposition Plan Recommendations: subacute rehab placement

## 2018-11-28 NOTE — PROGRESS NOTE ADULT - SUBJECTIVE AND OBJECTIVE BOX
Patient is a 78y old  Female who presents with a chief complaint of Septic shock and acute respiratory failure (28 Nov 2018 14:54)      INTERVAL HPI/OVERNIGHT EVENTS:  ICU Vital Signs Last 24 Hrs  T(C): 36.6 (28 Nov 2018 17:53), Max: 37.6 (27 Nov 2018 23:00)  T(F): 97.8 (28 Nov 2018 17:53), Max: 99.6 (27 Nov 2018 23:00)  HR: 92 (28 Nov 2018 17:25) (78 - 112)  BP: 142/77 (28 Nov 2018 17:25) (117/59 - 181/80)  BP(mean): 104 (28 Nov 2018 17:25) (76 - 124)  ABP: --  ABP(mean): --  RR: 23 (28 Nov 2018 16:00) (14 - 41)  SpO2: 98% (28 Nov 2018 17:25) (87% - 100%)    I&O's Summary    27 Nov 2018 07:01  -  28 Nov 2018 07:00  --------------------------------------------------------  IN: 462 mL / OUT: 1080 mL / NET: -618 mL    28 Nov 2018 07:01  -  28 Nov 2018 18:49  --------------------------------------------------------  IN: 80 mL / OUT: 245 mL / NET: -165 mL          LABS:                        8.3    12.0  )-----------( 286      ( 28 Nov 2018 05:06 )             27.9     11-28    138  |  100  |  35<H>  ----------------------------<  127<H>  4.4   |  26  |  1.10    Ca    10.0      28 Nov 2018 05:06  Phos  3.3     11-28  Mg     1.8     11-28    TPro  7.1  /  Alb  2.8<L>  /  TBili  0.3  /  DBili  x   /  AST  18  /  ALT  9<L>  /  AlkPhos  139<H>  11-27        CAPILLARY BLOOD GLUCOSE      POCT Blood Glucose.: 120 mg/dL (28 Nov 2018 18:33)  POCT Blood Glucose.: 112 mg/dL (28 Nov 2018 11:34)  POCT Blood Glucose.: 130 mg/dL (28 Nov 2018 06:21)  POCT Blood Glucose.: 178 mg/dL (27 Nov 2018 23:12)        RADIOLOGY & ADDITIONAL TESTS:    Consultant(s) Notes Reviewed:  [x ] YES  [ ] NO    MEDICATIONS  (STANDING):  ALBUTerol/ipratropium for Nebulization 3 milliLiter(s) Nebulizer every 6 hours  amLODIPine   Tablet 10 milliGRAM(s) Oral every 24 hours  artificial tears (preservative free) Ophthalmic Solution 1 Drop(s) Both EYES three times a day  dextrose 5%. 1000 milliLiter(s) (50 mL/Hr) IV Continuous <Continuous>  dextrose 50% Injectable 12.5 Gram(s) IV Push once  dextrose 50% Injectable 25 Gram(s) IV Push once  dextrose 50% Injectable 25 Gram(s) IV Push once  glycopyrrolate Injectable 0.2 milliGRAM(s) IV Push every 6 hours  heparin  Injectable 5000 Unit(s) SubCutaneous every 8 hours  insulin lispro (HumaLOG) corrective regimen sliding scale   SubCutaneous every 6 hours  lactobacillus acidophilus 1 Tablet(s) Oral every 8 hours  levothyroxine 150 MICROGram(s) Oral daily  lisinopril 40 milliGRAM(s) Oral daily  metoclopramide Injectable 5 milliGRAM(s) IV Push every 6 hours  pantoprazole   Suspension 40 milliGRAM(s) Enteral Tube daily  polyethylene glycol 3350 17 Gram(s) Oral daily    MEDICATIONS  (PRN):  dextrose 40% Gel 15 Gram(s) Oral once PRN Blood Glucose LESS THAN 70 milliGRAM(s)/deciliter  glucagon  Injectable 1 milliGRAM(s) IntraMuscular once PRN Glucose LESS THAN 70 milligrams/deciliter      PHYSICAL EXAM:  GENERAL: well built, well nourished  HEAD:  Atraumatic, Normocephalic  EYES: EOMI, PERRLA, conjunctiva and sclera clear  ENT: No tonsillar erythema, exudates, or enlargement; Moist mucous membranes, Good dentition, No lesions  NECK: Supple, No JVD, Normal thyroid, no enlarged nodes  NERVOUS SYSTEM:  Alert & Oriented X3, Good concentration; Motor Strength 5/5 B/L upper and lower extremities; DTRs 2+ intact and symmetric, sensory intact  CHEST/LUNG: B/L good air entry; No rales, rhonchi, or wheezing  HEART: S1S2 normal, no S3, Regular rate and rhythm; No murmurs, rubs, or gallops  ABDOMEN: Soft, Nontender, Nondistended; Bowel sounds present  EXTREMITIES:  2+ Peripheral Pulses, No clubbing, cyanosis, or edema  LYMPH: No lymphadenopathy noted  SKIN: No rashes or lesions    Care Discussed with Consultants/Other Providers [ x] YES  [ ] NO INTERVAL HPI:    ICU Vital Signs Last 24 Hrs  T(C): 36.6 (28 Nov 2018 17:53), Max: 37.6 (27 Nov 2018 23:00)  T(F): 97.8 (28 Nov 2018 17:53), Max: 99.6 (27 Nov 2018 23:00)  HR: 92 (28 Nov 2018 17:25) (78 - 112)  BP: 142/77 (28 Nov 2018 17:25) (117/59 - 181/80)  BP(mean): 104 (28 Nov 2018 17:25) (76 - 124)  ABP: --  ABP(mean): --  RR: 23 (28 Nov 2018 16:00) (14 - 41)  SpO2: 98% (28 Nov 2018 17:25) (87% - 100%)    I&O's Summary    27 Nov 2018 07:01  -  28 Nov 2018 07:00  --------------------------------------------------------  IN: 462 mL / OUT: 1080 mL / NET: -618 mL    28 Nov 2018 07:01  -  28 Nov 2018 18:49  --------------------------------------------------------  IN: 80 mL / OUT: 245 mL / NET: -165 mL          LABS:                        8.3    12.0  )-----------( 286      ( 28 Nov 2018 05:06 )             27.9     11-28    138  |  100  |  35<H>  ----------------------------<  127<H>  4.4   |  26  |  1.10    Ca    10.0      28 Nov 2018 05:06  Phos  3.3     11-28  Mg     1.8     11-28    TPro  7.1  /  Alb  2.8<L>  /  TBili  0.3  /  DBili  x   /  AST  18  /  ALT  9<L>  /  AlkPhos  139<H>  11-27        CAPILLARY BLOOD GLUCOSE      POCT Blood Glucose.: 120 mg/dL (28 Nov 2018 18:33)  POCT Blood Glucose.: 112 mg/dL (28 Nov 2018 11:34)  POCT Blood Glucose.: 130 mg/dL (28 Nov 2018 06:21)  POCT Blood Glucose.: 178 mg/dL (27 Nov 2018 23:12)        RADIOLOGY & ADDITIONAL TESTS:    Consultant(s) Notes Reviewed:  [x ] YES  [ ] NO    MEDICATIONS  (STANDING):  ALBUTerol/ipratropium for Nebulization 3 milliLiter(s) Nebulizer every 6 hours  amLODIPine   Tablet 10 milliGRAM(s) Oral every 24 hours  artificial tears (preservative free) Ophthalmic Solution 1 Drop(s) Both EYES three times a day  dextrose 5%. 1000 milliLiter(s) (50 mL/Hr) IV Continuous <Continuous>  dextrose 50% Injectable 12.5 Gram(s) IV Push once  dextrose 50% Injectable 25 Gram(s) IV Push once  dextrose 50% Injectable 25 Gram(s) IV Push once  glycopyrrolate Injectable 0.2 milliGRAM(s) IV Push every 6 hours  heparin  Injectable 5000 Unit(s) SubCutaneous every 8 hours  insulin lispro (HumaLOG) corrective regimen sliding scale   SubCutaneous every 6 hours  lactobacillus acidophilus 1 Tablet(s) Oral every 8 hours  levothyroxine 150 MICROGram(s) Oral daily  lisinopril 40 milliGRAM(s) Oral daily  metoclopramide Injectable 5 milliGRAM(s) IV Push every 6 hours  pantoprazole   Suspension 40 milliGRAM(s) Enteral Tube daily  polyethylene glycol 3350 17 Gram(s) Oral daily    MEDICATIONS  (PRN):  dextrose 40% Gel 15 Gram(s) Oral once PRN Blood Glucose LESS THAN 70 milliGRAM(s)/deciliter  glucagon  Injectable 1 milliGRAM(s) IntraMuscular once PRN Glucose LESS THAN 70 milligrams/deciliter      PHYSICAL EXAM:  GENERAL: well built, well nourished  HEAD:  Atraumatic, Normocephalic  EYES: EOMI, PERRLA, conjunctiva and sclera clear  ENT: No tonsillar erythema, exudates, or enlargement; Moist mucous membranes, Good dentition, No lesions  NECK: Supple, No JVD, Normal thyroid, no enlarged nodes  NERVOUS SYSTEM:  Alert & Oriented X3, Good concentration; Motor Strength 5/5 B/L upper and lower extremities; DTRs 2+ intact and symmetric, sensory intact  CHEST/LUNG: B/L good air entry; No rales, rhonchi, or wheezing  HEART: S1S2 normal, no S3, Regular rate and rhythm; No murmurs, rubs, or gallops  ABDOMEN: Soft, Nontender, Nondistended; Bowel sounds present  EXTREMITIES:  2+ Peripheral Pulses, No clubbing, cyanosis, or edema  LYMPH: No lymphadenopathy noted  SKIN: No rashes or lesions    Care Discussed with Consultants/Other Providers [ x] YES  [ ] NO Transfer Note Acceptance from MICU to [address] [place] [address] course:   78F w/ PMH of morbid obesity, DM, HTN, hypothyroidism, sepsis secondary to ventral hernia infection s/p repair, PE s/p IVC filter, acute blood loss anemia from hemorrhagic uterine fibroids and UGIB, ATN requiring HD, sepsis secondary to MSSA bacteremia and MDR E coli UTI (4-18)  now w/AMS and s/p TAHBSO and small bowel resection s/p PEG. Patient presented from [facility] with concern for sepsis 2/2 aspiration pna and hypoxia. Per EMS, patient was saturating 85% on RA at [facility] with some improvement to 95% after being placed on NRB.  On arrival to the ED, patient was altered and saturating 89%. She was intubated in the ED with suctioning revealing purulent material. She was started on vanc/azithro/cipro (due to prior ESBL that was resistant to zosyn but sn to cipro, cipro course was finished) and that was deescalated to cefazolin once sputum had speciation to staph aureus sensitive to that and completed cefazolin course. She also completed ciprofloxacin for concern of ESBL (that was sn to this). She was successfully extubated, and tolerating nasal cannula. She had diarrhea that was present prior to admission as well (due to hx of SB resection), that improved with probiotics. Had continued secretions and completed cefazolin course. Was also started on glycopyrrolate and octreotide for secretions but now discontinued. She had mucous plugging with white out of right side of lung, this improved with percussion vest. 11/26 patient and son had long discussion with palliative care and it was decided to keep patient full code currently with a MOLST indicating as such. Of note, pt did require levophed followed by midodrine for low BP post-extubation, but now no longer requiring pressor support and home anti-hypertensives restarted. She has had 2 vomiting episodes in the past day, likely in setting of tube feeds, which were held last night and restarted this AM. NS 500cc bolus given 11/28 for decreasing UOP likely due to holding of tube feeds prior. Miralax started 11/28 as no BM in last 1-2 days thru rectal tube (in place due to incontinent dermatitis). Stable for step down to [facility] for further monitoring of respiratory status.    INTERVAL HPI: Pt is examined at bedside upon transfer. She is resting comfortably in bed, in NAD. Patient denies SOB, chest pain, abdominal pain. She is responsive to simple commands. ROS limited as patient answers questions inconsistently.     ICU Vital Signs Last 24 Hrs  T(C): 36.6 (28 Nov 2018 17:53), Max: 37.6 (27 Nov 2018 23:00)  T(F): 97.8 (28 Nov 2018 17:53), Max: 99.6 (27 Nov 2018 23:00)  HR: 92 (28 Nov 2018 17:25) (78 - 112)  BP: 142/77 (28 Nov 2018 17:25) (117/59 - 181/80)  BP(mean): 104 (28 Nov 2018 17:25) (76 - 124)  ABP: --  ABP(mean): --  RR: 23 (28 Nov 2018 16:00) (14 - 41)  SpO2: 98% (28 Nov 2018 17:25) (87% - 100%)    I&O's Summary    27 Nov 2018 07:01  -  28 Nov 2018 07:00  --------------------------------------------------------  IN: 462 mL / OUT: 1080 mL / NET: -618 mL    28 Nov 2018 07:01  -  28 Nov 2018 18:49  --------------------------------------------------------  IN: 80 mL / OUT: 245 mL / NET: -165 mL          LABS:                        8.3    12.0  )-----------( 286      ( 28 Nov 2018 05:06 )             27.9     11-28    138  |  100  |  35<H>  ----------------------------<  127<H>  4.4   |  26  |  1.10    Ca    10.0      28 Nov 2018 05:06  Phos  3.3     11-28  Mg     1.8     11-28    TPro  7.1  /  Alb  2.8<L>  /  TBili  0.3  /  DBili  x   /  AST  18  /  ALT  9<L>  /  AlkPhos  139<H>  11-27        CAPILLARY BLOOD GLUCOSE      POCT Blood Glucose.: 120 mg/dL (28 Nov 2018 18:33)  POCT Blood Glucose.: 112 mg/dL (28 Nov 2018 11:34)  POCT Blood Glucose.: 130 mg/dL (28 Nov 2018 06:21)  POCT Blood Glucose.: 178 mg/dL (27 Nov 2018 23:12)        RADIOLOGY & ADDITIONAL TESTS:    Consultant(s) Notes Reviewed:  [x ] YES  [ ] NO    MEDICATIONS  (STANDING):  ALBUTerol/ipratropium for Nebulization 3 milliLiter(s) Nebulizer every 6 hours  amLODIPine   Tablet 10 milliGRAM(s) Oral every 24 hours  artificial tears (preservative free) Ophthalmic Solution 1 Drop(s) Both EYES three times a day  dextrose 5%. 1000 milliLiter(s) (50 mL/Hr) IV Continuous <Continuous>  dextrose 50% Injectable 12.5 Gram(s) IV Push once  dextrose 50% Injectable 25 Gram(s) IV Push once  dextrose 50% Injectable 25 Gram(s) IV Push once  glycopyrrolate Injectable 0.2 milliGRAM(s) IV Push every 6 hours  heparin  Injectable 5000 Unit(s) SubCutaneous every 8 hours  insulin lispro (HumaLOG) corrective regimen sliding scale   SubCutaneous every 6 hours  lactobacillus acidophilus 1 Tablet(s) Oral every 8 hours  levothyroxine 150 MICROGram(s) Oral daily  lisinopril 40 milliGRAM(s) Oral daily  metoclopramide Injectable 5 milliGRAM(s) IV Push every 6 hours  pantoprazole   Suspension 40 milliGRAM(s) Enteral Tube daily  polyethylene glycol 3350 17 Gram(s) Oral daily    MEDICATIONS  (PRN):  dextrose 40% Gel 15 Gram(s) Oral once PRN Blood Glucose LESS THAN 70 milliGRAM(s)/deciliter  glucagon  Injectable 1 milliGRAM(s) IntraMuscular once PRN Glucose LESS THAN 70 milligrams/deciliter      PHYSICAL EXAM:  GENERAL: NAD, resting comfortably in bed.   HEAD:  Atraumatic, Normocephalic  EYES: EOMI, PERRLA, conjunctiva and sclera clear  ENT: No tonsillar erythema, exudates, or enlargement; Moist mucous membranes, Good dentition, No lesions  NECK: Supple, No JVD, Normal thyroid, no enlarged nodes  NERVOUS SYSTEM:  Alert & Orientedx2, responsive to simple commands. Answering questions but inconsistently muscle strength 5/5 b/l.   CHEST/LUNG: B/L good air entry; No rales, rhonchi, or wheezing  HEART: S1S2 normal, no S3, Regular rate and rhythm; No murmurs, rubs, or gallops  ABDOMEN: Soft, Nontender, Nondistended; Bowel sounds present  EXTREMITIES:  2+ Peripheral Pulses, No clubbing, cyanosis, or edema  LYMPH: No lymphadenopathy noted  SKIN: rash near buttocks region and low back due to stool, no erythema, discharge or purulence    Care Discussed with Consultants/Other Providers [ x] YES  [ ] NO Transfer Note Acceptance from MICU to [address] [place] [address] course:   78F w/ PMH of morbid obesity, DM, HTN, hypothyroidism, sepsis secondary to ventral hernia infection s/p repair, PE s/p IVC filter, acute blood loss anemia from hemorrhagic uterine fibroids and UGIB, ATN requiring HD, sepsis secondary to MSSA bacteremia and MDR E coli UTI (4-18)  now w/AMS and s/p TAHBSO and small bowel resection s/p PEG. Patient presented from [facility] with concern for sepsis 2/2 aspiration pna and hypoxia. Per EMS, patient was saturating 85% on RA at [facility] with some improvement to 95% after being placed on NRB.  On arrival to the ED, patient was altered and saturating 89%. She was intubated in the ED with suctioning revealing purulent material. She was started on vanc/azithro/cipro (due to prior ESBL that was resistant to zosyn but sn to cipro, cipro course was finished) and that was deescalated to cefazolin once sputum had speciation to staph aureus sensitive to that and completed cefazolin course. She also completed ciprofloxacin for concern of ESBL (that was sn to this). She was successfully extubated, and tolerating nasal cannula. She had diarrhea that was present prior to admission as well (due to hx of SB resection), that improved with probiotics. Had continued secretions and completed cefazolin course. Was also started on glycopyrrolate and octreotide for secretions but now discontinued. She had mucous plugging with white out of right side of lung, this improved with percussion vest. 11/26 patient and son had long discussion with palliative care and it was decided to keep patient full code currently with a MOLST indicating as such. Of note, pt did require levophed followed by midodrine for low BP post-extubation, but now no longer requiring pressor support and home anti-hypertensives restarted. She has had 2 vomiting episodes in the past day, likely in setting of tube feeds, which were held last night and restarted this AM. NS 500cc bolus given 11/28 for decreasing UOP likely due to holding of tube feeds prior. Miralax started 11/28 as no BM in last 1-2 days thru rectal tube (in place due to incontinent dermatitis). Stable for step down to [facility] for further monitoring of respiratory status.    Of note, recent hospital admissions;  3/2 underwent ventral hernia repair with mesh, robotic converted to open, discharged on 3/4  3/12 admitted for PE, started on heparin gtt, switched to NOAC, discharged on 3/16  3/23 admitted for acute blood loss anemia from vaginal and UGI bleed, EGD with ulceration; c/b GONZALEZ requiring temporary HD; IVC filter placement on 3/30; discharged 4/11  4/15 admitted to OSH for sepsis 2/2 MSSA bacteremia and MDR E Coli in urine, transferred to [facility], CT with extravasation, IR drain placement, underwent DAVY BSO 2/2 tubal metaplasia, SBR with primary anastomosis, abdominal washout, PEG placement, discharged on 6/4    INTERVAL HPI: Pt is examined at bedside upon transfer. She is resting comfortably in bed, in NAD. Patient denies SOB, chest pain, abdominal pain. She is responsive to simple commands. ROS limited as patient answers questions inconsistently.     ICU Vital Signs Last 24 Hrs  T(C): 36.6 (28 Nov 2018 17:53), Max: 37.6 (27 Nov 2018 23:00)  T(F): 97.8 (28 Nov 2018 17:53), Max: 99.6 (27 Nov 2018 23:00)  HR: 92 (28 Nov 2018 17:25) (78 - 112)  BP: 142/77 (28 Nov 2018 17:25) (117/59 - 181/80)  BP(mean): 104 (28 Nov 2018 17:25) (76 - 124)  ABP: --  ABP(mean): --  RR: 23 (28 Nov 2018 16:00) (14 - 41)  SpO2: 98% (28 Nov 2018 17:25) (87% - 100%)    I&O's Summary    27 Nov 2018 07:01  -  28 Nov 2018 07:00  --------------------------------------------------------  IN: 462 mL / OUT: 1080 mL / NET: -618 mL    28 Nov 2018 07:01  -  28 Nov 2018 18:49  --------------------------------------------------------  IN: 80 mL / OUT: 245 mL / NET: -165 mL          LABS:                        8.3    12.0  )-----------( 286      ( 28 Nov 2018 05:06 )             27.9     11-28    138  |  100  |  35<H>  ----------------------------<  127<H>  4.4   |  26  |  1.10    Ca    10.0      28 Nov 2018 05:06  Phos  3.3     11-28  Mg     1.8     11-28    TPro  7.1  /  Alb  2.8<L>  /  TBili  0.3  /  DBili  x   /  AST  18  /  ALT  9<L>  /  AlkPhos  139<H>  11-27        CAPILLARY BLOOD GLUCOSE      POCT Blood Glucose.: 120 mg/dL (28 Nov 2018 18:33)  POCT Blood Glucose.: 112 mg/dL (28 Nov 2018 11:34)  POCT Blood Glucose.: 130 mg/dL (28 Nov 2018 06:21)  POCT Blood Glucose.: 178 mg/dL (27 Nov 2018 23:12)        RADIOLOGY & ADDITIONAL TESTS:    Consultant(s) Notes Reviewed:  [x ] YES  [ ] NO    MEDICATIONS  (STANDING):  ALBUTerol/ipratropium for Nebulization 3 milliLiter(s) Nebulizer every 6 hours  amLODIPine   Tablet 10 milliGRAM(s) Oral every 24 hours  artificial tears (preservative free) Ophthalmic Solution 1 Drop(s) Both EYES three times a day  dextrose 5%. 1000 milliLiter(s) (50 mL/Hr) IV Continuous <Continuous>  dextrose 50% Injectable 12.5 Gram(s) IV Push once  dextrose 50% Injectable 25 Gram(s) IV Push once  dextrose 50% Injectable 25 Gram(s) IV Push once  glycopyrrolate Injectable 0.2 milliGRAM(s) IV Push every 6 hours  heparin  Injectable 5000 Unit(s) SubCutaneous every 8 hours  insulin lispro (HumaLOG) corrective regimen sliding scale   SubCutaneous every 6 hours  lactobacillus acidophilus 1 Tablet(s) Oral every 8 hours  levothyroxine 150 MICROGram(s) Oral daily  lisinopril 40 milliGRAM(s) Oral daily  metoclopramide Injectable 5 milliGRAM(s) IV Push every 6 hours  pantoprazole   Suspension 40 milliGRAM(s) Enteral Tube daily  polyethylene glycol 3350 17 Gram(s) Oral daily    MEDICATIONS  (PRN):  dextrose 40% Gel 15 Gram(s) Oral once PRN Blood Glucose LESS THAN 70 milliGRAM(s)/deciliter  glucagon  Injectable 1 milliGRAM(s) IntraMuscular once PRN Glucose LESS THAN 70 milligrams/deciliter      PHYSICAL EXAM:  GENERAL: NAD, resting comfortably in bed.   HEAD:  Atraumatic, Normocephalic  EYES: EOMI, PERRLA, conjunctiva and sclera clear  ENT: No tonsillar erythema, exudates, or enlargement; Moist mucous membranes, Good dentition, No lesions  NECK: Supple, No JVD, Normal thyroid, no enlarged nodes  NERVOUS SYSTEM:  Alert & Orientedx2, responsive to simple commands. Answering questions but inconsistently muscle strength 5/5 b/l.   CHEST/LUNG: B/L good air entry; No rales, rhonchi, or wheezing  HEART: S1S2 normal, no S3, Regular rate and rhythm; No murmurs, rubs, or gallops  ABDOMEN: Soft, Nontender, Nondistended; Bowel sounds present  EXTREMITIES:  2+ Peripheral Pulses, No clubbing, cyanosis, or edema  LYMPH: No lymphadenopathy noted  SKIN: rash near buttocks region and low back due to stool, no erythema, discharge or purulence    Care Discussed with Consultants/Other Providers [ x] YES  [ ] NO

## 2018-11-28 NOTE — PROGRESS NOTE ADULT - PROBLEM SELECTOR PLAN 3
Toxic metabolic encephalopathy 2/2 septic shock  RESOLVED  - mental status consistent with patient's baseline dementia

## 2018-11-28 NOTE — PROGRESS NOTE ADULT - ASSESSMENT
78F w/ PMH obesity, DM, HTN, hypothyroidism, sepsis secondary to ventral hernia infection s/p repair, PE s/p IVC filter, acute blood loss anemia from hemorrhagic uterine fibroids and UGIB, ATN requiring HD, sepsis secondary to MSSA bacteremia and MDR E coli UTI w/AMS and s/p TAHBSO and small bowel resection s/p PEG. Presenting with septic shock likely 2/2 PNA vs UTI and acute hypoxic respiratory failure now extubated.    Neuro  #Toxic metabolic encephalopathy 2/2 septic shock  RESOLVED  - mental status consistent with patient's baseline dementia    Respiratory  #HAP with sputum culture with MSSA sensitive to cefazolin.   -percussion vest for mucous/ glycopyrrolate 0.2 Q6 IV  - octreotide for secretions 100 TID  - abx as below  -mucous plugging- had percussion vest and glycopyrrolate that is helping.     CV  #HTN  -BP 150s/80s  -continue home lisinopril 40 mg daily  -amlodipine 10 mg daily    GI  - PEG in site        Renal  - Cr at baseline 1.10 as of 11/25    ID  #Sepsis 2/2 HAP and ESBL UTI  resolved  -sputum cx staph aureus sn to cefazolin  -completed course of cefazolin for MSSA and ciprofloxacin for UTI (ESBL sn to this).     Endo  #T2DM  -ISS  -FSBS      #hypothyroidism  -Continue synthroid currently 150 mcg per day oral.     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

## 2018-11-28 NOTE — PROGRESS NOTE ADULT - PROBLEM SELECTOR PLAN 9
Dvt ppx: hep sq  GI ppx: protonix  Code Status: Full code, will have palliative have discussions for long term care of patient due to chronic aspiration pna  Dispo: [facility]

## 2018-11-28 NOTE — PROGRESS NOTE ADULT - PROBLEM SELECTOR PLAN 2
HAP with sputum culture with MSSA sensitive to cefazolin.   -percussion vest for mucous/ glycopyrrolate 0.2 Q6 IV  - octreotide for secretions 100 TID  - abx as below  -mucous plugging- had percussion vest and glycopyrrolate that is helping. HAP with sputum culture with MSSA sensitive to cefazolin.   - see above

## 2018-11-28 NOTE — PROGRESS NOTE ADULT - PROBLEM SELECTOR PLAN 4
-BP 150s/80s  -continue home lisinopril 40 mg daily  -amlodipine 10 mg daily Pt recently admitted 4/15/18 due to MSSA bacteremia and MDR E Coli in urine, transferred to [facility], and found to have extravasation on CT abd, s/p IR drain placement, underwent DAVY BSO 2/2 tubal metaplasia, SBR with primary anastomosis, abdominal washout, PEG placement, discharged on 6/4  - surgery on board  - PEG in place  - c/w jevity feeds.

## 2018-11-28 NOTE — PROGRESS NOTE ADULT - PROBLEM SELECTOR PLAN 1
Sepsis 2/2 HAP and ESBL UTI  resolved  -sputum cx staph aureus sn to cefazolin  -completed course of cefazolin for MSSA and ciprofloxacin for UTI (ESBL sn to this). Pt diagnosed with sepsis 2/2 HAP and ESBL UTIon admission, hypotensive in MICU requiring levophed and midodrine.   - resolved  - sputum cx staph aureus sn to cefazolin  - completed course of cefazolin for MSSA and ciprofloxacin for UTI (ESBL sn to this).    #Muccus Plugging: Pt with episode of hypoxia in MICU, found to have complete white out of right lung 2/2 to mucus plugining.   - c/w robinol as needed   - s/p percussion vest  - continue to monitor Pt diagnosed with sepsis 2/2 HAP and ESBL UTIon admission, hypotensive in MICU requiring levophed and midodrine.   - resolved  - sputum cx staph aureus sn to cefazolin  - completed course of cefazolin for MSSA and ciprofloxacin for UTI (ESBL sn to this).    #Muccus Plugging: Pt with episode of hypoxia in MICU, found to have complete white out of right lung 2/2 to mucus plugining.   - c/w robinol as needed   - s/p percussion vest  - continue to monitor    #incontinent dermatitis of b/l buttocks  -daily wound care  -rectal tube in place    #Anemia of chronic disease  -no signs of active bleeding  -active type and screen

## 2018-11-28 NOTE — PROGRESS NOTE ADULT - ASSESSMENT
78F PMHx MO, DM, HTN, hypothyroidism, PE s/p IVC filter, recent admissions for sepsis and acute blood loss anemia s/p SBR with primary anastomosis and PEG, p/w septic shock likely 2/2 PNA  and acute hypoxic respiratory failure. Improving off abx, pending SDU    no acute surgical intervention, unlikely surgical pathology  care per primary team

## 2018-11-28 NOTE — PROGRESS NOTE ADULT - SUBJECTIVE AND OBJECTIVE BOX
INTERVAL HPI/OVERNIGHT EVENTS:    OVERNIGHT: No overnight events.  SUBJECTIVE: Patient seen and examined at bedside.     ROS:  CV: Denies chest pain  Resp: Denies SOB  GI: Denies abdominal pain, constipation, diarrhea, nausea, vomiting  : Denies dysuria  ID: Denies fevers, chills  MSK: Denies joint pain     OBJECTIVE:    VITAL SIGNS:  ICU Vital Signs Last 24 Hrs  T(C): 37.3 (28 Nov 2018 14:04), Max: 37.9 (27 Nov 2018 17:32)  T(F): 99.2 (28 Nov 2018 14:04), Max: 100.3 (27 Nov 2018 17:32)  HR: 104 (28 Nov 2018 14:00) (78 - 114)  BP: 145/93 (28 Nov 2018 13:58) (117/59 - 181/80)  BP(mean): 116 (28 Nov 2018 13:58) (76 - 133)  ABP: --  ABP(mean): --  RR: 21 (28 Nov 2018 14:00) (14 - 41)  SpO2: 95% (28 Nov 2018 14:00) (87% - 100%)        11-27 @ 07:01 - 11-28 @ 07:00  --------------------------------------------------------  IN: 462 mL / OUT: 1080 mL / NET: -618 mL    11-28 @ 07:01 - 11-28 @ 14:54  --------------------------------------------------------  IN: 80 mL / OUT: 245 mL / NET: -165 mL      CAPILLARY BLOOD GLUCOSE      POCT Blood Glucose.: 112 mg/dL (28 Nov 2018 11:34)      PHYSICAL EXAM:    General: NAD, comfortable  HEENT: NCAT, PERRL, clear conjunctiva, no scleral icterus  Neck: supple, no JVD  Respiratory: CTA b/l, no wheezing, rhonchi, rales  Cardiovascular: RRR, normal S1S2, no M/R/G  Abdomen: soft, NT/ND, bowel sounds in all four quadrants, no palpable masses  Extremities: WWP, no clubbing, cyanosis, or edema  Neuro:     MEDICATIONS:  MEDICATIONS  (STANDING):  ALBUTerol/ipratropium for Nebulization 3 milliLiter(s) Nebulizer every 6 hours  amLODIPine   Tablet 10 milliGRAM(s) Oral every 24 hours  artificial tears (preservative free) Ophthalmic Solution 1 Drop(s) Both EYES three times a day  dextrose 5%. 1000 milliLiter(s) (50 mL/Hr) IV Continuous <Continuous>  dextrose 50% Injectable 12.5 Gram(s) IV Push once  dextrose 50% Injectable 25 Gram(s) IV Push once  dextrose 50% Injectable 25 Gram(s) IV Push once  glycopyrrolate Injectable 0.2 milliGRAM(s) IV Push every 6 hours  heparin  Injectable 5000 Unit(s) SubCutaneous every 8 hours  insulin lispro (HumaLOG) corrective regimen sliding scale   SubCutaneous every 6 hours  lactobacillus acidophilus 1 Tablet(s) Oral every 8 hours  levothyroxine 150 MICROGram(s) Oral daily  lisinopril 40 milliGRAM(s) Oral daily  metoclopramide Injectable 5 milliGRAM(s) IV Push every 6 hours  pantoprazole   Suspension 40 milliGRAM(s) Enteral Tube daily  polyethylene glycol 3350 17 Gram(s) Oral daily  sodium chloride 0.9% Bolus 500 milliLiter(s) IV Bolus once    MEDICATIONS  (PRN):  dextrose 40% Gel 15 Gram(s) Oral once PRN Blood Glucose LESS THAN 70 milliGRAM(s)/deciliter  glucagon  Injectable 1 milliGRAM(s) IntraMuscular once PRN Glucose LESS THAN 70 milligrams/deciliter      ALLERGIES:  Allergies    No Known Allergies    Intolerances        LABS:                        8.3    12.0  )-----------( 286      ( 28 Nov 2018 05:06 )             27.9     11-28    138  |  100  |  35<H>  ----------------------------<  127<H>  4.4   |  26  |  1.10    Ca    10.0      28 Nov 2018 05:06  Phos  3.3     11-28  Mg     1.8     11-28    TPro  7.1  /  Alb  2.8<L>  /  TBili  0.3  /  DBili  x   /  AST  18  /  ALT  9<L>  /  AlkPhos  139<H>  11-27          RADIOLOGY & ADDITIONAL TESTS: Reviewed. PGY-1 Transfer Note from [facility] to [facility] course:   78F w/ PMH of morbid obesity, DM, HTN, hypothyroidism, sepsis secondary to ventral hernia infection s/p repair, PE s/p IVC filter, acute blood loss anemia from hemorrhagic uterine fibroids and UGIB, ATN requiring HD, sepsis secondary to MSSA bacteremia and MDR E coli UTI (4-18)  now w/AMS and s/p TAHBSO and small bowel resection s/p PEG. Patient presented from [facility] with concern for sepsis 2/2 aspiration pna and hypoxia. Per EMS, patient was saturating 85% on RA at [facility] with some improvement to 95% after being placed on NRB.  On arrival to the ED, patient was altered and saturating 89%. She was intubated in the ED with suctioning revealing purulent material. She was started on vanc/azithro/cipro (due to prior ESBL that was resistant to zosyn but sn to cipro, cipro course was finished) and that was deescalated to cefazolin once sputum had speciation to staph aureus sensitive to that and completed cefazolin course. She also completed ciprofloxacin for concern of ESBL (that was sn to this). She was successfully extubated, and tolerating nasal cannula. She had diarrhea that was present prior to admission as well (due to hx of SB resection), that improved with probiotics. Had continued secretions and completed cefazolin course. Was also started on glycopyrrolate. She had mucous plugging with white out of right side of lung, this improved with percussion vest and glycopyrrolate 11/27. 11/26 patient and son had long discussion with palliative care and it was decided to keep patient full code currently. After extubation patient also was started on midodrine for blood pressure support and to take of levophed. She was able to come off of levophed. Patient had bradycardia to mid 30s, and blood pressure to 170s, and patient's midodrine was discontinued and heart rate improved to the 60s. She became hypertensive to the 170s again, and home lisinopril 40 mg and amlodipine 10 mg daily was started.       INTERVAL HPI/OVERNIGHT EVENTS: She had one episode of vomiting overnight. Tube feeds were paused at that time. No other events overnight.   OVERNIGHT: No overnight events.  SUBJECTIVE: Patient seen and examined at bedside. Patient sleeping but arousable on exam. ROS limited     ROS:  CV: Denies chest pain  Resp: Denies SOB  GI: Denies abdominal pain, constipation, diarrhea, nausea, vomiting  : Denies dysuria  ID: Denies fevers, chills  MSK: Denies joint pain     OBJECTIVE:    VITAL SIGNS:  ICU Vital Signs Last 24 Hrs  T(C): 37.3 (28 Nov 2018 14:04), Max: 37.9 (27 Nov 2018 17:32)  T(F): 99.2 (28 Nov 2018 14:04), Max: 100.3 (27 Nov 2018 17:32)  HR: 104 (28 Nov 2018 14:00) (78 - 114)  BP: 145/93 (28 Nov 2018 13:58) (117/59 - 181/80)  BP(mean): 116 (28 Nov 2018 13:58) (76 - 133)  ABP: --  ABP(mean): --  RR: 21 (28 Nov 2018 14:00) (14 - 41)  SpO2: 95% (28 Nov 2018 14:00) (87% - 100%)        11-27 @ 07:01 - 11-28 @ 07:00  --------------------------------------------------------  IN: 462 mL / OUT: 1080 mL / NET: -618 mL    11-28 @ 07:01 - 11-28 @ 14:54  --------------------------------------------------------  IN: 80 mL / OUT: 245 mL / NET: -165 mL      CAPILLARY BLOOD GLUCOSE      POCT Blood Glucose.: 112 mg/dL (28 Nov 2018 11:34)      PHYSICAL EXAM:    General: NAD, comfortable, obese white female, coughs intermittently   HEENT: NCAT, PERRL, clear conjunctiva, no scleral icterus, moist mucus membranes   Neck: supple, no JVD  Respiratory: Rhonchi bilaterally throughout, more prominent in the right lung field   Cardiovascular: RRR, normal S1,S2, no M/R/G  Abdomen: soft, NT/ND, bowel sounds in all four quadrants, no palpable masses  Extremities: WWP, no clubbing, cyanosis, or edema  Neuro: follows simple commands, answers questions but inconsistently   Skin:  rash near buttocks region and low back due to stool, no erythema, discharge or purulence    MEDICATIONS:  MEDICATIONS  (STANDING):  ALBUTerol/ipratropium for Nebulization 3 milliLiter(s) Nebulizer every 6 hours  amLODIPine   Tablet 10 milliGRAM(s) Oral every 24 hours  artificial tears (preservative free) Ophthalmic Solution 1 Drop(s) Both EYES three times a day  dextrose 5%. 1000 milliLiter(s) (50 mL/Hr) IV Continuous <Continuous>  dextrose 50% Injectable 12.5 Gram(s) IV Push once  dextrose 50% Injectable 25 Gram(s) IV Push once  dextrose 50% Injectable 25 Gram(s) IV Push once  glycopyrrolate Injectable 0.2 milliGRAM(s) IV Push every 6 hours  heparin  Injectable 5000 Unit(s) SubCutaneous every 8 hours  insulin lispro (HumaLOG) corrective regimen sliding scale   SubCutaneous every 6 hours  lactobacillus acidophilus 1 Tablet(s) Oral every 8 hours  levothyroxine 150 MICROGram(s) Oral daily  lisinopril 40 milliGRAM(s) Oral daily  metoclopramide Injectable 5 milliGRAM(s) IV Push every 6 hours  pantoprazole   Suspension 40 milliGRAM(s) Enteral Tube daily  polyethylene glycol 3350 17 Gram(s) Oral daily  sodium chloride 0.9% Bolus 500 milliLiter(s) IV Bolus once    MEDICATIONS  (PRN):  dextrose 40% Gel 15 Gram(s) Oral once PRN Blood Glucose LESS THAN 70 milliGRAM(s)/deciliter  glucagon  Injectable 1 milliGRAM(s) IntraMuscular once PRN Glucose LESS THAN 70 milligrams/deciliter      ALLERGIES:  Allergies    No Known Allergies    Intolerances        LABS:                        8.3    12.0  )-----------( 286      ( 28 Nov 2018 05:06 )             27.9     11-28    138  |  100  |  35<H>  ----------------------------<  127<H>  4.4   |  26  |  1.10    Ca    10.0      28 Nov 2018 05:06  Phos  3.3     11-28  Mg     1.8     11-28    TPro  7.1  /  Alb  2.8<L>  /  TBili  0.3  /  DBili  x   /  AST  18  /  ALT  9<L>  /  AlkPhos  139<H>  11-27          RADIOLOGY & ADDITIONAL TESTS: Reviewed. PGY-1 Transfer Note from [facility] to [facility]: 78F w/ PMH of morbid obesity, DM, HTN, hypothyroidism, sepsis secondary to ventral hernia infection s/p repair, PE s/p IVC filter, acute blood loss anemia from hemorrhagic uterine fibroids and UGIB, ATN requiring HD, sepsis secondary to MSSA bacteremia and MDR E coli UTI (4-18)  now w/AMS and s/p TAHBSO and small bowel resection s/p PEG. Patient presented from [facility] with concern for sepsis 2/2 aspiration pna and hypoxia. Per EMS, patient was saturating 85% on RA at [facility] with some improvement to 95% after being placed on NRB.  On arrival to the ED, patient was altered and saturating 89%. She was intubated in the ED with suctioning revealing purulent material. She was started on vanc/azithro/cipro (due to prior ESBL that was resistant to zosyn but sn to cipro, cipro course was finished) and that was deescalated to cefazolin once sputum had speciation to staph aureus sensitive to that and completed cefazolin course. She also completed ciprofloxacin for concern of ESBL (that was sn to this). She was successfully extubated, and tolerating nasal cannula. She had diarrhea that was present prior to admission as well (due to hx of SB resection), that improved with probiotics. Had continued secretions and completed cefazolin course. Was also started on glycopyrrolate and octreotide for secretions but now discontinued. She had mucous plugging with white out of right side of lung, this improved with percussion vest. 11/26 patient and son had long discussion with palliative care and it was decided to keep patient full code currently with a MOLST indicating as such. Of note, pt did require levophed followed by midodrine for low BP post-extubation, but now no longer requiring pressor support and home anti-hypertensives restarted. She has had 2 vomiting episodes in the past day, likely in setting of tube feeds, which were held last night and restarted this AM. NS 500cc bolus given 11/28 for decreasing UOP likely due to holding of tube feeds prior. Miralax started 11/28 as no BM in last 1-2 days thru rectal tube (in place due to incontinent dermatitis). Stable for step down to [facility] for further monitoring of respiratory status.    INTERVAL HPI/OVERNIGHT EVENTS: She had one episode of vomiting overnight. Tube feeds were paused at that time. No other events overnight.   OVERNIGHT: No overnight events.  SUBJECTIVE: Patient seen and examined at bedside. Patient sleeping but arousable on exam. ROS limited     ROS:  CV: Denies chest pain  Resp: Denies SOB  GI: Denies abdominal pain, constipation, diarrhea, nausea, vomiting  : Denies dysuria  ID: Denies fevers, chills  MSK: Denies joint pain     OBJECTIVE:    VITAL SIGNS:  ICU Vital Signs Last 24 Hrs  T(C): 37.3 (28 Nov 2018 14:04), Max: 37.9 (27 Nov 2018 17:32)  T(F): 99.2 (28 Nov 2018 14:04), Max: 100.3 (27 Nov 2018 17:32)  HR: 104 (28 Nov 2018 14:00) (78 - 114)  BP: 145/93 (28 Nov 2018 13:58) (117/59 - 181/80)  BP(mean): 116 (28 Nov 2018 13:58) (76 - 133)  ABP: --  ABP(mean): --  RR: 21 (28 Nov 2018 14:00) (14 - 41)  SpO2: 95% (28 Nov 2018 14:00) (87% - 100%)        11-27 @ 07:01 - 11-28 @ 07:00  --------------------------------------------------------  IN: 462 mL / OUT: 1080 mL / NET: -618 mL    11-28 @ 07:01 - 11-28 @ 14:54  --------------------------------------------------------  IN: 80 mL / OUT: 245 mL / NET: -165 mL      CAPILLARY BLOOD GLUCOSE      POCT Blood Glucose.: 112 mg/dL (28 Nov 2018 11:34)      PHYSICAL EXAM:    General: NAD, comfortable, obese white female, coughs intermittently   HEENT: NCAT, PERRL, clear conjunctiva, no scleral icterus, moist mucus membranes   Neck: supple, no JVD  Respiratory: Rhonchi bilaterally throughout, more prominent in the right lung field   Cardiovascular: RRR, normal S1,S2, no M/R/G  Abdomen: soft, NT/ND, bowel sounds in all four quadrants, no palpable masses  Extremities: WWP, no clubbing, cyanosis, or edema  Neuro: follows simple commands, answers questions but inconsistently   Skin:  rash near buttocks region and low back due to stool, no erythema, discharge or purulence    MEDICATIONS:  MEDICATIONS  (STANDING):  ALBUTerol/ipratropium for Nebulization 3 milliLiter(s) Nebulizer every 6 hours  amLODIPine   Tablet 10 milliGRAM(s) Oral every 24 hours  artificial tears (preservative free) Ophthalmic Solution 1 Drop(s) Both EYES three times a day  dextrose 5%. 1000 milliLiter(s) (50 mL/Hr) IV Continuous <Continuous>  dextrose 50% Injectable 12.5 Gram(s) IV Push once  dextrose 50% Injectable 25 Gram(s) IV Push once  dextrose 50% Injectable 25 Gram(s) IV Push once  glycopyrrolate Injectable 0.2 milliGRAM(s) IV Push every 6 hours  heparin  Injectable 5000 Unit(s) SubCutaneous every 8 hours  insulin lispro (HumaLOG) corrective regimen sliding scale   SubCutaneous every 6 hours  lactobacillus acidophilus 1 Tablet(s) Oral every 8 hours  levothyroxine 150 MICROGram(s) Oral daily  lisinopril 40 milliGRAM(s) Oral daily  metoclopramide Injectable 5 milliGRAM(s) IV Push every 6 hours  pantoprazole   Suspension 40 milliGRAM(s) Enteral Tube daily  polyethylene glycol 3350 17 Gram(s) Oral daily  sodium chloride 0.9% Bolus 500 milliLiter(s) IV Bolus once    MEDICATIONS  (PRN):  dextrose 40% Gel 15 Gram(s) Oral once PRN Blood Glucose LESS THAN 70 milliGRAM(s)/deciliter  glucagon  Injectable 1 milliGRAM(s) IntraMuscular once PRN Glucose LESS THAN 70 milligrams/deciliter      ALLERGIES:  Allergies    No Known Allergies    Intolerances        LABS:                        8.3    12.0  )-----------( 286      ( 28 Nov 2018 05:06 )             27.9     11-28    138  |  100  |  35<H>  ----------------------------<  127<H>  4.4   |  26  |  1.10    Ca    10.0      28 Nov 2018 05:06  Phos  3.3     11-28  Mg     1.8     11-28    TPro  7.1  /  Alb  2.8<L>  /  TBili  0.3  /  DBili  x   /  AST  18  /  ALT  9<L>  /  AlkPhos  139<H>  11-27          RADIOLOGY & ADDITIONAL TESTS: Reviewed.

## 2018-11-28 NOTE — PROGRESS NOTE ADULT - ASSESSMENT
78F w/ PMH obesity, DM, HTN, hypothyroidism, sepsis secondary to ventral hernia infection s/p repair, PE s/p IVC filter, acute blood loss anemia from hemorrhagic uterine fibroids and UGIB, ATN requiring HD, sepsis secondary to MSSA bacteremia and MDR E coli UTI w/AMS and s/p TAHBSO and small bowel resection s/p PEG. Presenting with septic shock likely 2/2 PNA vs UTI and acute hypoxic respiratory failure now extubated.    Neuro  #Toxic metabolic encephalopathy 2/2 septic shock  RESOLVED  - mental status consistent with patient's baseline dementia    Respiratory  #HAP with sputum culture with MSSA sensitive to cefazolin.   -percussion vest for mucous/ glycopyrrolate 0.2 Q6 IV  - octreotide for secretions 100 TID  - abx as below  -mucous plugging- had percussion vest and glycopyrrolate that is helping.     CV  #HTN  -BP 150s/80s  -continue home lisinopril 40 mg daily  -amlodipine 10 mg daily    GI  - PEG in site        Renal  - Cr at baseline 1.10 as of 11/25    ID  #Sepsis 2/2 HAP and ESBL UTI  resolved  -sputum cx staph aureus sn to cefazolin  -completed course of cefazolin for MSSA and ciprofloxacin for UTI (ESBL sn to this).     Endo  #T2DM  -ISS  -FSBS      #hypothyroidism  -Continue synthroid currently 150 mcg per day oral.     Derm  #incontinent dermatitis of b/l buttocks  -daily wound care  -rectal tube in place    Heme:  # Anemia of chronic disease  -no signs of active bleeding  -active type and screen    GI  # Chronic diarrhea 2/2 small bowel resection  -probiotics daily      FEN  F: none  E: K>4 Mag>2 caution given recent GONZALEZ  N: glucerna 1.5 via PEG    Dvt ppx: hep sq  GI ppx: protonix    Code Status: Full code, will have palliative have discussions for long term care of patient due to chronic aspiration pna    Dispo: [facility] 78F w/ PMH obesity, DM, HTN, hypothyroidism, sepsis secondary to ventral hernia infection s/p repair, PE s/p IVC filter, acute blood loss anemia from hemorrhagic uterine fibroids and UGIB, ATN requiring HD, sepsis secondary to MSSA bacteremia and MDR E coli UTI w/AMS and s/p TAHBSO and small bowel resection s/p PEG. Presenting with septic shock likely 2/2 PNA vs UTI and acute hypoxic respiratory failure now extubated.        GI  - PEG in site    Renal  - Cr at baseline 1.10 as of 11/25     Derm  #incontinent dermatitis of b/l buttocks  -daily wound care  -rectal tube in place    Heme:  # Anemia of chronic disease  -no signs of active bleeding  -active type and screen    GI  # Chronic diarrhea 2/2 small bowel resection  -probiotics daily 78F w/ PMH obesity, DM, HTN, hypothyroidism, sepsis secondary to ventral hernia infection s/p repair, PE s/p IVC filter, acute blood loss anemia from hemorrhagic uterine fibroids and UGIB, ATN requiring HD, sepsis secondary to MSSA bacteremia and MDR E coli UTI w/AMS and s/p TAHBSO and small bowel resection s/p PEG. Presenting with septic shock likely 2/2 PNA vs UTI and acute hypoxic respiratory failure now extubated.

## 2018-11-28 NOTE — PROGRESS NOTE ADULT - PROBLEM SELECTOR PLAN 5
-Continue synthroid currently 150 mcg per day oral. -BP 150s/80s  -continue home lisinopril 40 mg daily  -amlodipine 10 mg daily

## 2018-11-28 NOTE — ADVANCED PRACTICE NURSE CONSULT - ASSESSMENT
Denuded skin on sacrum and bilateral buttocks improving. De-roofed blister noted on left jaw measuring 1 cm x 3 cm x 0.1 cm with scant serous exudate. Patient requires 2 person assist to turn in bed. Patient on AirTAP positioning system with wedges for turning and repositioning and wearing heel protectors to offload heels. Lopez TOSCANO present and assisted during assessment.

## 2018-11-29 LAB
ANION GAP SERPL CALC-SCNC: 10 MMOL/L — SIGNIFICANT CHANGE UP (ref 5–17)
BUN SERPL-MCNC: 33 MG/DL — HIGH (ref 7–23)
CALCIUM SERPL-MCNC: 9.7 MG/DL — SIGNIFICANT CHANGE UP (ref 8.4–10.5)
CHLORIDE SERPL-SCNC: 105 MMOL/L — SIGNIFICANT CHANGE UP (ref 96–108)
CO2 SERPL-SCNC: 26 MMOL/L — SIGNIFICANT CHANGE UP (ref 22–31)
CREAT SERPL-MCNC: 1.17 MG/DL — SIGNIFICANT CHANGE UP (ref 0.5–1.3)
GLUCOSE BLDC GLUCOMTR-MCNC: 119 MG/DL — HIGH (ref 70–99)
GLUCOSE BLDC GLUCOMTR-MCNC: 121 MG/DL — HIGH (ref 70–99)
GLUCOSE BLDC GLUCOMTR-MCNC: 125 MG/DL — HIGH (ref 70–99)
GLUCOSE BLDC GLUCOMTR-MCNC: 131 MG/DL — HIGH (ref 70–99)
GLUCOSE SERPL-MCNC: 130 MG/DL — HIGH (ref 70–99)
HCT VFR BLD CALC: 27.5 % — LOW (ref 34.5–45)
HGB BLD-MCNC: 8.1 G/DL — LOW (ref 11.5–15.5)
MAGNESIUM SERPL-MCNC: 2 MG/DL — SIGNIFICANT CHANGE UP (ref 1.6–2.6)
MCHC RBC-ENTMCNC: 27.9 PG — SIGNIFICANT CHANGE UP (ref 27–34)
MCHC RBC-ENTMCNC: 29.5 G/DL — LOW (ref 32–36)
MCV RBC AUTO: 94.8 FL — SIGNIFICANT CHANGE UP (ref 80–100)
PLATELET # BLD AUTO: 312 K/UL — SIGNIFICANT CHANGE UP (ref 150–400)
POTASSIUM SERPL-MCNC: 4.2 MMOL/L — SIGNIFICANT CHANGE UP (ref 3.5–5.3)
POTASSIUM SERPL-SCNC: 4.2 MMOL/L — SIGNIFICANT CHANGE UP (ref 3.5–5.3)
RBC # BLD: 2.9 M/UL — LOW (ref 3.8–5.2)
RBC # FLD: 17.3 % — HIGH (ref 10.3–16.9)
SODIUM SERPL-SCNC: 141 MMOL/L — SIGNIFICANT CHANGE UP (ref 135–145)
WBC # BLD: 10.2 K/UL — SIGNIFICANT CHANGE UP (ref 3.8–10.5)
WBC # FLD AUTO: 10.2 K/UL — SIGNIFICANT CHANGE UP (ref 3.8–10.5)

## 2018-11-29 PROCEDURE — 99233 SBSQ HOSP IP/OBS HIGH 50: CPT | Mod: GC

## 2018-11-29 RX ORDER — ACETAMINOPHEN 500 MG
650 TABLET ORAL EVERY 6 HOURS
Qty: 0 | Refills: 0 | Status: DISCONTINUED | OUTPATIENT
Start: 2018-11-29 | End: 2018-12-17

## 2018-11-29 RX ORDER — SODIUM CHLORIDE 9 MG/ML
500 INJECTION INTRAMUSCULAR; INTRAVENOUS; SUBCUTANEOUS ONCE
Qty: 0 | Refills: 0 | Status: COMPLETED | OUTPATIENT
Start: 2018-11-29 | End: 2018-11-29

## 2018-11-29 RX ADMIN — SODIUM CHLORIDE 1500 MILLILITER(S): 9 INJECTION INTRAMUSCULAR; INTRAVENOUS; SUBCUTANEOUS at 22:12

## 2018-11-29 RX ADMIN — Medication 5 MILLIGRAM(S): at 23:22

## 2018-11-29 RX ADMIN — ROBINUL 0.2 MILLIGRAM(S): 0.2 INJECTION INTRAMUSCULAR; INTRAVENOUS at 05:11

## 2018-11-29 RX ADMIN — AMLODIPINE BESYLATE 10 MILLIGRAM(S): 2.5 TABLET ORAL at 17:46

## 2018-11-29 RX ADMIN — POLYETHYLENE GLYCOL 3350 17 GRAM(S): 17 POWDER, FOR SOLUTION ORAL at 12:34

## 2018-11-29 RX ADMIN — Medication 3 MILLILITER(S): at 05:10

## 2018-11-29 RX ADMIN — Medication 650 MILLIGRAM(S): at 23:21

## 2018-11-29 RX ADMIN — PANTOPRAZOLE SODIUM 40 MILLIGRAM(S): 20 TABLET, DELAYED RELEASE ORAL at 12:34

## 2018-11-29 RX ADMIN — HEPARIN SODIUM 5000 UNIT(S): 5000 INJECTION INTRAVENOUS; SUBCUTANEOUS at 05:10

## 2018-11-29 RX ADMIN — Medication 1 DROP(S): at 22:10

## 2018-11-29 RX ADMIN — Medication 5 MILLIGRAM(S): at 00:00

## 2018-11-29 RX ADMIN — Medication 1 DROP(S): at 05:11

## 2018-11-29 RX ADMIN — HEPARIN SODIUM 5000 UNIT(S): 5000 INJECTION INTRAVENOUS; SUBCUTANEOUS at 22:10

## 2018-11-29 RX ADMIN — Medication 1 TABLET(S): at 14:47

## 2018-11-29 RX ADMIN — ROBINUL 0.2 MILLIGRAM(S): 0.2 INJECTION INTRAMUSCULAR; INTRAVENOUS at 00:42

## 2018-11-29 RX ADMIN — Medication 5 MILLIGRAM(S): at 05:10

## 2018-11-29 RX ADMIN — HEPARIN SODIUM 5000 UNIT(S): 5000 INJECTION INTRAVENOUS; SUBCUTANEOUS at 14:47

## 2018-11-29 RX ADMIN — ROBINUL 0.2 MILLIGRAM(S): 0.2 INJECTION INTRAMUSCULAR; INTRAVENOUS at 12:30

## 2018-11-29 RX ADMIN — Medication 1 TABLET(S): at 23:21

## 2018-11-29 RX ADMIN — Medication 5 MILLIGRAM(S): at 12:26

## 2018-11-29 RX ADMIN — Medication 150 MICROGRAM(S): at 05:11

## 2018-11-29 RX ADMIN — Medication 3 MILLILITER(S): at 12:33

## 2018-11-29 RX ADMIN — Medication 1 TABLET(S): at 05:11

## 2018-11-29 RX ADMIN — Medication 3 MILLILITER(S): at 17:47

## 2018-11-29 RX ADMIN — LISINOPRIL 40 MILLIGRAM(S): 2.5 TABLET ORAL at 05:11

## 2018-11-29 RX ADMIN — Medication 3 MILLILITER(S): at 00:42

## 2018-11-29 RX ADMIN — Medication 5 MILLIGRAM(S): at 18:22

## 2018-11-29 RX ADMIN — ROBINUL 0.2 MILLIGRAM(S): 0.2 INJECTION INTRAMUSCULAR; INTRAVENOUS at 23:22

## 2018-11-29 RX ADMIN — ROBINUL 0.2 MILLIGRAM(S): 0.2 INJECTION INTRAMUSCULAR; INTRAVENOUS at 18:22

## 2018-11-29 RX ADMIN — Medication 3 MILLILITER(S): at 23:22

## 2018-11-29 NOTE — PROVIDER CONTACT NOTE (OTHER) - ASSESSMENT
patient lying supine in bed, PEG feeds going at 30 cc/hr per order. patient requres frequent sucitoning of thick secretions, -130, oxygen 90-02% on 4L NC, previously given duoneb per eMAR. rectal temp of 99.8. patient lying supine in bed, PEG feeds going at 30 cc/hr per order. patient requires frequent suctioning of thick secretions, -130, oxygen 90-02% on 4L NC, previously given duoneb per eMAR. rectal temp of 99.8.  no change in patients mental status, alert, non verbal. patient lying supine in bed, PEG feeds going at 30 cc/hr per order. patient requires frequent suctioning of thick secretions, -130, oxygen 90-02% on 4L NC, previously given duoneb per eMAR. rectal temp of 100.1.  no change in patients mental status, alert, non verbal.

## 2018-11-29 NOTE — PROGRESS NOTE ADULT - PROBLEM SELECTOR PLAN 1
Pt diagnosed with sepsis 2/2 HAP and ESBL UTIon admission, hypotensive in MICU requiring levophed and midodrine.   - resolved  - sputum cx staph aureus sn to cefazolin  - completed course of cefazolin for MSSA and ciprofloxacin for UTI (ESBL sn to this).    #Muccus Plugging: Pt with episode of hypoxia in MICU, found to have complete white out of right lung 2/2 to mucus plugining.   - c/w robinol as needed   - s/p percussion vest  - continue to monitor    #incontinent dermatitis of b/l buttocks  -daily wound care  -rectal tube in place    #Anemia of chronic disease  -no signs of active bleeding  -active type and screen Pt diagnosed with sepsis 2/2 HAP and ESBL UTIon admission, hypotensive in MICU requiring levophed and midodrine.   - resolved  - sputum cx staph aureus sn to cefazolin  - completed course of cefazolin for MSSA and ciprofloxacin for UTI (ESBL sn to this).    #Muccus Plugging: Pt with episode of hypoxia in MICU, found to have complete white out of right lung 2/2 to mucus plugining.   - c/w robinol as needed   - s/p percussion vest  - continue to monitor resp status  - frequent suctioning     #incontinent dermatitis of b/l buttocks  -daily wound care  -rectal tube in place    #Anemia of chronic disease  -no signs of active bleeding  -active type and screen

## 2018-11-29 NOTE — PROVIDER CONTACT NOTE (OTHER) - BACKGROUND
patient admitted with septic shock; s/p intubation/extubation this admission, patient previously saturating 96% this AM on 2L NC, HR baseline 90s to low 100s.  patient tmax 100.2 today rectally. patient admitted with septic shock; s/p intubation/extubation this admission, patient previously saturating 96% this AM on 2L NC, HR baseline 90s to low 100s. patient tmax 100.2 today rectally.

## 2018-11-29 NOTE — PROGRESS NOTE ADULT - PROBLEM SELECTOR PLAN 4
Pt recently admitted 4/15/18 due to MSSA bacteremia and MDR E Coli in urine, transferred to [facility], and found to have extravasation on CT abd, s/p IR drain placement, underwent DAVY BSO 2/2 tubal metaplasia, SBR with primary anastomosis, abdominal washout, PEG placement, discharged on 6/4  - surgery on board  - PEG in place  - c/w jevity feeds. Pt recently admitted 4/15/18 due to MSSA bacteremia and MDR E Coli in urine, transferred to [facility], and found to have extravasation on CT abd, s/p IR drain placement, underwent DAVY BSO 2/2 tubal metaplasia, SBR with primary anastomosis, abdominal washout, PEG placement, discharged on 6/4  - surgery on board  - PEG in place  - c/w glucerna feeds at 30cc/hr

## 2018-11-29 NOTE — PROGRESS NOTE ADULT - ASSESSMENT
78F w/ PMH obesity, DM, HTN, hypothyroidism, sepsis secondary to ventral hernia infection s/p repair, PE s/p IVC filter, acute blood loss anemia from hemorrhagic uterine fibroids and UGIB, ATN requiring HD, sepsis secondary to MSSA bacteremia and MDR E coli UTI w/AMS and s/p TAHBSO and small bowel resection s/p PEG. Presenting with septic shock likely 2/2 PNA vs UTI and acute hypoxic respiratory failure now extubated.

## 2018-11-29 NOTE — PROGRESS NOTE ADULT - SUBJECTIVE AND OBJECTIVE BOX
Patient is a 78y old  Female who presents with a chief complaint of Septic shock and acute respiratory failure (28 Nov 2018 18:49)      INTERVAL HPI/OVERNIGHT EVENTS:  ICU Vital Signs Last 24 Hrs  T(C): 37.1 (29 Nov 2018 05:45), Max: 37.3 (28 Nov 2018 14:04)  T(F): 98.8 (29 Nov 2018 05:45), Max: 99.2 (28 Nov 2018 14:04)  HR: 102 (29 Nov 2018 04:10) (90 - 112)  BP: 149/70 (29 Nov 2018 04:10) (131/60 - 172/91)  BP(mean): 101 (29 Nov 2018 04:10) (86 - 120)  ABP: --  ABP(mean): --  RR: 18 (29 Nov 2018 04:10) (18 - 41)  SpO2: 95% (29 Nov 2018 04:10) (87% - 99%)    I&O's Summary    27 Nov 2018 07:01  -  28 Nov 2018 07:00  --------------------------------------------------------  IN: 462 mL / OUT: 1080 mL / NET: -618 mL    28 Nov 2018 07:01  -  29 Nov 2018 06:12  --------------------------------------------------------  IN: 280 mL / OUT: 245 mL / NET: 35 mL          LABS:                        8.3    11.7  )-----------( 240      ( 28 Nov 2018 19:10 )             29.8     11-28    138  |  100  |  35<H>  ----------------------------<  127<H>  4.4   |  26  |  1.10    Ca    10.0      28 Nov 2018 05:06  Phos  3.3     11-28  Mg     1.8     11-28    TPro  7.1  /  Alb  2.8<L>  /  TBili  0.3  /  DBili  x   /  AST  18  /  ALT  9<L>  /  AlkPhos  139<H>  11-27        CAPILLARY BLOOD GLUCOSE      POCT Blood Glucose.: 121 mg/dL (29 Nov 2018 00:36)  POCT Blood Glucose.: 120 mg/dL (28 Nov 2018 18:33)  POCT Blood Glucose.: 112 mg/dL (28 Nov 2018 11:34)  POCT Blood Glucose.: 130 mg/dL (28 Nov 2018 06:21)        RADIOLOGY & ADDITIONAL TESTS:    Consultant(s) Notes Reviewed:  [x ] YES  [ ] NO    MEDICATIONS  (STANDING):  ALBUTerol/ipratropium for Nebulization 3 milliLiter(s) Nebulizer every 6 hours  amLODIPine   Tablet 10 milliGRAM(s) Oral every 24 hours  artificial tears (preservative free) Ophthalmic Solution 1 Drop(s) Both EYES three times a day  dextrose 5%. 1000 milliLiter(s) (50 mL/Hr) IV Continuous <Continuous>  dextrose 50% Injectable 12.5 Gram(s) IV Push once  dextrose 50% Injectable 25 Gram(s) IV Push once  dextrose 50% Injectable 25 Gram(s) IV Push once  glycopyrrolate Injectable 0.2 milliGRAM(s) IV Push every 6 hours  heparin  Injectable 5000 Unit(s) SubCutaneous every 8 hours  insulin lispro (HumaLOG) corrective regimen sliding scale   SubCutaneous every 6 hours  lactobacillus acidophilus 1 Tablet(s) Oral every 8 hours  levothyroxine 150 MICROGram(s) Oral daily  lisinopril 40 milliGRAM(s) Oral daily  metoclopramide Injectable 5 milliGRAM(s) IV Push every 6 hours  pantoprazole   Suspension 40 milliGRAM(s) Enteral Tube daily  polyethylene glycol 3350 17 Gram(s) Oral daily    MEDICATIONS  (PRN):  dextrose 40% Gel 15 Gram(s) Oral once PRN Blood Glucose LESS THAN 70 milliGRAM(s)/deciliter  glucagon  Injectable 1 milliGRAM(s) IntraMuscular once PRN Glucose LESS THAN 70 milligrams/deciliter      PHYSICAL EXAM:  GENERAL: well built, well nourished  HEAD:  Atraumatic, Normocephalic  EYES: EOMI, PERRLA, conjunctiva and sclera clear  ENT: No tonsillar erythema, exudates, or enlargement; Moist mucous membranes, Good dentition, No lesions  NECK: Supple, No JVD, Normal thyroid, no enlarged nodes  NERVOUS SYSTEM:  Alert & Oriented X3, Good concentration; Motor Strength 5/5 B/L upper and lower extremities; DTRs 2+ intact and symmetric, sensory intact  CHEST/LUNG: B/L good air entry; No rales, rhonchi, or wheezing  HEART: S1S2 normal, no S3, Regular rate and rhythm; No murmurs, rubs, or gallops  ABDOMEN: Soft, Nontender, Nondistended; Bowel sounds present  EXTREMITIES:  2+ Peripheral Pulses, No clubbing, cyanosis, or edema  LYMPH: No lymphadenopathy noted  SKIN: No rashes or lesions    Care Discussed with Consultants/Other Providers [ x] YES  [ ] NO OVERNIGHT EVENTS: EMANUEL    INTERVAL HPI: Pt is examined at bedside. She is resting comfortably in bed, in NAD. Patient is AOx2, responsive to commands. She denies any chest pain, SOB, abdominal pain, nausea/vomiting.     ICU Vital Signs Last 24 Hrs  T(C): 37.1 (29 Nov 2018 05:45), Max: 37.3 (28 Nov 2018 14:04)  T(F): 98.8 (29 Nov 2018 05:45), Max: 99.2 (28 Nov 2018 14:04)  HR: 102 (29 Nov 2018 04:10) (90 - 112)  BP: 149/70 (29 Nov 2018 04:10) (131/60 - 172/91)  BP(mean): 101 (29 Nov 2018 04:10) (86 - 120)  ABP: --  ABP(mean): --  RR: 18 (29 Nov 2018 04:10) (18 - 41)  SpO2: 95% (29 Nov 2018 04:10) (87% - 99%)    I&O's Summary    27 Nov 2018 07:01  -  28 Nov 2018 07:00  --------------------------------------------------------  IN: 462 mL / OUT: 1080 mL / NET: -618 mL    28 Nov 2018 07:01  -  29 Nov 2018 06:12  --------------------------------------------------------  IN: 280 mL / OUT: 245 mL / NET: 35 mL          LABS:                        8.3    11.7  )-----------( 240      ( 28 Nov 2018 19:10 )             29.8     11-28    138  |  100  |  35<H>  ----------------------------<  127<H>  4.4   |  26  |  1.10    Ca    10.0      28 Nov 2018 05:06  Phos  3.3     11-28  Mg     1.8     11-28    TPro  7.1  /  Alb  2.8<L>  /  TBili  0.3  /  DBili  x   /  AST  18  /  ALT  9<L>  /  AlkPhos  139<H>  11-27        CAPILLARY BLOOD GLUCOSE      POCT Blood Glucose.: 121 mg/dL (29 Nov 2018 00:36)  POCT Blood Glucose.: 120 mg/dL (28 Nov 2018 18:33)  POCT Blood Glucose.: 112 mg/dL (28 Nov 2018 11:34)  POCT Blood Glucose.: 130 mg/dL (28 Nov 2018 06:21)        RADIOLOGY & ADDITIONAL TESTS:    Consultant(s) Notes Reviewed:  [x ] YES  [ ] NO    MEDICATIONS  (STANDING):  ALBUTerol/ipratropium for Nebulization 3 milliLiter(s) Nebulizer every 6 hours  amLODIPine   Tablet 10 milliGRAM(s) Oral every 24 hours  artificial tears (preservative free) Ophthalmic Solution 1 Drop(s) Both EYES three times a day  dextrose 5%. 1000 milliLiter(s) (50 mL/Hr) IV Continuous <Continuous>  dextrose 50% Injectable 12.5 Gram(s) IV Push once  dextrose 50% Injectable 25 Gram(s) IV Push once  dextrose 50% Injectable 25 Gram(s) IV Push once  glycopyrrolate Injectable 0.2 milliGRAM(s) IV Push every 6 hours  heparin  Injectable 5000 Unit(s) SubCutaneous every 8 hours  insulin lispro (HumaLOG) corrective regimen sliding scale   SubCutaneous every 6 hours  lactobacillus acidophilus 1 Tablet(s) Oral every 8 hours  levothyroxine 150 MICROGram(s) Oral daily  lisinopril 40 milliGRAM(s) Oral daily  metoclopramide Injectable 5 milliGRAM(s) IV Push every 6 hours  pantoprazole   Suspension 40 milliGRAM(s) Enteral Tube daily  polyethylene glycol 3350 17 Gram(s) Oral daily    MEDICATIONS  (PRN):  dextrose 40% Gel 15 Gram(s) Oral once PRN Blood Glucose LESS THAN 70 milliGRAM(s)/deciliter  glucagon  Injectable 1 milliGRAM(s) IntraMuscular once PRN Glucose LESS THAN 70 milligrams/deciliter      PHYSICAL EXAM:  GENERAL: NAD, resting comfortably in bed.   HEAD:  Atraumatic, Normocephalic  EYES: EOMI, PERRLA, conjunctiva and sclera clear  ENT: No tonsillar erythema, exudates, or enlargement; Moist mucous membranes, Good dentition, No lesions  NECK: Supple, No JVD, Normal thyroid, no enlarged nodes  NERVOUS SYSTEM:  Alert & Orientedx2, responsive to simple commands. Answering questions but inconsistently muscle strength 5/5 b/l. Sensation in tact b/l.   CHEST/LUNG: B/L good air entry; No rales, rhonchi, or wheezing  HEART: S1S2 normal, no S3, Regular rate and rhythm; No murmurs, rubs, or gallops  ABDOMEN: Soft, Nontender, Nondistended; Bowel sounds present  EXTREMITIES:  2+ Peripheral Pulses, No clubbing, cyanosis, or edema  LYMPH: No lymphadenopathy noted  SKIN: rash near buttocks region and low back due to stool, no erythema, discharge or purulence      Care Discussed with Consultants/Other Providers [ x] YES  [ ] NO

## 2018-11-30 DIAGNOSIS — J98.09 OTHER DISEASES OF BRONCHUS, NOT ELSEWHERE CLASSIFIED: ICD-10-CM

## 2018-11-30 LAB
ANION GAP SERPL CALC-SCNC: 6 MMOL/L — SIGNIFICANT CHANGE UP (ref 5–17)
APTT BLD: 25.6 SEC — LOW (ref 27.5–36.3)
BUN SERPL-MCNC: 29 MG/DL — HIGH (ref 7–23)
CALCIUM SERPL-MCNC: 9.8 MG/DL — SIGNIFICANT CHANGE UP (ref 8.4–10.5)
CHLORIDE SERPL-SCNC: 111 MMOL/L — HIGH (ref 96–108)
CO2 SERPL-SCNC: 28 MMOL/L — SIGNIFICANT CHANGE UP (ref 22–31)
CREAT SERPL-MCNC: 1.05 MG/DL — SIGNIFICANT CHANGE UP (ref 0.5–1.3)
GLUCOSE BLDC GLUCOMTR-MCNC: 102 MG/DL — HIGH (ref 70–99)
GLUCOSE BLDC GLUCOMTR-MCNC: 104 MG/DL — HIGH (ref 70–99)
GLUCOSE BLDC GLUCOMTR-MCNC: 132 MG/DL — HIGH (ref 70–99)
GLUCOSE BLDC GLUCOMTR-MCNC: 139 MG/DL — HIGH (ref 70–99)
GLUCOSE BLDC GLUCOMTR-MCNC: 89 MG/DL — SIGNIFICANT CHANGE UP (ref 70–99)
GLUCOSE SERPL-MCNC: 129 MG/DL — HIGH (ref 70–99)
GRAM STN FLD: SIGNIFICANT CHANGE UP
HCT VFR BLD CALC: 26.6 % — LOW (ref 34.5–45)
HGB BLD-MCNC: 7.8 G/DL — LOW (ref 11.5–15.5)
INR BLD: 1.2 — HIGH (ref 0.88–1.16)
MCHC RBC-ENTMCNC: 27.7 PG — SIGNIFICANT CHANGE UP (ref 27–34)
MCHC RBC-ENTMCNC: 29.3 G/DL — LOW (ref 32–36)
MCV RBC AUTO: 94.3 FL — SIGNIFICANT CHANGE UP (ref 80–100)
PLATELET # BLD AUTO: 330 K/UL — SIGNIFICANT CHANGE UP (ref 150–400)
POTASSIUM SERPL-MCNC: 4.1 MMOL/L — SIGNIFICANT CHANGE UP (ref 3.5–5.3)
POTASSIUM SERPL-SCNC: 4.1 MMOL/L — SIGNIFICANT CHANGE UP (ref 3.5–5.3)
PROTHROM AB SERPL-ACNC: 13.6 SEC — HIGH (ref 10–12.9)
RBC # BLD: 2.82 M/UL — LOW (ref 3.8–5.2)
RBC # FLD: 17.5 % — HIGH (ref 10.3–16.9)
SODIUM SERPL-SCNC: 145 MMOL/L — SIGNIFICANT CHANGE UP (ref 135–145)
SPECIMEN SOURCE: SIGNIFICANT CHANGE UP
WBC # BLD: 7.7 K/UL — SIGNIFICANT CHANGE UP (ref 3.8–10.5)
WBC # FLD AUTO: 7.7 K/UL — SIGNIFICANT CHANGE UP (ref 3.8–10.5)

## 2018-11-30 PROCEDURE — 99223 1ST HOSP IP/OBS HIGH 75: CPT | Mod: GC,25

## 2018-11-30 PROCEDURE — 99233 SBSQ HOSP IP/OBS HIGH 50: CPT | Mod: GC

## 2018-11-30 PROCEDURE — 71045 X-RAY EXAM CHEST 1 VIEW: CPT | Mod: 26,76

## 2018-11-30 RX ORDER — FENTANYL CITRATE 50 UG/ML
50 INJECTION INTRAVENOUS ONCE
Qty: 0 | Refills: 0 | Status: DISCONTINUED | OUTPATIENT
Start: 2018-11-30 | End: 2018-11-30

## 2018-11-30 RX ORDER — HEPARIN SODIUM 5000 [USP'U]/ML
5000 INJECTION INTRAVENOUS; SUBCUTANEOUS EVERY 8 HOURS
Qty: 0 | Refills: 0 | Status: DISCONTINUED | OUTPATIENT
Start: 2018-11-30 | End: 2018-12-20

## 2018-11-30 RX ORDER — MIDAZOLAM HYDROCHLORIDE 1 MG/ML
2 INJECTION, SOLUTION INTRAMUSCULAR; INTRAVENOUS ONCE
Qty: 0 | Refills: 0 | Status: DISCONTINUED | OUTPATIENT
Start: 2018-11-30 | End: 2018-11-30

## 2018-11-30 RX ORDER — CHLORHEXIDINE GLUCONATE 213 G/1000ML
1 SOLUTION TOPICAL
Qty: 0 | Refills: 0 | Status: DISCONTINUED | OUTPATIENT
Start: 2018-11-30 | End: 2018-12-05

## 2018-11-30 RX ADMIN — Medication 5 MILLIGRAM(S): at 17:01

## 2018-11-30 RX ADMIN — PANTOPRAZOLE SODIUM 40 MILLIGRAM(S): 20 TABLET, DELAYED RELEASE ORAL at 11:24

## 2018-11-30 RX ADMIN — HEPARIN SODIUM 5000 UNIT(S): 5000 INJECTION INTRAVENOUS; SUBCUTANEOUS at 11:24

## 2018-11-30 RX ADMIN — ROBINUL 0.2 MILLIGRAM(S): 0.2 INJECTION INTRAMUSCULAR; INTRAVENOUS at 11:24

## 2018-11-30 RX ADMIN — Medication 150 MICROGRAM(S): at 05:36

## 2018-11-30 RX ADMIN — LISINOPRIL 40 MILLIGRAM(S): 2.5 TABLET ORAL at 05:36

## 2018-11-30 RX ADMIN — Medication 5 MILLIGRAM(S): at 05:37

## 2018-11-30 RX ADMIN — ROBINUL 0.2 MILLIGRAM(S): 0.2 INJECTION INTRAMUSCULAR; INTRAVENOUS at 05:37

## 2018-11-30 RX ADMIN — CHLORHEXIDINE GLUCONATE 1 APPLICATION(S): 213 SOLUTION TOPICAL at 21:57

## 2018-11-30 RX ADMIN — FENTANYL CITRATE 50 MICROGRAM(S): 50 INJECTION INTRAVENOUS at 12:46

## 2018-11-30 RX ADMIN — POLYETHYLENE GLYCOL 3350 17 GRAM(S): 17 POWDER, FOR SOLUTION ORAL at 11:24

## 2018-11-30 RX ADMIN — Medication 3 MILLILITER(S): at 11:25

## 2018-11-30 RX ADMIN — FENTANYL CITRATE 50 MICROGRAM(S): 50 INJECTION INTRAVENOUS at 16:44

## 2018-11-30 RX ADMIN — MIDAZOLAM HYDROCHLORIDE 2 MILLIGRAM(S): 1 INJECTION, SOLUTION INTRAMUSCULAR; INTRAVENOUS at 12:00

## 2018-11-30 RX ADMIN — Medication 1 TABLET(S): at 05:36

## 2018-11-30 RX ADMIN — Medication 1 TABLET(S): at 11:24

## 2018-11-30 RX ADMIN — Medication 650 MILLIGRAM(S): at 00:30

## 2018-11-30 RX ADMIN — HEPARIN SODIUM 5000 UNIT(S): 5000 INJECTION INTRAVENOUS; SUBCUTANEOUS at 21:56

## 2018-11-30 RX ADMIN — AMLODIPINE BESYLATE 10 MILLIGRAM(S): 2.5 TABLET ORAL at 17:01

## 2018-11-30 RX ADMIN — Medication 3 MILLILITER(S): at 05:37

## 2018-11-30 RX ADMIN — Medication 1 TABLET(S): at 21:56

## 2018-11-30 RX ADMIN — Medication 3 MILLILITER(S): at 17:01

## 2018-11-30 RX ADMIN — HEPARIN SODIUM 5000 UNIT(S): 5000 INJECTION INTRAVENOUS; SUBCUTANEOUS at 05:37

## 2018-11-30 RX ADMIN — Medication 1 DROP(S): at 05:36

## 2018-11-30 RX ADMIN — ROBINUL 0.2 MILLIGRAM(S): 0.2 INJECTION INTRAMUSCULAR; INTRAVENOUS at 17:01

## 2018-11-30 RX ADMIN — Medication 5 MILLIGRAM(S): at 11:24

## 2018-11-30 NOTE — CHART NOTE - NSCHARTNOTEFT_GEN_A_CORE
Pt noted to have HR 120s last night since 6pm. Pt required frequent suctioning during the day. Pt was non-toxic appearing, breathing comfortably. T 100.1 -> 100.7. On exam, had decreased breath sounds on the right compared to left. CXR showed tension pneumothorax. Patient intermittently in sinus tach overnight. Assessed and appeared to be dry on exam. Given IVF with some improvement. Otherwise comfortable. Low suspicion of PE. No noticeable pain. Patient had borderline fever. Given tylenol and blood cultures obtained. Held off on antibiotics at this time as not septic and could have non-infectious etiology of temp. AM CXR showing whiteout of right hemithorax.    #Abnormal CXR: whiteout of right hemithorax could be related to mucous plugging with post-obstructive atelectasis. Other differentials include aspiration, infection.  -patient currently saturating well and following commands, no respiratory distress  -will continue to suction, if not improvement may require bronchoscopy with removal of mucous plug   -no BiPAP as contraindicated with secretions/plugging  -continue to monitor respiratory status  -holding feeds, aspiration precautions  -f/u blood cx  -d/w intensivist Dr. Olivo

## 2018-11-30 NOTE — PROGRESS NOTE ADULT - PROBLEM SELECTOR PLAN 10
1) PCP Contacted on Admission: (Y/N) --> Name & Phone #:  2) Date of Contact with PCP:  3) PCP Contacted at Discharge: (Y/N, N/A)  4) Summary of Handoff Given to PCP:   5) Post-Discharge Appointment Date and Location: Dvt ppx: hep sq  GI ppx: protonix  Code Status: Full code, will have palliative have discussions for long term care of patient due to chronic aspiration pna  Dispo: [facility]    #JOSSELYN  1) PCP Contacted on Admission: (Y/N) --> Name & Phone #:  2) Date of Contact with PCP:  3) PCP Contacted at Discharge: (Y/N, N/A)  4) Summary of Handoff Given to PCP:   5) Post-Discharge Appointment Date and Location: Dvt ppx: hep sq  GI ppx: protonix  Code Status: Full code, palliative with discussions with son for long term care of patient due to chronic aspiration pna. She remains full code.     Dispo: [facility]U    #JOSSELYN  1) PCP Contacted on Admission: (Y/N) --> Name & Phone #:  2) Date of Contact with PCP:  3) PCP Contacted at Discharge: (Y/N, N/A)  4) Summary of Handoff Given to PCP:   5) Post-Discharge Appointment Date and Location:

## 2018-11-30 NOTE — CONSULT NOTE ADULT - ATTENDING COMMENTS
Seen and examined by me - deep suctioning ineffective, bronchoscopy performed by our service today.
GONZALEZ due to sepsis/ hypoperfusion  Lytes improving -- BUN and creat slightly better  uo seems to be improved now  cont supportive care antibiotuics, presors, IVF - no sign fluid overload '  will follow

## 2018-11-30 NOTE — PROGRESS NOTE ADULT - PROBLEM SELECTOR PLAN 1
Pt diagnosed with sepsis 2/2 HAP and ESBL UTIon admission, hypotensive in MICU requiring levophed and midodrine.   - resolved  - sputum cx staph aureus sn to cefazolin  - completed course of cefazolin for MSSA and ciprofloxacin for UTI (ESBL sn to this).    #Muccus Plugging: Pt with episode of hypoxia in MICU, found to have complete white out of right lung 2/2 to mucus plugining.   - c/w robinol as needed   - s/p percussion vest  - continue to monitor resp status  - frequent suctioning     #incontinent dermatitis of b/l buttocks  -daily wound care  -rectal tube in place    #Anemia of chronic disease  -no signs of active bleeding  -active type and screen CXR with complete white out of right lung likely 2/2 mucus plugging. Pt with frequent mucus plugging throughout hospital course s/p percussion vest, robinol, frequent suctioning, robinol.   - Pt will be transferred to MICU for possible bronch, intubation.   - continue to monitor resp status. CXR with complete white out of right lung likely 2/2 mucus plugging. Pt with frequent mucus plugging throughout hospital course s/p percussion vest, robinol, frequent suctioning.  - Pt will be transferred to MICU for possible bronch, intubation.   - continue to monitor resp status. CXR with complete white out of right lung likely 2/2 mucus plugging. Pt with frequent mucus plugging throughout hospital course s/p percussion vest, robinol, frequent suctioning.  - Pt will be transferred to MICU for possible bronch, intubation.   - continue to monitor resp status.    #Fever: Pt with low grade fever overnight likely 2/2 to mucus plugging/atelectasis less likely infectious etiology. WBC down trending   - s/p tylenol   - f/up Bcultures  - consider antifungal for dermatitis.

## 2018-11-30 NOTE — PROGRESS NOTE ADULT - SUBJECTIVE AND OBJECTIVE BOX
PGY-1 Transfer Acceptance from [facility] to [facility]    Hospital Course:   78F w/ PMH of morbid obesity, DM, HTN, hypothyroidism, sepsis secondary to ventral hernia infection s/p repair, PE s/p IVC filter, acute blood loss anemia from hemorrhagic uterine fibroids and UGIB, ATN requiring HD, sepsis secondary to MSSA bacteremia and MDR E coli UTI (4-18)  now w/AMS and s/p TAHBSO and small bowel resection s/p PEG. Patient presented from [facility] with concern for sepsis 2/2 aspiration pna and hypoxia. Per EMS, patient was saturating 85% on RA at [facility] with some improvement to 95% after being placed on NRB.  On arrival to the ED, patient was altered and saturating 89%. She was intubated in the ED with suctioning revealing purulent material. She was started on vanc/azithro/cipro (due to prior ESBL that was resistant to zosyn but sn to cipro, cipro course was finished) and that was deescalated to cefazolin once sputum had speciation to staph aureus sensitive to that and completed cefazolin course. She also completed ciprofloxacin for concern of ESBL (that was sn to this). She was successfully extubated, and tolerating nasal cannula. She had diarrhea that was present prior to admission as well (due to hx of SB resection), that improved with probiotics. Had continued secretions and completed cefazolin course. Was also started on glycopyrrolate and octreotide for secretions but now discontinued. She had mucous plugging with white out of right side of lung, this improved with percussion vest. 11/26 patient and son had long discussion with palliative care and it was decided to keep patient full code currently with a MOLST indicating as such. Of note, pt did require levophed followed by midodrine for low BP post-extubation, but now no longer requiring pressor support and home anti-hypertensives restarted. She has had 2 vomiting episodes in the past day, likely in setting of tube feeds, which were held last night and restarted this AM. NS 500cc bolus given 11/28 for decreasing UOP likely due to holding of tube feeds prior. Miralax started 11/28 as no BM in last 1-2 days thru rectal tube (in place due to incontinent dermatitis). Stable for step down to [facility] for further monitoring of respiratory status. Frequent suctioning for secretions.  Feeds increased back to 30cc/hr. Tachy to 120s overnight 11/29, with rising temp, max 100.7 rectal. Patient still sating well on NC. CXR shows complete white out of RL with tracheal shift likely 2/2 to mucus plugging. Patient transferred to MICU for possible bronchoscopy and Intubation with anticipation to transition to [facility].    OVERNIGHT EVENTS:  Pt tachy (sinus on monitor). No complaints of pain.  Pt given 500 cc bolus of fluids. Pt still tachy despite bolus with low grade temp of rectal 100.2, gave patient tyenol.  Tachy improved to 90's. cxr showing white out right sided. Feeds stopped. blood cx sent after patient spiked 100.7 rectal     INTERVAL HPI: Pt is examined at bedside. She appears well, resting comfortably in bed, in NAD. She denies chest pain, SOB, abdominal pain.     ROS:  CV: Denies chest pain  Resp: Denies SOB  GI: Denies abdominal pain, constipation, diarrhea, nausea, vomiting  : Denies dysuria  ID: Denies fevers, chills  MSK: Denies joint pain     OBJECTIVE:    VITAL SIGNS:  ICU Vital Signs Last 24 Hrs  T(C): 37.6 (30 Nov 2018 09:40), Max: 38.2 (29 Nov 2018 22:30)  T(F): 99.6 (30 Nov 2018 09:40), Max: 100.7 (29 Nov 2018 22:30)  HR: 100 (30 Nov 2018 10:00) (94 - 126)  BP: 151/83 (30 Nov 2018 10:00) (138/63 - 160/76)  BP(mean): 102 (30 Nov 2018 10:00) (91 - 113)  ABP: --  ABP(mean): --  RR: 21 (30 Nov 2018 10:00) (16 - 21)  SpO2: 97% (30 Nov 2018 10:00) (90% - 97%)        11-29 @ 07:01  -  11-30 @ 07:00  --------------------------------------------------------  IN: 1170 mL / OUT: 1900 mL / NET: -730 mL      CAPILLARY BLOOD GLUCOSE      POCT Blood Glucose.: 104 mg/dL (30 Nov 2018 10:46)      PHYSICAL EXAM:    General: NAD, comfortable  HEENT: NCAT, PERRL, clear conjunctiva, no scleral icterus  Neck: supple, no JVD  Respiratory: CTA b/l, no wheezing, rhonchi, rales  Cardiovascular: RRR, normal S1S2, no M/R/G  Abdomen: soft, NT/ND, bowel sounds in all four quadrants, no palpable masses  Extremities: WWP, no clubbing, cyanosis, or edema  Neuro:     MEDICATIONS:  MEDICATIONS  (STANDING):  ALBUTerol/ipratropium for Nebulization 3 milliLiter(s) Nebulizer every 6 hours  amLODIPine   Tablet 10 milliGRAM(s) Oral every 24 hours  artificial tears (preservative free) Ophthalmic Solution 1 Drop(s) Both EYES three times a day  chlorhexidine 2% Cloths 1 Application(s) Topical <User Schedule>  dextrose 5%. 1000 milliLiter(s) (50 mL/Hr) IV Continuous <Continuous>  dextrose 50% Injectable 12.5 Gram(s) IV Push once  dextrose 50% Injectable 25 Gram(s) IV Push once  dextrose 50% Injectable 25 Gram(s) IV Push once  glycopyrrolate Injectable 0.2 milliGRAM(s) IV Push every 6 hours  heparin  Injectable 5000 Unit(s) SubCutaneous every 8 hours  insulin lispro (HumaLOG) corrective regimen sliding scale   SubCutaneous every 6 hours  lactobacillus acidophilus 1 Tablet(s) Oral every 8 hours  levothyroxine 150 MICROGram(s) Oral daily  lisinopril 40 milliGRAM(s) Oral daily  metoclopramide Injectable 5 milliGRAM(s) IV Push every 6 hours  pantoprazole   Suspension 40 milliGRAM(s) Enteral Tube daily  polyethylene glycol 3350 17 Gram(s) Oral daily    MEDICATIONS  (PRN):  acetaminophen    Suspension .. 650 milliGRAM(s) Oral every 6 hours PRN Temp greater or equal to 38C (100.4F)  dextrose 40% Gel 15 Gram(s) Oral once PRN Blood Glucose LESS THAN 70 milliGRAM(s)/deciliter  glucagon  Injectable 1 milliGRAM(s) IntraMuscular once PRN Glucose LESS THAN 70 milligrams/deciliter      ALLERGIES:  Allergies    No Known Allergies    Intolerances        LABS:                        7.8    7.7   )-----------( 330      ( 30 Nov 2018 05:43 )             26.6     11-30    145  |  111<H>  |  29<H>  ----------------------------<  129<H>  4.1   |  28  |  1.05    Ca    9.8      30 Nov 2018 05:43  Mg     2.0     11-29      PT/INR - ( 30 Nov 2018 10:52 )   PT: 13.6 sec;   INR: 1.20          PTT - ( 30 Nov 2018 10:52 )  PTT:25.6 sec      RADIOLOGY & ADDITIONAL TESTS: Reviewed.

## 2018-11-30 NOTE — PROGRESS NOTE ADULT - ATTENDING COMMENTS
Pt noted to have decreased BS on exam today and CXR showed atelectasis of right lung. Pts son continues to want to proceed with intubation if necessary and understands she would require trach for ongoing pulm toilet.  Despite aggressive nasal tracheal suctioning pt did not open up lung. RR in mid 20's on NC with O2 sat 96% and transferred to MICU for continued pulm toilet and possible intubation. Cough remains weak.

## 2018-11-30 NOTE — PROGRESS NOTE ADULT - PROBLEM SELECTOR PLAN 4
Pt recently admitted 4/15/18 due to MSSA bacteremia and MDR E Coli in urine, transferred to [facility], and found to have extravasation on CT abd, s/p IR drain placement, underwent DAVY BSO 2/2 tubal metaplasia, SBR with primary anastomosis, abdominal washout, PEG placement, discharged on 6/4  - surgery on board  - PEG in place  - c/w glucerna feeds at 30cc/hr Toxic metabolic encephalopathy 2/2 septic shock  RESOLVED  - mental status consistent with patient's baseline dementia

## 2018-11-30 NOTE — PROGRESS NOTE ADULT - ASSESSMENT
78F PMHx MO, DM, HTN, hypothyroidism, PE s/p IVC filter, recent admissions for sepsis and acute blood loss anemia s/p SBR with primary anastomosis and PEG, p/w septic shock likely 2/2 PNA  and acute hypoxic respiratory failure. With elevated T last night CXR shows worsening R lung    no acute surgical intervention, unlikely surgical pathology  care per primary team

## 2018-11-30 NOTE — PROGRESS NOTE ADULT - SUBJECTIVE AND OBJECTIVE BOX
INTERVAL HISTORY:  Patient seen and examined at bedside. This is a 77 yo f       78F w/ PMH sepsis secondary to ventral hernia infection s/p repair, PE s/p IVC filter, acute blood loss anemia from hemorrhagic uterine fibroids and UGIB, ATN requiring HD, sepsis secondary to MSSA bacteremia and MDR E coli UTI (4-18)  now w/AMS and s/p TAHBSO and small bowel resection s/p PEG. Patient presented from [facility] with concern for sepsis 2/2 aspiration pna and hypoxia. Per EMS, patient was saturating 85% on RA at [facility] with some improvement to 95% after being placed on NRB.  On arrival to the ED, patient was altered and saturating 89%. She was intubated in the ED with suctioning revealing purulent material. She was started on vanc/azithro/cipro (due to prior ESBL that was resistant to zosyn but sn to cipro, cipro course was finished) and that was deescalated to cefazolin once sputum had speciation to staph aureus sensitive to that and completed cefazolin course. She also completed ciprofloxacin for concern of ESBL (that was sn to this). She was successfully extubated, and tolerating nasal cannula. She had diarrhea that was present prior to admission as well (due to hx of SB resection), that improved with probiotics. Had continued secretions and completed cefazolin course. Was also started on glycopyrrolate and octreotide for secretions but now discontinued. She had mucous plugging with white out of right side of lung, this improved with percussion vest. 11/26 patient and son had long discussion with palliative care and it was decided to keep patient full code currently with a MOLST indicating as such. Of note, pt did require levophed followed by midodrine for low BP post-extubation, but now no longer requiring pressor support and home anti-hypertensives restarted. She has had 2 vomiting episodes in the past day, likely in setting of tube feeds, which were held last night and restarted this AM. NS 500cc bolus given 11/28 for decreasing UOP likely due to holding of tube feeds prior. Miralax started 11/28 as no BM in last 1-2 days thru rectal tube (in place due to incontinent dermatitis). Stable for step down to [facility] for further monitoring of respiratory status.       VITAL SIGNS:  ICU Vital Signs Last 24 Hrs  T(C): 37.6 (30 Nov 2018 09:40), Max: 38.2 (29 Nov 2018 22:30)  T(F): 99.6 (30 Nov 2018 09:40), Max: 100.7 (29 Nov 2018 22:30)  HR: 100 (30 Nov 2018 10:00) (94 - 126)  BP: 151/83 (30 Nov 2018 10:00) (138/63 - 160/76)  BP(mean): 102 (30 Nov 2018 10:00) (91 - 113)  ABP: --  ABP(mean): --  RR: 21 (30 Nov 2018 10:00) (16 - 21)  SpO2: 97% (30 Nov 2018 10:00) (90% - 97%)        11-29 @ 07:01  -  11-30 @ 07:00  --------------------------------------------------------  IN: 1170 mL / OUT: 1900 mL / NET: -730 mL      CAPILLARY BLOOD GLUCOSE      POCT Blood Glucose.: 132 mg/dL (30 Nov 2018 06:26)      PHYSICAL EXAM:  Constitutional: resting comfortably in bed, NAD  HEENT: NC/AT; PERRL, anicteric sclera; no oropharyngeal erythema or exudates; MMM  Neck: supple, no JVD  Respiratory: CTA B/L, no W/R/R; respirations appear non-labored, speaking full sentences  Cardiovascular: +S1/S2, RRR  Gastrointestinal: abdomen soft, NT/ND; +BS x4  Extremities: WWP; no clubbing, cyanosis or edema  Vascular: 2+ radial, DP/PT and femoral pulses B/L      MEDICATIONS:  MEDICATIONS  (STANDING):  ALBUTerol/ipratropium for Nebulization 3 milliLiter(s) Nebulizer every 6 hours  amLODIPine   Tablet 10 milliGRAM(s) Oral every 24 hours  artificial tears (preservative free) Ophthalmic Solution 1 Drop(s) Both EYES three times a day  chlorhexidine 2% Cloths 1 Application(s) Topical <User Schedule>  dextrose 5%. 1000 milliLiter(s) (50 mL/Hr) IV Continuous <Continuous>  dextrose 50% Injectable 12.5 Gram(s) IV Push once  dextrose 50% Injectable 25 Gram(s) IV Push once  dextrose 50% Injectable 25 Gram(s) IV Push once  glycopyrrolate Injectable 0.2 milliGRAM(s) IV Push every 6 hours  heparin  Injectable 5000 Unit(s) SubCutaneous every 8 hours  insulin lispro (HumaLOG) corrective regimen sliding scale   SubCutaneous every 6 hours  lactobacillus acidophilus 1 Tablet(s) Oral every 8 hours  levothyroxine 150 MICROGram(s) Oral daily  lisinopril 40 milliGRAM(s) Oral daily  metoclopramide Injectable 5 milliGRAM(s) IV Push every 6 hours  pantoprazole   Suspension 40 milliGRAM(s) Enteral Tube daily  polyethylene glycol 3350 17 Gram(s) Oral daily    MEDICATIONS  (PRN):  acetaminophen    Suspension .. 650 milliGRAM(s) Oral every 6 hours PRN Temp greater or equal to 38C (100.4F)  dextrose 40% Gel 15 Gram(s) Oral once PRN Blood Glucose LESS THAN 70 milliGRAM(s)/deciliter  glucagon  Injectable 1 milliGRAM(s) IntraMuscular once PRN Glucose LESS THAN 70 milligrams/deciliter      ALLERGIES:  Allergies    No Known Allergies    Intolerances        LABS:                        7.8    7.7   )-----------( 330      ( 30 Nov 2018 05:43 )             26.6     11-30    145  |  111<H>  |  29<H>  ----------------------------<  129<H>  4.1   |  28  |  1.05    Ca    9.8      30 Nov 2018 05:43  Mg     2.0     11-29            RADIOLOGY & ADDITIONAL TESTS: INTERVAL HISTORY:  Patient seen and examined at bedside. This is a 79 yo woman with dementia, PE (s/p IVC filteR) and prior hospitalizations for episodes of sepsis (ventral hernia infection, MSSA bacteremia, MDR E. Coli UTI) who initially presented here from a nursing home for aspiration pna leading to sepsis. She was treated with broad abx (vancomycin, cipro, and azithro based on prior cultures), which was de-escalated to cefazolin later on (also based on current cultures growing MSSA). She was intubated for respiratory failure 2/2 asp pna, but was eventually extubated and has been on nasal cannula since then (11/19). However, she has been having recurrent mucus plugging 2/2 not being able to handle her secretions. Glycopyrrolate, chest PT + percussion device, and octreotide have been tried but this has been a persistent issue. This morning, CXR was noted to have opacities in the entire right lung     t. 11/26 patient and son had long discussion with palliative care and it was decided to keep patient full code currently with a MOLST indicating as such. Of note, pt did require levophed followed by midodrine for low BP post-extubation, but now no longer requiring pressor support and home anti-hypertensives restarted. She has had 2 vomiting episodes in the past day, likely in setting of tube feeds, which were held last night and restarted this AM. NS 500cc bolus given 11/28 for decreasing UOP likely due to holding of tube feeds prior. Miralax started 11/28 as no BM in last 1-2 days thru rectal tube (in place due to incontinent dermatitis). Stable for step down to [facility] for further monitoring of respiratory status.       VITAL SIGNS:  ICU Vital Signs Last 24 Hrs  T(C): 37.6 (30 Nov 2018 09:40), Max: 38.2 (29 Nov 2018 22:30)  T(F): 99.6 (30 Nov 2018 09:40), Max: 100.7 (29 Nov 2018 22:30)  HR: 100 (30 Nov 2018 10:00) (94 - 126)  BP: 151/83 (30 Nov 2018 10:00) (138/63 - 160/76)  BP(mean): 102 (30 Nov 2018 10:00) (91 - 113)  ABP: --  ABP(mean): --  RR: 21 (30 Nov 2018 10:00) (16 - 21)  SpO2: 97% (30 Nov 2018 10:00) (90% - 97%)        11-29 @ 07:01  -  11-30 @ 07:00  --------------------------------------------------------  IN: 1170 mL / OUT: 1900 mL / NET: -730 mL      CAPILLARY BLOOD GLUCOSE      POCT Blood Glucose.: 132 mg/dL (30 Nov 2018 06:26)      PHYSICAL EXAM:  Constitutional: resting comfortably in bed, NAD  HEENT: NC/AT; PERRL, anicteric sclera; no oropharyngeal erythema or exudates; MMM  Neck: supple, no JVD  Respiratory: CTA B/L, no W/R/R; respirations appear non-labored, speaking full sentences  Cardiovascular: +S1/S2, RRR  Gastrointestinal: abdomen soft, NT/ND; +BS x4  Extremities: WWP; no clubbing, cyanosis or edema  Vascular: 2+ radial, DP/PT and femoral pulses B/L      MEDICATIONS:  MEDICATIONS  (STANDING):  ALBUTerol/ipratropium for Nebulization 3 milliLiter(s) Nebulizer every 6 hours  amLODIPine   Tablet 10 milliGRAM(s) Oral every 24 hours  artificial tears (preservative free) Ophthalmic Solution 1 Drop(s) Both EYES three times a day  chlorhexidine 2% Cloths 1 Application(s) Topical <User Schedule>  dextrose 5%. 1000 milliLiter(s) (50 mL/Hr) IV Continuous <Continuous>  dextrose 50% Injectable 12.5 Gram(s) IV Push once  dextrose 50% Injectable 25 Gram(s) IV Push once  dextrose 50% Injectable 25 Gram(s) IV Push once  glycopyrrolate Injectable 0.2 milliGRAM(s) IV Push every 6 hours  heparin  Injectable 5000 Unit(s) SubCutaneous every 8 hours  insulin lispro (HumaLOG) corrective regimen sliding scale   SubCutaneous every 6 hours  lactobacillus acidophilus 1 Tablet(s) Oral every 8 hours  levothyroxine 150 MICROGram(s) Oral daily  lisinopril 40 milliGRAM(s) Oral daily  metoclopramide Injectable 5 milliGRAM(s) IV Push every 6 hours  pantoprazole   Suspension 40 milliGRAM(s) Enteral Tube daily  polyethylene glycol 3350 17 Gram(s) Oral daily    MEDICATIONS  (PRN):  acetaminophen    Suspension .. 650 milliGRAM(s) Oral every 6 hours PRN Temp greater or equal to 38C (100.4F)  dextrose 40% Gel 15 Gram(s) Oral once PRN Blood Glucose LESS THAN 70 milliGRAM(s)/deciliter  glucagon  Injectable 1 milliGRAM(s) IntraMuscular once PRN Glucose LESS THAN 70 milligrams/deciliter      ALLERGIES:  Allergies    No Known Allergies    Intolerances        LABS:                        7.8    7.7   )-----------( 330      ( 30 Nov 2018 05:43 )             26.6     11-30    145  |  111<H>  |  29<H>  ----------------------------<  129<H>  4.1   |  28  |  1.05    Ca    9.8      30 Nov 2018 05:43  Mg     2.0     11-29            RADIOLOGY & ADDITIONAL TESTS:

## 2018-11-30 NOTE — PROGRESS NOTE ADULT - SUBJECTIVE AND OBJECTIVE BOX
GENERAL SURGERY CONSULT PROGRESS NOTE    SUBJECTIVE:   Pt seen & examined at bedside. Pt seen & examined at bedside. Pt seen and examined at bedside. Smiles and orients to questions, no meaningful ROS obtainable.     MEDICATIONS:  ---NEURO-  acetaminophen    Suspension .. 650 milliGRAM(s) Oral every 6 hours PRN  metoclopramide Injectable 5 milliGRAM(s) IV Push every 6 hours  midazolam Injectable 2 milliGRAM(s) IV Push once  ---CV-  amLODIPine   Tablet 10 milliGRAM(s) Oral every 24 hours  lisinopril 40 milliGRAM(s) Oral daily  ---PULM-  ALBUTerol/ipratropium for Nebulization 3 milliLiter(s) Nebulizer every 6 hours  ---GI/FEN-  glycopyrrolate Injectable 0.2 milliGRAM(s) IV Push every 6 hours  pantoprazole   Suspension 40 milliGRAM(s) Enteral Tube daily  polyethylene glycol 3350 17 Gram(s) Oral daily  dextrose 5%. 1000 milliLiter(s) IV Continuous <Continuous>  ----  ---ID-   ---ENDO-  dextrose 40% Gel 15 Gram(s) Oral once PRN  dextrose 50% Injectable 12.5 Gram(s) IV Push once  dextrose 50% Injectable 25 Gram(s) IV Push once  dextrose 50% Injectable 25 Gram(s) IV Push once  glucagon  Injectable 1 milliGRAM(s) IntraMuscular once PRN  insulin lispro (HumaLOG) corrective regimen sliding scale   SubCutaneous every 6 hours  levothyroxine 150 MICROGram(s) Oral daily  ---HEME-  heparin  Injectable 5000 Unit(s) SubCutaneous every 8 hours  ---OTHER-  artificial tears (preservative free) Ophthalmic Solution 1 Drop(s) Both EYES three times a day  chlorhexidine 2% Cloths 1 Application(s) Topical <User Schedule>  lactobacillus acidophilus 1 Tablet(s) Oral every 8 hours        VOLUME STATUS:  I&O's Detail    29 Nov 2018 07:01  -  30 Nov 2018 07:00  --------------------------------------------------------  IN:    Glucerna 1.5: 670 mL    IV PiggyBack: 500 mL  Total IN: 1170 mL    OUT:    Indwelling Catheter - Urethral: 1600 mL    Rectal Tube: 300 mL  Total OUT: 1900 mL    Total NET: -730 mL      30 Nov 2018 07:01  -  30 Nov 2018 14:28  --------------------------------------------------------  IN:    Enteral Tube Flush: 100 mL  Total IN: 100 mL    OUT:    Indwelling Catheter - Urethral: 375 mL  Total OUT: 375 mL    Total NET: -275 mL          VITALS:  Vital Signs Last 24 Hrs  T(C): 37.6 (30 Nov 2018 09:40), Max: 38.2 (29 Nov 2018 22:30)  T(F): 99.6 (30 Nov 2018 09:40), Max: 100.7 (29 Nov 2018 22:30)  HR: 104 (30 Nov 2018 13:00) (94 - 126)  BP: 153/81 (30 Nov 2018 13:00) (139/67 - 165/84)  BP(mean): 106 (30 Nov 2018 13:00) (91 - 121)  RR: 23 (30 Nov 2018 13:00) (16 - 24)  SpO2: 99% (30 Nov 2018 13:00) (90% - 100%)    PHYSICAL EXAM:  Constitutional: NAD, resting comfortably, on NC, orients  to verbal stimulus   HEENT: MMM  Resp: nonlabored breathing  Abd: soft, nondistended, nontender, PEG in place, incontinent     LABS:                        7.8    7.7   )-----------( 330      ( 30 Nov 2018 05:43 )             26.6     11-30    145  |  111<H>  |  29<H>  ----------------------------<  129<H>  4.1   |  28  |  1.05    Ca    9.8      30 Nov 2018 05:43  Mg     2.0     11-29      PT/INR - ( 30 Nov 2018 10:52 )   PT: 13.6 sec;   INR: 1.20          PTT - ( 30 Nov 2018 10:52 )  PTT:25.6 sec

## 2018-11-30 NOTE — CONSULT NOTE ADULT - PROBLEM SELECTOR PROBLEM 1
Septic shock
Acute respiratory failure with hypoxia
GONZALEZ (acute kidney injury)
Mucus plugging of bronchi

## 2018-11-30 NOTE — BRIEF OPERATIVE NOTE - OPERATION/FINDINGS
Bronchoscope inserted through ETT. Airway evaluation revealed Sharp Priscila. MAUDE and LLL evaluation revealed thick mucus, which was therapeutically aspirated. RUL, RML, RLL also revealed thick secretions whih was aspirated. Bronchoscope then withdrawn from ETT. Minimal bleeding noted

## 2018-11-30 NOTE — PROGRESS NOTE ADULT - PROBLEM SELECTOR PLAN 9
Dvt ppx: hep sq  GI ppx: protonix  Code Status: Full code, will have palliative have discussions for long term care of patient due to chronic aspiration pna  Dispo: [facility] F: none  E: K>4 Mag>2 caution given recent GONZALEZ  N: glucerna 1.5 via PEG F: none  E: K>4 Mag>2 caution given recent GONZALEZ  N: holding glucerna 1.5 via PEG

## 2018-11-30 NOTE — PROGRESS NOTE ADULT - PROBLEM SELECTOR PLAN 2
HAP with sputum culture with MSSA sensitive to cefazolin.   - see above Pt diagnosed with sepsis 2/2 HAP and ESBL UTIon admission, hypotensive in MICU requiring levophed and midodrine.   - resolved  - sputum cx staph aureus sn to cefazolin  - completed course of cefazolin for MSSA and ciprofloxacin for UTI (ESBL sn to this).    #incontinent dermatitis of b/l buttocks  -daily wound care  -rectal tube in place    #Anemia of chronic disease  -no signs of active bleeding  -active type and screen

## 2018-11-30 NOTE — PROGRESS NOTE ADULT - SUBJECTIVE AND OBJECTIVE BOX
Patient is a 78y old  Female who presents with a chief complaint of Septic shock and acute respiratory failure (29 Nov 2018 06:12)      INTERVAL HPI/OVERNIGHT EVENTS:  ICU Vital Signs Last 24 Hrs  T(C): 37.1 (30 Nov 2018 05:34), Max: 38.2 (29 Nov 2018 22:30)  T(F): 98.7 (30 Nov 2018 05:34), Max: 100.7 (29 Nov 2018 22:30)  HR: 94 (30 Nov 2018 04:00) (94 - 126)  BP: 157/78 (30 Nov 2018 04:00) (138/63 - 159/83)  BP(mean): 109 (30 Nov 2018 04:00) (91 - 113)  ABP: --  ABP(mean): --  RR: 18 (30 Nov 2018 04:00) (16 - 18)  SpO2: 96% (30 Nov 2018 04:00) (90% - 96%)    I&O's Summary    28 Nov 2018 07:01  -  29 Nov 2018 07:00  --------------------------------------------------------  IN: 320 mL / OUT: 1045 mL / NET: -725 mL    29 Nov 2018 07:01  -  30 Nov 2018 06:26  --------------------------------------------------------  IN: 1140 mL / OUT: 1000 mL / NET: 140 mL          LABS:                        7.8    7.7   )-----------( 330      ( 30 Nov 2018 05:43 )             26.6     11-29    141  |  105  |  33<H>  ----------------------------<  130<H>  4.2   |  26  |  1.17    Ca    9.7      29 Nov 2018 06:52  Mg     2.0     11-29          CAPILLARY BLOOD GLUCOSE      POCT Blood Glucose.: 139 mg/dL (29 Nov 2018 23:57)  POCT Blood Glucose.: 125 mg/dL (29 Nov 2018 16:50)  POCT Blood Glucose.: 119 mg/dL (29 Nov 2018 11:30)  POCT Blood Glucose.: 131 mg/dL (29 Nov 2018 07:07)        RADIOLOGY & ADDITIONAL TESTS:    Consultant(s) Notes Reviewed:  [x ] YES  [ ] NO    MEDICATIONS  (STANDING):  ALBUTerol/ipratropium for Nebulization 3 milliLiter(s) Nebulizer every 6 hours  amLODIPine   Tablet 10 milliGRAM(s) Oral every 24 hours  artificial tears (preservative free) Ophthalmic Solution 1 Drop(s) Both EYES three times a day  dextrose 5%. 1000 milliLiter(s) (50 mL/Hr) IV Continuous <Continuous>  dextrose 50% Injectable 12.5 Gram(s) IV Push once  dextrose 50% Injectable 25 Gram(s) IV Push once  dextrose 50% Injectable 25 Gram(s) IV Push once  glycopyrrolate Injectable 0.2 milliGRAM(s) IV Push every 6 hours  heparin  Injectable 5000 Unit(s) SubCutaneous every 8 hours  insulin lispro (HumaLOG) corrective regimen sliding scale   SubCutaneous every 6 hours  lactobacillus acidophilus 1 Tablet(s) Oral every 8 hours  levothyroxine 150 MICROGram(s) Oral daily  lisinopril 40 milliGRAM(s) Oral daily  metoclopramide Injectable 5 milliGRAM(s) IV Push every 6 hours  pantoprazole   Suspension 40 milliGRAM(s) Enteral Tube daily  polyethylene glycol 3350 17 Gram(s) Oral daily    MEDICATIONS  (PRN):  acetaminophen    Suspension .. 650 milliGRAM(s) Oral every 6 hours PRN Temp greater or equal to 38C (100.4F)  dextrose 40% Gel 15 Gram(s) Oral once PRN Blood Glucose LESS THAN 70 milliGRAM(s)/deciliter  glucagon  Injectable 1 milliGRAM(s) IntraMuscular once PRN Glucose LESS THAN 70 milligrams/deciliter      PHYSICAL EXAM:  GENERAL: well built, well nourished  HEAD:  Atraumatic, Normocephalic  EYES: EOMI, PERRLA, conjunctiva and sclera clear  ENT: No tonsillar erythema, exudates, or enlargement; Moist mucous membranes, Good dentition, No lesions  NECK: Supple, No JVD, Normal thyroid, no enlarged nodes  NERVOUS SYSTEM:  Alert & Oriented X3, Good concentration; Motor Strength 5/5 B/L upper and lower extremities; DTRs 2+ intact and symmetric, sensory intact  CHEST/LUNG: B/L good air entry; No rales, rhonchi, or wheezing  HEART: S1S2 normal, no S3, Regular rate and rhythm; No murmurs, rubs, or gallops  ABDOMEN: Soft, Nontender, Nondistended; Bowel sounds present  EXTREMITIES:  2+ Peripheral Pulses, No clubbing, cyanosis, or edema  LYMPH: No lymphadenopathy noted  SKIN: No rashes or lesions    Care Discussed with Consultants/Other Providers [ x] YES  [ ] NO TRANSFER NOTE FROM [facility] TO [facility]	    HOSPITAL COURSE    OVERNIGHT EVENTS:    INTERVAL HPI/  ICU Vital Signs Last 24 Hrs  T(C): 37.1 (30 Nov 2018 05:34), Max: 38.2 (29 Nov 2018 22:30)  T(F): 98.7 (30 Nov 2018 05:34), Max: 100.7 (29 Nov 2018 22:30)  HR: 94 (30 Nov 2018 04:00) (94 - 126)  BP: 157/78 (30 Nov 2018 04:00) (138/63 - 159/83)  BP(mean): 109 (30 Nov 2018 04:00) (91 - 113)  ABP: --  ABP(mean): --  RR: 18 (30 Nov 2018 04:00) (16 - 18)  SpO2: 96% (30 Nov 2018 04:00) (90% - 96%)    I&O's Summary    28 Nov 2018 07:01  -  29 Nov 2018 07:00  --------------------------------------------------------  IN: 320 mL / OUT: 1045 mL / NET: -725 mL    29 Nov 2018 07:01  -  30 Nov 2018 06:26  --------------------------------------------------------  IN: 1140 mL / OUT: 1000 mL / NET: 140 mL          LABS:                        7.8    7.7   )-----------( 330      ( 30 Nov 2018 05:43 )             26.6     11-29    141  |  105  |  33<H>  ----------------------------<  130<H>  4.2   |  26  |  1.17    Ca    9.7      29 Nov 2018 06:52  Mg     2.0     11-29          CAPILLARY BLOOD GLUCOSE      POCT Blood Glucose.: 139 mg/dL (29 Nov 2018 23:57)  POCT Blood Glucose.: 125 mg/dL (29 Nov 2018 16:50)  POCT Blood Glucose.: 119 mg/dL (29 Nov 2018 11:30)  POCT Blood Glucose.: 131 mg/dL (29 Nov 2018 07:07)        RADIOLOGY & ADDITIONAL TESTS:    Consultant(s) Notes Reviewed:  [x ] YES  [ ] NO    MEDICATIONS  (STANDING):  ALBUTerol/ipratropium for Nebulization 3 milliLiter(s) Nebulizer every 6 hours  amLODIPine   Tablet 10 milliGRAM(s) Oral every 24 hours  artificial tears (preservative free) Ophthalmic Solution 1 Drop(s) Both EYES three times a day  dextrose 5%. 1000 milliLiter(s) (50 mL/Hr) IV Continuous <Continuous>  dextrose 50% Injectable 12.5 Gram(s) IV Push once  dextrose 50% Injectable 25 Gram(s) IV Push once  dextrose 50% Injectable 25 Gram(s) IV Push once  glycopyrrolate Injectable 0.2 milliGRAM(s) IV Push every 6 hours  heparin  Injectable 5000 Unit(s) SubCutaneous every 8 hours  insulin lispro (HumaLOG) corrective regimen sliding scale   SubCutaneous every 6 hours  lactobacillus acidophilus 1 Tablet(s) Oral every 8 hours  levothyroxine 150 MICROGram(s) Oral daily  lisinopril 40 milliGRAM(s) Oral daily  metoclopramide Injectable 5 milliGRAM(s) IV Push every 6 hours  pantoprazole   Suspension 40 milliGRAM(s) Enteral Tube daily  polyethylene glycol 3350 17 Gram(s) Oral daily    MEDICATIONS  (PRN):  acetaminophen    Suspension .. 650 milliGRAM(s) Oral every 6 hours PRN Temp greater or equal to 38C (100.4F)  dextrose 40% Gel 15 Gram(s) Oral once PRN Blood Glucose LESS THAN 70 milliGRAM(s)/deciliter  glucagon  Injectable 1 milliGRAM(s) IntraMuscular once PRN Glucose LESS THAN 70 milligrams/deciliter      PHYSICAL EXAM:  GENERAL: well built, well nourished  HEAD:  Atraumatic, Normocephalic  EYES: EOMI, PERRLA, conjunctiva and sclera clear  ENT: No tonsillar erythema, exudates, or enlargement; Moist mucous membranes, Good dentition, No lesions  NECK: Supple, No JVD, Normal thyroid, no enlarged nodes  NERVOUS SYSTEM:  Alert & Oriented X3, Good concentration; Motor Strength 5/5 B/L upper and lower extremities; DTRs 2+ intact and symmetric, sensory intact  CHEST/LUNG: B/L good air entry; No rales, rhonchi, or wheezing  HEART: S1S2 normal, no S3, Regular rate and rhythm; No murmurs, rubs, or gallops  ABDOMEN: Soft, Nontender, Nondistended; Bowel sounds present  EXTREMITIES:  2+ Peripheral Pulses, No clubbing, cyanosis, or edema  LYMPH: No lymphadenopathy noted  SKIN: No rashes or lesions    Care Discussed with Consultants/Other Providers [ x] YES  [ ] NO TRANSFER NOTE [facility]	    HOSPITAL COURSE  78F w/ PMH of morbid obesity, DM, HTN, hypothyroidism, sepsis secondary to ventral hernia infection s/p repair, PE s/p IVC filter, acute blood loss anemia from hemorrhagic uterine fibroids and UGIB, ATN requiring HD, sepsis secondary to MSSA bacteremia and MDR E coli UTI (4-18)  now w/AMS and s/p TAHBSO and small bowel resection s/p PEG. Patient presented from [facility] with concern for sepsis 2/2 aspiration pna and hypoxia. Per EMS, patient was saturating 85% on RA at [facility] with some improvement to 95% after being placed on NRB.  On arrival to the ED, patient was altered and saturating 89%. She was intubated in the ED with suctioning revealing purulent material. She was started on vanc/azithro/cipro (due to prior ESBL that was resistant to zosyn but sn to cipro, cipro course was finished) and that was deescalated to cefazolin once sputum had speciation to staph aureus sensitive to that and completed cefazolin course. She also completed ciprofloxacin for concern of ESBL (that was sn to this). She was successfully extubated, and tolerating nasal cannula. She had diarrhea that was present prior to admission as well (due to hx of SB resection), that improved with probiotics. Had continued secretions and completed cefazolin course. Was also started on glycopyrrolate and octreotide for secretions but now discontinued. She had mucous plugging with white out of right side of lung, this improved with percussion vest. 11/26 patient and son had long discussion with palliative care and it was decided to keep patient full code currently with a MOLST indicating as such. Of note, pt did require levophed followed by midodrine for low BP post-extubation, but now no longer requiring pressor support and home anti-hypertensives restarted. She has had 2 vomiting episodes in the past day, likely in setting of tube feeds, which were held last night and restarted this AM. NS 500cc bolus given 11/28 for decreasing UOP likely due to holding of tube feeds prior. Miralax started 11/28 as no BM in last 1-2 days thru rectal tube (in place due to incontinent dermatitis). Stable for step down to 7La for further monitoring of respiratory status. Frequent suctioning for secretions.  Feeds increased back to 30cc/hr. Tachy to 120s overnight 11/29, with rising temp, max 100.7 rectal. Patient still sating well on NC. CXR shows complete white out of RL with tracheal shift likely 2/2 to mucus plugging. Patient transferred to MICU for possible bronchoscopy and Intubation with anticipation to transition to [facility].    OVERNIGHT EVENTS:  Pt tachy (sinus on monitor). No complaints of pain.  Pt given 500 cc bolus of fluids. Pt still tachy despite bolus with low grade temp of rectal 100.2, gave patient tyenol.  Tachy improved to 90's. cxr showing white out right sided. Feeds stopped. blood cx sent after patient spiked 100.7 rectal     INTERVAL HPI: Pt is examined at bedside. She appears well, resting comfortably in bed, in NAD. She denies chest pain, SOB, abdominal pain.     ICU Vital Signs Last 24 Hrs  T(C): 37.1 (30 Nov 2018 05:34), Max: 38.2 (29 Nov 2018 22:30)  T(F): 98.7 (30 Nov 2018 05:34), Max: 100.7 (29 Nov 2018 22:30)  HR: 94 (30 Nov 2018 04:00) (94 - 126)  BP: 157/78 (30 Nov 2018 04:00) (138/63 - 159/83)  BP(mean): 109 (30 Nov 2018 04:00) (91 - 113)  ABP: --  ABP(mean): --  RR: 18 (30 Nov 2018 04:00) (16 - 18)  SpO2: 96% (30 Nov 2018 04:00) (90% - 96%)    I&O's Summary    28 Nov 2018 07:01  -  29 Nov 2018 07:00  --------------------------------------------------------  IN: 320 mL / OUT: 1045 mL / NET: -725 mL    29 Nov 2018 07:01  -  30 Nov 2018 06:26  --------------------------------------------------------  IN: 1140 mL / OUT: 1000 mL / NET: 140 mL          LABS:                        7.8    7.7   )-----------( 330      ( 30 Nov 2018 05:43 )             26.6     11-29    141  |  105  |  33<H>  ----------------------------<  130<H>  4.2   |  26  |  1.17    Ca    9.7      29 Nov 2018 06:52  Mg     2.0     11-29          CAPILLARY BLOOD GLUCOSE      POCT Blood Glucose.: 139 mg/dL (29 Nov 2018 23:57)  POCT Blood Glucose.: 125 mg/dL (29 Nov 2018 16:50)  POCT Blood Glucose.: 119 mg/dL (29 Nov 2018 11:30)  POCT Blood Glucose.: 131 mg/dL (29 Nov 2018 07:07)        RADIOLOGY & ADDITIONAL TESTS:    Consultant(s) Notes Reviewed:  [x ] YES  [ ] NO    MEDICATIONS  (STANDING):  ALBUTerol/ipratropium for Nebulization 3 milliLiter(s) Nebulizer every 6 hours  amLODIPine   Tablet 10 milliGRAM(s) Oral every 24 hours  artificial tears (preservative free) Ophthalmic Solution 1 Drop(s) Both EYES three times a day  dextrose 5%. 1000 milliLiter(s) (50 mL/Hr) IV Continuous <Continuous>  dextrose 50% Injectable 12.5 Gram(s) IV Push once  dextrose 50% Injectable 25 Gram(s) IV Push once  dextrose 50% Injectable 25 Gram(s) IV Push once  glycopyrrolate Injectable 0.2 milliGRAM(s) IV Push every 6 hours  heparin  Injectable 5000 Unit(s) SubCutaneous every 8 hours  insulin lispro (HumaLOG) corrective regimen sliding scale   SubCutaneous every 6 hours  lactobacillus acidophilus 1 Tablet(s) Oral every 8 hours  levothyroxine 150 MICROGram(s) Oral daily  lisinopril 40 milliGRAM(s) Oral daily  metoclopramide Injectable 5 milliGRAM(s) IV Push every 6 hours  pantoprazole   Suspension 40 milliGRAM(s) Enteral Tube daily  polyethylene glycol 3350 17 Gram(s) Oral daily    MEDICATIONS  (PRN):  acetaminophen    Suspension .. 650 milliGRAM(s) Oral every 6 hours PRN Temp greater or equal to 38C (100.4F)  dextrose 40% Gel 15 Gram(s) Oral once PRN Blood Glucose LESS THAN 70 milliGRAM(s)/deciliter  glucagon  Injectable 1 milliGRAM(s) IntraMuscular once PRN Glucose LESS THAN 70 milligrams/deciliter    PHYSICAL EXAM:  GENERAL: NAD, resting comfortably in bed.   HEAD:  Atraumatic, Normocephalic  EYES: EOMI, PERRLA, conjunctiva and sclera clear  ENT: No tonsillar erythema, exudates, or enlargement; Moist mucous membranes, Good dentition, No lesions  NECK: Supple, No JVD, Normal thyroid, no enlarged nodes  NERVOUS SYSTEM:  Alert & Orientedx2, responsive to simple commands. Answering questions but inconsistently muscle strength 5/5 b/l. Sensation in tact b/l.   CHEST/LUNG: decreased breath sounds on right lung.   HEART: S1S2 normal, no S3, Regular rate and rhythm; No murmurs, rubs, or gallops  ABDOMEN: Soft, Nontender, Nondistended; Bowel sounds present  EXTREMITIES:  2+ Peripheral Pulses, No clubbing, cyanosis, or edema  LYMPH: No lymphadenopathy noted  SKIN: rash near buttocks region and low back due to stool, no erythema, discharge or purulence      Care Discussed with Consultants/Other Providers [ x] YES  [ ] NO TRANSFER NOTE FROM [facility] TO [facility]	    HOSPITAL COURSE  78F w/ PMH of morbid obesity, DM, HTN, hypothyroidism, sepsis secondary to ventral hernia infection s/p repair, PE s/p IVC filter, acute blood loss anemia from hemorrhagic uterine fibroids and UGIB, ATN requiring HD, sepsis secondary to MSSA bacteremia and MDR E coli UTI (4-18)  now w/AMS and s/p TAHBSO and small bowel resection s/p PEG. Patient presented from [facility] with concern for sepsis 2/2 aspiration pna and hypoxia. Per EMS, patient was saturating 85% on RA at [facility] with some improvement to 95% after being placed on NRB.  On arrival to the ED, patient was altered and saturating 89%. She was intubated in the ED with suctioning revealing purulent material. She was started on vanc/azithro/cipro (due to prior ESBL that was resistant to zosyn but sn to cipro, cipro course was finished) and that was deescalated to cefazolin once sputum had speciation to staph aureus sensitive to that and completed cefazolin course. She also completed ciprofloxacin for concern of ESBL (that was sn to this). She was successfully extubated, and tolerating nasal cannula. She had diarrhea that was present prior to admission as well (due to hx of SB resection), that improved with probiotics. Had continued secretions and completed cefazolin course. Was also started on glycopyrrolate and octreotide for secretions but now discontinued. She had mucous plugging with white out of right side of lung, this improved with percussion vest. 11/26 patient and son had long discussion with palliative care and it was decided to keep patient full code currently with a MOLST indicating as such. Of note, pt did require levophed followed by midodrine for low BP post-extubation, but now no longer requiring pressor support and home anti-hypertensives restarted. She has had 2 vomiting episodes in the past day, likely in setting of tube feeds, which were held last night and restarted this AM. NS 500cc bolus given 11/28 for decreasing UOP likely due to holding of tube feeds prior. Miralax started 11/28 as no BM in last 1-2 days thru rectal tube (in place due to incontinent dermatitis). Stable for step down to 7La for further monitoring of respiratory status. Frequent suctioning for secretions.  Feeds increased back to 30cc/hr. Tachy to 120s overnight 11/29, with rising temp, max 100.7 rectal. Patient still sating well on NC. CXR shows complete white out of RL with tracheal shift likely 2/2 to mucus plugging. Patient transferred to MICU for possible bronchoscopy and Intubation with anticipation to transition to trach.    OVERNIGHT EVENTS:  Pt tachy (sinus on monitor). No complaints of pain.  Pt given 500 cc bolus of fluids. Pt still tachy despite bolus with low grade temp of rectal 100.2, gave patient tyenol.  Tachy improved to 90's. cxr showing white out right sided. Feeds stopped. blood cx sent after patient spiked 100.7 rectal     INTERVAL HPI: Pt is examined at bedside. She appears well, resting comfortably in bed, in NAD. She denies chest pain, SOB, abdominal pain. Sating well on NC.     ICU Vital Signs Last 24 Hrs  T(C): 37.1 (30 Nov 2018 05:34), Max: 38.2 (29 Nov 2018 22:30)  T(F): 98.7 (30 Nov 2018 05:34), Max: 100.7 (29 Nov 2018 22:30)  HR: 94 (30 Nov 2018 04:00) (94 - 126)  BP: 157/78 (30 Nov 2018 04:00) (138/63 - 159/83)  BP(mean): 109 (30 Nov 2018 04:00) (91 - 113)  ABP: --  ABP(mean): --  RR: 18 (30 Nov 2018 04:00) (16 - 18)  SpO2: 96% (30 Nov 2018 04:00) (90% - 96%)    I&O's Summary    28 Nov 2018 07:01  -  29 Nov 2018 07:00  --------------------------------------------------------  IN: 320 mL / OUT: 1045 mL / NET: -725 mL    29 Nov 2018 07:01  -  30 Nov 2018 06:26  --------------------------------------------------------  IN: 1140 mL / OUT: 1000 mL / NET: 140 mL          LABS:                        7.8    7.7   )-----------( 330      ( 30 Nov 2018 05:43 )             26.6     11-29    141  |  105  |  33<H>  ----------------------------<  130<H>  4.2   |  26  |  1.17    Ca    9.7      29 Nov 2018 06:52  Mg     2.0     11-29          CAPILLARY BLOOD GLUCOSE      POCT Blood Glucose.: 139 mg/dL (29 Nov 2018 23:57)  POCT Blood Glucose.: 125 mg/dL (29 Nov 2018 16:50)  POCT Blood Glucose.: 119 mg/dL (29 Nov 2018 11:30)  POCT Blood Glucose.: 131 mg/dL (29 Nov 2018 07:07)        RADIOLOGY & ADDITIONAL TESTS:    Consultant(s) Notes Reviewed:  [x ] YES  [ ] NO    MEDICATIONS  (STANDING):  ALBUTerol/ipratropium for Nebulization 3 milliLiter(s) Nebulizer every 6 hours  amLODIPine   Tablet 10 milliGRAM(s) Oral every 24 hours  artificial tears (preservative free) Ophthalmic Solution 1 Drop(s) Both EYES three times a day  dextrose 5%. 1000 milliLiter(s) (50 mL/Hr) IV Continuous <Continuous>  dextrose 50% Injectable 12.5 Gram(s) IV Push once  dextrose 50% Injectable 25 Gram(s) IV Push once  dextrose 50% Injectable 25 Gram(s) IV Push once  glycopyrrolate Injectable 0.2 milliGRAM(s) IV Push every 6 hours  heparin  Injectable 5000 Unit(s) SubCutaneous every 8 hours  insulin lispro (HumaLOG) corrective regimen sliding scale   SubCutaneous every 6 hours  lactobacillus acidophilus 1 Tablet(s) Oral every 8 hours  levothyroxine 150 MICROGram(s) Oral daily  lisinopril 40 milliGRAM(s) Oral daily  metoclopramide Injectable 5 milliGRAM(s) IV Push every 6 hours  pantoprazole   Suspension 40 milliGRAM(s) Enteral Tube daily  polyethylene glycol 3350 17 Gram(s) Oral daily    MEDICATIONS  (PRN):  acetaminophen    Suspension .. 650 milliGRAM(s) Oral every 6 hours PRN Temp greater or equal to 38C (100.4F)  dextrose 40% Gel 15 Gram(s) Oral once PRN Blood Glucose LESS THAN 70 milliGRAM(s)/deciliter  glucagon  Injectable 1 milliGRAM(s) IntraMuscular once PRN Glucose LESS THAN 70 milligrams/deciliter    PHYSICAL EXAM:  GENERAL: NAD, resting comfortably in bed.   HEAD:  Atraumatic, Normocephalic  EYES: EOMI, PERRLA, conjunctiva and sclera clear  ENT: No tonsillar erythema, exudates, or enlargement; Moist mucous membranes, Good dentition, No lesions  NECK: Supple, No JVD, Normal thyroid, no enlarged nodes  NERVOUS SYSTEM:  Alert & Orientedx2, responsive to simple commands. Answering questions but inconsistently muscle strength 5/5 b/l. Sensation in tact b/l.   CHEST/LUNG: decreased breath sounds on right lung.   HEART: S1S2 normal, no S3, Regular rate and rhythm; No murmurs, rubs, or gallops  ABDOMEN: Soft, Nontender, Nondistended; Bowel sounds present  EXTREMITIES:  2+ Peripheral Pulses, No clubbing, cyanosis, or edema  LYMPH: No lymphadenopathy noted  SKIN: rash near buttocks region and low back due to stool, no erythema, discharge or purulence      Care Discussed with Consultants/Other Providers [ x] YES  [ ] NO

## 2018-11-30 NOTE — BRIEF OPERATIVE NOTE - PROCEDURE
<<-----Click on this checkbox to enter Procedure Flexible bronchoscopy  11/30/2018    Active  ANNITA

## 2018-11-30 NOTE — CONSULT NOTE ADULT - REASON FOR ADMISSION
Septic shock and acute respiratory failure
Septic Shock
Septic shock and acute respiratory failure

## 2018-11-30 NOTE — PROGRESS NOTE ADULT - PROBLEM SELECTOR PLAN 5
-BP 150s/80s  -continue home lisinopril 40 mg daily  -amlodipine 10 mg daily Pt recently admitted 4/15/18 due to MSSA bacteremia and MDR E Coli in urine, transferred to [facility], and found to have extravasation on CT abd, s/p IR drain placement, underwent DAVY BSO 2/2 tubal metaplasia, SBR with primary anastomosis, abdominal washout, PEG placement, discharged on 6/4  - surgery on board  - PEG in place  - c/w glucerna feeds at 30cc/hr

## 2018-11-30 NOTE — PROGRESS NOTE ADULT - PROBLEM SELECTOR PLAN 3
Toxic metabolic encephalopathy 2/2 septic shock  RESOLVED  - mental status consistent with patient's baseline dementia HAP with sputum culture with MSSA sensitive to cefazolin.   - see above

## 2018-11-30 NOTE — PROGRESS NOTE ADULT - ASSESSMENT
78F w/ PMH obesity, DM, HTN, hypothyroidism, sepsis secondary to ventral hernia infection s/p repair, PE s/p IVC filter, acute blood loss anemia from hemorrhagic uterine fibroids and UGIB, ATN requiring HD, sepsis secondary to MSSA bacteremia and MDR E coli UTI w/AMS and s/p TAHBSO and small bowel resection s/p PEG. Presenting with septic shock likely 2/2 PNA vs UTI and acute hypoxic respiratory failure now extubated, concern for     Neuro  #Toxic metabolic encephalopathy 2/2 septic shock  RESOLVED  - mental status consistent with patient's baseline dementia    Respiratory  #HAP with sputum culture with MSSA sensitive to cefazolin.   -percussion vest for mucous/ glycopyrrolate 0.2 Q6 IV  - octreotide for secretions 100 TID  - abx as below  -mucous plugging- had percussion vest and glycopyrrolate that is helping.     CV  #HTN  -BP 150s/80s  -continue home lisinopril 40 mg daily  -amlodipine 10 mg daily    GI  - PEG in site        Renal  - Cr at baseline 1.10 as of 11/25    ID  #Sepsis 2/2 HAP and ESBL UTI  resolved  -sputum cx staph aureus sn to cefazolin  -completed course of cefazolin for MSSA and ciprofloxacin for UTI (ESBL sn to this).     Endo  #T2DM  -ISS  -FSBS      #hypothyroidism  -Continue synthroid currently 150 mcg per day oral.     Derm  #incontinent dermatitis of b/l buttocks  -daily wound care  -rectal tube in place    Heme:  # Anemia of chronic disease  -no signs of active bleeding  -active type and screen    GI  # Chronic diarrhea 2/2 small bowel resection  -probiotics daily      FEN  F: none  E: K>4 Mag>2 caution given recent GONZALEZ  N: glucerna 1.5 via PEG    Dvt ppx: hep sq  GI ppx: protonix    Code Status: Full code, will have palliative have discussions for long term care of patient due to chronic aspiration pna    Dispo: 7 [address] 78F w/ PMH obesity, DM, HTN, hypothyroidism, sepsis secondary to ventral hernia infection s/p repair, PE s/p IVC filter, acute blood loss anemia from hemorrhagic uterine fibroids and UGIB, ATN requiring HD, sepsis secondary to MSSA bacteremia and MDR E coli UTI w/AMS and s/p TAHBSO and small bowel resection s/p PEG. Presenting with septic shock likely 2/2 PNA vs UTI and acute hypoxic respiratory failure now extubated, plan for bronchoscopy due to recurrent mucus plugging     Neuro  #Toxic metabolic encephalopathy 2/2 septic shock  RESOLVED  - mental status consistent with patient's baseline dementia    Respiratory  #HAP with sputum culture with MSSA sensitive to cefazolin.     # Mucus plugging- recurrent, pt unable to keep down secretions, now with white out of R lung   - continue Chest PT and percussion vest  for mucous/ glycopyrrolate 0.2 Q6 IV  - plan for bronch, potentially trach  - goals of care discussion with patient's son, pt currently full code    CV  #HTN  -BP 150s/80s  -continue home lisinopril 40 mg daily  -amlodipine 10 mg daily    GI  - PEG in site  - NPO for bronch today    Renal  - Cr at baseline 1.10 as of 11/25    ID  #Sepsis 2/2 HAP and ESBL UTI  resolved  -sputum cx staph aureus sn to cefazolin  -completed course of cefazolin for MSSA and ciprofloxacin for UTI (ESBL sn to this).     Endo  #T2DM  -ISS  -FSBS      #hypothyroidism  -Continue synthroid currently 150 mcg per day oral.     Derm  #incontinent dermatitis of b/l buttocks  -daily wound care  -rectal tube in place    Heme:  # Anemia of chronic disease  -no signs of active bleeding  -active type and screen    GI  # Chronic diarrhea 2/2 small bowel resection  -probiotics daily      FEN  F: none  E: K>4 Mag>2 caution given recent GONZALEZ  N: NPO for bronch     Dvt ppx: hep sq  GI ppx: protonix    Code Status: Full code, will have palliative have discussions for long term care of patient due to chronic aspiration pna    Dispo: MICU

## 2018-11-30 NOTE — CONSULT NOTE ADULT - PROBLEM SELECTOR RECOMMENDATION 9
Given her history of poor secretion handling and imaging this morning suggestive of right lung volume loss with an abrupt cutoff of the right mainstem bronchi, she likely has mucus plugging of her bronchi. Despite repeat deep nasal suctioning retrieving thick secretions, repeat imaging showed no changes with no aeration in the area of opacification.     Recommend  - Bedside bronchoscopic exam today.

## 2018-11-30 NOTE — CONSULT NOTE ADULT - SUBJECTIVE AND OBJECTIVE BOX
Patient seen and examined at bedside. This is a 79 yo woman with dementia, PE (s/p IVC filteR) and prior hospitalizations for episodes of sepsis (ventral hernia infection, MSSA bacteremia, MDR E. Coli UTI) who initially presented here from a nursing home for aspiration pna leading to sepsis. She was treated with broad abx (vancomycin, cipro, and azithro based on prior cultures), which was de-escalated to cefazolin later on (also based on current cultures growing MSSA). She was intubated for respiratory failure 2/2 asp pna, but was eventually extubated and has been on nasal cannula since then (11/19). However, she has been having recurrent mucus plugging 2/2 not being able to handle her secretions. Glycopyrrolate, chest PT + percussion device, and octreotide have been tried but this has been a persistent issue. This morning, CXR was noted to have opacities in the entire right lung Patient seen and examined at bedside. This is a 77 yo woman with dementia, PE (s/p IVC filter) and prior hospitalizations for episodes of sepsis (ventral hernia infection, MSSA bacteremia, MDR E. Coli UTI) who initially presented here from a nursing home for aspiration pna leading to sepsis. She was treated with broad abx (vancomycin, cipro, and azithro based on prior cultures), which was de-escalated to cefazolin later on (also based on current cultures growing MSSA). She was intubated for respiratory failure 2/2 asp pna, but was eventually extubated and has been on nasal cannula since then (11/19). However, she has been having recurrent mucus plugging 2/2 not being able to handle her secretions. Glycopyrrolate, chest PT + percussion device, and octreotide have been tried but this has been a persistent issue. This morning, CXR was noted to have opacification of the entire right lung, for which we are consulted for. Patient is demented and cannot give sufficient history.       VITAL SIGNS:  ICU Vital Signs Last 24 Hrs  T(C): 37.6 (30 Nov 2018 09:40), Max: 38.2 (29 Nov 2018 22:30)  T(F): 99.6 (30 Nov 2018 09:40), Max: 100.7 (29 Nov 2018 22:30)  HR: 104 (30 Nov 2018 13:00) (94 - 126)  BP: 153/81 (30 Nov 2018 13:00) (139/67 - 165/84)  BP(mean): 106 (30 Nov 2018 13:00) (91 - 121)  ABP: --  ABP(mean): --  RR: 23 (30 Nov 2018 13:00) (16 - 24)  SpO2: 99% (30 Nov 2018 13:00) (90% - 100%)        11-29 @ 07:01  -  11-30 @ 07:00  --------------------------------------------------------  IN: 1170 mL / OUT: 1900 mL / NET: -730 mL    11-30 @ 07:01  -  11-30 @ 14:29  --------------------------------------------------------  IN: 100 mL / OUT: 375 mL / NET: -275 mL      CAPILLARY BLOOD GLUCOSE      POCT Blood Glucose.: 104 mg/dL (30 Nov 2018 10:46)      PHYSICAL EXAM:  Constitutional: resting comfortably in bed, NAD  HEENT: NC/AT; PERRL, anicteric sclera; no oropharyngeal erythema or exudates; MMM  Neck: supple, no JVD  Respiratory: Breath sounds were audible after nasal suctioning of secretions. However decreased compared to the left. respirations appear non-labored  Cardiovascular: Tachycardic to 110's max, +S1/S2,  Gastrointestinal: abdomen soft, NT/ND; +BS x4  Extremities: WWP; no clubbing, cyanosis or edema  Vascular: 2+ radial, DP/PT and femoral pulses B/L      MEDICATIONS:  MEDICATIONS  (STANDING):  ALBUTerol/ipratropium for Nebulization 3 milliLiter(s) Nebulizer every 6 hours  amLODIPine   Tablet 10 milliGRAM(s) Oral every 24 hours  artificial tears (preservative free) Ophthalmic Solution 1 Drop(s) Both EYES three times a day  chlorhexidine 2% Cloths 1 Application(s) Topical <User Schedule>  dextrose 5%. 1000 milliLiter(s) (50 mL/Hr) IV Continuous <Continuous>  dextrose 50% Injectable 12.5 Gram(s) IV Push once  dextrose 50% Injectable 25 Gram(s) IV Push once  dextrose 50% Injectable 25 Gram(s) IV Push once  glycopyrrolate Injectable 0.2 milliGRAM(s) IV Push every 6 hours  heparin  Injectable 5000 Unit(s) SubCutaneous every 8 hours  insulin lispro (HumaLOG) corrective regimen sliding scale   SubCutaneous every 6 hours  lactobacillus acidophilus 1 Tablet(s) Oral every 8 hours  levothyroxine 150 MICROGram(s) Oral daily  lisinopril 40 milliGRAM(s) Oral daily  metoclopramide Injectable 5 milliGRAM(s) IV Push every 6 hours  midazolam Injectable 2 milliGRAM(s) IV Push once  pantoprazole   Suspension 40 milliGRAM(s) Enteral Tube daily  polyethylene glycol 3350 17 Gram(s) Oral daily    MEDICATIONS  (PRN):  acetaminophen    Suspension .. 650 milliGRAM(s) Oral every 6 hours PRN Temp greater or equal to 38C (100.4F)  dextrose 40% Gel 15 Gram(s) Oral once PRN Blood Glucose LESS THAN 70 milliGRAM(s)/deciliter  glucagon  Injectable 1 milliGRAM(s) IntraMuscular once PRN Glucose LESS THAN 70 milligrams/deciliter      ALLERGIES:  Allergies    No Known Allergies    Intolerances        LABS:                        7.8    7.7   )-----------( 330      ( 30 Nov 2018 05:43 )             26.6     11-30    145  |  111<H>  |  29<H>  ----------------------------<  129<H>  4.1   |  28  |  1.05    Ca    9.8      30 Nov 2018 05:43  Mg     2.0     11-29      PT/INR - ( 30 Nov 2018 10:52 )   PT: 13.6 sec;   INR: 1.20          PTT - ( 30 Nov 2018 10:52 )  PTT:25.6 sec      RADIOLOGY & ADDITIONAL TESTS:   CXR reviewed - opacification of the right thorax with tracheal shift to right. Abrupt cutoff of the right mainstem airway Patient seen and examined at bedside. This is a 79 yo woman with dementia, PE (s/p IVC filter) and prior hospitalizations for episodes of sepsis (ventral hernia infection, MSSA bacteremia, MDR E. Coli UTI) who initially presented here from a nursing home for aspiration pna leading to sepsis. She was treated with broad abx (vancomycin, cipro, and azithro based on prior cultures), which was de-escalated to cefazolin later on (also based on current cultures growing MSSA). She was intubated for respiratory failure 2/2 asp pna, but was eventually extubated and has been on nasal cannula since then (11/19). However, she has been having recurrent mucus plugging 2/2 not being able to handle her secretions. Glycopyrrolate, chest PT + percussion device, and octreotide have been tried but this has been a persistent issue. This morning, CXR was noted to have opacification of the entire right lung, for which we are consulted for. Patient is demented and cannot give sufficient history.     Patient History:   Past Medical History:  DM (diabetes mellitus)    GERD (gastroesophageal reflux disease)    HLD (hyperlipidemia)    HTN (hypertension)    Hypothyroidism    Obesity.    Past Surgical History:  H/O ventral hernia repair    S/P small bowel resection    Status post total abdominal hysterectomy and bilateral salpingo-oophorectomy.    Family History:  No pertinent family history in first degree relatives.    Social History:  Non-smoker, no alcohol abuse hx, no illicit drug use hx        VITAL SIGNS:  ICU Vital Signs Last 24 Hrs  T(C): 37.6 (30 Nov 2018 09:40), Max: 38.2 (29 Nov 2018 22:30)  T(F): 99.6 (30 Nov 2018 09:40), Max: 100.7 (29 Nov 2018 22:30)  HR: 104 (30 Nov 2018 13:00) (94 - 126)  BP: 153/81 (30 Nov 2018 13:00) (139/67 - 165/84)  BP(mean): 106 (30 Nov 2018 13:00) (91 - 121)  ABP: --  ABP(mean): --  RR: 23 (30 Nov 2018 13:00) (16 - 24)  SpO2: 99% (30 Nov 2018 13:00) (90% - 100%)        11-29 @ 07:01  -  11-30 @ 07:00  --------------------------------------------------------  IN: 1170 mL / OUT: 1900 mL / NET: -730 mL    11-30 @ 07:01  - 11-30 @ 14:29  --------------------------------------------------------  IN: 100 mL / OUT: 375 mL / NET: -275 mL      CAPILLARY BLOOD GLUCOSE      POCT Blood Glucose.: 104 mg/dL (30 Nov 2018 10:46)      PHYSICAL EXAM:  Constitutional: resting comfortably in bed, NAD  HEENT: NC/AT; PERRL, anicteric sclera; no oropharyngeal erythema or exudates; MMM  Neck: supple, no JVD  Respiratory: Breath sounds were audible after nasal suctioning of secretions. However decreased compared to the left. respirations appear non-labored  Cardiovascular: Tachycardic to 110's max, +S1/S2,  Gastrointestinal: abdomen soft, NT/ND; +BS x4  Extremities: WWP; no clubbing, cyanosis or edema  Vascular: 2+ radial, DP/PT and femoral pulses B/L      MEDICATIONS:  MEDICATIONS  (STANDING):  ALBUTerol/ipratropium for Nebulization 3 milliLiter(s) Nebulizer every 6 hours  amLODIPine   Tablet 10 milliGRAM(s) Oral every 24 hours  artificial tears (preservative free) Ophthalmic Solution 1 Drop(s) Both EYES three times a day  chlorhexidine 2% Cloths 1 Application(s) Topical <User Schedule>  dextrose 5%. 1000 milliLiter(s) (50 mL/Hr) IV Continuous <Continuous>  dextrose 50% Injectable 12.5 Gram(s) IV Push once  dextrose 50% Injectable 25 Gram(s) IV Push once  dextrose 50% Injectable 25 Gram(s) IV Push once  glycopyrrolate Injectable 0.2 milliGRAM(s) IV Push every 6 hours  heparin  Injectable 5000 Unit(s) SubCutaneous every 8 hours  insulin lispro (HumaLOG) corrective regimen sliding scale   SubCutaneous every 6 hours  lactobacillus acidophilus 1 Tablet(s) Oral every 8 hours  levothyroxine 150 MICROGram(s) Oral daily  lisinopril 40 milliGRAM(s) Oral daily  metoclopramide Injectable 5 milliGRAM(s) IV Push every 6 hours  midazolam Injectable 2 milliGRAM(s) IV Push once  pantoprazole   Suspension 40 milliGRAM(s) Enteral Tube daily  polyethylene glycol 3350 17 Gram(s) Oral daily    MEDICATIONS  (PRN):  acetaminophen    Suspension .. 650 milliGRAM(s) Oral every 6 hours PRN Temp greater or equal to 38C (100.4F)  dextrose 40% Gel 15 Gram(s) Oral once PRN Blood Glucose LESS THAN 70 milliGRAM(s)/deciliter  glucagon  Injectable 1 milliGRAM(s) IntraMuscular once PRN Glucose LESS THAN 70 milligrams/deciliter      ALLERGIES:  Allergies    No Known Allergies    Intolerances        LABS:                        7.8    7.7   )-----------( 330      ( 30 Nov 2018 05:43 )             26.6     11-30    145  |  111<H>  |  29<H>  ----------------------------<  129<H>  4.1   |  28  |  1.05    Ca    9.8      30 Nov 2018 05:43  Mg     2.0     11-29      PT/INR - ( 30 Nov 2018 10:52 )   PT: 13.6 sec;   INR: 1.20          PTT - ( 30 Nov 2018 10:52 )  PTT:25.6 sec      RADIOLOGY & ADDITIONAL TESTS:   CXR reviewed - opacification of the right thorax with tracheal shift to right. Abrupt cutoff of the right mainstem airway Patient seen and examined at bedside. This is a 79 yo woman with dementia, PE (s/p IVC filter) and prior hospitalizations for episodes of sepsis (ventral hernia infection, MSSA bacteremia, MDR E. Coli UTI) who initially presented here from a nursing home for aspiration pna leading to sepsis. She was treated with broad abx (vancomycin, cipro, and azithro based on prior cultures), which was de-escalated to cefazolin later on (also based on current cultures growing MSSA). She was intubated for respiratory failure 2/2 asp pna, but was eventually extubated and has been on nasal cannula since then (11/19). However, she has been having recurrent mucus plugging 2/2 not being able to handle her secretions. Glycopyrrolate, chest PT + percussion device, and octreotide have been tried but this has been a persistent issue. This morning, CXR was noted to have opacification of the entire right lung, for which we are consulted for. Patient is demented and cannot give sufficient history.     Past Medical History:  DM (diabetes mellitus)    GERD (gastroesophageal reflux disease)    HLD (hyperlipidemia)    HTN (hypertension)    Hypothyroidism    Obesity.    Past Surgical History:  H/O ventral hernia repair    S/P small bowel resection    Status post total abdominal hysterectomy and bilateral salpingo-oophorectomy.    Family History:  No pertinent family history in first degree relatives.    Social History:  Non-smoker, no alcohol abuse hx, no illicit drug use hx        VITAL SIGNS:  ICU Vital Signs Last 24 Hrs  T(C): 37.6 (30 Nov 2018 09:40), Max: 38.2 (29 Nov 2018 22:30)  T(F): 99.6 (30 Nov 2018 09:40), Max: 100.7 (29 Nov 2018 22:30)  HR: 104 (30 Nov 2018 13:00) (94 - 126)  BP: 153/81 (30 Nov 2018 13:00) (139/67 - 165/84)  BP(mean): 106 (30 Nov 2018 13:00) (91 - 121)  ABP: --  ABP(mean): --  RR: 23 (30 Nov 2018 13:00) (16 - 24)  SpO2: 99% (30 Nov 2018 13:00) (90% - 100%)        11-29 @ 07:01  -  11-30 @ 07:00  --------------------------------------------------------  IN: 1170 mL / OUT: 1900 mL / NET: -730 mL    11-30 @ 07:01  - 11-30 @ 14:29  --------------------------------------------------------  IN: 100 mL / OUT: 375 mL / NET: -275 mL      CAPILLARY BLOOD GLUCOSE      POCT Blood Glucose.: 104 mg/dL (30 Nov 2018 10:46)      PHYSICAL EXAM:  Constitutional: resting comfortably in bed, NAD  HEENT: NC/AT; PERRL, anicteric sclera; no oropharyngeal erythema or exudates; MMM  Neck: supple, no JVD  Respiratory: Breath sounds were audible after nasal suctioning of secretions. However decreased compared to the left. respirations appear non-labored  Cardiovascular: Tachycardic to 110's max, +S1/S2,  Gastrointestinal: abdomen soft, NT/ND; +BS x4  Extremities: WWP; no clubbing, cyanosis or edema  Vascular: 2+ radial, DP/PT and femoral pulses B/L      MEDICATIONS:  MEDICATIONS  (STANDING):  ALBUTerol/ipratropium for Nebulization 3 milliLiter(s) Nebulizer every 6 hours  amLODIPine   Tablet 10 milliGRAM(s) Oral every 24 hours  artificial tears (preservative free) Ophthalmic Solution 1 Drop(s) Both EYES three times a day  chlorhexidine 2% Cloths 1 Application(s) Topical <User Schedule>  dextrose 5%. 1000 milliLiter(s) (50 mL/Hr) IV Continuous <Continuous>  dextrose 50% Injectable 12.5 Gram(s) IV Push once  dextrose 50% Injectable 25 Gram(s) IV Push once  dextrose 50% Injectable 25 Gram(s) IV Push once  glycopyrrolate Injectable 0.2 milliGRAM(s) IV Push every 6 hours  heparin  Injectable 5000 Unit(s) SubCutaneous every 8 hours  insulin lispro (HumaLOG) corrective regimen sliding scale   SubCutaneous every 6 hours  lactobacillus acidophilus 1 Tablet(s) Oral every 8 hours  levothyroxine 150 MICROGram(s) Oral daily  lisinopril 40 milliGRAM(s) Oral daily  metoclopramide Injectable 5 milliGRAM(s) IV Push every 6 hours  midazolam Injectable 2 milliGRAM(s) IV Push once  pantoprazole   Suspension 40 milliGRAM(s) Enteral Tube daily  polyethylene glycol 3350 17 Gram(s) Oral daily    MEDICATIONS  (PRN):  acetaminophen    Suspension .. 650 milliGRAM(s) Oral every 6 hours PRN Temp greater or equal to 38C (100.4F)  dextrose 40% Gel 15 Gram(s) Oral once PRN Blood Glucose LESS THAN 70 milliGRAM(s)/deciliter  glucagon  Injectable 1 milliGRAM(s) IntraMuscular once PRN Glucose LESS THAN 70 milligrams/deciliter      ALLERGIES:  Allergies    No Known Allergies    Intolerances        LABS:                        7.8    7.7   )-----------( 330      ( 30 Nov 2018 05:43 )             26.6     11-30    145  |  111<H>  |  29<H>  ----------------------------<  129<H>  4.1   |  28  |  1.05    Ca    9.8      30 Nov 2018 05:43  Mg     2.0     11-29      PT/INR - ( 30 Nov 2018 10:52 )   PT: 13.6 sec;   INR: 1.20          PTT - ( 30 Nov 2018 10:52 )  PTT:25.6 sec      RADIOLOGY & ADDITIONAL TESTS:   CXR reviewed - opacification of the right thorax with tracheal shift to right. Abrupt cutoff of the right mainstem airway

## 2018-12-01 LAB
ANION GAP SERPL CALC-SCNC: 7 MMOL/L — SIGNIFICANT CHANGE UP (ref 5–17)
BASOPHILS NFR BLD AUTO: 0.4 % — SIGNIFICANT CHANGE UP (ref 0–2)
BUN SERPL-MCNC: 28 MG/DL — HIGH (ref 7–23)
CALCIUM SERPL-MCNC: 10.2 MG/DL — SIGNIFICANT CHANGE UP (ref 8.4–10.5)
CHLORIDE SERPL-SCNC: 112 MMOL/L — HIGH (ref 96–108)
CO2 SERPL-SCNC: 28 MMOL/L — SIGNIFICANT CHANGE UP (ref 22–31)
CREAT SERPL-MCNC: 1.03 MG/DL — SIGNIFICANT CHANGE UP (ref 0.5–1.3)
EOSINOPHIL NFR BLD AUTO: 2.3 % — SIGNIFICANT CHANGE UP (ref 0–6)
GLUCOSE BLDC GLUCOMTR-MCNC: 112 MG/DL — HIGH (ref 70–99)
GLUCOSE BLDC GLUCOMTR-MCNC: 121 MG/DL — HIGH (ref 70–99)
GLUCOSE BLDC GLUCOMTR-MCNC: 126 MG/DL — HIGH (ref 70–99)
GLUCOSE BLDC GLUCOMTR-MCNC: 144 MG/DL — HIGH (ref 70–99)
GLUCOSE SERPL-MCNC: 121 MG/DL — HIGH (ref 70–99)
HCT VFR BLD CALC: 30.8 % — LOW (ref 34.5–45)
HGB BLD-MCNC: 9 G/DL — LOW (ref 11.5–15.5)
LYMPHOCYTES # BLD AUTO: 18.8 % — SIGNIFICANT CHANGE UP (ref 13–44)
MAGNESIUM SERPL-MCNC: 1.8 MG/DL — SIGNIFICANT CHANGE UP (ref 1.6–2.6)
MCHC RBC-ENTMCNC: 28.4 PG — SIGNIFICANT CHANGE UP (ref 27–34)
MCHC RBC-ENTMCNC: 29.2 G/DL — LOW (ref 32–36)
MCV RBC AUTO: 97.2 FL — SIGNIFICANT CHANGE UP (ref 80–100)
MONOCYTES NFR BLD AUTO: 5.3 % — SIGNIFICANT CHANGE UP (ref 2–14)
NEUTROPHILS NFR BLD AUTO: 73.2 % — SIGNIFICANT CHANGE UP (ref 43–77)
PLATELET # BLD AUTO: 350 K/UL — SIGNIFICANT CHANGE UP (ref 150–400)
POTASSIUM SERPL-MCNC: 4.3 MMOL/L — SIGNIFICANT CHANGE UP (ref 3.5–5.3)
POTASSIUM SERPL-SCNC: 4.3 MMOL/L — SIGNIFICANT CHANGE UP (ref 3.5–5.3)
RBC # BLD: 3.17 M/UL — LOW (ref 3.8–5.2)
RBC # FLD: 17.2 % — HIGH (ref 10.3–16.9)
SODIUM SERPL-SCNC: 147 MMOL/L — HIGH (ref 135–145)
WBC # BLD: 7.4 K/UL — SIGNIFICANT CHANGE UP (ref 3.8–10.5)
WBC # FLD AUTO: 7.4 K/UL — SIGNIFICANT CHANGE UP (ref 3.8–10.5)

## 2018-12-01 PROCEDURE — 71045 X-RAY EXAM CHEST 1 VIEW: CPT | Mod: 26

## 2018-12-01 PROCEDURE — 99233 SBSQ HOSP IP/OBS HIGH 50: CPT

## 2018-12-01 RX ORDER — METOCLOPRAMIDE HCL 10 MG
5 TABLET ORAL EVERY 12 HOURS
Qty: 0 | Refills: 0 | Status: DISCONTINUED | OUTPATIENT
Start: 2018-12-01 | End: 2018-12-06

## 2018-12-01 RX ADMIN — HEPARIN SODIUM 5000 UNIT(S): 5000 INJECTION INTRAVENOUS; SUBCUTANEOUS at 11:52

## 2018-12-01 RX ADMIN — ROBINUL 0.2 MILLIGRAM(S): 0.2 INJECTION INTRAMUSCULAR; INTRAVENOUS at 18:10

## 2018-12-01 RX ADMIN — Medication 3 MILLILITER(S): at 16:08

## 2018-12-01 RX ADMIN — ROBINUL 0.2 MILLIGRAM(S): 0.2 INJECTION INTRAMUSCULAR; INTRAVENOUS at 11:52

## 2018-12-01 RX ADMIN — PANTOPRAZOLE SODIUM 40 MILLIGRAM(S): 20 TABLET, DELAYED RELEASE ORAL at 11:52

## 2018-12-01 RX ADMIN — AMLODIPINE BESYLATE 10 MILLIGRAM(S): 2.5 TABLET ORAL at 18:10

## 2018-12-01 RX ADMIN — Medication 5 MILLIGRAM(S): at 05:18

## 2018-12-01 RX ADMIN — Medication 5 MILLIGRAM(S): at 18:10

## 2018-12-01 RX ADMIN — Medication 3 MILLILITER(S): at 05:53

## 2018-12-01 RX ADMIN — LISINOPRIL 40 MILLIGRAM(S): 2.5 TABLET ORAL at 05:19

## 2018-12-01 RX ADMIN — Medication 3 MILLILITER(S): at 10:36

## 2018-12-01 RX ADMIN — Medication 5 MILLIGRAM(S): at 00:40

## 2018-12-01 RX ADMIN — ROBINUL 0.2 MILLIGRAM(S): 0.2 INJECTION INTRAMUSCULAR; INTRAVENOUS at 00:39

## 2018-12-01 RX ADMIN — CHLORHEXIDINE GLUCONATE 1 APPLICATION(S): 213 SOLUTION TOPICAL at 05:19

## 2018-12-01 RX ADMIN — POLYETHYLENE GLYCOL 3350 17 GRAM(S): 17 POWDER, FOR SOLUTION ORAL at 11:53

## 2018-12-01 RX ADMIN — Medication 1 TABLET(S): at 22:06

## 2018-12-01 RX ADMIN — Medication 3 MILLILITER(S): at 00:51

## 2018-12-01 RX ADMIN — ROBINUL 0.2 MILLIGRAM(S): 0.2 INJECTION INTRAMUSCULAR; INTRAVENOUS at 05:18

## 2018-12-01 RX ADMIN — Medication 150 MICROGRAM(S): at 05:19

## 2018-12-01 RX ADMIN — ROBINUL 0.2 MILLIGRAM(S): 0.2 INJECTION INTRAMUSCULAR; INTRAVENOUS at 23:39

## 2018-12-01 RX ADMIN — Medication 1 TABLET(S): at 11:52

## 2018-12-01 RX ADMIN — HEPARIN SODIUM 5000 UNIT(S): 5000 INJECTION INTRAVENOUS; SUBCUTANEOUS at 05:18

## 2018-12-01 RX ADMIN — Medication 1 DROP(S): at 22:23

## 2018-12-01 RX ADMIN — Medication 1 DROP(S): at 06:12

## 2018-12-01 RX ADMIN — HEPARIN SODIUM 5000 UNIT(S): 5000 INJECTION INTRAVENOUS; SUBCUTANEOUS at 22:06

## 2018-12-01 RX ADMIN — Medication 1 TABLET(S): at 05:19

## 2018-12-01 NOTE — PROGRESS NOTE ADULT - ASSESSMENT
78F w/ PMH obesity, DM, HTN, hypothyroidism, sepsis secondary to ventral hernia infection s/p repair, PE s/p IVC filter, acute blood loss anemia from hemorrhagic uterine fibroids and UGIB, ATN requiring HD, sepsis secondary to MSSA bacteremia and MDR E coli UTI w/AMS and s/p TAHBSO and small bowel resection s/p PEG. Presenting with septic shock likely 2/2 PNA vs UTI and acute hypoxic respiratory failure now extubated, s/p bronchoscopy     Neuro  #Toxic metabolic encephalopathy 2/2 septic shock  RESOLVED  - mental status consistent with patient's baseline dementia    Respiratory    #HAP RESOLVED with sputum culture with MSSA sensitive to cefazolin.     # Mucus plugging- recurrent, pt unable to keep down secretions, now with white out of R lung   - continue Chest PT and percussion vest  for mucous/ glycopyrrolate 0.2 Q6 IV  -s/p broncostopy  - goals of care discussion with patient's son, pt currently full code    CV  #HTN  -BP 150s/80s  -continue home lisinopril 40 mg daily  -amlodipine 10 mg daily    GI  - PEG in site  - NPO for bronch today    Renal  - Cr at baseline 1.10 as of 11/25    ID  #Sepsis 2/2 HAP and ESBL UTI  resolved  -sputum cx staph aureus sn to cefazolin  -completed course of cefazolin for MSSA and ciprofloxacin for UTI (ESBL sn to this).     Endo  #T2DM  -ISS  -FSBS      #hypothyroidism  -Continue synthroid currently 150 mcg per day oral.     Derm  #incontinent dermatitis of b/l buttocks  -daily wound care  -rectal tube in place    Heme:  # Anemia of chronic disease  -no signs of active bleeding  -active type and screen    GI  # Chronic diarrhea 2/2 small bowel resection  -probiotics daily      FEN  F: none  E: K>4 Mag>2 caution given recent GONZALEZ  N: NPO for bronch     Dvt ppx: hep sq  GI ppx: protonix    Code Status: Full code, will have palliative have discussions for long term care of patient due to chronic aspiration pna    Dispo: MICU

## 2018-12-01 NOTE — PROGRESS NOTE ADULT - ASSESSMENT
A - respiratory failure /recurrent R lung collapse/dementia/ stage 1 decutitus/PE/DM hypothyroidism    Suggest:    feeds to goal  try HF to maintain upper airway open  encourage cough  BP meds  no need for change in glucose control  decrease reglan dose  mobilize

## 2018-12-01 NOTE — PROGRESS NOTE ADULT - ASSESSMENT
A/P: 78F PMHx MO, DM, HTN, hypothyroidism, PE s/p IVC filter, recent admissions for sepsis and acute blood loss anemia s/p SBR with primary anastomosis and PEG, p/w septic shock likely 2/2 PNA and acute hypoxic respiratory failure s/p bronchoscopy with mucus plug removal 11/30    - No surgical intervention at this time  - Continue care per primary team  - Surgery Team 1C will sign off

## 2018-12-01 NOTE — PROGRESS NOTE ADULT - SUBJECTIVE AND OBJECTIVE BOX
Patient is a 78y old  Female who presents with a chief complaint of Septic shock and acute respiratory failure (30 Nov 2018 14:27)        INTERVAL HPI/OVERNIGHT EVENTS: none    SYMPTOMS resting comfortably, awake does not verbalize    DRIPS none        ICU Vital Signs Last 24 Hrs  T(C): 36.9 (01 Dec 2018 09:11), Max: 37.7 (01 Dec 2018 01:00)  T(F): 98.4 (01 Dec 2018 09:11), Max: 99.9 (01 Dec 2018 01:00)  HR: 86 (01 Dec 2018 08:00) (82 - 104)  BP: 150/76 (01 Dec 2018 08:00) (113/59 - 167/74)  BP(mean): 110 (01 Dec 2018 08:00) (85 - 121)  ABP: --  ABP(mean): --  RR: 17 (01 Dec 2018 08:00) (14 - 32)  SpO2: 94% (01 Dec 2018 08:00) (94% - 100%)      I&O's Summary    30 Nov 2018 07:01  -  01 Dec 2018 07:00  --------------------------------------------------------  IN: 650 mL / OUT: 1195 mL / NET: -545 mL        EXAM    Chest decrease bs R    Heart rr    Abdomen soft nntender with bs    Extremities trace edema    Neuro awkae [facility]      LABS:                            9.0    7.4   )-----------( 350      ( 01 Dec 2018 06:17 )             30.8     12-01    147<H>  |  112<H>  |  28<H>  ----------------------------<  121<H>  4.3   |  28  |  1.03    Ca    10.2      01 Dec 2018 06:17  Mg     1.8     12-01      PT/INR - ( 30 Nov 2018 10:52 )   PT: 13.6 sec;   INR: 1.20          PTT - ( 30 Nov 2018 10:52 )  PTT:25.6 sec    cxr unchanged, rexpanded after bronch        RADIOLOGY & ADDITIONAL STUDIES:    CRITICAL CARE TIME SPENT:

## 2018-12-01 NOTE — PROGRESS NOTE ADULT - SUBJECTIVE AND OBJECTIVE BOX
ID: [id number] PMHx MO, DM, HTN, hypothyroidism, PE s/p IVC filter, recent admissions for sepsis and acute blood loss anemia s/p SBR with primary anastomosis and PEG, p/w septic shock likely 2/2 PNA and acute hypoxic respiratory failure s/p bronchoscopy with mucus plug removal 11/30    24 Hour Events:  s/p bronchoscopy with mucus plug removal 11/30    SUBJECTIVE: Patient resting well      OBJECTIVE:    Vital Signs:  Vital Signs Last 24 Hrs  T(C): 36.7 (01 Dec 2018 22:33), Max: 37.7 (01 Dec 2018 01:00)  T(F): 98.1 (01 Dec 2018 22:33), Max: 99.9 (01 Dec 2018 01:00)  HR: 92 (01 Dec 2018 23:00) (78 - 96)  BP: 151/75 (01 Dec 2018 23:00) (137/64 - 170/78)  BP(mean): 98 (01 Dec 2018 23:00) (86 - 130)  RR: 31 (01 Dec 2018 23:00) (14 - 31)  SpO2: 95% (01 Dec 2018 23:00) (94% - 100%)    Physical Exam:  General: NAD  Pulmonary: Nonlabored breathing, no respiratory distress, face mask with supplemental oxygen  Cardiovascular: No heaves/thrills  Abdominal: Soft, NT/ND, PEG tube in place with feeds  Extremities: WWP, no clubbing/cyanosis/edema  Neuro: AAO x3, no focal deficits    Lines/Drains/Tubes:    Ins and Outs:  I&O's Summary    30 Nov 2018 07:01  -  01 Dec 2018 07:00  --------------------------------------------------------  IN: 650 mL / OUT: 1195 mL / NET: -545 mL    01 Dec 2018 07:01  -  01 Dec 2018 23:35  --------------------------------------------------------  IN: 820 mL / OUT: 740 mL / NET: 80 mL        Labs:                        9.0    7.4   )-----------( 350      ( 01 Dec 2018 06:17 )             30.8     12-01    147<H>  |  112<H>  |  28<H>  ----------------------------<  121<H>  4.3   |  28  |  1.03    Ca    10.2      01 Dec 2018 06:17  Mg     1.8     12-01      PT/INR - ( 30 Nov 2018 10:52 )   PT: 13.6 sec;   INR: 1.20          PTT - ( 30 Nov 2018 10:52 )  PTT:25.6 sec    CAPILLARY BLOOD GLUCOSE      POCT Blood Glucose.: 144 mg/dL (01 Dec 2018 23:19)  POCT Blood Glucose.: 112 mg/dL (01 Dec 2018 17:07)  POCT Blood Glucose.: 121 mg/dL (01 Dec 2018 11:35)  POCT Blood Glucose.: 126 mg/dL (01 Dec 2018 05:52)        Radiology & Additional Studies:

## 2018-12-01 NOTE — PROGRESS NOTE ADULT - SUBJECTIVE AND OBJECTIVE BOX
OVERNIGHT EVENTS: EMANUEL    INTERVAL HPI: Pt is examined at bedside. She appears well, resting comfortably in bed, in NAD. She denies chest pain, SOB, abdominal pain.       OBJECTIVE:        ICU Vital Signs Last 24 Hrs  T(C): 36.9 (01 Dec 2018 09:11), Max: 37.7 (01 Dec 2018 01:00)  T(F): 98.4 (01 Dec 2018 09:11), Max: 99.9 (01 Dec 2018 01:00)  HR: 88 (01 Dec 2018 09:00) (82 - 104)  BP: 138/74 (01 Dec 2018 09:00) (113/59 - 167/74)  BP(mean): 115 (01 Dec 2018 09:00) (85 - 121)  ABP: --  ABP(mean): --  RR: 19 (01 Dec 2018 09:00) (14 - 32)  SpO2: 94% (01 Dec 2018 09:00) (94% - 100%)    11-30-18 @ 07:01  -  12-01-18 @ 07:00  --------------------------------------------------------  IN: 650 mL / OUT: 1195 mL / NET: -545 mL          CAPILLARY BLOOD GLUCOSE      POCT Blood Glucose.: 104 mg/dL (30 Nov 2018 10:46)      PHYSICAL EXAM:    General: NAD, comfortable  HEENT: NCAT, PERRL, clear conjunctiva, no scleral icterus  Neck: supple, no JVD  Respiratory: CTA b/l, no wheezing, rhonchi, rales  Cardiovascular: RRR, normal S1S2, no M/R/G  Abdomen: soft, NT/ND, bowel sounds in all four quadrants, no palpable masses  Extremities: WWP, no clubbing, cyanosis, or edema  Neuro:     MEDICATIONS:  MEDICATIONS  (STANDING):  ALBUTerol/ipratropium for Nebulization 3 milliLiter(s) Nebulizer every 6 hours  amLODIPine   Tablet 10 milliGRAM(s) Oral every 24 hours  artificial tears (preservative free) Ophthalmic Solution 1 Drop(s) Both EYES three times a day  chlorhexidine 2% Cloths 1 Application(s) Topical <User Schedule>  dextrose 5%. 1000 milliLiter(s) (50 mL/Hr) IV Continuous <Continuous>  dextrose 50% Injectable 12.5 Gram(s) IV Push once  dextrose 50% Injectable 25 Gram(s) IV Push once  dextrose 50% Injectable 25 Gram(s) IV Push once  glycopyrrolate Injectable 0.2 milliGRAM(s) IV Push every 6 hours  heparin  Injectable 5000 Unit(s) SubCutaneous every 8 hours  insulin lispro (HumaLOG) corrective regimen sliding scale   SubCutaneous every 6 hours  lactobacillus acidophilus 1 Tablet(s) Oral every 8 hours  levothyroxine 150 MICROGram(s) Oral daily  lisinopril 40 milliGRAM(s) Oral daily  metoclopramide Injectable 5 milliGRAM(s) IV Push every 6 hours  pantoprazole   Suspension 40 milliGRAM(s) Enteral Tube daily  polyethylene glycol 3350 17 Gram(s) Oral daily    MEDICATIONS  (PRN):  acetaminophen    Suspension .. 650 milliGRAM(s) Oral every 6 hours PRN Temp greater or equal to 38C (100.4F)  dextrose 40% Gel 15 Gram(s) Oral once PRN Blood Glucose LESS THAN 70 milliGRAM(s)/deciliter  glucagon  Injectable 1 milliGRAM(s) IntraMuscular once PRN Glucose LESS THAN 70 milligrams/deciliter      ALLERGIES:  Allergies    No Known Allergies    Intolerances        LABS:                 .  LABS:                         9.0    7.4   )-----------( 350      ( 01 Dec 2018 06:17 )             30.8     12-01    147<H>  |  112<H>  |  28<H>  ----------------------------<  121<H>  4.3   |  28  |  1.03    Ca    10.2      01 Dec 2018 06:17  Mg     1.8     12-01      PT/INR - ( 30 Nov 2018 10:52 )   PT: 13.6 sec;   INR: 1.20          PTT - ( 30 Nov 2018 10:52 )  PTT:25.6 sec              RADIOLOGY, EKG & ADDITIONAL TESTS: Reviewed.

## 2018-12-02 LAB
-  AMIKACIN: SIGNIFICANT CHANGE UP
-  AMPICILLIN/SULBACTAM: SIGNIFICANT CHANGE UP
-  AMPICILLIN: SIGNIFICANT CHANGE UP
-  AZTREONAM: SIGNIFICANT CHANGE UP
-  AZTREONAM: SIGNIFICANT CHANGE UP
-  CEFAZOLIN: SIGNIFICANT CHANGE UP
-  CEFEPIME: SIGNIFICANT CHANGE UP
-  CEFOTAXIME: SIGNIFICANT CHANGE UP
-  CEFOXITIN: SIGNIFICANT CHANGE UP
-  CEFTAZIDIME: SIGNIFICANT CHANGE UP
-  CEFTAZIDIME: SIGNIFICANT CHANGE UP
-  CEFTRIAXONE: SIGNIFICANT CHANGE UP
-  CEFUROXIME: SIGNIFICANT CHANGE UP
-  CIPROFLOXACIN: SIGNIFICANT CHANGE UP
-  CIPROFLOXACIN: SIGNIFICANT CHANGE UP
-  ERTAPENEM: SIGNIFICANT CHANGE UP
-  GENTAMICIN: SIGNIFICANT CHANGE UP
-  GENTAMICIN: SIGNIFICANT CHANGE UP
-  LEVOFLOXACIN: SIGNIFICANT CHANGE UP
-  MEROPENEM: SIGNIFICANT CHANGE UP
-  MOXIFLOXACIN(AEROBIC): SIGNIFICANT CHANGE UP
-  PIPERACILLIN/TAZOBACTAM: SIGNIFICANT CHANGE UP
-  PIPERACILLIN/TAZOBACTAM: SIGNIFICANT CHANGE UP
-  TETRACYCLINE: SIGNIFICANT CHANGE UP
-  TIGECYCLINE: SIGNIFICANT CHANGE UP
-  TOBRAMYCIN: SIGNIFICANT CHANGE UP
-  TOBRAMYCIN: SIGNIFICANT CHANGE UP
-  TRIMETHOPRIM/SULFAMETHOXAZOLE: SIGNIFICANT CHANGE UP
ANION GAP SERPL CALC-SCNC: 9 MMOL/L — SIGNIFICANT CHANGE UP (ref 5–17)
BUN SERPL-MCNC: 28 MG/DL — HIGH (ref 7–23)
CALCIUM SERPL-MCNC: 10.1 MG/DL — SIGNIFICANT CHANGE UP (ref 8.4–10.5)
CHLORIDE SERPL-SCNC: 110 MMOL/L — HIGH (ref 96–108)
CO2 SERPL-SCNC: 29 MMOL/L — SIGNIFICANT CHANGE UP (ref 22–31)
CREAT SERPL-MCNC: 0.93 MG/DL — SIGNIFICANT CHANGE UP (ref 0.5–1.3)
CULTURE RESULTS: SIGNIFICANT CHANGE UP
GLUCOSE BLDC GLUCOMTR-MCNC: 146 MG/DL — HIGH (ref 70–99)
GLUCOSE BLDC GLUCOMTR-MCNC: 149 MG/DL — HIGH (ref 70–99)
GLUCOSE BLDC GLUCOMTR-MCNC: 158 MG/DL — HIGH (ref 70–99)
GLUCOSE BLDC GLUCOMTR-MCNC: 166 MG/DL — HIGH (ref 70–99)
GLUCOSE SERPL-MCNC: 148 MG/DL — HIGH (ref 70–99)
HCT VFR BLD CALC: 27.6 % — LOW (ref 34.5–45)
HGB BLD-MCNC: 8.1 G/DL — LOW (ref 11.5–15.5)
MAGNESIUM SERPL-MCNC: 1.8 MG/DL — SIGNIFICANT CHANGE UP (ref 1.6–2.6)
MCHC RBC-ENTMCNC: 28.6 PG — SIGNIFICANT CHANGE UP (ref 27–34)
MCHC RBC-ENTMCNC: 29.3 G/DL — LOW (ref 32–36)
MCV RBC AUTO: 97.5 FL — SIGNIFICANT CHANGE UP (ref 80–100)
METHOD TYPE: SIGNIFICANT CHANGE UP
ORGANISM # SPEC MICROSCOPIC CNT: SIGNIFICANT CHANGE UP
PHOSPHATE SERPL-MCNC: 2.5 MG/DL — SIGNIFICANT CHANGE UP (ref 2.5–4.5)
PLATELET # BLD AUTO: 334 K/UL — SIGNIFICANT CHANGE UP (ref 150–400)
POTASSIUM SERPL-MCNC: 4 MMOL/L — SIGNIFICANT CHANGE UP (ref 3.5–5.3)
POTASSIUM SERPL-SCNC: 4 MMOL/L — SIGNIFICANT CHANGE UP (ref 3.5–5.3)
RBC # BLD: 2.83 M/UL — LOW (ref 3.8–5.2)
RBC # FLD: 16.7 % — SIGNIFICANT CHANGE UP (ref 10.3–16.9)
SODIUM SERPL-SCNC: 148 MMOL/L — HIGH (ref 135–145)
SPECIMEN SOURCE: SIGNIFICANT CHANGE UP
WBC # BLD: 7 K/UL — SIGNIFICANT CHANGE UP (ref 3.8–10.5)
WBC # FLD AUTO: 7 K/UL — SIGNIFICANT CHANGE UP (ref 3.8–10.5)

## 2018-12-02 PROCEDURE — 93010 ELECTROCARDIOGRAM REPORT: CPT

## 2018-12-02 PROCEDURE — 99233 SBSQ HOSP IP/OBS HIGH 50: CPT

## 2018-12-02 PROCEDURE — 71045 X-RAY EXAM CHEST 1 VIEW: CPT | Mod: 26

## 2018-12-02 RX ORDER — MEROPENEM 1 G/30ML
1000 INJECTION INTRAVENOUS EVERY 12 HOURS
Qty: 0 | Refills: 0 | Status: DISCONTINUED | OUTPATIENT
Start: 2018-12-02 | End: 2018-12-02

## 2018-12-02 RX ORDER — MEROPENEM 1 G/30ML
1000 INJECTION INTRAVENOUS EVERY 12 HOURS
Qty: 0 | Refills: 0 | Status: DISCONTINUED | OUTPATIENT
Start: 2018-12-03 | End: 2018-12-07

## 2018-12-02 RX ORDER — MEROPENEM 1 G/30ML
1000 INJECTION INTRAVENOUS EVERY 12 HOURS
Qty: 0 | Refills: 0 | Status: COMPLETED | OUTPATIENT
Start: 2018-12-02 | End: 2018-12-03

## 2018-12-02 RX ORDER — ACETAMINOPHEN 500 MG
650 TABLET ORAL ONCE
Qty: 0 | Refills: 0 | Status: COMPLETED | OUTPATIENT
Start: 2018-12-02 | End: 2018-12-02

## 2018-12-02 RX ORDER — PIPERACILLIN AND TAZOBACTAM 4; .5 G/20ML; G/20ML
4.5 INJECTION, POWDER, LYOPHILIZED, FOR SOLUTION INTRAVENOUS EVERY 6 HOURS
Qty: 0 | Refills: 0 | Status: DISCONTINUED | OUTPATIENT
Start: 2018-12-02 | End: 2018-12-02

## 2018-12-02 RX ORDER — SODIUM CHLORIDE 9 MG/ML
500 INJECTION INTRAMUSCULAR; INTRAVENOUS; SUBCUTANEOUS ONCE
Qty: 0 | Refills: 0 | Status: COMPLETED | OUTPATIENT
Start: 2018-12-02 | End: 2018-12-02

## 2018-12-02 RX ADMIN — ROBINUL 0.2 MILLIGRAM(S): 0.2 INJECTION INTRAMUSCULAR; INTRAVENOUS at 05:03

## 2018-12-02 RX ADMIN — Medication 650 MILLIGRAM(S): at 17:58

## 2018-12-02 RX ADMIN — Medication 1 TABLET(S): at 05:03

## 2018-12-02 RX ADMIN — Medication 5 MILLIGRAM(S): at 05:03

## 2018-12-02 RX ADMIN — CHLORHEXIDINE GLUCONATE 1 APPLICATION(S): 213 SOLUTION TOPICAL at 05:04

## 2018-12-02 RX ADMIN — Medication 5 MILLIGRAM(S): at 17:09

## 2018-12-02 RX ADMIN — Medication 2: at 05:33

## 2018-12-02 RX ADMIN — Medication 1 DROP(S): at 05:04

## 2018-12-02 RX ADMIN — Medication 1 TABLET(S): at 22:39

## 2018-12-02 RX ADMIN — Medication 3 MILLILITER(S): at 17:09

## 2018-12-02 RX ADMIN — Medication 1 DROP(S): at 22:16

## 2018-12-02 RX ADMIN — HEPARIN SODIUM 5000 UNIT(S): 5000 INJECTION INTRAVENOUS; SUBCUTANEOUS at 15:14

## 2018-12-02 RX ADMIN — AMLODIPINE BESYLATE 10 MILLIGRAM(S): 2.5 TABLET ORAL at 17:09

## 2018-12-02 RX ADMIN — Medication 1 TABLET(S): at 15:14

## 2018-12-02 RX ADMIN — Medication 1 DROP(S): at 15:30

## 2018-12-02 RX ADMIN — SODIUM CHLORIDE 1500 MILLILITER(S): 9 INJECTION INTRAMUSCULAR; INTRAVENOUS; SUBCUTANEOUS at 17:56

## 2018-12-02 RX ADMIN — MEROPENEM 100 MILLIGRAM(S): 1 INJECTION INTRAVENOUS at 17:05

## 2018-12-02 RX ADMIN — Medication 3 MILLILITER(S): at 12:00

## 2018-12-02 RX ADMIN — LISINOPRIL 40 MILLIGRAM(S): 2.5 TABLET ORAL at 05:03

## 2018-12-02 RX ADMIN — Medication 2: at 19:15

## 2018-12-02 RX ADMIN — PANTOPRAZOLE SODIUM 40 MILLIGRAM(S): 20 TABLET, DELAYED RELEASE ORAL at 12:33

## 2018-12-02 RX ADMIN — HEPARIN SODIUM 5000 UNIT(S): 5000 INJECTION INTRAVENOUS; SUBCUTANEOUS at 22:16

## 2018-12-02 RX ADMIN — Medication 650 MILLIGRAM(S): at 19:00

## 2018-12-02 RX ADMIN — HEPARIN SODIUM 5000 UNIT(S): 5000 INJECTION INTRAVENOUS; SUBCUTANEOUS at 05:03

## 2018-12-02 RX ADMIN — ROBINUL 0.2 MILLIGRAM(S): 0.2 INJECTION INTRAMUSCULAR; INTRAVENOUS at 12:32

## 2018-12-02 RX ADMIN — Medication 3 MILLILITER(S): at 06:50

## 2018-12-02 RX ADMIN — PIPERACILLIN AND TAZOBACTAM 200 GRAM(S): 4; .5 INJECTION, POWDER, LYOPHILIZED, FOR SOLUTION INTRAVENOUS at 10:30

## 2018-12-02 RX ADMIN — ROBINUL 0.2 MILLIGRAM(S): 0.2 INJECTION INTRAMUSCULAR; INTRAVENOUS at 17:10

## 2018-12-02 RX ADMIN — Medication 150 MICROGRAM(S): at 05:03

## 2018-12-02 NOTE — PROGRESS NOTE ADULT - SUBJECTIVE AND OBJECTIVE BOX
Patient is a 78y old  Female who presents with a chief complaint of Septic shock and acute respiratory failure (02 Dec 2018 08:00)        INTERVAL HPI/OVERNIGHT EVENTS: none    SYMPTOMS no sob, pain    DRIPS none        ICU Vital Signs Last 24 Hrs  T(C): 37.3 (02 Dec 2018 09:00), Max: 37.4 (01 Dec 2018 13:58)  T(F): 99.1 (02 Dec 2018 09:00), Max: 99.3 (01 Dec 2018 13:58)  HR: 88 (02 Dec 2018 10:00) (76 - 96)  BP: 154/74 (02 Dec 2018 10:00) (120/84 - 174/86)  BP(mean): 113 (02 Dec 2018 10:00) (87 - 132)  ABP: --  ABP(mean): --  RR: 17 (02 Dec 2018 10:00) (15 - 31)  SpO2: 95% (02 Dec 2018 10:00) (91% - 100%)      I&O's Summary    01 Dec 2018 07:01  -  02 Dec 2018 07:00  --------------------------------------------------------  IN: 1260 mL / OUT: 1350 mL / NET: -90 mL    02 Dec 2018 07:01  -  02 Dec 2018 12:25  --------------------------------------------------------  IN: 55 mL / OUT: 0 mL / NET: 55 mL        EXAM    Chest bilateral ronchi    Heart rr    Abdomen soft nontender with bs    Extremities trace edeam    Neuro awake, does not communicate, smiles      LABS:                            8.1    7.0   )-----------( 334      ( 02 Dec 2018 05:28 )             27.6     12-02    148<H>  |  110<H>  |  28<H>  ----------------------------<  148<H>  4.0   |  29  |  0.93    Ca    10.1      02 Dec 2018 05:28  Phos  2.5     12-02  Mg     1.8     12-02                RADIOLOGY & ADDITIONAL STUDIES: cxr rll infiltrte    CRITICAL CARE TIME SPENT:

## 2018-12-02 NOTE — PROGRESS NOTE ADULT - ASSESSMENT
A -recurrent colpase of r lung/ gram negative pneumonia/dementia/DM/HTN    Suggest:    start pip tazo for infiltrate  continue enteral feeds  no change in bp meds  transfer to floor

## 2018-12-02 NOTE — PROGRESS NOTE ADULT - PROBLEM SELECTOR PLAN 1
Pt with frequent mucus plugging throughout hospital course s/p percussion vest, robinol, frequent suctioning.  - continue to monitor resp status.  - continue Chest PT and percussion vest  for mucous/ glycopyrrolate 0.2 Q6 IV  - s/p bronchoscopy 11/30    #Fever: Pt had a low grade fever on 11/29 100.7.   - completed course of cefazolin for MSSA and ciprofloxacin for UTI (ESBL sn to this).   - repeat sputum cx (12/1) positive for pseudomonas/klebsiella susceptibility pending, started empirically on Zosyn 4.5 g q6h   - f/up Bcultures    #Chronic diarrhea 2/2 small bowel resection  -probiotics daily Pt with frequent mucus plugging throughout hospital course s/p percussion vest, robinol, frequent suctioning.  - continue to monitor resp status.  - continue Chest PT and percussion vest  for mucous/ glycopyrrolate 0.2 Q6 IV  - s/p bronchoscopy 11/30    #Fever: Pt had a low grade fever on 11/29 100.7. Possible Gram neg PNA in RLL.  - completed course of cefazolin for MSSA and ciprofloxacin for UTI (ESBL sn to this).   - repeat sputum cx (12/1) positive for pseudomonas/klebsiella, ESBL ECOLI susceptibility pending, started empirically on Zosyn 4.5 g q6h. Switched to meropenem  - f/up Bcultures    #Chronic diarrhea 2/2 small bowel resection  -probiotics daily

## 2018-12-02 NOTE — PROGRESS NOTE ADULT - PROBLEM SELECTOR PLAN 4
Pt recently admitted 4/15/18 due to MSSA bacteremia and MDR E Coli in urine, transferred to [facility], and found to have extravasation on CT abd, s/p IR drain placement, underwent DAVY BSO 2/2 tubal metaplasia, SBR with primary anastomosis, abdominal washout, PEG placement, discharged on 6/4  - surgery on board  - PEG in place  - c/w glucerna feeds at 55cc/hr

## 2018-12-02 NOTE — PROGRESS NOTE ADULT - ASSESSMENT
78F w/ PMH obesity, DM, HTN, hypothyroidism, sepsis secondary to ventral hernia infection s/p repair, PE s/p IVC filter, acute blood loss anemia from hemorrhagic uterine fibroids and UGIB, ATN requiring HD, sepsis secondary to MSSA bacteremia and MDR E coli UTI w/AMS and s/p TAHBSO and small bowel resection s/p PEG. Presenting with septic shock likely 2/2 PNA vs UTI and acute hypoxic respiratory failure now extubated, s/p bronchoscopy 11/30, now being treated for pseudomonal PNA with Zosyn (12/2- )     Neuro  #Toxic metabolic encephalopathy 2/2 septic shock  RESOLVED  - mental status consistent with patient's baseline dementia    Respiratory  #HAP RESOLVED with initial sputum culture with MSSA sensitive to cefazolin.   - repeat sputum cx from12/1 positive for pseumonas, started on Zosyn, f/u susceptibilities     # Mucus plugging- recurrent, pt unable to keep down secretions, now with white out of R lung   - continue Chest PT and percussion vest  for mucous/ glycopyrrolate 0.2 Q6 IV  - s/p bronchoscopy 11/30  - goals of care discussion with patient's son, pt currently full code    CV  #HTN  -BP 150s/80s  -continue home lisinopril 40 mg daily  -amlodipine 10 mg daily    GI  - PEG in site  - continue TF at goal rate     Renal  - Cr at baseline 1.10 as of 11/25    ID  #Sepsis 2/2 HAP and ESBL UTI resolved, however had a low grade fever on 11/29 100.7.   -completed course of cefazolin for MSSA and ciprofloxacin for UTI (ESBL sn to this).   - repeat sputum cx (12/1) positive for pseudomonas, susceptibility pending, started empirically on Zosyn 4.5 g q6h     Endo  #T2DM  -ISS  -FSBS      #hypothyroidism  -Continue synthroid currently 150 mcg per day oral.     Derm  #incontinent dermatitis of b/l buttocks  -daily wound care  -rectal tube in place    Heme:  # Anemia of chronic disease  -no signs of active bleeding  -active type and screen    GI  # Chronic diarrhea 2/2 small bowel resection  -probiotics daily      FEN  F: none  E: K>4 Mag>2 caution given recent GONZALEZ  N: NPO with TF Glucerna 1.5 , goal 55cc/hr    Dvt ppx: hep sq  GI ppx: protonix    Code Status: Full code, will have palliative have discussions for long term care of patient due to chronic aspiration pna    Dispo:7Lachman 78F w/ PMH obesity, DM, HTN, hypothyroidism, sepsis secondary to ventral hernia infection s/p repair, PE s/p IVC filter, acute blood loss anemia from hemorrhagic uterine fibroids and UGIB, ATN requiring HD, sepsis secondary to MSSA bacteremia and MDR E coli UTI w/AMS and s/p TAHBSO and small bowel resection s/p PEG. Presenting with septic shock likely 2/2 PNA vs UTI and acute hypoxic respiratory failure now extubated, s/p bronchoscopy 11/30, now being treated for pseudomonal PNA with Zosyn (12/2- )     Neuro  #Toxic metabolic encephalopathy 2/2 septic shock RESOLVED  - mental status consistent with patient's baseline dementia    Respiratory  #HAP RESOLVED with initial sputum culture with MSSA sensitive to cefazolin.   - repeat sputum cx from12/1 positive for pseumonas, sensitive to zosyn  - continue Zosyn 4.5 q q6h    # Mucus plugging- recurrent, pt unable to keep down secretions, now with white out of R lung   - continue Chest PT and percussion vest  for mucous/ glycopyrrolate 0.2 Q6 IV  - s/p bronchoscopy 11/30  - goals of care discussion with patient's son, pt currently full code    CV  #HTN  -BP 150s/80s  -continue home lisinopril 40 mg daily  -amlodipine 10 mg daily    GI  - PEG in site  - continue TF at goal rate     Renal  - Cr at baseline 1.10 as of 11/25    ID  #Sepsis 2/2 HAP and ESBL UTI resolved, however had a low grade fever on 11/29 100.7.   -completed course of cefazolin for MSSA and ciprofloxacin for UTI (ESBL sn to this).   - repeat sputum cx (12/1) positive for pseudomonas, susceptibility pending, started empirically on Zosyn 4.5 g q6h     Endo  #T2DM  -ISS  -FSBS      #hypothyroidism  -Continue synthroid currently 150 mcg per day oral.     Derm  #incontinent dermatitis of b/l buttocks  -daily wound care  -rectal tube in place    Heme:  # Anemia of chronic disease  -no signs of active bleeding  -active type and screen    GI  # Chronic diarrhea 2/2 small bowel resection  -probiotics daily      FEN  F: none  E: K>4 Mag>2 caution given recent GONZALEZ  N: NPO with TF Glucerna 1.5 , goal 55cc/hr    Dvt ppx: hep sq  GI ppx: protonix    Code Status: Full code, will have palliative have discussions for long term care of patient due to chronic aspiration pna    Dispo:7Lachman 78F w/ PMH obesity, DM, HTN, hypothyroidism, sepsis secondary to ventral hernia infection s/p repair, PE s/p IVC filter, acute blood loss anemia from hemorrhagic uterine fibroids and UGIB, ATN requiring HD, sepsis secondary to MSSA bacteremia and MDR E coli UTI w/AMS and s/p TAHBSO and small bowel resection s/p PEG. Presenting with septic shock likely 2/2 PNA vs UTI and acute hypoxic respiratory failure now extubated, s/p bronchoscopy 11/30, now being treated for pseudomonal PNA with Zosyn (12/2- )     Neuro  #Toxic metabolic encephalopathy 2/2 septic shock RESOLVED  - mental status consistent with patient's baseline dementia    Respiratory  #HAP RESOLVED with initial sputum culture with MSSA sensitive to cefazolin.   - repeat sputum cx from12/1 positive for pseudomonas/klebsiella, sensitive to zosyn  - continue Zosyn 4.5 q q6h    # Mucus plugging- recurrent, pt unable to keep down secretions, now with white out of R lung   - continue Chest PT and percussion vest  for mucous/ glycopyrrolate 0.2 Q6 IV  - s/p bronchoscopy 11/30  - goals of care discussion with patient's son, pt currently full code    CV  #HTN  -BP 150s/80s  -continue home lisinopril 40 mg daily  -amlodipine 10 mg daily    GI  - PEG in site  - continue TF at goal rate     Renal  - Cr at baseline 1.10 as of 11/25    ID  #Sepsis 2/2 HAP and ESBL UTI resolved, however had a low grade fever on 11/29 100.7.   -completed course of cefazolin for MSSA and ciprofloxacin for UTI (ESBL sn to this).   - repeat sputum cx (12/1) positive for pseudomonas/klebsiella susceptibility pending, started empirically on Zosyn 4.5 g q6h     Endo  #T2DM  -ISS  -FSBS      #hypothyroidism  -Continue synthroid currently 150 mcg per day oral.     Derm  #incontinent dermatitis of b/l buttocks  -daily wound care  -rectal tube in place    Heme:  # Anemia of chronic disease  -no signs of active bleeding  -active type and screen    GI  # Chronic diarrhea 2/2 small bowel resection  -probiotics daily      FEN  F: none  E: K>4 Mag>2 caution given recent GONZALEZ  N: NPO with TF Glucerna 1.5 , goal 55cc/hr    Dvt ppx: hep sq  GI ppx: protonix    Code Status: Full code, will have palliative have discussions for long term care of patient due to chronic aspiration pna    Dispo:[facility]

## 2018-12-02 NOTE — PROGRESS NOTE ADULT - SUBJECTIVE AND OBJECTIVE BOX
INTERVAL HPI/OVERNIGHT EVENTS: No acute events overnight.     SUBJECTIVE: Patient seen and examined at bedside.  Pt awake, resting comfortably, no acute distress, now off of nasal cannula. Pt unable to verbalize interval history. Yesterday, her tube feeds were titrated up to goal of 55cc/hr. Reglan 5 mg QID was switched to BID. Bronch from 11/30 was positive for pseudomonas, however abx were not started at that time.     ROS:  unable to obtain    OBJECTIVE:    VITAL SIGNS:  ICU Vital Signs Last 24 Hrs  T(C): 36.4 (02 Dec 2018 05:51), Max: 37.4 (01 Dec 2018 13:58)  T(F): 97.6 (02 Dec 2018 05:51), Max: 99.3 (01 Dec 2018 13:58)  HR: 76 (02 Dec 2018 07:00) (76 - 96)  BP: 135/73 (02 Dec 2018 07:00) (120/84 - 174/86)  BP(mean): 95 (02 Dec 2018 07:00) (87 - 132)  ABP: --  ABP(mean): --  RR: 16 (02 Dec 2018 07:00) (15 - 31)  SpO2: 98% (02 Dec 2018 07:00) (91% - 100%)        12-01 @ 07:01  -  12-02 @ 07:00  --------------------------------------------------------  IN: 1260 mL / OUT: 1350 mL / NET: -90 mL    12-02 @ 07:01  -  12-02 @ 08:00  --------------------------------------------------------  IN: 55 mL / OUT: 0 mL / NET: 55 mL      CAPILLARY BLOOD GLUCOSE      POCT Blood Glucose.: 166 mg/dL (02 Dec 2018 05:19)      PHYSICAL EXAM:  General: NAD, comfortable  HEENT: NCAT, PERRL, clear conjunctiva, no scleral icterus  Neck: supple, no JVD  Respiratory: CTA b/l, no wheezing, rhonchi, rales  Cardiovascular: RRR, normal S1S2, no M/R/G  Abdomen: soft, NT/ND, bowel sounds in all four quadrants, no palpable masses  Extremities: WWP, no clubbing, cyanosis, or edema  Neuro: no focal deficits       MEDICATIONS:  MEDICATIONS  (STANDING):  ALBUTerol/ipratropium for Nebulization 3 milliLiter(s) Nebulizer every 6 hours  amLODIPine   Tablet 10 milliGRAM(s) Oral every 24 hours  artificial tears (preservative free) Ophthalmic Solution 1 Drop(s) Both EYES three times a day  chlorhexidine 2% Cloths 1 Application(s) Topical <User Schedule>  dextrose 5%. 1000 milliLiter(s) (50 mL/Hr) IV Continuous <Continuous>  dextrose 50% Injectable 12.5 Gram(s) IV Push once  dextrose 50% Injectable 25 Gram(s) IV Push once  dextrose 50% Injectable 25 Gram(s) IV Push once  glycopyrrolate Injectable 0.2 milliGRAM(s) IV Push every 6 hours  heparin  Injectable 5000 Unit(s) SubCutaneous every 8 hours  insulin lispro (HumaLOG) corrective regimen sliding scale   SubCutaneous every 6 hours  lactobacillus acidophilus 1 Tablet(s) Oral every 8 hours  levothyroxine 150 MICROGram(s) Oral daily  lisinopril 40 milliGRAM(s) Oral daily  metoclopramide Injectable 5 milliGRAM(s) IV Push every 12 hours  pantoprazole   Suspension 40 milliGRAM(s) Enteral Tube daily  polyethylene glycol 3350 17 Gram(s) Oral daily    MEDICATIONS  (PRN):  acetaminophen    Suspension .. 650 milliGRAM(s) Oral every 6 hours PRN Temp greater or equal to 38C (100.4F)  dextrose 40% Gel 15 Gram(s) Oral once PRN Blood Glucose LESS THAN 70 milliGRAM(s)/deciliter  glucagon  Injectable 1 milliGRAM(s) IntraMuscular once PRN Glucose LESS THAN 70 milligrams/deciliter      ALLERGIES:  Allergies    No Known Allergies    Intolerances        LABS:                        8.1    7.0   )-----------( 334      ( 02 Dec 2018 05:28 )             27.6     12-02    148<H>  |  110<H>  |  28<H>  ----------------------------<  148<H>  4.0   |  29  |  0.93    Ca    10.1      02 Dec 2018 05:28  Phos  2.5     12-02  Mg     1.8     12-02      PT/INR - ( 30 Nov 2018 10:52 )   PT: 13.6 sec;   INR: 1.20          PTT - ( 30 Nov 2018 10:52 )  PTT:25.6 sec      RADIOLOGY & ADDITIONAL TESTS: Reviewed. PGY-1 Transfer Acceptance Note from MICU to [address] [place] [address] Course:  78F w/ PMH of morbid obesity, DM, HTN, hypothyroidism, sepsis secondary to ventral hernia infection s/p repair, PE s/p IVC filter, acute blood loss anemia from hemorrhagic uterine fibroids and UGIB, ATN requiring HD, sepsis secondary to MSSA bacteremia and MDR E coli UTI (4-18)  now w/AMS and s/p TAHBSO and small bowel resection s/p PEG. Patient presented from [facility] with concern for sepsis 2/2 aspiration pna and hypoxia. Per EMS, patient was saturating 85% on RA at [facility] with some improvement to 95% after being placed on NRB.  On arrival to the ED, patient was altered and saturating 89%. She was intubated in the ED with suctioning revealing purulent material. She was started on vanc/azithro/cipro (due to prior ESBL that was resistant to zosyn but sn to cipro, cipro course was finished) and that was deescalated to cefazolin once sputum had speciation to staph aureus sensitive to that and completed cefazolin course. She also completed ciprofloxacin for concern of ESBL (that was sn to this). She was successfully extubated, and tolerating nasal cannula. She had diarrhea that was present prior to admission as well (due to hx of SB resection), that improved with probiotics. Had continued secretions and completed cefazolin course. Was also started on glycopyrrolate and octreotide for secretions but now discontinued. She had mucous plugging with white out of right side of lung, this improved with percussion vest. 11/26 patient and son had long discussion with palliative care and it was decided to keep patient full code currently with a MOLST indicating as such. Of note, pt did require levophed followed by midodrine for low BP post-extubation, but now no longer requiring pressor support and home anti-hypertensives restarted. She has had 2 vomiting episodes in the past day, likely in setting of tube feeds, which were held last night and restarted this AM. NS 500cc bolus given 11/28 for decreasing UOP likely due to holding of tube feeds prior. Miralax started 11/28 as no BM in last 1-2 days thru rectal tube (in place due to incontinent dermatitis). Stable for step down to [facility] for further monitoring of respiratory status. Frequent suctioning for secretions.  Feeds increased back to 30cc/hr. Tachy to 120s overnight 11/29, with rising temp, max 100.7 rectal. Patient still sating well on NC. CXR shows complete  opacification of R lung with tracheal shift likely 2/2 to mucus plugging. Patient transferred to MICU for possible bronchoscopy and Intubation with anticipation to transition to [facility].     While in the MICU, pt had a bronchoscopy on 11/30 for recurrent mucus plugging, and had chest PT/percussion with improvement in opacification of R hemithorax, now off of nasal canula and saturating 94-95% on room air. She did have a low grade fever of 100.7 (11/29), Sputum cx was obtained which was positive for pseudomonas, now started on Zosyn 4.5 g q6h. Currently stable transfer back to [facility] for further monitoring of respiratory status.    INTERVAL HPI/OVERNIGHT EVENTS: No acute events overnight.     SUBJECTIVE: Patient seen and examined at bedside.  Pt awake, resting comfortably, no acute distress, now off of nasal cannula. Pt unable to verbalize interval history. Yesterday, her tube feeds were titrated up to goal of 55cc/hr. Reglan 5 mg QID was switched to BID. Bronch from 11/30 was positive for pseudomonas, however abx were not started at that time.     ROS:  unable to obtain    OBJECTIVE:    VITAL SIGNS:  ICU Vital Signs Last 24 Hrs  T(C): 36.4 (02 Dec 2018 05:51), Max: 37.4 (01 Dec 2018 13:58)  T(F): 97.6 (02 Dec 2018 05:51), Max: 99.3 (01 Dec 2018 13:58)  HR: 76 (02 Dec 2018 07:00) (76 - 96)  BP: 135/73 (02 Dec 2018 07:00) (120/84 - 174/86)  BP(mean): 95 (02 Dec 2018 07:00) (87 - 132)  ABP: --  ABP(mean): --  RR: 16 (02 Dec 2018 07:00) (15 - 31)  SpO2: 98% (02 Dec 2018 07:00) (91% - 100%)        12-01 @ 07:01  -  12-02 @ 07:00  --------------------------------------------------------  IN: 1260 mL / OUT: 1350 mL / NET: -90 mL    12-02 @ 07:01  -  12-02 @ 08:00  --------------------------------------------------------  IN: 55 mL / OUT: 0 mL / NET: 55 mL      CAPILLARY BLOOD GLUCOSE      POCT Blood Glucose.: 166 mg/dL (02 Dec 2018 05:19)      PHYSICAL EXAM:  General: NAD, comfortable  HEENT: NCAT, PERRL, clear conjunctiva, no scleral icterus  Neck: supple, no JVD  Respiratory: CTA b/l, no wheezing, rhonchi, rales  Cardiovascular: RRR, normal S1S2, no M/R/G  Abdomen: soft, NT/ND, bowel sounds in all four quadrants, no palpable masses  Extremities: WWP, no clubbing, cyanosis, or edema  Neuro: no focal deficits       MEDICATIONS:  MEDICATIONS  (STANDING):  ALBUTerol/ipratropium for Nebulization 3 milliLiter(s) Nebulizer every 6 hours  amLODIPine   Tablet 10 milliGRAM(s) Oral every 24 hours  artificial tears (preservative free) Ophthalmic Solution 1 Drop(s) Both EYES three times a day  chlorhexidine 2% Cloths 1 Application(s) Topical <User Schedule>  dextrose 5%. 1000 milliLiter(s) (50 mL/Hr) IV Continuous <Continuous>  dextrose 50% Injectable 12.5 Gram(s) IV Push once  dextrose 50% Injectable 25 Gram(s) IV Push once  dextrose 50% Injectable 25 Gram(s) IV Push once  glycopyrrolate Injectable 0.2 milliGRAM(s) IV Push every 6 hours  heparin  Injectable 5000 Unit(s) SubCutaneous every 8 hours  insulin lispro (HumaLOG) corrective regimen sliding scale   SubCutaneous every 6 hours  lactobacillus acidophilus 1 Tablet(s) Oral every 8 hours  levothyroxine 150 MICROGram(s) Oral daily  lisinopril 40 milliGRAM(s) Oral daily  metoclopramide Injectable 5 milliGRAM(s) IV Push every 12 hours  pantoprazole   Suspension 40 milliGRAM(s) Enteral Tube daily  polyethylene glycol 3350 17 Gram(s) Oral daily    MEDICATIONS  (PRN):  acetaminophen    Suspension .. 650 milliGRAM(s) Oral every 6 hours PRN Temp greater or equal to 38C (100.4F)  dextrose 40% Gel 15 Gram(s) Oral once PRN Blood Glucose LESS THAN 70 milliGRAM(s)/deciliter  glucagon  Injectable 1 milliGRAM(s) IntraMuscular once PRN Glucose LESS THAN 70 milligrams/deciliter      ALLERGIES:  Allergies    No Known Allergies    Intolerances        LABS:                        8.1    7.0   )-----------( 334      ( 02 Dec 2018 05:28 )             27.6     12-02    148<H>  |  110<H>  |  28<H>  ----------------------------<  148<H>  4.0   |  29  |  0.93    Ca    10.1      02 Dec 2018 05:28  Phos  2.5     12-02  Mg     1.8     12-02      PT/INR - ( 30 Nov 2018 10:52 )   PT: 13.6 sec;   INR: 1.20          PTT - ( 30 Nov 2018 10:52 )  PTT:25.6 sec      RADIOLOGY & ADDITIONAL TESTS: Reviewed. PGY-1 Transfer Acceptance Note from MICU to [address] [place] [address] Course:  78F w/ PMH of morbid obesity, DM, HTN, hypothyroidism, sepsis secondary to ventral hernia infection s/p repair, PE s/p IVC filter, acute blood loss anemia from hemorrhagic uterine fibroids and UGIB, ATN requiring HD, sepsis secondary to MSSA bacteremia and MDR E coli UTI (4-18)  now w/AMS and s/p TAHBSO and small bowel resection s/p PEG. Patient presented from [facility] with concern for sepsis 2/2 aspiration pna and hypoxia. Per EMS, patient was saturating 85% on RA at [facility] with some improvement to 95% after being placed on NRB.  On arrival to the ED, patient was altered and saturating 89%. She was intubated in the ED with suctioning revealing purulent material. She was started on vanc/azithro/cipro (due to prior ESBL that was resistant to zosyn but sn to cipro, cipro course was finished) and that was deescalated to cefazolin once sputum had speciation to staph aureus sensitive to that and completed cefazolin course. She also completed ciprofloxacin for concern of ESBL (that was sn to this). She was successfully extubated, and tolerating nasal cannula. She had diarrhea that was present prior to admission as well (due to hx of SB resection), that improved with probiotics. Had continued secretions and completed cefazolin course. Was also started on glycopyrrolate and octreotide for secretions but now discontinued. She had mucous plugging with white out of right side of lung, this improved with percussion vest. 11/26 patient and son had long discussion with palliative care and it was decided to keep patient full code currently with a MOLST indicating as such. Of note, pt did require levophed followed by midodrine for low BP post-extubation, but now no longer requiring pressor support and home anti-hypertensives restarted. She has had 2 vomiting episodes in the past day, likely in setting of tube feeds, which were held last night and restarted this AM. NS 500cc bolus given 11/28 for decreasing UOP likely due to holding of tube feeds prior. Miralax started 11/28 as no BM in last 1-2 days thru rectal tube (in place due to incontinent dermatitis). Stable for step down to [facility] for further monitoring of respiratory status. Frequent suctioning for secretions.  Feeds increased back to 30cc/hr. Tachy to 120s overnight 11/29, with rising temp, max 100.7 rectal. Patient still sating well on NC. CXR shows complete  opacification of R lung with tracheal shift likely 2/2 to mucus plugging. Patient transferred to MICU for possible bronchoscopy and Intubation with anticipation to transition to [facility].     While in the MICU, pt had a bronchoscopy on 11/30 for recurrent mucus plugging, and had chest PT/percussion with improvement in opacification of R hemithorax, now off of nasal canula and saturating 94-95% on room air. She did have a low grade fever of 100.7 (11/29), Sputum cx was obtained which was positive for pseudomonas and klebsiella, sensitive to zosyn, now on Zosyn 4.5 g q6h. Currently stable transfer back to [facility] for further monitoring of respiratory status.    INTERVAL HPI/OVERNIGHT EVENTS: No acute events overnight.     SUBJECTIVE: Patient seen and examined at bedside.  Pt awake, resting comfortably, no acute distress, now off of nasal cannula. Pt unable to verbalize interval history. Yesterday, her tube feeds were titrated up to goal of 55cc/hr. Reglan 5 mg QID was switched to BID. Bronch from 11/30 was positive for pseudomonas, however abx were not started at that time.     ROS:  unable to obtain    OBJECTIVE:    VITAL SIGNS:  ICU Vital Signs Last 24 Hrs  T(C): 36.4 (02 Dec 2018 05:51), Max: 37.4 (01 Dec 2018 13:58)  T(F): 97.6 (02 Dec 2018 05:51), Max: 99.3 (01 Dec 2018 13:58)  HR: 76 (02 Dec 2018 07:00) (76 - 96)  BP: 135/73 (02 Dec 2018 07:00) (120/84 - 174/86)  BP(mean): 95 (02 Dec 2018 07:00) (87 - 132)  ABP: --  ABP(mean): --  RR: 16 (02 Dec 2018 07:00) (15 - 31)  SpO2: 98% (02 Dec 2018 07:00) (91% - 100%)        12-01 @ 07:01  -  12-02 @ 07:00  --------------------------------------------------------  IN: 1260 mL / OUT: 1350 mL / NET: -90 mL    12-02 @ 07:01  -  12-02 @ 08:00  --------------------------------------------------------  IN: 55 mL / OUT: 0 mL / NET: 55 mL      CAPILLARY BLOOD GLUCOSE      POCT Blood Glucose.: 166 mg/dL (02 Dec 2018 05:19)      PHYSICAL EXAM:  General: NAD, comfortable  HEENT: NCAT, PERRL, clear conjunctiva, no scleral icterus  Neck: supple, no JVD  Respiratory: CTA b/l, no wheezing, rhonchi, rales  Cardiovascular: RRR, normal S1S2, no M/R/G  Abdomen: soft, NT/ND, bowel sounds in all four quadrants, no palpable masses  Extremities: WWP, no clubbing, cyanosis, or edema  Neuro: no focal deficits       MEDICATIONS:  MEDICATIONS  (STANDING):  ALBUTerol/ipratropium for Nebulization 3 milliLiter(s) Nebulizer every 6 hours  amLODIPine   Tablet 10 milliGRAM(s) Oral every 24 hours  artificial tears (preservative free) Ophthalmic Solution 1 Drop(s) Both EYES three times a day  chlorhexidine 2% Cloths 1 Application(s) Topical <User Schedule>  dextrose 5%. 1000 milliLiter(s) (50 mL/Hr) IV Continuous <Continuous>  dextrose 50% Injectable 12.5 Gram(s) IV Push once  dextrose 50% Injectable 25 Gram(s) IV Push once  dextrose 50% Injectable 25 Gram(s) IV Push once  glycopyrrolate Injectable 0.2 milliGRAM(s) IV Push every 6 hours  heparin  Injectable 5000 Unit(s) SubCutaneous every 8 hours  insulin lispro (HumaLOG) corrective regimen sliding scale   SubCutaneous every 6 hours  lactobacillus acidophilus 1 Tablet(s) Oral every 8 hours  levothyroxine 150 MICROGram(s) Oral daily  lisinopril 40 milliGRAM(s) Oral daily  metoclopramide Injectable 5 milliGRAM(s) IV Push every 12 hours  pantoprazole   Suspension 40 milliGRAM(s) Enteral Tube daily  polyethylene glycol 3350 17 Gram(s) Oral daily    MEDICATIONS  (PRN):  acetaminophen    Suspension .. 650 milliGRAM(s) Oral every 6 hours PRN Temp greater or equal to 38C (100.4F)  dextrose 40% Gel 15 Gram(s) Oral once PRN Blood Glucose LESS THAN 70 milliGRAM(s)/deciliter  glucagon  Injectable 1 milliGRAM(s) IntraMuscular once PRN Glucose LESS THAN 70 milligrams/deciliter      ALLERGIES:  Allergies    No Known Allergies    Intolerances        LABS:                        8.1    7.0   )-----------( 334      ( 02 Dec 2018 05:28 )             27.6     12-02    148<H>  |  110<H>  |  28<H>  ----------------------------<  148<H>  4.0   |  29  |  0.93    Ca    10.1      02 Dec 2018 05:28  Phos  2.5     12-02  Mg     1.8     12-02      PT/INR - ( 30 Nov 2018 10:52 )   PT: 13.6 sec;   INR: 1.20          PTT - ( 30 Nov 2018 10:52 )  PTT:25.6 sec      RADIOLOGY & ADDITIONAL TESTS: Reviewed.

## 2018-12-02 NOTE — PROGRESS NOTE ADULT - PROBLEM SELECTOR PLAN 2
Pt diagnosed with sepsis 2/2 HAP and ESBL UTIon admission, hypotensive in MICU requiring levophed and midodrine.   - resolved  - sputum cx staph aureus sn to cefazolin  - completed course of cefazolin for MSSA and ciprofloxacin for UTI (ESBL sn to this).  - fever on 11/29 with repeat sputum cultures (12/1) positive for pseudomonas/klebsiella susceptibility pending, started empirically on Zosyn 4.5 g q6h     #incontinent dermatitis of b/l buttocks  -daily wound care  -rectal tube in place    #Anemia of chronic disease  -no signs of active bleeding  -active type and screen

## 2018-12-02 NOTE — PROGRESS NOTE ADULT - PROBLEM SELECTOR PLAN 9
Dvt ppx: hep sq  GI ppx: protonix  Code Status: Full code, palliative with discussions with son for long term care of patient due to chronic aspiration pna. She remains full code.     Dispo: MICU    #JOSSELYN  1) PCP Contacted on Admission: (Y/N) --> Name & Phone #:  2) Date of Contact with PCP:  3) PCP Contacted at Discharge: (Y/N, N/A)  4) Summary of Handoff Given to PCP:   5) Post-Discharge Appointment Date and Location:

## 2018-12-02 NOTE — PROGRESS NOTE ADULT - ASSESSMENT
78F w/ PMH obesity, DM, HTN, hypothyroidism, sepsis secondary to ventral hernia infection s/p repair, PE s/p IVC filter, acute blood loss anemia from hemorrhagic uterine fibroids and UGIB, ATN requiring HD, sepsis secondary to MSSA bacteremia and MDR E coli UTI w/AMS and s/p TAHBSO and small bowel resection s/p PEG. Presenting with septic shock likely 2/2 PNA vs UTI and acute hypoxic respiratory failure now extubated, s/p bronchoscopy 11/30, now being treated for pseudomonal PNA with Zosyn (12/2- )

## 2018-12-02 NOTE — PROGRESS NOTE ADULT - SUBJECTIVE AND OBJECTIVE BOX
Hospital Course:  78F w/ PMH of morbid obesity, DM, HTN, hypothyroidism, sepsis secondary to ventral hernia infection s/p repair, PE s/p IVC filter, acute blood loss anemia from hemorrhagic uterine fibroids and UGIB, ATN requiring HD, sepsis secondary to MSSA bacteremia and MDR E coli UTI (4-18)  now w/AMS and s/p TAHBSO and small bowel resection s/p PEG. Patient presented from [facility] with concern for sepsis 2/2 aspiration pna and hypoxia. Per EMS, patient was saturating 85% on RA at [facility] with some improvement to 95% after being placed on NRB.  On arrival to the ED, patient was altered and saturating 89%. She was intubated in the ED with suctioning revealing purulent material. She was started on vanc/azithro/cipro (due to prior ESBL that was resistant to zosyn but sn to cipro, cipro course was finished) and that was deescalated to cefazolin once sputum had speciation to staph aureus sensitive to that and completed cefazolin course. She also completed ciprofloxacin for concern of ESBL (that was sn to this). She was successfully extubated, and tolerating nasal cannula. She had diarrhea that was present prior to admission as well (due to hx of SB resection), that improved with probiotics. Had continued secretions and completed cefazolin course. Was also started on glycopyrrolate and octreotide for secretions but now discontinued. She had mucous plugging with white out of right side of lung, this improved with percussion vest. 11/26 patient and son had long discussion with palliative care and it was decided to keep patient full code currently with a MOLST indicating as such. Of note, pt did require levophed followed by midodrine for low BP post-extubation, but now no longer requiring pressor support and home anti-hypertensives restarted. She has had 2 vomiting episodes in the past day, likely in setting of tube feeds, which were held last night and restarted this AM. NS 500cc bolus given 11/28 for decreasing UOP likely due to holding of tube feeds prior. Miralax started 11/28 as no BM in last 1-2 days thru rectal tube (in place due to incontinent dermatitis). Stable for step down to [facility] for further monitoring of respiratory status. Frequent suctioning for secretions.  Feeds increased back to 30cc/hr. Tachy to 120s overnight 11/29, with rising temp, max 100.7 rectal. Patient still sating well on NC. CXR shows complete  opacification of R lung with tracheal shift likely 2/2 to mucus plugging. Patient transferred to MICU for possible bronchoscopy and Intubation with anticipation to transition to [facility].     While in the MICU, pt had a bronchoscopy on 11/30 for recurrent mucus plugging, and had chest PT/percussion with improvement in opacification of R hemithorax, now off of nasal canula and saturating 94-95% on room air. She did have a low grade fever of 100.7 (11/29), Sputum cx was obtained which was positive for pseudomonas and klebsiella, sensitive to zosyn, now on Zosyn 4.5 g q6h. Currently stable transfer back to [facility] for further monitoring of respiratory status.    INTERVAL HPI: Pt is examined upon transfer to [facility] service. She appears well, in NAD. Patient is AOx2(name and place), she is responsive to some commands, slow to answering questions. Patient denies SOB, abdominal pain, chest pain, nausea/vomiting.       ICU Vital Signs Last 24 Hrs  T(C): 36.4 (02 Dec 2018 13:41), Max: 37.4 (01 Dec 2018 18:27)  T(F): 97.6 (02 Dec 2018 13:41), Max: 99.3 (01 Dec 2018 18:27)  HR: 96 (02 Dec 2018 13:20) (76 - 96)  BP: 138/85 (02 Dec 2018 13:20) (120/84 - 174/86)  BP(mean): 105 (02 Dec 2018 13:20) (87 - 132)  ABP: --  ABP(mean): --  RR: 18 (02 Dec 2018 13:20) (15 - 31)  SpO2: 95% (02 Dec 2018 13:20) (91% - 100%)    I&O's Summary    01 Dec 2018 07:01  -  02 Dec 2018 07:00  --------------------------------------------------------  IN: 1260 mL / OUT: 1350 mL / NET: -90 mL    02 Dec 2018 07:01  -  02 Dec 2018 15:45  --------------------------------------------------------  IN: 275 mL / OUT: 595 mL / NET: -320 mL          LABS:                        8.1    7.0   )-----------( 334      ( 02 Dec 2018 05:28 )             27.6     12-02    148<H>  |  110<H>  |  28<H>  ----------------------------<  148<H>  4.0   |  29  |  0.93    Ca    10.1      02 Dec 2018 05:28  Phos  2.5     12-02  Mg     1.8     12-02          CAPILLARY BLOOD GLUCOSE      POCT Blood Glucose.: 149 mg/dL (02 Dec 2018 11:09)  POCT Blood Glucose.: 166 mg/dL (02 Dec 2018 05:19)  POCT Blood Glucose.: 144 mg/dL (01 Dec 2018 23:19)  POCT Blood Glucose.: 112 mg/dL (01 Dec 2018 17:07)        RADIOLOGY & ADDITIONAL TESTS:    Consultant(s) Notes Reviewed:  [x ] YES  [ ] NO    MEDICATIONS  (STANDING):  ALBUTerol/ipratropium for Nebulization 3 milliLiter(s) Nebulizer every 6 hours  amLODIPine   Tablet 10 milliGRAM(s) Oral every 24 hours  artificial tears (preservative free) Ophthalmic Solution 1 Drop(s) Both EYES three times a day  chlorhexidine 2% Cloths 1 Application(s) Topical <User Schedule>  dextrose 5%. 1000 milliLiter(s) (50 mL/Hr) IV Continuous <Continuous>  dextrose 50% Injectable 12.5 Gram(s) IV Push once  dextrose 50% Injectable 25 Gram(s) IV Push once  dextrose 50% Injectable 25 Gram(s) IV Push once  glycopyrrolate Injectable 0.2 milliGRAM(s) IV Push every 6 hours  heparin  Injectable 5000 Unit(s) SubCutaneous every 8 hours  insulin lispro (HumaLOG) corrective regimen sliding scale   SubCutaneous every 6 hours  lactobacillus acidophilus 1 Tablet(s) Oral every 8 hours  levothyroxine 150 MICROGram(s) Oral daily  lisinopril 40 milliGRAM(s) Oral daily  meropenem  IVPB 1000 milliGRAM(s) IV Intermittent every 8 hours  metoclopramide Injectable 5 milliGRAM(s) IV Push every 12 hours  pantoprazole   Suspension 40 milliGRAM(s) Enteral Tube daily  polyethylene glycol 3350 17 Gram(s) Oral daily    MEDICATIONS  (PRN):  acetaminophen    Suspension .. 650 milliGRAM(s) Oral every 6 hours PRN Temp greater or equal to 38C (100.4F)  dextrose 40% Gel 15 Gram(s) Oral once PRN Blood Glucose LESS THAN 70 milliGRAM(s)/deciliter  glucagon  Injectable 1 milliGRAM(s) IntraMuscular once PRN Glucose LESS THAN 70 milligrams/deciliter      PHYSICAL EXAM:  GENERAL: Elderly female, in NAD.   HEAD:  Atraumatic, Normocephalic  EYES: EOMI, PERRLA, conjunctiva and sclera clear  ENT: No tonsillar erythema, exudates, or enlargement; Moist mucous membranes, Good dentition, No lesions  NECK: Supple, No JVD, Normal thyroid, no enlarged nodes  NERVOUS SYSTEM:  Alert & Oriented X2(name place), Good concentration; Motor Strength 4/5 B/L upper and lower extremities; DTRs 2+ intact and symmetric, sensory intact.   CHEST/LUNG: Decreased breath sounds on right lung.   HEART: S1S2 normal, no S3, Regular rate and rhythm; No murmurs, rubs, or gallops  ABDOMEN: Soft, Nontender, Nondistended; Bowel sounds present. Peg in place, no erythma or purulence.   EXTREMITIES:  2+ Peripheral Pulses, No clubbing, cyanosis, or edema  LYMPH: No lymphadenopathy noted  SKIN: No rashes or lesions    Care Discussed with Consultants/Other Providers [ x] YES  [ ] NO

## 2018-12-03 LAB
ANION GAP SERPL CALC-SCNC: 7 MMOL/L — SIGNIFICANT CHANGE UP (ref 5–17)
BUN SERPL-MCNC: 32 MG/DL — HIGH (ref 7–23)
CALCIUM SERPL-MCNC: 9.8 MG/DL — SIGNIFICANT CHANGE UP (ref 8.4–10.5)
CHLORIDE SERPL-SCNC: 113 MMOL/L — HIGH (ref 96–108)
CO2 SERPL-SCNC: 30 MMOL/L — SIGNIFICANT CHANGE UP (ref 22–31)
CREAT SERPL-MCNC: 1.05 MG/DL — SIGNIFICANT CHANGE UP (ref 0.5–1.3)
GLUCOSE BLDC GLUCOMTR-MCNC: 131 MG/DL — HIGH (ref 70–99)
GLUCOSE BLDC GLUCOMTR-MCNC: 133 MG/DL — HIGH (ref 70–99)
GLUCOSE BLDC GLUCOMTR-MCNC: 135 MG/DL — HIGH (ref 70–99)
GLUCOSE BLDC GLUCOMTR-MCNC: 161 MG/DL — HIGH (ref 70–99)
GLUCOSE BLDC GLUCOMTR-MCNC: 168 MG/DL — HIGH (ref 70–99)
GLUCOSE SERPL-MCNC: 157 MG/DL — HIGH (ref 70–99)
HCT VFR BLD CALC: 27.6 % — LOW (ref 34.5–45)
HGB BLD-MCNC: 8 G/DL — LOW (ref 11.5–15.5)
MCHC RBC-ENTMCNC: 28.5 PG — SIGNIFICANT CHANGE UP (ref 27–34)
MCHC RBC-ENTMCNC: 29 G/DL — LOW (ref 32–36)
MCV RBC AUTO: 98.2 FL — SIGNIFICANT CHANGE UP (ref 80–100)
PLATELET # BLD AUTO: 344 K/UL — SIGNIFICANT CHANGE UP (ref 150–400)
POTASSIUM SERPL-MCNC: 4.1 MMOL/L — SIGNIFICANT CHANGE UP (ref 3.5–5.3)
POTASSIUM SERPL-SCNC: 4.1 MMOL/L — SIGNIFICANT CHANGE UP (ref 3.5–5.3)
RBC # BLD: 2.81 M/UL — LOW (ref 3.8–5.2)
RBC # FLD: 17.4 % — HIGH (ref 10.3–16.9)
SODIUM SERPL-SCNC: 150 MMOL/L — HIGH (ref 135–145)
WBC # BLD: 8 K/UL — SIGNIFICANT CHANGE UP (ref 3.8–10.5)
WBC # FLD AUTO: 8 K/UL — SIGNIFICANT CHANGE UP (ref 3.8–10.5)

## 2018-12-03 PROCEDURE — 99233 SBSQ HOSP IP/OBS HIGH 50: CPT | Mod: GC

## 2018-12-03 RX ORDER — ACETYLCYSTEINE 200 MG/ML
3 VIAL (ML) MISCELLANEOUS EVERY 6 HOURS
Qty: 0 | Refills: 0 | Status: DISCONTINUED | OUTPATIENT
Start: 2018-12-03 | End: 2018-12-20

## 2018-12-03 RX ADMIN — Medication 5 MILLIGRAM(S): at 06:05

## 2018-12-03 RX ADMIN — PANTOPRAZOLE SODIUM 40 MILLIGRAM(S): 20 TABLET, DELAYED RELEASE ORAL at 11:48

## 2018-12-03 RX ADMIN — ROBINUL 0.2 MILLIGRAM(S): 0.2 INJECTION INTRAMUSCULAR; INTRAVENOUS at 00:19

## 2018-12-03 RX ADMIN — Medication 2: at 06:41

## 2018-12-03 RX ADMIN — Medication 3 MILLILITER(S): at 11:49

## 2018-12-03 RX ADMIN — Medication 3 MILLILITER(S): at 21:58

## 2018-12-03 RX ADMIN — Medication 150 MICROGRAM(S): at 05:28

## 2018-12-03 RX ADMIN — ROBINUL 0.2 MILLIGRAM(S): 0.2 INJECTION INTRAMUSCULAR; INTRAVENOUS at 05:29

## 2018-12-03 RX ADMIN — CHLORHEXIDINE GLUCONATE 1 APPLICATION(S): 213 SOLUTION TOPICAL at 06:01

## 2018-12-03 RX ADMIN — Medication 3 MILLILITER(S): at 00:18

## 2018-12-03 RX ADMIN — Medication 1 TABLET(S): at 13:04

## 2018-12-03 RX ADMIN — Medication 1 TABLET(S): at 05:29

## 2018-12-03 RX ADMIN — Medication 5 MILLIGRAM(S): at 17:08

## 2018-12-03 RX ADMIN — LISINOPRIL 40 MILLIGRAM(S): 2.5 TABLET ORAL at 05:28

## 2018-12-03 RX ADMIN — Medication 1 TABLET(S): at 21:32

## 2018-12-03 RX ADMIN — Medication 1 DROP(S): at 05:28

## 2018-12-03 RX ADMIN — Medication 3 MILLILITER(S): at 23:03

## 2018-12-03 RX ADMIN — MEROPENEM 100 MILLIGRAM(S): 1 INJECTION INTRAVENOUS at 17:57

## 2018-12-03 RX ADMIN — POLYETHYLENE GLYCOL 3350 17 GRAM(S): 17 POWDER, FOR SOLUTION ORAL at 11:48

## 2018-12-03 RX ADMIN — ROBINUL 0.2 MILLIGRAM(S): 0.2 INJECTION INTRAMUSCULAR; INTRAVENOUS at 17:08

## 2018-12-03 RX ADMIN — Medication 3 MILLILITER(S): at 16:46

## 2018-12-03 RX ADMIN — Medication 3 MILLILITER(S): at 06:36

## 2018-12-03 RX ADMIN — AMLODIPINE BESYLATE 10 MILLIGRAM(S): 2.5 TABLET ORAL at 16:43

## 2018-12-03 RX ADMIN — HEPARIN SODIUM 5000 UNIT(S): 5000 INJECTION INTRAVENOUS; SUBCUTANEOUS at 21:32

## 2018-12-03 RX ADMIN — MEROPENEM 100 MILLIGRAM(S): 1 INJECTION INTRAVENOUS at 06:28

## 2018-12-03 RX ADMIN — Medication 3 MILLILITER(S): at 17:08

## 2018-12-03 RX ADMIN — Medication 2: at 17:07

## 2018-12-03 RX ADMIN — ROBINUL 0.2 MILLIGRAM(S): 0.2 INJECTION INTRAMUSCULAR; INTRAVENOUS at 23:04

## 2018-12-03 RX ADMIN — Medication 1 DROP(S): at 13:04

## 2018-12-03 RX ADMIN — ROBINUL 0.2 MILLIGRAM(S): 0.2 INJECTION INTRAMUSCULAR; INTRAVENOUS at 11:48

## 2018-12-03 RX ADMIN — HEPARIN SODIUM 5000 UNIT(S): 5000 INJECTION INTRAVENOUS; SUBCUTANEOUS at 13:04

## 2018-12-03 RX ADMIN — HEPARIN SODIUM 5000 UNIT(S): 5000 INJECTION INTRAVENOUS; SUBCUTANEOUS at 05:29

## 2018-12-03 RX ADMIN — Medication 1 DROP(S): at 21:31

## 2018-12-03 NOTE — PROGRESS NOTE ADULT - PROBLEM SELECTOR PLAN 4
Pt recently admitted 4/15/18 due to MSSA bacteremia and MDR E Coli in urine, transferred to [facility], and found to have extravasation on CT abd, s/p IR drain placement, underwent DAVY BSO 2/2 tubal metaplasia, SBR with primary anastomosis, abdominal washout, PEG placement, discharged on 6/4  - surgery on board  - PEG in place  - c/w glucerna feeds at 55cc/hr

## 2018-12-03 NOTE — CHART NOTE - NSCHARTNOTEFT_GEN_A_CORE
Admitting Diagnosis:   Patient is a 78y old  Female who presents with a chief complaint of Septic shock and acute respiratory failure (02 Dec 2018 15:44)      PAST MEDICAL & SURGICAL HISTORY:  Hypothyroidism  GERD (gastroesophageal reflux disease)  Obesity  DM (diabetes mellitus)  HLD (hyperlipidemia)  HTN (hypertension)  Status post total abdominal hysterectomy and bilateral salpingo-oophorectomy  S/P small bowel resection  H/O ventral hernia repair      Current Nutrition Order:  Glucerna 1.5 Nicholas @ 55mL/hr x 24hrs via PEG (1320 mL TV, 1980kcal, 109g protein, 1002mL free H2O)    PO Intake: Good (%) [   ]  Fair (50-75%) [   ] Poor (<25%) [   ]- N/A NPO w/EN    GI Issues: Unable to assess at this time 2/2 AMS, +rectal tube, -residuals     Pain: Unable to assess at this time 2/2 AMS; appears comfortable at this time     Skin Integrity: Cecilio 10, IAD, sacrum DTI    Labs:       150<H>  |  113<H>  |  32<H>  ----------------------------<  157<H>  4.1   |  30  |  1.05    Ca    9.8      03 Dec 2018 06:41  Phos  2.5     12-02  Mg     1.8     12-02      CAPILLARY BLOOD GLUCOSE      POCT Blood Glucose.: 135 mg/dL (03 Dec 2018 11:17)  POCT Blood Glucose.: 161 mg/dL (03 Dec 2018 06:39)  POCT Blood Glucose.: 133 mg/dL (03 Dec 2018 00:14)  POCT Blood Glucose.: 158 mg/dL (02 Dec 2018 18:06)  POCT Blood Glucose.: 146 mg/dL (02 Dec 2018 16:33)      Medications:  MEDICATIONS  (STANDING):  acetylcysteine 10%  Inhalation 3 milliLiter(s) Inhalation every 6 hours  ALBUTerol/ipratropium for Nebulization 3 milliLiter(s) Nebulizer every 6 hours  amLODIPine   Tablet 10 milliGRAM(s) Oral every 24 hours  artificial tears (preservative free) Ophthalmic Solution 1 Drop(s) Both EYES three times a day  chlorhexidine 2% Cloths 1 Application(s) Topical <User Schedule>  dextrose 5%. 1000 milliLiter(s) (50 mL/Hr) IV Continuous <Continuous>  dextrose 50% Injectable 12.5 Gram(s) IV Push once  dextrose 50% Injectable 25 Gram(s) IV Push once  dextrose 50% Injectable 25 Gram(s) IV Push once  glycopyrrolate Injectable 0.2 milliGRAM(s) IV Push every 6 hours  heparin  Injectable 5000 Unit(s) SubCutaneous every 8 hours  insulin lispro (HumaLOG) corrective regimen sliding scale   SubCutaneous every 6 hours  lactobacillus acidophilus 1 Tablet(s) Oral every 8 hours  levothyroxine 150 MICROGram(s) Oral daily  lisinopril 40 milliGRAM(s) Oral daily  meropenem  IVPB 1000 milliGRAM(s) IV Intermittent every 12 hours  metoclopramide Injectable 5 milliGRAM(s) IV Push every 12 hours  pantoprazole   Suspension 40 milliGRAM(s) Enteral Tube daily  polyethylene glycol 3350 17 Gram(s) Oral daily    MEDICATIONS  (PRN):  acetaminophen    Suspension .. 650 milliGRAM(s) Oral every 6 hours PRN Temp greater or equal to 38C (100.4F)  dextrose 40% Gel 15 Gram(s) Oral once PRN Blood Glucose LESS THAN 70 milliGRAM(s)/deciliter  glucagon  Injectable 1 milliGRAM(s) IntraMuscular once PRN Glucose LESS THAN 70 milligrams/deciliter      Weight:  Daily     Daily Weight in k.6 (2018 05:00)    Weight: 69.2kg   Daily     Daily     Weight Change: Large weight discrepancy noted; asked RN to please take new weight     Estimated energy needs: Utilized IBW (54.5kg) to calculate needs, pt >120% of IBW. Adjusted for sepsis/wound healing  Calories: 25-30 kcal/kg =1362-1635kcal/day  Protein: 1.2-1.4 g/kg = 65-76g protein/day  Fluids: 30-35 mL/kg = 4004-5765 mL/day    Subjective: 79y/o F presenting with septic shock likely 2/2 PNA vs UTI and acute hypoxic respiratory failure initially requiring intubation. Pt extubated on , now on NC. S/p transfer back to MICU on  for bronchoscopy 2/2 mucous plugging. Mucous removed and pt was able to remain on NC. Pt back on [facility], awake, but not alert or responding to questions. EN running at goal of 55mL/hr with good tolerance per RN. +Rectal tube in place, minimal output. Pt tachycardic and febrile, and continues to require frequent suctioning. Will continue to keep nutrition aligned with GOC at all times.     Previous Nutrition Diagnosis:   Increased protein-calorie needs RT increased demand for protein-calorie intake AEB sepsis/wound healing     Active [X   ]  Resolved [   ]    If resolved, new PES:    Goal: Pt will meet % of EER per day via tolerated route     Recommendations:  1. Continue with current EN order  2. Monitor for s/s intolerance; maintain aspiration precautions at all times  3. Monitor lytes and replete prn.  4. Trend weights weekly  5. Keep nutrition aligned with GOC at all times     Education: N/A- not indicated at this time 2/2 AMS    Risk Level: High [   ] Moderate [ X  ] Low [   ]

## 2018-12-03 NOTE — PROGRESS NOTE ADULT - SUBJECTIVE AND OBJECTIVE BOX
Patient is a 78y old  Female who presents with a chief complaint of Septic shock and acute respiratory failure (03 Dec 2018 14:34)      INTERVAL HPI/OVERNIGHT EVENTS:  ICU Vital Signs Last 24 Hrs  T(C): 37.7 (03 Dec 2018 13:27), Max: 37.8 (02 Dec 2018 18:02)  T(F): 99.8 (03 Dec 2018 13:27), Max: 100 (02 Dec 2018 18:02)  HR: 114 (03 Dec 2018 12:17) (90 - 120)  BP: 183/89 (03 Dec 2018 12:17) (159/74 - 183/89)  BP(mean): 128 (03 Dec 2018 12:17) (106 - 128)  ABP: --  ABP(mean): --  RR: 20 (03 Dec 2018 08:17) (17 - 20)  SpO2: 98% (03 Dec 2018 12:17) (94% - 100%)    I&O's Summary    02 Dec 2018 07:01  -  03 Dec 2018 07:00  --------------------------------------------------------  IN: 985 mL / OUT: 1495 mL / NET: -510 mL    03 Dec 2018 07:01  -  03 Dec 2018 15:42  --------------------------------------------------------  IN: 525 mL / OUT: 650 mL / NET: -125 mL          LABS:                        8.0    8.0   )-----------( 344      ( 03 Dec 2018 06:41 )             27.6     12-03    150<H>  |  113<H>  |  32<H>  ----------------------------<  157<H>  4.1   |  30  |  1.05    Ca    9.8      03 Dec 2018 06:41  Phos  2.5     12-02  Mg     1.8     12-02          CAPILLARY BLOOD GLUCOSE      POCT Blood Glucose.: 135 mg/dL (03 Dec 2018 11:17)  POCT Blood Glucose.: 161 mg/dL (03 Dec 2018 06:39)  POCT Blood Glucose.: 133 mg/dL (03 Dec 2018 00:14)  POCT Blood Glucose.: 158 mg/dL (02 Dec 2018 18:06)  POCT Blood Glucose.: 146 mg/dL (02 Dec 2018 16:33)        RADIOLOGY & ADDITIONAL TESTS:    Consultant(s) Notes Reviewed:  [x ] YES  [ ] NO    MEDICATIONS  (STANDING):  acetylcysteine 10%  Inhalation 3 milliLiter(s) Inhalation every 6 hours  ALBUTerol/ipratropium for Nebulization 3 milliLiter(s) Nebulizer every 6 hours  amLODIPine   Tablet 10 milliGRAM(s) Oral every 24 hours  artificial tears (preservative free) Ophthalmic Solution 1 Drop(s) Both EYES three times a day  chlorhexidine 2% Cloths 1 Application(s) Topical <User Schedule>  dextrose 5%. 1000 milliLiter(s) (50 mL/Hr) IV Continuous <Continuous>  dextrose 50% Injectable 12.5 Gram(s) IV Push once  dextrose 50% Injectable 25 Gram(s) IV Push once  dextrose 50% Injectable 25 Gram(s) IV Push once  glycopyrrolate Injectable 0.2 milliGRAM(s) IV Push every 6 hours  heparin  Injectable 5000 Unit(s) SubCutaneous every 8 hours  insulin lispro (HumaLOG) corrective regimen sliding scale   SubCutaneous every 6 hours  lactobacillus acidophilus 1 Tablet(s) Oral every 8 hours  levothyroxine 150 MICROGram(s) Oral daily  lisinopril 40 milliGRAM(s) Oral daily  meropenem  IVPB 1000 milliGRAM(s) IV Intermittent every 12 hours  metoclopramide Injectable 5 milliGRAM(s) IV Push every 12 hours  pantoprazole   Suspension 40 milliGRAM(s) Enteral Tube daily  polyethylene glycol 3350 17 Gram(s) Oral daily    MEDICATIONS  (PRN):  acetaminophen    Suspension .. 650 milliGRAM(s) Oral every 6 hours PRN Temp greater or equal to 38C (100.4F)  dextrose 40% Gel 15 Gram(s) Oral once PRN Blood Glucose LESS THAN 70 milliGRAM(s)/deciliter  glucagon  Injectable 1 milliGRAM(s) IntraMuscular once PRN Glucose LESS THAN 70 milligrams/deciliter      PHYSICAL EXAM:  GENERAL: well built, well nourished  HEAD:  Atraumatic, Normocephalic  EYES: EOMI, PERRLA, conjunctiva and sclera clear  ENT: No tonsillar erythema, exudates, or enlargement; Moist mucous membranes, Good dentition, No lesions  NECK: Supple, No JVD, Normal thyroid, no enlarged nodes  NERVOUS SYSTEM:  Alert & Oriented X3, Good concentration; Motor Strength 5/5 B/L upper and lower extremities; DTRs 2+ intact and symmetric, sensory intact  CHEST/LUNG: B/L good air entry; No rales, rhonchi, or wheezing  HEART: S1S2 normal, no S3, Regular rate and rhythm; No murmurs, rubs, or gallops  ABDOMEN: Soft, Nontender, Nondistended; Bowel sounds present  EXTREMITIES:  2+ Peripheral Pulses, No clubbing, cyanosis, or edema  LYMPH: No lymphadenopathy noted  SKIN: No rashes or lesions    Care Discussed with Consultants/Other Providers [ x] YES  [ ] NO OVERNIGHT EVENTS: EMANUEL    INTERVAL HPI: Pt is examined at bedside. She appear well, in NAD. Patient is awake and alert. Responsive to some commands. Does not appear to be in any resp distress.     ICU Vital Signs Last 24 Hrs  T(C): 37.7 (03 Dec 2018 13:27), Max: 37.8 (02 Dec 2018 18:02)  T(F): 99.8 (03 Dec 2018 13:27), Max: 100 (02 Dec 2018 18:02)  HR: 114 (03 Dec 2018 12:17) (90 - 120)  BP: 183/89 (03 Dec 2018 12:17) (159/74 - 183/89)  BP(mean): 128 (03 Dec 2018 12:17) (106 - 128)  ABP: --  ABP(mean): --  RR: 20 (03 Dec 2018 08:17) (17 - 20)  SpO2: 98% (03 Dec 2018 12:17) (94% - 100%)    I&O's Summary    02 Dec 2018 07:01  -  03 Dec 2018 07:00  --------------------------------------------------------  IN: 985 mL / OUT: 1495 mL / NET: -510 mL    03 Dec 2018 07:01  -  03 Dec 2018 15:42  --------------------------------------------------------  IN: 525 mL / OUT: 650 mL / NET: -125 mL          LABS:                        8.0    8.0   )-----------( 344      ( 03 Dec 2018 06:41 )             27.6     12-03    150<H>  |  113<H>  |  32<H>  ----------------------------<  157<H>  4.1   |  30  |  1.05    Ca    9.8      03 Dec 2018 06:41  Phos  2.5     12-02  Mg     1.8     12-02          CAPILLARY BLOOD GLUCOSE      POCT Blood Glucose.: 135 mg/dL (03 Dec 2018 11:17)  POCT Blood Glucose.: 161 mg/dL (03 Dec 2018 06:39)  POCT Blood Glucose.: 133 mg/dL (03 Dec 2018 00:14)  POCT Blood Glucose.: 158 mg/dL (02 Dec 2018 18:06)  POCT Blood Glucose.: 146 mg/dL (02 Dec 2018 16:33)        RADIOLOGY & ADDITIONAL TESTS:    Consultant(s) Notes Reviewed:  [x ] YES  [ ] NO    MEDICATIONS  (STANDING):  acetylcysteine 10%  Inhalation 3 milliLiter(s) Inhalation every 6 hours  ALBUTerol/ipratropium for Nebulization 3 milliLiter(s) Nebulizer every 6 hours  amLODIPine   Tablet 10 milliGRAM(s) Oral every 24 hours  artificial tears (preservative free) Ophthalmic Solution 1 Drop(s) Both EYES three times a day  chlorhexidine 2% Cloths 1 Application(s) Topical <User Schedule>  dextrose 5%. 1000 milliLiter(s) (50 mL/Hr) IV Continuous <Continuous>  dextrose 50% Injectable 12.5 Gram(s) IV Push once  dextrose 50% Injectable 25 Gram(s) IV Push once  dextrose 50% Injectable 25 Gram(s) IV Push once  glycopyrrolate Injectable 0.2 milliGRAM(s) IV Push every 6 hours  heparin  Injectable 5000 Unit(s) SubCutaneous every 8 hours  insulin lispro (HumaLOG) corrective regimen sliding scale   SubCutaneous every 6 hours  lactobacillus acidophilus 1 Tablet(s) Oral every 8 hours  levothyroxine 150 MICROGram(s) Oral daily  lisinopril 40 milliGRAM(s) Oral daily  meropenem  IVPB 1000 milliGRAM(s) IV Intermittent every 12 hours  metoclopramide Injectable 5 milliGRAM(s) IV Push every 12 hours  pantoprazole   Suspension 40 milliGRAM(s) Enteral Tube daily  polyethylene glycol 3350 17 Gram(s) Oral daily    MEDICATIONS  (PRN):  acetaminophen    Suspension .. 650 milliGRAM(s) Oral every 6 hours PRN Temp greater or equal to 38C (100.4F)  dextrose 40% Gel 15 Gram(s) Oral once PRN Blood Glucose LESS THAN 70 milliGRAM(s)/deciliter  glucagon  Injectable 1 milliGRAM(s) IntraMuscular once PRN Glucose LESS THAN 70 milligrams/deciliter      PHYSICAL EXAM:  GENERAL: well built, well nourished  HEAD:  Atraumatic, Normocephalic  EYES: EOMI, PERRLA, conjunctiva and sclera clear  ENT: No tonsillar erythema, exudates, or enlargement; Moist mucous membranes, Good dentition, No lesions  NECK: Supple, No JVD, Normal thyroid, no enlarged nodes  NERVOUS SYSTEM:  Alert & Oriented X3, Good concentration; Motor Strength 5/5 B/L upper and lower extremities; DTRs 2+ intact and symmetric, sensory intact  CHEST/LUNG: B/L good air entry; No rales, rhonchi, or wheezing  HEART: S1S2 normal, no S3, Regular rate and rhythm; No murmurs, rubs, or gallops  ABDOMEN: Soft, Nontender, Nondistended; Bowel sounds present  EXTREMITIES:  2+ Peripheral Pulses, No clubbing, cyanosis, or edema  LYMPH: No lymphadenopathy noted  SKIN: No rashes or lesions    Care Discussed with Consultants/Other Providers [ x] YES  [ ] NO

## 2018-12-03 NOTE — PROGRESS NOTE ADULT - ASSESSMENT
78F w/ PMH obesity, DM, HTN, hypothyroidism, sepsis secondary to ventral hernia infection s/p repair, PE s/p IVC filter, acute blood loss anemia from hemorrhagic uterine fibroids and UGIB, ATN requiring HD, sepsis secondary to MSSA bacteremia and MDR E coli UTI w/AMS and s/p TAHBSO and small bowel resection s/p PEG. Presenting with septic shock likely 2/2 PNA vs UTI and acute hypoxic respiratory failure now extubated, s/p bronchoscopy 11/30, now being treated for pseudomonal PNA with Zosyn (12/2- )               Problem/Plan - 1:  ·  Problem: Mucus plugging of bronchi.  Plan: Pt with frequent mucus plugging throughout hospital course s/p percussion vest, robinol, frequent suctioning.  - continue to monitor resp status.  - continue Chest PT and percussion vest  for mucous/ glycopyrrolate 0.2 Q6 IV  - s/p bronchoscopy 11/30    #Fever: Pt had a low grade fever on 11/29 100.7.   - completed course of cefazolin for MSSA and ciprofloxacin for UTI (ESBL sn to this).   - repeat sputum cx (12/1) positive for pseudomonas/klebsiella susceptibility pending, started empirically on Zosyn 4.5 g q6h   - f/up Bcultures    #Chronic diarrhea 2/2 small bowel resection  -probiotics daily.     Problem/Plan - 2:  ·  Problem: Septic shock.  Plan: Pt diagnosed with sepsis 2/2 HAP and ESBL UTIon admission, hypotensive in MICU requiring levophed and midodrine.   - resolved  - sputum cx staph aureus sn to cefazolin  - completed course of cefazolin for MSSA and ciprofloxacin for UTI (ESBL sn to this).  - fever on 11/29 with repeat sputum cultures (12/1) positive for pseudomonas/klebsiella susceptibility pending, started empirically on Zosyn 4.5 g q6h     #incontinent dermatitis of b/l buttocks  -daily wound care  -rectal tube in place    #Anemia of chronic disease  -no signs of active bleeding  -active type and screen.     Problem/Plan - 3:  ·  Problem: Toxic metabolic encephalopathy.  Plan: Toxic metabolic encephalopathy 2/2 septic shock  RESOLVED  - mental status consistent with patient's baseline dementia.     Problem/Plan - 4:  ·  Problem: Small bowel perforation.  Plan: Pt recently admitted 4/15/18 due to MSSA bacteremia and MDR E Coli in urine, transferred to [facility], and found to have extravasation on CT abd, s/p IR drain placement, underwent DAVY BSO 2/2 tubal metaplasia, SBR with primary anastomosis, abdominal washout, PEG placement, discharged on 6/4  - surgery on board  - PEG in place  - c/w glucerna feeds at 55cc/hr.

## 2018-12-03 NOTE — PROGRESS NOTE ADULT - PROBLEM SELECTOR PLAN 1
Pt with frequent mucus plugging throughout hospital course s/p percussion vest, robinol, frequent suctioning.  - continue to monitor resp status.  - continue Chest PT and percussion vest  for mucous/ glycopyrrolate 0.2 Q6 IV  - s/p bronchoscopy 11/30    #Fever: Pt had a low grade fever on 11/29 100.7.   - completed course of cefazolin for MSSA and ciprofloxacin for UTI (ESBL sn to this).   - repeat sputum cx (12/1) positive for pseudomonas/klebsiella susceptibility pending, started empirically on Zosyn 4.5 g q6h   - f/up Bcultures    #Chronic diarrhea 2/2 small bowel resection  -probiotics daily Pt with frequent mucus plugging throughout hospital course s/p percussion vest, robinol, frequent suctioning.  - continue to monitor resp status.  - continue Chest PT and percussion vest  for mucous/ glycopyrrolate 0.2 Q6 IV  - s/p bronchoscopy 11/30  - add mucomyst     #Gram Neg PNA:: Pt had a low grade fever on 11/29 100.7. CXR with possible RLL infiltrate, completed course of cefazolin for MSSA and ciprofloxacin for UTI (ESBL sn to this).   - repeat sputum cx (12/1) positive for pseudomonas/klebsiella, and ESBL Ecoli susceptibility pending, started empirically on Zosyn 4.5 g q6h. Changed to meropenem  - c/w meropenem for 5 days.   - f/up Bcultures    #Chronic diarrhea 2/2 small bowel resection  -probiotics daily

## 2018-12-03 NOTE — CHART NOTE - NSCHARTNOTEFT_GEN_A_CORE
Infectious Diseases Anti-infective Approval Note    Medication: meropenem  Dose: 1g  Route: IV  Frequency: q12h  Duration: 5 days    Dose may be adjusted as needed for alterations in renal function.    *THIS IS NOT AN INFECTIOUS DISEASES CONSULTATION*

## 2018-12-03 NOTE — PROGRESS NOTE ADULT - SUBJECTIVE AND OBJECTIVE BOX
Physical Medicine and Rehabilitation Progress Note:    Patient is a 78y old  Female who presents with a chief complaint of Septic shock and acute respiratory failure (02 Dec 2018 15:44)      HPI:  Patient is currently intubated and unable to participate in interview. History taken from chart and MICU consult.    78F w/ PMH of morbid obesity, DM, HTN, hypothyroidism, sepsis secondary to ventral hernia infection s/p repair, PE s/p IVC filter, acute blood loss anemia from hemorrhagic uterine fibroids and UGIB, ATN requiring HD, sepsis secondary to MSSA bacteremia and MDR E coli UTI now w/AMS and s/p TAHBSO and small bowel resection s/p PEG. Patient presented from [facility] with concern for sepsis and hypoxia. Per EMS, patient was saturating 85% on RA at [facility] with some improvement to 95% after being placed on NRB.  On arrival to the ED, patient was altered and saturating 89%. She was intubated in the ED with suctioning revealing purulent material.       T was 101.5, , BP79/59. WBC 27.9 Hgb 11.0 .3 Lactate 5.4 Na 154 K 5.6 CO2 16 AG [id number] Cr 3.38 Trop 0.14 ABG 7.32 pCO2 29 pO2 138 HCO3 15  UA: (+) leuk est, (+) bacteria  She was started on Levo gtt and Dopamine and subsequently was titrated off the Dopamine. She was given Azithromycin, Cipro, Vancomycin and Zosyn, 4L nS, and Insulin 10U. (16 Nov 2018 23:11)                            8.0    8.0   )-----------( 344      ( 03 Dec 2018 06:41 )             27.6       12-03    150<H>  |  113<H>  |  32<H>  ----------------------------<  157<H>  4.1   |  30  |  1.05    Ca    9.8      03 Dec 2018 06:41  Phos  2.5     12-02  Mg     1.8     12-02      Vital Signs Last 24 Hrs  T(C): 37.7 (03 Dec 2018 13:27), Max: 37.8 (02 Dec 2018 18:02)  T(F): 99.8 (03 Dec 2018 13:27), Max: 100 (02 Dec 2018 18:02)  HR: 114 (03 Dec 2018 12:17) (90 - 120)  BP: 183/89 (03 Dec 2018 12:17) (159/74 - 183/89)  BP(mean): 128 (03 Dec 2018 12:17) (106 - 128)  RR: 20 (03 Dec 2018 08:17) (17 - 20)  SpO2: 98% (03 Dec 2018 12:17) (94% - 100%)    MEDICATIONS  (STANDING):  acetylcysteine 10%  Inhalation 3 milliLiter(s) Inhalation every 6 hours  ALBUTerol/ipratropium for Nebulization 3 milliLiter(s) Nebulizer every 6 hours  amLODIPine   Tablet 10 milliGRAM(s) Oral every 24 hours  artificial tears (preservative free) Ophthalmic Solution 1 Drop(s) Both EYES three times a day  chlorhexidine 2% Cloths 1 Application(s) Topical <User Schedule>  dextrose 5%. 1000 milliLiter(s) (50 mL/Hr) IV Continuous <Continuous>  dextrose 50% Injectable 12.5 Gram(s) IV Push once  dextrose 50% Injectable 25 Gram(s) IV Push once  dextrose 50% Injectable 25 Gram(s) IV Push once  glycopyrrolate Injectable 0.2 milliGRAM(s) IV Push every 6 hours  heparin  Injectable 5000 Unit(s) SubCutaneous every 8 hours  insulin lispro (HumaLOG) corrective regimen sliding scale   SubCutaneous every 6 hours  lactobacillus acidophilus 1 Tablet(s) Oral every 8 hours  levothyroxine 150 MICROGram(s) Oral daily  lisinopril 40 milliGRAM(s) Oral daily  meropenem  IVPB 1000 milliGRAM(s) IV Intermittent every 12 hours  metoclopramide Injectable 5 milliGRAM(s) IV Push every 12 hours  pantoprazole   Suspension 40 milliGRAM(s) Enteral Tube daily  polyethylene glycol 3350 17 Gram(s) Oral daily    MEDICATIONS  (PRN):  acetaminophen    Suspension .. 650 milliGRAM(s) Oral every 6 hours PRN Temp greater or equal to 38C (100.4F)  dextrose 40% Gel 15 Gram(s) Oral once PRN Blood Glucose LESS THAN 70 milliGRAM(s)/deciliter  glucagon  Injectable 1 milliGRAM(s) IntraMuscular once PRN Glucose LESS THAN 70 milligrams/deciliter    Currently Undergoing Physical Therapy at bedside.    Functional Status Assessment: on 12/1/2018    Pain Assessment/FLACC  FLACC Pain Rating: Rest: (0) no particular expression or smile,  (0) normal position or relaxed,  (0) lying quietly, normal position, moves easily,  (0) no cry (awake or asleep),  (0) content, relaxed  FLACC Score: Rest FLACC Score: Rest: 0   FLACC Pain Rating: Activity: (1) occasional grimace or frown, withdrawn, disinterested,  (1) uneasy, restless, tense,  (1) squirming, shifting back and forth, tense,  (1) moans or whimpers; occasional complaint,  (0) content, relaxed  FLACC Score: Activity FLACC Score: Activity: 4     Therapeutic Interventions      Bed Mobility  Bed Mobility Training Symptoms Noted During/After Treatment: fatigue  Bed Mobility Training Scooting: moderate assist (50% patient effort);  2 person assist  Bed Mobility Training Sit-to-Supine: maximum assist (25% patient effort);  2 person assist  Bed Mobility Training Supine-to-Sit: maximum assist (25% patient effort);  2 person assist;  verbal cues  Bed Mobility Training Limitations: decreased ability to use arms for pushing/pulling;  decreased ability to use legs for bridging/pushing;  decreased strength;  impaired postural control;  cognitive, decreased safety awareness    Therapeutic Exercise  Therapeutic Exercise Symptoms Noted During/After Treatment: fatigue  Therapeutic Exercise Charges: Supine therex: Resisted hip flexion/extension x 10 bilaterallySitting Edge of Bed Therex: shoulder flexion x 10 bilaterallyLong Arc Quads x 10 bilaterallyAnkle Dorsiflexion/plantarflexion x 10 bilaterallyTotal dangle time~15 minutes                 PM&R Impression: as above    Disposition Plan Recommendations: subacute rehab placement

## 2018-12-03 NOTE — PROGRESS NOTE ADULT - PROBLEM SELECTOR PLAN 3
Toxic metabolic encephalopathy 2/2 septic shock  RESOLVED  - mental status consistent with patient's baseline dementia Toxic metabolic encephalopathy 2/2 septic shock  RESOLVED  - mental status consistent with patient's baseline dementia    #Hypernatremia: FW deficit 1.5L. Likely 2/2 low intake  -  q 6hr  - trend bmp

## 2018-12-04 LAB
ALBUMIN SERPL ELPH-MCNC: 2.6 G/DL — LOW (ref 3.3–5)
ALP SERPL-CCNC: 78 U/L — SIGNIFICANT CHANGE UP (ref 40–120)
ALT FLD-CCNC: 8 U/L — LOW (ref 10–45)
ANION GAP SERPL CALC-SCNC: 11 MMOL/L — SIGNIFICANT CHANGE UP (ref 5–17)
AST SERPL-CCNC: 12 U/L — SIGNIFICANT CHANGE UP (ref 10–40)
BILIRUB SERPL-MCNC: 0.2 MG/DL — SIGNIFICANT CHANGE UP (ref 0.2–1.2)
BUN SERPL-MCNC: 35 MG/DL — HIGH (ref 7–23)
CALCIUM SERPL-MCNC: 9.8 MG/DL — SIGNIFICANT CHANGE UP (ref 8.4–10.5)
CHLORIDE SERPL-SCNC: 110 MMOL/L — HIGH (ref 96–108)
CO2 SERPL-SCNC: 29 MMOL/L — SIGNIFICANT CHANGE UP (ref 22–31)
CREAT SERPL-MCNC: 1.02 MG/DL — SIGNIFICANT CHANGE UP (ref 0.5–1.3)
GLUCOSE BLDC GLUCOMTR-MCNC: 105 MG/DL — HIGH (ref 70–99)
GLUCOSE BLDC GLUCOMTR-MCNC: 134 MG/DL — HIGH (ref 70–99)
GLUCOSE BLDC GLUCOMTR-MCNC: 135 MG/DL — HIGH (ref 70–99)
GLUCOSE BLDC GLUCOMTR-MCNC: 143 MG/DL — HIGH (ref 70–99)
GLUCOSE SERPL-MCNC: 135 MG/DL — HIGH (ref 70–99)
HCT VFR BLD CALC: 29.3 % — LOW (ref 34.5–45)
HGB BLD-MCNC: 8.3 G/DL — LOW (ref 11.5–15.5)
MCHC RBC-ENTMCNC: 27.9 PG — SIGNIFICANT CHANGE UP (ref 27–34)
MCHC RBC-ENTMCNC: 28.3 G/DL — LOW (ref 32–36)
MCV RBC AUTO: 98.3 FL — SIGNIFICANT CHANGE UP (ref 80–100)
PLATELET # BLD AUTO: 333 K/UL — SIGNIFICANT CHANGE UP (ref 150–400)
POTASSIUM SERPL-MCNC: 4.2 MMOL/L — SIGNIFICANT CHANGE UP (ref 3.5–5.3)
POTASSIUM SERPL-SCNC: 4.2 MMOL/L — SIGNIFICANT CHANGE UP (ref 3.5–5.3)
PROT SERPL-MCNC: 6.8 G/DL — SIGNIFICANT CHANGE UP (ref 6–8.3)
RBC # BLD: 2.98 M/UL — LOW (ref 3.8–5.2)
RBC # FLD: 18.2 % — HIGH (ref 10.3–16.9)
SODIUM SERPL-SCNC: 150 MMOL/L — HIGH (ref 135–145)
WBC # BLD: 7.8 K/UL — SIGNIFICANT CHANGE UP (ref 3.8–10.5)
WBC # FLD AUTO: 7.8 K/UL — SIGNIFICANT CHANGE UP (ref 3.8–10.5)

## 2018-12-04 PROCEDURE — 71045 X-RAY EXAM CHEST 1 VIEW: CPT | Mod: 26

## 2018-12-04 PROCEDURE — 99233 SBSQ HOSP IP/OBS HIGH 50: CPT | Mod: GC

## 2018-12-04 RX ORDER — SODIUM CHLORIDE 9 MG/ML
1000 INJECTION, SOLUTION INTRAVENOUS
Qty: 0 | Refills: 0 | Status: DISCONTINUED | OUTPATIENT
Start: 2018-12-04 | End: 2018-12-05

## 2018-12-04 RX ORDER — HYDROCHLOROTHIAZIDE 25 MG
12.5 TABLET ORAL DAILY
Qty: 0 | Refills: 0 | Status: DISCONTINUED | OUTPATIENT
Start: 2018-12-04 | End: 2018-12-10

## 2018-12-04 RX ADMIN — AMLODIPINE BESYLATE 10 MILLIGRAM(S): 2.5 TABLET ORAL at 18:10

## 2018-12-04 RX ADMIN — POLYETHYLENE GLYCOL 3350 17 GRAM(S): 17 POWDER, FOR SOLUTION ORAL at 11:56

## 2018-12-04 RX ADMIN — Medication 1 DROP(S): at 23:16

## 2018-12-04 RX ADMIN — Medication 3 MILLILITER(S): at 23:16

## 2018-12-04 RX ADMIN — Medication 5 MILLIGRAM(S): at 06:04

## 2018-12-04 RX ADMIN — Medication 3 MILLILITER(S): at 10:23

## 2018-12-04 RX ADMIN — Medication 3 MILLILITER(S): at 06:03

## 2018-12-04 RX ADMIN — HEPARIN SODIUM 5000 UNIT(S): 5000 INJECTION INTRAVENOUS; SUBCUTANEOUS at 23:15

## 2018-12-04 RX ADMIN — Medication 3 MILLILITER(S): at 18:05

## 2018-12-04 RX ADMIN — Medication 1 TABLET(S): at 12:00

## 2018-12-04 RX ADMIN — Medication 5 MILLIGRAM(S): at 18:06

## 2018-12-04 RX ADMIN — ROBINUL 0.2 MILLIGRAM(S): 0.2 INJECTION INTRAMUSCULAR; INTRAVENOUS at 23:14

## 2018-12-04 RX ADMIN — Medication 12.5 MILLIGRAM(S): at 10:23

## 2018-12-04 RX ADMIN — Medication 3 MILLILITER(S): at 11:56

## 2018-12-04 RX ADMIN — Medication 3 MILLILITER(S): at 23:15

## 2018-12-04 RX ADMIN — Medication 1 TABLET(S): at 06:06

## 2018-12-04 RX ADMIN — CHLORHEXIDINE GLUCONATE 1 APPLICATION(S): 213 SOLUTION TOPICAL at 06:07

## 2018-12-04 RX ADMIN — Medication 1 DROP(S): at 17:05

## 2018-12-04 RX ADMIN — Medication 1 TABLET(S): at 23:14

## 2018-12-04 RX ADMIN — Medication 3 MILLILITER(S): at 06:05

## 2018-12-04 RX ADMIN — MEROPENEM 100 MILLIGRAM(S): 1 INJECTION INTRAVENOUS at 06:05

## 2018-12-04 RX ADMIN — HEPARIN SODIUM 5000 UNIT(S): 5000 INJECTION INTRAVENOUS; SUBCUTANEOUS at 12:00

## 2018-12-04 RX ADMIN — PANTOPRAZOLE SODIUM 40 MILLIGRAM(S): 20 TABLET, DELAYED RELEASE ORAL at 11:56

## 2018-12-04 RX ADMIN — MEROPENEM 100 MILLIGRAM(S): 1 INJECTION INTRAVENOUS at 18:06

## 2018-12-04 RX ADMIN — ROBINUL 0.2 MILLIGRAM(S): 0.2 INJECTION INTRAMUSCULAR; INTRAVENOUS at 06:04

## 2018-12-04 RX ADMIN — Medication 150 MICROGRAM(S): at 06:04

## 2018-12-04 RX ADMIN — LISINOPRIL 40 MILLIGRAM(S): 2.5 TABLET ORAL at 06:04

## 2018-12-04 RX ADMIN — ROBINUL 0.2 MILLIGRAM(S): 0.2 INJECTION INTRAMUSCULAR; INTRAVENOUS at 11:56

## 2018-12-04 RX ADMIN — ROBINUL 0.2 MILLIGRAM(S): 0.2 INJECTION INTRAMUSCULAR; INTRAVENOUS at 18:08

## 2018-12-04 RX ADMIN — Medication 1 DROP(S): at 06:05

## 2018-12-04 RX ADMIN — Medication 3 MILLILITER(S): at 18:08

## 2018-12-04 RX ADMIN — HEPARIN SODIUM 5000 UNIT(S): 5000 INJECTION INTRAVENOUS; SUBCUTANEOUS at 06:04

## 2018-12-04 NOTE — PROGRESS NOTE ADULT - SUBJECTIVE AND OBJECTIVE BOX
Patient is a 78y old  Female who presents with a chief complaint of Septic shock and acute respiratory failure (03 Dec 2018 15:41)      INTERVAL HPI/OVERNIGHT EVENTS:  ICU Vital Signs Last 24 Hrs  T(C): 37.5 (04 Dec 2018 01:34), Max: 38.2 (03 Dec 2018 17:23)  T(F): 99.5 (04 Dec 2018 01:34), Max: 100.7 (03 Dec 2018 17:23)  HR: 88 (04 Dec 2018 05:00) (88 - 114)  BP: 186/83 (04 Dec 2018 05:00) (128/84 - 199/93)  BP(mean): 119 (04 Dec 2018 05:00) (101 - 134)  ABP: --  ABP(mean): --  RR: 17 (04 Dec 2018 05:00) (17 - 22)  SpO2: 97% (04 Dec 2018 05:00) (95% - 98%)    I&O's Summary    02 Dec 2018 07:01  -  03 Dec 2018 07:00  --------------------------------------------------------  IN: 985 mL / OUT: 1495 mL / NET: -510 mL    03 Dec 2018 07:01  -  04 Dec 2018 06:34  --------------------------------------------------------  IN: 690 mL / OUT: 1250 mL / NET: -560 mL          LABS:                        8.0    8.0   )-----------( 344      ( 03 Dec 2018 06:41 )             27.6     12-03    150<H>  |  113<H>  |  32<H>  ----------------------------<  157<H>  4.1   |  30  |  1.05    Ca    9.8      03 Dec 2018 06:41          CAPILLARY BLOOD GLUCOSE      POCT Blood Glucose.: 131 mg/dL (03 Dec 2018 21:42)  POCT Blood Glucose.: 168 mg/dL (03 Dec 2018 16:36)  POCT Blood Glucose.: 135 mg/dL (03 Dec 2018 11:17)  POCT Blood Glucose.: 161 mg/dL (03 Dec 2018 06:39)        RADIOLOGY & ADDITIONAL TESTS:    Consultant(s) Notes Reviewed:  [x ] YES  [ ] NO    MEDICATIONS  (STANDING):  acetylcysteine 10%  Inhalation 3 milliLiter(s) Inhalation every 6 hours  ALBUTerol/ipratropium for Nebulization 3 milliLiter(s) Nebulizer every 6 hours  amLODIPine   Tablet 10 milliGRAM(s) Oral every 24 hours  artificial tears (preservative free) Ophthalmic Solution 1 Drop(s) Both EYES three times a day  chlorhexidine 2% Cloths 1 Application(s) Topical <User Schedule>  dextrose 5%. 1000 milliLiter(s) (50 mL/Hr) IV Continuous <Continuous>  dextrose 50% Injectable 12.5 Gram(s) IV Push once  dextrose 50% Injectable 25 Gram(s) IV Push once  dextrose 50% Injectable 25 Gram(s) IV Push once  glycopyrrolate Injectable 0.2 milliGRAM(s) IV Push every 6 hours  heparin  Injectable 5000 Unit(s) SubCutaneous every 8 hours  insulin lispro (HumaLOG) corrective regimen sliding scale   SubCutaneous every 6 hours  lactobacillus acidophilus 1 Tablet(s) Oral every 8 hours  levothyroxine 150 MICROGram(s) Oral daily  lisinopril 40 milliGRAM(s) Oral daily  meropenem  IVPB 1000 milliGRAM(s) IV Intermittent every 12 hours  metoclopramide Injectable 5 milliGRAM(s) IV Push every 12 hours  pantoprazole   Suspension 40 milliGRAM(s) Enteral Tube daily  polyethylene glycol 3350 17 Gram(s) Oral daily    MEDICATIONS  (PRN):  acetaminophen    Suspension .. 650 milliGRAM(s) Oral every 6 hours PRN Temp greater or equal to 38C (100.4F)  dextrose 40% Gel 15 Gram(s) Oral once PRN Blood Glucose LESS THAN 70 milliGRAM(s)/deciliter  glucagon  Injectable 1 milliGRAM(s) IntraMuscular once PRN Glucose LESS THAN 70 milligrams/deciliter      PHYSICAL EXAM:  GENERAL: well built, well nourished  HEAD:  Atraumatic, Normocephalic  EYES: EOMI, PERRLA, conjunctiva and sclera clear  ENT: No tonsillar erythema, exudates, or enlargement; Moist mucous membranes, Good dentition, No lesions  NECK: Supple, No JVD, Normal thyroid, no enlarged nodes  NERVOUS SYSTEM:  Alert & Oriented X3, Good concentration; Motor Strength 5/5 B/L upper and lower extremities; DTRs 2+ intact and symmetric, sensory intact  CHEST/LUNG: B/L good air entry; No rales, rhonchi, or wheezing  HEART: S1S2 normal, no S3, Regular rate and rhythm; No murmurs, rubs, or gallops  ABDOMEN: Soft, Nontender, Nondistended; Bowel sounds present  EXTREMITIES:  2+ Peripheral Pulses, No clubbing, cyanosis, or edema  LYMPH: No lymphadenopathy noted  SKIN: No rashes or lesions    Care Discussed with Consultants/Other Providers [ x] YES  [ ] NO OVERNIGHT EVENTS: Fever 100.7. B/Cultures drawn.     INTERVAL HPI: Pt is examined at bedside. She Appears well, in NAD.  Patient is awake and alert. Responsive to some commands. Does not appear to be in any resp distress.    ICU Vital Signs Last 24 Hrs  T(C): 37.5 (04 Dec 2018 01:34), Max: 38.2 (03 Dec 2018 17:23)  T(F): 99.5 (04 Dec 2018 01:34), Max: 100.7 (03 Dec 2018 17:23)  HR: 88 (04 Dec 2018 05:00) (88 - 114)  BP: 186/83 (04 Dec 2018 05:00) (128/84 - 199/93)  BP(mean): 119 (04 Dec 2018 05:00) (101 - 134)  ABP: --  ABP(mean): --  RR: 17 (04 Dec 2018 05:00) (17 - 22)  SpO2: 97% (04 Dec 2018 05:00) (95% - 98%)    I&O's Summary    02 Dec 2018 07:01  -  03 Dec 2018 07:00  --------------------------------------------------------  IN: 985 mL / OUT: 1495 mL / NET: -510 mL    03 Dec 2018 07:01  -  04 Dec 2018 06:34  --------------------------------------------------------  IN: 690 mL / OUT: 1250 mL / NET: -560 mL          LABS:                        8.0    8.0   )-----------( 344      ( 03 Dec 2018 06:41 )             27.6     12-03    150<H>  |  113<H>  |  32<H>  ----------------------------<  157<H>  4.1   |  30  |  1.05    Ca    9.8      03 Dec 2018 06:41          CAPILLARY BLOOD GLUCOSE      POCT Blood Glucose.: 131 mg/dL (03 Dec 2018 21:42)  POCT Blood Glucose.: 168 mg/dL (03 Dec 2018 16:36)  POCT Blood Glucose.: 135 mg/dL (03 Dec 2018 11:17)  POCT Blood Glucose.: 161 mg/dL (03 Dec 2018 06:39)        RADIOLOGY & ADDITIONAL TESTS:    Consultant(s) Notes Reviewed:  [x ] YES  [ ] NO    MEDICATIONS  (STANDING):  acetylcysteine 10%  Inhalation 3 milliLiter(s) Inhalation every 6 hours  ALBUTerol/ipratropium for Nebulization 3 milliLiter(s) Nebulizer every 6 hours  amLODIPine   Tablet 10 milliGRAM(s) Oral every 24 hours  artificial tears (preservative free) Ophthalmic Solution 1 Drop(s) Both EYES three times a day  chlorhexidine 2% Cloths 1 Application(s) Topical <User Schedule>  dextrose 5%. 1000 milliLiter(s) (50 mL/Hr) IV Continuous <Continuous>  dextrose 50% Injectable 12.5 Gram(s) IV Push once  dextrose 50% Injectable 25 Gram(s) IV Push once  dextrose 50% Injectable 25 Gram(s) IV Push once  glycopyrrolate Injectable 0.2 milliGRAM(s) IV Push every 6 hours  heparin  Injectable 5000 Unit(s) SubCutaneous every 8 hours  insulin lispro (HumaLOG) corrective regimen sliding scale   SubCutaneous every 6 hours  lactobacillus acidophilus 1 Tablet(s) Oral every 8 hours  levothyroxine 150 MICROGram(s) Oral daily  lisinopril 40 milliGRAM(s) Oral daily  meropenem  IVPB 1000 milliGRAM(s) IV Intermittent every 12 hours  metoclopramide Injectable 5 milliGRAM(s) IV Push every 12 hours  pantoprazole   Suspension 40 milliGRAM(s) Enteral Tube daily  polyethylene glycol 3350 17 Gram(s) Oral daily    MEDICATIONS  (PRN):  acetaminophen    Suspension .. 650 milliGRAM(s) Oral every 6 hours PRN Temp greater or equal to 38C (100.4F)  dextrose 40% Gel 15 Gram(s) Oral once PRN Blood Glucose LESS THAN 70 milliGRAM(s)/deciliter  glucagon  Injectable 1 milliGRAM(s) IntraMuscular once PRN Glucose LESS THAN 70 milligrams/deciliter    PHYSICAL EXAM:  GENERAL: Elderly female, in NAD.   HEAD:  Atraumatic, Normocephalic  EYES: EOMI, PERRLA, conjunctiva and sclera clear  ENT: No tonsillar erythema, exudates, or enlargement; Moist mucous membranes, Good dentition, No lesions  NECK: Supple, No JVD, Normal thyroid, no enlarged nodes  NERVOUS SYSTEM:  Alert & Oriented X0, responds to verbal stimuli,, Good concentration; Motor Strength 4/5 B/L upper and lower extremities; DTRs 2+ intact and symmetric, sensory intact.   CHEST/LUNG: CTA b/l  HEART: S1S2 normal, no S3, Regular rate and rhythm; No murmurs, rubs, or gallops  ABDOMEN: Soft, Nontender, Nondistended; Bowel sounds present. Peg in place, no erythma or purulence.   EXTREMITIES:  2+ Peripheral Pulses, No clubbing, cyanosis, or edema  LYMPH: No lymphadenopathy noted  SKIN: No rashes or lesions    Care Discussed with Consultants/Other Providers [ x] YES  [ ] NO

## 2018-12-04 NOTE — PROGRESS NOTE ADULT - PROBLEM SELECTOR PLAN 4
Pt recently admitted 4/15/18 due to MSSA bacteremia and MDR E Coli in urine, transferred to [facility], and found to have extravasation on CT abd, s/p IR drain placement, underwent DAVY BSO 2/2 tubal metaplasia, SBR with primary anastomosis, abdominal washout, PEG placement, discharged on 6/4  - surgery on board  - PEG in place  - c/w glucerna feeds at 55cc/hr

## 2018-12-04 NOTE — PROGRESS NOTE ADULT - PROBLEM SELECTOR PLAN 1
Pt with frequent mucus plugging throughout hospital course s/p percussion vest, robinol, frequent suctioning.  - continue to monitor resp status.  - continue Chest PT and percussion vest  for mucous/ glycopyrrolate 0.2 Q6 IV  - s/p bronchoscopy 11/30  - add mucomyst     #Gram Neg PNA:: Pt had a low grade fever on 11/29 100.7. CXR with possible RLL infiltrate, completed course of cefazolin for MSSA and ciprofloxacin for UTI (ESBL sn to this).   - repeat sputum cx (12/1) positive for pseudomonas/klebsiella, and ESBL Ecoli susceptibility pending, started empirically on Zosyn 4.5 g q6h. Changed to meropenem  - c/w meropenem for 5 days.   - f/up Bcultures    #Chronic diarrhea 2/2 small bowel resection  -probiotics daily Pt with frequent mucus plugging throughout hospital course s/p percussion vest, robinol, frequent suctioning.  - continue to monitor resp status.  - continue Chest PT and percussion vest  for mucous/ glycopyrrolate 0.2 Q6 IV  - s/p bronchoscopy 11/30  - c/w mucomyst     #Gram Neg PNA: Pt had a low grade fever on 11/29 100.7. CXR with possible RLL infiltrate, completed course of cefazolin for MSSA and ciprofloxacin for UTI (ESBL sn to this).   - fever 100.7 overnight, BCultures drawn. Pt afebrile throughout the day  - repeat sputum cx (12/1) positive for pseudomonas/klebsiella, and ESBL Ecoli. started empirically on Zosyn 4.5 g q6h. Changed to meropenem based on sensitivities  - c/w meropenem for 5 days.   - f/up Bcultures    #Chronic diarrhea 2/2 small bowel resection  -probiotics daily

## 2018-12-04 NOTE — PROGRESS NOTE ADULT - ATTENDING COMMENTS
Patient seen and examined with house-staff during bedside rounds.  Resident note read, including vitals, physical findings, laboratory data, and radiological reports.   Revisions included below.  Direct personal management at bed side and extensive interpretation of the data.  Plan was outlined and discussed in details with the housestaff.  Decision making of high complexity  Action taken for acute disease activity to reflect the level of care provided:  - medication reconciliation  - review laboratory data  To suction the patient but she resisted suctioning and obtained small amount of secretion. Continue antibiotic. Palate and medicine was involved. The patient has poor prognosis. Patient's risk of aspiration. Continue bronchodilator. Plan as outlined

## 2018-12-04 NOTE — PROGRESS NOTE ADULT - PROBLEM SELECTOR PLAN 2
Pt diagnosed with sepsis 2/2 HAP and ESBL UTIon admission, hypotensive in MICU requiring levophed and midodrine.   - resolved  - sputum cx staph aureus sn to cefazolin  - completed course of cefazolin for MSSA and ciprofloxacin for UTI (ESBL sn to this).  - fever on 11/29 with repeat sputum cultures (12/1) positive for pseudomonas/klebsiella susceptibility pending, started empirically on Zosyn 4.5 g q6h     #incontinent dermatitis of b/l buttocks  -daily wound care  -rectal tube in place    #Anemia of chronic disease  -no signs of active bleeding  -active type and screen Pt diagnosed with sepsis 2/2 HAP and ESBL UTIon admission, hypotensive in MICU requiring levophed and midodrine.   - resolved  - sputum cx staph aureus sn to cefazolin  - completed course of cefazolin for MSSA and ciprofloxacin for UTI (ESBL sn to this).    #incontinent dermatitis of b/l buttocks  -daily wound care  -rectal tube in place    #Anemia of chronic disease  -no signs of active bleeding  -active type and screen

## 2018-12-04 NOTE — PROGRESS NOTE ADULT - PROBLEM SELECTOR PLAN 3
Toxic metabolic encephalopathy 2/2 septic shock  RESOLVED  - mental status consistent with patient's baseline dementia    #Hypernatremia: FW deficit 1.5L. Likely 2/2 low intake  -  q 6hr  - trend bmp Toxic metabolic encephalopathy 2/2 septic shock  RESOLVED  - mental status consistent with patient's baseline dementia    #Hypernatremia: FW deficit 1.5L. Likely 2/2 low intake. Na today 150  - c/w  q 6hr  - trend bmp

## 2018-12-05 LAB
ALBUMIN SERPL ELPH-MCNC: 2.8 G/DL — LOW (ref 3.3–5)
ALP SERPL-CCNC: 76 U/L — SIGNIFICANT CHANGE UP (ref 40–120)
ALT FLD-CCNC: 11 U/L — SIGNIFICANT CHANGE UP (ref 10–45)
ANION GAP SERPL CALC-SCNC: 12 MMOL/L — SIGNIFICANT CHANGE UP (ref 5–17)
AST SERPL-CCNC: 24 U/L — SIGNIFICANT CHANGE UP (ref 10–40)
BILIRUB SERPL-MCNC: 0.3 MG/DL — SIGNIFICANT CHANGE UP (ref 0.2–1.2)
BUN SERPL-MCNC: 31 MG/DL — HIGH (ref 7–23)
CALCIUM SERPL-MCNC: 10.2 MG/DL — SIGNIFICANT CHANGE UP (ref 8.4–10.5)
CHLORIDE SERPL-SCNC: 106 MMOL/L — SIGNIFICANT CHANGE UP (ref 96–108)
CO2 SERPL-SCNC: 28 MMOL/L — SIGNIFICANT CHANGE UP (ref 22–31)
CREAT SERPL-MCNC: 1.06 MG/DL — SIGNIFICANT CHANGE UP (ref 0.5–1.3)
CULTURE RESULTS: SIGNIFICANT CHANGE UP
CULTURE RESULTS: SIGNIFICANT CHANGE UP
GLUCOSE BLDC GLUCOMTR-MCNC: 102 MG/DL — HIGH (ref 70–99)
GLUCOSE BLDC GLUCOMTR-MCNC: 117 MG/DL — HIGH (ref 70–99)
GLUCOSE BLDC GLUCOMTR-MCNC: 118 MG/DL — HIGH (ref 70–99)
GLUCOSE BLDC GLUCOMTR-MCNC: 97 MG/DL — SIGNIFICANT CHANGE UP (ref 70–99)
GLUCOSE BLDC GLUCOMTR-MCNC: 98 MG/DL — SIGNIFICANT CHANGE UP (ref 70–99)
GLUCOSE SERPL-MCNC: 97 MG/DL — SIGNIFICANT CHANGE UP (ref 70–99)
HCT VFR BLD CALC: 31.2 % — LOW (ref 34.5–45)
HGB BLD-MCNC: 9.1 G/DL — LOW (ref 11.5–15.5)
MCHC RBC-ENTMCNC: 28.6 PG — SIGNIFICANT CHANGE UP (ref 27–34)
MCHC RBC-ENTMCNC: 29.2 G/DL — LOW (ref 32–36)
MCV RBC AUTO: 98.1 FL — SIGNIFICANT CHANGE UP (ref 80–100)
PLATELET # BLD AUTO: 346 K/UL — SIGNIFICANT CHANGE UP (ref 150–400)
POTASSIUM SERPL-MCNC: 4.7 MMOL/L — SIGNIFICANT CHANGE UP (ref 3.5–5.3)
POTASSIUM SERPL-SCNC: 4.7 MMOL/L — SIGNIFICANT CHANGE UP (ref 3.5–5.3)
PROT SERPL-MCNC: 7 G/DL — SIGNIFICANT CHANGE UP (ref 6–8.3)
RBC # BLD: 3.18 M/UL — LOW (ref 3.8–5.2)
RBC # FLD: 18.1 % — HIGH (ref 10.3–16.9)
SODIUM SERPL-SCNC: 146 MMOL/L — HIGH (ref 135–145)
SPECIMEN SOURCE: SIGNIFICANT CHANGE UP
SPECIMEN SOURCE: SIGNIFICANT CHANGE UP
WBC # BLD: 8.3 K/UL — SIGNIFICANT CHANGE UP (ref 3.8–10.5)
WBC # FLD AUTO: 8.3 K/UL — SIGNIFICANT CHANGE UP (ref 3.8–10.5)

## 2018-12-05 PROCEDURE — 99233 SBSQ HOSP IP/OBS HIGH 50: CPT | Mod: GC

## 2018-12-05 RX ADMIN — Medication 1 TABLET(S): at 13:55

## 2018-12-05 RX ADMIN — Medication 1 TABLET(S): at 06:32

## 2018-12-05 RX ADMIN — Medication 5 MILLIGRAM(S): at 17:18

## 2018-12-05 RX ADMIN — Medication 3 MILLILITER(S): at 16:26

## 2018-12-05 RX ADMIN — HEPARIN SODIUM 5000 UNIT(S): 5000 INJECTION INTRAVENOUS; SUBCUTANEOUS at 23:41

## 2018-12-05 RX ADMIN — PANTOPRAZOLE SODIUM 40 MILLIGRAM(S): 20 TABLET, DELAYED RELEASE ORAL at 11:49

## 2018-12-05 RX ADMIN — Medication 3 MILLILITER(S): at 11:03

## 2018-12-05 RX ADMIN — Medication 3 MILLILITER(S): at 23:41

## 2018-12-05 RX ADMIN — Medication 1 DROP(S): at 17:19

## 2018-12-05 RX ADMIN — Medication 3 MILLILITER(S): at 04:04

## 2018-12-05 RX ADMIN — AMLODIPINE BESYLATE 10 MILLIGRAM(S): 2.5 TABLET ORAL at 16:27

## 2018-12-05 RX ADMIN — HEPARIN SODIUM 5000 UNIT(S): 5000 INJECTION INTRAVENOUS; SUBCUTANEOUS at 06:32

## 2018-12-05 RX ADMIN — Medication 5 MILLIGRAM(S): at 06:33

## 2018-12-05 RX ADMIN — Medication 1 DROP(S): at 06:34

## 2018-12-05 RX ADMIN — Medication 3 MILLILITER(S): at 04:06

## 2018-12-05 RX ADMIN — POLYETHYLENE GLYCOL 3350 17 GRAM(S): 17 POWDER, FOR SOLUTION ORAL at 11:49

## 2018-12-05 RX ADMIN — HEPARIN SODIUM 5000 UNIT(S): 5000 INJECTION INTRAVENOUS; SUBCUTANEOUS at 13:55

## 2018-12-05 RX ADMIN — Medication 3 MILLILITER(S): at 11:02

## 2018-12-05 RX ADMIN — ROBINUL 0.2 MILLIGRAM(S): 0.2 INJECTION INTRAMUSCULAR; INTRAVENOUS at 06:33

## 2018-12-05 RX ADMIN — Medication 3 MILLILITER(S): at 23:40

## 2018-12-05 RX ADMIN — Medication 1 DROP(S): at 23:42

## 2018-12-05 RX ADMIN — ROBINUL 0.2 MILLIGRAM(S): 0.2 INJECTION INTRAMUSCULAR; INTRAVENOUS at 11:49

## 2018-12-05 RX ADMIN — Medication 12.5 MILLIGRAM(S): at 06:32

## 2018-12-05 RX ADMIN — ROBINUL 0.2 MILLIGRAM(S): 0.2 INJECTION INTRAMUSCULAR; INTRAVENOUS at 17:18

## 2018-12-05 RX ADMIN — MEROPENEM 100 MILLIGRAM(S): 1 INJECTION INTRAVENOUS at 06:32

## 2018-12-05 RX ADMIN — Medication 1 TABLET(S): at 23:41

## 2018-12-05 RX ADMIN — LISINOPRIL 40 MILLIGRAM(S): 2.5 TABLET ORAL at 06:32

## 2018-12-05 RX ADMIN — MEROPENEM 100 MILLIGRAM(S): 1 INJECTION INTRAVENOUS at 17:56

## 2018-12-05 RX ADMIN — Medication 150 MICROGRAM(S): at 06:32

## 2018-12-05 NOTE — PROGRESS NOTE ADULT - PROBLEM SELECTOR PLAN 3
Toxic metabolic encephalopathy 2/2 septic shock  RESOLVED  - mental status consistent with patient's baseline dementia    #Hypernatremia: FW deficit 1.5L. Likely 2/2 low intake. Na today 150  - c/w  q 6hr  - trend bmp Toxic metabolic encephalopathy 2/2 septic shock  RESOLVED  - mental status consistent with patient's baseline dementia per son    #Hypernatremia: FW deficit 1.5L. Likely 2/2 low intake. Na today 146  - s/p d5W  - c/w  q 6hr  - trend bmp Toxic metabolic encephalopathy 2/2 septic shock  RESOLVED  - mental status consistent with patient's baseline dementia per son    #Hypernatremia: FW deficit 1.5L. Likely 2/2 low intake. Na today 145  - s/p d5W  - c/w  q 6hr  - trend bmp

## 2018-12-05 NOTE — PROGRESS NOTE ADULT - PROBLEM SELECTOR PLAN 4
Pt recently admitted 4/15/18 due to MSSA bacteremia and MDR E Coli in urine, transferred to [facility], and found to have extravasation on CT abd, s/p IR drain placement, underwent DAVY BSO 2/2 tubal metaplasia, SBR with primary anastomosis, abdominal washout, PEG placement, discharged on 6/4  - surgery on board  - PEG in place  - c/w glucerna feeds at 55cc/hr

## 2018-12-05 NOTE — PROGRESS NOTE ADULT - ATTENDING COMMENTS
Patient seen and examined with house-staff during bedside rounds.  Resident note read, including vitals, physical findings, laboratory data, and radiological reports.   Revisions included below.  Direct personal management at bed side and extensive interpretation of the data.  Plan was outlined and discussed in details with the housestaff.  Decision making of high complexity  Action taken for acute disease activity to reflect the level of care provided:  - medication reconciliation  - review laboratory data  The patient was suctioned and he had a moderate amount of secretion. Culture is still pending. Patient temperature curve is decreasing. Patient is off.Vancomycin. Continue Zosyn.  Continue bronchodilator. No change in mental status.  I discussed the case with the family regarding the tracheostomy Patient seen and examined with house-staff during bedside rounds.  Resident note read, including vitals, physical findings, laboratory data, and radiological reports.   Revisions included below.  Direct personal management at bed side and extensive interpretation of the data.  Plan was outlined and discussed in details with the housestaff.  Decision making of high complexity  Action taken for acute disease activity to reflect the level of care provided:  - medication reconciliation  - review laboratory data  The patient was suctioned and he had a moderate amount of secretion. Continue Zosyn.  Continue bronchodilator. No change in mental status.  continue tube feed. Palliative consult

## 2018-12-05 NOTE — PROGRESS NOTE ADULT - PROBLEM SELECTOR PLAN 1
Pt with frequent mucus plugging throughout hospital course s/p percussion vest, robinol, frequent suctioning.  - continue to monitor resp status.  - continue Chest PT and percussion vest  for mucous/ glycopyrrolate 0.2 Q6 IV  - s/p bronchoscopy 11/30  - c/w mucomyst     #Gram Neg PNA: Pt had a low grade fever on 11/29 100.7. CXR with possible RLL infiltrate, completed course of cefazolin for MSSA and ciprofloxacin for UTI (ESBL sn to this).   - fever 100.7 overnight, BCultures drawn. Pt afebrile throughout the day  - repeat sputum cx (12/1) positive for pseudomonas/klebsiella, and ESBL Ecoli. started empirically on Zosyn 4.5 g q6h. Changed to meropenem based on sensitivities  - c/w meropenem for 5 days.   - f/up Bcultures    #Chronic diarrhea 2/2 small bowel resection  -probiotics daily Pt with frequent mucus plugging throughout hospital course s/p percussion vest, robinol, frequent suctioning.  - s/p bronchoscopy 11/30  - continue to monitor resp status.  - continue Chest PT and percussion vest  for mucous/ glycopyrrolate 0.2 Q6 IV  - c/w mucomyst     #Gram Neg PNA: Pt had a low grade fever on 11/29 100.7. CXR with possible RLL infiltrate.   - repeat sputum cx (12/1) positive for pseudomonas/klebsiella, and ESBL Ecoli. started empirically on Zosyn 4.5 g q6h. Changed to meropenem based on sensitivities  - c/w meropenem for 5 days. (Started 12/2)  - f/up Bcultures    #Chronic diarrhea 2/2 small bowel resection  -probiotics daily Pt with frequent mucus plugging throughout hospital course s/p percussion vest, robinol, frequent suctioning.  - s/p bronchoscopy 11/30  - continue to monitor resp status.  - continue Chest PT and percussion vest  for mucous/ glycopyrrolate 0.2 Q6 IV  - c/w mucomyst   - secretions now improved. requiring only q4hr suctioning.     #Gram Neg PNA: Pt had a low grade fever on 11/29 100.7. CXR with possible RLL infiltrate.   - repeat sputum cx (12/1) positive for pseudomonas/klebsiella, and ESBL Ecoli. started empirically on Zosyn 4.5 g q6h. Changed to meropenem based on sensitivities  - c/w meropenem for 5 days. 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

## 2018-12-05 NOTE — PROGRESS NOTE ADULT - ASSESSMENT
78F w/ PMH obesity, DM, HTN, hypothyroidism, sepsis secondary to ventral hernia infection s/p repair, PE s/p IVC filter, acute blood loss anemia from hemorrhagic uterine fibroids and UGIB, ATN requiring HD, sepsis secondary to MSSA bacteremia and MDR E coli UTI w/AMS and s/p TAHBSO and small bowel resection s/p PEG. Presenting with septic shock likely 2/2 PNA vs UTI and acute hypoxic respiratory failure now extubated, s/p bronchoscopy 11/30, now being treated for pseudomonal PNA with Zosyn (12/2- ) 78F w/ PMH obesity, DM, HTN, hypothyroidism, sepsis secondary to ventral hernia infection s/p repair, PE s/p IVC filter, acute blood loss anemia from hemorrhagic uterine fibroids and UGIB, ATN requiring HD, sepsis secondary to MSSA bacteremia and MDR E coli UTI w/AMS and s/p TAHBSO and small bowel resection s/p PEG. Presenting with septic shock likely 2/2 PNA vs UTI and acute hypoxic respiratory failure now extubated, s/p bronchoscopy 11/30, now being treated for pseudomonal PNA with meropenem (12/2-12/6)

## 2018-12-05 NOTE — PROGRESS NOTE ADULT - ASSESSMENT
78F w/ PMH obesity, DM, HTN, hypothyroidism, sepsis secondary to ventral hernia infection s/p repair, PE s/p IVC filter, acute blood loss anemia from hemorrhagic uterine fibroids and UGIB, ATN requiring HD, sepsis secondary to MSSA bacteremia and MDR E coli UTI w/AMS and s/p TAHBSO and small bowel resection s/p PEG. Presenting with septic shock likely 2/2 PNA vs UTI and acute hypoxic respiratory failure now extubated, s/p bronchoscopy 11/30, now being treated for pseudomonal PNA with Zosyn (12/2- )               Problem/Plan - 1:  ·  Problem: Mucus plugging of bronchi.  Plan: Pt with frequent mucus plugging throughout hospital course s/p percussion vest, robinol, frequent suctioning.  - s/p bronchoscopy 11/30  - continue to monitor resp status.  - continue Chest PT and percussion vest  for mucous/ glycopyrrolate 0.2 Q6 IV  - c/w mucomyst     #Gram Neg PNA: Pt had a low grade fever on 11/29 100.7. CXR with possible RLL infiltrate.   - repeat sputum cx (12/1) positive for pseudomonas/klebsiella, and ESBL Ecoli. started empirically on Zosyn 4.5 g q6h. Changed to meropenem based on sensitivities  - c/w meropenem for 5 days. (Started 12/2)  - f/up Bcultures    #Chronic diarrhea 2/2 small bowel resection  -probiotics daily.    Problem/Plan - 2:  ·  Problem: Septic shock.  Plan: Pt diagnosed with sepsis 2/2 HAP and ESBL UTIon admission, hypotensive in MICU requiring levophed and midodrine.   - resolved  - sputum cx staph aureus sn to cefazolin  - completed course of cefazolin for MSSA and ciprofloxacin for UTI (ESBL sn to this).    #incontinent dermatitis of b/l buttocks  -daily wound care  -rectal tube in place    #Anemia of chronic disease  -no signs of active bleeding  -active type and screen.     Problem/Plan - 3:  ·  Problem: Toxic metabolic encephalopathy.  Plan: Toxic metabolic encephalopathy 2/2 septic shock  RESOLVED  - mental status consistent with patient's baseline dementia per son    #Hypernatremia: FW deficit 1.5L. Likely 2/2 low intake. Na today 146  - s/p d5W  - c/w  q 6hr  - trend bmp.    Problem/Plan - 4:  ·  Problem: Small bowel perforation.  Plan: Pt recently admitted 4/15/18 due to MSSA bacteremia and MDR E Coli in urine, transferred to [facility], and found to have extravasation on CT abd, s/p IR drain placement, underwent DAVY BSO 2/2 tubal metaplasia, SBR with primary anastomosis, abdominal washout, PEG placement, discharged on 6/4  - surgery on board  - PEG in place  - c/w glucerna feeds at 55cc/hr.

## 2018-12-05 NOTE — PROGRESS NOTE ADULT - PROBLEM SELECTOR PLAN 2
Pt diagnosed with sepsis 2/2 HAP and ESBL UTIon admission, hypotensive in MICU requiring levophed and midodrine.   - resolved  - sputum cx staph aureus sn to cefazolin  - completed course of cefazolin for MSSA and ciprofloxacin for UTI (ESBL sn to this).    #incontinent dermatitis of b/l buttocks  -daily wound care  -rectal tube in place    #Anemia of chronic disease  -no signs of active bleeding  -active type and screen

## 2018-12-05 NOTE — PROGRESS NOTE ADULT - SUBJECTIVE AND OBJECTIVE BOX
Patient is a 78y old  Female who presents with a chief complaint of Septic shock and acute respiratory failure (04 Dec 2018 06:34)      INTERVAL HPI/OVERNIGHT EVENTS:  ICU Vital Signs Last 24 Hrs  T(C): 36.7 (05 Dec 2018 06:26), Max: 37.3 (04 Dec 2018 14:25)  T(F): 98.1 (05 Dec 2018 06:26), Max: 99.2 (04 Dec 2018 14:25)  HR: 88 (05 Dec 2018 04:11) (88 - 102)  BP: 150/75 (05 Dec 2018 04:11) (141/72 - 173/86)  BP(mean): 104 (05 Dec 2018 04:11) (99 - 121)  ABP: --  ABP(mean): --  RR: 17 (05 Dec 2018 04:11) (16 - 17)  SpO2: 96% (05 Dec 2018 04:11) (82% - 100%)    I&O's Summary    03 Dec 2018 07:01  -  04 Dec 2018 07:00  --------------------------------------------------------  IN: 1350 mL / OUT: 1600 mL / NET: -250 mL    04 Dec 2018 07:01  -  05 Dec 2018 06:40  --------------------------------------------------------  IN: 0 mL / OUT: 500 mL / NET: -500 mL          LABS:                        8.3    7.8   )-----------( 333      ( 04 Dec 2018 07:02 )             29.3     12-04    150<H>  |  110<H>  |  35<H>  ----------------------------<  135<H>  4.2   |  29  |  1.02    Ca    9.8      04 Dec 2018 07:02    TPro  6.8  /  Alb  2.6<L>  /  TBili  0.2  /  DBili  x   /  AST  12  /  ALT  8<L>  /  AlkPhos  78  12-04        CAPILLARY BLOOD GLUCOSE      POCT Blood Glucose.: 97 mg/dL (05 Dec 2018 06:10)  POCT Blood Glucose.: 98 mg/dL (05 Dec 2018 00:11)  POCT Blood Glucose.: 105 mg/dL (04 Dec 2018 21:25)  POCT Blood Glucose.: 134 mg/dL (04 Dec 2018 16:40)  POCT Blood Glucose.: 135 mg/dL (04 Dec 2018 11:34)  POCT Blood Glucose.: 143 mg/dL (04 Dec 2018 06:51)        RADIOLOGY & ADDITIONAL TESTS:    Consultant(s) Notes Reviewed:  [x ] YES  [ ] NO    MEDICATIONS  (STANDING):  acetylcysteine 10%  Inhalation 3 milliLiter(s) Inhalation every 6 hours  ALBUTerol/ipratropium for Nebulization 3 milliLiter(s) Nebulizer every 6 hours  amLODIPine   Tablet 10 milliGRAM(s) Oral every 24 hours  artificial tears (preservative free) Ophthalmic Solution 1 Drop(s) Both EYES three times a day  dextrose 5%. 1000 milliLiter(s) (50 mL/Hr) IV Continuous <Continuous>  dextrose 5%. 1000 milliLiter(s) (35 mL/Hr) IV Continuous <Continuous>  dextrose 50% Injectable 12.5 Gram(s) IV Push once  dextrose 50% Injectable 25 Gram(s) IV Push once  dextrose 50% Injectable 25 Gram(s) IV Push once  glycopyrrolate Injectable 0.2 milliGRAM(s) IV Push every 6 hours  heparin  Injectable 5000 Unit(s) SubCutaneous every 8 hours  hydrochlorothiazide 12.5 milliGRAM(s) Oral daily  insulin lispro (HumaLOG) corrective regimen sliding scale   SubCutaneous every 6 hours  lactobacillus acidophilus 1 Tablet(s) Oral every 8 hours  levothyroxine 150 MICROGram(s) Oral daily  lisinopril 40 milliGRAM(s) Oral daily  meropenem  IVPB 1000 milliGRAM(s) IV Intermittent every 12 hours  metoclopramide Injectable 5 milliGRAM(s) IV Push every 12 hours  pantoprazole   Suspension 40 milliGRAM(s) Enteral Tube daily  polyethylene glycol 3350 17 Gram(s) Oral daily    MEDICATIONS  (PRN):  acetaminophen    Suspension .. 650 milliGRAM(s) Oral every 6 hours PRN Temp greater or equal to 38C (100.4F)  dextrose 40% Gel 15 Gram(s) Oral once PRN Blood Glucose LESS THAN 70 milliGRAM(s)/deciliter  glucagon  Injectable 1 milliGRAM(s) IntraMuscular once PRN Glucose LESS THAN 70 milligrams/deciliter      PHYSICAL EXAM:  GENERAL: well built, well nourished  HEAD:  Atraumatic, Normocephalic  EYES: EOMI, PERRLA, conjunctiva and sclera clear  ENT: No tonsillar erythema, exudates, or enlargement; Moist mucous membranes, Good dentition, No lesions  NECK: Supple, No JVD, Normal thyroid, no enlarged nodes  NERVOUS SYSTEM:  Alert & Oriented X3, Good concentration; Motor Strength 5/5 B/L upper and lower extremities; DTRs 2+ intact and symmetric, sensory intact  CHEST/LUNG: B/L good air entry; No rales, rhonchi, or wheezing  HEART: S1S2 normal, no S3, Regular rate and rhythm; No murmurs, rubs, or gallops  ABDOMEN: Soft, Nontender, Nondistended; Bowel sounds present  EXTREMITIES:  2+ Peripheral Pulses, No clubbing, cyanosis, or edema  LYMPH: No lymphadenopathy noted  SKIN: No rashes or lesions    Care Discussed with Consultants/Other Providers [ x] YES  [ ] NO OVERNIGHT EVENTS: EMANUEL    INTERVAL HPI: Pt is examined at bedside. She appears well, in NAD.  Patient is awake and alert. Responsive to some commands. Does not appear to be in any resp distress.    ICU Vital Signs Last 24 Hrs  T(C): 36.7 (05 Dec 2018 06:26), Max: 37.3 (04 Dec 2018 14:25)  T(F): 98.1 (05 Dec 2018 06:26), Max: 99.2 (04 Dec 2018 14:25)  HR: 88 (05 Dec 2018 04:11) (88 - 102)  BP: 150/75 (05 Dec 2018 04:11) (141/72 - 173/86)  BP(mean): 104 (05 Dec 2018 04:11) (99 - 121)  ABP: --  ABP(mean): --  RR: 17 (05 Dec 2018 04:11) (16 - 17)  SpO2: 96% (05 Dec 2018 04:11) (82% - 100%)    I&O's Summary    03 Dec 2018 07:01  -  04 Dec 2018 07:00  --------------------------------------------------------  IN: 1350 mL / OUT: 1600 mL / NET: -250 mL    04 Dec 2018 07:01  -  05 Dec 2018 06:40  --------------------------------------------------------  IN: 0 mL / OUT: 500 mL / NET: -500 mL          LABS:                        8.3    7.8   )-----------( 333      ( 04 Dec 2018 07:02 )             29.3     12-04    150<H>  |  110<H>  |  35<H>  ----------------------------<  135<H>  4.2   |  29  |  1.02    Ca    9.8      04 Dec 2018 07:02    TPro  6.8  /  Alb  2.6<L>  /  TBili  0.2  /  DBili  x   /  AST  12  /  ALT  8<L>  /  AlkPhos  78  12-04        CAPILLARY BLOOD GLUCOSE      POCT Blood Glucose.: 97 mg/dL (05 Dec 2018 06:10)  POCT Blood Glucose.: 98 mg/dL (05 Dec 2018 00:11)  POCT Blood Glucose.: 105 mg/dL (04 Dec 2018 21:25)  POCT Blood Glucose.: 134 mg/dL (04 Dec 2018 16:40)  POCT Blood Glucose.: 135 mg/dL (04 Dec 2018 11:34)  POCT Blood Glucose.: 143 mg/dL (04 Dec 2018 06:51)        RADIOLOGY & ADDITIONAL TESTS:    Consultant(s) Notes Reviewed:  [x ] YES  [ ] NO    MEDICATIONS  (STANDING):  acetylcysteine 10%  Inhalation 3 milliLiter(s) Inhalation every 6 hours  ALBUTerol/ipratropium for Nebulization 3 milliLiter(s) Nebulizer every 6 hours  amLODIPine   Tablet 10 milliGRAM(s) Oral every 24 hours  artificial tears (preservative free) Ophthalmic Solution 1 Drop(s) Both EYES three times a day  dextrose 5%. 1000 milliLiter(s) (50 mL/Hr) IV Continuous <Continuous>  dextrose 5%. 1000 milliLiter(s) (35 mL/Hr) IV Continuous <Continuous>  dextrose 50% Injectable 12.5 Gram(s) IV Push once  dextrose 50% Injectable 25 Gram(s) IV Push once  dextrose 50% Injectable 25 Gram(s) IV Push once  glycopyrrolate Injectable 0.2 milliGRAM(s) IV Push every 6 hours  heparin  Injectable 5000 Unit(s) SubCutaneous every 8 hours  hydrochlorothiazide 12.5 milliGRAM(s) Oral daily  insulin lispro (HumaLOG) corrective regimen sliding scale   SubCutaneous every 6 hours  lactobacillus acidophilus 1 Tablet(s) Oral every 8 hours  levothyroxine 150 MICROGram(s) Oral daily  lisinopril 40 milliGRAM(s) Oral daily  meropenem  IVPB 1000 milliGRAM(s) IV Intermittent every 12 hours  metoclopramide Injectable 5 milliGRAM(s) IV Push every 12 hours  pantoprazole   Suspension 40 milliGRAM(s) Enteral Tube daily  polyethylene glycol 3350 17 Gram(s) Oral daily    MEDICATIONS  (PRN):  acetaminophen    Suspension .. 650 milliGRAM(s) Oral every 6 hours PRN Temp greater or equal to 38C (100.4F)  dextrose 40% Gel 15 Gram(s) Oral once PRN Blood Glucose LESS THAN 70 milliGRAM(s)/deciliter  glucagon  Injectable 1 milliGRAM(s) IntraMuscular once PRN Glucose LESS THAN 70 milligrams/deciliter    PHYSICAL EXAM:  GENERAL: Elderly female, in NAD.   HEAD:  Atraumatic, Normocephalic  EYES: EOMI, PERRLA, conjunctiva and sclera clear  ENT: No tonsillar erythema, exudates, or enlargement; Moist mucous membranes, Good dentition, No lesions  NECK: Supple, No JVD, Normal thyroid, no enlarged nodes  NERVOUS SYSTEM:  Alert & Oriented X0, responds to verbal stimuli,, Good concentration; Motor Strength 4/5 B/L upper and lower extremities; DTRs 2+ intact and symmetric, sensory intact.   CHEST/LUNG: CTA b/l  HEART: S1S2 normal, no S3, Regular rate and rhythm; No murmurs, rubs, or gallops  ABDOMEN: Soft, Nontender, Nondistended; Bowel sounds present. Peg in place, no erythma or purulence.   EXTREMITIES:  2+ Peripheral Pulses, No clubbing, cyanosis, or edema  LYMPH: No lymphadenopathy noted  SKIN: No rashes or lesions    Care Discussed with Consultants/Other Providers [ x] YES  [ ] NO Transfer Note [facility]    Hospital Course:  78F w/ PMH of morbid obesity, DM, HTN, hypothyroidism, sepsis secondary to ventral hernia infection s/p repair, PE s/p IVC filter, acute blood loss anemia from hemorrhagic uterine fibroids and UGIB, ATN requiring HD, sepsis secondary to MSSA bacteremia and MDR E coli UTI (4-18)  now w/AMS and s/p TAHBSO and small bowel resection s/p PEG. Patient presented from [facility] with concern for sepsis 2/2 aspiration pna and hypoxia. Per EMS, patient was saturating 85% on RA at [facility] with some improvement to 95% after being placed on NRB.  On arrival to the ED, patient was altered and saturating 89%. She was intubated in the ED with suctioning revealing purulent material. She was started on vanc/azithro/cipro (due to prior ESBL that was resistant to zosyn but sn to cipro, cipro course was finished) and that was deescalated to cefazolin once sputum had speciation to staph aureus sensitive to that and completed cefazolin course. She also completed ciprofloxacin for concern of ESBL (that was sn to this). She was successfully extubated, and tolerating nasal cannula. She had diarrhea that was present prior to admission as well (due to hx of SB resection), that improved with probiotics. Had continued secretions and completed cefazolin course. Was also started on glycopyrrolate and octreotide for secretions but now discontinued. She had mucous plugging with white out of right side of lung, this improved with percussion vest. 11/26 patient and son had long discussion with palliative care and it was decided to keep patient full code currently with a MOLST indicating as such. Of note, pt did require levophed followed by midodrine for low BP post-extubation, but now no longer requiring pressor support and home anti-hypertensives restarted. She has had 2 vomiting episodes in the past day, likely in setting of tube feeds, which were held last night and restarted this AM. NS 500cc bolus given 11/28 for decreasing UOP likely due to holding of tube feeds prior. Miralax started 11/28 as no BM in last 1-2 days thru rectal tube (in place due to incontinent dermatitis). Stable for step down to [facility] for further monitoring of respiratory status. Frequent suctioning for secretions.  Feeds increased back to 30cc/hr. Tachy to 120s overnight 11/29, with rising temp, max 100.7 rectal. Patient still sating well on NC. CXR shows complete  opacification of R lung with tracheal shift likely 2/2 to mucus plugging. Patient transferred to MICU for possible bronchoscopy and Intubation with anticipation to transition to [facility]. While in the MICU, pt had a bronchoscopy on 11/30 for recurrent mucus plugging, and had chest PT/percussion with improvement in opacification of R hemithorax, now off of nasal canula and saturating 94-95% on room air. She did have a low grade fever of 100.7 (11/29), Sputum cx was obtained which was positive for pseudomonas and klebsiella, sensitive to zosyn, now on Zosyn 4.5 g q6h. Currently stable transfer back to [facility] for further monitoring of respiratory status. ESBL klebsiella and pseudomonas growing in sputum. D/c'ed zosyn and started on meropenem 1gq12.  Mucomyst ordered for secretions. ID approved meropenem. Started on d5w and FW for hypernatremia, now resolved. D5W dc/ed. Patient now requiring only q4 suctioning. Patient is stable for [facility] to [facility].      OVERNIGHT EVENTS: EMANUEL    INTERVAL HPI: Pt is examined at bedside. She appears well, in NAD.  Patient is awake and alert. Responsive to some commands. Does not appear to be in any resp distress. Unable to obtain full ROS due to pts baseline dementia.     ICU Vital Signs Last 24 Hrs  T(C): 36.7 (05 Dec 2018 06:26), Max: 37.3 (04 Dec 2018 14:25)  T(F): 98.1 (05 Dec 2018 06:26), Max: 99.2 (04 Dec 2018 14:25)  HR: 88 (05 Dec 2018 04:11) (88 - 102)  BP: 150/75 (05 Dec 2018 04:11) (141/72 - 173/86)  BP(mean): 104 (05 Dec 2018 04:11) (99 - 121)  ABP: --  ABP(mean): --  RR: 17 (05 Dec 2018 04:11) (16 - 17)  SpO2: 96% (05 Dec 2018 04:11) (82% - 100%)    I&O's Summary    03 Dec 2018 07:01  -  04 Dec 2018 07:00  --------------------------------------------------------  IN: 1350 mL / OUT: 1600 mL / NET: -250 mL    04 Dec 2018 07:01  -  05 Dec 2018 06:40  --------------------------------------------------------  IN: 0 mL / OUT: 500 mL / NET: -500 mL          LABS:                        8.3    7.8   )-----------( 333      ( 04 Dec 2018 07:02 )             29.3     12-04    150<H>  |  110<H>  |  35<H>  ----------------------------<  135<H>  4.2   |  29  |  1.02    Ca    9.8      04 Dec 2018 07:02    TPro  6.8  /  Alb  2.6<L>  /  TBili  0.2  /  DBili  x   /  AST  12  /  ALT  8<L>  /  AlkPhos  78  12-04        CAPILLARY BLOOD GLUCOSE      POCT Blood Glucose.: 97 mg/dL (05 Dec 2018 06:10)  POCT Blood Glucose.: 98 mg/dL (05 Dec 2018 00:11)  POCT Blood Glucose.: 105 mg/dL (04 Dec 2018 21:25)  POCT Blood Glucose.: 134 mg/dL (04 Dec 2018 16:40)  POCT Blood Glucose.: 135 mg/dL (04 Dec 2018 11:34)  POCT Blood Glucose.: 143 mg/dL (04 Dec 2018 06:51)        RADIOLOGY & ADDITIONAL TESTS:    Consultant(s) Notes Reviewed:  [x ] YES  [ ] NO    MEDICATIONS  (STANDING):  acetylcysteine 10%  Inhalation 3 milliLiter(s) Inhalation every 6 hours  ALBUTerol/ipratropium for Nebulization 3 milliLiter(s) Nebulizer every 6 hours  amLODIPine   Tablet 10 milliGRAM(s) Oral every 24 hours  artificial tears (preservative free) Ophthalmic Solution 1 Drop(s) Both EYES three times a day  dextrose 5%. 1000 milliLiter(s) (50 mL/Hr) IV Continuous <Continuous>  dextrose 5%. 1000 milliLiter(s) (35 mL/Hr) IV Continuous <Continuous>  dextrose 50% Injectable 12.5 Gram(s) IV Push once  dextrose 50% Injectable 25 Gram(s) IV Push once  dextrose 50% Injectable 25 Gram(s) IV Push once  glycopyrrolate Injectable 0.2 milliGRAM(s) IV Push every 6 hours  heparin  Injectable 5000 Unit(s) SubCutaneous every 8 hours  hydrochlorothiazide 12.5 milliGRAM(s) Oral daily  insulin lispro (HumaLOG) corrective regimen sliding scale   SubCutaneous every 6 hours  lactobacillus acidophilus 1 Tablet(s) Oral every 8 hours  levothyroxine 150 MICROGram(s) Oral daily  lisinopril 40 milliGRAM(s) Oral daily  meropenem  IVPB 1000 milliGRAM(s) IV Intermittent every 12 hours  metoclopramide Injectable 5 milliGRAM(s) IV Push every 12 hours  pantoprazole   Suspension 40 milliGRAM(s) Enteral Tube daily  polyethylene glycol 3350 17 Gram(s) Oral daily    MEDICATIONS  (PRN):  acetaminophen    Suspension .. 650 milliGRAM(s) Oral every 6 hours PRN Temp greater or equal to 38C (100.4F)  dextrose 40% Gel 15 Gram(s) Oral once PRN Blood Glucose LESS THAN 70 milliGRAM(s)/deciliter  glucagon  Injectable 1 milliGRAM(s) IntraMuscular once PRN Glucose LESS THAN 70 milligrams/deciliter    PHYSICAL EXAM:  GENERAL: Elderly female, in NAD.   HEAD:  Atraumatic, Normocephalic  EYES: EOMI, PERRLA, conjunctiva and sclera clear  ENT: No tonsillar erythema, exudates, or enlargement; Moist mucous membranes, Good dentition, No lesions  NECK: Supple, No JVD, Normal thyroid, no enlarged nodes  NERVOUS SYSTEM:  Alert & Oriented X0, responds to verbal stimuli,, Good concentration; Motor Strength 4/5 B/L upper and lower extremities; DTRs 2+ intact and symmetric, sensory intact.   CHEST/LUNG: CTA b/l  HEART: S1S2 normal, no S3, Regular rate and rhythm; No murmurs, rubs, or gallops  ABDOMEN: Soft, Nontender, Nondistended; Bowel sounds present. Peg in place, no erythma or purulence.   EXTREMITIES:  2+ Peripheral Pulses, No clubbing, cyanosis, or edema  LYMPH: No lymphadenopathy noted  SKIN: No rashes or lesions    Care Discussed with Consultants/Other Providers [ x] YES  [ ] NO

## 2018-12-05 NOTE — PROGRESS NOTE ADULT - SUBJECTIVE AND OBJECTIVE BOX
Physical Medicine and Rehabilitation Progress Note:    Patient is a 78y old  Female who presents with a chief complaint of Septic shock and acute respiratory failure (05 Dec 2018 06:39)      HPI:  Patient is currently intubated and unable to participate in interview. History taken from chart and MICU consult.    78F w/ PMH of morbid obesity, DM, HTN, hypothyroidism, sepsis secondary to ventral hernia infection s/p repair, PE s/p IVC filter, acute blood loss anemia from hemorrhagic uterine fibroids and UGIB, ATN requiring HD, sepsis secondary to MSSA bacteremia and MDR E coli UTI now w/AMS and s/p TAHBSO and small bowel resection s/p PEG. Patient presented from [facility] with concern for sepsis and hypoxia. Per EMS, patient was saturating 85% on RA at [facility] with some improvement to 95% after being placed on NRB.  On arrival to the ED, patient was altered and saturating 89%. She was intubated in the ED with suctioning revealing purulent material.       T was 101.5, , BP79/59. WBC 27.9 Hgb 11.0 .3 Lactate 5.4 Na 154 K 5.6 CO2 16 AG [id number] Cr 3.38 Trop 0.14 ABG 7.32 pCO2 29 pO2 138 HCO3 15  UA: (+) leuk est, (+) bacteria  She was started on Levo gtt and Dopamine and subsequently was titrated off the Dopamine. She was given Azithromycin, Cipro, Vancomycin and Zosyn, 4L nS, and Insulin 10U. (16 Nov 2018 23:11)                            9.1    8.3   )-----------( 346      ( 05 Dec 2018 06:37 )             31.2       12-05    146<H>  |  106  |  31<H>  ----------------------------<  97  4.7   |  28  |  1.06    Ca    10.2      05 Dec 2018 06:37    TPro  7.0  /  Alb  2.8<L>  /  TBili  0.3  /  DBili  x   /  AST  24  /  ALT  11  /  AlkPhos  76  12-05    Vital Signs Last 24 Hrs  T(C): 36.7 (05 Dec 2018 06:26), Max: 37.3 (04 Dec 2018 14:25)  T(F): 98.1 (05 Dec 2018 06:26), Max: 99.2 (04 Dec 2018 14:25)  HR: 98 (05 Dec 2018 12:01) (86 - 102)  BP: 132/65 (05 Dec 2018 12:01) (132/65 - 173/86)  BP(mean): 92 (05 Dec 2018 12:01) (92 - 121)  RR: 22 (05 Dec 2018 12:01) (16 - 26)  SpO2: 100% (05 Dec 2018 12:01) (94% - 100%)    MEDICATIONS  (STANDING):  acetylcysteine 10%  Inhalation 3 milliLiter(s) Inhalation every 6 hours  ALBUTerol/ipratropium for Nebulization 3 milliLiter(s) Nebulizer every 6 hours  amLODIPine   Tablet 10 milliGRAM(s) Oral every 24 hours  artificial tears (preservative free) Ophthalmic Solution 1 Drop(s) Both EYES three times a day  dextrose 5%. 1000 milliLiter(s) (50 mL/Hr) IV Continuous <Continuous>  dextrose 50% Injectable 12.5 Gram(s) IV Push once  dextrose 50% Injectable 25 Gram(s) IV Push once  dextrose 50% Injectable 25 Gram(s) IV Push once  glycopyrrolate Injectable 0.2 milliGRAM(s) IV Push every 6 hours  heparin  Injectable 5000 Unit(s) SubCutaneous every 8 hours  hydrochlorothiazide 12.5 milliGRAM(s) Oral daily  insulin lispro (HumaLOG) corrective regimen sliding scale   SubCutaneous every 6 hours  lactobacillus acidophilus 1 Tablet(s) Oral every 8 hours  levothyroxine 150 MICROGram(s) Oral daily  lisinopril 40 milliGRAM(s) Oral daily  meropenem  IVPB 1000 milliGRAM(s) IV Intermittent every 12 hours  metoclopramide Injectable 5 milliGRAM(s) IV Push every 12 hours  pantoprazole   Suspension 40 milliGRAM(s) Enteral Tube daily  polyethylene glycol 3350 17 Gram(s) Oral daily    MEDICATIONS  (PRN):  acetaminophen    Suspension .. 650 milliGRAM(s) Oral every 6 hours PRN Temp greater or equal to 38C (100.4F)  dextrose 40% Gel 15 Gram(s) Oral once PRN Blood Glucose LESS THAN 70 milliGRAM(s)/deciliter  glucagon  Injectable 1 milliGRAM(s) IntraMuscular once PRN Glucose LESS THAN 70 milligrams/deciliter    Currently Undergoing Physical Therapy at bedside.    Functional Status Assessment: on 12/4/2018    Therapeutic Interventions      Bed Mobility  Bed Mobility Training Rolling/Turning: dependent (less than 25% patient effort);  2 person assist  Bed Mobility Training Scooting: dependent (less than 25% patient effort);  2 person assist  Bed Mobility Training Sit-to-Supine: dependent (less than 25% patient effort);  2 person assist  Bed Mobility Training Supine-to-Sit: dependent (less than 25% patient effort);  2 person assist  Bed Mobility Training Limitations: impaired ability to control trunk for mobility;  decreased ability to use legs for bridging/pushing;  decreased ability to use arms for pushing/pulling;  cognitive, decreased safety awareness    Therapeutic Exercise  Therapeutic Exercise Detail: Unable to perform 2/2 poor command following.           PM&R Impression: as above    Disposition Plan Recommendations:  subacute rehab placement

## 2018-12-06 DIAGNOSIS — J18.9 PNEUMONIA, UNSPECIFIED ORGANISM: ICD-10-CM

## 2018-12-06 LAB
ANION GAP SERPL CALC-SCNC: 8 MMOL/L — SIGNIFICANT CHANGE UP (ref 5–17)
BUN SERPL-MCNC: 35 MG/DL — HIGH (ref 7–23)
CALCIUM SERPL-MCNC: 9.9 MG/DL — SIGNIFICANT CHANGE UP (ref 8.4–10.5)
CHLORIDE SERPL-SCNC: 110 MMOL/L — HIGH (ref 96–108)
CO2 SERPL-SCNC: 27 MMOL/L — SIGNIFICANT CHANGE UP (ref 22–31)
CREAT SERPL-MCNC: 1.09 MG/DL — SIGNIFICANT CHANGE UP (ref 0.5–1.3)
GLUCOSE BLDC GLUCOMTR-MCNC: 112 MG/DL — HIGH (ref 70–99)
GLUCOSE BLDC GLUCOMTR-MCNC: 119 MG/DL — HIGH (ref 70–99)
GLUCOSE BLDC GLUCOMTR-MCNC: 123 MG/DL — HIGH (ref 70–99)
GLUCOSE BLDC GLUCOMTR-MCNC: 128 MG/DL — HIGH (ref 70–99)
GLUCOSE BLDC GLUCOMTR-MCNC: 141 MG/DL — HIGH (ref 70–99)
GLUCOSE SERPL-MCNC: 119 MG/DL — HIGH (ref 70–99)
HCT VFR BLD CALC: 28.5 % — LOW (ref 34.5–45)
HGB BLD-MCNC: 8.5 G/DL — LOW (ref 11.5–15.5)
MCHC RBC-ENTMCNC: 28.6 PG — SIGNIFICANT CHANGE UP (ref 27–34)
MCHC RBC-ENTMCNC: 29.8 G/DL — LOW (ref 32–36)
MCV RBC AUTO: 96 FL — SIGNIFICANT CHANGE UP (ref 80–100)
PLATELET # BLD AUTO: 295 K/UL — SIGNIFICANT CHANGE UP (ref 150–400)
POTASSIUM SERPL-MCNC: 4.1 MMOL/L — SIGNIFICANT CHANGE UP (ref 3.5–5.3)
POTASSIUM SERPL-SCNC: 4.1 MMOL/L — SIGNIFICANT CHANGE UP (ref 3.5–5.3)
RBC # BLD: 2.97 M/UL — LOW (ref 3.8–5.2)
RBC # FLD: 18 % — HIGH (ref 10.3–16.9)
SODIUM SERPL-SCNC: 145 MMOL/L — SIGNIFICANT CHANGE UP (ref 135–145)
WBC # BLD: 7.4 K/UL — SIGNIFICANT CHANGE UP (ref 3.8–10.5)
WBC # FLD AUTO: 7.4 K/UL — SIGNIFICANT CHANGE UP (ref 3.8–10.5)

## 2018-12-06 PROCEDURE — 99233 SBSQ HOSP IP/OBS HIGH 50: CPT | Mod: GC

## 2018-12-06 RX ADMIN — ROBINUL 0.2 MILLIGRAM(S): 0.2 INJECTION INTRAMUSCULAR; INTRAVENOUS at 07:35

## 2018-12-06 RX ADMIN — AMLODIPINE BESYLATE 10 MILLIGRAM(S): 2.5 TABLET ORAL at 19:22

## 2018-12-06 RX ADMIN — HEPARIN SODIUM 5000 UNIT(S): 5000 INJECTION INTRAVENOUS; SUBCUTANEOUS at 07:36

## 2018-12-06 RX ADMIN — Medication 150 MICROGRAM(S): at 07:36

## 2018-12-06 RX ADMIN — Medication 5 MILLIGRAM(S): at 07:36

## 2018-12-06 RX ADMIN — HEPARIN SODIUM 5000 UNIT(S): 5000 INJECTION INTRAVENOUS; SUBCUTANEOUS at 13:09

## 2018-12-06 RX ADMIN — Medication 1 DROP(S): at 13:10

## 2018-12-06 RX ADMIN — Medication 1 TABLET(S): at 13:09

## 2018-12-06 RX ADMIN — Medication 3 MILLILITER(S): at 05:01

## 2018-12-06 RX ADMIN — MEROPENEM 100 MILLIGRAM(S): 1 INJECTION INTRAVENOUS at 07:36

## 2018-12-06 RX ADMIN — ROBINUL 0.2 MILLIGRAM(S): 0.2 INJECTION INTRAMUSCULAR; INTRAVENOUS at 19:23

## 2018-12-06 RX ADMIN — Medication 3 MILLILITER(S): at 15:30

## 2018-12-06 RX ADMIN — PANTOPRAZOLE SODIUM 40 MILLIGRAM(S): 20 TABLET, DELAYED RELEASE ORAL at 11:33

## 2018-12-06 RX ADMIN — Medication 3 MILLILITER(S): at 10:46

## 2018-12-06 RX ADMIN — Medication 1 DROP(S): at 07:37

## 2018-12-06 RX ADMIN — Medication 12.5 MILLIGRAM(S): at 07:35

## 2018-12-06 RX ADMIN — ROBINUL 0.2 MILLIGRAM(S): 0.2 INJECTION INTRAMUSCULAR; INTRAVENOUS at 11:33

## 2018-12-06 RX ADMIN — POLYETHYLENE GLYCOL 3350 17 GRAM(S): 17 POWDER, FOR SOLUTION ORAL at 11:33

## 2018-12-06 RX ADMIN — Medication 3 MILLILITER(S): at 15:31

## 2018-12-06 RX ADMIN — MEROPENEM 100 MILLIGRAM(S): 1 INJECTION INTRAVENOUS at 19:23

## 2018-12-06 RX ADMIN — Medication 1 TABLET(S): at 07:35

## 2018-12-06 RX ADMIN — ROBINUL 0.2 MILLIGRAM(S): 0.2 INJECTION INTRAMUSCULAR; INTRAVENOUS at 00:49

## 2018-12-06 RX ADMIN — LISINOPRIL 40 MILLIGRAM(S): 2.5 TABLET ORAL at 07:35

## 2018-12-06 NOTE — PROGRESS NOTE ADULT - PROBLEM SELECTOR PLAN 5
Hx of htn  -continue home lisinopril 40 mg daily  -amlodipine 10 mg daily Pt presenting with PMH of HTN. Currently normotensive on the following regimen:  -continue home lisinopril 40 mg daily  -amlodipine 10 mg daily

## 2018-12-06 NOTE — PROGRESS NOTE ADULT - PROBLEM SELECTOR PLAN 2
Pt diagnosed with sepsis 2/2 HAP and ESBL UTIon admission, hypotensive in MICU requiring levophed and midodrine.   - resolved  - sputum cx staph aureus sn to cefazolin  - completed course of cefazolin for MSSA and ciprofloxacin for UTI (ESBL sn to this).    #incontinent dermatitis of b/l buttocks  -daily wound care  -rectal tube in place    #Anemia of chronic disease  -no signs of active bleeding  -active type and screen Pt noted to have fever on 11/29 100.7 with tachycardia. CXR with possible RLL infiltrate. Repeat sputum cx (12/1) positive for pseudomonas/klebsiella, and ESBL Ecoli. 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  - s/p bronchoscopy 11/30  - c/w Chest PT and percussion vest for mucous  - c/w glycopyrrolate 0.2 Q6 IV  - c/w mucomyst   - c/w q4hr suctioning    #Chronic diarrhea 2/2 small bowel resection  - c/w probiotics daily    #Dermatitis of b/l buttocks  -daily wound care  -rectal tube in place    #Anemia of chronic disease  - BL Hgb 8-9  -no signs of active bleeding  - B12, folate wnl  - maintain active type and screen  - transfuse for Hgb <7

## 2018-12-06 NOTE — PROGRESS NOTE ADULT - SUBJECTIVE AND OBJECTIVE BOX
Transfer Note From [facility] to [facility]    Hospital Course:  78F w/ PMH of morbid obesity, DM, HTN, hypothyroidism, sepsis secondary to ventral hernia infection s/p repair, PE s/p IVC filter, acute blood loss anemia from hemorrhagic uterine fibroids and UGIB, ATN requiring HD, sepsis secondary to MSSA bacteremia and MDR E coli UTI (4-18)  now w/AMS and s/p TAHBSO and small bowel resection s/p PEG. Patient presented from [facility] with concern for sepsis 2/2 aspiration pna and hypoxia. Per EMS, patient was saturating 85% on RA at [facility] with some improvement to 95% after being placed on NRB.  On arrival to the ED, patient was altered and saturating 89%. She was intubated in the ED with suctioning revealing purulent material. She was started on vanc/azithro/cipro (due to prior ESBL that was resistant to zosyn but sn to cipro, cipro course was finished) and that was deescalated to cefazolin once sputum had speciation to staph aureus sensitive to that and completed cefazolin course. She also completed ciprofloxacin for concern of ESBL (that was sn to this). She was successfully extubated, and tolerating nasal cannula. She had diarrhea that was present prior to admission as well (due to hx of SB resection), that improved with probiotics. Had continued secretions and completed cefazolin course. Was also started on glycopyrrolate and octreotide for secretions but now discontinued. She had mucous plugging with white out of right side of lung, this improved with percussion vest. 11/26 patient and son had long discussion with palliative care and it was decided to keep patient full code currently with a MOLST indicating as such. Of note, pt did require levophed followed by midodrine for low BP post-extubation, but now no longer requiring pressor support and home anti-hypertensives restarted. She has had 2 vomiting episodes in the past day, likely in setting of tube feeds, which were held last night and restarted this AM. NS 500cc bolus given 11/28 for decreasing UOP likely due to holding of tube feeds prior. Miralax started 11/28 as no BM in last 1-2 days thru rectal tube (in place due to incontinent dermatitis). Stable for step down to [facility] for further monitoring of respiratory status. Frequent suctioning for secretions.  Feeds increased back to 30cc/hr. Tachy to 120s overnight 11/29, with rising temp, max 100.7 rectal. Patient still sating well on NC. CXR shows complete  opacification of R lung with tracheal shift likely 2/2 to mucus plugging. Patient transferred to MICU for possible bronchoscopy and Intubation with anticipation to transition to [facility]. While in the MICU, pt had a bronchoscopy on 11/30 for recurrent mucus plugging, and had chest PT/percussion with improvement in opacification of R hemithorax, now off of nasal canula and saturating 94-95% on room air. She did have a low grade fever of 100.7 (11/29), Sputum cx was obtained which was positive for pseudomonas and klebsiella, sensitive to zosyn, now on Zosyn 4.5 g q6h. Currently stable transfer back to [facility] for further monitoring of respiratory status. ESBL klebsiella and pseudomonas growing in sputum. D/c'ed zosyn and started on meropenem 1gq12.  Mucomyst ordered for secretions. ID approved meropenem. Started on d5w and FW for hypernatremia, now resolved. D5W dc/ed. Patient now requiring only q4 suctioning. Patient is stable for transfer to [facility].      OVERNIGHT EVENTS: EMANUEL    INTERVAL HPI: Pt is examined at bedside. She appears well, in NAD.  Patient is awake and alert. Responsive to some commands. Does not appear to be in any resp distress. Unable to obtain full ROS due to pts baseline dementia.       ICU Vital Signs Last 24 Hrs  T(C): 36.8 (06 Dec 2018 10:12), Max: 37.6 (06 Dec 2018 02:00)  T(F): 98.3 (06 Dec 2018 10:12), Max: 99.6 (06 Dec 2018 02:00)  HR: 96 (06 Dec 2018 08:17) (94 - 120)  BP: 124/62 (06 Dec 2018 08:17) (124/62 - 171/91)  BP(mean): 85 (06 Dec 2018 08:17) (85 - 115)  ABP: --  ABP(mean): --  RR: 18 (06 Dec 2018 08:17) (18 - 36)  SpO2: 98% (06 Dec 2018 08:17) (94% - 100%)    I&O's Summary    05 Dec 2018 07:01  -  06 Dec 2018 07:00  --------------------------------------------------------  IN: 1145 mL / OUT: 500 mL / NET: 645 mL    06 Dec 2018 07:01  -  06 Dec 2018 13:38  --------------------------------------------------------  IN: 0 mL / OUT: 800 mL / NET: -800 mL          LABS:                        8.5    7.4   )-----------( 295      ( 06 Dec 2018 06:59 )             28.5     12-06    145  |  110<H>  |  35<H>  ----------------------------<  119<H>  4.1   |  27  |  1.09    Ca    9.9      06 Dec 2018 06:59    TPro  7.0  /  Alb  2.8<L>  /  TBili  0.3  /  DBili  x   /  AST  24  /  ALT  11  /  AlkPhos  76  12-05        CAPILLARY BLOOD GLUCOSE      POCT Blood Glucose.: 128 mg/dL (06 Dec 2018 11:19)  POCT Blood Glucose.: 119 mg/dL (06 Dec 2018 06:51)  POCT Blood Glucose.: 112 mg/dL (06 Dec 2018 00:14)  POCT Blood Glucose.: 102 mg/dL (05 Dec 2018 21:15)  POCT Blood Glucose.: 118 mg/dL (05 Dec 2018 16:19)        RADIOLOGY & ADDITIONAL TESTS:    Consultant(s) Notes Reviewed:  [x ] YES  [ ] NO    MEDICATIONS  (STANDING):  acetylcysteine 10%  Inhalation 3 milliLiter(s) Inhalation every 6 hours  ALBUTerol/ipratropium for Nebulization 3 milliLiter(s) Nebulizer every 6 hours  amLODIPine   Tablet 10 milliGRAM(s) Oral every 24 hours  artificial tears (preservative free) Ophthalmic Solution 1 Drop(s) Both EYES three times a day  dextrose 5%. 1000 milliLiter(s) (50 mL/Hr) IV Continuous <Continuous>  dextrose 50% Injectable 12.5 Gram(s) IV Push once  dextrose 50% Injectable 25 Gram(s) IV Push once  dextrose 50% Injectable 25 Gram(s) IV Push once  glycopyrrolate Injectable 0.2 milliGRAM(s) IV Push every 6 hours  heparin  Injectable 5000 Unit(s) SubCutaneous every 8 hours  hydrochlorothiazide 12.5 milliGRAM(s) Oral daily  insulin lispro (HumaLOG) corrective regimen sliding scale   SubCutaneous every 6 hours  lactobacillus acidophilus 1 Tablet(s) Oral every 8 hours  levothyroxine 150 MICROGram(s) Oral daily  lisinopril 40 milliGRAM(s) Oral daily  meropenem  IVPB 1000 milliGRAM(s) IV Intermittent every 12 hours  metoclopramide Injectable 5 milliGRAM(s) IV Push every 12 hours  pantoprazole   Suspension 40 milliGRAM(s) Enteral Tube daily  polyethylene glycol 3350 17 Gram(s) Oral daily    MEDICATIONS  (PRN):  acetaminophen    Suspension .. 650 milliGRAM(s) Oral every 6 hours PRN Temp greater or equal to 38C (100.4F)  dextrose 40% Gel 15 Gram(s) Oral once PRN Blood Glucose LESS THAN 70 milliGRAM(s)/deciliter  glucagon  Injectable 1 milliGRAM(s) IntraMuscular once PRN Glucose LESS THAN 70 milligrams/deciliter    PHYSICAL EXAM:  GENERAL: Elderly female, in NAD.   HEAD:  Atraumatic, Normocephalic  EYES: EOMI, PERRLA, conjunctiva and sclera clear  ENT: No tonsillar erythema, exudates, or enlargement; Moist mucous membranes, Good dentition, No lesions  NECK: Supple, No JVD, Normal thyroid, no enlarged nodes  NERVOUS SYSTEM:  Alert & Oriented X0, responds to verbal stimuli,, Good concentration; Motor Strength 4/5 B/L upper and lower extremities; DTRs 2+ intact and symmetric, sensory intact.   CHEST/LUNG: CTA b/l  HEART: S1S2 normal, no S3, Regular rate and rhythm; No murmurs, rubs, or gallops  ABDOMEN: Soft, Nontender, Nondistended; Bowel sounds present. Peg in place, no erythma or purulence.   EXTREMITIES:  2+ Peripheral Pulses, No clubbing, cyanosis, or edema  LYMPH: No lymphadenopathy noted  SKIN: No rashes or lesions    Care Discussed with Consultants/Other Providers [ x] YES  [ ] NO

## 2018-12-06 NOTE — PROGRESS NOTE ADULT - PROBLEM SELECTOR PLAN 3
Toxic metabolic encephalopathy 2/2 septic shock  RESOLVED  - mental status consistent with patient's baseline dementia per son    #Hypernatremia: FW deficit 1.5L. Likely 2/2 low intake. Na today 145  - s/p d5W  - c/w  q 6hr  - trend bmp

## 2018-12-06 NOTE — PROGRESS NOTE ADULT - PROBLEM SELECTOR PLAN 10
1) PCP Contacted on Admission: (N) --> Name & Phone #: MORA GARVIN [phone]  2) Date of Contact with PCP: NA  3) PCP Contacted at Discharge: (Y/N, N/A)  4) Summary of Handoff Given to PCP: NA  5) Post-Discharge Appointment Date and Location: NA

## 2018-12-06 NOTE — PROGRESS NOTE ADULT - ATTENDING COMMENTS
Patient seen and examined with house-staff during bedside rounds.  Resident note read, including vitals, physical findings, laboratory data, and radiological reports.   Revisions included below.  Direct personal management at bed side and extensive interpretation of the data.  Plan was outlined and discussed in details with the housestaff.  Decision making of high complexity  Action taken for acute disease activity to reflect the level of care provided:  - medication reconciliation  - review laboratory data  Patient is clinically improving with decrease in the suction and in the amount of secretion. Continue antibiotic. Patient was evaluated for transfer to the regular floor

## 2018-12-06 NOTE — PROGRESS NOTE ADULT - PROBLEM SELECTOR PLAN 9
Dvt ppx: hep sq  GI ppx: protonix  Code Status: Full code, palliative with discussions with son for long term care of patient due to chronic aspiration pna. She remains full code.     Dispo: MICU    #JOSSELYN  1) PCP Contacted on Admission: (Y/N) --> Name & Phone #:  2) Date of Contact with PCP:  3) PCP Contacted at Discharge: (Y/N, N/A)  4) Summary of Handoff Given to PCP:   5) Post-Discharge Appointment Date and Location: Dvt ppx: hep sq  GI ppx: protonix    Code Status: FULL CODE    Dispo: [facility]

## 2018-12-06 NOTE — PROGRESS NOTE ADULT - PROBLEM SELECTOR PLAN 4
Pt recently admitted 4/15/18 due to MSSA bacteremia and MDR E Coli in urine, transferred to [facility], and found to have extravasation on CT abd, s/p IR drain placement, underwent DAVY BSO 2/2 tubal metaplasia, SBR with primary anastomosis, abdominal washout, PEG placement, discharged on 6/4  - surgery on board  - PEG in place  - c/w glucerna feeds at 55cc/hr Pt with hx of sm carola perforation, recently admitted 4/15/18 due to MSSA bacteremia and MDR E Coli in urine, transferred to [facility], and found to have extravasation on CT abd, s/p IR drain placement, underwent DAVY BSO 2/2 tubal metaplasia, SBR with primary anastomosis, abdominal washout, PEG placement, discharged on 6/4  - surgery on board  - PEG in place  - c/w glucerna feeds at 55cc/hr Pt with hx of sm carola perforation, recently admitted 4/15/18 due to MSSA bacteremia and MDR E Coli in urine, transferred to [facility], and found to have extravasation on CT abd, s/p IR drain placement, underwent DAVY BSO 2/2 tubal metaplasia, SBR with primary anastomosis, abdominal washout, PEG placement, discharged on 6/4  - surgery on board  - PEG in place, c/w glucerna feeds at 55cc/hr Pt with hx of sm carola perforation, recently admitted 4/15/18 due to MSSA bacteremia and MDR E Coli in urine, transferred to [facility], and found to have extravasation on CT abd, s/p IR drain placement, underwent DAVY BSO 2/2 tubal metaplasia, SBR with primary anastomosis, abdominal washout, PEG placement, discharged on 6/4  - surgery signed off  - PEG in place, c/w glucerna feeds at 55cc/hr RESOLVED. Pt initially admitted on 11/16/18 for sepsis 2/2 HAP and ESBL UTIon admission, hypotensive in MICU requiring levophed and midodrine s/p course of cefazolin for MSSA and ciprofloxacin for UTI (ESBL sn to this).

## 2018-12-06 NOTE — PROGRESS NOTE ADULT - PROBLEM SELECTOR PLAN 3
Toxic metabolic encephalopathy 2/2 septic shock  RESOLVED  - mental status consistent with patient's baseline dementia per son    #Hypernatremia: FW deficit 1.5L. Likely 2/2 low intake. Na today 145  - s/p d5W  - c/w  q 6hr  - trend bmp RESOLVED. Pt initially admitted on 11/16/18 for sepsis 2/2 HAP and ESBL UTIon admission, hypotensive in MICU requiring levophed and midodrine s/p course of cefazolin for MSSA and ciprofloxacin for UTI (ESBL sn to this). Pt with hx of sm carola perforation, recently admitted 4/15/18 due to MSSA bacteremia and MDR E Coli in urine, transferred to [facility], and found to have extravasation on CT abd, s/p IR drain placement, underwent DAVY BSO 2/2 tubal metaplasia, SBR with primary anastomosis, abdominal washout, PEG placement, discharged on 6/4  - surgery was following, now signed off  - PEG in place, c/w glucerna feeds at 55cc/hr  - pt was on standing reglan q12 for bowel pro-motility, d/c due to concern for possible upper extremity rigidity  - speech and swallow eval

## 2018-12-06 NOTE — PROGRESS NOTE ADULT - PROBLEM SELECTOR PLAN 4
Pt recently admitted 4/15/18 due to MSSA bacteremia and MDR E Coli in urine, transferred to [facility], and found to have extravasation on CT abd, s/p IR drain placement, underwent DAVY BSO 2/2 tubal metaplasia, SBR with primary anastomosis, abdominal washout, PEG placement, discharged on 6/4  - surgery on board  - PEG in place  - c/w glucerna feeds at 55cc/hr

## 2018-12-06 NOTE — PROGRESS NOTE ADULT - PROBLEM SELECTOR PLAN 8
F: none  E: K>4 Mag>2 caution given recent GONZALEZ  N: glucerna 1.5 via PEG 55cc/hr F: none  E: K>4 Mag>2   N: tube feeds, glucerna 1.5 via PEG 55cc/hr

## 2018-12-06 NOTE — PROGRESS NOTE ADULT - PROBLEM SELECTOR PLAN 1
Pt with frequent mucus plugging throughout hospital course s/p percussion vest, robinol, frequent suctioning.  - s/p bronchoscopy 11/30  - continue to monitor resp status.  - continue Chest PT and percussion vest  for mucous/ glycopyrrolate 0.2 Q6 IV  - c/w mucomyst   - secretions now improved. requiring only q4hr suctioning.     #Gram Neg PNA: Pt had a low grade fever on 11/29 100.7. CXR with possible RLL infiltrate.   - repeat sputum cx (12/1) positive for pseudomonas/klebsiella, and ESBL Ecoli. started empirically on Zosyn 4.5 g q6h. Changed to meropenem based on sensitivities  - c/w meropenem for 5 days. (12/2- 12/6)  - f/up Bcultures    #Chronic diarrhea 2/2 small bowel resection  -probiotics daily Pt with frequent mucus plugging throughout hospital course s/p percussion vest, robinol, frequent suctioning.  - s/p bronchoscopy 11/30  - c/w Chest PT and percussion vest for mucous  - c/w glycopyrrolate 0.2 Q6 IV  - c/w mucomyst   - c/w q4hr suctioning Pt noted to have fever on 11/29 100.7 with tachycardia. CXR with RLL infiltrate. Repeat sputum cx (12/1) positive for pseudomonas/klebsiella, and ESBL Ecoli. Pt started empirically on Zosyn 4.5 g q6h escalated to meropenem based on sensitivities on 12/2  - c/w meropenem to complete 5 day course ending 12/7   - Bcx from 12/3 NGTD, continue to follow

## 2018-12-06 NOTE — PROGRESS NOTE ADULT - PROBLEM SELECTOR PLAN 1
Pt with frequent mucus plugging throughout hospital course s/p percussion vest, robinol, frequent suctioning.  - s/p bronchoscopy 11/30  - continue to monitor resp status.  - continue Chest PT and percussion vest  for mucous/ glycopyrrolate 0.2 Q6 IV  - c/w mucomyst   - secretions now improved. requiring only q4hr suctioning.     #Gram Neg PNA: Pt had a low grade fever on 11/29 100.7. CXR with possible RLL infiltrate.   - repeat sputum cx (12/1) positive for pseudomonas/klebsiella, and ESBL Ecoli. started empirically on Zosyn 4.5 g q6h. Changed to meropenem based on sensitivities  - c/w meropenem for 5 days. 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

## 2018-12-06 NOTE — PROGRESS NOTE ADULT - ASSESSMENT
78F w/ PMH obesity, DM, HTN, hypothyroidism, sepsis secondary to ventral hernia infection s/p repair, PE s/p IVC filter, acute blood loss anemia from hemorrhagic uterine fibroids and UGIB, ATN requiring HD, sepsis secondary to MSSA bacteremia and MDR E coli UTI w/AMS and s/p TAHBSO and small bowel resection s/p PEG. Presenting with septic shock likely 2/2 PNA vs UTI and acute hypoxic respiratory failure now extubated, s/p bronchoscopy 11/30, now being treated for pseudomonal PNA with meropenem (12/2-12/6)

## 2018-12-06 NOTE — PROGRESS NOTE ADULT - SUBJECTIVE AND OBJECTIVE BOX
PGY-1 TRANSFER ACCEPTANCE NOTE: [facility] to [facility]    HOSPITAL COURSE: 78F w/ PMH of morbid obesity, DM, HTN, hypothyroidism, sepsis secondary to ventral hernia infection s/p repair, s/p small bowel resection, s/p PEG, PE s/p IVC filter, acute blood loss anemia from hemorrhagic uterine fibroids s/p TAHBSO, hx of UGIB, sepsis secondary to MSSA bacteremia and MDR E coli UTI (April 2018) admitted to [facility] MICU on 11/16/18  for sepsis 2/2 aspiration PNA and hypoxic respiratory failure requiring intubation with suctioning revealing purulent material. She was started on vanc/azithro/cipro (due to prior ESBL that was resistant to zosyn but sn to cipro) which was deescalated to cefazolin (sputum cx grew staph aureus sensitive to cefazolin). Pt also completed ciprofloxacin for concern of ESBL (that was sn to this). Pt was successfully extubated, and tolerating nasal cannula. Pt required pressor support following intubation which was weaned off and home anti-htn meds restarted. MICU course c/b mucous plug with white out of right lung, which improved with percussion vest. 11/26 patient and son had long discussion with palliative care and it was decided to keep patient full code currently with a MOLST indicating as such. Pt stepped down to [facility] for further monitoring of respiratory status with frequent suctioning for secretions.  On 11/29, overnight pt noted to be tachycardic to 120s, febrile to 100.7 with CXR showing complete  opacification of R lung with tracheal shift likely 2/2 to mucus plugging. Patient stepped back up to MICU and underwent bronchoscopy on 11/30 for recurrent mucus plugging, and had chest PT/percussion with improvement in opacification of R hemithorax. Pt transitioned to nasal canula, saturating 94-95% on room air.  Sputum cx from bronch grew pseudomonas and klebsiella, sensitive to zosyn, and pt started on Zosyn 4.5 g q6h and stepped back down to [facility]. Sputum cx finalized as ESBL klebsiella and pseudomonas, abx escalated to meropenem 1g q12 (ID approved) to complete 5 day course ending 12/7/12.  Pt started on mucomyst for secretions. Hospital course also c/b hypernatremia thought to be 2/2 tube feeds treated with D5W and FW flushes, now resolved with D5W dc/ed. Patient now requiring only q4 suctioning. Patient is stable for transfer to [facility].        OVERNIGHT EVENTS:    SUBJECTIVE / INTERVAL HPI: Patient seen and examined at bedside.     VITAL SIGNS:  Vital Signs Last 24 Hrs  T(C): 36.8 (06 Dec 2018 14:41), Max: 37.6 (06 Dec 2018 02:00)  T(F): 98.2 (06 Dec 2018 14:41), Max: 99.6 (06 Dec 2018 02:00)  HR: 110 (06 Dec 2018 14:09) (94 - 120)  BP: 137/68 (06 Dec 2018 14:09) (124/62 - 171/91)  BP(mean): 95 (06 Dec 2018 14:09) (85 - 115)  RR: 20 (06 Dec 2018 14:09) (18 - 36)  SpO2: 95% (06 Dec 2018 14:09) (94% - 100%)    PHYSICAL EXAM:  GENERAL: Elderly female, resting comfortably, in NAD, responsive to questions in [place].  HEAD:  Atraumatic, Normocephalic  EYES: EOMI, PERRLA, conjunctiva and sclera clear  ENT: No tonsillar erythema, exudates, or enlargement; Moist mucous membranes.  NECK: Supple, No JVD, Normal thyroid, no enlarged nodes  NERVOUS SYSTEM:  Alert & Oriented X1, responds to verbal stimuli. Motor Strength 4/5 B/L upper and lower extremities; DTRs 2+ intact and symmetric, sensory intact. Able to move all 4 extremities spontaneously.   CHEST/LUNG: CTA b/l, no w/r/r  HEART: S1S2 normal, Regular rate and rhythm; No murmurs, rubs, or gallops  ABDOMEN: Soft, Nontender, Nondistended; Bowel sounds present x4 quadrants. Peg in place, no surrounding erythma or purulence.   EXTREMITIES:  2+ Peripheral Pulses, No clubbing, cyanosis, or edema  LYMPH: No lymphadenopathy noted  SKIN: No rashes or lesions    MEDICATIONS:  MEDICATIONS  (STANDING):  acetylcysteine 10%  Inhalation 3 milliLiter(s) Inhalation every 6 hours  ALBUTerol/ipratropium for Nebulization 3 milliLiter(s) Nebulizer every 6 hours  amLODIPine   Tablet 10 milliGRAM(s) Oral every 24 hours  artificial tears (preservative free) Ophthalmic Solution 1 Drop(s) Both EYES three times a day  dextrose 5%. 1000 milliLiter(s) (50 mL/Hr) IV Continuous <Continuous>  dextrose 50% Injectable 12.5 Gram(s) IV Push once  dextrose 50% Injectable 25 Gram(s) IV Push once  dextrose 50% Injectable 25 Gram(s) IV Push once  glycopyrrolate Injectable 0.2 milliGRAM(s) IV Push every 6 hours  heparin  Injectable 5000 Unit(s) SubCutaneous every 8 hours  hydrochlorothiazide 12.5 milliGRAM(s) Oral daily  insulin lispro (HumaLOG) corrective regimen sliding scale   SubCutaneous every 6 hours  lactobacillus acidophilus 1 Tablet(s) Oral every 8 hours  levothyroxine 150 MICROGram(s) Oral daily  lisinopril 40 milliGRAM(s) Oral daily  meropenem  IVPB 1000 milliGRAM(s) IV Intermittent every 12 hours  metoclopramide Injectable 5 milliGRAM(s) IV Push every 12 hours  pantoprazole   Suspension 40 milliGRAM(s) Enteral Tube daily  polyethylene glycol 3350 17 Gram(s) Oral daily    MEDICATIONS  (PRN):  acetaminophen    Suspension .. 650 milliGRAM(s) Oral every 6 hours PRN Temp greater or equal to 38C (100.4F)  dextrose 40% Gel 15 Gram(s) Oral once PRN Blood Glucose LESS THAN 70 milliGRAM(s)/deciliter  glucagon  Injectable 1 milliGRAM(s) IntraMuscular once PRN Glucose LESS THAN 70 milligrams/deciliter      ALLERGIES:  Allergies    No Known Allergies    Intolerances        LABS:                        8.5    7.4   )-----------( 295      ( 06 Dec 2018 06:59 )             28.5     12-06    145  |  110<H>  |  35<H>  ----------------------------<  119<H>  4.1   |  27  |  1.09    Ca    9.9      06 Dec 2018 06:59    TPro  7.0  /  Alb  2.8<L>  /  TBili  0.3  /  DBili  x   /  AST  24  /  ALT  11  /  AlkPhos  76  12-05        CAPILLARY BLOOD GLUCOSE      POCT Blood Glucose.: 128 mg/dL (06 Dec 2018 11:19)      RADIOLOGY & ADDITIONAL TESTS: Reviewed. PGY-1 TRANSFER ACCEPTANCE NOTE: 7 [facility] to [facility]    HOSPITAL COURSE: 78F w/ PMH of morbid obesity, DM, HTN, hypothyroidism, sepsis secondary to ventral hernia infection s/p repair, s/p small bowel resection, s/p PEG, PE s/p IVC filter, acute blood loss anemia from hemorrhagic uterine fibroids s/p TAHBSO, hx of UGIB, sepsis secondary to MSSA bacteremia and MDR E coli UTI (April 2018) admitted to [facility] MICU on 11/16/18  for sepsis 2/2 aspiration PNA vs ESBL UTI and hypoxic respiratory failure requiring intubation with suctioning revealing purulent material. She was started on vanc/azithro/cipro (due to prior ESBL that was resistant to zosyn but sn to cipro) which was deescalated to cefazolin (sputum cx grew staph aureus sensitive to cefazolin). Pt also completed ciprofloxacin for concern of ESBL UTI (that was sn to this). Pt was successfully extubated, and tolerating nasal cannula. Pt required pressor support following intubation which was weaned off and home anti-htn meds restarted. MICU course c/b mucous plug with white out of right lung, which improved with percussion vest. 11/26 patient and son had long discussion with palliative care and it was decided to keep patient full code currently with a MOLST indicating as such. Pt stepped down to [facility] for further monitoring of respiratory status with frequent suctioning for secretions.  On 11/29, overnight pt noted to be tachycardic to 120s, febrile to 100.7 with CXR showing complete  opacification of R lung with tracheal shift likely 2/2 to mucus plugging. Patient stepped back up to MICU and underwent bronchoscopy on 11/30 for recurrent mucus plugging, and had chest PT/percussion with improvement in opacification of R hemithorax. Pt transitioned to nasal canula, saturating 94-95% on room air.  Sputum cx from bronch grew pseudomonas and klebsiella, sensitive to zosyn, and pt started on Zosyn 4.5 g q6h and stepped back down to [facility]. Sputum cx finalized as ESBL klebsiella and pseudomonas, abx escalated to meropenem 1g q12 (ID approved) to complete 5 day course ending 12/7/12.  Pt started on mucomyst for secretions. Hospital course also c/b hypernatremia thought to be 2/2 tube feeds treated with D5W and FW flushes, now resolved with D5W dc/ed. Pt history also notable for chronic diarrhea 2/2 bowel resection, now requiring rectal tube for dermatitis associated with incontinence, which is improved with probiotics. Patient now requiring only q4 suctioning. Patient is stable for transfer to [facility].        OVERNIGHT EVENTS:    SUBJECTIVE / INTERVAL HPI: Patient seen and examined at bedside.     VITAL SIGNS:  Vital Signs Last 24 Hrs  T(C): 36.8 (06 Dec 2018 14:41), Max: 37.6 (06 Dec 2018 02:00)  T(F): 98.2 (06 Dec 2018 14:41), Max: 99.6 (06 Dec 2018 02:00)  HR: 110 (06 Dec 2018 14:09) (94 - 120)  BP: 137/68 (06 Dec 2018 14:09) (124/62 - 171/91)  BP(mean): 95 (06 Dec 2018 14:09) (85 - 115)  RR: 20 (06 Dec 2018 14:09) (18 - 36)  SpO2: 95% (06 Dec 2018 14:09) (94% - 100%)    PHYSICAL EXAM:  GENERAL: Elderly female, resting comfortably, in NAD, responsive to questions in [place].  HEAD:  Atraumatic, Normocephalic  EYES: EOMI, PERRLA, conjunctiva and sclera clear  ENT: No tonsillar erythema, exudates, or enlargement; Moist mucous membranes.  NECK: Supple, No JVD, Normal thyroid, no enlarged nodes  NERVOUS SYSTEM:  Alert & Oriented X1, responds to verbal stimuli. Motor Strength 4/5 B/L upper and lower extremities; DTRs 2+ intact and symmetric, sensory intact. Able to move all 4 extremities spontaneously.   CHEST/LUNG: CTA b/l, no w/r/r  HEART: S1S2 normal, Regular rate and rhythm; No murmurs, rubs, or gallops  ABDOMEN: Soft, Nontender, Nondistended; Bowel sounds present x4 quadrants. Peg in place, no surrounding erythma or purulence.   EXTREMITIES:  2+ Peripheral Pulses, No clubbing, cyanosis, or edema  LYMPH: No lymphadenopathy noted  SKIN: No rashes or lesions    MEDICATIONS:  MEDICATIONS  (STANDING):  acetylcysteine 10%  Inhalation 3 milliLiter(s) Inhalation every 6 hours  ALBUTerol/ipratropium for Nebulization 3 milliLiter(s) Nebulizer every 6 hours  amLODIPine   Tablet 10 milliGRAM(s) Oral every 24 hours  artificial tears (preservative free) Ophthalmic Solution 1 Drop(s) Both EYES three times a day  dextrose 5%. 1000 milliLiter(s) (50 mL/Hr) IV Continuous <Continuous>  dextrose 50% Injectable 12.5 Gram(s) IV Push once  dextrose 50% Injectable 25 Gram(s) IV Push once  dextrose 50% Injectable 25 Gram(s) IV Push once  glycopyrrolate Injectable 0.2 milliGRAM(s) IV Push every 6 hours  heparin  Injectable 5000 Unit(s) SubCutaneous every 8 hours  hydrochlorothiazide 12.5 milliGRAM(s) Oral daily  insulin lispro (HumaLOG) corrective regimen sliding scale   SubCutaneous every 6 hours  lactobacillus acidophilus 1 Tablet(s) Oral every 8 hours  levothyroxine 150 MICROGram(s) Oral daily  lisinopril 40 milliGRAM(s) Oral daily  meropenem  IVPB 1000 milliGRAM(s) IV Intermittent every 12 hours  metoclopramide Injectable 5 milliGRAM(s) IV Push every 12 hours  pantoprazole   Suspension 40 milliGRAM(s) Enteral Tube daily  polyethylene glycol 3350 17 Gram(s) Oral daily    MEDICATIONS  (PRN):  acetaminophen    Suspension .. 650 milliGRAM(s) Oral every 6 hours PRN Temp greater or equal to 38C (100.4F)  dextrose 40% Gel 15 Gram(s) Oral once PRN Blood Glucose LESS THAN 70 milliGRAM(s)/deciliter  glucagon  Injectable 1 milliGRAM(s) IntraMuscular once PRN Glucose LESS THAN 70 milligrams/deciliter      ALLERGIES:  Allergies    No Known Allergies    Intolerances        LABS:                        8.5    7.4   )-----------( 295      ( 06 Dec 2018 06:59 )             28.5     12-06    145  |  110<H>  |  35<H>  ----------------------------<  119<H>  4.1   |  27  |  1.09    Ca    9.9      06 Dec 2018 06:59    TPro  7.0  /  Alb  2.8<L>  /  TBili  0.3  /  DBili  x   /  AST  24  /  ALT  11  /  AlkPhos  76  12-05        CAPILLARY BLOOD GLUCOSE      POCT Blood Glucose.: 128 mg/dL (06 Dec 2018 11:19)      RADIOLOGY & ADDITIONAL TESTS: Reviewed. PGY-1 TRANSFER ACCEPTANCE NOTE: 7 [facility] to [facility]    HOSPITAL COURSE: 78F w/ PMH of morbid obesity, DM, HTN, hypothyroidism, sepsis secondary to ventral hernia infection s/p repair, s/p small bowel resection, s/p PEG, PE s/p IVC filter, acute blood loss anemia from hemorrhagic uterine fibroids s/p TAHBSO, hx of UGIB, sepsis secondary to MSSA bacteremia and MDR E coli UTI (April 2018) admitted to [facility] MICU on 11/16/18  for septic shock 2/2 aspiration PNA vs ESBL UTI and hypoxic respiratory failure requiring pressor support and intubation with suctioning revealing purulent material. She was started on vanc/azithro/cipro (due to prior ESBL that was resistant to zosyn but sn to cipro) which was deescalated to cefazolin (sputum cx grew staph aureus sensitive to cefazolin). Pt also completed ciprofloxacin for concern of ESBL UTI (that was sn to this). Pt was successfully extubated and weaned down to NC; also weaned off pressors. MICU course c/b mucous plug with white out of right lung, which improved with percussion vest. 11/26 patient and son had long discussion with palliative care and it was decided to keep patient full code currently with a MOLST indicating as such. Pt stepped down to [facility] for further monitoring of respiratory status with frequent suctioning for secretions.  On 11/29, overnight pt noted to be tachycardic to 120s, febrile to 100.7 with CXR showing complete opacification of R lung with tracheal shift likely 2/2 to mucus plugging. Patient stepped back up to MICU and underwent bronchoscopy on 11/30 for recurrent mucus plugging, and had chest PT/percussion with improvement in opacification of R hemithorax. Pt transitioned to nasal canula, saturating 94-95% on room air.  Sputum cx from bronch grew pseudomonas and klebsiella, sensitive to zosyn, and pt started on Zosyn 4.5 g q6h and stepped back down to [facility]. Sputum cx finalized as ESBL klebsiella and pseudomonas, abx escalated to meropenem 1g q12 (ID approved) to complete 5 day course ending 12/7/12.  Pt started on mucomyst for secretions. Hospital course also c/b hypernatremia thought to be 2/2 tube feeds treated with D5W and FW flushes, now resolved with D5W dc/ed. Pt history also notable for chronic diarrhea 2/2 bowel resection, now requiring rectal tube for dermatitis associated with incontinence, which is improved with probiotics. Patient now requiring only q4 suctioning. Patient is stable for transfer to [facility].        OVERNIGHT EVENTS:    SUBJECTIVE / INTERVAL HPI: Patient seen and examined at bedside.     VITAL SIGNS:  Vital Signs Last 24 Hrs  T(C): 36.8 (06 Dec 2018 14:41), Max: 37.6 (06 Dec 2018 02:00)  T(F): 98.2 (06 Dec 2018 14:41), Max: 99.6 (06 Dec 2018 02:00)  HR: 110 (06 Dec 2018 14:09) (94 - 120)  BP: 137/68 (06 Dec 2018 14:09) (124/62 - 171/91)  BP(mean): 95 (06 Dec 2018 14:09) (85 - 115)  RR: 20 (06 Dec 2018 14:09) (18 - 36)  SpO2: 95% (06 Dec 2018 14:09) (94% - 100%)    PHYSICAL EXAM:  GENERAL: Elderly female, resting comfortably, in NAD, responsive to questions in [place].  HEAD:  Atraumatic, Normocephalic  EYES: EOMI, PERRLA, conjunctiva and sclera clear  ENT: No tonsillar erythema, exudates, or enlargement; Moist mucous membranes.  NECK: Supple, No JVD, Normal thyroid, no enlarged nodes  CHEST/LUNG: +decreased BS over R lower lung field. good air entry b/l. no w/r/r.  HEART: S1S2 normal, Regular rate and rhythm; No murmurs, rubs, or gallops  ABDOMEN: Soft, Nontender, Nondistended; Bowel sounds present x4 quadrants. Peg in place, no surrounding erythema or purulence.   EXTREMITIES:  2+ Peripheral Pulses, No clubbing, cyanosis, or edema  NERVOUS SYSTEM:  Alert & Oriented X1, responds to verbal stimuli. verbally responsive to questions in native language ([place]). Motor Strength 4/5 B/L upper and lower extremities; DTRs 2+ intact and symmetric, sensory intact. Able to move all 4 extremities spontaneously.   LYMPH: No lymphadenopathy noted  SKIN: No rashes or lesions    MEDICATIONS:  MEDICATIONS  (STANDING):  acetylcysteine 10%  Inhalation 3 milliLiter(s) Inhalation every 6 hours  ALBUTerol/ipratropium for Nebulization 3 milliLiter(s) Nebulizer every 6 hours  amLODIPine   Tablet 10 milliGRAM(s) Oral every 24 hours  artificial tears (preservative free) Ophthalmic Solution 1 Drop(s) Both EYES three times a day  dextrose 5%. 1000 milliLiter(s) (50 mL/Hr) IV Continuous <Continuous>  dextrose 50% Injectable 12.5 Gram(s) IV Push once  dextrose 50% Injectable 25 Gram(s) IV Push once  dextrose 50% Injectable 25 Gram(s) IV Push once  glycopyrrolate Injectable 0.2 milliGRAM(s) IV Push every 6 hours  heparin  Injectable 5000 Unit(s) SubCutaneous every 8 hours  hydrochlorothiazide 12.5 milliGRAM(s) Oral daily  insulin lispro (HumaLOG) corrective regimen sliding scale   SubCutaneous every 6 hours  lactobacillus acidophilus 1 Tablet(s) Oral every 8 hours  levothyroxine 150 MICROGram(s) Oral daily  lisinopril 40 milliGRAM(s) Oral daily  meropenem  IVPB 1000 milliGRAM(s) IV Intermittent every 12 hours  metoclopramide Injectable 5 milliGRAM(s) IV Push every 12 hours  pantoprazole   Suspension 40 milliGRAM(s) Enteral Tube daily  polyethylene glycol 3350 17 Gram(s) Oral daily    MEDICATIONS  (PRN):  acetaminophen    Suspension .. 650 milliGRAM(s) Oral every 6 hours PRN Temp greater or equal to 38C (100.4F)  dextrose 40% Gel 15 Gram(s) Oral once PRN Blood Glucose LESS THAN 70 milliGRAM(s)/deciliter  glucagon  Injectable 1 milliGRAM(s) IntraMuscular once PRN Glucose LESS THAN 70 milligrams/deciliter      ALLERGIES:  Allergies    No Known Allergies    Intolerances        LABS:                        8.5    7.4   )-----------( 295      ( 06 Dec 2018 06:59 )             28.5     12-06    145  |  110<H>  |  35<H>  ----------------------------<  119<H>  4.1   |  27  |  1.09    Ca    9.9      06 Dec 2018 06:59    TPro  7.0  /  Alb  2.8<L>  /  TBili  0.3  /  DBili  x   /  AST  24  /  ALT  11  /  AlkPhos  76  12-05        CAPILLARY BLOOD GLUCOSE      POCT Blood Glucose.: 128 mg/dL (06 Dec 2018 11:19)      RADIOLOGY & ADDITIONAL TESTS: Reviewed. PGY-1 TRANSFER ACCEPTANCE NOTE: 7 [facility] to [facility]    HOSPITAL COURSE: 78F w/ PMH of morbid obesity, DM, HTN, hypothyroidism, sepsis secondary to ventral hernia infection s/p repair, s/p small bowel resection, s/p PEG, PE s/p IVC filter, acute blood loss anemia from hemorrhagic uterine fibroids s/p TAHBSO, hx of UGIB, sepsis secondary to MSSA bacteremia and MDR E coli UTI (April 2018) admitted to [facility] MICU on 11/16/18  for septic shock 2/2 aspiration PNA vs ESBL UTI and hypoxic respiratory failure requiring pressor support and intubation with suctioning revealing purulent material. She was started on vanc/azithro/cipro (due to prior ESBL that was resistant to zosyn but sn to cipro) which was deescalated to cefazolin (sputum cx grew staph aureus sensitive to cefazolin). Pt also completed ciprofloxacin for concern of ESBL UTI (that was sn to this). Pt was successfully extubated and weaned down to NC; also weaned off pressors. MICU course c/b mucous plug with white out of right lung, which improved with percussion vest. 11/26 patient and son had long discussion with palliative care and it was decided to keep patient full code currently with a MOLST indicating as such. Pt stepped down to [facility] for further monitoring of respiratory status with frequent suctioning for secretions.  On 11/29, overnight pt noted to be tachycardic to 120s, febrile to 100.7 with CXR showing complete opacification of R lung with tracheal shift likely 2/2 to mucus plugging. Patient stepped back up to MICU and underwent bronchoscopy on 11/30 for recurrent mucus plugging, and had chest PT/percussion with improvement in opacification of R hemithorax. Pt transitioned to nasal canula, saturating 94-95% on room air.  Sputum cx from bronch grew pseudomonas and klebsiella, sensitive to zosyn, and pt started on Zosyn 4.5 g q6h and stepped back down to [facility]. Sputum cx finalized as ESBL klebsiella and pseudomonas, abx escalated to meropenem 1g q12 (ID approved) to complete 5 day course ending 12/7/12.  Pt started on mucomyst for secretions. Hospital course also c/b hypernatremia thought to be 2/2 tube feeds treated with D5W and FW flushes, now resolved with D5W dc/ed. Pt history also notable for chronic diarrhea 2/2 bowel resection, now requiring rectal tube for dermatitis associated with incontinence, which is improved with probiotics. Patient now requiring only q4 suctioning. Patient is stable for transfer to [facility].        OVERNIGHT EVENTS: No acute overnight events.    SUBJECTIVE / INTERVAL HPI: Patient seen and examined at bedside. She appears well, in NAD.  Patient is awake and alert. Responsive to some commands. Does not appear to be in any resp distress. Unable to obtain full ROS due to pts baseline dementia.     VITAL SIGNS:  Vital Signs Last 24 Hrs  T(C): 36.8 (06 Dec 2018 14:41), Max: 37.6 (06 Dec 2018 02:00)  T(F): 98.2 (06 Dec 2018 14:41), Max: 99.6 (06 Dec 2018 02:00)  HR: 110 (06 Dec 2018 14:09) (94 - 120)  BP: 137/68 (06 Dec 2018 14:09) (124/62 - 171/91)  BP(mean): 95 (06 Dec 2018 14:09) (85 - 115)  RR: 20 (06 Dec 2018 14:09) (18 - 36)  SpO2: 95% (06 Dec 2018 14:09) (94% - 100%)    PHYSICAL EXAM:  GENERAL: Elderly female, resting comfortably, in NAD, responsive to questions in French.  HEAD:  Atraumatic, Normocephalic  EYES: EOMI, PERRLA, conjunctiva and sclera clear  ENT: No tonsillar erythema, exudates, or enlargement; Moist mucous membranes.  NECK: Supple, No JVD, Normal thyroid, no enlarged nodes  CHEST/LUNG: +decreased BS over R lower lung field. good air entry b/l. no w/r/r.  HEART: S1S2 normal, Regular rate and rhythm; No murmurs, rubs, or gallops  ABDOMEN: Soft, Nontender, Nondistended; Bowel sounds present x4 quadrants. Peg in place, no surrounding erythema or purulence.   EXTREMITIES:  2+ Peripheral Pulses, No clubbing, cyanosis, or edema  NERVOUS SYSTEM:  Alert & Oriented X1, responds to verbal stimuli. Intermittently verbally responsive to questions in native language ([place]ian). Motor Strength 4/5 B/L upper and lower extremities; B/L upper extremities with ?increased tone versus voluntary resisting. DTRs 2+ intact and symmetric, sensory intact. Able to move all 4 extremities spontaneously. Per son at bedside, exam consistent with patient's baseline.  LYMPH: No lymphadenopathy noted  SKIN: No rashes or lesions    MEDICATIONS:  MEDICATIONS  (STANDING):  acetylcysteine 10%  Inhalation 3 milliLiter(s) Inhalation every 6 hours  ALBUTerol/ipratropium for Nebulization 3 milliLiter(s) Nebulizer every 6 hours  amLODIPine   Tablet 10 milliGRAM(s) Oral every 24 hours  artificial tears (preservative free) Ophthalmic Solution 1 Drop(s) Both EYES three times a day  dextrose 5%. 1000 milliLiter(s) (50 mL/Hr) IV Continuous <Continuous>  dextrose 50% Injectable 12.5 Gram(s) IV Push once  dextrose 50% Injectable 25 Gram(s) IV Push once  dextrose 50% Injectable 25 Gram(s) IV Push once  glycopyrrolate Injectable 0.2 milliGRAM(s) IV Push every 6 hours  heparin  Injectable 5000 Unit(s) SubCutaneous every 8 hours  hydrochlorothiazide 12.5 milliGRAM(s) Oral daily  insulin lispro (HumaLOG) corrective regimen sliding scale   SubCutaneous every 6 hours  lactobacillus acidophilus 1 Tablet(s) Oral every 8 hours  levothyroxine 150 MICROGram(s) Oral daily  lisinopril 40 milliGRAM(s) Oral daily  meropenem  IVPB 1000 milliGRAM(s) IV Intermittent every 12 hours  metoclopramide Injectable 5 milliGRAM(s) IV Push every 12 hours  pantoprazole   Suspension 40 milliGRAM(s) Enteral Tube daily  polyethylene glycol 3350 17 Gram(s) Oral daily    MEDICATIONS  (PRN):  acetaminophen    Suspension .. 650 milliGRAM(s) Oral every 6 hours PRN Temp greater or equal to 38C (100.4F)  dextrose 40% Gel 15 Gram(s) Oral once PRN Blood Glucose LESS THAN 70 milliGRAM(s)/deciliter  glucagon  Injectable 1 milliGRAM(s) IntraMuscular once PRN Glucose LESS THAN 70 milligrams/deciliter      ALLERGIES:  Allergies    No Known Allergies    Intolerances        LABS:                        8.5    7.4   )-----------( 295      ( 06 Dec 2018 06:59 )             28.5     12-06    145  |  110<H>  |  35<H>  ----------------------------<  119<H>  4.1   |  27  |  1.09    Ca    9.9      06 Dec 2018 06:59    TPro  7.0  /  Alb  2.8<L>  /  TBili  0.3  /  DBili  x   /  AST  24  /  ALT  11  /  AlkPhos  76  12-05        CAPILLARY BLOOD GLUCOSE      POCT Blood Glucose.: 128 mg/dL (06 Dec 2018 11:19)      RADIOLOGY & ADDITIONAL TESTS: Reviewed. PGY-1 TRANSFER ACCEPTANCE NOTE: [facility] to [facility]    HOSPITAL COURSE: 78F w/ PMH of morbid obesity, DM, HTN, hypothyroidism, sepsis secondary to ventral hernia infection s/p repair, s/p small bowel resection, s/p PEG, PE s/p IVC filter, acute blood loss anemia from hemorrhagic uterine fibroids s/p TAHBSO, hx of UGIB, sepsis secondary to MSSA bacteremia and MDR E coli UTI (April 2018) admitted to [facility] MICU on 11/16/18  for septic shock 2/2 aspiration PNA vs ESBL UTI and hypoxic respiratory failure requiring pressor support and intubation with suctioning revealing purulent material. She was started on vanc/azithro/cipro (due to prior ESBL that was resistant to zosyn but sn to cipro) which was deescalated to cefazolin (sputum cx grew staph aureus sensitive to cefazolin). Pt also completed ciprofloxacin for concern of ESBL UTI (that was sn to this). Pt was successfully extubated and weaned down to NC; also weaned off pressors. MICU course c/b mucous plug with white out of right lung, which improved with percussion vest. 11/26 patient and son had long discussion with palliative care and it was decided to keep patient full code currently with a MOLST indicating as such. Pt stepped down to [facility] for further monitoring of respiratory status with frequent suctioning for secretions.  On 11/29, overnight pt noted to be tachycardic to 120s, febrile to 100.7 with CXR showing complete opacification of R lung with tracheal shift likely 2/2 to mucus plugging. Patient stepped back up to MICU and underwent bronchoscopy on 11/30 for recurrent mucus plugging, and had chest PT/percussion with improvement in opacification of R hemithorax. Pt transitioned to nasal canula, saturating 94-95% on room air.  Sputum cx from bronch grew pseudomonas and klebsiella, sensitive to zosyn, and pt started on Zosyn 4.5 g q6h and stepped back down to [facility]. Sputum cx finalized as ESBL klebsiella and pseudomonas, abx escalated to meropenem 1g q12 (ID approved) to complete 5 day course ending 12/7/12.  Pt started on mucomyst for secretions. Hospital course also c/b hypernatremia thought to be 2/2 tube feeds treated with D5W and FW flushes, now resolved with D5W dc/ed. Pt history also notable for chronic diarrhea 2/2 bowel resection, now requiring rectal tube for dermatitis associated with incontinence, which is improved with probiotics. Patient now requiring only q4 suctioning. Patient is stable for transfer to [facility].        OVERNIGHT EVENTS: No acute overnight events.    SUBJECTIVE / INTERVAL HPI: Patient seen and examined at bedside. She appears well, in NAD.  Patient is awake and alert. Responsive to some commands. Does not appear to be in any resp distress. Unable to obtain full ROS due to pts baseline dementia.     VITAL SIGNS:  Vital Signs Last 24 Hrs  T(C): 36.8 (06 Dec 2018 14:41), Max: 37.6 (06 Dec 2018 02:00)  T(F): 98.2 (06 Dec 2018 14:41), Max: 99.6 (06 Dec 2018 02:00)  HR: 110 (06 Dec 2018 14:09) (94 - 120)  BP: 137/68 (06 Dec 2018 14:09) (124/62 - 171/91)  BP(mean): 95 (06 Dec 2018 14:09) (85 - 115)  RR: 20 (06 Dec 2018 14:09) (18 - 36)  SpO2: 95% (06 Dec 2018 14:09) (94% - 100%)    PHYSICAL EXAM:  GENERAL: Elderly female, resting comfortably, in NAD, responsive to questions in Turkmen.  HEAD:  Atraumatic, Normocephalic  EYES: EOMI, PERRLA, conjunctiva and sclera clear  ENT: No tonsillar erythema, exudates, or enlargement; Moist mucous membranes.  NECK: Supple, No JVD, Normal thyroid, no enlarged nodes  CHEST/LUNG: +decreased BS over R lower lung field. good air entry b/l. no w/r/r.  HEART: S1S2 normal, Regular rate and rhythm; No murmurs, rubs, or gallops  ABDOMEN: Soft, Nontender, Nondistended; Bowel sounds present x4 quadrants. Peg in place, no surrounding erythema or purulence.   EXTREMITIES:  2+ Peripheral Pulses, No clubbing, cyanosis, or edema  NERVOUS SYSTEM:  Alert & Oriented X1, Intermittently verbally responsive to questions in native language (Kyrgyz), selectively follows simple commands. Able to move all 4 extremities spontaneously. Motor Strength 4/5 B/L upper and lower extremities; B/L upper extremities with ?increased tone versus voluntary resisting. DTRs difficult to elicit in b/l UE, achilles DTRs 2+ b/l. Babinski downgoing b/l.  Per son at bedside, pt appears to be at her baseline and pt known to be uncooperative at times.   LYMPH: No lymphadenopathy noted  SKIN: No rashes or lesions    MEDICATIONS:  MEDICATIONS  (STANDING):  acetylcysteine 10%  Inhalation 3 milliLiter(s) Inhalation every 6 hours  ALBUTerol/ipratropium for Nebulization 3 milliLiter(s) Nebulizer every 6 hours  amLODIPine   Tablet 10 milliGRAM(s) Oral every 24 hours  artificial tears (preservative free) Ophthalmic Solution 1 Drop(s) Both EYES three times a day  dextrose 5%. 1000 milliLiter(s) (50 mL/Hr) IV Continuous <Continuous>  dextrose 50% Injectable 12.5 Gram(s) IV Push once  dextrose 50% Injectable 25 Gram(s) IV Push once  dextrose 50% Injectable 25 Gram(s) IV Push once  glycopyrrolate Injectable 0.2 milliGRAM(s) IV Push every 6 hours  heparin  Injectable 5000 Unit(s) SubCutaneous every 8 hours  hydrochlorothiazide 12.5 milliGRAM(s) Oral daily  insulin lispro (HumaLOG) corrective regimen sliding scale   SubCutaneous every 6 hours  lactobacillus acidophilus 1 Tablet(s) Oral every 8 hours  levothyroxine 150 MICROGram(s) Oral daily  lisinopril 40 milliGRAM(s) Oral daily  meropenem  IVPB 1000 milliGRAM(s) IV Intermittent every 12 hours  metoclopramide Injectable 5 milliGRAM(s) IV Push every 12 hours  pantoprazole   Suspension 40 milliGRAM(s) Enteral Tube daily  polyethylene glycol 3350 17 Gram(s) Oral daily    MEDICATIONS  (PRN):  acetaminophen    Suspension .. 650 milliGRAM(s) Oral every 6 hours PRN Temp greater or equal to 38C (100.4F)  dextrose 40% Gel 15 Gram(s) Oral once PRN Blood Glucose LESS THAN 70 milliGRAM(s)/deciliter  glucagon  Injectable 1 milliGRAM(s) IntraMuscular once PRN Glucose LESS THAN 70 milligrams/deciliter      ALLERGIES:  Allergies    No Known Allergies    Intolerances        LABS:                        8.5    7.4   )-----------( 295      ( 06 Dec 2018 06:59 )             28.5     12-06    145  |  110<H>  |  35<H>  ----------------------------<  119<H>  4.1   |  27  |  1.09    Ca    9.9      06 Dec 2018 06:59    TPro  7.0  /  Alb  2.8<L>  /  TBili  0.3  /  DBili  x   /  AST  24  /  ALT  11  /  AlkPhos  76  12-05        CAPILLARY BLOOD GLUCOSE      POCT Blood Glucose.: 128 mg/dL (06 Dec 2018 11:19)      RADIOLOGY & ADDITIONAL TESTS: Reviewed.

## 2018-12-07 ENCOUNTER — TRANSCRIPTION ENCOUNTER (OUTPATIENT)
Age: 78
End: 2018-12-07

## 2018-12-07 LAB
ANION GAP SERPL CALC-SCNC: 8 MMOL/L — SIGNIFICANT CHANGE UP (ref 5–17)
BUN SERPL-MCNC: 38 MG/DL — HIGH (ref 7–23)
CALCIUM SERPL-MCNC: 10 MG/DL — SIGNIFICANT CHANGE UP (ref 8.4–10.5)
CHLORIDE SERPL-SCNC: 106 MMOL/L — SIGNIFICANT CHANGE UP (ref 96–108)
CO2 SERPL-SCNC: 27 MMOL/L — SIGNIFICANT CHANGE UP (ref 22–31)
CREAT SERPL-MCNC: 1.04 MG/DL — SIGNIFICANT CHANGE UP (ref 0.5–1.3)
FERRITIN SERPL-MCNC: 371 NG/ML — HIGH (ref 15–150)
GLUCOSE BLDC GLUCOMTR-MCNC: 154 MG/DL — HIGH (ref 70–99)
GLUCOSE BLDC GLUCOMTR-MCNC: 173 MG/DL — HIGH (ref 70–99)
GLUCOSE SERPL-MCNC: 132 MG/DL — HIGH (ref 70–99)
HCT VFR BLD CALC: 29.6 % — LOW (ref 34.5–45)
HGB BLD-MCNC: 8.8 G/DL — LOW (ref 11.5–15.5)
IRON SATN MFR SERPL: 32 % — SIGNIFICANT CHANGE UP (ref 14–50)
IRON SATN MFR SERPL: 63 UG/DL — SIGNIFICANT CHANGE UP (ref 30–160)
MAGNESIUM SERPL-MCNC: 2.1 MG/DL — SIGNIFICANT CHANGE UP (ref 1.6–2.6)
MCHC RBC-ENTMCNC: 29 PG — SIGNIFICANT CHANGE UP (ref 27–34)
MCHC RBC-ENTMCNC: 29.7 G/DL — LOW (ref 32–36)
MCV RBC AUTO: 97.7 FL — SIGNIFICANT CHANGE UP (ref 80–100)
PLATELET # BLD AUTO: 330 K/UL — SIGNIFICANT CHANGE UP (ref 150–400)
POTASSIUM SERPL-MCNC: 4.1 MMOL/L — SIGNIFICANT CHANGE UP (ref 3.5–5.3)
POTASSIUM SERPL-SCNC: 4.1 MMOL/L — SIGNIFICANT CHANGE UP (ref 3.5–5.3)
RBC # BLD: 3.03 M/UL — LOW (ref 3.8–5.2)
RBC # FLD: 18 % — HIGH (ref 10.3–16.9)
SODIUM SERPL-SCNC: 141 MMOL/L — SIGNIFICANT CHANGE UP (ref 135–145)
TIBC SERPL-MCNC: 198 UG/DL — LOW (ref 220–430)
UIBC SERPL-MCNC: 135 UG/DL — SIGNIFICANT CHANGE UP (ref 110–370)
WBC # BLD: 7.9 K/UL — SIGNIFICANT CHANGE UP (ref 3.8–10.5)
WBC # FLD AUTO: 7.9 K/UL — SIGNIFICANT CHANGE UP (ref 3.8–10.5)

## 2018-12-07 PROCEDURE — 99233 SBSQ HOSP IP/OBS HIGH 50: CPT

## 2018-12-07 PROCEDURE — 71045 X-RAY EXAM CHEST 1 VIEW: CPT | Mod: 26

## 2018-12-07 PROCEDURE — 74018 RADEX ABDOMEN 1 VIEW: CPT | Mod: 26

## 2018-12-07 RX ADMIN — Medication 150 MICROGRAM(S): at 05:45

## 2018-12-07 RX ADMIN — HEPARIN SODIUM 5000 UNIT(S): 5000 INJECTION INTRAVENOUS; SUBCUTANEOUS at 01:35

## 2018-12-07 RX ADMIN — Medication 2: at 15:00

## 2018-12-07 RX ADMIN — Medication 3 MILLILITER(S): at 05:44

## 2018-12-07 RX ADMIN — Medication 2: at 09:07

## 2018-12-07 RX ADMIN — Medication 3 MILLILITER(S): at 01:33

## 2018-12-07 RX ADMIN — Medication 3 MILLILITER(S): at 11:36

## 2018-12-07 RX ADMIN — AMLODIPINE BESYLATE 10 MILLIGRAM(S): 2.5 TABLET ORAL at 18:06

## 2018-12-07 RX ADMIN — HEPARIN SODIUM 5000 UNIT(S): 5000 INJECTION INTRAVENOUS; SUBCUTANEOUS at 22:26

## 2018-12-07 RX ADMIN — Medication 1 DROP(S): at 05:44

## 2018-12-07 RX ADMIN — Medication 1 DROP(S): at 17:58

## 2018-12-07 RX ADMIN — Medication 3 MILLILITER(S): at 22:27

## 2018-12-07 RX ADMIN — Medication 1 DROP(S): at 01:34

## 2018-12-07 RX ADMIN — POLYETHYLENE GLYCOL 3350 17 GRAM(S): 17 POWDER, FOR SOLUTION ORAL at 11:38

## 2018-12-07 RX ADMIN — Medication 12.5 MILLIGRAM(S): at 05:45

## 2018-12-07 RX ADMIN — Medication 3 MILLILITER(S): at 11:37

## 2018-12-07 RX ADMIN — Medication 1 TABLET(S): at 05:45

## 2018-12-07 RX ADMIN — ROBINUL 0.2 MILLIGRAM(S): 0.2 INJECTION INTRAMUSCULAR; INTRAVENOUS at 05:44

## 2018-12-07 RX ADMIN — HEPARIN SODIUM 5000 UNIT(S): 5000 INJECTION INTRAVENOUS; SUBCUTANEOUS at 13:00

## 2018-12-07 RX ADMIN — MEROPENEM 100 MILLIGRAM(S): 1 INJECTION INTRAVENOUS at 19:25

## 2018-12-07 RX ADMIN — Medication 650 MILLIGRAM(S): at 22:28

## 2018-12-07 RX ADMIN — MEROPENEM 100 MILLIGRAM(S): 1 INJECTION INTRAVENOUS at 05:49

## 2018-12-07 RX ADMIN — Medication 3 MILLILITER(S): at 22:26

## 2018-12-07 RX ADMIN — Medication 1 TABLET(S): at 17:59

## 2018-12-07 RX ADMIN — PANTOPRAZOLE SODIUM 40 MILLIGRAM(S): 20 TABLET, DELAYED RELEASE ORAL at 11:37

## 2018-12-07 RX ADMIN — Medication 3 MILLILITER(S): at 18:06

## 2018-12-07 RX ADMIN — HEPARIN SODIUM 5000 UNIT(S): 5000 INJECTION INTRAVENOUS; SUBCUTANEOUS at 05:45

## 2018-12-07 RX ADMIN — LISINOPRIL 40 MILLIGRAM(S): 2.5 TABLET ORAL at 05:45

## 2018-12-07 RX ADMIN — Medication 3 MILLILITER(S): at 01:38

## 2018-12-07 RX ADMIN — ROBINUL 0.2 MILLIGRAM(S): 0.2 INJECTION INTRAMUSCULAR; INTRAVENOUS at 01:34

## 2018-12-07 RX ADMIN — Medication 650 MILLIGRAM(S): at 22:25

## 2018-12-07 RX ADMIN — Medication 1 DROP(S): at 22:28

## 2018-12-07 RX ADMIN — Medication 1 TABLET(S): at 22:26

## 2018-12-07 RX ADMIN — Medication 1 TABLET(S): at 01:33

## 2018-12-07 RX ADMIN — Medication 3 MILLILITER(S): at 17:57

## 2018-12-07 NOTE — PROGRESS NOTE ADULT - PROBLEM SELECTOR PLAN 2
Pt with frequent mucus plugging throughout hospital course s/p percussion vest, robinol, frequent suctioning.  - s/p bronchoscopy 11/30  - d/c glycopyrrolate 0.2 Q6 IV, pt currently without copious secretions  - c/w mucomyst   - c/w q4hr suctioning    #Chronic diarrhea 2/2 small bowel resection  - c/w probiotics daily    #Dermatitis of b/l buttocks  -daily wound care  -rectal tube in place    #Anemia   - BL Hgb 8-9; no signs of active bleeding; H&H stable  - B12, folate wnl  - Fe studies with normal iron sat and slightly low TIBC, remaining values wnl  - maintain active type and screen  - transfuse for Hgb <7

## 2018-12-07 NOTE — SWALLOW BEDSIDE ASSESSMENT ADULT - NS SPL SWALLOW CLINIC TRIAL FT
Bolus acceptance varied throughout evaluation. Pt initially presented with a tight labial seal and refused ice chip trial despite variety of presentation modalities (finger, spoon), oral stimulation, and verbal encouragement. Upon attempts, pt eventually accepted droplets of thin liquid x2 via spoon. Open mouth posture observed during trial x1 Bolus acceptance varied throughout evaluation. Pt initially presented with a tight labial seal and refused ice chip trial despite variety of presentation modalities (finger, spoon), oral stimulation, and verbal encouragement. Upon attempts, pt eventually accepted droplets of thin liquid x2 via spoon. Once present in the oral cavity, pt made minimal to no attempt to manipulate the bolus even when presented with max. cues. Decreased oral awareness suspected based on pt's presentation. Oral suctioning was completed x2 as pt was unable to voluntarily/involuntarily trigger a swallow. PO trials were discontinued d/t severity of observed deficits.

## 2018-12-07 NOTE — DISCHARGE NOTE ADULT - MEDICATION SUMMARY - MEDICATIONS TO TAKE
I will START or STAY ON the medications listed below when I get home from the hospital:    oxycodone-acetaminophen 5 mg-325 mg oral tablet  -- 1 tab(s) by mouth every 4 hours, As needed, Moderate Pain (4 - 6)  -- Indication: For Pain    lisinopril 40 mg oral tablet  -- 1 tab(s) by mouth once a day  -- Indication: For Blood pressure    heparin  -- 5000 unit(s) subcutaneous every 8 hours  -- Indication: For DVT ppx    acetylcysteine 10% inhalation solution  -- 3 milliliter(s) inhaled every 6 hours  -- Indication: For Secretions    ipratropium-albuterol 0.5 mg-2.5 mg/3 mLinhalation solution  -- 3 milliliter(s) inhaled every 6 hours  -- Indication: For Maintain open airway    amLODIPine 10 mg oral tablet  -- 1 tab(s) by mouth every 24 hours  -- Indication: For Blood pressure    ocular lubricant ophthalmic solution  -- 1 drop(s) to each affected eye 2 times a day  -- Indication: For Eye drops    lactobacillus acidophilus oral capsule  -- 1 tab(s) by mouth every 8 hours  -- Indication: For Probiotic     pantoprazole 40 mg oral granule, delayed release  -- 40 milligram(s) by mouth once a day  -- Indication: For Anti-acid    levothyroxine 150 mcg (0.15 mg) oral tablet  -- 1 tab(s) by mouth once a day  -- Indication: For Hypothyroidism    hydrALAZINE 50 mg oral tablet  -- 1 tab(s) by mouth every 6 hours  -- Indication: For blood pressure I will START or STAY ON the medications listed below when I get home from the hospital:    oxycodone-acetaminophen 5 mg-325 mg oral tablet  -- 1 tab(s) by mouth every 4 hours, As needed, Moderate Pain (4 - 6)  -- Indication: For Pain    lisinopril 40 mg oral tablet  -- 1 tab(s) by mouth once a day  -- Indication: For Blood pressure    heparin  -- 5000 unit(s) subcutaneous every 8 hours  -- Indication: For DVT ppx    acetylcysteine 10% inhalation solution  -- 3 milliliter(s) inhaled every 6 hours  -- Indication: For Secretions    ipratropium-albuterol 0.5 mg-2.5 mg/3 mLinhalation solution  -- 3 milliliter(s) inhaled every 6 hours  -- Indication: For Maintain open airway    amLODIPine 10 mg oral tablet  -- 1 tab(s) by mouth every 24 hours  -- Indication: For Blood pressure    ocular lubricant ophthalmic solution  -- 1 drop(s) to each affected eye 2 times a day  -- Indication: For Eye drops    lactobacillus acidophilus oral capsule  -- 1 tab(s) by mouth every 8 hours  -- Indication: For Probiotic     pantoprazole 40 mg oral granule, delayed release  -- 40 milligram(s) by mouth once a day  -- Indication: For Anti-acid    levothyroxine 150 mcg (0.15 mg) oral tablet  -- 1 tab(s) by mouth once a day  -- Indication: For Hypothyroidism    hydrALAZINE 25 mg oral tablet  -- 3 tab(s) by mouth every 6 hours  -- Indication: For blood pressure

## 2018-12-07 NOTE — PROGRESS NOTE ADULT - PROBLEM SELECTOR PLAN 4
RESOLVED. Pt initially admitted on 11/16/18 for sepsis 2/2 HAP and ESBL UTIon admission, hypotensive in MICU requiring levophed and midodrine s/p course of cefazolin for MSSA and ciprofloxacin for UTI (ESBL sn to this).

## 2018-12-07 NOTE — SWALLOW BEDSIDE ASSESSMENT ADULT - SLP GENERAL OBSERVATIONS
Pt received sitting upright in bed. Pt received sitting upright in bed. Alert but disoriented as identified via yes/no responses. Verbalizations were rare and mostly unintelligible. Pt unable to consistently follow 1-step simple directives. SLP communicated with pt in Occitan. Please note, pt with PMHx significant for dementia. Pt appeared anxious throughout the evaluation.

## 2018-12-07 NOTE — PROGRESS NOTE ADULT - ASSESSMENT
78F w/ PMH obesity, DM, HTN, hypothyroidism, sepsis secondary to ventral hernia infection s/p repair, PE s/p IVC filter, acute blood loss anemia from hemorrhagic uterine fibroids and UGIB, ATN requiring HD, sepsis secondary to MSSA bacteremia and MDR E coli UTI w/AMS and s/p TAHBSO and small bowel resection s/p PEG. Admitted with septic shock likely 2/2 PNA vs UTI and acute hypoxic respiratory failure now extubated, s/p bronchoscopy 11/30, now being treated for pseudomonal PNA with meropenem to complete 5 day course ending 12/7.

## 2018-12-07 NOTE — DISCHARGE NOTE ADULT - CARE PLAN
Principal Discharge DX:	Septic shock  Goal:	Resolution of infection  Assessment and plan of treatment:	You were admitted to the hospital with a severe infection in your lungs (aspiration pneumonia) and urinary tract with resistant organisms requiring your to be intubated and in the ICU. We treated you for this with IV antibiotics and you improved. Your hospital course was complicated by mucus plugs in your lungs that required a procedure called bronchoscopy to remove them. Your infections were effectively treated and you were able to breathe on your own without a ventilator or supplemental oxygen. You are currently clinically improved. Please follow up with your primary doctor, Dr. Wynn and take your home medications as prescribed.  Secondary Diagnosis:	Sepsis due to pneumonia  Goal:	Resolution of infection  Assessment and plan of treatment:	You were admitted to the hospital with a severe infection in your lungs (aspiration pneumonia) and urinary tract with resistant organisms requiring your to be intubated and in the ICU. We treated you for this with IV antibiotics and you improved. We also had to discontinue your feedings through the peg tube for a short time to reduce your risk of aspiration. Your hospital course was complicated by mucus plugs in your lungs that required a procedure called bronchoscopy to remove them. Your infections were effectively treated and you were able to breathe on your own without a ventilator or supplemental oxygen. You are currently clinically improved and back on your peg tube feeding. Please follow up with your primary doctor, Dr. Wynn and take your home medications as prescribed.  Secondary Diagnosis:	Urinary tract infection without hematuria, site unspecified  Goal:	Resolution of infection  Assessment and plan of treatment:	You were admitted to the hospital with a severe infection in your lungs and urinary tract with resistant organisms requiring your to be intubated and in the ICU. We treated you for this with IV antibiotics and you improved. Your hospital course was complicated by mucus plugs in your lungs that required a procedure called bronchoscopy to remove them. Your infections were effectively treated and you were able to breathe on your own without a ventilator or supplemental oxygen. You are currently clinically improved. Please follow up with your primary doctor, Dr. Wynn and take your home medications as prescribed.  Secondary Diagnosis:	Toxic metabolic encephalopathy  Goal:	Improvement in mental status  Assessment and plan of treatment:	You were admitted to the hospital with a change in your mental status that we believe to be secondary to the severe infection. As this infection was treated, your mental status improved back to its baseline. Please follow up with your primary doctor, Dr. Wynn and take your home medications as prescribed.  Secondary Diagnosis:	Hypothyroidism, unspecified type  Goal:	Maintenance of healthy thyroid  Assessment and plan of treatment:	You came to the hospital with a history of hypothyroidism. Please follow up with your primary doctor, Dr. Wynn and take your home medications as prescribed.  Secondary Diagnosis:	Essential hypertension  Goal:	Maintenance of healthy blood pressure  Assessment and plan of treatment:	You came to the hospital with a history of hypothyroidism. Please follow up with your primary doctor, Dr. Wynn and take your home medications as prescribed.  Secondary Diagnosis:	Hyperlipidemia, unspecified hyperlipidemia type  Goal:	Maintenance of healthy cholesterol levels  Assessment and plan of treatment:	You came to the hospital with a history of hyperlipidemia. Please follow up with your primary doctor, Dr. Wynn and take your home medications as prescribed. Principal Discharge DX:	Septic shock  Goal:	Resolution of infection  Assessment and plan of treatment:	You were admitted to the hospital with a severe infection in your lungs (aspiration pneumonia) and urinary tract with resistant organisms requiring your to be intubated and in the ICU. We treated you for this with IV antibiotics and you improved. Your hospital course was complicated by mucus plugs in your lungs that required a procedure called bronchoscopy to remove them. Your infections were effectively treated and you were able to breathe on your own without a ventilator or supplemental oxygen. You are maintained on medications to keep your airways open and to thin out secretions you produce. You are currently clinically improved. Please follow up with your primary doctor, Dr. Wynn and take your home medications as prescribed.  Secondary Diagnosis:	Sepsis due to pneumonia  Goal:	Resolution of infection  Assessment and plan of treatment:	You were admitted to the hospital with a severe infection in your lungs (aspiration pneumonia) and urinary tract with resistant organisms requiring your to be intubated and in the ICU. We treated you for this with IV antibiotics and you improved. We also had to discontinue your feedings through the peg tube for a short time to reduce your risk of aspiration. Your hospital course was complicated by mucus plugs in your lungs that required a procedure called bronchoscopy to remove them. Your infections were effectively treated and you were able to breathe on your own without a ventilator or supplemental oxygen. You are currently clinically improved and back on your peg tube feeding. Please follow up with your primary doctor, Dr. Wynn and take your home medications as prescribed.  Secondary Diagnosis:	Urinary tract infection without hematuria, site unspecified  Goal:	Resolution of infection  Assessment and plan of treatment:	You were admitted to the hospital with a severe infection in your lungs and urinary tract with resistant organisms requiring your to be intubated and in the ICU. We treated you for this with IV antibiotics and you improved. Your hospital course was complicated by mucus plugs in your lungs that required a procedure called bronchoscopy to remove them. Your infections were effectively treated and you were able to breathe on your own without a ventilator or supplemental oxygen. You are currently clinically improved. Please follow up with your primary doctor, Dr. Wynn and take your home medications as prescribed.  Secondary Diagnosis:	Toxic metabolic encephalopathy  Goal:	Improvement in mental status  Assessment and plan of treatment:	You were admitted to the hospital with a change in your mental status that we believe to be secondary to the severe infection. As this infection was treated, your mental status improved back to its baseline. Please follow up with your primary doctor, Dr. Wynn and take your home medications as prescribed.  Secondary Diagnosis:	Hypothyroidism, unspecified type  Goal:	Maintenance of healthy thyroid  Assessment and plan of treatment:	You came to the hospital with a history of hypothyroidism. Please follow up with your primary doctor, Dr. Wynn and take your home medications as prescribed.  Secondary Diagnosis:	Essential hypertension  Goal:	Maintenance of healthy blood pressure  Assessment and plan of treatment:	You came to the hospital with a history of hypothyroidism. Please follow up with your primary doctor, Dr. Wynn and take your home medications as prescribed.  Secondary Diagnosis:	Hyperlipidemia, unspecified hyperlipidemia type  Goal:	Maintenance of healthy cholesterol levels  Assessment and plan of treatment:	You came to the hospital with a history of hyperlipidemia. Please follow up with your primary doctor, Dr. Wynn and take your home medications as prescribed.

## 2018-12-07 NOTE — PROGRESS NOTE ADULT - PROBLEM SELECTOR PLAN 5
Pt presenting with PMH of HTN. Currently normotensive on the following regimen:  -continue home lisinopril 40 mg daily  -amlodipine 10 mg daily

## 2018-12-07 NOTE — SWALLOW BEDSIDE ASSESSMENT ADULT - ADDITIONAL RECOMMENDATIONS
Recommend pt remain NPO at this time d/t severity of deficits observed at bedside. However, this service will f/u to continue to assess pt's readiness for PO. SLP spoke with Dr. Giraldo and RN regarding results and recommendations.

## 2018-12-07 NOTE — PROGRESS NOTE ADULT - PROBLEM SELECTOR PLAN 3
Pt with hx of sm carola perforation, recently admitted 4/15/18 due to MSSA bacteremia and MDR E Coli in urine, transferred to [facility], and found to have extravasation on CT abd, s/p IR drain placement, underwent DAVY BSO 2/2 tubal metaplasia, SBR with primary anastomosis, abdominal washout, PEG placement, discharged on 6/4  - surgery was following, now signed off  - PEG in place, c/w glucerna feeds at 55cc/hr  - pt was on standing reglan q12 for bowel pro-motility, d/c due to concern for possible upper extremity rigidity  - f/u speech and swallow eval

## 2018-12-07 NOTE — ADVANCED PRACTICE NURSE CONSULT - ASSESSMENT
Bilateral buttocks denuded skin improving. Applied Cavilon Advanced Skin Protectant to bilateral buttocks denuded skin. RNAmarilys present and assisted during assessment.

## 2018-12-07 NOTE — DISCHARGE NOTE ADULT - MEDICATION SUMMARY - MEDICATIONS TO STOP TAKING
I will STOP taking the medications listed below when I get home from the hospital:    Aspirin Enteric Coated 81 mg oral delayed release tablet  -- 1 tab(s) by mouth once a day    bisacodyl 5 mg oral delayed release tablet  -- 1 tab(s) by mouth every 12 hours, As needed, Constipation    escitalopram 10 mg oral tablet  -- 1 tab(s) by mouth once a day    ARIPiprazole 5 mg oral tablet  -- 1 tab(s) by mouth once a day    QUEtiapine    medroxyPROGESTERone 10 mg oral tablet  -- 1 tab(s) by mouth once a day    sodium chloride 0.65% nasal spray  -- 2 spray(s) into nose once a day    cholecalciferol oral tablet  -- 1000 unit(s) by mouth once a day    acetaminophen 325 mg oral tablet  -- 2 tab(s) by mouth every 6 hours, As needed, Mild Pain (1 - 3)    glipiZIDE 5 mg oral tablet  -- 1 tab(s) by mouth once a day    labetalol 200 mg oral tablet  -- 1 tab(s) by mouth 2 times a day

## 2018-12-07 NOTE — DISCHARGE NOTE ADULT - HOSPITAL COURSE
78F w/ PMH of morbid obesity, DM, HTN, hypothyroidism, sepsis secondary to ventral hernia infection s/p repair, s/p small bowel resection, s/p PEG, PE s/p IVC filter, acute blood loss anemia from hemorrhagic uterine fibroids s/p TAHBSO, hx of UGIB, sepsis secondary to MSSA bacteremia and MDR E coli UTI (April 2018) admitted to [facility] MICU on 11/16/18  for septic shock 2/2 aspiration PNA vs ESBL UTI and hypoxic respiratory failure requiring pressor support and intubation with suctioning revealing purulent material. She was started on vanc/azithro/cipro (due to prior ESBL that was resistant to zosyn but sn to cipro) which was deescalated to cefazolin (sputum cx grew staph aureus sensitive to cefazolin). Pt also completed ciprofloxacin for concern of ESBL UTI (that was sn to this). Pt was successfully extubated and weaned down to NC; also weaned off pressors. MICU course c/b mucous plug with white out of right lung, which improved with percussion vest. 11/26 patient and son had long discussion with palliative care and it was decided to keep patient full code currently with a MOLST indicating as such. Pt stepped down to [facility] for further monitoring of respiratory status with frequent suctioning for secretions.  On 11/29, overnight pt noted to be tachycardic to 120s, febrile to 100.7 with CXR showing complete opacification of R lung with tracheal shift likely 2/2 to mucus plugging. Patient stepped back up to MICU and underwent bronchoscopy on 11/30 for recurrent mucus plugging, and had chest PT/percussion with improvement in opacification of R hemithorax. Pt transitioned to nasal canula, saturating 94-95% on room air.  Sputum cx from bronch grew pseudomonas and klebsiella, sensitive to zosyn, and pt started on Zosyn 4.5 g q6h and stepped back down to [place]. Sputum cx finalized as ESBL klebsiella and pseudomonas, abx escalated to meropenem 1g q12 (ID approved) to complete 5 day course ending 12/7/12.  Pt started on mucomyst for secretions. Hospital course also c/b hypernatremia thought to be 2/2 tube feeds treated with D5W and FW flushes, now resolved with D5W dc/ed. Pt history also notable for chronic diarrhea 2/2 bowel resection, now requiring rectal tube for dermatitis associated with incontinence, which is improved with probiotics. Patient now requiring only q4 suctioning. Patient transfered to [facility].  Pt completed course of meropenem for  ESBL klebsiella and pseudomonas PNA. Glycopyyrolate d/c'ed. Reglan d/c'ed. PT worked with pt and recommended placement to NH. Pt currently afebrile, HD stable and stable for discharge with outpt follow up. 78F w/ PMH of morbid obesity, DM, HTN, hypothyroidism, sepsis secondary to ventral hernia infection s/p repair, s/p small bowel resection, s/p PEG, PE s/p IVC filter, acute blood loss anemia from hemorrhagic uterine fibroids s/p TAHBSO, hx of UGIB, sepsis secondary to MSSA bacteremia and MDR E coli UTI (April 2018) admitted to [facility] MICU on 11/16/18  for septic shock 2/2 aspiration PNA vs ESBL UTI and hypoxic respiratory failure requiring pressor support and intubation with suctioning revealing purulent material. She was started on vanc/azithro/cipro (due to prior ESBL that was resistant to zosyn but sn to cipro) which was deescalated to cefazolin (sputum cx grew staph aureus sensitive to cefazolin). Pt also completed ciprofloxacin for concern of ESBL UTI (that was sn to this). Pt was successfully extubated and weaned down to NC; also weaned off pressors. MICU course c/b mucous plug with white out of right lung, which improved with percussion vest. 11/26 patient and son had long discussion with palliative care and it was decided to keep patient full code currently with a MOLST indicating as such. Pt stepped down to [facility] for further monitoring of respiratory status with frequent suctioning for secretions.  On 11/29, overnight pt noted to be tachycardic to 120s, febrile to 100.7 with CXR showing complete opacification of R lung with tracheal shift likely 2/2 to mucus plugging. Patient stepped back up to MICU and underwent bronchoscopy on 11/30 for recurrent mucus plugging, and had chest PT/percussion with improvement in opacification of R hemithorax. Pt transitioned to nasal canula, saturating 94-95% on room air.  Sputum cx from bronch grew pseudomonas and klebsiella, sensitive to zosyn, and pt started on Zosyn 4.5 g q6h and stepped back down to [place]. Sputum cx finalized as ESBL klebsiella and pseudomonas, abx escalated to meropenem 1g q12 (ID approved) to complete 5 day course ending 12/7/12.  Pt started on mucomyst for secretions. Hospital course also c/b hypernatremia thought to be 2/2 tube feeds treated with D5W and FW flushes, now resolved with D5W dc/ed. Pt history also notable for chronic diarrhea 2/2 bowel resection which required rectal tube for dermatitis associated with incontinence but has now improved. Patient now requiring only q4 suctioning. Patient transfered to [facility].  Pt completed course of meropenem for  ESBL klebsiella and pseudomonas PNA. Glycopyyrolate d/c'ed. Reglan d/c'ed. PT worked with pt and recommended placement to NH. Pt currently afebrile, HD stable and stable for discharge with outpt follow up. 78F w/ PMH of morbid obesity, DM, HTN, hypothyroidism, sepsis secondary to ventral hernia infection s/p repair, s/p small bowel resection, s/p PEG, PE s/p IVC filter, acute blood loss anemia from hemorrhagic uterine fibroids s/p TAHBSO, hx of UGIB, sepsis secondary to MSSA bacteremia and MDR E coli UTI (April 2018) admitted to [facility] MICU on 11/16/18  for septic shock 2/2 aspiration PNA vs ESBL UTI and hypoxic respiratory failure requiring pressor support and intubation with suctioning revealing purulent material. She was started on vanc/azithro/cipro (due to prior ESBL that was resistant to zosyn but sn to cipro) which was deescalated to cefazolin (sputum cx grew staph aureus sensitive to cefazolin). Pt also completed ciprofloxacin for concern of ESBL UTI (that was sn to this). Pt was successfully extubated and weaned down to NC; also weaned off pressors. MICU course c/b mucous plug with white out of right lung, which improved with percussion vest. 11/26 patient and son had long discussion with palliative care and it was decided to keep patient full code currently with a MOLST indicating as such. Pt stepped down to [facility] for further monitoring of respiratory status with frequent suctioning for secretions.  On 11/29, overnight pt noted to be tachycardic to 120s, febrile to 100.7 with CXR showing complete opacification of R lung with tracheal shift likely 2/2 to mucus plugging. Patient stepped back up to MICU and underwent bronchoscopy on 11/30 for recurrent mucus plugging, and had chest PT/percussion with improvement in opacification of R hemithorax. Pt transitioned to nasal canula, saturating 94-95% on room air.  Sputum cx from bronch grew pseudomonas and klebsiella, sensitive to zosyn, and pt started on Zosyn 4.5 g q6h and stepped back down to [place]. Sputum cx finalized as ESBL klebsiella and pseudomonas, abx escalated to meropenem 1g q12 (ID approved) to complete 5 day course ending 12/7/12.  Pt started on mucomyst for secretions. Hospital course also c/b hypernatremia thought to be 2/2 tube feeds treated with D5W and FW flushes, now resolved with D5W dc/ed. Pt history also notable for chronic diarrhea 2/2 bowel resection which required rectal tube for dermatitis associated with incontinence but has now improved. Patient now requiring only q4 suctioning. Patient transfered to [facility].  Pt completed course of meropenem for  ESBL klebsiella and pseudomonas PNA. Glycopyyrolate d/c'ed. Reglan d/c'ed. PT worked with pt and recommended placement to NH. Patient intermittently hypernatremic and that was improved with free water flushes. She was also found to be tachycardic which seems to improve with fluids. Pt currently afebrile, HD stable and stable for discharge with outpt follow up.

## 2018-12-07 NOTE — PROGRESS NOTE ADULT - PROBLEM SELECTOR PLAN 10
1) PCP Contacted on Admission: (N) --> Name & Phone #: MORA GARVIN [phone]  2) Date of Contact with PCP: NA  3) PCP Contacted at Discharge: (Y/N, N/A)  4) Summary of Handoff Given to PCP: NA  5) Post-Discharge Appointment Date and Location: NA 1) PCP Contacted on Admission: (Y) --> Name & Phone #: MORA WYNN [phone]  2) Date of Contact with PCP: 12/7/18  3) PCP Contacted at Discharge: (Y/N, N/A)  4) Summary of Handoff Given to PCP: NA  5) Post-Discharge Appointment Date and Location: Dr Wynn 12/26/18 at 8:30 am, [address]

## 2018-12-07 NOTE — SWALLOW BEDSIDE ASSESSMENT ADULT - SWALLOW EVAL: DIAGNOSIS
Severe oropharyngeal dysphagia suspected characterized by limited bolus acceptance, decreased oral sensation, and an absent swallow reflex. PO trials were extremely limited d/t severity of deficits.

## 2018-12-07 NOTE — DISCHARGE NOTE ADULT - NS MD DC FALL RISK RISK
done/no preop medications ordered For information on Fall & Injury Prevention, visit [email or web address]

## 2018-12-07 NOTE — PROGRESS NOTE ADULT - PROBLEM SELECTOR PLAN 1
Pt noted to have fever on 11/29 100.7 with tachycardia. CXR with RLL infiltrate. Repeat sputum cx (12/1) positive for pseudomonas/klebsiella, and ESBL Ecoli. Pt started empirically on Zosyn 4.5 g q6h escalated to meropenem based on sensitivities on 12/2  - c/w meropenem to complete 5 day course ending 12/7  - Bcx from 12/3 NGTD, continue to follow    #Abdominal pain  - Pt c/o abdominal pain this morning. Abd exam soft, with ? wincing on palpation  - Obtain AXR  - Check residuals from peg feeds

## 2018-12-07 NOTE — PROGRESS NOTE ADULT - SUBJECTIVE AND OBJECTIVE BOX
OVERNIGHT EVENTS: No acute overnight events.     SUBJECTIVE / INTERVAL HPI: Patient seen and examined at bedside.     VITAL SIGNS:  Vital Signs Last 24 Hrs  T(C): 36.8 (07 Dec 2018 09:04), Max: 37.2 (07 Dec 2018 05:01)  T(F): 98.2 (07 Dec 2018 09:04), Max: 98.9 (07 Dec 2018 05:01)  HR: 95 (07 Dec 2018 09:04) (94 - 110)  BP: 154/90 (07 Dec 2018 09:04) (137/68 - 170/77)  BP(mean): 95 (06 Dec 2018 14:09) (95 - 111)  RR: 18 (07 Dec 2018 09:04) (18 - 26)  SpO2: 94% (07 Dec 2018 09:04) (94% - 100%)    PHYSICAL EXAM:    General: WDWN  HEENT: NCAT; PERRL, anicteric sclera; MMM  Neck: supple, trachea midline  Cardiovascular: S1, S2 normal; RRR, no M/G/R  Respiratory: CTABL; no W/R/R  Gastrointestinal: soft, nontender, nondistended. bowel sounds present.  Skin: no ulcerations or visible rashes appreciated  Extremities: WWP; no edema, clubbing or cyanosis  Vascular: 2+ radial, DP/PT pulses B/L  Neurological: AAOx3; CN II-XII grossly intact; no focal deficits    MEDICATIONS:  MEDICATIONS  (STANDING):  acetylcysteine 10%  Inhalation 3 milliLiter(s) Inhalation every 6 hours  ALBUTerol/ipratropium for Nebulization 3 milliLiter(s) Nebulizer every 6 hours  amLODIPine   Tablet 10 milliGRAM(s) Oral every 24 hours  artificial tears (preservative free) Ophthalmic Solution 1 Drop(s) Both EYES three times a day  dextrose 5%. 1000 milliLiter(s) (50 mL/Hr) IV Continuous <Continuous>  dextrose 50% Injectable 12.5 Gram(s) IV Push once  dextrose 50% Injectable 25 Gram(s) IV Push once  dextrose 50% Injectable 25 Gram(s) IV Push once  glycopyrrolate Injectable 0.2 milliGRAM(s) IV Push every 6 hours  heparin  Injectable 5000 Unit(s) SubCutaneous every 8 hours  hydrochlorothiazide 12.5 milliGRAM(s) Oral daily  insulin lispro (HumaLOG) corrective regimen sliding scale   SubCutaneous every 6 hours  lactobacillus acidophilus 1 Tablet(s) Oral every 8 hours  levothyroxine 150 MICROGram(s) Oral daily  lisinopril 40 milliGRAM(s) Oral daily  meropenem  IVPB 1000 milliGRAM(s) IV Intermittent every 12 hours  pantoprazole   Suspension 40 milliGRAM(s) Enteral Tube daily  polyethylene glycol 3350 17 Gram(s) Oral daily    MEDICATIONS  (PRN):  acetaminophen    Suspension .. 650 milliGRAM(s) Oral every 6 hours PRN Temp greater or equal to 38C (100.4F)  dextrose 40% Gel 15 Gram(s) Oral once PRN Blood Glucose LESS THAN 70 milliGRAM(s)/deciliter  glucagon  Injectable 1 milliGRAM(s) IntraMuscular once PRN Glucose LESS THAN 70 milligrams/deciliter      ALLERGIES:  Allergies    No Known Allergies    Intolerances        LABS:                        8.8    7.9   )-----------( 330      ( 07 Dec 2018 05:43 )             29.6     12-07    141  |  106  |  38<H>  ----------------------------<  132<H>  4.1   |  27  |  1.04    Ca    10.0      07 Dec 2018 05:42  Mg     2.1     12-07          CAPILLARY BLOOD GLUCOSE      POCT Blood Glucose.: 154 mg/dL (07 Dec 2018 08:17)      RADIOLOGY & ADDITIONAL TESTS: Reviewed. OVERNIGHT EVENTS: No acute overnight events.     SUBJECTIVE / INTERVAL HPI: Patient seen and examined at bedside. Appears comfortable. Mentating at baseline. PT c/o abd pain.    VITAL SIGNS:  Vital Signs Last 24 Hrs  T(C): 36.8 (07 Dec 2018 09:04), Max: 37.2 (07 Dec 2018 05:01)  T(F): 98.2 (07 Dec 2018 09:04), Max: 98.9 (07 Dec 2018 05:01)  HR: 95 (07 Dec 2018 09:04) (94 - 110)  BP: 154/90 (07 Dec 2018 09:04) (137/68 - 170/77)  BP(mean): 95 (06 Dec 2018 14:09) (95 - 111)  RR: 18 (07 Dec 2018 09:04) (18 - 26)  SpO2: 94% (07 Dec 2018 09:04) (94% - 100%)    PHYSICAL EXAM:  GENERAL: Elderly female, resting comfortably, in NAD, responsive to questions in Japanese with 1-2 words.  HEAD:  Atraumatic, Normocephalic  EYES: EOMI, PERRLA, conjunctiva and sclera clear  ENT: No tonsillar erythema, exudates, or enlargement; Moist mucous membranes.  NECK: Supple, No JVD, Normal thyroid, no enlarged nodes  CHEST/LUNG: +decreased BS over R lower lung field. good air entry b/l. no w/r/r.  HEART: S1S2 normal, Regular rate and rhythm; No murmurs, rubs, or gallops  ABDOMEN: Soft, Nontender, Nondistended; Bowel sounds present x4 quadrants. Peg in place, no surrounding erythema or purulence.   EXTREMITIES:  2+ Peripheral Pulses, No clubbing, cyanosis, or edema  NERVOUS SYSTEM:  Alert & Oriented X1, Intermittently verbally responsive to questions in native language (Danish), selectively follows simple commands. Able to move all 4 extremities spontaneously. Motor Strength 4/5 B/L upper and lower extremities; B/L upper extremities limber with full ROM. DTRs difficult to elicit in b/l UE, achilles DTRs 2+ b/l. Babinski downgoing b/l.    LYMPH: No lymphadenopathy noted  SKIN: No rashes or lesions    MEDICATIONS:  MEDICATIONS  (STANDING):  acetylcysteine 10%  Inhalation 3 milliLiter(s) Inhalation every 6 hours  ALBUTerol/ipratropium for Nebulization 3 milliLiter(s) Nebulizer every 6 hours  amLODIPine   Tablet 10 milliGRAM(s) Oral every 24 hours  artificial tears (preservative free) Ophthalmic Solution 1 Drop(s) Both EYES three times a day  dextrose 5%. 1000 milliLiter(s) (50 mL/Hr) IV Continuous <Continuous>  dextrose 50% Injectable 12.5 Gram(s) IV Push once  dextrose 50% Injectable 25 Gram(s) IV Push once  dextrose 50% Injectable 25 Gram(s) IV Push once  glycopyrrolate Injectable 0.2 milliGRAM(s) IV Push every 6 hours  heparin  Injectable 5000 Unit(s) SubCutaneous every 8 hours  hydrochlorothiazide 12.5 milliGRAM(s) Oral daily  insulin lispro (HumaLOG) corrective regimen sliding scale   SubCutaneous every 6 hours  lactobacillus acidophilus 1 Tablet(s) Oral every 8 hours  levothyroxine 150 MICROGram(s) Oral daily  lisinopril 40 milliGRAM(s) Oral daily  meropenem  IVPB 1000 milliGRAM(s) IV Intermittent every 12 hours  pantoprazole   Suspension 40 milliGRAM(s) Enteral Tube daily  polyethylene glycol 3350 17 Gram(s) Oral daily    MEDICATIONS  (PRN):  acetaminophen    Suspension .. 650 milliGRAM(s) Oral every 6 hours PRN Temp greater or equal to 38C (100.4F)  dextrose 40% Gel 15 Gram(s) Oral once PRN Blood Glucose LESS THAN 70 milliGRAM(s)/deciliter  glucagon  Injectable 1 milliGRAM(s) IntraMuscular once PRN Glucose LESS THAN 70 milligrams/deciliter      ALLERGIES:  Allergies    No Known Allergies    Intolerances        LABS:                        8.8    7.9   )-----------( 330      ( 07 Dec 2018 05:43 )             29.6     12-07    141  |  106  |  38<H>  ----------------------------<  132<H>  4.1   |  27  |  1.04    Ca    10.0      07 Dec 2018 05:42  Mg     2.1     12-07          CAPILLARY BLOOD GLUCOSE      POCT Blood Glucose.: 154 mg/dL (07 Dec 2018 08:17)      RADIOLOGY & ADDITIONAL TESTS: Reviewed.

## 2018-12-07 NOTE — SWALLOW BEDSIDE ASSESSMENT ADULT - SWALLOW EVAL: ORAL MUSCULATURE
unable to fully assess as pt could not consistently follow simple 1-step oral motor directives despite modeling and max. cueing. Extensive oral care was provided via moistened toothette prior to attempted PO trials.

## 2018-12-07 NOTE — SWALLOW BEDSIDE ASSESSMENT ADULT - COMMENTS
Pt known to SLP team from prior admissions earlier this year (April-June). During this time, SLP attempted to see pt x4 prior to recommending a diet as pt was clinically inappropriate (minimal bolus acceptance/MAXI). A modified diet of puree solids and thin liquids was recommended on 06/04/18. This service was unable to f/u with pt d/t hospital d/c.     Per Dr. Giraldo, according to pt's family members, pt was tolerating thickened liquids at [facility]. Family is, therefore, eager for pt to resume oral feedings.

## 2018-12-07 NOTE — DISCHARGE NOTE ADULT - PLAN OF CARE
You came to the hospital with a history of hypothyroidism. Please follow up with your primary doctor, Dr. Wynn and take your home medications as prescribed. Maintenance of healthy cholesterol levels You came to the hospital with a history of hyperlipidemia. Please follow up with your primary doctor, Dr. Wynn and take your home medications as prescribed. Resolution of infection You were admitted to the hospital with a severe infection in your lungs (aspiration pneumonia) and urinary tract with resistant organisms requiring your to be intubated and in the ICU. We treated you for this with IV antibiotics and you improved. Your hospital course was complicated by mucus plugs in your lungs that required a procedure called bronchoscopy to remove them. Your infections were effectively treated and you were able to breathe on your own without a ventilator or supplemental oxygen. You are currently clinically improved. Please follow up with your primary doctor, Dr. Wynn and take your home medications as prescribed. You were admitted to the hospital with a severe infection in your lungs (aspiration pneumonia) and urinary tract with resistant organisms requiring your to be intubated and in the ICU. We treated you for this with IV antibiotics and you improved. We also had to discontinue your feedings through the peg tube for a short time to reduce your risk of aspiration. Your hospital course was complicated by mucus plugs in your lungs that required a procedure called bronchoscopy to remove them. Your infections were effectively treated and you were able to breathe on your own without a ventilator or supplemental oxygen. You are currently clinically improved and back on your peg tube feeding. Please follow up with your primary doctor, Dr. Wynn and take your home medications as prescribed. You were admitted to the hospital with a severe infection in your lungs and urinary tract with resistant organisms requiring your to be intubated and in the ICU. We treated you for this with IV antibiotics and you improved. Your hospital course was complicated by mucus plugs in your lungs that required a procedure called bronchoscopy to remove them. Your infections were effectively treated and you were able to breathe on your own without a ventilator or supplemental oxygen. You are currently clinically improved. Please follow up with your primary doctor, Dr. Wynn and take your home medications as prescribed. Improvement in mental status You were admitted to the hospital with a change in your mental status that we believe to be secondary to the severe infection. As this infection was treated, your mental status improved back to its baseline. Please follow up with your primary doctor, Dr. Wynn and take your home medications as prescribed. Maintenance of healthy thyroid Maintenance of healthy blood pressure You were admitted to the hospital with a severe infection in your lungs (aspiration pneumonia) and urinary tract with resistant organisms requiring your to be intubated and in the ICU. We treated you for this with IV antibiotics and you improved. Your hospital course was complicated by mucus plugs in your lungs that required a procedure called bronchoscopy to remove them. Your infections were effectively treated and you were able to breathe on your own without a ventilator or supplemental oxygen. You are maintained on medications to keep your airways open and to thin out secretions you produce. You are currently clinically improved. Please follow up with your primary doctor, Dr. Wynn and take your home medications as prescribed.

## 2018-12-07 NOTE — DISCHARGE NOTE ADULT - INSTRUCTIONS
Maintain PEG feeds  Patient is cleared for pureed diet with clear liquids. However, she is a known aspiration risk. Maintain PEG feeds with Glucerna 1.5 continuously with feed rate of 55mL/hr and free water flush 250ml q8hr.  Patient is cleared for pureed diet with clear liquids following modified barium swallow. However, she is a known aspiration risk.

## 2018-12-07 NOTE — DISCHARGE NOTE ADULT - PATIENT PORTAL LINK FT
You can access the [facility] Patient Portal, offered by [facility], by registering with the following website: [email or web address]

## 2018-12-07 NOTE — DISCHARGE NOTE ADULT - SECONDARY DIAGNOSIS.
Hyperlipidemia, unspecified hyperlipidemia type Sepsis due to pneumonia Urinary tract infection without hematuria, site unspecified Toxic metabolic encephalopathy Hypothyroidism, unspecified type Essential hypertension

## 2018-12-07 NOTE — SWALLOW BEDSIDE ASSESSMENT ADULT - SPECIFY REASON(S)
SLP consulted for a clinical bedside feeding evaluation to assess for safest, least restrictive diet. Pt was admitted on 11/16/18 for septic shock.

## 2018-12-08 DIAGNOSIS — R00.0 TACHYCARDIA, UNSPECIFIED: ICD-10-CM

## 2018-12-08 LAB
ANION GAP SERPL CALC-SCNC: 10 MMOL/L — SIGNIFICANT CHANGE UP (ref 5–17)
ANISOCYTOSIS BLD QL: SLIGHT — SIGNIFICANT CHANGE UP
BUN SERPL-MCNC: 47 MG/DL — HIGH (ref 7–23)
CALCIUM SERPL-MCNC: 10.5 MG/DL — SIGNIFICANT CHANGE UP (ref 8.4–10.5)
CHLORIDE SERPL-SCNC: 106 MMOL/L — SIGNIFICANT CHANGE UP (ref 96–108)
CO2 SERPL-SCNC: 27 MMOL/L — SIGNIFICANT CHANGE UP (ref 22–31)
CREAT SERPL-MCNC: 1.24 MG/DL — SIGNIFICANT CHANGE UP (ref 0.5–1.3)
CULTURE RESULTS: SIGNIFICANT CHANGE UP
CULTURE RESULTS: SIGNIFICANT CHANGE UP
EOSINOPHIL NFR BLD AUTO: 1 % — SIGNIFICANT CHANGE UP (ref 0–6)
GLUCOSE BLDC GLUCOMTR-MCNC: 152 MG/DL — HIGH (ref 70–99)
GLUCOSE BLDC GLUCOMTR-MCNC: 164 MG/DL — HIGH (ref 70–99)
GLUCOSE BLDC GLUCOMTR-MCNC: 183 MG/DL — HIGH (ref 70–99)
GLUCOSE BLDC GLUCOMTR-MCNC: 203 MG/DL — HIGH (ref 70–99)
GLUCOSE BLDC GLUCOMTR-MCNC: 224 MG/DL — HIGH (ref 70–99)
GLUCOSE SERPL-MCNC: 146 MG/DL — HIGH (ref 70–99)
HCT VFR BLD CALC: 32.3 % — LOW (ref 34.5–45)
HGB BLD-MCNC: 9.7 G/DL — LOW (ref 11.5–15.5)
LYMPHOCYTES # BLD AUTO: 27 % — SIGNIFICANT CHANGE UP (ref 13–44)
MAGNESIUM SERPL-MCNC: 2.4 MG/DL — SIGNIFICANT CHANGE UP (ref 1.6–2.6)
MANUAL SMEAR VERIFICATION: SIGNIFICANT CHANGE UP
MCHC RBC-ENTMCNC: 29.2 PG — SIGNIFICANT CHANGE UP (ref 27–34)
MCHC RBC-ENTMCNC: 30 G/DL — LOW (ref 32–36)
MCV RBC AUTO: 97.3 FL — SIGNIFICANT CHANGE UP (ref 80–100)
METAMYELOCYTES # FLD: 1 % — HIGH
MONOCYTES NFR BLD AUTO: 4 % — SIGNIFICANT CHANGE UP (ref 2–14)
NEUTROPHILS NFR BLD AUTO: 64 % — SIGNIFICANT CHANGE UP (ref 43–77)
NEUTS BAND # BLD: 3 % — SIGNIFICANT CHANGE UP
PLAT MORPH BLD: NORMAL — SIGNIFICANT CHANGE UP
PLATELET # BLD AUTO: 346 K/UL — SIGNIFICANT CHANGE UP (ref 150–400)
PLATELET CLUMP BLD QL SMEAR: PRESENT
POLYCHROMASIA BLD QL SMEAR: SLIGHT — SIGNIFICANT CHANGE UP
POTASSIUM SERPL-MCNC: 4 MMOL/L — SIGNIFICANT CHANGE UP (ref 3.5–5.3)
POTASSIUM SERPL-SCNC: 4 MMOL/L — SIGNIFICANT CHANGE UP (ref 3.5–5.3)
RBC # BLD: 3.32 M/UL — LOW (ref 3.8–5.2)
RBC # FLD: 18.5 % — HIGH (ref 10.3–16.9)
RBC BLD AUTO: ABNORMAL
SODIUM SERPL-SCNC: 143 MMOL/L — SIGNIFICANT CHANGE UP (ref 135–145)
SPECIMEN SOURCE: SIGNIFICANT CHANGE UP
SPECIMEN SOURCE: SIGNIFICANT CHANGE UP
WBC # BLD: 10.3 K/UL — SIGNIFICANT CHANGE UP (ref 3.8–10.5)
WBC # FLD AUTO: 10.3 K/UL — SIGNIFICANT CHANGE UP (ref 3.8–10.5)

## 2018-12-08 PROCEDURE — 93010 ELECTROCARDIOGRAM REPORT: CPT

## 2018-12-08 PROCEDURE — 99233 SBSQ HOSP IP/OBS HIGH 50: CPT | Mod: GC

## 2018-12-08 RX ORDER — SODIUM CHLORIDE 9 MG/ML
500 INJECTION INTRAMUSCULAR; INTRAVENOUS; SUBCUTANEOUS ONCE
Qty: 0 | Refills: 0 | Status: COMPLETED | OUTPATIENT
Start: 2018-12-08 | End: 2018-12-08

## 2018-12-08 RX ADMIN — HEPARIN SODIUM 5000 UNIT(S): 5000 INJECTION INTRAVENOUS; SUBCUTANEOUS at 21:54

## 2018-12-08 RX ADMIN — Medication 3 MILLILITER(S): at 03:56

## 2018-12-08 RX ADMIN — AMLODIPINE BESYLATE 10 MILLIGRAM(S): 2.5 TABLET ORAL at 17:15

## 2018-12-08 RX ADMIN — Medication 1 TABLET(S): at 06:45

## 2018-12-08 RX ADMIN — PANTOPRAZOLE SODIUM 40 MILLIGRAM(S): 20 TABLET, DELAYED RELEASE ORAL at 12:40

## 2018-12-08 RX ADMIN — Medication 3 MILLILITER(S): at 15:29

## 2018-12-08 RX ADMIN — Medication 2: at 07:11

## 2018-12-08 RX ADMIN — Medication 3 MILLILITER(S): at 15:28

## 2018-12-08 RX ADMIN — Medication 3 MILLILITER(S): at 10:06

## 2018-12-08 RX ADMIN — Medication 150 MICROGRAM(S): at 06:47

## 2018-12-08 RX ADMIN — HEPARIN SODIUM 5000 UNIT(S): 5000 INJECTION INTRAVENOUS; SUBCUTANEOUS at 15:27

## 2018-12-08 RX ADMIN — POLYETHYLENE GLYCOL 3350 17 GRAM(S): 17 POWDER, FOR SOLUTION ORAL at 12:40

## 2018-12-08 RX ADMIN — SODIUM CHLORIDE 1000 MILLILITER(S): 9 INJECTION INTRAMUSCULAR; INTRAVENOUS; SUBCUTANEOUS at 10:06

## 2018-12-08 RX ADMIN — Medication 12.5 MILLIGRAM(S): at 06:47

## 2018-12-08 RX ADMIN — Medication 3 MILLILITER(S): at 22:50

## 2018-12-08 RX ADMIN — Medication 2: at 17:14

## 2018-12-08 RX ADMIN — Medication 1 DROP(S): at 17:16

## 2018-12-08 RX ADMIN — Medication 2: at 00:39

## 2018-12-08 RX ADMIN — HEPARIN SODIUM 5000 UNIT(S): 5000 INJECTION INTRAVENOUS; SUBCUTANEOUS at 06:45

## 2018-12-08 RX ADMIN — Medication 4: at 12:39

## 2018-12-08 RX ADMIN — Medication 1 DROP(S): at 06:45

## 2018-12-08 RX ADMIN — Medication 1 TABLET(S): at 15:27

## 2018-12-08 RX ADMIN — LISINOPRIL 40 MILLIGRAM(S): 2.5 TABLET ORAL at 06:47

## 2018-12-08 RX ADMIN — Medication 3 MILLILITER(S): at 22:22

## 2018-12-08 RX ADMIN — Medication 1 TABLET(S): at 21:54

## 2018-12-08 NOTE — PROGRESS NOTE ADULT - SUBJECTIVE AND OBJECTIVE BOX
Patient is a 78y old  Female who presents with a chief complaint of Septic shock and acute respiratory failure (07 Dec 2018 13:50)      INTERVAL HPI/OVERNIGHT EVENTS:  pt seen and examined by me at beside earlier in AM      Review of Systems: 12 point review of systems otherwise negative    MEDICATIONS  (STANDING):  acetylcysteine 10%  Inhalation 3 milliLiter(s) Inhalation every 6 hours  ALBUTerol/ipratropium for Nebulization 3 milliLiter(s) Nebulizer every 6 hours  amLODIPine   Tablet 10 milliGRAM(s) Oral every 24 hours  artificial  tears Solution 1 Drop(s) Both EYES two times a day  dextrose 5%. 1000 milliLiter(s) (50 mL/Hr) IV Continuous <Continuous>  dextrose 50% Injectable 12.5 Gram(s) IV Push once  dextrose 50% Injectable 25 Gram(s) IV Push once  dextrose 50% Injectable 25 Gram(s) IV Push once  heparin  Injectable 5000 Unit(s) SubCutaneous every 8 hours  hydrochlorothiazide 12.5 milliGRAM(s) Oral daily  insulin lispro (HumaLOG) corrective regimen sliding scale   SubCutaneous every 6 hours  lactobacillus acidophilus 1 Tablet(s) Oral every 8 hours  levothyroxine 150 MICROGram(s) Oral daily  lisinopril 40 milliGRAM(s) Oral daily  pantoprazole   Suspension 40 milliGRAM(s) Enteral Tube daily  polyethylene glycol 3350 17 Gram(s) Oral daily    MEDICATIONS  (PRN):  acetaminophen    Suspension .. 650 milliGRAM(s) Oral every 6 hours PRN Temp greater or equal to 38C (100.4F)  dextrose 40% Gel 15 Gram(s) Oral once PRN Blood Glucose LESS THAN 70 milliGRAM(s)/deciliter  glucagon  Injectable 1 milliGRAM(s) IntraMuscular once PRN Glucose LESS THAN 70 milligrams/deciliter      Allergies    No Known Allergies    Intolerances          Vital Signs Last 24 Hrs  T(C): 36.9 (08 Dec 2018 09:15), Max: 37.3 (07 Dec 2018 21:18)  T(F): 98.5 (08 Dec 2018 09:15), Max: 99.2 (07 Dec 2018 21:18)  HR: 107 (08 Dec 2018 09:15) (104 - 119)  BP: 146/83 (08 Dec 2018 09:15) (145/84 - 155/90)  BP(mean): --  RR: 18 (08 Dec 2018 09:15) (18 - 20)  SpO2: 95% (08 Dec 2018 09:15) (92% - 98%)  CAPILLARY BLOOD GLUCOSE      POCT Blood Glucose.: 224 mg/dL (08 Dec 2018 12:12)  POCT Blood Glucose.: 164 mg/dL (08 Dec 2018 06:16)  POCT Blood Glucose.: 152 mg/dL (08 Dec 2018 00:20)        Physical Exam:    Daily     Daily   General:  follows some commands  CV:  RRR  Lungs:  Anterior decrease bs on bases otherwise CTA  Abdomen:  Soft, non-tender, no distended, +PEG  Extremities:  no LEs edema  +rectal tube with minimum output      LABS:                        9.7    10.3  )-----------( 346      ( 08 Dec 2018 05:38 )             32.3     12-08    143  |  106  |  47<H>  ----------------------------<  146<H>  4.0   |  27  |  1.24    Ca    10.5      08 Dec 2018 05:38  Mg     2.4     12-08

## 2018-12-09 LAB
ANION GAP SERPL CALC-SCNC: 14 MMOL/L — SIGNIFICANT CHANGE UP (ref 5–17)
BUN SERPL-MCNC: 50 MG/DL — HIGH (ref 7–23)
CALCIUM SERPL-MCNC: 10.5 MG/DL — SIGNIFICANT CHANGE UP (ref 8.4–10.5)
CHLORIDE SERPL-SCNC: 106 MMOL/L — SIGNIFICANT CHANGE UP (ref 96–108)
CO2 SERPL-SCNC: 26 MMOL/L — SIGNIFICANT CHANGE UP (ref 22–31)
CREAT SERPL-MCNC: 1.08 MG/DL — SIGNIFICANT CHANGE UP (ref 0.5–1.3)
GLUCOSE BLDC GLUCOMTR-MCNC: 134 MG/DL — HIGH (ref 70–99)
GLUCOSE BLDC GLUCOMTR-MCNC: 153 MG/DL — HIGH (ref 70–99)
GLUCOSE BLDC GLUCOMTR-MCNC: 170 MG/DL — HIGH (ref 70–99)
GLUCOSE BLDC GLUCOMTR-MCNC: 179 MG/DL — HIGH (ref 70–99)
GLUCOSE SERPL-MCNC: 182 MG/DL — HIGH (ref 70–99)
HCT VFR BLD CALC: 30.7 % — LOW (ref 34.5–45)
HGB BLD-MCNC: 9 G/DL — LOW (ref 11.5–15.5)
MAGNESIUM SERPL-MCNC: 2.2 MG/DL — SIGNIFICANT CHANGE UP (ref 1.6–2.6)
MCHC RBC-ENTMCNC: 28.8 PG — SIGNIFICANT CHANGE UP (ref 27–34)
MCHC RBC-ENTMCNC: 29.3 G/DL — LOW (ref 32–36)
MCV RBC AUTO: 98.4 FL — SIGNIFICANT CHANGE UP (ref 80–100)
PLATELET # BLD AUTO: 295 K/UL — SIGNIFICANT CHANGE UP (ref 150–400)
POTASSIUM SERPL-MCNC: 4.1 MMOL/L — SIGNIFICANT CHANGE UP (ref 3.5–5.3)
POTASSIUM SERPL-SCNC: 4.1 MMOL/L — SIGNIFICANT CHANGE UP (ref 3.5–5.3)
RBC # BLD: 3.12 M/UL — LOW (ref 3.8–5.2)
RBC # FLD: 18.5 % — HIGH (ref 10.3–16.9)
SODIUM SERPL-SCNC: 146 MMOL/L — HIGH (ref 135–145)
WBC # BLD: 10 K/UL — SIGNIFICANT CHANGE UP (ref 3.8–10.5)
WBC # FLD AUTO: 10 K/UL — SIGNIFICANT CHANGE UP (ref 3.8–10.5)

## 2018-12-09 PROCEDURE — 99233 SBSQ HOSP IP/OBS HIGH 50: CPT | Mod: GC

## 2018-12-09 RX ADMIN — Medication 1 TABLET(S): at 13:33

## 2018-12-09 RX ADMIN — Medication 2: at 12:49

## 2018-12-09 RX ADMIN — Medication 3 MILLILITER(S): at 03:29

## 2018-12-09 RX ADMIN — AMLODIPINE BESYLATE 10 MILLIGRAM(S): 2.5 TABLET ORAL at 17:37

## 2018-12-09 RX ADMIN — LISINOPRIL 40 MILLIGRAM(S): 2.5 TABLET ORAL at 06:59

## 2018-12-09 RX ADMIN — PANTOPRAZOLE SODIUM 40 MILLIGRAM(S): 20 TABLET, DELAYED RELEASE ORAL at 12:50

## 2018-12-09 RX ADMIN — Medication 1 DROP(S): at 07:42

## 2018-12-09 RX ADMIN — Medication 3 MILLILITER(S): at 17:31

## 2018-12-09 RX ADMIN — Medication 2: at 23:57

## 2018-12-09 RX ADMIN — Medication 2: at 06:57

## 2018-12-09 RX ADMIN — HEPARIN SODIUM 5000 UNIT(S): 5000 INJECTION INTRAVENOUS; SUBCUTANEOUS at 13:33

## 2018-12-09 RX ADMIN — Medication 1 DROP(S): at 17:31

## 2018-12-09 RX ADMIN — HEPARIN SODIUM 5000 UNIT(S): 5000 INJECTION INTRAVENOUS; SUBCUTANEOUS at 22:36

## 2018-12-09 RX ADMIN — Medication 4: at 00:22

## 2018-12-09 RX ADMIN — Medication 150 MICROGRAM(S): at 07:42

## 2018-12-09 RX ADMIN — Medication 3 MILLILITER(S): at 04:03

## 2018-12-09 RX ADMIN — Medication 3 MILLILITER(S): at 10:26

## 2018-12-09 RX ADMIN — Medication 1 TABLET(S): at 06:59

## 2018-12-09 RX ADMIN — Medication 3 MILLILITER(S): at 10:27

## 2018-12-09 RX ADMIN — Medication 3 MILLILITER(S): at 22:35

## 2018-12-09 RX ADMIN — POLYETHYLENE GLYCOL 3350 17 GRAM(S): 17 POWDER, FOR SOLUTION ORAL at 12:50

## 2018-12-09 RX ADMIN — Medication 12.5 MILLIGRAM(S): at 06:58

## 2018-12-09 RX ADMIN — HEPARIN SODIUM 5000 UNIT(S): 5000 INJECTION INTRAVENOUS; SUBCUTANEOUS at 06:58

## 2018-12-09 RX ADMIN — Medication 1 TABLET(S): at 22:35

## 2018-12-09 NOTE — PROGRESS NOTE ADULT - SUBJECTIVE AND OBJECTIVE BOX
OVERNIGHT EVENTS:    SUBJECTIVE:    Vital Signs Last 12 Hrs  T(F): 98.5 (12-08-18 @ 20:38), Max: 98.5 (12-08-18 @ 20:38)  HR: 108 (12-08-18 @ 20:38) (108 - 108)  BP: 135/81 (12-08-18 @ 20:38) (135/81 - 135/81)  BP(mean): --  RR: 18 (12-08-18 @ 20:38) (18 - 18)  SpO2: 95% (12-08-18 @ 20:38) (95% - 95%)  I&O's Summary      PHYSICAL EXAM:  GENERAL: Elderly female, resting comfortably, in NAD, responsive to questions in [place] with 1-2 words.  HEAD:  Atraumatic, Normocephalic  EYES: EOMI, PERRLA, conjunctiva and sclera clear  ENT: No tonsillar erythema, exudates, or enlargement; Moist mucous membranes.  NECK: Supple, No JVD, Normal thyroid, no enlarged nodes  CHEST/LUNG: +decreased BS over R lower lung field. good air entry b/l. no w/r/r.  HEART: S1S2 normal, Regular rate and rhythm; No murmurs, rubs, or gallops  ABDOMEN: Soft, Nontender, Nondistended; Bowel sounds present x4 quadrants. Peg in place, no surrounding erythema or purulence.   EXTREMITIES:  2+ Peripheral Pulses, No clubbing, cyanosis, or edema  NERVOUS SYSTEM:  Alert & Oriented X1, Intermittently verbally responsive to questions in native language ([place]), selectively follows simple commands. Able to move all 4 extremities spontaneously. Motor Strength 4/5 B/L upper and lower extremities; B/L upper extremities limber with full ROM. DTRs difficult to elicit in b/l UE, achilles DTRs 2+ b/l. Babinski downgoing b/l.    LYMPH: No lymphadenopathy noted  SKIN: No rashes or lesions        LABS:                        9.7    10.3  )-----------( 346      ( 08 Dec 2018 05:38 )             32.3     12-08    143  |  106  |  47<H>  ----------------------------<  146<H>  4.0   |  27  |  1.24    Ca    10.5      08 Dec 2018 05:38  Mg     2.4     12-08            RADIOLOGY & ADDITIONAL TESTS:    MEDICATIONS  (STANDING):  acetylcysteine 10%  Inhalation 3 milliLiter(s) Inhalation every 6 hours  ALBUTerol/ipratropium for Nebulization 3 milliLiter(s) Nebulizer every 6 hours  amLODIPine   Tablet 10 milliGRAM(s) Oral every 24 hours  artificial  tears Solution 1 Drop(s) Both EYES two times a day  dextrose 5%. 1000 milliLiter(s) (50 mL/Hr) IV Continuous <Continuous>  dextrose 50% Injectable 12.5 Gram(s) IV Push once  dextrose 50% Injectable 25 Gram(s) IV Push once  dextrose 50% Injectable 25 Gram(s) IV Push once  heparin  Injectable 5000 Unit(s) SubCutaneous every 8 hours  hydrochlorothiazide 12.5 milliGRAM(s) Oral daily  insulin lispro (HumaLOG) corrective regimen sliding scale   SubCutaneous every 6 hours  lactobacillus acidophilus 1 Tablet(s) Oral every 8 hours  levothyroxine 150 MICROGram(s) Oral daily  lisinopril 40 milliGRAM(s) Oral daily  pantoprazole   Suspension 40 milliGRAM(s) Enteral Tube daily  polyethylene glycol 3350 17 Gram(s) Oral daily    MEDICATIONS  (PRN):  acetaminophen    Suspension .. 650 milliGRAM(s) Oral every 6 hours PRN Temp greater or equal to 38C (100.4F)  dextrose 40% Gel 15 Gram(s) Oral once PRN Blood Glucose LESS THAN 70 milliGRAM(s)/deciliter  glucagon  Injectable 1 milliGRAM(s) IntraMuscular once PRN Glucose LESS THAN 70 milligrams/deciliter OVERNIGHT EVENTS: EMANUEL    SUBJECTIVE: Patient reluctant to participate in conversation but denies any pain or SOB.    Vital Signs Last 12 Hrs  T(F): 98.5 (12-08-18 @ 20:38), Max: 98.5 (12-08-18 @ 20:38)  HR: 108 (12-08-18 @ 20:38) (108 - 108)  BP: 135/81 (12-08-18 @ 20:38) (135/81 - 135/81)  BP(mean): --  RR: 18 (12-08-18 @ 20:38) (18 - 18)  SpO2: 95% (12-08-18 @ 20:38) (95% - 95%)  I&O's Summary      PHYSICAL EXAM:  GENERAL: Elderly female, resting comfortably, in NAD, reluctant to respond verbally but shakes head yes or no  HEAD:  Atraumatic, Normocephalic  EYES: EOMI, PERRLA, conjunctiva and sclera clear  ENT: No tonsillar erythema, exudates, or enlargement; Moist mucous membranes.  NECK: Supple, No JVD, Normal thyroid, no enlarged nodes  CHEST/LUNG: decreased BS over bilater lower lung fields anteriorly. good air entry b/l. no w/r/r.  HEART: S1S2 normal, Regular rate and rhythm; No murmurs, rubs, or gallops  ABDOMEN: Soft, Nontender, Nondistended; +BS. Peg in place, no surrounding erythema or purulence.   EXTREMITIES:  2+ Peripheral Pulses, No clubbing, cyanosis, or edema  NERVOUS SYSTEM:  Alert & Oriented x1 to person, strength and sensation grossly intact throughout  LYMPH: No lymphadenopathy noted  SKIN: No rashes or lesions        LABS:                        9.7    10.3  )-----------( 346      ( 08 Dec 2018 05:38 )             32.3     12-08    143  |  106  |  47<H>  ----------------------------<  146<H>  4.0   |  27  |  1.24    Ca    10.5      08 Dec 2018 05:38  Mg     2.4     12-08            RADIOLOGY & ADDITIONAL TESTS:    MEDICATIONS  (STANDING):  acetylcysteine 10%  Inhalation 3 milliLiter(s) Inhalation every 6 hours  ALBUTerol/ipratropium for Nebulization 3 milliLiter(s) Nebulizer every 6 hours  amLODIPine   Tablet 10 milliGRAM(s) Oral every 24 hours  artificial  tears Solution 1 Drop(s) Both EYES two times a day  dextrose 5%. 1000 milliLiter(s) (50 mL/Hr) IV Continuous <Continuous>  dextrose 50% Injectable 12.5 Gram(s) IV Push once  dextrose 50% Injectable 25 Gram(s) IV Push once  dextrose 50% Injectable 25 Gram(s) IV Push once  heparin  Injectable 5000 Unit(s) SubCutaneous every 8 hours  hydrochlorothiazide 12.5 milliGRAM(s) Oral daily  insulin lispro (HumaLOG) corrective regimen sliding scale   SubCutaneous every 6 hours  lactobacillus acidophilus 1 Tablet(s) Oral every 8 hours  levothyroxine 150 MICROGram(s) Oral daily  lisinopril 40 milliGRAM(s) Oral daily  pantoprazole   Suspension 40 milliGRAM(s) Enteral Tube daily  polyethylene glycol 3350 17 Gram(s) Oral daily    MEDICATIONS  (PRN):  acetaminophen    Suspension .. 650 milliGRAM(s) Oral every 6 hours PRN Temp greater or equal to 38C (100.4F)  dextrose 40% Gel 15 Gram(s) Oral once PRN Blood Glucose LESS THAN 70 milliGRAM(s)/deciliter  glucagon  Injectable 1 milliGRAM(s) IntraMuscular once PRN Glucose LESS THAN 70 milligrams/deciliter

## 2018-12-09 NOTE — PROGRESS NOTE ADULT - PROBLEM SELECTOR PLAN 2
Pt with frequent mucus plugging throughout hospital course s/p percussion vest, robinol, frequent suctioning.  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

## 2018-12-09 NOTE — PROGRESS NOTE ADULT - PROBLEM SELECTOR PLAN 1
Pt noted to have fever on 11/29 100.7 with tachycardia. CXR with RLL infiltrate. Repeat sputum cx (12/1) positive for pseudomonas/klebsiella, and ESBL Ecoli. Pt started empirically on Zosyn 4.5 g q6h escalated to meropenem based on sensitivities on 12/2  - s/p meropenem 5 day course ended 12/7  - Bcx from 12/3 NGTD, continue to follow

## 2018-12-09 NOTE — PROGRESS NOTE ADULT - PROBLEM SELECTOR PLAN 8
F: none  E: K>4 Mag>2   N: tube feeds, glucerna 1.5 via PEG 55cc/hr F: none  E: K>4 Mag>2   N: tube feeds, glucerna 1.5 via PEG 55cc/hr, 250cc free water flushes x4 for hypernatremia

## 2018-12-09 NOTE — PROGRESS NOTE ADULT - ASSESSMENT
78F w/ PMH obesity, DM, HTN, hypothyroidism, sepsis secondary to ventral hernia infection s/p repair, PE s/p IVC filter, acute blood loss anemia from hemorrhagic uterine fibroids and UGIB, ATN requiring HD, sepsis secondary to MSSA bacteremia and MDR E coli UTI w/AMS and s/p TAHBSO and small bowel resection s/p PEG. Admitted with septic shock likely 2/2 PNA vs UTI and acute hypoxic respiratory failure now extubated, s/p bronchoscopy 11/30, s/p 5 day course of meropenem for pseudomonal PNA.

## 2018-12-09 NOTE — PROGRESS NOTE ADULT - PROBLEM SELECTOR PLAN 10
1) PCP Contacted on Admission: (Y) --> Name & Phone #: MORA WYNN [phone]  2) Date of Contact with PCP: 12/7/18  3) PCP Contacted at Discharge: (Y/N, N/A)  4) Summary of Handoff Given to PCP: NA  5) Post-Discharge Appointment Date and Location: Dr Wynn 12/26/18 at 8:30 am, [address]

## 2018-12-09 NOTE — PROGRESS NOTE ADULT - PROBLEM SELECTOR PLAN 3
Pt with hx of sm carola perforation, recently admitted 4/15/18 due to MSSA bacteremia and MDR E Coli in urine, transferred to [facility], and found to have extravasation on CT abd, s/p IR drain placement, underwent DAVY BSO 2/2 tubal metaplasia, SBR with primary anastomosis, abdominal washout, PEG placement, discharged on 6/4  - surgery was following, now signed off  - PEG in place, c/w glucerna feeds at 55cc/hr  - pt was on standing reglan q12 for bowel pro-motility, d/c due to concern for possible upper extremity rigidity  - speech and swallow eval - NPO

## 2018-12-10 DIAGNOSIS — R00.0 TACHYCARDIA, UNSPECIFIED: ICD-10-CM

## 2018-12-10 LAB
ANION GAP SERPL CALC-SCNC: 11 MMOL/L — SIGNIFICANT CHANGE UP (ref 5–17)
ANION GAP SERPL CALC-SCNC: 12 MMOL/L — SIGNIFICANT CHANGE UP (ref 5–17)
BUN SERPL-MCNC: 54 MG/DL — HIGH (ref 7–23)
BUN SERPL-MCNC: 54 MG/DL — HIGH (ref 7–23)
CALCIUM SERPL-MCNC: 10.3 MG/DL — SIGNIFICANT CHANGE UP (ref 8.4–10.5)
CALCIUM SERPL-MCNC: 10.6 MG/DL — HIGH (ref 8.4–10.5)
CHLORIDE SERPL-SCNC: 106 MMOL/L — SIGNIFICANT CHANGE UP (ref 96–108)
CHLORIDE SERPL-SCNC: 107 MMOL/L — SIGNIFICANT CHANGE UP (ref 96–108)
CO2 SERPL-SCNC: 28 MMOL/L — SIGNIFICANT CHANGE UP (ref 22–31)
CO2 SERPL-SCNC: 29 MMOL/L — SIGNIFICANT CHANGE UP (ref 22–31)
CREAT SERPL-MCNC: 1.09 MG/DL — SIGNIFICANT CHANGE UP (ref 0.5–1.3)
CREAT SERPL-MCNC: 1.15 MG/DL — SIGNIFICANT CHANGE UP (ref 0.5–1.3)
GLUCOSE BLDC GLUCOMTR-MCNC: 161 MG/DL — HIGH (ref 70–99)
GLUCOSE BLDC GLUCOMTR-MCNC: 162 MG/DL — HIGH (ref 70–99)
GLUCOSE BLDC GLUCOMTR-MCNC: 173 MG/DL — HIGH (ref 70–99)
GLUCOSE BLDC GLUCOMTR-MCNC: 194 MG/DL — HIGH (ref 70–99)
GLUCOSE SERPL-MCNC: 152 MG/DL — HIGH (ref 70–99)
GLUCOSE SERPL-MCNC: 166 MG/DL — HIGH (ref 70–99)
HCT VFR BLD CALC: 30 % — LOW (ref 34.5–45)
HGB BLD-MCNC: 8.8 G/DL — LOW (ref 11.5–15.5)
MAGNESIUM SERPL-MCNC: 2.4 MG/DL — SIGNIFICANT CHANGE UP (ref 1.6–2.6)
MCHC RBC-ENTMCNC: 28.9 PG — SIGNIFICANT CHANGE UP (ref 27–34)
MCHC RBC-ENTMCNC: 29.3 G/DL — LOW (ref 32–36)
MCV RBC AUTO: 98.7 FL — SIGNIFICANT CHANGE UP (ref 80–100)
PHOSPHATE SERPL-MCNC: 3.2 MG/DL — SIGNIFICANT CHANGE UP (ref 2.5–4.5)
PLATELET # BLD AUTO: 298 K/UL — SIGNIFICANT CHANGE UP (ref 150–400)
POTASSIUM SERPL-MCNC: 4 MMOL/L — SIGNIFICANT CHANGE UP (ref 3.5–5.3)
POTASSIUM SERPL-MCNC: 4.4 MMOL/L — SIGNIFICANT CHANGE UP (ref 3.5–5.3)
POTASSIUM SERPL-SCNC: 4 MMOL/L — SIGNIFICANT CHANGE UP (ref 3.5–5.3)
POTASSIUM SERPL-SCNC: 4.4 MMOL/L — SIGNIFICANT CHANGE UP (ref 3.5–5.3)
RBC # BLD: 3.04 M/UL — LOW (ref 3.8–5.2)
RBC # FLD: 18.5 % — HIGH (ref 10.3–16.9)
SODIUM SERPL-SCNC: 146 MMOL/L — HIGH (ref 135–145)
SODIUM SERPL-SCNC: 147 MMOL/L — HIGH (ref 135–145)
WBC # BLD: 9.9 K/UL — SIGNIFICANT CHANGE UP (ref 3.8–10.5)
WBC # FLD AUTO: 9.9 K/UL — SIGNIFICANT CHANGE UP (ref 3.8–10.5)

## 2018-12-10 RX ORDER — SODIUM CHLORIDE 9 MG/ML
1000 INJECTION, SOLUTION INTRAVENOUS
Qty: 0 | Refills: 0 | Status: DISCONTINUED | OUTPATIENT
Start: 2018-12-10 | End: 2018-12-11

## 2018-12-10 RX ORDER — SODIUM CHLORIDE 9 MG/ML
1000 INJECTION, SOLUTION INTRAVENOUS
Qty: 0 | Refills: 0 | Status: DISCONTINUED | OUTPATIENT
Start: 2018-12-10 | End: 2018-12-10

## 2018-12-10 RX ORDER — HYDRALAZINE HCL 50 MG
10 TABLET ORAL EVERY 8 HOURS
Qty: 0 | Refills: 0 | Status: DISCONTINUED | OUTPATIENT
Start: 2018-12-10 | End: 2018-12-11

## 2018-12-10 RX ORDER — SODIUM CHLORIDE 9 MG/ML
1000 INJECTION INTRAMUSCULAR; INTRAVENOUS; SUBCUTANEOUS
Qty: 0 | Refills: 0 | Status: DISCONTINUED | OUTPATIENT
Start: 2018-12-10 | End: 2018-12-10

## 2018-12-10 RX ADMIN — HEPARIN SODIUM 5000 UNIT(S): 5000 INJECTION INTRAVENOUS; SUBCUTANEOUS at 06:49

## 2018-12-10 RX ADMIN — Medication 12.5 MILLIGRAM(S): at 06:48

## 2018-12-10 RX ADMIN — Medication 150 MICROGRAM(S): at 06:48

## 2018-12-10 RX ADMIN — Medication 2: at 12:22

## 2018-12-10 RX ADMIN — Medication 3 MILLILITER(S): at 23:15

## 2018-12-10 RX ADMIN — Medication 2: at 17:57

## 2018-12-10 RX ADMIN — Medication 3 MILLILITER(S): at 17:57

## 2018-12-10 RX ADMIN — Medication 2: at 06:49

## 2018-12-10 RX ADMIN — Medication 1 DROP(S): at 06:49

## 2018-12-10 RX ADMIN — LISINOPRIL 40 MILLIGRAM(S): 2.5 TABLET ORAL at 06:48

## 2018-12-10 RX ADMIN — Medication 3 MILLILITER(S): at 06:50

## 2018-12-10 RX ADMIN — HEPARIN SODIUM 5000 UNIT(S): 5000 INJECTION INTRAVENOUS; SUBCUTANEOUS at 15:33

## 2018-12-10 RX ADMIN — SODIUM CHLORIDE 100 MILLILITER(S): 9 INJECTION, SOLUTION INTRAVENOUS at 10:07

## 2018-12-10 RX ADMIN — Medication 10 MILLIGRAM(S): at 23:16

## 2018-12-10 RX ADMIN — Medication 1 TABLET(S): at 15:33

## 2018-12-10 RX ADMIN — Medication 3 MILLILITER(S): at 11:14

## 2018-12-10 RX ADMIN — Medication 1 TABLET(S): at 23:18

## 2018-12-10 RX ADMIN — Medication 1 TABLET(S): at 06:49

## 2018-12-10 RX ADMIN — PANTOPRAZOLE SODIUM 40 MILLIGRAM(S): 20 TABLET, DELAYED RELEASE ORAL at 12:22

## 2018-12-10 RX ADMIN — Medication 1 DROP(S): at 17:58

## 2018-12-10 RX ADMIN — Medication 10 MILLIGRAM(S): at 15:34

## 2018-12-10 RX ADMIN — AMLODIPINE BESYLATE 10 MILLIGRAM(S): 2.5 TABLET ORAL at 17:58

## 2018-12-10 RX ADMIN — HEPARIN SODIUM 5000 UNIT(S): 5000 INJECTION INTRAVENOUS; SUBCUTANEOUS at 23:16

## 2018-12-10 NOTE — CHART NOTE - NSCHARTNOTEFT_GEN_A_CORE
Admitting Diagnosis:   Patient is a 78y old  Female who presents with a chief complaint of Septic shock and acute respiratory failure (10 Dec 2018 08:35)      PAST MEDICAL & SURGICAL HISTORY:  Hypothyroidism  GERD (gastroesophageal reflux disease)  Obesity  DM (diabetes mellitus)  HLD (hyperlipidemia)  HTN (hypertension)  Status post total abdominal hysterectomy and bilateral salpingo-oophorectomy  S/P small bowel resection  H/O ventral hernia repair      Current Nutrition Order: Glucerna 1.5 Nicholas @ 55mL/hr x 24hrs via PEG (1320 mL TV, 1980kcal, 109g protein, 1002mL free H2O)    PO Intake: NPO w EN     GI Issues: chronic diarrhea per team, no noted n/v/c, rectal tube in place     Pain: appears comfortable, c/w AMS, no family at bedside     Skin Integrity: stage 2 pressure ulcer to sacrum noted    Labs:   12-10    147<H>  |  106  |  54<H>  ----------------------------<  152<H>  4.4   |  29  |  1.15    Ca    10.6<H>      10 Dec 2018 05:53  Phos  3.2     12-10  Mg     2.4     12-10      CAPILLARY BLOOD GLUCOSE      POCT Blood Glucose.: 161 mg/dL (10 Dec 2018 08:37)  POCT Blood Glucose.: 162 mg/dL (10 Dec 2018 06:03)  POCT Blood Glucose.: 179 mg/dL (09 Dec 2018 23:50)  POCT Blood Glucose.: 134 mg/dL (09 Dec 2018 17:21)  POCT Blood Glucose.: 153 mg/dL (09 Dec 2018 12:27)      Medications:  MEDICATIONS  (STANDING):  acetylcysteine 10%  Inhalation 3 milliLiter(s) Inhalation every 6 hours  ALBUTerol/ipratropium for Nebulization 3 milliLiter(s) Nebulizer every 6 hours  amLODIPine   Tablet 10 milliGRAM(s) Oral every 24 hours  artificial  tears Solution 1 Drop(s) Both EYES two times a day  dextrose 5% + sodium chloride 0.45%. 1000 milliLiter(s) (100 mL/Hr) IV Continuous <Continuous>  dextrose 5%. 1000 milliLiter(s) (50 mL/Hr) IV Continuous <Continuous>  dextrose 50% Injectable 12.5 Gram(s) IV Push once  dextrose 50% Injectable 25 Gram(s) IV Push once  dextrose 50% Injectable 25 Gram(s) IV Push once  heparin  Injectable 5000 Unit(s) SubCutaneous every 8 hours  hydrALAZINE 10 milliGRAM(s) Oral every 8 hours  insulin lispro (HumaLOG) corrective regimen sliding scale   SubCutaneous every 6 hours  lactobacillus acidophilus 1 Tablet(s) Oral every 8 hours  levothyroxine 150 MICROGram(s) Oral daily  lisinopril 40 milliGRAM(s) Oral daily  pantoprazole   Suspension 40 milliGRAM(s) Enteral Tube daily    MEDICATIONS  (PRN):  acetaminophen    Suspension .. 650 milliGRAM(s) Oral every 6 hours PRN Temp greater or equal to 38C (100.4F)  dextrose 40% Gel 15 Gram(s) Oral once PRN Blood Glucose LESS THAN 70 milliGRAM(s)/deciliter  glucagon  Injectable 1 milliGRAM(s) IntraMuscular once PRN Glucose LESS THAN 70 milligrams/deciliter      Weight: 78kg (12/4)   81.6kg (11/26)  69.2kg (11/16)    Weight Change: noted large discrepancy, after re-weigh wt consistent +/- 1-2kg likely in setting of fluid shifts      Nutrition Focused Physical Exam: Completed [   ]  Not Pertinent [  x ]    Estimated energy needs:   Utilized IBW (54.5kg) to calculate needs, pt >120% of IBW. Adjusted for sepsis/wound healing  Calories: 25-30 kcal/kg =1362-1635kcal/day  Protein: 1.2-1.4 g/kg = 65-76g protein/day  Fluids: 30-35 mL/kg = 4396-3422 mL/day    Subjective:   79y/o F presenting with septic shock likely 2/2 PNA vs UTI and acute hypoxic respiratory failure initially requiring intubation. Pt extubated on 11/19. S/p transfer back to MICU on 11/30 for bronchoscopy 2/2 mucous plugging. Mucous removed and pt was able to remain on NC, now off of NC + sepsis has resolved. Pt later transferred back to [facility], and now moved to [facility] for further monitoring, has finished course of abx, diarrhea likely exacerbated in setting of abx. EN running at goal of 55mL/hr with good tolerance, per team c/w diarrhea. F/u SLP eval 12/7, recommending continue w NPO status + EN to meet needs d/t aspiration risk. Pt seen in room, resting in bed, appears comfortable, still with confusion. Per discussion w MD this AM concerned pt is now hypernatremic, initiated FWF this AM, recommend c/w FWF + probiotics to aide w diarrhea, if diarrhea persists recommend immodium via PEG. C/w 250ml FWF q6 to meet fluid needs, in addition to EN pt needs 650-900ml h2o to meet needs, pt needs at minimum 30ml flushes q4hr to maintain tube patency/ to flush meds. Will continue to keep nutrition aligned with GOC at all times.     Previous Nutrition Diagnosis: Increased protein-calorie needs RT increased demand for protein-calorie intake AEB sepsis/wound healing     Active [ x  ]  Resolved [   ]    If resolved, new PES:     Goal: Pt will meet % of EER per day via tolerated route     Recommendations:  1. Continue with current EN order + c/w 250ml FWF q6 to meet fluid needs, in addition to EN pt needs 650-900ml h2o to meet needs  2. Monitor for s/s intolerance; maintain aspiration precautions at all times  3. Monitor lytes and replete prn.  4. Trend weights weekly  5. Keep nutrition aligned with GOC at all times   6. C/w probiotic to aide in GI fn   7. Consider addition of immodium vs metamucil via PEG to aide in diarrhea    Education: n/a d/t mental status, family not present     Risk Level: High [   ] Moderate [ x  ] Low [   ] Admitting Diagnosis:   Patient is a 78y old  Female who presents with a chief complaint of Septic shock and acute respiratory failure (10 Dec 2018 08:35)      PAST MEDICAL & SURGICAL HISTORY:  Hypothyroidism  GERD (gastroesophageal reflux disease)  Obesity  DM (diabetes mellitus)  HLD (hyperlipidemia)  HTN (hypertension)  Status post total abdominal hysterectomy and bilateral salpingo-oophorectomy  S/P small bowel resection  H/O ventral hernia repair      Current Nutrition Order: Glucerna 1.5 Nicholas @ 55mL/hr x 24hrs via PEG (1320 mL TV, 1980kcal, 109g protein, 1002mL free H2O)    PO Intake: NPO w EN     GI Issues: chronic diarrhea per team, no noted n/v/c, rectal tube in place     Pain: appears comfortable, c/w AMS, no family at bedside     Skin Integrity: stage 2 pressure ulcer to sacrum noted    Labs:   12-10    147<H>  |  106  |  54<H>  ----------------------------<  152<H>  4.4   |  29  |  1.15    Ca    10.6<H>      10 Dec 2018 05:53  Phos  3.2     12-10  Mg     2.4     12-10      CAPILLARY BLOOD GLUCOSE      POCT Blood Glucose.: 161 mg/dL (10 Dec 2018 08:37)  POCT Blood Glucose.: 162 mg/dL (10 Dec 2018 06:03)  POCT Blood Glucose.: 179 mg/dL (09 Dec 2018 23:50)  POCT Blood Glucose.: 134 mg/dL (09 Dec 2018 17:21)  POCT Blood Glucose.: 153 mg/dL (09 Dec 2018 12:27)      Medications:  MEDICATIONS  (STANDING):  acetylcysteine 10%  Inhalation 3 milliLiter(s) Inhalation every 6 hours  ALBUTerol/ipratropium for Nebulization 3 milliLiter(s) Nebulizer every 6 hours  amLODIPine   Tablet 10 milliGRAM(s) Oral every 24 hours  artificial  tears Solution 1 Drop(s) Both EYES two times a day  dextrose 5% + sodium chloride 0.45%. 1000 milliLiter(s) (100 mL/Hr) IV Continuous <Continuous>  dextrose 5%. 1000 milliLiter(s) (50 mL/Hr) IV Continuous <Continuous>  dextrose 50% Injectable 12.5 Gram(s) IV Push once  dextrose 50% Injectable 25 Gram(s) IV Push once  dextrose 50% Injectable 25 Gram(s) IV Push once  heparin  Injectable 5000 Unit(s) SubCutaneous every 8 hours  hydrALAZINE 10 milliGRAM(s) Oral every 8 hours  insulin lispro (HumaLOG) corrective regimen sliding scale   SubCutaneous every 6 hours  lactobacillus acidophilus 1 Tablet(s) Oral every 8 hours  levothyroxine 150 MICROGram(s) Oral daily  lisinopril 40 milliGRAM(s) Oral daily  pantoprazole   Suspension 40 milliGRAM(s) Enteral Tube daily    MEDICATIONS  (PRN):  acetaminophen    Suspension .. 650 milliGRAM(s) Oral every 6 hours PRN Temp greater or equal to 38C (100.4F)  dextrose 40% Gel 15 Gram(s) Oral once PRN Blood Glucose LESS THAN 70 milliGRAM(s)/deciliter  glucagon  Injectable 1 milliGRAM(s) IntraMuscular once PRN Glucose LESS THAN 70 milligrams/deciliter      Weight: 78kg (12/4)   81.6kg (11/26)  69.2kg (11/16)    Weight Change: noted large discrepancy, after re-weigh wt consistent +/- 1-2kg likely in setting of fluid shifts      Nutrition Focused Physical Exam: Completed [   ]  Not Pertinent [  x ]    Estimated energy needs:   Utilized IBW (54.5kg) to calculate needs, pt >120% of IBW. Adjusted for sepsis/wound healing  Calories: 25-30 kcal/kg =1362-1635kcal/day  Protein: 1.2-1.4 g/kg = 65-76g protein/day  Fluids: 30-35 mL/kg = 4823-0427 mL/day    Subjective:   77y/o F presenting with septic shock likely 2/2 PNA vs UTI and acute hypoxic respiratory failure initially requiring intubation. Pt extubated on 11/19. S/p transfer back to MICU on 11/30 for bronchoscopy 2/2 mucous plugging. Mucous removed and pt was able to remain on NC, now off of NC + sepsis has resolved. Pt later transferred back to [facility], and now moved to [facility] for further monitoring, has finished course of abx, diarrhea likely exacerbated in setting of abx. EN running at goal of 55mL/hr with good tolerance, per team c/w diarrhea. F/u SLP eval 12/7, recommending continue w NPO status + EN to meet needs d/t aspiration risk. Pt seen in room, resting in bed, appears comfortable, still with confusion. Per discussion w MD this AM concerned pt is now hypernatremic, initiated FWF this AM, recommend c/w FWF + probiotics to aide w diarrhea, if diarrhea persists recommend immodium via PEG. C/w 250ml FWF q6 to meet fluid needs, in addition to EN pt needs 650-900ml h2o to meet needs, pt needs at minimum 30ml flushes q4hr to maintain tube patency/ to flush meds. Plan to go to rehab within the week per team. Will continue to keep nutrition aligned with GOC at all times.     Previous Nutrition Diagnosis: Increased protein-calorie needs RT increased demand for protein-calorie intake AEB sepsis/wound healing     Active [ x  ]  Resolved [   ]    If resolved, new PES:     Goal: Pt will meet % of EER per day via tolerated route     Recommendations:  1. Continue with current EN order + c/w 250ml FWF q6 to meet fluid needs, in addition to EN pt needs 650-900ml h2o to meet needs  2. Monitor for s/s intolerance; maintain aspiration precautions at all times  3. Monitor lytes and replete prn.  4. Trend weights weekly  5. Keep nutrition aligned with GOC at all times   6. C/w probiotic to aide in GI fn   7. Consider addition of immodium vs metamucil via PEG to aide in diarrhea    discussed w team this AM    Education: n/a d/t mental status, family not present     Risk Level: High [   ] Moderate [ x  ] Low [   ] Admitting Diagnosis:   Patient is a 78y old  Female who presents with a chief complaint of Septic shock and acute respiratory failure (10 Dec 2018 08:35)      PAST MEDICAL & SURGICAL HISTORY:  Hypothyroidism  GERD (gastroesophageal reflux disease)  Obesity  DM (diabetes mellitus)  HLD (hyperlipidemia)  HTN (hypertension)  Status post total abdominal hysterectomy and bilateral salpingo-oophorectomy  S/P small bowel resection  H/O ventral hernia repair      Current Nutrition Order: Glucerna 1.5 Nicholas @ 55mL/hr x 24hrs via PEG (1320 mL TV, 1980kcal, 109g protein, 1002mL free H2O)    PO Intake: NPO w EN     GI Issues: chronic diarrhea per team, no noted n/v/c, rectal tube in place     Pain: appears comfortable, c/w AMS, no family at bedside     Skin Integrity: stage 2 pressure ulcer to sacrum noted    Labs:   12-10    147<H>  |  106  |  54<H>  ----------------------------<  152<H>  4.4   |  29  |  1.15    Ca    10.6<H>      10 Dec 2018 05:53  Phos  3.2     12-10  Mg     2.4     12-10      CAPILLARY BLOOD GLUCOSE      POCT Blood Glucose.: 161 mg/dL (10 Dec 2018 08:37)  POCT Blood Glucose.: 162 mg/dL (10 Dec 2018 06:03)  POCT Blood Glucose.: 179 mg/dL (09 Dec 2018 23:50)  POCT Blood Glucose.: 134 mg/dL (09 Dec 2018 17:21)  POCT Blood Glucose.: 153 mg/dL (09 Dec 2018 12:27)      Medications:  MEDICATIONS  (STANDING):  acetylcysteine 10%  Inhalation 3 milliLiter(s) Inhalation every 6 hours  ALBUTerol/ipratropium for Nebulization 3 milliLiter(s) Nebulizer every 6 hours  amLODIPine   Tablet 10 milliGRAM(s) Oral every 24 hours  artificial  tears Solution 1 Drop(s) Both EYES two times a day  dextrose 5% + sodium chloride 0.45%. 1000 milliLiter(s) (100 mL/Hr) IV Continuous <Continuous>  dextrose 5%. 1000 milliLiter(s) (50 mL/Hr) IV Continuous <Continuous>  dextrose 50% Injectable 12.5 Gram(s) IV Push once  dextrose 50% Injectable 25 Gram(s) IV Push once  dextrose 50% Injectable 25 Gram(s) IV Push once  heparin  Injectable 5000 Unit(s) SubCutaneous every 8 hours  hydrALAZINE 10 milliGRAM(s) Oral every 8 hours  insulin lispro (HumaLOG) corrective regimen sliding scale   SubCutaneous every 6 hours  lactobacillus acidophilus 1 Tablet(s) Oral every 8 hours  levothyroxine 150 MICROGram(s) Oral daily  lisinopril 40 milliGRAM(s) Oral daily  pantoprazole   Suspension 40 milliGRAM(s) Enteral Tube daily    MEDICATIONS  (PRN):  acetaminophen    Suspension .. 650 milliGRAM(s) Oral every 6 hours PRN Temp greater or equal to 38C (100.4F)  dextrose 40% Gel 15 Gram(s) Oral once PRN Blood Glucose LESS THAN 70 milliGRAM(s)/deciliter  glucagon  Injectable 1 milliGRAM(s) IntraMuscular once PRN Glucose LESS THAN 70 milligrams/deciliter      Weight: 78kg (12/4)   81.6kg (11/26)  69.2kg (11/16)    Weight Change: noted large discrepancy, after re-weigh wt consistent +/- 1-2kg likely in setting of fluid shifts      Nutrition Focused Physical Exam: Completed [   ]  Not Pertinent [  x ]    Estimated energy needs:   Utilized IBW (54.5kg) to calculate needs, pt >120% of IBW. Adjusted for sepsis/wound healing  Calories: 25-30 kcal/kg =1362-1635kcal/day  Protein: 1.2-1.4 g/kg = 65-76g protein/day  Fluids: 30-35 mL/kg = 0959-4185 mL/day    Subjective:   77y/o F presenting with septic shock likely 2/2 PNA vs UTI and acute hypoxic respiratory failure initially requiring intubation. Pt extubated on 11/19. S/p transfer back to MICU on 11/30 for bronchoscopy 2/2 mucous plugging. Mucous removed and pt was able to remain on NC, now off of NC + sepsis has resolved. Pt later transferred back to [facility], and now moved to [facility] for further monitoring, has finished course of abx, diarrhea likely exacerbated in setting of abx. EN running at goal of 55mL/hr with good tolerance, per team c/w diarrhea. F/u SLP eval 12/7, recommending continue w NPO status + EN to meet needs d/t aspiration risk. Pt seen in room, resting in bed, appears comfortable, still with confusion. Per discussion w MD this AM concerned pt is now hypernatremic Na 147 (H), initiated FWF this AM, recommend c/w FWF + probiotics to aide w diarrhea, if diarrhea persists recommend immodium via PEG. C/w 250ml FWF q6 to meet fluid needs, in addition to EN pt needs 650-900ml h2o to meet needs, pt needs at minimum 30ml flushes q4hr to maintain tube patency/ to flush meds. Plan to go to rehab within the week per team. Will continue to keep nutrition aligned with GOC at all times.     Previous Nutrition Diagnosis: Increased protein-calorie needs RT increased demand for protein-calorie intake AEB sepsis/wound healing     Active [ x  ]  Resolved [   ]    If resolved, new PES:     Goal: Pt will meet % of EER per day via tolerated route     Recommendations:  1. Continue with current EN order + c/w 250ml FWF q6 to meet fluid needs, in addition to EN pt needs 650-900ml h2o to meet needs  2. Monitor for s/s intolerance; maintain aspiration precautions at all times  3. Monitor lytes and replete prn.  4. Trend weights weekly  5. Keep nutrition aligned with GOC at all times   6. C/w probiotic to aide in GI fn   7. Consider addition of immodium vs metamucil via PEG to aide in diarrhea    discussed w team this AM    Education: n/a d/t mental status, family not present     Risk Level: High [   ] Moderate [ x  ] Low [   ]

## 2018-12-10 NOTE — PROGRESS NOTE ADULT - SUBJECTIVE AND OBJECTIVE BOX
OVERNIGHT EVENTS: No acute overnight events.     SUBJECTIVE / INTERVAL HPI: Patient seen and examined at bedside. Denies CP, SOB, abdominal pain, nausea, vomiting.     VITAL SIGNS:  Vital Signs Last 24 Hrs  T(C): 36.9 (10 Dec 2018 06:00), Max: 36.9 (10 Dec 2018 06:00)  T(F): 98.4 (10 Dec 2018 06:00), Max: 98.4 (10 Dec 2018 06:00)  HR: 104 (10 Dec 2018 06:00) (104 - 111)  BP: 137/81 (10 Dec 2018 06:00) (137/81 - 158/93)  BP(mean): --  RR: 18 (10 Dec 2018 06:00) (18 - 20)  SpO2: 94% (10 Dec 2018 06:00) (94% - 97%)    PHYSICAL EXAM:    GENERAL: Elderly female, resting comfortably, in NAD, reluctant to respond verbally but shakes head yes or no  HEAD:  Atraumatic, Normocephalic  EYES: EOMI, PERRLA, conjunctiva and sclera clear  ENT: No tonsillar erythema, exudates, or enlargement; Moist mucous membranes.  NECK: Supple, No JVD, Normal thyroid, no enlarged nodes  CHEST/LUNG: decreased BS over bilater lower lung fields anteriorly. good air entry b/l. no w/r/r.  HEART: S1S2 normal, Regular rate and rhythm; No murmurs, rubs, or gallops  ABDOMEN: Soft, Nontender, Nondistended; +BS. Peg in place, no surrounding erythema or purulence.   EXTREMITIES:  2+ Peripheral Pulses, No clubbing, cyanosis, or edema  NERVOUS SYSTEM:  Alert & Oriented x1 to person, strength and sensation grossly intact throughout  LYMPH: No lymphadenopathy noted  SKIN: No rashes or lesions    MEDICATIONS:  MEDICATIONS  (STANDING):  acetylcysteine 10%  Inhalation 3 milliLiter(s) Inhalation every 6 hours  ALBUTerol/ipratropium for Nebulization 3 milliLiter(s) Nebulizer every 6 hours  amLODIPine   Tablet 10 milliGRAM(s) Oral every 24 hours  artificial  tears Solution 1 Drop(s) Both EYES two times a day  dextrose 5%. 1000 milliLiter(s) (50 mL/Hr) IV Continuous <Continuous>  dextrose 50% Injectable 12.5 Gram(s) IV Push once  dextrose 50% Injectable 25 Gram(s) IV Push once  dextrose 50% Injectable 25 Gram(s) IV Push once  heparin  Injectable 5000 Unit(s) SubCutaneous every 8 hours  hydrochlorothiazide 12.5 milliGRAM(s) Oral daily  insulin lispro (HumaLOG) corrective regimen sliding scale   SubCutaneous every 6 hours  lactobacillus acidophilus 1 Tablet(s) Oral every 8 hours  levothyroxine 150 MICROGram(s) Oral daily  lisinopril 40 milliGRAM(s) Oral daily  pantoprazole   Suspension 40 milliGRAM(s) Enteral Tube daily  polyethylene glycol 3350 17 Gram(s) Oral daily  sodium chloride 0.9%. 1000 milliLiter(s) (100 mL/Hr) IV Continuous <Continuous>    MEDICATIONS  (PRN):  acetaminophen    Suspension .. 650 milliGRAM(s) Oral every 6 hours PRN Temp greater or equal to 38C (100.4F)  dextrose 40% Gel 15 Gram(s) Oral once PRN Blood Glucose LESS THAN 70 milliGRAM(s)/deciliter  glucagon  Injectable 1 milliGRAM(s) IntraMuscular once PRN Glucose LESS THAN 70 milligrams/deciliter      ALLERGIES:  Allergies    No Known Allergies    Intolerances        LABS:                        8.8    9.9   )-----------( 298      ( 10 Dec 2018 05:53 )             30.0     12-10    147<H>  |  106  |  54<H>  ----------------------------<  152<H>  4.4   |  29  |  1.15    Ca    10.6<H>      10 Dec 2018 05:53  Phos  3.2     12-10  Mg     2.4     12-10          CAPILLARY BLOOD GLUCOSE      POCT Blood Glucose.: 162 mg/dL (10 Dec 2018 06:03)      RADIOLOGY & ADDITIONAL TESTS: Reviewed.

## 2018-12-10 NOTE — PROGRESS NOTE ADULT - PROBLEM SELECTOR PLAN 8
F: none  E: K>4 Mag>2   N: tube feeds, glucerna 1.5 via PEG 55cc/hr, 250cc free water flushes x4 for hypernatremia

## 2018-12-10 NOTE — PROGRESS NOTE ADULT - PROBLEM SELECTOR PLAN 1
Resolved  - Pt noted to have fever on 11/29 100.7 with tachycardia. CXR with RLL infiltrate. Repeat sputum cx (12/1) positive for pseudomonas/klebsiella, and ESBL Ecoli. Pt started empirically on Zosyn 4.5 g q6h escalated to meropenem based on sensitivities on 12/2; now s/p meropenem 5 day course ended 12/7  - Bcx from 12/3 negative    #Sinus tachycardia  - likely 2/2 intravascular depletion 2/2 GI losses with chronic diarrhea  - IVF maintenance    #Hypernatremia  - Pt with uptrending Na, 146 this am  - Likely 2/2 feeds vs chronic diarrhea  - Free water flushes, maintenance IVF  - Reconsult nutrition for further recommendations - Pt became increasingly tachycardic from 90s to low 100s  - Likely 2/2 intravascular volume depletion from GI losses (chronic diarrhea) and diuretic (HCTZ), also supported by rise in BUN 54/Cr 1.15 ratio  - s/p IVF 500cc bolus over weekend  - Maintenance IVF    #Hypernatremia  - Pt with uptrending Na, 146 this am  - Likely 2/2 feeds vs chronic diarrhea  - Free water deficit 1.5L  - Free water flushes 250cc q6 hrs, maintenance IVF D5 1/2NS at 100cc/hr  - Reconsult nutrition for further recommendations on feeds

## 2018-12-10 NOTE — PROGRESS NOTE ADULT - PROBLEM SELECTOR PLAN 5
Pt presenting with PMH of HTN. Currently normotensive on the following regimen:  -continue home lisinopril 40 mg daily  -amlodipine 10 mg daily Pt with hx of sm carola perforation, recently admitted 4/15/18 due to MSSA bacteremia and MDR E Coli in urine, transferred to [facility], and found to have extravasation on CT abd, s/p IR drain placement, underwent DAVY BSO 2/2 tubal metaplasia, SBR with primary anastomosis, abdominal washout, PEG placement, discharged on 6/4  - surgery was following, now signed off  - PEG in place, c/w glucerna feeds at 55cc/hr  - pt was on standing reglan q12 for bowel pro-motility, d/c due to concern for possible upper extremity rigidity  - speech and swallow eval - NPO

## 2018-12-10 NOTE — PROGRESS NOTE ADULT - PROBLEM SELECTOR PLAN 3
Pt with hx of sm carola perforation, recently admitted 4/15/18 due to MSSA bacteremia and MDR E Coli in urine, transferred to [facility], and found to have extravasation on CT abd, s/p IR drain placement, underwent DAVY BSO 2/2 tubal metaplasia, SBR with primary anastomosis, abdominal washout, PEG placement, discharged on 6/4  - surgery was following, now signed off  - PEG in place, c/w glucerna feeds at 55cc/hr  - pt was on standing reglan q12 for bowel pro-motility, d/c due to concern for possible upper extremity rigidity  - speech and swallow eval - NPO Resolved  - Pt noted to have fever on 11/29 100.7 with tachycardia. CXR with RLL infiltrate. Repeat sputum cx (12/1) positive for pseudomonas/klebsiella, and ESBL Ecoli. Pt started empirically on Zosyn 4.5 g q6h escalated to meropenem based on sensitivities on 12/2; now s/p meropenem 5 day course ended 12/7  - Bcx from 12/3 negative

## 2018-12-10 NOTE — PROGRESS NOTE ADULT - PROBLEM SELECTOR PLAN 4
RESOLVED. Pt initially admitted on 11/16/18 for sepsis 2/2 HAP and ESBL UTIon admission, hypotensive in MICU requiring levophed and midodrine s/p course of cefazolin for MSSA and ciprofloxacin for UTI (ESBL sn to this). Pt with frequent mucus plugging throughout hospital course s/p percussion vest, s/p bronchoscopy 11/30  - c/w mucomyst   - c/w q4hr suctioning    #Chronic diarrhea 2/2 small bowel resection  - c/w probiotics daily    #Dermatitis of b/l buttocks  -daily wound care  -rectal tube in place - continue in setting of decub ulcer    #Anemia   - BL Hgb 8-9; no signs of active bleeding; H&H stable  - B12, folate wnl  - Fe studies with normal iron sat and slightly low TIBC, remaining values wnl  - maintain active type and screen  - transfuse for Hgb <7

## 2018-12-10 NOTE — PROGRESS NOTE ADULT - PROBLEM SELECTOR PLAN 10
1) PCP Contacted on Admission: (Y) --> Name & Phone #: MORA WYNN [phone]  2) Date of Contact with PCP: 12/7/18  3) PCP Contacted at Discharge: (Y/N, N/A)  4) Summary of Handoff Given to PCP: NA  5) Post-Discharge Appointment Date and Location: Dr Wynn 12/26/18 at 8:30 am, [address]

## 2018-12-10 NOTE — PROGRESS NOTE ADULT - PROBLEM SELECTOR PLAN 2
Pt with frequent mucus plugging throughout hospital course s/p percussion vest, s/p bronchoscopy 11/30  - c/w mucomyst   - c/w q4hr suctioning    #Chronic diarrhea 2/2 small bowel resection  - c/w probiotics daily    #Dermatitis of b/l buttocks  -daily wound care  -rectal tube in place - continue in setting of decub ulcer    #Anemia   - BL Hgb 8-9; no signs of active bleeding; H&H stable  - B12, folate wnl  - Fe studies with normal iron sat and slightly low TIBC, remaining values wnl  - maintain active type and screen  - transfuse for Hgb <7 Pt presenting with PMH of HTN. Currently normotensive on the following regimen:  -c/w home lisinopril 40 mg daily  -c/w amlodipine 10 mg daily  - D/c HCTZ, likely contributing to intravascular depletion  - Start hydralazine 10mg q8h as pt currently hypertensive

## 2018-12-11 LAB
ANION GAP SERPL CALC-SCNC: 9 MMOL/L — SIGNIFICANT CHANGE UP (ref 5–17)
BUN SERPL-MCNC: 49 MG/DL — HIGH (ref 7–23)
CALCIUM SERPL-MCNC: 10.2 MG/DL — SIGNIFICANT CHANGE UP (ref 8.4–10.5)
CHLORIDE SERPL-SCNC: 106 MMOL/L — SIGNIFICANT CHANGE UP (ref 96–108)
CO2 SERPL-SCNC: 28 MMOL/L — SIGNIFICANT CHANGE UP (ref 22–31)
CREAT SERPL-MCNC: 1.02 MG/DL — SIGNIFICANT CHANGE UP (ref 0.5–1.3)
GLUCOSE BLDC GLUCOMTR-MCNC: 111 MG/DL — HIGH (ref 70–99)
GLUCOSE BLDC GLUCOMTR-MCNC: 152 MG/DL — HIGH (ref 70–99)
GLUCOSE BLDC GLUCOMTR-MCNC: 165 MG/DL — HIGH (ref 70–99)
GLUCOSE BLDC GLUCOMTR-MCNC: 168 MG/DL — HIGH (ref 70–99)
GLUCOSE BLDC GLUCOMTR-MCNC: 190 MG/DL — HIGH (ref 70–99)
GLUCOSE SERPL-MCNC: 155 MG/DL — HIGH (ref 70–99)
HCT VFR BLD CALC: 29.5 % — LOW (ref 34.5–45)
HGB BLD-MCNC: 8.7 G/DL — LOW (ref 11.5–15.5)
MAGNESIUM SERPL-MCNC: 2 MG/DL — SIGNIFICANT CHANGE UP (ref 1.6–2.6)
MCHC RBC-ENTMCNC: 29.2 PG — SIGNIFICANT CHANGE UP (ref 27–34)
MCHC RBC-ENTMCNC: 29.5 G/DL — LOW (ref 32–36)
MCV RBC AUTO: 99 FL — SIGNIFICANT CHANGE UP (ref 80–100)
PLATELET # BLD AUTO: 253 K/UL — SIGNIFICANT CHANGE UP (ref 150–400)
POTASSIUM SERPL-MCNC: 4 MMOL/L — SIGNIFICANT CHANGE UP (ref 3.5–5.3)
POTASSIUM SERPL-SCNC: 4 MMOL/L — SIGNIFICANT CHANGE UP (ref 3.5–5.3)
RBC # BLD: 2.98 M/UL — LOW (ref 3.8–5.2)
RBC # FLD: 18 % — HIGH (ref 10.3–16.9)
SODIUM SERPL-SCNC: 143 MMOL/L — SIGNIFICANT CHANGE UP (ref 135–145)
WBC # BLD: 9.1 K/UL — SIGNIFICANT CHANGE UP (ref 3.8–10.5)
WBC # FLD AUTO: 9.1 K/UL — SIGNIFICANT CHANGE UP (ref 3.8–10.5)

## 2018-12-11 PROCEDURE — 99233 SBSQ HOSP IP/OBS HIGH 50: CPT | Mod: GC

## 2018-12-11 RX ORDER — LOPERAMIDE HCL 2 MG
2 TABLET ORAL DAILY
Qty: 0 | Refills: 0 | Status: DISCONTINUED | OUTPATIENT
Start: 2018-12-11 | End: 2018-12-11

## 2018-12-11 RX ORDER — HYDRALAZINE HCL 50 MG
20 TABLET ORAL EVERY 8 HOURS
Qty: 0 | Refills: 0 | Status: DISCONTINUED | OUTPATIENT
Start: 2018-12-11 | End: 2018-12-13

## 2018-12-11 RX ORDER — LOPERAMIDE HCL 2 MG
2 TABLET ORAL ONCE
Qty: 0 | Refills: 0 | Status: COMPLETED | OUTPATIENT
Start: 2018-12-11 | End: 2018-12-11

## 2018-12-11 RX ORDER — SODIUM CHLORIDE 9 MG/ML
1000 INJECTION INTRAMUSCULAR; INTRAVENOUS; SUBCUTANEOUS
Qty: 0 | Refills: 0 | Status: DISCONTINUED | OUTPATIENT
Start: 2018-12-11 | End: 2018-12-15

## 2018-12-11 RX ORDER — OXYCODONE AND ACETAMINOPHEN 5; 325 MG/1; MG/1
1 TABLET ORAL EVERY 4 HOURS
Qty: 0 | Refills: 0 | Status: DISCONTINUED | OUTPATIENT
Start: 2018-12-11 | End: 2018-12-17

## 2018-12-11 RX ADMIN — Medication 20 MILLIGRAM(S): at 22:20

## 2018-12-11 RX ADMIN — Medication 3 MILLILITER(S): at 19:51

## 2018-12-11 RX ADMIN — PANTOPRAZOLE SODIUM 40 MILLIGRAM(S): 20 TABLET, DELAYED RELEASE ORAL at 11:54

## 2018-12-11 RX ADMIN — HEPARIN SODIUM 5000 UNIT(S): 5000 INJECTION INTRAVENOUS; SUBCUTANEOUS at 22:21

## 2018-12-11 RX ADMIN — Medication 1 DROP(S): at 18:29

## 2018-12-11 RX ADMIN — Medication 1 TABLET(S): at 22:21

## 2018-12-11 RX ADMIN — AMLODIPINE BESYLATE 10 MILLIGRAM(S): 2.5 TABLET ORAL at 18:31

## 2018-12-11 RX ADMIN — Medication 2 MILLIGRAM(S): at 11:53

## 2018-12-11 RX ADMIN — HEPARIN SODIUM 5000 UNIT(S): 5000 INJECTION INTRAVENOUS; SUBCUTANEOUS at 07:03

## 2018-12-11 RX ADMIN — Medication 10 MILLIGRAM(S): at 07:02

## 2018-12-11 RX ADMIN — SODIUM CHLORIDE 100 MILLILITER(S): 9 INJECTION INTRAMUSCULAR; INTRAVENOUS; SUBCUTANEOUS at 12:39

## 2018-12-11 RX ADMIN — Medication 2: at 12:39

## 2018-12-11 RX ADMIN — Medication 150 MICROGRAM(S): at 07:02

## 2018-12-11 RX ADMIN — OXYCODONE AND ACETAMINOPHEN 1 TABLET(S): 5; 325 TABLET ORAL at 23:20

## 2018-12-11 RX ADMIN — Medication 20 MILLIGRAM(S): at 14:55

## 2018-12-11 RX ADMIN — Medication 2: at 18:30

## 2018-12-11 RX ADMIN — LISINOPRIL 40 MILLIGRAM(S): 2.5 TABLET ORAL at 07:02

## 2018-12-11 RX ADMIN — Medication 1 TABLET(S): at 14:54

## 2018-12-11 RX ADMIN — Medication 2: at 07:03

## 2018-12-11 RX ADMIN — Medication 3 MILLILITER(S): at 14:54

## 2018-12-11 RX ADMIN — Medication 1 TABLET(S): at 07:02

## 2018-12-11 RX ADMIN — Medication 3 MILLILITER(S): at 07:01

## 2018-12-11 RX ADMIN — Medication 3 MILLILITER(S): at 07:02

## 2018-12-11 RX ADMIN — Medication 1 DROP(S): at 07:03

## 2018-12-11 RX ADMIN — OXYCODONE AND ACETAMINOPHEN 1 TABLET(S): 5; 325 TABLET ORAL at 22:20

## 2018-12-11 RX ADMIN — HEPARIN SODIUM 5000 UNIT(S): 5000 INJECTION INTRAVENOUS; SUBCUTANEOUS at 14:55

## 2018-12-11 NOTE — PROGRESS NOTE ADULT - PROBLEM SELECTOR PLAN 10
1) PCP Contacted on Admission: (Y) --> Name & Phone #: MOAR WYNN [phone]  2) Date of Contact with PCP: 12/7/18  3) PCP Contacted at Discharge: (Y/N, N/A)  4) Summary of Handoff Given to PCP: NA  5) Post-Discharge Appointment Date and Location: Dr Wynn 12/26/18 at 8:30 am, [address]

## 2018-12-11 NOTE — PROGRESS NOTE ADULT - PROBLEM SELECTOR PLAN 1
- Stable in   - Likely 2/2 intravascular volume depletion from GI losses (chronic diarrhea) and prior diuretic (HCTZ) use, also supported by rise in BUN 54/Cr 1.15 ratio  - Maintenance IVF  - HCTZ d/c'ed    #Hypernatremia  - Improving  - Na downtrending (143 this am) with free water flushes and IVF  - C/w free water flushes 250cc q6 hrs  - d/c maintenance IVF D5 1/2NS at 100cc/hr  - Nutrition recommending c/w FWF, consider immodium for persistent diarrhea to counteract GI losses - Tachycardic to   - Likely 2/2 intravascular volume depletion from GI losses (chronic diarrhea) and prior diuretic (HCTZ) use, also supported by rise in BUN 54/Cr 1.15 ratio  - Maintenance IVF changed to NS at 100cc/hr  - HCTZ d/c'ed    #Hypernatremia  - Improving  - Na downtrending (143 this am) with free water flushes and IVF  - C/w free water flushes 250cc q6 hrs  - d/c maintenance IVF D5 1/2NS at 100cc/hr  - Nutrition recommending c/w FWF, immodium for persistent diarrhea to counteract GI losses  - give 1 dose of immodium 2mg via peg, monitor rectal tube output

## 2018-12-11 NOTE — PROGRESS NOTE ADULT - SUBJECTIVE AND OBJECTIVE BOX
OFF SERVICE NOTE    HOSPITAL COURSE: 78F w/ PMH of morbid obesity, DM, HTN, hypothyroidism, sepsis secondary to ventral hernia infection s/p repair, s/p small bowel resection, s/p PEG, PE s/p IVC filter, acute blood loss anemia from hemorrhagic uterine fibroids s/p TAHBSO, hx of UGIB, sepsis secondary to MSSA bacteremia and MDR E coli UTI (April 2018) admitted to [facility] MICU on 11/16/18  for septic shock 2/2 aspiration PNA vs ESBL UTI and hypoxic respiratory failure requiring pressor support and intubation with suctioning revealing purulent material. She was started on vanc/azithro/cipro (due to prior ESBL that was resistant to zosyn but sn to cipro) which was deescalated to cefazolin (sputum cx grew staph aureus sensitive to cefazolin). Pt also completed ciprofloxacin for concern of ESBL UTI (that was sn to this). Pt was successfully extubated and weaned down to NC; also weaned off pressors. MICU course c/b mucous plug with white out of right lung, which improved with percussion vest. 11/26 patient and son had long discussion with palliative care and it was decided to keep patient full code currently with a MOLST indicating as such. Pt stepped down to [facility] for further monitoring of respiratory status with frequent suctioning for secretions.  On 11/29, overnight pt noted to be tachycardic to 120s, febrile to 100.7 with CXR showing complete opacification of R lung with tracheal shift likely 2/2 to mucus plugging. Patient stepped back up to MICU and underwent bronchoscopy on 11/30 for recurrent mucus plugging, and had chest PT/percussion with improvement in opacification of R hemithorax. Pt transitioned to nasal canula, saturating 94-95% on room air.  Sputum cx from bronch grew pseudomonas and klebsiella, sensitive to zosyn, and pt started on Zosyn 4.5 g q6h and stepped back down to [place]. Sputum cx finalized as ESBL klebsiella and pseudomonas, abx escalated to meropenem 1g q12 (ID approved) to complete 5 day course ending 12/7/12.  Pt started on mucomyst for secretions. Hospital course also c/b episodic hypernatremia thought to be 2/2 tube feeds treated with D5W and FW flushes. Pt history also notable for chronic diarrhea 2/2 bowel resection, now requiring rectal tube for dermatitis associated with incontinence, which is  improved with probiotics. Patient stepped down to RMF. Pt completed 5 day course of meropenem for  ESBL klebsiella and pseudomonas PNA on 12/7. Pt noted to be increasingly tachycardic to 100-110 on 12/10, thought to be 2/2 intravascular depletion from chronic diarrhea and diuretic use. HCTZ d/c'ed. Bowel regimen d/c'ed. Nutrition reconsulted, recommending immodium. On 12/10, pt also started on free water flushes for hypernatremia on 12/10 and d5 1/2 NS maintenance IVF. Hydralazine started for optimization of BP. Pt currently clinically improved, hemodynamically stable, awaiting authorization for ERNESTINE.     OVERNIGHT EVENTS: No acute overnight events. No acute overnight events.    SUBJECTIVE / INTERVAL HPI: Patient seen and examined at bedside. Denies CP, SOB, abdominal pain, nausea, vomiting. Awaiting auth for ERNESTINE placement.    VITAL SIGNS:  Vital Signs Last 24 Hrs  T(C): 36.6 (11 Dec 2018 08:33), Max: 37.1 (11 Dec 2018 05:27)  T(F): 97.9 (11 Dec 2018 08:33), Max: 98.7 (11 Dec 2018 05:27)  HR: 102 (11 Dec 2018 08:33) (96 - 108)  BP: 138/82 (11 Dec 2018 08:33) (138/82 - 154/84)  BP(mean): --  RR: 18 (11 Dec 2018 08:33) (17 - 18)  SpO2: 98% (11 Dec 2018 08:33) (95% - 98%)    PHYSICAL EXAM:  GENERAL: Elderly female, resting comfortably, in NAD, responsive with 1-2 word answers to questions; shakes head yes or no  HEAD:  Atraumatic, Normocephalic  EYES: EOMI, PERRLA, conjunctiva and sclera clear  ENT: No tonsillar erythema, exudates, or enlargement; Moist mucous membranes.  NECK: Supple, No JVD, Normal thyroid, no enlarged nodes  CHEST/LUNG: CTA B/L good air entry b/l. no w/r/r.  HEART: S1S2 normal, Regular rate and rhythm; No murmurs, rubs, or gallops  ABDOMEN: Soft, Nontender, Nondistended; +BS. Peg in place, no surrounding erythema or purulence.   EXTREMITIES:  2+ Peripheral Pulses, No clubbing, cyanosis, or edema  NERVOUS SYSTEM:  Alert & Oriented x1 to person, strength and sensation grossly intact throughout  LYMPH: No lymphadenopathy noted  SKIN: No rashes or lesions      MEDICATIONS:  MEDICATIONS  (STANDING):  acetylcysteine 10%  Inhalation 3 milliLiter(s) Inhalation every 6 hours  ALBUTerol/ipratropium for Nebulization 3 milliLiter(s) Nebulizer every 6 hours  amLODIPine   Tablet 10 milliGRAM(s) Oral every 24 hours  artificial  tears Solution 1 Drop(s) Both EYES two times a day  dextrose 5%. 1000 milliLiter(s) (50 mL/Hr) IV Continuous <Continuous>  dextrose 50% Injectable 12.5 Gram(s) IV Push once  dextrose 50% Injectable 25 Gram(s) IV Push once  dextrose 50% Injectable 25 Gram(s) IV Push once  heparin  Injectable 5000 Unit(s) SubCutaneous every 8 hours  hydrALAZINE 10 milliGRAM(s) Oral every 8 hours  insulin lispro (HumaLOG) corrective regimen sliding scale   SubCutaneous every 6 hours  lactobacillus acidophilus 1 Tablet(s) Oral every 8 hours  levothyroxine 150 MICROGram(s) Oral daily  lisinopril 40 milliGRAM(s) Oral daily  pantoprazole   Suspension 40 milliGRAM(s) Enteral Tube daily    MEDICATIONS  (PRN):  acetaminophen    Suspension .. 650 milliGRAM(s) Oral every 6 hours PRN Temp greater or equal to 38C (100.4F)  dextrose 40% Gel 15 Gram(s) Oral once PRN Blood Glucose LESS THAN 70 milliGRAM(s)/deciliter  glucagon  Injectable 1 milliGRAM(s) IntraMuscular once PRN Glucose LESS THAN 70 milligrams/deciliter      ALLERGIES:  Allergies    No Known Allergies    Intolerances        LABS:                        8.7    9.1   )-----------( 253      ( 11 Dec 2018 06:40 )             29.5     12-11    143  |  106  |  49<H>  ----------------------------<  155<H>  4.0   |  28  |  1.02    Ca    10.2      11 Dec 2018 06:41  Phos  3.2     12-10  Mg     2.0     12-11          CAPILLARY BLOOD GLUCOSE      POCT Blood Glucose.: 190 mg/dL (11 Dec 2018 08:20)      RADIOLOGY & ADDITIONAL TESTS: Reviewed.

## 2018-12-11 NOTE — PROGRESS NOTE ADULT - PROBLEM SELECTOR PLAN 5
Pt with hx of sm carola perforation, recently admitted 4/15/18 due to MSSA bacteremia and MDR E Coli in urine, transferred to [facility], and found to have extravasation on CT abd, s/p IR drain placement, underwent DAVY BSO 2/2 tubal metaplasia, SBR with primary anastomosis, abdominal washout, PEG placement, discharged on 6/4  - surgery was following, now signed off  - PEG in place, c/w glucerna feeds at 55cc/hr  - pt was on standing reglan q12 for bowel pro-motility, d/c due to concern for possible upper extremity rigidity  - speech and swallow eval - NPO

## 2018-12-11 NOTE — PROGRESS NOTE ADULT - PROBLEM SELECTOR PLAN 3
Resolved  - Pt noted to have fever on 11/29 100.7 with tachycardia. CXR with RLL infiltrate. Repeat sputum cx (12/1) positive for pseudomonas/klebsiella, and ESBL Ecoli. Pt started empirically on Zosyn 4.5 g q6h escalated to meropenem based on sensitivities on 12/2; now s/p meropenem 5 day course ended 12/7  - Bcx from 12/3 negative

## 2018-12-11 NOTE — PROGRESS NOTE ADULT - PROBLEM SELECTOR PLAN 2
Pt presenting with PMH of HTN, currently hypertensive 139-153/76-84  - d/c IVF  -c/w home lisinopril 40 mg daily  -c/w amlodipine 10 mg daily  -c/w hydralazine 10mg q8h Pt presenting with PMH of HTN, currently hypertensive 139-153/76-84  -c/w home lisinopril 40 mg daily  -c/w amlodipine 10 mg daily  -increase hydralazine to 20mg q8h

## 2018-12-11 NOTE — PROGRESS NOTE ADULT - PROBLEM SELECTOR PLAN 4
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  - c/w probiotics daily    #Dermatitis of b/l buttocks  -daily wound care  -rectal tube in place - continue in setting of decub ulcer    #Anemia   - BL Hgb 8-9; no signs of active bleeding; H&H stable  - B12, folate wnl  - Fe studies with normal iron sat and slightly low TIBC, remaining values wnl  - maintain active type and screen  - transfuse for Hgb <7

## 2018-12-12 LAB
ANION GAP SERPL CALC-SCNC: 7 MMOL/L — SIGNIFICANT CHANGE UP (ref 5–17)
BUN SERPL-MCNC: 47 MG/DL — HIGH (ref 7–23)
CALCIUM SERPL-MCNC: 9.9 MG/DL — SIGNIFICANT CHANGE UP (ref 8.4–10.5)
CHLORIDE SERPL-SCNC: 109 MMOL/L — HIGH (ref 96–108)
CO2 SERPL-SCNC: 28 MMOL/L — SIGNIFICANT CHANGE UP (ref 22–31)
CREAT SERPL-MCNC: 1 MG/DL — SIGNIFICANT CHANGE UP (ref 0.5–1.3)
GLUCOSE BLDC GLUCOMTR-MCNC: 119 MG/DL — HIGH (ref 70–99)
GLUCOSE BLDC GLUCOMTR-MCNC: 133 MG/DL — HIGH (ref 70–99)
GLUCOSE BLDC GLUCOMTR-MCNC: 136 MG/DL — HIGH (ref 70–99)
GLUCOSE BLDC GLUCOMTR-MCNC: 147 MG/DL — HIGH (ref 70–99)
GLUCOSE BLDC GLUCOMTR-MCNC: 161 MG/DL — HIGH (ref 70–99)
GLUCOSE BLDC GLUCOMTR-MCNC: 167 MG/DL — HIGH (ref 70–99)
GLUCOSE SERPL-MCNC: 137 MG/DL — HIGH (ref 70–99)
HCT VFR BLD CALC: 27 % — LOW (ref 34.5–45)
HGB BLD-MCNC: 8.1 G/DL — LOW (ref 11.5–15.5)
MAGNESIUM SERPL-MCNC: 1.8 MG/DL — SIGNIFICANT CHANGE UP (ref 1.6–2.6)
MCHC RBC-ENTMCNC: 28.9 PG — SIGNIFICANT CHANGE UP (ref 27–34)
MCHC RBC-ENTMCNC: 30 G/DL — LOW (ref 32–36)
MCV RBC AUTO: 96.4 FL — SIGNIFICANT CHANGE UP (ref 80–100)
PLATELET # BLD AUTO: 239 K/UL — SIGNIFICANT CHANGE UP (ref 150–400)
POTASSIUM SERPL-MCNC: 3.9 MMOL/L — SIGNIFICANT CHANGE UP (ref 3.5–5.3)
POTASSIUM SERPL-SCNC: 3.9 MMOL/L — SIGNIFICANT CHANGE UP (ref 3.5–5.3)
RBC # BLD: 2.8 M/UL — LOW (ref 3.8–5.2)
RBC # FLD: 18 % — HIGH (ref 10.3–16.9)
SODIUM SERPL-SCNC: 144 MMOL/L — SIGNIFICANT CHANGE UP (ref 135–145)
WBC # BLD: 8.1 K/UL — SIGNIFICANT CHANGE UP (ref 3.8–10.5)
WBC # FLD AUTO: 8.1 K/UL — SIGNIFICANT CHANGE UP (ref 3.8–10.5)

## 2018-12-12 PROCEDURE — 99233 SBSQ HOSP IP/OBS HIGH 50: CPT | Mod: GC

## 2018-12-12 RX ORDER — SODIUM CHLORIDE 9 MG/ML
500 INJECTION INTRAMUSCULAR; INTRAVENOUS; SUBCUTANEOUS ONCE
Qty: 0 | Refills: 0 | Status: COMPLETED | OUTPATIENT
Start: 2018-12-12 | End: 2018-12-12

## 2018-12-12 RX ORDER — POTASSIUM CHLORIDE 20 MEQ
10 PACKET (EA) ORAL ONCE
Qty: 0 | Refills: 0 | Status: COMPLETED | OUTPATIENT
Start: 2018-12-12 | End: 2018-12-12

## 2018-12-12 RX ORDER — MAGNESIUM SULFATE 500 MG/ML
2 VIAL (ML) INJECTION ONCE
Qty: 0 | Refills: 0 | Status: COMPLETED | OUTPATIENT
Start: 2018-12-12 | End: 2018-12-12

## 2018-12-12 RX ADMIN — Medication 1 TABLET(S): at 13:49

## 2018-12-12 RX ADMIN — AMLODIPINE BESYLATE 10 MILLIGRAM(S): 2.5 TABLET ORAL at 17:49

## 2018-12-12 RX ADMIN — SODIUM CHLORIDE 100 MILLILITER(S): 9 INJECTION INTRAMUSCULAR; INTRAVENOUS; SUBCUTANEOUS at 10:18

## 2018-12-12 RX ADMIN — Medication 50 GRAM(S): at 08:22

## 2018-12-12 RX ADMIN — HEPARIN SODIUM 5000 UNIT(S): 5000 INJECTION INTRAVENOUS; SUBCUTANEOUS at 22:07

## 2018-12-12 RX ADMIN — Medication 3 MILLILITER(S): at 01:12

## 2018-12-12 RX ADMIN — SODIUM CHLORIDE 500 MILLILITER(S): 9 INJECTION INTRAMUSCULAR; INTRAVENOUS; SUBCUTANEOUS at 10:19

## 2018-12-12 RX ADMIN — HEPARIN SODIUM 5000 UNIT(S): 5000 INJECTION INTRAVENOUS; SUBCUTANEOUS at 13:17

## 2018-12-12 RX ADMIN — Medication 1 TABLET(S): at 22:06

## 2018-12-12 RX ADMIN — Medication 3 MILLILITER(S): at 13:19

## 2018-12-12 RX ADMIN — Medication 3 MILLILITER(S): at 17:52

## 2018-12-12 RX ADMIN — SODIUM CHLORIDE 100 MILLILITER(S): 9 INJECTION INTRAMUSCULAR; INTRAVENOUS; SUBCUTANEOUS at 17:48

## 2018-12-12 RX ADMIN — Medication 100 MILLIEQUIVALENT(S): at 07:07

## 2018-12-12 RX ADMIN — Medication 1 TABLET(S): at 06:36

## 2018-12-12 RX ADMIN — Medication 3 MILLILITER(S): at 13:18

## 2018-12-12 RX ADMIN — Medication 150 MICROGRAM(S): at 06:32

## 2018-12-12 RX ADMIN — Medication 3 MILLILITER(S): at 06:33

## 2018-12-12 RX ADMIN — HEPARIN SODIUM 5000 UNIT(S): 5000 INJECTION INTRAVENOUS; SUBCUTANEOUS at 06:33

## 2018-12-12 RX ADMIN — Medication 20 MILLIGRAM(S): at 06:32

## 2018-12-12 RX ADMIN — Medication 2: at 07:45

## 2018-12-12 RX ADMIN — Medication 2: at 00:46

## 2018-12-12 RX ADMIN — Medication 3 MILLILITER(S): at 17:51

## 2018-12-12 RX ADMIN — Medication 20 MILLIGRAM(S): at 22:06

## 2018-12-12 RX ADMIN — PANTOPRAZOLE SODIUM 40 MILLIGRAM(S): 20 TABLET, DELAYED RELEASE ORAL at 13:18

## 2018-12-12 RX ADMIN — Medication 1 DROP(S): at 06:33

## 2018-12-12 RX ADMIN — SODIUM CHLORIDE 100 MILLILITER(S): 9 INJECTION INTRAMUSCULAR; INTRAVENOUS; SUBCUTANEOUS at 22:06

## 2018-12-12 RX ADMIN — Medication 20 MILLIGRAM(S): at 13:17

## 2018-12-12 RX ADMIN — Medication 3 MILLILITER(S): at 22:06

## 2018-12-12 RX ADMIN — LISINOPRIL 40 MILLIGRAM(S): 2.5 TABLET ORAL at 06:31

## 2018-12-12 NOTE — PROGRESS NOTE ADULT - PROBLEM SELECTOR PLAN 5
Pt with hx of sm carola perforation, recently admitted 4/15/18 due to MSSA bacteremia and MDR E Coli in urine, transferred to [facility], and found to have extravasation on CT abd, s/p IR drain placement, underwent DAVY BSO 2/2 tubal metaplasia, SBR with primary anastomosis, abdominal washout, PEG placement, discharged on 6/4  - surgery was following, now signed off  - PEG in place, c/w glucerna feeds at 55cc/hr  - pt was on standing reglan q12 for bowel pro-motility, d/c due to concern for possible upper extremity rigidity  - speech and swallow eval - NPO

## 2018-12-12 NOTE — PROGRESS NOTE ADULT - PROBLEM SELECTOR PLAN 4
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  - c/w probiotics daily  -s/p trial of immodium    #Dermatitis of b/l buttocks  -daily wound care  -rectal tube in place - continue in setting of decub ulcer    #Anemia   - BL Hgb 8-9; no signs of active bleeding; H&H stable  - B12, folate wnl  - Fe studies with normal iron sat and slightly low TIBC, remaining values wnl  - maintain active type and screen  - transfuse for Hgb <7

## 2018-12-12 NOTE — PROGRESS NOTE ADULT - PROBLEM SELECTOR PLAN 9
Dvt ppx: hep sq  GI ppx: protonix    Code Status: FULL CODE    Dispo: [facility]

## 2018-12-12 NOTE — PROGRESS NOTE ADULT - PROBLEM SELECTOR PLAN 1
Non-tachycardic this am but borderline tachycardia noted in high 90s.   - Likely 2/2 intravascular volume depletion from GI losses (chronic diarrhea) and prior diuretic (HCTZ) use, also supported by rise in BUN 54/Cr 1.15 ratio. HCTZ was d/c-ed on 12/11. BUN/Cr improved to 47/1.00.   - Continue NS at 100cc/hr      #Hypernatremia  -Improving with free water flushes and IVF. Currently WNL at 144.   - C/w free water flushes 250cc q6 hrs  - Nutrition recommending c/w FWF, immodium for persistent diarrhea to counteract GI losses  - give 1 dose of immodium 2mg via peg, monitor rectal tube output. 500mL in the last 24hrs. Non-tachycardic this am but borderline tachycardia noted in high 90s.   - Likely 2/2 intravascular volume depletion from GI losses (chronic diarrhea) and prior diuretic (HCTZ) use, also supported by rise in BUN 54/Cr 1.15 ratio. HCTZ was d/c-ed on 12/11. BUN/Cr improved to 47/1.00.   - Continue NS at 100cc/hr  -Will give additional NS 500cc bolus.    #Hypernatremia  -Improving with free water flushes and IVF. Currently WNL at 144.   - C/w free water flushes 250cc q6 hrs. Will monitor and will consider increasing frequency of free water flushes.   - Nutrition recommending c/w FWF, immodium for persistent diarrhea to counteract GI losses  - give 1 dose of immodium 2mg via peg, monitor rectal tube output. 500mL in the last 24hrs.

## 2018-12-12 NOTE — PROGRESS NOTE ADULT - PROBLEM SELECTOR PLAN 10
1) PCP Contacted on Admission: (Y) --> Name & Phone #: MORA WYNN [phone]  2) Date of Contact with PCP: 12/7/18  3) PCP Contacted at Discharge: (Y/N, N/A)  4) Summary of Handoff Given to PCP: NA  5) Post-Discharge Appointment Date and Location: Dr Wynn 12/26/18 at 8:30 am, [address]

## 2018-12-12 NOTE — PROGRESS NOTE ADULT - SUBJECTIVE AND OBJECTIVE BOX
SUBJECTIVE / INTERVAL HPI: EMANUEL. Patient seen and examined at bedside. Patient currently without any complaints. She denies any headache, chest pain, difficulty breathing, abdominal pain, nausea, vomiting. No back pain today. Otherwise: (-) fever, (-) chills, (-) dizziness,  (-) neck pain, (-) palpitations,  (-)new weakness, (-) paresthesias.      VITAL SIGNS:  Vital Signs Last 24 Hrs  T(C): 37.1 (12 Dec 2018 04:53), Max: 37.2 (11 Dec 2018 21:52)  T(F): 98.7 (12 Dec 2018 04:53), Max: 99 (11 Dec 2018 21:52)  HR: 93 (12 Dec 2018 04:53) (93 - 104)  BP: 126/87 (12 Dec 2018 04:53) (122/72 - 152/81)  BP(mean): 105 (11 Dec 2018 21:52) (105 - 105)  RR: 18 (12 Dec 2018 04:53) (18 - 18)  SpO2: 98% (12 Dec 2018 04:53) (94% - 98%)    PHYSICAL EXAM:    General: well appearing, resting comfortably in bed and in no acute distress  HEENT: normocephalic, atraumatic, PERRL, anicteric sclera; no conjunctival pallor, mucus membranes moist  Neck: supple, no masses  Cardiovascular: +S1/S2, regular rate and rhythm; no murmurs, no rub, no gallop  Respiratory: clear to auscultation bilaterally, no rhonchi, no wheeze, no rales  Gastrointestinal: soft, active bowel sounds, non-tender, non-distended  Extremities: Warm, dry; no edema, clubbing or cyanosis  Vascular: 2+ radial, DP pulses bilaterally  Neurological: AAOx1 to person only. no focal deficits    MEDICATIONS:  MEDICATIONS  (STANDING):  acetylcysteine 10%  Inhalation 3 milliLiter(s) Inhalation every 6 hours  ALBUTerol/ipratropium for Nebulization 3 milliLiter(s) Nebulizer every 6 hours  amLODIPine   Tablet 10 milliGRAM(s) Oral every 24 hours  artificial  tears Solution 1 Drop(s) Both EYES two times a day  dextrose 5%. 1000 milliLiter(s) (50 mL/Hr) IV Continuous <Continuous>  dextrose 50% Injectable 12.5 Gram(s) IV Push once  dextrose 50% Injectable 25 Gram(s) IV Push once  dextrose 50% Injectable 25 Gram(s) IV Push once  heparin  Injectable 5000 Unit(s) SubCutaneous every 8 hours  hydrALAZINE 20 milliGRAM(s) Oral every 8 hours  insulin lispro (HumaLOG) corrective regimen sliding scale   SubCutaneous every 6 hours  lactobacillus acidophilus 1 Tablet(s) Oral every 8 hours  levothyroxine 150 MICROGram(s) Oral daily  lisinopril 40 milliGRAM(s) Oral daily  magnesium sulfate  IVPB 2 Gram(s) IV Intermittent once  pantoprazole   Suspension 40 milliGRAM(s) Enteral Tube daily  sodium chloride 0.9%. 1000 milliLiter(s) (100 mL/Hr) IV Continuous <Continuous>    MEDICATIONS  (PRN):  acetaminophen    Suspension .. 650 milliGRAM(s) Oral every 6 hours PRN Temp greater or equal to 38C (100.4F)  dextrose 40% Gel 15 Gram(s) Oral once PRN Blood Glucose LESS THAN 70 milliGRAM(s)/deciliter  glucagon  Injectable 1 milliGRAM(s) IntraMuscular once PRN Glucose LESS THAN 70 milligrams/deciliter  oxyCODONE    5 mG/acetaminophen 325 mG 1 Tablet(s) Oral every 4 hours PRN Moderate Pain (4 - 6)      ALLERGIES:  Allergies    No Known Allergies    Intolerances        LABS:                        8.1    8.1   )-----------( 239      ( 12 Dec 2018 06:07 )             27.0     12-12    144  |  109<H>  |  47<H>  ----------------------------<  137<H>  3.9   |  28  |  1.00    Ca    9.9      12 Dec 2018 06:08  Mg     1.8     12-12          CAPILLARY BLOOD GLUCOSE      POCT Blood Glucose.: 161 mg/dL (12 Dec 2018 06:53)      RADIOLOGY & ADDITIONAL TESTS: Reviewed. SUBJECTIVE / INTERVAL HPI: EMANUEL. Patient seen and examined at bedside. Patient currently without any complaints. She denies any headache, chest pain, difficulty breathing, abdominal pain, nausea, vomiting. No back pain today. Otherwise: (-) fever, (-) chills, (-) dizziness,  (-) neck pain, (-) palpitations,  (-)new weakness, (-) paresthesias.      VITAL SIGNS:  Vital Signs Last 24 Hrs  T(C): 37.1 (12 Dec 2018 04:53), Max: 37.2 (11 Dec 2018 21:52)  T(F): 98.7 (12 Dec 2018 04:53), Max: 99 (11 Dec 2018 21:52)  HR: 93 (12 Dec 2018 04:53) (93 - 104)  BP: 126/87 (12 Dec 2018 04:53) (122/72 - 152/81)  BP(mean): 105 (11 Dec 2018 21:52) (105 - 105)  RR: 18 (12 Dec 2018 04:53) (18 - 18)  SpO2: 98% (12 Dec 2018 04:53) (94% - 98%)    PHYSICAL EXAM:    General: well appearing, resting comfortably in bed and in no acute distress. occasionally will respond in [place].   HEENT: normocephalic, atraumatic, PERRL, anicteric sclera; no conjunctival pallor, mucus membranes moist  Neck: supple, no masses  Cardiovascular: +S1/S2, regular rate and rhythm; no murmurs, no rub, no gallop.  Respiratory: clear to auscultation bilaterally, no rhonchi, no wheeze, no rales.,  decreased breath sounds on R but poor inspiratory effort.  Gastrointestinal: soft, obese, active bowel sounds, non-tender, non-distended, rectal tube in place. PEG in place. Clean dry and intact skin surrounding PEG.   Extremities: Warm, dry; no edema, clubbing or cyanosis  Vascular: 2+ radial, DP pulses bilaterally  Neurological: AAOx1 to person only. no focal deficits    MEDICATIONS:  MEDICATIONS  (STANDING):  acetylcysteine 10%  Inhalation 3 milliLiter(s) Inhalation every 6 hours  ALBUTerol/ipratropium for Nebulization 3 milliLiter(s) Nebulizer every 6 hours  amLODIPine   Tablet 10 milliGRAM(s) Oral every 24 hours  artificial  tears Solution 1 Drop(s) Both EYES two times a day  dextrose 5%. 1000 milliLiter(s) (50 mL/Hr) IV Continuous <Continuous>  dextrose 50% Injectable 12.5 Gram(s) IV Push once  dextrose 50% Injectable 25 Gram(s) IV Push once  dextrose 50% Injectable 25 Gram(s) IV Push once  heparin  Injectable 5000 Unit(s) SubCutaneous every 8 hours  hydrALAZINE 20 milliGRAM(s) Oral every 8 hours  insulin lispro (HumaLOG) corrective regimen sliding scale   SubCutaneous every 6 hours  lactobacillus acidophilus 1 Tablet(s) Oral every 8 hours  levothyroxine 150 MICROGram(s) Oral daily  lisinopril 40 milliGRAM(s) Oral daily  magnesium sulfate  IVPB 2 Gram(s) IV Intermittent once  pantoprazole   Suspension 40 milliGRAM(s) Enteral Tube daily  sodium chloride 0.9%. 1000 milliLiter(s) (100 mL/Hr) IV Continuous <Continuous>    MEDICATIONS  (PRN):  acetaminophen    Suspension .. 650 milliGRAM(s) Oral every 6 hours PRN Temp greater or equal to 38C (100.4F)  dextrose 40% Gel 15 Gram(s) Oral once PRN Blood Glucose LESS THAN 70 milliGRAM(s)/deciliter  glucagon  Injectable 1 milliGRAM(s) IntraMuscular once PRN Glucose LESS THAN 70 milligrams/deciliter  oxyCODONE    5 mG/acetaminophen 325 mG 1 Tablet(s) Oral every 4 hours PRN Moderate Pain (4 - 6)      ALLERGIES:  Allergies    No Known Allergies    Intolerances        LABS:                        8.1    8.1   )-----------( 239      ( 12 Dec 2018 06:07 )             27.0     12-12    144  |  109<H>  |  47<H>  ----------------------------<  137<H>  3.9   |  28  |  1.00    Ca    9.9      12 Dec 2018 06:08  Mg     1.8     12-12          CAPILLARY BLOOD GLUCOSE      POCT Blood Glucose.: 161 mg/dL (12 Dec 2018 06:53)      RADIOLOGY & ADDITIONAL TESTS: Reviewed. SUBJECTIVE / INTERVAL HPI: EMANUEL. Patient seen and examined at bedside. Patient currently without any complaints. She denies any headache, chest pain, difficulty breathing, abdominal pain, nausea, vomiting. No back pain today. Otherwise: (-) fever, (-) chills, (-) dizziness,  (-) neck pain, (-) palpitations,  (-)new weakness, (-) paresthesias. Pending auth for ERNESTINE      VITAL SIGNS:  Vital Signs Last 24 Hrs  T(C): 37.1 (12 Dec 2018 04:53), Max: 37.2 (11 Dec 2018 21:52)  T(F): 98.7 (12 Dec 2018 04:53), Max: 99 (11 Dec 2018 21:52)   HR: 93 (12 Dec 2018 04:53) (93 - 104)  BP: 126/87 (12 Dec 2018 04:53) (122/72 - 152/81)  BP(mean): 105 (11 Dec 2018 21:52) (105 - 105)  RR: 18 (12 Dec 2018 04:53) (18 - 18)  SpO2: 98% (12 Dec 2018 04:53) (94% - 98%)    PHYSICAL EXAM:    General: well appearing, resting comfortably in bed and in no acute distress. occasionally will respond in [place].   HEENT: normocephalic, atraumatic, PERRL, anicteric sclera; no conjunctival pallor, mucus membranes moist  Neck: supple, no masses  Cardiovascular: +S1/S2, regular rate and rhythm; no murmurs, no rub, no gallop.  Respiratory: clear to auscultation bilaterally, no rhonchi, no wheeze, no rales.,  decreased breath sounds on R but poor inspiratory effort.  Gastrointestinal: soft, obese, active bowel sounds, non-tender, non-distended, rectal tube in place. PEG in place. Clean dry and intact skin surrounding PEG.   Extremities: Warm, dry; no edema, clubbing or cyanosis  Vascular: 2+ radial, DP pulses bilaterally  Neurological: AAOx1 to person only. no focal deficits    MEDICATIONS:  MEDICATIONS  (STANDING):  acetylcysteine 10%  Inhalation 3 milliLiter(s) Inhalation every 6 hours  ALBUTerol/ipratropium for Nebulization 3 milliLiter(s) Nebulizer every 6 hours  amLODIPine   Tablet 10 milliGRAM(s) Oral every 24 hours  artificial  tears Solution 1 Drop(s) Both EYES two times a day  dextrose 5%. 1000 milliLiter(s) (50 mL/Hr) IV Continuous <Continuous>  dextrose 50% Injectable 12.5 Gram(s) IV Push once  dextrose 50% Injectable 25 Gram(s) IV Push once  dextrose 50% Injectable 25 Gram(s) IV Push once  heparin  Injectable 5000 Unit(s) SubCutaneous every 8 hours  hydrALAZINE 20 milliGRAM(s) Oral every 8 hours  insulin lispro (HumaLOG) corrective regimen sliding scale   SubCutaneous every 6 hours  lactobacillus acidophilus 1 Tablet(s) Oral every 8 hours  levothyroxine 150 MICROGram(s) Oral daily  lisinopril 40 milliGRAM(s) Oral daily  magnesium sulfate  IVPB 2 Gram(s) IV Intermittent once  pantoprazole   Suspension 40 milliGRAM(s) Enteral Tube daily  sodium chloride 0.9%. 1000 milliLiter(s) (100 mL/Hr) IV Continuous <Continuous>    MEDICATIONS  (PRN):  acetaminophen    Suspension .. 650 milliGRAM(s) Oral every 6 hours PRN Temp greater or equal to 38C (100.4F)  dextrose 40% Gel 15 Gram(s) Oral once PRN Blood Glucose LESS THAN 70 milliGRAM(s)/deciliter  glucagon  Injectable 1 milliGRAM(s) IntraMuscular once PRN Glucose LESS THAN 70 milligrams/deciliter  oxyCODONE    5 mG/acetaminophen 325 mG 1 Tablet(s) Oral every 4 hours PRN Moderate Pain (4 - 6)      ALLERGIES:  Allergies    No Known Allergies    Intolerances        LABS:                        8.1    8.1   )-----------( 239      ( 12 Dec 2018 06:07 )             27.0     12-12    144  |  109<H>  |  47<H>  ----------------------------<  137<H>  3.9   |  28  |  1.00    Ca    9.9      12 Dec 2018 06:08  Mg     1.8     12-12          CAPILLARY BLOOD GLUCOSE      POCT Blood Glucose.: 161 mg/dL (12 Dec 2018 06:53)      RADIOLOGY & ADDITIONAL TESTS: Reviewed.

## 2018-12-12 NOTE — PROGRESS NOTE ADULT - PROBLEM SELECTOR PLAN 2
Pt presenting with PMH of HTN. BP this /87. High of 152/81 last night.  -c/w home lisinopril 40 mg daily  -c/w amlodipine 10 mg daily  -continue hydralazine 20mg q8h

## 2018-12-13 LAB
ANION GAP SERPL CALC-SCNC: 9 MMOL/L — SIGNIFICANT CHANGE UP (ref 5–17)
BASOPHILS NFR BLD AUTO: 0.6 % — SIGNIFICANT CHANGE UP (ref 0–2)
BUN SERPL-MCNC: 39 MG/DL — HIGH (ref 7–23)
CALCIUM SERPL-MCNC: 9.8 MG/DL — SIGNIFICANT CHANGE UP (ref 8.4–10.5)
CHLORIDE SERPL-SCNC: 108 MMOL/L — SIGNIFICANT CHANGE UP (ref 96–108)
CO2 SERPL-SCNC: 26 MMOL/L — SIGNIFICANT CHANGE UP (ref 22–31)
CREAT SERPL-MCNC: 0.88 MG/DL — SIGNIFICANT CHANGE UP (ref 0.5–1.3)
EOSINOPHIL NFR BLD AUTO: 3.6 % — SIGNIFICANT CHANGE UP (ref 0–6)
GLUCOSE BLDC GLUCOMTR-MCNC: 117 MG/DL — HIGH (ref 70–99)
GLUCOSE BLDC GLUCOMTR-MCNC: 122 MG/DL — HIGH (ref 70–99)
GLUCOSE BLDC GLUCOMTR-MCNC: 128 MG/DL — HIGH (ref 70–99)
GLUCOSE BLDC GLUCOMTR-MCNC: 135 MG/DL — HIGH (ref 70–99)
GLUCOSE BLDC GLUCOMTR-MCNC: 138 MG/DL — HIGH (ref 70–99)
GLUCOSE SERPL-MCNC: 106 MG/DL — HIGH (ref 70–99)
HCT VFR BLD CALC: 26.6 % — LOW (ref 34.5–45)
HGB BLD-MCNC: 7.9 G/DL — LOW (ref 11.5–15.5)
LYMPHOCYTES # BLD AUTO: 24.8 % — SIGNIFICANT CHANGE UP (ref 13–44)
MAGNESIUM SERPL-MCNC: 2.1 MG/DL — SIGNIFICANT CHANGE UP (ref 1.6–2.6)
MCHC RBC-ENTMCNC: 28.3 PG — SIGNIFICANT CHANGE UP (ref 27–34)
MCHC RBC-ENTMCNC: 29.7 G/DL — LOW (ref 32–36)
MCV RBC AUTO: 95.3 FL — SIGNIFICANT CHANGE UP (ref 80–100)
MONOCYTES NFR BLD AUTO: 4.9 % — SIGNIFICANT CHANGE UP (ref 2–14)
NEUTROPHILS NFR BLD AUTO: 66.1 % — SIGNIFICANT CHANGE UP (ref 43–77)
PHOSPHATE SERPL-MCNC: 3.3 MG/DL — SIGNIFICANT CHANGE UP (ref 2.5–4.5)
PLATELET # BLD AUTO: 242 K/UL — SIGNIFICANT CHANGE UP (ref 150–400)
POTASSIUM SERPL-MCNC: 3.8 MMOL/L — SIGNIFICANT CHANGE UP (ref 3.5–5.3)
POTASSIUM SERPL-SCNC: 3.8 MMOL/L — SIGNIFICANT CHANGE UP (ref 3.5–5.3)
RBC # BLD: 2.79 M/UL — LOW (ref 3.8–5.2)
RBC # BLD: 2.79 M/UL — LOW (ref 3.8–5.2)
RBC # FLD: 17.6 % — HIGH (ref 10.3–16.9)
RETICS/RBC NFR: 4.6 % — HIGH (ref 0.5–2.5)
SODIUM SERPL-SCNC: 143 MMOL/L — SIGNIFICANT CHANGE UP (ref 135–145)
WBC # BLD: 7.2 K/UL — SIGNIFICANT CHANGE UP (ref 3.8–10.5)
WBC # FLD AUTO: 7.2 K/UL — SIGNIFICANT CHANGE UP (ref 3.8–10.5)

## 2018-12-13 PROCEDURE — 99233 SBSQ HOSP IP/OBS HIGH 50: CPT | Mod: GC

## 2018-12-13 RX ORDER — SODIUM CHLORIDE 9 MG/ML
500 INJECTION INTRAMUSCULAR; INTRAVENOUS; SUBCUTANEOUS ONCE
Qty: 0 | Refills: 0 | Status: COMPLETED | OUTPATIENT
Start: 2018-12-13 | End: 2018-12-13

## 2018-12-13 RX ORDER — POTASSIUM CHLORIDE 20 MEQ
40 PACKET (EA) ORAL ONCE
Qty: 0 | Refills: 0 | Status: COMPLETED | OUTPATIENT
Start: 2018-12-13 | End: 2018-12-13

## 2018-12-13 RX ORDER — POTASSIUM CHLORIDE 20 MEQ
10 PACKET (EA) ORAL ONCE
Qty: 0 | Refills: 0 | Status: COMPLETED | OUTPATIENT
Start: 2018-12-13 | End: 2018-12-13

## 2018-12-13 RX ORDER — POTASSIUM CHLORIDE 20 MEQ
40 PACKET (EA) ORAL ONCE
Qty: 0 | Refills: 0 | Status: DISCONTINUED | OUTPATIENT
Start: 2018-12-13 | End: 2018-12-13

## 2018-12-13 RX ORDER — HYDRALAZINE HCL 50 MG
30 TABLET ORAL EVERY 8 HOURS
Qty: 0 | Refills: 0 | Status: DISCONTINUED | OUTPATIENT
Start: 2018-12-13 | End: 2018-12-14

## 2018-12-13 RX ORDER — LOPERAMIDE HCL 2 MG
2 TABLET ORAL ONCE
Qty: 0 | Refills: 0 | Status: COMPLETED | OUTPATIENT
Start: 2018-12-13 | End: 2018-12-13

## 2018-12-13 RX ORDER — HYDRALAZINE HCL 50 MG
25 TABLET ORAL EVERY 8 HOURS
Qty: 0 | Refills: 0 | Status: DISCONTINUED | OUTPATIENT
Start: 2018-12-13 | End: 2018-12-13

## 2018-12-13 RX ADMIN — PANTOPRAZOLE SODIUM 40 MILLIGRAM(S): 20 TABLET, DELAYED RELEASE ORAL at 11:23

## 2018-12-13 RX ADMIN — Medication 3 MILLILITER(S): at 19:30

## 2018-12-13 RX ADMIN — Medication 1 DROP(S): at 07:05

## 2018-12-13 RX ADMIN — Medication 30 MILLIGRAM(S): at 22:00

## 2018-12-13 RX ADMIN — Medication 3 MILLILITER(S): at 12:09

## 2018-12-13 RX ADMIN — LISINOPRIL 40 MILLIGRAM(S): 2.5 TABLET ORAL at 07:04

## 2018-12-13 RX ADMIN — Medication 3 MILLILITER(S): at 01:43

## 2018-12-13 RX ADMIN — AMLODIPINE BESYLATE 10 MILLIGRAM(S): 2.5 TABLET ORAL at 17:45

## 2018-12-13 RX ADMIN — HEPARIN SODIUM 5000 UNIT(S): 5000 INJECTION INTRAVENOUS; SUBCUTANEOUS at 07:05

## 2018-12-13 RX ADMIN — Medication 1 TABLET(S): at 13:07

## 2018-12-13 RX ADMIN — Medication 1 DROP(S): at 17:45

## 2018-12-13 RX ADMIN — Medication 3 MILLILITER(S): at 17:45

## 2018-12-13 RX ADMIN — SODIUM CHLORIDE 100 MILLILITER(S): 9 INJECTION INTRAMUSCULAR; INTRAVENOUS; SUBCUTANEOUS at 07:07

## 2018-12-13 RX ADMIN — Medication 3 MILLILITER(S): at 07:07

## 2018-12-13 RX ADMIN — HEPARIN SODIUM 5000 UNIT(S): 5000 INJECTION INTRAVENOUS; SUBCUTANEOUS at 13:08

## 2018-12-13 RX ADMIN — SODIUM CHLORIDE 1500 MILLILITER(S): 9 INJECTION INTRAMUSCULAR; INTRAVENOUS; SUBCUTANEOUS at 11:23

## 2018-12-13 RX ADMIN — Medication 30 MILLIGRAM(S): at 15:14

## 2018-12-13 RX ADMIN — Medication 1 TABLET(S): at 21:52

## 2018-12-13 RX ADMIN — Medication 3 MILLILITER(S): at 13:07

## 2018-12-13 RX ADMIN — Medication 40 MILLIEQUIVALENT(S): at 09:50

## 2018-12-13 RX ADMIN — Medication 100 MILLIEQUIVALENT(S): at 09:50

## 2018-12-13 RX ADMIN — Medication 3 MILLILITER(S): at 01:42

## 2018-12-13 RX ADMIN — HEPARIN SODIUM 5000 UNIT(S): 5000 INJECTION INTRAVENOUS; SUBCUTANEOUS at 21:52

## 2018-12-13 RX ADMIN — Medication 150 MICROGRAM(S): at 07:04

## 2018-12-13 RX ADMIN — Medication 20 MILLIGRAM(S): at 07:04

## 2018-12-13 RX ADMIN — Medication 2 MILLIGRAM(S): at 12:09

## 2018-12-13 RX ADMIN — Medication 1 TABLET(S): at 07:04

## 2018-12-13 NOTE — PROGRESS NOTE ADULT - SUBJECTIVE AND OBJECTIVE BOX
Physical Medicine and Rehabilitation Progress Note:    Patient is a 78y old  Female who presents with a chief complaint of Septic shock and acute respiratory failure (13 Dec 2018 08:38)      HPI:  Patient is currently intubated and unable to participate in interview. History taken from chart and MICU consult.    78F w/ PMH of morbid obesity, DM, HTN, hypothyroidism, sepsis secondary to ventral hernia infection s/p repair, PE s/p IVC filter, acute blood loss anemia from hemorrhagic uterine fibroids and UGIB, ATN requiring HD, sepsis secondary to MSSA bacteremia and MDR E coli UTI now w/AMS and s/p TAHBSO and small bowel resection s/p PEG. Patient presented from [facility] with concern for sepsis and hypoxia. Per EMS, patient was saturating 85% on RA at [facility] with some improvement to 95% after being placed on NRB.  On arrival to the ED, patient was altered and saturating 89%. She was intubated in the ED with suctioning revealing purulent material.       T was 101.5, , BP79/59. WBC 27.9 Hgb 11.0 .3 Lactate 5.4 Na 154 K 5.6 CO2 16 AG [id number] Cr 3.38 Trop 0.14 ABG 7.32 pCO2 29 pO2 138 HCO3 15  UA: (+) leuk est, (+) bacteria  She was started on Levo gtt and Dopamine and subsequently was titrated off the Dopamine. She was given Azithromycin, Cipro, Vancomycin and Zosyn, 4L nS, and Insulin 10U. (16 Nov 2018 23:11)                            7.9    7.2   )-----------( 242      ( 13 Dec 2018 07:00 )             26.6       12-13    143  |  108  |  39<H>  ----------------------------<  106<H>  3.8   |  26  |  0.88    Ca    9.8      13 Dec 2018 06:58  Phos  3.3     12-13  Mg     2.1     12-13      Vital Signs Last 24 Hrs  T(C): 36.6 (13 Dec 2018 09:01), Max: 36.9 (12 Dec 2018 15:41)  T(F): 97.8 (13 Dec 2018 09:01), Max: 98.5 (12 Dec 2018 15:41)  HR: 87 (13 Dec 2018 09:01) (87 - 94)  BP: 152/69 (13 Dec 2018 09:01) (127/65 - 160/82)  BP(mean): --  RR: 18 (13 Dec 2018 09:01) (16 - 18)  SpO2: 98% (13 Dec 2018 09:01) (95% - 99%)    MEDICATIONS  (STANDING):  acetylcysteine 10%  Inhalation 3 milliLiter(s) Inhalation every 6 hours  ALBUTerol/ipratropium for Nebulization 3 milliLiter(s) Nebulizer every 6 hours  amLODIPine   Tablet 10 milliGRAM(s) Oral every 24 hours  artificial  tears Solution 1 Drop(s) Both EYES two times a day  dextrose 5%. 1000 milliLiter(s) (50 mL/Hr) IV Continuous <Continuous>  dextrose 50% Injectable 12.5 Gram(s) IV Push once  dextrose 50% Injectable 25 Gram(s) IV Push once  dextrose 50% Injectable 25 Gram(s) IV Push once  heparin  Injectable 5000 Unit(s) SubCutaneous every 8 hours  hydrALAZINE 30 milliGRAM(s) Oral every 8 hours  insulin lispro (HumaLOG) corrective regimen sliding scale   SubCutaneous every 6 hours  lactobacillus acidophilus 1 Tablet(s) Oral every 8 hours  levothyroxine 150 MICROGram(s) Oral daily  lisinopril 40 milliGRAM(s) Oral daily  pantoprazole   Suspension 40 milliGRAM(s) Enteral Tube daily  sodium chloride 0.9%. 1000 milliLiter(s) (100 mL/Hr) IV Continuous <Continuous>    MEDICATIONS  (PRN):  acetaminophen    Suspension .. 650 milliGRAM(s) Oral every 6 hours PRN Temp greater or equal to 38C (100.4F)  dextrose 40% Gel 15 Gram(s) Oral once PRN Blood Glucose LESS THAN 70 milliGRAM(s)/deciliter  glucagon  Injectable 1 milliGRAM(s) IntraMuscular once PRN Glucose LESS THAN 70 milligrams/deciliter  oxyCODONE    5 mG/acetaminophen 325 mG 1 Tablet(s) Oral every 4 hours PRN Moderate Pain (4 - 6)    Currently Undergoing Physical Therapy at bedside.    Functional Status Assessment:    Therapeutic Interventions      Bed Mobility  Bed Mobility Training Rolling/Turning: maximum assist (25% patient effort);  1 person assist;  verbal cues;  nonverbal cues (demo/gestures)  Bed Mobility Training Scooting: max A x 1-2   Bed Mobility Training Sit-to-Supine: maximum assist (25% patient effort);  2 person assist;  verbal cues;  nonverbal cues (demo/gestures)  Bed Mobility Training Supine-to-Sit: maximum assist (25% patient effort);  2 person assist;  verbal cues;  nonverbal cues (demo/gestures);  bed rails  Bed Mobility Training Limitations: decreased ability to use arms for pushing/pulling;  decreased ability to use legs for bridging/pushing;  impaired ability to control trunk for mobility;  decreased strength;  impaired balance;  impaired postural control;  cognitive, decreased safety awareness    Sit-Stand Transfer Training  Transfer Training Sit-to-Stand Transfer: max A x 2 unsuccessfully     Therapeutic Exercise  Therapeutic Exercise Detail: pt dangled at edge of bed for~10 mins with max<--> mod A.Seated ther ex: LAQ, hip flexion x 10 bilaterally AAROM/AROM. shoulder flexion/extension, elbow flexion/extension x 10 bilaterally AROM/AAROMSitting Balance: Static: Poor Dynamic: Poor-           PM&R Impression: as above    Disposition Plan Recommendations:  subacute rehab placement

## 2018-12-13 NOTE — PROGRESS NOTE ADULT - PROBLEM SELECTOR PLAN 2
Pt presenting with PMH of HTN. BP this /87. High of 152/81 last night.  -c/w home lisinopril 40 mg daily  -c/w amlodipine 10 mg daily  -continue hydralazine 20mg q8h Pt presenting with PMH of HTN. BP this /78. High of 160/82 last night.  -c/w home lisinopril 40 mg daily  -c/w amlodipine 10 mg daily  -increase hydralazine to 30mg q8hr    ##Diarrhea  Patient now w/ chronic diarrhea likely 2/2 to malabsorption. S/p trial of immodium 2 days ago. stool output in last 24hrs: 1.1L.  -nutrition recommending immodium  -Will give  immodium 2mg via peg  -monitor rectal tube output Pt presenting with PMH of HTN. BP this /78. High of 160/82 last night.  -c/w home lisinopril 40 mg daily  -c/w amlodipine 10 mg daily  -increase hydralazine to 25mg q8hr    ##Diarrhea  Patient now w/ chronic diarrhea likely 2/2 to malabsorption. S/p trial of immodium 2 days ago. stool output in last 24hrs: 1.1L.  -nutrition recommending immodium  -Will give  immodium 2mg via peg  -monitor rectal tube output

## 2018-12-13 NOTE — PROGRESS NOTE ADULT - PROBLEM SELECTOR PLAN 5
Pt with hx of sm carola perforation, recently admitted 4/15/18 due to MSSA bacteremia and MDR E Coli in urine, transferred to [facility], and found to have extravasation on CT abd, s/p IR drain placement, underwent DAVY BSO 2/2 tubal metaplasia, SBR with primary anastomosis, abdominal washout, PEG placement, discharged on 6/4  - surgery was following, now signed off  - PEG in place, c/w glucerna feeds at 55cc/hr  - pt was on standing reglan q12 for bowel pro-motility, d/c due to concern for possible upper extremity rigidity  - speech and swallow eval - NPO

## 2018-12-13 NOTE — PROGRESS NOTE ADULT - ASSESSMENT
78F w/ PMH obesity, DM, HTN, hypothyroidism, sepsis secondary to ventral hernia infection s/p repair, PE s/p IVC filter, acute blood loss anemia from hemorrhagic uterine fibroids and UGIB, ATN requiring HD, sepsis secondary to MSSA bacteremia and MDR E coli UTI w/AMS and s/p TAHBSO and small bowel resection s/p PEG. Admitted with septic shock likely 2/2 PNA vs UTI and acute hypoxic respiratory failure now extubated, s/p bronchoscopy 11/30, s/p 5 day course of meropenem for pseudomonal PNA.            Problem/Plan - 1:  ·  Problem: Sinus tachycardia.  Plan: Non-tachycardic this am w/ HR in 80s   - Likely 2/2 intravascular volume depletion from GI losses (chronic diarrhea) and prior diuretic (HCTZ) use, also supported by rise in BUN 54/Cr 1.15 ratio. HCTZ was d/c-ed on 12/11. S/p 500cc bolus and now BUN/Cr at 39/0.88  - Continue NS at 100cc/hr    #Hypernatremia  -Improving with free water flushes and IVF. Currently WNL at 144.   - C/w free water flushes 250cc q6 hrs. Will monitor and will consider increasing frequency of free water flushes.     Problem/Plan - 2:  ·  Problem: Hypertension.  Plan: Pt presenting with PMH of HTN. BP this /78. High of 160/82 last night.  -c/w home lisinopril 40 mg daily  -c/w amlodipine 10 mg daily  -increase hydralazine to 30mg q8hr    ##Diarrhea  Patient now w/ chronic diarrhea likely 2/2 to malabsorption. S/p trial of immodium 2 days ago. stool output in last 24hrs: 1.1L.  -nutrition recommending immodium  -Will give  immodium 2mg via peg  -monitor rectal tube output.    Problem/Plan - 3:  ·  Problem: Sepsis due to pneumonia.  Plan: Resolved  - Pt noted to have fever on 11/29 100.7 with tachycardia. CXR with RLL infiltrate. Repeat sputum cx (12/1) positive for pseudomonas/klebsiella, and ESBL Ecoli. Pt started empirically on Zosyn 4.5 g q6h escalated to meropenem based on sensitivities on 12/2; now s/p meropenem 5 day course ended 12/7  - Bcx from 12/3 negative.     Problem/Plan - 4:  ·  Problem: Mucus plugging of bronchi.  Plan: 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 However no signs of active bleeding; H&H stable  - B12, folate wnl  - Fe studies with normal iron sat and slightly low TIBC, remaining values wnl  - consider Fe supplementation as patient is not likely absorbing well  - transfuse for Hgb <7.     Problem/Plan - 5:  ·  Problem: Small bowel perforation.  Plan: Pt with hx of sm carola perforation, recently admitted 4/15/18 due to MSSA bacteremia and MDR E Coli in urine, transferred to [facility], and found to have extravasation on CT abd, s/p IR drain placement, underwent DAVY BSO 2/2 tubal metaplasia, SBR with primary anastomosis, abdominal washout, PEG placement, discharged on 6/4  - surgery was following, now signed off  - PEG in place, c/w glucerna feeds at 55cc/hr  - pt was on standing reglan q12 for bowel pro-motility, d/c due to concern for possible upper extremity rigidity  - speech and swallow eval - NPO.

## 2018-12-13 NOTE — PROGRESS NOTE ADULT - PROBLEM SELECTOR PLAN 1
Non-tachycardic this am but borderline tachycardia noted in high 90s.   - Likely 2/2 intravascular volume depletion from GI losses (chronic diarrhea) and prior diuretic (HCTZ) use, also supported by rise in BUN 54/Cr 1.15 ratio. HCTZ was d/c-ed on 12/11. BUN/Cr improved to 47/1.00.   - Continue NS at 100cc/hr  -Will give additional NS 500cc bolus.    #Hypernatremia  -Improving with free water flushes and IVF. Currently WNL at 144.   - C/w free water flushes 250cc q6 hrs. Will monitor and will consider increasing frequency of free water flushes.   - Nutrition recommending c/w FWF, immodium for persistent diarrhea to counteract GI losses  - give 1 dose of immodium 2mg via peg, monitor rectal tube output. 500mL in the last 24hrs. Non-tachycardic this am w/ HR in 80s   - Likely 2/2 intravascular volume depletion from GI losses (chronic diarrhea) and prior diuretic (HCTZ) use, also supported by rise in BUN 54/Cr 1.15 ratio. HCTZ was d/c-ed on 12/11. S/p 500cc bolus and now BUN/Cr at 39/0.88  - Continue NS at 100cc/hr    #Hypernatremia  -Improving with free water flushes and IVF. Currently WNL at 144.   - C/w free water flushes 250cc q6 hrs. Will monitor and will consider increasing frequency of free water flushes.

## 2018-12-13 NOTE — PROGRESS NOTE ADULT - SUBJECTIVE AND OBJECTIVE BOX
SUBJECTIVE / INTERVAL HPI: EMANUEL. Patient seen and examined at bedside. Patient sleeping but arousable-nodding yes and no with occasional speech. No complaints at this time. Otherwise: (-) fever, (-) chills, (-) dizziness, (-) headache, (-) neck pain, (-) chest pain, (-) palpitations, (-)SOB, (-) nausea, (-) vomiting, (-) diarrhea, (-) abdominal pain, (-)new weakness, (-) paresthesias. Pending auth.       VITAL SIGNS:  Vital Signs Last 24 Hrs  T(C): 36.3 (13 Dec 2018 05:41), Max: 36.9 (12 Dec 2018 15:41)  T(F): 97.3 (13 Dec 2018 05:41), Max: 98.5 (12 Dec 2018 15:41)  HR: 87 (13 Dec 2018 05:41) (87 - 96)  BP: 154/78 (13 Dec 2018 05:41) (127/65 - 160/82)  BP(mean): --  RR: 18 (13 Dec 2018 05:41) (16 - 18)  SpO2: 99% (13 Dec 2018 05:41) (95% - 99%)    PHYSICAL EXAM:    General: well appearing, resting comfortably in bed and in no acute distress. occasionally will respond in [place].   HEENT: normocephalic, atraumatic, PERRL, anicteric sclera; no conjunctival pallor, mucus membranes moist  Neck: supple, no masses  Cardiovascular: +S1/S2, regular rate and rhythm; no murmurs, no rub, no gallop.  Respiratory: clear to auscultation bilaterally, no rhonchi, no wheeze, no rales.,  decreased breath sounds on R but poor inspiratory effort.  Gastrointestinal: soft, obese, active bowel sounds, non-tender, non-distended, rectal tube in place. PEG in place. Clean dry and intact skin surrounding PEG.   Extremities: Warm, dry; no edema, clubbing or cyanosis  Vascular: 2+ radial, DP pulses bilaterally  Neurological: AAOx1 to person only. no focal deficits    MEDICATIONS:  MEDICATIONS  (STANDING):  acetylcysteine 10%  Inhalation 3 milliLiter(s) Inhalation every 6 hours  ALBUTerol/ipratropium for Nebulization 3 milliLiter(s) Nebulizer every 6 hours  amLODIPine   Tablet 10 milliGRAM(s) Oral every 24 hours  artificial  tears Solution 1 Drop(s) Both EYES two times a day  dextrose 5%. 1000 milliLiter(s) (50 mL/Hr) IV Continuous <Continuous>  dextrose 50% Injectable 12.5 Gram(s) IV Push once  dextrose 50% Injectable 25 Gram(s) IV Push once  dextrose 50% Injectable 25 Gram(s) IV Push once  heparin  Injectable 5000 Unit(s) SubCutaneous every 8 hours  hydrALAZINE 20 milliGRAM(s) Oral every 8 hours  insulin lispro (HumaLOG) corrective regimen sliding scale   SubCutaneous every 6 hours  lactobacillus acidophilus 1 Tablet(s) Oral every 8 hours  levothyroxine 150 MICROGram(s) Oral daily  lisinopril 40 milliGRAM(s) Oral daily  pantoprazole   Suspension 40 milliGRAM(s) Enteral Tube daily  potassium chloride    Tablet ER 40 milliEquivalent(s) Oral once  potassium chloride  10 mEq/100 mL IVPB 10 milliEquivalent(s) IV Intermittent once  sodium chloride 0.9%. 1000 milliLiter(s) (100 mL/Hr) IV Continuous <Continuous>    MEDICATIONS  (PRN):  acetaminophen    Suspension .. 650 milliGRAM(s) Oral every 6 hours PRN Temp greater or equal to 38C (100.4F)  dextrose 40% Gel 15 Gram(s) Oral once PRN Blood Glucose LESS THAN 70 milliGRAM(s)/deciliter  glucagon  Injectable 1 milliGRAM(s) IntraMuscular once PRN Glucose LESS THAN 70 milligrams/deciliter  oxyCODONE    5 mG/acetaminophen 325 mG 1 Tablet(s) Oral every 4 hours PRN Moderate Pain (4 - 6)      ALLERGIES:  Allergies    No Known Allergies    Intolerances        LABS:                        7.9    7.2   )-----------( 242      ( 13 Dec 2018 07:00 )             26.6     12-13    143  |  108  |  39<H>  ----------------------------<  106<H>  3.8   |  26  |  0.88    Ca    9.8      13 Dec 2018 06:58  Phos  3.3     12-13  Mg     2.1     12-13          CAPILLARY BLOOD GLUCOSE      POCT Blood Glucose.: 117 mg/dL (13 Dec 2018 06:18)      RADIOLOGY & ADDITIONAL TESTS: Reviewed. SUBJECTIVE / INTERVAL HPI: EMANUEL. Patient seen and examined at bedside. Patient sleeping but arousable-nodding yes and no with occasional speech. No complaints at this time. Otherwise: (-) fever, (-) chills, (-) dizziness, (-) headache, (-) neck pain, (-) chest pain, (-) palpitations, (-)SOB, (-) nausea, (-) vomiting, (-) diarrhea, (-) abdominal pain, (-)new weakness, (-) paresthesias. Pending auth.       VITAL SIGNS:  Vital Signs Last 24 Hrs  T(C): 36.3 (13 Dec 2018 05:41), Max: 36.9 (12 Dec 2018 15:41)  T(F): 97.3 (13 Dec 2018 05:41), Max: 98.5 (12 Dec 2018 15:41)  HR: 87 (13 Dec 2018 05:41) (87 - 96)  BP: 154/78 (13 Dec 2018 05:41) (127/65 - 160/82)  BP(mean): --  RR: 18 (13 Dec 2018 05:41) (16 - 18)  SpO2: 99% (13 Dec 2018 05:41) (95% - 99%)    PHYSICAL EXAM:    General: well appearing, resting comfortably in bed and in no acute distress. occasionally will respond in [place].   HEENT: normocephalic, atraumatic, PERRL, anicteric sclera; no conjunctival pallor, mucus membranes moist  Neck: supple, no masses  Cardiovascular: +S1/S2, regular rate and rhythm; no murmurs, no rub, no gallop.  Respiratory: clear to auscultation bilaterally, no rhonchi, no wheeze, no rales, decreased breath sounds at R base  Gastrointestinal: soft, obese, active bowel sounds, non-tender, non-distended, rectal tube in place. PEG in place. Clean dry and intact skin surrounding PEG.   Extremities: Warm, dry; no edema, clubbing or cyanosis  Vascular: 2+ radial, DP pulses bilaterally  Neurological: AAOx1 to person only. no focal deficits    MEDICATIONS:  MEDICATIONS  (STANDING):  acetylcysteine 10%  Inhalation 3 milliLiter(s) Inhalation every 6 hours  ALBUTerol/ipratropium for Nebulization 3 milliLiter(s) Nebulizer every 6 hours  amLODIPine   Tablet 10 milliGRAM(s) Oral every 24 hours  artificial  tears Solution 1 Drop(s) Both EYES two times a day  dextrose 5%. 1000 milliLiter(s) (50 mL/Hr) IV Continuous <Continuous>  dextrose 50% Injectable 12.5 Gram(s) IV Push once  dextrose 50% Injectable 25 Gram(s) IV Push once  dextrose 50% Injectable 25 Gram(s) IV Push once  heparin  Injectable 5000 Unit(s) SubCutaneous every 8 hours  hydrALAZINE 20 milliGRAM(s) Oral every 8 hours  insulin lispro (HumaLOG) corrective regimen sliding scale   SubCutaneous every 6 hours  lactobacillus acidophilus 1 Tablet(s) Oral every 8 hours  levothyroxine 150 MICROGram(s) Oral daily  lisinopril 40 milliGRAM(s) Oral daily  pantoprazole   Suspension 40 milliGRAM(s) Enteral Tube daily  potassium chloride    Tablet ER 40 milliEquivalent(s) Oral once  potassium chloride  10 mEq/100 mL IVPB 10 milliEquivalent(s) IV Intermittent once  sodium chloride 0.9%. 1000 milliLiter(s) (100 mL/Hr) IV Continuous <Continuous>    MEDICATIONS  (PRN):  acetaminophen    Suspension .. 650 milliGRAM(s) Oral every 6 hours PRN Temp greater or equal to 38C (100.4F)  dextrose 40% Gel 15 Gram(s) Oral once PRN Blood Glucose LESS THAN 70 milliGRAM(s)/deciliter  glucagon  Injectable 1 milliGRAM(s) IntraMuscular once PRN Glucose LESS THAN 70 milligrams/deciliter  oxyCODONE    5 mG/acetaminophen 325 mG 1 Tablet(s) Oral every 4 hours PRN Moderate Pain (4 - 6)      ALLERGIES:  Allergies    No Known Allergies    Intolerances        LABS:                        7.9    7.2   )-----------( 242      ( 13 Dec 2018 07:00 )             26.6     12-13    143  |  108  |  39<H>  ----------------------------<  106<H>  3.8   |  26  |  0.88    Ca    9.8      13 Dec 2018 06:58  Phos  3.3     12-13  Mg     2.1     12-13          CAPILLARY BLOOD GLUCOSE      POCT Blood Glucose.: 117 mg/dL (13 Dec 2018 06:18)      RADIOLOGY & ADDITIONAL TESTS: Reviewed.

## 2018-12-13 NOTE — PROGRESS NOTE ADULT - PROBLEM SELECTOR PLAN 4
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  - c/w probiotics daily  -s/p trial of immodium    #Dermatitis of b/l buttocks  -daily wound care  -rectal tube in place - continue in setting of decub ulcer    #Anemia   - BL Hgb 8-9; no signs of active bleeding; H&H stable  - B12, folate wnl  - Fe studies with normal iron sat and slightly low TIBC, remaining values wnl  - maintain active type and screen  - transfuse for Hgb <7 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 However no signs of active bleeding; H&H stable  - B12, folate wnl  - Fe studies with normal iron sat and slightly low TIBC, remaining values wnl  - consider Fe supplementation as patient is not likely absorbing well  - transfuse for Hgb <7

## 2018-12-13 NOTE — PROGRESS NOTE ADULT - PROBLEM SELECTOR PLAN 10
1) PCP Contacted on Admission: (Y) --> Name & Phone #: MORA WYNN [phone]  2) Date of Contact with PCP: 12/7/18  3) PCP Contacted at Discharge: (Y/N, N/A)  4) Summary of Handoff Given to PCP: NA  5) Post-Discharge Appointment Date and Location: Dr Wynn 12/26/18 at 8:30 am, [address]

## 2018-12-14 LAB
ANION GAP SERPL CALC-SCNC: 10 MMOL/L — SIGNIFICANT CHANGE UP (ref 5–17)
BUN SERPL-MCNC: 35 MG/DL — HIGH (ref 7–23)
CALCIUM SERPL-MCNC: 10 MG/DL — SIGNIFICANT CHANGE UP (ref 8.4–10.5)
CHLORIDE SERPL-SCNC: 112 MMOL/L — HIGH (ref 96–108)
CO2 SERPL-SCNC: 23 MMOL/L — SIGNIFICANT CHANGE UP (ref 22–31)
CREAT SERPL-MCNC: 0.89 MG/DL — SIGNIFICANT CHANGE UP (ref 0.5–1.3)
GLUCOSE BLDC GLUCOMTR-MCNC: 103 MG/DL — HIGH (ref 70–99)
GLUCOSE BLDC GLUCOMTR-MCNC: 121 MG/DL — HIGH (ref 70–99)
GLUCOSE BLDC GLUCOMTR-MCNC: 123 MG/DL — HIGH (ref 70–99)
GLUCOSE BLDC GLUCOMTR-MCNC: 147 MG/DL — HIGH (ref 70–99)
GLUCOSE SERPL-MCNC: 124 MG/DL — HIGH (ref 70–99)
HCT VFR BLD CALC: 26.9 % — LOW (ref 34.5–45)
HGB BLD-MCNC: 8.1 G/DL — LOW (ref 11.5–15.5)
MAGNESIUM SERPL-MCNC: 1.7 MG/DL — SIGNIFICANT CHANGE UP (ref 1.6–2.6)
MCHC RBC-ENTMCNC: 29.2 PG — SIGNIFICANT CHANGE UP (ref 27–34)
MCHC RBC-ENTMCNC: 30.1 G/DL — LOW (ref 32–36)
MCV RBC AUTO: 97.1 FL — SIGNIFICANT CHANGE UP (ref 80–100)
PHOSPHATE SERPL-MCNC: 3.2 MG/DL — SIGNIFICANT CHANGE UP (ref 2.5–4.5)
PLATELET # BLD AUTO: 258 K/UL — SIGNIFICANT CHANGE UP (ref 150–400)
POTASSIUM SERPL-MCNC: 4.8 MMOL/L — SIGNIFICANT CHANGE UP (ref 3.5–5.3)
POTASSIUM SERPL-SCNC: 4.8 MMOL/L — SIGNIFICANT CHANGE UP (ref 3.5–5.3)
RBC # BLD: 2.77 M/UL — LOW (ref 3.8–5.2)
RBC # FLD: 17.2 % — HIGH (ref 10.3–16.9)
SODIUM SERPL-SCNC: 145 MMOL/L — SIGNIFICANT CHANGE UP (ref 135–145)
WBC # BLD: 7.9 K/UL — SIGNIFICANT CHANGE UP (ref 3.8–10.5)
WBC # FLD AUTO: 7.9 K/UL — SIGNIFICANT CHANGE UP (ref 3.8–10.5)

## 2018-12-14 PROCEDURE — 74230 X-RAY XM SWLNG FUNCJ C+: CPT | Mod: 26

## 2018-12-14 PROCEDURE — 99233 SBSQ HOSP IP/OBS HIGH 50: CPT | Mod: GC

## 2018-12-14 RX ORDER — MAGNESIUM SULFATE 500 MG/ML
2 VIAL (ML) INJECTION ONCE
Qty: 0 | Refills: 0 | Status: COMPLETED | OUTPATIENT
Start: 2018-12-14 | End: 2018-12-14

## 2018-12-14 RX ORDER — HYDRALAZINE HCL 50 MG
50 TABLET ORAL EVERY 8 HOURS
Qty: 0 | Refills: 0 | Status: DISCONTINUED | OUTPATIENT
Start: 2018-12-14 | End: 2018-12-16

## 2018-12-14 RX ORDER — HYDRALAZINE HCL 50 MG
40 TABLET ORAL EVERY 8 HOURS
Qty: 0 | Refills: 0 | Status: DISCONTINUED | OUTPATIENT
Start: 2018-12-14 | End: 2018-12-14

## 2018-12-14 RX ADMIN — Medication 3 MILLILITER(S): at 18:21

## 2018-12-14 RX ADMIN — Medication 3 MILLILITER(S): at 00:59

## 2018-12-14 RX ADMIN — HEPARIN SODIUM 5000 UNIT(S): 5000 INJECTION INTRAVENOUS; SUBCUTANEOUS at 22:10

## 2018-12-14 RX ADMIN — Medication 3 MILLILITER(S): at 00:50

## 2018-12-14 RX ADMIN — Medication 3 MILLILITER(S): at 18:10

## 2018-12-14 RX ADMIN — HEPARIN SODIUM 5000 UNIT(S): 5000 INJECTION INTRAVENOUS; SUBCUTANEOUS at 06:42

## 2018-12-14 RX ADMIN — Medication 3 MILLILITER(S): at 06:44

## 2018-12-14 RX ADMIN — Medication 1 TABLET(S): at 23:31

## 2018-12-14 RX ADMIN — Medication 150 MICROGRAM(S): at 06:42

## 2018-12-14 RX ADMIN — SODIUM CHLORIDE 100 MILLILITER(S): 9 INJECTION INTRAMUSCULAR; INTRAVENOUS; SUBCUTANEOUS at 22:13

## 2018-12-14 RX ADMIN — Medication 50 MILLIGRAM(S): at 15:47

## 2018-12-14 RX ADMIN — AMLODIPINE BESYLATE 10 MILLIGRAM(S): 2.5 TABLET ORAL at 18:23

## 2018-12-14 RX ADMIN — Medication 1 DROP(S): at 18:22

## 2018-12-14 RX ADMIN — Medication 3 MILLILITER(S): at 23:30

## 2018-12-14 RX ADMIN — Medication 30 MILLIGRAM(S): at 06:42

## 2018-12-14 RX ADMIN — Medication 1 TABLET(S): at 13:34

## 2018-12-14 RX ADMIN — Medication 3 MILLILITER(S): at 13:33

## 2018-12-14 RX ADMIN — Medication 3 MILLILITER(S): at 06:45

## 2018-12-14 RX ADMIN — HEPARIN SODIUM 5000 UNIT(S): 5000 INJECTION INTRAVENOUS; SUBCUTANEOUS at 13:34

## 2018-12-14 RX ADMIN — Medication 3 MILLILITER(S): at 23:31

## 2018-12-14 RX ADMIN — LISINOPRIL 40 MILLIGRAM(S): 2.5 TABLET ORAL at 06:42

## 2018-12-14 RX ADMIN — Medication 1 TABLET(S): at 06:58

## 2018-12-14 RX ADMIN — Medication 50 MILLIGRAM(S): at 22:11

## 2018-12-14 RX ADMIN — Medication 50 GRAM(S): at 13:33

## 2018-12-14 RX ADMIN — SODIUM CHLORIDE 100 MILLILITER(S): 9 INJECTION INTRAMUSCULAR; INTRAVENOUS; SUBCUTANEOUS at 01:00

## 2018-12-14 RX ADMIN — SODIUM CHLORIDE 100 MILLILITER(S): 9 INJECTION INTRAMUSCULAR; INTRAVENOUS; SUBCUTANEOUS at 18:22

## 2018-12-14 RX ADMIN — PANTOPRAZOLE SODIUM 40 MILLIGRAM(S): 20 TABLET, DELAYED RELEASE ORAL at 13:33

## 2018-12-14 RX ADMIN — Medication 1 DROP(S): at 06:42

## 2018-12-14 NOTE — SWALLOW VFSS/MBS ASSESSMENT ADULT - RECOMMENDED CONSISTENCY
Recommend the initiation of a diet of puree solids and thin liquids. 1:1 supervision during PO for feeding assistance/to ensure compliance with swallow strategies. Please only feed pt when alert.

## 2018-12-14 NOTE — SWALLOW VFSS/MBS ASSESSMENT ADULT - COMMENTS
Swallow function assessed at bedside yesterday, 12/13/18, with recommendations for a MBSS prior to initiating a PO diet.   Pt receiving nutrition/hydration/medication via PEG.

## 2018-12-14 NOTE — SWALLOW VFSS/MBS ASSESSMENT ADULT - ORAL PHASE COMMENTS
A collection of residue remained in the oral cavity following the swallow. Delayed A-P bolus transport. A collection of residue remained in the oral cavity following the swallow.

## 2018-12-14 NOTE — SWALLOW VFSS/MBS ASSESSMENT ADULT - ORAL PHASE
Uncontrolled bolus / spillover in [place]-pharynx/Residue in oral cavity/Uncontrolled bolus / spillover in hypopharynx Uncontrolled bolus / spillover in whit[place]-pharynx/Reduced anterior - posterior transport/Residue in oral cavity/Uncontrolled bolus / spillover in hypopharynx

## 2018-12-14 NOTE — SWALLOW VFSS/MBS ASSESSMENT ADULT - NS SWALLOW VFSS REC ASPIR MON
fever/throat clearing/oral hygiene/cough/pneumonia/upper respiratory infection/change of breathing pattern/position upright (90Y)/gurgly voice

## 2018-12-14 NOTE — SWALLOW VFSS/MBS ASSESSMENT ADULT - PHARYNGEAL PHASE COMMENTS
The pharyngeal swallow was initiated with the bolus head in the pyriforms. Laryngeal elevation was mildly reduced as evidenced by partial superior movement of the thyroid cartilage/partial approximation of arytenoids to the epiglottic petiole. There was partial anterior hyoid excursion. Epiglottic movement was sluggish but inversion was completed. Incomplete laryngeal vestibular closure was observed as evidenced by a narrow column of air/contrast in the laryngeal vestibule. Pharyngeal stripping wave was present but diminished. Partial distension/partial duration/partial obstruction of flow at the PES opening. The pharyngeal swallow was initiated with the bolus head in the pyriforms. Laryngeal elevation was mildly reduced as evidenced by partial superior movement of the thyroid cartilage/partial approximation of arytenoids to the epiglottic petiole. There was partial anterior hyoid excursion. Epiglottic movement was sluggish but inversion was completed. Incomplete laryngeal vestibular closure was observed as evidenced by a narrow column of air/contrast in the laryngeal vestibule. Pharyngeal stripping wave was present but diminished. Partial distension/partial duration/partial obstruction of flow at the PES opening. Tongue base retraction was mildly reduced with a narrow column of contrast between the tongue base and posterior pharyngeal wall. A trace amount-collection of residue was observed on the tongue base, in the valleculae, in the pyriforms.

## 2018-12-14 NOTE — PROGRESS NOTE ADULT - PROBLEM SELECTOR PLAN 2
Pt presenting with PMH of HTN. BP this /83. High of 141/82 last night.  -c/w home lisinopril 40 mg daily  -c/w amlodipine 10 mg daily  -increase hydralazine to 30mg q8hr    ##Diarrhea  Patient now w/ chronic diarrhea likely 2/2 to malabsorption. diarrhea appears to not be changing much despite immodium. 500cc in the bag.  -nutrition recommending immodium  -Will give immodium 2mg via peg  -monitor rectal tube output Pt presenting with PMH of HTN. BP this /83. High of 141/82 last night.  -c/w home lisinopril 40 mg daily  -c/w amlodipine 10 mg daily  -increase hydralazine to 50mg q8hr given HTN this am    ##Diarrhea  Patient now w/ chronic diarrhea likely 2/2 to malabsorption. diarrhea appears to not be changing much despite immodium. 500cc in the bag.  -nutrition recommending immodium  -Will give immodium 2mg via peg  -monitor rectal tube output

## 2018-12-14 NOTE — PROGRESS NOTE ADULT - PROBLEM SELECTOR PLAN 10
1) PCP Contacted on Admission: (Y) --> Name & Phone #: MORA WYNN [phone]  2) Date of Contact with PCP: 12/7/18  3) PCP Contacted at Discharge: (Y/N, N/A)  4) Summary of Handoff Given to PCP: NA  5) Post-Discharge Appointment Date and Location: Dr Wynn 12/26/18 at 8:30 am, [address]

## 2018-12-14 NOTE — PROGRESS NOTE ADULT - PROBLEM SELECTOR PLAN 5
Pt with hx of sm carola perforation, recently admitted 4/15/18 due to MSSA bacteremia and MDR E Coli in urine, transferred to [facility], and found to have extravasation on CT abd, s/p IR drain placement, underwent DAVY BSO 2/2 tubal metaplasia, SBR with primary anastomosis, abdominal washout, PEG placement, discharged on 6/4  - surgery was following, now signed off  - PEG in place, c/w glucerna feeds at 55cc/hr  - pt was on standing reglan q12 for bowel pro-motility, d/c due to concern for possible upper extremity rigidity  - speech and swallow eval - NPO. Pt with hx of sm carola perforation, recently admitted 4/15/18 due to MSSA bacteremia and MDR E Coli in urine, transferred to [facility], and found to have extravasation on CT abd, s/p IR drain placement, underwent DAVY BSO 2/2 tubal metaplasia, SBR with primary anastomosis, abdominal washout, PEG placement, discharged on 6/4  - surgery was following, now signed off  - PEG in place, c/w glucerna feeds at 55cc/hr  - pt was on standing reglan q12 for bowel pro-motility, d/c due to concern for possible upper extremity rigidity  - speech and swallow eval - NPO. Will go for modified barium swallow

## 2018-12-14 NOTE — PROGRESS NOTE ADULT - PROBLEM SELECTOR PLAN 9
Dvt ppx: hep sq  GI ppx: protonix    Code Status: FULL CODE    Dispo: [facility]

## 2018-12-14 NOTE — SWALLOW VFSS/MBS ASSESSMENT ADULT - ORAL PREP COMMENTS
Anterior bolus escape of liquids/solids occurred which progressed beyond the mid-chin. Lingual motion during A-P bolus transport was repetitive/disorganized. Mastication was not assessed as additional solids deferred d/t oral deficits.

## 2018-12-14 NOTE — SWALLOW VFSS/MBS ASSESSMENT ADULT - RECOMMENDED FEEDING/EATING TECHNIQUES
small sips/bites/allow for swallow between intakes/maintain upright posture during/after eating for 30 mins/crush medication (when feasible)/oral hygiene

## 2018-12-14 NOTE — PROGRESS NOTE ADULT - PROBLEM SELECTOR PLAN 1
Non-tachycardic this am w/ HR in 80s   - Likely 2/2 intravascular volume depletion from GI losses (chronic diarrhea) and prior diuretic (HCTZ) use, also supported by rise in BUN 54/Cr 1.15 ratio. HCTZ was d/c-ed on 12/11. S/p 500cc bolus and now BUN/Cr at 39/0.88  - Continue NS at 100cc/hr    #Hypernatremia  -Improving with free water flushes and IVF. Currently WNL at 144.   - C/w free water flushes 250cc q6 hrs. Will monitor and will consider increasing frequency of free water flushes. Non-tachycardic this am w/ HR in 80s   - Likely 2/2 intravascular volume depletion from GI losses (chronic diarrhea) and prior diuretic (HCTZ) use, also supported by rise in BUN 54/Cr 1.15 ratio. HCTZ was d/c-ed on 12/11. BUN/Cr at 35/0.89 today.   - Continue NS at 100cc/hr    #Hypernatremia  -Improving with free water flushes and IVF. Currently WNL at 145.   -increase  free water flushes to 300cc q6 hrs. Will monitor and will consider increasing frequency of free water flushes.

## 2018-12-14 NOTE — SWALLOW VFSS/MBS ASSESSMENT ADULT - LARYNGEAL PENETRATION DURING THE SWALLOW - SILENT
Laryngeal penetration, with and without complete evacuation, transiently occurred above the level of vocal folds. Pt reduced but did not clear penetrated material with a cued cough (weak in nature)/ subsequent swallows

## 2018-12-14 NOTE — PROGRESS NOTE ADULT - SUBJECTIVE AND OBJECTIVE BOX
SUBJECTIVE / INTERVAL HPI: EMANUEL. Patient seen and examined at bedside. Patient currently without complaints. She is more alert and awake today. Otherwise: (-) fever, (-) chills, (-) dizziness, (-) headache, (-) neck pain, (-) chest pain, (-) palpitations, (-)SOB, (-) nausea, (-) vomiting, (-) diarrhea, (-) abdominal pain, (-)new weakness, (-) paresthesias.      VITAL SIGNS:  Vital Signs Last 24 Hrs  T(C): 36.7 (14 Dec 2018 04:47), Max: 36.9 (13 Dec 2018 22:35)  T(F): 98.1 (14 Dec 2018 04:47), Max: 98.4 (13 Dec 2018 22:35)  HR: 84 (14 Dec 2018 07:43) (84 - 93)  BP: 148/82 (14 Dec 2018 07:43) (141/82 - 161/83)  BP(mean): --  RR: 20 (14 Dec 2018 04:47) (18 - 20)  SpO2: 96% (14 Dec 2018 04:47) (96% - 99%)    PHYSICAL EXAM:    General: well appearing, resting comfortably in bed and in no acute distress. responding to all questions today verbally.   HEENT: normocephalic, atraumatic, PERRL, anicteric sclera; no conjunctival pallor, mucus membranes moist  Neck: supple, no masses  Cardiovascular: +S1/S2, regular rate and rhythm; no murmurs, no rub, no gallop.  Respiratory: clear to auscultation bilaterally, no rhonchi, no wheeze, no rales.,   Gastrointestinal: soft, obese, active bowel sounds, non-tender, non-distended, rectal tube in place. PEG in place. Clean dry and intact skin surrounding PEG.   Extremities: Warm, dry; no edema, clubbing or cyanosis  Vascular: 2+ radial, DP pulses bilaterally  Neurological: AAOx1 to person only. no focal deficits      MEDICATIONS:  MEDICATIONS  (STANDING):  acetylcysteine 10%  Inhalation 3 milliLiter(s) Inhalation every 6 hours  ALBUTerol/ipratropium for Nebulization 3 milliLiter(s) Nebulizer every 6 hours  amLODIPine   Tablet 10 milliGRAM(s) Oral every 24 hours  artificial  tears Solution 1 Drop(s) Both EYES two times a day  dextrose 5%. 1000 milliLiter(s) (50 mL/Hr) IV Continuous <Continuous>  dextrose 50% Injectable 12.5 Gram(s) IV Push once  dextrose 50% Injectable 25 Gram(s) IV Push once  dextrose 50% Injectable 25 Gram(s) IV Push once  heparin  Injectable 5000 Unit(s) SubCutaneous every 8 hours  hydrALAZINE 30 milliGRAM(s) Oral every 8 hours  insulin lispro (HumaLOG) corrective regimen sliding scale   SubCutaneous every 6 hours  lactobacillus acidophilus 1 Tablet(s) Oral every 8 hours  levothyroxine 150 MICROGram(s) Oral daily  lisinopril 40 milliGRAM(s) Oral daily  pantoprazole   Suspension 40 milliGRAM(s) Enteral Tube daily  sodium chloride 0.9%. 1000 milliLiter(s) (100 mL/Hr) IV Continuous <Continuous>    MEDICATIONS  (PRN):  acetaminophen    Suspension .. 650 milliGRAM(s) Oral every 6 hours PRN Temp greater or equal to 38C (100.4F)  dextrose 40% Gel 15 Gram(s) Oral once PRN Blood Glucose LESS THAN 70 milliGRAM(s)/deciliter  glucagon  Injectable 1 milliGRAM(s) IntraMuscular once PRN Glucose LESS THAN 70 milligrams/deciliter  oxyCODONE    5 mG/acetaminophen 325 mG 1 Tablet(s) Oral every 4 hours PRN Moderate Pain (4 - 6)      ALLERGIES:  Allergies    No Known Allergies    Intolerances        LABS:                        7.9    7.2   )-----------( 242      ( 13 Dec 2018 07:00 )             26.6     12-13    143  |  108  |  39<H>  ----------------------------<  106<H>  3.8   |  26  |  0.88    Ca    9.8      13 Dec 2018 06:58  Phos  3.3     12-13  Mg     2.1     12-13          CAPILLARY BLOOD GLUCOSE      POCT Blood Glucose.: 123 mg/dL (14 Dec 2018 06:45)      RADIOLOGY & ADDITIONAL TESTS: Reviewed.

## 2018-12-14 NOTE — PROGRESS NOTE ADULT - PROBLEM SELECTOR PLAN 8
F: none  E: K>4 Mag>2   N: tube feeds, glucerna 1.5 via PEG 55cc/hr, 250cc free water flushes q6hrs F: NS 100cc/hr  E: K>4 Mag>2   N: tube feeds, glucerna 1.5 via PEG 55cc/hr, 250cc free water flushes q6hrs

## 2018-12-14 NOTE — SWALLOW VFSS/MBS ASSESSMENT ADULT - DIAGNOSTIC IMPRESSIONS
No aspiration appreciated during this study. Pt presents with moderate oral dysphagia and mild pharyngeal dysphagia outlined above.

## 2018-12-14 NOTE — SWALLOW VFSS/MBS ASSESSMENT ADULT - ADDITIONAL RECOMMENDATIONS
This service will continue to follow for ongoing reassessment to determine pt's readiness for a solid diet upgrade. Considerations for supplemental feedings via PEG to ensure pt is receiving adequate nutrition/hydration. Results and recommendations were reviewed with pt and team.

## 2018-12-15 LAB
ANION GAP SERPL CALC-SCNC: 10 MMOL/L — SIGNIFICANT CHANGE UP (ref 5–17)
BUN SERPL-MCNC: 34 MG/DL — HIGH (ref 7–23)
CALCIUM SERPL-MCNC: 9.7 MG/DL — SIGNIFICANT CHANGE UP (ref 8.4–10.5)
CHLORIDE SERPL-SCNC: 110 MMOL/L — HIGH (ref 96–108)
CO2 SERPL-SCNC: 23 MMOL/L — SIGNIFICANT CHANGE UP (ref 22–31)
CREAT SERPL-MCNC: 0.88 MG/DL — SIGNIFICANT CHANGE UP (ref 0.5–1.3)
GLUCOSE BLDC GLUCOMTR-MCNC: 101 MG/DL — HIGH (ref 70–99)
GLUCOSE BLDC GLUCOMTR-MCNC: 127 MG/DL — HIGH (ref 70–99)
GLUCOSE BLDC GLUCOMTR-MCNC: 133 MG/DL — HIGH (ref 70–99)
GLUCOSE BLDC GLUCOMTR-MCNC: 133 MG/DL — HIGH (ref 70–99)
GLUCOSE SERPL-MCNC: 115 MG/DL — HIGH (ref 70–99)
HCT VFR BLD CALC: 25.8 % — LOW (ref 34.5–45)
HGB BLD-MCNC: 7.8 G/DL — LOW (ref 11.5–15.5)
MAGNESIUM SERPL-MCNC: 1.9 MG/DL — SIGNIFICANT CHANGE UP (ref 1.6–2.6)
MCHC RBC-ENTMCNC: 28.5 PG — SIGNIFICANT CHANGE UP (ref 27–34)
MCHC RBC-ENTMCNC: 30.2 G/DL — LOW (ref 32–36)
MCV RBC AUTO: 94.2 FL — SIGNIFICANT CHANGE UP (ref 80–100)
PHOSPHATE SERPL-MCNC: 3.4 MG/DL — SIGNIFICANT CHANGE UP (ref 2.5–4.5)
PLATELET # BLD AUTO: 261 K/UL — SIGNIFICANT CHANGE UP (ref 150–400)
POTASSIUM SERPL-MCNC: 4.4 MMOL/L — SIGNIFICANT CHANGE UP (ref 3.5–5.3)
POTASSIUM SERPL-SCNC: 4.4 MMOL/L — SIGNIFICANT CHANGE UP (ref 3.5–5.3)
RBC # BLD: 2.65 M/UL — LOW (ref 3.8–5.2)
RBC # BLD: 2.74 M/UL — LOW (ref 3.8–5.2)
RBC # FLD: 17.5 % — HIGH (ref 10.3–16.9)
RETICS/RBC NFR: 4.7 % — HIGH (ref 0.5–2.5)
SODIUM SERPL-SCNC: 143 MMOL/L — SIGNIFICANT CHANGE UP (ref 135–145)
WBC # BLD: 7.3 K/UL — SIGNIFICANT CHANGE UP (ref 3.8–10.5)
WBC # FLD AUTO: 7.3 K/UL — SIGNIFICANT CHANGE UP (ref 3.8–10.5)

## 2018-12-15 PROCEDURE — 99232 SBSQ HOSP IP/OBS MODERATE 35: CPT

## 2018-12-15 RX ADMIN — HEPARIN SODIUM 5000 UNIT(S): 5000 INJECTION INTRAVENOUS; SUBCUTANEOUS at 07:25

## 2018-12-15 RX ADMIN — Medication 50 MILLIGRAM(S): at 07:27

## 2018-12-15 RX ADMIN — Medication 3 MILLILITER(S): at 12:17

## 2018-12-15 RX ADMIN — HEPARIN SODIUM 5000 UNIT(S): 5000 INJECTION INTRAVENOUS; SUBCUTANEOUS at 21:25

## 2018-12-15 RX ADMIN — Medication 3 MILLILITER(S): at 18:07

## 2018-12-15 RX ADMIN — Medication 3 MILLILITER(S): at 07:25

## 2018-12-15 RX ADMIN — Medication 1 DROP(S): at 18:08

## 2018-12-15 RX ADMIN — Medication 3 MILLILITER(S): at 23:23

## 2018-12-15 RX ADMIN — Medication 1 TABLET(S): at 14:54

## 2018-12-15 RX ADMIN — PANTOPRAZOLE SODIUM 40 MILLIGRAM(S): 20 TABLET, DELAYED RELEASE ORAL at 12:17

## 2018-12-15 RX ADMIN — Medication 1 TABLET(S): at 21:31

## 2018-12-15 RX ADMIN — Medication 150 MICROGRAM(S): at 07:26

## 2018-12-15 RX ADMIN — Medication 1 DROP(S): at 07:25

## 2018-12-15 RX ADMIN — Medication 3 MILLILITER(S): at 23:22

## 2018-12-15 RX ADMIN — Medication 50 MILLIGRAM(S): at 23:22

## 2018-12-15 RX ADMIN — Medication 50 MILLIGRAM(S): at 14:54

## 2018-12-15 RX ADMIN — SODIUM CHLORIDE 100 MILLILITER(S): 9 INJECTION INTRAMUSCULAR; INTRAVENOUS; SUBCUTANEOUS at 07:28

## 2018-12-15 RX ADMIN — Medication 3 MILLILITER(S): at 18:26

## 2018-12-15 RX ADMIN — LISINOPRIL 40 MILLIGRAM(S): 2.5 TABLET ORAL at 07:25

## 2018-12-15 RX ADMIN — Medication 3 MILLILITER(S): at 07:24

## 2018-12-15 RX ADMIN — HEPARIN SODIUM 5000 UNIT(S): 5000 INJECTION INTRAVENOUS; SUBCUTANEOUS at 14:54

## 2018-12-15 RX ADMIN — AMLODIPINE BESYLATE 10 MILLIGRAM(S): 2.5 TABLET ORAL at 18:07

## 2018-12-15 RX ADMIN — Medication 1 TABLET(S): at 07:25

## 2018-12-15 NOTE — PROGRESS NOTE ADULT - ASSESSMENT
78F w/ PMH obesity, DM, HTN, hypothyroidism, sepsis secondary to ventral hernia infection s/p repair, PE s/p IVC filter, acute blood loss anemia from hemorrhagic uterine fibroids and UGIB, ATN requiring HD, sepsis secondary to MSSA bacteremia and MDR E coli UTI w/AMS and s/p TAHBSO and small bowel resection s/p PEG. Admitted with septic shock likely 2/2 PNA vs UTI and acute hypoxic respiratory failure now extubated, s/p bronchoscopy 11/30, s/p 5 day course of meropenem for pseudomonal PNA.      Sinus tachycardia.  Plan: Non-tachycardic this am    Likely 2/2 intravascular volume depletion from GI losses (chronic diarrhea) and prior diuretic (HCTZ) , now improved after fluids    #Hypernatremia  -Improved with free water flushes and IVF. Currently WNL at 145.   -Cont free water flushes to 300cc q6 hrs.   -holding IVF now    # Hypertension.  Still slightly elevated, hydralazine recently incerased; will cont to monitor following increase   -c/w home lisinopril 40 mg daily  -c/w amlodipine 10 mg daily  -Cont hydralazine to 50mg q8hr     #Diarrhea - Unknown etiology, now becoming more solid.   -pull rectal tube as becoming more solid and risk causing constipation  -cont to monitor    # Sepsis due to pneumonia.  Plan: Resolved  - ABX  course ended 12/7  - Bcx from 12/3 negative.     # Mucus plugging of bronchi.  Plan: Pt with frequent mucus plugging throughout hospital course s/p percussion vest, s/p bronchoscopy 11/30  - c/w mucomyst   - pt currently without copious secretions, intermittent suctioning prn     #Dermatitis of b/l buttocks  -daily wound care  -rectal tube in place - continue in setting of decub ulcer    #Anemia  - Stable, possibly anemia of chornic disease   - B12, folate wnl  - Fe studies with normal iron sat and slightly low TIBC, remaining values wnl  - consider Fe supplementation as patient is not likely absorbing well  - transfuse for Hgb <7.       #GONZALEZ - previously elevated BUN/Cr, now improved afte rIVF  -can DC IVF and cont water flushes       # Hypothyroidism. Plan: -Continue synthroid currently 150 mcg per day oral.  #  # DM (diabetes mellitus).  Controlled on MISS  -Monitor FSG.        DC pending ERNESTINE

## 2018-12-15 NOTE — PROGRESS NOTE ADULT - SUBJECTIVE AND OBJECTIVE BOX
Patient is a 78y old  Female who presents with a chief complaint of Septic shock and acute respiratory failure (14 Dec 2018 08:19)      INTERVAL HPI/OVERNIGHT EVENTS:    Patient offers no complaints at bedside, feeling fine watchign TV alert    Review of Systems: 12 point review of systems otherwise negative  ( - )fevers/chills  ( - ) dyspnea  ( - ) cough  ( - ) chest pain  ( - ) palpatations  ( - ) dizziness/lightheadedness  ( - ) nausea/vomiting  ( - ) abd pain  ( - ) diarrhea  ( - ) melena  ( - ) hematochezia  ( - ) dysuria  ( - ) hematuria  ( - ) leg swelling  ( -) calf tenderness  ( - ) motor weakness  ( - ) extremity numbness  ( - ) back pain  ( + ) tolerating POs  ( + ) BM    MEDICATIONS  (STANDING):  acetylcysteine 10%  Inhalation 3 milliLiter(s) Inhalation every 6 hours  ALBUTerol/ipratropium for Nebulization 3 milliLiter(s) Nebulizer every 6 hours  amLODIPine   Tablet 10 milliGRAM(s) Oral every 24 hours  artificial  tears Solution 1 Drop(s) Both EYES two times a day  dextrose 5%. 1000 milliLiter(s) (50 mL/Hr) IV Continuous <Continuous>  dextrose 50% Injectable 12.5 Gram(s) IV Push once  dextrose 50% Injectable 25 Gram(s) IV Push once  dextrose 50% Injectable 25 Gram(s) IV Push once  heparin  Injectable 5000 Unit(s) SubCutaneous every 8 hours  hydrALAZINE 50 milliGRAM(s) Oral every 8 hours  insulin lispro (HumaLOG) corrective regimen sliding scale   SubCutaneous every 6 hours  lactobacillus acidophilus 1 Tablet(s) Oral every 8 hours  levothyroxine 150 MICROGram(s) Oral daily  lisinopril 40 milliGRAM(s) Oral daily  pantoprazole   Suspension 40 milliGRAM(s) Enteral Tube daily    MEDICATIONS  (PRN):  acetaminophen    Suspension .. 650 milliGRAM(s) Oral every 6 hours PRN Temp greater or equal to 38C (100.4F)  dextrose 40% Gel 15 Gram(s) Oral once PRN Blood Glucose LESS THAN 70 milliGRAM(s)/deciliter  glucagon  Injectable 1 milliGRAM(s) IntraMuscular once PRN Glucose LESS THAN 70 milligrams/deciliter  oxyCODONE    5 mG/acetaminophen 325 mG 1 Tablet(s) Oral every 4 hours PRN Moderate Pain (4 - 6)      Allergies    No Known Allergies    Intolerances          Vital Signs Last 24 Hrs  T(C): 37 (15 Dec 2018 09:23), Max: 37.3 (14 Dec 2018 16:56)  T(F): 98.6 (15 Dec 2018 09:23), Max: 99.1 (14 Dec 2018 16:56)  HR: 88 (15 Dec 2018 14:55) (84 - 100)  BP: 154/86 (15 Dec 2018 14:55) (149/77 - 160/71)  BP(mean): --  RR: 20 (15 Dec 2018 09:23) (18 - 20)  SpO2: 95% (15 Dec 2018 09:23) (94% - 97%)  CAPILLARY BLOOD GLUCOSE      POCT Blood Glucose.: 133 mg/dL (15 Dec 2018 12:44)  POCT Blood Glucose.: 127 mg/dL (15 Dec 2018 05:58)  POCT Blood Glucose.: 121 mg/dL (14 Dec 2018 21:58)  POCT Blood Glucose.: 147 mg/dL (14 Dec 2018 18:06)      12-14 @ 07:01  -  12-15 @ 07:00  --------------------------------------------------------  IN: 2400 mL / OUT: 100 mL / NET: 2300 mL        Physical Exam:    Daily     Daily   General:  Well appearing, NAD, not cachetic  HEENT:  Nonicteric, PERRLA  CV:  RRR, no murmur, no JVD  Lungs:  CTA B/L, no wheezes, rales, rhonchi  Abdomen:  Soft, non-tender, no distended, positive BS, no hepatosplenomegaly  Extremities:   no c/c, no edema  Skin:  Warm and dry, no rashes    LABS:                        7.8    7.3   )-----------( 261      ( 15 Dec 2018 06:55 )             25.8     12-15    143  |  110<H>  |  34<H>  ----------------------------<  115<H>  4.4   |  23  |  0.88    Ca    9.7      15 Dec 2018 06:54  Phos  3.4     12-15  Mg     1.9     12-15

## 2018-12-16 LAB
ANION GAP SERPL CALC-SCNC: 13 MMOL/L — SIGNIFICANT CHANGE UP (ref 5–17)
BUN SERPL-MCNC: 33 MG/DL — HIGH (ref 7–23)
CALCIUM SERPL-MCNC: 10 MG/DL — SIGNIFICANT CHANGE UP (ref 8.4–10.5)
CHLORIDE SERPL-SCNC: 104 MMOL/L — SIGNIFICANT CHANGE UP (ref 96–108)
CO2 SERPL-SCNC: 23 MMOL/L — SIGNIFICANT CHANGE UP (ref 22–31)
CREAT SERPL-MCNC: 0.83 MG/DL — SIGNIFICANT CHANGE UP (ref 0.5–1.3)
GLUCOSE BLDC GLUCOMTR-MCNC: 111 MG/DL — HIGH (ref 70–99)
GLUCOSE BLDC GLUCOMTR-MCNC: 116 MG/DL — HIGH (ref 70–99)
GLUCOSE BLDC GLUCOMTR-MCNC: 139 MG/DL — HIGH (ref 70–99)
GLUCOSE SERPL-MCNC: 105 MG/DL — HIGH (ref 70–99)
HCT VFR BLD CALC: 27.8 % — LOW (ref 34.5–45)
HGB BLD-MCNC: 8.6 G/DL — LOW (ref 11.5–15.5)
MAGNESIUM SERPL-MCNC: 1.8 MG/DL — SIGNIFICANT CHANGE UP (ref 1.6–2.6)
MCHC RBC-ENTMCNC: 29.8 PG — SIGNIFICANT CHANGE UP (ref 27–34)
MCHC RBC-ENTMCNC: 30.9 G/DL — LOW (ref 32–36)
MCV RBC AUTO: 96.2 FL — SIGNIFICANT CHANGE UP (ref 80–100)
PHOSPHATE SERPL-MCNC: 3.7 MG/DL — SIGNIFICANT CHANGE UP (ref 2.5–4.5)
PLATELET # BLD AUTO: 265 K/UL — SIGNIFICANT CHANGE UP (ref 150–400)
POTASSIUM SERPL-MCNC: 4.3 MMOL/L — SIGNIFICANT CHANGE UP (ref 3.5–5.3)
POTASSIUM SERPL-SCNC: 4.3 MMOL/L — SIGNIFICANT CHANGE UP (ref 3.5–5.3)
RBC # BLD: 2.89 M/UL — LOW (ref 3.8–5.2)
RBC # FLD: 17.3 % — HIGH (ref 10.3–16.9)
SODIUM SERPL-SCNC: 140 MMOL/L — SIGNIFICANT CHANGE UP (ref 135–145)
WBC # BLD: 10 K/UL — SIGNIFICANT CHANGE UP (ref 3.8–10.5)
WBC # FLD AUTO: 10 K/UL — SIGNIFICANT CHANGE UP (ref 3.8–10.5)

## 2018-12-16 PROCEDURE — 99232 SBSQ HOSP IP/OBS MODERATE 35: CPT | Mod: GC

## 2018-12-16 RX ORDER — HYDRALAZINE HCL 50 MG
10 TABLET ORAL ONCE
Qty: 0 | Refills: 0 | Status: COMPLETED | OUTPATIENT
Start: 2018-12-16 | End: 2018-12-16

## 2018-12-16 RX ORDER — MAGNESIUM SULFATE 500 MG/ML
2 VIAL (ML) INJECTION ONCE
Qty: 0 | Refills: 0 | Status: DISCONTINUED | OUTPATIENT
Start: 2018-12-16 | End: 2018-12-16

## 2018-12-16 RX ORDER — HYDRALAZINE HCL 50 MG
75 TABLET ORAL EVERY 8 HOURS
Qty: 0 | Refills: 0 | Status: DISCONTINUED | OUTPATIENT
Start: 2018-12-16 | End: 2018-12-16

## 2018-12-16 RX ORDER — HYDRALAZINE HCL 50 MG
50 TABLET ORAL EVERY 6 HOURS
Qty: 0 | Refills: 0 | Status: DISCONTINUED | OUTPATIENT
Start: 2018-12-16 | End: 2018-12-17

## 2018-12-16 RX ADMIN — HEPARIN SODIUM 5000 UNIT(S): 5000 INJECTION INTRAVENOUS; SUBCUTANEOUS at 14:18

## 2018-12-16 RX ADMIN — Medication 3 MILLILITER(S): at 18:07

## 2018-12-16 RX ADMIN — Medication 75 MILLIGRAM(S): at 15:43

## 2018-12-16 RX ADMIN — Medication 3 MILLILITER(S): at 12:15

## 2018-12-16 RX ADMIN — Medication 3 MILLILITER(S): at 12:08

## 2018-12-16 RX ADMIN — LISINOPRIL 40 MILLIGRAM(S): 2.5 TABLET ORAL at 06:05

## 2018-12-16 RX ADMIN — Medication 10 MILLIGRAM(S): at 12:19

## 2018-12-16 RX ADMIN — Medication 150 MICROGRAM(S): at 06:05

## 2018-12-16 RX ADMIN — Medication 3 MILLILITER(S): at 06:04

## 2018-12-16 RX ADMIN — Medication 1 DROP(S): at 18:07

## 2018-12-16 RX ADMIN — Medication 50 MILLIGRAM(S): at 06:06

## 2018-12-16 RX ADMIN — PANTOPRAZOLE SODIUM 40 MILLIGRAM(S): 20 TABLET, DELAYED RELEASE ORAL at 12:08

## 2018-12-16 RX ADMIN — Medication 50 MILLIGRAM(S): at 21:44

## 2018-12-16 RX ADMIN — HEPARIN SODIUM 5000 UNIT(S): 5000 INJECTION INTRAVENOUS; SUBCUTANEOUS at 21:44

## 2018-12-16 RX ADMIN — HEPARIN SODIUM 5000 UNIT(S): 5000 INJECTION INTRAVENOUS; SUBCUTANEOUS at 06:04

## 2018-12-16 RX ADMIN — Medication 1 TABLET(S): at 06:06

## 2018-12-16 RX ADMIN — OXYCODONE AND ACETAMINOPHEN 1 TABLET(S): 5; 325 TABLET ORAL at 23:06

## 2018-12-16 RX ADMIN — Medication 1 TABLET(S): at 21:44

## 2018-12-16 RX ADMIN — AMLODIPINE BESYLATE 10 MILLIGRAM(S): 2.5 TABLET ORAL at 18:07

## 2018-12-16 RX ADMIN — Medication 1 DROP(S): at 06:04

## 2018-12-16 RX ADMIN — OXYCODONE AND ACETAMINOPHEN 1 TABLET(S): 5; 325 TABLET ORAL at 21:44

## 2018-12-16 RX ADMIN — Medication 3 MILLILITER(S): at 18:13

## 2018-12-16 RX ADMIN — Medication 1 TABLET(S): at 14:18

## 2018-12-16 RX ADMIN — Medication 3 MILLILITER(S): at 06:05

## 2018-12-16 NOTE — PROGRESS NOTE ADULT - ATTENDING COMMENTS
I was physically present for the key portions of the evaluation and management service provided.  I agree with the above history, physical, and plan which I have reviewed and edited where appropriate    Patient rectal tube was removed, no further diarrhea  change hydralazine to 50q6 for improved BP control    More than 50% of the visit was spent counseling and or coordinating care by the attending physician

## 2018-12-16 NOTE — PROGRESS NOTE ADULT - PROBLEM SELECTOR PLAN 4
Pt with frequent mucus plugging throughout hospital course s/p percussion vest, s/p bronchoscopy 11/30  - c/w mucomyst   - pt currently without copious secretions, intermittent suctioning prn     #Dermatitis of b/l buttocks  -daily wound care  -rectal tube in place - continue in setting of decub ulcer    #Anemia   Hgb stabilized but did noted to have downtrend in last few days. However no signs of active bleeding; H&H stable  - B12, folate wnl  - Fe studies with normal iron sat and slightly low TIBC, remaining values wnl  - consider Fe supplementation as patient is not likely absorbing well  - transfuse for Hgb <7

## 2018-12-16 NOTE — PROGRESS NOTE ADULT - SUBJECTIVE AND OBJECTIVE BOX
SUBJECTIVE / INTERVAL HPI: EMANUEL. Patient seen and examined at bedside. Patient currently without discomfort. Watching TV. Answers yes and no to questions. Otherwise: (-) fever, (-) chills, (-) dizziness, (-) headache, (-) neck pain, (-) chest pain, (-) palpitations, (-)SOB, (-) nausea, (-) vomiting, (-) diarrhea, (-) abdominal pain, (-)new weakness, (-) paresthesias.      VITAL SIGNS:  Vital Signs Last 24 Hrs  T(C): 36.7 (16 Dec 2018 05:56), Max: 37.1 (15 Dec 2018 21:23)  T(F): 98.1 (16 Dec 2018 05:56), Max: 98.7 (15 Dec 2018 21:23)  HR: 89 (16 Dec 2018 05:56) (88 - 97)  BP: 165/84 (16 Dec 2018 05:56) (151/77 - 176/80)  BP(mean): 112 (15 Dec 2018 21:23) (112 - 112)  RR: 18 (16 Dec 2018 05:56) (18 - 20)  SpO2: 95% (16 Dec 2018 05:56) (95% - 100%)    PHYSICAL EXAM:    General: well appearing, resting comfortably in bed and in no acute distress. answers yes and no to questions.   HEENT: normocephalic, atraumatic, PERRL, anicteric sclera; no conjunctival pallor, mucus membranes moist  Neck: supple, no masses  Cardiovascular: +S1/S2, regular rate and rhythm; no murmurs, no rub, no gallop.  Respiratory: clear to auscultation bilaterally, no rhonchi, no wheeze, no rales.,   Gastrointestinal: soft, obese, active bowel sounds, non-tender, non-distended, rectal tube in place without stool. . PEG in place. Clean dry and intact skin surrounding PEG.   Extremities: Warm, dry; no edema, clubbing or cyanosis  Vascular: 2+ radial, DP pulses bilaterally  Neurological: AAOx1 to person only. no focal deficits      MEDICATIONS:  MEDICATIONS  (STANDING):  acetylcysteine 10%  Inhalation 3 milliLiter(s) Inhalation every 6 hours  ALBUTerol/ipratropium for Nebulization 3 milliLiter(s) Nebulizer every 6 hours  amLODIPine   Tablet 10 milliGRAM(s) Oral every 24 hours  artificial  tears Solution 1 Drop(s) Both EYES two times a day  dextrose 5%. 1000 milliLiter(s) (50 mL/Hr) IV Continuous <Continuous>  dextrose 50% Injectable 12.5 Gram(s) IV Push once  dextrose 50% Injectable 25 Gram(s) IV Push once  dextrose 50% Injectable 25 Gram(s) IV Push once  heparin  Injectable 5000 Unit(s) SubCutaneous every 8 hours  hydrALAZINE 50 milliGRAM(s) Oral every 8 hours  insulin lispro (HumaLOG) corrective regimen sliding scale   SubCutaneous every 6 hours  lactobacillus acidophilus 1 Tablet(s) Oral every 8 hours  levothyroxine 150 MICROGram(s) Oral daily  lisinopril 40 milliGRAM(s) Oral daily  pantoprazole   Suspension 40 milliGRAM(s) Enteral Tube daily    MEDICATIONS  (PRN):  acetaminophen    Suspension .. 650 milliGRAM(s) Oral every 6 hours PRN Temp greater or equal to 38C (100.4F)  dextrose 40% Gel 15 Gram(s) Oral once PRN Blood Glucose LESS THAN 70 milliGRAM(s)/deciliter  glucagon  Injectable 1 milliGRAM(s) IntraMuscular once PRN Glucose LESS THAN 70 milligrams/deciliter  oxyCODONE    5 mG/acetaminophen 325 mG 1 Tablet(s) Oral every 4 hours PRN Moderate Pain (4 - 6)      ALLERGIES:  Allergies    No Known Allergies    Intolerances        LABS:                        8.6    10.0  )-----------( 265      ( 16 Dec 2018 06:37 )             27.8     12-16    140  |  104  |  33<H>  ----------------------------<  105<H>  4.3   |  23  |  0.83    Ca    10.0      16 Dec 2018 06:37  Phos  3.7     12-16  Mg     1.8     12-16          CAPILLARY BLOOD GLUCOSE      POCT Blood Glucose.: 116 mg/dL (16 Dec 2018 06:13)      RADIOLOGY & ADDITIONAL TESTS: Reviewed.

## 2018-12-16 NOTE — PROGRESS NOTE ADULT - PROBLEM SELECTOR PLAN 2
Resolved: Non-tachycardic this am w/ HR in 60s  - pt was intrvasculuarly depleted. Improved with IVF hydration     #Hypernatremia  Resolved:  -continue free water flushes to 300cc q6 hrs. Will monitor and will consider increasing frequency of free water flushes.  -trend BMP

## 2018-12-16 NOTE — PROGRESS NOTE ADULT - PROBLEM SELECTOR PLAN 1
Pt presenting with PMH of HTN. BP this /84. High of 176/80 last night.  -c/w home lisinopril 40 mg daily  -c/w amlodipine 10 mg daily  -continue hydralazine 50mg q8hr    ##Diarrhea  Patient now w/ chronic diarrhea likely 2/2 to malabsorption. stool has been more foremd  -Will remove rectal tube Pt presenting with PMH of HTN. BP this /84. High of 176/80 last night.  -c/w home lisinopril 40 mg daily  -c/w amlodipine 10 mg daily  -increase hydralazine to 60mg q8hr    ##Diarrhea  Patient now w/ chronic diarrhea likely 2/2 to malabsorption. stool has been more foremd  -Will remove rectal tube

## 2018-12-16 NOTE — PROGRESS NOTE ADULT - PROBLEM SELECTOR PLAN 8
F: NS 100cc/hr  E: K>4 Mag>2   N: tube feeds, glucerna 1.5 via PEG 55cc/hr, 250cc free water flushes q6hrs F: None  E: K>4 Mag>2   N: tube feeds, glucerna 1.5 via PEG 55cc/hr, 300cc free water flushes q6hrs

## 2018-12-16 NOTE — PROGRESS NOTE ADULT - PROBLEM SELECTOR PLAN 10
1) PCP Contacted on Admission: (Y) --> Name & Phone #: MORA WYNN [phone]  2) Date of Contact with PCP: 12/7/18  3) PCP Contacted at Discharge: (Y/N, N/A)  4) Summary of Handoff Given to PCP: NA  5) Post-Discharge Appointment Date and Location: Dr Wynn 12/26/18 at 8:30 am, [address]

## 2018-12-16 NOTE — PROGRESS NOTE ADULT - PROBLEM SELECTOR PLAN 5
Pt with hx of sm carola perforation, recently admitted 4/15/18 due to MSSA bacteremia and MDR E Coli in urine, transferred to [facility], and found to have extravasation on CT abd, s/p IR drain placement, underwent DAVY BSO 2/2 tubal metaplasia, SBR with primary anastomosis, abdominal washout, PEG placement, discharged on 6/4  - surgery was following, now signed off  - PEG in place, c/w glucerna feeds at 55cc/hr  - pt was on standing reglan q12 for bowel pro-motility, d/c due to concern for possible upper extremity rigidity  -Speech and swallow rec pureed diet w/ thin liquids Pt with hx of sm carola perforation, recently admitted 4/15/18 due to MSSA bacteremia and MDR E Coli in urine, transferred to [facility], and found to have extravasation on CT abd, s/p IR drain placement, underwent DAVY BSO 2/2 tubal metaplasia, SBR with primary anastomosis, abdominal washout, PEG placement, discharged on 6/4  - surgery was following, now signed off  - PEG in place, c/w glucerna feeds at 55cc/hr  - pt was on standing reglan q12 for bowel pro-motility, d/c due to concern for possible upper extremity rigidity  -Speech and swallow rec pureed diet w/ thin liquids..however will continue tube feeds concurrently until patient able to tolerate diet orally.

## 2018-12-17 LAB
ANION GAP SERPL CALC-SCNC: 10 MMOL/L — SIGNIFICANT CHANGE UP (ref 5–17)
BUN SERPL-MCNC: 35 MG/DL — HIGH (ref 7–23)
CALCIUM SERPL-MCNC: 10.1 MG/DL — SIGNIFICANT CHANGE UP (ref 8.4–10.5)
CHLORIDE SERPL-SCNC: 105 MMOL/L — SIGNIFICANT CHANGE UP (ref 96–108)
CO2 SERPL-SCNC: 24 MMOL/L — SIGNIFICANT CHANGE UP (ref 22–31)
CREAT SERPL-MCNC: 0.96 MG/DL — SIGNIFICANT CHANGE UP (ref 0.5–1.3)
GLUCOSE BLDC GLUCOMTR-MCNC: 114 MG/DL — HIGH (ref 70–99)
GLUCOSE BLDC GLUCOMTR-MCNC: 121 MG/DL — HIGH (ref 70–99)
GLUCOSE BLDC GLUCOMTR-MCNC: 121 MG/DL — HIGH (ref 70–99)
GLUCOSE BLDC GLUCOMTR-MCNC: 140 MG/DL — HIGH (ref 70–99)
GLUCOSE SERPL-MCNC: 117 MG/DL — HIGH (ref 70–99)
HCT VFR BLD CALC: 27 % — LOW (ref 34.5–45)
HGB BLD-MCNC: 8.3 G/DL — LOW (ref 11.5–15.5)
MAGNESIUM SERPL-MCNC: 1.6 MG/DL — SIGNIFICANT CHANGE UP (ref 1.6–2.6)
MCHC RBC-ENTMCNC: 28.9 PG — SIGNIFICANT CHANGE UP (ref 27–34)
MCHC RBC-ENTMCNC: 30.7 G/DL — LOW (ref 32–36)
MCV RBC AUTO: 94.1 FL — SIGNIFICANT CHANGE UP (ref 80–100)
PHOSPHATE SERPL-MCNC: 3.6 MG/DL — SIGNIFICANT CHANGE UP (ref 2.5–4.5)
PLATELET # BLD AUTO: 294 K/UL — SIGNIFICANT CHANGE UP (ref 150–400)
POTASSIUM SERPL-MCNC: 4.3 MMOL/L — SIGNIFICANT CHANGE UP (ref 3.5–5.3)
POTASSIUM SERPL-SCNC: 4.3 MMOL/L — SIGNIFICANT CHANGE UP (ref 3.5–5.3)
RBC # BLD: 2.87 M/UL — LOW (ref 3.8–5.2)
RBC # FLD: 17.7 % — HIGH (ref 10.3–16.9)
SODIUM SERPL-SCNC: 139 MMOL/L — SIGNIFICANT CHANGE UP (ref 135–145)
WBC # BLD: 9.1 K/UL — SIGNIFICANT CHANGE UP (ref 3.8–10.5)
WBC # FLD AUTO: 9.1 K/UL — SIGNIFICANT CHANGE UP (ref 3.8–10.5)

## 2018-12-17 PROCEDURE — 99233 SBSQ HOSP IP/OBS HIGH 50: CPT | Mod: GC

## 2018-12-17 RX ORDER — OXYCODONE AND ACETAMINOPHEN 5; 325 MG/1; MG/1
1 TABLET ORAL EVERY 4 HOURS
Qty: 0 | Refills: 0 | Status: DISCONTINUED | OUTPATIENT
Start: 2018-12-17 | End: 2018-12-20

## 2018-12-17 RX ORDER — MAGNESIUM SULFATE 500 MG/ML
2 VIAL (ML) INJECTION ONCE
Qty: 0 | Refills: 0 | Status: COMPLETED | OUTPATIENT
Start: 2018-12-17 | End: 2018-12-17

## 2018-12-17 RX ORDER — HYDRALAZINE HCL 50 MG
50 TABLET ORAL EVERY 6 HOURS
Qty: 0 | Refills: 0 | Status: DISCONTINUED | OUTPATIENT
Start: 2018-12-17 | End: 2018-12-19

## 2018-12-17 RX ADMIN — Medication 50 MILLIGRAM(S): at 03:18

## 2018-12-17 RX ADMIN — Medication 1 TABLET(S): at 23:28

## 2018-12-17 RX ADMIN — Medication 1 TABLET(S): at 06:03

## 2018-12-17 RX ADMIN — PANTOPRAZOLE SODIUM 40 MILLIGRAM(S): 20 TABLET, DELAYED RELEASE ORAL at 12:13

## 2018-12-17 RX ADMIN — HEPARIN SODIUM 5000 UNIT(S): 5000 INJECTION INTRAVENOUS; SUBCUTANEOUS at 14:19

## 2018-12-17 RX ADMIN — Medication 3 MILLILITER(S): at 18:10

## 2018-12-17 RX ADMIN — HEPARIN SODIUM 5000 UNIT(S): 5000 INJECTION INTRAVENOUS; SUBCUTANEOUS at 06:02

## 2018-12-17 RX ADMIN — Medication 3 MILLILITER(S): at 12:13

## 2018-12-17 RX ADMIN — AMLODIPINE BESYLATE 10 MILLIGRAM(S): 2.5 TABLET ORAL at 18:00

## 2018-12-17 RX ADMIN — Medication 50 MILLIGRAM(S): at 23:47

## 2018-12-17 RX ADMIN — Medication 1 DROP(S): at 18:00

## 2018-12-17 RX ADMIN — Medication 3 MILLILITER(S): at 23:47

## 2018-12-17 RX ADMIN — Medication 50 MILLIGRAM(S): at 10:00

## 2018-12-17 RX ADMIN — Medication 3 MILLILITER(S): at 06:01

## 2018-12-17 RX ADMIN — Medication 3 MILLILITER(S): at 12:15

## 2018-12-17 RX ADMIN — Medication 3 MILLILITER(S): at 00:20

## 2018-12-17 RX ADMIN — Medication 150 MICROGRAM(S): at 06:03

## 2018-12-17 RX ADMIN — Medication 3 MILLILITER(S): at 18:00

## 2018-12-17 RX ADMIN — LISINOPRIL 40 MILLIGRAM(S): 2.5 TABLET ORAL at 06:03

## 2018-12-17 RX ADMIN — Medication 1 TABLET(S): at 14:19

## 2018-12-17 RX ADMIN — HEPARIN SODIUM 5000 UNIT(S): 5000 INJECTION INTRAVENOUS; SUBCUTANEOUS at 23:28

## 2018-12-17 RX ADMIN — Medication 1 DROP(S): at 06:02

## 2018-12-17 RX ADMIN — Medication 50 GRAM(S): at 09:38

## 2018-12-17 RX ADMIN — Medication 50 MILLIGRAM(S): at 18:00

## 2018-12-17 NOTE — PROGRESS NOTE ADULT - PROBLEM SELECTOR PLAN 10
1) PCP Contacted on Admission: (Y) --> Name & Phone #: MORA WYNN [phone]  2) Date of Contact with PCP: 12/7/18  3) PCP Contacted at Discharge: (Y/N, N/A)  4) Summary of Handoff Given to PCP: NA  5) Post-Discharge Appointment Date and Location: Dr Wynn 12/26/18 at 8:30 am, [address]

## 2018-12-17 NOTE — PROGRESS NOTE ADULT - PROBLEM SELECTOR PLAN 5
Pt with hx of sm carola perforation, recently admitted 4/15/18 due to MSSA bacteremia and MDR E Coli in urine, transferred to [facility], and found to have extravasation on CT abd, s/p IR drain placement, underwent DAVY BSO 2/2 tubal metaplasia, SBR with primary anastomosis, abdominal washout, PEG placement, discharged on 6/4  - surgery was following, now signed off  - PEG in place, c/w glucerna feeds at 55cc/hr  - pt was on standing reglan q12 for bowel pro-motility, d/c due to concern for possible upper extremity rigidity  -Speech and swallow rec pureed diet w/ thin liquids..however will continue tube feeds concurrently until patient able to tolerate diet orally.

## 2018-12-17 NOTE — ADVANCED PRACTICE NURSE CONSULT - RECOMMEDATIONS
patient
Continue to apply Cavilon Advanced Skin Protectant to bilateral buttocks denuded skin. Do not apply moisture barrier cream or foam dressings.  Discussed assessment and recommendations with Olivia
Continue use of Cavilon Advanced Skin Protectant to denuded skin Mon/Wed/Fri. Do not apply moisture barrier creams or foam dressings.  Apply PrimaFit Female Urinary Management System to wall suction.   Discussed assessment and recommendations with [facility] and house staff, Dr. Hortensia Sykes.
DTPI along right buttock extending along sacrum to left buttock - cleanse with cleansing wipes, apply zinc based moisture barrier cream twice daily or prn post incontinence.   Bilateral buttocks denuded skin - cleanse with cleansing wipes, apply zinc based moisture barrier cream twice daily and prn post incontinence.
Denuded skin on sacrum and bilateral buttocks -  cleanse with cleansing wipes, apply Cavilon Advanced Liquid Skin Protectant Mon/Weds/Fri. Do not apply moisture barrier cream or foam dressing.  Left jaw de-roofed blister - cleanse with normal saline, apply Xeroform gauze, cover with foam dressing every other day.   Discussed assessment and recommendations with [facility] and house staff, Dr Escobedo.

## 2018-12-17 NOTE — PROGRESS NOTE ADULT - PROBLEM SELECTOR PLAN 8
F: None  E: K>4 Mag>2   N: tube feeds, glucerna 1.5 via PEG 55cc/hr, 300cc free water flushes q6hrs F: None  E: K>4 Mag>2   N: tube feeds, glucerna 1.5 via PEG 55cc/hr, 250cc free water flushes q6hrs

## 2018-12-17 NOTE — CHART NOTE - NSCHARTNOTEFT_GEN_A_CORE
Admitting Diagnosis:   Patient is a 78y old  Female who presents with a chief complaint of Septic shock and acute respiratory failure (17 Dec 2018 08:18)      PAST MEDICAL & SURGICAL HISTORY:  Hypothyroidism  GERD (gastroesophageal reflux disease)  Obesity  DM (diabetes mellitus)  HLD (hyperlipidemia)  HTN (hypertension)  Status post total abdominal hysterectomy and bilateral salpingo-oophorectomy  S/P small bowel resection  H/O ventral hernia repair      Current Nutrition Order: Glucerna 1.5 Nicholas @ 55mL/hr x 24hrs via PEG (1320 mL TV, 1980kcal, 109g protein, 1002mL free H2O) FWF 250ml q6 hrs     GI Issues: x2 BM O/N, formed, no n/v/c     Pain: comfortable at this time     Skin Integrity: Stage 2 Pressure ulcer     Labs:   12-17    139  |  105  |  35<H>  ----------------------------<  117<H>  4.3   |  24  |  0.96    Ca    10.1      17 Dec 2018 06:50  Phos  3.6     12-17  Mg     1.6     12-17      CAPILLARY BLOOD GLUCOSE      POCT Blood Glucose.: 140 mg/dL (17 Dec 2018 07:13)  POCT Blood Glucose.: 114 mg/dL (17 Dec 2018 00:01)  POCT Blood Glucose.: 111 mg/dL (16 Dec 2018 17:57)  POCT Blood Glucose.: 139 mg/dL (16 Dec 2018 12:19)      Medications:  MEDICATIONS  (STANDING):  acetylcysteine 10%  Inhalation 3 milliLiter(s) Inhalation every 6 hours  ALBUTerol/ipratropium for Nebulization 3 milliLiter(s) Nebulizer every 6 hours  amLODIPine   Tablet 10 milliGRAM(s) Oral every 24 hours  artificial  tears Solution 1 Drop(s) Both EYES two times a day  dextrose 5%. 1000 milliLiter(s) (50 mL/Hr) IV Continuous <Continuous>  dextrose 50% Injectable 12.5 Gram(s) IV Push once  dextrose 50% Injectable 25 Gram(s) IV Push once  dextrose 50% Injectable 25 Gram(s) IV Push once  heparin  Injectable 5000 Unit(s) SubCutaneous every 8 hours  hydrALAZINE 50 milliGRAM(s) Oral every 6 hours  insulin lispro (HumaLOG) corrective regimen sliding scale   SubCutaneous every 6 hours  lactobacillus acidophilus 1 Tablet(s) Oral every 8 hours  levothyroxine 150 MICROGram(s) Oral daily  lisinopril 40 milliGRAM(s) Oral daily  pantoprazole   Suspension 40 milliGRAM(s) Enteral Tube daily    MEDICATIONS  (PRN):  dextrose 40% Gel 15 Gram(s) Oral once PRN Blood Glucose LESS THAN 70 milliGRAM(s)/deciliter  glucagon  Injectable 1 milliGRAM(s) IntraMuscular once PRN Glucose LESS THAN 70 milligrams/deciliter  oxyCODONE    5 mG/acetaminophen 325 mG 1 Tablet(s) Oral every 4 hours PRN Moderate Pain (4 - 6)      Weight: 78kg   81.6kg (11/26)  69.2kg (11/16)    Weight Change: oted large discrepancy, after re-weigh wt consistent +/- 1-2kg likely in setting of fluid shifts      Nutrition Focused Physical Exam: Completed [   ]  Not Pertinent [ x  ]    Estimated energy needs:   Utilized IBW (54.5kg) to calculate needs, pt >120% of IBW. Adjusted for sepsis/wound healing  Calories: 25-30 kcal/kg =1362-1635kcal/day  Protein: 1.2-1.4 g/kg = 65-76g protein/day  Fluids: 30-35 mL/kg = 9393-2620 mL/day    Subjective:   77y/o F presenting with septic shock likely 2/2 PNA vs UTI and acute hypoxic respiratory failure initially requiring intubation. Pt extubated on 11/19. S/p transfer back to MICU on 11/30 for bronchoscopy 2/2 mucous plugging. Mucous removed and pt was able to remain on NC, now off of NC + sepsis has resolved. Pt later transferred back to [facility], and now moved to [facility] for further monitoring, has finished course of abx, diarrhea likely exacerbated in setting of abx, has since resolved, having formed BM at this time. EN running at goal of 55mL/hr with good tolerance. Additional f/u SLP eval 12/14 now recommending pureed diet with thin liquids, plan to trial PO with continuation of EN to meet pt needs at this time per record, recommend decreasing EN or initiating nocturnal feeds if wish to restart PO diet to increase pt appetite. Last week pt hypernatremic, since initiation of FWF pt Na has been WNL. C/w 250ml FWF q6 to meet fluid needs, in addition to EN pt needs 650-900ml h2o to meet needs, pt needs at minimum 30ml flushes q4hr to maintain tube patency/ to flush meds. Pt seen in room, resting in bed, appears comfortable, no new complaints. Dispo plan remains [facility]. Will continue to keep nutrition aligned with GOC at all times.    Previous Nutrition Diagnosis:  Increased protein-calorie needs RT increased demand for protein-calorie intake AEB sepsis/wound healing     Active [  x ]  Resolved [   ]    If resolved, new PES:     Goal: Pt will meet % of EER per day via tolerated route     Recommendations:  1. Continue with current EN order + c/w 250ml FWF q6 to meet fluid needs, in addition to EN pt needs 650-900ml h2o to meet needs  2. Monitor for s/s intolerance; maintain aspiration precautions at all times  3. Monitor lytes and replete prn.  4. Trend weights weekly  5. Keep nutrition aligned with GOC at all times   6. C/w probiotic to aide in GI fn   7. Trial nocturnal feeds if wish to initiate PO diet or decrease current regimen to stimulate appetite  Glucerna 1.5 @35ml/hr x12 hrs 6pm-6am (840ml TV, 1260kcal, 69g pro, 630ml h2o), will adjust regimen pending PO tolerance    Education: n/a     Risk Level: High [   ] Moderate [x   ] Low [   ]

## 2018-12-17 NOTE — ADVANCED PRACTICE NURSE CONSULT - ASSESSMENT
Bilateral buttocks denuded skin unchanged. Patient incontinent of bowel and bladder. Intact and de-roofed blister noted on left buttock. Applied Cavilon Advanced Skin Protectant to denuded skin and blister. Applied PrimaFit Female Urinary Management System to wall suction. RNBranden present during assessment. Patient remains on AirTAP positioning system with wedges for turning and repositioning and heel protectors to offload heels.

## 2018-12-17 NOTE — PROGRESS NOTE ADULT - PROBLEM SELECTOR PLAN 1
Pt presenting with PMH of HTN. BP this /84. High of 176/80 last night.  -c/w home lisinopril 40 mg daily  -c/w amlodipine 10 mg daily  -increase hydralazine to 50mg q6hr    ##Diarrhea  Resolved. Patient now w/ chronic diarrhea likely 2/2 to malabsorption. stool has been more formed Pt presenting with PMH of HTN. BP this /63 and high of 167/89 last night.  -c/w home lisinopril 40 mg daily  -c/w amlodipine 10 mg daily  -increase hydralazine to 50mg q6hr    ##Diarrhea  Resolved. Patient now w/ chronic diarrhea likely 2/2 to malabsorption. stool has been more formed

## 2018-12-17 NOTE — PROGRESS NOTE ADULT - PROBLEM SELECTOR PLAN 2
Resolved: Non-tachycardic this am w/ HR in 60s  - pt was intrvasculuarly depleted. Improved with IVF hydration     #Hypernatremia  Resolved:  -continue free water flushes to 300cc q6 hrs. Will monitor and will consider increasing frequency of free water flushes.  -trend BMP Resolved: Non-tachycardic this am w/ HR in 60s  - pt was intrvasculuarly depleted. Improved with IVF hydration     #Hypernatremia  Resolved:  -continue free water flushes to 250cc q6 hrs. Will monitor and will consider increasing frequency of free water flushes.  -trend BMP Resolved: Non-tachycardic this am w/ HR in 60s  - pt was intrvasculuarly depleted. Improved with IVF hydration     #Hypernatremia  Resolved:  -trend BMP

## 2018-12-18 LAB
ANION GAP SERPL CALC-SCNC: 13 MMOL/L — SIGNIFICANT CHANGE UP (ref 5–17)
BUN SERPL-MCNC: 34 MG/DL — HIGH (ref 7–23)
CALCIUM SERPL-MCNC: 10.3 MG/DL — SIGNIFICANT CHANGE UP (ref 8.4–10.5)
CHLORIDE SERPL-SCNC: 100 MMOL/L — SIGNIFICANT CHANGE UP (ref 96–108)
CO2 SERPL-SCNC: 26 MMOL/L — SIGNIFICANT CHANGE UP (ref 22–31)
CREAT SERPL-MCNC: 0.97 MG/DL — SIGNIFICANT CHANGE UP (ref 0.5–1.3)
GLUCOSE BLDC GLUCOMTR-MCNC: 108 MG/DL — HIGH (ref 70–99)
GLUCOSE BLDC GLUCOMTR-MCNC: 120 MG/DL — HIGH (ref 70–99)
GLUCOSE BLDC GLUCOMTR-MCNC: 125 MG/DL — HIGH (ref 70–99)
GLUCOSE BLDC GLUCOMTR-MCNC: 133 MG/DL — HIGH (ref 70–99)
GLUCOSE SERPL-MCNC: 100 MG/DL — HIGH (ref 70–99)
HCT VFR BLD CALC: 28.9 % — LOW (ref 34.5–45)
HGB BLD-MCNC: 8.9 G/DL — LOW (ref 11.5–15.5)
MAGNESIUM SERPL-MCNC: 2.1 MG/DL — SIGNIFICANT CHANGE UP (ref 1.6–2.6)
MCHC RBC-ENTMCNC: 28.6 PG — SIGNIFICANT CHANGE UP (ref 27–34)
MCHC RBC-ENTMCNC: 30.8 G/DL — LOW (ref 32–36)
MCV RBC AUTO: 92.9 FL — SIGNIFICANT CHANGE UP (ref 80–100)
PHOSPHATE SERPL-MCNC: 3.6 MG/DL — SIGNIFICANT CHANGE UP (ref 2.5–4.5)
PLATELET # BLD AUTO: 301 K/UL — SIGNIFICANT CHANGE UP (ref 150–400)
POTASSIUM SERPL-MCNC: 4.5 MMOL/L — SIGNIFICANT CHANGE UP (ref 3.5–5.3)
POTASSIUM SERPL-SCNC: 4.5 MMOL/L — SIGNIFICANT CHANGE UP (ref 3.5–5.3)
RBC # BLD: 3.11 M/UL — LOW (ref 3.8–5.2)
RBC # FLD: 17.7 % — HIGH (ref 10.3–16.9)
SODIUM SERPL-SCNC: 139 MMOL/L — SIGNIFICANT CHANGE UP (ref 135–145)
WBC # BLD: 10.2 K/UL — SIGNIFICANT CHANGE UP (ref 3.8–10.5)
WBC # FLD AUTO: 10.2 K/UL — SIGNIFICANT CHANGE UP (ref 3.8–10.5)

## 2018-12-18 PROCEDURE — 99233 SBSQ HOSP IP/OBS HIGH 50: CPT | Mod: GC

## 2018-12-18 RX ORDER — SODIUM CHLORIDE 9 MG/ML
500 INJECTION INTRAMUSCULAR; INTRAVENOUS; SUBCUTANEOUS
Qty: 0 | Refills: 0 | Status: DISCONTINUED | OUTPATIENT
Start: 2018-12-18 | End: 2018-12-19

## 2018-12-18 RX ORDER — SODIUM CHLORIDE 9 MG/ML
500 INJECTION INTRAMUSCULAR; INTRAVENOUS; SUBCUTANEOUS
Qty: 0 | Refills: 0 | Status: DISCONTINUED | OUTPATIENT
Start: 2018-12-18 | End: 2018-12-18

## 2018-12-18 RX ORDER — LABETALOL HCL 100 MG
5 TABLET ORAL ONCE
Qty: 0 | Refills: 0 | Status: DISCONTINUED | OUTPATIENT
Start: 2018-12-18 | End: 2018-12-18

## 2018-12-18 RX ADMIN — Medication 1 TABLET(S): at 09:29

## 2018-12-18 RX ADMIN — Medication 50 MILLIGRAM(S): at 09:29

## 2018-12-18 RX ADMIN — Medication 3 MILLILITER(S): at 06:16

## 2018-12-18 RX ADMIN — Medication 50 MILLIGRAM(S): at 22:52

## 2018-12-18 RX ADMIN — Medication 3 MILLILITER(S): at 13:27

## 2018-12-18 RX ADMIN — AMLODIPINE BESYLATE 10 MILLIGRAM(S): 2.5 TABLET ORAL at 18:33

## 2018-12-18 RX ADMIN — Medication 3 MILLILITER(S): at 18:33

## 2018-12-18 RX ADMIN — Medication 1 TABLET(S): at 15:33

## 2018-12-18 RX ADMIN — Medication 3 MILLILITER(S): at 23:55

## 2018-12-18 RX ADMIN — Medication 3 MILLILITER(S): at 06:17

## 2018-12-18 RX ADMIN — Medication 1 DROP(S): at 18:34

## 2018-12-18 RX ADMIN — SODIUM CHLORIDE 100 MILLILITER(S): 9 INJECTION INTRAMUSCULAR; INTRAVENOUS; SUBCUTANEOUS at 18:35

## 2018-12-18 RX ADMIN — PANTOPRAZOLE SODIUM 40 MILLIGRAM(S): 20 TABLET, DELAYED RELEASE ORAL at 12:29

## 2018-12-18 RX ADMIN — LISINOPRIL 40 MILLIGRAM(S): 2.5 TABLET ORAL at 09:29

## 2018-12-18 RX ADMIN — HEPARIN SODIUM 5000 UNIT(S): 5000 INJECTION INTRAVENOUS; SUBCUTANEOUS at 15:34

## 2018-12-18 RX ADMIN — Medication 3 MILLILITER(S): at 23:54

## 2018-12-18 RX ADMIN — Medication 1 DROP(S): at 06:15

## 2018-12-18 RX ADMIN — Medication 3 MILLILITER(S): at 12:29

## 2018-12-18 RX ADMIN — Medication 50 MILLIGRAM(S): at 16:22

## 2018-12-18 RX ADMIN — Medication 1 TABLET(S): at 23:54

## 2018-12-18 RX ADMIN — HEPARIN SODIUM 5000 UNIT(S): 5000 INJECTION INTRAVENOUS; SUBCUTANEOUS at 06:15

## 2018-12-18 RX ADMIN — HEPARIN SODIUM 5000 UNIT(S): 5000 INJECTION INTRAVENOUS; SUBCUTANEOUS at 22:52

## 2018-12-18 RX ADMIN — Medication 150 MICROGRAM(S): at 09:29

## 2018-12-18 NOTE — PROGRESS NOTE ADULT - ASSESSMENT
78F w/ PMH obesity, DM, HTN, hypothyroidism, sepsis secondary to ventral hernia infection s/p repair, PE s/p IVC filter, acute blood loss anemia from hemorrhagic uterine fibroids and UGIB, ATN requiring HD, sepsis secondary to MSSA bacteremia and MDR E coli UTI w/AMS and s/p TAHBSO and small bowel resection s/p PEG. Admitted with septic shock likely 2/2 PNA vs UTI and acute hypoxic respiratory failure now extubated, s/p bronchoscopy 11/30, s/p 5 day course of meropenem for pseudomonal PNA.            Problem/Plan - 1:  ·  Problem: Hypertension.  Plan: Pt presenting with PMH of HTN. BP this AM was significantly elevated  at 183/97 and repeated at 155/78  -c/w home lisinopril 40 mg daily  -c/w amlodipine 10 mg daily  -continue hydralazine to 50mg q6hr    ##Diarrhea  Resolved. Patient now w/ chronic diarrhea likely 2/2 to malabsorption. stool has been more formed.     Problem/Plan - 2:  ·  Problem: Sinus tachycardia.  Plan: Resolved: Non-tachycardic this am w/ HR in 98. Prior tachycardia likely 2/2 to intravascular depletion as it responded well to fluids.   - Consider restarting IVF considering slight incr in BUN/Cr as well.    #Hypernatremia  Resolved:  -trend BMP.     Problem/Plan - 3:  ·  Problem: Sepsis due to pneumonia.  Plan: Resolved  - Pt noted to have fever on 11/29 100.7 with tachycardia. CXR with RLL infiltrate. Repeat sputum cx (12/1) positive for pseudomonas/klebsiella, and ESBL Ecoli. Pt started empirically on Zosyn 4.5 g q6h escalated to meropenem based on sensitivities on 12/2; now s/p meropenem 5 day course ended 12/7  - Bcx from 12/3 negative.     Problem/Plan - 4:  ·  Problem: Mucus plugging of bronchi.  Plan: Pt with frequent mucus plugging throughout hospital course s/p percussion vest, s/p bronchoscopy 11/30  - c/w mucomyst   - pt currently without copious secretions, intermittent suctioning prn     #Dermatitis of b/l buttocks  -daily wound care  -rectal tube in place - continue in setting of decub ulcer    #Anemia   Hgb stabilized but did noted to have downtrend in last few days. However no signs of active bleeding; H&H stable  - B12, folate wnl  - Fe studies with normal iron sat and slightly low TIBC, remaining values wnl  - consider Fe supplementation as patient is not likely absorbing well  - transfuse for Hgb <7.     Problem/Plan - 5:  ·  Problem: Small bowel perforation.  Plan: Pt with hx of sm carola perforation, recently admitted 4/15/18 due to MSSA bacteremia and MDR E Coli in urine, transferred to [facility], and found to have extravasation on CT abd, s/p IR drain placement, underwent DAVY BSO 2/2 tubal metaplasia, SBR with primary anastomosis, abdominal washout, PEG placement, discharged on 6/4  - surgery was following, now signed off  - PEG in place, c/w glucerna feeds at 55cc/hr  - pt was on standing reglan q12 for bowel pro-motility, d/c due to concern for possible upper extremity rigidity  -Speech and swallow rec pureed diet w/ thin liquids..however will continue tube feeds concurrently until patient able to tolerate diet orally.

## 2018-12-18 NOTE — PROGRESS NOTE ADULT - PROBLEM SELECTOR PLAN 5
Pt with hx of sm carola perforation, recently admitted 4/15/18 due to MSSA bacteremia and MDR E Coli in urine, transferred to [facility], and found to have extravasation on CT abd, s/p IR drain placement, underwent DAVY BSO 2/2 tubal metaplasia, SBR with primary anastomosis, abdominal washout, PEG placement, discharged on 6/4  - surgery was following, now signed off  - PEG in place, c/w glucerna feeds at 55cc/hr  - pt was on standing reglan q12 for bowel pro-motility, d/c due to concern for possible upper extremity rigidity  -Speech and swallow rec pureed diet w/ thin liquids..however will continue tube feeds concurrently until patient able to tolerate diet orally.

## 2018-12-18 NOTE — PROGRESS NOTE ADULT - PROBLEM SELECTOR PLAN 10
1) PCP Contacted on Admission: (Y) --> Name & Phone #: MORA WYNN [phone]  2) Date of Contact with PCP: 12/7/18  3) PCP Contacted at Discharge: (Y/N, N/A)  4) Summary of Handoff Given to PCP: NA  5) Post-Discharge Appointment Date and Location: Dr Wynn 12/26/18 at 8:30 am, [address]

## 2018-12-18 NOTE — PROGRESS NOTE ADULT - PROBLEM SELECTOR PLAN 8
F: None  E: K>4 Mag>2   N: tube feeds, glucerna 1.5 via PEG 55cc/hr, 250cc  free water flushes q8hr to meet nutritional needs

## 2018-12-18 NOTE — PROGRESS NOTE ADULT - PROBLEM SELECTOR PLAN 9
Dvt ppx: hep sq  GI ppx: protonix    Code Status: FULL CODE    Dispo: [facility]

## 2018-12-18 NOTE — PROGRESS NOTE ADULT - PROBLEM SELECTOR PLAN 2
Resolved: Non-tachycardic this am w/ HR in 98. Prior tachycardia likely 2/2 to intravascular depletion as it responded well to fluids.   - Consider restarting IVF considering slight incr in BUN/Cr as well.    #Hypernatremia  Resolved:  -trend BMP

## 2018-12-18 NOTE — PROGRESS NOTE ADULT - SUBJECTIVE AND OBJECTIVE BOX
SUBJECTIVE / INTERVAL HPI: Patient reportedly agitated and pulled PEG tube out by several inches. She now has wrist restraints. Patient seen and examined at bedside. Patient comfortable and without complaints at this time. Answers yes and no today. Otherwise: (-) fever, (-) chills, (-) dizziness, (-) headache, (-) neck pain, (-) chest pain, (-) palpitations, (-)SOB, (-) nausea, (-) vomiting, (-) diarrhea, (-) abdominal pain, (-)new weakness, (-) paresthesias.      VITAL SIGNS:  Vital Signs Last 24 Hrs  T(C): 36.6 (18 Dec 2018 07:23), Max: 37 (17 Dec 2018 16:12)  T(F): 97.8 (18 Dec 2018 07:23), Max: 98.6 (17 Dec 2018 16:12)  HR: 98 (18 Dec 2018 07:23) (88 - 100)  BP: 155/78 (18 Dec 2018 07:23) (130/76 - 183/97)  BP(mean): --  RR: 18 (18 Dec 2018 07:23) (18 - 18)  SpO2: 98% (18 Dec 2018 07:23) (95% - 100%)    PHYSICAL EXAM:    General: well appearing, resting comfortably in bed and in no acute distress. answers questions.   SKIN: stage 2 sacral decubitus  HEENT: normocephalic, atraumatic, PERRL, anicteric sclera; no conjunctival pallor, mucus membranes moist  Neck: supple, no masses  Cardiovascular: +S1/S2, regular rate and rhythm; no murmurs, no rub, no gallop.  Respiratory: clear to auscultation bilaterally though poor inspiratory effort, no rhonchi, no wheeze, no rales.,   Gastrointestinal: soft, obese, active bowel sounds, non-tender, non-distended,  PEG pushed back into place. Clean dry and intact skin surrounding PEG.   : Primafit in place draining dark urine  Extremities: Warm, dry; no edema, clubbing or cyanosis  Vascular: 2+ radial, DP pulses bilaterally  Neurological: AAOx1 to person only. no focal deficits    MEDICATIONS:  MEDICATIONS  (STANDING):  acetylcysteine 10%  Inhalation 3 milliLiter(s) Inhalation every 6 hours  ALBUTerol/ipratropium for Nebulization 3 milliLiter(s) Nebulizer every 6 hours  amLODIPine   Tablet 10 milliGRAM(s) Oral every 24 hours  artificial  tears Solution 1 Drop(s) Both EYES two times a day  dextrose 5%. 1000 milliLiter(s) (50 mL/Hr) IV Continuous <Continuous>  dextrose 50% Injectable 12.5 Gram(s) IV Push once  dextrose 50% Injectable 25 Gram(s) IV Push once  dextrose 50% Injectable 25 Gram(s) IV Push once  heparin  Injectable 5000 Unit(s) SubCutaneous every 8 hours  hydrALAZINE 50 milliGRAM(s) Oral every 6 hours  insulin lispro (HumaLOG) corrective regimen sliding scale   SubCutaneous every 6 hours  lactobacillus acidophilus 1 Tablet(s) Oral every 8 hours  levothyroxine 150 MICROGram(s) Oral daily  lisinopril 40 milliGRAM(s) Oral daily  pantoprazole   Suspension 40 milliGRAM(s) Enteral Tube daily    MEDICATIONS  (PRN):  dextrose 40% Gel 15 Gram(s) Oral once PRN Blood Glucose LESS THAN 70 milliGRAM(s)/deciliter  glucagon  Injectable 1 milliGRAM(s) IntraMuscular once PRN Glucose LESS THAN 70 milligrams/deciliter  oxyCODONE    5 mG/acetaminophen 325 mG 1 Tablet(s) Oral every 4 hours PRN Moderate Pain (4 - 6)      ALLERGIES:  Allergies    No Known Allergies    Intolerances        LABS:                        8.9    10.2  )-----------( 301      ( 18 Dec 2018 06:28 )             28.9     12-18    139  |  100  |  34<H>  ----------------------------<  100<H>  4.5   |  26  |  0.97    Ca    10.3      18 Dec 2018 06:28  Phos  3.6     12-18  Mg     2.1     12-18          CAPILLARY BLOOD GLUCOSE      POCT Blood Glucose.: 108 mg/dL (18 Dec 2018 06:43)      RADIOLOGY & ADDITIONAL TESTS: Reviewed.

## 2018-12-18 NOTE — PROGRESS NOTE ADULT - SUBJECTIVE AND OBJECTIVE BOX
Physical Medicine and Rehabilitation Progress Note:    Patient is a 78y old  Female who presents with a chief complaint of Septic shock and acute respiratory failure (18 Dec 2018 09:15)      HPI:  Patient is currently intubated and unable to participate in interview. History taken from chart and MICU consult.    78F w/ PMH of morbid obesity, DM, HTN, hypothyroidism, sepsis secondary to ventral hernia infection s/p repair, PE s/p IVC filter, acute blood loss anemia from hemorrhagic uterine fibroids and UGIB, ATN requiring HD, sepsis secondary to MSSA bacteremia and MDR E coli UTI now w/AMS and s/p TAHBSO and small bowel resection s/p PEG. Patient presented from [facility] with concern for sepsis and hypoxia. Per EMS, patient was saturating 85% on RA at [facility] with some improvement to 95% after being placed on NRB.  On arrival to the ED, patient was altered and saturating 89%. She was intubated in the ED with suctioning revealing purulent material.       T was 101.5, , BP79/59. WBC 27.9 Hgb 11.0 .3 Lactate 5.4 Na 154 K 5.6 CO2 16 AG [id number] Cr 3.38 Trop 0.14 ABG 7.32 pCO2 29 pO2 138 HCO3 15  UA: (+) leuk est, (+) bacteria  She was started on Levo gtt and Dopamine and subsequently was titrated off the Dopamine. She was given Azithromycin, Cipro, Vancomycin and Zosyn, 4L nS, and Insulin 10U. (16 Nov 2018 23:11)                            8.9    10.2  )-----------( 301      ( 18 Dec 2018 06:28 )             28.9       12-18    139  |  100  |  34<H>  ----------------------------<  100<H>  4.5   |  26  |  0.97    Ca    10.3      18 Dec 2018 06:28  Phos  3.6     12-18  Mg     2.1     12-18      Vital Signs Last 24 Hrs  T(C): 36.8 (18 Dec 2018 11:01), Max: 37 (17 Dec 2018 16:12)  T(F): 98.3 (18 Dec 2018 11:01), Max: 98.6 (17 Dec 2018 16:12)  HR: 96 (18 Dec 2018 11:01) (88 - 100)  BP: 163/92 (18 Dec 2018 11:01) (130/76 - 183/97)  BP(mean): --  RR: 18 (18 Dec 2018 11:01) (18 - 18)  SpO2: 96% (18 Dec 2018 11:01) (95% - 100%)    MEDICATIONS  (STANDING):  acetylcysteine 10%  Inhalation 3 milliLiter(s) Inhalation every 6 hours  ALBUTerol/ipratropium for Nebulization 3 milliLiter(s) Nebulizer every 6 hours  amLODIPine   Tablet 10 milliGRAM(s) Oral every 24 hours  artificial  tears Solution 1 Drop(s) Both EYES two times a day  dextrose 5%. 1000 milliLiter(s) (50 mL/Hr) IV Continuous <Continuous>  dextrose 50% Injectable 12.5 Gram(s) IV Push once  dextrose 50% Injectable 25 Gram(s) IV Push once  dextrose 50% Injectable 25 Gram(s) IV Push once  heparin  Injectable 5000 Unit(s) SubCutaneous every 8 hours  hydrALAZINE 50 milliGRAM(s) Oral every 6 hours  insulin lispro (HumaLOG) corrective regimen sliding scale   SubCutaneous every 6 hours  lactobacillus acidophilus 1 Tablet(s) Oral every 8 hours  levothyroxine 150 MICROGram(s) Oral daily  lisinopril 40 milliGRAM(s) Oral daily  pantoprazole   Suspension 40 milliGRAM(s) Enteral Tube daily    MEDICATIONS  (PRN):  dextrose 40% Gel 15 Gram(s) Oral once PRN Blood Glucose LESS THAN 70 milliGRAM(s)/deciliter  glucagon  Injectable 1 milliGRAM(s) IntraMuscular once PRN Glucose LESS THAN 70 milligrams/deciliter  oxyCODONE    5 mG/acetaminophen 325 mG 1 Tablet(s) Oral every 4 hours PRN Moderate Pain (4 - 6)    Currently Undergoing Physical Therapy at bedside.    Functional Status Assessment:    Therapeutic Interventions      Bed Mobility  Bed Mobility Training Rehab Potential: fair, will monitor progress closely  Bed Mobility Training Symptoms Noted During/After Treatment: none  Bed Mobility Training Rolling/Turning: maximum assist (25% patient effort);  2 person assist  Bed Mobility Training Scooting: maximum assist (25% patient effort);  2 person assist  Bed Mobility Training Sit-to-Supine: maximum assist (25% patient effort);  2 person assist  Bed Mobility Training Supine-to-Sit: maximum assist (25% patient effort);  2 person assist  Bed Mobility Training Limitations: decreased strength;  impaired balance;  decreased ability to use arms for pushing/pulling;  decreased ability to use legs for bridging/pushing;  impaired ability to control trunk for mobility    Sit-Stand Transfer Training  Sit-to-Stand Transfer Training Rehab Potential: deferred due to poor sitting balance and impaired strength    Therapeutic Exercise  Therapeutic Exercise Detail: Supine: heel slides PROM, hip abd AAROM x 10 bilateral, shoulder ABD circles x 10, bicep curls AAROM x 10, dangled 20 min contralateral chest press with trunk rotation bilateral x 10, long arc quads x 10 bilateral AAROM hip flexion x 10     Balance Skills Training  Balance Skills Training Training Strategies: hand over hand reaching and chest press with trunk rotation x 10 bilateral, lateral weight shifts onto elbow and anterior posterior weight shifts with HHA mod assist x 10   Balance Skills Training Sitting Balance: Static: poor balance   progressed from max assist to min assist  Balance Skills Sitting Balance: Dynamic: poor balance  Balance Skills Sit-to-Stand Balance: not tested  Balance Skills Standing Balance: Static: not tested  Balance Skills Standing Balance: Dynamic: not tested  Balance Skills Systems Impairment Contributing to Balance Disturbance: musculoskeletal  Balance Skills Identified Impairments Contributing to Balance Disturbance: decreased strength;  decreased ROM            PM&R Impression: as above    Disposition Plan Recommendations: subacute rehab placement, accepted to [facility]

## 2018-12-18 NOTE — PROGRESS NOTE ADULT - PROBLEM SELECTOR PLAN 1
Pt presenting with PMH of HTN. BP this AM was significantly elevated  at 183/97 and repeated at 155/78  -c/w home lisinopril 40 mg daily  -c/w amlodipine 10 mg daily  -continue hydralazine to 50mg q6hr    ##Diarrhea  Resolved. Patient now w/ chronic diarrhea likely 2/2 to malabsorption. stool has been more formed

## 2018-12-19 LAB
GLUCOSE BLDC GLUCOMTR-MCNC: 111 MG/DL — HIGH (ref 70–99)
GLUCOSE BLDC GLUCOMTR-MCNC: 133 MG/DL — HIGH (ref 70–99)
GLUCOSE BLDC GLUCOMTR-MCNC: 139 MG/DL — HIGH (ref 70–99)
GLUCOSE BLDC GLUCOMTR-MCNC: 183 MG/DL — HIGH (ref 70–99)

## 2018-12-19 PROCEDURE — 99233 SBSQ HOSP IP/OBS HIGH 50: CPT | Mod: GC

## 2018-12-19 RX ORDER — ACETYLCYSTEINE 200 MG/ML
3 VIAL (ML) MISCELLANEOUS
Qty: 0 | Refills: 0 | COMMUNITY
Start: 2018-12-19

## 2018-12-19 RX ORDER — HEPARIN SODIUM 5000 [USP'U]/ML
5000 INJECTION INTRAVENOUS; SUBCUTANEOUS
Qty: 0 | Refills: 0 | COMMUNITY
Start: 2018-12-19

## 2018-12-19 RX ORDER — HYDRALAZINE HCL 50 MG
75 TABLET ORAL EVERY 6 HOURS
Qty: 0 | Refills: 0 | Status: DISCONTINUED | OUTPATIENT
Start: 2018-12-19 | End: 2018-12-20

## 2018-12-19 RX ORDER — HYDRALAZINE HCL 50 MG
75 TABLET ORAL EVERY 6 HOURS
Qty: 0 | Refills: 0 | Status: DISCONTINUED | OUTPATIENT
Start: 2018-12-19 | End: 2018-12-19

## 2018-12-19 RX ORDER — PANTOPRAZOLE SODIUM 20 MG/1
40 TABLET, DELAYED RELEASE ORAL
Qty: 0 | Refills: 0 | COMMUNITY
Start: 2018-12-19

## 2018-12-19 RX ORDER — LEVOTHYROXINE SODIUM 125 MCG
1 TABLET ORAL
Qty: 0 | Refills: 0 | COMMUNITY
Start: 2018-12-19

## 2018-12-19 RX ORDER — IPRATROPIUM/ALBUTEROL SULFATE 18-103MCG
3 AEROSOL WITH ADAPTER (GRAM) INHALATION
Qty: 0 | Refills: 0 | COMMUNITY
Start: 2018-12-19

## 2018-12-19 RX ORDER — LISINOPRIL 2.5 MG/1
1 TABLET ORAL
Qty: 0 | Refills: 0 | COMMUNITY
Start: 2018-12-19

## 2018-12-19 RX ORDER — LACTOBACILLUS ACIDOPHILUS 100MM CELL
1 CAPSULE ORAL
Qty: 0 | Refills: 0 | COMMUNITY
Start: 2018-12-19

## 2018-12-19 RX ORDER — HYDRALAZINE HCL 50 MG
25 TABLET ORAL ONCE
Qty: 0 | Refills: 0 | Status: COMPLETED | OUTPATIENT
Start: 2018-12-19 | End: 2018-12-19

## 2018-12-19 RX ORDER — ASPIRIN/CALCIUM CARB/MAGNESIUM 324 MG
1 TABLET ORAL
Qty: 0 | Refills: 0 | COMMUNITY

## 2018-12-19 RX ORDER — HYDRALAZINE HCL 50 MG
3 TABLET ORAL
Qty: 0 | Refills: 0 | COMMUNITY
Start: 2018-12-19

## 2018-12-19 RX ORDER — HYDRALAZINE HCL 50 MG
1 TABLET ORAL
Qty: 0 | Refills: 0 | COMMUNITY
Start: 2018-12-19

## 2018-12-19 RX ORDER — AMLODIPINE BESYLATE 2.5 MG/1
1 TABLET ORAL
Qty: 0 | Refills: 0 | COMMUNITY
Start: 2018-12-19

## 2018-12-19 RX ADMIN — HEPARIN SODIUM 5000 UNIT(S): 5000 INJECTION INTRAVENOUS; SUBCUTANEOUS at 05:53

## 2018-12-19 RX ADMIN — Medication 3 MILLILITER(S): at 05:54

## 2018-12-19 RX ADMIN — Medication 2: at 18:25

## 2018-12-19 RX ADMIN — Medication 50 MILLIGRAM(S): at 05:54

## 2018-12-19 RX ADMIN — Medication 3 MILLILITER(S): at 23:07

## 2018-12-19 RX ADMIN — LISINOPRIL 40 MILLIGRAM(S): 2.5 TABLET ORAL at 05:55

## 2018-12-19 RX ADMIN — Medication 75 MILLIGRAM(S): at 22:58

## 2018-12-19 RX ADMIN — Medication 3 MILLILITER(S): at 12:02

## 2018-12-19 RX ADMIN — Medication 1 TABLET(S): at 05:55

## 2018-12-19 RX ADMIN — HEPARIN SODIUM 5000 UNIT(S): 5000 INJECTION INTRAVENOUS; SUBCUTANEOUS at 15:28

## 2018-12-19 RX ADMIN — Medication 3 MILLILITER(S): at 18:25

## 2018-12-19 RX ADMIN — Medication 75 MILLIGRAM(S): at 15:28

## 2018-12-19 RX ADMIN — Medication 150 MICROGRAM(S): at 05:54

## 2018-12-19 RX ADMIN — Medication 1 DROP(S): at 05:55

## 2018-12-19 RX ADMIN — Medication 25 MILLIGRAM(S): at 12:02

## 2018-12-19 RX ADMIN — PANTOPRAZOLE SODIUM 40 MILLIGRAM(S): 20 TABLET, DELAYED RELEASE ORAL at 12:02

## 2018-12-19 RX ADMIN — AMLODIPINE BESYLATE 10 MILLIGRAM(S): 2.5 TABLET ORAL at 18:26

## 2018-12-19 RX ADMIN — Medication 1 TABLET(S): at 22:58

## 2018-12-19 RX ADMIN — HEPARIN SODIUM 5000 UNIT(S): 5000 INJECTION INTRAVENOUS; SUBCUTANEOUS at 22:59

## 2018-12-19 RX ADMIN — Medication 1 TABLET(S): at 15:41

## 2018-12-19 RX ADMIN — Medication 1 DROP(S): at 18:25

## 2018-12-19 RX ADMIN — Medication 3 MILLILITER(S): at 05:53

## 2018-12-19 NOTE — PROGRESS NOTE ADULT - PROBLEM SELECTOR PLAN 2
Resolved: Non-tachycardic this am w/ HR in 98. Prior tachycardia likely 2/2 to intravascular depletion as it responded well to fluids.   - Consider restarting IVF considering slight incr in BUN/Cr as well.    #Hypernatremia  Resolved:  -trend BMP Resolved: Non-tachycardic this am w/ HR in 90. Prior tachycardia likely 2/2 to intravascular depletion as it responded well to fluids. Improved s/p fluids    #Hypernatremia  Resolved:  -trend BMP

## 2018-12-19 NOTE — PROGRESS NOTE ADULT - SUBJECTIVE AND OBJECTIVE BOX
SUBJECTIVE / INTERVAL HPI: EMANUEL. Patient seen and examined at bedside. No complaints at this time. Otherwise: (-) fever, (-) chills, (-) dizziness, (-) headache, (-) neck pain, (-) chest pain, (-) palpitations, (-)SOB, (-) nausea, (-) vomiting, (-) diarrhea, (-) abdominal pain, (-)new weakness, (-) paresthesias.      VITAL SIGNS:  Vital Signs Last 24 Hrs  T(C): 36.4 (19 Dec 2018 16:19), Max: 36.7 (18 Dec 2018 22:58)  T(F): 97.6 (19 Dec 2018 16:19), Max: 98 (18 Dec 2018 22:58)  HR: 95 (19 Dec 2018 16:19) (88 - 98)  BP: 134/72 (19 Dec 2018 16:19) (134/72 - 185/81)  BP(mean): --  RR: 18 (19 Dec 2018 16:19) (16 - 19)  SpO2: 97% (19 Dec 2018 16:19) (96% - 100%)    PHYSICAL EXAM:    General: well appearing, resting comfortably in bed and in no acute distress. answers questions.   SKIN: stage 2 sacral decubitus  HEENT: normocephalic, atraumatic, PERRL, anicteric sclera; no conjunctival pallor, mucus membranes moist  Neck: supple, no masses  Cardiovascular: +S1/S2, regular rate and rhythm; no murmurs, no rub, no gallop.  Respiratory: clear to auscultation bilaterally though poor inspiratory effort, no rhonchi, no wheeze, no rales.,   Gastrointestinal: soft, obese, active bowel sounds, non-tender, non-distended,  PEG pushed back into place. Clean dry and intact skin surrounding PEG.   : Primafit in place draining dark urine  Extremities: Warm, dry; no edema, clubbing or cyanosis  Vascular: 2+ radial, DP pulses bilaterally  Neurological: AAOx1 to person only. no focal deficits    MEDICATIONS:  MEDICATIONS  (STANDING):  acetylcysteine 10%  Inhalation 3 milliLiter(s) Inhalation every 6 hours  ALBUTerol/ipratropium for Nebulization 3 milliLiter(s) Nebulizer every 6 hours  amLODIPine   Tablet 10 milliGRAM(s) Oral every 24 hours  artificial  tears Solution 1 Drop(s) Both EYES two times a day  dextrose 5%. 1000 milliLiter(s) (50 mL/Hr) IV Continuous <Continuous>  dextrose 50% Injectable 12.5 Gram(s) IV Push once  dextrose 50% Injectable 25 Gram(s) IV Push once  dextrose 50% Injectable 25 Gram(s) IV Push once  heparin  Injectable 5000 Unit(s) SubCutaneous every 8 hours  hydrALAZINE 75 milliGRAM(s) Oral every 6 hours  insulin lispro (HumaLOG) corrective regimen sliding scale   SubCutaneous every 6 hours  lactobacillus acidophilus 1 Tablet(s) Oral every 8 hours  levothyroxine 150 MICROGram(s) Oral daily  lisinopril 40 milliGRAM(s) Oral daily  pantoprazole   Suspension 40 milliGRAM(s) Enteral Tube daily    MEDICATIONS  (PRN):  dextrose 40% Gel 15 Gram(s) Oral once PRN Blood Glucose LESS THAN 70 milliGRAM(s)/deciliter  glucagon  Injectable 1 milliGRAM(s) IntraMuscular once PRN Glucose LESS THAN 70 milligrams/deciliter  oxyCODONE    5 mG/acetaminophen 325 mG 1 Tablet(s) Oral every 4 hours PRN Moderate Pain (4 - 6)      ALLERGIES:  Allergies    No Known Allergies    Intolerances        LABS:                        8.9    10.2  )-----------( 301      ( 18 Dec 2018 06:28 )             28.9     12-18    139  |  100  |  34<H>  ----------------------------<  100<H>  4.5   |  26  |  0.97    Ca    10.3      18 Dec 2018 06:28  Phos  3.6     12-18  Mg     2.1     12-18          CAPILLARY BLOOD GLUCOSE      POCT Blood Glucose.: 133 mg/dL (19 Dec 2018 12:09)      RADIOLOGY & ADDITIONAL TESTS: Reviewed.

## 2018-12-19 NOTE — PROGRESS NOTE ADULT - PROBLEM SELECTOR PLAN 10
1) PCP Contacted on Admission: (Y) --> Name & Phone #: MORA WYNN [phone]  2) Date of Contact with PCP: 12/7/18  3) PCP Contacted at Discharge: (Y/N, N/A)  4) Summary of Handoff Given to PCP: NA  5) Post-Discharge Appointment Date and Location: Dr Wynn 12/26/18 at 8:30 am, [address]

## 2018-12-19 NOTE — PROGRESS NOTE ADULT - PROBLEM SELECTOR PLAN 1
Pt presenting with PMH of HTN. BP this AM was significantly elevated  at 140/79 and evening BP was 164/8  -c/w home lisinopril 40 mg daily  -c/w amlodipine 10 mg daily  -continue hydralazine to 50mg q6hr    ##Diarrhea  Resolved. Patient now w/ chronic diarrhea likely 2/2 to malabsorption. stool has been more formed Pt presenting with PMH of HTN. BP this AM was significantly elevated  at 140/79 and evening BP was 164/84  -c/w home lisinopril 40 mg daily  -c/w amlodipine 10 mg daily  -increase hydralazine to 75mg q6hr    ##Diarrhea  Resolved. Patient now w/ chronic diarrhea likely 2/2 to malabsorption. stool has been more formed

## 2018-12-19 NOTE — PROGRESS NOTE ADULT - PROBLEM SELECTOR PLAN 5
Pt with hx of sm carola perforation, recently admitted 4/15/18 due to MSSA bacteremia and MDR E Coli in urine, transferred to [facility], and found to have extravasation on CT abd, s/p IR drain placement, underwent DAVY BSO 2/2 tubal metaplasia, SBR with primary anastomosis, abdominal washout, PEG placement, discharged on 6/4  - surgery was following, now signed off  - PEG in place, c/w glucerna feeds at 55cc/hr  - pt was on standing reglan q12 for bowel pro-motility, d/c due to concern for possible upper extremity rigidity  -Speech and swallow rec pureed diet w/ thin liquids..however will continue tube feeds concurrently until patient able to tolerate diet orally.

## 2018-12-20 VITALS
HEART RATE: 93 BPM | DIASTOLIC BLOOD PRESSURE: 83 MMHG | OXYGEN SATURATION: 93 % | TEMPERATURE: 97 F | SYSTOLIC BLOOD PRESSURE: 118 MMHG | RESPIRATION RATE: 19 BRPM

## 2018-12-20 LAB
GLUCOSE BLDC GLUCOMTR-MCNC: 121 MG/DL — HIGH (ref 70–99)
GLUCOSE BLDC GLUCOMTR-MCNC: 126 MG/DL — HIGH (ref 70–99)
GLUCOSE BLDC GLUCOMTR-MCNC: 132 MG/DL — HIGH (ref 70–99)

## 2018-12-20 PROCEDURE — 99292 CRITICAL CARE ADDL 30 MIN: CPT | Mod: 25

## 2018-12-20 PROCEDURE — 92611 MOTION FLUOROSCOPY/SWALLOW: CPT

## 2018-12-20 PROCEDURE — 83550 IRON BINDING TEST: CPT

## 2018-12-20 PROCEDURE — 80202 ASSAY OF VANCOMYCIN: CPT

## 2018-12-20 PROCEDURE — 85610 PROTHROMBIN TIME: CPT

## 2018-12-20 PROCEDURE — 99291 CRITICAL CARE FIRST HOUR: CPT | Mod: 25

## 2018-12-20 PROCEDURE — 94660 CPAP INITIATION&MGMT: CPT

## 2018-12-20 PROCEDURE — 87040 BLOOD CULTURE FOR BACTERIA: CPT

## 2018-12-20 PROCEDURE — 86850 RBC ANTIBODY SCREEN: CPT

## 2018-12-20 PROCEDURE — 82248 BILIRUBIN DIRECT: CPT

## 2018-12-20 PROCEDURE — 84439 ASSAY OF FREE THYROXINE: CPT

## 2018-12-20 PROCEDURE — 83735 ASSAY OF MAGNESIUM: CPT

## 2018-12-20 PROCEDURE — 36556 INSERT NON-TUNNEL CV CATH: CPT

## 2018-12-20 PROCEDURE — 92526 ORAL FUNCTION THERAPY: CPT

## 2018-12-20 PROCEDURE — 87186 SC STD MICRODIL/AGAR DIL: CPT

## 2018-12-20 PROCEDURE — 86923 COMPATIBILITY TEST ELECTRIC: CPT

## 2018-12-20 PROCEDURE — 87184 SC STD DISK METHOD PER PLATE: CPT

## 2018-12-20 PROCEDURE — 83935 ASSAY OF URINE OSMOLALITY: CPT

## 2018-12-20 PROCEDURE — 84300 ASSAY OF URINE SODIUM: CPT

## 2018-12-20 PROCEDURE — 36415 COLL VENOUS BLD VENIPUNCTURE: CPT

## 2018-12-20 PROCEDURE — 87324 CLOSTRIDIUM AG IA: CPT

## 2018-12-20 PROCEDURE — C1751: CPT

## 2018-12-20 PROCEDURE — 85045 AUTOMATED RETICULOCYTE COUNT: CPT

## 2018-12-20 PROCEDURE — 96375 TX/PRO/DX INJ NEW DRUG ADDON: CPT | Mod: XU

## 2018-12-20 PROCEDURE — 84443 ASSAY THYROID STIM HORMONE: CPT

## 2018-12-20 PROCEDURE — 92610 EVALUATE SWALLOWING FUNCTION: CPT

## 2018-12-20 PROCEDURE — 84100 ASSAY OF PHOSPHORUS: CPT

## 2018-12-20 PROCEDURE — 80048 BASIC METABOLIC PNL TOTAL CA: CPT

## 2018-12-20 PROCEDURE — 51702 INSERT TEMP BLADDER CATH: CPT | Mod: XU

## 2018-12-20 PROCEDURE — 99239 HOSP IP/OBS DSCHRG MGMT >30: CPT

## 2018-12-20 PROCEDURE — 82746 ASSAY OF FOLIC ACID SERUM: CPT

## 2018-12-20 PROCEDURE — 84484 ASSAY OF TROPONIN QUANT: CPT

## 2018-12-20 PROCEDURE — 85027 COMPLETE CBC AUTOMATED: CPT

## 2018-12-20 PROCEDURE — 76770 US EXAM ABDO BACK WALL COMP: CPT

## 2018-12-20 PROCEDURE — 31500 INSERT EMERGENCY AIRWAY: CPT

## 2018-12-20 PROCEDURE — 84480 ASSAY TRIIODOTHYRONINE (T3): CPT

## 2018-12-20 PROCEDURE — 94003 VENT MGMT INPAT SUBQ DAY: CPT

## 2018-12-20 PROCEDURE — 97162 PT EVAL MOD COMPLEX 30 MIN: CPT

## 2018-12-20 PROCEDURE — 82247 BILIRUBIN TOTAL: CPT

## 2018-12-20 PROCEDURE — 82803 BLOOD GASES ANY COMBINATION: CPT

## 2018-12-20 PROCEDURE — 87086 URINE CULTURE/COLONY COUNT: CPT

## 2018-12-20 PROCEDURE — 86901 BLOOD TYPING SEROLOGIC RH(D): CPT

## 2018-12-20 PROCEDURE — 82728 ASSAY OF FERRITIN: CPT

## 2018-12-20 PROCEDURE — 36430 TRANSFUSION BLD/BLD COMPNT: CPT

## 2018-12-20 PROCEDURE — 94002 VENT MGMT INPAT INIT DAY: CPT

## 2018-12-20 PROCEDURE — 74176 CT ABD & PELVIS W/O CONTRAST: CPT

## 2018-12-20 PROCEDURE — 97530 THERAPEUTIC ACTIVITIES: CPT

## 2018-12-20 PROCEDURE — 74230 X-RAY XM SWLNG FUNCJ C+: CPT

## 2018-12-20 PROCEDURE — 85730 THROMBOPLASTIN TIME PARTIAL: CPT

## 2018-12-20 PROCEDURE — 94640 AIRWAY INHALATION TREATMENT: CPT

## 2018-12-20 PROCEDURE — 97116 GAIT TRAINING THERAPY: CPT

## 2018-12-20 PROCEDURE — 85025 COMPLETE CBC W/AUTO DIFF WBC: CPT

## 2018-12-20 PROCEDURE — 97164 PT RE-EVAL EST PLAN CARE: CPT

## 2018-12-20 PROCEDURE — 84436 ASSAY OF TOTAL THYROXINE: CPT

## 2018-12-20 PROCEDURE — 80053 COMPREHEN METABOLIC PANEL: CPT

## 2018-12-20 PROCEDURE — 87449 NOS EACH ORGANISM AG IA: CPT

## 2018-12-20 PROCEDURE — 74018 RADEX ABDOMEN 1 VIEW: CPT

## 2018-12-20 PROCEDURE — P9016: CPT

## 2018-12-20 PROCEDURE — 83605 ASSAY OF LACTIC ACID: CPT

## 2018-12-20 PROCEDURE — 82962 GLUCOSE BLOOD TEST: CPT

## 2018-12-20 PROCEDURE — 97110 THERAPEUTIC EXERCISES: CPT

## 2018-12-20 PROCEDURE — 96374 THER/PROPH/DIAG INJ IV PUSH: CPT | Mod: XU

## 2018-12-20 PROCEDURE — 86900 BLOOD TYPING SEROLOGIC ABO: CPT

## 2018-12-20 PROCEDURE — 83540 ASSAY OF IRON: CPT

## 2018-12-20 PROCEDURE — 71045 X-RAY EXAM CHEST 1 VIEW: CPT

## 2018-12-20 PROCEDURE — 81001 URINALYSIS AUTO W/SCOPE: CPT

## 2018-12-20 PROCEDURE — 87070 CULTURE OTHR SPECIMN AEROBIC: CPT

## 2018-12-20 PROCEDURE — 93005 ELECTROCARDIOGRAM TRACING: CPT

## 2018-12-20 PROCEDURE — 82607 VITAMIN B-12: CPT

## 2018-12-20 PROCEDURE — 82010 KETONE BODYS QUAN: CPT

## 2018-12-20 RX ADMIN — Medication 75 MILLIGRAM(S): at 03:57

## 2018-12-20 RX ADMIN — Medication 3 MILLILITER(S): at 07:03

## 2018-12-20 RX ADMIN — Medication 1 DROP(S): at 07:04

## 2018-12-20 RX ADMIN — Medication 150 MICROGRAM(S): at 07:03

## 2018-12-20 RX ADMIN — Medication 1 TABLET(S): at 07:03

## 2018-12-20 RX ADMIN — Medication 3 MILLILITER(S): at 12:25

## 2018-12-20 RX ADMIN — LISINOPRIL 40 MILLIGRAM(S): 2.5 TABLET ORAL at 07:05

## 2018-12-20 RX ADMIN — PANTOPRAZOLE SODIUM 40 MILLIGRAM(S): 20 TABLET, DELAYED RELEASE ORAL at 12:27

## 2018-12-20 RX ADMIN — Medication 3 MILLILITER(S): at 12:27

## 2018-12-20 RX ADMIN — HEPARIN SODIUM 5000 UNIT(S): 5000 INJECTION INTRAVENOUS; SUBCUTANEOUS at 07:03

## 2018-12-20 RX ADMIN — OXYCODONE AND ACETAMINOPHEN 1 TABLET(S): 5; 325 TABLET ORAL at 07:20

## 2018-12-20 RX ADMIN — Medication 1 TABLET(S): at 12:27

## 2018-12-20 RX ADMIN — Medication 75 MILLIGRAM(S): at 09:25

## 2018-12-20 NOTE — PROGRESS NOTE ADULT - PROBLEM/PLAN-6
DISPLAY PLAN FREE TEXT
patient

## 2018-12-20 NOTE — PROGRESS NOTE ADULT - PROBLEM SELECTOR PLAN 5
Pt with hx of sm carola perforation, recently admitted 4/15/18 due to MSSA bacteremia and MDR E Coli in urine, transferred to [facility], and found to have extravasation on CT abd, s/p IR drain placement, underwent DAVY BSO 2/2 tubal metaplasia, SBR with primary anastomosis, abdominal washout, PEG placement, discharged on 6/4  - surgery was following, now signed off  - PEG in place, c/w glucerna feeds at 55cc/hr  - pt was on standing reglan q12 for bowel pro-motility, d/c due to concern for possible upper extremity rigidity  -Speech and swallow rec pureed diet w/ thin liquids..however will continue tube feeds concurrently until patient able to tolerate diet orally.

## 2018-12-20 NOTE — PROGRESS NOTE ADULT - PROBLEM SELECTOR PLAN 2
Resolved: Non-tachycardic this am w/ HR in 90s. Prior tachycardia likely 2/2 to intravascular depletion as it responded well to fluids. Improved s/p fluids    #Hypernatremia  Resolved:  Lab holiday

## 2018-12-20 NOTE — PROGRESS NOTE ADULT - SUBJECTIVE AND OBJECTIVE BOX
Physical Medicine and Rehabilitation Progress Note:    Patient is a 78y old  Female who presents with a chief complaint of Septic shock and acute respiratory failure (20 Dec 2018 11:24)      HPI:  Patient is currently intubated and unable to participate in interview. History taken from chart and MICU consult.    78F w/ PMH of morbid obesity, DM, HTN, hypothyroidism, sepsis secondary to ventral hernia infection s/p repair, PE s/p IVC filter, acute blood loss anemia from hemorrhagic uterine fibroids and UGIB, ATN requiring HD, sepsis secondary to MSSA bacteremia and MDR E coli UTI now w/AMS and s/p TAHBSO and small bowel resection s/p PEG. Patient presented from [facility] with concern for sepsis and hypoxia. Per EMS, patient was saturating 85% on RA at [facility] with some improvement to 95% after being placed on NRB.  On arrival to the ED, patient was altered and saturating 89%. She was intubated in the ED with suctioning revealing purulent material.       T was 101.5, , BP79/59. WBC 27.9 Hgb 11.0 .3 Lactate 5.4 Na 154 K 5.6 CO2 16 AG [id number] Cr 3.38 Trop 0.14 ABG 7.32 pCO2 29 pO2 138 HCO3 15  UA: (+) leuk est, (+) bacteria  She was started on Levo gtt and Dopamine and subsequently was titrated off the Dopamine. She was given Azithromycin, Cipro, Vancomycin and Zosyn, 4L nS, and Insulin 10U. (16 Nov 2018 23:11)                Vital Signs Last 24 Hrs  T(C): 36.3 (20 Dec 2018 09:23), Max: 37 (19 Dec 2018 21:19)  T(F): 97.3 (20 Dec 2018 09:23), Max: 98.6 (19 Dec 2018 21:19)  HR: 93 (20 Dec 2018 09:23) (93 - 98)  BP: 118/83 (20 Dec 2018 09:23) (118/83 - 158/79)  BP(mean): --  RR: 19 (20 Dec 2018 09:23) (18 - 20)  SpO2: 93% (20 Dec 2018 09:23) (93% - 97%)    MEDICATIONS  (STANDING):  acetylcysteine 10%  Inhalation 3 milliLiter(s) Inhalation every 6 hours  ALBUTerol/ipratropium for Nebulization 3 milliLiter(s) Nebulizer every 6 hours  amLODIPine   Tablet 10 milliGRAM(s) Oral every 24 hours  artificial  tears Solution 1 Drop(s) Both EYES two times a day  dextrose 5%. 1000 milliLiter(s) (50 mL/Hr) IV Continuous <Continuous>  dextrose 50% Injectable 12.5 Gram(s) IV Push once  dextrose 50% Injectable 25 Gram(s) IV Push once  dextrose 50% Injectable 25 Gram(s) IV Push once  heparin  Injectable 5000 Unit(s) SubCutaneous every 8 hours  hydrALAZINE 75 milliGRAM(s) Oral every 6 hours  insulin lispro (HumaLOG) corrective regimen sliding scale   SubCutaneous every 6 hours  lactobacillus acidophilus 1 Tablet(s) Oral every 8 hours  levothyroxine 150 MICROGram(s) Oral daily  lisinopril 40 milliGRAM(s) Oral daily  pantoprazole   Suspension 40 milliGRAM(s) Enteral Tube daily    MEDICATIONS  (PRN):  dextrose 40% Gel 15 Gram(s) Oral once PRN Blood Glucose LESS THAN 70 milliGRAM(s)/deciliter  glucagon  Injectable 1 milliGRAM(s) IntraMuscular once PRN Glucose LESS THAN 70 milligrams/deciliter  oxyCODONE    5 mG/acetaminophen 325 mG 1 Tablet(s) Oral every 4 hours PRN Moderate Pain (4 - 6)    Currently Undergoing Physical Therapy at bedside.    Functional Status Assessment:      Therapeutic Interventions      Bed Mobility  Bed Mobility Training Rolling/Turning: maximum assist (25% patient effort);  2 person assist;  nonverbal cues (demo/gestures);  verbal cues  Bed Mobility Training Scooting: maximum assist (25% patient effort);  2 person assist;  nonverbal cues (demo/gestures);  verbal cues  Bed Mobility Training Sit-to-Supine: maximum assist (25% patient effort);  2 person assist;  nonverbal cues (demo/gestures);  verbal cues  Bed Mobility Training Supine-to-Sit: maximum assist (25% patient effort);  2 person assist;  verbal cues;  nonverbal cues (demo/gestures)  Bed Mobility Training Limitations: decreased ability to use arms for pushing/pulling;  decreased ability to use legs for bridging/pushing;  impaired ability to control trunk for mobility;  decreased strength;  impaired balance;  impaired motor control;  impaired postural control;  cognitive, decreased safety awareness    Therapeutic Exercise  Therapeutic Exercise Detail: dangling on EOB x10 min w/ mod/maxA for trunk supportAAROM BLE dangling on EOB: ankle pumps, LAQ, marching AAROM BUE dangling on EOB: shoulder flexion, elbow flex/extcervical rotation AROM           PM&R Impression: as above    Disposition Plan Recommendations:  subacute rehab placement

## 2018-12-20 NOTE — PROGRESS NOTE ADULT - REASON FOR ADMISSION
Septic shock and acute respiratory failure

## 2018-12-20 NOTE — PROGRESS NOTE ADULT - SUBJECTIVE AND OBJECTIVE BOX
SUBJECTIVE / INTERVAL HPI: EMANUEL. Patient seen and examined at bedside. Patient currently without complaints. She denies pain. Mostly watching TV. Otherwise: (-) fever, (-) chills, (-) dizziness, (-) headache, (-) neck pain, (-) chest pain, (-) palpitations, (-)SOB, (-) nausea, (-) vomiting, (-) diarrhea, (-) abdominal pain, (-)new weakness, (-) paresthesias.      VITAL SIGNS:  Vital Signs Last 24 Hrs  T(C): 36.3 (20 Dec 2018 09:23), Max: 37 (19 Dec 2018 21:19)  T(F): 97.3 (20 Dec 2018 09:23), Max: 98.6 (19 Dec 2018 21:19)  HR: 93 (20 Dec 2018 09:23) (93 - 98)  BP: 118/83 (20 Dec 2018 09:23) (118/83 - 158/79)  BP(mean): --  RR: 19 (20 Dec 2018 09:23) (18 - 20)  SpO2: 93% (20 Dec 2018 09:23) (93% - 97%)    PHYSICAL EXAM:    General: well appearing, resting comfortably in bed and in no acute distress. answers questions.   SKIN: stage 2 sacral decubitus  HEENT: normocephalic, atraumatic, PERRL, anicteric sclera; no conjunctival pallor, mucus membranes moist  Neck: supple, no masses  Cardiovascular: +S1/S2, regular rate and rhythm; no murmurs, no rub, no gallop.  Respiratory: clear to auscultation bilaterally though poor inspiratory effort, no rhonchi, no wheeze, no rales.,   Gastrointestinal: soft, obese, active bowel sounds, non-tender, non-distended,  PEG pushed back into place. Clean dry and intact skin surrounding PEG.   : Primafit in place draining dark urine  Extremities: Warm, dry; no edema, clubbing or cyanosis  Vascular: 2+ radial, DP pulses bilaterally  Neurological: AAOx1 to person only. no focal deficits      MEDICATIONS:  MEDICATIONS  (STANDING):  acetylcysteine 10%  Inhalation 3 milliLiter(s) Inhalation every 6 hours  ALBUTerol/ipratropium for Nebulization 3 milliLiter(s) Nebulizer every 6 hours  amLODIPine   Tablet 10 milliGRAM(s) Oral every 24 hours  artificial  tears Solution 1 Drop(s) Both EYES two times a day  dextrose 5%. 1000 milliLiter(s) (50 mL/Hr) IV Continuous <Continuous>  dextrose 50% Injectable 12.5 Gram(s) IV Push once  dextrose 50% Injectable 25 Gram(s) IV Push once  dextrose 50% Injectable 25 Gram(s) IV Push once  heparin  Injectable 5000 Unit(s) SubCutaneous every 8 hours  hydrALAZINE 75 milliGRAM(s) Oral every 6 hours  insulin lispro (HumaLOG) corrective regimen sliding scale   SubCutaneous every 6 hours  lactobacillus acidophilus 1 Tablet(s) Oral every 8 hours  levothyroxine 150 MICROGram(s) Oral daily  lisinopril 40 milliGRAM(s) Oral daily  pantoprazole   Suspension 40 milliGRAM(s) Enteral Tube daily    MEDICATIONS  (PRN):  dextrose 40% Gel 15 Gram(s) Oral once PRN Blood Glucose LESS THAN 70 milliGRAM(s)/deciliter  glucagon  Injectable 1 milliGRAM(s) IntraMuscular once PRN Glucose LESS THAN 70 milligrams/deciliter  oxyCODONE    5 mG/acetaminophen 325 mG 1 Tablet(s) Oral every 4 hours PRN Moderate Pain (4 - 6)      ALLERGIES:  Allergies    No Known Allergies    Intolerances        LABS:              CAPILLARY BLOOD GLUCOSE      POCT Blood Glucose.: 126 mg/dL (20 Dec 2018 06:01)      RADIOLOGY & ADDITIONAL TESTS: Reviewed.

## 2018-12-20 NOTE — PROGRESS NOTE ADULT - ASSESSMENT
78F w/ PMH obesity, DM, HTN, hypothyroidism, sepsis secondary to ventral hernia infection s/p repair, PE s/p IVC filter, acute blood loss anemia from hemorrhagic uterine fibroids and UGIB, ATN requiring HD, sepsis secondary to MSSA bacteremia and MDR E coli UTI w/AMS and s/p TAHBSO and small bowel resection s/p PEG. Admitted with septic shock likely 2/2 PNA vs UTI and acute hypoxic respiratory failure now extubated, s/p bronchoscopy 11/30, s/p 5 day course of meropenem for pseudomonal PNA. PENDING AUTH        Problem/Plan - 1:  ·  Problem: Hypertension.  Plan: Pt presenting with PMH of HTN. BP improved at 139/83and evening BP was 155/69  -c/w home lisinopril 40 mg daily  -c/w amlodipine 10 mg daily  -continue hydralazine to 75mg q6hr    ##Diarrhea  Resolved. Patient now w/ chronic diarrhea likely 2/2 to malabsorption. stool has been more formed.     Problem/Plan - 2:  ·  Problem: Sinus tachycardia.  Plan: Resolved: Non-tachycardic this am w/ HR in 90s. Prior tachycardia likely 2/2 to intravascular depletion as it responded well to fluids. Improved s/p fluids    #Hypernatremia  Resolved:  Lab holiday.     Problem/Plan - 3:  ·  Problem: Sepsis due to pneumonia.  Plan: Resolved  - Pt noted to have fever on 11/29 100.7 with tachycardia. CXR with RLL infiltrate. Repeat sputum cx (12/1) positive for pseudomonas/klebsiella, and ESBL Ecoli. Pt started empirically on Zosyn 4.5 g q6h escalated to meropenem based on sensitivities on 12/2; now s/p meropenem 5 day course ended 12/7  - Bcx from 12/3 negative.     Problem/Plan - 4:  ·  Problem: Mucus plugging of bronchi.  Plan: Pt with frequent mucus plugging throughout hospital course s/p percussion vest, s/p bronchoscopy 11/30  - c/w mucomyst   - pt currently without copious secretions, intermittent suctioning prn     #Dermatitis of b/l buttocks  -daily wound care    #Anemia   no signs of active bleeding; lab holiday  From work up of anemia:  - B12, folate wnl  - Fe studies with normal iron sat and slightly low TIBC, remaining values wnl  - transfuse for Hgb <7 post CBC if suspect beleding.    Problem/Plan - 5:  ·  Problem: Small bowel perforation.  Plan: Pt with hx of sm carola perforation, recently admitted 4/15/18 due to MSSA bacteremia and MDR E Coli in urine, transferred to [facility], and found to have extravasation on CT abd, s/p IR drain placement, underwent DAVY BSO 2/2 tubal metaplasia, SBR with primary anastomosis, abdominal washout, PEG placement, discharged on 6/4  - surgery was following, now signed off  - PEG in place, c/w glucerna feeds at 55cc/hr  - pt was on standing reglan q12 for bowel pro-motility, d/c due to concern for possible upper extremity rigidity  -Speech and swallow rec pureed diet w/ thin liquids..however will continue tube feeds concurrently until patient able to tolerate diet orally.

## 2018-12-20 NOTE — PROGRESS NOTE ADULT - PROBLEM SELECTOR PLAN 10
1) PCP Contacted on Admission: (Y) --> Name & Phone #: MORA WYNN [phone]  2) Date of Contact with PCP: 12/7/18  3) PCP Contacted at Discharge: (Y/N, N/A)  4) Summary of Handoff Given to PCP: NA  5) Post-Discharge Appointment Date and Location: Dr Wynn 12/26/18 at 8:30 am, [address]

## 2018-12-20 NOTE — PROGRESS NOTE ADULT - PROBLEM SELECTOR PLAN 4
Pt with frequent mucus plugging throughout hospital course s/p percussion vest, s/p bronchoscopy 11/30  - c/w mucomyst   - pt currently without copious secretions, intermittent suctioning prn     #Dermatitis of b/l buttocks  -daily wound care  -rectal tube in place - continue in setting of decub ulcer    #Anemia   no signs of active bleeding; lab holiday  From work up of anemia:  - B12, folate wnl  - Fe studies with normal iron sat and slightly low TIBC, remaining values wnl  - transfuse for Hgb <7 post CBC if suspect beleding Pt with frequent mucus plugging throughout hospital course s/p percussion vest, s/p bronchoscopy 11/30  - c/w mucomyst   - pt currently without copious secretions, intermittent suctioning prn     #Dermatitis of b/l buttocks  -daily wound care    #Anemia   no signs of active bleeding; lab holiday  From work up of anemia:  - B12, folate wnl  - Fe studies with normal iron sat and slightly low TIBC, remaining values wnl  - transfuse for Hgb <7 post CBC if suspect beleding

## 2018-12-20 NOTE — PROGRESS NOTE ADULT - PROBLEM SELECTOR PLAN 1
Pt presenting with PMH of HTN. BP improved at 139/83and evening BP was 155/69  -c/w home lisinopril 40 mg daily  -c/w amlodipine 10 mg daily  -continue hydralazine to 75mg q6hr    ##Diarrhea  Resolved. Patient now w/ chronic diarrhea likely 2/2 to malabsorption. stool has been more formed

## 2018-12-20 NOTE — PROGRESS NOTE ADULT - ASSESSMENT
78F w/ PMH obesity, DM, HTN, hypothyroidism, sepsis secondary to ventral hernia infection s/p repair, PE s/p IVC filter, acute blood loss anemia from hemorrhagic uterine fibroids and UGIB, ATN requiring HD, sepsis secondary to MSSA bacteremia and MDR E coli UTI w/AMS and s/p TAHBSO and small bowel resection s/p PEG. Admitted with septic shock likely 2/2 PNA vs UTI and acute hypoxic respiratory failure now extubated, s/p bronchoscopy 11/30, s/p 5 day course of meropenem for pseudomonal PNA. PENDING AUTH

## 2018-12-28 DIAGNOSIS — Z79.84 LONG TERM (CURRENT) USE OF ORAL HYPOGLYCEMIC DRUGS: ICD-10-CM

## 2018-12-28 DIAGNOSIS — J69.0 PNEUMONITIS DUE TO INHALATION OF FOOD AND VOMIT: ICD-10-CM

## 2018-12-28 DIAGNOSIS — I10 ESSENTIAL (PRIMARY) HYPERTENSION: ICD-10-CM

## 2018-12-28 DIAGNOSIS — J15.0 PNEUMONIA DUE TO KLEBSIELLA PNEUMONIAE: ICD-10-CM

## 2018-12-28 DIAGNOSIS — J15.1 PNEUMONIA DUE TO PSEUDOMONAS: ICD-10-CM

## 2018-12-28 DIAGNOSIS — N17.0 ACUTE KIDNEY FAILURE WITH TUBULAR NECROSIS: ICD-10-CM

## 2018-12-28 DIAGNOSIS — E87.0 HYPEROSMOLALITY AND HYPERNATREMIA: ICD-10-CM
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
## 2018-12-28 DIAGNOSIS — F03.90 UNSPECIFIED DEMENTIA WITHOUT BEHAVIORAL DISTURBANCE: ICD-10-CM

## 2018-12-28 DIAGNOSIS — G93.41 METABOLIC ENCEPHALOPATHY: ICD-10-CM

## 2018-12-28 DIAGNOSIS — J15.5 PNEUMONIA DUE TO ESCHERICHIA COLI: ICD-10-CM

## 2018-12-28 DIAGNOSIS — E87.2 ACIDOSIS: ICD-10-CM

## 2018-12-28 DIAGNOSIS — Z86.711 PERSONAL HISTORY OF PULMONARY EMBOLISM: ICD-10-CM

## 2018-12-28 DIAGNOSIS — Z93.1 GASTROSTOMY STATUS: ICD-10-CM

## 2018-12-28 DIAGNOSIS — Z78.1 PHYSICAL RESTRAINT STATUS: ICD-10-CM

## 2018-12-28 DIAGNOSIS — D63.8 ANEMIA IN OTHER CHRONIC DISEASES CLASSIFIED ELSEWHERE: ICD-10-CM

## 2018-12-28 DIAGNOSIS — Z95.828 PRESENCE OF OTHER VASCULAR IMPLANTS AND GRAFTS: ICD-10-CM

## 2018-12-28 DIAGNOSIS — R65.21 SEVERE SEPSIS WITH SEPTIC SHOCK: ICD-10-CM

## 2018-12-28 DIAGNOSIS — J96.01 ACUTE RESPIRATORY FAILURE WITH HYPOXIA: ICD-10-CM

## 2018-12-28 DIAGNOSIS — E03.9 HYPOTHYROIDISM, UNSPECIFIED: ICD-10-CM

## 2018-12-28 DIAGNOSIS — L30.9 DERMATITIS, UNSPECIFIED: ICD-10-CM

## 2018-12-28 DIAGNOSIS — A41.9 SEPSIS, UNSPECIFIED ORGANISM: ICD-10-CM

## 2018-12-28 DIAGNOSIS — Z79.82 LONG TERM (CURRENT) USE OF ASPIRIN: ICD-10-CM

## 2018-12-28 DIAGNOSIS — K59.00 CONSTIPATION, UNSPECIFIED: ICD-10-CM

## 2018-12-28 DIAGNOSIS — E11.65 TYPE 2 DIABETES MELLITUS WITH HYPERGLYCEMIA: ICD-10-CM

## 2018-12-28 DIAGNOSIS — E87.8 OTHER DISORDERS OF ELECTROLYTE AND FLUID BALANCE, NOT ELSEWHERE CLASSIFIED: ICD-10-CM

## 2018-12-28 DIAGNOSIS — J15.211 PNEUMONIA DUE TO METHICILLIN SUSCEPTIBLE STAPHYLOCOCCUS AUREUS: ICD-10-CM

## 2018-12-28 DIAGNOSIS — N39.0 URINARY TRACT INFECTION, SITE NOT SPECIFIED: ICD-10-CM

## 2019-02-12 NOTE — PROGRESS NOTE BEHAVIORAL HEALTH - NSBHLOC_PSY_A_CORE
Alert
Lethargic, arousable to verbal stimulus
Lethargic, arousable to verbal stimulus
Alert
Alert
Lethargic, arousable to verbal stimulus
Alert
Lethargic, arousable to verbal stimulus
Alert
yes...

## 2019-02-21 NOTE — PHYSICAL THERAPY INITIAL EVALUATION ADULT - LEVEL OF INDEPENDENCE: SIT/STAND, REHAB EVAL
Modified Barium Swallow    Patient Name:  Claudia Elmore   MRN:  [id number]      Recommendations:     Recommendations:                General Recommendations:     1. Recommend routine follow up with outpatient speech pathology every 3 months to assess oral and pharyngeal swallow     Diet recommendations:    1. At this time baby is primarily breast fed. Breast feeds well. Continue breast feeding.  2. Begin offering breast milk via slow flow nipple to help with transition to bottle feeding when mother needs to return to work.  3. Recommend Dr. Watson level 1 nipple    Aspiration Precautions:   1. Due to hypotonia, dcr head and neck control, recommend elevated sidelying position during breast feeding and bottle feeding  2. Swaddle shoulder girdle to increase alignment of ear, shoulder and hips for increase airway protection and respiratory support during oral feeding.  3. Use of slow flow nipples due to mild delay in trigger of the swallow reflex    General Precautions: Standard,        Referral     Reason for Referral  Patient was referred for a Modified Barium Swallow Study to assess the efficiency of his/her swallow function, rule out aspiration and make recommendations regarding safe dietary consistencies, effective compensatory strategies, and safe eating environment.     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    AS PER NEUROLOGY NOTE: 2 mo F presents for c/o SMA. Baby seen by  PCP for check up.  no reflexes noted  and was sent to [facility] ED where they did genetic test, results are pending. Dr. Araseli Sanabria of [facility] neuro evaluated and suspects SMA.    When she was brought home from the hospital after birth parents feel she moved all extremities normal. She had check ups at her pediatrician's where there were no concerns. Parents report that she has stopped moving her legs and has weak movement of arms. She no longer tries to  "lift head off mom's shoulder. Mom did tummy time at home, she was never able to lift head.    She continues to breastfeed well. She is urinating normally. She did go 3 -4 days without defecating, relieved with glycerin suppository. She has since been able to go on her own. She cries well, consoles with parents. She wakes up to eat. She sleeps well.     She had RSV at 12 days, she did not require hospitalization.    BIRTH HISTORY  Pregnancy - 28 year old  3 para 2 Ab 1 mother - Born at 38 5/7 weeks, mom took vitamins and no other medicines  Birth Wt - 9 lbs 3 ounces  Labor & Delivery -  2/ repeat, no complications   Course - No NICU, discharged after 2 days    DEVELOPMENT  [place]- 6 weeks  [place] - 7 weeks    FEEDING HX: Baby is currently primarily breast fed. She is gaining weight and mom reports she breast feeds well in terms of suction and latch. She states baby "grazes" and breast feeds for short periods of time and eats every 1-2 hours. The baby is hypotonic with dcr head control and parent reports some difficulty with positioning her during breast feeding. Mother reports occasional coughing with breast feeding during instances of milk let down.      Objective:       Oral Peripheral Examination  ·  rooting reflex present with head turn, mouth opening and prompt initiation of reflexive suck  · adquate lip seal , lingual to palate contact and lingual peristalsis during NNS on gloved finger  · Cry is strong  · Vocal quality during cry is clear and normal volume  · Symmetrical smile  · Midline lingual protrusion  · Phasic bite reflex present  · Transverse tongue reflex present  · Gag reflex present    Consistencies Assessed  · Thin via disposable  Enfamil slow flow and standard nipple trialed  · Baby is breast fed, mother did not bring breast milk to study  · She does not take formula, therefor water used as thin liquid for study    Oral Preparation/Oral Phase  · Dcr compression and " expression of both the disposable slow flow and standard nipples. However, this most likely due to lack of oral feeding experience with artificial nipple.  · Suck swallow ratio ranging from 3-8 on slow flow nipple  · Suck swallow ratio of 1-4 on standard nipple  · (Mother reports good suction, latch and ability to sustain suck and latch during breast feeding)  · Mild gag response to nipple and taste of barium noted  · Arrhythmical bursts of suck swallow, which again could be due to lack of consistent bottke feeding experience.    Pharyngeal Phase   SLOW FLOW NIPPLE: Age appropriate collection of liquids in the valleculae, with no delay in trigger of the swallow reflex. No airway penetration or aspiration on 5 swallows. No pharyngeal residuals     STANDARD NIPPLE: Initially baby demonstrated timely trigger of the swallow reflex on the first 3-4 swallows. Onset of reflex delay with premature spillage to the pyriform sinuses as trial progressed. No airway penetration or aspiration 8 swallows. No pharyngeal residuals.    Cervical Esophageal Phase  · Adequate degree and duration of cricopharyngeal opening, normal pharyngo esophageal transit of liquids    Assessment:     Impressions:  · Mother reports no concerns with oral phase of swallow during breast feeding  · She reports occasional coughing during breastfeeding  · During bottle trials this session: baby demonstrated dcr compression and expression of nipple which may be due to lack of experience with bottle feeding. Recommend follow up with SLP if needed if baby has difficulty transitioning from breast to bottle  · Instances of delayed trigger of the swallow reflex when high flow/standard flow nipple trialed  · No airway penetration or aspiration during this evaluation  · No pharyngeal weakness causing residuals after the swallow: normal tongue base retraction and pharyngeal contraction during swallow.     Prognosis: Good to continue breast feeding at this time and to  transition to bottle when parent needs to return to work    Education: father present during study and assisted with oral feeding. Recorded results and recs discussed with both parents at end of study. Recommendations for positioning to support head, neck, trunk and airway during oral feeding discussed and demonstrated. Signs of aspiration and worsening of swallow function discussed. Recommend use of slow flow nipple when baby begins to bottle feed. Both parents attentive to study and information. Asked relevant questions      Plan:   · Patient to be seen:      · Plan of Care expires:     · Plan of Care reviewed with:      parents       Time Tracking:   SLP Treatment Date:   02/21/19  Speech Start Time:  1020  Speech Stop Time:  1053     Speech Total Time (min):  33 min    Usha Stanford CCC-SLP  02/21/2019   unable to perform/due to poor sitting balance

## 2019-04-05 NOTE — ED ADULT NURSE REASSESSMENT NOTE - NS ED NURSE REASSESS COMMENT FT1
Pt bgm 66mg/dl. Pt given 2 cups of orange juice. MD Junior from admitting team made aware and stated that bgm should be rechecked in an hr for further intervention. Pt remain at baseline mentation. Pt noted with mild diarrhea. No blood noted in stool. Pt changed and made comfortable. Pt also noted with LEANN drainage to rt abd, dsg in place. No drainage noted in collection bag. Staples noted to incision mid abd, no drainage noted. Pt denies pain at this time. Close monitoring continues.
Rec'd pt calm, a+o x 3 in stable condition. Pt noted with Heparin drip infusing at 16ml/hr via 18 g HL LAC. Patent. Pt also with D5/NS infusing. Cardiac monitor placed. Pt denies pain and discomfort at this time. Admitted awaiting dispo to unit. Safety precautions in place. Close monitoring continues.
REDNESS/PAIN

## 2019-10-21 NOTE — PROGRESS NOTE ADULT - ASSESSMENT
77 yo F w/ T2DM, HTN, HLD. with questionable surgical history. Here for ventral hernia repair robotic assist, converted to open    1. Ventral hernia s/p open repair  -Pain/nausea control  -DEIDRA  -CLD/IVF  -DVT PPx  -LEANN drain    2. Cardiac hx   Denies any hx, however considered high risk  -2/16 Echo & stress, LVEF 70%, w/ normal perfusion    3. T2DM  -Home Glipizide held  -ISS    4. HTN  -Home Atenolol Held  - Current 2.5 Metoprolol IV Push    5. Hypothyroidism  - On synthroid  - IV Levothyroxine 77 yo F w/ T2DM, HTN, HLD. with questionable surgical history. Here for ventral hernia repair robotic assist, converted to open    1. Ventral hernia s/p open repair  -Pain/nausea control  -DEIDRA  -regular diet  -home dulcolax  -DVT PPx  -LEANN drain    2. Cardiac hx   Denies any hx, however considered high risk  -2/16 Echo & stress, LVEF 70%, w/ normal perfusion    3. T2DM  -Home Glipizide held  -ISS    4. HTN  -Home Atenolol Held  - Current 2.5 Metoprolol IV Push    5. Hypothyroidism  - On synthroid  - IV Levothyroxine positive S2/positive S1

## 2019-11-29 NOTE — PHYSICAL THERAPY INITIAL EVALUATION ADULT - ADDITIONAL COMMENTS
"80 year old female brought to ED because the family felt there had been a change in the patient's mentation. 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 She was not discharged on anti-coagulation medication. She has been seen by Cardiology and a loop recorder has been placed. Over the past "several day" according to son the patient has been having episodes when she "stares out into nothingness" and her thinking "has changed". (She had such an episode during this interview: patient staring out into space for approximately 30 seconds, and refocused). She denies sphincter tone loss, or tongue biting. In ED CT Head (personally reviewed) revealed sub acute infarct. EKG (personally reviewed) showed Sinus with 1st degree block. Labs (personally reviewed) reveal mild pre-renal azotemia. Patient personally discussed with ED physician: Neuro has requested no anti-coagulation, and he'll order EEG. Will remove loop-recorder for MRI to be performed. Cardiology to be consulted as well.  "
Pt lives alone in elevator access apt building w/ no stairs to enter. Denies use of DME for ambulation prior to this admission. Denies hx of recent falls, no HHA. States that her brother will be staying w/ her initially when she returns home and that when he leaves she will be hiring a HHA

## 2020-02-01 NOTE — DISCHARGE NOTE ADULT - PROVIDER TOKENS
FREE:[LAST:[Wynn],FIRST:[MD Venus, MPH],PHONE:[(   )    -],FAX:[(   )    -],ADDRESS:[[address] [place] [address]  Phone: [phone]]] [Recent Weight Gain (___ Lbs)] : recent [unfilled] ~Ulb weight gain [Diarrhea] : diarrhea [Abdominal Pain] : abdominal pain [Joint Pain] : joint pain [Joint Stiffness] : joint stiffness [Joint Swelling] : joint swelling [Difficulty Walking] : difficulty walking [Feelings Of Weakness] : feelings of weakness [Fever] : no fever [Red Eyes] : eyes not red [Chills] : no chills [Eye Pain] : no eye pain [Dry Eyes] : no dryness of the eyes [Hoarseness] : no hoarseness [Sore Throat] : no sore throat [Palpitations] : no palpitations [Lower Ext Edema] : no lower extremity edema [Chest Pain] : no chest pain [SOB on Exertion] : no shortness of breath during exertion [Cough] : no cough [Shortness Of Breath] : no shortness of breath [Anxiety] : no anxiety [Skin Lesions] : no skin lesions [Depression] : no depression [Heartburn] : no heartburn [Muscle Weakness] : no muscle weakness [Easy Bleeding] : no tendency for easy bleeding [Easy Bruising] : no tendency for easy bruising

## 2020-03-23 NOTE — PROGRESS NOTE ADULT - ASSESSMENT
Subjective   Krupa Concepcion is a 37 y.o. female.     Chief Complaint   Patient presents with   • Cough     ER/ C follow up   • Bronchitis       HPI  [place] [facility] for concerns with fever, cough and possible exposure to Covid.      bronchitis: 2/22 she was in [employer] [facility] on [place] talking to people in convention. She spiked a temp on Sun 3/15/2020 temp 99.4. She called her boss and they told her to go get tested she was considered at risk due to [employer]. On 3/16 she had 99.9. She was required to go to Urgent Care 3/16 she was tested covid 19 and was told this past sat was negative.   [facility] [place] touch bronchitis she was negative for flu and strep had sores in her mouth with cough and congestion.  and she was prescribed zpac and put in isolation. She did not get zpac on 20th . She did start the zpac on 21st her tmax was 98.7. Tmax on 3/18 101.4.  she was told neg for flu and strep.   On 3/20/2020 she went to the ED and was prescribed codeine cough syrup. She was diagnosed with bronchiits during this visit.   Current medication: nebulizer, cough medication currently on zpac.     Reviewed her ED notes and UCC. Chest xray normal.   Testing neg flu , strep and SARS-CoV-2 GILBERT.       The following portions of the patient's history were reviewed and updated as appropriate: allergies, current medications, past family history, past medical history, past social history, past surgical history and problem list.      Current Outpatient Medications:   •  ARIPiprazole (ABILIFY) 30 MG tablet, Take 30 mg by mouth Every Morning., Disp: , Rfl:   •  azithromycin (ZITHROMAX Z-BIJAN) 250 MG tablet, Take 2 tablets the first day, then 1 tablet daily for 4 days., Disp: 6 tablet, Rfl: 0  •  cetirizine (zyrTEC) 10 MG tablet, Take 10 mg by mouth 2 (Two) Times a Day., Disp: , Rfl:   •  EPINEPHrine (EPIPEN) 0.3 MG/0.3ML solution auto-injector injection, 0.3 mg 1 (One) Time As Needed., Disp: , Rfl:   •  FLUoxetine (PROzac)  20 MG capsule, Take 60 mg by mouth Every Morning., Disp: , Rfl:   •  fluticasone (FLONASE) 50 MCG/ACT nasal spray, 2 sprays into the nostril(s) as directed by provider Daily., Disp: 1 bottle, Rfl: 2  •  HYDROcodone-homatropine (HYCODAN) 5-1.5 MG/5ML syrup, Take 5 mL by mouth Every 6 (Six) Hours As Needed for Cough for up to 20 doses., Disp: 100 mL, Rfl: 0  •  levothyroxine (SYNTHROID, LEVOTHROID) 50 MCG tablet, TAKE 1 TABLET BY MOUTH ONCE DAILY, Disp: 30 tablet, Rfl: 0  •  Multiple Vitamins-Minerals (MULTIVITAMIN WITH MINERALS) tablet tablet, Take 1 tablet by mouth Daily., Disp: , Rfl:   •  omeprazole (priLOSEC) 40 MG capsule, Take 40 mg by mouth every night at bedtime., Disp: , Rfl:   •  PARAGARD INTRAUTERINE COPPER intrauterine device IUD, 1 each by Intrauterine route 1 (One) Time., Disp: , Rfl:   •  QUEtiapine (SEROquel) 200 MG tablet, Take 200 mg by mouth Every Night., Disp: , Rfl:   •  ROZEREM 8 MG tablet, Take 8 mg by mouth Every Night., Disp: , Rfl:   •  saccharomyces boulardii (FLORASTOR) 250 MG capsule, Take 1 capsule by mouth 2 (Two) Times a Day for 10 days., Disp: 20 capsule, Rfl: 0  •  topiramate (TOPAMAX) 200 MG tablet, Take 200 mg by mouth every night at bedtime., Disp: , Rfl:     Recent Results (from the past 4032 hour(s))   Basic Metabolic Panel    Collection Time: 11/05/19  9:56 AM   Result Value Ref Range    Glucose 82 65 - 99 mg/dL    BUN 9 6 - 20 mg/dL    Creatinine 1.08 (H) 0.57 - 1.00 mg/dL    Sodium 138 136 - 145 mmol/L    Potassium 4.1 3.5 - 5.2 mmol/L    Chloride 102 98 - 107 mmol/L    CO2 21.7 (L) 22.0 - 29.0 mmol/L    Calcium 9.4 8.6 - 10.5 mg/dL    eGFR Non African Amer 57 (L) >60 mL/min/1.73    BUN/Creatinine Ratio 8.3 7.0 - 25.0    Anion Gap 14.3 5.0 - 15.0 mmol/L   Respiratory Panel, PCR - Swab, Nasopharynx    Collection Time: 11/05/19  9:56 AM   Result Value Ref Range    ADENOVIRUS, PCR Not Detected Not Detected    Coronavirus 229E Not Detected Not Detected    Coronavirus HKU1 Not  77 y/o F with sepsis from open abdominal wound infection, MSSA bacteremia present on admission, and UTI present on admission found to have contained small bowel leak, s/p exlap, TAHBSO, SBR 5/15, s/p PEG 5/16    Neuro: seroquel 25 QHS, zofran prn, tylenol prn, holding: aripiprazole, escitalapram  CV: HD stable s/p severe sepsis, amlodipine, 4/19 Echo  65-70%.  Pulm: NC prn  GI: NPO, protonix, PEG on glucerna 1.2 TF (goal 59) failed S&S  : Voiding, S/p AKF on HD now resolved. s/p UTI (present on admission)    ID: None D/c'd:MSSA in blood: s/p Oxacillin,  TTE neg for endocarditis. Jose L( 4/17-5/21) Eden: ( 4/27-5/21)   Endo: ISS, Synthroid  Insulin  in TPN: 25. Lupron 5/15.Lantus 10 qhs   PPx: sqh held for low plts  PE:Sp IVC filter on 3/30   Lines: PIV,  PICC (5/9-)  /// D/c'd: Sherley (5/15-?)R IJ TLC (5/15-5/?),L Rad Art line( 4/15-4/18),  Rt IJ TLC from OSH ( 4/15-), LIJ TLC (4/29-5/9)  Wounds: Wound vac to midline incision , change MWF  PT/OT: PT ordered 5/19 Detected Not Detected    Coronavirus NL63 Not Detected Not Detected    Coronavirus OC43 Not Detected Not Detected    Human Metapneumovirus Not Detected Not Detected    Human Rhinovirus/Enterovirus Not Detected Not Detected    Influenza B PCR Not Detected Not Detected    Parainfluenza Virus 1 Not Detected Not Detected    Parainfluenza Virus 2 Not Detected Not Detected    Parainfluenza Virus 3 Not Detected Not Detected    Parainfluenza Virus 4 Not Detected Not Detected    Bordetella pertussis pcr Not Detected Not Detected    Influenza A H1 2009 PCR Not Detected Not Detected    Chlamydophila pneumoniae PCR Not Detected Not Detected    Mycoplasma pneumo by PCR Not Detected Not Detected    Influenza A PCR Not Detected Not Detected    Influenza A H3 Not Detected Not Detected    Influenza A H1 Not Detected Not Detected    RSV, PCR Not Detected Not Detected   CBC Auto Differential    Collection Time: 11/05/19  9:56 AM   Result Value Ref Range    WBC 10.66 3.40 - 10.80 10*3/mm3    RBC 4.27 3.77 - 5.28 10*6/mm3    Hemoglobin 11.9 (L) 12.0 - 15.9 g/dL    Hematocrit 37.4 34.0 - 46.6 %    MCV 87.6 79.0 - 97.0 fL    MCH 27.9 26.6 - 33.0 pg    MCHC 31.8 31.5 - 35.7 g/dL    RDW 16.5 (H) 12.3 - 15.4 %    RDW-SD 53.3 37.0 - 54.0 fl    MPV 11.4 6.0 - 12.0 fL    Platelets 292 140 - 450 10*3/mm3    Neutrophil % 79.0 (H) 42.7 - 76.0 %    Lymphocyte % 12.9 (L) 19.6 - 45.3 %    Monocyte % 5.5 5.0 - 12.0 %    Eosinophil % 1.3 0.3 - 6.2 %    Basophil % 0.6 0.0 - 1.5 %    Immature Grans % 0.7 (H) 0.0 - 0.5 %    Neutrophils, Absolute 8.42 (H) 1.70 - 7.00 10*3/mm3    Lymphocytes, Absolute 1.38 0.70 - 3.10 10*3/mm3    Monocytes, Absolute 0.59 0.10 - 0.90 10*3/mm3    Eosinophils, Absolute 0.14 0.00 - 0.40 10*3/mm3    Basophils, Absolute 0.06 0.00 - 0.20 10*3/mm3    Immature Grans, Absolute 0.07 (H) 0.00 - 0.05 10*3/mm3    nRBC 0.0 0.0 - 0.2 /100 WBC   Influenza Antigen, Rapid - Swab, Nasopharynx    Collection Time: 11/06/19  6:53 PM   Result  Value Ref Range    Influenza A PCR Not Detected Not Detected    Influenza B PCR Not Detected Not Detected   Influenza Antigen, Rapid - Swab, Nasopharynx    Collection Time: 11/12/19  2:02 PM   Result Value Ref Range    Influenza A PCR Not Detected Not Detected    Influenza B PCR Not Detected Not Detected   Comprehensive Metabolic Panel    Collection Time: 11/12/19  2:15 PM   Result Value Ref Range    Glucose 77 65 - 99 mg/dL    BUN 6 6 - 20 mg/dL    Creatinine 0.80 0.57 - 1.00 mg/dL    Sodium 143 136 - 145 mmol/L    Potassium 3.5 3.5 - 5.2 mmol/L    Chloride 106 98 - 107 mmol/L    CO2 24.0 22.0 - 29.0 mmol/L    Calcium 9.4 8.6 - 10.5 mg/dL    Total Protein 7.8 6.0 - 8.5 g/dL    Albumin 4.50 3.50 - 5.20 g/dL    ALT (SGPT) 18 1 - 33 U/L    AST (SGOT) 21 1 - 32 U/L    Alkaline Phosphatase 90 39 - 117 U/L    Total Bilirubin 0.6 0.2 - 1.2 mg/dL    eGFR Non African Amer 81 >60 mL/min/1.73    Globulin 3.3 gm/dL    A/G Ratio 1.4 g/dL    BUN/Creatinine Ratio 7.5 7.0 - 25.0    Anion Gap 13.0 5.0 - 15.0 mmol/L   Troponin    Collection Time: 11/12/19  2:15 PM   Result Value Ref Range    Troponin T <0.010 0.000 - 0.030 ng/mL   D-dimer, Quantitative    Collection Time: 11/12/19  2:15 PM   Result Value Ref Range    D-Dimer, Quantitative 0.30 0.17 - 0.59 MCGFEU/mL   Blood Culture - Blood, Blood, Venous Line    Collection Time: 11/12/19  2:15 PM   Result Value Ref Range    Blood Culture No growth at 5 days    Magnesium    Collection Time: 11/12/19  2:15 PM   Result Value Ref Range    Magnesium 2.5 1.6 - 2.6 mg/dL   TSH    Collection Time: 11/12/19  2:15 PM   Result Value Ref Range    TSH 0.764 0.270 - 4.200 uIU/mL   CBC Auto Differential    Collection Time: 11/12/19  2:15 PM   Result Value Ref Range    WBC 8.40 3.40 - 10.80 10*3/mm3    RBC 4.29 3.77 - 5.28 10*6/mm3    Hemoglobin 12.2 12.0 - 15.9 g/dL    Hematocrit 36.0 34.0 - 46.6 %    MCV 84.0 79.0 - 97.0 fL    MCH 28.4 26.6 - 33.0 pg    MCHC 33.8 31.5 - 35.7 g/dL    RDW 18.2 (H)  12.3 - 15.4 %    RDW-SD 53.4 37.0 - 54.0 fl    MPV 9.2 6.0 - 12.0 fL    Platelets 235 140 - 450 10*3/mm3    Neutrophil % 74.9 42.7 - 76.0 %    Lymphocyte % 16.5 (L) 19.6 - 45.3 %    Monocyte % 6.8 5.0 - 12.0 %    Eosinophil % 1.2 0.3 - 6.2 %    Basophil % 0.6 0.0 - 1.5 %    Neutrophils, Absolute 6.30 1.70 - 7.00 10*3/mm3    Lymphocytes, Absolute 1.40 0.70 - 3.10 10*3/mm3    Monocytes, Absolute 0.60 0.10 - 0.90 10*3/mm3    Eosinophils, Absolute 0.10 0.00 - 0.40 10*3/mm3    Basophils, Absolute 0.10 0.00 - 0.20 10*3/mm3    nRBC 0.1 0.0 - 0.2 /100 WBC   Blood Culture - Blood, Arm, Right    Collection Time: 11/12/19  3:07 PM   Result Value Ref Range    Blood Culture No growth at 5 days    Pregnancy, Urine - Urine, Clean Catch    Collection Time: 11/12/19  3:58 PM   Result Value Ref Range    HCG, Urine QL Negative Negative   Urinalysis With Culture If Indicated - Urine, Clean Catch    Collection Time: 11/12/19  3:58 PM   Result Value Ref Range    Color, UA Yellow Yellow, Straw    Appearance, UA Cloudy (A) Clear    pH, UA 7.0 5.0 - 8.0    Specific Gravity, UA <=1.005 1.005 - 1.030    Glucose, UA Negative Negative    Ketones, UA Negative Negative    Bilirubin, UA Negative Negative    Blood, UA Negative Negative    Protein, UA Negative Negative    Leuk Esterase, UA Negative Negative    Nitrite, UA Negative Negative    Urobilinogen, UA 0.2 E.U./dL 0.2 - 1.0 E.U./dL   POCT rapid strep A    Collection Time: 02/21/20  9:53 AM   Result Value Ref Range    Rapid Strep A Screen Negative Negative, VALID, INVALID, Not Performed    Internal Control Passed Passed    Lot Number [id number]     Expiration Date 10/31/20    POCT Flu A&B, Alere    Collection Time: 03/16/20  3:31 PM   Result Value Ref Range    POC Influenza A, Molecular Negative Negative    POC Influenza B, Molecular Negative Negative    Internal Control Passed Passed    Lot Number [id number]     Expiration Date 01/17/2021    POCT Strep A, molecular    Collection Time: 03/16/20   "3:34 PM   Result Value Ref Range    POC Strep A, Molecular Negative Negative    Internal Control Passed Passed    Lot Number [id number]     Expiration Date 12/31/2020    SARS-CoV-2, GILBERT ([facility]) - Swab, Nasopharynx    Collection Time: 03/16/20  4:28 PM   Result Value Ref Range    SARS-CoV-2, GILBERT Not Detected Not Detected   CBC Auto Differential    Collection Time: 03/20/20  3:49 PM   Result Value Ref Range    WBC 6.50 3.40 - 10.80 10*3/mm3    RBC 3.23 (L) 3.77 - 5.28 10*6/mm3    Hemoglobin 9.4 (L) 12.0 - 15.9 g/dL    Hematocrit 26.5 (L) 34.0 - 46.6 %    MCV 82.0 79.0 - 97.0 fL    MCH 29.0 26.6 - 33.0 pg    MCHC 35.3 31.5 - 35.7 g/dL    RDW 22.9 (H) 12.3 - 15.4 %    RDW-SD 64.8 (H) 37.0 - 54.0 fl    MPV 8.5 6.0 - 12.0 fL    Platelets 218 140 - 450 10*3/mm3    Neutrophil % 55.5 42.7 - 76.0 %    Lymphocyte % 32.8 19.6 - 45.3 %    Monocyte % 9.2 5.0 - 12.0 %    Eosinophil % 1.9 0.3 - 6.2 %    Basophil % 0.6 0.0 - 1.5 %    Neutrophils, Absolute 3.60 1.70 - 7.00 10*3/mm3    Lymphocytes, Absolute 2.10 0.70 - 3.10 10*3/mm3    Monocytes, Absolute 0.60 0.10 - 0.90 10*3/mm3    Eosinophils, Absolute 0.10 0.00 - 0.40 10*3/mm3    Basophils, Absolute 0.00 0.00 - 0.20 10*3/mm3    nRBC 0.3 (H) 0.0 - 0.2 /100 WBC         Review of Systems   Constitutional: Positive for fever.        Resolved last temp 97.6 today.    HENT: Negative for mouth sores, nosebleeds, postnasal drip and rhinorrhea.    Respiratory: Positive for cough and wheezing.    Genitourinary: Negative for difficulty urinating, dysuria and urgency.   Musculoskeletal: Negative.    Neurological: Negative for seizures, light-headedness, headache and memory problem.   Hematological: Negative.    Psychiatric/Behavioral: Negative.        Objective     /85 (BP Location: Left arm, Patient Position: Sitting, Cuff Size: Large Adult)   Pulse 100   Temp 99.3 °F (37.4 °C) (Oral)   Resp 20   Ht 149.9 cm (59\")   Wt 80.1 kg (176 lb 9.6 oz)   LMP 03/13/2020   SpO2 98%   BMI " 35.67 kg/m²     Physical Exam   Constitutional: She is oriented to person, place, and time. She appears well-developed and well-nourished.   HENT:   Head: Normocephalic and atraumatic.   Right Ear: Hearing and external ear normal.   Left Ear: External ear normal.   Ears:    Nose: Nose normal.   Mouth/Throat: Oropharynx is clear and moist. No oropharyngeal exudate.   Eyes: Pupils are equal, round, and reactive to light. Conjunctivae and EOM are normal.   Neck: Normal range of motion. Neck supple.   Cardiovascular: Normal rate, regular rhythm, normal heart sounds and intact distal pulses.   Pulmonary/Chest: Effort normal and breath sounds normal.   Abdominal: Soft. Bowel sounds are normal.   Musculoskeletal: Normal range of motion.   Neurological: She is alert and oriented to person, place, and time.   Skin: Skin is warm and dry.   Psychiatric: She has a normal mood and affect. Her behavior is normal.   Nursing note and vitals reviewed.        Assessment/Plan   Krupa was seen today for cough and bronchitis.    Diagnoses and all orders for this visit:    Bronchitis      Patient Instructions   Work note given to patient today.   She is to follow up and if symptoms worsen  Stop muccinex DM has increased your pulse.         Neli May, APRN    03/23/20

## 2020-04-05 NOTE — PROGRESS NOTE BEHAVIORAL HEALTH - NS ED [facility] MED ROS EYES
General


Chief Complaint:  Respiratory Problems


Stated Complaint:  SOB/HOT,COLD


Source of Information:  Patient


Exam Limitations:  No Limitations





History of Present Illness


Date Seen by Provider:  2020


Time Seen by Provider:  23:55


Initial Comments


Here with report of feeling dizzy and hot and cold as well as short of air. She 

was at work when this happened. Denies sick contacts. Denies sick family 

members. She has 3 kids at home and stay with her aunt. None of them are sick. 

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 Denies upper 

respiratory symptoms or cough. States that she just wanted to get checked out to

make sure he was okay so she left work.


Timing/Duration:  1 Hour


Severity:  Mild


Associated Systoms:  No Chest Pain, No Cough; Shortness of Air





Allergies and Home Medications


Allergies


Coded Allergies:  


     No Known Drug Allergies (Verified , 08)





Home Medications


Acetaminophen with Codeine 1 Each Tablet, 1-2 TAB PO Q6H PRN for Pain


   Prescribed by: MARCELO OLIVEIRA on 17 0827


Cephalexin 500 Mg Tablet, 500 MG PO BID


   Prescribed by: JEREMI MARTIN on 20 0101


Docusate Sodium 100 Mg Capsule, 100 MG PO BID PRN for CONSTIPATION-1ST LINE


   Prescribed by: MARCELO OLIVEIRA on 4/10/17 0944


Ferrous Sulfate 325 Mg Tablet, 325 MG PO DAILY


   Prescribed by: MARCELO OLIVEIRA on 4/10/17 0945


Ibuprofen 600 Mg Tablet, 600 MG PO Q6H


   Prescribed by: MARCELO OLIVEIRA on 4/10/17 0944


Omeprazole 20 Mg Tablet.dr, 20 MG PO DAILY, (Reported)


Prenatal Vit/Iron Fumarate/FA 1 Each Tablet, 1 EACH PO DAILY, (Reported)





Patient Home Medication List


Home Medication List Reviewed:  Yes





Review of Systems


Review of Systems


Constitutional:  see HPI; No chills; dizziness; No fever, No weakness


EENTM:  no symptoms reported


Respiratory:  No cough; short of breath


Cardiovascular:  no symptoms reported


Gastrointestinal:  No diarrhea; nausea; No vomiting


Genitourinary:  No dysuria, No pain


Pregnant:  No


LMP:  Mar 15, 2020


Musculoskeletal:  no symptoms reported


Skin:  no symptoms reported


Psychiatric/Neurological:  Denies Headache, Denies Weakness





Past Medical-Social-Family Hx


Past Med/Social Hx:  Reviewed Nursing Past Med/Soc Hx


Patient Social History


Alcohol Use:  Denies Use


Recreational Drug Use:  No


Smoking Status:  Current Everyday Smoker


Type Used:  Cigarettes


2nd Hand Smoke Exposure:  No


Recent Hopitalizations:  No





Immunizations Up To Date


Tetanus Booster (TDap):  Less than 5yrs


PED Vaccines UTD:  No


Date of Influenza Vaccine:  Oct 15, 2019





Seasonal Allergies


Seasonal Allergies:  No





Past Medical History


Surgeries:  Yes


Appendectomy, Cystectomy, Gallbladder, Tubal Ligation


Respiratory:  No


Currently Using CPAP:  No


Currently Using BIPAP:  No


Cardiac:  No


Neurological:  No


Hx :  3


Hx Para:  3


Reproductive Disorders:  No


Female Reproductive Disorders:  Ovarian Cyst


GYN History:  Tubal Ligation


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


HIV/AIDS:  No


Genitourinary:  No


Kidney Infection


Gastrointestinal:  No


Gall Bladder Disease


Musculoskeletal:  No


Endocrine:  No


HEENT:  No


Loss of Vision:  Denies


Hearing Impairment:  Denies


Cancer:  No


Psychosocial:  Yes


Anxiety, Depression


Integumentary:  No


Blood Disorders:  No


Adverse Reaction/Blood Tranf:  No





Family Medical History


Reviewed Nursing Family Hx





Colon cancer


  GRANDFATHER


Diabetes mellitus


  19 MOTHER


  GRANDFATHER


  GRANDMOTHER


FH: breast cancer


  GRANDMOTHER


FH: depression


  19 FATHER


  19 MOTHER


FH: emphysema


  GRANDFATHER


FH: lung cancer


  GRANDMOTHER


FH: sleep apnea


  19 MOTHER


FH: stroke


  GRANDFATHER


Hepatitis C


  19 FATHER


Hypercholesterolemia


  19 MOTHER


  GRANDFATHER


  GRANDMOTHER


Hypertension


  19 FATHER


  19 MOTHER


  GRANDMOTHER


Myocardial infarction


  GRANDFATHER


Thyroid disease (HYPOTHYROIDISM)


  19 MOTHER


Heart Disease, Cancer, Diabetes, Hypertension





Physical Exam


Vital Signs





Vital Signs - First Documented








 20





 00:31


 


Temp 37.5


 


Pulse 92


 


Resp 18


 


B/P (MAP) 140/70 (93)


 


Pulse Ox 99


 


O2 Delivery Room Air





Capillary Refill :


Height, Weight, BMI


Height: 5'6.50"


Weight: 225lbs. 0.0oz. 102.310278qg; 35.8 BMI


Method:Stated


General Appearance:  No Apparent Distress, WD/WN


HEENT:  PERRL/EOMI, Pharynx Normal


Neck:  Non Tender, Supple


Respiratory:  Lungs Clear, Normal Breath Sounds


Cardiovascular:  Regular Rate, Rhythm, No Murmur


Gastrointestinal:  Non Tender, Soft


Back:  Normal Inspection, No CVA Tenderness, No Vertebral Tenderness


Extremity:  Normal Range of Motion, Non Tender


Neurologic/Psychiatric:  Alert, Oriented x3


Skin:  Normal Color, Warm/Dry





Procedures/Interventions





   Suture Size:  4-0





Progress/Results/Core Measures


Suspected Sepsis


SIRS


Temperature: 


Pulse:  


Respiratory Rate: 


 


Laboratory Tests


20 00:08: White Blood Count 10.8


Blood Pressure  / 


Mean: 


 





Laboratory Tests


20 00:08: 


Creatinine 0.83, Platelet Count 216, Total Bilirubin 0.4








Results/Orders


Lab Results





Laboratory Tests








Test


 20


00:08 20


00:12 Range/Units


 


 


White Blood Count


 10.8 


 


 4.3-11.0


10^3/uL


 


Red Blood Count


 4.58 


 


 4.35-5.85


10^6/uL


 


Hemoglobin 12.9   11.5-16.0  G/DL


 


Hematocrit 40   35-52  %


 


Mean Corpuscular Volume 87   80-99  FL


 


Mean Corpuscular Hemoglobin 28   25-34  PG


 


Mean Corpuscular Hemoglobin


Concent 32 


 


 32-36  G/DL





 


Red Cell Distribution Width 14.9 H  10.0-14.5  %


 


Platelet Count


 216 


 


 130-400


10^3/uL


 


Mean Platelet Volume 11.7 H  7.4-10.4  FL


 


Neutrophils (%) (Auto) 60   42-75  %


 


Lymphocytes (%) (Auto) 31   12-44  %


 


Monocytes (%) (Auto) 6   0-12  %


 


Eosinophils (%) (Auto) 3   0-10  %


 


Basophils (%) (Auto) 0   0-10  %


 


Neutrophils # (Auto) 6.5   1.8-7.8  X 10^3


 


Lymphocytes # (Auto) 3.4   1.0-4.0  X 10^3


 


Monocytes # (Auto) 0.6   0.0-1.0  X 10^3


 


Eosinophils # (Auto)


 0.3 


 


 0.0-0.3


10^3/uL


 


Basophils # (Auto)


 0.0 


 


 0.0-0.1


10^3/uL


 


Erythrocyte Sedimentation Rate 6   0-20  MM/HR


 


D-Dimer


 0.31 


 


 0.00-0.49


UG/ML


 


Urine Color DARK YELLOW    


 


Urine Clarity SL CLOUDY    


 


Urine pH 6.5   5-9  


 


Urine Specific Gravity 1.025 H  1.016-1.022  


 


Urine Protein NEGATIVE   NEGATIVE  


 


Urine Glucose (UA) NEGATIVE   NEGATIVE  


 


Urine Ketones NEGATIVE   NEGATIVE  


 


Urine Nitrite POSITIVE H  NEGATIVE  


 


Urine Bilirubin NEGATIVE   NEGATIVE  


 


Urine Urobilinogen 1.0   < = 1.0  MG/DL


 


Urine Leukocyte Esterase NEGATIVE   NEGATIVE  


 


Urine RBC (Auto) NEGATIVE   NEGATIVE  


 


Urine RBC 2-5 H   /HPF


 


Urine WBC 2-5    /HPF


 


Urine Squamous Epithelial


Cells 0-2 


 


  /HPF





 


Urine Crystals NONE    /LPF


 


Urine Bacteria LARGE H   /HPF


 


Urine Casts NONE    /LPF


 


Urine Mucus NEGATIVE    /LPF


 


Urine Culture Indicated YES    


 


Sodium Level 140   135-145  MMOL/L


 


Potassium Level 3.8   3.6-5.0  MMOL/L


 


Chloride Level 108 H    MMOL/L


 


Carbon Dioxide Level 21   21-32  MMOL/L


 


Anion Gap 11   5-14  MMOL/L


 


Blood Urea Nitrogen 11   7-18  MG/DL


 


Creatinine


 0.83 


 


 0.60-1.30


MG/DL


 


Estimat Glomerular Filtration


Rate > 60 


 


  





 


BUN/Creatinine Ratio 13    


 


Glucose Level 93     MG/DL


 


Calcium Level 8.8   8.5-10.1  MG/DL


 


Corrected Calcium 8.6   8.5-10.1  MG/DL


 


Magnesium Level 2.0   1.6-2.4  MG/DL


 


Total Bilirubin 0.4   0.1-1.0  MG/DL


 


Aspartate Amino Transf


(AST/SGOT) 13 


 


 5-34  U/L





 


Alanine Aminotransferase


(ALT/SGPT) 16 


 


 0-55  U/L





 


Alkaline Phosphatase 66     U/L


 


Lactate Dehydrogenase 121 L  125-220  U/L


 


C-Reactive Protein High


Sensitivity 2.84 H


 


 0.00-0.50


MG/DL


 


Total Protein 7.2   6.4-8.2  GM/DL


 


Albumin 4.3   3.2-4.5  GM/DL


 


Group A Streptococcus Screen  NEGATIVE  NEGATIVE  








Micro Results





Microbiology


20 Influenza Types A,B Antigen (RICK) - Final, Complete


         





My Orders





Orders - JEREMI MARTIN MD


Cbc With Automated Diff (20 00:07)


Comprehensive Metabolic Panel (20 00:07)


Hs C Reactive Protein (20 00:07)


Erythrocyte Sedimentation Rate (20 00:07)


Fibrin Degradation Products (20 00:07)


Magnesium (20 00:07)


Rapid Strep A Screen (20 00:07)


Ua Culture If Indicated (20 00:07)


Influenza A And B Antigens (20 00:07)


Urine Pregnancy Bedside (20 00:07)


LDH (20 00:07)


Urine Culture (20 00:08)


Cephalexin Capsule (Keflex Capsule) (20 00:55)





Vital Signs/I&O











 20





 00:31


 


Temp 37.5


 


Pulse 92


 


Resp 18


 


B/P (MAP) 140/70 (93)


 


Pulse Ox 99


 


O2 Delivery Room Air





Capillary Refill :


Progress Note :  


Progress Note


Seen and evaluated. Given her dizziness and thick symptoms we will go ahead and 

check some basic labs as well as the prescreening swabs. No indication of 

needing and viral infection currently and vital signs are normal with benign 

exam. We will also check UA. Monitor patient. 0056: UTI noted. Keflex 500 mg by 

mouth given. Strep is negative. CBC is reassuring. Anticipate discharge home. 

0102: Labs complete and/or reassuring. Discharged home with return precautions. 

Patient verbalize understanding instructions and agreement with plan. No 

indication of other infection including COVID-19 currently.





Departure


Impression





   Primary Impression:  


   Urinary tract infection


   Qualified Codes:  N30.01 - Acute cystitis with hematuria


Disposition:   HOME, SELF-CARE


Condition:  Stable





Departure-Patient Inst.


Decision time for Depature:  00:57


Referrals:  


NO,LOCAL PHYSICIAN (PCP/Family)


Primary Care Physician


Patient Instructions:  Urinary Tract Infection, Adult (DC)





Add. Discharge Instructions:  








All discharge instructions reviewed with patient and/or family. Voiced 

understanding. 





Drink plenty of fluids. Take medications as directed. Follow-up with your DrLavell in

a few days for recheck. Return for worse pain, fever, vomiting, weakness, 

breathing problems or other concerns as needed. You may take 

Tylenol/acetaminophen 1000 mg every 8 hours as needed for fever or pain. You may

take ibuprofen 600 mg every 8 hours as needed for fever or pain.


Scripts


Cephalexin (Cephalexin) 500 Mg Tablet


500 MG PO BID, #10 TAB 0 Refills


   Prov: JEREMI MARTIN MD         20


Work/School Note:  Work Release Form   Date Seen in the Emergency Department:  

2020


   Return to Work:  2020


   Restrictions:  No Restrictions











JEREMI MARTIN MD           2020 00:18
No complaints

## 2020-07-11 NOTE — PROGRESS NOTE BEHAVIORAL HEALTH - SECONDARY DX1
GONZALEZ (acute kidney injury)
No complaints
GONZALEZ (acute kidney injury)

## 2021-02-12 NOTE — BRIEF OPERATIVE NOTE - PROCEDURE
What Type Of Note Output Would You Prefer (Optional)?: Bullet Format How Severe Is Your Rash?: moderate Is This A New Presentation, Or A Follow-Up?: Rash <<-----Click on this checkbox to enter Procedure IVC filter placement  03/30/2018    Active  TAN

## 2021-06-29 NOTE — ED ADULT NURSE NOTE - PAIN RATING/NUMBER SCALE (0-10): ACTIVITY
Emailed Yessica with [facility] to get current dosing sheet and verify that concentration has been increased. Kezia Arnett RN on 6/29/2021 at 11:31 AM  _________________________________________________________    Per Yessica with [facility]:    For Karen Anderson, she did just increase her cassette conc:    As of 6/25, she was at 34 ng/kg/ min (cassette conc 60,000 ng/ml with 46 ml/day),  and today she reported being at 35 ng/kg/min (conc 70,000 ng/ml with rated of 41 ml/day)    Even with the +/- 6% pump variance, the 46 ml/day theoretically works, even with priming the tubing, but agreed- there isn t much left over!  We go up to the rate of 46 ml/day to minimize drug waste, but occasionally a pump does run on the +6% end.  If she has problems again when her rate gets up there, let us know.    ----- Message from Fabby Seymour RN sent at 6/25/2021 10:08 AM CDT -----  Regarding: FW: UPDATES POST VISIT  JANAE,    I spoke with Shawna this morning and advised her you had already called [facility].  We are assuming they will automatically change the concentration, but can you verify once we receive the new dosing guide.    Thanks!  Fabby  ----- Message -----  From: Shawna Marcum PA  Sent: 6/24/2021   6:15 PM CDT  To: Fabby Seymour RN  Subject: UPDATES POST VISIT                               Labs still weren't resulted at 6:15 pm---we need to review in Am and please text/call me with results so I can see if diuretic adjsutments are needed.     Also--please call [facility] ASAP and give them the ok to increase concentration on her mixing.  Her cassette is running low when they get close to the 48 hours, so I'd like to increase concentration so we can reduce pump rate.  Let me know if you have questions--I don't think they need specific orders to do this.    Thlaura Matos        
0

## 2021-09-30 NOTE — ED PROVIDER NOTE - NS ED MD DISPO ADMIT [facility]
CALLED PATIENTS BROTHER SPENCER NORRIS ( POA) , HE STATES HE SIGNS HER CONSENTS AND HE WILL BRING POA PAPERWORK TOMORROW 10/1/21 TO THE HOSPITAL WHEN HE BRINGS CELSA FOR SURGERY. MEDICINE

## 2022-02-16 NOTE — PATIENT PROFILE ADULT - NSPROMEDSPUMP_GEN_A_NUR
[EDS: ESS=____] : daytime somnolence: ESS=[unfilled] [Fatigue] : fatigue [Shortness Of Breath] : shortness of breath [Obesity] : obesity [Difficulty Maintaining Sleep] : difficulty maintaining sleep [Negative] : Psychiatric [Nasal Congestion] : no nasal congestion [Snoring] : no snoring [Chest Pain] : no chest pain [Palpitations] : no palpitations [A.M. Headache] : no headache present upon awakening [Leg Dysesthesias] : no leg dysesthesias [Difficulty Initiating Sleep] : no difficulty falling asleep none [Lower Extremity Discomfort] : no lower extremity discomfort [Late day/ Evening symptoms] : no late day/evening symptoms [Unusual Sleep Behavior] : no unusual sleep behavior [Hypersomnolence] : not sleeping much more than usual [Cataplexy] :  no cataplexy

## 2022-10-22 NOTE — PATIENT PROFILE ADULT. - NSCAFFEAMTFREQ_GEN_ALL_CORE_SD
Pt presents to ED via EMS with c/o left ankle pain after GLF 3 hours PTA. Pt states unable to bear weight. Pt denies taking medication for pain. 1-2 cups/cans per day

## 2023-02-08 NOTE — CHART NOTE - NSCHARTNOTEFT_GEN_A_CORE
Admitting Diagnosis:   Patient is a 78y old  Female who presents with a chief complaint of Abdominal wound and multifactorial sepsis (17 Apr 2018 21:02)      PAST MEDICAL & SURGICAL HISTORY:  Hypothyroidism  GERD (gastroesophageal reflux disease)  Obesity  DM (diabetes mellitus)  HLD (hyperlipidemia)  HTN (hypertension)  H/O ventral hernia repair      Current Nutrition Order:  Osmolite 1.2 via PEG at goal rate of 60ml/hr x 24hr (1440ml TV, 1728kcal, 79g, 1180ml water). Running at goal rate    GI Issues:   Per RN, pt tolerating feeds well. Regular BMs.     Pain:  Pt appears to be resting comfortably; sleeping     Skin Integrity:  Cecilio score 14  L thigh skin tear  Abd ASW with VAC  PEG site     Labs:   06-03    141  |  107  |  21  ----------------------------<  140<H>  4.1   |  24  |  0.68    Ca    9.3      03 Jun 2018 06:56  Phos  3.4     06-03  Mg     1.8     06-03      CAPILLARY BLOOD GLUCOSE      POCT Blood Glucose.: 163 mg/dL (04 Jun 2018 05:43)  POCT Blood Glucose.: 153 mg/dL (04 Jun 2018 00:17)  POCT Blood Glucose.: 195 mg/dL (03 Jun 2018 21:12)  POCT Blood Glucose.: 131 mg/dL (03 Jun 2018 16:26)      Medications:  MEDICATIONS  (STANDING):  amLODIPine   Tablet 10 milliGRAM(s) Oral daily  artificial  tears Solution 1 Drop(s) Both EYES two times a day  dextrose 50% Injectable 12.5 Gram(s) IV Push once  dextrose 50% Injectable 25 Gram(s) IV Push once  heparin  Injectable 5000 Unit(s) SubCutaneous every 8 hours  insulin glargine Injectable (LANTUS) 10 Unit(s) SubCutaneous at bedtime  insulin lispro (HumaLOG) corrective regimen sliding scale   SubCutaneous every 6 hours  labetalol 200 milliGRAM(s) Oral two times a day  lactobacillus acidophilus 1 Tablet(s) Oral daily  levothyroxine Injectable 75 MICROGram(s) IV Push <User Schedule>  lisinopril 40 milliGRAM(s) Enteral Tube daily  nystatin Powder 1 Application(s) Topical two times a day  pantoprazole   Suspension 40 milliGRAM(s) Oral daily  sodium chloride 0.9% lock flush 20 milliLiter(s) IV Push once    MEDICATIONS  (PRN):  acetaminophen    Suspension. 650 milliGRAM(s) Oral every 6 hours PRN Moderate Pain (4 - 6)  ALBUTerol/ipratropium for Nebulization 3 milliLiter(s) Nebulizer every 6 hours PRN Shortness of Breath and/or Wheezing  ondansetron Injectable 4 milliGRAM(s) IV Push every 6 hours PRN Nausea  sodium chloride 0.9% lock flush 10 milliLiter(s) IV Push every 1 hour PRN After each medication administration  sodium chloride 0.9% lock flush 10 milliLiter(s) IV Push every 12 hours PRN Lumen of catheter NOT used      Weight:  90.5kg (4/21)  93.6kg (5/15)    Weight Change:   ~3kg wt gain noted; please continue to trend 3x week   No new wts taken.     Estimated energy needs:   IBW 61kg used for EER and adjusted for post-op/VAC  Calories: 25-30 kcal/kg = 1525-1830kcal/day  Protein: 1.2-1.4 g/kg = 73-85g protein/day  Fluids: 30-35 mL/kg = 1830-2135mL/day    Subjective:   77y/o F with sepsis from open abdominal wound infection, MSSA bacteremia present on admission, and UTI present on admission found to have contained small bowel leak, s/p exlap, TAHBSO, SBR 5/15, s/p PEG 5/16. Pt seen asleep in bed. Feeds running at goal rate. Formula switched from Glucerna to Osmolite 2/2 stool output. Ordered for lactobacillus acidophilus. Per RN, pt is tolerating feeds well. Fecal incontinence w/ regular BMs. Passed bedside dysphagia screen 6/3. Please closely monitor BG as osmolite is not a carb steady formula. Will follow.     Previous Nutrition Diagnosis:  Increased nutrient needs RT increased demand AEB post-op    Active [X]  Resolved [   ]    If resolved, new PES:     Goal:   Pt to meet % of EER via EN     Recommendations:  1. Continue w/ Osmolite 1.2 via PEG at goal rate of 60ml/hr x 24hr (1440ml TV, 1728kcal, 79g, 1180ml water). Additional fluid per team. Monitor for s/s of intolerance and maintain aspiration precautions.   2. Closely monitor BG as osmolite is not a carb steady formula. Will follow.   3. Monitor lytes and replete prn.   4. Trend wts 3x/week.     Education:   N/A    Risk Level: High [X] Moderate [   ] Low [   ]. Epidermal Closure Graft Donor Site (Optional): running

## 2023-04-10 NOTE — PROGRESS NOTE ADULT - HEMATOLOGY/LYMPHATICS
Patient here with constipation which started today. She stated she took several laxative at 5 pm today     Triage Assessment     Row Name 04/09/23 2030       Triage Assessment (Adult)    Airway WDL WDL       Respiratory WDL    Respiratory WDL WDL       Skin Circulation/Temperature WDL    Skin Circulation/Temperature WDL WDL       Cardiac WDL    Cardiac WDL WDL       Peripheral/Neurovascular WDL    Peripheral Neurovascular WDL WDL       Cognitive/Neuro/Behavioral WDL    Cognitive/Neuro/Behavioral WDL WDL              
negative

## 2023-05-25 NOTE — PROGRESS NOTE ADULT - PROBLEM SELECTOR PROBLEM 5
Small bowel perforation Complex Repair And Single Advancement Flap Text: The defect edges were debeveled with a #15 scalpel blade.  The primary defect was closed partially with a complex linear closure.  Given the location of the remaining defect, shape of the defect and the proximity to free margins a single advancement flap was deemed most appropriate for complete closure of the defect.  Using a sterile surgical marker, an appropriate advancement flap was drawn incorporating the defect and placing the expected incisions within the relaxed skin tension lines where possible.    The area thus outlined was incised deep to adipose tissue with a #15 scalpel blade.  The skin margins were undermined to an appropriate distance in all directions utilizing iris scissors.

## 2023-07-06 NOTE — DISCHARGE NOTE ADULT - PROVIDER RX CONTACT NUMBER
----- Message from Debo Mario sent at 7/6/2023  8:54 AM CDT -----  Contact: Cynthia Beckford is calling in regards to her being 10 minutes late.please call back at [phone]            Thanks  DENISE    
Returned call; No answer./CS  
[phone]

## 2023-09-22 NOTE — PROGRESS NOTE ADULT - SUBJECTIVE AND OBJECTIVE BOX
Sharifa (wife)/Health Care Proxy (HCP) unknown Patient seen and examined at bedside. Patient is a 78 year old female with a history of diabetes mellitus, hyperlipidemia, hypertension and pulmonary embolism whom developed oliguric GONZALEZ from ATN which required dialysis. Patient has not received dialysis for several days now. Patient is comfortable and in no acute distress.     acetaminophen   Tablet. 650 milliGRAM(s) every 6 hours PRN  acetaminophen   Tablet. 650 milliGRAM(s) every 6 hours PRN  amLODIPine   Tablet 10 milliGRAM(s) daily  ARIPiprazole 5 milliGRAM(s) daily  cholecalciferol 1000 Unit(s) daily  dextrose 5%. 1000 milliLiter(s) <Continuous>  dextrose 50% Injectable 12.5 Gram(s) once  dextrose 50% Injectable 25 Gram(s) once  dextrose 50% Injectable 25 Gram(s) once  dextrose Gel 1 Dose(s) once PRN  escitalopram 10 milliGRAM(s) daily  glucagon  Injectable 1 milliGRAM(s) once PRN  heparin  flush 100 Units/mL Injectable 100 Unit(s) every other day  insulin lispro (HumaLOG) corrective regimen sliding scale   Before meals and at bedtime  levothyroxine 150 MICROGram(s) daily  medroxyPROGESTERone 10 milliGRAM(s) daily  QUEtiapine 12.5 milliGRAM(s) at bedtime PRN  sodium chloride 0.65% Nasal 1 Spray(s) every 4 hours PRN    Allergies    No Known Allergies    Intolerances    T(C): , Max: 36.9 (04-08-18 @ 09:31)  T(F): , Max: 98.5 (04-08-18 @ 09:31)  HR: 88 (04-08-18 @ 09:31)  BP: 168/73 (04-08-18 @ 09:31)  BP(mean): 99 (04-07-18 @ 15:13)  RR: 18 (04-08-18 @ 09:31)  SpO2: 90% (04-08-18 @ 09:31)    04-07 @ 07:01  -  04-08 @ 07:00  --------------------------------------------------------  IN:  Total IN: 0 mL    OUT:    Indwelling Catheter - Urethral: 600 mL  Total OUT: 600 mL    Total NET: -600 mL    LABS:                        7.7    11.0  )-----------( 196      ( 07 Apr 2018 23:46 )             24.7     04-07    143  |  104  |  35<H>  ----------------------------<  79  3.8   |  21<L>  |  1.92<H>    Ca    9.0      07 Apr 2018 11:04  Mg     1.9     04-07

## 2023-09-22 NOTE — PROGRESS NOTE ADULT - PROBLEM SELECTOR PROBLEM 7
CANCER TREATMENT EVALUATION AND RECOMMENDATION SUMMARY    RECTAL CANCER MULTIDISCIPLINARY CASE REVIEW    NAME OF SURGEON: Dr. Rob Monteiro    PATIENT NAME/:  Dara Small  1967    DATE:  2023        TUMOR LOCATION IN THE RECTUM (LOWER, MIDDLE, OR UPPER THIRD) -   Low rectum    INDICATION OF SPHINCTER INVOLVEMENT -   Sphincter involvement: Yes. There is internal sphincter invasion in the posterior half of the upper internal sphincter (series 8 images 14-15, and series 2 image 13.)  Suspicious extra mesorectal lymph nodes: There is a suspicious superior rectal chain 9 mm node (series 4 image 13 and series 7 image 13.)  EMVI: No    CLINICAL (PRETREATMENT) [employer]CER ([employer]) STAGE -   T4b, N1      CT SCAN STAGING -   No evidence of metastatic disease in the chest, abdomen, and pelvis      PRETREATMENT CIRCUMFERENTIAL RESECTION MARGIN STATUS (INVOLVED, THREATENED, OR NOT THREATENED) -    T4b, N1      CARCINOEMBRYONIC ANTIGEN LEVEL (CEA) -   36.2      NEOADJUVANT THERAPY RECOMMENDATION -   YES      TYPE AND DURATION OF NEOADJUVANT THERAPY RECOMMENDED -   -Chemotherapy (FOLFOX) x4 months.  -Concurrent chemo (5FU)/ RT x6 weeks    ANTICIPATED DATE AND TYPE OF SURGICAL PROCEDURE -   Anticipated surgery with Low Anterior Resection (LAR). Anticipated surgical date 2024.     CLINICAL RESEARCH STUDY ELIGIBILITY AND/OR ENROLLMENT -   N/A DM (diabetes mellitus)

## 2023-12-06 NOTE — DISCHARGE NOTE ADULT - NS AS DC FOLLOWUP INST YN
"SUBJECTIVE:   Idalia is a 69 year old, presenting for the following:  Medicare Visit (Physical, fasting.), chronic UTI, and Imm/Inj (Flu.)        12/6/2023    12:52 PM   Additional Questions   Roomed by San[employer] LAW.       Are you in the first 12 months of your Medicare coverage?  No    Imm/Inj  Associated symptoms include arthralgias, coughing, myalgias and a sore throat. Pertinent negatives include no abdominal pain, chest pain, chills, congestion, fever, headaches, joint swelling, nausea, rash or weakness.   Healthy Habits:     In general, how would you rate your overall health?  Good    Frequency of exercise:  2-3 days/week    Duration of exercise:  15-30 minutes    Do you usually eat at least 4 servings of fruit and vegetables a day, include whole grains    & fiber and avoid regularly eating high fat or \"junk\" foods?  Yes    Taking medications regularly:  Yes    Medication side effects:  Not applicable    Ability to successfully perform activities of daily living:  No assistance needed    Home Safety:  No safety concerns identified    Hearing Impairment:  No hearing concerns    In the past 6 months, have you been bothered by leaking of urine? Yes    In general, how would you rate your overall mental or emotional health?  Excellent    Additional concerns today:  Yes  UTI  This is a chronic problem. The current episode started in the past 7 days. The problem occurs daily. The problem has been unchanged. Associated symptoms include arthralgias, coughing, myalgias and a sore throat. Pertinent negatives include no abdominal pain, chest pain, chills, congestion, fever, headaches, joint swelling, nausea, rash or weakness.       Today's PHQ-2 Score:       12/6/2023    12:52 PM   PHQ-2 ( 1999 [employer])   Q1: Little interest or pleasure in doing things 0   Q2: Feeling down, depressed or hopeless 0   PHQ-2 Score 0   Q1: Little interest or pleasure in doing things Not at all   Q2: Feeling down, depressed or hopeless Not at all "   PHQ-2 Score 0           Have you ever done Advance Care Planning? (For example, a Health Directive, POLST, or a discussion with a medical provider or your loved ones about your wishes): Yes, patient states has an Advance Care Planning document and will bring a copy to the clinic.       Fall risk  Fallen 2 or more times in the past year?: No  Any fall with injury in the past year?: No    Cognitive Screening   1) Repeat 3 items (Leader, Season, Table)    2) Clock draw: NORMAL  3) 3 item recall: Recalls 3 objects  Results: 3 items recalled: COGNITIVE IMPAIRMENT LESS LIKELY    Mini-CogTM Copyright S Cyndi. Licensed by the author for use in [facility]; reprinted with permission (sodarleen@[facility]). All rights reserved.      Do you have sleep apnea, excessive snoring or daytime drowsiness? : no    Reviewed and updated as needed this visit by clinical staff   Tobacco  Allergies  Meds              Reviewed and updated as needed this visit by Provider                 Social History     Tobacco Use    Smoking status: Never    Smokeless tobacco: Never   Substance Use Topics    Alcohol use: Not Currently     Comment: once-twice a month              12/6/2023    12:52 PM   Alcohol Use   Prescreen: >3 drinks/day or >7 drinks/week? No          No data to display              Do you have a current opioid prescription? No  Do you use any other controlled substances or medications that are not prescribed by a provider? None          Current providers sharing in care for this patient include:   Patient Care Team:  Akhil Almodovar MD as PCP - General (Family Practice)  Akhil Almodovar MD as Assigned PCP    The following health maintenance items are reviewed in Epic and correct as of today:  Health Maintenance   Topic Date Due    SPIROMETRY  Never done    COPD ACTION PLAN  Never done    RSV VACCINE (Pregnancy & 60+) (1 - 1-dose 60+ series) Never done    COLORECTAL CANCER SCREENING  01/01/2015    MAMMO SCREENING  02/04/2021     DTAP/TDAP/TD IMMUNIZATION (2 - Td or Tdap) 06/06/2022    MEDICARE ANNUAL WELLNESS VISIT  01/10/2023    INFLUENZA VACCINE (1) 09/01/2023    COVID-19 Vaccine (4 - 2023-24 season) 09/01/2023    TSH W/FREE T4 REFLEX  03/23/2024    ANNUAL REVIEW OF HM ORDERS  08/24/2024    FALL RISK ASSESSMENT  12/06/2024    EGD  07/05/2026    ADVANCE CARE PLANNING  01/10/2027    LIPID  03/23/2028    DEXA  02/04/2034    PHQ-2 (once per calendar year)  Completed    Pneumococcal Vaccine: 65+ Years  Completed    ZOSTER IMMUNIZATION  Completed    IPV IMMUNIZATION  Aged Out    HPV IMMUNIZATION  Aged Out    MENINGITIS IMMUNIZATION  Aged Out    RSV MONOCLONAL ANTIBODY  Aged Out    HEPATITIS C SCREENING  Discontinued     BP Readings from Last 3 Encounters:   12/06/23 132/83   09/06/23 120/74   07/05/23 111/71    Wt Readings from Last 3 Encounters:   12/06/23 68 kg (150 lb)   09/06/23 67.3 kg (148 lb 6.4 oz)   06/09/23 65.8 kg (145 lb)                  Patient Active Problem List   Diagnosis    Hyperlipidemia LDL goal <160    Lumbar spondylosis    Lumbar disc disease with radiculopathy    DJD (degenerative joint disease) of cervical spine    Malignant neoplasm of thyroid gland (H)    Nonspecific reaction to tuberculin test    Cyst of ovary    History of malignant neoplasm of thyroid    Postoperative hypothyroidism    Acute bronchitis    Chronic bronchitis (H)    Chronic rhinitis    Mixed incontinence    Urgency-frequency syndrome    Hypothyroidism, unspecified type    Myalgia of pelvic floor    Pelvic floor dysfunction    Fecal smearing    Personal history of urinary tract infection     Past Surgical History:   Procedure Laterality Date    APPENDECTOMY      ESOPHAGOSCOPY, GASTROSCOPY, DUODENOSCOPY (EGD), COMBINED N/A 07/05/2023    Procedure: Esophagoscopy, gastroscopy, duodenoscopy (EGD), combined;  Surgeon: David Garcia MD;  Location: [facility]    THYROIDECTOMY   2004    thyroid cancer       Social History     Tobacco Use    Smoking status: Never     Smokeless tobacco: Never   Substance Use Topics    Alcohol use: Not Currently     Comment: once-twice a month      Family History   Problem Relation Age of Onset    Diabetes Mother     Hypertension Mother     Hyperlipidemia Mother          Current Outpatient Medications   Medication Sig Dispense Refill    Acetaminophen (TYLENOL PO) Take 325 mg by mouth every 4 hours as needed for mild pain or fever      albuterol (PROAIR RESPICLICK) 108 (90 Base) MCG/ACT inhaler INHALE 1 PUFF INTO THE LUNGS EVERY 6 HOURS AS NEEDED FOR SHORTNESS OF BREATH OR DIFFICULT BREATHING OR WHEEZING Strength: 108 (90 Base) MCG/ACT 1 each 3    azelastine (ASTELIN) 0.1 % nasal spray Spray 1 spray into both nostrils 2 times daily 30 mL 1    benzonatate (TESSALON) 100 MG capsule Take 1 capsule (100 mg) by mouth 3 times daily as needed for cough 60 capsule 0    Calcium Citrate-Vitamin D (CALCIUM + D PO) Take by mouth daily      conjugated estrogens (PREMARIN) 0.625 MG/GM vaginal cream Place 0.5 g vaginally once a week 30 g 1    fluticasone (FLONASE) 50 MCG/ACT nasal spray Spray 1 spray into both nostrils At Bedtime 16 mL 3    gabapentin (NEURONTIN) 100 MG capsule TAKE 1 CAPSULE(100 MG) BY MOUTH TWICE DAILY 180 capsule 0    guaiFENesin-codeine (ROBITUSSIN AC) 100-10 MG/5ML solution Take 5 mLs by mouth every 8 hours as needed for cough 350 mL 0    levothyroxine (SYNTHROID/LEVOTHROID) 100 MCG tablet Take 1 tablet (100 mcg) by mouth daily 90 tablet 1    losartan (COZAAR) 100 MG tablet Take 1 tablet (100 mg) by mouth daily 90 tablet 2    montelukast (SINGULAIR) 10 MG tablet TAKE 1 TABLET(10 MG) BY MOUTH AT BEDTIME 90 tablet 1    Multiple Vitamins-Minerals (PRESERVISION AREDS PO) Take by mouth daily      nitroFURantoin macrocrystal-monohydrate (MACROBID) 100 MG capsule Take 1 capsule (100 mg) by mouth 2 times daily 14 capsule 0    omeprazole (PRILOSEC) 20 MG DR capsule Take 1 capsule (20 mg) by mouth daily 90 capsule 0    simvastatin (ZOCOR) 40 MG  tablet TAKE 1 TABLET(40 MG) BY MOUTH AT BEDTIME 90 tablet 1    WIXELA INHUB 250-50 MCG/ACT inhaler INHALE 1 PUFF INTO THE LUNGS EVERY 12 HOURS 180 each 1    chlorthalidone (HYGROTON) 25 MG tablet Take 0.5 tablets (12.5 mg) by mouth daily (Patient not taking: Reported on 12/6/2023) 45 tablet 2    omeprazole (PRILOSEC) 20 MG DR capsule TAKE 1 CAPSULE(20 MG) BY MOUTH TWICE DAILY (Patient not taking: Reported on 12/6/2023) 84 capsule 0     No Known Allergies  Recent Labs   Lab Test 03/23/23  1135 01/10/22  1339 08/17/20  1139 02/03/20  1209 07/22/19  1230   A1C  --   --   --   --  5.7*   * 122*  --  102*  --    HDL 61 64  --  58  --    TRIG 144 213*  --  227* 159*   ALT 22 25  --  23 36   CR 0.63 0.63  --  0.64 0.63   GFRESTIMATED >90 >90  --  >90 >90   GFRESTBLACK  --   --   --  >90 >90   POTASSIUM 4.2 3.4  --  3.6 3.5   TSH 0.01* 25.57*   < > <0.01*  --     < > = values in this interval not displayed.              1/10/2022    12:55 PM   Breast CA Risk Assessment (FHS-7)   Do you have a family history of breast, colon, or ovarian cancer? No / Unknown       click delete button to remove this line now  Mammogram Screening: Recommended mammography every 1-2 years with patient discussion and risk factor consideration  Pertinent mammograms are reviewed under the imaging tab.    Review of Systems   Constitutional:  Negative for chills and fever.   HENT:  Positive for sore throat. Negative for congestion, ear pain and hearing loss.    Eyes:  Positive for pain. Negative for visual disturbance.   Respiratory:  Positive for cough and shortness of breath.    Cardiovascular:  Positive for palpitations. Negative for chest pain and peripheral edema.   Gastrointestinal:  Negative for abdominal pain, constipation, diarrhea, heartburn, hematochezia and nausea.   Breasts:  Negative for tenderness, breast mass and discharge.   Genitourinary:  Positive for dysuria, frequency and urgency. Negative for genital sores, hematuria,  "pelvic pain, vaginal bleeding and vaginal discharge.   Musculoskeletal:  Positive for arthralgias and myalgias. Negative for joint swelling.   Skin:  Negative for rash.   Neurological:  Negative for dizziness, weakness, headaches and paresthesias.   Psychiatric/Behavioral:  Negative for mood changes. The patient is not nervous/anxious.          OBJECTIVE:   /83   Pulse 55   Temp 97.6  F (36.4  C) (Temporal)   Resp 16   Ht 1.566 m (5' 1.65\")   Wt 68 kg (150 lb)   LMP  (LMP Unknown)   SpO2 95%   BMI 27.74 kg/m   Estimated body mass index is 27.74 kg/m  as calculated from the following:    Height as of this encounter: 1.566 m (5' 1.65\").    Weight as of this encounter: 68 kg (150 lb).  Physical Exam  GENERAL: healthy, alert and no distress  EYES: Eyes grossly normal to inspection, PERRL and conjunctivae and sclerae normal  HENT: ear canals and TM's normal, nose and mouth without ulcers or lesions  NECK: no adenopathy, no asymmetry, masses, or scars and thyroid normal to palpation  RESP: lungs clear to auscultation - no rales, rhonchi or wheezes  CV: regular rate and rhythm, normal S1 S2, no S3 or S4, no murmur, click or rub, no peripheral edema and peripheral pulses strong  ABDOMEN: soft, nontender, no hepatosplenomegaly, no masses and bowel sounds normal  MS: no gross musculoskeletal defects noted, no edema  SKIN: no suspicious lesions or rashes  NEURO: Normal strength and tone, mentation intact and speech normal  BACK: no CVA tenderness, no paralumbar tenderness  PSYCH: mentation appears normal, affect normal/bright        ASSESSMENT / PLAN:   (Z00.01) Encounter for general adult medical examination with abnormal findings  (primary encounter diagnosis)  Plan: Colonoscopy Screening [facility] [place]         SCREENING DIGITAL BILAT - Future  (s+30), CBC         with platelets, Comprehensive metabolic panel         (BMP + Alb, Alk Phos, ALT, AST, Total. Bili,         TP), Lipid panel reflex to direct " "LDL Fasting,         TSH with free T4 reflex, UA with Microscopic         reflex to Culture - lab collect            (J45.40) Moderate persistent asthma without complication  Comment: seeing pulmo and started trelogy, will have her to continue monitoring, Wixela dc'ed   Plan: mentioned above     (Z12.11) Screen for colon cancer  Plan: Colonoscopy Screening  Referral            (Z12.31) Visit for screening mammogram  Plan: [place] SCREENING DIGITAL BILAT - Future  (s+30)            (I10) Benign essential hypertension  Comment: stable, keep monitoring   Plan: mentioned above     (E03.9) Hypothyroidism, unspecified type  Plan: TSH with free T4 reflex              (R30.0) Dysuria  Comment: has been worsening over past 3-4 months, will have her to recheck UA, encouraged her vinegar sitz bath, will review the lab and update pt   Plan: UA with Microscopic reflex to Culture - lab         collect            (R32) Urinary incontinence, unspecified type  Comment: mentioned above, has been having frequent UTI, will have her to see urogyn for incontinence  Plan: Adult Uro/Gyn  Referral            (J32.9) Purulent postnasal drainage  Comment: has been having cough over past 2 months with COPD, wll have her to try H2 antihistamine   Plan: cetirizine (ZYRTEC) 10 MG tablet            Patient has been advised of split billing requirements and indicates understanding: Yes      COUNSELING:  Reviewed preventive health counseling, as reflected in patient instructions      BMI:   Estimated body mass index is 27.74 kg/m  as calculated from the following:    Height as of this encounter: 1.566 m (5' 1.65\").    Weight as of this encounter: 68 kg (150 lb).   Weight management plan: Discussed healthy diet and exercise guidelines      She reports that she has never smoked. She has never used smokeless tobacco.      Appropriate preventive services were discussed with this patient, including applicable screening as appropriate for " fall prevention, nutrition, physical activity, Tobacco-use cessation, weight loss and cognition.  Checklist reviewing preventive services available has been given to the patient.    Reviewed patients plan of care and provided an AVS. The Basic Care Plan (routine screening as documented in Health Maintenance) for Idalia meets the Care Plan requirement. This Care Plan has been established and reviewed with the Patient.          Akhil Almodovar MD  [facility]    Identified Health Risks:  I have reviewed Opioid Use Disorder and Substance Use Disorder risk factors and made any needed referrals. Information on urinary incontinence and treatment options given to patient.   No

## 2024-02-10 NOTE — DIETITIAN INITIAL EVALUATION ADULT. - PT NOT SOURCE
confused/Pt declined interview at this seem. Seem confused at time of interview. Stated she wanted to sleep/other (specify)
Fall with Harm Risk

## 2024-06-01 NOTE — PROGRESS NOTE ADULT - PROBLEM SELECTOR PLAN 8
F: none  E: K>4 Mag>2   N: tube feeds, glucerna 1.5 via PEG 55cc/hr, 250cc free water flushes q6hrs  used

## 2024-07-18 NOTE — ED ADULT NURSE REASSESSMENT NOTE - NS ED NURSE REASSESS COMMENT FT1
Addended by: SILVESTRE MCKAY on: 7/18/2024 03:27 PM     Modules accepted: Orders     Received pt from night shift DORCAS Ortiz. Pt A&Ox3; denies any pain at this time.

## 2024-11-25 NOTE — ED PROVIDER NOTE - DATA REVIEWED, MDM
Physical Therapy Visit    Visit Type: Daily Treatment Note  Visit: 8  Referring Provider: Zeyad Begum MD  Medical Diagnosis (from order): R19.8 - Straining during bowel movements     Diagnosis Precautions: Osteopenia    SUBJECTIVE                                                                                                               Patient reports that she is having an easier time getting stool out, but the diameter of the stool is getting less, so she is unsure if she is completely emptying. She is not sure if she should restart metamucil or not.   Functional Change: See above for current status    Pain / Symptoms  - Pain rating (out of 10): Current: 0       OBJECTIVE                                                                                                                               Pelvic Health  Internal Vaginal Exam  -  Bulbocavernosus:         Left: tightness        Right: tightness  - Pubococcygeus:         Left: tightness        Right: tightness  - Ischiococcygeus:         Left: tightness        Right: tightness  Unable to palpate entire levator or to obturator internus.       Treatment     Manual Therapy   Internal myofascial release to initial portion of bilateral levator ani with initial sustained holds but then progressed to lateral, vertical, and diagonal stretching with moderate pressure as patient tolerates.    Therapeutic Activity   - Discussed with patient benefits of use of pelvic wand along with patient readiness towards using it. Patient educated on importance of releasing tension throughout the pelvic floor regularly. Patient shown which pelvic wand to purchase on [email or web address] along with an appropriate lubricant to purchase. Instructed patient to bring wand with her to next therapy and we will go over how to use it with very detailed instructions.    - Educated patient on how a change in routine can lead to bowel symptoms such as constipation and incomplete emptying.     Skilled  input: as detailed above    Writer verbally educated and received verbal consent for hand placement, positioning of patient, and techniques to be performed today from patient for hand placement and palpation for techniques, therapist position for techniques and clothing adjustments for techniques as described above and how they are pertinent to the patient's plan of care.  Verbal consent received today for internal pelvic floor muscle assessment and treatment.   Patient provided continued consent during evaluation and treatment.  Third person was offered to be in room during session, patient declined.  Home Exercise Program  Abdominal massage  Access Code: [email or web address]  URL: [email or web address]  Date: 10/04/2024  Prepared by: Tami Jaimes    Exercises  - Supine Belly Breathing with Hands on Stomach   - 2 x daily - 7 x weekly Incorporate pelvic drop as able  - Supine Lower Trunk Rotation  - 2 x daily - 7 x weekly - 2 sets - 10 reps  - Sidelying Thoracic and Shoulder Rotation  - 2 x daily - 7 x weekly - 2 sets - 10 reps  - Seated Piriformis Stretch with Trunk Bend  - 1 x daily - 7 x weekly - 3 sets - 30 second hold  - Seated Hamstring Stretch  - 1 x daily - 7 x weekly - 3 sets - 30 second hold      ASSESSMENT                                                                                                            Patient is expressing readiness towards use of pelvic wand to decrease pelvic floor tension, and she will bring this with her to next therapy session. She may also benefit from an internal rectal assessment, as I suspect her sphincter has increased tension. We will start with use of the pelvic wand and advance as patient tolerates.  Pain/symptoms after session (out of 10): 0  Education:   - Results of above outlined education: Verbalizes understanding    PLAN                                                                                                                            Suggestions for next session as indicated: Progress per plan of care  Instruction on pelvic wand       Therapy procedure time and total treatment time can be found documented on the Time Entry flowsheet     nurses notes/vital signs

## 2025-01-24 NOTE — PROGRESS NOTE ADULT - PROVIDER SPECIALTY LIST ADULT
Critical Care
Critical Care
Internal Medicine
MICU
Nephrology
Palliative Care
Pulmonology
Rehab Medicine
Surgery
Rehab Medicine
21F PMH ADHD, autism spectrum disorder, anxiety, chronic vertigo on meclizine p/w dysuria, urinary frequency. Was on cipro 250mg 1 week ago, was increased to 500mg 2-3 days ago as her LE increased on UA. States she has had resistant UTI's in the past. No fevers, abd pain. Was prescribed zofran a few days ago for nausea 2/2 cipro use
Nephrology
Internal Medicine

## 2025-04-23 NOTE — PROGRESS NOTE ADULT - PROBLEM SELECTOR PLAN 3
[FreeTextEntry1] : Nuclear stress done 4/23/25 is nonischemic. There is no evidence of active ischemia or CHF, there is no cardiac contraindication to planned procedure/surgery. Michelle Altamirano MD Patient with prior history of hypertension now blood pressure improving off BP medications with range of 110's.  Continue hold all BP medications including ACEi and betablokers.  Hold IV fluids for now  Maintain MAP>65-70 mmhg.

## 2025-06-10 NOTE — ED ADULT TRIAGE NOTE - BP NONINVASIVE SYSTOLIC (MM HG)
Patient Education    [employer] HANDOUT- PARENT  15 MONTH VISIT  Here are some suggestions from [employer] experts that may be of value to your family.     TALKING AND FEELING  Try to give choices. Allow your child to choose between 2 good options, such as a banana or an apple, or 2 favorite books.  Know that it is normal for your child to be anxious around new people. Be sure to comfort your child.  Take time for yourself and your partner.  Get support from other parents.  Show your child how to use words.  Use simple, clear phrases to talk to your child.  Use simple words to talk about a book s pictures when reading.  Use words to describe your child s feelings.  Describe your child s gestures with words.    TANTRUMS AND DISCIPLINE  Use distraction to stop tantrums when you can.  Praise your child when she does what you ask her to do and for what she can accomplish.  Set limits and use discipline to teach and protect your child, not to punish her.  Limit the need to say  No!  by making your home and yard safe for play.  Teach your child not to hit, bite, or hurt other people.  Be a role model.    A GOOD NIGHT S SLEEP  Put your child to bed at the same time every night. Early is better.  Make the hour before bedtime loving and calm.  Have a simple bedtime routine that includes a book.  Try to tuck in your child when he is drowsy but still awake.  Don t give your child a bottle in bed.  Don t put a TV, computer, tablet, or smartphone in your child s bedroom.  Avoid giving your child enjoyable attention if he wakes during the night. Use words to reassure and give a blanket or toy to hold for comfort.    HEALTHY TEETH  Take your child for a first dental visit if you have not done so.  Brush your child s teeth twice each day with a small smear of fluoridated toothpaste, no more than a grain of rice.  Wean your child from the bottle.  Brush your own teeth. Avoid sharing cups and spoons with your child. Don t  clean her pacifier in your mouth.    SAFETY  Make sure your child s car safety seat is rear facing until he reaches the highest weight or height allowed by the car safety seat s . In most cases, this will be well past the second birthday.  Never put your child in the front seat of a vehicle that has a passenger airbag. The back seat is the safest.  Everyone should wear a seat belt in the car.  Keep poisons, medicines, and lawn and cleaning supplies in locked cabinets, out of your child s sight and reach.  Put the Poison Help number into all phones, including cell phones. Call if you are worried your child has swallowed something harmful. Don t make your child vomit.  Place staton at the top and bottom of stairs. Install operable window guards on windows at the second story and higher. Keep furniture away from windows.  Turn pan handles toward the back of the stove.  Don t leave hot liquids on tables with tablecloths that your child might pull down.  Have working smoke and carbon monoxide alarms on every floor. Test them every month and change the batteries every year. Make a family escape plan in case of fire in your home.    WHAT TO EXPECT AT YOUR CHILD S 18 MONTH VISIT  We will talk about  Handling stranger anxiety, setting limits, and knowing when to start toilet training  Supporting your child s speech and ability to communicate  Talking, reading, and using tablets or smartphones with your child  Eating healthy  Keeping your child safe at home, outside, and in the car        Helpful Resources: Poison Help Line:  834.229.5370  Information About Car Safety Seats: www.safercar.gov/parents  Toll-free Auto Safety Hotline: 459.766.1046  Consistent with Bright Futures: Guidelines for Health Supervision of Infants, Children, and Adolescents, 4th Edition  For more information, go to https://brightfutures.aap.org.                    [phone]